# Patient Record
Sex: FEMALE | Race: BLACK OR AFRICAN AMERICAN | Employment: OTHER | URBAN - METROPOLITAN AREA
[De-identification: names, ages, dates, MRNs, and addresses within clinical notes are randomized per-mention and may not be internally consistent; named-entity substitution may affect disease eponyms.]

---

## 2017-01-04 ENCOUNTER — OFFICE VISIT (OUTPATIENT)
Dept: FAMILY MEDICINE CLINIC | Age: 57
End: 2017-01-04

## 2017-01-04 VITALS
RESPIRATION RATE: 16 BRPM | HEIGHT: 67 IN | TEMPERATURE: 98.5 F | DIASTOLIC BLOOD PRESSURE: 84 MMHG | SYSTOLIC BLOOD PRESSURE: 139 MMHG | WEIGHT: 131 LBS | OXYGEN SATURATION: 98 % | BODY MASS INDEX: 20.56 KG/M2 | HEART RATE: 99 BPM

## 2017-01-04 DIAGNOSIS — F33.9 RECURRENT MAJOR DEPRESSIVE DISORDER, REMISSION STATUS UNSPECIFIED (HCC): ICD-10-CM

## 2017-01-04 DIAGNOSIS — D50.9 IRON DEFICIENCY ANEMIA, UNSPECIFIED IRON DEFICIENCY ANEMIA TYPE: ICD-10-CM

## 2017-01-04 DIAGNOSIS — E09.9 STEROID-INDUCED DIABETES (HCC): ICD-10-CM

## 2017-01-04 DIAGNOSIS — I27.82 OTHER CHRONIC PULMONARY EMBOLISM WITHOUT ACUTE COR PULMONALE (HCC): ICD-10-CM

## 2017-01-04 DIAGNOSIS — H61.23 EXCESSIVE CERUMEN IN EAR CANAL, BILATERAL: ICD-10-CM

## 2017-01-04 DIAGNOSIS — T38.0X5A STEROID-INDUCED DIABETES (HCC): ICD-10-CM

## 2017-01-04 DIAGNOSIS — I10 ESSENTIAL HYPERTENSION: Primary | ICD-10-CM

## 2017-01-04 DIAGNOSIS — I82.501 CHRONIC DEEP VEIN THROMBOSIS (DVT) OF RIGHT LOWER EXTREMITY, UNSPECIFIED VEIN (HCC): ICD-10-CM

## 2017-01-04 LAB — HBA1C MFR BLD HPLC: 5.5 %

## 2017-01-04 NOTE — PROGRESS NOTES
HISTORY OF PRESENT ILLNESS  Paulette Gotti is a 60 y.o. female. 1/4/2017  10:02 AM    Chief Complaint   Patient presents with    Follow Up Chronic Condition       HPI: Here today for follow up on chronic disease. Had labs done 8/2016 and 9/2016. Follows with general oncology and radiation oncology for treatment of lung cancer- undergoing radiation and chemotherapy. HTN: Taking Norvasc as prescribed. Does not check BP at home. No complaints of headaches, dizziness, chest pain, SOB, or leg swelling. ECHO last done 3/2016 with EJF 55-60%. EKG done 3/2016 with ST, nonspecific T wave abnormality. 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 Anemia: Has been taking iron tablets as prescribed- has not began taking with Vit C yet, but no longer taking with milk. Has been following with oncology. Negative heme stools 8/2016. Taking stool softener daily. PE/DVT: History PE and right leg DVT 2/2016. On Xarelto and has been doing well with the medication. No signs of bleeding or side effects. She reports taking as prescribed. Anticipated lifetime on medication. Complains of bilateral ear fullness that has been happening for the last few days-she states that she was in the shower and the water has not seemed to come out of her ears. No pain, no fevers, or other ENT symptoms. Review of Systems   Constitutional: Negative for chills, fever and malaise/fatigue. HENT: Negative for congestion, ear pain and sore throat.         +ear fullness   Eyes: Negative for double vision, photophobia and pain. Respiratory: Negative for cough, shortness of breath and wheezing. Cardiovascular: Negative for chest pain, palpitations and leg swelling. Gastrointestinal: Negative for abdominal pain, constipation, diarrhea, nausea and vomiting. Genitourinary: Negative for dysuria, frequency and urgency. Musculoskeletal: Negative for back pain, joint pain and myalgias.    Skin: Negative for rash.   Neurological: Negative for dizziness, weakness and headaches. Psychiatric/Behavioral: Negative for depression, substance abuse and suicidal ideas. The patient is not nervous/anxious and does not have insomnia. 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      Social History Main Topics    Smoking status: Former Smoker     Packs/day: 0.50     Quit date: 2/28/2016    Smokeless tobacco: Not on file    Alcohol use No    Drug use: Yes     Special: Prescription    Sexual activity: Yes     Partners: Male     Birth control/ protection: None     Other Topics Concern    Not on file     Social History Narrative       Family History   Problem Relation Age of Onset    Cancer Sister 48     breast    Liver Disease Sister      cirrhosis    Heart Attack Mother 37    No Known Problems Child        Allergies   Allergen Reactions    Flagyl [Metronidazole] Hives       Current Outpatient Prescriptions   Medication Sig Dispense Refill    sertraline (ZOLOFT) 50 mg tablet Take 1 Tab by mouth daily. 30 Tab 6    rivaroxaban (XARELTO) 20 mg tab tablet Take 1 Tab by mouth daily (with breakfast). 30 Tab 6    ferrous sulfate 325 mg (65 mg iron) tablet Take 1 Tab by mouth two (2) times daily (with meals). 60 Tab 11    amLODIPine (NORVASC) 5 mg tablet Take 1 Tab by mouth daily. 30 Tab 6    ascorbic acid, vitamin C, (VITAMIN C) 100 mg tab Take 1 Tab by mouth daily. 30 Tab 11    senna-docusate (PERICOLACE) 8.6-50 mg per tablet Take 1 Tab by mouth daily. 30 Tab 0    albuterol (PROVENTIL HFA, VENTOLIN HFA, PROAIR HFA) 90 mcg/actuation inhaler Take 2 Puffs by inhalation every four (4) hours as needed for Wheezing or Shortness of Breath. 1 Inhaler 0    loratadine (CLARITIN) 10 mg tablet Take 10 mg by mouth daily. Health Maintenance and Screenings  Colon Cancer: 8/2016 negative FOBT, recent blood thinner 2/2016; consider Cologuard in future  COPD Screen/ Lung Cancer: Diagnosed with lung CA 2/2016- follows with oncology and seen pulmonology in hospital  CAD Screen: 8/2016 LDL 58, HDL 44, Trig 171  Diabetes: today A1c 5.5%- confirmed that elevated HgBA1c previously was steroid-induced. No true diabetes; however, will continue to monitor. DM meds: None  Foot exam DM: Done 7/2016  Microalbumin DM/HTN: Positive- considered ACE- will continue to monitor per patient preference  STD and HIV Screen: Declines screening  Breast Cancer: BIRADS 2 1/2015- elects to do every 2 years  Cervical Cancer: Hysterectomy in past  Osteoporosis Screen: DEXA to be done with next mammo  Hep C screening: Declines testing  Abdominal Aneurysm Screen: Not indicated  Opthalmology: Follows with eye doctor  Dentistry Services:    Hearing Impairment: No hearing loss noted  Skin Cancer Screen:    Obesity Screen: Body mass index is 20.83 kg/(m^2).     Flu Vaccination: Given 16/17 season  Pneumococcal Vaccine: Consider early vaccination  Shingles Vaccine: Not indicated for early vaccination   Tetanus Booster:   Hepatitis B Vaccinations:       *Will continue to follow up on      Advance Care Planning:   Patient was offered the opportunity to discuss advance care planning NO   Does patient have an Advance Directive:  YES   If no, did you provide information on [facility]ctions? Patient Care Team:  Patient Care Team:  Arron Soares NP as PCP - General (Nurse Practitioner)  Mike Gomez MD (Hematology and Oncology)  Jarvis Hernandez MD (Hematology and Oncology)  Whitney Moore MD (Radiation Oncology)        LABS:     Ref. Range 1/4/2017 10:05   Hemoglobin A1c (POC) Latest Units: % 5.5       RADIOLOGY:  None new to review      Physical Exam   Constitutional: She is oriented to person, place, and time. She appears well-developed and well-nourished. No distress. HENT:   Head: Normocephalic. Right Ear: External ear and ear canal normal.   Left Ear: External ear and ear canal normal.   Nose: Nose normal. No mucosal edema or rhinorrhea. Mouth/Throat: Uvula is midline, oropharynx is clear and moist and mucous membranes are normal. No oropharyngeal exudate, posterior oropharyngeal edema or posterior oropharyngeal erythema.   +cerumen bilateral ear canals impeding visualization of TMs   Eyes: EOM are normal. Pupils are equal, round, and reactive to light. Neck: Normal range of motion. Neck supple. No thyromegaly present. Cardiovascular: Normal rate, regular rhythm and normal heart sounds. No murmur heard. Pulmonary/Chest: Effort normal and breath sounds normal. No respiratory distress. Abdominal: Soft. Bowel sounds are normal. There is no tenderness. Musculoskeletal: She exhibits no edema. Lymphadenopathy:     She has no cervical adenopathy. Neurological: She is alert and oriented to person, place, and time. Skin: Skin is warm and dry.    Psychiatric: Her speech is normal and behavior is normal. Judgment normal. Her mood appears not anxious. Cognition and memory are normal. She does not exhibit a depressed mood. Vitals:    01/04/17 1001   BP: 139/84   Pulse: 99   Resp: 16   Temp: 98.5 °F (36.9 °C)   TempSrc: Oral   SpO2: 98%   Weight: 131 lb (59.4 kg)   Height: 5' 6.5\" (1.689 m)   PainSc:   0 - No pain         ASSESSMENT and PLAN  Alecia Nash was seen today for follow up chronic condition, medication refill and vaginal pain. Diagnoses and all orders for this visit:      Essential hypertension  *Controlled. Continue current medications. 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 Address every 6 months or PRN. - AMB POC HEMOGLOBIN A1C    Iron deficiency anemia, unspecified iron deficiency anemia type  *Discussed with patient- has stopped milk use with medication and this was commended. Recommend Vit C still and instructed to  at pharmacy. Recurrent major depressive disorder, remission status unspecified (HCC)  *Stable. Continue medications. Chronic deep vein thrombosis (DVT) of right lower extremity, unspecified vein (HCC)/ Other chronic pulmonary embolism without acute cor pulmonale (HCC)  *Stable. Continue Xarelto. Advised on preventing falls. Excessive cerumen in ear canal, bilateral  *Noted- recommended Debrox and rinsing ears at home in shower. Call or RTO if further lavage needed. Discussed signs of ear infection. - carbamide peroxide (DEBROX) 6.5 % otic solution; Administer 5 Drops into each ear two (2) times a day. Dispense: 7.5 mL; Refill: 0        *Plan of care reviewed with patient. Patient in agreement with plan and expresses understanding. All questions answered and patient encouraged to call or RTO if further questions or concerns. Follow-up Disposition:  Return in about 3 months (around 4/4/2017) for chronic disease followup.

## 2017-01-12 ENCOUNTER — HOSPITAL ENCOUNTER (OUTPATIENT)
Dept: INFUSION THERAPY | Age: 57
Discharge: HOME | End: 2017-01-12
Payer: MEDICAID

## 2017-01-12 VITALS
TEMPERATURE: 98 F | HEART RATE: 97 BPM | DIASTOLIC BLOOD PRESSURE: 82 MMHG | WEIGHT: 132.4 LBS | HEIGHT: 67 IN | SYSTOLIC BLOOD PRESSURE: 150 MMHG | OXYGEN SATURATION: 100 % | BODY MASS INDEX: 20.78 KG/M2 | RESPIRATION RATE: 18 BRPM

## 2017-01-12 LAB
ALBUMIN SERPL BCP-MCNC: 3.7 G/DL (ref 3.4–5)
ALBUMIN/GLOB SERPL: 1.1 {RATIO} (ref 0.8–1.7)
ALP SERPL-CCNC: 98 U/L (ref 45–117)
ALT SERPL-CCNC: 20 U/L (ref 13–56)
ANION GAP BLD CALC-SCNC: 9 MMOL/L (ref 3–18)
AST SERPL W P-5'-P-CCNC: 22 U/L (ref 15–37)
BASO+EOS+MONOS # BLD AUTO: 0.6 K/UL (ref 0–2.3)
BASO+EOS+MONOS # BLD AUTO: 10 % (ref 0.1–17)
BILIRUB SERPL-MCNC: <0.1 MG/DL (ref 0.2–1)
BUN SERPL-MCNC: 7 MG/DL (ref 7–18)
BUN/CREAT SERPL: 8 (ref 12–20)
CALCIUM SERPL-MCNC: 9.1 MG/DL (ref 8.5–10.1)
CHLORIDE SERPL-SCNC: 105 MMOL/L (ref 100–108)
CO2 SERPL-SCNC: 27 MMOL/L (ref 21–32)
CREAT SERPL-MCNC: 0.87 MG/DL (ref 0.6–1.3)
DIFFERENTIAL METHOD BLD: ABNORMAL
ERYTHROCYTE [DISTWIDTH] IN BLOOD BY AUTOMATED COUNT: 15.8 % (ref 11.5–14.5)
GLOBULIN SER CALC-MCNC: 3.3 G/DL (ref 2–4)
GLUCOSE SERPL-MCNC: 98 MG/DL (ref 74–99)
HCT VFR BLD AUTO: 33 % (ref 36–48)
HGB BLD-MCNC: 10.6 G/DL (ref 12–16)
LYMPHOCYTES # BLD AUTO: 20 % (ref 14–44)
LYMPHOCYTES # BLD: 1 K/UL (ref 1.1–5.9)
MCH RBC QN AUTO: 27.2 PG (ref 25–35)
MCHC RBC AUTO-ENTMCNC: 32.1 G/DL (ref 31–37)
MCV RBC AUTO: 84.6 FL (ref 78–102)
NEUTS SEG # BLD: 3.7 K/UL (ref 1.8–9.5)
NEUTS SEG NFR BLD AUTO: 70 % (ref 40–70)
PLATELET # BLD AUTO: 301 K/UL (ref 140–440)
POTASSIUM SERPL-SCNC: 3.8 MMOL/L (ref 3.5–5.5)
PROT SERPL-MCNC: 7 G/DL (ref 6.4–8.2)
RBC # BLD AUTO: 3.9 M/UL (ref 4.1–5.1)
SODIUM SERPL-SCNC: 141 MMOL/L (ref 136–145)
WBC # BLD AUTO: 5.3 K/UL (ref 4.5–13)

## 2017-01-12 PROCEDURE — 96375 TX/PRO/DX INJ NEW DRUG ADDON: CPT

## 2017-01-12 PROCEDURE — 80053 COMPREHEN METABOLIC PANEL: CPT | Performed by: INTERNAL MEDICINE

## 2017-01-12 PROCEDURE — 74011250636 HC RX REV CODE- 250/636

## 2017-01-12 PROCEDURE — 96409 CHEMO IV PUSH SNGL DRUG: CPT

## 2017-01-12 PROCEDURE — 85025 COMPLETE CBC W/AUTO DIFF WBC: CPT | Performed by: INTERNAL MEDICINE

## 2017-01-12 PROCEDURE — 74011000258 HC RX REV CODE- 258: Performed by: INTERNAL MEDICINE

## 2017-01-12 PROCEDURE — 36415 COLL VENOUS BLD VENIPUNCTURE: CPT | Performed by: INTERNAL MEDICINE

## 2017-01-12 PROCEDURE — 74011250636 HC RX REV CODE- 250/636: Performed by: INTERNAL MEDICINE

## 2017-01-12 PROCEDURE — 77030012965 HC NDL HUBR BBMI -A

## 2017-01-12 PROCEDURE — 96372 THER/PROPH/DIAG INJ SC/IM: CPT

## 2017-01-12 RX ORDER — SODIUM CHLORIDE 9 MG/ML
25 INJECTION, SOLUTION INTRAVENOUS CONTINUOUS
Status: DISPENSED | OUTPATIENT
Start: 2017-01-12 | End: 2017-01-13

## 2017-01-12 RX ORDER — HEPARIN SODIUM (PORCINE) LOCK FLUSH IV SOLN 100 UNIT/ML 100 UNIT/ML
SOLUTION INTRAVENOUS
Status: COMPLETED
Start: 2017-01-12 | End: 2017-01-12

## 2017-01-12 RX ORDER — ONDANSETRON 4 MG/1
4 TABLET, ORALLY DISINTEGRATING ORAL
COMMUNITY
End: 2018-04-05

## 2017-01-12 RX ADMIN — DEXAMETHASONE SODIUM PHOSPHATE 10 MG: 4 INJECTION, SOLUTION INTRA-ARTICULAR; INTRALESIONAL; INTRAMUSCULAR; INTRAVENOUS; SOFT TISSUE at 11:17

## 2017-01-12 RX ADMIN — SODIUM CHLORIDE 25 ML/HR: 900 INJECTION, SOLUTION INTRAVENOUS at 10:46

## 2017-01-12 RX ADMIN — HEPARIN SODIUM (PORCINE) LOCK FLUSH IV SOLN 100 UNIT/ML 500 UNITS: 100 SOLUTION at 12:19

## 2017-01-12 RX ADMIN — SODIUM CHLORIDE 16 MG: 900 INJECTION, SOLUTION INTRAVENOUS at 10:48

## 2017-01-12 RX ADMIN — PEGFILGRASTIM 6 MG: KIT SUBCUTANEOUS at 12:22

## 2017-01-12 RX ADMIN — SODIUM CHLORIDE 860 MG: 900 INJECTION, SOLUTION INTRAVENOUS at 12:05

## 2017-01-12 NOTE — PROGRESS NOTES
[facility] Progress Note    Date: 2017    Name: Ashlyn Cruz              MRN: [id number]              : 1960    Chemotherapy Cycle: C14      Pt to [facility], ambulatory, at 1010. Ms. Martha Guallpa was assessed and education was provided. Denies any complaints. Ms. Bashir Duenas vitals were reviewed. Visit Vitals    /82 (BP 1 Location: Right arm, BP Patient Position: Sitting)    Pulse 97    Temp 98 °F (36.7 °C)    Resp 18    Ht 5' 6.5\" (1.689 m)    Wt 60.1 kg (132 lb 6.4 oz)    SpO2 100%    Breastfeeding No    BMI 21.05 kg/m2     Left chest double mediport lateral lumen accessed with a 20 G 1 inch Downs needle after chloroprep. Flushes without difficulty and had brisk blood return. Labs drawn off and sent to lab for CBC and CMP per order after 10 mL waste. Flushed with 10 mL NS and NS started at [facility]. Lab results were obtained and reviewed. Recent Results (from the past 12 hour(s))   CBC WITH 3 PART DIFF    Collection Time: 17 10:34 AM   Result Value Ref Range    WBC 5.3 4.5 - 13.0 K/uL    RBC 3.90 (L) 4.10 - 5.10 M/uL    HGB 10.6 (L) 12.0 - 16 g/dL    HCT 33.0 (L) 36 - 48 %    MCV 84.6 78 - 102 FL    MCH 27.2 25.0 - 35.0 PG    MCHC 32.1 31 - 37 g/dL    RDW 15.8 (H) 11.5 - 14.5 %    PLATELET 794 770 - 451 K/uL    NEUTROPHILS 70 40 - 70 %    MIXED CELLS 10 0.1 - 17 %    LYMPHOCYTES 20 14 - 44 %    ABS. NEUTROPHILS 3.7 1.8 - 9.5 K/UL    ABS. MIXED CELLS 0.6 0.0 - 2.3 K/uL    ABS. LYMPHOCYTES 1.0 (L) 1.1 - 5.9 K/UL    DF AUTOMATED         Lab results within ordered parameters to give chemo today. ANC = 3.7, PLT = 301. Chemo dosages verified with today's BSA and found to be within 10% of ordered dosages. Pre-medications (Zofran 16 mg IV, Decadron 10 mg IV) were administered as ordered and chemotherapy was initiated after blood return from [facility] re-verified. Pemetrexed 860 mg was infused over 10 minutes as ordered. VS stable at end of infusion and pt denied complaints.  Line flushed with NS and blood return re-verified. Mediport flushed with 20 mL NS and 500 units of heparin per protocol before de accessing. Band-aid applied to site. Neulasta 6mg OBI was applied to the left lower quadrant of the abdomen. Green light indicator verified. Ms. Hannah Mahoney tolerated infusion, and had no complaints at this time. Patient armband removed and shredded. Ms. Hannah Mahoney was discharged from [place] [address] in stable condition at 1230. She has no further appointments with [facility] at this time.      Michael Burr RN  January 12, 2017

## 2017-03-09 ENCOUNTER — HOSPITAL ENCOUNTER (OUTPATIENT)
Dept: INFUSION THERAPY | Age: 57
Discharge: HOME | End: 2017-03-09
Payer: MEDICAID

## 2017-03-09 VITALS
OXYGEN SATURATION: 99 % | HEIGHT: 67 IN | RESPIRATION RATE: 18 BRPM | BODY MASS INDEX: 21.05 KG/M2 | HEART RATE: 90 BPM | WEIGHT: 134.1 LBS | TEMPERATURE: 97.9 F

## 2017-03-09 LAB
ALBUMIN SERPL BCP-MCNC: 3.6 G/DL (ref 3.4–5)
ALBUMIN/GLOB SERPL: 1 {RATIO} (ref 0.8–1.7)
ALP SERPL-CCNC: 99 U/L (ref 45–117)
ALT SERPL-CCNC: 12 U/L (ref 13–56)
ANION GAP BLD CALC-SCNC: 16 MMOL/L (ref 10–20)
ANION GAP BLD CALC-SCNC: 8 MMOL/L (ref 3–18)
AST SERPL W P-5'-P-CCNC: 18 U/L (ref 15–37)
BASO+EOS+MONOS # BLD AUTO: 0.1 K/UL (ref 0–2.3)
BASO+EOS+MONOS # BLD AUTO: 3 % (ref 0.1–17)
BILIRUB SERPL-MCNC: 0.2 MG/DL (ref 0.2–1)
BUN BLD-MCNC: 4 MG/DL (ref 7–18)
BUN SERPL-MCNC: 6 MG/DL (ref 7–18)
BUN/CREAT SERPL: 8 (ref 12–20)
CA-I BLD-MCNC: 1.19 MMOL/L (ref 1.12–1.32)
CALCIUM SERPL-MCNC: 8.9 MG/DL (ref 8.5–10.1)
CHLORIDE BLD-SCNC: 104 MMOL/L (ref 100–108)
CHLORIDE SERPL-SCNC: 106 MMOL/L (ref 100–108)
CO2 BLD-SCNC: 26 MMOL/L (ref 19–24)
CO2 SERPL-SCNC: 28 MMOL/L (ref 21–32)
CREAT SERPL-MCNC: 0.72 MG/DL (ref 0.6–1.3)
CREAT UR-MCNC: 0.7 MG/DL (ref 0.6–1.3)
DIFFERENTIAL METHOD BLD: ABNORMAL
ERYTHROCYTE [DISTWIDTH] IN BLOOD BY AUTOMATED COUNT: 14.4 % (ref 11.5–14.5)
GLOBULIN SER CALC-MCNC: 3.5 G/DL (ref 2–4)
GLUCOSE BLD STRIP.AUTO-MCNC: 88 MG/DL (ref 74–106)
GLUCOSE SERPL-MCNC: 82 MG/DL (ref 74–99)
HCT VFR BLD AUTO: 33.6 % (ref 36–48)
HCT VFR BLD CALC: 33 % (ref 36–49)
HGB BLD-MCNC: 10.7 G/DL (ref 12–16)
HGB BLD-MCNC: 11.2 G/DL (ref 12–16)
LYMPHOCYTES # BLD AUTO: 34 % (ref 14–44)
LYMPHOCYTES # BLD: 1.4 K/UL (ref 1.1–5.9)
MCH RBC QN AUTO: 26.3 PG (ref 25–35)
MCHC RBC AUTO-ENTMCNC: 31.8 G/DL (ref 31–37)
MCV RBC AUTO: 82.6 FL (ref 78–102)
NEUTS SEG # BLD: 2.6 K/UL (ref 1.8–9.5)
NEUTS SEG NFR BLD AUTO: 63 % (ref 40–70)
PLATELET # BLD AUTO: 177 K/UL (ref 140–440)
POTASSIUM BLD-SCNC: 3.7 MMOL/L (ref 3.5–5.5)
POTASSIUM SERPL-SCNC: 3.8 MMOL/L (ref 3.5–5.5)
PROT SERPL-MCNC: 7.1 G/DL (ref 6.4–8.2)
RBC # BLD AUTO: 4.07 M/UL (ref 4.1–5.1)
SODIUM BLD-SCNC: 142 MMOL/L (ref 136–145)
SODIUM SERPL-SCNC: 142 MMOL/L (ref 136–145)
WBC # BLD AUTO: 4.1 K/UL (ref 4.5–13)

## 2017-03-09 PROCEDURE — 74011250636 HC RX REV CODE- 250/636

## 2017-03-09 PROCEDURE — 85025 COMPLETE CBC W/AUTO DIFF WBC: CPT | Performed by: INTERNAL MEDICINE

## 2017-03-09 PROCEDURE — 80053 COMPREHEN METABOLIC PANEL: CPT | Performed by: INTERNAL MEDICINE

## 2017-03-09 PROCEDURE — 77030012965 HC NDL HUBR BBMI -A

## 2017-03-09 PROCEDURE — 96372 THER/PROPH/DIAG INJ SC/IM: CPT

## 2017-03-09 PROCEDURE — 36415 COLL VENOUS BLD VENIPUNCTURE: CPT | Performed by: INTERNAL MEDICINE

## 2017-03-09 PROCEDURE — 96375 TX/PRO/DX INJ NEW DRUG ADDON: CPT

## 2017-03-09 PROCEDURE — 96377 APPLICATON ON-BODY INJECTOR: CPT

## 2017-03-09 PROCEDURE — 74011250636 HC RX REV CODE- 250/636: Performed by: INTERNAL MEDICINE

## 2017-03-09 PROCEDURE — 74011000258 HC RX REV CODE- 258: Performed by: INTERNAL MEDICINE

## 2017-03-09 PROCEDURE — 80047 BASIC METABLC PNL IONIZED CA: CPT

## 2017-03-09 PROCEDURE — 96409 CHEMO IV PUSH SNGL DRUG: CPT

## 2017-03-09 RX ORDER — HEPARIN SODIUM (PORCINE) LOCK FLUSH IV SOLN 100 UNIT/ML 100 UNIT/ML
SOLUTION INTRAVENOUS
Status: COMPLETED
Start: 2017-03-09 | End: 2017-03-09

## 2017-03-09 RX ORDER — SODIUM CHLORIDE 0.9 % (FLUSH) 0.9 %
10-40 SYRINGE (ML) INJECTION AS NEEDED
Status: DISPENSED | OUTPATIENT
Start: 2017-03-09 | End: 2017-03-09

## 2017-03-09 RX ORDER — HEPARIN SODIUM (PORCINE) LOCK FLUSH IV SOLN 100 UNIT/ML 100 UNIT/ML
500 SOLUTION INTRAVENOUS AS NEEDED
Status: ACTIVE | OUTPATIENT
Start: 2017-03-09 | End: 2017-03-10

## 2017-03-09 RX ORDER — SODIUM CHLORIDE 9 MG/ML
25 INJECTION, SOLUTION INTRAVENOUS CONTINUOUS
Status: DISPENSED | OUTPATIENT
Start: 2017-03-09 | End: 2017-03-10

## 2017-03-09 RX ORDER — CYANOCOBALAMIN 1000 UG/ML
1000 INJECTION, SOLUTION INTRAMUSCULAR; SUBCUTANEOUS ONCE
Status: COMPLETED | OUTPATIENT
Start: 2017-03-09 | End: 2017-03-09

## 2017-03-09 RX ADMIN — SODIUM CHLORIDE 25 ML/HR: 900 INJECTION, SOLUTION INTRAVENOUS at 12:26

## 2017-03-09 RX ADMIN — HEPARIN SODIUM (PORCINE) LOCK FLUSH IV SOLN 100 UNIT/ML 500 UNITS: 100 SOLUTION at 13:55

## 2017-03-09 RX ADMIN — SODIUM CHLORIDE 850 MG: 900 INJECTION, SOLUTION INTRAVENOUS at 13:35

## 2017-03-09 RX ADMIN — CYANOCOBALAMIN 1000 MCG: 1000 INJECTION, SOLUTION INTRAMUSCULAR at 13:48

## 2017-03-09 RX ADMIN — Medication 20 ML: at 11:40

## 2017-03-09 RX ADMIN — ONDANSETRON 16 MG: 2 INJECTION INTRAMUSCULAR; INTRAVENOUS at 12:26

## 2017-03-09 RX ADMIN — DEXAMETHASONE SODIUM PHOSPHATE 10 MG: 4 INJECTION, SOLUTION INTRA-ARTICULAR; INTRALESIONAL; INTRAMUSCULAR; INTRAVENOUS; SOFT TISSUE at 12:27

## 2017-03-09 RX ADMIN — Medication 10 ML: at 13:55

## 2017-03-09 RX ADMIN — PEGFILGRASTIM 6 MG: KIT SUBCUTANEOUS at 13:48

## 2017-03-09 NOTE — PROGRESS NOTES
[facility] Progress Note    Date: 2017    Name: Andria Hurst              MRN: [id number]              : 1960    Chemotherapy Cycle: C15      Pt to [facility], ambulatory, at 1115. Ms. Eulalia Crowley was assessed and education was provided. Denies any complaints. Ms. Arabella Hartmann vitals were reviewed. Visit Vitals    Pulse 90    Temp 97.9 °F (36.6 °C)    Resp 18    Ht 5' 6.5\" (1.689 m)    Wt 60.8 kg (134 lb 1.6 oz)    SpO2 99%    Breastfeeding No    BMI 21.32 kg/m2     Left chest double mediport lateral lumen accessed with a 20 G 1 inch Downs needle after chloroprep. Flushes without difficulty and had brisk blood return. Labs drawn off and sent to lab for CBC and CMP per order after 10 mL waste. Flushed with 10 mL NS and NS started at [facility]. Lab results were obtained and reviewed. Recent Results (from the past 12 hour(s))   CBC WITH 3 PART DIFF    Collection Time: 17 11:37 AM   Result Value Ref Range    WBC 4.1 (L) 4.5 - 13.0 K/uL    RBC 4.07 (L) 4.10 - 5.10 M/uL    HGB 10.7 (L) 12.0 - 16 g/dL    HCT 33.6 (L) 36 - 48 %    MCV 82.6 78 - 102 FL    MCH 26.3 25.0 - 35.0 PG    MCHC 31.8 31 - 37 g/dL    RDW 14.4 11.5 - 14.5 %    PLATELET 507 649 - 650 K/uL    NEUTROPHILS 63 40 - 70 %    MIXED CELLS 3 0.1 - 17 %    LYMPHOCYTES 34 14 - 44 %    ABS. NEUTROPHILS 2.6 1.8 - 9.5 K/UL    ABS. MIXED CELLS 0.1 0.0 - 2.3 K/uL    ABS.  LYMPHOCYTES 1.4 1.1 - 5.9 K/UL    DF AUTOMATED     METABOLIC PANEL, COMPREHENSIVE    Collection Time: 17 11:37 AM   Result Value Ref Range    Sodium 142 136 - 145 mmol/L    Potassium 3.8 3.5 - 5.5 mmol/L    Chloride 106 100 - 108 mmol/L    CO2 28 21 - 32 mmol/L    Anion gap 8 3.0 - 18 mmol/L    Glucose 82 74 - 99 mg/dL    BUN 6 (L) 7.0 - 18 MG/DL    Creatinine 0.72 0.6 - 1.3 MG/DL    BUN/Creatinine ratio 8 (L) 12 - 20      GFR est AA >60 >60 ml/min/1.73m2    GFR est non-AA >60 >60 ml/min/1.73m2    Calcium 8.9 8.5 - 10.1 MG/DL    Bilirubin, total 0.2 0.2 - 1.0 MG/DL    ALT (SGPT) 12 (L) 13 - 56 U/L    AST (SGOT) 18 15 - 37 U/L    Alk. phosphatase 99 45 - 117 U/L    Protein, total 7.1 6.4 - 8.2 g/dL    Albumin 3.6 3.4 - 5.0 g/dL    Globulin 3.5 2.0 - 4.0 g/dL    A-G Ratio 1.0 0.8 - 1.7     POC CHEM8    Collection Time: 03/09/17 11:43 AM   Result Value Ref Range    CO2 (POC) 26 (H) 19 - 24 MMOL/L    Glucose (POC) 88 74 - 106 MG/DL    BUN (POC) 4 (L) 7 - 18 MG/DL    Creatinine (POC) 0.7 0.6 - 1.3 MG/DL    GFR-AA (POC) >60 >60 ml/min/1.73m2    GFR, non-AA (POC) >60 >60 ml/min/1.73m2    Sodium (POC) 142 136 - 145 MMOL/L    Potassium (POC) 3.7 3.5 - 5.5 MMOL/L    Calcium, ionized (POC) 1.19 1.12 - 1.32 MMOL/L    Chloride (POC) 104 100 - 108 MMOL/L    Anion gap (POC) 16 10 - 20      Hematocrit (POC) 33 (L) 36 - 49 %    Hemoglobin (POC) 11.2 (L) 12 - 16 G/DL       Lab results within ordered parameters to give chemo today. ANC = 2.6, PLT = 177. Chemo dosages verified with today's BSA and found to be within 10% of ordered dosages. Pre-medications (Zofran 16 mg IV, Decadron 10 mg IV) were administered as ordered and chemotherapy was initiated after blood return from [facility] re-verified. Pemetrexed 850 mg was infused over 10 minutes as ordered. VS stable at end of infusion and pt denied complaints. Line flushed with NS and blood return re-verified. Mediport flushed with 20 mL NS and 500 units of heparin per protocol before de accessing. Band-aid applied to site. B12 IM injection given today in right deltoid. Neulasta 6mg OBI was applied to the left lower quadrant of the abdomen. Green light indicator verified. Ms. Hazel Santizo tolerated infusion, and had no complaints at this time. Patient armband removed and shredded. Ms. Hazel Friend was discharged from [place] [address] in stable condition at 1405. She has her next appt March 30 at 1000 for Alimta C16.      Queenie Rubio RN  March 9, 2017

## 2017-03-29 ENCOUNTER — TELEPHONE (OUTPATIENT)
Dept: FAMILY MEDICINE CLINIC | Age: 57
End: 2017-03-29

## 2017-03-29 NOTE — TELEPHONE ENCOUNTER
Received call from [facility] at [facility]. She needed diagnosis code for occult stool blood test from 8/31/2016. I gave her code R19.5 per Teetee Hartman NP previous order from 7/1/16. This was a repeat test. [facility] verbalized understanding.

## 2017-03-30 ENCOUNTER — HOSPITAL ENCOUNTER (OUTPATIENT)
Dept: INFUSION THERAPY | Age: 57
Discharge: HOME | End: 2017-03-30
Payer: MEDICAID

## 2017-03-30 VITALS
TEMPERATURE: 97.9 F | HEART RATE: 82 BPM | WEIGHT: 131.7 LBS | DIASTOLIC BLOOD PRESSURE: 76 MMHG | OXYGEN SATURATION: 97 % | RESPIRATION RATE: 18 BRPM | BODY MASS INDEX: 20.67 KG/M2 | HEIGHT: 67 IN | SYSTOLIC BLOOD PRESSURE: 126 MMHG

## 2017-03-30 LAB
ALBUMIN SERPL BCP-MCNC: 3.6 G/DL (ref 3.4–5)
ALBUMIN/GLOB SERPL: 1 {RATIO} (ref 0.8–1.7)
ALP SERPL-CCNC: 100 U/L (ref 45–117)
ALT SERPL-CCNC: 19 U/L (ref 13–56)
ANION GAP BLD CALC-SCNC: 7 MMOL/L (ref 3–18)
AST SERPL W P-5'-P-CCNC: 22 U/L (ref 15–37)
BASO+EOS+MONOS # BLD AUTO: 0.5 K/UL (ref 0–2.3)
BASO+EOS+MONOS # BLD AUTO: 10 % (ref 0.1–17)
BILIRUB SERPL-MCNC: 0.2 MG/DL (ref 0.2–1)
BUN SERPL-MCNC: 10 MG/DL (ref 7–18)
BUN/CREAT SERPL: 14 (ref 12–20)
CALCIUM SERPL-MCNC: 8.9 MG/DL (ref 8.5–10.1)
CHLORIDE SERPL-SCNC: 106 MMOL/L (ref 100–108)
CO2 SERPL-SCNC: 28 MMOL/L (ref 21–32)
CREAT SERPL-MCNC: 0.71 MG/DL (ref 0.6–1.3)
DIFFERENTIAL METHOD BLD: ABNORMAL
ERYTHROCYTE [DISTWIDTH] IN BLOOD BY AUTOMATED COUNT: 15.7 % (ref 11.5–14.5)
GLOBULIN SER CALC-MCNC: 3.5 G/DL (ref 2–4)
GLUCOSE SERPL-MCNC: 92 MG/DL (ref 74–99)
HCT VFR BLD AUTO: 32.6 % (ref 36–48)
HGB BLD-MCNC: 10.8 G/DL (ref 12–16)
LYMPHOCYTES # BLD AUTO: 27 % (ref 14–44)
LYMPHOCYTES # BLD: 1.2 K/UL (ref 1.1–5.9)
MCH RBC QN AUTO: 27.6 PG (ref 25–35)
MCHC RBC AUTO-ENTMCNC: 33.1 G/DL (ref 31–37)
MCV RBC AUTO: 83.2 FL (ref 78–102)
NEUTS SEG # BLD: 2.7 K/UL (ref 1.8–9.5)
NEUTS SEG NFR BLD AUTO: 63 % (ref 40–70)
PLATELET # BLD AUTO: 348 K/UL (ref 140–440)
POTASSIUM SERPL-SCNC: 4 MMOL/L (ref 3.5–5.5)
PROT SERPL-MCNC: 7.1 G/DL (ref 6.4–8.2)
RBC # BLD AUTO: 3.92 M/UL (ref 4.1–5.1)
SODIUM SERPL-SCNC: 141 MMOL/L (ref 136–145)
WBC # BLD AUTO: 4.4 K/UL (ref 4.5–13)

## 2017-03-30 PROCEDURE — 74011250636 HC RX REV CODE- 250/636

## 2017-03-30 PROCEDURE — 85025 COMPLETE CBC W/AUTO DIFF WBC: CPT | Performed by: INTERNAL MEDICINE

## 2017-03-30 PROCEDURE — 74011000258 HC RX REV CODE- 258: Performed by: INTERNAL MEDICINE

## 2017-03-30 PROCEDURE — 96375 TX/PRO/DX INJ NEW DRUG ADDON: CPT

## 2017-03-30 PROCEDURE — 96377 APPLICATON ON-BODY INJECTOR: CPT

## 2017-03-30 PROCEDURE — 77030012965 HC NDL HUBR BBMI -A

## 2017-03-30 PROCEDURE — 96409 CHEMO IV PUSH SNGL DRUG: CPT

## 2017-03-30 PROCEDURE — 74011250636 HC RX REV CODE- 250/636: Performed by: INTERNAL MEDICINE

## 2017-03-30 PROCEDURE — 80053 COMPREHEN METABOLIC PANEL: CPT | Performed by: INTERNAL MEDICINE

## 2017-03-30 RX ORDER — SODIUM CHLORIDE 9 MG/ML
250 INJECTION, SOLUTION INTRAVENOUS ONCE
Status: COMPLETED | OUTPATIENT
Start: 2017-03-30 | End: 2017-03-30

## 2017-03-30 RX ORDER — HEPARIN SODIUM (PORCINE) LOCK FLUSH IV SOLN 100 UNIT/ML 100 UNIT/ML
500 SOLUTION INTRAVENOUS AS NEEDED
Status: DISPENSED | OUTPATIENT
Start: 2017-03-30 | End: 2017-03-31

## 2017-03-30 RX ORDER — SODIUM CHLORIDE 0.9 % (FLUSH) 0.9 %
10-40 SYRINGE (ML) INJECTION AS NEEDED
Status: DISCONTINUED | OUTPATIENT
Start: 2017-03-30 | End: 2017-04-03 | Stop reason: HOSPADM

## 2017-03-30 RX ADMIN — SODIUM CHLORIDE 850 MG: 900 INJECTION, SOLUTION INTRAVENOUS at 11:53

## 2017-03-30 RX ADMIN — PEGFILGRASTIM 6 MG: KIT SUBCUTANEOUS at 12:20

## 2017-03-30 RX ADMIN — Medication 10 ML: at 10:36

## 2017-03-30 RX ADMIN — DEXAMETHASONE SODIUM PHOSPHATE 10 MG: 4 INJECTION, SOLUTION INTRA-ARTICULAR; INTRALESIONAL; INTRAMUSCULAR; INTRAVENOUS; SOFT TISSUE at 11:05

## 2017-03-30 RX ADMIN — SODIUM CHLORIDE 250 ML: 900 INJECTION, SOLUTION INTRAVENOUS at 10:39

## 2017-03-30 RX ADMIN — HEPARIN SODIUM (PORCINE) LOCK FLUSH IV SOLN 100 UNIT/ML 500 UNITS: 100 SOLUTION at 12:13

## 2017-03-30 RX ADMIN — SODIUM CHLORIDE 16 MG: 900 INJECTION, SOLUTION INTRAVENOUS at 10:41

## 2017-03-30 RX ADMIN — Medication 20 ML: at 12:10

## 2017-03-30 NOTE — PROGRESS NOTES
[facility] Progress Note    Date: 2017    Name: Judith Zelaya              MRN: [id number]              : 1960    Chemotherapy Cycle: C16      Pt to [facility], ambulatory, at 1000. Ms. Ivelisse Ahumada was assessed and education was provided. Denies any complaints. Ms. Fung's vitals were reviewed. Visit Vitals    /76 (BP 1 Location: Right arm, BP Patient Position: Sitting)    Pulse 82    Temp 97.9 °F (36.6 °C)    Resp 18    Ht 5' 6.5\" (1.689 m)    Wt 59.7 kg (131 lb 11.2 oz)    SpO2 97%    BMI 20.94 kg/m2     Left chest double mediport lateral lumen accessed with a 20 G 1 inch Downs needle after chloroprep. Flushes without difficulty and had brisk blood return. Labs drawn off and sent to lab for CBC and CMP per order after 10 mL waste. Flushed with 10 mL NS and NS started at [facility]. Lab results were obtained and reviewed. Recent Results (from the past 12 hour(s))   CBC WITH 3 PART DIFF    Collection Time: 17 10:16 AM   Result Value Ref Range    WBC 4.4 (L) 4.5 - 13.0 K/uL    RBC 3.92 (L) 4.10 - 5.10 M/uL    HGB 10.8 (L) 12.0 - 16 g/dL    HCT 32.6 (L) 36 - 48 %    MCV 83.2 78 - 102 FL    MCH 27.6 25.0 - 35.0 PG    MCHC 33.1 31 - 37 g/dL    RDW 15.7 (H) 11.5 - 14.5 %    PLATELET 407 844 - 580 K/uL    NEUTROPHILS 63 40 - 70 %    MIXED CELLS 10 0.1 - 17 %    LYMPHOCYTES 27 14 - 44 %    ABS. NEUTROPHILS 2.7 1.8 - 9.5 K/UL    ABS. MIXED CELLS 0.5 0.0 - 2.3 K/uL    ABS. LYMPHOCYTES 1.2 1.1 - 5.9 K/UL    DF AUTOMATED         Lab results within ordered parameters to give chemo today. ANC = 2.7, PLT = 348 and WBC= 4.4. Chemo dosages verified with today's BSA and found to be within 10% of ordered dosages. Pre-medications (Zofran 16 mg IV, Decadron 10 mg IV) were administered as ordered and chemotherapy was initiated after blood return from [facility] re-verified. Pemetrexed 850 mg was infused over 10 minutes as ordered. VS stable at end of infusion and pt denied complaints.  Line flushed with NS and blood return re-verified. Chemotherapy medication administered and monitored by ROSARIO Thorpe RN. Mediport flushed with 20 mL NS and 500 units of heparin per protocol before de accessing. Band-aid applied to site. Neulasta 6mg OBI was applied to the left lower quadrant of the abdomen. Green light indicator verified. Ms. Jeromy Valderrama tolerated infusion, and had no complaints at this time. Patient armband removed and shredded. Ms. Jeromy Valderrama was discharged from [place] [address] in stable condition at 1225. She has her next appt 04/20/2017 at 1000 for Alimta C17.      Guillermina Sultana RN  March 30, 2017

## 2017-04-17 DIAGNOSIS — F33.9 RECURRENT MAJOR DEPRESSIVE DISORDER, REMISSION STATUS UNSPECIFIED (HCC): ICD-10-CM

## 2017-04-19 RX ORDER — SERTRALINE HYDROCHLORIDE 50 MG/1
50 TABLET, FILM COATED ORAL DAILY
Qty: 30 TAB | Refills: 6 | Status: SHIPPED | OUTPATIENT
Start: 2017-04-19 | End: 2017-10-05 | Stop reason: SDUPTHER

## 2017-04-19 NOTE — TELEPHONE ENCOUNTER
4/19/2017  7:20 AM    Chief Complaint   Patient presents with    Medication Refill       Noted refill request for Zoloft. Last seen in office 1/2017 and upcoming appointment next week. Refill completed.

## 2017-04-20 ENCOUNTER — HOSPITAL ENCOUNTER (OUTPATIENT)
Dept: INFUSION THERAPY | Age: 57
Discharge: HOME | End: 2017-04-20
Payer: SELF-PAY

## 2017-04-20 VITALS
HEIGHT: 67 IN | HEART RATE: 90 BPM | WEIGHT: 134.6 LBS | DIASTOLIC BLOOD PRESSURE: 80 MMHG | OXYGEN SATURATION: 97 % | BODY MASS INDEX: 21.12 KG/M2 | TEMPERATURE: 98.3 F | SYSTOLIC BLOOD PRESSURE: 117 MMHG | RESPIRATION RATE: 18 BRPM

## 2017-04-20 LAB
ALBUMIN SERPL BCP-MCNC: 3.7 G/DL (ref 3.4–5)
ALBUMIN/GLOB SERPL: 1.2 {RATIO} (ref 0.8–1.7)
ALP SERPL-CCNC: 93 U/L (ref 45–117)
ALT SERPL-CCNC: 15 U/L (ref 13–56)
ANION GAP BLD CALC-SCNC: 7 MMOL/L (ref 3–18)
AST SERPL W P-5'-P-CCNC: 17 U/L (ref 15–37)
BASOPHILS # BLD AUTO: 0 K/UL (ref 0–0.1)
BASOPHILS # BLD: 1 % (ref 0–2)
BILIRUB SERPL-MCNC: 0.2 MG/DL (ref 0.2–1)
BUN SERPL-MCNC: 10 MG/DL (ref 7–18)
BUN/CREAT SERPL: 13 (ref 12–20)
CALCIUM SERPL-MCNC: 8.8 MG/DL (ref 8.5–10.1)
CHLORIDE SERPL-SCNC: 107 MMOL/L (ref 100–108)
CO2 SERPL-SCNC: 29 MMOL/L (ref 21–32)
CREAT SERPL-MCNC: 0.79 MG/DL (ref 0.6–1.3)
DIFFERENTIAL METHOD BLD: ABNORMAL
EOSINOPHIL # BLD: 0.1 K/UL (ref 0–0.4)
EOSINOPHIL NFR BLD: 3 % (ref 0–5)
ERYTHROCYTE [DISTWIDTH] IN BLOOD BY AUTOMATED COUNT: 17.4 % (ref 11.6–14.5)
GLOBULIN SER CALC-MCNC: 3.2 G/DL (ref 2–4)
GLUCOSE SERPL-MCNC: 106 MG/DL (ref 74–99)
HCT VFR BLD AUTO: 31.1 % (ref 35–45)
HGB BLD-MCNC: 10 G/DL (ref 12–16)
LYMPHOCYTES # BLD AUTO: 25 % (ref 21–52)
LYMPHOCYTES # BLD: 1 K/UL (ref 0.9–3.6)
MCH RBC QN AUTO: 26.7 PG (ref 24–34)
MCHC RBC AUTO-ENTMCNC: 32.2 G/DL (ref 31–37)
MCV RBC AUTO: 82.9 FL (ref 74–97)
MONOCYTES # BLD: 0.5 K/UL (ref 0.05–1.2)
MONOCYTES NFR BLD AUTO: 12 % (ref 3–10)
NEUTS SEG # BLD: 2.4 K/UL (ref 1.8–8)
NEUTS SEG NFR BLD AUTO: 59 % (ref 40–73)
PLATELET # BLD AUTO: 319 K/UL (ref 135–420)
PMV BLD AUTO: 10.3 FL (ref 9.2–11.8)
POTASSIUM SERPL-SCNC: 4 MMOL/L (ref 3.5–5.5)
PROT SERPL-MCNC: 6.9 G/DL (ref 6.4–8.2)
RBC # BLD AUTO: 3.75 M/UL (ref 4.2–5.3)
SODIUM SERPL-SCNC: 143 MMOL/L (ref 136–145)
WBC # BLD AUTO: 4 K/UL (ref 4.6–13.2)

## 2017-04-20 PROCEDURE — 74011250636 HC RX REV CODE- 250/636: Performed by: INTERNAL MEDICINE

## 2017-04-20 PROCEDURE — 80053 COMPREHEN METABOLIC PANEL: CPT | Performed by: INTERNAL MEDICINE

## 2017-04-20 PROCEDURE — 74011250636 HC RX REV CODE- 250/636

## 2017-04-20 PROCEDURE — 96375 TX/PRO/DX INJ NEW DRUG ADDON: CPT

## 2017-04-20 PROCEDURE — 96409 CHEMO IV PUSH SNGL DRUG: CPT

## 2017-04-20 PROCEDURE — 74011000258 HC RX REV CODE- 258

## 2017-04-20 PROCEDURE — 96377 APPLICATON ON-BODY INJECTOR: CPT

## 2017-04-20 PROCEDURE — 74011000258 HC RX REV CODE- 258: Performed by: INTERNAL MEDICINE

## 2017-04-20 PROCEDURE — 77030012965 HC NDL HUBR BBMI -A

## 2017-04-20 PROCEDURE — 85025 COMPLETE CBC W/AUTO DIFF WBC: CPT | Performed by: INTERNAL MEDICINE

## 2017-04-20 RX ORDER — HEPARIN SODIUM (PORCINE) LOCK FLUSH IV SOLN 100 UNIT/ML 100 UNIT/ML
500 SOLUTION INTRAVENOUS AS NEEDED
Status: DISPENSED | OUTPATIENT
Start: 2017-04-20 | End: 2017-04-21

## 2017-04-20 RX ORDER — SODIUM CHLORIDE 9 MG/ML
250 INJECTION, SOLUTION INTRAVENOUS ONCE
Status: COMPLETED | OUTPATIENT
Start: 2017-04-20 | End: 2017-04-20

## 2017-04-20 RX ORDER — SODIUM CHLORIDE 0.9 % (FLUSH) 0.9 %
10-40 SYRINGE (ML) INJECTION AS NEEDED
Status: DISCONTINUED | OUTPATIENT
Start: 2017-04-20 | End: 2017-04-24 | Stop reason: HOSPADM

## 2017-04-20 RX ADMIN — SODIUM CHLORIDE 16 MG: 900 INJECTION, SOLUTION INTRAVENOUS at 10:40

## 2017-04-20 RX ADMIN — Medication 10 ML: at 10:32

## 2017-04-20 RX ADMIN — DEXAMETHASONE SODIUM PHOSPHATE 10 MG: 4 INJECTION, SOLUTION INTRA-ARTICULAR; INTRALESIONAL; INTRAMUSCULAR; INTRAVENOUS; SOFT TISSUE at 11:12

## 2017-04-20 RX ADMIN — PEGFILGRASTIM 6 MG: KIT SUBCUTANEOUS at 13:04

## 2017-04-20 RX ADMIN — SODIUM CHLORIDE 850 MG: 900 INJECTION, SOLUTION INTRAVENOUS at 12:15

## 2017-04-20 RX ADMIN — HEPARIN SODIUM (PORCINE) LOCK FLUSH IV SOLN 100 UNIT/ML 500 UNITS: 100 SOLUTION at 13:00

## 2017-04-20 RX ADMIN — SODIUM CHLORIDE 250 ML: 900 INJECTION, SOLUTION INTRAVENOUS at 10:35

## 2017-04-20 RX ADMIN — Medication 20 ML: at 12:59

## 2017-04-20 NOTE — PROGRESS NOTES
[facility] Progress Note    Date: 2017    Name: Deborah Arrieta              MRN: [id number]              : 1960    Chemotherapy Cycle: C17      Pt to [facility], ambulatory, at 0950. Ms. Jamar Mendoza was assessed and education was provided. Denies any complaints. Ms. Fung's vitals were reviewed. Visit Vitals    /76 (BP 1 Location: Right arm, BP Patient Position: Sitting)    Pulse 75    Temp 98.4 °F (36.9 °C)    Resp 18    Ht 5' 6.5\" (1.689 m)    Wt 61.1 kg (134 lb 9.6 oz)    SpO2 95%    Breastfeeding No    BMI 21.4 kg/m2     Left chest double mediport lateral lumen accessed with a 20 G 1 inch Downs needle after chloroprep. Flushes without difficulty and had brisk blood return. Labs drawn off and sent to lab for CBC and CMP per order after 10 mL waste. Flushed with 10 mL NS and NS started at [facility]. Lab results were obtained and reviewed. No results found for this or any previous visit (from the past 12 hour(s)). Lab results within ordered parameters to give chemo today. ANC = 2.5, PLT = 319 and WBC= 4.1. Chemo dosages verified with today's BSA and found to be within 10% of ordered dosages. Pre-medications (Zofran 16 mg IV, Decadron 10 mg IV) were administered as ordered and chemotherapy was initiated after blood return from mediport re-verified. Pemetrexed 850 mg was infused over 10 minutes as ordered. VS stable at end of infusion and pt denied complaints. Line flushed with NS and blood return re-verified. Chemotherapy medication administered and monitored by ROSARIO Bradley Caro, RN. Mediport flushed with 20 mL NS and 500 units of heparin per protocol before de accessing. Band-aid applied to site. Neulasta 6mg OBI was applied to the left lower quadrant of the abdomen. Green light indicator verified. Pt informed that she can remove the device around 1830 tomorrow night. Pt stated understanding.      Ms. Jamar Mendoza tolerated infusion, and had no complaints at this time.    Patient armband removed and shredded. Ms. Ivelisse Ahumada was discharged from [place] [address] in stable condition at 1310. She has her next appt 05/11/2017 at 1000 for Alimta C18.      Lori Stringer RN  April 20, 2017

## 2017-04-25 ENCOUNTER — HOSPITAL ENCOUNTER (OUTPATIENT)
Dept: CT IMAGING | Age: 57
Discharge: HOME | End: 2017-04-25
Attending: INTERNAL MEDICINE
Payer: SELF-PAY

## 2017-04-25 DIAGNOSIS — C34.11 MALIGNANT NEOPLASM OF UPPER LOBE OF RIGHT LUNG (HCC): ICD-10-CM

## 2017-04-25 LAB — CREAT UR-MCNC: 0.8 MG/DL (ref 0.6–1.3)

## 2017-04-25 PROCEDURE — 74177 CT ABD & PELVIS W/CONTRAST: CPT

## 2017-04-25 PROCEDURE — 74011250636 HC RX REV CODE- 250/636

## 2017-04-25 PROCEDURE — 82565 ASSAY OF CREATININE: CPT

## 2017-04-25 PROCEDURE — 74011636320 HC RX REV CODE- 636/320: Performed by: INTERNAL MEDICINE

## 2017-04-25 RX ORDER — HEPARIN SODIUM (PORCINE) LOCK FLUSH IV SOLN 100 UNIT/ML 100 UNIT/ML
SOLUTION INTRAVENOUS
Status: COMPLETED
Start: 2017-04-25 | End: 2017-04-25

## 2017-04-25 RX ADMIN — HEPARIN SODIUM (PORCINE) LOCK FLUSH IV SOLN 100 UNIT/ML 500 UNITS: 100 SOLUTION at 10:30

## 2017-04-25 RX ADMIN — IOPAMIDOL 88 ML: 612 INJECTION, SOLUTION INTRAVENOUS at 10:30

## 2017-04-26 ENCOUNTER — OFFICE VISIT (OUTPATIENT)
Dept: FAMILY MEDICINE CLINIC | Age: 57
End: 2017-04-26

## 2017-04-26 VITALS
RESPIRATION RATE: 18 BRPM | TEMPERATURE: 99.3 F | HEART RATE: 103 BPM | SYSTOLIC BLOOD PRESSURE: 106 MMHG | HEIGHT: 67 IN | BODY MASS INDEX: 20.97 KG/M2 | DIASTOLIC BLOOD PRESSURE: 62 MMHG | OXYGEN SATURATION: 98 % | WEIGHT: 133.6 LBS

## 2017-04-26 DIAGNOSIS — I27.82 OTHER CHRONIC PULMONARY EMBOLISM WITHOUT ACUTE COR PULMONALE (HCC): ICD-10-CM

## 2017-04-26 DIAGNOSIS — I82.501 CHRONIC DEEP VEIN THROMBOSIS (DVT) OF RIGHT LOWER EXTREMITY, UNSPECIFIED VEIN (HCC): ICD-10-CM

## 2017-04-26 DIAGNOSIS — D50.9 IRON DEFICIENCY ANEMIA, UNSPECIFIED IRON DEFICIENCY ANEMIA TYPE: ICD-10-CM

## 2017-04-26 DIAGNOSIS — Z78.0 POST-MENOPAUSAL: ICD-10-CM

## 2017-04-26 DIAGNOSIS — F33.42 RECURRENT MAJOR DEPRESSIVE DISORDER, IN FULL REMISSION (HCC): ICD-10-CM

## 2017-04-26 DIAGNOSIS — I10 ESSENTIAL HYPERTENSION WITH GOAL BLOOD PRESSURE LESS THAN 140/90: Primary | ICD-10-CM

## 2017-04-26 DIAGNOSIS — Z13.820 SCREENING FOR OSTEOPOROSIS: ICD-10-CM

## 2017-04-26 DIAGNOSIS — Z12.31 ENCOUNTER FOR SCREENING MAMMOGRAM FOR BREAST CANCER: ICD-10-CM

## 2017-04-26 DIAGNOSIS — H61.23 EXCESSIVE EAR WAX, BILATERAL: ICD-10-CM

## 2017-04-26 RX ORDER — AMLODIPINE BESYLATE 5 MG/1
5 TABLET ORAL DAILY
Qty: 30 TAB | Refills: 6 | Status: SHIPPED | OUTPATIENT
Start: 2017-04-26 | End: 2017-10-05 | Stop reason: SDUPTHER

## 2017-04-26 NOTE — MR AVS SNAPSHOT
Visit Information Date & Time Provider Department Dept. Phone Encounter #  
 4/26/2017  9:45 AM Sima Hancock NP [facility] [phone] [id number] Follow-up Instructions Return in about 3 months (around 7/26/2017) for chronic disease followup- 30 min. Upcoming Health Maintenance Date Due DTaP/Tdap/Td series (1 - Tdap) 10/22/1979 BREAST CANCER SCRN MAMMOGRAM 1/8/2017 FOBT Q 1 YEAR AGE 50-75 8/18/2017 Pneumococcal 19-64 Highest Risk (2 of 3 - PCV13) 4/26/2018 Allergies as of 4/26/2017  Review Complete On: 4/26/2017 By: Mian Chowdhury LPN Severity Noted Reaction Type Reactions Flagyl [Metronidazole]  06/07/2015    Hives Current Immunizations  Reviewed on 4/20/2017 Name Date Influenza Vaccine (Quad) PF 10/4/2016 Not reviewed this visit You Were Diagnosed With   
  
 Codes Comments Essential hypertension with goal blood pressure less than 140/90    -  Primary ICD-10-CM: I10 
ICD-9-CM: 401.9 Iron deficiency anemia, unspecified iron deficiency anemia type     ICD-10-CM: D50.9 ICD-9-CM: 280.9 Recurrent major depressive disorder, [facility]     ICD-10-CM: F33.42 
ICD-9-CM: 296.36 Chronic deep vein thrombosis (DVT) of right lower extremity, unspecified vein (HCC)     ICD-10-CM: I82.501 ICD-9-CM: 453.50 Other chronic pulmonary embolism without acute cor pulmonale (HCC)     ICD-10-CM: I27.82 
ICD-9-CM: 416.2 Encounter for screening mammogram for breast cancer     ICD-10-CM: Z12.31 
ICD-9-CM: V76.12 Screening for osteoporosis     ICD-10-CM: Z13.820 ICD-9-CM: V82.81 Post-menopausal     ICD-10-CM: Z78.0 ICD-9-CM: V49.81 Excessive ear wax, bilateral     ICD-10-CM: H61.23 
ICD-9-CM: 380.4 Vitals BP Pulse Temp Resp Height(growth percentile) Weight(growth percentile)  106/62 (BP 1 Location: Right arm, BP Patient Position: Sitting) (!) 103 99.3 °F (37.4 °C) (Oral) 18 5' 6.5\" (1.689 m) 133 lb 9.6 oz (60.6 kg) SpO2 BMI OB Status Smoking Status 98% 21.24 kg/m2 Menopause Former Smoker Vitals History BMI and BSA Data Body Mass Index Body Surface Area  
 21.24 kg/m 2 1.69 m 2 Preferred Pharmacy Pharmacy Name Phone [facility] [address], [address] [facility] [phone] Your Updated Medication List  
  
   
This list is accurate as of: 4/26/17 10:23 AM.  Always use your most recent med list.  
  
  
  
  
 albuterol 90 mcg/actuation inhaler Commonly known as:  PROVENTIL HFA, VENTOLIN HFA, PROAIR HFA Take 2 Puffs by inhalation every four (4) hours as needed for Wheezing or Shortness of Breath. amLODIPine 5 mg tablet Commonly known as:  [place] Take 1 Tab by mouth daily. ascorbic acid (vitamin C) 100 mg tab Commonly known as:  VITAMIN C Take 1 Tab by mouth daily. carbamide peroxide 6.5 % otic solution Commonly known as:  Jamaal Filter Administer 5 Drops into each ear two (2) times a day. ferrous sulfate 325 mg (65 mg iron) tablet Take 1 Tab by mouth two (2) times daily (with meals). loratadine 10 mg tablet Commonly known as:  Ana Maria Castro Take 10 mg by mouth daily. rivaroxaban 20 mg Tab tablet Commonly known as:  Rubi Mendez Take 1 Tab by mouth daily (with breakfast). senna-docusate 8.6-50 mg per tablet Commonly known as:  Wava Dun Take 1 Tab by mouth daily. sertraline 50 mg tablet Commonly known as:  ZOLOFT Take 1 Tab by mouth daily. ZOFRAN ODT 4 mg disintegrating tablet Generic drug:  ondansetron Take 4 mg by mouth every eight (8) hours as needed for Nausea. Prescriptions Printed Refills  
 rivaroxaban (XARELTO) 20 mg tab tablet 6 Sig: Take 1 Tab by mouth daily (with breakfast). Class: Print Route: Oral  
  
Prescriptions Sent to Pharmacy Refills amLODIPine (NORVASC) 5 mg tablet 6 Sig: Take 1 Tab by mouth daily. Class: Normal  
 Pharmacy: [id number] [facility], [address] [place] [address] #: [phone] Route: Oral  
  
We Performed the Following REMOVE IMPACTED EAR WAX [99333 CPT(R)] Follow-up Instructions Return in about 3 months (around 7/26/2017) for chronic disease followup- 30 min. To-Do List   
 04/26/2017 Imaging:  D[facility] MAMMO BI SCREENING INCL CAD Around 04/27/2017 Imaging:  DEXA BONE DENSITY STUDY AXIAL   
  
 05/11/2017 10:00 AM  
  Appointment with [facility] NURSE 2 at [facility] ([phone])  
  
 06/01/2017 10:00 AM  
  Appointment with [facility] NURSE 1 at [facility] ([phone])  
  
 06/22/2017 10:00 AM  
  Appointment with [facility] NURSE 1 at [facility] ([phone]) Please provide this summary of care documentation to your next provider. Your primary care clinician is listed as Sugey Harris. If you have any questions after today's visit, please call [phone].

## 2017-04-26 NOTE — PROGRESS NOTES
HISTORY OF PRESENT ILLNESS  Rosa Jaime is a 60 y.o. female. 4/26/2017  9:42 AM    Chief Complaint   Patient presents with    Follow Up Chronic Condition     HTN, DOING WELL       HPI: Here today for follow up on chronic disease. Had labs done 8/2016 and 4/2017. Follows with oncology for treatment of lung cancer- undergoing chemotherapy at this time. Would like to have her ears checked since she has used the Debrox a few times at home. HTN: Taking Norvasc as prescribed. Does not check BP at home. No complaints of headaches, dizziness, chest pain, SOB, or leg swelling. ECHO last done 3/2016 with EJF 55-60%. EKG done 3/2016 with ST, nonspecific T wave abnormality. 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 Anemia: Has been taking iron tablets and Vit C as prescribed. Has been following with oncology. Negative heme stools 8/2016. Taking stool softener/laxative PRN. PE/DVT: History PE and right leg DVT 2/2016. On Xarelto and has been doing well with the medication. No signs of bleeding or side effects. She reports taking as prescribed. Anticipated lifetime on medication. Review of Systems   Constitutional: Negative for chills, fever and malaise/fatigue. HENT: Negative for congestion, ear pain and sore throat. Eyes: Negative for double vision, photophobia and pain. Respiratory: Negative for cough, shortness of breath and wheezing. Cardiovascular: Negative for chest pain, palpitations and leg swelling. Gastrointestinal: Negative for abdominal pain, constipation, diarrhea, nausea and vomiting. Genitourinary: Negative for dysuria, frequency and urgency. Musculoskeletal: Negative for back pain, joint pain and myalgias. Skin: Negative for rash. Neurological: Negative for dizziness, weakness and headaches. Psychiatric/Behavioral: Negative for depression, substance abuse and suicidal ideas. The patient is not nervous/anxious and does not have insomnia. History  Past Medical History:   Diagnosis Date    Anemia, iron deficiency 2/2016    Depression     H/O seasonal allergies     Hypertension     Non-small cell carcinoma of lung ([facility] 2/2016    adenocarcinoma of right lung    Positive occult stool blood test 2/29/2016    Pulmonary embolism ([facility] 2/28/2016    small, bilateral PEs- on Xarelto    Right leg DVT ([facility] 2/28/2016    on Xarelto    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[facility] 4/1/2016    resolved    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 Social History Main Topics    Smoking status: Former Smoker     Packs/day: 0.50     Quit date: 2/28/2016    Smokeless tobacco: Not on file    Alcohol use No    Drug use: Yes     Special: Prescription    Sexual activity: Yes     Partners: Male     Birth control/ protection: None     Other Topics Concern    Not on file     Social History Narrative       Family History   Problem Relation Age of Onset    Cancer Sister 48     breast    Liver Disease Sister      cirrhosis    Heart Attack Mother 37    No Known Problems Child        Allergies   Allergen Reactions    Flagyl [Metronidazole] Hives       Current Outpatient Prescriptions   Medication Sig Dispense Refill    rivaroxaban (XARELTO) 20 mg tab tablet Take 1 Tab by mouth daily (with breakfast). 30 Tab 6    amLODIPine (NORVASC) 5 mg tablet Take 1 Tab by mouth daily.  30 Tab 6    sertraline (ZOLOFT) 50 mg tablet Take 1 Tab by mouth daily. 30 Tab 6    ondansetron (ZOFRAN ODT) 4 mg disintegrating tablet Take 4 mg by mouth every eight (8) hours as needed for Nausea.  ferrous sulfate 325 mg (65 mg iron) tablet Take 1 Tab by mouth two (2) times daily (with meals). 60 Tab 11    ascorbic acid, vitamin C, (VITAMIN C) 100 mg tab Take 1 Tab by mouth daily. 30 Tab 11    albuterol (PROVENTIL HFA, VENTOLIN HFA, PROAIR HFA) 90 mcg/actuation inhaler Take 2 Puffs by inhalation every four (4) hours as needed for Wheezing or Shortness of Breath. 1 Inhaler 0    loratadine (CLARITIN) 10 mg tablet Take 10 mg by mouth daily.  carbamide peroxide (DEBROX) 6.5 % otic solution Administer 5 Drops into each ear two (2) times a day. 7.5 mL 0    senna-docusate (PERICOLACE) 8.6-50 mg per tablet Take 1 Tab by mouth daily. 30 Tab 0       Health Maintenance and Screenings  Colon Cancer: 8/2016 negative FOBT  COPD Screen/ Lung Cancer: Diagnosed with lung CA 2/2016- follows with oncology and seen pulmonology in hospital  CAD Screen: 8/2016 LDL 58, HDL 44, Trig 171  Diabetes: A1c 1/2017 5.5%- confirmed that elevated HgBA1c previously was steroid-induced. No true diabetes; however, will continue to monitor. DM meds: None  Foot exam DM: Done 7/2016  Microalbumin DM/HTN: Positive- considered ACE- will continue to monitor per patient preference  STD and HIV Screen: Declines screening  Breast Cancer: BIRADS 2 1/2015- reordered  Cervical Cancer: Hysterectomy in past  Osteoporosis Screen: DEXA ordered  Hep C screening: Declines testing  Abdominal Aneurysm Screen: Not indicated  Opthalmology: Follows with eye doctor  Dentistry Services:    Hearing Impairment: No hearing loss noted  Skin Cancer Screen:    Obesity Screen: Body mass index is 21.24 kg/(m^2).     Flu Vaccination: Given 16/17 season  Pneumococcal Vaccine: Consider early vaccination  Shingles Vaccine: Not indicated for early vaccination   Tetanus Booster:   Hepatitis B Vaccinations:       *Will continue to follow up on      Advance Care Planning:   Patient was offered the opportunity to discuss advance care planning NO   Does patient have an Advance Directive:  YES   If no, did you provide information on Caring Connections? Patient Care Team:  Patient Care Team:  Shreyas Rodriguez NP as PCP - General (Nurse Practitioner)  Maxine Villeda MD (Hematology and Oncology)  Pastora Delacruz MD (Hematology and Oncology)  Arthur Maria MD (Radiation Oncology)        LABS:       Ref. Range 4/20/2017 10:08   WBC Latest Ref Range: 4.6 - 13.2 K/uL 4.0 (L)   RBC Latest Ref Range: 4.20 - 5.30 M/uL 3.75 (L)   HGB Latest Ref Range: 12.0 - 16.0 g/dL 10.0 (L)   HCT Latest Ref Range: 35.0 - 45.0 % 31.1 (L)   MCV Latest Ref Range: 74.0 - 97.0 FL 82.9   MCH Latest Ref Range: 24.0 - 34.0 PG 26.7   MCHC Latest Ref Range: 31.0 - 37.0 g/dL 32.2   RDW Latest Ref Range: 11.6 - 14.5 % 17.4 (H)   PLATELET Latest Ref Range: 135 - 420 K/uL 319   MPV Latest Ref Range: 9.2 - 11.8 FL 10.3   NEUTROPHILS Latest Ref Range: 40 - 73 % 59   LYMPHOCYTES Latest Ref Range: 21 - 52 % 25   MONOCYTES Latest Ref Range: 3 - 10 % 12 (H)   EOSINOPHILS Latest Ref Range: 0 - 5 % 3   BASOPHILS Latest Ref Range: 0 - 2 % 1   DF Latest Units:   AUTOMATED   ABS. NEUTROPHILS Latest Ref Range: 1.8 - 8.0 K/UL 2.4   ABS. LYMPHOCYTES Latest Ref Range: 0.9 - 3.6 K/UL 1.0   ABS. MONOCYTES Latest Ref Range: 0.05 - 1.2 K/UL 0.5   ABS. EOSINOPHILS Latest Ref Range: 0.0 - 0.4 K/UL 0.1   ABS.  BASOPHILS Latest Ref Range: 0.0 - 0.1 K/UL 0.0   Sodium Latest Ref Range: 136 - 145 mmol/L 143   Potassium Latest Ref Range: 3.5 - 5.5 mmol/L 4.0   Chloride Latest Ref Range: 100 - 108 mmol/L 107   CO2 Latest Ref Range: 21 - 32 mmol/L 29   Anion gap Latest Ref Range: 3.0 - 18 mmol/L 7   Glucose Latest Ref Range: 74 - 99 mg/dL 106 (H)   BUN Latest Ref Range: 7.0 - 18 MG/DL 10   Creatinine Latest Ref Range: 0.6 - 1.3 MG/DL 0.79   BUN/Creatinine ratio Latest Ref Range: 12 - 20   13   Calcium Latest Ref Range: 8.5 - 10.1 MG/DL 8.8   GFR est non-AA Latest Ref Range: >60 ml/min/1.73m2 >60   GFR est AA Latest Ref Range: >60 ml/min/1.73m2 >60   Bilirubin, total Latest Ref Range: 0.2 - 1.0 MG/DL 0.2   Protein, total Latest Ref Range: 6.4 - 8.2 g/dL 6.9   Albumin Latest Ref Range: 3.4 - 5.0 g/dL 3.7   Globulin Latest Ref Range: 2.0 - 4.0 g/dL 3.2   A-G Ratio Latest Ref Range: 0.8 - 1.7   1.2   ALT Latest Ref Range: 13 - 56 U/L 15   AST Latest Ref Range: 15 - 37 U/L 17   Alk. phosphatase Latest Ref Range: 45 - 117 U/L 93       RADIOLOGY:  None new to review      Physical Exam   Constitutional: She is oriented to person, place, and time. She appears well-developed and well-nourished. No distress. HENT:   Head: Normocephalic. Right Ear: External ear and ear canal normal.   Left Ear: External ear and ear canal normal.   Nose: Nose normal. No mucosal edema or rhinorrhea. Mouth/Throat: Uvula is midline, oropharynx is clear and moist and mucous membranes are normal. No oropharyngeal exudate, posterior oropharyngeal edema or posterior oropharyngeal erythema.   +cerumen bilateral ear canals impeding visualization of TMs   Eyes: EOM are normal. Pupils are equal, round, and reactive to light. Neck: Normal range of motion. Neck supple. No thyromegaly present. Cardiovascular: Normal rate, regular rhythm and normal heart sounds. No murmur heard. Pulmonary/Chest: Effort normal and breath sounds normal. No respiratory distress. Abdominal: Soft. Bowel sounds are normal. There is no tenderness. Musculoskeletal: She exhibits no edema. Lymphadenopathy:     She has no cervical adenopathy. Neurological: She is alert and oriented to person, place, and time. She exhibits normal muscle tone. Coordination normal.   Skin: Skin is warm and dry. Psychiatric: Her speech is normal and behavior is normal. Judgment normal. Her mood appears not anxious.  Cognition and memory are normal. She does not exhibit a depressed mood. Vitals:    04/26/17 0927   BP: 106/62   Pulse: (!) 103   Resp: 18   Temp: 99.3 °F (37.4 °C)   TempSrc: Oral   SpO2: 98%   Weight: 133 lb 9.6 oz (60.6 kg)   Height: 5' 6.5\" (1.689 m)   PainSc:   0 - No pain       Procedure(s):  Discussed with patient about cerumen removal and the risk factors and predicted effectiveness. Risks and benefits explained. Procedure risks discussed including risk for infection, further impaction, ear drum rupture, dizziness, syncope, pain, or no benefit- patient verbalizes understanding and accepting of risks. Time out at 9:55 AM. After patient was identified by name and date of birth, verbal consent was obtained by patient and procedure site verified. Using large bulb syringe and collection container, room temperature water was pushed into the ear from entrance of ear canal. Water returned with some cerumen particles. 3 flushes done bilaterally with subsequent ear exam showing slight improvement; however, still presence of cerumen impeding TM visualization. Ear canal without tenderness or erythema. The procedure was well tolerated and patient denies any complaints. ASSESSMENT and PLAN  Bella Mathis was seen today for follow up chronic condition, medication refill and vaginal pain. Diagnoses and all orders for this visit:      Essential hypertension with goal blood pressure less than 140/90  *Controlled, continue current dosing. Refilled. - amLODIPine (NORVASC) 5 mg tablet; Take 1 Tab by mouth daily. Dispense: 30 Tab; Refill: 6    Iron deficiency anemia, unspecified iron deficiency anemia type  *Continue with current medication, continue with heme/onc. Recurrent major depressive disorder, in full remission ([facility]  *Continue on Zoloft- effective. Refills done recently.      Chronic deep vein thrombosis (DVT) of right lower extremity, unspecified vein (HCC)/ Other chronic pulmonary embolism without acute cor pulmonale ([facility]  *Refilled. Continue to monitor.   - rivaroxaban (XARELTO) 20 mg tab tablet; Take 1 Tab by mouth daily (with breakfast). Dispense: 30 Tab; Refill: 6    Encounter for screening mammogram for breast cancer  - DANIELLE MAMMO BI SCREENING INCL CAD; Future    Screening for osteoporosis/ Post-menopausal  - DEXA BONE DENSITY STUDY AXIAL; Future    Excessive ear wax, bilateral  *Done today in office. Patient has not done Debrox recently. Discussed with patient about using Debrox at home for a few days and then coming into office for flush. Also discussed ENT referral. She opts to continue working on ear cleanings at home and understands that objects should not be inserted into the ear canal.   - [id number] reviewed with patient. Patient in agreement with plan and expresses understanding. All questions answered and patient encouraged to call or RTO if further questions or concerns. Follow-up Disposition:  Return in about 3 months (around 7/26/2017) for chronic disease followup- 30 min.

## 2017-04-28 ENCOUNTER — TELEPHONE (OUTPATIENT)
Dept: FAMILY MEDICINE CLINIC | Age: 57
End: 2017-04-28

## 2017-04-28 NOTE — TELEPHONE ENCOUNTER
4/28/2017  12:53 PM    Chief Complaint   Patient presents with    Medication Problem       Attempted to call patient at this time. LM requesting call back about her question with Xarelto.

## 2017-05-11 ENCOUNTER — HOSPITAL ENCOUNTER (OUTPATIENT)
Dept: INFUSION THERAPY | Age: 57
Discharge: HOME | End: 2017-05-11
Payer: MEDICAID

## 2017-05-11 VITALS
HEIGHT: 67 IN | SYSTOLIC BLOOD PRESSURE: 139 MMHG | HEART RATE: 87 BPM | BODY MASS INDEX: 20.99 KG/M2 | DIASTOLIC BLOOD PRESSURE: 80 MMHG | TEMPERATURE: 98.1 F | RESPIRATION RATE: 18 BRPM | OXYGEN SATURATION: 98 % | WEIGHT: 133.7 LBS

## 2017-05-11 LAB
ALBUMIN SERPL BCP-MCNC: 3.7 G/DL (ref 3.4–5)
ALBUMIN/GLOB SERPL: 1.1 {RATIO} (ref 0.8–1.7)
ALP SERPL-CCNC: 107 U/L (ref 45–117)
ALT SERPL-CCNC: 16 U/L (ref 13–56)
ANION GAP BLD CALC-SCNC: 7 MMOL/L (ref 3–18)
AST SERPL W P-5'-P-CCNC: 20 U/L (ref 15–37)
BASO+EOS+MONOS # BLD AUTO: 0.5 K/UL (ref 0–2.3)
BASO+EOS+MONOS # BLD AUTO: 8 % (ref 0.1–17)
BILIRUB SERPL-MCNC: 0.3 MG/DL (ref 0.2–1)
BUN SERPL-MCNC: 10 MG/DL (ref 7–18)
BUN/CREAT SERPL: 13 (ref 12–20)
CALCIUM SERPL-MCNC: 8.9 MG/DL (ref 8.5–10.1)
CHLORIDE SERPL-SCNC: 106 MMOL/L (ref 100–108)
CO2 SERPL-SCNC: 27 MMOL/L (ref 21–32)
CREAT SERPL-MCNC: 0.77 MG/DL (ref 0.6–1.3)
DIFFERENTIAL METHOD BLD: ABNORMAL
ERYTHROCYTE [DISTWIDTH] IN BLOOD BY AUTOMATED COUNT: 17.6 % (ref 11.5–14.5)
GLOBULIN SER CALC-MCNC: 3.4 G/DL (ref 2–4)
GLUCOSE SERPL-MCNC: 124 MG/DL (ref 74–99)
HCT VFR BLD AUTO: 32.1 % (ref 36–48)
HGB BLD-MCNC: 10.4 G/DL (ref 12–16)
LYMPHOCYTES # BLD AUTO: 17 % (ref 14–44)
LYMPHOCYTES # BLD: 1 K/UL (ref 1.1–5.9)
MCH RBC QN AUTO: 27.2 PG (ref 25–35)
MCHC RBC AUTO-ENTMCNC: 32.4 G/DL (ref 31–37)
MCV RBC AUTO: 83.8 FL (ref 78–102)
NEUTS SEG # BLD: 4.5 K/UL (ref 1.8–9.5)
NEUTS SEG NFR BLD AUTO: 75 % (ref 40–70)
PLATELET # BLD AUTO: 282 K/UL (ref 140–440)
POTASSIUM SERPL-SCNC: 3.5 MMOL/L (ref 3.5–5.5)
PROT SERPL-MCNC: 7.1 G/DL (ref 6.4–8.2)
RBC # BLD AUTO: 3.83 M/UL (ref 4.1–5.1)
SODIUM SERPL-SCNC: 140 MMOL/L (ref 136–145)
WBC # BLD AUTO: 6 K/UL (ref 4.5–13)

## 2017-05-11 PROCEDURE — 74011250636 HC RX REV CODE- 250/636: Performed by: INTERNAL MEDICINE

## 2017-05-11 PROCEDURE — 96367 TX/PROPH/DG ADDL SEQ IV INF: CPT

## 2017-05-11 PROCEDURE — 96409 CHEMO IV PUSH SNGL DRUG: CPT

## 2017-05-11 PROCEDURE — 74011000258 HC RX REV CODE- 258: Performed by: INTERNAL MEDICINE

## 2017-05-11 PROCEDURE — 74011000258 HC RX REV CODE- 258

## 2017-05-11 PROCEDURE — 96372 THER/PROPH/DIAG INJ SC/IM: CPT

## 2017-05-11 PROCEDURE — 96365 THER/PROPH/DIAG IV INF INIT: CPT

## 2017-05-11 PROCEDURE — 77030012965 HC NDL HUBR BBMI -A

## 2017-05-11 PROCEDURE — 74011250636 HC RX REV CODE- 250/636

## 2017-05-11 PROCEDURE — 80053 COMPREHEN METABOLIC PANEL: CPT | Performed by: INTERNAL MEDICINE

## 2017-05-11 PROCEDURE — 96375 TX/PRO/DX INJ NEW DRUG ADDON: CPT

## 2017-05-11 PROCEDURE — 96411 CHEMO IV PUSH ADDL DRUG: CPT

## 2017-05-11 PROCEDURE — 96377 APPLICATON ON-BODY INJECTOR: CPT

## 2017-05-11 PROCEDURE — 85025 COMPLETE CBC W/AUTO DIFF WBC: CPT | Performed by: INTERNAL MEDICINE

## 2017-05-11 RX ORDER — HEPARIN SODIUM (PORCINE) LOCK FLUSH IV SOLN 100 UNIT/ML 100 UNIT/ML
500 SOLUTION INTRAVENOUS AS NEEDED
Status: DISPENSED | OUTPATIENT
Start: 2017-05-11 | End: 2017-05-12

## 2017-05-11 RX ORDER — SODIUM CHLORIDE 9 MG/ML
25 INJECTION, SOLUTION INTRAVENOUS CONTINUOUS
Status: DISPENSED | OUTPATIENT
Start: 2017-05-11 | End: 2017-05-12

## 2017-05-11 RX ORDER — SODIUM CHLORIDE 0.9 % (FLUSH) 0.9 %
10-40 SYRINGE (ML) INJECTION AS NEEDED
Status: DISCONTINUED | OUTPATIENT
Start: 2017-05-11 | End: 2017-05-15 | Stop reason: HOSPADM

## 2017-05-11 RX ORDER — CYANOCOBALAMIN 1000 UG/ML
1000 INJECTION, SOLUTION INTRAMUSCULAR; SUBCUTANEOUS ONCE
Status: COMPLETED | OUTPATIENT
Start: 2017-05-11 | End: 2017-05-11

## 2017-05-11 RX ADMIN — Medication 10 ML: at 11:17

## 2017-05-11 RX ADMIN — CYANOCOBALAMIN 1000 MCG: 1000 INJECTION, SOLUTION INTRAMUSCULAR at 10:51

## 2017-05-11 RX ADMIN — SODIUM CHLORIDE 16 MG: 900 INJECTION, SOLUTION INTRAVENOUS at 10:23

## 2017-05-11 RX ADMIN — SODIUM CHLORIDE 850 MG: 900 INJECTION, SOLUTION INTRAVENOUS at 10:54

## 2017-05-11 RX ADMIN — SODIUM CHLORIDE 25 ML/HR: 900 INJECTION, SOLUTION INTRAVENOUS at 10:20

## 2017-05-11 RX ADMIN — HEPARIN SODIUM (PORCINE) LOCK FLUSH IV SOLN 100 UNIT/ML 500 UNITS: 100 SOLUTION at 11:17

## 2017-05-11 RX ADMIN — PEGFILGRASTIM 6 MG: KIT SUBCUTANEOUS at 11:17

## 2017-05-11 RX ADMIN — Medication 20 ML: at 10:05

## 2017-05-11 RX ADMIN — DEXAMETHASONE SODIUM PHOSPHATE 10 MG: 4 INJECTION, SOLUTION INTRA-ARTICULAR; INTRALESIONAL; INTRAMUSCULAR; INTRAVENOUS; SOFT TISSUE at 10:24

## 2017-05-11 NOTE — PROGRESS NOTES
[facility] Progress Note    Date: May 11, 2017    Name: Tami Mcleod              MRN: [id number]              : 1960    Chemotherapy Cycle: C18      Pt to [facility], ambulatory, at 0955. Ms. [place] was assessed and education was provided. Denies any complaints. Ms. Cathie Edmonds vitals were reviewed. Visit Vitals    /80 (BP 1 Location: Right arm, BP Patient Position: At rest;Sitting)    Pulse 87    Temp 98.1 °F (36.7 °C)    Resp 18    Ht 5' 6.5\" (1.689 m)    Wt 60.6 kg (133 lb 11.2 oz)    SpO2 98%    Breastfeeding No    BMI 21.26 kg/m2     Left chest double mediport medial lumen accessed with a 20 G 1 inch Downs needle after chloroprep. Flushes without difficulty and had brisk blood return. Labs drawn off and sent to lab for CBC and CMP per order after 10 mL waste. Flushed with 10 mL NS and NS started at [facility]. Lab results were obtained and reviewed. Recent Results (from the past 12 hour(s))   CBC WITH 3 PART DIFF    Collection Time: 17 10:07 AM   Result Value Ref Range    WBC 6.0 4.5 - 13.0 K/uL    RBC 3.83 (L) 4.10 - 5.10 M/uL    HGB 10.4 (L) 12.0 - 16 g/dL    HCT 32.1 (L) 36 - 48 %    MCV 83.8 78 - 102 FL    MCH 27.2 25.0 - 35.0 PG    MCHC 32.4 31 - 37 g/dL    RDW 17.6 (H) 11.5 - 14.5 %    PLATELET 453 797 - 416 K/uL    NEUTROPHILS 75 (H) 40 - 70 %    MIXED CELLS 8 0.1 - 17 %    LYMPHOCYTES 17 14 - 44 %    ABS. NEUTROPHILS 4.5 1.8 - 9.5 K/UL    ABS. MIXED CELLS 0.5 0.0 - 2.3 K/uL    ABS. LYMPHOCYTES 1.0 (L) 1.1 - 5.9 K/UL    DF AUTOMATED         Lab results within ordered parameters to give chemo today. Chemo dosages verified with today's BSA and found to be within 10% of ordered dosages. Pre-medications (Zofran 16 mg IV, Decadron 10 mg IV) were administered as ordered and chemotherapy was initiated after blood return from [facility] re-verified. Pemetrexed 850 mg was infused over 10 minutes as ordered. VS stable at end of infusion and pt denied complaints.  Line flushed with NS and blood return re-verified. Mediport flushed with 20 mL NS and 500 units of heparin per protocol before de accessing. Band-aid applied to site. B12 IM injection given today in right deltoid. Neulasta 6mg OBI was applied to the left lower quadrant of the abdomen. Green light indicator verified. Ms. Etienne Fay tolerated infusion, and had no complaints at this time. Patient armband removed and shredded. Ms. Etienne Fay was discharged from [place] [address] in stable condition at 1130. She has her next appt 6/1/217 at 1000 for Alimta C20.      Gaviota Nguyen RN  May 11, 2017

## 2017-05-22 ENCOUNTER — HOSPITAL ENCOUNTER (OUTPATIENT)
Dept: BONE DENSITY | Age: 57
Discharge: HOME | End: 2017-05-22
Attending: NURSE PRACTITIONER
Payer: MEDICAID

## 2017-05-22 ENCOUNTER — HOSPITAL ENCOUNTER (OUTPATIENT)
Dept: MAMMOGRAPHY | Age: 57
Discharge: HOME | End: 2017-05-22
Attending: NURSE PRACTITIONER
Payer: MEDICAID

## 2017-05-22 DIAGNOSIS — Z13.820 SCREENING FOR OSTEOPOROSIS: ICD-10-CM

## 2017-05-22 DIAGNOSIS — Z12.31 ENCOUNTER FOR SCREENING MAMMOGRAM FOR BREAST CANCER: ICD-10-CM

## 2017-05-22 DIAGNOSIS — Z78.0 POST-MENOPAUSAL: ICD-10-CM

## 2017-05-22 PROCEDURE — 77080 DXA BONE DENSITY AXIAL: CPT

## 2017-05-22 PROCEDURE — 77067 SCR MAMMO BI INCL CAD: CPT

## 2017-05-25 DIAGNOSIS — R92.8 ABNORMAL MAMMOGRAM OF RIGHT BREAST: Primary | ICD-10-CM

## 2017-06-01 ENCOUNTER — HOSPITAL ENCOUNTER (OUTPATIENT)
Dept: INFUSION THERAPY | Age: 57
Discharge: HOME | End: 2017-06-01
Payer: MEDICAID

## 2017-06-01 VITALS
TEMPERATURE: 98.3 F | OXYGEN SATURATION: 98 % | WEIGHT: 134.6 LBS | HEART RATE: 94 BPM | SYSTOLIC BLOOD PRESSURE: 133 MMHG | BODY MASS INDEX: 21.12 KG/M2 | DIASTOLIC BLOOD PRESSURE: 91 MMHG | HEIGHT: 67 IN | RESPIRATION RATE: 18 BRPM

## 2017-06-01 LAB
ANION GAP BLD CALC-SCNC: 19 MMOL/L (ref 10–20)
ANION GAP BLD CALC-SCNC: 7 MMOL/L (ref 3–18)
BASO+EOS+MONOS # BLD AUTO: 0.1 K/UL (ref 0–2.3)
BASO+EOS+MONOS # BLD AUTO: 3 % (ref 0.1–17)
BUN BLD-MCNC: 4 MG/DL (ref 7–18)
BUN SERPL-MCNC: 6 MG/DL (ref 7–18)
BUN/CREAT SERPL: 8 (ref 12–20)
CA-I BLD-MCNC: 1.2 MMOL/L (ref 1.12–1.32)
CALCIUM SERPL-MCNC: 8.8 MG/DL (ref 8.5–10.1)
CHLORIDE BLD-SCNC: 104 MMOL/L (ref 100–108)
CHLORIDE SERPL-SCNC: 107 MMOL/L (ref 100–108)
CO2 BLD-SCNC: 24 MMOL/L (ref 19–24)
CO2 SERPL-SCNC: 26 MMOL/L (ref 21–32)
CREAT SERPL-MCNC: 0.78 MG/DL (ref 0.6–1.3)
CREAT UR-MCNC: 0.7 MG/DL (ref 0.6–1.3)
DIFFERENTIAL METHOD BLD: ABNORMAL
ERYTHROCYTE [DISTWIDTH] IN BLOOD BY AUTOMATED COUNT: 17.7 % (ref 11.5–14.5)
GLUCOSE BLD STRIP.AUTO-MCNC: 122 MG/DL (ref 74–106)
GLUCOSE SERPL-MCNC: 108 MG/DL (ref 74–99)
HCT VFR BLD AUTO: 32.5 % (ref 36–48)
HCT VFR BLD CALC: 32 % (ref 36–49)
HGB BLD-MCNC: 10.5 G/DL (ref 12–16)
HGB BLD-MCNC: 10.9 G/DL (ref 12–16)
LYMPHOCYTES # BLD AUTO: 37 % (ref 14–44)
LYMPHOCYTES # BLD: 1.6 K/UL (ref 1.1–5.9)
MCH RBC QN AUTO: 27.6 PG (ref 25–35)
MCHC RBC AUTO-ENTMCNC: 32.3 G/DL (ref 31–37)
MCV RBC AUTO: 85.3 FL (ref 78–102)
NEUTS SEG # BLD: 2.6 K/UL (ref 1.8–9.5)
NEUTS SEG NFR BLD AUTO: 60 % (ref 40–70)
PLATELET # BLD AUTO: 281 K/UL (ref 140–440)
POTASSIUM BLD-SCNC: 3.5 MMOL/L (ref 3.5–5.5)
POTASSIUM SERPL-SCNC: 3.9 MMOL/L (ref 3.5–5.5)
RBC # BLD AUTO: 3.81 M/UL (ref 4.1–5.1)
SODIUM BLD-SCNC: 141 MMOL/L (ref 136–145)
SODIUM SERPL-SCNC: 140 MMOL/L (ref 136–145)
WBC # BLD AUTO: 4.3 K/UL (ref 4.5–13)

## 2017-06-01 PROCEDURE — 74011000258 HC RX REV CODE- 258: Performed by: INTERNAL MEDICINE

## 2017-06-01 PROCEDURE — 96377 APPLICATON ON-BODY INJECTOR: CPT

## 2017-06-01 PROCEDURE — 96409 CHEMO IV PUSH SNGL DRUG: CPT

## 2017-06-01 PROCEDURE — 85025 COMPLETE CBC W/AUTO DIFF WBC: CPT | Performed by: INTERNAL MEDICINE

## 2017-06-01 PROCEDURE — 36415 COLL VENOUS BLD VENIPUNCTURE: CPT | Performed by: INTERNAL MEDICINE

## 2017-06-01 PROCEDURE — 77030012965 HC NDL HUBR BBMI -A

## 2017-06-01 PROCEDURE — 80047 BASIC METABLC PNL IONIZED CA: CPT

## 2017-06-01 PROCEDURE — 96375 TX/PRO/DX INJ NEW DRUG ADDON: CPT

## 2017-06-01 PROCEDURE — 80048 BASIC METABOLIC PNL TOTAL CA: CPT | Performed by: INTERNAL MEDICINE

## 2017-06-01 PROCEDURE — 74011250636 HC RX REV CODE- 250/636

## 2017-06-01 PROCEDURE — 74011250636 HC RX REV CODE- 250/636: Performed by: INTERNAL MEDICINE

## 2017-06-01 PROCEDURE — 74011000258 HC RX REV CODE- 258

## 2017-06-01 RX ORDER — HEPARIN SODIUM (PORCINE) LOCK FLUSH IV SOLN 100 UNIT/ML 100 UNIT/ML
500 SOLUTION INTRAVENOUS AS NEEDED
Status: DISPENSED | OUTPATIENT
Start: 2017-06-01 | End: 2017-06-02

## 2017-06-01 RX ORDER — SODIUM CHLORIDE 9 MG/ML
25 INJECTION, SOLUTION INTRAVENOUS ONCE
Status: COMPLETED | OUTPATIENT
Start: 2017-06-01 | End: 2017-06-01

## 2017-06-01 RX ORDER — SODIUM CHLORIDE 0.9 % (FLUSH) 0.9 %
10-40 SYRINGE (ML) INJECTION AS NEEDED
Status: DISCONTINUED | OUTPATIENT
Start: 2017-06-01 | End: 2017-06-05 | Stop reason: HOSPADM

## 2017-06-01 RX ADMIN — PEGFILGRASTIM 6 MG: KIT SUBCUTANEOUS at 10:48

## 2017-06-01 RX ADMIN — SODIUM CHLORIDE 25 ML/HR: 900 INJECTION, SOLUTION INTRAVENOUS at 09:57

## 2017-06-01 RX ADMIN — HEPARIN SODIUM (PORCINE) LOCK FLUSH IV SOLN 100 UNIT/ML 500 UNITS: 100 SOLUTION at 11:00

## 2017-06-01 RX ADMIN — Medication 20 ML: at 09:57

## 2017-06-01 RX ADMIN — SODIUM CHLORIDE 850 MG: 900 INJECTION, SOLUTION INTRAVENOUS at 10:43

## 2017-06-01 RX ADMIN — SODIUM CHLORIDE 16 MG: 900 INJECTION, SOLUTION INTRAVENOUS at 10:13

## 2017-06-01 RX ADMIN — DEXAMETHASONE SODIUM PHOSPHATE 10 MG: 4 INJECTION, SOLUTION INTRA-ARTICULAR; INTRALESIONAL; INTRAMUSCULAR; INTRAVENOUS; SOFT TISSUE at 09:59

## 2017-06-01 RX ADMIN — Medication 10 ML: at 11:00

## 2017-06-01 NOTE — PROGRESS NOTES
[facility] Progress Note    Date: 2017    Name: Gio Hutchison              MRN: [id number]              : 1960    Chemotherapy Cycle: C19     Ms. Manuel Nance was assessed and education was provided. Pt arrived ambulatory for scheduled infusion. Denies any nausea, vomiting, fever, chills, constipation, diarrhea,pain, rashes or mouth sores. Taking folic acid as directed at home. No issues noted with appetite or po intake. Ms. Jerome Fuelanthony vitals were reviewed and patient was observed for 5 minutes prior to treatment. Visit Vitals    BP (!) 133/91 (BP 1 Location: Right arm, BP Patient Position: At rest)    Pulse 94    Temp 98.3 °F (36.8 °C)    Resp 18    Ht 5' 6.5\" (1.689 m)    Wt 61.1 kg (134 lb 9.6 oz)    SpO2 98%    BMI 21.4 kg/m2       Lab results were obtained and reviewed. Recent Results (from the past 12 hour(s))   CBC WITH 3 PART DIFF    Collection Time: 17  9:40 AM   Result Value Ref Range    WBC 4.3 (L) 4.5 - 13.0 K/uL    RBC 3.81 (L) 4.10 - 5.10 M/uL    HGB 10.5 (L) 12.0 - 16 g/dL    HCT 32.5 (L) 36 - 48 %    MCV 85.3 78 - 102 FL    MCH 27.6 25.0 - 35.0 PG    MCHC 32.3 31 - 37 g/dL    RDW 17.7 (H) 11.5 - 14.5 %    PLATELET 929 393 - 977 K/uL    NEUTROPHILS 60 40 - 70 %    MIXED CELLS 3 0.1 - 17 %    LYMPHOCYTES 37 14 - 44 %    ABS. NEUTROPHILS 2.6 1.8 - 9.5 K/UL    ABS. MIXED CELLS 0.1 0.0 - 2.3 K/uL    ABS. LYMPHOCYTES 1.6 1.1 - 5.9 K/UL    DF AUTOMATED     POC CHEM8    Collection Time: 17  9:51 AM   Result Value Ref Range    CO2 (POC) 24 19 - 24 MMOL/L    Glucose (POC) 122 (H) 74 - 106 MG/DL    BUN (POC) 4 (L) 7 - 18 MG/DL    Creatinine (POC) 0.7 0.6 - 1.3 MG/DL    GFR-AA (POC) >60 >60 ml/min/1.73m2    GFR, non-AA (POC) >60 >60 ml/min/1.73m2    Sodium (POC) 141 136 - 145 MMOL/L    Potassium (POC) 3.5 3.5 - 5.5 MMOL/L    Calcium, ionized (POC) 1.20 1. 12 - 1.32 MMOL/L    Chloride (POC) 104 100 - 108 MMOL/L    Anion gap (POC) 19 10 - 20      Hematocrit (POC) 32 (L) 36 - 49 % Hemoglobin (POC) 10.9 (L) 12 - 16 G/DL     Distal side of mediport accessed, brisk blood return noted. Labs drawn as ordered. Pre-medications were administered as ordered and chemotherapy was initiated. Alimta 850 mg  was infused over 10 min as ordered. Neulasta OBI placed to right lower quadrant of abdomen. Mediport flushed and de accessed. Ms. Julius Black tolerated infusion, and had no complaints at this time. Patient armband removed and shredded. Ms. Julius Black was discharged from [place] [address] in stable condition at . She is to return on 06/28/2017 at 1000 for her next appointment.      Ambreen Taylor RN  June 1, 2017  12:53 PM

## 2017-06-06 ENCOUNTER — HOSPITAL ENCOUNTER (OUTPATIENT)
Dept: LAB | Age: 57
Discharge: HOME | End: 2017-06-06

## 2017-06-06 ENCOUNTER — HOSPITAL ENCOUNTER (OUTPATIENT)
Dept: MAMMOGRAPHY | Age: 57
Discharge: HOME | End: 2017-06-06
Attending: NURSE PRACTITIONER
Payer: MEDICAID

## 2017-06-06 ENCOUNTER — HOSPITAL ENCOUNTER (OUTPATIENT)
Dept: ULTRASOUND IMAGING | Age: 57
Discharge: HOME | End: 2017-06-06
Attending: NURSE PRACTITIONER
Payer: MEDICAID

## 2017-06-06 DIAGNOSIS — R92.8 ABNORMAL MAMMOGRAM: ICD-10-CM

## 2017-06-06 DIAGNOSIS — R92.8 ABNORMAL MAMMOGRAM OF RIGHT BREAST: ICD-10-CM

## 2017-06-06 PROCEDURE — 77065 DX MAMMO INCL CAD UNI: CPT

## 2017-06-06 PROCEDURE — 77066 DX MAMMO INCL CAD BI: CPT

## 2017-06-21 ENCOUNTER — OFFICE VISIT (OUTPATIENT)
Dept: SURGERY | Age: 57
End: 2017-06-21

## 2017-06-21 VITALS — HEIGHT: 67 IN | WEIGHT: 134 LBS | RESPIRATION RATE: 13 BRPM | BODY MASS INDEX: 21.03 KG/M2

## 2017-06-21 DIAGNOSIS — R92.8 ABNORMAL MAMMOGRAM: Primary | ICD-10-CM

## 2017-06-21 NOTE — MR AVS SNAPSHOT
Visit Information Date & Time Provider Department Dept. Phone Encounter #  
 6/21/2017 10:00 AM Jennie Carbone MD [facility] [phone] [id number] Your Appointments 7/26/2017 10:00 AM  
FOLLOW UP EXAM with Meche Camarena NP [facility] ([address]) Appt Note: 3 month f/u  
 [address] [facility] [id number]  
[facility] [id number] Upcoming Health Maintenance Date Due DTaP/Tdap/Td series (1 - Tdap) 10/22/1979 FOBT Q 1 YEAR AGE 50-75 8/18/2017 INFLUENZA AGE 9 TO ADULT 8/1/2017 Pneumococcal 19-64 Highest Risk (2 of 3 - PCV13) 4/26/2018 BREAST CANCER SCRN MAMMOGRAM 6/6/2019 Bone Densitometry 5/22/2022 Allergies as of 6/21/2017  Review Complete On: 6/21/2017 By: Jennie Carbone MD  
  
 Severity Noted Reaction Type Reactions Flagyl [Metronidazole]  06/07/2015    Hives Current Immunizations  Reviewed on 6/1/2017 Name Date Influenza Vaccine (Quad) PF 10/4/2016 Not reviewed this visit Vitals Resp Height(growth percentile) Weight(growth percentile) BMI OB Status Smoking Status 13 5' 6.5\" (1.689 m) 134 lb (60.8 kg) 21.3 kg/m2 Menopause Former Smoker BMI and BSA Data Body Mass Index Body Surface Area  
 21.3 kg/m 2 1.69 m 2 Preferred Pharmacy Pharmacy Name Phone [facility] [phone] [facility] [phone] Your Updated Medication List  
  
   
This list is accurate as of: 6/21/17 10:37 AM.  Always use your most recent med list.  
  
  
  
  
 albuterol 90 mcg/actuation inhaler Commonly known as:  PROVENTIL HFA, VENTOLIN HFA, PROAIR HFA Take 2 Puffs by inhalation every four (4) hours as needed for Wheezing or Shortness of Breath. amLODIPine 5 mg tablet Commonly known as:  Sherald Burkitt Take 1 Tab by mouth daily. ascorbic acid (vitamin C) 100 mg tab Commonly known as:  VITAMIN C Take 1 Tab by mouth daily. carbamide peroxide 6.5 % otic solution Commonly known as:  Jamaal Filter Administer 5 Drops into each ear two (2) times a day. ferrous sulfate 325 mg (65 mg iron) tablet Take 1 Tab by mouth two (2) times daily (with meals). loratadine 10 mg tablet Commonly known as:  Ana Maria Castro Take 10 mg by mouth daily. rivaroxaban 20 mg Tab tablet Commonly known as:  Rubi Mendez Take 1 Tab by mouth daily (with breakfast). senna-docusate 8.6-50 mg per tablet Commonly known as:  Wava Dun Take 1 Tab by mouth daily. sertraline 50 mg tablet Commonly known as:  ZOLOFT Take 1 Tab by mouth daily. ZOFRAN ODT 4 mg disintegrating tablet Generic drug:  ondansetron Take 4 mg by mouth every eight (8) hours as needed for Nausea. To-Do List   
 06/22/2017 10:00 AM  
  Appointment with [facility] INFUSION NURSE 1 at [facility] OP INFUSION ([phone]) Patient Instructions Call the office at [phone] if you have any questions or concerns. Please provide this summary of care documentation to your next provider. Your primary care clinician is listed as Gabo Briceno. If you have any questions after today's visit, please call [phone].

## 2017-06-21 NOTE — PROGRESS NOTES
Progress Note    Patient: Genny Hill  MRN: [id number]  SSN: [id number]   YOB: 1960  Age: 60 y.o. Sex: female     Chief Complaint   Patient presents with    Breast Problem     Birads 2 mammo- never seen before; Rachelle Man referred for incomplete mammo; 2nd mammo done was Birads 2; sister  from breast cancer at age 48; 1st child delivered at 20; cycle started at 13; currently being treated for lung cancer       HPI    Ms Ankita Berry is a patient of Dr. Audrey Cabrales for a lung cancer who presents with an was thought to be an abnormal mammogram.  On further imaging however is a BI-RADS 2. She is currently on Xarelto for PE and being treated for her lung cancer. She has a mother who had lung cancer at an early age and  of a recurrence. She follows with her PCP for normal mammograms and reports no breast health complaints.   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     Past Surgical History:   Procedure Laterality Date    BREAST SURGERY PROCEDURE UNLISTED      biopsy    HX ANGIOPLASTY Right 3/20/2016    IJ, subclavian, and brachiocephalic veins    HX HYSTERECTOMY      \"weak cervix\"    HX OTHER SURGICAL Right 3/20/2016    2 placed in right IJ, subclavian/brachiocephalic    HX THROMBECTOMY  3/20/2016    with thrombolysis     Allergies   Allergen Reactions    Flagyl [Metronidazole] Hives     Current Outpatient Prescriptions   Medication Sig Dispense Refill    rivaroxaban (XARELTO) 20 mg tab tablet Take 1 Tab by mouth daily (with breakfast). 30 Tab 6    amLODIPine (NORVASC) 5 mg tablet Take 1 Tab by mouth daily. 30 Tab 6    sertraline (ZOLOFT) 50 mg tablet Take 1 Tab by mouth daily. 30 Tab 6    ondansetron (ZOFRAN ODT) 4 mg disintegrating tablet Take 4 mg by mouth every eight (8) hours as needed for Nausea.  carbamide peroxide (DEBROX) 6.5 % otic solution Administer 5 Drops into each ear two (2) times a day. 7.5 mL 0    ferrous sulfate 325 mg (65 mg iron) tablet Take 1 Tab by mouth two (2) times daily (with meals). 60 Tab 11    ascorbic acid, vitamin C, (VITAMIN C) 100 mg tab Take 1 Tab by mouth daily. 30 Tab 11    senna-docusate (PERICOLACE) 8.6-50 mg per tablet Take 1 Tab by mouth daily. 30 Tab 0    albuterol (PROVENTIL HFA, VENTOLIN HFA, PROAIR HFA) 90 mcg/actuation inhaler Take 2 Puffs by inhalation every four (4) hours as needed for Wheezing or Shortness of Breath. 1 Inhaler 0    loratadine (CLARITIN) 10 mg tablet Take 10 mg by mouth daily. Social History     Social History    Marital status: LEGALLY      Spouse name: N/A    Number of children: N/A    Years of education: N/A     Occupational History    Not on file.      Social History Main Topics    Smoking status: Former Smoker     Packs/day: 0.50     Quit date: 2/28/2016    Smokeless tobacco: Never Used    Alcohol use No    Drug use: Yes     Special: Prescription    Sexual activity: Yes     Partners: Male     Birth control/ protection: None     Other Topics Concern    Not on file     Social History Narrative     Family History   Problem Relation Age of Onset    Cancer Sister 48     breast    Liver Disease Sister      cirrhosis    Heart Attack Mother 37    No Known Problems Child          Review of systems:  Patient denies any reflux, emesis, abdominal pain, change in bowel habits, hematochezia, melena, fever, weight loss, fatigue chills, dermatitis, abnormal moles, change in vision, vertigo, epistaxis, dysphagia, hoarseness, chest pain, palpitations, hypertension, edema, cough, shortness of breath, wheezing, hemoptysis, snoring, hematuria, diabetes, thyroid disease, anemia, bruising, history of blood transfusion, dizziness, headache, or fainting. Physical Examination    Well developed well nourished female in no apparent distress  Visit Vitals    Resp 13    Ht 5' 6.5\" (1.689 m)    Wt 60.8 kg (134 lb)    BMI 21.3 kg/m2      Head: normocephalic, atraumatic  Mouth: Clear, no overt lesions, oral mucosa pink and moist  Neck: supple, no masses, no adenopathy or carotid bruits, trachea midline  Resp: clear to auscultation bilaterally, no wheeze, rhonchi or rales, excursions normal and symmetrical  Cardio: Regular rate and rhythm, no murmurs, clicks, gallops or rubs, no edema or varicosities  Abdomen: soft, nontender, nondistended, normoactive bowel sounds, no hernias, no hepatosplenomegaly,   Back: Deferred  Extremeties: warm, well-perfused, no tenderness or swelling, normal gait/station  Neuro: sensation and strength grossly intact and symmetrical  Psych: alert and oriented to person, place and time  Breast exam bilateral breast exam without dominant mass, skin change, nipple discharge, lymphadenopathy    IMPRESSION  Patient with history of a primary relative with breast cancer at early age however normal mammograms her whole life and no breast health complaints. Currently in cancer treatment for lung cancer    PLAN  No orders of the defined types were placed in this encounter.     Follow-up with PCP for ongoing mammograms  Kiesha Gil MD

## 2017-06-22 ENCOUNTER — HOSPITAL ENCOUNTER (OUTPATIENT)
Dept: INFUSION THERAPY | Age: 57
Discharge: HOME | End: 2017-06-22
Payer: MEDICAID

## 2017-06-22 VITALS
HEIGHT: 67 IN | WEIGHT: 136.5 LBS | RESPIRATION RATE: 18 BRPM | OXYGEN SATURATION: 100 % | DIASTOLIC BLOOD PRESSURE: 79 MMHG | SYSTOLIC BLOOD PRESSURE: 129 MMHG | HEART RATE: 73 BPM | BODY MASS INDEX: 21.43 KG/M2 | TEMPERATURE: 98.4 F

## 2017-06-22 LAB
ANION GAP BLD CALC-SCNC: 20 MMOL/L (ref 10–20)
BASO+EOS+MONOS # BLD AUTO: 0.4 K/UL (ref 0–2.3)
BASO+EOS+MONOS # BLD AUTO: 12 % (ref 0.1–17)
BUN BLD-MCNC: 3 MG/DL (ref 7–18)
CA-I BLD-MCNC: 1.17 MMOL/L (ref 1.12–1.32)
CHLORIDE BLD-SCNC: 102 MMOL/L (ref 100–108)
CO2 BLD-SCNC: 24 MMOL/L (ref 19–24)
CREAT UR-MCNC: 0.7 MG/DL (ref 0.6–1.3)
DIFFERENTIAL METHOD BLD: ABNORMAL
ERYTHROCYTE [DISTWIDTH] IN BLOOD BY AUTOMATED COUNT: 16.6 % (ref 11.5–14.5)
GLUCOSE BLD STRIP.AUTO-MCNC: 152 MG/DL (ref 74–106)
HCT VFR BLD AUTO: 32.2 % (ref 36–48)
HCT VFR BLD CALC: 32 % (ref 36–49)
HGB BLD-MCNC: 10.4 G/DL (ref 12–16)
HGB BLD-MCNC: 10.9 G/DL (ref 12–16)
LYMPHOCYTES # BLD AUTO: 32 % (ref 14–44)
LYMPHOCYTES # BLD: 1.1 K/UL (ref 1.1–5.9)
MCH RBC QN AUTO: 27.7 PG (ref 25–35)
MCHC RBC AUTO-ENTMCNC: 32.3 G/DL (ref 31–37)
MCV RBC AUTO: 85.9 FL (ref 78–102)
NEUTS SEG # BLD: 1.8 K/UL (ref 1.8–9.5)
NEUTS SEG NFR BLD AUTO: 56 % (ref 40–70)
PLATELET # BLD AUTO: 319 K/UL (ref 140–440)
POTASSIUM BLD-SCNC: 3.3 MMOL/L (ref 3.5–5.5)
RBC # BLD AUTO: 3.75 M/UL (ref 4.1–5.1)
SODIUM BLD-SCNC: 141 MMOL/L (ref 136–145)
WBC # BLD AUTO: 3.3 K/UL (ref 4.5–13)

## 2017-06-22 PROCEDURE — 96375 TX/PRO/DX INJ NEW DRUG ADDON: CPT

## 2017-06-22 PROCEDURE — 74011000258 HC RX REV CODE- 258: Performed by: INTERNAL MEDICINE

## 2017-06-22 PROCEDURE — 96409 CHEMO IV PUSH SNGL DRUG: CPT

## 2017-06-22 PROCEDURE — 36591 DRAW BLOOD OFF VENOUS DEVICE: CPT

## 2017-06-22 PROCEDURE — 77030012965 HC NDL HUBR BBMI -A

## 2017-06-22 PROCEDURE — 74011250636 HC RX REV CODE- 250/636: Performed by: INTERNAL MEDICINE

## 2017-06-22 PROCEDURE — 85025 COMPLETE CBC W/AUTO DIFF WBC: CPT | Performed by: INTERNAL MEDICINE

## 2017-06-22 PROCEDURE — 80047 BASIC METABLC PNL IONIZED CA: CPT

## 2017-06-22 PROCEDURE — 74011000258 HC RX REV CODE- 258

## 2017-06-22 PROCEDURE — 74011250636 HC RX REV CODE- 250/636

## 2017-06-22 PROCEDURE — 96377 APPLICATON ON-BODY INJECTOR: CPT

## 2017-06-22 RX ORDER — HEPARIN SODIUM (PORCINE) LOCK FLUSH IV SOLN 100 UNIT/ML 100 UNIT/ML
500 SOLUTION INTRAVENOUS AS NEEDED
Status: DISPENSED | OUTPATIENT
Start: 2017-06-22 | End: 2017-06-23

## 2017-06-22 RX ORDER — SODIUM CHLORIDE 9 MG/ML
250 INJECTION, SOLUTION INTRAVENOUS CONTINUOUS
Status: DISPENSED | OUTPATIENT
Start: 2017-06-22 | End: 2017-06-23

## 2017-06-22 RX ORDER — SODIUM CHLORIDE 0.9 % (FLUSH) 0.9 %
10-40 SYRINGE (ML) INJECTION AS NEEDED
Status: DISCONTINUED | OUTPATIENT
Start: 2017-06-22 | End: 2017-06-26 | Stop reason: HOSPADM

## 2017-06-22 RX ADMIN — DEXAMETHASONE SODIUM PHOSPHATE 10 MG: 4 INJECTION, SOLUTION INTRA-ARTICULAR; INTRALESIONAL; INTRAMUSCULAR; INTRAVENOUS; SOFT TISSUE at 11:09

## 2017-06-22 RX ADMIN — HEPARIN SODIUM (PORCINE) LOCK FLUSH IV SOLN 100 UNIT/ML 500 UNITS: 100 SOLUTION at 12:16

## 2017-06-22 RX ADMIN — SODIUM CHLORIDE 250 ML/HR: 900 INJECTION, SOLUTION INTRAVENOUS at 10:16

## 2017-06-22 RX ADMIN — Medication 10 ML: at 10:10

## 2017-06-22 RX ADMIN — SODIUM CHLORIDE 850 MG: 900 INJECTION, SOLUTION INTRAVENOUS at 11:57

## 2017-06-22 RX ADMIN — Medication 20 ML: at 10:15

## 2017-06-22 RX ADMIN — SODIUM CHLORIDE 16 MG: 900 INJECTION, SOLUTION INTRAVENOUS at 10:48

## 2017-06-22 RX ADMIN — Medication 10 ML: at 12:15

## 2017-06-22 RX ADMIN — PEGFILGRASTIM 6 MG: KIT SUBCUTANEOUS at 12:30

## 2017-06-22 NOTE — PROGRESS NOTES
[facility] Progress Note    Date: 2017    Name: Doroteo Hooks              MRN: [id number]              : 1960    Chemotherapy Cycle: C20     Ms. Jyo Dunn was assessed and education was provided. Pt arrived ambulatory for scheduled infusion. Denies any nausea, vomiting, fever, chills, constipation, diarrhea,pain, rashes or mouth sores. Taking folic acid as directed at home. No issues noted with appetite or po intake. Ms. Talia Coronel vitals were reviewed and patient was observed for 5 minutes prior to treatment. Visit Vitals    /79 (BP 1 Location: Left arm, BP Patient Position: Sitting)    Pulse 73    Temp 98.4 °F (36.9 °C)    Resp 18    Ht 5' 6.5\" (1.689 m)    Wt 61.9 kg (136 lb 8 oz)    SpO2 100%    Breastfeeding No    BMI 21.7 kg/m2       Lab results were obtained and reviewed. Recent Results (from the past 12 hour(s))   CBC WITH 3 PART DIFF    Collection Time: 17 10:15 AM   Result Value Ref Range    WBC 3.3 (L) 4.5 - 13.0 K/uL    RBC 3.75 (L) 4.10 - 5.10 M/uL    HGB 10.4 (L) 12.0 - 16 g/dL    HCT 32.2 (L) 36 - 48 %    MCV 85.9 78 - 102 FL    MCH 27.7 25.0 - 35.0 PG    MCHC 32.3 31 - 37 g/dL    RDW 16.6 (H) 11.5 - 14.5 %    PLATELET 938 597 - 493 K/uL    NEUTROPHILS 56 40 - 70 %    MIXED CELLS 12 0.1 - 17 %    LYMPHOCYTES 32 14 - 44 %    ABS. NEUTROPHILS 1.8 1.8 - 9.5 K/UL    ABS. MIXED CELLS 0.4 0.0 - 2.3 K/uL    ABS.  LYMPHOCYTES 1.1 1.1 - 5.9 K/UL    DF AUTOMATED     POC CHEM8    Collection Time: 17 10:37 AM   Result Value Ref Range    CO2 (POC) 24 19 - 24 MMOL/L    Glucose (POC) 152 (H) 74 - 106 MG/DL    BUN (POC) 3 (L) 7 - 18 MG/DL    Creatinine (POC) 0.7 0.6 - 1.3 MG/DL    GFR-AA (POC) >60 >60 ml/min/1.73m2    GFR, non-AA (POC) >60 >60 ml/min/1.73m2    Sodium (POC) 141 136 - 145 MMOL/L    Potassium (POC) 3.3 (L) 3.5 - 5.5 MMOL/L    Calcium, ionized (POC) 1.17 1.12 - 1.32 MMOL/L    Chloride (POC) 102 100 - 108 MMOL/L    Anion gap (POC) 20 10 - 20      Hematocrit (POC) 32 (L) 36 - 49 %    Hemoglobin (POC) 10.9 (L) 12 - 16 G/DL     Distal side of mediport accessed, brisk blood return noted. Labs drawn as ordered. Potassium noted to be 3.3, Black Born from Dr. Corrales Nurse made aware, RN states patient should eat some bananas. Patient has been informed and noted with full understanding. No additional orders received at this time. Pre-medications were administered as ordered and chemotherapy was initiated. Alimta 850 mg  was infused over 10 min as ordered. Neulasta OBI placed to left lower quadrant of abdomen. Mediport flushed and de accessed. Band aid applied to site. Ms. Anil Alexander tolerated infusion, and had no complaints at this time. Patient armband removed and shredded. Ms. Anil Alexander was discharged from [place] [address] in stable condition at 1235. She has no appointments with [facility] at this time.     Yumiko Moreno  June 22, 2017  12:53 PM

## 2017-07-20 ENCOUNTER — HOSPITAL ENCOUNTER (OUTPATIENT)
Dept: INFUSION THERAPY | Age: 57
Discharge: HOME | End: 2017-07-20
Payer: MEDICAID

## 2017-07-20 VITALS
DIASTOLIC BLOOD PRESSURE: 95 MMHG | OXYGEN SATURATION: 97 % | HEIGHT: 67 IN | WEIGHT: 130.8 LBS | BODY MASS INDEX: 20.53 KG/M2 | HEART RATE: 101 BPM | RESPIRATION RATE: 18 BRPM | SYSTOLIC BLOOD PRESSURE: 144 MMHG

## 2017-07-20 LAB
ALBUMIN SERPL BCP-MCNC: 3.8 G/DL (ref 3.4–5)
ALBUMIN/GLOB SERPL: 1.1 {RATIO} (ref 0.8–1.7)
ALP SERPL-CCNC: 99 U/L (ref 45–117)
ALT SERPL-CCNC: 14 U/L (ref 13–56)
ANION GAP BLD CALC-SCNC: 11 MMOL/L (ref 3–18)
AST SERPL W P-5'-P-CCNC: 20 U/L (ref 15–37)
BASO+EOS+MONOS # BLD AUTO: 0.3 K/UL (ref 0–2.3)
BASO+EOS+MONOS # BLD AUTO: 6 % (ref 0.1–17)
BILIRUB SERPL-MCNC: 0.3 MG/DL (ref 0.2–1)
BUN SERPL-MCNC: 11 MG/DL (ref 7–18)
BUN/CREAT SERPL: 11 (ref 12–20)
CALCIUM SERPL-MCNC: 9.1 MG/DL (ref 8.5–10.1)
CHLORIDE SERPL-SCNC: 105 MMOL/L (ref 100–108)
CO2 SERPL-SCNC: 25 MMOL/L (ref 21–32)
CREAT SERPL-MCNC: 0.98 MG/DL (ref 0.6–1.3)
DIFFERENTIAL METHOD BLD: ABNORMAL
ERYTHROCYTE [DISTWIDTH] IN BLOOD BY AUTOMATED COUNT: 15.6 % (ref 11.5–14.5)
GLOBULIN SER CALC-MCNC: 3.6 G/DL (ref 2–4)
GLUCOSE SERPL-MCNC: 130 MG/DL (ref 74–99)
HCT VFR BLD AUTO: 34.9 % (ref 36–48)
HGB BLD-MCNC: 11.7 G/DL (ref 12–16)
LYMPHOCYTES # BLD AUTO: 29 % (ref 14–44)
LYMPHOCYTES # BLD: 1.3 K/UL (ref 1.1–5.9)
MCH RBC QN AUTO: 28.5 PG (ref 25–35)
MCHC RBC AUTO-ENTMCNC: 33.5 G/DL (ref 31–37)
MCV RBC AUTO: 84.9 FL (ref 78–102)
NEUTS SEG # BLD: 2.8 K/UL (ref 1.8–9.5)
NEUTS SEG NFR BLD AUTO: 65 % (ref 40–70)
PLATELET # BLD AUTO: 219 K/UL (ref 140–440)
POTASSIUM SERPL-SCNC: 3.9 MMOL/L (ref 3.5–5.5)
PROT SERPL-MCNC: 7.4 G/DL (ref 6.4–8.2)
RBC # BLD AUTO: 4.11 M/UL (ref 4.1–5.1)
SODIUM SERPL-SCNC: 141 MMOL/L (ref 136–145)
WBC # BLD AUTO: 4.4 K/UL (ref 4.5–13)

## 2017-07-20 PROCEDURE — 77030012965 HC NDL HUBR BBMI -A

## 2017-07-20 PROCEDURE — 74011250636 HC RX REV CODE- 250/636: Performed by: INTERNAL MEDICINE

## 2017-07-20 PROCEDURE — 80053 COMPREHEN METABOLIC PANEL: CPT | Performed by: INTERNAL MEDICINE

## 2017-07-20 PROCEDURE — 96409 CHEMO IV PUSH SNGL DRUG: CPT

## 2017-07-20 PROCEDURE — 96375 TX/PRO/DX INJ NEW DRUG ADDON: CPT

## 2017-07-20 PROCEDURE — 85025 COMPLETE CBC W/AUTO DIFF WBC: CPT | Performed by: INTERNAL MEDICINE

## 2017-07-20 PROCEDURE — 74011000258 HC RX REV CODE- 258: Performed by: INTERNAL MEDICINE

## 2017-07-20 PROCEDURE — 96372 THER/PROPH/DIAG INJ SC/IM: CPT

## 2017-07-20 PROCEDURE — 74011250636 HC RX REV CODE- 250/636

## 2017-07-20 RX ORDER — CYANOCOBALAMIN 1000 UG/ML
1000 INJECTION, SOLUTION INTRAMUSCULAR; SUBCUTANEOUS ONCE
Status: COMPLETED | OUTPATIENT
Start: 2017-07-20 | End: 2017-07-20

## 2017-07-20 RX ORDER — SODIUM CHLORIDE 9 MG/ML
25 INJECTION, SOLUTION INTRAVENOUS CONTINUOUS
Status: DISPENSED | OUTPATIENT
Start: 2017-07-20 | End: 2017-07-21

## 2017-07-20 RX ORDER — HEPARIN SODIUM (PORCINE) LOCK FLUSH IV SOLN 100 UNIT/ML 100 UNIT/ML
SOLUTION INTRAVENOUS
Status: COMPLETED
Start: 2017-07-20 | End: 2017-07-20

## 2017-07-20 RX ORDER — SODIUM CHLORIDE 0.9 % (FLUSH) 0.9 %
10-40 SYRINGE (ML) INJECTION AS NEEDED
Status: DISCONTINUED | OUTPATIENT
Start: 2017-07-20 | End: 2017-07-23 | Stop reason: HOSPADM

## 2017-07-20 RX ORDER — HEPARIN SODIUM (PORCINE) LOCK FLUSH IV SOLN 100 UNIT/ML 100 UNIT/ML
500 SOLUTION INTRAVENOUS AS NEEDED
Status: ACTIVE | OUTPATIENT
Start: 2017-07-20 | End: 2017-07-21

## 2017-07-20 RX ADMIN — SODIUM CHLORIDE 16 MG: 900 INJECTION, SOLUTION INTRAVENOUS at 09:48

## 2017-07-20 RX ADMIN — HEPARIN SODIUM (PORCINE) LOCK FLUSH IV SOLN 100 UNIT/ML 500 UNITS: 100 SOLUTION at 10:58

## 2017-07-20 RX ADMIN — SODIUM CHLORIDE 850 MG: 900 INJECTION, SOLUTION INTRAVENOUS at 10:36

## 2017-07-20 RX ADMIN — CYANOCOBALAMIN 1000 MCG: 1000 INJECTION, SOLUTION INTRAMUSCULAR at 09:53

## 2017-07-20 RX ADMIN — Medication 30 ML: at 09:48

## 2017-07-20 RX ADMIN — DEXAMETHASONE SODIUM PHOSPHATE 10 MG: 4 INJECTION, SOLUTION INTRA-ARTICULAR; INTRALESIONAL; INTRAMUSCULAR; INTRAVENOUS; SOFT TISSUE at 09:48

## 2017-07-20 RX ADMIN — PEGFILGRASTIM 6 MG: KIT SUBCUTANEOUS at 10:58

## 2017-07-20 NOTE — PROGRESS NOTES
[facility] Progress Note    Date: 2017    Name: Charleen Pacheco              MRN: [id number]              : 1960    Chemotherapy Cycle: C21      Pt to [facility], ambulatory, at 0905. Ms. Nedra Ma was assessed and education was provided. Denies any complaints. Ms. Jade Vann vitals were reviewed. Visit Vitals    BP (!) 144/95 (BP 1 Location: Left arm, BP Patient Position: Sitting)    Pulse (!) 101    Resp 18    Ht 5' 6.5\" (1.689 m)    Wt 59.3 kg (130 lb 12.8 oz)    SpO2 97%    Breastfeeding No    BMI 20.8 kg/m2     Left chest double mediport lateral lumen accessed with a 20 G 1 inch Downs needle after chloroprep. Flushes without difficulty and had brisk blood return. Labs drawn off and sent to lab for CBC and CMP per order after 10 mL waste. Flushed with 10 mL NS and NS started at [facility]. Lab results were obtained and reviewed. Recent Results (from the past 12 hour(s))   CBC WITH 3 PART DIFF    Collection Time: 17  9:22 AM   Result Value Ref Range    WBC 4.4 (L) 4.5 - 13.0 K/uL    RBC 4.11 4.10 - 5.10 M/uL    HGB 11.7 (L) 12.0 - 16 g/dL    HCT 34.9 (L) 36 - 48 %    MCV 84.9 78 - 102 FL    MCH 28.5 25.0 - 35.0 PG    MCHC 33.5 31 - 37 g/dL    RDW 15.6 (H) 11.5 - 14.5 %    PLATELET 573 026 - 283 K/uL    NEUTROPHILS 65 40 - 70 %    MIXED CELLS 6 0.1 - 17 %    LYMPHOCYTES 29 14 - 44 %    ABS. NEUTROPHILS 2.8 1.8 - 9.5 K/UL    ABS. MIXED CELLS 0.3 0.0 - 2.3 K/uL    ABS.  LYMPHOCYTES 1.3 1.1 - 5.9 K/UL    DF AUTOMATED     METABOLIC PANEL, COMPREHENSIVE    Collection Time: 17  9:22 AM   Result Value Ref Range    Sodium 141 136 - 145 mmol/L    Potassium 3.9 3.5 - 5.5 mmol/L    Chloride 105 100 - 108 mmol/L    CO2 25 21 - 32 mmol/L    Anion gap 11 3.0 - 18 mmol/L    Glucose 130 (H) 74 - 99 mg/dL    BUN 11 7.0 - 18 MG/DL    Creatinine 0.98 0.6 - 1.3 MG/DL    BUN/Creatinine ratio 11 (L) 12 - 20      GFR est AA >60 >60 ml/min/1.73m2    GFR est non-AA 58 (L) >60 ml/min/1.73m2    Calcium 9.1 8.5 - 10.1 MG/DL    Bilirubin, total 0.3 0.2 - 1.0 MG/DL    ALT (SGPT) 14 13 - 56 U/L    AST (SGOT) 20 15 - 37 U/L    Alk. phosphatase 99 45 - 117 U/L    Protein, total 7.4 6.4 - 8.2 g/dL    Albumin 3.8 3.4 - 5.0 g/dL    Globulin 3.6 2.0 - 4.0 g/dL    A-G Ratio 1.1 0.8 - 1.7         Lab results within ordered parameters to give chemo today. ANC = 2.8, PLT = 219. Chemo dosages verified with today's BSA and found to be within 10% of ordered dosages. Pre-medications (Zofran 16 mg IV, Decadron 10 mg IV) were administered as ordered and chemotherapy was initiated after blood return from [facility] re-verified. B12 injection was given IM in the right arm. Band-aid applied. Pemetrexed 850 mg was infused over 10 minutes as ordered. VS stable at end of infusion and pt denied complaints. Line flushed with NS and blood return re-verified. Mediport flushed with 20 mL NS and 500 units of heparin per protocol before de accessing. Band-aid applied to site. Neulasta 6mg OBI was applied to the left lower quadrant of the abdomen. Green light indicator verified. Ms. Julius Black tolerated infusion, and had no complaints at this time. Patient armband removed and shredded. Ms. Julius Black was discharged from [place] [address] in stable condition at 1100. She is to return on 08/10/2017 at 1000 for her next appointment.      Sanchez Crabtree RN  July 20, 2017

## 2017-07-28 ENCOUNTER — HOSPITAL ENCOUNTER (OUTPATIENT)
Dept: CT IMAGING | Age: 57
Discharge: HOME | End: 2017-07-28
Attending: INTERNAL MEDICINE
Payer: MEDICAID

## 2017-07-28 DIAGNOSIS — I26.99 PULMONARY INFARCTION (HCC): ICD-10-CM

## 2017-07-28 DIAGNOSIS — R92.8 ABNORMAL MAMMOGRAM, UNSPECIFIED: ICD-10-CM

## 2017-07-28 DIAGNOSIS — C34.11 MALIGNANT NEOPLASM OF UPPER LOBE OF RIGHT LUNG (HCC): ICD-10-CM

## 2017-07-28 PROCEDURE — 74011636320 HC RX REV CODE- 636/320: Performed by: INTERNAL MEDICINE

## 2017-07-28 PROCEDURE — 74177 CT ABD & PELVIS W/CONTRAST: CPT

## 2017-07-28 PROCEDURE — 74011250636 HC RX REV CODE- 250/636

## 2017-07-28 RX ORDER — HEPARIN SODIUM (PORCINE) LOCK FLUSH IV SOLN 100 UNIT/ML 100 UNIT/ML
SOLUTION INTRAVENOUS
Status: COMPLETED
Start: 2017-07-28 | End: 2017-07-28

## 2017-07-28 RX ADMIN — HEPARIN SODIUM (PORCINE) LOCK FLUSH IV SOLN 100 UNIT/ML 500 UNITS: 100 SOLUTION at 10:18

## 2017-07-28 RX ADMIN — IOPAMIDOL 78 ML: 612 INJECTION, SOLUTION INTRAVENOUS at 10:12

## 2017-08-10 ENCOUNTER — HOSPITAL ENCOUNTER (OUTPATIENT)
Dept: INFUSION THERAPY | Age: 57
Discharge: HOME | End: 2017-08-10
Payer: MEDICAID

## 2017-08-10 VITALS
OXYGEN SATURATION: 98 % | TEMPERATURE: 98.1 F | BODY MASS INDEX: 21.11 KG/M2 | DIASTOLIC BLOOD PRESSURE: 86 MMHG | WEIGHT: 134.5 LBS | SYSTOLIC BLOOD PRESSURE: 149 MMHG | HEIGHT: 67 IN | RESPIRATION RATE: 18 BRPM | HEART RATE: 75 BPM

## 2017-08-10 LAB
ANION GAP BLD CALC-SCNC: 19 MMOL/L (ref 10–20)
BASO+EOS+MONOS # BLD AUTO: 0.4 K/UL (ref 0–2.3)
BASO+EOS+MONOS # BLD AUTO: 12 % (ref 0.1–17)
BUN BLD-MCNC: 3 MG/DL (ref 7–18)
CA-I BLD-MCNC: 1.18 MMOL/L (ref 1.12–1.32)
CHLORIDE BLD-SCNC: 102 MMOL/L (ref 100–108)
CO2 BLD-SCNC: 24 MMOL/L (ref 19–24)
CREAT UR-MCNC: 0.7 MG/DL (ref 0.6–1.3)
DIFFERENTIAL METHOD BLD: ABNORMAL
ERYTHROCYTE [DISTWIDTH] IN BLOOD BY AUTOMATED COUNT: 16 % (ref 11.5–14.5)
GLUCOSE BLD STRIP.AUTO-MCNC: 112 MG/DL (ref 74–106)
HCT VFR BLD AUTO: 32.7 % (ref 36–48)
HCT VFR BLD CALC: 35 % (ref 36–49)
HGB BLD-MCNC: 10.8 G/DL (ref 12–16)
HGB BLD-MCNC: 11.9 G/DL (ref 12–16)
LYMPHOCYTES # BLD AUTO: 29 % (ref 14–44)
LYMPHOCYTES # BLD: 1 K/UL (ref 1.1–5.9)
MCH RBC QN AUTO: 28.1 PG (ref 25–35)
MCHC RBC AUTO-ENTMCNC: 33 G/DL (ref 31–37)
MCV RBC AUTO: 85.2 FL (ref 78–102)
NEUTS SEG # BLD: 2.2 K/UL (ref 1.8–9.5)
NEUTS SEG NFR BLD AUTO: 59 % (ref 40–70)
PLATELET # BLD AUTO: 258 K/UL (ref 140–440)
POTASSIUM BLD-SCNC: 3.1 MMOL/L (ref 3.5–5.5)
RBC # BLD AUTO: 3.84 M/UL (ref 4.1–5.1)
SODIUM BLD-SCNC: 141 MMOL/L (ref 136–145)
WBC # BLD AUTO: 3.6 K/UL (ref 4.5–13)

## 2017-08-10 PROCEDURE — 85025 COMPLETE CBC W/AUTO DIFF WBC: CPT | Performed by: INTERNAL MEDICINE

## 2017-08-10 PROCEDURE — 74011250636 HC RX REV CODE- 250/636: Performed by: INTERNAL MEDICINE

## 2017-08-10 PROCEDURE — 96377 APPLICATON ON-BODY INJECTOR: CPT

## 2017-08-10 PROCEDURE — 96375 TX/PRO/DX INJ NEW DRUG ADDON: CPT

## 2017-08-10 PROCEDURE — 77030012965 HC NDL HUBR BBMI -A

## 2017-08-10 PROCEDURE — 80047 BASIC METABLC PNL IONIZED CA: CPT

## 2017-08-10 PROCEDURE — 96409 CHEMO IV PUSH SNGL DRUG: CPT

## 2017-08-10 PROCEDURE — 74011000258 HC RX REV CODE- 258: Performed by: INTERNAL MEDICINE

## 2017-08-10 PROCEDURE — 74011250636 HC RX REV CODE- 250/636

## 2017-08-10 RX ORDER — HEPARIN SODIUM (PORCINE) LOCK FLUSH IV SOLN 100 UNIT/ML 100 UNIT/ML
500 SOLUTION INTRAVENOUS AS NEEDED
Status: ACTIVE | OUTPATIENT
Start: 2017-08-10 | End: 2017-08-11

## 2017-08-10 RX ORDER — HEPARIN SODIUM (PORCINE) LOCK FLUSH IV SOLN 100 UNIT/ML 100 UNIT/ML
SOLUTION INTRAVENOUS
Status: COMPLETED
Start: 2017-08-10 | End: 2017-08-10

## 2017-08-10 RX ORDER — SODIUM CHLORIDE 0.9 % (FLUSH) 0.9 %
10-40 SYRINGE (ML) INJECTION AS NEEDED
Status: DISCONTINUED | OUTPATIENT
Start: 2017-08-10 | End: 2017-08-14 | Stop reason: HOSPADM

## 2017-08-10 RX ORDER — SODIUM CHLORIDE 9 MG/ML
25 INJECTION, SOLUTION INTRAVENOUS CONTINUOUS
Status: DISPENSED | OUTPATIENT
Start: 2017-08-10 | End: 2017-08-11

## 2017-08-10 RX ADMIN — Medication 20 ML: at 09:35

## 2017-08-10 RX ADMIN — SODIUM CHLORIDE 25 ML/HR: 900 INJECTION, SOLUTION INTRAVENOUS at 09:35

## 2017-08-10 RX ADMIN — HEPARIN SODIUM (PORCINE) LOCK FLUSH IV SOLN 100 UNIT/ML 500 UNITS: 100 SOLUTION at 11:43

## 2017-08-10 RX ADMIN — Medication 10 ML: at 11:43

## 2017-08-10 RX ADMIN — SODIUM CHLORIDE 850 MG: 9 INJECTION, SOLUTION INTRAVENOUS at 11:15

## 2017-08-10 RX ADMIN — PEGFILGRASTIM 6 MG: KIT SUBCUTANEOUS at 11:41

## 2017-08-10 RX ADMIN — DEXAMETHASONE SODIUM PHOSPHATE 10 MG: 4 INJECTION, SOLUTION INTRA-ARTICULAR; INTRALESIONAL; INTRAMUSCULAR; INTRAVENOUS; SOFT TISSUE at 09:38

## 2017-08-10 RX ADMIN — SODIUM CHLORIDE 16 MG: 900 INJECTION, SOLUTION INTRAVENOUS at 09:38

## 2017-08-10 NOTE — PROGRESS NOTES
[facility] Progress Note    Date: August 10, 2017    Name: Nuris Desai              MRN: [id number]              : 1960    Chemotherapy Cycle: C22      Pt to [facility], ambulatory, at 0900. Ms. Monika Cherry was assessed and education was provided. Denies any complaints. Ms. Fung's vitals were reviewed. Visit Vitals    /86 (BP 1 Location: Left arm, BP Patient Position: Sitting)    Pulse 75    Temp 98.1 °F (36.7 °C)    Resp 18    Ht 5' 6.5\" (1.689 m)    Wt 61 kg (134 lb 8 oz)    SpO2 98%    Breastfeeding No    BMI 21.38 kg/m2     Left chest double mediport medial lumen accessed with a 20 G 1 inch Downs needle after chloroprep. Flushes without difficulty and had brisk blood return. Labs drawn off and sent to lab for CBC and istat BMP run per order after 10 mL waste. Flushed with 10 mL NS and NS started at [facility]. Lab results were obtained and reviewed. Recent Results (from the past 12 hour(s))   CBC WITH 3 PART DIFF    Collection Time: 08/10/17  9:17 AM   Result Value Ref Range    WBC 3.6 (L) 4.5 - 13.0 K/uL    RBC 3.84 (L) 4.10 - 5.10 M/uL    HGB 10.8 (L) 12.0 - 16 g/dL    HCT 32.7 (L) 36 - 48 %    MCV 85.2 78 - 102 FL    MCH 28.1 25.0 - 35.0 PG    MCHC 33.0 31 - 37 g/dL    RDW 16.0 (H) 11.5 - 14.5 %    PLATELET 796 542 - 343 K/uL    NEUTROPHILS 59 40 - 70 %    MIXED CELLS 12 0.1 - 17 %    LYMPHOCYTES 29 14 - 44 %    ABS. NEUTROPHILS 2.2 1.8 - 9.5 K/UL    ABS. MIXED CELLS 0.4 0.0 - 2.3 K/uL    ABS.  LYMPHOCYTES 1.0 (L) 1.1 - 5.9 K/UL    DF AUTOMATED     POC CHEM8    Collection Time: 08/10/17  9:23 AM   Result Value Ref Range    CO2, POC 24 19 - 24 MMOL/L    Glucose,  (H) 74 - 106 MG/DL    BUN, POC 3 (L) 7 - 18 MG/DL    Creatinine, POC 0.7 0.6 - 1.3 MG/DL    GFRAA, POC >60 >60 ml/min/1.73m2    GFRNA, POC >60 >60 ml/min/1.73m2    Sodium,  136 - 145 MMOL/L    Potassium, POC 3.1 (L) 3.5 - 5.5 MMOL/L    Calcium, ionized (POC) 1.18 1.12 - 1.32 MMOL/L    Chloride,  100 - 108 MMOL/L    Anion gap, POC 19 10 - 20      Hematocrit, POC 35 (L) 36 - 49 %    Hemoglobin, POC 11.9 (L) 12 - 16 G/DL       Lab results within ordered parameters to give chemo today. ANC = 2.2, PLT = 258. Chemo dosages verified with today's BSA and found to be within 10% of ordered dosages. Pre-medications (Zofran 16 mg IV, Decadron 10 mg IV) were administered as ordered and chemotherapy was initiated after blood return from [facility] re-verified. Pemetrexed 850 mg was infused over 10 minutes as ordered. VS stable at end of infusion and pt denied complaints. Line flushed with NS and blood return re-verified. Mediport flushed with 20 mL NS and 500 units of heparin per protocol before de accessing. Band-aid applied to site. Neulasta 6mg OBI was applied to the left lower quadrant of the abdomen. Green light indicator verified. Ms. Irene Murillo tolerated infusion, and had no complaints at this time. Patient armband removed and shredded. Ms. Irene Murillo was discharged from [place] [address] in stable condition at 1145. She is to return on 08/31/2017 at 0900 for her next appointment.      Eagle Melvin RN  August 10, 2017

## 2017-08-31 ENCOUNTER — HOSPITAL ENCOUNTER (OUTPATIENT)
Dept: INFUSION THERAPY | Age: 57
Discharge: HOME | End: 2017-08-31
Payer: MEDICAID

## 2017-08-31 VITALS
HEART RATE: 79 BPM | WEIGHT: 131.9 LBS | HEIGHT: 67 IN | TEMPERATURE: 98.4 F | BODY MASS INDEX: 20.7 KG/M2 | OXYGEN SATURATION: 98 % | SYSTOLIC BLOOD PRESSURE: 143 MMHG | DIASTOLIC BLOOD PRESSURE: 87 MMHG | RESPIRATION RATE: 18 BRPM

## 2017-08-31 LAB
ANION GAP BLD CALC-SCNC: 19 MMOL/L (ref 10–20)
BASO+EOS+MONOS # BLD AUTO: 0.3 K/UL (ref 0–2.3)
BASO+EOS+MONOS # BLD AUTO: 5 % (ref 0.1–17)
BUN BLD-MCNC: 6 MG/DL (ref 7–18)
CA-I BLD-MCNC: 1.19 MMOL/L (ref 1.12–1.32)
CHLORIDE BLD-SCNC: 103 MMOL/L (ref 100–108)
CO2 BLD-SCNC: 23 MMOL/L (ref 19–24)
CREAT UR-MCNC: 0.8 MG/DL (ref 0.6–1.3)
DIFFERENTIAL METHOD BLD: ABNORMAL
ERYTHROCYTE [DISTWIDTH] IN BLOOD BY AUTOMATED COUNT: 16.1 % (ref 11.5–14.5)
GLUCOSE BLD STRIP.AUTO-MCNC: 142 MG/DL (ref 74–106)
HCT VFR BLD AUTO: 33.2 % (ref 36–48)
HCT VFR BLD CALC: 34 % (ref 36–49)
HGB BLD-MCNC: 11 G/DL (ref 12–16)
HGB BLD-MCNC: 11.6 G/DL (ref 12–16)
LYMPHOCYTES # BLD: 1.4 K/UL (ref 1.1–5.9)
LYMPHOCYTES NFR BLD: 25 % (ref 14–44)
MCH RBC QN AUTO: 28.2 PG (ref 25–35)
MCHC RBC AUTO-ENTMCNC: 33.1 G/DL (ref 31–37)
MCV RBC AUTO: 85.1 FL (ref 78–102)
NEUTS SEG # BLD: 3.8 K/UL (ref 1.8–9.5)
NEUTS SEG NFR BLD: 70 % (ref 40–70)
PLATELET # BLD AUTO: 338 K/UL (ref 140–440)
POTASSIUM BLD-SCNC: 3.6 MMOL/L (ref 3.5–5.5)
RBC # BLD AUTO: 3.9 M/UL (ref 4.1–5.1)
SODIUM BLD-SCNC: 141 MMOL/L (ref 136–145)
WBC # BLD AUTO: 5.5 K/UL (ref 4.5–13)

## 2017-08-31 PROCEDURE — 74011000258 HC RX REV CODE- 258: Performed by: INTERNAL MEDICINE

## 2017-08-31 PROCEDURE — 74011250636 HC RX REV CODE- 250/636: Performed by: INTERNAL MEDICINE

## 2017-08-31 PROCEDURE — 77030012965 HC NDL HUBR BBMI -A

## 2017-08-31 PROCEDURE — 80047 BASIC METABLC PNL IONIZED CA: CPT

## 2017-08-31 PROCEDURE — 96372 THER/PROPH/DIAG INJ SC/IM: CPT

## 2017-08-31 PROCEDURE — 85025 COMPLETE CBC W/AUTO DIFF WBC: CPT | Performed by: INTERNAL MEDICINE

## 2017-08-31 PROCEDURE — 96377 APPLICATON ON-BODY INJECTOR: CPT

## 2017-08-31 PROCEDURE — 96409 CHEMO IV PUSH SNGL DRUG: CPT

## 2017-08-31 PROCEDURE — 96367 TX/PROPH/DG ADDL SEQ IV INF: CPT

## 2017-08-31 PROCEDURE — 74011250636 HC RX REV CODE- 250/636

## 2017-08-31 RX ORDER — CYANOCOBALAMIN 1000 UG/ML
1000 INJECTION, SOLUTION INTRAMUSCULAR; SUBCUTANEOUS ONCE
Status: COMPLETED | OUTPATIENT
Start: 2017-08-31 | End: 2017-08-31

## 2017-08-31 RX ORDER — SODIUM CHLORIDE 9 MG/ML
250 INJECTION, SOLUTION INTRAVENOUS ONCE
Status: COMPLETED | OUTPATIENT
Start: 2017-08-31 | End: 2017-08-31

## 2017-08-31 RX ORDER — SODIUM CHLORIDE 0.9 % (FLUSH) 0.9 %
10-40 SYRINGE (ML) INJECTION AS NEEDED
Status: DISCONTINUED | OUTPATIENT
Start: 2017-08-31 | End: 2017-09-04 | Stop reason: HOSPADM

## 2017-08-31 RX ORDER — HEPARIN SODIUM (PORCINE) LOCK FLUSH IV SOLN 100 UNIT/ML 100 UNIT/ML
500 SOLUTION INTRAVENOUS AS NEEDED
Status: DISPENSED | OUTPATIENT
Start: 2017-08-31 | End: 2017-09-01

## 2017-08-31 RX ADMIN — Medication 10 ML: at 09:31

## 2017-08-31 RX ADMIN — SODIUM CHLORIDE 16 MG: 9 INJECTION, SOLUTION INTRAVENOUS at 09:36

## 2017-08-31 RX ADMIN — PEGFILGRASTIM 6 MG: KIT SUBCUTANEOUS at 11:05

## 2017-08-31 RX ADMIN — CYANOCOBALAMIN 1000 MCG: 1000 INJECTION, SOLUTION INTRAMUSCULAR at 10:58

## 2017-08-31 RX ADMIN — Medication 20 ML: at 10:53

## 2017-08-31 RX ADMIN — DEXAMETHASONE SODIUM PHOSPHATE 10 MG: 4 INJECTION, SOLUTION INTRA-ARTICULAR; INTRALESIONAL; INTRAMUSCULAR; INTRAVENOUS; SOFT TISSUE at 09:36

## 2017-08-31 RX ADMIN — HEPARIN SODIUM (PORCINE) LOCK FLUSH IV SOLN 100 UNIT/ML 500 UNITS: 100 SOLUTION at 10:53

## 2017-08-31 RX ADMIN — SODIUM CHLORIDE 850 MG: 900 INJECTION, SOLUTION INTRAVENOUS at 10:34

## 2017-08-31 RX ADMIN — SODIUM CHLORIDE 250 ML: 900 INJECTION, SOLUTION INTRAVENOUS at 09:31

## 2017-08-31 NOTE — PROGRESS NOTES
[facility] Progress Note    Date: 2017    Name: Holden Negrete              MRN: [id number]              : 1960    Chemotherapy Cycle: C23      Pt to [facility], ambulatory, at 0850. Ms. Etienne Fay was assessed and education was provided. Denies any complaints. Ms. Jonny Silverman vitals were reviewed. Visit Vitals    /82 (BP 1 Location: Left arm, BP Patient Position: Sitting)    Pulse 90    Temp 98.5 °F (36.9 °C)    Resp 18    Ht 5' 6.5\" (1.689 m)    Wt 59.8 kg (131 lb 14.4 oz)    SpO2 99%    BMI 20.97 kg/m2     Left chest double mediport lateral lumen accessed with a 20 G 1 inch Downs needle after chloroprep. Flushes without difficulty and had brisk blood return. Labs drawn off and sent to lab for CBC and BMP per order after 10 mL waste. Flushed with 10 mL NS and NS started at [facility]. Lab results were obtained and reviewed. Recent Results (from the past 12 hour(s))   CBC WITH 3 PART DIFF    Collection Time: 17  9:09 AM   Result Value Ref Range    WBC 5.5 4.5 - 13.0 K/uL    RBC 3.90 (L) 4.10 - 5.10 M/uL    HGB 11.0 (L) 12.0 - 16 g/dL    HCT 33.2 (L) 36 - 48 %    MCV 85.1 78 - 102 FL    MCH 28.2 25.0 - 35.0 PG    MCHC 33.1 31 - 37 g/dL    RDW 16.1 (H) 11.5 - 14.5 %    PLATELET 279 779 - 139 K/uL    NEUTROPHILS 70 40 - 70 %    MIXED CELLS 5 0.1 - 17 %    LYMPHOCYTES 25 14 - 44 %    ABS. NEUTROPHILS 3.8 1.8 - 9.5 K/UL    ABS. MIXED CELLS 0.3 0.0 - 2.3 K/uL    ABS.  LYMPHOCYTES 1.4 1.1 - 5.9 K/UL    DF AUTOMATED     POC CHEM8    Collection Time: 17  9:14 AM   Result Value Ref Range    CO2, POC 23 19 - 24 MMOL/L    Glucose,  (H) 74 - 106 MG/DL    BUN, POC 6 (L) 7 - 18 MG/DL    Creatinine, POC 0.8 0.6 - 1.3 MG/DL    GFRAA, POC >60 >60 ml/min/1.73m2    GFRNA, POC >60 >60 ml/min/1.73m2    Sodium,  136 - 145 MMOL/L    Potassium, POC 3.6 3.5 - 5.5 MMOL/L    Calcium, ionized (POC) 1.19 1.12 - 1.32 MMOL/L    Chloride,  100 - 108 MMOL/L    Anion gap, POC 19 10 - 20 Hematocrit, POC 34 (L) 36 - 49 %    Hemoglobin, POC 11.6 (L) 12 - 16 G/DL       Lab results within ordered parameters to give chemo today. ANC = 3.8, PLT = 338 and WBC= 5.5. Chemo dosages verified with today's BSA and found to be within 10% of ordered dosages. Pre-medications (Zofran 16 mg IV, Decadron 10 mg IV) were administered as ordered and chemotherapy was initiated after blood return from mediport re-verified. Alimta 850 mg was infused over 10 minutes as ordered. VS stable at end of infusion and pt denied complaints. Line flushed with NS and blood return re-verified. Mediport flushed with 20 mL NS and 500 units of heparin per protocol before de accessing. Band-aid applied to site. B12 injection was given IM in the right arm per written order. Band-aid applied. Neulasta 6mg OBI was applied to the left lower quadrant of the abdomen. Green light indicator verified. Ms. Gregory Santizo tolerated infusion, and had no complaints at this time. Patient armband removed and shredded. Ms. Jenkins Friend was discharged from [place] [address] in stable condition at 1115 . She is to return on 09/21/2017 at 0900 for her next appointment.      Mik Sanchez RN  August 31, 2017

## 2017-09-21 ENCOUNTER — HOSPITAL ENCOUNTER (OUTPATIENT)
Dept: INFUSION THERAPY | Age: 57
Discharge: HOME | End: 2017-09-21
Payer: MEDICAID

## 2017-09-21 VITALS
WEIGHT: 129.9 LBS | RESPIRATION RATE: 18 BRPM | HEART RATE: 93 BPM | OXYGEN SATURATION: 98 % | HEIGHT: 67 IN | DIASTOLIC BLOOD PRESSURE: 95 MMHG | TEMPERATURE: 98.3 F | SYSTOLIC BLOOD PRESSURE: 148 MMHG | BODY MASS INDEX: 20.39 KG/M2

## 2017-09-21 LAB
ANION GAP BLD CALC-SCNC: 18 MMOL/L (ref 10–20)
BASO+EOS+MONOS # BLD AUTO: 0.5 K/UL (ref 0–2.3)
BASO+EOS+MONOS # BLD AUTO: 9 % (ref 0.1–17)
BUN BLD-MCNC: 4 MG/DL (ref 7–18)
CA-I BLD-MCNC: 1.18 MMOL/L (ref 1.12–1.32)
CHLORIDE BLD-SCNC: 104 MMOL/L (ref 100–108)
CO2 BLD-SCNC: 23 MMOL/L (ref 19–24)
CREAT UR-MCNC: 0.7 MG/DL (ref 0.6–1.3)
DIFFERENTIAL METHOD BLD: ABNORMAL
ERYTHROCYTE [DISTWIDTH] IN BLOOD BY AUTOMATED COUNT: 16.9 % (ref 11.5–14.5)
GLUCOSE BLD STRIP.AUTO-MCNC: 97 MG/DL (ref 74–106)
HCT VFR BLD AUTO: 34.3 % (ref 36–48)
HCT VFR BLD CALC: 35 % (ref 36–49)
HGB BLD-MCNC: 11.3 G/DL (ref 12–16)
HGB BLD-MCNC: 11.9 G/DL (ref 12–16)
LYMPHOCYTES # BLD: 1 K/UL (ref 1.1–5.9)
LYMPHOCYTES NFR BLD: 20 % (ref 14–44)
MCH RBC QN AUTO: 28.4 PG (ref 25–35)
MCHC RBC AUTO-ENTMCNC: 32.9 G/DL (ref 31–37)
MCV RBC AUTO: 86.2 FL (ref 78–102)
NEUTS SEG # BLD: 3.6 K/UL (ref 1.8–9.5)
NEUTS SEG NFR BLD: 71 % (ref 40–70)
PLATELET # BLD AUTO: 319 K/UL (ref 140–440)
POTASSIUM BLD-SCNC: 3.6 MMOL/L (ref 3.5–5.5)
RBC # BLD AUTO: 3.98 M/UL (ref 4.1–5.1)
SODIUM BLD-SCNC: 141 MMOL/L (ref 136–145)
WBC # BLD AUTO: 5.1 K/UL (ref 4.5–13)

## 2017-09-21 PROCEDURE — 80047 BASIC METABLC PNL IONIZED CA: CPT

## 2017-09-21 PROCEDURE — 96377 APPLICATON ON-BODY INJECTOR: CPT

## 2017-09-21 PROCEDURE — 74011250636 HC RX REV CODE- 250/636: Performed by: INTERNAL MEDICINE

## 2017-09-21 PROCEDURE — 77030012965 HC NDL HUBR BBMI -A

## 2017-09-21 PROCEDURE — 74011250636 HC RX REV CODE- 250/636

## 2017-09-21 PROCEDURE — 96367 TX/PROPH/DG ADDL SEQ IV INF: CPT

## 2017-09-21 PROCEDURE — 74011000258 HC RX REV CODE- 258: Performed by: INTERNAL MEDICINE

## 2017-09-21 PROCEDURE — 96409 CHEMO IV PUSH SNGL DRUG: CPT

## 2017-09-21 PROCEDURE — 85025 COMPLETE CBC W/AUTO DIFF WBC: CPT | Performed by: INTERNAL MEDICINE

## 2017-09-21 RX ORDER — HEPARIN SODIUM (PORCINE) LOCK FLUSH IV SOLN 100 UNIT/ML 100 UNIT/ML
500 SOLUTION INTRAVENOUS AS NEEDED
Status: DISPENSED | OUTPATIENT
Start: 2017-09-21 | End: 2017-09-22

## 2017-09-21 RX ORDER — SODIUM CHLORIDE 0.9 % (FLUSH) 0.9 %
10-40 SYRINGE (ML) INJECTION AS NEEDED
Status: DISCONTINUED | OUTPATIENT
Start: 2017-09-21 | End: 2017-09-25 | Stop reason: HOSPADM

## 2017-09-21 RX ORDER — SODIUM CHLORIDE 9 MG/ML
25 INJECTION, SOLUTION INTRAVENOUS ONCE
Status: COMPLETED | OUTPATIENT
Start: 2017-09-21 | End: 2017-09-21

## 2017-09-21 RX ADMIN — SODIUM CHLORIDE 25 ML/HR: 900 INJECTION, SOLUTION INTRAVENOUS at 09:57

## 2017-09-21 RX ADMIN — HEPARIN SODIUM (PORCINE) LOCK FLUSH IV SOLN 100 UNIT/ML 500 UNITS: 100 SOLUTION at 11:19

## 2017-09-21 RX ADMIN — SODIUM CHLORIDE 850 MG: 900 INJECTION, SOLUTION INTRAVENOUS at 10:50

## 2017-09-21 RX ADMIN — ONDANSETRON 16 MG: 2 INJECTION INTRAMUSCULAR; INTRAVENOUS at 09:57

## 2017-09-21 RX ADMIN — Medication 40 ML: at 11:22

## 2017-09-21 RX ADMIN — PEGFILGRASTIM 6 MG: KIT SUBCUTANEOUS at 11:18

## 2017-09-21 RX ADMIN — DEXAMETHASONE SODIUM PHOSPHATE 10 MG: 4 INJECTION, SOLUTION INTRA-ARTICULAR; INTRALESIONAL; INTRAMUSCULAR; INTRAVENOUS; SOFT TISSUE at 10:15

## 2017-09-21 RX ADMIN — Medication 40 ML: at 09:57

## 2017-09-21 RX ADMIN — HEPARIN SODIUM (PORCINE) LOCK FLUSH IV SOLN 100 UNIT/ML 500 UNITS: 100 SOLUTION at 11:18

## 2017-09-21 NOTE — PROGRESS NOTES
[facility] Progress Note    Date: 2017    Name: Mary Ellen Lai              MRN: [id number]              : 1960    Chemotherapy Cycle: C23      Pt to [facility], ambulatory, at 0900. Ms. Valente Bean was assessed and education was provided. Denies any complaints. Ms. Fung's vitals were reviewed. Visit Vitals    BP (!) 148/95 (BP 1 Location: Right arm, BP Patient Position: Sitting)    Pulse 93    Temp 98.3 °F (36.8 °C)    Resp 18    Ht 5' 6.5\" (1.689 m)    Wt 58.9 kg (129 lb 14.4 oz)    SpO2 98%    BMI 20.65 kg/m2     Left chest double mediport lateral lumen accessed with a 20 G 1 inch Downs needle after chloroprep. Flushes without difficulty and had sluggish blood return. Medial lumen accessed with a 20 G 1 inch Downs needle after chloroprep. Flushes without difficulty and had brisk blood return. Labs drawn off and sent to lab for CBC and BMP per order after 10 mL waste. Flushed with 10 mL NS and NS started at [facility]. Lab results were obtained and reviewed. Recent Results (from the past 12 hour(s))   CBC WITH 3 PART DIFF    Collection Time: 17  9:27 AM   Result Value Ref Range    WBC 5.1 4.5 - 13.0 K/uL    RBC 3.98 (L) 4.10 - 5.10 M/uL    HGB 11.3 (L) 12.0 - 16 g/dL    HCT 34.3 (L) 36 - 48 %    MCV 86.2 78 - 102 FL    MCH 28.4 25.0 - 35.0 PG    MCHC 32.9 31 - 37 g/dL    RDW 16.9 (H) 11.5 - 14.5 %    PLATELET 549 981 - 141 K/uL    NEUTROPHILS 71 (H) 40 - 70 %    MIXED CELLS 9 0.1 - 17 %    LYMPHOCYTES 20 14 - 44 %    ABS. NEUTROPHILS 3.6 1.8 - 9.5 K/UL    ABS. MIXED CELLS 0.5 0.0 - 2.3 K/uL    ABS.  LYMPHOCYTES 1.0 (L) 1.1 - 5.9 K/UL    DF AUTOMATED     POC CHEM8    Collection Time: 17  9:35 AM   Result Value Ref Range    CO2, POC 23 19 - 24 MMOL/L    Glucose, POC 97 74 - 106 MG/DL    BUN, POC 4 (L) 7 - 18 MG/DL    Creatinine, POC 0.7 0.6 - 1.3 MG/DL    GFRAA, POC >60 >60 ml/min/1.73m2    GFRNA, POC >60 >60 ml/min/1.73m2    Sodium,  136 - 145 MMOL/L    Potassium, POC 3.6 3.5 - 5.5 MMOL/L    Calcium, ionized (POC) 1.18 1.12 - 1.32 MMOL/L    Chloride,  100 - 108 MMOL/L    Anion gap, POC 18 10 - 20      Hematocrit, POC 35 (L) 36 - 49 %    Hemoglobin, POC 11.9 (L) 12 - 16 G/DL       Lab results within ordered parameters to give chemo today. ANC = 3.6, PLT = 319 and WBC= 5.1. Chemo dosages verified with today's BSA and found to be within 10% of ordered dosages. Pre-medications (Zofran 16 mg IV, Decadron 10 mg IV) were administered as ordered and chemotherapy was initiated after blood return from mediport re-verified. Alimta 850 mg was infused over 10 minutes as ordered. VS stable at end of infusion and pt denied complaints. Line flushed with NS and blood return re-verified. Mediport flushed with 20 mL NS and 500 units of heparin per protocol, before de accessing, medial and lateral. Band-aid applied to sites. Neulasta 6mg OBI was applied to the left lower quadrant of the abdomen. Green light indicator verified. Ms. Hiawatha Curling tolerated infusion, and had no complaints at this time. Patient armband removed and shredded. Ms. Hiawatha Curling was discharged from [place] [address] in stable condition at 1125 . She is to return on 10/12/2017 at 0900 for her next appointment.      Callum Vasquez RN  September 21, 2017

## 2017-10-05 ENCOUNTER — OFFICE VISIT (OUTPATIENT)
Dept: FAMILY MEDICINE CLINIC | Age: 57
End: 2017-10-05

## 2017-10-05 VITALS
HEIGHT: 66 IN | HEART RATE: 91 BPM | RESPIRATION RATE: 20 BRPM | TEMPERATURE: 98.2 F | OXYGEN SATURATION: 100 % | DIASTOLIC BLOOD PRESSURE: 78 MMHG | BODY MASS INDEX: 21.21 KG/M2 | SYSTOLIC BLOOD PRESSURE: 118 MMHG | WEIGHT: 132 LBS

## 2017-10-05 DIAGNOSIS — I27.82 OTHER CHRONIC PULMONARY EMBOLISM WITHOUT ACUTE COR PULMONALE (HCC): ICD-10-CM

## 2017-10-05 DIAGNOSIS — I10 ESSENTIAL HYPERTENSION WITH GOAL BLOOD PRESSURE LESS THAN 140/90: Primary | ICD-10-CM

## 2017-10-05 DIAGNOSIS — I82.501 CHRONIC DEEP VEIN THROMBOSIS (DVT) OF RIGHT LOWER EXTREMITY, UNSPECIFIED VEIN (HCC): ICD-10-CM

## 2017-10-05 DIAGNOSIS — F33.9 RECURRENT MAJOR DEPRESSIVE DISORDER, REMISSION STATUS UNSPECIFIED (HCC): ICD-10-CM

## 2017-10-05 DIAGNOSIS — H61.23 HEARING LOSS OF BOTH EARS DUE TO CERUMEN IMPACTION: ICD-10-CM

## 2017-10-05 DIAGNOSIS — Z23 ENCOUNTER FOR IMMUNIZATION: ICD-10-CM

## 2017-10-05 RX ORDER — AMLODIPINE BESYLATE 5 MG/1
5 TABLET ORAL DAILY
Qty: 30 TAB | Refills: 6 | Status: SHIPPED | OUTPATIENT
Start: 2017-10-05 | End: 2018-04-05 | Stop reason: SDUPTHER

## 2017-10-05 RX ORDER — SERTRALINE HYDROCHLORIDE 50 MG/1
50 TABLET, FILM COATED ORAL DAILY
Qty: 30 TAB | Refills: 6 | Status: SHIPPED | OUTPATIENT
Start: 2017-10-05 | End: 2018-04-05 | Stop reason: SDUPTHER

## 2017-10-05 NOTE — MR AVS SNAPSHOT
Visit Information Date & Time Provider Department Dept. Phone Encounter #  
 [facility]5/2017 10:30 [phone] [id number] Upcoming Health Maintenance Date Due DTaP/Tdap/Td series (1 - Tdap) 10/22/1979 INFLUENZA AGE 9 TO ADULT 8/1/2017 FOBT Q 1 YEAR AGE 50-75 8/18/2017 Pneumococcal 19-64 Highest Risk (2 of 3 - PCV13) 4/26/2018 BREAST CANCER SCRN MAMMOGRAM 6/6/2019 Bone Densitometry 5/22/2022 Allergies as of 10/5/2017  Review Complete On: 10/5/2017 By: CAL Fournier Severity Noted Reaction Type Reactions Flagyl [Metronidazole]  06/07/2015    Hives Current Immunizations  Reviewed on 8/31/2017 Name Date Influenza Vaccine (Quad) PF  Incomplete, 10/4/2016 Not reviewed this visit You Were Diagnosed With   
  
 Codes Comments Essential hypertension with goal blood pressure less than 140/90    -  Primary ICD-10-CM: I10 
ICD-9-CM: 401.9 Other chronic pulmonary embolism without acute cor pulmonale (HCC)     ICD-10-CM: I27.82 
ICD-9-CM: 416.2 Chronic deep vein thrombosis (DVT) of right lower extremity, unspecified vein (HCC)     ICD-10-CM: I82.501 ICD-9-CM: 453.50 Recurrent major depressive disorder, remission status unspecified ([facility]     ICD-10-CM: F33.9 ICD-9-CM: 296.30 Hearing loss of both ears due to cerumen impaction     ICD-10-CM: H61.23 
ICD-9-CM: 389.8, 380.4 Encounter for immunization     ICD-10-CM: O02 ICD-9-CM: V03.89 Vitals BP Pulse Temp Resp Height(growth percentile) Weight(growth percentile) 118/78 91 98.2 °F (36.8 °C) (Oral) 20 5' 6\" (1.676 m) 132 lb (59.9 kg) SpO2 BMI OB Status Smoking Status 100% 21.31 kg/m2 Menopause Former Smoker Vitals History BMI and BSA Data Body Mass Index Body Surface Area  
 21.31 kg/m 2 1.67 m 2 Preferred Pharmacy Pharmacy Name Phone [facility] [address] [facility], [address] [facility] [phone] Your Updated Medication List  
  
   
This list is accurate as of: 10/5/17 11:02 AM.  Always use your most recent med list.  
  
  
  
  
 albuterol 90 mcg/actuation inhaler Commonly known as:  PROVENTIL HFA, VENTOLIN HFA, PROAIR HFA Take 2 Puffs by inhalation every four (4) hours as needed for Wheezing or Shortness of Breath. amLODIPine 5 mg tablet Commonly known as:  Angelica Croon Take 1 Tab by mouth daily. ascorbic acid (vitamin C) 100 mg tab Commonly known as:  VITAMIN C Take 1 Tab by mouth daily. ferrous sulfate 325 mg (65 mg iron) tablet Take 1 Tab by mouth two (2) times daily (with meals). loratadine 10 mg tablet Commonly known as:  Elizabeth Martinet Take 10 mg by mouth daily. rivaroxaban 20 mg Tab tablet Commonly known as:  Ace Antes Take 1 Tab by mouth daily (with breakfast). senna-docusate 8.6-50 mg per tablet Commonly known as:  Juanda Nadja Take 1 Tab by mouth daily. sertraline 50 mg tablet Commonly known as:  ZOLOFT Take 1 Tab by mouth daily. ZOFRAN ODT 4 mg disintegrating tablet Generic drug:  ondansetron Take 4 mg by mouth every eight (8) hours as needed for Nausea. Prescriptions Sent to Pharmacy Refills  
 rivaroxaban (XARELTO) 20 mg tab tablet 6 Sig: Take 1 Tab by mouth daily (with breakfast). Class: Normal  
 Pharmacy: [id number] [address] Ph #: [phone] Route: Oral  
 amLODIPine (NORVASC) 5 mg tablet 6 Sig: Take 1 Tab by mouth daily. Class: Normal  
 Pharmacy: [id number] [address] Ph #: [phone] Route: Oral  
 sertraline (ZOLOFT) 50 mg tablet 6 Sig: Take 1 Tab by mouth daily. Class: Normal  
 Pharmacy: [id number] [address] Ph #: [phone] Route: Oral  
  
We Performed the Following INFLUENZA VIRUS VAC QUAD,SPLIT,PRESV FREE SYRINGE IM [id number] CPT(R)] CT IMMUNIZ ADMIN,1 SINGLE/COMB VAC/TOXOID [id number] CPT(R)] REMOVAL IMPACTED CERUMEN IRRIGATION/LVG UNILAT [id number] CPT(R)] To-Do List   
 10/12/2017 9:00 AM  
  Appointment with [facility] NURSE 5 at [facility] ([phone]) Please provide this summary of care documentation to your next provider. Your primary care clinician is listed as Marvel Payne. If you have any questions after today's visit, please call [phone].

## 2017-10-05 NOTE — PROGRESS NOTES
Patient: Desirae Thompson MRN: [id number]  SSN: [id number]    YOB: 1960  Age: 60 y.o. Sex: female      Date of Service: 10/5/2017   Provider: CAL Soria         REASON FOR VISIT:   Chief Complaint   Patient presents with    Follow Up Chronic Condition     HTN         VITALS:   Visit Vitals    /78    Pulse 91    Temp 98.2 °F (36.8 °C) (Oral)    Resp 20    Ht 5' 6\" (1.676 m)    Wt 132 lb (59.9 kg)    SpO2 100%    BMI 21.31 kg/m2       MEDICATIONS:   Current Outpatient Prescriptions on File Prior to Visit   Medication Sig Dispense Refill    rivaroxaban (XARELTO) 20 mg tab tablet Take 1 Tab by mouth daily (with breakfast). 30 Tab 6    amLODIPine (NORVASC) 5 mg tablet Take 1 Tab by mouth daily. 30 Tab 6    sertraline (ZOLOFT) 50 mg tablet Take 1 Tab by mouth daily. 30 Tab 6    ondansetron (ZOFRAN ODT) 4 mg disintegrating tablet Take 4 mg by mouth every eight (8) hours as needed for Nausea.  ferrous sulfate 325 mg (65 mg iron) tablet Take 1 Tab by mouth two (2) times daily (with meals). 60 Tab 11    senna-docusate (PERICOLACE) 8.6-50 mg per tablet Take 1 Tab by mouth daily. 30 Tab 0    loratadine (CLARITIN) 10 mg tablet Take 10 mg by mouth daily.  carbamide peroxide (DEBROX) 6.5 % otic solution Administer 5 Drops into each ear two (2) times a day. 7.5 mL 0    ascorbic acid, vitamin C, (VITAMIN C) 100 mg tab Take 1 Tab by mouth daily. 30 Tab 11    albuterol (PROVENTIL HFA, VENTOLIN HFA, PROAIR HFA) 90 mcg/actuation inhaler Take 2 Puffs by inhalation every four (4) hours as needed for Wheezing or Shortness of Breath. 1 Inhaler 0     No current facility-administered medications on file prior to visit.          ALLERGIES:   Allergies   Allergen Reactions    Flagyl [Metronidazole] Hives        ACTIVE MEDICAL PROBLEMS:  Patient Active Problem List   Diagnosis Code    SVC syndrome I87.1    Former smoker Z87.891    Essential hypertension I10    Right leg DVT ([facility] I82.401    Pulmonary embolism without acute cor pulmonale (HCC) I26.99    [facility]urrent [facility] F33.9    Iron deficiency anemia D50.9    Abnormal mammogram R92.8        MEDICAL/SURGICAL HISTORY:  Past Medical History:   Diagnosis Date    Anemia, iron deficiency 2/2016    Depression     H/O seasonal allergies     Hypertension     Non-small cell carcinoma of lung ([facility] 2/2016    adenocarcinoma of right lung    Positive occult stool blood test 2/29/2016    Pulmonary embolism ([facility] 2/28/2016    small, bilateral PEs- on Xarelto    Right leg DVT ([facility] 2/28/2016    on Xarelto    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[facility] 4/1/2016    resolved    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      Past Surgical History:   Procedure Laterality Date    BREAST SURGERY PROCEDURE UNLISTED      biopsy    HX ANGIOPLASTY Right 3/20/2016    IJ, subclavian, and brachiocephalic veins    HX HYSTERECTOMY      \"weak cervix\"    HX OTHER SURGICAL Right 3/20/2016    2 placed in right IJ, subclavian/brachiocephalic    HX THROMBECTOMY  3/20/2016    with thrombolysis        FAMILY HISTORY:  Family History   Problem Relation Age of Onset    Cancer Sister 48     breast    Liver Disease Sister      cirrhosis    Heart Attack Mother 37    No Known Problems Child         SOCIAL HISTORY:  Social History   Substance Use Topics    Smoking status: Former Smoker     Packs/day: 0.50     Quit date: 2/28/2016    Smokeless tobacco: Never Used    Alcohol use No         HISTORY OF PRESENT ILLNESS: Lowell Oliva is a 60 y.o. female who presents to the office for a routine follow up visit. Hypertension -   Currently, the patient's condition is well controlled. Reports compliance with the following regimen: amlodipine 5 mg daily  Home BP readings: not checking    History of PE/DVT -   Patient is anticoagulated on Xarelto. Reports no acute issues today. No issues with bleeding or bruising.      Lung Cancer/Anemia -   Managed by heme/onc  Last CBC 9/21/17 revealed Hgb 11.3/Hct 34.3    Depression -   Patient reports symptoms are stable on Zoloft 50 mg daily  Reports she also uses this medication for hot flashes which are well controlled     Chronic Cerumen Impaction -  Patient reports she had her ears flushed about 1 year ago, and had been using Debrox intermittently for a while thereafter. However, she has since stopped using the drops and now feels like wax is recurring. Would like to consider having ears flushed today     REVIEW OF SYSTEMS:  Review of Systems   Constitutional: Negative for chills, fever and malaise/fatigue. HENT: Positive for hearing loss. Respiratory: Negative for cough, shortness of breath and wheezing. Cardiovascular: Negative for chest pain and palpitations. Gastrointestinal: Negative for abdominal pain, diarrhea, nausea and vomiting. PHYSICAL EXAMINATION:  Physical Exam   Constitutional: She is oriented to person, place, and time and well-developed, well-nourished, and in no distress. HENT:   excess cerumen b/l ear canals   Cardiovascular: Normal rate, regular rhythm and normal heart sounds. Exam reveals no gallop and no friction rub. No murmur heard. Pulmonary/Chest: Effort normal and breath sounds normal. She has no wheezes. She has no rales. Musculoskeletal: She exhibits no edema. Neurological: She is alert and oriented to person, place, and time. Skin: Skin is warm and dry. No rash noted. RESULTS:  No results found for this visit on 10/05/17. ASSESSMENT/PLAN:  Diagnoses and all orders for this visit:    1. Essential hypertension with goal blood pressure less than 140/90  -     amLODIPine (NORVASC) 5 mg tablet; Take 1 Tab by mouth daily. 2. Other chronic pulmonary embolism without acute cor pulmonale (HCC)  -     rivaroxaban (XARELTO) 20 mg tab tablet; Take 1 Tab by mouth daily (with breakfast).     3. Chronic deep vein thrombosis (DVT) of right lower extremity, unspecified vein (HCC)  -     rivaroxaban (XARELTO) 20 mg tab tablet; Take 1 Tab by mouth daily (with breakfast). 4. Recurrent major depressive disorder, remission status unspecified (HCC)  -     sertraline (ZOLOFT) 50 mg tablet; Take 1 Tab by mouth daily. 5. Hearing loss of both ears due to cerumen impaction  -     REMOVAL IMPACTED CERUMEN IRRIGATION/LVG UNILAT    6.  Encounter for immunization  -     INFLUENZA VIRUS VACCINE QUADRIVALENT, PRESERVATIVE FREE SYRINGE (01075)  -     SC IMMUNIZ ADMIN,1 SINGLE/COMB VAC/TOXOID        CAL Baron   October 5, 2017

## 2017-10-12 ENCOUNTER — HOSPITAL ENCOUNTER (OUTPATIENT)
Dept: INFUSION THERAPY | Age: 57
Discharge: HOME | End: 2017-10-12
Payer: SELF-PAY

## 2017-10-12 VITALS
WEIGHT: 135.9 LBS | BODY MASS INDEX: 21.33 KG/M2 | RESPIRATION RATE: 18 BRPM | SYSTOLIC BLOOD PRESSURE: 139 MMHG | DIASTOLIC BLOOD PRESSURE: 85 MMHG | HEIGHT: 67 IN | OXYGEN SATURATION: 95 % | TEMPERATURE: 99.3 F | HEART RATE: 81 BPM

## 2017-10-12 LAB
ANION GAP BLD CALC-SCNC: 17 MMOL/L (ref 10–20)
BASO+EOS+MONOS # BLD AUTO: 0.3 K/UL (ref 0–2.3)
BASO+EOS+MONOS # BLD AUTO: 9 % (ref 0.1–17)
BUN BLD-MCNC: 4 MG/DL (ref 7–18)
CA-I BLD-MCNC: 1.15 MMOL/L (ref 1.12–1.32)
CHLORIDE BLD-SCNC: 104 MMOL/L (ref 100–108)
CO2 BLD-SCNC: 24 MMOL/L (ref 19–24)
CREAT UR-MCNC: 0.7 MG/DL (ref 0.6–1.3)
DIFFERENTIAL METHOD BLD: ABNORMAL
ERYTHROCYTE [DISTWIDTH] IN BLOOD BY AUTOMATED COUNT: 16.1 % (ref 11.5–14.5)
GLUCOSE BLD STRIP.AUTO-MCNC: 108 MG/DL (ref 74–106)
HCT VFR BLD AUTO: 33.9 % (ref 36–48)
HCT VFR BLD CALC: 34 % (ref 36–49)
HGB BLD-MCNC: 10.8 G/DL (ref 12–16)
HGB BLD-MCNC: 11.6 G/DL (ref 12–16)
LYMPHOCYTES # BLD: 1.1 K/UL (ref 1.1–5.9)
LYMPHOCYTES NFR BLD: 28 % (ref 14–44)
MCH RBC QN AUTO: 28.1 PG (ref 25–35)
MCHC RBC AUTO-ENTMCNC: 31.9 G/DL (ref 31–37)
MCV RBC AUTO: 88.3 FL (ref 78–102)
NEUTS SEG # BLD: 2.4 K/UL (ref 1.8–9.5)
NEUTS SEG NFR BLD: 63 % (ref 40–70)
PLATELET # BLD AUTO: 270 K/UL (ref 140–440)
POTASSIUM BLD-SCNC: 3.5 MMOL/L (ref 3.5–5.5)
RBC # BLD AUTO: 3.84 M/UL (ref 4.1–5.1)
SODIUM BLD-SCNC: 141 MMOL/L (ref 136–145)
WBC # BLD AUTO: 3.8 K/UL (ref 4.5–13)

## 2017-10-12 PROCEDURE — 96372 THER/PROPH/DIAG INJ SC/IM: CPT

## 2017-10-12 PROCEDURE — 74011250636 HC RX REV CODE- 250/636

## 2017-10-12 PROCEDURE — 74011250636 HC RX REV CODE- 250/636: Performed by: INTERNAL MEDICINE

## 2017-10-12 PROCEDURE — 96409 CHEMO IV PUSH SNGL DRUG: CPT

## 2017-10-12 PROCEDURE — 96377 APPLICATON ON-BODY INJECTOR: CPT

## 2017-10-12 PROCEDURE — 74011000258 HC RX REV CODE- 258: Performed by: INTERNAL MEDICINE

## 2017-10-12 PROCEDURE — 85025 COMPLETE CBC W/AUTO DIFF WBC: CPT | Performed by: INTERNAL MEDICINE

## 2017-10-12 PROCEDURE — 77030012965 HC NDL HUBR BBMI -A

## 2017-10-12 PROCEDURE — 96375 TX/PRO/DX INJ NEW DRUG ADDON: CPT

## 2017-10-12 PROCEDURE — 80047 BASIC METABLC PNL IONIZED CA: CPT

## 2017-10-12 RX ORDER — HEPARIN SODIUM (PORCINE) LOCK FLUSH IV SOLN 100 UNIT/ML 100 UNIT/ML
500 SOLUTION INTRAVENOUS AS NEEDED
Status: DISPENSED | OUTPATIENT
Start: 2017-10-12 | End: 2017-10-13

## 2017-10-12 RX ORDER — CYANOCOBALAMIN 1000 UG/ML
1000 INJECTION, SOLUTION INTRAMUSCULAR; SUBCUTANEOUS ONCE
Status: COMPLETED | OUTPATIENT
Start: 2017-10-12 | End: 2017-10-12

## 2017-10-12 RX ORDER — SODIUM CHLORIDE 0.9 % (FLUSH) 0.9 %
10-40 SYRINGE (ML) INJECTION AS NEEDED
Status: DISCONTINUED | OUTPATIENT
Start: 2017-10-12 | End: 2017-10-16 | Stop reason: HOSPADM

## 2017-10-12 RX ADMIN — SODIUM CHLORIDE 850 MG: 900 INJECTION, SOLUTION INTRAVENOUS at 10:22

## 2017-10-12 RX ADMIN — PEGFILGRASTIM 6 MG: KIT SUBCUTANEOUS at 10:39

## 2017-10-12 RX ADMIN — HEPARIN SODIUM (PORCINE) LOCK FLUSH IV SOLN 100 UNIT/ML 500 UNITS: 100 SOLUTION at 10:39

## 2017-10-12 RX ADMIN — CYANOCOBALAMIN 1000 MCG: 1000 INJECTION, SOLUTION INTRAMUSCULAR at 10:39

## 2017-10-12 RX ADMIN — Medication 10 ML: at 10:24

## 2017-10-12 RX ADMIN — Medication 10 ML: at 10:39

## 2017-10-12 RX ADMIN — DEXAMETHASONE SODIUM PHOSPHATE: 4 INJECTION, SOLUTION INTRA-ARTICULAR; INTRALESIONAL; INTRAMUSCULAR; INTRAVENOUS; SOFT TISSUE at 09:44

## 2017-10-12 NOTE — PROGRESS NOTES
[facility] Progress Note    Date: 2017    Name: Judith Zelaya              MRN: [id number]              : 1960    Chemotherapy Cycle: C25      Pt to [facility], ambulatory, at 0905. Ms. Ivelisse Ahumada was assessed and education was provided. Denies any complaints. Ms. Fung's vitals were reviewed. Visit Vitals    /85 (BP 1 Location: Left arm, BP Patient Position: Sitting)    Pulse 81    Temp 99.3 °F (37.4 °C)    Resp 18    Ht 5' 6.5\" (1.689 m)    Wt 61.6 kg (135 lb 14.4 oz)    SpO2 95%    BMI 21.61 kg/m2     Left chest double mediport lateral lumen accessed with a 20 G 1 inch Downs needle after chloroprep. Flushes without difficulty and had brisk blood return. Labs drawn off and sent to lab for CBC and BMP per order after 10 mL waste. Flushed with 10 mL NS. .     Lab results were obtained and reviewed. Recent Results (from the past 12 hour(s))   CBC WITH 3 PART DIFF    Collection Time: 10/12/17  9:15 AM   Result Value Ref Range    WBC 3.8 (L) 4.5 - 13.0 K/uL    RBC 3.84 (L) 4.10 - 5.10 M/uL    HGB 10.8 (L) 12.0 - 16 g/dL    HCT 33.9 (L) 36 - 48 %    MCV 88.3 78 - 102 FL    MCH 28.1 25.0 - 35.0 PG    MCHC 31.9 31 - 37 g/dL    RDW 16.1 (H) 11.5 - 14.5 %    PLATELET 288 044 - 027 K/uL    NEUTROPHILS 63 40 - 70 %    MIXED CELLS 9 0.1 - 17 %    LYMPHOCYTES 28 14 - 44 %    ABS. NEUTROPHILS 2.4 1.8 - 9.5 K/UL    ABS. MIXED CELLS 0.3 0.0 - 2.3 K/uL    ABS.  LYMPHOCYTES 1.1 1.1 - 5.9 K/UL    DF AUTOMATED     POC CHEM8    Collection Time: 10/12/17  9:26 AM   Result Value Ref Range    CO2, POC 24 19 - 24 MMOL/L    Glucose,  (H) 74 - 106 MG/DL    BUN, POC 4 (L) 7 - 18 MG/DL    Creatinine, POC 0.7 0.6 - 1.3 MG/DL    GFRAA, POC >60 >60 ml/min/1.73m2    GFRNA, POC >60 >60 ml/min/1.73m2    Sodium,  136 - 145 MMOL/L    Potassium, POC 3.5 3.5 - 5.5 MMOL/L    Calcium, ionized (POC) 1.15 1.12 - 1.32 MMOL/L    Chloride,  100 - 108 MMOL/L    Anion gap, POC 17 10 - 20      Hematocrit, POC 34 (L) 36 - 49 %    Hemoglobin, POC 11.6 (L) 12 - 16 G/DL       Lab results within ordered parameters to give chemo today. Chemo dosages verified with today's BSA and found to be within 10% of ordered dosages. Pre-medications (Zofran 16 mg IV, Decadron 10 mg IV) were administered as ordered and chemotherapy was initiated after blood return from mediport re-verified. Pemetrexed 850 mg was infused over 10 minutes as ordered. VS stable at end of infusion and pt denied complaints. Line flushed with NS and blood return re-verified. Mediport flushed with 20 mL NS and 500 units of heparin per protocol before de accessing. Band-aid applied to site. B12 IM injection given today in right deltoid. Neulasta 6mg OBI was applied to the left lower quadrant of the abdomen. Green light indicator verified. Ms. Keny Tucker tolerated infusion, and had no complaints at this time. Patient armband removed and shredded. Ms. Keny Tucker was discharged from [place] [address] in stable condition at 1045. She has no further appt at this time.     Rory Vasquez RN  October 12, 2017  4110

## 2017-12-05 RX ORDER — CYANOCOBALAMIN 1000 UG/ML
1000 INJECTION, SOLUTION INTRAMUSCULAR; SUBCUTANEOUS ONCE
Status: CANCELLED | OUTPATIENT
Start: 2017-12-07 | End: 2017-12-07

## 2017-12-07 ENCOUNTER — HOSPITAL ENCOUNTER (OUTPATIENT)
Dept: INFUSION THERAPY | Age: 57
Discharge: HOME | End: 2017-12-07
Payer: MEDICAID

## 2017-12-07 RX ORDER — HEPARIN SODIUM (PORCINE) LOCK FLUSH IV SOLN 100 UNIT/ML 100 UNIT/ML
500 SOLUTION INTRAVENOUS AS NEEDED
Status: CANCELLED | OUTPATIENT
Start: 2017-12-07 | End: 2017-12-08

## 2017-12-07 RX ORDER — SODIUM CHLORIDE 0.9 % (FLUSH) 0.9 %
10-40 SYRINGE (ML) INJECTION AS NEEDED
Status: CANCELLED | OUTPATIENT
Start: 2017-12-07

## 2017-12-14 ENCOUNTER — HOSPITAL ENCOUNTER (OUTPATIENT)
Dept: INFUSION THERAPY | Age: 57
Discharge: HOME | End: 2017-12-14
Payer: MEDICAID

## 2017-12-14 VITALS
RESPIRATION RATE: 18 BRPM | TEMPERATURE: 98.7 F | DIASTOLIC BLOOD PRESSURE: 90 MMHG | WEIGHT: 134.4 LBS | HEIGHT: 67 IN | OXYGEN SATURATION: 99 % | HEART RATE: 89 BPM | BODY MASS INDEX: 21.09 KG/M2 | SYSTOLIC BLOOD PRESSURE: 157 MMHG

## 2017-12-14 LAB
BASO+EOS+MONOS # BLD AUTO: 0.2 K/UL (ref 0–2.3)
BASO+EOS+MONOS # BLD AUTO: 5 % (ref 0.1–17)
DIFFERENTIAL METHOD BLD: ABNORMAL
ERYTHROCYTE [DISTWIDTH] IN BLOOD BY AUTOMATED COUNT: 13.7 % (ref 11.5–14.5)
HCT VFR BLD AUTO: 37.2 % (ref 36–48)
HGB BLD-MCNC: 11.7 G/DL (ref 12–16)
LYMPHOCYTES # BLD: 1.3 K/UL (ref 1.1–5.9)
LYMPHOCYTES NFR BLD: 30 % (ref 14–44)
MCH RBC QN AUTO: 27.4 PG (ref 25–35)
MCHC RBC AUTO-ENTMCNC: 31.5 G/DL (ref 31–37)
MCV RBC AUTO: 87.1 FL (ref 78–102)
NEUTS SEG # BLD: 2.7 K/UL (ref 1.8–9.5)
NEUTS SEG NFR BLD: 64 % (ref 40–70)
PLATELET # BLD AUTO: 202 K/UL (ref 140–440)
RBC # BLD AUTO: 4.27 M/UL (ref 4.1–5.1)
WBC # BLD AUTO: 4.2 K/UL (ref 4.5–13)

## 2017-12-14 PROCEDURE — 74011250636 HC RX REV CODE- 250/636: Performed by: INTERNAL MEDICINE

## 2017-12-14 PROCEDURE — 74011000258 HC RX REV CODE- 258: Performed by: INTERNAL MEDICINE

## 2017-12-14 PROCEDURE — 96367 TX/PROPH/DG ADDL SEQ IV INF: CPT

## 2017-12-14 PROCEDURE — 96413 CHEMO IV INFUSION 1 HR: CPT

## 2017-12-14 PROCEDURE — 85025 COMPLETE CBC W/AUTO DIFF WBC: CPT | Performed by: INTERNAL MEDICINE

## 2017-12-14 PROCEDURE — 74011250636 HC RX REV CODE- 250/636

## 2017-12-14 PROCEDURE — 77030012965 HC NDL HUBR BBMI -A

## 2017-12-14 RX ORDER — HEPARIN SODIUM (PORCINE) LOCK FLUSH IV SOLN 100 UNIT/ML 100 UNIT/ML
500 SOLUTION INTRAVENOUS AS NEEDED
Status: DISPENSED | OUTPATIENT
Start: 2017-12-14 | End: 2017-12-15

## 2017-12-14 RX ORDER — CYANOCOBALAMIN 1000 UG/ML
1000 INJECTION, SOLUTION INTRAMUSCULAR; SUBCUTANEOUS ONCE
Status: COMPLETED | OUTPATIENT
Start: 2017-12-14 | End: 2017-12-14

## 2017-12-14 RX ORDER — SODIUM CHLORIDE 9 MG/ML
25 INJECTION, SOLUTION INTRAVENOUS ONCE
Status: COMPLETED | OUTPATIENT
Start: 2017-12-14 | End: 2017-12-14

## 2017-12-14 RX ORDER — SODIUM CHLORIDE 0.9 % (FLUSH) 0.9 %
10-40 SYRINGE (ML) INJECTION AS NEEDED
Status: DISCONTINUED | OUTPATIENT
Start: 2017-12-14 | End: 2017-12-18 | Stop reason: HOSPADM

## 2017-12-14 RX ADMIN — HEPARIN SODIUM (PORCINE) LOCK FLUSH IV SOLN 100 UNIT/ML 500 UNITS: 100 SOLUTION at 10:51

## 2017-12-14 RX ADMIN — SODIUM CHLORIDE 25 ML/HR: 900 INJECTION, SOLUTION INTRAVENOUS at 09:20

## 2017-12-14 RX ADMIN — Medication 20 ML: at 10:51

## 2017-12-14 RX ADMIN — PEGFILGRASTIM 6 MG: KIT SUBCUTANEOUS at 10:51

## 2017-12-14 RX ADMIN — DEXAMETHASONE SODIUM PHOSPHATE: 4 INJECTION, SOLUTION INTRA-ARTICULAR; INTRALESIONAL; INTRAMUSCULAR; INTRAVENOUS; SOFT TISSUE at 09:33

## 2017-12-14 RX ADMIN — Medication 20 ML: at 09:33

## 2017-12-14 RX ADMIN — SODIUM CHLORIDE 850 MG: 900 INJECTION, SOLUTION INTRAVENOUS at 10:17

## 2017-12-14 RX ADMIN — CYANOCOBALAMIN 1000 MCG: 1000 INJECTION, SOLUTION INTRAMUSCULAR at 10:51

## 2017-12-14 NOTE — PROGRESS NOTES
[facility] Progress Note    Date: 2017    Name: Maryse Trejo              MRN: [id number]              : 1960    Chemotherapy Cycle: C26      Pt to [facility], ambulatory, at 0900. Ms. Prince Keita was assessed and education was provided. Denies any complaints. Ms. Diamond Dick vitals were reviewed. Visit Vitals    /90 (BP 1 Location: Left arm, BP Patient Position: Sitting)    Pulse 89    Temp 98.7 °F (37.1 °C)    Resp 18    Ht 5' 6.5\" (1.689 m)    Wt 61 kg (134 lb 6.4 oz)    SpO2 99%    Breastfeeding No    BMI 21.37 kg/m2     Left chest double mediport lateral lumen accessed with a 20 G 1 inch Downs needle after chloroprep. Flushes without difficulty and had sluggish blood return. Medial lumen accessed with a 20 G 1 inch Downs needle after chloroprep. Flushes without difficulty and had brisk blood return. Labs drawn off and sent to lab for CBC per order after 10 mL waste. Flushed with 10 mL NS and NS started at [facility]. Lab results were obtained and reviewed. Recent Results (from the past 12 hour(s))   CBC WITH 3 PART DIFF    Collection Time: 17  9:20 AM   Result Value Ref Range    WBC 4.2 (L) 4.5 - 13.0 K/uL    RBC 4.27 4. 10 - 5.10 M/uL    HGB 11.7 (L) 12.0 - 16 g/dL    HCT 37.2 36 - 48 %    MCV 87.1 78 - 102 FL    MCH 27.4 25.0 - 35.0 PG    MCHC 31.5 31 - 37 g/dL    RDW 13.7 11.5 - 14.5 %    PLATELET 074 094 - 156 K/uL    NEUTROPHILS 64 40 - 70 %    MIXED CELLS 5 0.1 - 17 %    LYMPHOCYTES 30 14 - 44 %    ABS. NEUTROPHILS 2.7 1.8 - 9.5 K/UL    ABS. MIXED CELLS 0.2 0.0 - 2.3 K/uL    ABS. LYMPHOCYTES 1.3 1.1 - 5.9 K/UL    DF AUTOMATED         Lab results within ordered parameters to give chemo today. ANC = 2.7, PLT = 202 and WBC= 4.2. Chemo dosages verified with today's BSA and found to be within 10% of ordered dosages. Pre-medications (Zofran 16 mg IV, Decadron 10 mg IV) were administered as ordered and chemotherapy was initiated after blood return from [facility] re-verified. Alimta 850 mg was infused over 10 minutes as ordered. VS stable at end of infusion and pt denied complaints. Line flushed with NS and blood return re-verified. Mediport flushed with 20 mL NS and 500 units of heparin per protocol, before de accessing, medial and lateral. Band-aid applied to sites. Neulasta 6mg OBI was applied to the left lower quadrant of the abdomen. Green light indicator verified. Ms. Compa Saldivar tolerated infusion, and had no complaints at this time. Patient armband removed and shredded. Ms. Compa Saldivar was discharged from [place] [address] in stable condition at 1100 . She is to return on 1/4/2018 at 0900 for her next appointment.      Chan Jarrell RN  December 14, 2017

## 2018-01-02 RX ORDER — CYANOCOBALAMIN 1000 UG/ML
1000 INJECTION, SOLUTION INTRAMUSCULAR; SUBCUTANEOUS ONCE
Status: CANCELLED | OUTPATIENT
Start: 2018-01-04 | End: 2018-01-04

## 2018-01-04 ENCOUNTER — HOSPITAL ENCOUNTER (OUTPATIENT)
Dept: INFUSION THERAPY | Age: 58
Discharge: HOME | End: 2018-01-04
Payer: MEDICAID

## 2018-01-09 RX ORDER — CYANOCOBALAMIN 1000 UG/ML
1000 INJECTION, SOLUTION INTRAMUSCULAR; SUBCUTANEOUS ONCE
Status: CANCELLED | OUTPATIENT
Start: 2018-01-11 | End: 2018-01-11

## 2018-01-11 ENCOUNTER — HOSPITAL ENCOUNTER (OUTPATIENT)
Dept: INFUSION THERAPY | Age: 58
Discharge: HOME | End: 2018-01-11
Payer: MEDICAID

## 2018-01-13 DIAGNOSIS — D50.9 IRON DEFICIENCY ANEMIA, UNSPECIFIED IRON DEFICIENCY ANEMIA TYPE: ICD-10-CM

## 2018-01-16 RX ORDER — LANOLIN ALCOHOL/MO/W.PET/CERES
CREAM (GRAM) TOPICAL
Qty: 60 TAB | Refills: 2 | Status: SHIPPED | OUTPATIENT
Start: 2018-01-16 | End: 2018-06-28 | Stop reason: SDUPTHER

## 2018-01-18 ENCOUNTER — HOSPITAL ENCOUNTER (OUTPATIENT)
Dept: INFUSION THERAPY | Age: 58
Discharge: HOME | End: 2018-01-18
Payer: MEDICAID

## 2018-01-18 VITALS
SYSTOLIC BLOOD PRESSURE: 166 MMHG | HEIGHT: 67 IN | WEIGHT: 136.8 LBS | TEMPERATURE: 98.9 F | DIASTOLIC BLOOD PRESSURE: 92 MMHG | HEART RATE: 85 BPM | RESPIRATION RATE: 18 BRPM | BODY MASS INDEX: 21.47 KG/M2

## 2018-01-18 LAB
ALBUMIN SERPL-MCNC: 3.6 G/DL (ref 3.4–5)
ALBUMIN/GLOB SERPL: 0.9 {RATIO} (ref 0.8–1.7)
ALP SERPL-CCNC: 90 U/L (ref 45–117)
ALT SERPL-CCNC: 14 U/L (ref 13–56)
ANION GAP BLD CALC-SCNC: 18 MMOL/L (ref 10–20)
AST SERPL-CCNC: 19 U/L (ref 15–37)
BASO+EOS+MONOS # BLD AUTO: 0.3 K/UL (ref 0–2.3)
BASO+EOS+MONOS # BLD AUTO: 7 % (ref 0.1–17)
BILIRUB DIRECT SERPL-MCNC: <0.1 MG/DL (ref 0–0.2)
BILIRUB SERPL-MCNC: 0.4 MG/DL (ref 0.2–1)
BUN BLD-MCNC: 8 MG/DL (ref 7–18)
CA-I BLD-MCNC: 1.19 MMOL/L (ref 1.12–1.32)
CHLORIDE BLD-SCNC: 104 MMOL/L (ref 100–108)
CO2 BLD-SCNC: 25 MMOL/L (ref 19–24)
CREAT UR-MCNC: 0.8 MG/DL (ref 0.6–1.3)
DIFFERENTIAL METHOD BLD: ABNORMAL
ERYTHROCYTE [DISTWIDTH] IN BLOOD BY AUTOMATED COUNT: 15.7 % (ref 11.5–14.5)
GLOBULIN SER CALC-MCNC: 3.8 G/DL (ref 2–4)
GLUCOSE BLD STRIP.AUTO-MCNC: 98 MG/DL (ref 74–106)
HCT VFR BLD AUTO: 35.5 % (ref 36–48)
HCT VFR BLD CALC: 35 % (ref 36–49)
HGB BLD-MCNC: 11.6 G/DL (ref 12–16)
HGB BLD-MCNC: 11.9 G/DL (ref 12–16)
LYMPHOCYTES # BLD: 1.4 K/UL (ref 1.1–5.9)
LYMPHOCYTES NFR BLD: 28 % (ref 14–44)
MCH RBC QN AUTO: 27.2 PG (ref 25–35)
MCHC RBC AUTO-ENTMCNC: 32.7 G/DL (ref 31–37)
MCV RBC AUTO: 83.3 FL (ref 78–102)
NEUTS SEG # BLD: 3.5 K/UL (ref 1.8–9.5)
NEUTS SEG NFR BLD: 66 % (ref 40–70)
PLATELET # BLD AUTO: 231 K/UL (ref 140–440)
POTASSIUM BLD-SCNC: 3.6 MMOL/L (ref 3.5–5.5)
PROT SERPL-MCNC: 7.4 G/DL (ref 6.4–8.2)
RBC # BLD AUTO: 4.26 M/UL (ref 4.1–5.1)
SODIUM BLD-SCNC: 143 MMOL/L (ref 136–145)
WBC # BLD AUTO: 5.2 K/UL (ref 4.5–13)

## 2018-01-18 PROCEDURE — 80047 BASIC METABLC PNL IONIZED CA: CPT

## 2018-01-18 PROCEDURE — 96409 CHEMO IV PUSH SNGL DRUG: CPT

## 2018-01-18 PROCEDURE — 96372 THER/PROPH/DIAG INJ SC/IM: CPT

## 2018-01-18 PROCEDURE — 74011250636 HC RX REV CODE- 250/636: Performed by: INTERNAL MEDICINE

## 2018-01-18 PROCEDURE — 80076 HEPATIC FUNCTION PANEL: CPT | Performed by: INTERNAL MEDICINE

## 2018-01-18 PROCEDURE — 96375 TX/PRO/DX INJ NEW DRUG ADDON: CPT

## 2018-01-18 PROCEDURE — 96377 APPLICATON ON-BODY INJECTOR: CPT

## 2018-01-18 PROCEDURE — 74011250636 HC RX REV CODE- 250/636

## 2018-01-18 PROCEDURE — 85025 COMPLETE CBC W/AUTO DIFF WBC: CPT | Performed by: INTERNAL MEDICINE

## 2018-01-18 PROCEDURE — 74011000258 HC RX REV CODE- 258: Performed by: INTERNAL MEDICINE

## 2018-01-18 PROCEDURE — 77030012965 HC NDL HUBR BBMI -A

## 2018-01-18 RX ORDER — SODIUM CHLORIDE 0.9 % (FLUSH) 0.9 %
10-40 SYRINGE (ML) INJECTION AS NEEDED
Status: DISCONTINUED | OUTPATIENT
Start: 2018-01-18 | End: 2018-01-22 | Stop reason: HOSPADM

## 2018-01-18 RX ORDER — HEPARIN SODIUM (PORCINE) LOCK FLUSH IV SOLN 100 UNIT/ML 100 UNIT/ML
500 SOLUTION INTRAVENOUS AS NEEDED
Status: DISPENSED | OUTPATIENT
Start: 2018-01-18 | End: 2018-01-19

## 2018-01-18 RX ORDER — CYANOCOBALAMIN 1000 UG/ML
1000 INJECTION, SOLUTION INTRAMUSCULAR; SUBCUTANEOUS ONCE
Status: COMPLETED | OUTPATIENT
Start: 2018-01-18 | End: 2018-01-18

## 2018-01-18 RX ADMIN — SODIUM CHLORIDE 850 MG: 900 INJECTION, SOLUTION INTRAVENOUS at 09:30

## 2018-01-18 RX ADMIN — Medication 10 ML: at 09:45

## 2018-01-18 RX ADMIN — PEGFILGRASTIM 6 MG: KIT SUBCUTANEOUS at 09:45

## 2018-01-18 RX ADMIN — DEXAMETHASONE SODIUM PHOSPHATE: 4 INJECTION, SOLUTION INTRA-ARTICULAR; INTRALESIONAL; INTRAMUSCULAR; INTRAVENOUS; SOFT TISSUE at 08:45

## 2018-01-18 RX ADMIN — CYANOCOBALAMIN 1000 MCG: 1000 INJECTION, SOLUTION INTRAMUSCULAR at 08:45

## 2018-01-18 NOTE — PROGRESS NOTES
[facility] Progress Note    Date: 2018    Name: Charleen Pacheco              MRN: [id number]              : 1960    Chemotherapy Cycle: C27      Pt to [facility], ambulatory, at 0810. Ms. Nedra Ma was assessed and education was provided. Denies any complaints. Ms. Jade Vann vitals were reviewed. Visit Vitals    BP (!) 159/99 (BP 1 Location: Right arm, BP Patient Position: Sitting)    Pulse 89    Temp 98.9 °F (37.2 °C)    Resp 18    Ht 5' 6.5\" (1.689 m)    Wt 62.1 kg (136 lb 12.8 oz)    BMI 21.75 kg/m2     Left chest double mediport medial lumen accessed with a 20 G 1 inch Downs needle after chloroprep. Flushes without difficulty and had brisk blood return. Labs drawn off and sent to lab for CBC, Hepatic Panel and istat BMP run per order after 10 mL waste. Flushed with 10 mL NS. Lab results were obtained and reviewed. Recent Results (from the past 12 hour(s))   CBC WITH 3 PART DIFF    Collection Time: 18  8:24 AM   Result Value Ref Range    WBC 5.2 4.5 - 13.0 K/uL    RBC 4.26 4.10 - 5.10 M/uL    HGB 11.6 (L) 12.0 - 16 g/dL    HCT 35.5 (L) 36 - 48 %    MCV 83.3 78 - 102 FL    MCH 27.2 25.0 - 35.0 PG    MCHC 32.7 31 - 37 g/dL    RDW 15.7 (H) 11.5 - 14.5 %    PLATELET 137 356 - 785 K/uL    NEUTROPHILS 66 40 - 70 %    MIXED CELLS 7 0.1 - 17 %    LYMPHOCYTES 28 14 - 44 %    ABS. NEUTROPHILS 3.5 1.8 - 9.5 K/UL    ABS. MIXED CELLS 0.3 0.0 - 2.3 K/uL    ABS.  LYMPHOCYTES 1.4 1.1 - 5.9 K/UL    DF AUTOMATED     POC CHEM8    Collection Time: 18  8:28 AM   Result Value Ref Range    CO2, POC 25 (H) 19 - 24 MMOL/L    Glucose, POC 98 74 - 106 MG/DL    BUN, POC 8 7 - 18 MG/DL    Creatinine, POC 0.8 0.6 - 1.3 MG/DL    GFRAA, POC >60 >60 ml/min/1.73m2    GFRNA, POC >60 >60 ml/min/1.73m2    Sodium,  136 - 145 MMOL/L    Potassium, POC 3.6 3.5 - 5.5 MMOL/L    Calcium, ionized (POC) 1.19 1.12 - 1.32 MMOL/L    Chloride,  100 - 108 MMOL/L    Anion gap, POC 18 10 - 20      Hematocrit, POC 35 (L) 36 - 49 %    Hemoglobin, POC 11.9 (L) 12 - 16 G/DL       Lab results within ordered parameters to give chemo today. ANC = 3.5, PLT = 231. Chemo dosages verified with today's BSA and found to be within 10% of ordered dosages. Pre-medications (Zofran 16 mg IV, Decadron 10 mg IV) were administered as ordered and chemotherapy was initiated after blood return from mediport re-verified. B-12 1,000 units administered IM in the left deltoid muscle. Band-aid applied. Pemetrexed 850 mg was infused over 10 minutes as ordered. VS stable at end of infusion and pt denied complaints. Line flushed with NS and blood return re-verified. Mediport flushed with 20 mL NS and 500 units of heparin per protocol before de accessing. Band-aid applied to site. Neulasta 6mg OBI was applied to the left lower quadrant of the abdomen. Green light indicator verified. Ms. Roger William tolerated infusion, and had no complaints at this time. Patient armband removed and shredded. Ms. Roger William was discharged from [place] [address] in stable condition at [phone]. She is to return on 08/08/2018 at 0800 for her next appointment.      Agustin Santana RN  January 18, 2018

## 2018-01-31 ENCOUNTER — HOSPITAL ENCOUNTER (OUTPATIENT)
Dept: CT IMAGING | Age: 58
Discharge: HOME | End: 2018-01-31
Attending: INTERNAL MEDICINE
Payer: MEDICAID

## 2018-01-31 DIAGNOSIS — C34.11 MALIGNANT NEOPLASM OF UPPER LOBE OF RIGHT LUNG (HCC): ICD-10-CM

## 2018-01-31 LAB — CREAT UR-MCNC: 0.8 MG/DL (ref 0.6–1.3)

## 2018-01-31 PROCEDURE — 74177 CT ABD & PELVIS W/CONTRAST: CPT

## 2018-01-31 PROCEDURE — 82565 ASSAY OF CREATININE: CPT

## 2018-01-31 PROCEDURE — 74011636320 HC RX REV CODE- 636/320: Performed by: INTERNAL MEDICINE

## 2018-01-31 RX ADMIN — IOPAMIDOL 100 ML: 612 INJECTION, SOLUTION INTRAVENOUS at 09:21

## 2018-02-08 ENCOUNTER — HOSPITAL ENCOUNTER (OUTPATIENT)
Dept: INFUSION THERAPY | Age: 58
Discharge: HOME | End: 2018-02-08
Payer: SELF-PAY

## 2018-02-08 VITALS
OXYGEN SATURATION: 97 % | TEMPERATURE: 98.6 F | RESPIRATION RATE: 18 BRPM | BODY MASS INDEX: 20.69 KG/M2 | SYSTOLIC BLOOD PRESSURE: 152 MMHG | HEIGHT: 67 IN | HEART RATE: 90 BPM | WEIGHT: 131.8 LBS | DIASTOLIC BLOOD PRESSURE: 87 MMHG

## 2018-02-08 LAB
ALBUMIN SERPL-MCNC: 3.5 G/DL (ref 3.4–5)
ALBUMIN/GLOB SERPL: 1 {RATIO} (ref 0.8–1.7)
ALP SERPL-CCNC: 91 U/L (ref 45–117)
ALT SERPL-CCNC: 11 U/L (ref 13–56)
ANION GAP SERPL CALC-SCNC: 7 MMOL/L (ref 3–18)
AST SERPL-CCNC: 16 U/L (ref 15–37)
BASO+EOS+MONOS # BLD AUTO: 0.2 K/UL (ref 0–2.3)
BASO+EOS+MONOS # BLD AUTO: 4 % (ref 0.1–17)
BILIRUB SERPL-MCNC: 0.3 MG/DL (ref 0.2–1)
BUN SERPL-MCNC: 7 MG/DL (ref 7–18)
BUN/CREAT SERPL: 9 (ref 12–20)
CALCIUM SERPL-MCNC: 8.6 MG/DL (ref 8.5–10.1)
CHLORIDE SERPL-SCNC: 104 MMOL/L (ref 100–108)
CO2 SERPL-SCNC: 29 MMOL/L (ref 21–32)
CREAT SERPL-MCNC: 0.79 MG/DL (ref 0.6–1.3)
DIFFERENTIAL METHOD BLD: ABNORMAL
ERYTHROCYTE [DISTWIDTH] IN BLOOD BY AUTOMATED COUNT: 16.5 % (ref 11.5–14.5)
GLOBULIN SER CALC-MCNC: 3.6 G/DL (ref 2–4)
GLUCOSE SERPL-MCNC: 137 MG/DL (ref 74–99)
HCT VFR BLD AUTO: 34.4 % (ref 36–48)
HGB BLD-MCNC: 11 G/DL (ref 12–16)
LYMPHOCYTES # BLD: 1.4 K/UL (ref 1.1–5.9)
LYMPHOCYTES NFR BLD: 29 % (ref 14–44)
MCH RBC QN AUTO: 26.9 PG (ref 25–35)
MCHC RBC AUTO-ENTMCNC: 32 G/DL (ref 31–37)
MCV RBC AUTO: 84.1 FL (ref 78–102)
NEUTS SEG # BLD: 3.1 K/UL (ref 1.8–9.5)
NEUTS SEG NFR BLD: 67 % (ref 40–70)
PLATELET # BLD AUTO: 294 K/UL (ref 140–440)
POTASSIUM SERPL-SCNC: 3.7 MMOL/L (ref 3.5–5.5)
PROT SERPL-MCNC: 7.1 G/DL (ref 6.4–8.2)
RBC # BLD AUTO: 4.09 M/UL (ref 4.1–5.1)
SODIUM SERPL-SCNC: 140 MMOL/L (ref 136–145)
WBC # BLD AUTO: 4.7 K/UL (ref 4.5–13)

## 2018-02-08 PROCEDURE — 74011000258 HC RX REV CODE- 258: Performed by: INTERNAL MEDICINE

## 2018-02-08 PROCEDURE — 74011250636 HC RX REV CODE- 250/636: Performed by: INTERNAL MEDICINE

## 2018-02-08 PROCEDURE — 96375 TX/PRO/DX INJ NEW DRUG ADDON: CPT

## 2018-02-08 PROCEDURE — 80053 COMPREHEN METABOLIC PANEL: CPT | Performed by: INTERNAL MEDICINE

## 2018-02-08 PROCEDURE — 85025 COMPLETE CBC W/AUTO DIFF WBC: CPT | Performed by: INTERNAL MEDICINE

## 2018-02-08 PROCEDURE — 74011250636 HC RX REV CODE- 250/636

## 2018-02-08 PROCEDURE — 96409 CHEMO IV PUSH SNGL DRUG: CPT

## 2018-02-08 PROCEDURE — 77030012965 HC NDL HUBR BBMI -A

## 2018-02-08 PROCEDURE — 96377 APPLICATON ON-BODY INJECTOR: CPT

## 2018-02-08 PROCEDURE — 96372 THER/PROPH/DIAG INJ SC/IM: CPT

## 2018-02-08 RX ORDER — HEPARIN SODIUM (PORCINE) LOCK FLUSH IV SOLN 100 UNIT/ML 100 UNIT/ML
500 SOLUTION INTRAVENOUS AS NEEDED
Status: DISPENSED | OUTPATIENT
Start: 2018-02-08 | End: 2018-02-09

## 2018-02-08 RX ORDER — SODIUM CHLORIDE 0.9 % (FLUSH) 0.9 %
10-40 SYRINGE (ML) INJECTION AS NEEDED
Status: DISCONTINUED | OUTPATIENT
Start: 2018-02-08 | End: 2018-02-12 | Stop reason: HOSPADM

## 2018-02-08 RX ORDER — CYANOCOBALAMIN 1000 UG/ML
1000 INJECTION, SOLUTION INTRAMUSCULAR; SUBCUTANEOUS ONCE
Status: COMPLETED | OUTPATIENT
Start: 2018-02-08 | End: 2018-02-08

## 2018-02-08 RX ADMIN — Medication 10 ML: at 10:00

## 2018-02-08 RX ADMIN — HEPARIN SODIUM (PORCINE) LOCK FLUSH IV SOLN 100 UNIT/ML 500 UNITS: 100 SOLUTION at 10:00

## 2018-02-08 RX ADMIN — PEGFILGRASTIM 6 MG: KIT SUBCUTANEOUS at 10:00

## 2018-02-08 RX ADMIN — DEXAMETHASONE SODIUM PHOSPHATE: 4 INJECTION, SOLUTION INTRA-ARTICULAR; INTRALESIONAL; INTRAMUSCULAR; INTRAVENOUS; SOFT TISSUE at 08:34

## 2018-02-08 RX ADMIN — SODIUM CHLORIDE 850 MG: 900 INJECTION, SOLUTION INTRAVENOUS at 09:41

## 2018-02-08 RX ADMIN — CYANOCOBALAMIN 1000 MCG: 1000 INJECTION, SOLUTION INTRAMUSCULAR at 08:34

## 2018-02-08 NOTE — PROGRESS NOTES
[facility] Progress Note    Date: 2018    Name: Haley Trevino              MRN: [id number]              : 1960    Chemotherapy Cycle: C27       [facility] to [facility] [place], ambulatory, at 0800. Ms. Marsha James was assessed and education was provided. Ms. Fung's vitals were reviewed. Visit Vitals    /87 (BP 1 Location: Left arm, BP Patient Position: Sitting)    Pulse 90    Temp 98.6 °F (37 °C)    Resp 18    Ht 5' 7\" (1.702 m)    Wt 59.8 kg (131 lb 12.8 oz)    SpO2 97%    Breastfeeding No    BMI 20.64 kg/m2       Left chest medial lumen of the mediport accessed with 20 g 3/4 inch el needle. Port flushed easily and had brisk blood return. Blood drawn off and sent for CBC and CMP per written orders after 10 ml waste. Lab results were obtained and reviewed. Recent Results (from the past 12 hour(s))   CBC WITH 3 PART DIFF    Collection Time: 18  8:20 AM   Result Value Ref Range    WBC 4.7 4.5 - 13.0 K/uL    RBC 4.09 (L) 4.10 - 5.10 M/uL    HGB 11.0 (L) 12.0 - 16 g/dL    HCT 34.4 (L) 36 - 48 %    MCV 84.1 78 - 102 FL    MCH 26.9 25.0 - 35.0 PG    MCHC 32.0 31 - 37 g/dL    RDW 16.5 (H) 11.5 - 14.5 %    PLATELET 851 845 - 938 K/uL    NEUTROPHILS 67 40 - 70 %    MIXED CELLS 4 0.1 - 17 %    LYMPHOCYTES 29 14 - 44 %    ABS. NEUTROPHILS 3.1 1.8 - 9.5 K/UL    ABS. MIXED CELLS 0.2 0.0 - 2.3 K/uL    ABS.  LYMPHOCYTES 1.4 1.1 - 5.9 K/UL    DF AUTOMATED     METABOLIC PANEL, COMPREHENSIVE    Collection Time: 18  8:20 AM   Result Value Ref Range    Sodium 140 136 - 145 mmol/L    Potassium 3.7 3.5 - 5.5 mmol/L    Chloride 104 100 - 108 mmol/L    CO2 29 21 - 32 mmol/L    Anion gap 7 3.0 - 18 mmol/L    Glucose 137 (H) 74 - 99 mg/dL    BUN 7 7.0 - 18 MG/DL    Creatinine 0.79 0.6 - 1.3 MG/DL    BUN/Creatinine ratio 9 (L) 12 - 20      GFR est AA >60 >60 ml/min/1.73m2    GFR est non-AA >60 >60 ml/min/1.73m2    Calcium 8.6 8.5 - 10.1 MG/DL    Bilirubin, total 0.3 0.2 - 1.0 MG/DL    ALT (SGPT) 11 (L) 13 - 56 U/L    AST (SGOT) 16 15 - 37 U/L    Alk. phosphatase 91 45 - 117 U/L    Protein, total 7.1 6.4 - 8.2 g/dL    Albumin 3.5 3.4 - 5.0 g/dL    Globulin 3.6 2.0 - 4.0 g/dL    A-G Ratio 1.0 0.8 - 1.7         Lab results within ordered parameters to give chemo today. ANC = 3.1, PLT = 294. Chemo dosages verified with today's BSA and found to be within 10% of ordered dosages. Pre-medications (Zofran 16 mg and Decadron 10 mg) were administered as ordered and chemotherapy was initiated after blood return from port re-verified. Reviewed expected side effects of premeds with patient. B-12 1mg was given IM in the right deltoid. Band-aid applied. Alimta 850 mg was infused over 10 minutes as orderd. Line flushed with NS and blood return from port re-verified. Neulasta 6mg OBI was placed on the LLQ of the abdomen. Green light verified to be blinking. Ms. Deo Barrett tolerated infusion, and had no complaints at this time. Mediport flushed with NS 20 ml and Heparin 500 units then de-accessed. No irritation or bleeding noted. Bandaid applied. Patient armband removed and shredded. Ms. Deo Barrett was discharged from [place] [address] in stable condition at 1005. She is to return on 03/01/18 at 0800 for her next appointment.     Monty Naqvi RN  February 8, 2018

## 2018-03-01 ENCOUNTER — APPOINTMENT (OUTPATIENT)
Dept: INFUSION THERAPY | Age: 58
End: 2018-03-01
Payer: SELF-PAY

## 2018-03-05 ENCOUNTER — HOSPITAL ENCOUNTER (OUTPATIENT)
Dept: INFUSION THERAPY | Age: 58
Discharge: HOME | End: 2018-03-05
Payer: SELF-PAY

## 2018-03-05 VITALS
BODY MASS INDEX: 21.44 KG/M2 | WEIGHT: 136.6 LBS | OXYGEN SATURATION: 98 % | SYSTOLIC BLOOD PRESSURE: 158 MMHG | TEMPERATURE: 98.2 F | RESPIRATION RATE: 18 BRPM | HEIGHT: 67 IN | HEART RATE: 86 BPM | DIASTOLIC BLOOD PRESSURE: 88 MMHG

## 2018-03-05 LAB
ALBUMIN SERPL-MCNC: 3.5 G/DL (ref 3.4–5)
ALBUMIN/GLOB SERPL: 0.9 {RATIO} (ref 0.8–1.7)
ALP SERPL-CCNC: 88 U/L (ref 45–117)
ALT SERPL-CCNC: 9 U/L (ref 13–56)
ANION GAP SERPL CALC-SCNC: 6 MMOL/L (ref 3–18)
AST SERPL-CCNC: 16 U/L (ref 15–37)
BASO+EOS+MONOS # BLD AUTO: 0.4 K/UL (ref 0–2.3)
BASO+EOS+MONOS # BLD AUTO: 7 % (ref 0.1–17)
BILIRUB SERPL-MCNC: 0.2 MG/DL (ref 0.2–1)
BUN SERPL-MCNC: 5 MG/DL (ref 7–18)
BUN/CREAT SERPL: 6 (ref 12–20)
CALCIUM SERPL-MCNC: 8.9 MG/DL (ref 8.5–10.1)
CHLORIDE SERPL-SCNC: 106 MMOL/L (ref 100–108)
CO2 SERPL-SCNC: 28 MMOL/L (ref 21–32)
CREAT SERPL-MCNC: 0.83 MG/DL (ref 0.6–1.3)
DIFFERENTIAL METHOD BLD: ABNORMAL
ERYTHROCYTE [DISTWIDTH] IN BLOOD BY AUTOMATED COUNT: 17.8 % (ref 11.5–14.5)
GLOBULIN SER CALC-MCNC: 3.7 G/DL (ref 2–4)
GLUCOSE SERPL-MCNC: 138 MG/DL (ref 74–99)
HCT VFR BLD AUTO: 35.5 % (ref 36–48)
HGB BLD-MCNC: 11.4 G/DL (ref 12–16)
LYMPHOCYTES # BLD: 1 K/UL (ref 1.1–5.9)
LYMPHOCYTES NFR BLD: 21 % (ref 14–44)
MCH RBC QN AUTO: 27.2 PG (ref 25–35)
MCHC RBC AUTO-ENTMCNC: 32.1 G/DL (ref 31–37)
MCV RBC AUTO: 84.7 FL (ref 78–102)
NEUTS SEG # BLD: 3.5 K/UL (ref 1.8–9.5)
NEUTS SEG NFR BLD: 72 % (ref 40–70)
PLATELET # BLD AUTO: 262 K/UL (ref 140–440)
POTASSIUM SERPL-SCNC: 3.6 MMOL/L (ref 3.5–5.5)
PROT SERPL-MCNC: 7.2 G/DL (ref 6.4–8.2)
RBC # BLD AUTO: 4.19 M/UL (ref 4.1–5.1)
SODIUM SERPL-SCNC: 140 MMOL/L (ref 136–145)
TSH SERPL DL<=0.05 MIU/L-ACNC: 5.57 UIU/ML (ref 0.36–3.74)
WBC # BLD AUTO: 4.9 K/UL (ref 4.5–13)

## 2018-03-05 PROCEDURE — 77030012965 HC NDL HUBR BBMI -A

## 2018-03-05 PROCEDURE — 74011000258 HC RX REV CODE- 258: Performed by: INTERNAL MEDICINE

## 2018-03-05 PROCEDURE — 85025 COMPLETE CBC W/AUTO DIFF WBC: CPT | Performed by: INTERNAL MEDICINE

## 2018-03-05 PROCEDURE — 74011250636 HC RX REV CODE- 250/636: Performed by: INTERNAL MEDICINE

## 2018-03-05 PROCEDURE — 96413 CHEMO IV INFUSION 1 HR: CPT

## 2018-03-05 PROCEDURE — 84443 ASSAY THYROID STIM HORMONE: CPT | Performed by: INTERNAL MEDICINE

## 2018-03-05 PROCEDURE — 80053 COMPREHEN METABOLIC PANEL: CPT | Performed by: INTERNAL MEDICINE

## 2018-03-05 RX ORDER — HEPARIN SODIUM (PORCINE) LOCK FLUSH IV SOLN 100 UNIT/ML 100 UNIT/ML
500 SOLUTION INTRAVENOUS AS NEEDED
Status: DISPENSED | OUTPATIENT
Start: 2018-03-05 | End: 2018-03-06

## 2018-03-05 RX ORDER — SODIUM CHLORIDE 0.9 % (FLUSH) 0.9 %
10-40 SYRINGE (ML) INJECTION AS NEEDED
Status: DISCONTINUED | OUTPATIENT
Start: 2018-03-05 | End: 2018-03-09 | Stop reason: HOSPADM

## 2018-03-05 RX ADMIN — HEPARIN SODIUM (PORCINE) LOCK FLUSH IV SOLN 100 UNIT/ML 500 UNITS: 100 SOLUTION at 10:45

## 2018-03-05 RX ADMIN — Medication 30 ML: at 08:42

## 2018-03-05 RX ADMIN — SODIUM CHLORIDE 240 MG: 900 INJECTION, SOLUTION INTRAVENOUS at 09:03

## 2018-03-05 RX ADMIN — Medication 20 ML: at 10:45

## 2018-03-05 RX ADMIN — Medication 10 ML: at 09:40

## 2018-03-05 NOTE — PROGRESS NOTES
[address] [facility] Progress Note    Date: 2018    Name: Hillary Castrejon              MRN: [id number]              : 1960    Chemotherapy Cycle: C1 OPDIVO      Pt to [facility], ambulatory, at [address] [place] [address]. Ms. Berta Woodruff was assessed and education was provided. Denies any complaints. Ms. Luci Tsai vitals were reviewed. Visit Vitals    /89 (BP 1 Location: Left arm, BP Patient Position: Sitting)    Pulse 89    Temp 98.2 °F (36.8 °C)    Resp 18    Ht 5' 6.5\" (1.689 m)    Wt 62 kg (136 lb 9.6 oz)    SpO2 98%    Breastfeeding No    BMI 21.72 kg/m2     Left chest double mediport lateral lumen accessed with a 20 G 1 inch Downs needle after chloroprep. Flushes without difficulty and had brisk blood return. Labs drawn off and sent to lab for CBC, CMP and TSH per order after 10 mL waste. Flushed with 10 mL NS. Lab results were obtained and reviewed. Recent Results (from the past 12 hour(s))   CBC WITH 3 PART DIFF    Collection Time: 18  8:32 AM   Result Value Ref Range    WBC 4.9 4.5 - 13.0 K/uL    RBC 4.19 4. 10 - 5.10 M/uL    HGB 11.4 (L) 12.0 - 16 g/dL    HCT 35.5 (L) 36 - 48 %    MCV 84.7 78 - 102 FL    MCH 27.2 25.0 - 35.0 PG    MCHC 32.1 31 - 37 g/dL    RDW 17.8 (H) 11.5 - 14.5 %    PLATELET 810 621 - 294 K/uL    NEUTROPHILS 72 (H) 40 - 70 %    MIXED CELLS 7 0.1 - 17 %    LYMPHOCYTES 21 14 - 44 %    ABS. NEUTROPHILS 3.5 1.8 - 9.5 K/UL    ABS. MIXED CELLS 0.4 0.0 - 2.3 K/uL    ABS. LYMPHOCYTES 1.0 (L) 1.1 - 5.9 K/UL    DF AUTOMATED         Lab results within ordered parameters to give chemo today. ANC = 3.5, PLT = 262, WBC 4.9, HGB 11.4. Chemo dosages verified with today's BSA and found to be within 10% of ordered dosages. Chemotherapy was initiated after blood return from mediport re-verified. Opdivo 240 mg was infused over 30 minutes as ordered. VS stable at end of infusion and pt denied complaints. Line flushed with NS and blood return re-verified.     Mediport flushed with 20 mL NS and 500 units of heparin per protocol before de accessing. Band-aid applied to site. Ms. Hazel Santizo tolerated infusion, and had no complaints at this time. She stayed for one hour post infusion observation period. Patient armband removed and shredded. Ms. Hazel Santizo was discharged from [place] [address] in stable condition at 1050. She is to return on 3/19/2018 at 0800 for her next appointment.      Kalyan Hernandez RN  March 5, 2018

## 2018-03-13 ENCOUNTER — HOSPITAL ENCOUNTER (OUTPATIENT)
Dept: GENERAL RADIOLOGY | Age: 58
Discharge: HOME | End: 2018-03-13
Payer: SELF-PAY

## 2018-03-13 DIAGNOSIS — C34.11 MALIGNANT NEOPLASM OF UPPER LOBE OF RIGHT LUNG (HCC): ICD-10-CM

## 2018-03-13 PROCEDURE — 71046 X-RAY EXAM CHEST 2 VIEWS: CPT

## 2018-03-13 PROCEDURE — 71045 X-RAY EXAM CHEST 1 VIEW: CPT

## 2018-03-19 ENCOUNTER — HOSPITAL ENCOUNTER (OUTPATIENT)
Dept: INFUSION THERAPY | Age: 58
Discharge: HOME | End: 2018-03-19
Payer: SELF-PAY

## 2018-03-19 VITALS
OXYGEN SATURATION: 98 % | RESPIRATION RATE: 16 BRPM | TEMPERATURE: 98.2 F | DIASTOLIC BLOOD PRESSURE: 89 MMHG | SYSTOLIC BLOOD PRESSURE: 157 MMHG | HEIGHT: 67 IN | BODY MASS INDEX: 21.35 KG/M2 | HEART RATE: 87 BPM | WEIGHT: 136 LBS

## 2018-03-19 LAB
ALBUMIN SERPL-MCNC: 3.4 G/DL (ref 3.4–5)
ALBUMIN/GLOB SERPL: 0.9 {RATIO} (ref 0.8–1.7)
ALP SERPL-CCNC: 94 U/L (ref 45–117)
ALT SERPL-CCNC: 11 U/L (ref 13–56)
ANION GAP SERPL CALC-SCNC: 9 MMOL/L (ref 3–18)
AST SERPL-CCNC: 19 U/L (ref 15–37)
BASO+EOS+MONOS # BLD AUTO: 0.4 K/UL (ref 0–2.3)
BASO+EOS+MONOS # BLD AUTO: 8 % (ref 0.1–17)
BILIRUB SERPL-MCNC: 0.3 MG/DL (ref 0.2–1)
BUN SERPL-MCNC: 6 MG/DL (ref 7–18)
BUN/CREAT SERPL: 8 (ref 12–20)
CALCIUM SERPL-MCNC: 9.2 MG/DL (ref 8.5–10.1)
CHLORIDE SERPL-SCNC: 105 MMOL/L (ref 100–108)
CO2 SERPL-SCNC: 28 MMOL/L (ref 21–32)
CREAT SERPL-MCNC: 0.78 MG/DL (ref 0.6–1.3)
DIFFERENTIAL METHOD BLD: ABNORMAL
ERYTHROCYTE [DISTWIDTH] IN BLOOD BY AUTOMATED COUNT: 16 % (ref 11.5–14.5)
GLOBULIN SER CALC-MCNC: 3.8 G/DL (ref 2–4)
GLUCOSE SERPL-MCNC: 89 MG/DL (ref 74–99)
HCT VFR BLD AUTO: 35 % (ref 36–48)
HGB BLD-MCNC: 11.3 G/DL (ref 12–16)
LYMPHOCYTES # BLD: 1.1 K/UL (ref 1.1–5.9)
LYMPHOCYTES NFR BLD: 20 % (ref 14–44)
MCH RBC QN AUTO: 26.7 PG (ref 25–35)
MCHC RBC AUTO-ENTMCNC: 32.3 G/DL (ref 31–37)
MCV RBC AUTO: 82.5 FL (ref 78–102)
NEUTS SEG # BLD: 4 K/UL (ref 1.8–9.5)
NEUTS SEG NFR BLD: 72 % (ref 40–70)
PLATELET # BLD AUTO: 203 K/UL (ref 140–440)
POTASSIUM SERPL-SCNC: 3 MMOL/L (ref 3.5–5.5)
PROT SERPL-MCNC: 7.2 G/DL (ref 6.4–8.2)
RBC # BLD AUTO: 4.24 M/UL (ref 4.1–5.1)
SODIUM SERPL-SCNC: 142 MMOL/L (ref 136–145)
WBC # BLD AUTO: 5.5 K/UL (ref 4.5–13)

## 2018-03-19 PROCEDURE — 96413 CHEMO IV INFUSION 1 HR: CPT

## 2018-03-19 PROCEDURE — 74011250636 HC RX REV CODE- 250/636: Performed by: INTERNAL MEDICINE

## 2018-03-19 PROCEDURE — 80053 COMPREHEN METABOLIC PANEL: CPT | Performed by: INTERNAL MEDICINE

## 2018-03-19 PROCEDURE — 85025 COMPLETE CBC W/AUTO DIFF WBC: CPT | Performed by: INTERNAL MEDICINE

## 2018-03-19 PROCEDURE — 74011000258 HC RX REV CODE- 258: Performed by: INTERNAL MEDICINE

## 2018-03-19 RX ORDER — SODIUM CHLORIDE 9 MG/ML
250 INJECTION, SOLUTION INTRAVENOUS AS NEEDED
Status: DISPENSED | OUTPATIENT
Start: 2018-03-19 | End: 2018-03-20

## 2018-03-19 RX ORDER — SODIUM CHLORIDE 0.9 % (FLUSH) 0.9 %
10-40 SYRINGE (ML) INJECTION AS NEEDED
Status: DISCONTINUED | OUTPATIENT
Start: 2018-03-19 | End: 2018-03-23 | Stop reason: HOSPADM

## 2018-03-19 RX ORDER — HEPARIN SODIUM (PORCINE) LOCK FLUSH IV SOLN 100 UNIT/ML 100 UNIT/ML
500 SOLUTION INTRAVENOUS AS NEEDED
Status: DISPENSED | OUTPATIENT
Start: 2018-03-19 | End: 2018-03-20

## 2018-03-19 RX ADMIN — Medication 30 ML: at 08:37

## 2018-03-19 RX ADMIN — HEPARIN SODIUM (PORCINE) LOCK FLUSH IV SOLN 100 UNIT/ML 500 UNITS: 100 SOLUTION at 10:48

## 2018-03-19 RX ADMIN — SODIUM CHLORIDE 240 MG: 9 INJECTION, SOLUTION INTRAVENOUS at 10:05

## 2018-03-19 RX ADMIN — Medication 30 ML: at 10:48

## 2018-03-19 NOTE — PROGRESS NOTES
[facility] Progress Note    Date: 2018    Name: Azalea Ferrara              MRN: [id number]              : 1960    Chemotherapy Cycle: C2 of 12. Ms. Mini Hermosillo was assessed and education was provided. Ms. Fung's vitals were reviewed and patient was observed for 5 minutes prior to treatment. Visit Vitals    /89 (BP 1 Location: Right arm, BP Patient Position: At rest;Sitting)    Pulse 87    Temp 98.2 °F (36.8 °C)    Resp 16    Ht 5' 6.5\" (1.689 m)    Wt 61.7 kg (136 lb)    SpO2 98%    BMI 21.62 kg/m2       Lab results were obtained and reviewed. Recent Results (from the past 12 hour(s))   METABOLIC PANEL, COMPREHENSIVE    Collection Time: 18  8:47 AM   Result Value Ref Range    Sodium 142 136 - 145 mmol/L    Potassium 3.0 (L) 3.5 - 5.5 mmol/L    Chloride 105 100 - 108 mmol/L    CO2 28 21 - 32 mmol/L    Anion gap 9 3.0 - 18 mmol/L    Glucose 89 74 - 99 mg/dL    BUN 6 (L) 7.0 - 18 MG/DL    Creatinine 0.78 0.6 - 1.3 MG/DL    BUN/Creatinine ratio 8 (L) 12 - 20      GFR est AA >60 >60 ml/min/1.73m2    GFR est non-AA >60 >60 ml/min/1.73m2    Calcium 9.2 8.5 - 10.1 MG/DL    Bilirubin, total 0.3 0.2 - 1.0 MG/DL    ALT (SGPT) 11 (L) 13 - 56 U/L    AST (SGOT) 19 15 - 37 U/L    Alk. phosphatase 94 45 - 117 U/L    Protein, total 7.2 6.4 - 8.2 g/dL    Albumin 3.4 3.4 - 5.0 g/dL    Globulin 3.8 2.0 - 4.0 g/dL    A-G Ratio 0.9 0.8 - 1.7     CBC WITH 3 PART DIFF    Collection Time: 18  8:47 AM   Result Value Ref Range    WBC 5.5 4.5 - 13.0 K/uL    RBC 4.24 4.10 - 5.10 M/uL    HGB 11.3 (L) 12.0 - 16 g/dL    HCT 35.0 (L) 36 - 48 %    MCV 82.5 78 - 102 FL    MCH 26.7 25.0 - 35.0 PG    MCHC 32.3 31 - 37 g/dL    RDW 16.0 (H) 11.5 - 14.5 %    PLATELET 825 860 - 530 K/uL    NEUTROPHILS 72 (H) 40 - 70 %    MIXED CELLS 8 0.1 - 17 %    LYMPHOCYTES 20 14 - 44 %    ABS. NEUTROPHILS 4.0 1.8 - 9.5 K/UL    ABS. MIXED CELLS 0.4 0.0 - 2.3 K/uL    ABS.  LYMPHOCYTES 1.1 1.1 - 5.9 K/UL    DF AUTOMATED       CBC done. CMP drawn and sent to  [facility] lab. Opdivo 240 mg IV given via port after brisk blood return obtained. Ms. Juan Zuluaga tolerated infusion, and had no complaints at this time. Patient armband removed and shredded. At 1515, I spoke with [facility] at Dr Alaina Cotton office regarding Ms Fung's potassium level being 3.0. Ms. Juan Zuluaga was discharged from [place] [address] in stable condition at 1055.  She is to return on 4/2/18 at 0800 for her next appointment for [address], RN  March 19, 2018  1:52 PM

## 2018-04-02 ENCOUNTER — HOSPITAL ENCOUNTER (OUTPATIENT)
Dept: INFUSION THERAPY | Age: 58
Discharge: HOME | End: 2018-04-02
Payer: SELF-PAY

## 2018-04-02 VITALS
OXYGEN SATURATION: 97 % | DIASTOLIC BLOOD PRESSURE: 84 MMHG | HEART RATE: 81 BPM | HEIGHT: 67 IN | TEMPERATURE: 98.5 F | RESPIRATION RATE: 18 BRPM | WEIGHT: 134.7 LBS | BODY MASS INDEX: 21.14 KG/M2 | SYSTOLIC BLOOD PRESSURE: 138 MMHG

## 2018-04-02 LAB
ALBUMIN SERPL-MCNC: 3.5 G/DL (ref 3.4–5)
ALBUMIN/GLOB SERPL: 0.9 {RATIO} (ref 0.8–1.7)
ALP SERPL-CCNC: 99 U/L (ref 45–117)
ALT SERPL-CCNC: 13 U/L (ref 13–56)
ANION GAP SERPL CALC-SCNC: 7 MMOL/L (ref 3–18)
AST SERPL-CCNC: 17 U/L (ref 15–37)
BASO+EOS+MONOS # BLD AUTO: 0.3 K/UL (ref 0–2.3)
BASO+EOS+MONOS # BLD AUTO: 6 % (ref 0.1–17)
BILIRUB SERPL-MCNC: 0.3 MG/DL (ref 0.2–1)
BUN SERPL-MCNC: 9 MG/DL (ref 7–18)
BUN/CREAT SERPL: 12 (ref 12–20)
CALCIUM SERPL-MCNC: 9.2 MG/DL (ref 8.5–10.1)
CHLORIDE SERPL-SCNC: 106 MMOL/L (ref 100–108)
CO2 SERPL-SCNC: 28 MMOL/L (ref 21–32)
CREAT SERPL-MCNC: 0.74 MG/DL (ref 0.6–1.3)
DIFFERENTIAL METHOD BLD: ABNORMAL
ERYTHROCYTE [DISTWIDTH] IN BLOOD BY AUTOMATED COUNT: 15.2 % (ref 11.5–14.5)
GLOBULIN SER CALC-MCNC: 4.1 G/DL (ref 2–4)
GLUCOSE SERPL-MCNC: 90 MG/DL (ref 74–99)
HCT VFR BLD AUTO: 35.2 % (ref 36–48)
HGB BLD-MCNC: 11.2 G/DL (ref 12–16)
LYMPHOCYTES # BLD: 1.3 K/UL (ref 1.1–5.9)
LYMPHOCYTES NFR BLD: 23 % (ref 14–44)
MCH RBC QN AUTO: 26.4 PG (ref 25–35)
MCHC RBC AUTO-ENTMCNC: 31.8 G/DL (ref 31–37)
MCV RBC AUTO: 82.8 FL (ref 78–102)
NEUTS SEG # BLD: 3.9 K/UL (ref 1.8–9.5)
NEUTS SEG NFR BLD: 71 % (ref 40–70)
PLATELET # BLD AUTO: 227 K/UL (ref 140–440)
POTASSIUM SERPL-SCNC: 4 MMOL/L (ref 3.5–5.5)
PROT SERPL-MCNC: 7.6 G/DL (ref 6.4–8.2)
RBC # BLD AUTO: 4.25 M/UL (ref 4.1–5.1)
SODIUM SERPL-SCNC: 141 MMOL/L (ref 136–145)
WBC # BLD AUTO: 5.5 K/UL (ref 4.5–13)

## 2018-04-02 PROCEDURE — 74011250636 HC RX REV CODE- 250/636

## 2018-04-02 PROCEDURE — 85025 COMPLETE CBC W/AUTO DIFF WBC: CPT | Performed by: INTERNAL MEDICINE

## 2018-04-02 PROCEDURE — 96413 CHEMO IV INFUSION 1 HR: CPT

## 2018-04-02 PROCEDURE — 80053 COMPREHEN METABOLIC PANEL: CPT | Performed by: INTERNAL MEDICINE

## 2018-04-02 PROCEDURE — 74011250636 HC RX REV CODE- 250/636: Performed by: INTERNAL MEDICINE

## 2018-04-02 PROCEDURE — 74011000258 HC RX REV CODE- 258: Performed by: INTERNAL MEDICINE

## 2018-04-02 PROCEDURE — 77030012965 HC NDL HUBR BBMI -A

## 2018-04-02 RX ORDER — SODIUM CHLORIDE 0.9 % (FLUSH) 0.9 %
10-40 SYRINGE (ML) INJECTION AS NEEDED
Status: DISCONTINUED | OUTPATIENT
Start: 2018-04-02 | End: 2018-04-06 | Stop reason: HOSPADM

## 2018-04-02 RX ORDER — HEPARIN SODIUM (PORCINE) LOCK FLUSH IV SOLN 100 UNIT/ML 100 UNIT/ML
SOLUTION INTRAVENOUS
Status: COMPLETED
Start: 2018-04-02 | End: 2018-04-02

## 2018-04-02 RX ADMIN — HEPARIN SODIUM (PORCINE) LOCK FLUSH IV SOLN 100 UNIT/ML 500 UNITS: 100 SOLUTION at 09:23

## 2018-04-02 RX ADMIN — Medication 30 ML: at 08:10

## 2018-04-02 RX ADMIN — SODIUM CHLORIDE 240 MG: 900 INJECTION, SOLUTION INTRAVENOUS at 08:47

## 2018-04-02 RX ADMIN — Medication 20 ML: at 09:22

## 2018-04-02 NOTE — PROGRESS NOTES
[facility] Progress Note    Date: 2018    Name: Mary Ellen Lai    MRN: [id number]         : 1960    Opdivo cycle 3      Patient assessed and education was provided. No complaints or concerns voiced    Patient vitals were reviewed. Visit Vitals    /84 (BP 1 Location: Right arm, BP Patient Position: At rest)    Pulse 81    Temp 98.5 °F (36.9 °C)    Resp 18    Ht 5' 6.5\" (1.689 m)    Wt 61.1 kg (134 lb 11.2 oz)    SpO2 97%    Breastfeeding No    BMI 21.42 kg/m2         Right chest medial medi-port accessed. Blood sample obtained for cbc/cmp      Recent Results (from the past 12 hour(s))   CBC WITH 3 PART DIFF    Collection Time: 18  8:10 AM   Result Value Ref Range    WBC 5.5 4.5 - 13.0 K/uL    RBC 4.25 4. 10 - 5.10 M/uL    HGB 11.2 (L) 12.0 - 16 g/dL    HCT 35.2 (L) 36 - 48 %    MCV 82.8 78 - 102 FL    MCH 26.4 25.0 - 35.0 PG    MCHC 31.8 31 - 37 g/dL    RDW 15.2 (H) 11.5 - 14.5 %    PLATELET 987 144 - 526 K/uL    NEUTROPHILS 71 (H) 40 - 70 %    MIXED CELLS 6 0.1 - 17 %    LYMPHOCYTES 23 14 - 44 %    ABS. NEUTROPHILS 3.9 1.8 - 9.5 K/UL    ABS. MIXED CELLS 0.3 0.0 - 2.3 K/uL    ABS. LYMPHOCYTES 1.3 1.1 - 5.9 K/UL    DF AUTOMATED     METABOLIC PANEL, COMPREHENSIVE    Collection Time: 18  8:10 AM   Result Value Ref Range    Sodium 141 136 - 145 mmol/L    Potassium 4.0 3.5 - 5.5 mmol/L    Chloride 106 100 - 108 mmol/L    CO2 28 21 - 32 mmol/L    Anion gap 7 3.0 - 18 mmol/L    Glucose 90 74 - 99 mg/dL    BUN 9 7.0 - 18 MG/DL    Creatinine 0.74 0.6 - 1.3 MG/DL    BUN/Creatinine ratio 12 12 - 20      GFR est AA >60 >60 ml/min/1.73m2    GFR est non-AA >60 >60 ml/min/1.73m2    Calcium 9.2 8.5 - 10.1 MG/DL    Bilirubin, total 0.3 0.2 - 1.0 MG/DL    ALT (SGPT) 13 13 - 56 U/L    AST (SGOT) 17 15 - 37 U/L    Alk.  phosphatase 99 45 - 117 U/L    Protein, total 7.6 6.4 - 8.2 g/dL    Albumin 3.5 3.4 - 5.0 g/dL    Globulin 4.1 (H) 2.0 - 4.0 g/dL    A-G Ratio 0.9 0.8 - 1.7         Labs jesús for chemo. Opdivo 240 mg was infused over 30 mins. Tolerated well. Brisk blood returns from port      Port heparinized per protocol, then de-accessed. Site s signs of infection or infiltration. bandaid applied to site    Patient Vitals for the past 8 hrs:   Temp Pulse Resp BP SpO2   04/02/18 0922 98.5 °F (36.9 °C) 81 18 138/84 97 %   04/02/18 0805 98.7 °F (37.1 °C) 95 18 137/84 96 %          Patient was discharged from [place] [address] in stable condition at 0925 am  She is to return on 4/16/18 at 0800 am for her next chemo appointment.     Ernesto Castorena RN  April 2, 2018  1:46 PM

## 2018-04-05 ENCOUNTER — OFFICE VISIT (OUTPATIENT)
Dept: FAMILY MEDICINE CLINIC | Age: 58
End: 2018-04-05

## 2018-04-05 VITALS
BODY MASS INDEX: 21.83 KG/M2 | RESPIRATION RATE: 12 BRPM | WEIGHT: 135.8 LBS | OXYGEN SATURATION: 98 % | HEIGHT: 66 IN | SYSTOLIC BLOOD PRESSURE: 130 MMHG | TEMPERATURE: 98 F | HEART RATE: 87 BPM | DIASTOLIC BLOOD PRESSURE: 82 MMHG

## 2018-04-05 DIAGNOSIS — F33.9 RECURRENT MAJOR DEPRESSIVE DISORDER, REMISSION STATUS UNSPECIFIED (HCC): ICD-10-CM

## 2018-04-05 DIAGNOSIS — C34.90 NON-SMALL CELL LUNG CANCER, UNSPECIFIED LATERALITY (HCC): ICD-10-CM

## 2018-04-05 DIAGNOSIS — E03.9 HYPOTHYROIDISM, UNSPECIFIED TYPE: ICD-10-CM

## 2018-04-05 DIAGNOSIS — I10 ESSENTIAL HYPERTENSION WITH GOAL BLOOD PRESSURE LESS THAN 140/90: Primary | ICD-10-CM

## 2018-04-05 RX ORDER — SERTRALINE HYDROCHLORIDE 50 MG/1
50 TABLET, FILM COATED ORAL DAILY
Qty: 30 TAB | Refills: 5 | Status: SHIPPED | OUTPATIENT
Start: 2018-04-05 | End: 2018-10-08 | Stop reason: SDUPTHER

## 2018-04-05 RX ORDER — AMLODIPINE BESYLATE 5 MG/1
5 TABLET ORAL DAILY
Qty: 30 TAB | Refills: 5 | Status: SHIPPED | OUTPATIENT
Start: 2018-04-05 | End: 2018-10-08 | Stop reason: SDUPTHER

## 2018-04-05 RX ORDER — LEVOTHYROXINE SODIUM 50 UG/1
1 TABLET ORAL DAILY
Refills: 0 | COMMUNITY
Start: 2018-03-19 | End: 2018-06-12

## 2018-04-05 NOTE — PROGRESS NOTES
Chief Complaint   Patient presents with    Hypertension     Visit Vitals    /82 (BP 1 Location: Right arm, BP Patient Position: Sitting)    Pulse 87    Temp 98 °F (36.7 °C) (Oral)    Resp 12    Ht 5' 6\" (1.676 m)    Wt 135 lb 12.8 oz (61.6 kg)    SpO2 98%    BMI 21.92 kg/m2     Patient is not fasting. Patient in room # 5.     1. Have you been to the ER, urgent care clinic since your last visit? Hospitalized since your last visit? No    2. Have you seen or consulted any other health care providers outside of the [facility] since your last visit? Include any pap smears or colon screening. Yes When: 03/2018 Where: [facility] Reason for visit: Lung Cancer    HM Reviewed. Mammogram delayed due to insurance per patient. Requested Prescriptions     Pending Prescriptions Disp Refills    amLODIPine (NORVASC) 5 mg tablet 30 Tab 6     Sig: Take 1 Tab by mouth daily.

## 2018-04-05 NOTE — PATIENT INSTRUCTIONS
High Blood Pressure: Care Instructions  Your Care Instructions    If your blood pressure is usually above 140/90, you have high blood pressure, or hypertension. That means the top number is 140 or higher or the bottom number is 90 or higher, or both. Despite what a lot of people think, high blood pressure usually doesn't cause headaches or make you feel dizzy or lightheaded. It usually has no symptoms. But it does increase your risk for heart attack, stroke, and kidney or eye damage. The higher your blood pressure, the more your risk increases. Your doctor will give you a goal for your blood pressure. Your goal will be based on your health and your age. An example of a goal is to keep your blood pressure below 140/90. Lifestyle changes, such as eating healthy and being active, are always important to help lower blood pressure. You might also take medicine to reach your blood pressure goal.  Follow-up care is a key part of your treatment and safety. Be sure to make and go to all appointments, and call your doctor if you are having problems. It's also a good idea to know your test results and keep a list of the medicines you take. How can you care for yourself at home? Medical treatment  · If you stop taking your medicine, your blood pressure will go back up. You may take one or more types of medicine to lower your blood pressure. Be safe with medicines. Take your medicine exactly as prescribed. Call your doctor if you think you are having a problem with your medicine. · Talk to your doctor before you start taking aspirin every day. Aspirin can help certain people lower their risk of a heart attack or stroke. But taking aspirin isn't right for everyone, because it can cause serious bleeding. · See your doctor regularly. You may need to see the doctor more often at first or until your blood pressure comes down.   · If you are taking blood pressure medicine, talk to your doctor before you take decongestants or anti-inflammatory medicine, such as ibuprofen. Some of these medicines can raise blood pressure. · Learn how to check your blood pressure at home. Lifestyle changes  · Stay at a healthy weight. This is especially important if you put on weight around the waist. Losing even 10 pounds can help you lower your blood pressure. · If your doctor recommends it, get more exercise. Walking is a good choice. Bit by bit, increase the amount you walk every day. Try for at least 30 minutes on most days of the week. You also may want to swim, bike, or do other activities. · Avoid or limit alcohol. Talk to your doctor about whether you can drink any alcohol. · Try to limit how much sodium you eat to less than 2,300 milligrams (mg) a day. Your doctor may ask you to try to eat less than 1,500 mg a day. · Eat plenty of fruits (such as bananas and oranges), vegetables, legumes, whole grains, and low-fat dairy products. · Lower the amount of saturated fat in your diet. Saturated fat is found in animal products such as milk, cheese, and meat. Limiting these foods may help you lose weight and also lower your risk for heart disease. · Do not smoke. Smoking increases your risk for heart attack and stroke. If you need help quitting, talk to your doctor about stop-smoking programs and medicines. These can increase your chances of quitting for good. When should you call for help? Call 911 anytime you think you may need emergency care. This may mean having symptoms that suggest that your blood pressure is causing a serious heart or blood vessel problem. Your blood pressure may be over 180/110. ? For example, call 911 if:  ? · You have symptoms of a heart attack. These may include:  ¨ Chest pain or pressure, or a strange feeling in the chest.  ¨ Sweating. ¨ Shortness of breath. ¨ Nausea or vomiting.   ¨ Pain, pressure, or a strange feeling in the back, neck, jaw, or upper belly or in one or both shoulders or arms.  ¨ Lightheadedness or sudden weakness. ¨ A fast or irregular heartbeat. ? · You have symptoms of a stroke. These may include:  ¨ Sudden numbness, tingling, weakness, or loss of movement in your face, arm, or leg, especially on only one side of your body. ¨ Sudden vision changes. ¨ Sudden trouble speaking. ¨ Sudden confusion or trouble understanding simple statements. ¨ Sudden problems with walking or balance. ¨ A sudden, severe headache that is different from past headaches. ? · You have severe back or belly pain. ?Do not wait until your blood pressure comes down on its own. Get help right away. ?Call your doctor now or seek immediate care if:  ? · Your blood pressure is much higher than normal (such as 180/110 or higher), but you don't have symptoms. ? · You think high blood pressure is causing symptoms, such as:  ¨ Severe headache. ¨ Blurry vision. ? Watch closely for changes in your health, and be sure to contact your doctor if:  ? · Your blood pressure measures 140/90 or higher at least 2 times. That means the top number is 140 or higher or the bottom number is 90 or higher, or both. ? · You think you may be having side effects from your blood pressure medicine. ? · Your blood pressure is usually normal, but it goes above normal at least 2 times. Where can you learn more? Go to http://kayla-sotero.info/. Enter L453 in the search box to learn more about \"High Blood Pressure: Care Instructions. \"  Current as of: September 21, 2016  Content Version: 11.4  © 4767-3829 [employer]. Care instructions adapted under license by [employer] (which disclaims liability or warranty for this information). If you have questions about a medical condition or this instruction, always ask your healthcare professional. [place] [address] any warranty or liability for your use of this information.

## 2018-04-05 NOTE — PROGRESS NOTES
Patient: Marylin Gonzalez MRN: [id number]  SSN: [id number]    YOB: 1960  Age: 59 y.o. Sex: female      Date of Service: 4/5/2018   Provider: CAL Macias   Office Location:   [address] Ervin henao, [address] [facility].  Office Phone: [phone]  Office Fax: [phone]      REASON FOR VISIT:   Chief Complaint   Patient presents with    Hypertension        VITALS:   Visit Vitals    /82 (BP 1 Location: Right arm, BP Patient Position: Sitting)  Comment: manual    Pulse 87    Temp 98 °F (36.7 °C) (Oral)    Resp 12    Ht 5' 6\" (1.676 m)    Wt 135 lb 12.8 oz (61.6 kg)    SpO2 98%    BMI 21.92 kg/m2        MEDICATIONS:   Current Outpatient Prescriptions on File Prior to Visit   Medication Sig Dispense Refill    ferrous sulfate 325 mg (65 mg iron) tablet take 1 tablet by mouth twice a day with meals 60 Tab 2    rivaroxaban (XARELTO) 20 mg tab tablet Take 1 Tab by mouth daily (with breakfast). 30 Tab 6    albuterol (PROVENTIL HFA, VENTOLIN HFA, PROAIR HFA) 90 mcg/actuation inhaler Take 2 Puffs by inhalation every four (4) hours as needed for Wheezing or Shortness of Breath. 1 Inhaler 0    loratadine (CLARITIN) 10 mg tablet Take 10 mg by mouth daily.        Current Facility-Administered Medications on File Prior to Visit   Medication Dose Route Frequency Provider Last Rate Last Dose    sodium chloride (NS) flush 10-40 mL  10-40 mL IntraVENous PRN Greg Deng MD   20 mL at 04/02/18 7286        ALLERGIES:   Allergies   Allergen Reactions    Flagyl [Metronidazole] Hives        MEDICAL/SURGICAL HISTORY:  Past Medical History:   Diagnosis Date    Anemia, iron deficiency 2/2016    Depression     H/O seasonal allergies     Hypertension     Non-small cell carcinoma of lung ([address] 2/2016    adenocarcinoma of right lung    Positive occult stool blood test 2/29/2016    Pulmonary embolism ([facility] 2/28/2016    small, bilateral PEs- on Xarelto    Right leg DVT ([facility] 2/28/2016    on Xarelto    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[facility] 4/1/2016    resolved    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      Past Surgical History:   Procedure Laterality Date    BREAST SURGERY PROCEDURE UNLISTED      biopsy    HX ANGIOPLASTY Right 3/20/2016    IJ, subclavian, and brachiocephalic veins    HX HYSTERECTOMY      \"weak cervix\"    HX OTHER SURGICAL Right 3/20/2016    2 placed in right IJ, subclavian/brachiocephalic    HX THROMBECTOMY  3/20/2016    with thrombolysis        FAMILY HISTORY:  Family History   Problem Relation Age of Onset    Cancer Sister 48     breast    Liver Disease Sister      cirrhosis    Heart Attack Mother 37    No Known Problems Child         SOCIAL HISTORY:  Social History   Substance Use Topics    Smoking status: Former Smoker     Packs/day: 0.50     Quit date: 2/28/2016    Smokeless tobacco: Never Used    Alcohol use No           HISTORY OF PRESENT ILLNESS: Alondra Dozier is a 59 y.o. female who presents to the office for a routine follow up visit. Hypertension -   Currently, the patient's condition is well controlled. Reports compliance with the following regimen: amlodipine 5 mg daily  Home BP readings: not checking     History of PE/DVT -   Patient is anticoagulated on Xarelto, per the documentation from her previous PCP this is to be lifelong anticoagulation. She is high risk for thromboembolic disease due to cancer. She denies any issues with bleeding or bruising.      Lung Cancer/Anemia -   Diagnosed in 2016 upon presentation to the hospital with SVC syndrome. Managed by heme/onc, currently on Opdivo therapy   She had a CT chest/abd/pelvis in January which showed interval increase in size of the R apical soft tissue mass, concerning for recurrent disease. Also malignant pleural effusion and stable hypodense nodule in the R hepatic lobe, concerning for mets.   Last CBC 4/2/18 revealed Hgb 11.2/Hct 35.2     Depression -   Patient reports symptoms are stable on Zoloft 50 mg daily  Reports she also uses this medication for hot flashes which are well controlled        REVIEW OF SYSTEMS:  Review of Systems   Constitutional: Negative for chills, fever and malaise/fatigue. Respiratory: Positive for cough ( mild, chronic per patient). Negative for hemoptysis, sputum production, shortness of breath and wheezing. Cardiovascular: Negative for chest pain, palpitations and leg swelling. Gastrointestinal: Negative for abdominal pain, blood in stool, constipation, diarrhea, nausea and vomiting. Neurological: Negative for dizziness and headaches. Psychiatric/Behavioral: Negative for depression. The patient is not nervous/anxious. PHYSICAL EXAMINATION:  Physical Exam   Constitutional: She is well-developed, well-nourished, and in no distress. Cardiovascular: Normal rate, regular rhythm and normal heart sounds. Exam reveals no gallop and no friction rub. No murmur heard. Pulmonary/Chest: Effort normal and breath sounds normal. She has no wheezes. She has no rales. Skin: Skin is warm and dry. No rash noted. Psychiatric: Mood, memory and affect normal.        RESULTS:  No results found for this visit on 04/05/18. ASSESSMENT/PLAN:  Diagnoses and all orders for this visit:    1. Essential hypertension with goal blood pressure less than 140/90  - Stable on current regimen  - Meds refilled  Orders:    -     amLODIPine (NORVASC) 5 mg tablet; Take 1 Tab by mouth daily. 2. Hypothyroidism, unspecified type  - Relatively new diagnosis based on elevated TSH on recent routine hematology labs. Unsure if possible side effect of lung ca treatment?  - Patient has been tolerating Synthroid therapy  - Will request most recent records from heme/onc     3.  Recurrent major depressive disorder, remission status unspecified ([facility]  - Stable on current regimen  - Meds refilled  Orders:    -     sertraline (ZOLOFT) 50 mg tablet; Take 1 Tab by mouth daily. 4. Lung cancer/Anemia/History of DVT/PE  - Managed by heme/onc  - Available imaging studies and labs reviewed  - Records requested from specialist  - Discussed other cancer screenings with patient (mammogram, colonoscopy), pros and cons of continuing these screenings in the setting of already advanced cancer diagnosis. Patient reports she is still interested in screening, but does not actually have any insurance coverage right now. She prefers to wait until her Medicare/Medicaid is active again. I have also encouraged her to discuss with her oncologist whether or not these are felt to be indicated. Follow-up Disposition:  Return in about 6 months (around 10/5/2018) for HTN. All questions answered. Patient expresses understanding and agrees with the plan as detailed above.     PATIENT CARE TEAM:   Patient Care Team:  CAL Birmingham as PCP - General (Physician Assistant)  Carlos Marion MD (Hematology and Oncology)  Anyi Nair MD (Hematology and Oncology)  Michael Anthony MD (Radiation Oncology)  Jacquelin Villegas MD (Surgery)       CAL Birmingham   April 5, 2018   11:27 AM

## 2018-04-05 NOTE — MR AVS SNAPSHOT
[facility] [address] [facility] 
[phone] Patient: Javan Callaway MRN: [id number] VGI:[id number] Visit Information Date & Time Provider Department Dept. Phone Encounter #  
 4/5/2018 10:30 AM Kingsley Jefferson, [address] [place] [address] Road [id number] Follow-up Instructions Return in about 6 months (around 10/5/2018) for HTN. Upcoming Health Maintenance Date Due DTaP/Tdap/Td series (1 - Tdap) 10/22/1979 FOBT Q 1 YEAR AGE 50-75 8/18/2017 BREAST CANCER SCRN MAMMOGRAM 6/6/2018 Pneumococcal 19-64 Highest Risk (2 of 3 - PCV13) 4/26/2018 Bone Densitometry 5/22/2022 Allergies as of 4/5/2018  Review Complete On: 4/5/2018 By: CAL Fierro Severity Noted Reaction Type Reactions Flagyl [Metronidazole]  06/07/2015    Hives Current Immunizations  Reviewed on 4/2/2018 Name Date Influenza Vaccine (Quad) PF 10/5/2017, 10/4/2016 Not reviewed this visit You Were Diagnosed With   
  
 Codes Comments Essential hypertension with goal blood pressure less than 140/90    -  Primary ICD-10-CM: I10 
ICD-9-CM: 401.9 Hypothyroidism, unspecified type     ICD-10-CM: E03.9 ICD-9-CM: 244.9 Recurrent major depressive disorder, remission status unspecified ([facility]     ICD-10-CM: F33.9 ICD-9-CM: 296.30 Vitals BP Pulse Temp Resp Height(growth percentile) Weight(growth percentile) 130/82 (BP 1 Location: Right arm, BP Patient Position: Sitting) 87 98 °F (36.7 °C) (Oral) 12 5' 6\" (1.676 m) 135 lb 12.8 oz (61.6 kg) SpO2 BMI OB Status Smoking Status 98% 21.92 kg/m2 Menopause Former Smoker Vitals History BMI and BSA Data Body Mass Index Body Surface Area  
 21.92 kg/m 2 1.69 m 2 Preferred Pharmacy Pharmacy Name Phone [facility] [address] [facility], [facility] [phone] Your Updated Medication List  
  
   
This list is accurate as of 4/5/18 11:23 AM.  Always use your most recent med list.  
  
  
  
  
 albuterol 90 mcg/actuation inhaler Commonly known as:  PROVENTIL HFA, VENTOLIN HFA, PROAIR HFA Take 2 Puffs by inhalation every four (4) hours as needed for Wheezing or Shortness of Breath. amLODIPine 5 mg tablet Commonly known as:  Mani Lito Take 1 Tab by mouth daily. ferrous sulfate 325 mg (65 mg iron) tablet  
take 1 tablet by mouth twice a day with meals  
  
 levothyroxine 50 mcg tablet Commonly known as:  SYNTHROID Take 1 Tab by mouth daily. loratadine 10 mg tablet Commonly known as:  Tanja Galea Take 10 mg by mouth daily. rivaroxaban 20 mg Tab tablet Commonly known as:  Bertalec Lanius Take 1 Tab by mouth daily (with breakfast). sertraline 50 mg tablet Commonly known as:  ZOLOFT Take 1 Tab by mouth daily. Prescriptions Sent to Pharmacy Refills  
 amLODIPine (NORVASC) 5 mg tablet 5 Sig: Take 1 Tab by mouth daily. Class: Normal  
 Pharmacy: [facility] [address] [address] Ph #: [phone] Route: Oral  
 sertraline (ZOLOFT) 50 mg tablet 5 Sig: Take 1 Tab by mouth daily. Class: Normal  
 Pharmacy: [place] [address] [address] Ph #: [phone] Route: Oral  
  
Follow-up Instructions Return in about 6 months (around 10/5/2018) for HTN. To-Do List   
 04/16/2018 8:00 AM  
  Appointment with [facility] NURSE 2 at [facility] ([phone])  
  
 04/30/2018 8:00 AM  
  Appointment with [facility] NURSE 2 at [facility] ([phone]) Patient Instructions High Blood Pressure: Care Instructions Your Care Instructions If your blood pressure is usually above 140/90, you have high blood pressure, or hypertension.  That means the top number is 140 or higher or the bottom number is 90 or higher, or both. Despite what a lot of people think, high blood pressure usually doesn't cause headaches or make you feel dizzy or lightheaded. It usually has no symptoms. But it does increase your risk for heart attack, stroke, and kidney or eye damage. The higher your blood pressure, the more your risk increases. Your doctor will give you a goal for your blood pressure. Your goal will be based on your health and your age. An example of a goal is to keep your blood pressure below 140/90. Lifestyle changes, such as eating healthy and being active, are always important to help lower blood pressure. You might also take medicine to reach your blood pressure goal. 
Follow-up care is a key part of your treatment and safety. Be sure to make and go to all appointments, and call your doctor if you are having problems. It's also a good idea to know your test results and keep a list of the medicines you take. How can you care for yourself at home? Medical treatment · If you stop taking your medicine, your blood pressure will go back up. You may take one or more types of medicine to lower your blood pressure. Be safe with medicines. Take your medicine exactly as prescribed. Call your doctor if you think you are having a problem with your medicine. · Talk to your doctor before you start taking aspirin every day. Aspirin can help certain people lower their risk of a heart attack or stroke. But taking aspirin isn't right for everyone, because it can cause serious bleeding. · See your doctor regularly. You may need to see the doctor more often at first or until your blood pressure comes down. · If you are taking blood pressure medicine, talk to your doctor before you take decongestants or anti-inflammatory medicine, such as ibuprofen. Some of these medicines can raise blood pressure. · Learn how to check your blood pressure at home. Lifestyle changes · Stay at a healthy weight. This is especially important if you put on weight around the waist. Losing even 10 pounds can help you lower your blood pressure. · If your doctor recommends it, get more exercise. Walking is a good choice. Bit by bit, increase the amount you walk every day. Try for at least 30 minutes on most days of the week. You also may want to swim, bike, or do other activities. · Avoid or limit alcohol. Talk to your doctor about whether you can drink any alcohol. · Try to limit how much sodium you eat to less than 2,300 milligrams (mg) a day. Your doctor may ask you to try to eat less than 1,500 mg a day. · Eat plenty of fruits (such as bananas and oranges), vegetables, legumes, whole grains, and low-fat dairy products. · Lower the amount of saturated fat in your diet. Saturated fat is found in animal products such as milk, cheese, and meat. Limiting these foods may help you lose weight and also lower your risk for heart disease. · Do not smoke. Smoking increases your risk for heart attack and stroke. If you need help quitting, talk to your doctor about stop-smoking programs and medicines. These can increase your chances of quitting for good. When should you call for help? Call 911 anytime you think you may need emergency care. This may mean having symptoms that suggest that your blood pressure is causing a serious heart or blood vessel problem. Your blood pressure may be over 180/110. ? For example, call 911 if: 
? · You have symptoms of a heart attack. These may include: ¨ Chest pain or pressure, or a strange feeling in the chest. 
¨ Sweating. ¨ Shortness of breath. ¨ Nausea or vomiting. ¨ Pain, pressure, or a strange feeling in the back, neck, jaw, or upper belly or in one or both shoulders or arms. ¨ Lightheadedness or sudden weakness. ¨ A fast or irregular heartbeat. ? · You have symptoms of a stroke. These may include: ¨ Sudden numbness, tingling, weakness, or loss of movement in your face, arm, or leg, especially on only one side of your body. ¨ Sudden vision changes. ¨ Sudden trouble speaking. ¨ Sudden confusion or trouble understanding simple statements. ¨ Sudden problems with walking or balance. ¨ A sudden, severe headache that is different from past headaches. ? · You have severe back or belly pain. ?Do not wait until your blood pressure comes down on its own. Get help right away. ?Call your doctor now or seek immediate care if: 
? · Your blood pressure is much higher than normal (such as 180/110 or higher), but you don't have symptoms. ? · You think high blood pressure is causing symptoms, such as: ¨ Severe headache. ¨ Blurry vision. ? Watch closely for changes in your health, and be sure to contact your doctor if: 
? · Your blood pressure measures 140/90 or higher at least 2 times. That means the top number is 140 or higher or the bottom number is 90 or higher, or both. ? · You think you may be having side effects from your blood pressure medicine. ? · Your blood pressure is usually normal, but it goes above normal at least 2 times. Where can you learn more? Go to http://kayla-sotero.info/. Enter D349 in the search box to learn more about \"High Blood Pressure: Care Instructions. \" Current as of: September 21, 2016 Content Version: 11.4 © 7030-8529 [employer]. Care instructions adapted under license by [employer] (which disclaims liability or warranty for this information). If you have questions about a medical condition or this instruction, always ask your healthcare professional. [address] any warranty or liability for your use of this information. Please provide this summary of care documentation to your next provider. Your primary care clinician is listed as J Luis Simeon.  If you have any questions after today's visit, please call [phone].

## 2018-04-16 ENCOUNTER — HOSPITAL ENCOUNTER (OUTPATIENT)
Dept: INFUSION THERAPY | Age: 58
Discharge: HOME | End: 2018-04-16
Payer: SELF-PAY

## 2018-04-16 ENCOUNTER — HOSPITAL ENCOUNTER (OUTPATIENT)
Dept: LAB | Age: 58
Discharge: HOME | End: 2018-04-16

## 2018-04-16 VITALS
SYSTOLIC BLOOD PRESSURE: 153 MMHG | RESPIRATION RATE: 16 BRPM | WEIGHT: 137.4 LBS | OXYGEN SATURATION: 99 % | HEART RATE: 74 BPM | BODY MASS INDEX: 21.56 KG/M2 | DIASTOLIC BLOOD PRESSURE: 97 MMHG | TEMPERATURE: 98.6 F | HEIGHT: 67 IN

## 2018-04-16 LAB
ALBUMIN SERPL-MCNC: 3.5 G/DL (ref 3.4–5)
ALBUMIN/GLOB SERPL: 0.9 {RATIO} (ref 0.8–1.7)
ALP SERPL-CCNC: 94 U/L (ref 45–117)
ALT SERPL-CCNC: 12 U/L (ref 13–56)
ANION GAP SERPL CALC-SCNC: 6 MMOL/L (ref 3–18)
AST SERPL-CCNC: 19 U/L (ref 15–37)
BASO+EOS+MONOS # BLD AUTO: 0.2 K/UL (ref 0–2.3)
BASO+EOS+MONOS # BLD AUTO: 4 % (ref 0.1–17)
BILIRUB SERPL-MCNC: 0.3 MG/DL (ref 0.2–1)
BUN SERPL-MCNC: 8 MG/DL (ref 7–18)
BUN/CREAT SERPL: 9 (ref 12–20)
CALCIUM SERPL-MCNC: 8.7 MG/DL (ref 8.5–10.1)
CHLORIDE SERPL-SCNC: 104 MMOL/L (ref 100–108)
CO2 SERPL-SCNC: 29 MMOL/L (ref 21–32)
CREAT SERPL-MCNC: 0.85 MG/DL (ref 0.6–1.3)
DIFFERENTIAL METHOD BLD: ABNORMAL
ERYTHROCYTE [DISTWIDTH] IN BLOOD BY AUTOMATED COUNT: 14.2 % (ref 11.5–14.5)
GLOBULIN SER CALC-MCNC: 3.9 G/DL (ref 2–4)
GLUCOSE SERPL-MCNC: 91 MG/DL (ref 74–99)
HCT VFR BLD AUTO: 34.3 % (ref 36–48)
HGB BLD-MCNC: 11 G/DL (ref 12–16)
LYMPHOCYTES # BLD: 1.4 K/UL (ref 1.1–5.9)
LYMPHOCYTES NFR BLD: 25 % (ref 14–44)
MCH RBC QN AUTO: 26.1 PG (ref 25–35)
MCHC RBC AUTO-ENTMCNC: 32.1 G/DL (ref 31–37)
MCV RBC AUTO: 81.5 FL (ref 78–102)
NEUTS SEG # BLD: 4 K/UL (ref 1.8–9.5)
NEUTS SEG NFR BLD: 71 % (ref 40–70)
PLATELET # BLD AUTO: 223 K/UL (ref 140–440)
POTASSIUM SERPL-SCNC: 3.4 MMOL/L (ref 3.5–5.5)
PROT SERPL-MCNC: 7.4 G/DL (ref 6.4–8.2)
RBC # BLD AUTO: 4.21 M/UL (ref 4.1–5.1)
SODIUM SERPL-SCNC: 139 MMOL/L (ref 136–145)
TSH SERPL DL<=0.05 MIU/L-ACNC: 3.97 UIU/ML (ref 0.36–3.74)
WBC # BLD AUTO: 5.6 K/UL (ref 4.5–13)

## 2018-04-16 PROCEDURE — 96413 CHEMO IV INFUSION 1 HR: CPT

## 2018-04-16 PROCEDURE — 74011000258 HC RX REV CODE- 258: Performed by: INTERNAL MEDICINE

## 2018-04-16 PROCEDURE — 74011250636 HC RX REV CODE- 250/636: Performed by: INTERNAL MEDICINE

## 2018-04-16 PROCEDURE — 80053 COMPREHEN METABOLIC PANEL: CPT | Performed by: INTERNAL MEDICINE

## 2018-04-16 PROCEDURE — 84443 ASSAY THYROID STIM HORMONE: CPT | Performed by: INTERNAL MEDICINE

## 2018-04-16 PROCEDURE — 77030012965 HC NDL HUBR BBMI -A

## 2018-04-16 PROCEDURE — 85025 COMPLETE CBC W/AUTO DIFF WBC: CPT | Performed by: INTERNAL MEDICINE

## 2018-04-16 RX ORDER — HEPARIN SODIUM (PORCINE) LOCK FLUSH IV SOLN 100 UNIT/ML 100 UNIT/ML
500 SOLUTION INTRAVENOUS AS NEEDED
Status: DISPENSED | OUTPATIENT
Start: 2018-04-16 | End: 2018-04-17

## 2018-04-16 RX ORDER — SODIUM CHLORIDE 0.9 % (FLUSH) 0.9 %
10-40 SYRINGE (ML) INJECTION AS NEEDED
Status: DISCONTINUED | OUTPATIENT
Start: 2018-04-16 | End: 2018-04-20 | Stop reason: HOSPADM

## 2018-04-16 RX ADMIN — Medication 40 ML: at 10:00

## 2018-04-16 RX ADMIN — Medication 30 ML: at 08:20

## 2018-04-16 RX ADMIN — SODIUM CHLORIDE 240 MG: 900 INJECTION, SOLUTION INTRAVENOUS at 09:05

## 2018-04-16 RX ADMIN — HEPARIN SODIUM (PORCINE) LOCK FLUSH IV SOLN 100 UNIT/ML 500 UNITS: 100 SOLUTION at 10:00

## 2018-04-16 NOTE — PROGRESS NOTES
[facility] Progress Note    Date: 2018    Name: Jesus Butcher    MRN: [id number]         : 1960      Ms. Leigh arrived in the [facility] today at 0800, in stable condition, here for Cycle 4, IV Opdivo (Nivolumab) Infusion. She was assessed and education was provided. Ms. Kathryn Condon vitals were reviewed. Visit Vitals    BP (!) 148/91 (BP 1 Location: Left arm, BP Patient Position: At rest;Sitting)    Pulse 84    Temp 98.3 °F (36.8 °C)    Resp 16    Ht 5' 6.5\" (1.689 m)    Wt 62.3 kg (137 lb 6.4 oz)    SpO2 99%    BMI 21.84 kg/m2           The outer/lateral lumen of her left chest double lumen port was accessed without incident at 0820, and blood for a CBC, CMP, & TSH, was drawn, per order. (The CBC was processed in house, and the CMP & TSH, were sent out to be processed.)      Lab results were obtained and reviewed, and the CBC results from today, listed below, as well as the most recent CMP results from 18, were all noted to be satisfactory for treatment today. Recent Results (from the past 12 hour(s))   CBC WITH 3 PART DIFF    Collection Time: 18  8:28 AM   Result Value Ref Range    WBC 5.6 4.5 - 13.0 K/uL    RBC 4.21 4.10 - 5.10 M/uL    HGB 11.0 (L) 12.0 - 16 g/dL    HCT 34.3 (L) 36 - 48 %    MCV 81.5 78 - 102 FL    MCH 26.1 25.0 - 35.0 PG    MCHC 32.1 31 - 37 g/dL    RDW 14.2 11.5 - 14.5 %    PLATELET 569 251 - 876 K/uL    NEUTROPHILS 71 (H) 40 - 70 %    MIXED CELLS 4 0.1 - 17 %    LYMPHOCYTES 25 14 - 44 %    ABS. NEUTROPHILS 4.0 1.8 - 9.5 K/UL    ABS. MIXED CELLS 0.2 0.0 - 2.3 K/uL    ABS. LYMPHOCYTES 1.4 1.1 - 5.9 K/UL    DF AUTOMATED             Opdivo (Nivolumab) 240 mg IV, was administered over approximately 30 minutes, per order, and without incident. After completion of the IV Opdivo, the port was flushed very well per protocol, with NS & Heparin, and the el needle was removed and gauze/bandaid was applied.             Ms. Kale Wilburn tolerated well, and had no complaints. Ms. Maris Naylor was discharged from [place] [address] in stable condition at 1005. She is to return in 2 weeks, on Monday, 4-30-18,  at 0800, for her next appointment, for Cycle 5, IV Opdivo (Nivolumab) Infusion.      Reinaldo Moody RN  April 16, 2018  8:43 AM

## 2018-04-30 ENCOUNTER — HOSPITAL ENCOUNTER (OUTPATIENT)
Dept: INFUSION THERAPY | Age: 58
Discharge: HOME | End: 2018-04-30
Payer: SELF-PAY

## 2018-04-30 VITALS
OXYGEN SATURATION: 86 % | HEIGHT: 67 IN | SYSTOLIC BLOOD PRESSURE: 157 MMHG | TEMPERATURE: 98.5 F | BODY MASS INDEX: 21.12 KG/M2 | RESPIRATION RATE: 18 BRPM | HEART RATE: 78 BPM | DIASTOLIC BLOOD PRESSURE: 99 MMHG | WEIGHT: 134.6 LBS

## 2018-04-30 LAB
ALBUMIN SERPL-MCNC: 3.5 G/DL (ref 3.4–5)
ALBUMIN/GLOB SERPL: 0.9 {RATIO} (ref 0.8–1.7)
ALP SERPL-CCNC: 93 U/L (ref 45–117)
ALT SERPL-CCNC: 13 U/L (ref 13–56)
ANION GAP SERPL CALC-SCNC: 11 MMOL/L (ref 3–18)
AST SERPL-CCNC: 18 U/L (ref 15–37)
BASO+EOS+MONOS # BLD AUTO: 0.1 K/UL (ref 0–2.3)
BASO+EOS+MONOS # BLD AUTO: 3 % (ref 0.1–17)
BILIRUB SERPL-MCNC: 0.2 MG/DL (ref 0.2–1)
BUN SERPL-MCNC: 6 MG/DL (ref 7–18)
BUN/CREAT SERPL: 7 (ref 12–20)
CALCIUM SERPL-MCNC: 8.7 MG/DL (ref 8.5–10.1)
CHLORIDE SERPL-SCNC: 105 MMOL/L (ref 100–108)
CO2 SERPL-SCNC: 26 MMOL/L (ref 21–32)
CREAT SERPL-MCNC: 0.85 MG/DL (ref 0.6–1.3)
DIFFERENTIAL METHOD BLD: ABNORMAL
ERYTHROCYTE [DISTWIDTH] IN BLOOD BY AUTOMATED COUNT: 14.1 % (ref 11.5–14.5)
GLOBULIN SER CALC-MCNC: 4.1 G/DL (ref 2–4)
GLUCOSE SERPL-MCNC: 98 MG/DL (ref 74–99)
HCT VFR BLD AUTO: 36 % (ref 36–48)
HGB BLD-MCNC: 11.4 G/DL (ref 12–16)
LYMPHOCYTES # BLD: 1.3 K/UL (ref 1.1–5.9)
LYMPHOCYTES NFR BLD: 29 % (ref 14–44)
MCH RBC QN AUTO: 25.9 PG (ref 25–35)
MCHC RBC AUTO-ENTMCNC: 31.7 G/DL (ref 31–37)
MCV RBC AUTO: 81.8 FL (ref 78–102)
NEUTS SEG # BLD: 3 K/UL (ref 1.8–9.5)
NEUTS SEG NFR BLD: 69 % (ref 40–70)
PLATELET # BLD AUTO: 191 K/UL (ref 140–440)
POTASSIUM SERPL-SCNC: 3.4 MMOL/L (ref 3.5–5.5)
PROT SERPL-MCNC: 7.6 G/DL (ref 6.4–8.2)
RBC # BLD AUTO: 4.4 M/UL (ref 4.1–5.1)
SODIUM SERPL-SCNC: 142 MMOL/L (ref 136–145)
WBC # BLD AUTO: 4.4 K/UL (ref 4.5–13)

## 2018-04-30 PROCEDURE — 74011250636 HC RX REV CODE- 250/636: Performed by: INTERNAL MEDICINE

## 2018-04-30 PROCEDURE — 96413 CHEMO IV INFUSION 1 HR: CPT

## 2018-04-30 PROCEDURE — 85025 COMPLETE CBC W/AUTO DIFF WBC: CPT | Performed by: INTERNAL MEDICINE

## 2018-04-30 PROCEDURE — 77030012965 HC NDL HUBR BBMI -A

## 2018-04-30 PROCEDURE — 80053 COMPREHEN METABOLIC PANEL: CPT | Performed by: INTERNAL MEDICINE

## 2018-04-30 PROCEDURE — 74011000258 HC RX REV CODE- 258: Performed by: INTERNAL MEDICINE

## 2018-04-30 RX ORDER — SODIUM CHLORIDE 0.9 % (FLUSH) 0.9 %
10-40 SYRINGE (ML) INJECTION AS NEEDED
Status: DISCONTINUED | OUTPATIENT
Start: 2018-04-30 | End: 2018-05-04 | Stop reason: HOSPADM

## 2018-04-30 RX ORDER — HEPARIN 100 UNIT/ML
500 SYRINGE INTRAVENOUS AS NEEDED
Status: DISCONTINUED | OUTPATIENT
Start: 2018-04-30 | End: 2018-05-04 | Stop reason: HOSPADM

## 2018-04-30 RX ADMIN — Medication 30 ML: at 08:20

## 2018-04-30 RX ADMIN — HEPARIN 500 UNITS: 100 SYRINGE at 10:20

## 2018-04-30 RX ADMIN — SODIUM CHLORIDE 240 MG: 900 INJECTION, SOLUTION INTRAVENOUS at 09:21

## 2018-04-30 RX ADMIN — Medication 10 ML: at 10:15

## 2018-04-30 NOTE — PROGRESS NOTES
[facility] Progress Note    Date: 2018    Name: Bishop Her    MRN: [id number]         : 1960    Opdivo cycle 5      Patient assessed and education was provided. No complaints or concerns voiced. Opdivo check list done and scanned into media tab. Patient vitals were reviewed. Visit Vitals    BP (!) 157/99 (BP 1 Location: Left arm, BP Patient Position: At rest)    Pulse 78    Temp 98.5 °F (36.9 °C)    Resp 18    Ht 5' 6.5\" (1.689 m)    Wt 61.1 kg (134 lb 9.6 oz)    SpO2 (!) 86%    Breastfeeding No    BMI 21.4 kg/m2         Right chest medial medi-port accessed. Blood sample obtained for cbc/cmp      Recent Results (from the past 12 hour(s))   CBC WITH 3 PART DIFF    Collection Time: 18  8:20 AM   Result Value Ref Range    WBC 4.4 (L) 4.5 - 13.0 K/uL    RBC 4.40 4. 10 - 5.10 M/uL    HGB 11.4 (L) 12.0 - 16 g/dL    HCT 36.0 36 - 48 %    MCV 81.8 78 - 102 FL    MCH 25.9 25.0 - 35.0 PG    MCHC 31.7 31 - 37 g/dL    RDW 14.1 11.5 - 14.5 %    PLATELET 818 912 - 692 K/uL    NEUTROPHILS 69 40 - 70 %    MIXED CELLS 3 0.1 - 17 %    LYMPHOCYTES 29 14 - 44 %    ABS. NEUTROPHILS 3.0 1.8 - 9.5 K/UL    ABS. MIXED CELLS 0.1 0.0 - 2.3 K/uL    ABS. LYMPHOCYTES 1.3 1.1 - 5.9 K/UL    DF AUTOMATED     METABOLIC PANEL, COMPREHENSIVE    Collection Time: 18  8:20 AM   Result Value Ref Range    Sodium 142 136 - 145 mmol/L    Potassium 3.4 (L) 3.5 - 5.5 mmol/L    Chloride 105 100 - 108 mmol/L    CO2 26 21 - 32 mmol/L    Anion gap 11 3.0 - 18 mmol/L    Glucose 98 74 - 99 mg/dL    BUN 6 (L) 7.0 - 18 MG/DL    Creatinine 0.85 0.6 - 1.3 MG/DL    BUN/Creatinine ratio 7 (L) 12 - 20      GFR est AA >60 >60 ml/min/1.73m2    GFR est non-AA >60 >60 ml/min/1.73m2    Calcium 8.7 8.5 - 10.1 MG/DL    Bilirubin, total 0.2 0.2 - 1.0 MG/DL    ALT (SGPT) 13 13 - 56 U/L    AST (SGOT) 18 15 - 37 U/L    Alk.  phosphatase 93 45 - 117 U/L    Protein, total 7.6 6.4 - 8.2 g/dL    Albumin 3.5 3.4 - 5.0 g/dL    Globulin 4.1 (H) 2.0 - 4.0 g/dL    A-G Ratio 0.9 0.8 - 1.7         Labs okay for chemo. Opdivo 240 mg was infused over 30 mins. Tolerated well. Brisk blood returns from port      Port heparinized per protocol, then de-accessed. Site s signs of infection or infiltration. bandaid applied to site    Patient Vitals for the past 8 hrs:   Temp Pulse Resp BP SpO2   04/30/18 1025 98.5 °F (36.9 °C) 78 18 (!) 157/99 (!) 86 %   04/30/18 0814 98.5 °F (36.9 °C) 86 18 (!) 161/91 99 %       I spoke with hospitals, Nurse for Dr Aubree Castro, regarding above BP readings and that patient states she took her bp med last night. She was also made aware that patient is asymptomatic. Patient instructed to see her PCP regarding bp management, and she is to go to ER if symptomatic, per Dr Aubree Castro. Patient verbalized understanding        Reviewed discharge instructions with patient. Patient verbalized understanding       Patient was discharged from [place] [address] in stable condition at 1045 am  She is to return on 5/14/18 at 0800 am for her next chemo appointment.     Ortiz Mendez RN  April 30, 2018  1:46 PM

## 2018-05-14 ENCOUNTER — HOSPITAL ENCOUNTER (OUTPATIENT)
Dept: INFUSION THERAPY | Age: 58
Discharge: HOME | End: 2018-05-14
Payer: SELF-PAY

## 2018-05-14 VITALS
TEMPERATURE: 98 F | HEIGHT: 67 IN | WEIGHT: 134.2 LBS | OXYGEN SATURATION: 98 % | RESPIRATION RATE: 18 BRPM | HEART RATE: 78 BPM | BODY MASS INDEX: 21.06 KG/M2 | DIASTOLIC BLOOD PRESSURE: 85 MMHG | SYSTOLIC BLOOD PRESSURE: 149 MMHG

## 2018-05-14 LAB
ALBUMIN SERPL-MCNC: 3.2 G/DL (ref 3.4–5)
ALBUMIN/GLOB SERPL: 0.7 {RATIO} (ref 0.8–1.7)
ALP SERPL-CCNC: 92 U/L (ref 45–117)
ALT SERPL-CCNC: 14 U/L (ref 13–56)
ANION GAP SERPL CALC-SCNC: 6 MMOL/L (ref 3–18)
AST SERPL-CCNC: 18 U/L (ref 15–37)
BASO+EOS+MONOS # BLD AUTO: 0.3 K/UL (ref 0–2.3)
BASO+EOS+MONOS # BLD AUTO: 6 % (ref 0.1–17)
BILIRUB SERPL-MCNC: 0.2 MG/DL (ref 0.2–1)
BUN SERPL-MCNC: 7 MG/DL (ref 7–18)
BUN/CREAT SERPL: 10 (ref 12–20)
CALCIUM SERPL-MCNC: 8.3 MG/DL (ref 8.5–10.1)
CHLORIDE SERPL-SCNC: 106 MMOL/L (ref 100–108)
CO2 SERPL-SCNC: 29 MMOL/L (ref 21–32)
CREAT SERPL-MCNC: 0.73 MG/DL (ref 0.6–1.3)
DIFFERENTIAL METHOD BLD: ABNORMAL
ERYTHROCYTE [DISTWIDTH] IN BLOOD BY AUTOMATED COUNT: 14.2 % (ref 11.5–14.5)
GLOBULIN SER CALC-MCNC: 4.5 G/DL (ref 2–4)
GLUCOSE SERPL-MCNC: 96 MG/DL (ref 74–99)
HCT VFR BLD AUTO: 36.7 % (ref 36–48)
HGB BLD-MCNC: 11.8 G/DL (ref 12–16)
LYMPHOCYTES # BLD: 1.3 K/UL (ref 1.1–5.9)
LYMPHOCYTES NFR BLD: 26 % (ref 14–44)
MCH RBC QN AUTO: 25.7 PG (ref 25–35)
MCHC RBC AUTO-ENTMCNC: 32.2 G/DL (ref 31–37)
MCV RBC AUTO: 79.8 FL (ref 78–102)
NEUTS SEG # BLD: 3.6 K/UL (ref 1.8–9.5)
NEUTS SEG NFR BLD: 69 % (ref 40–70)
PLATELET # BLD AUTO: 239 K/UL (ref 140–440)
POTASSIUM SERPL-SCNC: 3.4 MMOL/L (ref 3.5–5.5)
PROT SERPL-MCNC: 7.7 G/DL (ref 6.4–8.2)
RBC # BLD AUTO: 4.6 M/UL (ref 4.1–5.1)
SODIUM SERPL-SCNC: 141 MMOL/L (ref 136–145)
WBC # BLD AUTO: 5.2 K/UL (ref 4.5–13)

## 2018-05-14 PROCEDURE — 77030012965 HC NDL HUBR BBMI -A

## 2018-05-14 PROCEDURE — 74011000258 HC RX REV CODE- 258: Performed by: INTERNAL MEDICINE

## 2018-05-14 PROCEDURE — 85025 COMPLETE CBC W/AUTO DIFF WBC: CPT | Performed by: INTERNAL MEDICINE

## 2018-05-14 PROCEDURE — 74011250636 HC RX REV CODE- 250/636: Performed by: INTERNAL MEDICINE

## 2018-05-14 PROCEDURE — 80053 COMPREHEN METABOLIC PANEL: CPT | Performed by: INTERNAL MEDICINE

## 2018-05-14 PROCEDURE — 96413 CHEMO IV INFUSION 1 HR: CPT

## 2018-05-14 RX ORDER — HEPARIN 100 UNIT/ML
500 SYRINGE INTRAVENOUS AS NEEDED
Status: DISCONTINUED | OUTPATIENT
Start: 2018-05-14 | End: 2018-05-18 | Stop reason: HOSPADM

## 2018-05-14 RX ORDER — SODIUM CHLORIDE 0.9 % (FLUSH) 0.9 %
10-40 SYRINGE (ML) INJECTION AS NEEDED
Status: DISCONTINUED | OUTPATIENT
Start: 2018-05-14 | End: 2018-05-18 | Stop reason: HOSPADM

## 2018-05-14 RX ADMIN — Medication 20 ML: at 08:25

## 2018-05-14 RX ADMIN — Medication 30 ML: at 09:50

## 2018-05-14 RX ADMIN — Medication 500 UNITS: at 09:51

## 2018-05-14 RX ADMIN — SODIUM CHLORIDE 240 MG: 900 INJECTION, SOLUTION INTRAVENOUS at 09:03

## 2018-05-14 NOTE — PROGRESS NOTES
[facility] Progress Note    Date: May 14, 2018    Name: Doroteo Hooks              MRN: [id number]              : 1960    Chemotherapy Cycle: Varghese Score       Ms. Joy Dunn was assessed and education was provided. Opdivo checklist completed. Patient instructed to contact PMD regarding elevated blood pressures. Ms. Talia Coronel vitals were reviewed. Visit Vitals    /85 (BP 1 Location: Left arm, BP Patient Position: Post activity; Sitting)    Pulse 78    Temp 98 °F (36.7 °C)    Resp 18    Ht 5' 6.5\" (1.689 m)    Wt 60.9 kg (134 lb 3.2 oz)    SpO2 98%    Breastfeeding No    BMI 21.34 kg/m2     Patient Vitals for the past 12 hrs:   Temp Pulse Resp BP SpO2   18 0946 98 °F (36.7 °C) 78 18 149/85 -   18 0820 98.1 °F (36.7 °C) 85 18 171/83 98 %     Lateral port of dual port at left upper chest accessed with 20 gauge, 1 inch Downs needle w/o difficulty. Good blood return obtained. Labs drawn after 8 ml discard, then port flushed with 20 ml NS. Lab results were obtained and reviewed. Recent Results (from the past 12 hour(s))   CBC WITH 3 PART DIFF    Collection Time: 18  8:30 AM   Result Value Ref Range    WBC 5.2 4.5 - 13.0 K/uL    RBC 4.60 4.10 - 5.10 M/uL    HGB 11.8 (L) 12.0 - 16 g/dL    HCT 36.7 36 - 48 %    MCV 79.8 78 - 102 FL    MCH 25.7 25.0 - 35.0 PG    MCHC 32.2 31 - 37 g/dL    RDW 14.2 11.5 - 14.5 %    PLATELET 768 645 - 950 K/uL    NEUTROPHILS 69 40 - 70 %    MIXED CELLS 6 0.1 - 17 %    LYMPHOCYTES 26 14 - 44 %    ABS. NEUTROPHILS 3.6 1.8 - 9.5 K/UL    ABS. MIXED CELLS 0.3 0.0 - 2.3 K/uL    ABS. LYMPHOCYTES 1.3 1.1 - 5.9 K/UL    DF AUTOMATED           No pre-medications were ordered and chemotherapy was initiated. Opdivo 240 mg/134 ml was infused at 268 ml/hr. No s/s reaction noted. Lateral port flushed with 30 ml NS and 5 ml of 100 units/ml Heparin, then de-accessed. No irritation or drainage noted at site, band aid applied.       Ms. Joy Dunn tolerated infusion, and had no complaints at this time. Armband removed and shredded. Ms. Nedra Ma was discharged from [place] [address] in stable condition at [phone]. She is to return on 5/29/2018 at 1000 for her next appointment for labs and Opdivo infusion.     Shelley Meier RN  May 14, 2018  10:06 AM

## 2018-05-28 ENCOUNTER — APPOINTMENT (OUTPATIENT)
Dept: INFUSION THERAPY | Age: 58
End: 2018-05-28
Payer: SELF-PAY

## 2018-05-29 ENCOUNTER — HOSPITAL ENCOUNTER (OUTPATIENT)
Dept: INFUSION THERAPY | Age: 58
Discharge: HOME | End: 2018-05-29
Payer: SELF-PAY

## 2018-05-29 VITALS
DIASTOLIC BLOOD PRESSURE: 96 MMHG | WEIGHT: 135.9 LBS | HEART RATE: 78 BPM | SYSTOLIC BLOOD PRESSURE: 156 MMHG | RESPIRATION RATE: 18 BRPM | BODY MASS INDEX: 21.33 KG/M2 | HEIGHT: 67 IN | TEMPERATURE: 98.6 F | OXYGEN SATURATION: 98 %

## 2018-05-29 LAB
ALBUMIN SERPL-MCNC: 3.3 G/DL (ref 3.4–5)
ALBUMIN/GLOB SERPL: 0.7 {RATIO} (ref 0.8–1.7)
ALP SERPL-CCNC: 88 U/L (ref 45–117)
ALT SERPL-CCNC: 12 U/L (ref 13–56)
ANION GAP SERPL CALC-SCNC: 8 MMOL/L (ref 3–18)
AST SERPL-CCNC: 14 U/L (ref 15–37)
BASO+EOS+MONOS # BLD AUTO: 0.3 K/UL (ref 0–2.3)
BASO+EOS+MONOS # BLD AUTO: 7 % (ref 0.1–17)
BILIRUB SERPL-MCNC: 0.3 MG/DL (ref 0.2–1)
BUN SERPL-MCNC: 7 MG/DL (ref 7–18)
BUN/CREAT SERPL: 9 (ref 12–20)
CALCIUM SERPL-MCNC: 8.8 MG/DL (ref 8.5–10.1)
CHLORIDE SERPL-SCNC: 104 MMOL/L (ref 100–108)
CO2 SERPL-SCNC: 28 MMOL/L (ref 21–32)
CREAT SERPL-MCNC: 0.81 MG/DL (ref 0.6–1.3)
DIFFERENTIAL METHOD BLD: ABNORMAL
ERYTHROCYTE [DISTWIDTH] IN BLOOD BY AUTOMATED COUNT: 15.3 % (ref 11.5–14.5)
GLOBULIN SER CALC-MCNC: 4.5 G/DL (ref 2–4)
GLUCOSE SERPL-MCNC: 116 MG/DL (ref 74–99)
HCT VFR BLD AUTO: 36.7 % (ref 36–48)
HGB BLD-MCNC: 11.4 G/DL (ref 12–16)
LYMPHOCYTES # BLD: 1.1 K/UL (ref 1.1–5.9)
LYMPHOCYTES NFR BLD: 28 % (ref 14–44)
MCH RBC QN AUTO: 24.8 PG (ref 25–35)
MCHC RBC AUTO-ENTMCNC: 31.1 G/DL (ref 31–37)
MCV RBC AUTO: 80 FL (ref 78–102)
NEUTS SEG # BLD: 2.6 K/UL (ref 1.8–9.5)
NEUTS SEG NFR BLD: 65 % (ref 40–70)
PLATELET # BLD AUTO: 208 K/UL (ref 140–440)
POTASSIUM SERPL-SCNC: 3.6 MMOL/L (ref 3.5–5.5)
PROT SERPL-MCNC: 7.8 G/DL (ref 6.4–8.2)
RBC # BLD AUTO: 4.59 M/UL (ref 4.1–5.1)
SODIUM SERPL-SCNC: 140 MMOL/L (ref 136–145)
TSH SERPL DL<=0.05 MIU/L-ACNC: 5.51 UIU/ML (ref 0.36–3.74)
WBC # BLD AUTO: 4 K/UL (ref 4.5–13)

## 2018-05-29 PROCEDURE — 77030012965 HC NDL HUBR BBMI -A

## 2018-05-29 PROCEDURE — 36591 DRAW BLOOD OFF VENOUS DEVICE: CPT

## 2018-05-29 PROCEDURE — 74011250636 HC RX REV CODE- 250/636: Performed by: INTERNAL MEDICINE

## 2018-05-29 PROCEDURE — 84443 ASSAY THYROID STIM HORMONE: CPT | Performed by: INTERNAL MEDICINE

## 2018-05-29 PROCEDURE — 85025 COMPLETE CBC W/AUTO DIFF WBC: CPT | Performed by: INTERNAL MEDICINE

## 2018-05-29 PROCEDURE — 74011000258 HC RX REV CODE- 258: Performed by: INTERNAL MEDICINE

## 2018-05-29 PROCEDURE — 80053 COMPREHEN METABOLIC PANEL: CPT | Performed by: INTERNAL MEDICINE

## 2018-05-29 PROCEDURE — 96413 CHEMO IV INFUSION 1 HR: CPT

## 2018-05-29 RX ORDER — SODIUM CHLORIDE 0.9 % (FLUSH) 0.9 %
10-40 SYRINGE (ML) INJECTION AS NEEDED
Status: DISCONTINUED | OUTPATIENT
Start: 2018-05-29 | End: 2018-06-02 | Stop reason: HOSPADM

## 2018-05-29 RX ORDER — HEPARIN 100 UNIT/ML
500 SYRINGE INTRAVENOUS AS NEEDED
Status: DISCONTINUED | OUTPATIENT
Start: 2018-05-29 | End: 2018-06-02 | Stop reason: HOSPADM

## 2018-05-29 RX ADMIN — SODIUM CHLORIDE 240 MG: 900 INJECTION, SOLUTION INTRAVENOUS at 11:09

## 2018-05-29 RX ADMIN — Medication 20 ML: at 11:58

## 2018-05-29 RX ADMIN — Medication 20 ML: at 10:35

## 2018-05-29 RX ADMIN — Medication 500 UNITS: at 11:59

## 2018-05-29 NOTE — PROGRESS NOTES
[facility] Progress Note    Date: May 29, 2018    Name: Gordo Temple              MRN: [id number]              : 1960    Chemotherapy Cycle:  Opdivo C7 of 12      Ms. Rahat Delgado was assessed and education was provided. Denies any changes in check list questions. Ms. Vicky Chery vitals were reviewed. Visit Vitals    BP (!) 153/99 (BP 1 Location: Left arm, BP Patient Position: Sitting;Post activity)    Pulse 85    Temp 99.4 °F (37.4 °C)    Resp 18    Ht 5' 6.5\" (1.689 m)    Wt 61.6 kg (135 lb 14.4 oz)    SpO2 98%    BMI 21.61 kg/m2     Medial port of dual Mediport at left upper chest accessed w/20 gauge, 1 inch Downs needle w/o difficulty. Good blood return obtained, labs drawn after 8 ml discard, then medial port flushed with 20 ml NS. Lab results were obtained and reviewed. Recent Results (from the past 12 hour(s))   CBC WITH 3 PART DIFF    Collection Time: 18 10:30 AM   Result Value Ref Range    WBC 4.0 (L) 4.5 - 13.0 K/uL    RBC 4.59 4. 10 - 5.10 M/uL    HGB 11.4 (L) 12.0 - 16 g/dL    HCT 36.7 36 - 48 %    MCV 80.0 78 - 102 FL    MCH 24.8 (L) 25.0 - 35.0 PG    MCHC 31.1 31 - 37 g/dL    RDW 15.3 (H) 11.5 - 14.5 %    PLATELET 560 970 - 639 K/uL    NEUTROPHILS 65 40 - 70 %    MIXED CELLS 7 0.1 - 17 %    LYMPHOCYTES 28 14 - 44 %    ABS. NEUTROPHILS 2.6 1.8 - 9.5 K/UL    ABS. MIXED CELLS 0.3 0.0 - 2.3 K/uL    ABS. LYMPHOCYTES 1.1 1.1 - 5.9 K/UL    DF AUTOMATED           No pre-medications were ordered and chemotherapy was initiated. Opdivo 240 mg/1340ml was infused at 268 ml/hr. No s/s reaction noted. Medial port flushed with 20 ml NS and 5 ml of 100 units/ml Heparin, then de-accessed. No irritation noted at site, band aid applied. Ms. Rahat Delgado tolerated infusion, and had no complaints at this time. Armband removed and shredded. Ms. Rahat Delgado was discharged from [place] [address] in stable condition at 1200.  She is to return on 2018 at 0800 for her next appointment for labs and Opdivo infusion via lateral port.     Mai Garvey RN  May 29, 2018  11:39 AM

## 2018-06-12 ENCOUNTER — HOSPITAL ENCOUNTER (OUTPATIENT)
Dept: INFUSION THERAPY | Age: 58
Discharge: HOME | End: 2018-06-12
Payer: SELF-PAY

## 2018-06-12 VITALS
BODY MASS INDEX: 21.3 KG/M2 | DIASTOLIC BLOOD PRESSURE: 89 MMHG | SYSTOLIC BLOOD PRESSURE: 156 MMHG | HEART RATE: 93 BPM | HEIGHT: 67 IN | TEMPERATURE: 98.4 F | WEIGHT: 135.7 LBS | OXYGEN SATURATION: 96 % | RESPIRATION RATE: 18 BRPM

## 2018-06-12 LAB
ALBUMIN SERPL-MCNC: 3.2 G/DL (ref 3.4–5)
ALBUMIN/GLOB SERPL: 0.7 {RATIO} (ref 0.8–1.7)
ALP SERPL-CCNC: 93 U/L (ref 45–117)
ALT SERPL-CCNC: 14 U/L (ref 13–56)
ANION GAP SERPL CALC-SCNC: 8 MMOL/L (ref 3–18)
AST SERPL-CCNC: 14 U/L (ref 15–37)
BASO+EOS+MONOS # BLD AUTO: 0.2 K/UL (ref 0–2.3)
BASO+EOS+MONOS # BLD AUTO: 4 % (ref 0.1–17)
BILIRUB SERPL-MCNC: 0.4 MG/DL (ref 0.2–1)
BUN SERPL-MCNC: 7 MG/DL (ref 7–18)
BUN/CREAT SERPL: 8 (ref 12–20)
CALCIUM SERPL-MCNC: 8.7 MG/DL (ref 8.5–10.1)
CHLORIDE SERPL-SCNC: 104 MMOL/L (ref 100–108)
CO2 SERPL-SCNC: 27 MMOL/L (ref 21–32)
CREAT SERPL-MCNC: 0.91 MG/DL (ref 0.6–1.3)
DIFFERENTIAL METHOD BLD: ABNORMAL
ERYTHROCYTE [DISTWIDTH] IN BLOOD BY AUTOMATED COUNT: 15.4 % (ref 11.5–14.5)
GLOBULIN SER CALC-MCNC: 4.6 G/DL (ref 2–4)
GLUCOSE SERPL-MCNC: 119 MG/DL (ref 74–99)
HCT VFR BLD AUTO: 36.2 % (ref 36–48)
HGB BLD-MCNC: 11.4 G/DL (ref 12–16)
LYMPHOCYTES # BLD: 1.2 K/UL (ref 1.1–5.9)
LYMPHOCYTES NFR BLD: 21 % (ref 14–44)
MCH RBC QN AUTO: 24.9 PG (ref 25–35)
MCHC RBC AUTO-ENTMCNC: 31.5 G/DL (ref 31–37)
MCV RBC AUTO: 79 FL (ref 78–102)
NEUTS SEG # BLD: 4.2 K/UL (ref 1.8–9.5)
NEUTS SEG NFR BLD: 75 % (ref 40–70)
PLATELET # BLD AUTO: 241 K/UL (ref 140–440)
POTASSIUM SERPL-SCNC: 3.3 MMOL/L (ref 3.5–5.5)
PROT SERPL-MCNC: 7.8 G/DL (ref 6.4–8.2)
RBC # BLD AUTO: 4.58 M/UL (ref 4.1–5.1)
SODIUM SERPL-SCNC: 139 MMOL/L (ref 136–145)
WBC # BLD AUTO: 5.6 K/UL (ref 4.5–13)

## 2018-06-12 PROCEDURE — 85025 COMPLETE CBC W/AUTO DIFF WBC: CPT | Performed by: INTERNAL MEDICINE

## 2018-06-12 PROCEDURE — 77030012965 HC NDL HUBR BBMI -A

## 2018-06-12 PROCEDURE — 74011250636 HC RX REV CODE- 250/636: Performed by: INTERNAL MEDICINE

## 2018-06-12 PROCEDURE — 96413 CHEMO IV INFUSION 1 HR: CPT

## 2018-06-12 PROCEDURE — 74011000258 HC RX REV CODE- 258: Performed by: INTERNAL MEDICINE

## 2018-06-12 PROCEDURE — 80053 COMPREHEN METABOLIC PANEL: CPT | Performed by: INTERNAL MEDICINE

## 2018-06-12 RX ORDER — SODIUM CHLORIDE 0.9 % (FLUSH) 0.9 %
10-40 SYRINGE (ML) INJECTION AS NEEDED
Status: DISCONTINUED | OUTPATIENT
Start: 2018-06-12 | End: 2018-06-16 | Stop reason: HOSPADM

## 2018-06-12 RX ORDER — HEPARIN 100 UNIT/ML
500 SYRINGE INTRAVENOUS AS NEEDED
Status: DISCONTINUED | OUTPATIENT
Start: 2018-06-12 | End: 2018-06-16 | Stop reason: HOSPADM

## 2018-06-12 RX ADMIN — Medication 20 ML: at 08:20

## 2018-06-12 RX ADMIN — SODIUM CHLORIDE 240 MG: 900 INJECTION, SOLUTION INTRAVENOUS at 09:10

## 2018-06-12 RX ADMIN — Medication 10 ML: at 09:06

## 2018-06-12 RX ADMIN — Medication 20 ML: at 09:45

## 2018-06-12 RX ADMIN — Medication 500 UNITS: at 09:45

## 2018-06-12 NOTE — PROGRESS NOTES
[facility] Progress Note    Date: 2018    Name: Holden Negrete              MRN: [id number]              : 1960    Chemotherapy Cycle:  C8 of 15 Opdivo       Ms. Etienne Fay was assessed and education was provided. Opdivo check list reviewed with patient, no changes from previous infusion. Ms. Jonny Silverman vitals were reviewed. Visit Vitals    /89 (BP 1 Location: Left arm, BP Patient Position: Sitting)    Pulse 93    Temp 98.4 °F (36.9 °C)    Resp 18    Ht 5' 6.5\" (1.689 m)    Wt 61.6 kg (135 lb 11.2 oz)    SpO2 96%    Breastfeeding No    BMI 21.57 kg/m2     Patient Vitals for the past 12 hrs:   Temp Pulse Resp BP SpO2   18 0947 - - - 156/89 -   18 0946 98.4 °F (36.9 °C) 93 18 (!) 156/95 -   18 0815 98.1 °F (36.7 °C) 85 18 153/89 96 %     Dual mediport at left upper chest intact. Lateral port accessed with 20 gauge, 1 inch Downs needle w/o difficulty. Good blood return obtained, labs drawn after 8 ml discard, followed with 20 ml NS flush. Lab results were obtained and reviewed. Recent Results (from the past 12 hour(s))   CBC WITH 3 PART DIFF    Collection Time: 18  8:23 AM   Result Value Ref Range    WBC 5.6 4.5 - 13.0 K/uL    RBC 4.58 4.10 - 5.10 M/uL    HGB 11.4 (L) 12.0 - 16 g/dL    HCT 36.2 36 - 48 %    MCV 79.0 78 - 102 FL    MCH 24.9 (L) 25.0 - 35.0 PG    MCHC 31.5 31 - 37 g/dL    RDW 15.4 (H) 11.5 - 14.5 %    PLATELET 736 983 - 473 K/uL    NEUTROPHILS 75 (H) 40 - 70 %    MIXED CELLS 4 0.1 - 17 %    LYMPHOCYTES 21 14 - 44 %    ABS. NEUTROPHILS 4.2 1.8 - 9.5 K/UL    ABS. MIXED CELLS 0.2 0.0 - 2.3 K/uL    ABS.  LYMPHOCYTES 1.2 1.1 - 5.9 K/UL    DF AUTOMATED     METABOLIC PANEL, COMPREHENSIVE    Collection Time: 18  8:23 AM   Result Value Ref Range    Sodium 139 136 - 145 mmol/L    Potassium 3.3 (L) 3.5 - 5.5 mmol/L    Chloride 104 100 - 108 mmol/L    CO2 27 21 - 32 mmol/L    Anion gap 8 3.0 - 18 mmol/L    Glucose 119 (H) 74 - 99 mg/dL    BUN 7 7.0 - 18 MG/DL    Creatinine 0.91 0.6 - 1.3 MG/DL    BUN/Creatinine ratio 8 (L) 12 - 20      GFR est AA >60 >60 ml/min/1.73m2    GFR est non-AA >60 >60 ml/min/1.73m2    Calcium 8.7 8.5 - 10.1 MG/DL    Bilirubin, total 0.4 0.2 - 1.0 MG/DL    ALT (SGPT) 14 13 - 56 U/L    AST (SGOT) 14 (L) 15 - 37 U/L    Alk. phosphatase 93 45 - 117 U/L    Protein, total 7.8 6.4 - 8.2 g/dL    Albumin 3.2 (L) 3.4 - 5.0 g/dL    Globulin 4.6 (H) 2.0 - 4.0 g/dL    A-G Ratio 0.7 (L) 0.8 - 1.7           No pre-medications were ordered and chemotherapy was initiated. Opdivo 240 mg/134 ml was infused at 268 ml/hr. No s/s reaction noted. Port flushed with 20 ml NS and 5 ml of 100 units/ml Heparin, then de-accessed. No irritation or drainage noted at site, band aid applied. Ms. Parag Hill tolerated infusion, and had no complaints at this time. Armband removed and shredded. Ms. Parag Hill was discharged from [place] [address] in stable condition at [phone]. She is to return on 6/26/2018 at 1000 for her next appointment for labs and chemotherapy infusion.     More Reinoso RN  June 12, 2018  11:31 AM

## 2018-06-26 ENCOUNTER — HOSPITAL ENCOUNTER (OUTPATIENT)
Dept: INFUSION THERAPY | Age: 58
Discharge: HOME | End: 2018-06-26
Payer: SELF-PAY

## 2018-06-26 VITALS
BODY MASS INDEX: 20.88 KG/M2 | WEIGHT: 133 LBS | RESPIRATION RATE: 18 BRPM | OXYGEN SATURATION: 98 % | HEART RATE: 88 BPM | SYSTOLIC BLOOD PRESSURE: 159 MMHG | DIASTOLIC BLOOD PRESSURE: 94 MMHG | HEIGHT: 67 IN | TEMPERATURE: 98.3 F

## 2018-06-26 LAB
ALBUMIN SERPL-MCNC: 3.5 G/DL (ref 3.4–5)
ALBUMIN/GLOB SERPL: 0.8 {RATIO} (ref 0.8–1.7)
ALP SERPL-CCNC: 97 U/L (ref 45–117)
ALT SERPL-CCNC: 14 U/L (ref 13–56)
ANION GAP SERPL CALC-SCNC: 6 MMOL/L (ref 3–18)
AST SERPL-CCNC: 17 U/L (ref 15–37)
BASO+EOS+MONOS # BLD AUTO: 0.2 K/UL (ref 0–2.3)
BASO+EOS+MONOS # BLD AUTO: 4 % (ref 0.1–17)
BILIRUB SERPL-MCNC: 0.2 MG/DL (ref 0.2–1)
BUN SERPL-MCNC: 10 MG/DL (ref 7–18)
BUN/CREAT SERPL: 11 (ref 12–20)
CALCIUM SERPL-MCNC: 9.5 MG/DL (ref 8.5–10.1)
CHLORIDE SERPL-SCNC: 105 MMOL/L (ref 100–108)
CO2 SERPL-SCNC: 29 MMOL/L (ref 21–32)
CREAT SERPL-MCNC: 0.91 MG/DL (ref 0.6–1.3)
DIFFERENTIAL METHOD BLD: ABNORMAL
ERYTHROCYTE [DISTWIDTH] IN BLOOD BY AUTOMATED COUNT: 16 % (ref 11.5–14.5)
GLOBULIN SER CALC-MCNC: 4.4 G/DL (ref 2–4)
GLUCOSE SERPL-MCNC: 138 MG/DL (ref 74–99)
HCT VFR BLD AUTO: 37.9 % (ref 36–48)
HGB BLD-MCNC: 11.8 G/DL (ref 12–16)
LYMPHOCYTES # BLD: 1.3 K/UL (ref 1.1–5.9)
LYMPHOCYTES NFR BLD: 24 % (ref 14–44)
MCH RBC QN AUTO: 24.5 PG (ref 25–35)
MCHC RBC AUTO-ENTMCNC: 31.1 G/DL (ref 31–37)
MCV RBC AUTO: 78.8 FL (ref 78–102)
NEUTS SEG # BLD: 4 K/UL (ref 1.8–9.5)
NEUTS SEG NFR BLD: 72 % (ref 40–70)
PLATELET # BLD AUTO: 224 K/UL (ref 140–440)
POTASSIUM SERPL-SCNC: 4.3 MMOL/L (ref 3.5–5.5)
PROT SERPL-MCNC: 7.9 G/DL (ref 6.4–8.2)
RBC # BLD AUTO: 4.81 M/UL (ref 4.1–5.1)
SODIUM SERPL-SCNC: 140 MMOL/L (ref 136–145)
WBC # BLD AUTO: 5.5 K/UL (ref 4.5–13)

## 2018-06-26 PROCEDURE — 74011000258 HC RX REV CODE- 258: Performed by: INTERNAL MEDICINE

## 2018-06-26 PROCEDURE — 74011250636 HC RX REV CODE- 250/636: Performed by: INTERNAL MEDICINE

## 2018-06-26 PROCEDURE — 80053 COMPREHEN METABOLIC PANEL: CPT | Performed by: INTERNAL MEDICINE

## 2018-06-26 PROCEDURE — 77030012965 HC NDL HUBR BBMI -A

## 2018-06-26 PROCEDURE — 96413 CHEMO IV INFUSION 1 HR: CPT

## 2018-06-26 PROCEDURE — 85025 COMPLETE CBC W/AUTO DIFF WBC: CPT | Performed by: INTERNAL MEDICINE

## 2018-06-26 RX ORDER — HEPARIN 100 UNIT/ML
500 SYRINGE INTRAVENOUS AS NEEDED
Status: DISCONTINUED | OUTPATIENT
Start: 2018-06-26 | End: 2018-06-30 | Stop reason: HOSPADM

## 2018-06-26 RX ORDER — SODIUM CHLORIDE 0.9 % (FLUSH) 0.9 %
10-40 SYRINGE (ML) INJECTION AS NEEDED
Status: DISCONTINUED | OUTPATIENT
Start: 2018-06-26 | End: 2018-06-30 | Stop reason: HOSPADM

## 2018-06-26 RX ADMIN — Medication 20 ML: at 10:40

## 2018-06-26 RX ADMIN — Medication 500 UNITS: at 12:24

## 2018-06-26 RX ADMIN — SODIUM CHLORIDE 240 MG: 900 INJECTION, SOLUTION INTRAVENOUS at 11:36

## 2018-06-26 RX ADMIN — Medication 20 ML: at 12:28

## 2018-06-26 NOTE — PROGRESS NOTES
[facility] Progress Note    Date: 2018    Name: Robert Metz              MRN: [id number]              : 1960    Chemotherapy Cycle: C9 of 15 Opdivo       Ms. Tita Herrmann was assessed and education was provided. No changes to Opdivo check list.    Ms. Shankar Sanford vitals were reviewed. Visit Vitals    BP (!) 159/94 (BP 1 Location: Left arm, BP Patient Position: Sitting)    Pulse 88    Temp 98.3 °F (36.8 °C)    Resp 18    Ht 5' 6.5\" (1.689 m)    Wt 60.3 kg (133 lb)    SpO2 98%    BMI 21.15 kg/m2     Patient Vitals for the past 12 hrs:   Temp Pulse Resp BP SpO2   18 1230 98.3 °F (36.8 °C) 88 18 (!) 159/94 -   18 1016 98.4 °F (36.9 °C) 90 18 (!) 136/91 98 %     Mediport at left upper chest intact. Medial port accessed with 20 gauge, 1 inch Downs needle w/o difficulty. Good blood return obtained, labs drawn after 8 ml discard, then flushed with 20 ml NS. Lab results were obtained and reviewed. Recent Results (from the past 12 hour(s))   CBC WITH 3 PART DIFF    Collection Time: 18 10:30 AM   Result Value Ref Range    WBC 5.5 4.5 - 13.0 K/uL    RBC 4.81 4.10 - 5.10 M/uL    HGB 11.8 (L) 12.0 - 16 g/dL    HCT 37.9 36 - 48 %    MCV 78.8 78 - 102 FL    MCH 24.5 (L) 25.0 - 35.0 PG    MCHC 31.1 31 - 37 g/dL    RDW 16.0 (H) 11.5 - 14.5 %    PLATELET 888 699 - 650 K/uL    NEUTROPHILS 72 (H) 40 - 70 %    MIXED CELLS 4 0.1 - 17 %    LYMPHOCYTES 24 14 - 44 %    ABS. NEUTROPHILS 4.0 1.8 - 9.5 K/UL    ABS. MIXED CELLS 0.2 0.0 - 2.3 K/uL    ABS.  LYMPHOCYTES 1.3 1.1 - 5.9 K/UL    DF AUTOMATED     METABOLIC PANEL, COMPREHENSIVE    Collection Time: 18 10:30 AM   Result Value Ref Range    Sodium 140 136 - 145 mmol/L    Potassium 4.3 3.5 - 5.5 mmol/L    Chloride 105 100 - 108 mmol/L    CO2 29 21 - 32 mmol/L    Anion gap 6 3.0 - 18 mmol/L    Glucose 138 (H) 74 - 99 mg/dL    BUN 10 7.0 - 18 MG/DL    Creatinine 0.91 0.6 - 1.3 MG/DL    BUN/Creatinine ratio 11 (L) 12 - 20      GFR est AA >60 >60 ml/min/1.73m2    GFR est non-AA >60 >60 ml/min/1.73m2    Calcium 9.5 8.5 - 10.1 MG/DL    Bilirubin, total 0.2 0.2 - 1.0 MG/DL    ALT (SGPT) 14 13 - 56 U/L    AST (SGOT) 17 15 - 37 U/L    Alk. phosphatase 97 45 - 117 U/L    Protein, total 7.9 6.4 - 8.2 g/dL    Albumin 3.5 3.4 - 5.0 g/dL    Globulin 4.4 (H) 2.0 - 4.0 g/dL    A-G Ratio 0.8 0.8 - 1.7           No pre-medications were ordered and chemotherapy was initiated. Opdivo 240 mg/134 ml was infused via medial port at 268 ml/hr. No s/s reaction noted. Medial port flushed with 20 ml NS and 5 ml of 100 units/ml Heparin, then de-accessed. No irritation noted at site, band aid applied. Ms. Pau Lynn tolerated infusion, and had no complaints at this time. Armband removed and shredded. Ms. Pau Lynn was discharged from [place] [address] in stable condition at 1235. She is to return on 7/10/2018 at 1100 for her next appointment for labs and Opdivo infusion.     Jose Juan Nice RN  June 26, 2018  1:55 PM

## 2018-06-28 DIAGNOSIS — D50.9 IRON DEFICIENCY ANEMIA, UNSPECIFIED IRON DEFICIENCY ANEMIA TYPE: ICD-10-CM

## 2018-06-29 RX ORDER — LANOLIN ALCOHOL/MO/W.PET/CERES
CREAM (GRAM) TOPICAL
Qty: 60 TAB | Refills: 1 | Status: SHIPPED | OUTPATIENT
Start: 2018-06-29 | End: 2018-09-24 | Stop reason: SDUPTHER

## 2018-07-10 ENCOUNTER — HOSPITAL ENCOUNTER (OUTPATIENT)
Dept: INFUSION THERAPY | Age: 58
Discharge: HOME | End: 2018-07-10
Payer: SELF-PAY

## 2018-07-10 VITALS
OXYGEN SATURATION: 99 % | SYSTOLIC BLOOD PRESSURE: 146 MMHG | TEMPERATURE: 97.7 F | RESPIRATION RATE: 18 BRPM | DIASTOLIC BLOOD PRESSURE: 93 MMHG | HEART RATE: 73 BPM

## 2018-07-10 LAB
ALBUMIN SERPL-MCNC: 3.3 G/DL (ref 3.4–5)
ALBUMIN/GLOB SERPL: 0.7 {RATIO} (ref 0.8–1.7)
ALP SERPL-CCNC: 85 U/L (ref 45–117)
ALT SERPL-CCNC: 13 U/L (ref 13–56)
ANION GAP SERPL CALC-SCNC: 8 MMOL/L (ref 3–18)
AST SERPL-CCNC: 13 U/L (ref 15–37)
BASO+EOS+MONOS # BLD AUTO: 0.3 K/UL (ref 0–2.3)
BASO+EOS+MONOS # BLD AUTO: 5 % (ref 0.1–17)
BILIRUB SERPL-MCNC: 0.2 MG/DL (ref 0.2–1)
BUN SERPL-MCNC: 11 MG/DL (ref 7–18)
BUN/CREAT SERPL: 13 (ref 12–20)
CALCIUM SERPL-MCNC: 8.5 MG/DL (ref 8.5–10.1)
CHLORIDE SERPL-SCNC: 106 MMOL/L (ref 100–108)
CO2 SERPL-SCNC: 26 MMOL/L (ref 21–32)
CREAT SERPL-MCNC: 0.88 MG/DL (ref 0.6–1.3)
DIFFERENTIAL METHOD BLD: ABNORMAL
ERYTHROCYTE [DISTWIDTH] IN BLOOD BY AUTOMATED COUNT: 16.7 % (ref 11.5–14.5)
GLOBULIN SER CALC-MCNC: 4.5 G/DL (ref 2–4)
GLUCOSE SERPL-MCNC: 123 MG/DL (ref 74–99)
HCT VFR BLD AUTO: 36.1 % (ref 36–48)
HGB BLD-MCNC: 11.4 G/DL (ref 12–16)
LYMPHOCYTES # BLD: 1.3 K/UL (ref 1.1–5.9)
LYMPHOCYTES NFR BLD: 24 % (ref 14–44)
MCH RBC QN AUTO: 24.6 PG (ref 25–35)
MCHC RBC AUTO-ENTMCNC: 31.6 G/DL (ref 31–37)
MCV RBC AUTO: 78 FL (ref 78–102)
NEUTS SEG # BLD: 3.6 K/UL (ref 1.8–9.5)
NEUTS SEG NFR BLD: 71 % (ref 40–70)
PLATELET # BLD AUTO: 220 K/UL (ref 140–440)
POTASSIUM SERPL-SCNC: 3.8 MMOL/L (ref 3.5–5.5)
PROT SERPL-MCNC: 7.8 G/DL (ref 6.4–8.2)
RBC # BLD AUTO: 4.63 M/UL (ref 4.1–5.1)
SODIUM SERPL-SCNC: 140 MMOL/L (ref 136–145)
TSH SERPL DL<=0.05 MIU/L-ACNC: 7.32 UIU/ML (ref 0.36–3.74)
WBC # BLD AUTO: 5.2 K/UL (ref 4.5–13)

## 2018-07-10 PROCEDURE — 74011000258 HC RX REV CODE- 258: Performed by: INTERNAL MEDICINE

## 2018-07-10 PROCEDURE — 74011250636 HC RX REV CODE- 250/636: Performed by: INTERNAL MEDICINE

## 2018-07-10 PROCEDURE — 77030012965 HC NDL HUBR BBMI -A

## 2018-07-10 PROCEDURE — 85025 COMPLETE CBC W/AUTO DIFF WBC: CPT | Performed by: INTERNAL MEDICINE

## 2018-07-10 PROCEDURE — 96413 CHEMO IV INFUSION 1 HR: CPT

## 2018-07-10 PROCEDURE — 36591 DRAW BLOOD OFF VENOUS DEVICE: CPT

## 2018-07-10 PROCEDURE — 80053 COMPREHEN METABOLIC PANEL: CPT | Performed by: INTERNAL MEDICINE

## 2018-07-10 PROCEDURE — 84443 ASSAY THYROID STIM HORMONE: CPT | Performed by: INTERNAL MEDICINE

## 2018-07-10 RX ORDER — SODIUM CHLORIDE 0.9 % (FLUSH) 0.9 %
10-40 SYRINGE (ML) INJECTION AS NEEDED
Status: DISCONTINUED | OUTPATIENT
Start: 2018-07-10 | End: 2018-07-14 | Stop reason: HOSPADM

## 2018-07-10 RX ORDER — HEPARIN 100 UNIT/ML
500 SYRINGE INTRAVENOUS AS NEEDED
Status: DISCONTINUED | OUTPATIENT
Start: 2018-07-10 | End: 2018-07-14 | Stop reason: HOSPADM

## 2018-07-10 RX ADMIN — Medication 20 ML: at 11:30

## 2018-07-10 RX ADMIN — Medication 500 UNITS: at 12:59

## 2018-07-10 RX ADMIN — Medication 20 ML: at 12:48

## 2018-07-10 RX ADMIN — SODIUM CHLORIDE 240 MG: 900 INJECTION, SOLUTION INTRAVENOUS at 12:09

## 2018-07-10 NOTE — PROGRESS NOTES
[facility] Progress Note    Date: July 10, 2018    Name: Nicolette Curriechure              MRN: [id number]              : 1960    Chemotherapy Cycle: C10 of 12 Nivolumab (Opidivo)       Ms. Melquiades Yarbrough was assessed and education was provided. Patient is concerned about her blood pressures being elevated. She takes Lisinopril 5 mg every evening and took an additional dose this morning before her appointment. She and I have noticed that he BP following the Nivolomab infusion is quite elevated, but the systolic will decrease while the diastolic remains elevated in the 90's. I have tried to leave a message for Dr. Tammie Paul, but was unable to speak with anyone. 1400 called Dr. Leonor Scott office again and left a message to his PA regarding blood pressures. Ms. Toña Mohr vitals were reviewed. Visit Vitals    BP (!) 146/93 (BP 1 Location: Right arm, BP Patient Position: Sitting)    Pulse 73    Temp 97.7 °F (36.5 °C)    Resp 18    SpO2 99%    Breastfeeding No     Patient Vitals for the past 12 hrs:   Temp Pulse Resp BP SpO2   07/10/18 1308 - 73 - (!) 146/93 -   07/10/18 1300 97.7 °F (36.5 °C) 79 18 (!) 178/95 -   07/10/18 1120 99 °F (37.2 °C) 78 18 (!) 141/91 99 %     Lateral port of dual port at right upper chest accessed with 20 gauge, 1 inch Downs needle w/o difficulty. Good blood return obtained, labs drawn after 8 ml discard, followed with 20 ml NS flush. Lab results were obtained and reviewed. Recent Results (from the past 12 hour(s))   CBC WITH 3 PART DIFF    Collection Time: 07/10/18 10:30 AM   Result Value Ref Range    WBC 5.2 4.5 - 13.0 K/uL    RBC 4.63 4.10 - 5.10 M/uL    HGB 11.4 (L) 12.0 - 16 g/dL    HCT 36.1 36 - 48 %    MCV 78.0 78 - 102 FL    MCH 24.6 (L) 25.0 - 35.0 PG    MCHC 31.6 31 - 37 g/dL    RDW 16.7 (H) 11.5 - 14.5 %    PLATELET 848 061 - 842 K/uL    NEUTROPHILS 71 (H) 40 - 70 %    MIXED CELLS 5 0.1 - 17 %    LYMPHOCYTES 24 14 - 44 %    ABS. NEUTROPHILS 3.6 1.8 - 9.5 K/UL    ABS.  MIXED CELLS 0.3 0.0 - 2.3 K/uL    ABS. LYMPHOCYTES 1.3 1.1 - 5.9 K/UL    DF AUTOMATED     METABOLIC PANEL, COMPREHENSIVE    Collection Time: 07/10/18 11:30 AM   Result Value Ref Range    Sodium 140 136 - 145 mmol/L    Potassium 3.8 3.5 - 5.5 mmol/L    Chloride 106 100 - 108 mmol/L    CO2 26 21 - 32 mmol/L    Anion gap 8 3.0 - 18 mmol/L    Glucose 123 (H) 74 - 99 mg/dL    BUN 11 7.0 - 18 MG/DL    Creatinine 0.88 0.6 - 1.3 MG/DL    BUN/Creatinine ratio 13 12 - 20      GFR est AA >60 >60 ml/min/1.73m2    GFR est non-AA >60 >60 ml/min/1.73m2    Calcium 8.5 8.5 - 10.1 MG/DL    Bilirubin, total 0.2 0.2 - 1.0 MG/DL    ALT (SGPT) 13 13 - 56 U/L    AST (SGOT) 13 (L) 15 - 37 U/L    Alk. phosphatase 85 45 - 117 U/L    Protein, total 7.8 6.4 - 8.2 g/dL    Albumin 3.3 (L) 3.4 - 5.0 g/dL    Globulin 4.5 (H) 2.0 - 4.0 g/dL    A-G Ratio 0.7 (L) 0.8 - 1.7     TSH 3RD GENERATION    Collection Time: 07/10/18 11:30 AM   Result Value Ref Range    TSH 7.32 (H) 0.36 - 3.74 uIU/mL         No pre-medications were ordered and chemotherapy was initiated. Nivolumab checklist is unchanged, scanned into chart. Nivolumab 240 mg/134 ml was infused at 268 ml/hr. No s/s reaction noted. Port flushed with 20 ml NS and 5 ml of 100 units/ml Heparin, then de-accessed. No irritation noted at site, band aid applied. Ms. Melquiades Yarbrough tolerated infusion, and had no complaints at this time. Armband removed and shredded. Ms. Melquiades Yarbrough was discharged from [place] [address] in stable condition at 1310. She is to return on 7/24/2018 at 1000 for her next appointment for labs and Nivolumab.     Pricila Juarez RN  July 10, 2018  1:27 PM

## 2018-07-11 NOTE — PROGRESS NOTES
Pharmacy Note     Name: Valente Daniel  : 1960  Estimated body surface area is 1.68 meters squared as calculated from the following:    Height as of 18: 168.9 cm (66.5\"). Weight as of 18: 60.3 kg (133 lb). Diagnosis: NSCLC  Treatment Plan: nivolumab  Cycle: 10  Cycle Start Date: 7/10/18    Lab Results   Component Value Date/Time    WBC 5.2 07/10/2018 10:30 AM    PLATELET 886  10:30 AM     Lab Results   Component Value Date/Time    ABS. NEUTROPHILS 3.6 07/10/2018 10:30 AM     Lab Results   Component Value Date/Time    Creatinine, POC 0.8 2018 09:15 AM    Creatinine 0.88 07/10/2018 11:30 AM     Most Recent Creatinine Clearance:  CrCl: 65.6 mL/min (based on Adjusted Body Weight)  Creatinine: 0.88 mg/dL (7/10/2018)      Pharmacy Intervention:  · Patients TSH up to 7.32. Spoke to [facility] at [facility]. She will address with Dr. Jimenez Goodwin.     Pharmacist: Carissa Manjarrez, [facility]

## 2018-07-24 ENCOUNTER — HOSPITAL ENCOUNTER (OUTPATIENT)
Dept: INFUSION THERAPY | Age: 58
Discharge: HOME | End: 2018-07-24
Payer: SELF-PAY

## 2018-07-24 VITALS
RESPIRATION RATE: 18 BRPM | HEART RATE: 84 BPM | TEMPERATURE: 98.7 F | SYSTOLIC BLOOD PRESSURE: 160 MMHG | OXYGEN SATURATION: 100 % | DIASTOLIC BLOOD PRESSURE: 93 MMHG

## 2018-07-24 LAB
ALBUMIN SERPL-MCNC: 3.2 G/DL (ref 3.4–5)
ALBUMIN/GLOB SERPL: 0.7 {RATIO} (ref 0.8–1.7)
ALP SERPL-CCNC: 82 U/L (ref 45–117)
ALT SERPL-CCNC: 12 U/L (ref 13–56)
ANION GAP SERPL CALC-SCNC: 6 MMOL/L (ref 3–18)
AST SERPL-CCNC: 13 U/L (ref 15–37)
BASO+EOS+MONOS # BLD AUTO: 0.2 K/UL (ref 0–2.3)
BASO+EOS+MONOS # BLD AUTO: 3 % (ref 0.1–17)
BILIRUB SERPL-MCNC: 0.1 MG/DL (ref 0.2–1)
BUN SERPL-MCNC: 7 MG/DL (ref 7–18)
BUN/CREAT SERPL: 9 (ref 12–20)
CALCIUM SERPL-MCNC: 8.9 MG/DL (ref 8.5–10.1)
CHLORIDE SERPL-SCNC: 104 MMOL/L (ref 100–108)
CO2 SERPL-SCNC: 31 MMOL/L (ref 21–32)
CREAT SERPL-MCNC: 0.79 MG/DL (ref 0.6–1.3)
DIFFERENTIAL METHOD BLD: ABNORMAL
ERYTHROCYTE [DISTWIDTH] IN BLOOD BY AUTOMATED COUNT: 17.6 % (ref 11.5–14.5)
GLOBULIN SER CALC-MCNC: 4.3 G/DL (ref 2–4)
GLUCOSE SERPL-MCNC: 167 MG/DL (ref 74–99)
HCT VFR BLD AUTO: 35.4 % (ref 36–48)
HGB BLD-MCNC: 11 G/DL (ref 12–16)
LYMPHOCYTES # BLD: 1 K/UL (ref 1.1–5.9)
LYMPHOCYTES NFR BLD: 20 % (ref 14–44)
MCH RBC QN AUTO: 24.5 PG (ref 25–35)
MCHC RBC AUTO-ENTMCNC: 31.1 G/DL (ref 31–37)
MCV RBC AUTO: 78.8 FL (ref 78–102)
NEUTS SEG # BLD: 3.9 K/UL (ref 1.8–9.5)
NEUTS SEG NFR BLD: 77 % (ref 40–70)
PLATELET # BLD AUTO: 202 K/UL (ref 140–440)
POTASSIUM SERPL-SCNC: 3.4 MMOL/L (ref 3.5–5.5)
PROT SERPL-MCNC: 7.5 G/DL (ref 6.4–8.2)
RBC # BLD AUTO: 4.49 M/UL (ref 4.1–5.1)
SODIUM SERPL-SCNC: 141 MMOL/L (ref 136–145)
WBC # BLD AUTO: 5.1 K/UL (ref 4.5–13)

## 2018-07-24 PROCEDURE — 96413 CHEMO IV INFUSION 1 HR: CPT

## 2018-07-24 PROCEDURE — 74011250636 HC RX REV CODE- 250/636: Performed by: INTERNAL MEDICINE

## 2018-07-24 PROCEDURE — 74011000258 HC RX REV CODE- 258: Performed by: INTERNAL MEDICINE

## 2018-07-24 PROCEDURE — 85025 COMPLETE CBC W/AUTO DIFF WBC: CPT | Performed by: INTERNAL MEDICINE

## 2018-07-24 PROCEDURE — 36591 DRAW BLOOD OFF VENOUS DEVICE: CPT

## 2018-07-24 PROCEDURE — 80053 COMPREHEN METABOLIC PANEL: CPT | Performed by: INTERNAL MEDICINE

## 2018-07-24 PROCEDURE — 74011250636 HC RX REV CODE- 250/636

## 2018-07-24 PROCEDURE — 77030012965 HC NDL HUBR BBMI -A

## 2018-07-24 RX ORDER — HEPARIN SODIUM (PORCINE) LOCK FLUSH IV SOLN 100 UNIT/ML 100 UNIT/ML
500 SOLUTION INTRAVENOUS AS NEEDED
Status: DISPENSED | OUTPATIENT
Start: 2018-07-24 | End: 2018-07-25

## 2018-07-24 RX ORDER — SODIUM CHLORIDE 0.9 % (FLUSH) 0.9 %
10-40 SYRINGE (ML) INJECTION AS NEEDED
Status: DISCONTINUED | OUTPATIENT
Start: 2018-07-24 | End: 2018-07-28 | Stop reason: HOSPADM

## 2018-07-24 RX ORDER — HEPARIN 100 UNIT/ML
SYRINGE INTRAVENOUS
Status: COMPLETED
Start: 2018-07-24 | End: 2018-07-24

## 2018-07-24 RX ADMIN — Medication 10 ML: at 11:49

## 2018-07-24 RX ADMIN — Medication 10 ML: at 10:35

## 2018-07-24 RX ADMIN — SODIUM CHLORIDE 240 MG: 900 INJECTION, SOLUTION INTRAVENOUS at 11:08

## 2018-07-24 RX ADMIN — SODIUM CHLORIDE, PRESERVATIVE FREE 500 UNITS: 5 INJECTION INTRAVENOUS at 11:49

## 2018-07-24 NOTE — PROGRESS NOTES
[facility] Progress Note    Date: 2018    Name: Dixon Banerjee              MRN: [id number]              : 1960    Chemotherapy Cycle: C 11 of 12 Nivolumab (Opidivo)       Ms. Christa Rosales was assessed and education was provided. Patient is concerned about her blood pressures being elevated. Ms. Duke Reno vitals were reviewed. Visit Vitals    BP (!) 160/93 (BP 1 Location: Left arm, BP Patient Position: Sitting)    Pulse 84    Temp 98.7 °F (37.1 °C)    Resp 18    SpO2 100%     Patient Vitals for the past 12 hrs:   Temp Pulse Resp BP SpO2   18 1010 98.7 °F (37.1 °C) 84 18 (!) 160/93 100 %     Lateral port of dual port at right upper chest accessed with 20 gauge, 1 inch Downs needle w/o difficulty. Good blood return obtained, labs drawn after 10 ml discard, followed with 20 ml NS flush. Lab results were obtained and reviewed. Recent Results (from the past 12 hour(s))   CBC WITH 3 PART DIFF    Collection Time: 18 10:30 AM   Result Value Ref Range    WBC 5.1 4.5 - 13.0 K/uL    RBC 4.49 4. 10 - 5.10 M/uL    HGB 11.0 (L) 12.0 - 16 g/dL    HCT 35.4 (L) 36 - 48 %    MCV 78.8 78 - 102 FL    MCH 24.5 (L) 25.0 - 35.0 PG    MCHC 31.1 31 - 37 g/dL    RDW 17.6 (H) 11.5 - 14.5 %    PLATELET 950 161 - 254 K/uL    NEUTROPHILS 77 (H) 40 - 70 %    MIXED CELLS 3 0.1 - 17 %    LYMPHOCYTES 20 14 - 44 %    ABS. NEUTROPHILS 3.9 1.8 - 9.5 K/UL    ABS. MIXED CELLS 0.2 0.0 - 2.3 K/uL    ABS. LYMPHOCYTES 1.0 (L) 1.1 - 5.9 K/UL    DF AUTOMATED           No pre-medications were ordered and chemotherapy was initiated. Nivolumab 240 mg/134 ml was infused at 268 ml/hr. No s/s reaction noted. Port flushed with 20 ml NS and 5 ml of 100 units/ml Heparin, then de-accessed. No irritation noted at site, band aid applied. Ms. Christa Rosales tolerated infusion, and had no complaints at this time. Armband removed and shredded.     Ms. Christa Rosales was discharged from [place] [address] in stable condition at 1155. She is to return on 8/7/2018 at 1100 for her next appointment for labs and Nivolumab.     Sara Nava  July 24, 2018  1:27 PM

## 2018-08-07 ENCOUNTER — HOSPITAL ENCOUNTER (OUTPATIENT)
Dept: INFUSION THERAPY | Age: 58
Discharge: HOME | End: 2018-08-07
Payer: MEDICARE

## 2018-08-07 VITALS
BODY MASS INDEX: 21.54 KG/M2 | WEIGHT: 135.5 LBS | HEART RATE: 87 BPM | TEMPERATURE: 98.9 F | DIASTOLIC BLOOD PRESSURE: 87 MMHG | RESPIRATION RATE: 18 BRPM | SYSTOLIC BLOOD PRESSURE: 139 MMHG | OXYGEN SATURATION: 100 %

## 2018-08-07 LAB
ALBUMIN SERPL-MCNC: 3.2 G/DL (ref 3.4–5)
ALBUMIN/GLOB SERPL: 0.7 {RATIO} (ref 0.8–1.7)
ALP SERPL-CCNC: 82 U/L (ref 45–117)
ALT SERPL-CCNC: 14 U/L (ref 13–56)
ANION GAP SERPL CALC-SCNC: 6 MMOL/L (ref 3–18)
AST SERPL-CCNC: 14 U/L (ref 15–37)
BASO+EOS+MONOS # BLD AUTO: 0.2 K/UL (ref 0–2.3)
BASO+EOS+MONOS # BLD AUTO: 4 % (ref 0.1–17)
BILIRUB SERPL-MCNC: 0.2 MG/DL (ref 0.2–1)
BUN SERPL-MCNC: 6 MG/DL (ref 7–18)
BUN/CREAT SERPL: 7 (ref 12–20)
CALCIUM SERPL-MCNC: 8.4 MG/DL (ref 8.5–10.1)
CHLORIDE SERPL-SCNC: 106 MMOL/L (ref 100–108)
CO2 SERPL-SCNC: 28 MMOL/L (ref 21–32)
CREAT SERPL-MCNC: 0.82 MG/DL (ref 0.6–1.3)
DIFFERENTIAL METHOD BLD: ABNORMAL
ERYTHROCYTE [DISTWIDTH] IN BLOOD BY AUTOMATED COUNT: 17.1 % (ref 11.5–14.5)
GLOBULIN SER CALC-MCNC: 4.4 G/DL (ref 2–4)
GLUCOSE SERPL-MCNC: 112 MG/DL (ref 74–99)
HCT VFR BLD AUTO: 34.5 % (ref 36–48)
HGB BLD-MCNC: 10.9 G/DL (ref 12–16)
LYMPHOCYTES # BLD: 1.2 K/UL (ref 1.1–5.9)
LYMPHOCYTES NFR BLD: 23 % (ref 14–44)
MCH RBC QN AUTO: 24.9 PG (ref 25–35)
MCHC RBC AUTO-ENTMCNC: 31.6 G/DL (ref 31–37)
MCV RBC AUTO: 78.8 FL (ref 78–102)
NEUTS SEG # BLD: 3.8 K/UL (ref 1.8–9.5)
NEUTS SEG NFR BLD: 73 % (ref 40–70)
PLATELET # BLD AUTO: 229 K/UL (ref 140–440)
POTASSIUM SERPL-SCNC: 3.3 MMOL/L (ref 3.5–5.5)
PROT SERPL-MCNC: 7.6 G/DL (ref 6.4–8.2)
RBC # BLD AUTO: 4.38 M/UL (ref 4.1–5.1)
SODIUM SERPL-SCNC: 140 MMOL/L (ref 136–145)
WBC # BLD AUTO: 5.2 K/UL (ref 4.5–13)

## 2018-08-07 PROCEDURE — 96413 CHEMO IV INFUSION 1 HR: CPT

## 2018-08-07 PROCEDURE — 74011250636 HC RX REV CODE- 250/636: Performed by: INTERNAL MEDICINE

## 2018-08-07 PROCEDURE — 77030012965 HC NDL HUBR BBMI -A

## 2018-08-07 PROCEDURE — 80053 COMPREHEN METABOLIC PANEL: CPT | Performed by: INTERNAL MEDICINE

## 2018-08-07 PROCEDURE — 74011000258 HC RX REV CODE- 258: Performed by: INTERNAL MEDICINE

## 2018-08-07 PROCEDURE — 85025 COMPLETE CBC W/AUTO DIFF WBC: CPT | Performed by: INTERNAL MEDICINE

## 2018-08-07 RX ORDER — HEPARIN 100 UNIT/ML
500 SYRINGE INTRAVENOUS ONCE
Status: COMPLETED | OUTPATIENT
Start: 2018-08-07 | End: 2018-08-07

## 2018-08-07 RX ORDER — SODIUM CHLORIDE 0.9 % (FLUSH) 0.9 %
10 SYRINGE (ML) INJECTION AS NEEDED
Status: DISCONTINUED | OUTPATIENT
Start: 2018-08-07 | End: 2018-08-11 | Stop reason: HOSPADM

## 2018-08-07 RX ADMIN — Medication 10 ML: at 12:39

## 2018-08-07 RX ADMIN — Medication 10 ML: at 11:05

## 2018-08-07 RX ADMIN — SODIUM CHLORIDE 240 MG: 900 INJECTION, SOLUTION INTRAVENOUS at 11:33

## 2018-08-07 RX ADMIN — SODIUM CHLORIDE, PRESERVATIVE FREE 500 UNITS: 5 INJECTION INTRAVENOUS at 12:39

## 2018-08-07 NOTE — PROGRESS NOTES
[facility] Progress Note    Date: 2018    Name: Holden Negrete    MRN: [id number]         : 1960      Ms. Leigh arrived to [facility] at [phone]. Pt is here today for chemo, Opdivo, Cycle 12, Day 1. Ms. Etienne Fay was assessed and education was provided. Patient denied any new complications. Ms. Jonny Silverman vitals were reviewed. Visit Vitals    /87 (BP 1 Location: Left arm, BP Patient Position: Sitting)    Pulse 87    Temp 98.9 °F (37.2 °C)    Resp 18    Wt 61.5 kg (135 lb 8 oz)    SpO2 100%    Breastfeeding No    BMI 21.54 kg/m2       Patient's left outer upper chest port accessed and blood drawn for labs. Lab results were obtained and reviewed. Labs okay for chemo. Recent Results (from the past 12 hour(s))   CBC WITH 3 PART DIFF    Collection Time: 18 11:06 AM   Result Value Ref Range    WBC 5.2 4.5 - 13.0 K/uL    RBC 4.38 4.10 - 5.10 M/uL    HGB 10.9 (L) 12.0 - 16 g/dL    HCT 34.5 (L) 36 - 48 %    MCV 78.8 78 - 102 FL    MCH 24.9 (L) 25.0 - 35.0 PG    MCHC 31.6 31 - 37 g/dL    RDW 17.1 (H) 11.5 - 14.5 %    PLATELET 297 501 - 262 K/uL    NEUTROPHILS 73 (H) 40 - 70 %    MIXED CELLS 4 0.1 - 17 %    LYMPHOCYTES 23 14 - 44 %    ABS. NEUTROPHILS 3.8 1.8 - 9.5 K/UL    ABS. MIXED CELLS 0.2 0.0 - 2.3 K/uL    ABS. LYMPHOCYTES 1.2 1.1 - 5.9 K/UL    DF AUTOMATED              Opdivo 240 mg administered as ordered. Ms. Etienne Fay tolerated infusion without complaints. Port flushed with heparin per order and de-accessed. Band-aid applied to site. Ms. Etienne Fay was discharged from [place] [address] in stable condition at [phone]. No further appointments are scheduled at this office. Patient is advised to follow up with her physician.   Ish Moyer RN  2018

## 2018-08-15 NOTE — PROGRESS NOTES
Pharmacy Note      Name: Rg Joseph  : 1960  Estimated body surface area is 1.7 meters squared as calculated from the following:    Height as of 18: 168.9 cm (66.5\"). Weight as of 18: 61.5 kg (135 lb 8 oz).     Diagnosis: Non Small Cell Lung Cancer  Treatment Plan: Nivolumab           Lab Results   Component Value Date/Time     WBC 5.2 2018 11:06 AM     PLATELET 871  11:06 AM            Lab Results   Component Value Date/Time     ABS.  NEUTROPHILS 3.8 2018 11:06 AM            Lab Results   Component Value Date/Time     Creatinine, POC 0.8 2018 09:15 AM     Creatinine 0.82 2018 11:06 AM      Most Recent Creatinine Clearance:  CrCl: 71.0 mL/min (based on Adjusted Body Weight)  Creatinine: 0.82 mg/dL (2018)        Pharmacy Intervention:  · Patient has received 12 doses of nivolumab 240 mg IV every 14 days between 3/5/18 and 18  · Dosing regimen will be changed to Nivolumab 480 mg IV every 28 days starting 2018     Pharmacist: Andrea Park, [facility]

## 2018-08-21 ENCOUNTER — HOSPITAL ENCOUNTER (OUTPATIENT)
Dept: INFUSION THERAPY | Age: 58
Discharge: HOME | End: 2018-08-21
Payer: MEDICARE

## 2018-08-21 VITALS
WEIGHT: 136.4 LBS | TEMPERATURE: 98.2 F | RESPIRATION RATE: 20 BRPM | DIASTOLIC BLOOD PRESSURE: 76 MMHG | SYSTOLIC BLOOD PRESSURE: 142 MMHG | BODY MASS INDEX: 21.69 KG/M2 | HEART RATE: 72 BPM | OXYGEN SATURATION: 95 %

## 2018-08-21 LAB
ALBUMIN SERPL-MCNC: 3.2 G/DL (ref 3.4–5)
ALBUMIN/GLOB SERPL: 0.7 {RATIO} (ref 0.8–1.7)
ALP SERPL-CCNC: 85 U/L (ref 45–117)
ALT SERPL-CCNC: 14 U/L (ref 13–56)
ANION GAP SERPL CALC-SCNC: 6 MMOL/L (ref 3–18)
AST SERPL-CCNC: 16 U/L (ref 15–37)
BASO+EOS+MONOS # BLD AUTO: 0.3 K/UL (ref 0–2.3)
BASO+EOS+MONOS # BLD AUTO: 5 % (ref 0.1–17)
BILIRUB SERPL-MCNC: 0.2 MG/DL (ref 0.2–1)
BUN SERPL-MCNC: 6 MG/DL (ref 7–18)
BUN/CREAT SERPL: 7 (ref 12–20)
CALCIUM SERPL-MCNC: 8.4 MG/DL (ref 8.5–10.1)
CHLORIDE SERPL-SCNC: 105 MMOL/L (ref 100–108)
CO2 SERPL-SCNC: 29 MMOL/L (ref 21–32)
CREAT SERPL-MCNC: 0.84 MG/DL (ref 0.6–1.3)
DIFFERENTIAL METHOD BLD: ABNORMAL
ERYTHROCYTE [DISTWIDTH] IN BLOOD BY AUTOMATED COUNT: 16.7 % (ref 11.5–14.5)
GLOBULIN SER CALC-MCNC: 4.6 G/DL (ref 2–4)
GLUCOSE SERPL-MCNC: 90 MG/DL (ref 74–99)
HCT VFR BLD AUTO: 34.2 % (ref 36–48)
HGB BLD-MCNC: 10.8 G/DL (ref 12–16)
LYMPHOCYTES # BLD: 1.5 K/UL (ref 1.1–5.9)
LYMPHOCYTES NFR BLD: 27 % (ref 14–44)
MCH RBC QN AUTO: 24.9 PG (ref 25–35)
MCHC RBC AUTO-ENTMCNC: 31.6 G/DL (ref 31–37)
MCV RBC AUTO: 78.8 FL (ref 78–102)
NEUTS SEG # BLD: 3.9 K/UL (ref 1.8–9.5)
NEUTS SEG NFR BLD: 69 % (ref 40–70)
PLATELET # BLD AUTO: 222 K/UL (ref 140–440)
POTASSIUM SERPL-SCNC: 3.5 MMOL/L (ref 3.5–5.5)
PROT SERPL-MCNC: 7.8 G/DL (ref 6.4–8.2)
RBC # BLD AUTO: 4.34 M/UL (ref 4.1–5.1)
SODIUM SERPL-SCNC: 140 MMOL/L (ref 136–145)
TSH SERPL DL<=0.05 MIU/L-ACNC: 7.53 UIU/ML (ref 0.36–3.74)
WBC # BLD AUTO: 5.7 K/UL (ref 4.5–13)

## 2018-08-21 PROCEDURE — 85025 COMPLETE CBC W/AUTO DIFF WBC: CPT | Performed by: INTERNAL MEDICINE

## 2018-08-21 PROCEDURE — 74011250636 HC RX REV CODE- 250/636: Performed by: INTERNAL MEDICINE

## 2018-08-21 PROCEDURE — 80053 COMPREHEN METABOLIC PANEL: CPT | Performed by: INTERNAL MEDICINE

## 2018-08-21 PROCEDURE — 74011000258 HC RX REV CODE- 258: Performed by: INTERNAL MEDICINE

## 2018-08-21 PROCEDURE — 96413 CHEMO IV INFUSION 1 HR: CPT

## 2018-08-21 PROCEDURE — 84443 ASSAY THYROID STIM HORMONE: CPT | Performed by: INTERNAL MEDICINE

## 2018-08-21 PROCEDURE — 77030012965 HC NDL HUBR BBMI -A

## 2018-08-21 RX ORDER — HEPARIN 100 UNIT/ML
500 SYRINGE INTRAVENOUS AS NEEDED
Status: DISPENSED | OUTPATIENT
Start: 2018-08-21 | End: 2018-08-22

## 2018-08-21 RX ORDER — SODIUM CHLORIDE 9 MG/ML
25 INJECTION, SOLUTION INTRAVENOUS ONCE
Status: DISPENSED | OUTPATIENT
Start: 2018-08-21 | End: 2018-08-22

## 2018-08-21 RX ORDER — SODIUM CHLORIDE 0.9 % (FLUSH) 0.9 %
10-40 SYRINGE (ML) INJECTION AS NEEDED
Status: DISCONTINUED | OUTPATIENT
Start: 2018-08-21 | End: 2018-08-25 | Stop reason: HOSPADM

## 2018-08-21 RX ADMIN — Medication 20 ML: at 14:46

## 2018-08-21 RX ADMIN — SODIUM CHLORIDE, PRESERVATIVE FREE 500 UNITS: 5 INJECTION INTRAVENOUS at 14:46

## 2018-08-21 RX ADMIN — Medication 30 ML: at 13:16

## 2018-08-21 RX ADMIN — SODIUM CHLORIDE 480 MG: 900 INJECTION, SOLUTION INTRAVENOUS at 13:53

## 2018-08-21 NOTE — PROGRESS NOTES
[facility] Progress Note    Date: 2018    Name: Sameer Orozco              MRN: [id number]              : 1960    Chemotherapy Cycle:C1     Opdivo C1      Pt to [facility], ambulatory, at 1305. Ms. Sandra Lee was assessed and education was provided. Ms. Yaa Cates vitals were reviewed. Visit Vitals    Temp 98.2 °F (36.8 °C)    Resp 20    Wt 61.9 kg (136 lb 6.4 oz)    Breastfeeding No    BMI 21.69 kg/m2       Left chest double mediport lateral lumen accessed with a 20 G 1 inch Downs needle after chloroprep. Flushes without difficulty and had brisk blood return. Labs drawn off and sent to lab for CBC, CMP and TSH per order after 10 mL waste. Flushed with 10 mL NS.        Recent Results (from the past 8 hour(s))   CBC WITH 3 PART DIFF    Collection Time: 18  1:17 PM   Result Value Ref Range    WBC 5.7 4.5 - 13.0 K/uL    RBC 4.34 4.10 - 5.10 M/uL    HGB 10.8 (L) 12.0 - 16 g/dL    HCT 34.2 (L) 36 - 48 %    MCV 78.8 78 - 102 FL    MCH 24.9 (L) 25.0 - 35.0 PG    MCHC 31.6 31 - 37 g/dL    RDW 16.7 (H) 11.5 - 14.5 %    PLATELET 639 946 - 861 K/uL    NEUTROPHILS 69 40 - 70 %    MIXED CELLS 5 0.1 - 17 %    LYMPHOCYTES 27 14 - 44 %    ABS. NEUTROPHILS 3.9 1.8 - 9.5 K/UL    ABS. MIXED CELLS 0.3 0.0 - 2.3 K/uL    ABS. LYMPHOCYTES 1.5 1.1 - 5.9 K/UL    DF AUTOMATED       Lab results were obtained and reviewed. Lab results within ordered parameters to give chemo today. ANC = 3.9, PLT = 222, WBC 5.7, HGB 10.8. Chemo dosages verified with today's BSA and found to be within 10% of ordered dosages.     Chemotherapy was initiated after blood return from mediport re-verified.       Opdivo 240 mg was infused over 30 minutes as ordered. VS stable at end of infusion and pt denied complaints. Line flushed with NS and blood return re-verified.     Mediport flushed with 20 mL NS and 500 units of heparin per protocol before de accessing. Band-aid applied to site.      Ms. Leigh tolerated infusion, and had no complaints at this time. She stayed for one hour post infusion observation period. Patient armband removed and shredded. Ms. Julius Black was discharged from [place] [address] in stable condition at 1450. She is to return on 9/18/18 at 1300 for her next [id number] [place] [address] appointment.     Trung Wilson RN  August 21, 2018

## 2018-09-07 DIAGNOSIS — I82.501 CHRONIC DEEP VEIN THROMBOSIS (DVT) OF RIGHT LOWER EXTREMITY, UNSPECIFIED VEIN (HCC): ICD-10-CM

## 2018-09-07 DIAGNOSIS — I27.82 OTHER CHRONIC PULMONARY EMBOLISM WITHOUT ACUTE COR PULMONALE (HCC): ICD-10-CM

## 2018-09-10 RX ORDER — RIVAROXABAN 20 MG/1
TABLET, FILM COATED ORAL
Qty: 30 TAB | Refills: 6 | OUTPATIENT
Start: 2018-09-10

## 2018-09-10 NOTE — TELEPHONE ENCOUNTER
I believe this medication is now prescribed by patient's oncologist Dr. Jeffrey Norman. [facility] Please check with patient. thanks!

## 2018-09-10 NOTE — TELEPHONE ENCOUNTER
Called home number. Verified name and . Spoke with patient. Patient notified of refill request for Livia to go to Dr. Jeffrey Norman office. Patient had no further questions or concerns.

## 2018-09-18 ENCOUNTER — HOSPITAL ENCOUNTER (OUTPATIENT)
Dept: INFUSION THERAPY | Age: 58
Discharge: HOME | End: 2018-09-18
Payer: MEDICARE

## 2018-09-18 VITALS
HEART RATE: 71 BPM | HEIGHT: 67 IN | DIASTOLIC BLOOD PRESSURE: 88 MMHG | BODY MASS INDEX: 21.63 KG/M2 | OXYGEN SATURATION: 98 % | RESPIRATION RATE: 18 BRPM | TEMPERATURE: 98.4 F | SYSTOLIC BLOOD PRESSURE: 143 MMHG | WEIGHT: 137.8 LBS

## 2018-09-18 LAB
ALBUMIN SERPL-MCNC: 3.3 G/DL (ref 3.4–5)
ALBUMIN/GLOB SERPL: 0.8 {RATIO} (ref 0.8–1.7)
ALP SERPL-CCNC: 83 U/L (ref 45–117)
ALT SERPL-CCNC: 12 U/L (ref 13–56)
ANION GAP SERPL CALC-SCNC: 8 MMOL/L (ref 3–18)
AST SERPL-CCNC: 13 U/L (ref 15–37)
BILIRUB SERPL-MCNC: 0.2 MG/DL (ref 0.2–1)
BUN SERPL-MCNC: 5 MG/DL (ref 7–18)
BUN/CREAT SERPL: 6 (ref 12–20)
CALCIUM SERPL-MCNC: 9 MG/DL (ref 8.5–10.1)
CHLORIDE SERPL-SCNC: 107 MMOL/L (ref 100–108)
CO2 SERPL-SCNC: 27 MMOL/L (ref 21–32)
CREAT SERPL-MCNC: 0.81 MG/DL (ref 0.6–1.3)
GLOBULIN SER CALC-MCNC: 4.3 G/DL (ref 2–4)
GLUCOSE SERPL-MCNC: 112 MG/DL (ref 74–99)
POTASSIUM SERPL-SCNC: 3.4 MMOL/L (ref 3.5–5.5)
PROT SERPL-MCNC: 7.6 G/DL (ref 6.4–8.2)
SODIUM SERPL-SCNC: 142 MMOL/L (ref 136–145)

## 2018-09-18 PROCEDURE — 74011000258 HC RX REV CODE- 258: Performed by: INTERNAL MEDICINE

## 2018-09-18 PROCEDURE — 74011250636 HC RX REV CODE- 250/636: Performed by: INTERNAL MEDICINE

## 2018-09-18 PROCEDURE — 85025 COMPLETE CBC W/AUTO DIFF WBC: CPT | Performed by: INTERNAL MEDICINE

## 2018-09-18 PROCEDURE — 96413 CHEMO IV INFUSION 1 HR: CPT

## 2018-09-18 PROCEDURE — 77030012965 HC NDL HUBR BBMI -A

## 2018-09-18 PROCEDURE — 74011250636 HC RX REV CODE- 250/636

## 2018-09-18 PROCEDURE — 80053 COMPREHEN METABOLIC PANEL: CPT | Performed by: INTERNAL MEDICINE

## 2018-09-18 RX ORDER — SODIUM CHLORIDE 0.9 % (FLUSH) 0.9 %
10-40 SYRINGE (ML) INJECTION AS NEEDED
Status: DISCONTINUED | OUTPATIENT
Start: 2018-09-18 | End: 2018-09-22 | Stop reason: HOSPADM

## 2018-09-18 RX ORDER — HEPARIN 100 UNIT/ML
500 SYRINGE INTRAVENOUS AS NEEDED
Status: DISCONTINUED | OUTPATIENT
Start: 2018-09-18 | End: 2018-09-22 | Stop reason: HOSPADM

## 2018-09-18 RX ORDER — HEPARIN 100 UNIT/ML
SYRINGE INTRAVENOUS
Status: COMPLETED
Start: 2018-09-18 | End: 2018-09-18

## 2018-09-18 RX ADMIN — Medication 20 ML: at 14:47

## 2018-09-18 RX ADMIN — Medication 30 ML: at 13:25

## 2018-09-18 RX ADMIN — Medication 30 ML: at 13:30

## 2018-09-18 RX ADMIN — SODIUM CHLORIDE 480 MG: 900 INJECTION, SOLUTION INTRAVENOUS at 14:12

## 2018-09-18 RX ADMIN — HEPARIN 500 UNITS: 100 SYRINGE at 14:45

## 2018-09-18 RX ADMIN — HEPARIN 500 UNITS: 100 SYRINGE at 14:51

## 2018-09-18 NOTE — PROGRESS NOTES
[facility] Progress Note    Date: 2018    Name: Charleen Pacheco    MRN: [id number]         : 1960    Opdivo cycle 2    Arrived ambulatory to [facility] at 1315 pm    Patient assessed and education was provided. No complaints or concerns voiced. Opdivo check list done and scanned into media tab. Patient vitals were reviewed. Visit Vitals    /88 (BP 1 Location: Right arm, BP Patient Position: At rest)    Pulse 71    Temp 98.4 °F (36.9 °C)    Resp 18    Ht 5' 6.5\" (1.689 m)    Wt 62.5 kg (137 lb 12.8 oz)    SpO2 98%    Breastfeeding No    BMI 21.91 kg/m2         Right chest medial medi-port accessed. Brisk flush but no blood returns. Call placed and message left for [facility] nurse for Dr Ubaldo Franco regarding port without blood returns. Lateral port accessed. Brisk flush/blood returns noted. Blood sample obtained for cbc/cmp      Recent Results (from the past 12 hour(s))   CBC WITH 3 PART DIFF    Collection Time: 18  1:25 PM   Result Value Ref Range    WBC 5.7 4.5 - 13.0 K/uL    RBC 4.32 4.10 - 5.10 M/uL    HGB 10.5 (L) 12.0 - 16 g/dL    HCT 34.3 (L) 36 - 48 %    MCV 79.4 78 - 102 FL    MCH 24.3 (L) 25.0 - 35.0 PG    MCHC 30.6 (L) 31 - 37 g/dL    RDW 16.0 (H) 11.5 - 14.5 %    PLATELET 459 600 - 771 K/uL    NEUTROPHILS 73 (H) 40 - 70 %    MIXED CELLS 4 0.1 - 17 %    LYMPHOCYTES 24 14 - 44 %    ABS. NEUTROPHILS 4.2 1.8 - 9.5 K/UL    ABS. MIXED CELLS 0.2 0.0 - 2.3 K/uL    ABS. LYMPHOCYTES 1.3 1.1 - 5.9 K/UL    DF AUTOMATED         Labs okay for chemo. Opdivo 480 mg was infused over 30 mins. Tolerated well. Brisk flush/blood returns from infused lateral port    No response from Dr Jennyfer Garcia office regarding follow up with medial port without blood returns. Patient declined to stay to have same addressed    Both ports heparinized per protocol, then de-accessed. Sites s signs of infection or infiltration.  Gauze/tape applied to site    Patient Vitals for the past 8 hrs:   Temp Pulse Resp BP SpO2   09/18/18 1454 98.4 °F (36.9 °C) - 18 143/88 98 %   09/18/18 1315 98.8 °F (37.1 °C) 71 18 158/88 98 %         Reviewed discharge instructions with patient. Patient verbalized understanding       Patient was discharged from [place] [address] in stable condition at 1455 pm  She is to return on 10/16/18 at 1 pm for her next chemo appointment. 1600--- Js Pineda from Dr Jalil Mcgrath office called regarding appointment for another patient. I mentioned that I left a message for her to call me regarding the medial port that had no blood returns, and that patient declined to stay until I heard from office staff. She will send a prn cath amparo order from Dr Jalil Mcgrath for future appointments.     Janet Pineda RN  September 18, 2018  1:46 PM

## 2018-09-19 LAB
BASO+EOS+MONOS # BLD AUTO: 0.2 K/UL (ref 0–2.3)
BASO+EOS+MONOS # BLD AUTO: 4 % (ref 0.1–17)
DIFFERENTIAL METHOD BLD: ABNORMAL
ERYTHROCYTE [DISTWIDTH] IN BLOOD BY AUTOMATED COUNT: 16 % (ref 11.5–14.5)
HCT VFR BLD AUTO: 34.3 % (ref 36–48)
HGB BLD-MCNC: 10.5 G/DL (ref 12–16)
LYMPHOCYTES # BLD: 1.3 K/UL (ref 1.1–5.9)
LYMPHOCYTES NFR BLD: 24 % (ref 14–44)
MCH RBC QN AUTO: 24.3 PG (ref 25–35)
MCHC RBC AUTO-ENTMCNC: 30.6 G/DL (ref 31–37)
MCV RBC AUTO: 79.4 FL (ref 78–102)
NEUTS SEG # BLD: 4.2 K/UL (ref 1.8–9.5)
NEUTS SEG NFR BLD: 73 % (ref 40–70)
PLATELET # BLD AUTO: 231 K/UL (ref 140–440)
RBC # BLD AUTO: 4.32 M/UL (ref 4.1–5.1)
WBC # BLD AUTO: 5.7 K/UL (ref 4.5–13)

## 2018-10-08 ENCOUNTER — OFFICE VISIT (OUTPATIENT)
Dept: FAMILY MEDICINE CLINIC | Age: 58
End: 2018-10-08

## 2018-10-08 VITALS
HEIGHT: 67 IN | DIASTOLIC BLOOD PRESSURE: 82 MMHG | WEIGHT: 135.4 LBS | BODY MASS INDEX: 21.25 KG/M2 | OXYGEN SATURATION: 98 % | RESPIRATION RATE: 12 BRPM | SYSTOLIC BLOOD PRESSURE: 128 MMHG | HEART RATE: 93 BPM | TEMPERATURE: 98.5 F

## 2018-10-08 DIAGNOSIS — Z23 ENCOUNTER FOR IMMUNIZATION: ICD-10-CM

## 2018-10-08 DIAGNOSIS — F33.9 RECURRENT MAJOR DEPRESSIVE DISORDER, REMISSION STATUS UNSPECIFIED (HCC): ICD-10-CM

## 2018-10-08 DIAGNOSIS — C34.91 NON-SMALL CELL CANCER OF RIGHT LUNG (HCC): ICD-10-CM

## 2018-10-08 DIAGNOSIS — I10 ESSENTIAL HYPERTENSION: Primary | ICD-10-CM

## 2018-10-08 DIAGNOSIS — D50.9 IRON DEFICIENCY ANEMIA, UNSPECIFIED IRON DEFICIENCY ANEMIA TYPE: ICD-10-CM

## 2018-10-08 RX ORDER — SERTRALINE HYDROCHLORIDE 50 MG/1
50 TABLET, FILM COATED ORAL DAILY
Qty: 30 TAB | Refills: 5 | Status: SHIPPED | OUTPATIENT
Start: 2018-10-08 | End: 2019-04-11 | Stop reason: SDUPTHER

## 2018-10-08 RX ORDER — AMLODIPINE BESYLATE 5 MG/1
5 TABLET ORAL DAILY
Qty: 30 TAB | Refills: 5 | Status: SHIPPED | OUTPATIENT
Start: 2018-10-08 | End: 2019-03-15 | Stop reason: SDUPTHER

## 2018-10-08 RX ORDER — LEVOTHYROXINE SODIUM 50 UG/1
TABLET ORAL
COMMUNITY
End: 2019-09-11 | Stop reason: SDUPTHER

## 2018-10-08 NOTE — MR AVS SNAPSHOT
[address] 
[phone] Patient: Ayan Cleary MRN: [id number] Visit Information Date & Time Provider Department Dept. Phone Encounter #  
 10/8/2018 10:30 AM Jose Alejandro Bellamy, [facility] [id number] Follow-up Instructions Return in about 3 months (around 1/8/2019) for Routine care and Medicare Wellness. Upcoming Health Maintenance Date Due DTaP/Tdap/Td series (1 - Tdap) 10/22/1979 Shingrix Vaccine Age 50> (1 of 2) 10/22/2008 Pneumococcal 19-64 Highest Risk (2 of 3 - PCV13) 4/26/2018 Bone Densitometry 5/22/2022 Allergies as of 10/8/2018  Review Complete On: 10/8/2018 By: CAL Alvarado Severity Noted Reaction Type Reactions Flagyl [Metronidazole]  06/07/2015    Hives Current Immunizations  Reviewed on 9/18/2018 Name Date Influenza Vaccine (Quad) PF 10/8/2018 10:53 AM, 10/5/2017, 10/4/2016 Not reviewed this visit You Were Diagnosed With   
  
 Codes Comments Essential hypertension    -  Primary ICD-10-CM: I10 
ICD-9-CM: 401.9 Non-small cell cancer of right lung (HCC)     ICD-10-CM: C34.91 
ICD-9-CM: 162.9 Iron deficiency anemia, unspecified iron deficiency anemia type     ICD-10-CM: D50.9 ICD-9-CM: 280.9 Recurrent major depressive disorder, remission status unspecified ([facility]     ICD-10-CM: F33.9 ICD-9-CM: 296.30 Encounter for immunization     ICD-10-CM: Y05 ICD-9-CM: V03.89 Vitals BP Pulse Temp Resp Height(growth percentile) Weight(growth percentile) 128/82 (BP 1 Location: Left arm, BP Patient Position: Sitting) 93 98.5 °F (36.9 °C) (Oral) 12 5' 6.5\" (1.689 m) 135 lb 6.4 oz (61.4 kg) SpO2 BMI OB Status Smoking Status 98% 21.53 kg/m2 Menopause Former Smoker Vitals History BMI and BSA Data Body Mass Index Body Surface Area 21.53 kg/m 2 1.7 m 2 [facility] [address] [place] [address] [phone] Your Updated Medication List  
  
   
This list is accurate as of 10/8/18 11:06 AM.  Always use your most recent med list. amLODIPine 5 mg tablet Commonly known as:  Pasty Pall Take 1 Tab by mouth daily. ferrous sulfate 325 mg (65 mg iron) tablet  
take 1 tablet by mouth twice a day with meals  
  
 levothyroxine 50 mcg tablet Commonly known as:  SYNTHROID Take  by mouth Daily (before breakfast). loratadine 10 mg tablet Commonly known as:  Ramona Ligas Take 10 mg by mouth daily. OPDIVO 240 mg/24 mL Soln chemo injection Generic drug:  nivolumab  
by IntraVENous route. Rosaura Gault is administered Every 2 Weeks IV. Indications: non-small cell lung cancer  
  
 rivaroxaban 20 mg Tab tablet Commonly known as:  Jerrol Sor Take 1 Tab by mouth daily (with breakfast). sertraline 50 mg tablet Commonly known as:  ZOLOFT Take 1 Tab by mouth daily. Prescriptions Sent to Pharmacy Refills  
 sertraline (ZOLOFT) 50 mg tablet 5 Sig: Take 1 Tab by mouth daily. Class: Normal  
 Pharmacy: [id number] [address] Ph #: [phone] Route: Oral  
 amLODIPine (NORVASC) 5 mg tablet 5 Sig: Take 1 Tab by mouth daily. Class: Normal  
 Pharmacy: [id number] [address] Ph #: [phone] Route: Oral  
  
We Performed the Following INFLUENZA VIRUS VAC QUAD,SPLIT,PRESV FREE SYRINGE IM [id number] CPT(R)] Follow-up Instructions Return in about 3 months (around 1/8/2019) for Routine care and Medicare Wellness. To-Do List   
 10/16/2018 1:00 PM  
  Appointment with [facility] NURSE 2 at [facility] ([phone]) Patient Instructions Vaccine Information Statement Influenza (Flu) Vaccine (Inactivated or Recombinant): What you need to know Many Vaccine Information Statements are available in Maltese and other languages. See www.immunize.org/vis Hojas de Información Sobre Vacunas están disponibles en Español y en muchos otros idiomas. Visite www.immunize.org/vis 1. Why get vaccinated? Influenza (flu) is a contagious disease that spreads around the [facility] every year, usually between October and May. Flu is caused by influenza viruses, and is spread mainly by coughing, sneezing, and close contact. Anyone can get flu. Flu strikes suddenly and can last several days. Symptoms vary by age, but can include: 
 fever/chills  sore throat  muscle aches  fatigue  cough  headache  runny or stuffy nose Flu can also lead to pneumonia and blood infections, and cause diarrhea and seizures in children. If you have a medical condition, such as heart or lung disease, flu can make it worse. Flu is more dangerous for some people. Infants and young children, people 72years of age and older, pregnant women, and people with certain health conditions or a weakened immune system are at greatest risk. Each year thousands of people in the [facility] die from flu, and many more are hospitalized. Flu vaccine can: 
 keep you from getting flu, 
 make flu less severe if you do get it, and 
 keep you from spreading flu to your family and other people. 2. Inactivated and recombinant flu vaccines A dose of flu vaccine is recommended every flu season. Children 6 months through 6years of age may need two doses during the same flu season. Everyone else needs only one dose each flu season. Some inactivated flu vaccines contain a very small amount of a mercury-based preservative called thimerosal. Studies have not shown thimerosal in vaccines to be harmful, but flu vaccines that do not contain thimerosal are available. There is no live flu virus in flu shots. They cannot cause the flu. There are many flu viruses, and they are always changing. Each year a new flu vaccine is made to protect against three or four viruses that are likely to cause disease in the upcoming flu season. But even when the vaccine doesnt exactly match these viruses, it may still provide some protection Flu vaccine cannot prevent: 
 flu that is caused by a virus not covered by the vaccine, or 
 illnesses that look like flu but are not. It takes about 2 weeks for protection to develop after vaccination, and protection lasts through the flu season. 3. Some people should not get this vaccine Tell the person who is giving you the vaccine:  If you have any severe, life-threatening allergies. If you ever had a life-threatening allergic reaction after a dose of flu vaccine, or have a severe allergy to any part of this vaccine, you may be advised not to get vaccinated. Most, but not all, types of flu vaccine contain a small amount of egg protein.  If you ever had Guillain-Barré Syndrome (also called GBS). Some people with a history of GBS should not get this vaccine. This should be discussed with your doctor.  If you are not feeling well. It is usually okay to get flu vaccine when you have a mild illness, but you might be asked to come back when you feel better. 4. Risks of a vaccine reaction With any medicine, including vaccines, there is a chance of reactions. These are usually mild and go away on their own, but serious reactions are also possible. Most people who get a flu shot do not have any problems with it. Minor problems following a flu shot include:  
 soreness, redness, or swelling where the shot was given  hoarseness  sore, red or itchy eyes  cough  fever  aches  headache  itching  fatigue If these problems occur, they usually begin soon after the shot and last 1 or 2 days. More serious problems following a flu shot can include the following:  There may be a small increased risk of Guillain-Barré Syndrome (GBS) after inactivated flu vaccine. This risk has been estimated at 1 or 2 additional cases per million people vaccinated. This is much lower than the risk of severe complications from flu, which can be prevented by flu vaccine.  Young children who get the flu shot along with pneumococcal vaccine (PCV13) and/or DTaP vaccine at the same time might be slightly more likely to have a seizure caused by fever. Ask your doctor for more information. Tell your doctor if a child who is getting flu vaccine has ever had a seizure. Problems that could happen after any injected vaccine:  People sometimes faint after a medical procedure, including vaccination. Sitting or lying down for about 15 minutes can help prevent fainting, and injuries caused by a fall. Tell your doctor if you feel dizzy, or have vision changes or ringing in the ears.  Some people get severe pain in the shoulder and have difficulty moving the arm where a shot was given. This happens very rarely.  Any medication can cause a severe allergic reaction. Such reactions from a vaccine are very rare, estimated at about 1 in a million doses, and would happen within a few minutes to a few hours after the vaccination. As with any medicine, there is a very remote chance of a vaccine causing a serious injury or death. The safety of vaccines is always being monitored. For more information, visit: www.cdc.gov/vaccinesafety/ 
 
5. What if there is a serious reaction? What should I look for?  Look for anything that concerns you, such as signs of a severe allergic reaction, very high fever, or unusual behavior.  
 
Signs of a severe allergic reaction can include hives, swelling of the face and throat, difficulty breathing, a fast heartbeat, dizziness, and weakness  usually within a few minutes to a few hours after the vaccination. What should I do?  If you think it is a severe allergic reaction or other emergency that cant wait, call 9-1-1 and get the person to the nearest hospital. Otherwise, call your doctor.  Reactions should be reported to the Vaccine Adverse Event Reporting System (VAERS). Your doctor should file this report, or you can do it yourself through  the VAERS web site at www.vaers. [facility].gov, or by calling 1-391.991.5514. VAERS does not give medical advice. 6. The National Vaccine Injury Compensation Program 
 
The [facility] Vaccine Injury Compensation Program (VICP) is a federal program that was created to compensate people who may have been injured by certain vaccines. Persons who believe they may have been injured by a vaccine can learn about the program and about filing a claim by calling 8-649.861.9189 or visiting the [employer] website at www.[facility].gov/vaccinecompensation. There is a time limit to file a claim for compensation. 7. How can I learn more?  Ask your healthcare provider. He or she can give you the vaccine package insert or suggest other sources of information.  Call your local or state health department.  Contact the Centers for Disease Control and Prevention (CDC): 
- Call 7-209.425.3691 (1-800-CDC-INFO) or 
- Visit CDCs website at www.cdc.gov/flu Vaccine Information Statement Inactivated Influenza Vaccine 8/7/2015 
[id number] Department of [facility] [employer] Centers for Disease Control and Prevention Office Use Only Please provide this summary of care documentation to your next provider. Your primary care clinician is listed as Thirza Prader. If you have any questions after today's visit, please call [phone].

## 2018-10-08 NOTE — PROGRESS NOTES
Chief Complaint   Patient presents with    Hypertension    Anemia     Patient is not fasting. Patient in room # 5.     1. Have you been to the ER, urgent care clinic since your last visit? Hospitalized since your last visit? No    2. Have you seen or consulted any other health care providers outside of the [address] [place] [address] since your last visit? Include any pap smears or colon screening. No    HM Reviewed. Flu is due  Verbal order for in office Flu injection per CAL Ferguson/Rabia Carr LPN    Yimi Monzon is a 59 y.o. female who presents for routine immunizations. She denies any symptoms , reactions or allergies that would exclude them from being immunized today. Risks and adverse reactions were discussed and the VIS was given to them. All questions were addressed. She was observed for 10 min post injection. There were no reactions observed.     Jaquelin Juárez LPN

## 2018-10-08 NOTE — PROGRESS NOTES
Patient: Usman Gallegos MRN: [id number]  SSN: [id number]    YOB: 1960  Age: 59 y.o. Sex: female      Date of Service: 10/8/2018   Provider: CAL Cowart   Office Location:   [address] Yamile Ayala, [address] [place] [address] Dr Ervin henao, [address]  Office Phone: [phone]  Office Fax: [phone]      REASON FOR VISIT:   Chief Complaint   Patient presents with    Hypertension    Anemia        VITALS:   Visit Vitals    /82 (BP 1 Location: Left arm, BP Patient Position: Sitting)  Comment: manual    Pulse 93    Temp 98.5 °F (36.9 °C) (Oral)    Resp 12    Ht 5' 6.5\" (1.689 m)    Wt 135 lb 6.4 oz (61.4 kg)    SpO2 98%    BMI 21.53 kg/m2        MEDICATIONS:   Current Outpatient Prescriptions on File Prior to Visit   Medication Sig Dispense Refill    ferrous sulfate 325 mg (65 mg iron) tablet take 1 tablet by mouth twice a day with meals 60 Tab 5    nivolumab (OPDIVO) 240 mg/24 mL soln chemo injection by IntraVENous route. Lord Gottron is administered Every 2 Weeks IV. Indications: non-small cell lung cancer      amLODIPine (NORVASC) 5 mg tablet Take 1 Tab by mouth daily. 30 Tab 5    sertraline (ZOLOFT) 50 mg tablet Take 1 Tab by mouth daily. 30 Tab 5    rivaroxaban (XARELTO) 20 mg tab tablet Take 1 Tab by mouth daily (with breakfast). 30 Tab 6    loratadine (CLARITIN) 10 mg tablet Take 10 mg by mouth daily. No current facility-administered medications on file prior to visit.          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[facility] 4/1/2016    resolved    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      Past Surgical History:   Procedure Laterality Date    BREAST SURGERY PROCEDURE UNLISTED      biopsy    HX ANGIOPLASTY Right 3/20/2016    IJ, subclavian, and brachiocephalic veins    HX HYSTERECTOMY      \"weak cervix\"    HX OTHER SURGICAL Right 3/20/2016    2 placed in right IJ, subclavian/brachiocephalic    HX THROMBECTOMY  3/20/2016    with thrombolysis        FAMILY HISTORY:  Family History   Problem Relation Age of Onset    Cancer Sister 48     breast    Liver Disease Sister      cirrhosis    Heart Attack Mother 37    No Known Problems Child         SOCIAL HISTORY:  Social History   Substance Use Topics    Smoking status: Former Smoker     Packs/day: 0.50     Quit date: 2/28/2016    Smokeless tobacco: Never Used    Alcohol use No             HISTORY OF PRESENT ILLNESS: Sandra De La Rosa is a 59 y.o. female who presents to the office for a routine follow up visit. Hypertension -   Currently, the patient's condition is well controlled. Reports compliance with the following regimen: amlodipine 5 mg daily  Home BP readings: not checking      History of PE/DVT -   Patient is anticoagulated on Xarelto, lifelong. She is high risk for thromboembolic disease due to cancer. She denies any issues with bleeding or bruising.       Lung Cancer/Anemia -   Diagnosed in 2016 upon presentation to the hospital with SVC syndrome. Managed by heme/onc, currently on Opdivo therapy   She had a CT chest/abd/pelvis in January which showed interval increase in size of the R apical soft tissue mass, concerning for recurrent disease. Also malignant pleural effusion and stable hypodense nodule in the R hepatic lobe, concerning for mets. She takes iron supplements for her anemia.    Last CBC 9/18/18 revealed Hgb 10.5/Hct 34.3      Depression -   Patient reports symptoms are stable on Zoloft 50 mg daily  Reports she also uses this medication for hot flashes which are well controlled       REVIEW OF SYSTEMS:  Review of Systems   Constitutional: Negative for chills, fever and malaise/fatigue. Respiratory: Negative for cough, shortness of breath and wheezing. Cardiovascular: Negative for chest pain, palpitations and leg swelling. Gastrointestinal: Negative for abdominal pain, blood in stool, constipation, diarrhea, nausea and vomiting. PHYSICAL EXAMINATION:  Physical Exam   Constitutional: She is oriented to person, place, and time and well-developed, well-nourished, and in no distress. Cardiovascular: Normal rate, regular rhythm and normal heart sounds. Exam reveals no gallop and no friction rub. No murmur heard. Pulmonary/Chest: Effort normal and breath sounds normal. She has no wheezes. She has no rales. Neurological: She is alert and oriented to person, place, and time. Gait normal.   Skin: Skin is warm and dry. No rash noted. Psychiatric: Mood, memory and affect normal.        RESULTS:  No results found for this visit on 10/08/18. ASSESSMENT/PLAN:  Diagnoses and all orders for this visit:    1. Essential hypertension  - Reviewed BP readings from [facility], which have been a bit elevated  - However, BP is normal here in the office today  - Encouraged patient to start checking her blood pressure at home periodically. If she is averaging > 140/90, consider increasing amlodipine to 10 mg at next visit  Orders:    -     amLODIPine (NORVASC) 5 mg tablet; Take 1 Tab by mouth daily. 2. Non-small cell cancer of right lung ([facility]  3. Iron deficiency anemia, unspecified iron deficiency anemia type  - Doing well on Opdivo per patient report  - Oncology notes requested     4. Recurrent major depressive disorder, remission status unspecified ([facility]   - Doing well on Zoloft  - Meds refilled  Orders:    -     sertraline (ZOLOFT) 50 mg tablet;  Take 1 Tab by mouth daily.    5. Encounter for immunization  - Flu shot administered today with patient's consent. VIS provided. Orders:    -     Influenza virus vaccine (QUADRIVALENT PRES FREE SYRINGE) IM ([id number])          Follow-up Disposition:  Return in about 3 months (around 1/8/2019) for Routine care and Medicare Wellness (patient states she will have Medicare as of the 1st of the year and would like to get updated on routine health screenings at that time. .. consider pneumococcal vaccination)       All questions answered. Patient expresses understanding and agrees with the plan as detailed above.     PATIENT CARE TEAM:   Patient Care Team:  CAL Harris as PCP - General (Physician Assistant)  Karlie Barr MD (Hematology and Oncology)  Ammy Herrera MD (Hematology and Oncology)  Shayy Del Real MD (Radiation Oncology)  Renee Carpenter MD (Surgery)       CAL Harris   October 8, 2018   11:10 AM

## 2018-10-08 NOTE — PATIENT INSTRUCTIONS
Vaccine Information Statement    Influenza (Flu) Vaccine (Inactivated or Recombinant): What you need to know    Many Vaccine Information Statements are available in Romanian and other languages. See www.immunize.org/vis  Hojas de Información Sobre Vacunas están disponibles en Español y en muchos otros idiomas. Visite www.immunize.org/vis    1. Why get vaccinated? Influenza (flu) is a contagious disease that spreads around the [place] every year, usually between October and May. Flu is caused by influenza viruses, and is spread mainly by coughing, sneezing, and close contact. Anyone can get flu. Flu strikes suddenly and can last several days. Symptoms vary by age, but can include:   fever/chills   sore throat   muscle aches   fatigue   cough   headache    runny or stuffy nose    Flu can also lead to pneumonia and blood infections, and cause diarrhea and seizures in children. If you have a medical condition, such as heart or lung disease, flu can make it worse. Flu is more dangerous for some people. Infants and young children, people 72years of age and older, pregnant women, and people with certain health conditions or a weakened immune system are at greatest risk. Each year thousands of people in the [facility] die from flu, and many more are hospitalized. Flu vaccine can:   keep you from getting flu,   make flu less severe if you do get it, and   keep you from spreading flu to your family and other people. 2. Inactivated and recombinant flu vaccines    A dose of flu vaccine is recommended every flu season. Children 6 months through 6years of age may need two doses during the same flu season. Everyone else needs only one dose each flu season.        Some inactivated flu vaccines contain a very small amount of a mercury-based preservative called thimerosal. Studies have not shown thimerosal in vaccines to be harmful, but flu vaccines that do not contain thimerosal are available. There is no live flu virus in flu shots. They cannot cause the flu. There are many flu viruses, and they are always changing. Each year a new flu vaccine is made to protect against three or four viruses that are likely to cause disease in the upcoming flu season. But even when the vaccine doesnt exactly match these viruses, it may still provide some protection    Flu vaccine cannot prevent:   flu that is caused by a virus not covered by the vaccine, or   illnesses that look like flu but are not. It takes about 2 weeks for protection to develop after vaccination, and protection lasts through the flu season. 3. Some people should not get this vaccine    Tell the person who is giving you the vaccine:     If you have any severe, life-threatening allergies. If you ever had a life-threatening allergic reaction after a dose of flu vaccine, or have a severe allergy to any part of this vaccine, you may be advised not to get vaccinated. Most, but not all, types of flu vaccine contain a small amount of egg protein.  If you ever had Guillain-Barré Syndrome (also called GBS). Some people with a history of GBS should not get this vaccine. This should be discussed with your doctor.  If you are not feeling well. It is usually okay to get flu vaccine when you have a mild illness, but you might be asked to come back when you feel better. 4. Risks of a vaccine reaction    With any medicine, including vaccines, there is a chance of reactions. These are usually mild and go away on their own, but serious reactions are also possible. Most people who get a flu shot do not have any problems with it.      Minor problems following a flu shot include:    soreness, redness, or swelling where the shot was given     hoarseness   sore, red or itchy eyes   cough   fever   aches   headache   itching   fatigue  If these problems occur, they usually begin soon after the shot and last 1 or 2 days. More serious problems following a flu shot can include the following:     There may be a small increased risk of Guillain-Barré Syndrome (GBS) after inactivated flu vaccine. This risk has been estimated at 1 or 2 additional cases per million people vaccinated. This is much lower than the risk of severe complications from flu, which can be prevented by flu vaccine.  Young children who get the flu shot along with pneumococcal vaccine (PCV13) and/or DTaP vaccine at the same time might be slightly more likely to have a seizure caused by fever. Ask your doctor for more information. Tell your doctor if a child who is getting flu vaccine has ever had a seizure. Problems that could happen after any injected vaccine:      People sometimes faint after a medical procedure, including vaccination. Sitting or lying down for about 15 minutes can help prevent fainting, and injuries caused by a fall. Tell your doctor if you feel dizzy, or have vision changes or ringing in the ears.  Some people get severe pain in the shoulder and have difficulty moving the arm where a shot was given. This happens very rarely.  Any medication can cause a severe allergic reaction. Such reactions from a vaccine are very rare, estimated at about 1 in a million doses, and would happen within a few minutes to a few hours after the vaccination. As with any medicine, there is a very remote chance of a vaccine causing a serious injury or death. The safety of vaccines is always being monitored. For more information, visit: www.cdc.gov/vaccinesafety/    5. What if there is a serious reaction? What should I look for?  Look for anything that concerns you, such as signs of a severe allergic reaction, very high fever, or unusual behavior.     Signs of a severe allergic reaction can include hives, swelling of the face and throat, difficulty breathing, a fast heartbeat, dizziness, and weakness - usually within a few minutes to a few hours after the vaccination. What should I do?  If you think it is a severe allergic reaction or other emergency that cant wait, call 9-1-1 and get the person to the nearest hospital. Otherwise, call your doctor.  Reactions should be reported to the Vaccine Adverse Event Reporting System (VAERS). Your doctor should file this report, or you can do it yourself through  the VAERS web site at www.vaers. [facility].gov, or by calling 5-647.949.5137. VAERS does not give medical advice. 6. The National Vaccine Injury Compensation Program    The [facility] Vaccine Injury Compensation Program (VICP) is a federal program that was created to compensate people who may have been injured by certain vaccines. Persons who believe they may have been injured by a vaccine can learn about the program and about filing a claim by calling 0-636.302.7084 or visiting the [employer] website at www.[facility].gov/vaccinecompensation. There is a time limit to file a claim for compensation. 7. How can I learn more?  Ask your healthcare provider. He or she can give you the vaccine package insert or suggest other sources of information.  Call your local or state health department.  Contact the Centers for Disease Control and Prevention (CDC):  - Call 4-670.895.1412 (1-800-CDC-INFO) or  - Visit CDCs website at www.cdc.gov/flu    Vaccine Information Statement   Inactivated Influenza Vaccine   8/7/2015  42 ROBERT Niharika Sparkle [id number]    Department of Health and Human Services  Centers for Disease Control and Prevention    Office Use Only

## 2018-10-16 ENCOUNTER — HOSPITAL ENCOUNTER (EMERGENCY)
Age: 58
Discharge: HOME | End: 2018-10-16
Attending: EMERGENCY MEDICINE
Payer: MEDICARE

## 2018-10-16 ENCOUNTER — HOSPITAL ENCOUNTER (OUTPATIENT)
Dept: INFUSION THERAPY | Age: 58
Discharge: HOME | End: 2018-10-16
Payer: MEDICARE

## 2018-10-16 ENCOUNTER — APPOINTMENT (OUTPATIENT)
Dept: CT IMAGING | Age: 58
End: 2018-10-16
Attending: EMERGENCY MEDICINE
Payer: MEDICARE

## 2018-10-16 VITALS
WEIGHT: 136 LBS | TEMPERATURE: 98.5 F | DIASTOLIC BLOOD PRESSURE: 81 MMHG | OXYGEN SATURATION: 97 % | HEART RATE: 87 BPM | RESPIRATION RATE: 24 BRPM | BODY MASS INDEX: 21.35 KG/M2 | SYSTOLIC BLOOD PRESSURE: 137 MMHG | HEIGHT: 67 IN

## 2018-10-16 VITALS
HEART RATE: 103 BPM | DIASTOLIC BLOOD PRESSURE: 107 MMHG | TEMPERATURE: 98.5 F | RESPIRATION RATE: 18 BRPM | SYSTOLIC BLOOD PRESSURE: 184 MMHG | OXYGEN SATURATION: 100 %

## 2018-10-16 DIAGNOSIS — C34.90 MALIGNANT NEOPLASM OF LUNG, UNSPECIFIED LATERALITY, UNSPECIFIED PART OF LUNG (HCC): ICD-10-CM

## 2018-10-16 DIAGNOSIS — R56.9 NEW ONSET SEIZURE (HCC): Primary | ICD-10-CM

## 2018-10-16 LAB
ALBUMIN SERPL-MCNC: 3.2 G/DL (ref 3.4–5)
ALBUMIN SERPL-MCNC: 3.4 G/DL (ref 3.4–5)
ALBUMIN/GLOB SERPL: 0.7 {RATIO} (ref 0.8–1.7)
ALBUMIN/GLOB SERPL: 0.7 {RATIO} (ref 0.8–1.7)
ALP SERPL-CCNC: 86 U/L (ref 45–117)
ALP SERPL-CCNC: 95 U/L (ref 45–117)
ALT SERPL-CCNC: 13 U/L (ref 13–56)
ALT SERPL-CCNC: 25 U/L (ref 13–56)
ANION GAP SERPL CALC-SCNC: 6 MMOL/L (ref 3–18)
ANION GAP SERPL CALC-SCNC: 7 MMOL/L (ref 3–18)
APPEARANCE UR: CLEAR
AST SERPL-CCNC: 19 U/L (ref 15–37)
AST SERPL-CCNC: 49 U/L (ref 15–37)
ATRIAL RATE: 101 BPM
BACTERIA URNS QL MICRO: ABNORMAL /HPF
BASO+EOS+MONOS # BLD AUTO: 0.3 K/UL (ref 0–2.3)
BASO+EOS+MONOS # BLD AUTO: 4 % (ref 0.1–17)
BASOPHILS # BLD: 0 K/UL (ref 0–0.1)
BASOPHILS NFR BLD: 0 % (ref 0–2)
BILIRUB SERPL-MCNC: 0.1 MG/DL (ref 0.2–1)
BILIRUB SERPL-MCNC: 0.2 MG/DL (ref 0.2–1)
BILIRUB UR QL: NEGATIVE
BUN SERPL-MCNC: 5 MG/DL (ref 7–18)
BUN SERPL-MCNC: 7 MG/DL (ref 7–18)
BUN/CREAT SERPL: 6 (ref 12–20)
BUN/CREAT SERPL: 7 (ref 12–20)
CALCIUM SERPL-MCNC: 8.6 MG/DL (ref 8.5–10.1)
CALCIUM SERPL-MCNC: 8.9 MG/DL (ref 8.5–10.1)
CALCULATED P AXIS, ECG09: 75 DEGREES
CALCULATED R AXIS, ECG10: 67 DEGREES
CALCULATED T AXIS, ECG11: 35 DEGREES
CHLORIDE SERPL-SCNC: 101 MMOL/L (ref 100–108)
CHLORIDE SERPL-SCNC: 103 MMOL/L (ref 100–108)
CO2 SERPL-SCNC: 30 MMOL/L (ref 21–32)
CO2 SERPL-SCNC: 31 MMOL/L (ref 21–32)
COLOR UR: YELLOW
CREAT SERPL-MCNC: 0.83 MG/DL (ref 0.6–1.3)
CREAT SERPL-MCNC: 0.97 MG/DL (ref 0.6–1.3)
DIAGNOSIS, 93000: NORMAL
DIFFERENTIAL METHOD BLD: ABNORMAL
DIFFERENTIAL METHOD BLD: ABNORMAL
EOSINOPHIL # BLD: 0.1 K/UL (ref 0–0.4)
EOSINOPHIL NFR BLD: 1 % (ref 0–5)
EPITH CASTS URNS QL MICRO: ABNORMAL /LPF (ref 0–5)
ERYTHROCYTE [DISTWIDTH] IN BLOOD BY AUTOMATED COUNT: 15.7 % (ref 11.5–14.5)
ERYTHROCYTE [DISTWIDTH] IN BLOOD BY AUTOMATED COUNT: 16.5 % (ref 11.6–14.5)
GLOBULIN SER CALC-MCNC: 4.5 G/DL (ref 2–4)
GLOBULIN SER CALC-MCNC: 4.7 G/DL (ref 2–4)
GLUCOSE SERPL-MCNC: 121 MG/DL (ref 74–99)
GLUCOSE SERPL-MCNC: 169 MG/DL (ref 74–99)
GLUCOSE UR STRIP.AUTO-MCNC: NEGATIVE MG/DL
HCT VFR BLD AUTO: 33.4 % (ref 36–48)
HCT VFR BLD AUTO: 36.2 % (ref 35–45)
HGB BLD-MCNC: 10.6 G/DL (ref 12–16)
HGB BLD-MCNC: 11.1 G/DL (ref 12–16)
HGB UR QL STRIP: ABNORMAL
KETONES UR QL STRIP.AUTO: NEGATIVE MG/DL
LEUKOCYTE ESTERASE UR QL STRIP.AUTO: ABNORMAL
LYMPHOCYTES # BLD: 1.1 K/UL (ref 0.9–3.6)
LYMPHOCYTES # BLD: 1.2 K/UL (ref 1.1–5.9)
LYMPHOCYTES NFR BLD: 11 % (ref 21–52)
LYMPHOCYTES NFR BLD: 19 % (ref 14–44)
MCH RBC QN AUTO: 24.3 PG (ref 24–34)
MCH RBC QN AUTO: 25.1 PG (ref 25–35)
MCHC RBC AUTO-ENTMCNC: 30.7 G/DL (ref 31–37)
MCHC RBC AUTO-ENTMCNC: 31.7 G/DL (ref 31–37)
MCV RBC AUTO: 79 FL (ref 78–102)
MCV RBC AUTO: 79.2 FL (ref 74–97)
MONOCYTES # BLD: 0.6 K/UL (ref 0.05–1.2)
MONOCYTES NFR BLD: 6 % (ref 3–10)
NEUTS SEG # BLD: 4.8 K/UL (ref 1.8–9.5)
NEUTS SEG # BLD: 8.7 K/UL (ref 1.8–8)
NEUTS SEG NFR BLD: 77 % (ref 40–70)
NEUTS SEG NFR BLD: 82 % (ref 40–73)
NITRITE UR QL STRIP.AUTO: NEGATIVE
P-R INTERVAL, ECG05: 134 MS
PH UR STRIP: 7.5 [PH] (ref 5–8)
PLATELET # BLD AUTO: 217 K/UL (ref 135–420)
PLATELET # BLD AUTO: 240 K/UL (ref 140–440)
PMV BLD AUTO: 9.1 FL (ref 9.2–11.8)
POTASSIUM SERPL-SCNC: 3.1 MMOL/L (ref 3.5–5.5)
POTASSIUM SERPL-SCNC: 3.3 MMOL/L (ref 3.5–5.5)
PROT SERPL-MCNC: 7.7 G/DL (ref 6.4–8.2)
PROT SERPL-MCNC: 8.1 G/DL (ref 6.4–8.2)
PROT UR STRIP-MCNC: NEGATIVE MG/DL
Q-T INTERVAL, ECG07: 352 MS
QRS DURATION, ECG06: 84 MS
QTC CALCULATION (BEZET), ECG08: 456 MS
RBC # BLD AUTO: 4.23 M/UL (ref 4.1–5.1)
RBC # BLD AUTO: 4.57 M/UL (ref 4.2–5.3)
RBC #/AREA URNS HPF: ABNORMAL /HPF (ref 0–5)
SODIUM SERPL-SCNC: 139 MMOL/L (ref 136–145)
SODIUM SERPL-SCNC: 139 MMOL/L (ref 136–145)
SP GR UR REFRACTOMETRY: 1 (ref 1–1.03)
TRICHOMONAS UR QL MICRO: ABNORMAL
TSH SERPL DL<=0.05 MIU/L-ACNC: 3.96 UIU/ML (ref 0.36–3.74)
UROBILINOGEN UR QL STRIP.AUTO: 0.2 EU/DL (ref 0.2–1)
VENTRICULAR RATE, ECG03: 101 BPM
WBC # BLD AUTO: 10.5 K/UL (ref 4.6–13.2)
WBC # BLD AUTO: 6.3 K/UL (ref 4.5–13)
WBC URNS QL MICRO: ABNORMAL /HPF (ref 0–4)

## 2018-10-16 PROCEDURE — 77030012965 HC NDL HUBR BBMI -A

## 2018-10-16 PROCEDURE — 70450 CT HEAD/BRAIN W/O DYE: CPT

## 2018-10-16 PROCEDURE — 93005 ELECTROCARDIOGRAM TRACING: CPT

## 2018-10-16 PROCEDURE — 80053 COMPREHEN METABOLIC PANEL: CPT | Performed by: EMERGENCY MEDICINE

## 2018-10-16 PROCEDURE — 99213 OFFICE O/P EST LOW 20 MIN: CPT

## 2018-10-16 PROCEDURE — 81001 URINALYSIS AUTO W/SCOPE: CPT | Performed by: EMERGENCY MEDICINE

## 2018-10-16 PROCEDURE — 84443 ASSAY THYROID STIM HORMONE: CPT | Performed by: INTERNAL MEDICINE

## 2018-10-16 PROCEDURE — 85025 COMPLETE CBC W/AUTO DIFF WBC: CPT | Performed by: INTERNAL MEDICINE

## 2018-10-16 PROCEDURE — 74011250636 HC RX REV CODE- 250/636: Performed by: INTERNAL MEDICINE

## 2018-10-16 PROCEDURE — 74011000258 HC RX REV CODE- 258: Performed by: INTERNAL MEDICINE

## 2018-10-16 PROCEDURE — 80053 COMPREHEN METABOLIC PANEL: CPT | Performed by: INTERNAL MEDICINE

## 2018-10-16 PROCEDURE — 99285 EMERGENCY DEPT VISIT HI MDM: CPT

## 2018-10-16 PROCEDURE — 85025 COMPLETE CBC W/AUTO DIFF WBC: CPT | Performed by: EMERGENCY MEDICINE

## 2018-10-16 RX ORDER — HEPARIN 100 UNIT/ML
500 SYRINGE INTRAVENOUS ONCE
Status: DISPENSED | OUTPATIENT
Start: 2018-10-16 | End: 2018-10-17

## 2018-10-16 RX ORDER — SODIUM CHLORIDE 9 MG/ML
10 INJECTION INTRAMUSCULAR; INTRAVENOUS; SUBCUTANEOUS AS NEEDED
Status: ACTIVE | OUTPATIENT
Start: 2018-10-16 | End: 2018-10-17

## 2018-10-16 RX ADMIN — SODIUM CHLORIDE 480 MG: 900 INJECTION, SOLUTION INTRAVENOUS at 14:08

## 2018-10-16 NOTE — ED TRIAGE NOTES
Presents via ems from infusion center for possible seizure. Per ems states infusion center staff reports pt went unresponsive x 3 minutes with posturing, did have urinary incontinence.

## 2018-10-16 NOTE — ED PROVIDER NOTES
EMERGENCY DEPARTMENT HISTORY AND PHYSICAL EXAM    4:38 PM      Date: 10/16/2018  Patient Name: Parish Navarro    History of Presenting Illness     Chief Complaint   Patient presents with    Seizure         History Provided By: Patient    Chief Complaint: Syncopal Episode   Duration: \"this afternoon\" Hours  Timing:  Acute  Quality: \"passed out\"  Severity: Moderate  Modifying Factors: Nothing makes the Sx better or worse. Associated Symptoms: denies any other associated signs or symptoms      Additional History (Context): Parish Navarro is a 59 y.o. female with PMHx of HTN, anemia, lung cancer, and pulmonary embolism who presents with an acute onset of a moderate syncopal episode that happened \"this afternoon\". Nothing makes the Sx better or worse. Pt was at an infusion center and is being treated for lung cancer. She states she was \"off schedule\" with eating her lunch. States that she had the infusion on an \"empty stomach\" and she \"feels exhausted\". Pt states she became \"hot\" during the infusion and tried to get help. She then proceeded to pass out. The episode was not witnessed. EMS is unsure if the pt had a possible seizure. Denies any further complaints or symptoms at the moment. PCP: CAL Harris    Current Outpatient Prescriptions   Medication Sig Dispense Refill    levothyroxine (SYNTHROID) 50 mcg tablet Take  by mouth Daily (before breakfast).  sertraline (ZOLOFT) 50 mg tablet Take 1 Tab by mouth daily. 30 Tab 5    amLODIPine (NORVASC) 5 mg tablet Take 1 Tab by mouth daily. 30 Tab 5    ferrous sulfate 325 mg (65 mg iron) tablet take 1 tablet by mouth twice a day with meals 60 Tab 5    nivolumab (OPDIVO) 240 mg/24 mL soln chemo injection by IntraVENous route. Aguirre Anuj is administered Every 2 Weeks IV. Indications: non-small cell lung cancer      rivaroxaban (XARELTO) 20 mg tab tablet Take 1 Tab by mouth daily (with breakfast).  30 Tab 6    loratadine (CLARITIN) 10 mg tablet Take 10 mg by mouth daily. Facility-Administered Medications Ordered in Other Encounters   Medication Dose Route Frequency Provider Last Rate Last Dose    sodium chloride 0.9% injection 10 mL  10 mL IntraVENous PRN Arielle Coronel MD        heparin (porcine) pf 500 Units  500 Units Mike Beltrán MD           Past History     Past Medical History:  Past Medical History:   Diagnosis Date    Anemia, iron deficiency 2/2016    Depression     H/O seasonal allergies     Hypertension     Non-small cell carcinoma of lung ([facility] [address] 2/2016    adenocarcinoma of right lung    Positive occult stool blood test 2/29/2016    Pulmonary embolism ([facility] [address] 2/28/2016    small, bilateral PEs- on Xarelto    Right leg DVT ([facility] [address] 2/28/2016    on Xarelto    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[facility] [address] 4/1/2016    resolved    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       Past Surgical History:  Past Surgical History:   Procedure Laterality Date    BREAST SURGERY PROCEDURE UNLISTED      biopsy    HX ANGIOPLASTY Right 3/20/2016    IJ, subclavian, and brachiocephalic veins    HX HYSTERECTOMY      \"weak cervix\"    HX OTHER SURGICAL Right 3/20/2016    2 placed in right IJ, subclavian/brachiocephalic    HX THROMBECTOMY  3/20/2016    with thrombolysis       Family History:  Family History   Problem Relation Age of Onset    Cancer Sister 48     breast    Liver Disease Sister      cirrhosis    Heart Attack Mother 37    No Known Problems Child        Social History:  Social History   Substance Use Topics    Smoking status: Former Smoker     Packs/day: 0.50     Quit date: 2/28/2016    Smokeless tobacco: Never Used    Alcohol use No       Allergies: Allergies   Allergen Reactions    Flagyl [Metronidazole] Hives         Review of Systems       Review of Systems   Constitutional: Negative for fever. Respiratory: Negative for shortness of breath. Cardiovascular: Negative for chest pain.    Neurological: Positive for seizures and syncope. All other systems reviewed and are negative. Physical Exam     Visit Vitals    /79    Pulse 88    Temp 98.5 °F (36.9 °C)    Resp 24    Ht 5' 6.5\" (1.689 m)    Wt 61.7 kg (136 lb)    SpO2 98%    BMI 21.62 kg/m2         Physical Exam   Constitutional: She is oriented to person, place, and time. She appears well-developed and well-nourished. No distress. HENT:   Head: Normocephalic and atraumatic. Nose: Nose normal.   Eyes: No scleral icterus. Neck: Neck supple. No JVD present. Cardiovascular: Normal rate, regular rhythm and normal heart sounds. Exam reveals no gallop and no friction rub. No murmur heard. Pulmonary/Chest: Effort normal and breath sounds normal. No respiratory distress. She has no wheezes. She has no rales. Abdominal: Soft. There is no tenderness. There is no rebound and no guarding. Musculoskeletal: She exhibits no edema or tenderness. Neurological: She is alert and oriented to person, place, and time. She exhibits normal muscle tone. Coordination normal.   Skin: Skin is warm and dry. No rash noted. She is not diaphoretic. Psychiatric: She has a normal mood and affect. Nursing note and vitals reviewed. Diagnostic Study Results     Labs -  Recent Results (from the past 12 hour(s))   CBC WITH 3 PART DIFF    Collection Time: 10/16/18  1:35 PM   Result Value Ref Range    WBC 6.3 4.5 - 13.0 K/uL    RBC 4.23 4. 10 - 5.10 M/uL    HGB 10.6 (L) 12.0 - 16 g/dL    HCT 33.4 (L) 36 - 48 %    MCV 79.0 78 - 102 FL    MCH 25.1 25.0 - 35.0 PG    MCHC 31.7 31 - 37 g/dL    RDW 15.7 (H) 11.5 - 14.5 %    PLATELET 454 648 - 764 K/uL    NEUTROPHILS 77 (H) 40 - 70 %    MIXED CELLS 4 0.1 - 17 %    LYMPHOCYTES 19 14 - 44 %    ABS. NEUTROPHILS 4.8 1.8 - 9.5 K/UL    ABS. MIXED CELLS 0.3 0.0 - 2.3 K/uL    ABS.  LYMPHOCYTES 1.2 1.1 - 5.9 K/UL    DF AUTOMATED     METABOLIC PANEL, COMPREHENSIVE    Collection Time: 10/16/18  1:35 PM   Result Value Ref Range    Sodium 139 136 - 145 mmol/L    Potassium 3.3 (L) 3.5 - 5.5 mmol/L    Chloride 103 100 - 108 mmol/L    CO2 30 21 - 32 mmol/L    Anion gap 6 3.0 - 18 mmol/L    Glucose 121 (H) 74 - 99 mg/dL    BUN 5 (L) 7.0 - 18 MG/DL    Creatinine 0.83 0.6 - 1.3 MG/DL    BUN/Creatinine ratio 6 (L) 12 - 20      GFR est AA >60 >60 ml/min/1.73m2    GFR est non-AA >60 >60 ml/min/1.73m2    Calcium 8.6 8.5 - 10.1 MG/DL    Bilirubin, total 0.1 (L) 0.2 - 1.0 MG/DL    ALT (SGPT) 13 13 - 56 U/L    AST (SGOT) 19 15 - 37 U/L    Alk. phosphatase 86 45 - 117 U/L    Protein, total 7.7 6.4 - 8.2 g/dL    Albumin 3.2 (L) 3.4 - 5.0 g/dL    Globulin 4.5 (H) 2.0 - 4.0 g/dL    A-G Ratio 0.7 (L) 0.8 - 1.7     TSH 3RD GENERATION    Collection Time: 10/16/18  1:35 PM   Result Value Ref Range    TSH 3.96 (H) 0.36 - 3.74 uIU/mL   EKG, 12 LEAD, INITIAL    Collection Time: 10/16/18  3:17 PM   Result Value Ref Range    Ventricular Rate 101 BPM    Atrial Rate 101 BPM    P-R Interval 134 ms    QRS Duration 84 ms    Q-T Interval 352 ms    QTC Calculation (Bezet) 456 ms    Calculated P Axis 75 degrees    Calculated R Axis 67 degrees    Calculated T Axis 35 degrees    Diagnosis       Sinus tachycardia  Nonspecific T wave abnormality  Abnormal ECG  No previous ECGs available  Confirmed by Lyndsay Enrique MD, ----- ([id number]) on 10/16/2018 4:54:13 PM     CBC WITH AUTOMATED DIFF    Collection Time: 10/16/18  4:00 PM   Result Value Ref Range    WBC 10.5 4.6 - 13.2 K/uL    RBC 4.57 4.20 - 5.30 M/uL    HGB 11.1 (L) 12.0 - 16.0 g/dL    HCT 36.2 35.0 - 45.0 %    MCV 79.2 74.0 - 97.0 FL    MCH 24.3 24.0 - 34.0 PG    MCHC 30.7 (L) 31.0 - 37.0 g/dL    RDW 16.5 (H) 11.6 - 14.5 %    PLATELET 637 802 - 002 K/uL    MPV 9.1 (L) 9.2 - 11.8 FL    NEUTROPHILS 82 (H) 40 - 73 %    LYMPHOCYTES 11 (L) 21 - 52 %    MONOCYTES 6 3 - 10 %    EOSINOPHILS 1 0 - 5 %    BASOPHILS 0 0 - 2 %    ABS. NEUTROPHILS 8.7 (H) 1.8 - 8.0 K/UL    ABS. LYMPHOCYTES 1.1 0.9 - 3.6 K/UL    ABS.  MONOCYTES 0.6 0.05 - 1.2 K/UL    ABS. EOSINOPHILS 0.1 0.0 - 0.4 K/UL    ABS. BASOPHILS 0.0 0.0 - 0.1 K/UL    DF AUTOMATED     METABOLIC PANEL, COMPREHENSIVE    Collection Time: 10/16/18  4:00 PM   Result Value Ref Range    Sodium 139 136 - 145 mmol/L    Potassium 3.1 (L) 3.5 - 5.5 mmol/L    Chloride 101 100 - 108 mmol/L    CO2 31 21 - 32 mmol/L    Anion gap 7 3.0 - 18 mmol/L    Glucose 169 (H) 74 - 99 mg/dL    BUN 7 7.0 - 18 MG/DL    Creatinine 0.97 0.6 - 1.3 MG/DL    BUN/Creatinine ratio 7 (L) 12 - 20      GFR est AA >60 >60 ml/min/1.73m2    GFR est non-AA 59 (L) >60 ml/min/1.73m2    Calcium 8.9 8.5 - 10.1 MG/DL    Bilirubin, total 0.2 0.2 - 1.0 MG/DL    ALT (SGPT) 25 13 - 56 U/L    AST (SGOT) 49 (H) 15 - 37 U/L    Alk. phosphatase 95 45 - 117 U/L    Protein, total 8.1 6.4 - 8.2 g/dL    Albumin 3.4 3.4 - 5.0 g/dL    Globulin 4.7 (H) 2.0 - 4.0 g/dL    A-G Ratio 0.7 (L) 0.8 - 1.7     URINALYSIS W/ RFLX MICROSCOPIC    Collection Time: 10/16/18  4:00 PM   Result Value Ref Range    Color YELLOW      Appearance CLEAR      Specific gravity 1.005 1.005 - 1.030      pH (UA) 7.5 5.0 - 8.0      Protein NEGATIVE  NEG mg/dL    Glucose NEGATIVE  NEG mg/dL    Ketone NEGATIVE  NEG mg/dL    Bilirubin NEGATIVE  NEG      Blood TRACE (A) NEG      Urobilinogen 0.2 0.2 - 1.0 EU/dL    Nitrites NEGATIVE  NEG      Leukocyte Esterase MODERATE (A) NEG     URINE MICROSCOPIC ONLY    Collection Time: 10/16/18  4:00 PM   Result Value Ref Range    WBC 0 to 3 0 - 4 /hpf    RBC 0 to 3 0 - 5 /hpf    Epithelial cells 1+ 0 - 5 /lpf    Bacteria 1+ (A) NEG /hpf    Trichomonas 1+ (A) NEG       Radiologic Studies -   CT HEAD WO CONT    (Results Pending)     IMPRESSION:     Negative CT scan of the brain. There is no hemorrhage, mass, nor acute infarct. Medical Decision Making   I am the first provider for this patient. I reviewed the vital signs, available nursing notes, past medical history, past surgical history, family history and social history.     Vital Signs-Reviewed the patient's vital signs. Pulse Oximetry Analysis -  99 % on RA, normal     EKG: Interpreted by the EP. Time Interpreted: 1518   Rate: 101   Rhythm: Sinus Tachycardia    Interpretation:nonspecific sttw change w/o elevations    Records Reviewed: Nursing Notes (Time of Review: 4:38 PM)    ED Course: Progress Notes, Reevaluation, and Consults:    Imp: Pt had period of unresponsiveness characterized by rigidity and \"posturing\" per EMS. No generalized tonic-clonic activity reported. Seizure vs syncope. Was receiving infusion for lung CA that she has received multiple times previously w/o incident. Given h/o lung CA, concern for possible lung mets. CT neg. No tongue lac or prior h/o sz. Will hold anticonvulsants for now pending 2nd witnessed seizure          Diagnosis     Clinical Impression:   1. New onset seizure ([address]    2. Malignant neoplasm of lung, unspecified laterality, unspecified part of lung ([address]      1) labs and head CT ok. 2) This was likely a seizure although cannot rule out possible fainting episode. 3) Recommend Neurology follow up  4) Return for recurrent symptoms    Disposition: home    Follow-up Information     None           Patient's Medications   Start Taking    No medications on file   Continue Taking    AMLODIPINE (NORVASC) 5 MG TABLET    Take 1 Tab by mouth daily. FERROUS SULFATE 325 MG (65 MG IRON) TABLET    take 1 tablet by mouth twice a day with meals    LEVOTHYROXINE (SYNTHROID) 50 MCG TABLET    Take  by mouth Daily (before breakfast). LORATADINE (CLARITIN) 10 MG TABLET    Take 10 mg by mouth daily. NIVOLUMAB (OPDIVO) 240 MG/24 ML SOLN CHEMO INJECTION    by IntraVENous route. Everrett Christianson is administered Every 2 Weeks IV. Indications: non-small cell lung cancer    RIVAROXABAN (XARELTO) 20 MG TAB TABLET    Take 1 Tab by mouth daily (with breakfast). SERTRALINE (ZOLOFT) 50 MG TABLET    Take 1 Tab by mouth daily.    These Medications have changed    No medications on file   Stop Taking    No medications on file     _______________________________    Attestations:  Luis Eduardo Romo (Aj) acting as a scribe for and in the presence of Nelda Barrera MD      October 16, 2018 at 4:52 PM       Provider Attestation:      I personally performed the services described in the documentation, reviewed the documentation, as recorded by the scribe in my presence, and it accurately and completely records my words and actions.  October 16, 2018 at 4:52 PM - Nelda Barrera MD    _______________________________

## 2018-10-16 NOTE — PROGRESS NOTES
[facility] Progress Note    Date: 2018    Name: Sim Navarro    MRN: [id number]         : 1960    Opdivo cycle 3    Arrived ambulatory to [facility] at 1310 pm    Patient assessed and education was provided. No complaints or concerns voiced. Patient vitals were reviewed. Visit Vitals    BP (!) 184/107    Pulse (!) 103    Temp 98.5 °F (36.9 °C)    Resp 18    SpO2 100%    Breastfeeding No     Right chest lateral lumen of medi-port accessed. Port flushed easily and had brisk blood return. Blood drawn off for CBC, CMP and TSH per written order. Recent Results (from the past 12 hour(s))   CBC WITH 3 PART DIFF    Collection Time: 10/16/18  1:35 PM   Result Value Ref Range    WBC 6.3 4.5 - 13.0 K/uL    RBC 4.23 4. 10 - 5.10 M/uL    HGB 10.6 (L) 12.0 - 16 g/dL    HCT 33.4 (L) 36 - 48 %    MCV 79.0 78 - 102 FL    MCH 25.1 25.0 - 35.0 PG    MCHC 31.7 31 - 37 g/dL    RDW 15.7 (H) 11.5 - 14.5 %    PLATELET 121 375 - 228 K/uL    NEUTROPHILS 77 (H) 40 - 70 %    MIXED CELLS 4 0.1 - 17 %    LYMPHOCYTES 19 14 - 44 %    ABS. NEUTROPHILS 4.8 1.8 - 9.5 K/UL    ABS. MIXED CELLS 0.3 0.0 - 2.3 K/uL    ABS. LYMPHOCYTES 1.2 1.1 - 5.9 K/UL    DF AUTOMATED     METABOLIC PANEL, COMPREHENSIVE    Collection Time: 10/16/18  1:35 PM   Result Value Ref Range    Sodium 139 136 - 145 mmol/L    Potassium 3.3 (L) 3.5 - 5.5 mmol/L    Chloride 103 100 - 108 mmol/L    CO2 30 21 - 32 mmol/L    Anion gap 6 3.0 - 18 mmol/L    Glucose 121 (H) 74 - 99 mg/dL    BUN 5 (L) 7.0 - 18 MG/DL    Creatinine 0.83 0.6 - 1.3 MG/DL    BUN/Creatinine ratio 6 (L) 12 - 20      GFR est AA >60 >60 ml/min/1.73m2    GFR est non-AA >60 >60 ml/min/1.73m2    Calcium 8.6 8.5 - 10.1 MG/DL    Bilirubin, total 0.1 (L) 0.2 - 1.0 MG/DL    ALT (SGPT) 13 13 - 56 U/L    AST (SGOT) 19 15 - 37 U/L    Alk.  phosphatase 86 45 - 117 U/L    Protein, total 7.7 6.4 - 8.2 g/dL    Albumin 3.2 (L) 3.4 - 5.0 g/dL    Globulin 4.5 (H) 2.0 - 4.0 g/dL    A-G Ratio 0.7 (L) 0.8 - 1.7     TSH 3RD GENERATION    Collection Time: 10/16/18  1:35 PM   Result Value Ref Range    TSH 3.96 (H) 0.36 - 3.74 uIU/mL   EKG, 12 LEAD, INITIAL    Collection Time: 10/16/18  3:17 PM   Result Value Ref Range    Ventricular Rate 101 BPM    Atrial Rate 101 BPM    P-R Interval 134 ms    QRS Duration 84 ms    Q-T Interval 352 ms    QTC Calculation (Bezet) 456 ms    Calculated P Axis 75 degrees    Calculated R Axis 67 degrees    Calculated T Axis 35 degrees    Diagnosis       Sinus tachycardia  Nonspecific T wave abnormality  Abnormal ECG  No previous ECGs available       Labs within parameters for chemo today. Opdivo 480 mg was initiated. Six minutes into infusion (approximatley 30 mL was infused) a loud crash was heard and patient was found to be on the floor, in a rigid position with jaw clenched and disorientated. [facility] stopped and patient was placed back into chair by nurses. NS initiated at 500 ml/hr, NP notified, 911 called, O2 placed on patient via nasal cannula at 4L. Patient slowly became more alert and kept saying \"im hot, im hot. I need something to drink. \" Patient given ice water. I remained with the patient. Patient condition slowly improved. Patient does state that she has had seizures before but the last one was 3-4 years ago. EMS arrived on site and upon further evaluation they recommend patient be seen in the ER. Patient agreed to go be seen. Patient ambulated to the restroom x 2 assist. Patient had an episode of urinary incontinence during the event. Patient assisted to the stretcher and EMS will take her to ED for further evaluation. Port flushed with 10mL NS and 500 units of heparin and de-accessed.      Patient Vitals for the past 8 hrs:   Temp Pulse Resp BP SpO2   10/16/18 1423 - (!) 103 - (!) 184/107 100 %   10/16/18 1417 - - - (!) 216/112 -   10/16/18 1312 98.5 °F (36.9 °C) - 18 127/81 98 %      Patient is to return to [facility] on 11/13/18 at 1000 for her next appointment.      Krystal Joseph RN  October 16, 2018

## 2018-10-16 NOTE — ED NOTES
Bedside and Verbal shift change report given to Shivam House RN (oncoming nurse) by Eva Sanchez RN (offgoing nurse). Report included the following information SBAR, ED Summary and Procedure Summary.

## 2018-10-17 ENCOUNTER — TELEPHONE (OUTPATIENT)
Dept: FAMILY MEDICINE CLINIC | Age: 58
End: 2018-10-17

## 2018-10-17 NOTE — TELEPHONE ENCOUNTER
Called home number. Verified name and . Spoke with patient. Patient notified of message below. Patient offered and accepted an appointment for 10/24/2018 at 3p.m. Patient had no further questions or concerns. CAL Magana sent to Adair Arce LPN; Your, Anabella Morris RN             Patient was in the ER yesterday for seizure vs. syncope     Can we arrange for her to be seen in follow up within the next week?

## 2018-10-24 ENCOUNTER — OFFICE VISIT (OUTPATIENT)
Dept: FAMILY MEDICINE CLINIC | Age: 58
End: 2018-10-24

## 2018-10-24 ENCOUNTER — HOSPITAL ENCOUNTER (OUTPATIENT)
Dept: LAB | Age: 58
Discharge: HOME | End: 2018-10-24
Payer: MEDICARE

## 2018-10-24 VITALS
RESPIRATION RATE: 12 BRPM | HEART RATE: 89 BPM | DIASTOLIC BLOOD PRESSURE: 82 MMHG | WEIGHT: 139.8 LBS | TEMPERATURE: 98.4 F | BODY MASS INDEX: 21.94 KG/M2 | OXYGEN SATURATION: 97 % | SYSTOLIC BLOOD PRESSURE: 136 MMHG | HEIGHT: 67 IN

## 2018-10-24 DIAGNOSIS — E87.6 HYPOKALEMIA: ICD-10-CM

## 2018-10-24 DIAGNOSIS — R82.90 ABNORMAL URINALYSIS: ICD-10-CM

## 2018-10-24 DIAGNOSIS — R55 TRANSIENT LOC (LOSS OF CONSCIOUSNESS): Primary | ICD-10-CM

## 2018-10-24 PROCEDURE — 87086 URINE CULTURE/COLONY COUNT: CPT | Performed by: PHYSICIAN ASSISTANT

## 2018-10-24 PROCEDURE — 87491 CHLMYD TRACH DNA AMP PROBE: CPT | Performed by: PHYSICIAN ASSISTANT

## 2018-10-24 NOTE — PATIENT INSTRUCTIONS
A Healthy Lifestyle: Care Instructions  Your Care Instructions    A healthy lifestyle can help you feel good, stay at a healthy weight, and have plenty of energy for both work and play. A healthy lifestyle is something you can share with your whole family. A healthy lifestyle also can lower your risk for serious health problems, such as high blood pressure, heart disease, and diabetes. You can follow a few steps listed below to improve your health and the health of your family. Follow-up care is a key part of your treatment and safety. Be sure to make and go to all appointments, and call your doctor if you are having problems. It's also a good idea to know your test results and keep a list of the medicines you take. How can you care for yourself at home? · Do not eat too much sugar, fat, or fast foods. You can still have dessert and treats now and then. The goal is moderation. · Start small to improve your eating habits. Pay attention to portion sizes, drink less juice and soda pop, and eat more fruits and vegetables. ? Eat a healthy amount of food. A 3-ounce serving of meat, for example, is about the size of a deck of cards. Fill the rest of your plate with vegetables and whole grains. ? Limit the amount of soda and sports drinks you have every day. Drink more water when you are thirsty. ? Eat at least 5 servings of fruits and vegetables every day. It may seem like a lot, but it is not hard to reach this goal. A serving or helping is 1 piece of fruit, 1 cup of vegetables, or 2 cups of leafy, raw vegetables. Have an apple or some carrot sticks as an afternoon snack instead of a candy bar. Try to have fruits and/or vegetables at every meal.  · Make exercise part of your daily routine. You may want to start with simple activities, such as walking, bicycling, or slow swimming. Try to be active 30 to 60 minutes every day. You do not need to do all 30 to 60 minutes all at once.  For example, you can exercise 3 times a day for 10 or 20 minutes. Moderate exercise is safe for most people, but it is always a good idea to talk to your doctor before starting an exercise program.  · Keep moving. Alonso Maderaer the lawn, work in the garden, or [employer]. Take the stairs instead of the elevator at work. · If you smoke, quit. People who smoke have an increased risk for heart attack, stroke, cancer, and other lung illnesses. Quitting is hard, but there are ways to boost your chance of quitting tobacco for good. ? Use nicotine gum, patches, or lozenges. ? Ask your doctor about stop-smoking programs and medicines. ? Keep trying. In addition to reducing your risk of diseases in the future, you will notice some benefits soon after you stop using tobacco. If you have shortness of breath or asthma symptoms, they will likely get better within a few weeks after you quit. · Limit how much alcohol you drink. Moderate amounts of alcohol (up to 2 drinks a day for men, 1 drink a day for women) are okay. But drinking too much can lead to liver problems, high blood pressure, and other health problems. Family health  If you have a family, there are many things you can do together to improve your health. · Eat meals together as a family as often as possible. · Eat healthy foods. This includes fruits, vegetables, lean meats and dairy, and whole grains. · Include your family in your fitness plan. Most people think of activities such as jogging or tennis as the way to fitness, but there are many ways you and your family can be more active. Anything that makes you breathe hard and gets your heart pumping is exercise. Here are some tips:  ? Walk to do errands or to take your child to school or the bus.  ? Go for a family bike ride after dinner instead of watching TV. Where can you learn more? Go to http://kayla-sotero.info/. Enter Y472 in the search box to learn more about \"A Healthy Lifestyle: Care Instructions. \"  Current as of: December 7, 2017  Content Version: 11.8  © 1022-7862 [employer], Inc[employer]. Care instructions adapted under license by [employer] (which disclaims liability or warranty for this information). If you have questions about a medical condition or this instruction, always ask your healthcare professional. [address] any warranty or liability for your use of this information.

## 2018-10-24 NOTE — PROGRESS NOTES
Patient: Kingsley Maria MRN: [id number]  SSN: [id number]    YOB: 1960  Age: 59 y.o. Sex: female      Date of Service: 10/24/2018   Provider: CAL Paez   Office Location:   [address] [place], [address]  Office Phone: [phone]  Office Fax: [phone]      REASON FOR VISIT:   Chief Complaint   Patient presents with    Follow-up     ER Follow-up Seizure    Elevated Blood Pressure        VITALS:   Visit Vitals  /82 (BP 1 Location: Left arm, BP Patient Position: Sitting) Comment: manual   Pulse 89   Temp 98.4 °F (36.9 °C) (Oral)   Resp 12   Ht 5' 6.5\" (1.689 m)   Wt 139 lb 12.8 oz (63.4 kg)   SpO2 97%   BMI 22.23 kg/m²        MEDICATIONS:   Current Outpatient Medications on File Prior to Visit   Medication Sig Dispense Refill    levothyroxine (SYNTHROID) 50 mcg tablet Take  by mouth Daily (before breakfast).  sertraline (ZOLOFT) 50 mg tablet Take 1 Tab by mouth daily. 30 Tab 5    amLODIPine (NORVASC) 5 mg tablet Take 1 Tab by mouth daily. 30 Tab 5    ferrous sulfate 325 mg (65 mg iron) tablet take 1 tablet by mouth twice a day with meals 60 Tab 5    nivolumab (OPDIVO) 240 mg/24 mL soln chemo injection by IntraVENous route. Robyn Dyers is administered Every 2 Weeks IV. Indications: non-small cell lung cancer      rivaroxaban (XARELTO) 20 mg tab tablet Take 1 Tab by mouth daily (with breakfast). 30 Tab 6    loratadine (CLARITIN) 10 mg tablet Take 10 mg by mouth daily. No current facility-administered medications on file prior to visit.          ALLERGIES:   Allergies   Allergen Reactions    Flagyl [Metronidazole] Hives        MEDICAL/SURGICAL HISTORY:  Past Medical History:   Diagnosis Date    Anemia, iron deficiency 2/2016    Depression     H/O seasonal allergies     Hypertension     Non-small cell carcinoma of lung ([facility] [address] 2/2016    adenocarcinoma of right lung    Positive occult stool blood test 2016    Pulmonary embolism ([facility] [address] 2016    small, bilateral PEs- on Xarelto    Right leg DVT ([facility] [address] 2016    on Xarelto    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[facility] [address] 2016    resolved    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      Past Surgical History:   Procedure Laterality Date    BREAST SURGERY PROCEDURE UNLISTED      biopsy    HX ANGIOPLASTY Right 3/20/2016    IJ, subclavian, and brachiocephalic veins    HX HYSTERECTOMY      \"weak cervix\"    HX OTHER SURGICAL Right 3/20/2016    2 placed in right IJ, subclavian/brachiocephalic    HX THROMBECTOMY  3/20/2016    with thrombolysis        FAMILY HISTORY:  Family History   Problem Relation Age of Onset    Cancer Sister 48        breast    Liver Disease Sister         cirrhosis    Heart Attack Mother 37    No Known Problems Child         SOCIAL HISTORY:  Social History     Tobacco Use    Smoking status: Former Smoker     Packs/day: 0.50     Last attempt to quit: 2016     Years since quittin.6    Smokeless tobacco: Never Used   Substance Use Topics    Alcohol use: No    Drug use: Yes     Types: Prescription             HISTORY OF PRESENT ILLNESS: Jeana Bobby is a 59 y.o. female who presents to the office for an ER follow up visit. Patient was taken to the ED from the infusion center on 10/16 following an episode of syncope vs. seizure. Patient states she was sitting in the chair, receiving her infusion, when all of a sudden she began to feel warm and her vision started to go fuzzy like she was \"in a cloud. \" She stood up, and then dropped to the floor. ER workup included basic labs, which showed mild hypokalemia but were otherwise negative/at her baseline, EKG which was within normal limits, head CT which showed no acute process, and urine studies, which interestingly were suggestive of possible UTI and trichomonas. Patient states she was not informed of this. She is not having any urinary symptoms.     Today, she reports she is feeling much better. She has been in contact with her oncologist, Dr. Denise Elam, who has ordered an MRI of her brain which is scheduled for tomorrow. It seems from reading the ER notes and from the patient's report, that symptoms are more consistent with syncope over seizure, but it is difficult to say. She is unsure if the plan is to continue with Opdivo infusions, as this may have been an adverse reaction? Regardless, she feels back to her baseline and denies any acute complaints today. REVIEW OF SYSTEMS:  Review of Systems   Constitutional: Negative for chills, fever and malaise/fatigue. Eyes: Negative for blurred vision and double vision. Respiratory: Negative for cough, shortness of breath and wheezing. Cardiovascular: Negative for chest pain, palpitations and leg swelling. Gastrointestinal: Negative for abdominal pain, nausea and vomiting. Neurological: Negative for dizziness, tingling, tremors, sensory change and headaches. PHYSICAL EXAMINATION:  Physical Exam   Constitutional: She is oriented to person, place, and time and well-developed, well-nourished, and in no distress. Cardiovascular: Normal rate, regular rhythm and normal heart sounds. Exam reveals no gallop and no friction rub. No murmur heard. Pulmonary/Chest: Effort normal and breath sounds normal. She has no wheezes. She has no rales. Neurological: She is alert and oriented to person, place, and time. Gait normal.   Skin: Skin is warm and dry. No rash noted. Psychiatric: Mood, memory and affect normal.        RESULTS:  No results found for this visit on 10/24/18. ASSESSMENT/PLAN:  Diagnoses and all orders for this visit:    1. Transient LOC (loss of consciousness)  - Agree with plan of care per oncology to get an MRI of her brain to exclude mets   - IF symptoms recur, should consider neuro referral for possible EEG, though it seems seizure activity has not been confirmed.  Could also consider cardiology consult for syncope. Will monitor for now. 2. Hypokalemia  - Recheck labs  Orders:    -     METABOLIC PANEL, BASIC; Future  -     MAGNESIUM; Future    3. Abnormal urinalysis  - Asymptomatic.   - Repeat urine studies. Will treat if positive for infection. Avoid intercourse in the interim   Orders:    -     CULTURE, URINE; Future  -     CHLAMYDIA/NEISSERIA/TRICHOMONAS AMP; Future          Follow-up Disposition:  Return for as scheduled. All questions answered. Patient expresses understanding and agrees with the plan as detailed above.     PATIENT CARE TEAM:   Patient Care Team:  CAL Gifford as PCP - General (Physician Assistant)  Dago Barrett MD (Hematology and Oncology)  Rubi Smith MD (Hematology and Oncology)  Rosa Patterson MD (Radiation Oncology)  Prashanth Rocha MD (Surgery)       CAL Rivas   October 24, 2018   6:47 PM

## 2018-10-24 NOTE — PROGRESS NOTES
Chief Complaint   Patient presents with    Follow-up     ER Follow-up Seizure    Elevated Blood Pressure     Patient not fasting. Patient in room # 5.     1. Have you been to the ER, urgent care clinic since your last visit? Hospitalized since your last visit? No    2. Have you seen or consulted any other health care providers outside of the [address] [place] [address] since your last visit? Include any pap smears or colon screening. No     Reviewed. Upcoming Appt.  CT at [facility] on 10/25/2018

## 2018-10-25 ENCOUNTER — HOSPITAL ENCOUNTER (OUTPATIENT)
Dept: LAB | Age: 58
Discharge: HOME | End: 2018-10-25
Payer: MEDICARE

## 2018-10-25 ENCOUNTER — HOSPITAL ENCOUNTER (OUTPATIENT)
Age: 58
Discharge: HOME | End: 2018-10-25
Attending: INTERNAL MEDICINE
Payer: MEDICARE

## 2018-10-25 DIAGNOSIS — R82.90 ABNORMAL URINALYSIS: ICD-10-CM

## 2018-10-25 DIAGNOSIS — I82.501 CHRONIC DEEP VEIN THROMBOSIS (DVT) OF RIGHT LOWER EXTREMITY, UNSPECIFIED VEIN (HCC): ICD-10-CM

## 2018-10-25 DIAGNOSIS — E87.6 HYPOKALEMIA: ICD-10-CM

## 2018-10-25 DIAGNOSIS — C34.11 MALIGNANT NEOPLASM OF UPPER LOBE OF RIGHT LUNG (HCC): ICD-10-CM

## 2018-10-25 DIAGNOSIS — I27.82 OTHER CHRONIC PULMONARY EMBOLISM WITHOUT ACUTE COR PULMONALE (HCC): ICD-10-CM

## 2018-10-25 LAB
ANION GAP SERPL CALC-SCNC: 5 MMOL/L (ref 3–18)
BUN SERPL-MCNC: 2 MG/DL (ref 7–18)
BUN/CREAT SERPL: 2 (ref 12–20)
CALCIUM SERPL-MCNC: 8.5 MG/DL (ref 8.5–10.1)
CHLORIDE SERPL-SCNC: 106 MMOL/L (ref 100–108)
CO2 SERPL-SCNC: 31 MMOL/L (ref 21–32)
CREAT SERPL-MCNC: 0.83 MG/DL (ref 0.6–1.3)
CREAT UR-MCNC: 0.8 MG/DL (ref 0.6–1.3)
GLUCOSE SERPL-MCNC: 125 MG/DL (ref 74–99)
MAGNESIUM SERPL-MCNC: 2.1 MG/DL (ref 1.6–2.6)
POTASSIUM SERPL-SCNC: 4 MMOL/L (ref 3.5–5.5)
SODIUM SERPL-SCNC: 142 MMOL/L (ref 136–145)

## 2018-10-25 PROCEDURE — 87491 CHLMYD TRACH DNA AMP PROBE: CPT | Performed by: PHYSICIAN ASSISTANT

## 2018-10-25 PROCEDURE — 83735 ASSAY OF MAGNESIUM: CPT | Performed by: PHYSICIAN ASSISTANT

## 2018-10-25 PROCEDURE — 87086 URINE CULTURE/COLONY COUNT: CPT | Performed by: PHYSICIAN ASSISTANT

## 2018-10-25 PROCEDURE — 36415 COLL VENOUS BLD VENIPUNCTURE: CPT | Performed by: PHYSICIAN ASSISTANT

## 2018-10-25 PROCEDURE — 80048 BASIC METABOLIC PNL TOTAL CA: CPT | Performed by: PHYSICIAN ASSISTANT

## 2018-10-25 PROCEDURE — 82565 ASSAY OF CREATININE: CPT

## 2018-10-25 NOTE — TELEPHONE ENCOUNTER
Refill request for Xarelto 20mg # 30. Last refill on 10/05/2017 #30 w 6 refills. Last office visit 10/24/2018. Next appointment on 01/09/2019.  Requested pharmacy [facility]. Refill request sent to provider for approval or denial.

## 2018-10-25 NOTE — PROGRESS NOTES
Labs from yesterday were normal. Potassium has normalized.  No further intervention needed at this time

## 2018-10-25 NOTE — TELEPHONE ENCOUNTER
Called home number. Verified name and . Spoke with patient. Patient notified of message below per Gerda MCCALL. Patient understood the reason for call and had no further questions or concerns.

## 2018-10-25 NOTE — PROGRESS NOTES
Called home number. Verified name and . Spoke with patient. Patient notified of results below per Sharmin MCCALL. Patient understood the reason for call and had no further questions or concerns.

## 2018-10-26 LAB
BACTERIA SPEC CULT: NORMAL
SERVICE CMNT-IMP: NORMAL

## 2018-10-27 LAB
BACTERIA SPEC CULT: NORMAL
SERVICE CMNT-IMP: NORMAL

## 2018-10-30 LAB
C TRACH RRNA VAG QL NAA+PROBE: NEGATIVE
N GONORRHOEA RRNA VAG QL NAA+PROBE: NEGATIVE
SPECIMEN SOURCE: ABNORMAL
T VAGINALIS RRNA VAG QL NAA+PROBE: POSITIVE

## 2018-10-30 RX ORDER — CLINDAMYCIN HYDROCHLORIDE 300 MG/1
300 CAPSULE ORAL 2 TIMES DAILY
Qty: 14 CAP | Refills: 0 | Status: SHIPPED | OUTPATIENT
Start: 2018-10-30 | End: 2018-11-06

## 2018-10-30 NOTE — PROGRESS NOTES
Called home number. Verified name and . Spoke with patient. Patient notified of results below per Suresh MCCALL. Patient understood the reason for call and had no further questions or concerns.

## 2018-10-30 NOTE — PROGRESS NOTES
Patient tested positive for trichomonas. She is allergic to Flagyl, so will treat with clindamycin 300 mg BID x 7 days.  Partner(s) will need to seek testing/treatment as well, as this is sexually transmitted

## 2018-11-01 ENCOUNTER — HOSPITAL ENCOUNTER (OUTPATIENT)
Age: 58
Discharge: HOME | End: 2018-11-01
Attending: INTERNAL MEDICINE
Payer: MEDICARE

## 2018-11-01 PROCEDURE — 70553 MRI BRAIN STEM W/O & W/DYE: CPT

## 2018-11-01 PROCEDURE — 74011636320 HC RX REV CODE- 636/320: Performed by: INTERNAL MEDICINE

## 2018-11-01 PROCEDURE — A9575 INJ GADOTERATE MEGLUMI 0.1ML: HCPCS | Performed by: INTERNAL MEDICINE

## 2018-11-01 RX ADMIN — GADOTERATE MEGLUMINE 13 ML: 376.9 INJECTION INTRAVENOUS at 15:00

## 2018-11-13 ENCOUNTER — HOSPITAL ENCOUNTER (OUTPATIENT)
Dept: INFUSION THERAPY | Age: 58
Discharge: HOME | End: 2018-11-13

## 2018-12-11 ENCOUNTER — APPOINTMENT (OUTPATIENT)
Dept: INFUSION THERAPY | Age: 58
End: 2018-12-11

## 2018-12-14 ENCOUNTER — HOSPITAL ENCOUNTER (OUTPATIENT)
Dept: CT IMAGING | Age: 58
Discharge: HOME | End: 2018-12-14
Attending: INTERNAL MEDICINE
Payer: MEDICARE

## 2018-12-14 DIAGNOSIS — C34.11 MALIGNANT NEOPLASM OF UPPER LOBE OF RIGHT LUNG (HCC): ICD-10-CM

## 2018-12-14 LAB — CREAT UR-MCNC: 0.7 MG/DL (ref 0.6–1.3)

## 2018-12-14 PROCEDURE — 82565 ASSAY OF CREATININE: CPT

## 2018-12-14 PROCEDURE — 74177 CT ABD & PELVIS W/CONTRAST: CPT

## 2018-12-14 PROCEDURE — 74011636320 HC RX REV CODE- 636/320: Performed by: INTERNAL MEDICINE

## 2018-12-14 RX ADMIN — IOPAMIDOL 100 ML: 612 INJECTION, SOLUTION INTRAVENOUS at 09:57

## 2019-01-09 ENCOUNTER — HOSPITAL ENCOUNTER (OUTPATIENT)
Dept: INFUSION THERAPY | Age: 59
Discharge: HOME | End: 2019-01-09
Payer: MEDICARE

## 2019-01-09 VITALS
WEIGHT: 138.5 LBS | RESPIRATION RATE: 18 BRPM | SYSTOLIC BLOOD PRESSURE: 150 MMHG | DIASTOLIC BLOOD PRESSURE: 91 MMHG | TEMPERATURE: 98.5 F | HEART RATE: 74 BPM | OXYGEN SATURATION: 98 % | HEIGHT: 67 IN | BODY MASS INDEX: 21.74 KG/M2

## 2019-01-09 LAB
ALBUMIN SERPL-MCNC: 3.6 G/DL (ref 3.4–5)
ALBUMIN/GLOB SERPL: 0.8 {RATIO} (ref 0.8–1.7)
ALP SERPL-CCNC: 100 U/L (ref 45–117)
ALT SERPL-CCNC: 12 U/L (ref 13–56)
ANION GAP SERPL CALC-SCNC: 7 MMOL/L (ref 3–18)
AST SERPL-CCNC: 14 U/L (ref 15–37)
BASO+EOS+MONOS # BLD AUTO: 0.3 K/UL (ref 0–2.3)
BASO+EOS+MONOS NFR BLD AUTO: 6 % (ref 0.1–17)
BILIRUB SERPL-MCNC: 0.3 MG/DL (ref 0.2–1)
BUN SERPL-MCNC: 6 MG/DL (ref 7–18)
BUN/CREAT SERPL: 8 (ref 12–20)
CALCIUM SERPL-MCNC: 8.6 MG/DL (ref 8.5–10.1)
CHLORIDE SERPL-SCNC: 108 MMOL/L (ref 100–108)
CO2 SERPL-SCNC: 26 MMOL/L (ref 21–32)
CREAT SERPL-MCNC: 0.74 MG/DL (ref 0.6–1.3)
DIFFERENTIAL METHOD BLD: ABNORMAL
ERYTHROCYTE [DISTWIDTH] IN BLOOD BY AUTOMATED COUNT: 17.5 % (ref 11.5–14.5)
GLOBULIN SER CALC-MCNC: 4.3 G/DL (ref 2–4)
GLUCOSE SERPL-MCNC: 57 MG/DL (ref 74–99)
HCT VFR BLD AUTO: 34.5 % (ref 36–48)
HGB BLD-MCNC: 10.7 G/DL (ref 12–16)
LYMPHOCYTES # BLD: 1 K/UL (ref 1.1–5.9)
LYMPHOCYTES NFR BLD: 20 % (ref 14–44)
MCH RBC QN AUTO: 23.7 PG (ref 25–35)
MCHC RBC AUTO-ENTMCNC: 31 G/DL (ref 31–37)
MCV RBC AUTO: 76.5 FL (ref 78–102)
NEUTS SEG # BLD: 4 K/UL (ref 1.8–9.5)
NEUTS SEG NFR BLD: 74 % (ref 40–70)
PLATELET # BLD AUTO: 239 K/UL (ref 140–440)
POTASSIUM SERPL-SCNC: 3.9 MMOL/L (ref 3.5–5.5)
PROT SERPL-MCNC: 7.9 G/DL (ref 6.4–8.2)
RBC # BLD AUTO: 4.51 M/UL (ref 4.1–5.1)
SODIUM SERPL-SCNC: 141 MMOL/L (ref 136–145)
WBC # BLD AUTO: 5.3 K/UL (ref 4.5–13)

## 2019-01-09 PROCEDURE — 96409 CHEMO IV PUSH SNGL DRUG: CPT

## 2019-01-09 PROCEDURE — 85025 COMPLETE CBC W/AUTO DIFF WBC: CPT

## 2019-01-09 PROCEDURE — 74011250636 HC RX REV CODE- 250/636: Performed by: INTERNAL MEDICINE

## 2019-01-09 PROCEDURE — 77030012965 HC NDL HUBR BBMI -A

## 2019-01-09 PROCEDURE — 74011000258 HC RX REV CODE- 258: Performed by: INTERNAL MEDICINE

## 2019-01-09 PROCEDURE — 80053 COMPREHEN METABOLIC PANEL: CPT

## 2019-01-09 RX ORDER — PROCHLORPERAZINE EDISYLATE 5 MG/ML
10 INJECTION INTRAMUSCULAR; INTRAVENOUS
Status: ACTIVE | OUTPATIENT
Start: 2019-01-09 | End: 2019-01-09

## 2019-01-09 RX ORDER — HEPARIN 100 UNIT/ML
500 SYRINGE INTRAVENOUS ONCE
Status: COMPLETED | OUTPATIENT
Start: 2019-01-09 | End: 2019-01-09

## 2019-01-09 RX ORDER — SODIUM CHLORIDE 0.9 % (FLUSH) 0.9 %
10-40 SYRINGE (ML) INJECTION AS NEEDED
Status: DISCONTINUED | OUTPATIENT
Start: 2019-01-09 | End: 2019-01-13 | Stop reason: HOSPADM

## 2019-01-09 RX ORDER — SODIUM CHLORIDE 9 MG/ML
25 INJECTION, SOLUTION INTRAVENOUS CONTINUOUS
Status: DISPENSED | OUTPATIENT
Start: 2019-01-09 | End: 2019-01-10

## 2019-01-09 RX ADMIN — Medication 10 ML: at 09:50

## 2019-01-09 RX ADMIN — HEPARIN 500 UNITS: 100 SYRINGE at 09:50

## 2019-01-09 RX ADMIN — SODIUM CHLORIDE 25 ML/HR: 900 INJECTION, SOLUTION INTRAVENOUS at 09:22

## 2019-01-09 RX ADMIN — VINORELBINE TARTRATE 35 MG: 10 INJECTION INTRAVENOUS at 09:24

## 2019-01-09 NOTE — PROGRESS NOTES
[facility] Progress Note    Date: 2019    Name: Robby Gill              MRN: [id number]              : 1960    Chemotherapy Cycle:C1D1 Vinorelbine      Pt to [facility], ambulatory, at [phone] accompanied by her significant other. Ms. Hannah Mahoney was assessed and education was provided. Patient given printed care notes on her chemo today. Ms. Viviana Clinton vitals were reviewed. Visit Vitals  BP (!) 150/91 (BP 1 Location: Left arm, BP Patient Position: Sitting)   Pulse 74   Temp 98.5 °F (36.9 °C)   Resp 18   Ht 5' 6.54\" (1.69 m)   Wt 62.8 kg (138 lb 8 oz)   SpO2 98%   Breastfeeding? No   BMI 22.00 kg/m²       Left chest later lumen of her double lumen mediport accessed with 20 g 1 inch el needle. Port flushed easily and had brisk blood return. Blood drawn off and sent for CBC and CMP per written orders after 10 ml waste. NS initiated @ 50 mL/hr. Lab results were obtained and reviewed. Recent Results (from the past 12 hour(s))   CBC WITH 3 PART DIFF    Collection Time: 19  9:04 AM   Result Value Ref Range    WBC 5.3 4.5 - 13.0 K/uL    RBC 4.51 4.10 - 5.10 M/uL    HGB 10.7 (L) 12.0 - 16 g/dL    HCT 34.5 (L) 36 - 48 %    MCV 76.5 (L) 78 - 102 FL    MCH 23.7 (L) 25.0 - 35.0 PG    MCHC 31.0 31 - 37 g/dL    RDW 17.5 (H) 11.5 - 14.5 %    PLATELET 653 116 - 347 K/uL    NEUTROPHILS 74 (H) 40 - 70 %    MIXED CELLS 6 0.1 - 17 %    LYMPHOCYTES 20 14 - 44 %    ABS. NEUTROPHILS 4.0 1.8 - 9.5 K/UL    ABS. MIXED CELLS 0.3 0.0 - 2.3 K/uL    ABS.  LYMPHOCYTES 1.0 (L) 1.1 - 5.9 K/UL    DF AUTOMATED     METABOLIC PANEL, COMPREHENSIVE    Collection Time: 19  9:04 AM   Result Value Ref Range    Sodium 141 136 - 145 mmol/L    Potassium 3.9 3.5 - 5.5 mmol/L    Chloride 108 100 - 108 mmol/L    CO2 26 21 - 32 mmol/L    Anion gap 7 3.0 - 18 mmol/L    Glucose 57 (L) 74 - 99 mg/dL    BUN 6 (L) 7.0 - 18 MG/DL    Creatinine 0.74 0.6 - 1.3 MG/DL    BUN/Creatinine ratio 8 (L) 12 - 20      GFR est AA >60 >60 ml/min/1.73m2 GFR est non-AA >60 >60 ml/min/1.73m2    Calcium 8.6 8.5 - 10.1 MG/DL    Bilirubin, total 0.3 0.2 - 1.0 MG/DL    ALT (SGPT) 12 (L) 13 - 56 U/L    AST (SGOT) 14 (L) 15 - 37 U/L    Alk. phosphatase 100 45 - 117 U/L    Protein, total 7.9 6.4 - 8.2 g/dL    Albumin 3.6 3.4 - 5.0 g/dL    Globulin 4.3 (H) 2.0 - 4.0 g/dL    A-G Ratio 0.8 0.8 - 1.7         Lab results within ordered parameters to give chemo today. ANC = 4.0, PLT = 239. Chemo dosages verified with today's BSA and found to be within 10% of ordered dosages. No pre-medications ordered. Vinorelbine 35 mg was infused at over 10 minutes as ordered. VS stable at end of infusion and pt denied complaints. Line flushed with NS and blood return from port re-verified. Patient Vitals for the past 12 hrs:   Temp Pulse Resp BP SpO2   01/09/19 0949 98.5 °F (36.9 °C) 74 18 (!) 150/91 98 %   01/09/19 0855 98.3 °F (36.8 °C) 87 18 (!) 164/97 97 %     Line flushed with 100 mL NS following completion of the Vinorelbine as ordered. Ms. Manuel Nance tolerated infusion, and had no complaints at this time. She declined to stay for an observation period. Mediport flushed with NS 20 ml and Heparin 500 units then de-accessed. No irritation or bleeding noted. Bandaid applied. Patient armband removed and shredded. Ms. Manuel Nance was discharged from [place] [address] in stable condition at [phone]. She is to return on 01/16/19 at 0900 for her next  appointment.     Brett Alejandra RN  January 9, 2019

## 2019-01-16 ENCOUNTER — HOSPITAL ENCOUNTER (OUTPATIENT)
Dept: INFUSION THERAPY | Age: 59
Discharge: HOME | End: 2019-01-16
Payer: MEDICARE

## 2019-01-16 VITALS
SYSTOLIC BLOOD PRESSURE: 138 MMHG | TEMPERATURE: 98 F | BODY MASS INDEX: 21.2 KG/M2 | WEIGHT: 135.1 LBS | RESPIRATION RATE: 18 BRPM | HEART RATE: 86 BPM | DIASTOLIC BLOOD PRESSURE: 89 MMHG | HEIGHT: 67 IN | OXYGEN SATURATION: 98 %

## 2019-01-16 LAB
BASO+EOS+MONOS # BLD AUTO: 0.2 K/UL (ref 0–2.3)
BASO+EOS+MONOS NFR BLD AUTO: 4 % (ref 0.1–17)
DIFFERENTIAL METHOD BLD: ABNORMAL
ERYTHROCYTE [DISTWIDTH] IN BLOOD BY AUTOMATED COUNT: 16.6 % (ref 11.5–14.5)
HCT VFR BLD AUTO: 33.6 % (ref 36–48)
HGB BLD-MCNC: 10.4 G/DL (ref 12–16)
LYMPHOCYTES # BLD: 1.2 K/UL (ref 1.1–5.9)
LYMPHOCYTES NFR BLD: 26 % (ref 14–44)
MCH RBC QN AUTO: 23.3 PG (ref 25–35)
MCHC RBC AUTO-ENTMCNC: 31 G/DL (ref 31–37)
MCV RBC AUTO: 75.3 FL (ref 78–102)
NEUTS SEG # BLD: 3.1 K/UL (ref 1.8–9.5)
NEUTS SEG NFR BLD: 70 % (ref 40–70)
PLATELET # BLD AUTO: 246 K/UL (ref 140–440)
RBC # BLD AUTO: 4.46 M/UL (ref 4.1–5.1)
WBC # BLD AUTO: 4.5 K/UL (ref 4.5–13)

## 2019-01-16 PROCEDURE — 74011000258 HC RX REV CODE- 258: Performed by: INTERNAL MEDICINE

## 2019-01-16 PROCEDURE — 85025 COMPLETE CBC W/AUTO DIFF WBC: CPT

## 2019-01-16 PROCEDURE — 96409 CHEMO IV PUSH SNGL DRUG: CPT

## 2019-01-16 PROCEDURE — 74011250636 HC RX REV CODE- 250/636: Performed by: INTERNAL MEDICINE

## 2019-01-16 PROCEDURE — 77030012965 HC NDL HUBR BBMI -A

## 2019-01-16 RX ORDER — EPINEPHRINE 1 MG/ML
0.3 INJECTION, SOLUTION, CONCENTRATE INTRAVENOUS AS NEEDED
Status: CANCELLED | OUTPATIENT
Start: 2019-10-23

## 2019-01-16 RX ORDER — DIPHENHYDRAMINE HYDROCHLORIDE 50 MG/ML
50 INJECTION, SOLUTION INTRAMUSCULAR; INTRAVENOUS AS NEEDED
Status: CANCELLED
Start: 2019-10-23

## 2019-01-16 RX ORDER — ALBUTEROL SULFATE 0.83 MG/ML
2.5 SOLUTION RESPIRATORY (INHALATION) AS NEEDED
Status: CANCELLED
Start: 2019-09-04

## 2019-01-16 RX ORDER — SODIUM CHLORIDE 9 MG/ML
25 INJECTION, SOLUTION INTRAVENOUS CONTINUOUS
Status: CANCELLED | OUTPATIENT
Start: 2019-07-10

## 2019-01-16 RX ORDER — DIPHENHYDRAMINE HYDROCHLORIDE 50 MG/ML
50 INJECTION, SOLUTION INTRAMUSCULAR; INTRAVENOUS AS NEEDED
Status: CANCELLED
Start: 2019-10-30

## 2019-01-16 RX ORDER — ALBUTEROL SULFATE 0.83 MG/ML
2.5 SOLUTION RESPIRATORY (INHALATION) AS NEEDED
Status: CANCELLED
Start: 2019-02-20

## 2019-01-16 RX ORDER — EPINEPHRINE 1 MG/ML
0.3 INJECTION, SOLUTION, CONCENTRATE INTRAVENOUS AS NEEDED
Status: CANCELLED | OUTPATIENT
Start: 2019-04-03

## 2019-01-16 RX ORDER — HYDROCORTISONE SODIUM SUCCINATE 100 MG/2ML
100 INJECTION, POWDER, FOR SOLUTION INTRAMUSCULAR; INTRAVENOUS AS NEEDED
Status: CANCELLED | OUTPATIENT
Start: 2019-07-10

## 2019-01-16 RX ORDER — HYDROCORTISONE SODIUM SUCCINATE 100 MG/2ML
100 INJECTION, POWDER, FOR SOLUTION INTRAMUSCULAR; INTRAVENOUS AS NEEDED
Status: CANCELLED | OUTPATIENT
Start: 2019-08-21

## 2019-01-16 RX ORDER — SODIUM CHLORIDE 0.9 % (FLUSH) 0.9 %
10 SYRINGE (ML) INJECTION AS NEEDED
Status: CANCELLED
Start: 2019-04-17

## 2019-01-16 RX ORDER — HYDROCORTISONE SODIUM SUCCINATE 100 MG/2ML
100 INJECTION, POWDER, FOR SOLUTION INTRAMUSCULAR; INTRAVENOUS AS NEEDED
Status: CANCELLED | OUTPATIENT
Start: 2019-11-20

## 2019-01-16 RX ORDER — SODIUM CHLORIDE 0.9 % (FLUSH) 0.9 %
10 SYRINGE (ML) INJECTION AS NEEDED
Status: CANCELLED
Start: 2019-02-06

## 2019-01-16 RX ORDER — SODIUM CHLORIDE 9 MG/ML
25 INJECTION, SOLUTION INTRAVENOUS CONTINUOUS
Status: CANCELLED | OUTPATIENT
Start: 2019-06-12

## 2019-01-16 RX ORDER — HYDROCORTISONE SODIUM SUCCINATE 100 MG/2ML
100 INJECTION, POWDER, FOR SOLUTION INTRAMUSCULAR; INTRAVENOUS AS NEEDED
Status: CANCELLED | OUTPATIENT
Start: 2019-05-29

## 2019-01-16 RX ORDER — DIPHENHYDRAMINE HYDROCHLORIDE 50 MG/ML
50 INJECTION, SOLUTION INTRAMUSCULAR; INTRAVENOUS AS NEEDED
Status: CANCELLED
Start: 2019-08-21

## 2019-01-16 RX ORDER — HYDROCORTISONE SODIUM SUCCINATE 100 MG/2ML
100 INJECTION, POWDER, FOR SOLUTION INTRAMUSCULAR; INTRAVENOUS AS NEEDED
Status: CANCELLED | OUTPATIENT
Start: 2019-07-31

## 2019-01-16 RX ORDER — HEPARIN 100 UNIT/ML
300-500 SYRINGE INTRAVENOUS AS NEEDED
Status: CANCELLED
Start: 2019-01-23

## 2019-01-16 RX ORDER — SODIUM CHLORIDE 0.9 % (FLUSH) 0.9 %
10 SYRINGE (ML) INJECTION AS NEEDED
Status: CANCELLED
Start: 2019-01-23

## 2019-01-16 RX ORDER — DIPHENHYDRAMINE HYDROCHLORIDE 50 MG/ML
50 INJECTION, SOLUTION INTRAMUSCULAR; INTRAVENOUS AS NEEDED
Status: CANCELLED
Start: 2019-11-13

## 2019-01-16 RX ORDER — DIPHENHYDRAMINE HYDROCHLORIDE 50 MG/ML
50 INJECTION, SOLUTION INTRAMUSCULAR; INTRAVENOUS AS NEEDED
Status: CANCELLED
Start: 2019-07-31

## 2019-01-16 RX ORDER — HYDROCORTISONE SODIUM SUCCINATE 100 MG/2ML
100 INJECTION, POWDER, FOR SOLUTION INTRAMUSCULAR; INTRAVENOUS AS NEEDED
Status: CANCELLED | OUTPATIENT
Start: 2019-02-20

## 2019-01-16 RX ORDER — ALBUTEROL SULFATE 0.83 MG/ML
2.5 SOLUTION RESPIRATORY (INHALATION) AS NEEDED
Status: CANCELLED
Start: 2019-02-06

## 2019-01-16 RX ORDER — ONDANSETRON 2 MG/ML
8 INJECTION INTRAMUSCULAR; INTRAVENOUS AS NEEDED
Status: CANCELLED | OUTPATIENT
Start: 2019-05-15

## 2019-01-16 RX ORDER — SODIUM CHLORIDE 9 MG/ML
25 INJECTION, SOLUTION INTRAVENOUS CONTINUOUS
Status: CANCELLED | OUTPATIENT
Start: 2019-03-20

## 2019-01-16 RX ORDER — EPINEPHRINE 1 MG/ML
0.3 INJECTION, SOLUTION, CONCENTRATE INTRAVENOUS AS NEEDED
Status: CANCELLED | OUTPATIENT
Start: 2019-10-30

## 2019-01-16 RX ORDER — ACETAMINOPHEN 325 MG/1
650 TABLET ORAL AS NEEDED
Status: CANCELLED
Start: 2019-05-15

## 2019-01-16 RX ORDER — EPINEPHRINE 1 MG/ML
0.3 INJECTION, SOLUTION, CONCENTRATE INTRAVENOUS AS NEEDED
Status: CANCELLED | OUTPATIENT
Start: 2019-05-29

## 2019-01-16 RX ORDER — HEPARIN 100 UNIT/ML
300-500 SYRINGE INTRAVENOUS AS NEEDED
Status: CANCELLED
Start: 2019-04-10

## 2019-01-16 RX ORDER — EPINEPHRINE 1 MG/ML
0.3 INJECTION, SOLUTION, CONCENTRATE INTRAVENOUS AS NEEDED
Status: CANCELLED | OUTPATIENT
Start: 2019-06-05

## 2019-01-16 RX ORDER — HEPARIN 100 UNIT/ML
300-500 SYRINGE INTRAVENOUS AS NEEDED
Status: CANCELLED
Start: 2019-10-23

## 2019-01-16 RX ORDER — DIPHENHYDRAMINE HYDROCHLORIDE 50 MG/ML
50 INJECTION, SOLUTION INTRAMUSCULAR; INTRAVENOUS AS NEEDED
Status: CANCELLED
Start: 2019-04-10

## 2019-01-16 RX ORDER — ACETAMINOPHEN 325 MG/1
650 TABLET ORAL AS NEEDED
Status: CANCELLED
Start: 2019-04-17

## 2019-01-16 RX ORDER — ALBUTEROL SULFATE 0.83 MG/ML
2.5 SOLUTION RESPIRATORY (INHALATION) AS NEEDED
Status: CANCELLED
Start: 2019-07-10

## 2019-01-16 RX ORDER — SODIUM CHLORIDE 9 MG/ML
25 INJECTION, SOLUTION INTRAVENOUS CONTINUOUS
Status: CANCELLED | OUTPATIENT
Start: 2019-10-16

## 2019-01-16 RX ORDER — DIPHENHYDRAMINE HYDROCHLORIDE 50 MG/ML
50 INJECTION, SOLUTION INTRAMUSCULAR; INTRAVENOUS AS NEEDED
Status: CANCELLED
Start: 2019-01-23

## 2019-01-16 RX ORDER — HYDROCORTISONE SODIUM SUCCINATE 100 MG/2ML
100 INJECTION, POWDER, FOR SOLUTION INTRAMUSCULAR; INTRAVENOUS AS NEEDED
Status: CANCELLED | OUTPATIENT
Start: 2019-10-16

## 2019-01-16 RX ORDER — DIPHENHYDRAMINE HYDROCHLORIDE 50 MG/ML
50 INJECTION, SOLUTION INTRAMUSCULAR; INTRAVENOUS AS NEEDED
Status: CANCELLED
Start: 2019-06-05

## 2019-01-16 RX ORDER — DIPHENHYDRAMINE HYDROCHLORIDE 50 MG/ML
50 INJECTION, SOLUTION INTRAMUSCULAR; INTRAVENOUS AS NEEDED
Status: CANCELLED
Start: 2019-08-07

## 2019-01-16 RX ORDER — SODIUM CHLORIDE 9 MG/ML
10 INJECTION INTRAMUSCULAR; INTRAVENOUS; SUBCUTANEOUS AS NEEDED
Status: CANCELLED | OUTPATIENT
Start: 2019-05-01

## 2019-01-16 RX ORDER — ALBUTEROL SULFATE 0.83 MG/ML
2.5 SOLUTION RESPIRATORY (INHALATION) AS NEEDED
Status: CANCELLED
Start: 2019-05-01

## 2019-01-16 RX ORDER — SODIUM CHLORIDE 0.9 % (FLUSH) 0.9 %
10 SYRINGE (ML) INJECTION AS NEEDED
Status: CANCELLED
Start: 2019-05-08

## 2019-01-16 RX ORDER — DIPHENHYDRAMINE HYDROCHLORIDE 50 MG/ML
50 INJECTION, SOLUTION INTRAMUSCULAR; INTRAVENOUS AS NEEDED
Status: CANCELLED
Start: 2019-02-20

## 2019-01-16 RX ORDER — SODIUM CHLORIDE 9 MG/ML
10 INJECTION INTRAMUSCULAR; INTRAVENOUS; SUBCUTANEOUS AS NEEDED
Status: CANCELLED | OUTPATIENT
Start: 2019-07-24

## 2019-01-16 RX ORDER — SODIUM CHLORIDE 0.9 % (FLUSH) 0.9 %
10 SYRINGE (ML) INJECTION AS NEEDED
Status: CANCELLED
Start: 2019-04-10

## 2019-01-16 RX ORDER — EPINEPHRINE 1 MG/ML
0.3 INJECTION, SOLUTION, CONCENTRATE INTRAVENOUS AS NEEDED
Status: CANCELLED | OUTPATIENT
Start: 2019-09-18

## 2019-01-16 RX ORDER — SODIUM CHLORIDE 0.9 % (FLUSH) 0.9 %
10 SYRINGE (ML) INJECTION AS NEEDED
Status: CANCELLED
Start: 2019-07-24

## 2019-01-16 RX ORDER — EPINEPHRINE 1 MG/ML
0.3 INJECTION, SOLUTION, CONCENTRATE INTRAVENOUS AS NEEDED
Status: CANCELLED | OUTPATIENT
Start: 2019-06-26

## 2019-01-16 RX ORDER — EPINEPHRINE 1 MG/ML
0.3 INJECTION, SOLUTION, CONCENTRATE INTRAVENOUS AS NEEDED
Status: CANCELLED | OUTPATIENT
Start: 2019-05-15

## 2019-01-16 RX ORDER — ALBUTEROL SULFATE 0.83 MG/ML
2.5 SOLUTION RESPIRATORY (INHALATION) AS NEEDED
Status: CANCELLED
Start: 2019-08-21

## 2019-01-16 RX ORDER — ALBUTEROL SULFATE 0.83 MG/ML
2.5 SOLUTION RESPIRATORY (INHALATION) AS NEEDED
Status: CANCELLED
Start: 2019-11-20

## 2019-01-16 RX ORDER — SODIUM CHLORIDE 9 MG/ML
25 INJECTION, SOLUTION INTRAVENOUS CONTINUOUS
Status: CANCELLED | OUTPATIENT
Start: 2019-02-13

## 2019-01-16 RX ORDER — HYDROCORTISONE SODIUM SUCCINATE 100 MG/2ML
100 INJECTION, POWDER, FOR SOLUTION INTRAMUSCULAR; INTRAVENOUS AS NEEDED
Status: CANCELLED | OUTPATIENT
Start: 2019-07-24

## 2019-01-16 RX ORDER — DIPHENHYDRAMINE HYDROCHLORIDE 50 MG/ML
50 INJECTION, SOLUTION INTRAMUSCULAR; INTRAVENOUS AS NEEDED
Status: CANCELLED
Start: 2019-06-26

## 2019-01-16 RX ORDER — SODIUM CHLORIDE 9 MG/ML
10 INJECTION INTRAMUSCULAR; INTRAVENOUS; SUBCUTANEOUS AS NEEDED
Status: CANCELLED | OUTPATIENT
Start: 2019-05-15

## 2019-01-16 RX ORDER — DIPHENHYDRAMINE HYDROCHLORIDE 50 MG/ML
50 INJECTION, SOLUTION INTRAMUSCULAR; INTRAVENOUS AS NEEDED
Status: CANCELLED
Start: 2019-09-25

## 2019-01-16 RX ORDER — SODIUM CHLORIDE 0.9 % (FLUSH) 0.9 %
10 SYRINGE (ML) INJECTION AS NEEDED
Status: CANCELLED
Start: 2019-10-02

## 2019-01-16 RX ORDER — SODIUM CHLORIDE 9 MG/ML
10 INJECTION INTRAMUSCULAR; INTRAVENOUS; SUBCUTANEOUS AS NEEDED
Status: CANCELLED | OUTPATIENT
Start: 2019-06-12

## 2019-01-16 RX ORDER — SODIUM CHLORIDE 9 MG/ML
10 INJECTION INTRAMUSCULAR; INTRAVENOUS; SUBCUTANEOUS AS NEEDED
Status: CANCELLED | OUTPATIENT
Start: 2019-11-13

## 2019-01-16 RX ORDER — DIPHENHYDRAMINE HYDROCHLORIDE 50 MG/ML
50 INJECTION, SOLUTION INTRAMUSCULAR; INTRAVENOUS AS NEEDED
Status: CANCELLED
Start: 2019-03-20

## 2019-01-16 RX ORDER — SODIUM CHLORIDE 9 MG/ML
10 INJECTION INTRAMUSCULAR; INTRAVENOUS; SUBCUTANEOUS AS NEEDED
Status: CANCELLED | OUTPATIENT
Start: 2019-08-21

## 2019-01-16 RX ORDER — SODIUM CHLORIDE 9 MG/ML
25 INJECTION, SOLUTION INTRAVENOUS CONTINUOUS
Status: CANCELLED | OUTPATIENT
Start: 2019-08-07

## 2019-01-16 RX ORDER — SODIUM CHLORIDE 0.9 % (FLUSH) 0.9 %
10 SYRINGE (ML) INJECTION AS NEEDED
Status: CANCELLED
Start: 2019-05-01

## 2019-01-16 RX ORDER — SODIUM CHLORIDE 9 MG/ML
25 INJECTION, SOLUTION INTRAVENOUS CONTINUOUS
Status: CANCELLED | OUTPATIENT
Start: 2019-04-10

## 2019-01-16 RX ORDER — ACETAMINOPHEN 325 MG/1
650 TABLET ORAL AS NEEDED
Status: CANCELLED
Start: 2019-07-03

## 2019-01-16 RX ORDER — ALBUTEROL SULFATE 0.83 MG/ML
2.5 SOLUTION RESPIRATORY (INHALATION) AS NEEDED
Status: CANCELLED
Start: 2019-07-03

## 2019-01-16 RX ORDER — DIPHENHYDRAMINE HYDROCHLORIDE 50 MG/ML
50 INJECTION, SOLUTION INTRAMUSCULAR; INTRAVENOUS AS NEEDED
Status: CANCELLED
Start: 2019-07-24

## 2019-01-16 RX ORDER — SODIUM CHLORIDE 0.9 % (FLUSH) 0.9 %
10 SYRINGE (ML) INJECTION AS NEEDED
Status: CANCELLED
Start: 2019-06-26

## 2019-01-16 RX ORDER — HEPARIN 100 UNIT/ML
300-500 SYRINGE INTRAVENOUS AS NEEDED
Status: CANCELLED
Start: 2019-04-17

## 2019-01-16 RX ORDER — ACETAMINOPHEN 325 MG/1
650 TABLET ORAL AS NEEDED
Status: CANCELLED
Start: 2019-03-20

## 2019-01-16 RX ORDER — DIPHENHYDRAMINE HYDROCHLORIDE 50 MG/ML
50 INJECTION, SOLUTION INTRAMUSCULAR; INTRAVENOUS AS NEEDED
Status: CANCELLED
Start: 2019-11-20

## 2019-01-16 RX ORDER — SODIUM CHLORIDE 0.9 % (FLUSH) 0.9 %
10 SYRINGE (ML) INJECTION AS NEEDED
Status: CANCELLED
Start: 2019-04-03

## 2019-01-16 RX ORDER — HYDROCORTISONE SODIUM SUCCINATE 100 MG/2ML
100 INJECTION, POWDER, FOR SOLUTION INTRAMUSCULAR; INTRAVENOUS AS NEEDED
Status: CANCELLED | OUTPATIENT
Start: 2019-11-27

## 2019-01-16 RX ORDER — SODIUM CHLORIDE 0.9 % (FLUSH) 0.9 %
10 SYRINGE (ML) INJECTION AS NEEDED
Status: CANCELLED
Start: 2019-03-06

## 2019-01-16 RX ORDER — HYDROCORTISONE SODIUM SUCCINATE 100 MG/2ML
100 INJECTION, POWDER, FOR SOLUTION INTRAMUSCULAR; INTRAVENOUS AS NEEDED
Status: CANCELLED | OUTPATIENT
Start: 2019-05-08

## 2019-01-16 RX ORDER — SODIUM CHLORIDE 9 MG/ML
10 INJECTION INTRAMUSCULAR; INTRAVENOUS; SUBCUTANEOUS AS NEEDED
Status: CANCELLED | OUTPATIENT
Start: 2019-11-20

## 2019-01-16 RX ORDER — ACETAMINOPHEN 325 MG/1
650 TABLET ORAL AS NEEDED
Status: CANCELLED
Start: 2019-08-07

## 2019-01-16 RX ORDER — HEPARIN 100 UNIT/ML
300-500 SYRINGE INTRAVENOUS AS NEEDED
Status: CANCELLED
Start: 2019-05-29

## 2019-01-16 RX ORDER — ONDANSETRON 2 MG/ML
8 INJECTION INTRAMUSCULAR; INTRAVENOUS AS NEEDED
Status: CANCELLED | OUTPATIENT
Start: 2019-06-26

## 2019-01-16 RX ORDER — ONDANSETRON 2 MG/ML
8 INJECTION INTRAMUSCULAR; INTRAVENOUS AS NEEDED
Status: CANCELLED | OUTPATIENT
Start: 2019-02-20

## 2019-01-16 RX ORDER — ALBUTEROL SULFATE 0.83 MG/ML
2.5 SOLUTION RESPIRATORY (INHALATION) AS NEEDED
Status: CANCELLED
Start: 2019-05-29

## 2019-01-16 RX ORDER — HYDROCORTISONE SODIUM SUCCINATE 100 MG/2ML
100 INJECTION, POWDER, FOR SOLUTION INTRAMUSCULAR; INTRAVENOUS AS NEEDED
Status: CANCELLED | OUTPATIENT
Start: 2019-11-13

## 2019-01-16 RX ORDER — HYDROCORTISONE SODIUM SUCCINATE 100 MG/2ML
100 INJECTION, POWDER, FOR SOLUTION INTRAMUSCULAR; INTRAVENOUS AS NEEDED
Status: CANCELLED | OUTPATIENT
Start: 2019-04-17

## 2019-01-16 RX ORDER — SODIUM CHLORIDE 9 MG/ML
25 INJECTION, SOLUTION INTRAVENOUS CONTINUOUS
Status: CANCELLED | OUTPATIENT
Start: 2019-11-13

## 2019-01-16 RX ORDER — EPINEPHRINE 1 MG/ML
0.3 INJECTION, SOLUTION, CONCENTRATE INTRAVENOUS AS NEEDED
Status: CANCELLED | OUTPATIENT
Start: 2019-08-21

## 2019-01-16 RX ORDER — SODIUM CHLORIDE 9 MG/ML
25 INJECTION, SOLUTION INTRAVENOUS CONTINUOUS
Status: CANCELLED | OUTPATIENT
Start: 2019-04-03

## 2019-01-16 RX ORDER — SODIUM CHLORIDE 9 MG/ML
10 INJECTION INTRAMUSCULAR; INTRAVENOUS; SUBCUTANEOUS AS NEEDED
Status: CANCELLED | OUTPATIENT
Start: 2019-06-26

## 2019-01-16 RX ORDER — HEPARIN 100 UNIT/ML
300-500 SYRINGE INTRAVENOUS AS NEEDED
Status: CANCELLED
Start: 2019-09-04

## 2019-01-16 RX ORDER — ALBUTEROL SULFATE 0.83 MG/ML
2.5 SOLUTION RESPIRATORY (INHALATION) AS NEEDED
Status: CANCELLED
Start: 2019-03-20

## 2019-01-16 RX ORDER — HYDROCORTISONE SODIUM SUCCINATE 100 MG/2ML
100 INJECTION, POWDER, FOR SOLUTION INTRAMUSCULAR; INTRAVENOUS AS NEEDED
Status: CANCELLED | OUTPATIENT
Start: 2019-06-12

## 2019-01-16 RX ORDER — HYDROCORTISONE SODIUM SUCCINATE 100 MG/2ML
100 INJECTION, POWDER, FOR SOLUTION INTRAMUSCULAR; INTRAVENOUS AS NEEDED
Status: CANCELLED | OUTPATIENT
Start: 2019-08-28

## 2019-01-16 RX ORDER — HYDROCORTISONE SODIUM SUCCINATE 100 MG/2ML
100 INJECTION, POWDER, FOR SOLUTION INTRAMUSCULAR; INTRAVENOUS AS NEEDED
Status: CANCELLED | OUTPATIENT
Start: 2019-04-10

## 2019-01-16 RX ORDER — DIPHENHYDRAMINE HYDROCHLORIDE 50 MG/ML
50 INJECTION, SOLUTION INTRAMUSCULAR; INTRAVENOUS AS NEEDED
Status: CANCELLED
Start: 2019-05-29

## 2019-01-16 RX ORDER — SODIUM CHLORIDE 0.9 % (FLUSH) 0.9 %
10 SYRINGE (ML) INJECTION AS NEEDED
Status: CANCELLED
Start: 2019-07-03

## 2019-01-16 RX ORDER — ONDANSETRON 2 MG/ML
8 INJECTION INTRAMUSCULAR; INTRAVENOUS AS NEEDED
Status: CANCELLED | OUTPATIENT
Start: 2019-04-03

## 2019-01-16 RX ORDER — ONDANSETRON 2 MG/ML
8 INJECTION INTRAMUSCULAR; INTRAVENOUS AS NEEDED
Status: CANCELLED | OUTPATIENT
Start: 2019-08-28

## 2019-01-16 RX ORDER — SODIUM CHLORIDE 9 MG/ML
25 INJECTION, SOLUTION INTRAVENOUS CONTINUOUS
Status: CANCELLED | OUTPATIENT
Start: 2019-03-06

## 2019-01-16 RX ORDER — HEPARIN 100 UNIT/ML
300-500 SYRINGE INTRAVENOUS AS NEEDED
Status: CANCELLED
Start: 2019-03-20

## 2019-01-16 RX ORDER — HEPARIN 100 UNIT/ML
300-500 SYRINGE INTRAVENOUS AS NEEDED
Status: CANCELLED
Start: 2019-09-25

## 2019-01-16 RX ORDER — EPINEPHRINE 1 MG/ML
0.3 INJECTION, SOLUTION, CONCENTRATE INTRAVENOUS AS NEEDED
Status: CANCELLED | OUTPATIENT
Start: 2019-02-06

## 2019-01-16 RX ORDER — ALBUTEROL SULFATE 0.83 MG/ML
2.5 SOLUTION RESPIRATORY (INHALATION) AS NEEDED
Status: CANCELLED
Start: 2019-07-24

## 2019-01-16 RX ORDER — SODIUM CHLORIDE 9 MG/ML
25 INJECTION, SOLUTION INTRAVENOUS CONTINUOUS
Status: CANCELLED | OUTPATIENT
Start: 2019-07-31

## 2019-01-16 RX ORDER — ONDANSETRON 2 MG/ML
8 INJECTION INTRAMUSCULAR; INTRAVENOUS AS NEEDED
Status: CANCELLED | OUTPATIENT
Start: 2019-05-01

## 2019-01-16 RX ORDER — HEPARIN 100 UNIT/ML
300-500 SYRINGE INTRAVENOUS AS NEEDED
Status: CANCELLED
Start: 2019-08-07

## 2019-01-16 RX ORDER — DIPHENHYDRAMINE HYDROCHLORIDE 50 MG/ML
50 INJECTION, SOLUTION INTRAMUSCULAR; INTRAVENOUS AS NEEDED
Status: CANCELLED
Start: 2019-09-04

## 2019-01-16 RX ORDER — HYDROCORTISONE SODIUM SUCCINATE 100 MG/2ML
100 INJECTION, POWDER, FOR SOLUTION INTRAMUSCULAR; INTRAVENOUS AS NEEDED
Status: CANCELLED | OUTPATIENT
Start: 2019-02-06

## 2019-01-16 RX ORDER — SODIUM CHLORIDE 9 MG/ML
25 INJECTION, SOLUTION INTRAVENOUS CONTINUOUS
Status: CANCELLED | OUTPATIENT
Start: 2019-02-06

## 2019-01-16 RX ORDER — ONDANSETRON 2 MG/ML
8 INJECTION INTRAMUSCULAR; INTRAVENOUS AS NEEDED
Status: CANCELLED | OUTPATIENT
Start: 2019-10-30

## 2019-01-16 RX ORDER — DIPHENHYDRAMINE HYDROCHLORIDE 50 MG/ML
50 INJECTION, SOLUTION INTRAMUSCULAR; INTRAVENOUS AS NEEDED
Status: CANCELLED
Start: 2019-02-06

## 2019-01-16 RX ORDER — ACETAMINOPHEN 325 MG/1
650 TABLET ORAL AS NEEDED
Status: CANCELLED
Start: 2019-07-24

## 2019-01-16 RX ORDER — EPINEPHRINE 1 MG/ML
0.3 INJECTION, SOLUTION, CONCENTRATE INTRAVENOUS AS NEEDED
Status: CANCELLED | OUTPATIENT
Start: 2019-02-13

## 2019-01-16 RX ORDER — ALBUTEROL SULFATE 0.83 MG/ML
2.5 SOLUTION RESPIRATORY (INHALATION) AS NEEDED
Status: CANCELLED
Start: 2019-09-25

## 2019-01-16 RX ORDER — SODIUM CHLORIDE 9 MG/ML
10 INJECTION INTRAMUSCULAR; INTRAVENOUS; SUBCUTANEOUS AS NEEDED
Status: CANCELLED | OUTPATIENT
Start: 2019-09-04

## 2019-01-16 RX ORDER — DIPHENHYDRAMINE HYDROCHLORIDE 50 MG/ML
50 INJECTION, SOLUTION INTRAMUSCULAR; INTRAVENOUS AS NEEDED
Status: CANCELLED
Start: 2019-02-13

## 2019-01-16 RX ORDER — DIPHENHYDRAMINE HYDROCHLORIDE 50 MG/ML
50 INJECTION, SOLUTION INTRAMUSCULAR; INTRAVENOUS AS NEEDED
Status: CANCELLED
Start: 2019-05-08

## 2019-01-16 RX ORDER — DIPHENHYDRAMINE HYDROCHLORIDE 50 MG/ML
50 INJECTION, SOLUTION INTRAMUSCULAR; INTRAVENOUS AS NEEDED
Status: CANCELLED
Start: 2019-07-03

## 2019-01-16 RX ORDER — SODIUM CHLORIDE 9 MG/ML
25 INJECTION, SOLUTION INTRAVENOUS CONTINUOUS
Status: CANCELLED | OUTPATIENT
Start: 2019-10-23

## 2019-01-16 RX ORDER — ALBUTEROL SULFATE 0.83 MG/ML
2.5 SOLUTION RESPIRATORY (INHALATION) AS NEEDED
Status: CANCELLED
Start: 2019-02-13

## 2019-01-16 RX ORDER — HEPARIN 100 UNIT/ML
300-500 SYRINGE INTRAVENOUS AS NEEDED
Status: CANCELLED
Start: 2019-07-03

## 2019-01-16 RX ORDER — HYDROCORTISONE SODIUM SUCCINATE 100 MG/2ML
100 INJECTION, POWDER, FOR SOLUTION INTRAMUSCULAR; INTRAVENOUS AS NEEDED
Status: CANCELLED | OUTPATIENT
Start: 2019-01-23

## 2019-01-16 RX ORDER — ONDANSETRON 2 MG/ML
8 INJECTION INTRAMUSCULAR; INTRAVENOUS AS NEEDED
Status: CANCELLED | OUTPATIENT
Start: 2019-08-21

## 2019-01-16 RX ORDER — EPINEPHRINE 1 MG/ML
0.3 INJECTION, SOLUTION, CONCENTRATE INTRAVENOUS AS NEEDED
Status: CANCELLED | OUTPATIENT
Start: 2019-06-12

## 2019-01-16 RX ORDER — ALBUTEROL SULFATE 0.83 MG/ML
2.5 SOLUTION RESPIRATORY (INHALATION) AS NEEDED
Status: CANCELLED
Start: 2019-11-27

## 2019-01-16 RX ORDER — SODIUM CHLORIDE 0.9 % (FLUSH) 0.9 %
10 SYRINGE (ML) INJECTION AS NEEDED
Status: CANCELLED
Start: 2019-08-07

## 2019-01-16 RX ORDER — DIPHENHYDRAMINE HYDROCHLORIDE 50 MG/ML
50 INJECTION, SOLUTION INTRAMUSCULAR; INTRAVENOUS AS NEEDED
Status: CANCELLED
Start: 2019-03-06

## 2019-01-16 RX ORDER — SODIUM CHLORIDE 9 MG/ML
25 INJECTION, SOLUTION INTRAVENOUS CONTINUOUS
Status: CANCELLED | OUTPATIENT
Start: 2019-08-28

## 2019-01-16 RX ORDER — EPINEPHRINE 1 MG/ML
0.3 INJECTION, SOLUTION, CONCENTRATE INTRAVENOUS AS NEEDED
Status: CANCELLED | OUTPATIENT
Start: 2019-03-13

## 2019-01-16 RX ORDER — SODIUM CHLORIDE 9 MG/ML
25 INJECTION, SOLUTION INTRAVENOUS CONTINUOUS
Status: CANCELLED | OUTPATIENT
Start: 2019-01-23

## 2019-01-16 RX ORDER — HYDROCORTISONE SODIUM SUCCINATE 100 MG/2ML
100 INJECTION, POWDER, FOR SOLUTION INTRAMUSCULAR; INTRAVENOUS AS NEEDED
Status: CANCELLED | OUTPATIENT
Start: 2019-05-01

## 2019-01-16 RX ORDER — ONDANSETRON 2 MG/ML
8 INJECTION INTRAMUSCULAR; INTRAVENOUS AS NEEDED
Status: CANCELLED | OUTPATIENT
Start: 2019-08-07

## 2019-01-16 RX ORDER — SODIUM CHLORIDE 9 MG/ML
25 INJECTION, SOLUTION INTRAVENOUS CONTINUOUS
Status: CANCELLED | OUTPATIENT
Start: 2019-02-20

## 2019-01-16 RX ORDER — SODIUM CHLORIDE 9 MG/ML
10 INJECTION INTRAMUSCULAR; INTRAVENOUS; SUBCUTANEOUS AS NEEDED
Status: CANCELLED | OUTPATIENT
Start: 2019-05-29

## 2019-01-16 RX ORDER — EPINEPHRINE 1 MG/ML
0.3 INJECTION, SOLUTION, CONCENTRATE INTRAVENOUS AS NEEDED
Status: CANCELLED | OUTPATIENT
Start: 2019-07-10

## 2019-01-16 RX ORDER — ONDANSETRON 2 MG/ML
8 INJECTION INTRAMUSCULAR; INTRAVENOUS AS NEEDED
Status: CANCELLED | OUTPATIENT
Start: 2019-09-25

## 2019-01-16 RX ORDER — SODIUM CHLORIDE 9 MG/ML
25 INJECTION, SOLUTION INTRAVENOUS CONTINUOUS
Status: DISPENSED | OUTPATIENT
Start: 2019-01-16 | End: 2019-01-17

## 2019-01-16 RX ORDER — DIPHENHYDRAMINE HYDROCHLORIDE 50 MG/ML
50 INJECTION, SOLUTION INTRAMUSCULAR; INTRAVENOUS AS NEEDED
Status: CANCELLED
Start: 2019-11-27

## 2019-01-16 RX ORDER — HYDROCORTISONE SODIUM SUCCINATE 100 MG/2ML
100 INJECTION, POWDER, FOR SOLUTION INTRAMUSCULAR; INTRAVENOUS AS NEEDED
Status: CANCELLED | OUTPATIENT
Start: 2019-10-02

## 2019-01-16 RX ORDER — DIPHENHYDRAMINE HYDROCHLORIDE 50 MG/ML
50 INJECTION, SOLUTION INTRAMUSCULAR; INTRAVENOUS AS NEEDED
Status: CANCELLED
Start: 2019-10-02

## 2019-01-16 RX ORDER — ONDANSETRON 2 MG/ML
8 INJECTION INTRAMUSCULAR; INTRAVENOUS AS NEEDED
Status: CANCELLED | OUTPATIENT
Start: 2019-04-17

## 2019-01-16 RX ORDER — ACETAMINOPHEN 325 MG/1
650 TABLET ORAL AS NEEDED
Status: CANCELLED
Start: 2019-03-13

## 2019-01-16 RX ORDER — DIPHENHYDRAMINE HYDROCHLORIDE 50 MG/ML
50 INJECTION, SOLUTION INTRAMUSCULAR; INTRAVENOUS AS NEEDED
Status: CANCELLED
Start: 2019-05-15

## 2019-01-16 RX ORDER — HYDROCORTISONE SODIUM SUCCINATE 100 MG/2ML
100 INJECTION, POWDER, FOR SOLUTION INTRAMUSCULAR; INTRAVENOUS AS NEEDED
Status: CANCELLED | OUTPATIENT
Start: 2019-09-18

## 2019-01-16 RX ORDER — ACETAMINOPHEN 325 MG/1
650 TABLET ORAL AS NEEDED
Status: CANCELLED
Start: 2019-02-06

## 2019-01-16 RX ORDER — DIPHENHYDRAMINE HYDROCHLORIDE 50 MG/ML
50 INJECTION, SOLUTION INTRAMUSCULAR; INTRAVENOUS AS NEEDED
Status: CANCELLED
Start: 2019-07-10

## 2019-01-16 RX ORDER — ALBUTEROL SULFATE 0.83 MG/ML
2.5 SOLUTION RESPIRATORY (INHALATION) AS NEEDED
Status: CANCELLED
Start: 2019-04-17

## 2019-01-16 RX ORDER — ALBUTEROL SULFATE 0.83 MG/ML
2.5 SOLUTION RESPIRATORY (INHALATION) AS NEEDED
Status: CANCELLED
Start: 2019-04-03

## 2019-01-16 RX ORDER — HEPARIN 100 UNIT/ML
300-500 SYRINGE INTRAVENOUS AS NEEDED
Status: CANCELLED
Start: 2019-10-02

## 2019-01-16 RX ORDER — DIPHENHYDRAMINE HYDROCHLORIDE 50 MG/ML
50 INJECTION, SOLUTION INTRAMUSCULAR; INTRAVENOUS AS NEEDED
Status: CANCELLED
Start: 2019-08-28

## 2019-01-16 RX ORDER — ACETAMINOPHEN 325 MG/1
650 TABLET ORAL AS NEEDED
Status: CANCELLED
Start: 2019-11-13

## 2019-01-16 RX ORDER — EPINEPHRINE 1 MG/ML
0.3 INJECTION, SOLUTION, CONCENTRATE INTRAVENOUS AS NEEDED
Status: CANCELLED | OUTPATIENT
Start: 2019-04-10

## 2019-01-16 RX ORDER — SODIUM CHLORIDE 0.9 % (FLUSH) 0.9 %
10 SYRINGE (ML) INJECTION AS NEEDED
Status: CANCELLED
Start: 2019-05-15

## 2019-01-16 RX ORDER — ALBUTEROL SULFATE 0.83 MG/ML
2.5 SOLUTION RESPIRATORY (INHALATION) AS NEEDED
Status: CANCELLED
Start: 2019-09-18

## 2019-01-16 RX ORDER — SODIUM CHLORIDE 9 MG/ML
10 INJECTION INTRAMUSCULAR; INTRAVENOUS; SUBCUTANEOUS AS NEEDED
Status: CANCELLED | OUTPATIENT
Start: 2019-03-06

## 2019-01-16 RX ORDER — HEPARIN 100 UNIT/ML
300-500 SYRINGE INTRAVENOUS AS NEEDED
Status: CANCELLED
Start: 2019-09-18

## 2019-01-16 RX ORDER — ACETAMINOPHEN 325 MG/1
650 TABLET ORAL AS NEEDED
Status: CANCELLED
Start: 2019-10-02

## 2019-01-16 RX ORDER — ALBUTEROL SULFATE 0.83 MG/ML
2.5 SOLUTION RESPIRATORY (INHALATION) AS NEEDED
Status: CANCELLED
Start: 2019-08-07

## 2019-01-16 RX ORDER — HYDROCORTISONE SODIUM SUCCINATE 100 MG/2ML
100 INJECTION, POWDER, FOR SOLUTION INTRAMUSCULAR; INTRAVENOUS AS NEEDED
Status: CANCELLED | OUTPATIENT
Start: 2019-04-03

## 2019-01-16 RX ORDER — SODIUM CHLORIDE 9 MG/ML
10 INJECTION INTRAMUSCULAR; INTRAVENOUS; SUBCUTANEOUS AS NEEDED
Status: CANCELLED | OUTPATIENT
Start: 2019-04-03

## 2019-01-16 RX ORDER — ACETAMINOPHEN 325 MG/1
650 TABLET ORAL AS NEEDED
Status: CANCELLED
Start: 2019-04-10

## 2019-01-16 RX ORDER — EPINEPHRINE 1 MG/ML
0.3 INJECTION, SOLUTION, CONCENTRATE INTRAVENOUS AS NEEDED
Status: CANCELLED | OUTPATIENT
Start: 2019-07-31

## 2019-01-16 RX ORDER — ACETAMINOPHEN 325 MG/1
650 TABLET ORAL AS NEEDED
Status: CANCELLED
Start: 2019-07-31

## 2019-01-16 RX ORDER — ALBUTEROL SULFATE 0.83 MG/ML
2.5 SOLUTION RESPIRATORY (INHALATION) AS NEEDED
Status: CANCELLED
Start: 2019-10-02

## 2019-01-16 RX ORDER — ACETAMINOPHEN 325 MG/1
650 TABLET ORAL AS NEEDED
Status: CANCELLED
Start: 2019-04-03

## 2019-01-16 RX ORDER — SODIUM CHLORIDE 0.9 % (FLUSH) 0.9 %
10 SYRINGE (ML) INJECTION AS NEEDED
Status: CANCELLED
Start: 2019-05-29

## 2019-01-16 RX ORDER — HYDROCORTISONE SODIUM SUCCINATE 100 MG/2ML
100 INJECTION, POWDER, FOR SOLUTION INTRAMUSCULAR; INTRAVENOUS AS NEEDED
Status: CANCELLED | OUTPATIENT
Start: 2019-10-30

## 2019-01-16 RX ORDER — ALBUTEROL SULFATE 0.83 MG/ML
2.5 SOLUTION RESPIRATORY (INHALATION) AS NEEDED
Status: CANCELLED
Start: 2019-01-23

## 2019-01-16 RX ORDER — SODIUM CHLORIDE 9 MG/ML
25 INJECTION, SOLUTION INTRAVENOUS CONTINUOUS
Status: CANCELLED | OUTPATIENT
Start: 2019-05-15

## 2019-01-16 RX ORDER — SODIUM CHLORIDE 9 MG/ML
10 INJECTION INTRAMUSCULAR; INTRAVENOUS; SUBCUTANEOUS AS NEEDED
Status: CANCELLED | OUTPATIENT
Start: 2019-07-31

## 2019-01-16 RX ORDER — SODIUM CHLORIDE 9 MG/ML
25 INJECTION, SOLUTION INTRAVENOUS CONTINUOUS
Status: CANCELLED | OUTPATIENT
Start: 2019-09-18

## 2019-01-16 RX ORDER — ALBUTEROL SULFATE 0.83 MG/ML
2.5 SOLUTION RESPIRATORY (INHALATION) AS NEEDED
Status: CANCELLED
Start: 2019-10-30

## 2019-01-16 RX ORDER — DIPHENHYDRAMINE HYDROCHLORIDE 50 MG/ML
50 INJECTION, SOLUTION INTRAMUSCULAR; INTRAVENOUS AS NEEDED
Status: CANCELLED
Start: 2019-05-01

## 2019-01-16 RX ORDER — ACETAMINOPHEN 325 MG/1
650 TABLET ORAL AS NEEDED
Status: CANCELLED
Start: 2019-01-23

## 2019-01-16 RX ORDER — SODIUM CHLORIDE 9 MG/ML
10 INJECTION INTRAMUSCULAR; INTRAVENOUS; SUBCUTANEOUS AS NEEDED
Status: CANCELLED | OUTPATIENT
Start: 2019-10-30

## 2019-01-16 RX ORDER — SODIUM CHLORIDE 9 MG/ML
10 INJECTION INTRAMUSCULAR; INTRAVENOUS; SUBCUTANEOUS AS NEEDED
Status: CANCELLED | OUTPATIENT
Start: 2019-09-25

## 2019-01-16 RX ORDER — SODIUM CHLORIDE 0.9 % (FLUSH) 0.9 %
10-40 SYRINGE (ML) INJECTION AS NEEDED
Status: DISCONTINUED | OUTPATIENT
Start: 2019-01-16 | End: 2019-01-20 | Stop reason: HOSPADM

## 2019-01-16 RX ORDER — SODIUM CHLORIDE 0.9 % (FLUSH) 0.9 %
10 SYRINGE (ML) INJECTION AS NEEDED
Status: CANCELLED
Start: 2019-03-20

## 2019-01-16 RX ORDER — ONDANSETRON 2 MG/ML
8 INJECTION INTRAMUSCULAR; INTRAVENOUS AS NEEDED
Status: CANCELLED | OUTPATIENT
Start: 2019-06-05

## 2019-01-16 RX ORDER — SODIUM CHLORIDE 9 MG/ML
10 INJECTION INTRAMUSCULAR; INTRAVENOUS; SUBCUTANEOUS AS NEEDED
Status: CANCELLED | OUTPATIENT
Start: 2019-10-16

## 2019-01-16 RX ORDER — EPINEPHRINE 1 MG/ML
0.3 INJECTION, SOLUTION, CONCENTRATE INTRAVENOUS AS NEEDED
Status: CANCELLED | OUTPATIENT
Start: 2019-07-24

## 2019-01-16 RX ORDER — ONDANSETRON 2 MG/ML
8 INJECTION INTRAMUSCULAR; INTRAVENOUS AS NEEDED
Status: CANCELLED | OUTPATIENT
Start: 2019-07-10

## 2019-01-16 RX ORDER — SODIUM CHLORIDE 9 MG/ML
25 INJECTION, SOLUTION INTRAVENOUS CONTINUOUS
Status: CANCELLED | OUTPATIENT
Start: 2019-07-24

## 2019-01-16 RX ORDER — HEPARIN 100 UNIT/ML
300-500 SYRINGE INTRAVENOUS AS NEEDED
Status: CANCELLED
Start: 2019-07-24

## 2019-01-16 RX ORDER — DIPHENHYDRAMINE HYDROCHLORIDE 50 MG/ML
50 INJECTION, SOLUTION INTRAMUSCULAR; INTRAVENOUS AS NEEDED
Status: CANCELLED
Start: 2019-06-12

## 2019-01-16 RX ORDER — ALBUTEROL SULFATE 0.83 MG/ML
2.5 SOLUTION RESPIRATORY (INHALATION) AS NEEDED
Status: CANCELLED
Start: 2019-06-05

## 2019-01-16 RX ORDER — EPINEPHRINE 1 MG/ML
0.3 INJECTION, SOLUTION, CONCENTRATE INTRAVENOUS AS NEEDED
Status: CANCELLED | OUTPATIENT
Start: 2019-11-20

## 2019-01-16 RX ORDER — ALBUTEROL SULFATE 0.83 MG/ML
2.5 SOLUTION RESPIRATORY (INHALATION) AS NEEDED
Status: CANCELLED
Start: 2019-05-08

## 2019-01-16 RX ORDER — HYDROCORTISONE SODIUM SUCCINATE 100 MG/2ML
100 INJECTION, POWDER, FOR SOLUTION INTRAMUSCULAR; INTRAVENOUS AS NEEDED
Status: CANCELLED | OUTPATIENT
Start: 2019-08-07

## 2019-01-16 RX ORDER — DIPHENHYDRAMINE HYDROCHLORIDE 50 MG/ML
50 INJECTION, SOLUTION INTRAMUSCULAR; INTRAVENOUS AS NEEDED
Status: CANCELLED
Start: 2019-04-03

## 2019-01-16 RX ORDER — SODIUM CHLORIDE 9 MG/ML
25 INJECTION, SOLUTION INTRAVENOUS CONTINUOUS
Status: CANCELLED | OUTPATIENT
Start: 2019-10-02

## 2019-01-16 RX ORDER — ONDANSETRON 2 MG/ML
8 INJECTION INTRAMUSCULAR; INTRAVENOUS AS NEEDED
Status: CANCELLED | OUTPATIENT
Start: 2019-07-24

## 2019-01-16 RX ORDER — SODIUM CHLORIDE 9 MG/ML
10 INJECTION INTRAMUSCULAR; INTRAVENOUS; SUBCUTANEOUS AS NEEDED
Status: CANCELLED | OUTPATIENT
Start: 2019-10-23

## 2019-01-16 RX ORDER — ACETAMINOPHEN 325 MG/1
650 TABLET ORAL AS NEEDED
Status: CANCELLED
Start: 2019-10-23

## 2019-01-16 RX ORDER — SODIUM CHLORIDE 0.9 % (FLUSH) 0.9 %
10 SYRINGE (ML) INJECTION AS NEEDED
Status: CANCELLED
Start: 2019-07-31

## 2019-01-16 RX ORDER — SODIUM CHLORIDE 9 MG/ML
10 INJECTION INTRAMUSCULAR; INTRAVENOUS; SUBCUTANEOUS AS NEEDED
Status: CANCELLED | OUTPATIENT
Start: 2019-02-13

## 2019-01-16 RX ORDER — SODIUM CHLORIDE 9 MG/ML
10 INJECTION INTRAMUSCULAR; INTRAVENOUS; SUBCUTANEOUS AS NEEDED
Status: CANCELLED | OUTPATIENT
Start: 2019-04-10

## 2019-01-16 RX ORDER — HYDROCORTISONE SODIUM SUCCINATE 100 MG/2ML
100 INJECTION, POWDER, FOR SOLUTION INTRAMUSCULAR; INTRAVENOUS AS NEEDED
Status: CANCELLED | OUTPATIENT
Start: 2019-09-04

## 2019-01-16 RX ORDER — SODIUM CHLORIDE 0.9 % (FLUSH) 0.9 %
10 SYRINGE (ML) INJECTION AS NEEDED
Status: CANCELLED
Start: 2019-06-12

## 2019-01-16 RX ORDER — SODIUM CHLORIDE 0.9 % (FLUSH) 0.9 %
10 SYRINGE (ML) INJECTION AS NEEDED
Status: CANCELLED
Start: 2019-08-21

## 2019-01-16 RX ORDER — ALBUTEROL SULFATE 0.83 MG/ML
2.5 SOLUTION RESPIRATORY (INHALATION) AS NEEDED
Status: CANCELLED
Start: 2019-11-13

## 2019-01-16 RX ORDER — EPINEPHRINE 1 MG/ML
0.3 INJECTION, SOLUTION, CONCENTRATE INTRAVENOUS AS NEEDED
Status: CANCELLED | OUTPATIENT
Start: 2019-10-02

## 2019-01-16 RX ORDER — EPINEPHRINE 1 MG/ML
0.3 INJECTION, SOLUTION, CONCENTRATE INTRAVENOUS AS NEEDED
Status: CANCELLED | OUTPATIENT
Start: 2019-03-20

## 2019-01-16 RX ORDER — SODIUM CHLORIDE 9 MG/ML
25 INJECTION, SOLUTION INTRAVENOUS CONTINUOUS
Status: CANCELLED | OUTPATIENT
Start: 2019-04-17

## 2019-01-16 RX ORDER — SODIUM CHLORIDE 0.9 % (FLUSH) 0.9 %
10 SYRINGE (ML) INJECTION AS NEEDED
Status: CANCELLED
Start: 2019-03-13

## 2019-01-16 RX ORDER — HYDROCORTISONE SODIUM SUCCINATE 100 MG/2ML
100 INJECTION, POWDER, FOR SOLUTION INTRAMUSCULAR; INTRAVENOUS AS NEEDED
Status: CANCELLED | OUTPATIENT
Start: 2019-03-13

## 2019-01-16 RX ORDER — HEPARIN 100 UNIT/ML
300-500 SYRINGE INTRAVENOUS AS NEEDED
Status: CANCELLED
Start: 2019-06-12

## 2019-01-16 RX ORDER — EPINEPHRINE 1 MG/ML
0.3 INJECTION, SOLUTION, CONCENTRATE INTRAVENOUS AS NEEDED
Status: CANCELLED | OUTPATIENT
Start: 2019-08-07

## 2019-01-16 RX ORDER — ACETAMINOPHEN 325 MG/1
650 TABLET ORAL AS NEEDED
Status: CANCELLED
Start: 2019-06-26

## 2019-01-16 RX ORDER — DIPHENHYDRAMINE HYDROCHLORIDE 50 MG/ML
50 INJECTION, SOLUTION INTRAMUSCULAR; INTRAVENOUS AS NEEDED
Status: CANCELLED
Start: 2019-10-16

## 2019-01-16 RX ORDER — ONDANSETRON 2 MG/ML
8 INJECTION INTRAMUSCULAR; INTRAVENOUS AS NEEDED
Status: CANCELLED | OUTPATIENT
Start: 2019-07-31

## 2019-01-16 RX ORDER — EPINEPHRINE 1 MG/ML
0.3 INJECTION, SOLUTION, CONCENTRATE INTRAVENOUS AS NEEDED
Status: CANCELLED | OUTPATIENT
Start: 2019-07-03

## 2019-01-16 RX ORDER — SODIUM CHLORIDE 9 MG/ML
10 INJECTION INTRAMUSCULAR; INTRAVENOUS; SUBCUTANEOUS AS NEEDED
Status: CANCELLED | OUTPATIENT
Start: 2019-03-20

## 2019-01-16 RX ORDER — DIPHENHYDRAMINE HYDROCHLORIDE 50 MG/ML
50 INJECTION, SOLUTION INTRAMUSCULAR; INTRAVENOUS AS NEEDED
Status: CANCELLED
Start: 2019-09-18

## 2019-01-16 RX ORDER — ONDANSETRON 2 MG/ML
8 INJECTION INTRAMUSCULAR; INTRAVENOUS AS NEEDED
Status: CANCELLED | OUTPATIENT
Start: 2019-03-13

## 2019-01-16 RX ORDER — EPINEPHRINE 1 MG/ML
0.3 INJECTION, SOLUTION, CONCENTRATE INTRAVENOUS AS NEEDED
Status: CANCELLED | OUTPATIENT
Start: 2019-05-01

## 2019-01-16 RX ORDER — SODIUM CHLORIDE 9 MG/ML
25 INJECTION, SOLUTION INTRAVENOUS CONTINUOUS
Status: CANCELLED | OUTPATIENT
Start: 2019-05-08

## 2019-01-16 RX ORDER — SODIUM CHLORIDE 9 MG/ML
10 INJECTION INTRAMUSCULAR; INTRAVENOUS; SUBCUTANEOUS AS NEEDED
Status: CANCELLED | OUTPATIENT
Start: 2019-07-03

## 2019-01-16 RX ORDER — SODIUM CHLORIDE 9 MG/ML
25 INJECTION, SOLUTION INTRAVENOUS CONTINUOUS
Status: CANCELLED | OUTPATIENT
Start: 2019-05-29

## 2019-01-16 RX ORDER — ONDANSETRON 2 MG/ML
8 INJECTION INTRAMUSCULAR; INTRAVENOUS AS NEEDED
Status: CANCELLED | OUTPATIENT
Start: 2019-11-27

## 2019-01-16 RX ORDER — ACETAMINOPHEN 325 MG/1
650 TABLET ORAL AS NEEDED
Status: CANCELLED
Start: 2019-05-29

## 2019-01-16 RX ORDER — ACETAMINOPHEN 325 MG/1
650 TABLET ORAL AS NEEDED
Status: CANCELLED
Start: 2019-02-20

## 2019-01-16 RX ORDER — HYDROCORTISONE SODIUM SUCCINATE 100 MG/2ML
100 INJECTION, POWDER, FOR SOLUTION INTRAMUSCULAR; INTRAVENOUS AS NEEDED
Status: CANCELLED | OUTPATIENT
Start: 2019-03-20

## 2019-01-16 RX ORDER — HEPARIN 100 UNIT/ML
300-500 SYRINGE INTRAVENOUS AS NEEDED
Status: CANCELLED
Start: 2019-06-26

## 2019-01-16 RX ORDER — ONDANSETRON 2 MG/ML
8 INJECTION INTRAMUSCULAR; INTRAVENOUS AS NEEDED
Status: CANCELLED | OUTPATIENT
Start: 2019-06-12

## 2019-01-16 RX ORDER — SODIUM CHLORIDE 9 MG/ML
10 INJECTION INTRAMUSCULAR; INTRAVENOUS; SUBCUTANEOUS AS NEEDED
Status: CANCELLED | OUTPATIENT
Start: 2019-05-08

## 2019-01-16 RX ORDER — HEPARIN 100 UNIT/ML
300-500 SYRINGE INTRAVENOUS AS NEEDED
Status: CANCELLED
Start: 2019-02-06

## 2019-01-16 RX ORDER — HYDROCORTISONE SODIUM SUCCINATE 100 MG/2ML
100 INJECTION, POWDER, FOR SOLUTION INTRAMUSCULAR; INTRAVENOUS AS NEEDED
Status: CANCELLED | OUTPATIENT
Start: 2019-10-23

## 2019-01-16 RX ORDER — ALBUTEROL SULFATE 0.83 MG/ML
2.5 SOLUTION RESPIRATORY (INHALATION) AS NEEDED
Status: CANCELLED
Start: 2019-06-12

## 2019-01-16 RX ORDER — DIPHENHYDRAMINE HYDROCHLORIDE 50 MG/ML
50 INJECTION, SOLUTION INTRAMUSCULAR; INTRAVENOUS AS NEEDED
Status: CANCELLED
Start: 2019-04-17

## 2019-01-16 RX ORDER — ALBUTEROL SULFATE 0.83 MG/ML
2.5 SOLUTION RESPIRATORY (INHALATION) AS NEEDED
Status: CANCELLED
Start: 2019-06-26

## 2019-01-16 RX ORDER — EPINEPHRINE 1 MG/ML
0.3 INJECTION, SOLUTION, CONCENTRATE INTRAVENOUS AS NEEDED
Status: CANCELLED | OUTPATIENT
Start: 2019-01-23

## 2019-01-16 RX ORDER — HYDROCORTISONE SODIUM SUCCINATE 100 MG/2ML
100 INJECTION, POWDER, FOR SOLUTION INTRAMUSCULAR; INTRAVENOUS AS NEEDED
Status: CANCELLED | OUTPATIENT
Start: 2019-07-03

## 2019-01-16 RX ORDER — HEPARIN 100 UNIT/ML
300-500 SYRINGE INTRAVENOUS AS NEEDED
Status: CANCELLED
Start: 2019-11-27

## 2019-01-16 RX ORDER — ONDANSETRON 2 MG/ML
8 INJECTION INTRAMUSCULAR; INTRAVENOUS AS NEEDED
Status: CANCELLED | OUTPATIENT
Start: 2019-10-23

## 2019-01-16 RX ORDER — ACETAMINOPHEN 325 MG/1
650 TABLET ORAL AS NEEDED
Status: CANCELLED
Start: 2019-10-30

## 2019-01-16 RX ORDER — ONDANSETRON 2 MG/ML
8 INJECTION INTRAMUSCULAR; INTRAVENOUS AS NEEDED
Status: CANCELLED | OUTPATIENT
Start: 2019-02-06

## 2019-01-16 RX ORDER — SODIUM CHLORIDE 9 MG/ML
10 INJECTION INTRAMUSCULAR; INTRAVENOUS; SUBCUTANEOUS AS NEEDED
Status: CANCELLED | OUTPATIENT
Start: 2019-08-07

## 2019-01-16 RX ORDER — SODIUM CHLORIDE 9 MG/ML
25 INJECTION, SOLUTION INTRAVENOUS CONTINUOUS
Status: CANCELLED | OUTPATIENT
Start: 2019-03-13

## 2019-01-16 RX ORDER — HYDROCORTISONE SODIUM SUCCINATE 100 MG/2ML
100 INJECTION, POWDER, FOR SOLUTION INTRAMUSCULAR; INTRAVENOUS AS NEEDED
Status: CANCELLED | OUTPATIENT
Start: 2019-05-15

## 2019-01-16 RX ORDER — EPINEPHRINE 1 MG/ML
0.3 INJECTION, SOLUTION, CONCENTRATE INTRAVENOUS AS NEEDED
Status: CANCELLED | OUTPATIENT
Start: 2019-08-28

## 2019-01-16 RX ORDER — SODIUM CHLORIDE 9 MG/ML
25 INJECTION, SOLUTION INTRAVENOUS CONTINUOUS
Status: CANCELLED | OUTPATIENT
Start: 2019-06-26

## 2019-01-16 RX ORDER — HEPARIN 100 UNIT/ML
300-500 SYRINGE INTRAVENOUS AS NEEDED
Status: CANCELLED
Start: 2019-03-06

## 2019-01-16 RX ORDER — ALBUTEROL SULFATE 0.83 MG/ML
2.5 SOLUTION RESPIRATORY (INHALATION) AS NEEDED
Status: CANCELLED
Start: 2019-07-31

## 2019-01-16 RX ORDER — SODIUM CHLORIDE 0.9 % (FLUSH) 0.9 %
10 SYRINGE (ML) INJECTION AS NEEDED
Status: CANCELLED
Start: 2019-08-28

## 2019-01-16 RX ORDER — ACETAMINOPHEN 325 MG/1
650 TABLET ORAL AS NEEDED
Status: CANCELLED
Start: 2019-02-13

## 2019-01-16 RX ORDER — ONDANSETRON 2 MG/ML
8 INJECTION INTRAMUSCULAR; INTRAVENOUS AS NEEDED
Status: CANCELLED | OUTPATIENT
Start: 2019-10-16

## 2019-01-16 RX ORDER — EPINEPHRINE 1 MG/ML
0.3 INJECTION, SOLUTION, CONCENTRATE INTRAVENOUS AS NEEDED
Status: CANCELLED | OUTPATIENT
Start: 2019-09-25

## 2019-01-16 RX ORDER — SODIUM CHLORIDE 9 MG/ML
25 INJECTION, SOLUTION INTRAVENOUS CONTINUOUS
Status: CANCELLED | OUTPATIENT
Start: 2019-06-05

## 2019-01-16 RX ORDER — SODIUM CHLORIDE 9 MG/ML
10 INJECTION INTRAMUSCULAR; INTRAVENOUS; SUBCUTANEOUS AS NEEDED
Status: CANCELLED | OUTPATIENT
Start: 2019-10-02

## 2019-01-16 RX ORDER — HEPARIN 100 UNIT/ML
500 SYRINGE INTRAVENOUS AS NEEDED
Status: DISCONTINUED | OUTPATIENT
Start: 2019-01-16 | End: 2019-01-20 | Stop reason: HOSPADM

## 2019-01-16 RX ORDER — SODIUM CHLORIDE 0.9 % (FLUSH) 0.9 %
10 SYRINGE (ML) INJECTION AS NEEDED
Status: CANCELLED
Start: 2019-09-04

## 2019-01-16 RX ORDER — HEPARIN 100 UNIT/ML
300-500 SYRINGE INTRAVENOUS AS NEEDED
Status: CANCELLED
Start: 2019-11-13

## 2019-01-16 RX ORDER — SODIUM CHLORIDE 9 MG/ML
10 INJECTION INTRAMUSCULAR; INTRAVENOUS; SUBCUTANEOUS AS NEEDED
Status: CANCELLED | OUTPATIENT
Start: 2019-04-17

## 2019-01-16 RX ORDER — ACETAMINOPHEN 325 MG/1
650 TABLET ORAL AS NEEDED
Status: CANCELLED
Start: 2019-05-01

## 2019-01-16 RX ORDER — HEPARIN 100 UNIT/ML
300-500 SYRINGE INTRAVENOUS AS NEEDED
Status: CANCELLED
Start: 2019-07-31

## 2019-01-16 RX ORDER — DIPHENHYDRAMINE HYDROCHLORIDE 50 MG/ML
50 INJECTION, SOLUTION INTRAMUSCULAR; INTRAVENOUS AS NEEDED
Status: CANCELLED
Start: 2019-03-13

## 2019-01-16 RX ORDER — ACETAMINOPHEN 325 MG/1
650 TABLET ORAL AS NEEDED
Status: CANCELLED
Start: 2019-10-16

## 2019-01-16 RX ORDER — SODIUM CHLORIDE 0.9 % (FLUSH) 0.9 %
10 SYRINGE (ML) INJECTION AS NEEDED
Status: CANCELLED
Start: 2019-11-27

## 2019-01-16 RX ORDER — SODIUM CHLORIDE 0.9 % (FLUSH) 0.9 %
10 SYRINGE (ML) INJECTION AS NEEDED
Status: CANCELLED
Start: 2019-09-18

## 2019-01-16 RX ORDER — HEPARIN 100 UNIT/ML
300-500 SYRINGE INTRAVENOUS AS NEEDED
Status: CANCELLED
Start: 2019-10-16

## 2019-01-16 RX ORDER — ACETAMINOPHEN 325 MG/1
650 TABLET ORAL AS NEEDED
Status: CANCELLED
Start: 2019-07-10

## 2019-01-16 RX ORDER — ONDANSETRON 2 MG/ML
8 INJECTION INTRAMUSCULAR; INTRAVENOUS AS NEEDED
Status: CANCELLED | OUTPATIENT
Start: 2019-11-20

## 2019-01-16 RX ORDER — EPINEPHRINE 1 MG/ML
0.3 INJECTION, SOLUTION, CONCENTRATE INTRAVENOUS AS NEEDED
Status: CANCELLED | OUTPATIENT
Start: 2019-02-20

## 2019-01-16 RX ORDER — SODIUM CHLORIDE 9 MG/ML
25 INJECTION, SOLUTION INTRAVENOUS CONTINUOUS
Status: CANCELLED | OUTPATIENT
Start: 2019-11-20

## 2019-01-16 RX ORDER — SODIUM CHLORIDE 9 MG/ML
10 INJECTION INTRAMUSCULAR; INTRAVENOUS; SUBCUTANEOUS AS NEEDED
Status: CANCELLED | OUTPATIENT
Start: 2019-01-23

## 2019-01-16 RX ORDER — ACETAMINOPHEN 325 MG/1
650 TABLET ORAL AS NEEDED
Status: CANCELLED
Start: 2019-06-12

## 2019-01-16 RX ORDER — SODIUM CHLORIDE 9 MG/ML
25 INJECTION, SOLUTION INTRAVENOUS CONTINUOUS
Status: CANCELLED | OUTPATIENT
Start: 2019-07-03

## 2019-01-16 RX ORDER — SODIUM CHLORIDE 9 MG/ML
25 INJECTION, SOLUTION INTRAVENOUS CONTINUOUS
Status: CANCELLED | OUTPATIENT
Start: 2019-10-30

## 2019-01-16 RX ORDER — HEPARIN 100 UNIT/ML
300-500 SYRINGE INTRAVENOUS AS NEEDED
Status: CANCELLED
Start: 2019-08-21

## 2019-01-16 RX ORDER — ALBUTEROL SULFATE 0.83 MG/ML
2.5 SOLUTION RESPIRATORY (INHALATION) AS NEEDED
Status: CANCELLED
Start: 2019-10-23

## 2019-01-16 RX ORDER — ONDANSETRON 2 MG/ML
8 INJECTION INTRAMUSCULAR; INTRAVENOUS AS NEEDED
Status: CANCELLED | OUTPATIENT
Start: 2019-02-13

## 2019-01-16 RX ORDER — SODIUM CHLORIDE 0.9 % (FLUSH) 0.9 %
10 SYRINGE (ML) INJECTION AS NEEDED
Status: CANCELLED
Start: 2019-11-13

## 2019-01-16 RX ORDER — HYDROCORTISONE SODIUM SUCCINATE 100 MG/2ML
100 INJECTION, POWDER, FOR SOLUTION INTRAMUSCULAR; INTRAVENOUS AS NEEDED
Status: CANCELLED | OUTPATIENT
Start: 2019-09-25

## 2019-01-16 RX ORDER — ONDANSETRON 2 MG/ML
8 INJECTION INTRAMUSCULAR; INTRAVENOUS AS NEEDED
Status: CANCELLED | OUTPATIENT
Start: 2019-09-18

## 2019-01-16 RX ORDER — SODIUM CHLORIDE 9 MG/ML
10 INJECTION INTRAMUSCULAR; INTRAVENOUS; SUBCUTANEOUS AS NEEDED
Status: CANCELLED | OUTPATIENT
Start: 2019-02-06

## 2019-01-16 RX ORDER — ALBUTEROL SULFATE 0.83 MG/ML
2.5 SOLUTION RESPIRATORY (INHALATION) AS NEEDED
Status: CANCELLED
Start: 2019-10-16

## 2019-01-16 RX ORDER — SODIUM CHLORIDE 0.9 % (FLUSH) 0.9 %
10 SYRINGE (ML) INJECTION AS NEEDED
Status: CANCELLED
Start: 2019-02-20

## 2019-01-16 RX ORDER — HEPARIN 100 UNIT/ML
300-500 SYRINGE INTRAVENOUS AS NEEDED
Status: CANCELLED
Start: 2019-06-05

## 2019-01-16 RX ORDER — PROCHLORPERAZINE EDISYLATE 5 MG/ML
10 INJECTION INTRAMUSCULAR; INTRAVENOUS
Status: ACTIVE | OUTPATIENT
Start: 2019-01-16 | End: 2019-01-16

## 2019-01-16 RX ORDER — HEPARIN 100 UNIT/ML
300-500 SYRINGE INTRAVENOUS AS NEEDED
Status: CANCELLED
Start: 2019-02-20

## 2019-01-16 RX ORDER — ONDANSETRON 2 MG/ML
8 INJECTION INTRAMUSCULAR; INTRAVENOUS AS NEEDED
Status: CANCELLED | OUTPATIENT
Start: 2019-03-06

## 2019-01-16 RX ORDER — ACETAMINOPHEN 325 MG/1
650 TABLET ORAL AS NEEDED
Status: CANCELLED
Start: 2019-09-18

## 2019-01-16 RX ORDER — HEPARIN 100 UNIT/ML
300-500 SYRINGE INTRAVENOUS AS NEEDED
Status: CANCELLED
Start: 2019-10-30

## 2019-01-16 RX ORDER — ACETAMINOPHEN 325 MG/1
650 TABLET ORAL AS NEEDED
Status: CANCELLED
Start: 2019-09-25

## 2019-01-16 RX ORDER — HYDROCORTISONE SODIUM SUCCINATE 100 MG/2ML
100 INJECTION, POWDER, FOR SOLUTION INTRAMUSCULAR; INTRAVENOUS AS NEEDED
Status: CANCELLED | OUTPATIENT
Start: 2019-03-06

## 2019-01-16 RX ORDER — SODIUM CHLORIDE 9 MG/ML
10 INJECTION INTRAMUSCULAR; INTRAVENOUS; SUBCUTANEOUS AS NEEDED
Status: CANCELLED | OUTPATIENT
Start: 2019-07-10

## 2019-01-16 RX ORDER — ACETAMINOPHEN 325 MG/1
650 TABLET ORAL AS NEEDED
Status: CANCELLED
Start: 2019-05-08

## 2019-01-16 RX ORDER — SODIUM CHLORIDE 9 MG/ML
10 INJECTION INTRAMUSCULAR; INTRAVENOUS; SUBCUTANEOUS AS NEEDED
Status: CANCELLED | OUTPATIENT
Start: 2019-09-18

## 2019-01-16 RX ORDER — ALBUTEROL SULFATE 0.83 MG/ML
2.5 SOLUTION RESPIRATORY (INHALATION) AS NEEDED
Status: CANCELLED
Start: 2019-03-13

## 2019-01-16 RX ORDER — EPINEPHRINE 1 MG/ML
0.3 INJECTION, SOLUTION, CONCENTRATE INTRAVENOUS AS NEEDED
Status: CANCELLED | OUTPATIENT
Start: 2019-11-27

## 2019-01-16 RX ORDER — ONDANSETRON 2 MG/ML
8 INJECTION INTRAMUSCULAR; INTRAVENOUS AS NEEDED
Status: CANCELLED | OUTPATIENT
Start: 2019-04-10

## 2019-01-16 RX ORDER — ACETAMINOPHEN 325 MG/1
650 TABLET ORAL AS NEEDED
Status: CANCELLED
Start: 2019-09-04

## 2019-01-16 RX ORDER — SODIUM CHLORIDE 9 MG/ML
25 INJECTION, SOLUTION INTRAVENOUS CONTINUOUS
Status: CANCELLED | OUTPATIENT
Start: 2019-09-25

## 2019-01-16 RX ORDER — ACETAMINOPHEN 325 MG/1
650 TABLET ORAL AS NEEDED
Status: CANCELLED
Start: 2019-11-27

## 2019-01-16 RX ORDER — HEPARIN 100 UNIT/ML
300-500 SYRINGE INTRAVENOUS AS NEEDED
Status: CANCELLED
Start: 2019-05-08

## 2019-01-16 RX ORDER — HYDROCORTISONE SODIUM SUCCINATE 100 MG/2ML
100 INJECTION, POWDER, FOR SOLUTION INTRAMUSCULAR; INTRAVENOUS AS NEEDED
Status: CANCELLED | OUTPATIENT
Start: 2019-02-13

## 2019-01-16 RX ORDER — SODIUM CHLORIDE 9 MG/ML
25 INJECTION, SOLUTION INTRAVENOUS CONTINUOUS
Status: CANCELLED | OUTPATIENT
Start: 2019-11-27

## 2019-01-16 RX ORDER — EPINEPHRINE 1 MG/ML
0.3 INJECTION, SOLUTION, CONCENTRATE INTRAVENOUS AS NEEDED
Status: CANCELLED | OUTPATIENT
Start: 2019-05-08

## 2019-01-16 RX ORDER — HEPARIN 100 UNIT/ML
300-500 SYRINGE INTRAVENOUS AS NEEDED
Status: CANCELLED
Start: 2019-04-03

## 2019-01-16 RX ORDER — ALBUTEROL SULFATE 0.83 MG/ML
2.5 SOLUTION RESPIRATORY (INHALATION) AS NEEDED
Status: CANCELLED
Start: 2019-05-15

## 2019-01-16 RX ORDER — ONDANSETRON 2 MG/ML
8 INJECTION INTRAMUSCULAR; INTRAVENOUS AS NEEDED
Status: CANCELLED | OUTPATIENT
Start: 2019-10-02

## 2019-01-16 RX ORDER — ALBUTEROL SULFATE 0.83 MG/ML
2.5 SOLUTION RESPIRATORY (INHALATION) AS NEEDED
Status: CANCELLED
Start: 2019-08-28

## 2019-01-16 RX ORDER — ONDANSETRON 2 MG/ML
8 INJECTION INTRAMUSCULAR; INTRAVENOUS AS NEEDED
Status: CANCELLED | OUTPATIENT
Start: 2019-03-20

## 2019-01-16 RX ORDER — ALBUTEROL SULFATE 0.83 MG/ML
2.5 SOLUTION RESPIRATORY (INHALATION) AS NEEDED
Status: CANCELLED
Start: 2019-04-10

## 2019-01-16 RX ORDER — HEPARIN 100 UNIT/ML
300-500 SYRINGE INTRAVENOUS AS NEEDED
Status: CANCELLED
Start: 2019-11-20

## 2019-01-16 RX ORDER — SODIUM CHLORIDE 9 MG/ML
10 INJECTION INTRAMUSCULAR; INTRAVENOUS; SUBCUTANEOUS AS NEEDED
Status: CANCELLED | OUTPATIENT
Start: 2019-11-27

## 2019-01-16 RX ORDER — SODIUM CHLORIDE 9 MG/ML
10 INJECTION INTRAMUSCULAR; INTRAVENOUS; SUBCUTANEOUS AS NEEDED
Status: CANCELLED | OUTPATIENT
Start: 2019-02-20

## 2019-01-16 RX ORDER — SODIUM CHLORIDE 0.9 % (FLUSH) 0.9 %
10 SYRINGE (ML) INJECTION AS NEEDED
Status: CANCELLED
Start: 2019-07-10

## 2019-01-16 RX ORDER — SODIUM CHLORIDE 0.9 % (FLUSH) 0.9 %
10 SYRINGE (ML) INJECTION AS NEEDED
Status: CANCELLED
Start: 2019-10-16

## 2019-01-16 RX ORDER — HEPARIN 100 UNIT/ML
300-500 SYRINGE INTRAVENOUS AS NEEDED
Status: CANCELLED
Start: 2019-07-10

## 2019-01-16 RX ORDER — ACETAMINOPHEN 325 MG/1
650 TABLET ORAL AS NEEDED
Status: CANCELLED
Start: 2019-11-20

## 2019-01-16 RX ORDER — HEPARIN 100 UNIT/ML
300-500 SYRINGE INTRAVENOUS AS NEEDED
Status: CANCELLED
Start: 2019-08-28

## 2019-01-16 RX ORDER — ACETAMINOPHEN 325 MG/1
650 TABLET ORAL AS NEEDED
Status: CANCELLED
Start: 2019-03-06

## 2019-01-16 RX ORDER — EPINEPHRINE 1 MG/ML
0.3 INJECTION, SOLUTION, CONCENTRATE INTRAVENOUS AS NEEDED
Status: CANCELLED | OUTPATIENT
Start: 2019-03-06

## 2019-01-16 RX ORDER — ACETAMINOPHEN 325 MG/1
650 TABLET ORAL AS NEEDED
Status: CANCELLED
Start: 2019-08-28

## 2019-01-16 RX ORDER — ONDANSETRON 2 MG/ML
8 INJECTION INTRAMUSCULAR; INTRAVENOUS AS NEEDED
Status: CANCELLED | OUTPATIENT
Start: 2019-05-29

## 2019-01-16 RX ORDER — SODIUM CHLORIDE 0.9 % (FLUSH) 0.9 %
10 SYRINGE (ML) INJECTION AS NEEDED
Status: CANCELLED
Start: 2019-10-30

## 2019-01-16 RX ORDER — HEPARIN 100 UNIT/ML
300-500 SYRINGE INTRAVENOUS AS NEEDED
Status: CANCELLED
Start: 2019-02-13

## 2019-01-16 RX ORDER — ONDANSETRON 2 MG/ML
8 INJECTION INTRAMUSCULAR; INTRAVENOUS AS NEEDED
Status: CANCELLED | OUTPATIENT
Start: 2019-05-08

## 2019-01-16 RX ORDER — HEPARIN 100 UNIT/ML
300-500 SYRINGE INTRAVENOUS AS NEEDED
Status: CANCELLED
Start: 2019-03-13

## 2019-01-16 RX ORDER — ALBUTEROL SULFATE 0.83 MG/ML
2.5 SOLUTION RESPIRATORY (INHALATION) AS NEEDED
Status: CANCELLED
Start: 2019-03-06

## 2019-01-16 RX ORDER — HEPARIN 100 UNIT/ML
300-500 SYRINGE INTRAVENOUS AS NEEDED
Status: CANCELLED
Start: 2019-05-01

## 2019-01-16 RX ORDER — HEPARIN 100 UNIT/ML
300-500 SYRINGE INTRAVENOUS AS NEEDED
Status: CANCELLED
Start: 2019-05-15

## 2019-01-16 RX ORDER — SODIUM CHLORIDE 9 MG/ML
10 INJECTION INTRAMUSCULAR; INTRAVENOUS; SUBCUTANEOUS AS NEEDED
Status: CANCELLED | OUTPATIENT
Start: 2019-08-28

## 2019-01-16 RX ORDER — HYDROCORTISONE SODIUM SUCCINATE 100 MG/2ML
100 INJECTION, POWDER, FOR SOLUTION INTRAMUSCULAR; INTRAVENOUS AS NEEDED
Status: CANCELLED | OUTPATIENT
Start: 2019-06-26

## 2019-01-16 RX ORDER — ONDANSETRON 2 MG/ML
8 INJECTION INTRAMUSCULAR; INTRAVENOUS AS NEEDED
Status: CANCELLED | OUTPATIENT
Start: 2019-01-23

## 2019-01-16 RX ORDER — EPINEPHRINE 1 MG/ML
0.3 INJECTION, SOLUTION, CONCENTRATE INTRAVENOUS AS NEEDED
Status: CANCELLED | OUTPATIENT
Start: 2019-04-17

## 2019-01-16 RX ORDER — ACETAMINOPHEN 325 MG/1
650 TABLET ORAL AS NEEDED
Status: CANCELLED
Start: 2019-06-05

## 2019-01-16 RX ORDER — SODIUM CHLORIDE 0.9 % (FLUSH) 0.9 %
10 SYRINGE (ML) INJECTION AS NEEDED
Status: CANCELLED
Start: 2019-06-05

## 2019-01-16 RX ORDER — ONDANSETRON 2 MG/ML
8 INJECTION INTRAMUSCULAR; INTRAVENOUS AS NEEDED
Status: CANCELLED | OUTPATIENT
Start: 2019-09-04

## 2019-01-16 RX ORDER — SODIUM CHLORIDE 0.9 % (FLUSH) 0.9 %
10 SYRINGE (ML) INJECTION AS NEEDED
Status: CANCELLED
Start: 2019-11-20

## 2019-01-16 RX ORDER — ONDANSETRON 2 MG/ML
8 INJECTION INTRAMUSCULAR; INTRAVENOUS AS NEEDED
Status: CANCELLED | OUTPATIENT
Start: 2019-11-13

## 2019-01-16 RX ORDER — SODIUM CHLORIDE 0.9 % (FLUSH) 0.9 %
10 SYRINGE (ML) INJECTION AS NEEDED
Status: CANCELLED
Start: 2019-02-13

## 2019-01-16 RX ORDER — SODIUM CHLORIDE 9 MG/ML
25 INJECTION, SOLUTION INTRAVENOUS CONTINUOUS
Status: CANCELLED | OUTPATIENT
Start: 2019-05-01

## 2019-01-16 RX ORDER — SODIUM CHLORIDE 9 MG/ML
25 INJECTION, SOLUTION INTRAVENOUS CONTINUOUS
Status: CANCELLED | OUTPATIENT
Start: 2019-08-21

## 2019-01-16 RX ORDER — SODIUM CHLORIDE 0.9 % (FLUSH) 0.9 %
10 SYRINGE (ML) INJECTION AS NEEDED
Status: CANCELLED
Start: 2019-09-25

## 2019-01-16 RX ORDER — EPINEPHRINE 1 MG/ML
0.3 INJECTION, SOLUTION, CONCENTRATE INTRAVENOUS AS NEEDED
Status: CANCELLED | OUTPATIENT
Start: 2019-11-13

## 2019-01-16 RX ORDER — ACETAMINOPHEN 325 MG/1
650 TABLET ORAL AS NEEDED
Status: CANCELLED
Start: 2019-08-21

## 2019-01-16 RX ORDER — HYDROCORTISONE SODIUM SUCCINATE 100 MG/2ML
100 INJECTION, POWDER, FOR SOLUTION INTRAMUSCULAR; INTRAVENOUS AS NEEDED
Status: CANCELLED | OUTPATIENT
Start: 2019-06-05

## 2019-01-16 RX ORDER — SODIUM CHLORIDE 9 MG/ML
10 INJECTION INTRAMUSCULAR; INTRAVENOUS; SUBCUTANEOUS AS NEEDED
Status: CANCELLED | OUTPATIENT
Start: 2019-03-13

## 2019-01-16 RX ORDER — SODIUM CHLORIDE 9 MG/ML
25 INJECTION, SOLUTION INTRAVENOUS CONTINUOUS
Status: CANCELLED | OUTPATIENT
Start: 2019-09-04

## 2019-01-16 RX ORDER — SODIUM CHLORIDE 0.9 % (FLUSH) 0.9 %
10 SYRINGE (ML) INJECTION AS NEEDED
Status: CANCELLED
Start: 2019-10-23

## 2019-01-16 RX ORDER — SODIUM CHLORIDE 9 MG/ML
10 INJECTION INTRAMUSCULAR; INTRAVENOUS; SUBCUTANEOUS AS NEEDED
Status: CANCELLED | OUTPATIENT
Start: 2019-06-05

## 2019-01-16 RX ORDER — ONDANSETRON 2 MG/ML
8 INJECTION INTRAMUSCULAR; INTRAVENOUS AS NEEDED
Status: CANCELLED | OUTPATIENT
Start: 2019-07-03

## 2019-01-16 RX ORDER — EPINEPHRINE 1 MG/ML
0.3 INJECTION, SOLUTION, CONCENTRATE INTRAVENOUS AS NEEDED
Status: CANCELLED | OUTPATIENT
Start: 2019-10-16

## 2019-01-16 RX ORDER — EPINEPHRINE 1 MG/ML
0.3 INJECTION, SOLUTION, CONCENTRATE INTRAVENOUS AS NEEDED
Status: CANCELLED | OUTPATIENT
Start: 2019-09-04

## 2019-01-16 RX ADMIN — Medication 10 ML: at 09:28

## 2019-01-16 RX ADMIN — SODIUM CHLORIDE 25 ML/HR: 900 INJECTION, SOLUTION INTRAVENOUS at 09:38

## 2019-01-16 RX ADMIN — SODIUM CHLORIDE, PRESERVATIVE FREE 500 UNITS: 5 INJECTION INTRAVENOUS at 10:15

## 2019-01-16 RX ADMIN — Medication 20 ML: at 10:15

## 2019-01-16 RX ADMIN — VINORELBINE TARTRATE 35 MG: 10 INJECTION INTRAVENOUS at 09:50

## 2019-01-16 NOTE — PROGRESS NOTES
[facility] Progress Note    Date: 2019    Name: Tami Mcleod              MRN: [id number]              : 1960    Chemotherapy Cycle:C1D8 Vinorelbine      Pt to [facility], ambulatory, at [phone] accompanied by her significant other. Ms. Alfred Cameron was assessed and education was provided. Ms. Cathie Edmonds vitals were reviewed. Visit Vitals  /80 (BP 1 Location: Left arm, BP Patient Position: Sitting)   Pulse 91   Temp 98 °F (36.7 °C)   Resp 18   Ht 5' 6.5\" (1.689 m)   Wt 61.3 kg (135 lb 1.6 oz)   SpO2 96%   BMI 21.48 kg/m²       Left chest later lumen of her double lumen mediport accessed with 20 g 1 inch el needle. Port flushed easily and had brisk blood return. Blood drawn off and sent for CBC  per written orders after 10 ml waste. NS initiated @ 50 mL/hr. Patient declined having medial port flushed today. She agreed to allow extra time next visit in case medial port needs Activase. Lab results were obtained and reviewed. Recent Results (from the past 12 hour(s))   CBC WITH 3 PART DIFF    Collection Time: 19  9:28 AM   Result Value Ref Range    WBC 4.5 4.5 - 13.0 K/uL    RBC 4.46 4.10 - 5.10 M/uL    HGB 10.4 (L) 12.0 - 16 g/dL    HCT 33.6 (L) 36 - 48 %    MCV 75.3 (L) 78 - 102 FL    MCH 23.3 (L) 25.0 - 35.0 PG    MCHC 31.0 31 - 37 g/dL    RDW 16.6 (H) 11.5 - 14.5 %    PLATELET 822 184 - 181 K/uL    NEUTROPHILS 70 40 - 70 %    MIXED CELLS 4 0.1 - 17 %    LYMPHOCYTES 26 14 - 44 %    ABS. NEUTROPHILS 3.1 1.8 - 9.5 K/UL    ABS. MIXED CELLS 0.2 0.0 - 2.3 K/uL    ABS. LYMPHOCYTES 1.2 1.1 - 5.9 K/UL    DF AUTOMATED         Lab results within ordered parameters to give chemo today. ANC = 3.1, PLT = 246. Chemo dosages verified with today's BSA and found to be within 10% of ordered dosages. No pre-medications ordered. Vinorelbine 35 mg was infused at over 10 minutes as ordered. VS stable at end of infusion and pt denied complaints.  Line flushed with NS and blood return from port re-verified. Patient Vitals for the past 12 hrs:   Temp Pulse Resp BP SpO2   01/16/19 0910 98 °F (36.7 °C) 91 18 135/80 96 %     Line flushed with 100 mL NS following completion of the Vinorelbine as ordered. Ms. Barry Tompkins tolerated infusion, and had no complaints at this time. She declined to stay for an observation period. Mediport flushed with NS 20 ml and Heparin 500 units then de-accessed. No irritation or bleeding noted. Bandaid applied. Patient armband removed and shredded. Ms. Barry Tompkins was discharged from [place] [address] in stable condition at 1020. She is to return on 01/23/19 at 1000 for her next  appointment.     Palmira Rachel RN  January 16, 2019  1282

## 2019-01-23 ENCOUNTER — HOSPITAL ENCOUNTER (OUTPATIENT)
Dept: INFUSION THERAPY | Age: 59
Discharge: HOME | End: 2019-01-23
Payer: MEDICARE

## 2019-01-23 VITALS
BODY MASS INDEX: 21.8 KG/M2 | OXYGEN SATURATION: 98 % | HEIGHT: 67 IN | TEMPERATURE: 98.5 F | DIASTOLIC BLOOD PRESSURE: 87 MMHG | SYSTOLIC BLOOD PRESSURE: 149 MMHG | WEIGHT: 138.9 LBS | RESPIRATION RATE: 18 BRPM | HEART RATE: 88 BPM

## 2019-01-23 DIAGNOSIS — C34.91 NON-SMALL CELL CANCER OF RIGHT LUNG (HCC): Primary | ICD-10-CM

## 2019-01-23 LAB
BASO+EOS+MONOS # BLD AUTO: 0.1 K/UL (ref 0–2.3)
BASO+EOS+MONOS NFR BLD AUTO: 3 % (ref 0.1–17)
DIFFERENTIAL METHOD BLD: ABNORMAL
ERYTHROCYTE [DISTWIDTH] IN BLOOD BY AUTOMATED COUNT: 17 % (ref 11.5–14.5)
HCT VFR BLD AUTO: 30.6 % (ref 36–48)
HGB BLD-MCNC: 9.7 G/DL (ref 12–16)
LYMPHOCYTES # BLD: 1.1 K/UL (ref 1.1–5.9)
LYMPHOCYTES NFR BLD: 30 % (ref 14–44)
MCH RBC QN AUTO: 24.3 PG (ref 25–35)
MCHC RBC AUTO-ENTMCNC: 31.7 G/DL (ref 31–37)
MCV RBC AUTO: 76.5 FL (ref 78–102)
NEUTS SEG # BLD: 2.3 K/UL (ref 1.8–9.5)
NEUTS SEG NFR BLD: 67 % (ref 40–70)
PLATELET # BLD AUTO: 349 K/UL (ref 140–440)
RBC # BLD AUTO: 4 M/UL (ref 4.1–5.1)
WBC # BLD AUTO: 3.5 K/UL (ref 4.5–13)

## 2019-01-23 PROCEDURE — 74011000258 HC RX REV CODE- 258: Performed by: INTERNAL MEDICINE

## 2019-01-23 PROCEDURE — 74011250636 HC RX REV CODE- 250/636: Performed by: INTERNAL MEDICINE

## 2019-01-23 PROCEDURE — 96409 CHEMO IV PUSH SNGL DRUG: CPT

## 2019-01-23 PROCEDURE — 85025 COMPLETE CBC W/AUTO DIFF WBC: CPT

## 2019-01-23 PROCEDURE — 77030012965 HC NDL HUBR BBMI -A

## 2019-01-23 RX ORDER — SODIUM CHLORIDE 0.9 % (FLUSH) 0.9 %
10 SYRINGE (ML) INJECTION AS NEEDED
Status: DISPENSED | OUTPATIENT
Start: 2019-01-23 | End: 2019-01-23

## 2019-01-23 RX ORDER — SODIUM CHLORIDE 9 MG/ML
25 INJECTION, SOLUTION INTRAVENOUS CONTINUOUS
Status: DISPENSED | OUTPATIENT
Start: 2019-01-23 | End: 2019-01-23

## 2019-01-23 RX ORDER — HEPARIN 100 UNIT/ML
300-500 SYRINGE INTRAVENOUS AS NEEDED
Status: DISPENSED | OUTPATIENT
Start: 2019-01-23 | End: 2019-01-23

## 2019-01-23 RX ORDER — SODIUM CHLORIDE 9 MG/ML
10 INJECTION INTRAMUSCULAR; INTRAVENOUS; SUBCUTANEOUS AS NEEDED
Status: ACTIVE | OUTPATIENT
Start: 2019-01-23 | End: 2019-01-23

## 2019-01-23 RX ADMIN — SODIUM CHLORIDE 25 ML/HR: 900 INJECTION, SOLUTION INTRAVENOUS at 10:42

## 2019-01-23 RX ADMIN — SODIUM CHLORIDE 10 ML: 9 INJECTION INTRAMUSCULAR; INTRAVENOUS; SUBCUTANEOUS at 11:51

## 2019-01-23 RX ADMIN — VINORELBINE TARTRATE 35 MG: 10 INJECTION INTRAVENOUS at 11:21

## 2019-01-23 RX ADMIN — Medication 30 ML: at 10:10

## 2019-01-23 RX ADMIN — SODIUM CHLORIDE, PRESERVATIVE FREE 500 UNITS: 5 INJECTION INTRAVENOUS at 11:52

## 2019-01-23 NOTE — PROGRESS NOTES
[facility] Progress Note    Date: 2019    Name: Rg Joseph              MRN: [id number]              : 1960    Chemotherapy Cycle:C1D15 Vinorelbine      Pt to [facility], ambulatory, at 1000 am. No complaints or concerns voiced      . Ms. Oliva Friend was assessed and education was provided. Ms. Tammie Coker vitals were reviewed. Visit Vitals  /87 (BP 1 Location: Left arm, BP Patient Position: Sitting)   Pulse 88   Temp 98.5 °F (36.9 °C)   Resp 18   Ht 5' 6.5\" (1.689 m)   Wt 63 kg (138 lb 14.4 oz)   SpO2 98%   BMI 22.08 kg/m²       Left chest medial lumen of her double lumen mediport accessed with 20 g 1 inch el needle. Port flushed easily and had brisk blood return. Blood drawn off for cbc      Lab results were obtained and reviewed. Recent Results (from the past 12 hour(s))   CBC WITH 3 PART DIFF    Collection Time: 19 10:12 AM   Result Value Ref Range    WBC 3.5 (L) 4.5 - 13.0 K/uL    RBC 4.00 (L) 4.10 - 5.10 M/uL    HGB 9.7 (L) 12.0 - 16 g/dL    HCT 30.6 (L) 36 - 48 %    MCV 76.5 (L) 78 - 102 FL    MCH 24.3 (L) 25.0 - 35.0 PG    MCHC 31.7 31 - 37 g/dL    RDW 17.0 (H) 11.5 - 14.5 %    PLATELET 992 572 - 068 K/uL    NEUTROPHILS 67 40 - 70 %    MIXED CELLS 3 0.1 - 17 %    LYMPHOCYTES 30 14 - 44 %    ABS. NEUTROPHILS 2.3 1.8 - 9.5 K/UL    ABS. MIXED CELLS 0.1 0.0 - 2.3 K/uL    ABS. LYMPHOCYTES 1.1 1.1 - 5.9 K/UL    DF AUTOMATED         Lab results within ordered parameters to give chemo today. Chemo dosages verified with today's BSA and found to be within 10% of ordered dosages. No pre-medications ordered. Vinorelbine 35 mg was infused at over 10 minutes as ordered. VS stable at end of infusion and pt denied complaints. Line flushed with NS and blood return from port re-verified.     Patient Vitals for the past 12 hrs:   Temp Pulse Resp BP SpO2   19 1154 98.5 °F (36.9 °C) 88 18 149/87 98 %   19 1005 99.3 °F (37.4 °C) 87 18 132/80 97 %     Line flushed with 100 mL NS following completion of the Vinorelbine. .     Ms. Nedra Ma tolerated infusion, and had no complaints. Mediport flushed with NS 20 ml and Heparin 500 units then de-accessed. No irritation or bleeding noted. Bandaid applied. Patient armband removed and shredded. Ms. Nedra Ma was discharged from [place] [address] in stable condition at   1155. She is to return on 2/6/19 at 1000 am for her next  appointment.     Kaela Young RN  January 23, 2019  [id number]

## 2019-02-06 ENCOUNTER — HOSPITAL ENCOUNTER (OUTPATIENT)
Dept: INFUSION THERAPY | Age: 59
Discharge: HOME | End: 2019-02-06
Payer: MEDICARE

## 2019-02-06 VITALS
DIASTOLIC BLOOD PRESSURE: 86 MMHG | SYSTOLIC BLOOD PRESSURE: 138 MMHG | BODY MASS INDEX: 21.69 KG/M2 | OXYGEN SATURATION: 95 % | TEMPERATURE: 98.7 F | HEIGHT: 67 IN | WEIGHT: 138.2 LBS | HEART RATE: 95 BPM | RESPIRATION RATE: 18 BRPM

## 2019-02-06 DIAGNOSIS — C34.91 NON-SMALL CELL CANCER OF RIGHT LUNG (HCC): Primary | ICD-10-CM

## 2019-02-06 LAB
ALBUMIN SERPL-MCNC: 3.2 G/DL (ref 3.4–5)
ALBUMIN/GLOB SERPL: 0.7 {RATIO} (ref 0.8–1.7)
ALP SERPL-CCNC: 89 U/L (ref 45–117)
ALT SERPL-CCNC: 13 U/L (ref 13–56)
ANION GAP SERPL CALC-SCNC: 5 MMOL/L (ref 3–18)
AST SERPL-CCNC: 14 U/L (ref 15–37)
BASO+EOS+MONOS # BLD AUTO: 0.3 K/UL (ref 0–2.3)
BASO+EOS+MONOS NFR BLD AUTO: 8 % (ref 0.1–17)
BILIRUB SERPL-MCNC: 0.2 MG/DL (ref 0.2–1)
BUN SERPL-MCNC: 7 MG/DL (ref 7–18)
BUN/CREAT SERPL: 8 (ref 12–20)
CALCIUM SERPL-MCNC: 8.6 MG/DL (ref 8.5–10.1)
CHLORIDE SERPL-SCNC: 106 MMOL/L (ref 100–108)
CO2 SERPL-SCNC: 27 MMOL/L (ref 21–32)
CREAT SERPL-MCNC: 0.83 MG/DL (ref 0.6–1.3)
DIFFERENTIAL METHOD BLD: ABNORMAL
ERYTHROCYTE [DISTWIDTH] IN BLOOD BY AUTOMATED COUNT: 18.3 % (ref 11.5–14.5)
GLOBULIN SER CALC-MCNC: 4.3 G/DL (ref 2–4)
GLUCOSE SERPL-MCNC: 151 MG/DL (ref 74–99)
HCT VFR BLD AUTO: 31.2 % (ref 36–48)
HGB BLD-MCNC: 9.8 G/DL (ref 12–16)
LYMPHOCYTES # BLD: 0.9 K/UL (ref 1.1–5.9)
LYMPHOCYTES NFR BLD: 24 % (ref 14–44)
MCH RBC QN AUTO: 24.1 PG (ref 25–35)
MCHC RBC AUTO-ENTMCNC: 31.4 G/DL (ref 31–37)
MCV RBC AUTO: 76.8 FL (ref 78–102)
NEUTS SEG # BLD: 2.7 K/UL (ref 1.8–9.5)
NEUTS SEG NFR BLD: 68 % (ref 40–70)
PLATELET # BLD AUTO: 258 K/UL (ref 140–440)
POTASSIUM SERPL-SCNC: 3.6 MMOL/L (ref 3.5–5.5)
PROT SERPL-MCNC: 7.5 G/DL (ref 6.4–8.2)
RBC # BLD AUTO: 4.06 M/UL (ref 4.1–5.1)
SODIUM SERPL-SCNC: 138 MMOL/L (ref 136–145)
WBC # BLD AUTO: 3.9 K/UL (ref 4.5–13)

## 2019-02-06 PROCEDURE — 74011000258 HC RX REV CODE- 258: Performed by: INTERNAL MEDICINE

## 2019-02-06 PROCEDURE — 80053 COMPREHEN METABOLIC PANEL: CPT

## 2019-02-06 PROCEDURE — 96409 CHEMO IV PUSH SNGL DRUG: CPT

## 2019-02-06 PROCEDURE — 77030012965 HC NDL HUBR BBMI -A

## 2019-02-06 PROCEDURE — 36415 COLL VENOUS BLD VENIPUNCTURE: CPT

## 2019-02-06 PROCEDURE — 74011250636 HC RX REV CODE- 250/636: Performed by: INTERNAL MEDICINE

## 2019-02-06 PROCEDURE — 85025 COMPLETE CBC W/AUTO DIFF WBC: CPT

## 2019-02-06 RX ORDER — SODIUM CHLORIDE 9 MG/ML
10 INJECTION INTRAMUSCULAR; INTRAVENOUS; SUBCUTANEOUS AS NEEDED
Status: ACTIVE | OUTPATIENT
Start: 2019-02-06 | End: 2019-02-06

## 2019-02-06 RX ORDER — SODIUM CHLORIDE 9 MG/ML
25 INJECTION, SOLUTION INTRAVENOUS CONTINUOUS
Status: DISPENSED | OUTPATIENT
Start: 2019-02-06 | End: 2019-02-06

## 2019-02-06 RX ORDER — HEPARIN 100 UNIT/ML
300-500 SYRINGE INTRAVENOUS AS NEEDED
Status: DISPENSED | OUTPATIENT
Start: 2019-02-06 | End: 2019-02-06

## 2019-02-06 RX ORDER — SODIUM CHLORIDE 0.9 % (FLUSH) 0.9 %
10-40 SYRINGE (ML) INJECTION AS NEEDED
Status: DISPENSED | OUTPATIENT
Start: 2019-02-06 | End: 2019-02-06

## 2019-02-06 RX ADMIN — HEPARIN 500 UNITS: 100 SYRINGE at 11:08

## 2019-02-06 RX ADMIN — SODIUM CHLORIDE 25 ML/HR: 900 INJECTION, SOLUTION INTRAVENOUS at 10:34

## 2019-02-06 RX ADMIN — VINORELBINE TARTRATE 35 MG: 10 INJECTION INTRAVENOUS at 10:40

## 2019-02-06 NOTE — PROGRESS NOTES
[facility] Progress Note    Date: 2019    Name: Bishop Her              MRN: [id number]              : 1960    Chemotherapy Cycle:C1D1 Vinorelbine      Pt to [facility], ambulatory, at 0900. Ms. Roger William was assessed and education was provided. Ms. Anamaria Epstein vitals were reviewed. Visit Vitals  /86 (BP 1 Location: Left arm, BP Patient Position: Sitting)   Pulse 95   Temp 98.7 °F (37.1 °C)   Resp 18   Ht 5' 6.54\" (1.69 m)   Wt 62.7 kg (138 lb 3.2 oz)   SpO2 95%   Breastfeeding? No   BMI 21.95 kg/m²       Left chest later lumen of her double lumen mediport accessed with 20 g 1 inch el needle to both lumens. Medial lumen first with no blood return so the lateral lumen was accessed. Port flushed easily and had brisk blood return. Blood drawn off and sent for CBC and CMP per written orders after 10 ml waste. NS initiated @ 50 mL/hr. Lab results were obtained and reviewed. Recent Results (from the past 12 hour(s))   CBC WITH 3 PART DIFF    Collection Time: 19  9:15 AM   Result Value Ref Range    WBC 3.9 (L) 4.5 - 13.0 K/uL    RBC 4.06 (L) 4.10 - 5.10 M/uL    HGB 9.8 (L) 12.0 - 16 g/dL    HCT 31.2 (L) 36 - 48 %    MCV 76.8 (L) 78 - 102 FL    MCH 24.1 (L) 25.0 - 35.0 PG    MCHC 31.4 31 - 37 g/dL    RDW 18.3 (H) 11.5 - 14.5 %    PLATELET 426 735 - 466 K/uL    NEUTROPHILS 68 40 - 70 %    MIXED CELLS 8 0.1 - 17 %    LYMPHOCYTES 24 14 - 44 %    ABS. NEUTROPHILS 2.7 1.8 - 9.5 K/UL    ABS. MIXED CELLS 0.3 0.0 - 2.3 K/uL    ABS.  LYMPHOCYTES 0.9 (L) 1.1 - 5.9 K/UL    DF AUTOMATED     METABOLIC PANEL, COMPREHENSIVE    Collection Time: 19  9:15 AM   Result Value Ref Range    Sodium 138 136 - 145 mmol/L    Potassium 3.6 3.5 - 5.5 mmol/L    Chloride 106 100 - 108 mmol/L    CO2 27 21 - 32 mmol/L    Anion gap 5 3.0 - 18 mmol/L    Glucose 151 (H) 74 - 99 mg/dL    BUN 7 7.0 - 18 MG/DL    Creatinine 0.83 0.6 - 1.3 MG/DL    BUN/Creatinine ratio 8 (L) 12 - 20      GFR est AA >60 >60 ml/min/1.73m2 GFR est non-AA >60 >60 ml/min/1.73m2    Calcium 8.6 8.5 - 10.1 MG/DL    Bilirubin, total 0.2 0.2 - 1.0 MG/DL    ALT (SGPT) 13 13 - 56 U/L    AST (SGOT) 14 (L) 15 - 37 U/L    Alk. phosphatase 89 45 - 117 U/L    Protein, total 7.5 6.4 - 8.2 g/dL    Albumin 3.2 (L) 3.4 - 5.0 g/dL    Globulin 4.3 (H) 2.0 - 4.0 g/dL    A-G Ratio 0.7 (L) 0.8 - 1.7         Lab results within ordered parameters to give chemo today. ANC = 2.7, PLT = 258. Chemo dosages verified with today's BSA and found to be within 10% of ordered dosages. No pre-medications ordered. Vinorelbine 35 mg was infused at over 10 minutes as ordered. VS stable at end of infusion and pt denied complaints. Line flushed with NS and blood return from port re-verified. Line flushed with 100 mL NS following completion of the Vinorelbine as ordered. Ms. Christa Rosales tolerated infusion, and had no complaints at this time. She declined to stay for an observation period. Medial lumen of mediport flushed and was positive for blood return after having the patient lift her arm above her head. Lateral lumen of mediport positive for blood return as well. Both lumens flushed with NS 20 ml and Heparin 500 units then de-accessed. No irritation or bleeding noted. Bandaid applied. Patient armband removed and shredded. Ms. Christa Rosales was discharged from [place] [address] in stable condition at 1115. She is to return on 02/13/19 at 0900 for her next  appointment.     Lupie Oppenheim, RN  February 6, 2019

## 2019-02-13 ENCOUNTER — HOSPITAL ENCOUNTER (OUTPATIENT)
Dept: INFUSION THERAPY | Age: 59
Discharge: HOME | End: 2019-02-13
Payer: MEDICARE

## 2019-02-13 VITALS
SYSTOLIC BLOOD PRESSURE: 147 MMHG | TEMPERATURE: 98.5 F | WEIGHT: 137.1 LBS | RESPIRATION RATE: 18 BRPM | HEIGHT: 67 IN | BODY MASS INDEX: 21.52 KG/M2 | DIASTOLIC BLOOD PRESSURE: 91 MMHG | OXYGEN SATURATION: 98 % | HEART RATE: 84 BPM

## 2019-02-13 DIAGNOSIS — C34.91 NON-SMALL CELL CANCER OF RIGHT LUNG (HCC): Primary | ICD-10-CM

## 2019-02-13 LAB
BASO+EOS+MONOS # BLD AUTO: 0.2 K/UL (ref 0–2.3)
BASO+EOS+MONOS NFR BLD AUTO: 5 % (ref 0.1–17)
DIFFERENTIAL METHOD BLD: ABNORMAL
ERYTHROCYTE [DISTWIDTH] IN BLOOD BY AUTOMATED COUNT: 18 % (ref 11.5–14.5)
HCT VFR BLD AUTO: 31 % (ref 36–48)
HGB BLD-MCNC: 9.8 G/DL (ref 12–16)
LYMPHOCYTES # BLD: 1 K/UL (ref 1.1–5.9)
LYMPHOCYTES NFR BLD: 21 % (ref 14–44)
MCH RBC QN AUTO: 24 PG (ref 25–35)
MCHC RBC AUTO-ENTMCNC: 31.6 G/DL (ref 31–37)
MCV RBC AUTO: 75.8 FL (ref 78–102)
NEUTS SEG # BLD: 3.6 K/UL (ref 1.8–9.5)
NEUTS SEG NFR BLD: 74 % (ref 40–70)
PLATELET # BLD AUTO: 249 K/UL (ref 140–440)
RBC # BLD AUTO: 4.09 M/UL (ref 4.1–5.1)
WBC # BLD AUTO: 4.8 K/UL (ref 4.5–13)

## 2019-02-13 PROCEDURE — 77030012965 HC NDL HUBR BBMI -A

## 2019-02-13 PROCEDURE — 85025 COMPLETE CBC W/AUTO DIFF WBC: CPT

## 2019-02-13 PROCEDURE — 74011000258 HC RX REV CODE- 258: Performed by: INTERNAL MEDICINE

## 2019-02-13 PROCEDURE — 96409 CHEMO IV PUSH SNGL DRUG: CPT

## 2019-02-13 PROCEDURE — 74011250636 HC RX REV CODE- 250/636: Performed by: INTERNAL MEDICINE

## 2019-02-13 RX ORDER — SODIUM CHLORIDE 0.9 % (FLUSH) 0.9 %
10-40 SYRINGE (ML) INJECTION AS NEEDED
Status: DISPENSED | OUTPATIENT
Start: 2019-02-13 | End: 2019-02-13

## 2019-02-13 RX ORDER — HEPARIN 100 UNIT/ML
300-500 SYRINGE INTRAVENOUS AS NEEDED
Status: DISPENSED | OUTPATIENT
Start: 2019-02-13 | End: 2019-02-13

## 2019-02-13 RX ADMIN — VINORELBINE TARTRATE 35 MG: 10 INJECTION INTRAVENOUS at 09:53

## 2019-02-13 RX ADMIN — SODIUM CHLORIDE, PRESERVATIVE FREE 500 UNITS: 5 INJECTION INTRAVENOUS at 10:21

## 2019-02-13 RX ADMIN — Medication 10 ML: at 10:21

## 2019-02-13 NOTE — PROGRESS NOTES
[facility] Progress Note    Date: 2019    Name: Anjel Montano              MRN: [id number]              : 1960    Chemotherapy Cycle: C2 D8 Navelbine       Pt to [facility], ambulatory, at 0900 . Ms. Elo Guardado was assessed and education was provided. Ms. Mithc Quiroz vitals were reviewed. Visit Vitals  BP (!) 147/91 (BP 1 Location: Right arm, BP Patient Position: Sitting)   Pulse 84   Temp 98.5 °F (36.9 °C)   Resp 18   Ht 5' 6.54\" (1.69 m)   Wt 62.2 kg (137 lb 1.6 oz)   SpO2 98%   BMI 21.77 kg/m²       Upper left chest double mediport accessed in medial site  with 20 g 3/4 inch el needle. Port flushed easily and had brisk blood return. Blood drawn off and sent for CBC per written orders after 10 ml waste. .    Lab results were obtained and reviewed. Recent Results (from the past 12 hour(s))   CBC WITH 3 PART DIFF    Collection Time: 19  9:00 AM   Result Value Ref Range    WBC 4.8 4.5 - 13.0 K/uL    RBC 4.09 (L) 4.10 - 5.10 M/uL    HGB 9.8 (L) 12.0 - 16 g/dL    HCT 31.0 (L) 36 - 48 %    MCV 75.8 (L) 78 - 102 FL    MCH 24.0 (L) 25.0 - 35.0 PG    MCHC 31.6 31 - 37 g/dL    RDW 18.0 (H) 11.5 - 14.5 %    PLATELET 439 948 - 297 K/uL    NEUTROPHILS 74 (H) 40 - 70 %    MIXED CELLS 5 0.1 - 17 %    LYMPHOCYTES 21 14 - 44 %    ABS. NEUTROPHILS 3.6 1.8 - 9.5 K/UL    ABS. MIXED CELLS 0.2 0.0 - 2.3 K/uL    ABS. LYMPHOCYTES 1.0 (L) 1.1 - 5.9 K/UL    DF AUTOMATED         Lab results within ordered parameters to give chemo today. . Chemo dosages verified with today's BSA and found to be within 10% of ordered dosages. Navelbine 35 mg was infused over 10 minutes as ordered. . VS stable at end of infusion and pt denied complaints. Line flushed with NS and blood return from port re-verified. Ms. Elo Guardado tolerated infusion, and had no complaints at this time. Ms Elo Guardado declined to wait for a 30 minute observation period. Mediport flushed with NS 20 ml and Heparin 500 units then de-accessed.  No irritation or bleeding noted. Bandaid applied. Patient armband removed and shredded. Ms. Elo Guardado was discharged from [place] [address] in stable condition at 1030. She is to return on 2/20/19 at 0900 for her next  appointment.     Lata Kimball RN  February 13, 2019

## 2019-02-20 ENCOUNTER — HOSPITAL ENCOUNTER (OUTPATIENT)
Dept: INFUSION THERAPY | Age: 59
Discharge: HOME | End: 2019-02-20
Payer: MEDICARE

## 2019-02-20 VITALS
HEIGHT: 67 IN | TEMPERATURE: 97.9 F | HEART RATE: 88 BPM | DIASTOLIC BLOOD PRESSURE: 94 MMHG | BODY MASS INDEX: 21.69 KG/M2 | RESPIRATION RATE: 20 BRPM | WEIGHT: 138.19 LBS | SYSTOLIC BLOOD PRESSURE: 175 MMHG | OXYGEN SATURATION: 100 %

## 2019-02-20 DIAGNOSIS — C34.91 NON-SMALL CELL CANCER OF RIGHT LUNG (HCC): Primary | ICD-10-CM

## 2019-02-20 LAB
BASO+EOS+MONOS # BLD AUTO: 0.2 K/UL (ref 0–2.3)
BASO+EOS+MONOS NFR BLD AUTO: 4 % (ref 0.1–17)
DIFFERENTIAL METHOD BLD: ABNORMAL
ERYTHROCYTE [DISTWIDTH] IN BLOOD BY AUTOMATED COUNT: 18.1 % (ref 11.5–14.5)
HCT VFR BLD AUTO: 29.7 % (ref 36–48)
HGB BLD-MCNC: 9.4 G/DL (ref 12–16)
LYMPHOCYTES # BLD: 1 K/UL (ref 1.1–5.9)
LYMPHOCYTES NFR BLD: 26 % (ref 14–44)
MCH RBC QN AUTO: 24.2 PG (ref 25–35)
MCHC RBC AUTO-ENTMCNC: 31.6 G/DL (ref 31–37)
MCV RBC AUTO: 76.3 FL (ref 78–102)
NEUTS SEG # BLD: 2.6 K/UL (ref 1.8–9.5)
NEUTS SEG NFR BLD: 70 % (ref 40–70)
PLATELET # BLD AUTO: 299 K/UL (ref 140–440)
RBC # BLD AUTO: 3.89 M/UL (ref 4.1–5.1)
WBC # BLD AUTO: 3.8 K/UL (ref 4.5–13)

## 2019-02-20 PROCEDURE — 85025 COMPLETE CBC W/AUTO DIFF WBC: CPT

## 2019-02-20 PROCEDURE — 96413 CHEMO IV INFUSION 1 HR: CPT

## 2019-02-20 PROCEDURE — 74011250636 HC RX REV CODE- 250/636: Performed by: INTERNAL MEDICINE

## 2019-02-20 PROCEDURE — 77030012965 HC NDL HUBR BBMI -A

## 2019-02-20 PROCEDURE — 74011000258 HC RX REV CODE- 258: Performed by: INTERNAL MEDICINE

## 2019-02-20 RX ORDER — HEPARIN 100 UNIT/ML
300-500 SYRINGE INTRAVENOUS AS NEEDED
Status: ACTIVE | OUTPATIENT
Start: 2019-02-20 | End: 2019-02-20

## 2019-02-20 RX ORDER — SODIUM CHLORIDE 9 MG/ML
25 INJECTION, SOLUTION INTRAVENOUS CONTINUOUS
Status: DISPENSED | OUTPATIENT
Start: 2019-02-20 | End: 2019-02-20

## 2019-02-20 RX ORDER — SODIUM CHLORIDE 0.9 % (FLUSH) 0.9 %
10-40 SYRINGE (ML) INJECTION AS NEEDED
Status: DISPENSED | OUTPATIENT
Start: 2019-02-20 | End: 2019-02-20

## 2019-02-20 RX ORDER — SODIUM CHLORIDE 9 MG/ML
10 INJECTION INTRAMUSCULAR; INTRAVENOUS; SUBCUTANEOUS AS NEEDED
Status: ACTIVE | OUTPATIENT
Start: 2019-02-20 | End: 2019-02-20

## 2019-02-20 RX ADMIN — SODIUM CHLORIDE 25 ML/HR: 9 INJECTION, SOLUTION INTRAVENOUS at 10:00

## 2019-02-20 RX ADMIN — Medication 40 ML: at 09:45

## 2019-02-20 RX ADMIN — Medication 20 ML: at 10:43

## 2019-02-20 RX ADMIN — VINORELBINE TARTRATE 35 MG: 10 INJECTION INTRAVENOUS at 10:17

## 2019-02-20 RX ADMIN — SODIUM CHLORIDE, PRESERVATIVE FREE 500 UNITS: 5 INJECTION INTRAVENOUS at 10:43

## 2019-02-20 NOTE — PROGRESS NOTES
[facility] Progress Note    Date: 2019    Name: Maryse Trejo              MRN: [id number]              : 1960    Chemotherapy Cycle: C2 D15 Navelbine       Pt to \[facility]", ambulatory, at 0915 . Ms. Prince Keita was assessed and education was provided. Ms. Diamond Dick vitals were reviewed. Patient Vitals for the past 12 hrs:   Temp Pulse Resp BP SpO2   19 1047 97.9 °F (36.6 °C) 88 20 (!) 175/94 100 %   19 0921 98.3 °F (36.8 °C) 87 20 131/84 98 %       Visit Vitals  BP (!) 175/94 (BP 1 Location: Left arm, BP Patient Position: At rest)   Pulse 88   Temp 97.9 °F (36.6 °C)   Resp 20   Ht 5' 6.5\" (1.689 m)   Wt 62.7 kg (138 lb 3 oz)   SpO2 100%   BMI 21.97 kg/m²       Upper left chest double mediport accessed in medial & lateral site  with 20 g 3/4 inch el needle. Both ports flushed easily and had brisk blood return. Blood drawn off and sent for CBC per written orders after 10 ml blood wasted. Lab results were obtained and reviewed. Recent Results (from the past 12 hour(s))   CBC WITH 3 PART DIFF    Collection Time: 19  9:35 AM   Result Value Ref Range    WBC 3.8 (L) 4.5 - 13.0 K/uL    RBC 3.89 (L) 4.10 - 5.10 M/uL    HGB 9.4 (L) 12.0 - 16 g/dL    HCT 29.7 (L) 36 - 48 %    MCV 76.3 (L) 78 - 102 FL    MCH 24.2 (L) 25.0 - 35.0 PG    MCHC 31.6 31 - 37 g/dL    RDW 18.1 (H) 11.5 - 14.5 %    PLATELET 340 219 - 937 K/uL    NEUTROPHILS 70 40 - 70 %    MIXED CELLS 4 0.1 - 17 %    LYMPHOCYTES 26 14 - 44 %    ABS. NEUTROPHILS 2.6 1.8 - 9.5 K/UL    ABS. MIXED CELLS 0.2 0.0 - 2.3 K/uL    ABS. LYMPHOCYTES 1.0 (L) 1.1 - 5.9 K/UL    DF AUTOMATED         Lab results within ordered parameters to give chemo today. . Chemo dosages verified with today's BSA and found to be within 10% of ordered dosages. Navelbine 35 mg was infused over 10 minutes as ordered. . VS stable at end of infusion and pt denied complaints. Line flushed with NS and blood return from port re-verified.       Ms. Prince Keita tolerated infusion, and had no complaints at this time. Mediport flushed with NS 20 ml and Heparin 500 units then de-accessed. No irritation or bleeding noted. Bandaid applied. Patient armband removed and shredded. Ms. Berta Woodruff was discharged from [place] [address] in stable condition at 1050. She is to return on 3/20/19 at 0900 for her next  appointment.     Azeem Hunt RN  February 20, 2019

## 2019-03-05 RX ORDER — SODIUM CHLORIDE 0.9 % (FLUSH) 0.9 %
10-40 SYRINGE (ML) INJECTION AS NEEDED
Status: CANCELLED | OUTPATIENT
Start: 2019-03-05

## 2019-03-05 RX ORDER — HEPARIN 100 UNIT/ML
500 SYRINGE INTRAVENOUS AS NEEDED
Status: CANCELLED | OUTPATIENT
Start: 2019-03-05

## 2019-03-06 ENCOUNTER — HOSPITAL ENCOUNTER (OUTPATIENT)
Dept: INFUSION THERAPY | Age: 59
Discharge: HOME | End: 2019-03-06
Payer: MEDICARE

## 2019-03-06 VITALS
TEMPERATURE: 98.3 F | HEART RATE: 85 BPM | BODY MASS INDEX: 21.91 KG/M2 | RESPIRATION RATE: 20 BRPM | SYSTOLIC BLOOD PRESSURE: 149 MMHG | WEIGHT: 139.6 LBS | OXYGEN SATURATION: 98 % | HEIGHT: 67 IN | DIASTOLIC BLOOD PRESSURE: 83 MMHG

## 2019-03-06 DIAGNOSIS — C34.91 NON-SMALL CELL CANCER OF RIGHT LUNG (HCC): Primary | ICD-10-CM

## 2019-03-06 LAB
ALBUMIN SERPL-MCNC: 3.2 G/DL (ref 3.4–5)
ALBUMIN/GLOB SERPL: 0.8 {RATIO} (ref 0.8–1.7)
ALP SERPL-CCNC: 81 U/L (ref 45–117)
ALT SERPL-CCNC: 12 U/L (ref 13–56)
ANION GAP SERPL CALC-SCNC: 6 MMOL/L (ref 3–18)
AST SERPL-CCNC: 13 U/L (ref 15–37)
BASO+EOS+MONOS # BLD AUTO: 0.4 K/UL (ref 0–2.3)
BASO+EOS+MONOS NFR BLD AUTO: 9 % (ref 0.1–17)
BILIRUB SERPL-MCNC: 0.2 MG/DL (ref 0.2–1)
BUN SERPL-MCNC: 5 MG/DL (ref 7–18)
BUN/CREAT SERPL: 6 (ref 12–20)
CALCIUM SERPL-MCNC: 8.4 MG/DL (ref 8.5–10.1)
CHLORIDE SERPL-SCNC: 106 MMOL/L (ref 100–108)
CO2 SERPL-SCNC: 28 MMOL/L (ref 21–32)
CREAT SERPL-MCNC: 0.8 MG/DL (ref 0.6–1.3)
DIFFERENTIAL METHOD BLD: ABNORMAL
ERYTHROCYTE [DISTWIDTH] IN BLOOD BY AUTOMATED COUNT: 20.1 % (ref 11.5–14.5)
GLOBULIN SER CALC-MCNC: 3.9 G/DL (ref 2–4)
GLUCOSE SERPL-MCNC: 140 MG/DL (ref 74–99)
HCT VFR BLD AUTO: 29.9 % (ref 36–48)
HGB BLD-MCNC: 9.3 G/DL (ref 12–16)
LYMPHOCYTES # BLD: 0.9 K/UL (ref 1.1–5.9)
LYMPHOCYTES NFR BLD: 23 % (ref 14–44)
MCH RBC QN AUTO: 24 PG (ref 25–35)
MCHC RBC AUTO-ENTMCNC: 31.1 G/DL (ref 31–37)
MCV RBC AUTO: 77.3 FL (ref 78–102)
NEUTS SEG # BLD: 2.8 K/UL (ref 1.8–9.5)
NEUTS SEG NFR BLD: 68 % (ref 40–70)
PLATELET # BLD AUTO: 280 K/UL (ref 140–440)
POTASSIUM SERPL-SCNC: 3.6 MMOL/L (ref 3.5–5.5)
PROT SERPL-MCNC: 7.1 G/DL (ref 6.4–8.2)
RBC # BLD AUTO: 3.87 M/UL (ref 4.1–5.1)
SODIUM SERPL-SCNC: 140 MMOL/L (ref 136–145)
WBC # BLD AUTO: 4.1 K/UL (ref 4.5–13)

## 2019-03-06 PROCEDURE — 77030012965 HC NDL HUBR BBMI -A

## 2019-03-06 PROCEDURE — 36415 COLL VENOUS BLD VENIPUNCTURE: CPT

## 2019-03-06 PROCEDURE — 74011250636 HC RX REV CODE- 250/636: Performed by: INTERNAL MEDICINE

## 2019-03-06 PROCEDURE — 74011000258 HC RX REV CODE- 258: Performed by: INTERNAL MEDICINE

## 2019-03-06 PROCEDURE — 96409 CHEMO IV PUSH SNGL DRUG: CPT

## 2019-03-06 PROCEDURE — 85025 COMPLETE CBC W/AUTO DIFF WBC: CPT

## 2019-03-06 PROCEDURE — 80053 COMPREHEN METABOLIC PANEL: CPT

## 2019-03-06 RX ORDER — SODIUM CHLORIDE 9 MG/ML
25 INJECTION, SOLUTION INTRAVENOUS CONTINUOUS
Status: DISPENSED | OUTPATIENT
Start: 2019-03-06 | End: 2019-03-06

## 2019-03-06 RX ORDER — SODIUM CHLORIDE 0.9 % (FLUSH) 0.9 %
10 SYRINGE (ML) INJECTION AS NEEDED
Status: DISPENSED | OUTPATIENT
Start: 2019-03-06 | End: 2019-03-06

## 2019-03-06 RX ORDER — HEPARIN 100 UNIT/ML
300-500 SYRINGE INTRAVENOUS AS NEEDED
Status: DISPENSED | OUTPATIENT
Start: 2019-03-06 | End: 2019-03-06

## 2019-03-06 RX ORDER — SODIUM CHLORIDE 9 MG/ML
10 INJECTION INTRAMUSCULAR; INTRAVENOUS; SUBCUTANEOUS AS NEEDED
Status: ACTIVE | OUTPATIENT
Start: 2019-03-06 | End: 2019-03-06

## 2019-03-06 RX ADMIN — VINORELBINE TARTRATE 35 MG: 10 INJECTION INTRAVENOUS at 10:10

## 2019-03-06 RX ADMIN — SODIUM CHLORIDE 25 ML/HR: 9 INJECTION, SOLUTION INTRAVENOUS at 08:42

## 2019-03-06 RX ADMIN — SODIUM CHLORIDE, PRESERVATIVE FREE 500 UNITS: 5 INJECTION INTRAVENOUS at 10:24

## 2019-03-06 RX ADMIN — SODIUM CHLORIDE 20 ML: 9 INJECTION INTRAMUSCULAR; INTRAVENOUS; SUBCUTANEOUS at 10:24

## 2019-03-06 NOTE — PROGRESS NOTES
[facility] Progress Note    Date: 2019    Name: Ashlyn Cruz              MRN: [id number]              : 1960    Chemotherapy Cycle:C3D1 Vinorelbine      Pt to [facility]", ambulatory, at 0800. Ms. Martha Guallpa was assessed and education was provided. Ms. Bashir Duenas vitals were reviewed. Visit Vitals  /83 (BP 1 Location: Left arm, BP Patient Position: Sitting)   Pulse 85   Temp 98.3 °F (36.8 °C)   Resp 20   Ht 5' 6.5\" (1.689 m)   Wt 63.3 kg (139 lb 9.6 oz)   SpO2 98%   Breastfeeding? No   BMI 22.19 kg/m²       Left chest lateral lumen of her double lumen mediport accessed with 20 g 1 inch el needle. Port flushed easily and had brisk blood return. Blood drawn off and sent for CBC and CMP per written orders after 10 ml waste. NS initiated @ 50 mL/hr. Lab results were obtained and reviewed. Recent Results (from the past 12 hour(s))   CBC WITH 3 PART DIFF    Collection Time: 19  8:26 AM   Result Value Ref Range    WBC 4.1 (L) 4.5 - 13.0 K/uL    RBC 3.87 (L) 4.10 - 5.10 M/uL    HGB 9.3 (L) 12.0 - 16 g/dL    HCT 29.9 (L) 36 - 48 %    MCV 77.3 (L) 78 - 102 FL    MCH 24.0 (L) 25.0 - 35.0 PG    MCHC 31.1 31 - 37 g/dL    RDW 20.1 (H) 11.5 - 14.5 %    PLATELET 373 512 - 069 K/uL    NEUTROPHILS 68 40 - 70 %    MIXED CELLS 9 0.1 - 17 %    LYMPHOCYTES 23 14 - 44 %    ABS. NEUTROPHILS 2.8 1.8 - 9.5 K/UL    ABS. MIXED CELLS 0.4 0.0 - 2.3 K/uL    ABS.  LYMPHOCYTES 0.9 (L) 1.1 - 5.9 K/UL    DF AUTOMATED     METABOLIC PANEL, COMPREHENSIVE    Collection Time: 19  8:26 AM   Result Value Ref Range    Sodium 140 136 - 145 mmol/L    Potassium 3.6 3.5 - 5.5 mmol/L    Chloride 106 100 - 108 mmol/L    CO2 28 21 - 32 mmol/L    Anion gap 6 3.0 - 18 mmol/L    Glucose 140 (H) 74 - 99 mg/dL    BUN 5 (L) 7.0 - 18 MG/DL    Creatinine 0.80 0.6 - 1.3 MG/DL    BUN/Creatinine ratio 6 (L) 12 - 20      GFR est AA >60 >60 ml/min/1.73m2    GFR est non-AA >60 >60 ml/min/1.73m2    Calcium 8.4 (L) 8.5 - 10.1 MG/DL Bilirubin, total 0.2 0.2 - 1.0 MG/DL    ALT (SGPT) 12 (L) 13 - 56 U/L    AST (SGOT) 13 (L) 15 - 37 U/L    Alk. phosphatase 81 45 - 117 U/L    Protein, total 7.1 6.4 - 8.2 g/dL    Albumin 3.2 (L) 3.4 - 5.0 g/dL    Globulin 3.9 2.0 - 4.0 g/dL    A-G Ratio 0.8 0.8 - 1.7         Lab results within ordered parameters to give chemo today. ANC = 2.8, PLT = 280. Chemo dosages verified with today's BSA and found to be within 10% of ordered dosages. No pre-medications ordered. Vinorelbine 35 mg was infused at over 10 minutes as ordered. VS stable at end of infusion and pt denied complaints. Line flushed with NS and blood return from port re-verified. Line flushed with 100 mL NS following completion of the Vinorelbine as ordered. Ms. Prince Keita tolerated infusion, and had no complaints at this time. She declined to stay for an observation period. Lateral lumen flushed with NS 20 ml and Heparin 500 units then de-accessed. No irritation or bleeding noted. Bandaid applied. Patient declined having medial port accessed today. Patient armband removed and shredded. Ms. Prince Keita was discharged from [place] [address] in stable condition at 1030. She is to return on 03/13/19 at 0900 for her next  appointment.     Regina Lowry RN  March 6, 2019

## 2019-03-13 ENCOUNTER — HOSPITAL ENCOUNTER (OUTPATIENT)
Dept: INFUSION THERAPY | Age: 59
Discharge: HOME | End: 2019-03-13
Payer: MEDICARE

## 2019-03-13 VITALS
TEMPERATURE: 98.5 F | RESPIRATION RATE: 18 BRPM | HEIGHT: 67 IN | WEIGHT: 135 LBS | OXYGEN SATURATION: 99 % | DIASTOLIC BLOOD PRESSURE: 99 MMHG | BODY MASS INDEX: 21.19 KG/M2 | HEART RATE: 77 BPM | SYSTOLIC BLOOD PRESSURE: 157 MMHG

## 2019-03-13 DIAGNOSIS — C34.91 NON-SMALL CELL CANCER OF RIGHT LUNG (HCC): Primary | ICD-10-CM

## 2019-03-13 LAB
BASO+EOS+MONOS # BLD AUTO: 0.1 K/UL (ref 0–2.3)
BASO+EOS+MONOS NFR BLD AUTO: 2 % (ref 0.1–17)
DIFFERENTIAL METHOD BLD: ABNORMAL
ERYTHROCYTE [DISTWIDTH] IN BLOOD BY AUTOMATED COUNT: 19.3 % (ref 11.5–14.5)
HCT VFR BLD AUTO: 31.4 % (ref 36–48)
HGB BLD-MCNC: 9.9 G/DL (ref 12–16)
LYMPHOCYTES # BLD: 1 K/UL (ref 1.1–5.9)
LYMPHOCYTES NFR BLD: 18 % (ref 14–44)
MCH RBC QN AUTO: 24.2 PG (ref 25–35)
MCHC RBC AUTO-ENTMCNC: 31.5 G/DL (ref 31–37)
MCV RBC AUTO: 76.8 FL (ref 78–102)
NEUTS SEG # BLD: 4.1 K/UL (ref 1.8–9.5)
NEUTS SEG NFR BLD: 79 % (ref 40–70)
PLATELET # BLD AUTO: 254 K/UL (ref 140–440)
RBC # BLD AUTO: 4.09 M/UL (ref 4.1–5.1)
WBC # BLD AUTO: 5.2 K/UL (ref 4.5–13)

## 2019-03-13 PROCEDURE — 85025 COMPLETE CBC W/AUTO DIFF WBC: CPT

## 2019-03-13 PROCEDURE — 74011250636 HC RX REV CODE- 250/636: Performed by: INTERNAL MEDICINE

## 2019-03-13 PROCEDURE — 96409 CHEMO IV PUSH SNGL DRUG: CPT

## 2019-03-13 PROCEDURE — 74011250636 HC RX REV CODE- 250/636

## 2019-03-13 PROCEDURE — 77030012965 HC NDL HUBR BBMI -A

## 2019-03-13 PROCEDURE — 74011000258 HC RX REV CODE- 258: Performed by: INTERNAL MEDICINE

## 2019-03-13 RX ORDER — HEPARIN 100 UNIT/ML
300-500 SYRINGE INTRAVENOUS AS NEEDED
Status: ACTIVE | OUTPATIENT
Start: 2019-03-13 | End: 2019-03-13

## 2019-03-13 RX ORDER — SODIUM CHLORIDE 9 MG/ML
10 INJECTION INTRAMUSCULAR; INTRAVENOUS; SUBCUTANEOUS AS NEEDED
Status: ACTIVE | OUTPATIENT
Start: 2019-03-13 | End: 2019-03-13

## 2019-03-13 RX ORDER — HEPARIN 100 UNIT/ML
SYRINGE INTRAVENOUS
Status: COMPLETED
Start: 2019-03-13 | End: 2019-03-13

## 2019-03-13 RX ORDER — SODIUM CHLORIDE 0.9 % (FLUSH) 0.9 %
10 SYRINGE (ML) INJECTION AS NEEDED
Status: DISPENSED | OUTPATIENT
Start: 2019-03-13 | End: 2019-03-13

## 2019-03-13 RX ORDER — SODIUM CHLORIDE 9 MG/ML
25 INJECTION, SOLUTION INTRAVENOUS CONTINUOUS
Status: DISPENSED | OUTPATIENT
Start: 2019-03-13 | End: 2019-03-13

## 2019-03-13 RX ADMIN — Medication 20 ML: at 10:48

## 2019-03-13 RX ADMIN — Medication 20 ML: at 09:26

## 2019-03-13 RX ADMIN — VINORELBINE TARTRATE 35 MG: 10 INJECTION INTRAVENOUS at 10:32

## 2019-03-13 RX ADMIN — HEPARIN 500 UNITS: 100 SYRINGE at 10:48

## 2019-03-13 RX ADMIN — SODIUM CHLORIDE 25 ML/HR: 900 INJECTION, SOLUTION INTRAVENOUS at 09:40

## 2019-03-13 NOTE — PROGRESS NOTES
[facility] Progress Note    Date: 2019    Name: Donelda Hodgkins              MRN: [id number]              : 1960    Chemotherapy Cycle: C2 D8 Navelbine       Pt to [facility], ambulatory, at 0915. Ms. Pau Lynn was assessed and education was provided. Ms. Nel Mena vitals were reviewed. Visit Vitals  BP (!) 157/99 (BP 1 Location: Left arm, BP Patient Position: At rest;Sitting)   Pulse 77   Temp 98.5 °F (36.9 °C)   Resp 18   Ht 5' 6.5\" (1.689 m)   Wt 61.2 kg (135 lb)   SpO2 99%   BMI 21.46 kg/m²       Upper left chest double mediport accessed in medial site with 20 g 1 inch el needle. Port flushed easily and had brisk blood return. Blood drawn off and sent for CBC per written orders after 10 ml waste. .    Lab results were obtained and reviewed. Recent Results (from the past 12 hour(s))   CBC WITH 3 PART DIFF    Collection Time: 19  9:27 AM   Result Value Ref Range    WBC 5.2 4.5 - 13.0 K/uL    RBC 4.09 (L) 4.10 - 5.10 M/uL    HGB 9.9 (L) 12.0 - 16 g/dL    HCT 31.4 (L) 36 - 48 %    MCV 76.8 (L) 78 - 102 FL    MCH 24.2 (L) 25.0 - 35.0 PG    MCHC 31.5 31 - 37 g/dL    RDW 19.3 (H) 11.5 - 14.5 %    PLATELET 273 104 - 706 K/uL    NEUTROPHILS 79 (H) 40 - 70 %    MIXED CELLS 2 0.1 - 17 %    LYMPHOCYTES 18 14 - 44 %    ABS. NEUTROPHILS 4.1 1.8 - 9.5 K/UL    ABS. MIXED CELLS 0.1 0.0 - 2.3 K/uL    ABS. LYMPHOCYTES 1.0 (L) 1.1 - 5.9 K/UL    DF AUTOMATED         Lab results within ordered parameters to give chemo today. . Chemo dosages verified with today's BSA and found to be within 10% of ordered dosages. Navelbine 35 mg was infused over 10 minutes as ordered. . VS stable at end of infusion and pt denied complaints. Line flushed with NS and blood return from port re-verified. Ms. Pau Lynn tolerated infusion, and had no complaints at this time. Ms Pau Lynn declined to wait for a 30 minute observation period. Mediport flushed with NS 20 ml and Heparin 500 units then de-accessed.  No irritation or bleeding noted. Bandaid applied. Patient armband removed and shredded. Ms. Compa Sladivar was discharged from [place] [address] in stable condition at 1055. She is to return on 3/20/19 at 0900 for her next appointment.     Kristina Finn RN  March 13, 2019  [phone]

## 2019-03-15 DIAGNOSIS — I10 ESSENTIAL HYPERTENSION: ICD-10-CM

## 2019-03-17 RX ORDER — AMLODIPINE BESYLATE 5 MG/1
TABLET ORAL
Qty: 30 TAB | Refills: 0 | Status: SHIPPED | OUTPATIENT
Start: 2019-03-17 | End: 2019-04-11 | Stop reason: SDUPTHER

## 2019-03-17 NOTE — TELEPHONE ENCOUNTER
Refill approved for 30 day supply. Patient is overdue for follow up.  Please contact her to schedule at her earliest convenience

## 2019-03-18 NOTE — TELEPHONE ENCOUNTER
Patient advised that medication has been sent to pharmacy and she has been scheduled for Thursday, April 11, 2019 08:30 AM.

## 2019-03-20 ENCOUNTER — HOSPITAL ENCOUNTER (OUTPATIENT)
Dept: INFUSION THERAPY | Age: 59
Discharge: HOME | End: 2019-03-20
Payer: MEDICARE

## 2019-03-20 VITALS
WEIGHT: 133.8 LBS | HEART RATE: 96 BPM | RESPIRATION RATE: 20 BRPM | BODY MASS INDEX: 21 KG/M2 | OXYGEN SATURATION: 98 % | TEMPERATURE: 98.3 F | SYSTOLIC BLOOD PRESSURE: 158 MMHG | HEIGHT: 67 IN | DIASTOLIC BLOOD PRESSURE: 90 MMHG

## 2019-03-20 DIAGNOSIS — C34.91 NON-SMALL CELL CANCER OF RIGHT LUNG (HCC): Primary | ICD-10-CM

## 2019-03-20 LAB
BASO+EOS+MONOS # BLD AUTO: 0.3 K/UL (ref 0–2.3)
BASO+EOS+MONOS NFR BLD AUTO: 6 % (ref 0.1–17)
DIFFERENTIAL METHOD BLD: ABNORMAL
ERYTHROCYTE [DISTWIDTH] IN BLOOD BY AUTOMATED COUNT: 19.8 % (ref 11.5–14.5)
HCT VFR BLD AUTO: 29 % (ref 36–48)
HGB BLD-MCNC: 9.1 G/DL (ref 12–16)
LYMPHOCYTES # BLD: 0.8 K/UL (ref 1.1–5.9)
LYMPHOCYTES NFR BLD: 17 % (ref 14–44)
MCH RBC QN AUTO: 24.3 PG (ref 25–35)
MCHC RBC AUTO-ENTMCNC: 31.4 G/DL (ref 31–37)
MCV RBC AUTO: 77.3 FL (ref 78–102)
NEUTS SEG # BLD: 3.5 K/UL (ref 1.8–9.5)
NEUTS SEG NFR BLD: 76 % (ref 40–70)
PLATELET # BLD AUTO: 336 K/UL (ref 140–440)
RBC # BLD AUTO: 3.75 M/UL (ref 4.1–5.1)
WBC # BLD AUTO: 4.6 K/UL (ref 4.5–13)

## 2019-03-20 PROCEDURE — 74011250636 HC RX REV CODE- 250/636: Performed by: INTERNAL MEDICINE

## 2019-03-20 PROCEDURE — 74011000258 HC RX REV CODE- 258: Performed by: INTERNAL MEDICINE

## 2019-03-20 PROCEDURE — 77030012965 HC NDL HUBR BBMI -A

## 2019-03-20 PROCEDURE — 85025 COMPLETE CBC W/AUTO DIFF WBC: CPT

## 2019-03-20 PROCEDURE — 96409 CHEMO IV PUSH SNGL DRUG: CPT

## 2019-03-20 RX ORDER — SODIUM CHLORIDE 0.9 % (FLUSH) 0.9 %
10 SYRINGE (ML) INJECTION AS NEEDED
Status: DISPENSED | OUTPATIENT
Start: 2019-03-20 | End: 2019-03-20

## 2019-03-20 RX ORDER — SODIUM CHLORIDE 9 MG/ML
25 INJECTION, SOLUTION INTRAVENOUS CONTINUOUS
Status: DISPENSED | OUTPATIENT
Start: 2019-03-20 | End: 2019-03-20

## 2019-03-20 RX ORDER — HEPARIN 100 UNIT/ML
300-500 SYRINGE INTRAVENOUS AS NEEDED
Status: DISPENSED | OUTPATIENT
Start: 2019-03-20 | End: 2019-03-20

## 2019-03-20 RX ADMIN — Medication 30 ML: at 09:20

## 2019-03-20 RX ADMIN — Medication 10 ML: at 10:30

## 2019-03-20 RX ADMIN — SODIUM CHLORIDE 25 ML/HR: 9 INJECTION, SOLUTION INTRAVENOUS at 10:25

## 2019-03-20 RX ADMIN — Medication 10 ML: at 10:47

## 2019-03-20 RX ADMIN — SODIUM CHLORIDE, PRESERVATIVE FREE 500 UNITS: 5 INJECTION INTRAVENOUS at 10:47

## 2019-03-20 RX ADMIN — VINORELBINE TARTRATE 35 MG: 10 INJECTION INTRAVENOUS at 10:20

## 2019-03-20 NOTE — PROGRESS NOTES
[facility] Progress Note    Date: 2019    Name: Maryse Trejo              MRN: [id number]              : 1960    Chemotherapy Cycle: C3 D15 Navelbine       Pt to [facility], ambulatory, at 0915 . Ms. Prince Keita was assessed and education was provided. Ms. Diamond Dick vitals were reviewed. Patient Vitals for the past 12 hrs:   Temp Pulse Resp BP SpO2   19 0921 98.3 °F (36.8 °C) 96 20 158/90 98 %       Visit Vitals  /90 (BP 1 Location: Left arm, BP Patient Position: Sitting)   Pulse 96   Temp 98.3 °F (36.8 °C)   Resp 20   Ht 5' 6.5\" (1.689 m)   Wt 60.7 kg (133 lb 12.8 oz)   SpO2 98%   BMI 21.27 kg/m²       Upper left chest double mediport accessed in medial & lateral site  with 20 g 1 inch el needle. Both ports flushed easily and had brisk blood return. Blood drawn off and sent for CBC per written orders after 10 ml blood wasted. Lab results were obtained and reviewed. Recent Results (from the past 12 hour(s))   CBC WITH 3 PART DIFF    Collection Time: 19  9:30 AM   Result Value Ref Range    WBC 4.6 4.5 - 13.0 K/uL    RBC 3.75 (L) 4.10 - 5.10 M/uL    HGB 9.1 (L) 12.0 - 16 g/dL    HCT 29.0 (L) 36 - 48 %    MCV 77.3 (L) 78 - 102 FL    MCH 24.3 (L) 25.0 - 35.0 PG    MCHC 31.4 31 - 37 g/dL    RDW 19.8 (H) 11.5 - 14.5 %    PLATELET 876 526 - 483 K/uL    NEUTROPHILS 76 (H) 40 - 70 %    MIXED CELLS 6 0.1 - 17 %    LYMPHOCYTES 17 14 - 44 %    ABS. NEUTROPHILS 3.5 1.8 - 9.5 K/UL    ABS. MIXED CELLS 0.3 0.0 - 2.3 K/uL    ABS. LYMPHOCYTES 0.8 (L) 1.1 - 5.9 K/UL    DF AUTOMATED         Lab results within ordered parameters to give chemo today. Chemo dosages verified with today's BSA and found to be within 10% of ordered dosages. Navelbine 35 mg was infused over 10 minutes as ordered. . VS stable at end of infusion and pt denied complaints. Line flushed with NS and blood return from port re-verified. Ms. Prince Keita tolerated infusion, and had no complaints at this time.      Mediport flushed with NS 20 ml and Heparin 500 units then de-accessed. No irritation or bleeding noted. Bandaid applied. Patient armband removed and shredded. Ms. Joan Pacheco was discharged from [place] [address] in stable condition at 1055. She is to return on 4/3/19 at 0800 for her next  appointment.     Debbie Dugan RN  March 20, 2019  1055

## 2019-04-03 ENCOUNTER — HOSPITAL ENCOUNTER (OUTPATIENT)
Dept: INFUSION THERAPY | Age: 59
Discharge: HOME | End: 2019-04-03
Payer: MEDICARE

## 2019-04-03 VITALS
BODY MASS INDEX: 21.24 KG/M2 | TEMPERATURE: 98 F | SYSTOLIC BLOOD PRESSURE: 134 MMHG | DIASTOLIC BLOOD PRESSURE: 78 MMHG | OXYGEN SATURATION: 97 % | HEART RATE: 83 BPM | WEIGHT: 135.3 LBS | RESPIRATION RATE: 20 BRPM | HEIGHT: 67 IN

## 2019-04-03 DIAGNOSIS — C34.91 NON-SMALL CELL CANCER OF RIGHT LUNG (HCC): Primary | ICD-10-CM

## 2019-04-03 LAB
ALBUMIN SERPL-MCNC: 3.2 G/DL (ref 3.4–5)
ALBUMIN/GLOB SERPL: 0.7 {RATIO} (ref 0.8–1.7)
ALP SERPL-CCNC: 77 U/L (ref 45–117)
ALT SERPL-CCNC: 9 U/L (ref 13–56)
ANION GAP SERPL CALC-SCNC: 5 MMOL/L (ref 3–18)
AST SERPL-CCNC: 11 U/L (ref 15–37)
BASO+EOS+MONOS # BLD AUTO: 0.6 K/UL (ref 0–2.3)
BASO+EOS+MONOS NFR BLD AUTO: 14 % (ref 0.1–17)
BILIRUB SERPL-MCNC: 0.2 MG/DL (ref 0.2–1)
BUN SERPL-MCNC: 5 MG/DL (ref 7–18)
BUN/CREAT SERPL: 7 (ref 12–20)
CALCIUM SERPL-MCNC: 8.5 MG/DL (ref 8.5–10.1)
CHLORIDE SERPL-SCNC: 106 MMOL/L (ref 100–108)
CO2 SERPL-SCNC: 30 MMOL/L (ref 21–32)
CREAT SERPL-MCNC: 0.7 MG/DL (ref 0.6–1.3)
DIFFERENTIAL METHOD BLD: ABNORMAL
ERYTHROCYTE [DISTWIDTH] IN BLOOD BY AUTOMATED COUNT: 19.7 % (ref 11.5–14.5)
GLOBULIN SER CALC-MCNC: 4.3 G/DL (ref 2–4)
GLUCOSE SERPL-MCNC: 84 MG/DL (ref 74–99)
HCT VFR BLD AUTO: 30.3 % (ref 36–48)
HGB BLD-MCNC: 9.4 G/DL (ref 12–16)
LYMPHOCYTES # BLD: 0.9 K/UL (ref 1.1–5.9)
LYMPHOCYTES NFR BLD: 19 % (ref 14–44)
MCH RBC QN AUTO: 24.4 PG (ref 25–35)
MCHC RBC AUTO-ENTMCNC: 31 G/DL (ref 31–37)
MCV RBC AUTO: 78.5 FL (ref 78–102)
NEUTS SEG # BLD: 3.2 K/UL (ref 1.8–9.5)
NEUTS SEG NFR BLD: 67 % (ref 40–70)
PLATELET # BLD AUTO: 366 K/UL (ref 140–440)
POTASSIUM SERPL-SCNC: 3.2 MMOL/L (ref 3.5–5.5)
PROT SERPL-MCNC: 7.5 G/DL (ref 6.4–8.2)
RBC # BLD AUTO: 3.86 M/UL (ref 4.1–5.1)
SODIUM SERPL-SCNC: 141 MMOL/L (ref 136–145)
WBC # BLD AUTO: 4.7 K/UL (ref 4.5–13)

## 2019-04-03 PROCEDURE — 36415 COLL VENOUS BLD VENIPUNCTURE: CPT

## 2019-04-03 PROCEDURE — 80053 COMPREHEN METABOLIC PANEL: CPT

## 2019-04-03 PROCEDURE — 74011000258 HC RX REV CODE- 258: Performed by: INTERNAL MEDICINE

## 2019-04-03 PROCEDURE — 77030012965 HC NDL HUBR BBMI -A

## 2019-04-03 PROCEDURE — 74011250636 HC RX REV CODE- 250/636: Performed by: INTERNAL MEDICINE

## 2019-04-03 PROCEDURE — 85025 COMPLETE CBC W/AUTO DIFF WBC: CPT

## 2019-04-03 PROCEDURE — 96409 CHEMO IV PUSH SNGL DRUG: CPT

## 2019-04-03 RX ORDER — SODIUM CHLORIDE 0.9 % (FLUSH) 0.9 %
10 SYRINGE (ML) INJECTION AS NEEDED
Status: DISPENSED | OUTPATIENT
Start: 2019-04-03 | End: 2019-04-03

## 2019-04-03 RX ORDER — HEPARIN 100 UNIT/ML
300-500 SYRINGE INTRAVENOUS AS NEEDED
Status: DISPENSED | OUTPATIENT
Start: 2019-04-03 | End: 2019-04-03

## 2019-04-03 RX ORDER — SODIUM CHLORIDE 9 MG/ML
25 INJECTION, SOLUTION INTRAVENOUS CONTINUOUS
Status: DISPENSED | OUTPATIENT
Start: 2019-04-03 | End: 2019-04-03

## 2019-04-03 RX ADMIN — VINORELBINE TARTRATE 35 MG: 10 INJECTION INTRAVENOUS at 10:14

## 2019-04-03 RX ADMIN — SODIUM CHLORIDE 25 ML/HR: 900 INJECTION, SOLUTION INTRAVENOUS at 10:00

## 2019-04-03 RX ADMIN — SODIUM CHLORIDE, PRESERVATIVE FREE 500 UNITS: 5 INJECTION INTRAVENOUS at 10:35

## 2019-04-03 RX ADMIN — Medication 10 ML: at 10:30

## 2019-04-03 RX ADMIN — Medication 10 ML: at 08:30

## 2019-04-03 NOTE — PROGRESS NOTES
[facility] Progress Note    Date: April 3, 2019    Name: Maryse Trejo              MRN: [id number]              : 1960    Chemotherapy Cycle:C4D1 Vinorelbine      Pt to [facility], ambulatory, at 0810. Ms. Prince Keita was assessed and education was provided. Ms. Diamond Dick vitals were reviewed. Visit Vitals  /78 (BP 1 Location: Left arm, BP Patient Position: Sitting)   Pulse 83   Temp 98 °F (36.7 °C)   Resp 20   Ht 5' 6.5\" (1.689 m)   Wt 61.4 kg (135 lb 4.8 oz)   SpO2 97%   Breastfeeding? No   BMI 21.51 kg/m²       Left chest lateral lumen of her double lumen mediport accessed with 20 g 1 inch el needle. Port flushed easily and had brisk blood return. Blood drawn off and sent for CBC and CMP per written orders after 10 ml waste. NS initiated @ 50 mL/hr. Lab results were obtained and reviewed. Recent Results (from the past 12 hour(s))   METABOLIC PANEL, COMPREHENSIVE    Collection Time: 19  8:30 AM   Result Value Ref Range    Sodium 141 136 - 145 mmol/L    Potassium 3.2 (L) 3.5 - 5.5 mmol/L    Chloride 106 100 - 108 mmol/L    CO2 30 21 - 32 mmol/L    Anion gap 5 3.0 - 18 mmol/L    Glucose 84 74 - 99 mg/dL    BUN 5 (L) 7.0 - 18 MG/DL    Creatinine 0.70 0.6 - 1.3 MG/DL    BUN/Creatinine ratio 7 (L) 12 - 20      GFR est AA >60 >60 ml/min/1.73m2    GFR est non-AA >60 >60 ml/min/1.73m2    Calcium 8.5 8.5 - 10.1 MG/DL    Bilirubin, total 0.2 0.2 - 1.0 MG/DL    ALT (SGPT) 9 (L) 13 - 56 U/L    AST (SGOT) 11 (L) 15 - 37 U/L    Alk.  phosphatase 77 45 - 117 U/L    Protein, total 7.5 6.4 - 8.2 g/dL    Albumin 3.2 (L) 3.4 - 5.0 g/dL    Globulin 4.3 (H) 2.0 - 4.0 g/dL    A-G Ratio 0.7 (L) 0.8 - 1.7     CBC WITH 3 PART DIFF    Collection Time: 19  8:30 AM   Result Value Ref Range    WBC 4.7 4.5 - 13.0 K/uL    RBC 3.86 (L) 4.10 - 5.10 M/uL    HGB 9.4 (L) 12.0 - 16 g/dL    HCT 30.3 (L) 36 - 48 %    MCV 78.5 78 - 102 FL    MCH 24.4 (L) 25.0 - 35.0 PG    MCHC 31.0 31 - 37 g/dL    RDW 19.7 (H) 11.5 - 14.5 % PLATELET 655 668 - 138 K/uL    NEUTROPHILS 67 40 - 70 %    MIXED CELLS 14 0.1 - 17 %    LYMPHOCYTES 19 14 - 44 %    ABS. NEUTROPHILS 3.2 1.8 - 9.5 K/UL    ABS. MIXED CELLS 0.6 0.0 - 2.3 K/uL    ABS. LYMPHOCYTES 0.9 (L) 1.1 - 5.9 K/UL    DF AUTOMATED         Lab results within ordered parameters to give chemo today. ANC = 3.2, PLT = 366. Chemo dosages verified with today's BSA and found to be within 10% of ordered dosages. Left voicemail for Dr. Mcfarlane Grade regarding potassium level of 3.2. Patient not currently on any supplements. Discussed potassium rich foods with patient. No pre-medications ordered. Vinorelbine 35 mg was infused at over 10 minutes as ordered. VS stable at end of infusion and pt denied complaints. Line flushed with NS and blood return from port re-verified. Line flushed with 100 mL NS following completion of the Vinorelbine as ordered. Ms. Hannah Mahoney tolerated infusion, and had no complaints at this time. She declined to stay for an observation period. Lateral lumen flushed with NS 20 ml and Heparin 500 units then de-accessed. No irritation or bleeding noted. Bandaid applied. Patient declined having medial port accessed today. Patient armband removed and shredded. Ms. Hannah Mahoney was discharged from [place] [address] in stable condition at [phone]. She is to return on 04/10/19 at 0900 for her next  appointment.     Callum Vasquez RN  April 3, 2019  [id number]

## 2019-04-10 ENCOUNTER — HOSPITAL ENCOUNTER (OUTPATIENT)
Dept: INFUSION THERAPY | Age: 59
Discharge: HOME | End: 2019-04-10
Payer: MEDICARE

## 2019-04-10 VITALS
HEART RATE: 81 BPM | BODY MASS INDEX: 21.16 KG/M2 | WEIGHT: 134.8 LBS | DIASTOLIC BLOOD PRESSURE: 88 MMHG | HEIGHT: 67 IN | RESPIRATION RATE: 18 BRPM | TEMPERATURE: 98.4 F | SYSTOLIC BLOOD PRESSURE: 145 MMHG | OXYGEN SATURATION: 97 %

## 2019-04-10 DIAGNOSIS — C34.91 NON-SMALL CELL CANCER OF RIGHT LUNG (HCC): Primary | ICD-10-CM

## 2019-04-10 LAB
BASO+EOS+MONOS # BLD AUTO: 0.2 K/UL (ref 0–2.3)
BASO+EOS+MONOS NFR BLD AUTO: 4 % (ref 0.1–17)
DIFFERENTIAL METHOD BLD: ABNORMAL
ERYTHROCYTE [DISTWIDTH] IN BLOOD BY AUTOMATED COUNT: 19.6 % (ref 11.5–14.5)
HCT VFR BLD AUTO: 30.9 % (ref 36–48)
HGB BLD-MCNC: 9.5 G/DL (ref 12–16)
LYMPHOCYTES # BLD: 1.2 K/UL (ref 1.1–5.9)
LYMPHOCYTES NFR BLD: 21 % (ref 14–44)
MCH RBC QN AUTO: 23.8 PG (ref 25–35)
MCHC RBC AUTO-ENTMCNC: 30.7 G/DL (ref 31–37)
MCV RBC AUTO: 77.3 FL (ref 78–102)
NEUTS SEG # BLD: 4.3 K/UL (ref 1.8–9.5)
NEUTS SEG NFR BLD: 76 % (ref 40–70)
PLATELET # BLD AUTO: 305 K/UL (ref 140–440)
RBC # BLD AUTO: 4 M/UL (ref 4.1–5.1)
WBC # BLD AUTO: 5.7 K/UL (ref 4.5–13)

## 2019-04-10 PROCEDURE — 77030012965 HC NDL HUBR BBMI -A

## 2019-04-10 PROCEDURE — 85025 COMPLETE CBC W/AUTO DIFF WBC: CPT

## 2019-04-10 PROCEDURE — 96409 CHEMO IV PUSH SNGL DRUG: CPT

## 2019-04-10 PROCEDURE — 74011000258 HC RX REV CODE- 258: Performed by: INTERNAL MEDICINE

## 2019-04-10 PROCEDURE — 36591 DRAW BLOOD OFF VENOUS DEVICE: CPT

## 2019-04-10 PROCEDURE — 74011250636 HC RX REV CODE- 250/636: Performed by: INTERNAL MEDICINE

## 2019-04-10 RX ORDER — HEPARIN 100 UNIT/ML
300-500 SYRINGE INTRAVENOUS AS NEEDED
Status: DISPENSED | OUTPATIENT
Start: 2019-04-10 | End: 2019-04-10

## 2019-04-10 RX ORDER — SODIUM CHLORIDE 9 MG/ML
25 INJECTION, SOLUTION INTRAVENOUS CONTINUOUS
Status: DISPENSED | OUTPATIENT
Start: 2019-04-10 | End: 2019-04-10

## 2019-04-10 RX ORDER — SODIUM CHLORIDE 0.9 % (FLUSH) 0.9 %
10 SYRINGE (ML) INJECTION AS NEEDED
Status: DISPENSED | OUTPATIENT
Start: 2019-04-10 | End: 2019-04-10

## 2019-04-10 RX ADMIN — Medication 10 ML: at 10:15

## 2019-04-10 RX ADMIN — HEPARIN 500 UNITS: 100 SYRINGE at 10:16

## 2019-04-10 RX ADMIN — SODIUM CHLORIDE 25 ML/HR: 900 INJECTION, SOLUTION INTRAVENOUS at 09:45

## 2019-04-10 RX ADMIN — VINORELBINE TARTRATE 35 MG: 10 INJECTION INTRAVENOUS at 10:05

## 2019-04-10 NOTE — PROGRESS NOTES
[facility] Progress Note    Date: April 10, 2019    Name: Margaret Parra              MRN: [id number]              : 1960    Chemotherapy Cycle: C4 D8 Navelbine       Pt to [facility], ambulatory, at 0915. Ms. Brett Gomez was assessed and education was provided. Ms. Sonny Parson vitals were reviewed. Visit Vitals  /88 (BP 1 Location: Left arm, BP Patient Position: Sitting)   Pulse 81   Temp 98.4 °F (36.9 °C)   Resp 18   Ht 5' 6.5\" (1.689 m)   Wt 61.1 kg (134 lb 12.8 oz)   SpO2 97%   BMI 21.43 kg/m²       Upper left chest double mediport accessed in medial site with 20 g 1 inch el needle. Port flushed easily and had brisk blood return. Blood drawn off and sent for CBC per written orders after 10 ml waste. .    Lab results were obtained and reviewed. Recent Results (from the past 12 hour(s))   CBC WITH 3 PART DIFF    Collection Time: 04/10/19  9:30 AM   Result Value Ref Range    WBC 5.7 4.5 - 13.0 K/uL    RBC 4.00 (L) 4.10 - 5.10 M/uL    HGB 9.5 (L) 12.0 - 16 g/dL    HCT 30.9 (L) 36 - 48 %    MCV 77.3 (L) 78 - 102 FL    MCH 23.8 (L) 25.0 - 35.0 PG    MCHC 30.7 (L) 31 - 37 g/dL    RDW 19.6 (H) 11.5 - 14.5 %    PLATELET 230 673 - 245 K/uL    NEUTROPHILS 76 (H) 40 - 70 %    MIXED CELLS 4 0.1 - 17 %    LYMPHOCYTES 21 14 - 44 %    ABS. NEUTROPHILS 4.3 1.8 - 9.5 K/UL    ABS. MIXED CELLS 0.2 0.0 - 2.3 K/uL    ABS. LYMPHOCYTES 1.2 1.1 - 5.9 K/UL    DF AUTOMATED         Lab results within ordered parameters to give chemo today. . Chemo dosages verified with today's BSA and found to be within 10% of ordered dosages. Navelbine 35 mg was infused over 10 minutes as ordered. . VS stable at end of infusion and pt denied complaints. Line flushed with NS and blood return from port re-verified. Ms. Brett Gomez tolerated infusion, and had no complaints at this time. Ms Brett Gomez declined to wait for a 30 minute observation period. Mediport flushed with NS 20 ml and Heparin 500 units then de-accessed.  No irritation or bleeding noted. Bandaid applied. Patient armband removed and shredded. Ms. Prince Keita was discharged from [place] [id number] in stable condition at 1020. She is to return on 4/17/19 at 0900 for her next appointment.     Teri Patiño  April 10, 2019  1143

## 2019-04-11 ENCOUNTER — OFFICE VISIT (OUTPATIENT)
Dept: FAMILY MEDICINE CLINIC | Age: 59
End: 2019-04-11

## 2019-04-11 VITALS
HEIGHT: 66 IN | DIASTOLIC BLOOD PRESSURE: 84 MMHG | TEMPERATURE: 99 F | WEIGHT: 132.6 LBS | SYSTOLIC BLOOD PRESSURE: 126 MMHG | OXYGEN SATURATION: 98 % | BODY MASS INDEX: 21.31 KG/M2 | HEART RATE: 91 BPM | RESPIRATION RATE: 16 BRPM

## 2019-04-11 DIAGNOSIS — Z23 ENCOUNTER FOR IMMUNIZATION: ICD-10-CM

## 2019-04-11 DIAGNOSIS — Z12.11 SCREENING FOR COLON CANCER: ICD-10-CM

## 2019-04-11 DIAGNOSIS — Z00.00 WELCOME TO MEDICARE PREVENTIVE VISIT: ICD-10-CM

## 2019-04-11 DIAGNOSIS — D50.9 IRON DEFICIENCY ANEMIA, UNSPECIFIED IRON DEFICIENCY ANEMIA TYPE: ICD-10-CM

## 2019-04-11 DIAGNOSIS — F33.9 RECURRENT MAJOR DEPRESSIVE DISORDER, REMISSION STATUS UNSPECIFIED (HCC): ICD-10-CM

## 2019-04-11 DIAGNOSIS — Z12.39 SCREENING FOR BREAST CANCER: ICD-10-CM

## 2019-04-11 DIAGNOSIS — I10 ESSENTIAL HYPERTENSION: Primary | ICD-10-CM

## 2019-04-11 RX ORDER — SERTRALINE HYDROCHLORIDE 50 MG/1
50 TABLET, FILM COATED ORAL DAILY
Qty: 30 TAB | Refills: 5 | Status: SHIPPED | OUTPATIENT
Start: 2019-04-11 | End: 2019-10-10 | Stop reason: SDUPTHER

## 2019-04-11 RX ORDER — LANOLIN ALCOHOL/MO/W.PET/CERES
CREAM (GRAM) TOPICAL
Qty: 60 TAB | Refills: 5 | Status: SHIPPED | OUTPATIENT
Start: 2019-04-11 | End: 2019-09-11 | Stop reason: SDUPTHER

## 2019-04-11 RX ORDER — AMLODIPINE BESYLATE 5 MG/1
TABLET ORAL
Qty: 30 TAB | Refills: 5 | Status: SHIPPED | OUTPATIENT
Start: 2019-04-11 | End: 2019-05-13 | Stop reason: SDUPTHER

## 2019-04-11 NOTE — PATIENT INSTRUCTIONS
Medicare Wellness Visit, Female     The best way to live healthy is to have a lifestyle where you eat a well-balanced diet, exercise regularly, limit alcohol use, and quit all forms of tobacco/nicotine, if applicable. Regular preventive services are another way to keep healthy. Preventive services (vaccines, screening tests, monitoring & exams) can help personalize your care plan, which helps you manage your own care. Screening tests can find health problems at the earliest stages, when they are easiest to treat. [facility] follows the current, evidence-based guidelines published by the [facility] ([facility]) when recommending preventive services for our patients. Because we follow these guidelines, sometimes recommendations change over time as research supports it. (For example, mammograms used to be recommended annually. Even though Medicare will still pay for an annual mammogram, the newer guidelines recommend a mammogram every two years for women of average risk.)  Of course, you and your doctor may decide to screen more often for some diseases, based on your risk and your health status. Preventive services for you include:  - Medicare offers their members a free annual wellness visit, which is time for you and your primary care provider to discuss and plan for your preventive service needs. Take advantage of this benefit every year!  -All adults over the age of 72 should receive the recommended pneumonia vaccines. Current USPSTF guidelines recommend a series of two vaccines for the best pneumonia protection.   -All adults should have a flu vaccine yearly and a tetanus vaccine every 10 years. All adults age 61 and older should receive a shingles vaccine once in their lifetime.    -A bone mass density test is recommended when a woman turns 65 to screen for osteoporosis. This test is only recommended one time, as a screening.  Some providers will use this same test as a disease monitoring tool if you already have osteoporosis. -All adults age 38-68 who are overweight should have a diabetes screening test once every three years.   -Other screening tests and preventive services for persons with diabetes include: an eye exam to screen for diabetic retinopathy, a kidney function test, a foot exam, and stricter control over your cholesterol.   -Cardiovascular screening for adults with routine risk involves an electrocardiogram (ECG) at intervals determined by your doctor.   -Colorectal cancer screenings should be done for adults age 54-65 with no increased risk factors for colorectal cancer. There are a number of acceptable methods of screening for this type of cancer. Each test has its own benefits and drawbacks. Discuss with your doctor what is most appropriate for you during your annual wellness visit. The different tests include: colonoscopy (considered the best screening method), a fecal occult blood test, a fecal DNA test, and sigmoidoscopy. -Breast cancer screenings are recommended every other year for women of normal risk, age 54-69.  -Cervical cancer screenings for women over age 72 are only recommended with certain risk factors.   -All adults born between [facility] should be screened once for Hepatitis C. Here is a list of your current Health Maintenance items (your personalized list of preventive services) with a due date:  Health Maintenance Due   Topic Date Due    DTaP/Tdap/Td  (1 - Tdap) 10/22/1979    Shingles Vaccine (1 of 2) 10/22/2008    Annual Well Visit  10/16/2018         Vaccine Information Statement    Pneumococcal Polysaccharide Vaccine: What You Need to Know    Many Vaccine Information Statements are available in [place]an and other languages. See www.immunize.org/vis. Hojas de información Sobre Vacunas están disponibles en español y en muchos otros idiomas. Visite Mehrdad.si. 1. Why get vaccinated?     Vaccination can protect older adults (and some children and younger adults) from pneumococcal disease. Pneumococcal disease is caused by bacteria that can spread from person to person through close contact. It can cause ear infections, and it can also lead to more serious infections of the:   Lungs (pneumonia),   Blood (bacteremia), and   Covering of the brain and spinal cord (meningitis). Meningitis can cause deafness and brain damage, and it can be fatal.      Anyone can get pneumococcal disease, but children under 3years of age, people with certain medical conditions, adults over 72years of age, and cigarette smokers are at the highest risk. About 18,000 older adults die each year from pneumococcal disease in the [place]. Treatment of pneumococcal infections with penicillin and other drugs used to be more effective. But some strains of the disease have become resistant to these drugs. This makes prevention of the disease, through vaccination, even more important. 2. Pneumococcal polysaccharide vaccine (PPSV23)    Pneumococcal polysaccharide vaccine (PPSV23) protects against 23 types of pneumococcal bacteria. It will not prevent all pneumococcal disease. PPSV23 is recommended for:   All adults 72years of age and older,   Anyone 2 through 59years of age with certain long-term health problems,   Anyone 2 through 59years of age with a weakened immune system,   Adults 23 through 59years of age who smoke cigarettes or have asthma. Most people need only one dose of PPSV. A second dose is recommended for certain high-risk groups. People 72 and older should get a dose even if they have gotten one or more doses of the vaccine before they turned 65. Your healthcare provider can give you more information about these recommendations. Most healthy adults develop protection within 2 to 3 weeks of getting the shot.      3. Some people should not get this vaccine     Anyone who has had a life-threatening allergic reaction to PPSV should not get another dose.  Anyone who has a severe allergy to any component of PPSV should not receive it. Tell your provider if you have any severe allergies.  Anyone who is moderately or severely ill when the shot is scheduled may be asked to wait until they recover before getting the vaccine. Someone with a mild illness can usually be vaccinated.  Children less than 3years of age should not receive this vaccine.  There is no evidence that PPSV is harmful to either a pregnant woman or to her fetus. However, as a precaution, women who need the vaccine should be vaccinated before becoming pregnant, if possible. 4. Risks of a vaccine reaction    With any medicine, including vaccines, there is a chance of side effects. These are usually mild and go away on their own, but serious reactions are also possible. About half of people who get PPSV have mild side effects, such as redness or pain where the shot is given, which go away within about two days. Less than 1 out of 100 people develop a fever, muscle aches, or more severe local reactions. Problems that could happen after any vaccine:     People sometimes faint after a medical procedure, including vaccination. Sitting or lying down for about 15 minutes can help prevent fainting, and injuries caused by a fall. Tell your doctor if you feel dizzy, or have vision changes or ringing in the ears.  Some people get severe pain in the shoulder and have difficulty moving the arm where a shot was given. This happens very rarely.  Any medication can cause a severe allergic reaction. Such reactions from a vaccine are very rare, estimated at about 1 in a million doses, and would happen within a few minutes to a few hours after the vaccination. As with any medicine, there is a very remote chance of a vaccine causing a serious injury or death. The safety of vaccines is always being monitored.   For more information, visit: www.cdc.gov/vaccinesafety/     5. What if there is a serious reaction? What should I look for? Look for anything that concerns you, such as signs of a severe allergic reaction, very high fever, or unusual behavior. Signs of a severe allergic reaction can include hives, swelling of the face and throat, difficulty breathing, a fast heartbeat, dizziness, and weakness. These would usually start a few minutes to a few hours after the vaccination. What should I do? If you think it is a severe allergic reaction or other emergency that cant wait, call 9-1-1 or get to the nearest hospital. Otherwise, call your doctor. Afterward, the reaction should be reported to the Vaccine Adverse Event Reporting System (VAERS). Your doctor might file this report, or you can do it yourself through the VAERS web site at www.vaers. hhs.gov, or by calling 3-922.166.6296. VAERS does not give medical advice. 6. How can I learn more?  Ask your doctor. He or she can give you the vaccine package insert or suggest other sources of information.  Call your local or state health department.    Contact the Centers for Disease Control and Prevention (CDC):  - Call 6-899.143.8966 (1-800-CDC-INFO) or  - Visit CDCs website at [facility] [address] 04/24/2015    [facility] for Disease Control and Prevention    Office Use Only

## 2019-04-11 NOTE — PROGRESS NOTES
Patient: Anais Alberto MRN: [id number]  SSN: [id number]    YOB: 1960  Age: 59 y.o. Sex: female      Date of Service: 2019   Provider: CAL Gallo   Office Location:   [address] [place] [address] Dr Ervin henao, [address]  Office Phone: [phone]  Office Fax: [phone]      REASON FOR VISIT:   Chief Complaint   Patient presents with    Annual Wellness Visit    Hypertension    Other     hypokalemia        VITALS:   Visit Vitals  /84 (BP 1 Location: Left arm, BP Patient Position: Sitting)   Pulse 91   Temp 99 °F (37.2 °C) (Oral)   Resp 16   Ht 5' 6\" (1.676 m)   Wt 132 lb 9.6 oz (60.1 kg)   SpO2 98%   BMI 21.40 kg/m²        MEDICATIONS:   Current Outpatient Medications on File Prior to Visit   Medication Sig Dispense Refill    levothyroxine (SYNTHROID) 50 mcg tablet Take  by mouth Daily (before breakfast).  rivaroxaban (XARELTO) 20 mg tab tablet Take 1 Tab by mouth daily (with breakfast). 30 Tab 6    loratadine (CLARITIN) 10 mg tablet Take 10 mg by mouth daily.  nivolumab (OPDIVO) 240 mg/24 mL soln chemo injection by IntraVENous route. Israel Horanman is administered Every 2 Weeks IV.    Indications: non-small cell lung cancer       Current Facility-Administered Medications on File Prior to Visit   Medication Dose Route Frequency Provider Last Rate Last Dose    [] 0.9% sodium chloride infusion  25 mL/hr IntraVENous CONTINUOUS Myrl Lesches, MD   Stopped at 04/10/19 1015    [COMPLETED] vinORELBine (NAVELBINE) 35 mg in 0.9% sodium chloride 50 mL chemo infusion  20 mg/m2 (Order-Specific) IntraVENous Valdana Mckenna MD   Stopped at 04/10/19 1015    [] saline peripheral flush soln 10 mL  10 mL InterCATHeter PRN Myrl Lesches, MD   10 mL at 04/10/19 1015    [] heparin (porcine) pf 300-500 Units  300-500 Units InterCATHeter PRN Myrl Lesches, MD   500 Units at 04/10/19 1016        ALLERGIES: Allergies   Allergen Reactions    Flagyl [Metronidazole] Hives    Nitroimidazoles Other (comments)     Nitroimidazole derivatives        MEDICAL/SURGICAL HISTORY:  Past Medical History:   Diagnosis Date    Anemia, iron deficiency 2/2016    Depression     H/O seasonal allergies     Hypertension     Non-small cell carcinoma of lung ([facility] [address] 2/2016    adenocarcinoma of right lung    Positive occult stool blood test 2/29/2016    Pulmonary embolism ([facility] [address] 2/28/2016    small, bilateral PEs- on Xarelto    Right leg DVT ([facility] [address] 2/28/2016    on Xarelto    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[facility] [address] 4/1/2016    resolved    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      Past Surgical History:   Procedure Laterality Date    BREAST SURGERY PROCEDURE UNLISTED      biopsy    HX ANGIOPLASTY Right 3/20/2016    IJ, subclavian, and brachiocephalic veins    HX HYSTERECTOMY      \"weak cervix\"    HX OTHER SURGICAL Right 3/20/2016    2 placed in right IJ, subclavian/brachiocephalic    HX THROMBECTOMY  3/20/2016    with thrombolysis        FAMILY HISTORY:  Family History   Problem Relation Age of Onset    Cancer Sister 48        breast    Liver Disease Sister         cirrhosis    Heart Attack Mother 37    No Known Problems Child         SOCIAL HISTORY:  Social History     Tobacco Use    Smoking status: Former Smoker     Packs/day: 0.50     Last attempt to quit: 2/28/2016     Years since quitting: 3.1    Smokeless tobacco: Never Used   Substance Use Topics    Alcohol use: No    Drug use: Never     Types: Prescription             HISTORY OF PRESENT ILLNESS: Verta Habermann is a 59 y.o. female who presents to the office for a routine follow up visit. Hypertension -   Currently, the patient's condition is well controlled. Reports compliance with the following regimen: amlodipine 5 mg daily  Home BP readings: not checking      History of PE/DVT -   Patient is anticoagulated on Xarelto, lifelong.   She is high risk for thromboembolic disease due to cancer. She denies any issues with bleeding or bruising.       Lung Cancer/Anemia -   Diagnosed in 2016 upon presentation to the hospital with SVC syndrome. Managed by heme/onc. She had a CT chest/abd/pelvis in January which showed interval increase in size of the R apical soft tissue mass, concerning for recurrent disease. Also malignant pleural effusion and stable hypodense nodule in the R hepatic lobe, concerning for mets. Today she tells me that Opdivo was not working, so she was switched to chemotherapeutic vinorelbine infusions. She has a follow up with her oncologist next month to determine responsiveness to therapy. She takes iron supplements for her anemia, which has been stable.       Depression -   Patient reports symptoms are stable on Zoloft 50 mg daily. She does admit she was under some stress recently due to 2 deaths in the family, but she feels she is coping well. Reports she also uses this medication for hot flashes which are well controlled       REVIEW OF SYSTEMS:  Review of Systems   Constitutional: Negative for chills, fever and malaise/fatigue. Respiratory: Negative for cough, shortness of breath and wheezing. Cardiovascular: Negative for chest pain, palpitations and leg swelling. Gastrointestinal: Negative for abdominal pain, constipation, diarrhea, nausea and vomiting. PHYSICAL EXAMINATION:  Physical Exam   Constitutional: She is oriented to person, place, and time and well-developed, well-nourished, and in no distress. Cardiovascular: Normal rate, regular rhythm and normal heart sounds. Exam reveals no gallop and no friction rub. No murmur heard. Pulmonary/Chest: Effort normal and breath sounds normal. She has no wheezes. She has no rales. Neurological: She is alert and oriented to person, place, and time. Gait normal.   Skin: Skin is warm and dry. No rash noted.    Psychiatric: Mood, memory and affect normal.        RESULTS:  No results found for this visit on 04/11/19. ASSESSMENT/PLAN:  Diagnoses and all orders for this visit:    1. Essential hypertension  - Well controlled  - Continue amlodipine  Orders:    -     amLODIPine (NORVASC) 5 mg tablet; take 1 tablet by mouth once daily    2. Recurrent major depressive disorder, remission status unspecified ([facility]  - Well controlled  - Continue ZOloft   -     sertraline (ZOLOFT) 50 mg tablet; Take 1 Tab by mouth daily. 3. Iron deficiency anemia, unspecified iron deficiency anemia type  - Iron supplements refilled  - CBC monitored very regularly by heme/onc  Orders:    -     ferrous sulfate 325 mg (65 mg iron) tablet; take 1 tablet by mouth twice a day with meals         Follow-up and Dispositions    · Return in about 5 months (around 9/11/2019) for routine care. All questions answered. Patient expresses understanding and agrees with the plan as detailed above.     PATIENT CARE TEAM:   Patient Care Team:  CAL Castro as PCP - General (Physician Assistant)  Brendon Angel MD (Hematology and Oncology)  Chelsea Sanabria MD (Hematology and Oncology)  Niko Burdick MD (Radiation Oncology)  Adali Hernandez MD (Surgery)       CAL Snyder   April 11, 2019   9:44 AM

## 2019-04-11 NOTE — PROGRESS NOTES
This is a \"Welcome to [employer]"  Initial Preventive Physical Examination (IPPE) providing Personalized Prevention Plan Services (Performed in the first 12 months of enrollment)    I have reviewed the patient's medical history in detail and updated the computerized patient record. History     Past Medical History:   Diagnosis Date    Anemia, iron deficiency 2/2016    Depression     H/O seasonal allergies     Hypertension     Non-small cell carcinoma of lung ([facility] [address] 2/2016    adenocarcinoma of right lung    Positive occult stool blood test 2/29/2016    Pulmonary embolism ([facility] [address] 2/28/2016    small, bilateral PEs- on Xarelto    Right leg DVT ([facility] [address] 2/28/2016    on Xarelto    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[facility] [address] 4/1/2016    resolved    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      Past Surgical History:   Procedure Laterality Date    BREAST SURGERY PROCEDURE UNLISTED      biopsy    HX ANGIOPLASTY Right 3/20/2016    IJ, subclavian, and brachiocephalic veins    HX HYSTERECTOMY      due to menorrhagia     HX OTHER SURGICAL Right 3/20/2016    2 placed in right IJ, subclavian/brachiocephalic    HX THROMBECTOMY  3/20/2016    with thrombolysis     Current Outpatient Medications   Medication Sig Dispense Refill    amLODIPine (NORVASC) 5 mg tablet take 1 tablet by mouth once daily 30 Tab 5    sertraline (ZOLOFT) 50 mg tablet Take 1 Tab by mouth daily. 30 Tab 5    ferrous sulfate 325 mg (65 mg iron) tablet take 1 tablet by mouth twice a day with meals 60 Tab 5    levothyroxine (SYNTHROID) 50 mcg tablet Take  by mouth Daily (before breakfast).  rivaroxaban (XARELTO) 20 mg tab tablet Take 1 Tab by mouth daily (with breakfast). 30 Tab 6    loratadine (CLARITIN) 10 mg tablet Take 10 mg by mouth daily.  nivolumab (OPDIVO) 240 mg/24 mL soln chemo injection by IntraVENous route. Wilhemina Barthel is administered Every 2 Weeks IV.    Indications: non-small cell lung cancer       Allergies   Allergen Reactions  Flagyl [Metronidazole] Hives    Nitroimidazoles Other (comments)     Nitroimidazole derivatives     Family History   Problem Relation Age of Onset    Cancer Sister 48        breast    Liver Disease Sister         cirrhosis    Heart Attack Mother 37    No Known Problems Child      Social History     Tobacco Use    Smoking status: Former Smoker     Packs/day: 0.50     Last attempt to quit: 2/28/2016     Years since quitting: 3.1    Smokeless tobacco: Never Used   Substance Use Topics    Alcohol use: No     Diet, Lifestyle: No special diet    Exercise level: moderately active. walks regularly     Depression Risk Screen     3 most recent PHQ Screens 4/11/2019   Little interest or pleasure in doing things Not at all   Feeling down, depressed, irritable, or hopeless Not at all   Total Score PHQ 2 0        Alcohol Risk Screen   You do not drink alcohol or very rarely. Functional Ability and Level of Safety   Hearing Loss  Hearing is good. Vision Screening  Vision is good. Wears one contact lens on the right. UTD with eye doctor   No exam data present      Activities of Daily Living  The home contains: no safety equipment. Patient does total self care    Fall Risk Screen  Fall Risk Assessment, last 12 mths 4/11/2019   Able to walk? Yes   Fall in past 12 months? No       Abuse Screen  Abuse Screening Questionnaire 4/11/2019   Do you ever feel afraid of your partner? N   Are you in a relationship with someone who physically or mentally threatens you? N   Is it safe for you to go home?  Y        Screening EKG   EKG order placed: No (patient had normal EKG 6 months ago in the ER)     Patient Care Team   Patient Care Team:  CAL Brown as PCP - General (Physician Assistant)  Mercedes Upton MD (Hematology and Oncology)  Gus Presley MD (Hematology and Oncology)  Kym Al MD (Radiation Oncology)  Dotty Santos MD (Surgery)     End of Life Planning   Advanced care planning directives were discussed with the patient and /or family/caregiver. Assessment/Plan   Education and counseling provided:  Are appropriate based on today's review and evaluation  End-of-Life planning (with patient's consent)  Pneumococcal Vaccine  Screening Mammography  Colorectal cancer screening tests    Diagnoses and all orders for this visit:    1. Welcome to Medicare preventive visit  - Medicare Wellness visit completed  - Health screenings reviewed  - Age and gender specific counseling provided    - ACP reviewed     2. Screening for breast cancer  - Discussed optional screening with patient as she is already being treated for an active lung CA. Discussed risks/benefits of pursuing other cancer screenings   - Patient would like to have another mammogram now that she has Medicare coverage  Orders:    -     [facility] MAMMO BI SCREENING INCL CAD; Future    3. Screening for colon cancer  - Long discussion today regarding risks/benefits of colon ca screening. Similar to breast CA screening, I would consider this to be optional as she is already actively undergoing treatment for lung CA   - Patient mentions that she actually had a colonoscopy at [facility] 2-3 years ago, and thinks she was told to repeat in 5 years. She does not think she is due yet. We will look into trying to obtain that record and discuss further at her next f/u     4.  Encounter for immunization  - Pneumovax 23 administered today with patient's consent, VIS provided  Orders:    -     PNEUMOCOCCAL POLYSACCHARIDE VACCINE, 23-VALENT, ADULT OR IMMUNOSUPPRESSED PT DOSE,        Health Maintenance Due   Topic Date Due    DTaP/Tdap/Td series (1 - Tdap) 10/22/1979    Shingrix Vaccine Age 50> (1 of 2) 10/22/2008    BREAST CANCER SCRN MAMMOGRAM  06/06/2018       CAL Castaneda   4/11/2019   9:39 AM

## 2019-04-11 NOTE — PROGRESS NOTES
1. Have you been to the ER, urgent care clinic since your last visit? Hospitalized since your last visit? No    2. Have you seen or consulted any other health care providers outside of the [address] [place] [address] since your last visit? Include any pap smears or colon screening. No     Last pap over three years - patient does not recall physician name. Abnormal Pap smears No.  Procedures if indicated No.Form of contraception tubal ligation. Mammogram (if indicated) 6/06/17. Family history of breast CA sister onset age 42's yo, colon CA No, cervical CA No.Tetanus Yes within 10 years, as per patient.

## 2019-04-11 NOTE — ACP (ADVANCE CARE PLANNING)
Advance Care Planning (ACP) Provider Conversation Snapshot    Date of ACP Conversation: 04/11/19  Persons included in Conversation:  patient  Length of ACP Conversation in minutes:  <16 minutes (Non-Billable)    Authorized Decision Maker (if patient is incapable of making informed decisions): This person is:   Healthcare Agent/Medical Power of  under Advance Directive (sister, Earnstine [place])          For Patients with Decision Making Capacity:   Values/Goals: Exploration of values, goals, and preferences if recovery is not expected, even with continued medical treatment in the event of:  Imminent death    Conversation Outcomes / Follow-Up Plan:   Reviewed existing Advance Directive . No changes made.

## 2019-04-17 ENCOUNTER — HOSPITAL ENCOUNTER (OUTPATIENT)
Dept: INFUSION THERAPY | Age: 59
Discharge: HOME | End: 2019-04-17
Payer: MEDICARE

## 2019-04-17 VITALS
RESPIRATION RATE: 18 BRPM | HEIGHT: 67 IN | SYSTOLIC BLOOD PRESSURE: 152 MMHG | BODY MASS INDEX: 21.11 KG/M2 | TEMPERATURE: 98.4 F | DIASTOLIC BLOOD PRESSURE: 88 MMHG | HEART RATE: 91 BPM | OXYGEN SATURATION: 98 % | WEIGHT: 134.5 LBS

## 2019-04-17 DIAGNOSIS — C34.91 NON-SMALL CELL CANCER OF RIGHT LUNG (HCC): Primary | ICD-10-CM

## 2019-04-17 LAB
BASO+EOS+MONOS # BLD AUTO: 0.2 K/UL (ref 0–2.3)
BASO+EOS+MONOS NFR BLD AUTO: 4 % (ref 0.1–17)
DIFFERENTIAL METHOD BLD: ABNORMAL
ERYTHROCYTE [DISTWIDTH] IN BLOOD BY AUTOMATED COUNT: 20.9 % (ref 11.5–14.5)
HCT VFR BLD AUTO: 30 % (ref 36–48)
HGB BLD-MCNC: 9.4 G/DL (ref 12–16)
LYMPHOCYTES # BLD: 1 K/UL (ref 1.1–5.9)
LYMPHOCYTES NFR BLD: 22 % (ref 14–44)
MCH RBC QN AUTO: 24.5 PG (ref 25–35)
MCHC RBC AUTO-ENTMCNC: 31.3 G/DL (ref 31–37)
MCV RBC AUTO: 78.1 FL (ref 78–102)
NEUTS SEG # BLD: 3.1 K/UL (ref 1.8–9.5)
NEUTS SEG NFR BLD: 74 % (ref 40–70)
PLATELET # BLD AUTO: 349 K/UL (ref 140–440)
RBC # BLD AUTO: 3.84 M/UL (ref 4.1–5.1)
WBC # BLD AUTO: 4.3 K/UL (ref 4.5–13)

## 2019-04-17 PROCEDURE — 74011250636 HC RX REV CODE- 250/636: Performed by: INTERNAL MEDICINE

## 2019-04-17 PROCEDURE — 96409 CHEMO IV PUSH SNGL DRUG: CPT

## 2019-04-17 PROCEDURE — 36591 DRAW BLOOD OFF VENOUS DEVICE: CPT

## 2019-04-17 PROCEDURE — 85025 COMPLETE CBC W/AUTO DIFF WBC: CPT

## 2019-04-17 PROCEDURE — 74011000258 HC RX REV CODE- 258: Performed by: INTERNAL MEDICINE

## 2019-04-17 PROCEDURE — 77030012965 HC NDL HUBR BBMI -A

## 2019-04-17 RX ORDER — HEPARIN 100 UNIT/ML
300-500 SYRINGE INTRAVENOUS AS NEEDED
Status: DISPENSED | OUTPATIENT
Start: 2019-04-17 | End: 2019-04-17

## 2019-04-17 RX ORDER — SODIUM CHLORIDE 0.9 % (FLUSH) 0.9 %
10 SYRINGE (ML) INJECTION AS NEEDED
Status: DISPENSED | OUTPATIENT
Start: 2019-04-17 | End: 2019-04-17

## 2019-04-17 RX ORDER — SODIUM CHLORIDE 9 MG/ML
25 INJECTION, SOLUTION INTRAVENOUS CONTINUOUS
Status: DISPENSED | OUTPATIENT
Start: 2019-04-17 | End: 2019-04-17

## 2019-04-17 RX ADMIN — VINORELBINE TARTRATE 35 MG: 10 INJECTION INTRAVENOUS at 10:02

## 2019-04-17 RX ADMIN — Medication 10 ML: at 10:18

## 2019-04-17 RX ADMIN — SODIUM CHLORIDE 25 ML/HR: 900 INJECTION, SOLUTION INTRAVENOUS at 09:48

## 2019-04-17 RX ADMIN — HEPARIN 500 UNITS: 100 SYRINGE at 10:18

## 2019-04-17 NOTE — PROGRESS NOTES
[address] [facility] Progress Note    Date: 2019    Name: Anupama Klein              MRN: [id number]              : 1960    Chemotherapy Cycle: C4 D15 Navelbine       Pt to [facility], ambulatory, at 0920. Ms. Joan Pacheco was assessed and education was provided. Ms. Desire Carlos vitals were reviewed. Visit Vitals  /88 (BP 1 Location: Left arm, BP Patient Position: Sitting)   Pulse 91   Temp 98.4 °F (36.9 °C)   Resp 18   Ht 5' 6.5\" (1.689 m)   Wt 61 kg (134 lb 8 oz)   SpO2 98%   BMI 21.38 kg/m²       Upper left chest double mediport accessed in medial site with 20 g 1 inch el needle. Port flushed easily and had brisk blood return. Blood drawn off and sent for CBC per written orders after 10 ml waste. .    Lab results were obtained and reviewed. Recent Results (from the past 12 hour(s))   CBC WITH 3 PART DIFF    Collection Time: 19  9:35 AM   Result Value Ref Range    WBC 4.3 (L) 4.5 - 13.0 K/uL    RBC 3.84 (L) 4.10 - 5.10 M/uL    HGB 9.4 (L) 12.0 - 16 g/dL    HCT 30.0 (L) 36 - 48 %    MCV 78.1 78 - 102 FL    MCH 24.5 (L) 25.0 - 35.0 PG    MCHC 31.3 31 - 37 g/dL    RDW 20.9 (H) 11.5 - 14.5 %    PLATELET 603 565 - 333 K/uL    NEUTROPHILS 74 (H) 40 - 70 %    MIXED CELLS 4 0.1 - 17 %    LYMPHOCYTES 22 14 - 44 %    ABS. NEUTROPHILS 3.1 1.8 - 9.5 K/UL    ABS. MIXED CELLS 0.2 0.0 - 2.3 K/uL    ABS. LYMPHOCYTES 1.0 (L) 1.1 - 5.9 K/UL    DF AUTOMATED         Lab results within ordered parameters to give chemo today. Chemo dosages verified with today's BSA and found to be within 10% of ordered dosages. Navelbine 35 mg was infused over 10 minutes as ordered. . VS stable at end of infusion and pt denied complaints. Line flushed with NS and blood return from port re-verified. Ms. Joan Pacheco tolerated infusion, and had no complaints at this time. Ms Joan Pacheco declined to wait for a 30 minute observation period. Mediport flushed with NS 20 ml and Heparin 500 units then de-accessed. No irritation or bleeding noted. Bandaid applied. Patient armband removed and shredded. Ms. Kale Wilburn was discharged from [place] [address] in stable condition at 1020. She is to return on 5/1/19 at 0800 for her next appointment.     Luda Terrazas  April 17, 2019  1145

## 2019-05-01 ENCOUNTER — HOSPITAL ENCOUNTER (OUTPATIENT)
Dept: INFUSION THERAPY | Age: 59
Discharge: HOME | End: 2019-05-01
Payer: MEDICARE

## 2019-05-01 VITALS
WEIGHT: 136.2 LBS | HEART RATE: 94 BPM | HEIGHT: 67 IN | SYSTOLIC BLOOD PRESSURE: 158 MMHG | OXYGEN SATURATION: 98 % | RESPIRATION RATE: 18 BRPM | TEMPERATURE: 98.7 F | DIASTOLIC BLOOD PRESSURE: 94 MMHG | BODY MASS INDEX: 21.38 KG/M2

## 2019-05-01 DIAGNOSIS — C34.91 NON-SMALL CELL CANCER OF RIGHT LUNG (HCC): Primary | ICD-10-CM

## 2019-05-01 LAB
ALBUMIN SERPL-MCNC: 3.3 G/DL (ref 3.4–5)
ALBUMIN/GLOB SERPL: 0.8 {RATIO} (ref 0.8–1.7)
ALP SERPL-CCNC: 80 U/L (ref 45–117)
ALT SERPL-CCNC: 11 U/L (ref 13–56)
ANION GAP SERPL CALC-SCNC: 4 MMOL/L (ref 3–18)
AST SERPL-CCNC: 12 U/L (ref 15–37)
BASO+EOS+MONOS # BLD AUTO: 0.3 K/UL (ref 0–2.3)
BASO+EOS+MONOS NFR BLD AUTO: 6 % (ref 0.1–17)
BILIRUB SERPL-MCNC: 0.2 MG/DL (ref 0.2–1)
BUN SERPL-MCNC: 5 MG/DL (ref 7–18)
BUN/CREAT SERPL: 6 (ref 12–20)
CALCIUM SERPL-MCNC: 8.5 MG/DL (ref 8.5–10.1)
CHLORIDE SERPL-SCNC: 108 MMOL/L (ref 100–108)
CO2 SERPL-SCNC: 28 MMOL/L (ref 21–32)
CREAT SERPL-MCNC: 0.84 MG/DL (ref 0.6–1.3)
DIFFERENTIAL METHOD BLD: ABNORMAL
ERYTHROCYTE [DISTWIDTH] IN BLOOD BY AUTOMATED COUNT: 20.6 % (ref 11.5–14.5)
GLOBULIN SER CALC-MCNC: 4 G/DL (ref 2–4)
GLUCOSE SERPL-MCNC: 114 MG/DL (ref 74–99)
HCT VFR BLD AUTO: 30.9 % (ref 36–48)
HGB BLD-MCNC: 9.8 G/DL (ref 12–16)
LYMPHOCYTES # BLD: 1.1 K/UL (ref 1.1–5.9)
LYMPHOCYTES NFR BLD: 19 % (ref 14–44)
MCH RBC QN AUTO: 24.8 PG (ref 25–35)
MCHC RBC AUTO-ENTMCNC: 31.7 G/DL (ref 31–37)
MCV RBC AUTO: 78.2 FL (ref 78–102)
NEUTS SEG # BLD: 4.1 K/UL (ref 1.8–9.5)
NEUTS SEG NFR BLD: 75 % (ref 40–70)
PLATELET # BLD AUTO: 281 K/UL (ref 140–440)
POTASSIUM SERPL-SCNC: 3.4 MMOL/L (ref 3.5–5.5)
PROT SERPL-MCNC: 7.3 G/DL (ref 6.4–8.2)
RBC # BLD AUTO: 3.95 M/UL (ref 4.1–5.1)
SODIUM SERPL-SCNC: 140 MMOL/L (ref 136–145)
WBC # BLD AUTO: 5.5 K/UL (ref 4.5–13)

## 2019-05-01 PROCEDURE — 74011250636 HC RX REV CODE- 250/636: Performed by: INTERNAL MEDICINE

## 2019-05-01 PROCEDURE — 96409 CHEMO IV PUSH SNGL DRUG: CPT

## 2019-05-01 PROCEDURE — 36415 COLL VENOUS BLD VENIPUNCTURE: CPT

## 2019-05-01 PROCEDURE — 36591 DRAW BLOOD OFF VENOUS DEVICE: CPT

## 2019-05-01 PROCEDURE — 74011000258 HC RX REV CODE- 258: Performed by: INTERNAL MEDICINE

## 2019-05-01 PROCEDURE — 80053 COMPREHEN METABOLIC PANEL: CPT

## 2019-05-01 PROCEDURE — 77030012965 HC NDL HUBR BBMI -A

## 2019-05-01 PROCEDURE — 85025 COMPLETE CBC W/AUTO DIFF WBC: CPT

## 2019-05-01 RX ORDER — HEPARIN 100 UNIT/ML
300-500 SYRINGE INTRAVENOUS AS NEEDED
Status: DISPENSED | OUTPATIENT
Start: 2019-05-01 | End: 2019-05-01

## 2019-05-01 RX ORDER — SODIUM CHLORIDE 0.9 % (FLUSH) 0.9 %
10 SYRINGE (ML) INJECTION AS NEEDED
Status: DISPENSED | OUTPATIENT
Start: 2019-05-01 | End: 2019-05-01

## 2019-05-01 RX ORDER — SODIUM CHLORIDE 9 MG/ML
25 INJECTION, SOLUTION INTRAVENOUS CONTINUOUS
Status: DISPENSED | OUTPATIENT
Start: 2019-05-01 | End: 2019-05-01

## 2019-05-01 RX ADMIN — Medication 10 ML: at 09:55

## 2019-05-01 RX ADMIN — HEPARIN 500 UNITS: 100 SYRINGE at 09:56

## 2019-05-01 RX ADMIN — SODIUM CHLORIDE 25 ML/HR: 9 INJECTION, SOLUTION INTRAVENOUS at 08:30

## 2019-05-01 RX ADMIN — VINORELBINE TARTRATE 35 MG: 10 INJECTION INTRAVENOUS at 09:45

## 2019-05-01 NOTE — PROGRESS NOTES
[facility] Progress Note    Date: May 1, 2019    Name: Charlotte Dotson              MRN: [id number]              : 1960    Chemotherapy Cycle:C5D1 Vinorelbine      Pt to [facility], ambulatory, at 0810. Ms. Lakshmi George was assessed and education was provided. Ms. Abdirizak Magana vitals were reviewed. Visit Vitals  BP (!) 158/94 (BP 1 Location: Left arm, BP Patient Position: Sitting)   Pulse 94   Temp 98.7 °F (37.1 °C)   Resp 18   Ht 5' 6.5\" (1.689 m)   Wt 61.8 kg (136 lb 3.2 oz)   SpO2 98%   Breastfeeding? No   BMI 21.65 kg/m²       Left chest lateral lumen of her double lumen mediport accessed with 20 g 1 inch el needle. Port flushed easily and had brisk blood return. Blood drawn off and sent for CBC and CMP per written orders after 10 ml waste. NS initiated @ 50 mL/hr. Lab results were obtained and reviewed. Recent Results (from the past 12 hour(s))   METABOLIC PANEL, COMPREHENSIVE    Collection Time: 19  8:00 AM   Result Value Ref Range    Sodium 140 136 - 145 mmol/L    Potassium 3.4 (L) 3.5 - 5.5 mmol/L    Chloride 108 100 - 108 mmol/L    CO2 28 21 - 32 mmol/L    Anion gap 4 3.0 - 18 mmol/L    Glucose 114 (H) 74 - 99 mg/dL    BUN 5 (L) 7.0 - 18 MG/DL    Creatinine 0.84 0.6 - 1.3 MG/DL    BUN/Creatinine ratio 6 (L) 12 - 20      GFR est AA >60 >60 ml/min/1.73m2    GFR est non-AA >60 >60 ml/min/1.73m2    Calcium 8.5 8.5 - 10.1 MG/DL    Bilirubin, total 0.2 0.2 - 1.0 MG/DL    ALT (SGPT) 11 (L) 13 - 56 U/L    AST (SGOT) 12 (L) 15 - 37 U/L    Alk.  phosphatase 80 45 - 117 U/L    Protein, total 7.3 6.4 - 8.2 g/dL    Albumin 3.3 (L) 3.4 - 5.0 g/dL    Globulin 4.0 2.0 - 4.0 g/dL    A-G Ratio 0.8 0.8 - 1.7     CBC WITH 3 PART DIFF    Collection Time: 19  8:20 AM   Result Value Ref Range    WBC 5.5 4.5 - 13.0 K/uL    RBC 3.95 (L) 4.10 - 5.10 M/uL    HGB 9.8 (L) 12.0 - 16 g/dL    HCT 30.9 (L) 36 - 48 %    MCV 78.2 78 - 102 FL    MCH 24.8 (L) 25.0 - 35.0 PG    MCHC 31.7 31 - 37 g/dL    RDW 20.6 (H) 11.5 - 14.5 %    PLATELET 045 344 - 699 K/uL    NEUTROPHILS 75 (H) 40 - 70 %    MIXED CELLS 6 0.1 - 17 %    LYMPHOCYTES 19 14 - 44 %    ABS. NEUTROPHILS 4.1 1.8 - 9.5 K/UL    ABS. MIXED CELLS 0.3 0.0 - 2.3 K/uL    ABS. LYMPHOCYTES 1.1 1.1 - 5.9 K/UL    DF AUTOMATED         Lab results within ordered parameters to give chemo today. No pre-medications ordered. Vinorelbine 35 mg was infused at over 10 minutes as ordered. VS stable at end of infusion and pt denied complaints. Line flushed with NS and blood return from port re-verified. Line flushed with 100 mL NS following completion of the Vinorelbine as ordered. Ms. Christopher Vaughan tolerated infusion, and had no complaints at this time. She declined to stay for an observation period. Lateral lumen flushed with NS 20 ml and Heparin 500 units then de-accessed. No irritation or bleeding noted. Bandaid applied. Patient declined having medial port accessed today. Patient armband removed and shredded. Ms. Christopher Vaughan was discharged from [place] [address] in stable condition at 1000. She is to return on 05/08/19 at 0900 for her next  appointment.     Eileen Hyde  May 1, 2019  1040

## 2019-05-08 ENCOUNTER — HOSPITAL ENCOUNTER (OUTPATIENT)
Dept: INFUSION THERAPY | Age: 59
Discharge: HOME | End: 2019-05-08
Payer: MEDICARE

## 2019-05-08 VITALS
HEART RATE: 90 BPM | DIASTOLIC BLOOD PRESSURE: 9 MMHG | OXYGEN SATURATION: 98 % | RESPIRATION RATE: 18 BRPM | BODY MASS INDEX: 20.78 KG/M2 | WEIGHT: 132.4 LBS | TEMPERATURE: 98.5 F | SYSTOLIC BLOOD PRESSURE: 137 MMHG | HEIGHT: 67 IN

## 2019-05-08 DIAGNOSIS — C34.91 NON-SMALL CELL CANCER OF RIGHT LUNG (HCC): Primary | ICD-10-CM

## 2019-05-08 LAB
BASO+EOS+MONOS # BLD AUTO: 0.2 K/UL (ref 0–2.3)
BASO+EOS+MONOS NFR BLD AUTO: 3 % (ref 0.1–17)
DIFFERENTIAL METHOD BLD: ABNORMAL
ERYTHROCYTE [DISTWIDTH] IN BLOOD BY AUTOMATED COUNT: 20.8 % (ref 11.5–14.5)
HCT VFR BLD AUTO: 31.5 % (ref 36–48)
HGB BLD-MCNC: 9.9 G/DL (ref 12–16)
LYMPHOCYTES # BLD: 1.1 K/UL (ref 1.1–5.9)
LYMPHOCYTES NFR BLD: 22 % (ref 14–44)
MCH RBC QN AUTO: 24.5 PG (ref 25–35)
MCHC RBC AUTO-ENTMCNC: 31.4 G/DL (ref 31–37)
MCV RBC AUTO: 78 FL (ref 78–102)
NEUTS SEG # BLD: 3.6 K/UL (ref 1.8–9.5)
NEUTS SEG NFR BLD: 75 % (ref 40–70)
PLATELET # BLD AUTO: 236 K/UL (ref 140–440)
RBC # BLD AUTO: 4.04 M/UL (ref 4.1–5.1)
WBC # BLD AUTO: 4.9 K/UL (ref 4.5–13)

## 2019-05-08 PROCEDURE — 74011000258 HC RX REV CODE- 258: Performed by: INTERNAL MEDICINE

## 2019-05-08 PROCEDURE — 85025 COMPLETE CBC W/AUTO DIFF WBC: CPT

## 2019-05-08 PROCEDURE — 96409 CHEMO IV PUSH SNGL DRUG: CPT

## 2019-05-08 PROCEDURE — 74011250636 HC RX REV CODE- 250/636: Performed by: INTERNAL MEDICINE

## 2019-05-08 PROCEDURE — 77030012965 HC NDL HUBR BBMI -A

## 2019-05-08 PROCEDURE — 74011000250 HC RX REV CODE- 250: Performed by: INTERNAL MEDICINE

## 2019-05-08 RX ORDER — SODIUM CHLORIDE 9 MG/ML
25 INJECTION, SOLUTION INTRAVENOUS CONTINUOUS
Status: DISPENSED | OUTPATIENT
Start: 2019-05-08 | End: 2019-05-08

## 2019-05-08 RX ORDER — HEPARIN 100 UNIT/ML
300-500 SYRINGE INTRAVENOUS AS NEEDED
Status: DISPENSED | OUTPATIENT
Start: 2019-05-08 | End: 2019-05-08

## 2019-05-08 RX ORDER — HEPARIN 100 UNIT/ML
500 SYRINGE INTRAVENOUS AS NEEDED
Status: DISCONTINUED | OUTPATIENT
Start: 2019-05-08 | End: 2019-05-12 | Stop reason: HOSPADM

## 2019-05-08 RX ORDER — SODIUM CHLORIDE 0.9 % (FLUSH) 0.9 %
10 SYRINGE (ML) INJECTION AS NEEDED
Status: DISPENSED | OUTPATIENT
Start: 2019-05-08 | End: 2019-05-08

## 2019-05-08 RX ORDER — SODIUM CHLORIDE 9 MG/ML
10 INJECTION INTRAMUSCULAR; INTRAVENOUS; SUBCUTANEOUS AS NEEDED
Status: DISPENSED | OUTPATIENT
Start: 2019-05-08 | End: 2019-05-08

## 2019-05-08 RX ORDER — HEPARIN 100 UNIT/ML
SYRINGE INTRAVENOUS
Status: DISPENSED
Start: 2019-05-08 | End: 2019-05-08

## 2019-05-08 RX ADMIN — ALTEPLASE 2 MG: 2.2 INJECTION, POWDER, LYOPHILIZED, FOR SOLUTION INTRAVENOUS at 09:20

## 2019-05-08 RX ADMIN — Medication 20 ML: at 10:00

## 2019-05-08 RX ADMIN — SODIUM CHLORIDE 30 ML: 9 INJECTION INTRAMUSCULAR; INTRAVENOUS; SUBCUTANEOUS at 08:55

## 2019-05-08 RX ADMIN — VINORELBINE TARTRATE 35 MG: 10 INJECTION INTRAVENOUS at 09:36

## 2019-05-08 RX ADMIN — Medication 20 ML: at 08:53

## 2019-05-08 RX ADMIN — Medication 500 UNITS: at 10:06

## 2019-05-08 RX ADMIN — Medication 500 UNITS: at 10:01

## 2019-05-08 RX ADMIN — SODIUM CHLORIDE 25 ML/HR: 9 INJECTION, SOLUTION INTRAVENOUS at 09:15

## 2019-05-08 RX ADMIN — Medication 10 ML: at 10:05

## 2019-05-08 NOTE — PROGRESS NOTES
[facility] Progress Note    Date: May 8, 2019    Name: Robby Gill              MRN: [id number]              : 1960    Chemotherapy Cycle:C5D8 Vinorelbine      Pt to [facility], ambulatory, at 0945 am. No complaints or concerns voiced      . Ms. Hannah Mahoney was assessed and education was provided. Ms. Viviana Clinton vitals were reviewed. Visit Vitals  BP (!) 137/9 (BP 1 Location: Left arm, BP Patient Position: At rest)   Pulse 90   Temp 98.5 °F (36.9 °C)   Resp 18   Ht 5' 6.5\" (1.689 m)   Wt 60.1 kg (132 lb 6.4 oz)   SpO2 98%   BMI 21.05 kg/m²       Left chest medial lumen of her double lumen mediport accessed with 20 g 1 inch el needle. Port flushed easily, but no blood returns. Cath amparo instilled to dwell 30 minutes. Left chest Lateral port was accessed with # 20--1' el. Brisk flush/blood returns. Blood sample obtained for cbc    Lab results were obtained and reviewed. Recent Results (from the past 12 hour(s))   CBC WITH 3 PART DIFF    Collection Time: 19  8:55 AM   Result Value Ref Range    WBC 4.9 4.5 - 13.0 K/uL    RBC 4.04 (L) 4.10 - 5.10 M/uL    HGB 9.9 (L) 12.0 - 16 g/dL    HCT 31.5 (L) 36 - 48 %    MCV 78.0 78 - 102 FL    MCH 24.5 (L) 25.0 - 35.0 PG    MCHC 31.4 31 - 37 g/dL    RDW 20.8 (H) 11.5 - 14.5 %    PLATELET 438 814 - 115 K/uL    NEUTROPHILS 75 (H) 40 - 70 %    MIXED CELLS 3 0.1 - 17 %    LYMPHOCYTES 22 14 - 44 %    ABS. NEUTROPHILS 3.6 1.8 - 9.5 K/UL    ABS. MIXED CELLS 0.2 0.0 - 2.3 K/uL    ABS. LYMPHOCYTES 1.1 1.1 - 5.9 K/UL    DF AUTOMATED         Lab results within ordered parameters to give chemo today. Chemo dosages verified with today's BSA and found to be within 10% of ordered dosages. No pre-medications ordered. Vinorelbine 35 mg was infused at over 10 minutes as ordered. VS stable at end of infusion and pt denied complaints. Blood return from port( lateral) re-verified.     Patient Vitals for the past 12 hrs:   Temp Pulse Resp BP SpO2   19 0958 98.5 °F (36.9 °C) 90 18 (!) 137/9 --   05/08/19 0845 98 °F (36.7 °C) 98 18 146/85 98 %     Line flushed with 100 mL NS following completion of the Vinorelbine. .     Ms. Tita Herrmann tolerated infusion, and had no complaints. Blood returns noted from medial port post cath amparo. 10 ml blood discarded from port, then flushed with saline and heparin per protocol, and de-accessed. Site without redness/swelling/drainage or tenderness. Lateral port flushed with NS 20 ml and Heparin 500 units then de-accessed. No irritation or bleeding noted. Gauze/tegaderm applied to double port sites . Patient armband removed and shredded. Ms. Tita Herrmann was discharged from [place] [address] in stable condition at 1010. She is to return on 5/15/19 at 1000 am for her next  appointment.     Jonathan Hummel RN  May 8, 2019  1336

## 2019-05-15 ENCOUNTER — HOSPITAL ENCOUNTER (OUTPATIENT)
Dept: INFUSION THERAPY | Age: 59
Discharge: HOME | End: 2019-05-15
Payer: MEDICARE

## 2019-05-15 VITALS
OXYGEN SATURATION: 95 % | SYSTOLIC BLOOD PRESSURE: 152 MMHG | DIASTOLIC BLOOD PRESSURE: 82 MMHG | HEART RATE: 96 BPM | BODY MASS INDEX: 20.53 KG/M2 | TEMPERATURE: 98.7 F | RESPIRATION RATE: 18 BRPM | HEIGHT: 67 IN | WEIGHT: 130.8 LBS

## 2019-05-15 DIAGNOSIS — C34.91 NON-SMALL CELL CANCER OF RIGHT LUNG (HCC): Primary | ICD-10-CM

## 2019-05-15 LAB
BASO+EOS+MONOS # BLD AUTO: 0.1 K/UL (ref 0–2.3)
BASO+EOS+MONOS NFR BLD AUTO: 3 % (ref 0.1–17)
DIFFERENTIAL METHOD BLD: ABNORMAL
ERYTHROCYTE [DISTWIDTH] IN BLOOD BY AUTOMATED COUNT: 20.4 % (ref 11.5–14.5)
HCT VFR BLD AUTO: 29.4 % (ref 36–48)
HGB BLD-MCNC: 9.3 G/DL (ref 12–16)
LYMPHOCYTES # BLD: 0.7 K/UL (ref 1.1–5.9)
LYMPHOCYTES NFR BLD: 17 % (ref 14–44)
MCH RBC QN AUTO: 24.6 PG (ref 25–35)
MCHC RBC AUTO-ENTMCNC: 31.6 G/DL (ref 31–37)
MCV RBC AUTO: 77.8 FL (ref 78–102)
NEUTS SEG # BLD: 3.2 K/UL (ref 1.8–9.5)
NEUTS SEG NFR BLD: 80 % (ref 40–70)
PLATELET # BLD AUTO: 328 K/UL (ref 140–440)
RBC # BLD AUTO: 3.78 M/UL (ref 4.1–5.1)
WBC # BLD AUTO: 4 K/UL (ref 4.5–13)

## 2019-05-15 PROCEDURE — 74011250636 HC RX REV CODE- 250/636: Performed by: INTERNAL MEDICINE

## 2019-05-15 PROCEDURE — 74011000258 HC RX REV CODE- 258: Performed by: INTERNAL MEDICINE

## 2019-05-15 PROCEDURE — 96409 CHEMO IV PUSH SNGL DRUG: CPT

## 2019-05-15 PROCEDURE — 85025 COMPLETE CBC W/AUTO DIFF WBC: CPT

## 2019-05-15 PROCEDURE — 77030012965 HC NDL HUBR BBMI -A

## 2019-05-15 RX ORDER — HEPARIN 100 UNIT/ML
300-500 SYRINGE INTRAVENOUS AS NEEDED
Status: DISPENSED | OUTPATIENT
Start: 2019-05-15 | End: 2019-05-15

## 2019-05-15 RX ORDER — SODIUM CHLORIDE 9 MG/ML
25 INJECTION, SOLUTION INTRAVENOUS CONTINUOUS
Status: DISPENSED | OUTPATIENT
Start: 2019-05-15 | End: 2019-05-15

## 2019-05-15 RX ORDER — SODIUM CHLORIDE 9 MG/ML
10 INJECTION INTRAMUSCULAR; INTRAVENOUS; SUBCUTANEOUS AS NEEDED
Status: ACTIVE | OUTPATIENT
Start: 2019-05-15 | End: 2019-05-15

## 2019-05-15 RX ORDER — SODIUM CHLORIDE 0.9 % (FLUSH) 0.9 %
10-40 SYRINGE (ML) INJECTION AS NEEDED
Status: DISPENSED | OUTPATIENT
Start: 2019-05-15 | End: 2019-05-15

## 2019-05-15 RX ADMIN — Medication 20 ML: at 11:46

## 2019-05-15 RX ADMIN — Medication 20 ML: at 11:34

## 2019-05-15 RX ADMIN — VINORELBINE TARTRATE 35 MG: 10 INJECTION INTRAVENOUS at 11:34

## 2019-05-15 RX ADMIN — SODIUM CHLORIDE, PRESERVATIVE FREE 500 UNITS: 5 INJECTION INTRAVENOUS at 11:46

## 2019-05-15 RX ADMIN — SODIUM CHLORIDE 25 ML/HR: 9 INJECTION, SOLUTION INTRAVENOUS at 11:34

## 2019-05-15 NOTE — PROGRESS NOTES
[facility] Progress Note    Date: May 15, 2019    Name: Fede Nick              MRN: [id number]              : 1960    Chemotherapy Cycle: C5 D15 Navelbine       Pt to [facility], ambulatory, at 1005. Ms. Dipti Shetty was assessed and education was provided. Ms. Chencho Vasquez vitals were reviewed. Visit Vitals  /82 (BP 1 Location: Left arm, BP Patient Position: Sitting)   Pulse 96   Temp 98.7 °F (37.1 °C)   Resp 18   Ht 5' 6.5\" (1.689 m)   Wt 59.3 kg (130 lb 12.8 oz)   SpO2 95%   BMI 20.80 kg/m²       Upper left chest double mediport accessed in medial site with 20 g 1 inch el needle. Port flushed easily and had brisk blood return. Blood drawn off and sent for CBC per written orders after 10 ml waste. .    Lab results were obtained and reviewed. Recent Results (from the past 12 hour(s))   CBC WITH 3 PART DIFF    Collection Time: 05/15/19 10:10 AM   Result Value Ref Range    WBC 4.0 (L) 4.5 - 13.0 K/uL    RBC 3.78 (L) 4.10 - 5.10 M/uL    HGB 9.3 (L) 12.0 - 16 g/dL    HCT 29.4 (L) 36 - 48 %    MCV 77.8 (L) 78 - 102 FL    MCH 24.6 (L) 25.0 - 35.0 PG    MCHC 31.6 31 - 37 g/dL    RDW 20.4 (H) 11.5 - 14.5 %    PLATELET 630 173 - 835 K/uL    NEUTROPHILS 80 (H) 40 - 70 %    MIXED CELLS 3 0.1 - 17 %    LYMPHOCYTES 17 14 - 44 %    ABS. NEUTROPHILS 3.2 1.8 - 9.5 K/UL    ABS. MIXED CELLS 0.1 0.0 - 2.3 K/uL    ABS. LYMPHOCYTES 0.7 (L) 1.1 - 5.9 K/UL    DF AUTOMATED         Lab results within ordered parameters to give chemo today. Chemo dosages verified with today's BSA and found to be within 10% of ordered dosages. Navelbine 35 mg was infused over 10 minutes as ordered. . VS stable at end of infusion and pt denied complaints. Line flushed with NS and blood return from port re-verified. Ms. Dipti Shetty tolerated infusion, and had no complaints at this time. Ms Dipti Shetty declined to wait for a 30 minute observation period. Mediport flushed with NS 20 ml and Heparin 500 units then de-accessed.  No irritation or bleeding noted. Bandaid applied. Patient armband removed and shredded. Ms. Alfred Cameron was discharged from [place] [address] in stable condition at 1205. She is to return on 6/1/19 at 0800 for her next appointment.     Cheryl Verma RN  May 15, 2019  1205

## 2019-05-29 ENCOUNTER — HOSPITAL ENCOUNTER (OUTPATIENT)
Dept: INFUSION THERAPY | Age: 59
Discharge: HOME | End: 2019-05-29
Payer: MEDICARE

## 2019-05-29 VITALS
HEART RATE: 72 BPM | DIASTOLIC BLOOD PRESSURE: 81 MMHG | WEIGHT: 133.1 LBS | SYSTOLIC BLOOD PRESSURE: 140 MMHG | HEIGHT: 67 IN | OXYGEN SATURATION: 96 % | RESPIRATION RATE: 18 BRPM | TEMPERATURE: 98.4 F | BODY MASS INDEX: 20.89 KG/M2

## 2019-05-29 DIAGNOSIS — C34.91 NON-SMALL CELL CANCER OF RIGHT LUNG (HCC): Primary | ICD-10-CM

## 2019-05-29 LAB
ALBUMIN SERPL-MCNC: 3.3 G/DL (ref 3.4–5)
ALBUMIN/GLOB SERPL: 0.8 {RATIO} (ref 0.8–1.7)
ALP SERPL-CCNC: 82 U/L (ref 45–117)
ALT SERPL-CCNC: 10 U/L (ref 13–56)
ANION GAP SERPL CALC-SCNC: 5 MMOL/L (ref 3–18)
AST SERPL-CCNC: 12 U/L (ref 15–37)
BASO+EOS+MONOS # BLD AUTO: 0.3 K/UL (ref 0–2.3)
BASO+EOS+MONOS NFR BLD AUTO: 7 % (ref 0.1–17)
BILIRUB SERPL-MCNC: 0.2 MG/DL (ref 0.2–1)
BUN SERPL-MCNC: 5 MG/DL (ref 7–18)
BUN/CREAT SERPL: 7 (ref 12–20)
CALCIUM SERPL-MCNC: 8.6 MG/DL (ref 8.5–10.1)
CHLORIDE SERPL-SCNC: 108 MMOL/L (ref 100–108)
CO2 SERPL-SCNC: 30 MMOL/L (ref 21–32)
CREAT SERPL-MCNC: 0.76 MG/DL (ref 0.6–1.3)
DIFFERENTIAL METHOD BLD: ABNORMAL
ERYTHROCYTE [DISTWIDTH] IN BLOOD BY AUTOMATED COUNT: 20.6 % (ref 11.5–14.5)
GLOBULIN SER CALC-MCNC: 4 G/DL (ref 2–4)
GLUCOSE SERPL-MCNC: 83 MG/DL (ref 74–99)
HCT VFR BLD AUTO: 31 % (ref 36–48)
HGB BLD-MCNC: 9.7 G/DL (ref 12–16)
LYMPHOCYTES # BLD: 1 K/UL (ref 1.1–5.9)
LYMPHOCYTES NFR BLD: 27 % (ref 14–44)
MCH RBC QN AUTO: 24.6 PG (ref 25–35)
MCHC RBC AUTO-ENTMCNC: 31.3 G/DL (ref 31–37)
MCV RBC AUTO: 78.7 FL (ref 78–102)
NEUTS SEG # BLD: 2.4 K/UL (ref 1.8–9.5)
NEUTS SEG NFR BLD: 66 % (ref 40–70)
PLATELET # BLD AUTO: 275 K/UL (ref 140–440)
POTASSIUM SERPL-SCNC: 3.6 MMOL/L (ref 3.5–5.5)
PROT SERPL-MCNC: 7.3 G/DL (ref 6.4–8.2)
RBC # BLD AUTO: 3.94 M/UL (ref 4.1–5.1)
SODIUM SERPL-SCNC: 143 MMOL/L (ref 136–145)
WBC # BLD AUTO: 3.7 K/UL (ref 4.5–13)

## 2019-05-29 PROCEDURE — 36415 COLL VENOUS BLD VENIPUNCTURE: CPT

## 2019-05-29 PROCEDURE — 80053 COMPREHEN METABOLIC PANEL: CPT

## 2019-05-29 PROCEDURE — 74011250636 HC RX REV CODE- 250/636: Performed by: INTERNAL MEDICINE

## 2019-05-29 PROCEDURE — 36591 DRAW BLOOD OFF VENOUS DEVICE: CPT

## 2019-05-29 PROCEDURE — 77030012965 HC NDL HUBR BBMI -A

## 2019-05-29 PROCEDURE — 74011000258 HC RX REV CODE- 258: Performed by: INTERNAL MEDICINE

## 2019-05-29 PROCEDURE — 85025 COMPLETE CBC W/AUTO DIFF WBC: CPT

## 2019-05-29 PROCEDURE — 96409 CHEMO IV PUSH SNGL DRUG: CPT

## 2019-05-29 RX ORDER — SODIUM CHLORIDE 9 MG/ML
25 INJECTION, SOLUTION INTRAVENOUS CONTINUOUS
Status: DISPENSED | OUTPATIENT
Start: 2019-05-29 | End: 2019-05-29

## 2019-05-29 RX ORDER — SODIUM CHLORIDE 0.9 % (FLUSH) 0.9 %
10 SYRINGE (ML) INJECTION AS NEEDED
Status: DISPENSED | OUTPATIENT
Start: 2019-05-29 | End: 2019-05-29

## 2019-05-29 RX ORDER — HEPARIN 100 UNIT/ML
300-500 SYRINGE INTRAVENOUS AS NEEDED
Status: DISPENSED | OUTPATIENT
Start: 2019-05-29 | End: 2019-05-29

## 2019-05-29 RX ADMIN — Medication 20 ML: at 12:03

## 2019-05-29 RX ADMIN — Medication 500 UNITS: at 12:03

## 2019-05-29 RX ADMIN — VINORELBINE TARTRATE 35 MG: 10 INJECTION INTRAVENOUS at 11:50

## 2019-05-29 RX ADMIN — SODIUM CHLORIDE 25 ML/HR: 9 INJECTION, SOLUTION INTRAVENOUS at 11:48

## 2019-05-29 NOTE — PROGRESS NOTES
[facility] Progress Note    Date: May 29, 2019    Name: Deborah Figueroa              MRN: [id number]              : 1960    Chemotherapy Cycle:C6D1 Vinorelbine      Pt to [facility], ambulatory, at 1015. Ms. Parag Hill was assessed and education was provided. Ms. Emerita Maciel vitals were reviewed. Visit Vitals  /81 (BP 1 Location: Left arm, BP Patient Position: Sitting)   Pulse 72   Temp 98.4 °F (36.9 °C)   Resp 18   Ht 5' 6.5\" (1.689 m)   Wt 60.4 kg (133 lb 1.6 oz)   SpO2 96%   BMI 21.16 kg/m²       Left chest lateral lumen of her double lumen mediport accessed with 20 g 1 inch el needle. Port flushed easily and had brisk blood return. Blood drawn off and sent for CBC and CMP per written orders after 10 ml waste. NS initiated @ 50 mL/hr. Lab results were obtained and reviewed. Recent Results (from the past 12 hour(s))   METABOLIC PANEL, COMPREHENSIVE    Collection Time: 19 10:40 AM   Result Value Ref Range    Sodium 143 136 - 145 mmol/L    Potassium 3.6 3.5 - 5.5 mmol/L    Chloride 108 100 - 108 mmol/L    CO2 30 21 - 32 mmol/L    Anion gap 5 3.0 - 18 mmol/L    Glucose 83 74 - 99 mg/dL    BUN 5 (L) 7.0 - 18 MG/DL    Creatinine 0.76 0.6 - 1.3 MG/DL    BUN/Creatinine ratio 7 (L) 12 - 20      GFR est AA >60 >60 ml/min/1.73m2    GFR est non-AA >60 >60 ml/min/1.73m2    Calcium 8.6 8.5 - 10.1 MG/DL    Bilirubin, total 0.2 0.2 - 1.0 MG/DL    ALT (SGPT) 10 (L) 13 - 56 U/L    AST (SGOT) 12 (L) 15 - 37 U/L    Alk.  phosphatase 82 45 - 117 U/L    Protein, total 7.3 6.4 - 8.2 g/dL    Albumin 3.3 (L) 3.4 - 5.0 g/dL    Globulin 4.0 2.0 - 4.0 g/dL    A-G Ratio 0.8 0.8 - 1.7     CBC WITH 3 PART DIFF    Collection Time: 19 10:40 AM   Result Value Ref Range    WBC 3.7 (L) 4.5 - 13.0 K/uL    RBC 3.94 (L) 4.10 - 5.10 M/uL    HGB 9.7 (L) 12.0 - 16 g/dL    HCT 31.0 (L) 36 - 48 %    MCV 78.7 78 - 102 FL    MCH 24.6 (L) 25.0 - 35.0 PG    MCHC 31.3 31 - 37 g/dL    RDW 20.6 (H) 11.5 - 14.5 %    PLATELET 933 089 - 880 K/uL    NEUTROPHILS 66 40 - 70 %    MIXED CELLS 7 0.1 - 17 %    LYMPHOCYTES 27 14 - 44 %    ABS. NEUTROPHILS 2.4 1.8 - 9.5 K/UL    ABS. MIXED CELLS 0.3 0.0 - 2.3 K/uL    ABS. LYMPHOCYTES 1.0 (L) 1.1 - 5.9 K/UL    DF AUTOMATED         Lab results within ordered parameters to give chemo today. No pre-medications ordered. Vinorelbine 35 mg was infused at over 10 minutes as ordered. VS stable at end of infusion and pt denied complaints. Line flushed with NS and blood return from port re-verified. Line flushed with 100 mL NS following completion of the Vinorelbine as ordered. Ms. Kale Wilburn tolerated infusion, and had no complaints at this time. She declined to stay for an observation period. Lateral lumen flushed with NS 20 ml and Heparin 500 units then de-accessed. No irritation or bleeding noted. Bandaid applied. Patient declined having medial port accessed today. Patient armband removed and shredded. Ms. Kale Wilburn was discharged from [place] [address] in stable condition at 1205. She is to return on 06/05/19 at 1000 for her next  appointment.     Luda Terrazas  May 29, 2019  1040

## 2019-06-05 ENCOUNTER — HOSPITAL ENCOUNTER (OUTPATIENT)
Dept: INFUSION THERAPY | Age: 59
Discharge: HOME | End: 2019-06-05
Payer: MEDICARE

## 2019-06-05 VITALS
HEIGHT: 66 IN | RESPIRATION RATE: 18 BRPM | SYSTOLIC BLOOD PRESSURE: 148 MMHG | WEIGHT: 133.5 LBS | OXYGEN SATURATION: 96 % | BODY MASS INDEX: 21.45 KG/M2 | DIASTOLIC BLOOD PRESSURE: 86 MMHG | TEMPERATURE: 98.4 F | HEART RATE: 74 BPM

## 2019-06-05 DIAGNOSIS — C34.91 NON-SMALL CELL CANCER OF RIGHT LUNG (HCC): Primary | ICD-10-CM

## 2019-06-05 LAB
BASO+EOS+MONOS # BLD AUTO: 0.3 K/UL (ref 0–2.3)
BASO+EOS+MONOS NFR BLD AUTO: 5 % (ref 0.1–17)
DIFFERENTIAL METHOD BLD: ABNORMAL
ERYTHROCYTE [DISTWIDTH] IN BLOOD BY AUTOMATED COUNT: 21.1 % (ref 11.5–14.5)
HCT VFR BLD AUTO: 29.7 % (ref 36–48)
HGB BLD-MCNC: 9.3 G/DL (ref 12–16)
LYMPHOCYTES # BLD: 1.3 K/UL (ref 1.1–5.9)
LYMPHOCYTES NFR BLD: 23 % (ref 14–44)
MCH RBC QN AUTO: 24.3 PG (ref 25–35)
MCHC RBC AUTO-ENTMCNC: 31.3 G/DL (ref 31–37)
MCV RBC AUTO: 77.7 FL (ref 78–102)
NEUTS SEG # BLD: 3.8 K/UL (ref 1.8–9.5)
NEUTS SEG NFR BLD: 72 % (ref 40–70)
PLATELET # BLD AUTO: 241 K/UL (ref 140–440)
RBC # BLD AUTO: 3.82 M/UL (ref 4.1–5.1)
WBC # BLD AUTO: 5.4 K/UL (ref 4.5–13)

## 2019-06-05 PROCEDURE — 85025 COMPLETE CBC W/AUTO DIFF WBC: CPT

## 2019-06-05 PROCEDURE — 77030012965 HC NDL HUBR BBMI -A

## 2019-06-05 PROCEDURE — 96409 CHEMO IV PUSH SNGL DRUG: CPT

## 2019-06-05 PROCEDURE — 74011000258 HC RX REV CODE- 258: Performed by: INTERNAL MEDICINE

## 2019-06-05 PROCEDURE — 74011250636 HC RX REV CODE- 250/636: Performed by: INTERNAL MEDICINE

## 2019-06-05 RX ORDER — SODIUM CHLORIDE 0.9 % (FLUSH) 0.9 %
10 SYRINGE (ML) INJECTION AS NEEDED
Status: DISPENSED | OUTPATIENT
Start: 2019-06-05 | End: 2019-06-05

## 2019-06-05 RX ORDER — HEPARIN 100 UNIT/ML
300-500 SYRINGE INTRAVENOUS AS NEEDED
Status: DISPENSED | OUTPATIENT
Start: 2019-06-05 | End: 2019-06-05

## 2019-06-05 RX ORDER — SODIUM CHLORIDE 9 MG/ML
10 INJECTION INTRAMUSCULAR; INTRAVENOUS; SUBCUTANEOUS AS NEEDED
Status: ACTIVE | OUTPATIENT
Start: 2019-06-05 | End: 2019-06-05

## 2019-06-05 RX ORDER — SODIUM CHLORIDE 9 MG/ML
25 INJECTION, SOLUTION INTRAVENOUS CONTINUOUS
Status: DISPENSED | OUTPATIENT
Start: 2019-06-05 | End: 2019-06-05

## 2019-06-05 RX ADMIN — Medication 10 ML: at 12:12

## 2019-06-05 RX ADMIN — SODIUM CHLORIDE 25 ML/HR: 900 INJECTION, SOLUTION INTRAVENOUS at 11:00

## 2019-06-05 RX ADMIN — Medication 500 UNITS: at 12:14

## 2019-06-05 RX ADMIN — VINORELBINE TARTRATE 35 MG: 10 INJECTION INTRAVENOUS at 11:58

## 2019-06-05 NOTE — PROGRESS NOTES
[facility] Progress Note    Date: 2019    Name: Nicolette Valles              MRN: [id number]              : 1960    Chemotherapy Cycle:C6D8 Vinorelbine      Pt to [facility], ambulatory, at 1015. Ms. Melquiades Yarbrough was assessed and education was provided. Ms. Toña Mohr vitals were reviewed. Visit Vitals  /86 (BP 1 Location: Left arm, BP Patient Position: Sitting)   Pulse 74   Temp 98.4 °F (36.9 °C)   Resp 18   Ht 5' 6\" (1.676 m)   Wt 60.6 kg (133 lb 8 oz)   SpO2 96%   BMI 21.55 kg/m²       Left chest lateral lumen of her double lumen mediport accessed with 20 g 1 inch el needle. Port flushed easily and had brisk blood return. Blood drawn off and sent for CBC and CMP per written orders after 10 ml waste. NS initiated @ 50 mL/hr. Lab results were obtained and reviewed. Recent Results (from the past 12 hour(s))   CBC WITH 3 PART DIFF    Collection Time: 19 10:45 AM   Result Value Ref Range    WBC 5.4 4.5 - 13.0 K/uL    RBC 3.82 (L) 4.10 - 5.10 M/uL    HGB 9.3 (L) 12.0 - 16 g/dL    HCT 29.7 (L) 36 - 48 %    MCV 77.7 (L) 78 - 102 FL    MCH 24.3 (L) 25.0 - 35.0 PG    MCHC 31.3 31 - 37 g/dL    RDW 21.1 (H) 11.5 - 14.5 %    PLATELET 345 240 - 131 K/uL    NEUTROPHILS 72 (H) 40 - 70 %    MIXED CELLS 5 0.1 - 17 %    LYMPHOCYTES 23 14 - 44 %    ABS. NEUTROPHILS 3.8 1.8 - 9.5 K/UL    ABS. MIXED CELLS 0.3 0.0 - 2.3 K/uL    ABS. LYMPHOCYTES 1.3 1.1 - 5.9 K/UL    DF AUTOMATED         Lab results within ordered parameters to give chemo today. No pre-medications ordered. Vinorelbine 35 mg was infused at over 10 minutes as ordered. VS stable at end of infusion and pt denied complaints. Line flushed with NS and blood return from port re-verified. Line flushed with 100 mL NS following completion of the Vinorelbine as ordered. Ms. Melquiades Yarbrough tolerated infusion, and had no complaints at this time. Lateral lumen flushed with NS 20 ml and Heparin 500 units then de-accessed.  No irritation or bleeding noted. Bandaid applied. Patient declined having medial port accessed today. Patient armband removed and shredded. Ms. Manuel Nance was discharged from [place] [address] in stable condition at 1210. She is to return on 06/12/19 at 1000 for her next  appointment.     Damon Costello RN  June 5, 2019

## 2019-06-12 ENCOUNTER — HOSPITAL ENCOUNTER (OUTPATIENT)
Dept: INFUSION THERAPY | Age: 59
Discharge: HOME | End: 2019-06-12
Payer: MEDICARE

## 2019-06-12 VITALS
HEIGHT: 66 IN | DIASTOLIC BLOOD PRESSURE: 94 MMHG | BODY MASS INDEX: 21.38 KG/M2 | TEMPERATURE: 98.4 F | WEIGHT: 133 LBS | RESPIRATION RATE: 18 BRPM | HEART RATE: 84 BPM | OXYGEN SATURATION: 98 % | SYSTOLIC BLOOD PRESSURE: 144 MMHG

## 2019-06-12 DIAGNOSIS — C34.91 NON-SMALL CELL CANCER OF RIGHT LUNG (HCC): Primary | ICD-10-CM

## 2019-06-12 LAB
BASO+EOS+MONOS # BLD AUTO: 0.1 K/UL (ref 0–2.3)
BASO+EOS+MONOS NFR BLD AUTO: 2 % (ref 0.1–17)
DIFFERENTIAL METHOD BLD: ABNORMAL
ERYTHROCYTE [DISTWIDTH] IN BLOOD BY AUTOMATED COUNT: 20.3 % (ref 11.5–14.5)
HCT VFR BLD AUTO: 30.1 % (ref 36–48)
HGB BLD-MCNC: 9.4 G/DL (ref 12–16)
LYMPHOCYTES # BLD: 0.8 K/UL (ref 1.1–5.9)
LYMPHOCYTES NFR BLD: 20 % (ref 14–44)
MCH RBC QN AUTO: 24.5 PG (ref 25–35)
MCHC RBC AUTO-ENTMCNC: 31.2 G/DL (ref 31–37)
MCV RBC AUTO: 78.4 FL (ref 78–102)
NEUTS SEG # BLD: 3 K/UL (ref 1.8–9.5)
NEUTS SEG NFR BLD: 78 % (ref 40–70)
PLATELET # BLD AUTO: 293 K/UL (ref 140–440)
RBC # BLD AUTO: 3.84 M/UL (ref 4.1–5.1)
WBC # BLD AUTO: 3.9 K/UL (ref 4.5–13)

## 2019-06-12 PROCEDURE — 96409 CHEMO IV PUSH SNGL DRUG: CPT

## 2019-06-12 PROCEDURE — 85025 COMPLETE CBC W/AUTO DIFF WBC: CPT

## 2019-06-12 PROCEDURE — 74011000258 HC RX REV CODE- 258: Performed by: INTERNAL MEDICINE

## 2019-06-12 PROCEDURE — 74011250636 HC RX REV CODE- 250/636: Performed by: INTERNAL MEDICINE

## 2019-06-12 PROCEDURE — 77030012965 HC NDL HUBR BBMI -A

## 2019-06-12 RX ORDER — SODIUM CHLORIDE 0.9 % (FLUSH) 0.9 %
10 SYRINGE (ML) INJECTION AS NEEDED
Status: DISPENSED | OUTPATIENT
Start: 2019-06-12 | End: 2019-06-12

## 2019-06-12 RX ORDER — SODIUM CHLORIDE 9 MG/ML
25 INJECTION, SOLUTION INTRAVENOUS CONTINUOUS
Status: DISPENSED | OUTPATIENT
Start: 2019-06-12 | End: 2019-06-12

## 2019-06-12 RX ORDER — HEPARIN 100 UNIT/ML
300-500 SYRINGE INTRAVENOUS AS NEEDED
Status: DISPENSED | OUTPATIENT
Start: 2019-06-12 | End: 2019-06-12

## 2019-06-12 RX ORDER — SODIUM CHLORIDE 9 MG/ML
10 INJECTION INTRAMUSCULAR; INTRAVENOUS; SUBCUTANEOUS AS NEEDED
Status: ACTIVE | OUTPATIENT
Start: 2019-06-12 | End: 2019-06-12

## 2019-06-12 RX ADMIN — SODIUM CHLORIDE 25 ML/HR: 9 INJECTION, SOLUTION INTRAVENOUS at 11:04

## 2019-06-12 RX ADMIN — VINORELBINE TARTRATE 35 MG: 10 INJECTION INTRAVENOUS at 11:04

## 2019-06-12 RX ADMIN — Medication 500 UNITS: at 11:47

## 2019-06-12 RX ADMIN — SODIUM CHLORIDE 10 ML: 9 INJECTION INTRAMUSCULAR; INTRAVENOUS; SUBCUTANEOUS at 11:46

## 2019-06-12 NOTE — PROGRESS NOTES
[facility] Progress Note    Date: 2019    Name: Jessica Ochoa              MRN: [id number]              : 1960    Chemotherapy Cycle: L4H16 Vinorelbine      Pt to [facility], ambulatory, at 1015. Ms. Compa Saldivar was assessed and education was provided. Ms. Oziel Auguste vitals were reviewed. Visit Vitals  /87 (BP 1 Location: Left arm, BP Patient Position: At rest)   Pulse 86   Temp 98.4 °F (36.9 °C)   Resp 18   Ht 5' 6\" (1.676 m)   Wt 60.3 kg (133 lb)   SpO2 98%   BMI 21.47 kg/m²       Left chest lateral lumen of her double lumen mediport accessed with 20 g 1 inch el needle. Port flushed easily and had brisk blood return. Blood drawn off and sent for CBC and CMP per written orders after 10 ml waste. NS initiated @ 50 mL/hr. Lab results were obtained and reviewed. Recent Results (from the past 12 hour(s))   CBC WITH 3 PART DIFF    Collection Time: 19 10:45 AM   Result Value Ref Range    WBC 3.9 (L) 4.5 - 13.0 K/uL    RBC 3.84 (L) 4.10 - 5.10 M/uL    HGB 9.4 (L) 12.0 - 16 g/dL    HCT 30.1 (L) 36 - 48 %    MCV 78.4 78 - 102 FL    MCH 24.5 (L) 25.0 - 35.0 PG    MCHC 31.2 31 - 37 g/dL    RDW 20.3 (H) 11.5 - 14.5 %    PLATELET 571 612 - 156 K/uL    NEUTROPHILS 78 (H) 40 - 70 %    MIXED CELLS 2 0.1 - 17 %    LYMPHOCYTES 20 14 - 44 %    ABS. NEUTROPHILS 3.0 1.8 - 9.5 K/UL    ABS. MIXED CELLS 0.1 0.0 - 2.3 K/uL    ABS. LYMPHOCYTES 0.8 (L) 1.1 - 5.9 K/UL    DF AUTOMATED         Lab results within ordered parameters to give chemo today. No pre-medications ordered. Vinorelbine 35 mg was infused at over 10 minutes as ordered. VS stable at end of infusion and pt denied complaints. Line flushed with NS and blood return from port re-verified. Line flushed with 100 mL NS following completion of the Vinorelbine as ordered. Ms. Compa Saldivar tolerated infusion, and had no complaints at this time. Lateral lumen flushed with NS 20 ml and Heparin 500 units then de-accessed.  No irritation or bleeding noted. Bandaid applied. Patient declined having medial port accessed today. Patient armband removed and shredded. Ms. Hampton Nipmichelle was discharged from [place] [address] in stable condition at 1150. She is to return on 06/26/19 at 1000 for her next  appointment.     Zara Arevalo RN  June 12, 2019

## 2019-06-20 ENCOUNTER — HOSPITAL ENCOUNTER (OUTPATIENT)
Dept: CT IMAGING | Age: 59
Discharge: HOME | End: 2019-06-20
Attending: INTERNAL MEDICINE
Payer: MEDICARE

## 2019-06-20 DIAGNOSIS — C34.11 MALIGNANT NEOPLASM OF UPPER LOBE OF RIGHT LUNG (HCC): ICD-10-CM

## 2019-06-20 LAB — CREAT UR-MCNC: 0.7 MG/DL (ref 0.6–1.3)

## 2019-06-20 PROCEDURE — 74011636320 HC RX REV CODE- 636/320: Performed by: INTERNAL MEDICINE

## 2019-06-20 PROCEDURE — 82565 ASSAY OF CREATININE: CPT

## 2019-06-20 PROCEDURE — 74177 CT ABD & PELVIS W/CONTRAST: CPT

## 2019-06-20 RX ADMIN — IOPAMIDOL 100 ML: 612 INJECTION, SOLUTION INTRAVENOUS at 11:21

## 2019-06-26 ENCOUNTER — HOSPITAL ENCOUNTER (OUTPATIENT)
Dept: INFUSION THERAPY | Age: 59
Discharge: HOME | End: 2019-06-26
Payer: MEDICARE

## 2019-06-26 VITALS
HEART RATE: 88 BPM | RESPIRATION RATE: 18 BRPM | WEIGHT: 132.9 LBS | SYSTOLIC BLOOD PRESSURE: 154 MMHG | TEMPERATURE: 98.7 F | DIASTOLIC BLOOD PRESSURE: 88 MMHG | BODY MASS INDEX: 21.36 KG/M2 | HEIGHT: 66 IN

## 2019-06-26 DIAGNOSIS — C34.91 NON-SMALL CELL CANCER OF RIGHT LUNG (HCC): Primary | ICD-10-CM

## 2019-06-26 LAB
ALBUMIN SERPL-MCNC: 3.4 G/DL (ref 3.4–5)
ALBUMIN/GLOB SERPL: 0.8 {RATIO} (ref 0.8–1.7)
ALP SERPL-CCNC: 81 U/L (ref 45–117)
ALT SERPL-CCNC: 9 U/L (ref 13–56)
ANION GAP SERPL CALC-SCNC: 7 MMOL/L (ref 3–18)
AST SERPL-CCNC: 12 U/L (ref 15–37)
BASO+EOS+MONOS # BLD AUTO: 0.2 K/UL (ref 0–2.3)
BASO+EOS+MONOS NFR BLD AUTO: 5 % (ref 0.1–17)
BILIRUB SERPL-MCNC: 0.3 MG/DL (ref 0.2–1)
BUN SERPL-MCNC: 7 MG/DL (ref 7–18)
BUN/CREAT SERPL: 11 (ref 12–20)
CALCIUM SERPL-MCNC: 8.9 MG/DL (ref 8.5–10.1)
CHLORIDE SERPL-SCNC: 107 MMOL/L (ref 100–108)
CO2 SERPL-SCNC: 27 MMOL/L (ref 21–32)
CREAT SERPL-MCNC: 0.64 MG/DL (ref 0.6–1.3)
DIFFERENTIAL METHOD BLD: ABNORMAL
ERYTHROCYTE [DISTWIDTH] IN BLOOD BY AUTOMATED COUNT: 21 % (ref 11.5–14.5)
GLOBULIN SER CALC-MCNC: 4.2 G/DL (ref 2–4)
GLUCOSE SERPL-MCNC: 145 MG/DL (ref 74–99)
HCT VFR BLD AUTO: 32 % (ref 36–48)
HGB BLD-MCNC: 10 G/DL (ref 12–16)
LYMPHOCYTES # BLD: 0.9 K/UL (ref 1.1–5.9)
LYMPHOCYTES NFR BLD: 21 % (ref 14–44)
MCH RBC QN AUTO: 24.3 PG (ref 25–35)
MCHC RBC AUTO-ENTMCNC: 31.3 G/DL (ref 31–37)
MCV RBC AUTO: 77.9 FL (ref 78–102)
NEUTS SEG # BLD: 3 K/UL (ref 1.8–9.5)
NEUTS SEG NFR BLD: 74 % (ref 40–70)
PLATELET # BLD AUTO: 307 K/UL (ref 140–440)
POTASSIUM SERPL-SCNC: 3.4 MMOL/L (ref 3.5–5.5)
PROT SERPL-MCNC: 7.6 G/DL (ref 6.4–8.2)
RBC # BLD AUTO: 4.11 M/UL (ref 4.1–5.1)
SODIUM SERPL-SCNC: 141 MMOL/L (ref 136–145)
WBC # BLD AUTO: 4.1 K/UL (ref 4.5–13)

## 2019-06-26 PROCEDURE — 96413 CHEMO IV INFUSION 1 HR: CPT

## 2019-06-26 PROCEDURE — 74011250636 HC RX REV CODE- 250/636: Performed by: INTERNAL MEDICINE

## 2019-06-26 PROCEDURE — 74011000258 HC RX REV CODE- 258: Performed by: INTERNAL MEDICINE

## 2019-06-26 PROCEDURE — 85025 COMPLETE CBC W/AUTO DIFF WBC: CPT

## 2019-06-26 PROCEDURE — 80053 COMPREHEN METABOLIC PANEL: CPT

## 2019-06-26 PROCEDURE — 77030012965 HC NDL HUBR BBMI -A

## 2019-06-26 PROCEDURE — 36415 COLL VENOUS BLD VENIPUNCTURE: CPT

## 2019-06-26 RX ORDER — SODIUM CHLORIDE 9 MG/ML
25 INJECTION, SOLUTION INTRAVENOUS CONTINUOUS
Status: DISPENSED | OUTPATIENT
Start: 2019-06-26 | End: 2019-06-26

## 2019-06-26 RX ORDER — SODIUM CHLORIDE 0.9 % (FLUSH) 0.9 %
10 SYRINGE (ML) INJECTION AS NEEDED
Status: DISPENSED | OUTPATIENT
Start: 2019-06-26 | End: 2019-06-26

## 2019-06-26 RX ORDER — HEPARIN 100 UNIT/ML
300-500 SYRINGE INTRAVENOUS AS NEEDED
Status: DISPENSED | OUTPATIENT
Start: 2019-06-26 | End: 2019-06-26

## 2019-06-26 RX ORDER — SODIUM CHLORIDE 9 MG/ML
10 INJECTION INTRAMUSCULAR; INTRAVENOUS; SUBCUTANEOUS AS NEEDED
Status: ACTIVE | OUTPATIENT
Start: 2019-06-26 | End: 2019-06-26

## 2019-06-26 RX ADMIN — HEPARIN 500 UNITS: 100 SYRINGE at 11:56

## 2019-06-26 RX ADMIN — SODIUM CHLORIDE 20 ML: 9 INJECTION INTRAMUSCULAR; INTRAVENOUS; SUBCUTANEOUS at 11:59

## 2019-06-26 RX ADMIN — SODIUM CHLORIDE 25 ML/HR: 9 INJECTION, SOLUTION INTRAVENOUS at 11:32

## 2019-06-26 RX ADMIN — VINORELBINE TARTRATE 35 MG: 10 INJECTION INTRAVENOUS at 11:36

## 2019-06-26 RX ADMIN — HEPARIN 500 UNITS: 100 SYRINGE at 11:59

## 2019-06-26 NOTE — PROGRESS NOTES
[facility] Progress Note    Date: 2019    Name: Robby Gill              MRN: [id number]              : 1960    Chemotherapy Cycle:C7D1 Vinorelbine      Pt to [facility], ambulatory, at 1005. Ms. Hannah Mahoney was assessed and education was provided. Ms. Viviana Clinton vitals were reviewed. Visit Vitals  /88 (BP 1 Location: Left arm, BP Patient Position: At rest;Sitting)   Pulse 88   Temp 98.7 °F (37.1 °C)   Resp 18   Ht 5' 6\" (1.676 m)   Wt 60.3 kg (132 lb 14.4 oz)   BMI 21.45 kg/m²       Left chest lateral lumen of her double lumen mediport accessed with 20 g 1 inch el needle. Port flushed easily and had brisk blood return. Blood drawn off and sent for CBC and CMP per written orders after 10 ml waste. Lab results were obtained and reviewed. Recent Results (from the past 12 hour(s))   CBC WITH 3 PART DIFF    Collection Time: 19 10:20 AM   Result Value Ref Range    WBC 4.1 (L) 4.5 - 13.0 K/uL    RBC 4.11 4.10 - 5.10 M/uL    HGB 10.0 (L) 12.0 - 16 g/dL    HCT 32.0 (L) 36 - 48 %    MCV 77.9 (L) 78 - 102 FL    MCH 24.3 (L) 25.0 - 35.0 PG    MCHC 31.3 31 - 37 g/dL    RDW 21.0 (H) 11.5 - 14.5 %    PLATELET 715 340 - 489 K/uL    NEUTROPHILS 74 (H) 40 - 70 %    MIXED CELLS 5 0.1 - 17 %    LYMPHOCYTES 21 14 - 44 %    ABS. NEUTROPHILS 3.0 1.8 - 9.5 K/UL    ABS. MIXED CELLS 0.2 0.0 - 2.3 K/uL    ABS.  LYMPHOCYTES 0.9 (L) 1.1 - 5.9 K/UL    DF AUTOMATED     METABOLIC PANEL, COMPREHENSIVE    Collection Time: 19 10:20 AM   Result Value Ref Range    Sodium 141 136 - 145 mmol/L    Potassium 3.4 (L) 3.5 - 5.5 mmol/L    Chloride 107 100 - 108 mmol/L    CO2 27 21 - 32 mmol/L    Anion gap 7 3.0 - 18 mmol/L    Glucose 145 (H) 74 - 99 mg/dL    BUN 7 7.0 - 18 MG/DL    Creatinine 0.64 0.6 - 1.3 MG/DL    BUN/Creatinine ratio 11 (L) 12 - 20      GFR est AA >60 >60 ml/min/1.73m2    GFR est non-AA >60 >60 ml/min/1.73m2    Calcium 8.9 8.5 - 10.1 MG/DL    Bilirubin, total 0.3 0.2 - 1.0 MG/DL    ALT (SGPT) 9 (L) 13 - 56 U/L    AST (SGOT) 12 (L) 15 - 37 U/L    Alk. phosphatase 81 45 - 117 U/L    Protein, total 7.6 6.4 - 8.2 g/dL    Albumin 3.4 3.4 - 5.0 g/dL    Globulin 4.2 (H) 2.0 - 4.0 g/dL    A-G Ratio 0.8 0.8 - 1.7         Lab results within ordered parameters to give chemo today. No pre-medications ordered. Vinorelbine 35 mg was infused at over 10 minutes as ordered. VS stable at end of infusion and pt denied complaints. Line flushed with NS and blood return from port re-verified. Line flushed with 50 mL NS following completion of the Vinorelbine as ordered. Ms. Mckenzie Mayberry tolerated infusion, and had no complaints at this time. She declined to stay for an observation period. Lateral lumen flushed with NS 20 ml and Heparin 500 units then de-accessed. No irritation or bleeding noted. Bandaid applied. Patient declined having lateral port accessed today. Patient armband removed and shredded. Ms. Mckenzie Mayberry was discharged from [place] [address] in stable condition at 1200. She is to return on 07/03/19 at 1000 for her next  appointment.     Abdon Pandey RN  June 26, 2019  [phone]

## 2019-07-03 ENCOUNTER — HOSPITAL ENCOUNTER (OUTPATIENT)
Dept: INFUSION THERAPY | Age: 59
Discharge: HOME | End: 2019-07-03
Payer: MEDICARE

## 2019-07-03 VITALS
HEIGHT: 66 IN | WEIGHT: 131 LBS | SYSTOLIC BLOOD PRESSURE: 130 MMHG | TEMPERATURE: 98.4 F | BODY MASS INDEX: 21.05 KG/M2 | DIASTOLIC BLOOD PRESSURE: 88 MMHG | RESPIRATION RATE: 18 BRPM | HEART RATE: 86 BPM

## 2019-07-03 DIAGNOSIS — C34.91 NON-SMALL CELL CANCER OF RIGHT LUNG (HCC): Primary | ICD-10-CM

## 2019-07-03 LAB
BASO+EOS+MONOS # BLD AUTO: 0.2 K/UL (ref 0–2.3)
BASO+EOS+MONOS NFR BLD AUTO: 4 % (ref 0.1–17)
DIFFERENTIAL METHOD BLD: ABNORMAL
ERYTHROCYTE [DISTWIDTH] IN BLOOD BY AUTOMATED COUNT: 19.9 % (ref 11.5–14.5)
HCT VFR BLD AUTO: 30.9 % (ref 36–48)
HGB BLD-MCNC: 9.9 G/DL (ref 12–16)
LYMPHOCYTES # BLD: 1.2 K/UL (ref 1.1–5.9)
LYMPHOCYTES NFR BLD: 25 % (ref 14–44)
MCH RBC QN AUTO: 24.6 PG (ref 25–35)
MCHC RBC AUTO-ENTMCNC: 32 G/DL (ref 31–37)
MCV RBC AUTO: 76.9 FL (ref 78–102)
NEUTS SEG # BLD: 3.4 K/UL (ref 1.8–9.5)
NEUTS SEG NFR BLD: 71 % (ref 40–70)
PLATELET # BLD AUTO: 272 K/UL (ref 140–440)
RBC # BLD AUTO: 4.02 M/UL (ref 4.1–5.1)
WBC # BLD AUTO: 4.8 K/UL (ref 4.5–13)

## 2019-07-03 PROCEDURE — 74011000258 HC RX REV CODE- 258: Performed by: INTERNAL MEDICINE

## 2019-07-03 PROCEDURE — 77030012965 HC NDL HUBR BBMI -A

## 2019-07-03 PROCEDURE — 96409 CHEMO IV PUSH SNGL DRUG: CPT

## 2019-07-03 PROCEDURE — 85025 COMPLETE CBC W/AUTO DIFF WBC: CPT

## 2019-07-03 PROCEDURE — 74011250636 HC RX REV CODE- 250/636

## 2019-07-03 PROCEDURE — 74011250636 HC RX REV CODE- 250/636: Performed by: INTERNAL MEDICINE

## 2019-07-03 PROCEDURE — 36591 DRAW BLOOD OFF VENOUS DEVICE: CPT

## 2019-07-03 RX ORDER — HEPARIN 100 UNIT/ML
300-500 SYRINGE INTRAVENOUS AS NEEDED
Status: ACTIVE | OUTPATIENT
Start: 2019-07-03 | End: 2019-07-03

## 2019-07-03 RX ORDER — SODIUM CHLORIDE 0.9 % (FLUSH) 0.9 %
10 SYRINGE (ML) INJECTION AS NEEDED
Status: DISPENSED | OUTPATIENT
Start: 2019-07-03 | End: 2019-07-03

## 2019-07-03 RX ORDER — HEPARIN 100 UNIT/ML
SYRINGE INTRAVENOUS
Status: COMPLETED
Start: 2019-07-03 | End: 2019-07-03

## 2019-07-03 RX ADMIN — Medication 10 ML: at 11:06

## 2019-07-03 RX ADMIN — VINORELBINE TARTRATE 35 MG: 10 INJECTION INTRAVENOUS at 10:53

## 2019-07-03 RX ADMIN — HEPARIN 500 UNITS: 100 SYRINGE at 11:06

## 2019-07-03 NOTE — PROGRESS NOTES
[facility] Progress Note    Date: July 3, 2019    Name: Donelda Hodgkins              MRN: [id number]              : 1960    Chemotherapy Cycle:C5D8 Vinorelbine      Pt to [facility], ambulatory, at 1010 am. No complaints or concerns voiced      . Ms. Pau Lynn was assessed and education was provided. Ms. Nel Mena vitals were reviewed. Visit Vitals  /88 (BP 1 Location: Left arm, BP Patient Position: Sitting)   Pulse 86   Temp 98.4 °F (36.9 °C)   Resp 18   Ht 5' 6\" (1.676 m)   Wt 59.4 kg (131 lb)   Breastfeeding? No   BMI 21.14 kg/m²       Left chest medial lumen of her double lumen mediport accessed with 20 g 1 inch el needle. Port flushed easily, but no blood returns. Cath amparo instilled to dwell 30 minutes. Left chest Lateral port was accessed with # 20 1' el. Brisk flush/blood returns. Blood sample obtained for cbc    Lab results were obtained and reviewed. Recent Results (from the past 12 hour(s))   CBC WITH 3 PART DIFF    Collection Time: 19 10:24 AM   Result Value Ref Range    WBC 4.8 4.5 - 13.0 K/uL    RBC 4.02 (L) 4.10 - 5.10 M/uL    HGB 9.9 (L) 12.0 - 16 g/dL    HCT 30.9 (L) 36 - 48 %    MCV 76.9 (L) 78 - 102 FL    MCH 24.6 (L) 25.0 - 35.0 PG    MCHC 32.0 31 - 37 g/dL    RDW 19.9 (H) 11.5 - 14.5 %    PLATELET 496 391 - 400 K/uL    NEUTROPHILS 71 (H) 40 - 70 %    MIXED CELLS 4 0.1 - 17 %    LYMPHOCYTES 25 14 - 44 %    ABS. NEUTROPHILS 3.4 1.8 - 9.5 K/UL    ABS. MIXED CELLS 0.2 0.0 - 2.3 K/uL    ABS. LYMPHOCYTES 1.2 1.1 - 5.9 K/UL    DF AUTOMATED         Lab results within ordered parameters to give chemo today. Chemo dosages verified with today's BSA and found to be within 10% of ordered dosages. No pre-medications ordered. Vinorelbine 35 mg was infused at over 10 minutes as ordered. VS stable at end of infusion and pt denied complaints. Blood return from port( lateral) re-verified.     Patient Vitals for the past 12 hrs:   Temp Pulse Resp BP   19 1018 98.4 °F (36.9 °C) 86 18 130/88     Line flushed with 100 mL NS following completion of the Vinorelbine. .     Ms. Anil Alexander tolerated infusion, and had no complaints. Blood returns noted from medial port post cath amparo. 10 ml blood discarded from port, then flushed with saline and heparin per protocol, and de-accessed. Site without redness/swelling/drainage or tenderness. Lateral port flushed with NS 20 ml and Heparin 500 units then de-accessed. No irritation or bleeding noted. Gauze/tegaderm applied to double port sites . Patient armband removed and shredded. Ms. Anil Alexander was discharged from [place] [address] in stable condition at 1110. She is to return on 7/10/19 at 1000 am for her next  appointment.     Yumiko Moreno  July 3, 2019  1201

## 2019-07-10 ENCOUNTER — HOSPITAL ENCOUNTER (OUTPATIENT)
Dept: INFUSION THERAPY | Age: 59
Discharge: HOME | End: 2019-07-10
Payer: MEDICARE

## 2019-07-10 VITALS
DIASTOLIC BLOOD PRESSURE: 81 MMHG | TEMPERATURE: 98.7 F | BODY MASS INDEX: 21.24 KG/M2 | RESPIRATION RATE: 18 BRPM | SYSTOLIC BLOOD PRESSURE: 138 MMHG | WEIGHT: 132.2 LBS | HEART RATE: 85 BPM | OXYGEN SATURATION: 97 % | HEIGHT: 66 IN

## 2019-07-10 DIAGNOSIS — C34.91 NON-SMALL CELL CANCER OF RIGHT LUNG (HCC): Primary | ICD-10-CM

## 2019-07-10 LAB
BASO+EOS+MONOS # BLD AUTO: 0.1 K/UL (ref 0–2.3)
BASO+EOS+MONOS NFR BLD AUTO: 4 % (ref 0.1–17)
DIFFERENTIAL METHOD BLD: ABNORMAL
ERYTHROCYTE [DISTWIDTH] IN BLOOD BY AUTOMATED COUNT: 21 % (ref 11.5–14.5)
HCT VFR BLD AUTO: 29.4 % (ref 36–48)
HGB BLD-MCNC: 9.3 G/DL (ref 12–16)
LYMPHOCYTES # BLD: 0.8 K/UL (ref 1.1–5.9)
LYMPHOCYTES NFR BLD: 22 % (ref 14–44)
MCH RBC QN AUTO: 24.6 PG (ref 25–35)
MCHC RBC AUTO-ENTMCNC: 31.6 G/DL (ref 31–37)
MCV RBC AUTO: 77.8 FL (ref 78–102)
NEUTS SEG # BLD: 3 K/UL (ref 1.8–9.5)
NEUTS SEG NFR BLD: 75 % (ref 40–70)
PLATELET # BLD AUTO: 312 K/UL (ref 140–440)
RBC # BLD AUTO: 3.78 M/UL (ref 4.1–5.1)
WBC # BLD AUTO: 3.9 K/UL (ref 4.5–13)

## 2019-07-10 PROCEDURE — 85025 COMPLETE CBC W/AUTO DIFF WBC: CPT

## 2019-07-10 PROCEDURE — 96409 CHEMO IV PUSH SNGL DRUG: CPT

## 2019-07-10 PROCEDURE — 74011000258 HC RX REV CODE- 258: Performed by: INTERNAL MEDICINE

## 2019-07-10 PROCEDURE — 74011250636 HC RX REV CODE- 250/636: Performed by: INTERNAL MEDICINE

## 2019-07-10 PROCEDURE — 77030012965 HC NDL HUBR BBMI -A

## 2019-07-10 RX ORDER — HEPARIN 100 UNIT/ML
SYRINGE INTRAVENOUS
Status: DISPENSED
Start: 2019-07-10 | End: 2019-07-10

## 2019-07-10 RX ORDER — SODIUM CHLORIDE 9 MG/ML
10 INJECTION INTRAMUSCULAR; INTRAVENOUS; SUBCUTANEOUS AS NEEDED
Status: ACTIVE | OUTPATIENT
Start: 2019-07-10 | End: 2019-07-10

## 2019-07-10 RX ORDER — SODIUM CHLORIDE 0.9 % (FLUSH) 0.9 %
10 SYRINGE (ML) INJECTION AS NEEDED
Status: DISPENSED | OUTPATIENT
Start: 2019-07-10 | End: 2019-07-10

## 2019-07-10 RX ORDER — SODIUM CHLORIDE 9 MG/ML
25 INJECTION, SOLUTION INTRAVENOUS CONTINUOUS
Status: DISPENSED | OUTPATIENT
Start: 2019-07-10 | End: 2019-07-10

## 2019-07-10 RX ORDER — HEPARIN 100 UNIT/ML
300-500 SYRINGE INTRAVENOUS AS NEEDED
Status: ACTIVE | OUTPATIENT
Start: 2019-07-10 | End: 2019-07-10

## 2019-07-10 RX ADMIN — SODIUM CHLORIDE 10 ML: 9 INJECTION INTRAMUSCULAR; INTRAVENOUS; SUBCUTANEOUS at 11:20

## 2019-07-10 RX ADMIN — VINORELBINE TARTRATE 35 MG: 10 INJECTION INTRAVENOUS at 10:53

## 2019-07-10 RX ADMIN — SODIUM CHLORIDE 25 ML/HR: 9 INJECTION, SOLUTION INTRAVENOUS at 10:48

## 2019-07-10 RX ADMIN — HEPARIN 500 UNITS: 100 SYRINGE at 11:21

## 2019-07-10 RX ADMIN — Medication 30 ML: at 10:20

## 2019-07-10 NOTE — PROGRESS NOTES
[facility] Progress Note    Date: July 10, 2019    Name: Trixie Ballesteros              MRN: [id number]              : 1960    Chemotherapy Cycle:C7D15 Vinorelbine      Pt to [facility], ambulatory, at 1015 am. No complaints or concerns voiced      . Ms. Nuno Calderon was assessed and education was provided. Ms. Narda Arias vitals were reviewed. Visit Vitals  /81 (BP 1 Location: Left arm, BP Patient Position: At rest)   Pulse 85   Temp 98.7 °F (37.1 °C)   Resp 18   Ht 5' 6\" (1.676 m)   Wt 60 kg (132 lb 3.2 oz)   SpO2 97%   BMI 21.34 kg/m²       Left chest medial lumen of her double lumen mediport accessed with 20 g 1 inch el needle. Port flushed easily and had brisk blood return. Blood drawn off for cbc      Lab results were obtained and reviewed. Recent Results (from the past 12 hour(s))   CBC WITH 3 PART DIFF    Collection Time: 07/10/19 10:20 AM   Result Value Ref Range    WBC 3.9 (L) 4.5 - 13.0 K/uL    RBC 3.78 (L) 4.10 - 5.10 M/uL    HGB 9.3 (L) 12.0 - 16 g/dL    HCT 29.4 (L) 36 - 48 %    MCV 77.8 (L) 78 - 102 FL    MCH 24.6 (L) 25.0 - 35.0 PG    MCHC 31.6 31 - 37 g/dL    RDW 21.0 (H) 11.5 - 14.5 %    PLATELET 362 066 - 584 K/uL    NEUTROPHILS 75 (H) 40 - 70 %    MIXED CELLS 4 0.1 - 17 %    LYMPHOCYTES 22 14 - 44 %    ABS. NEUTROPHILS 3.0 1.8 - 9.5 K/UL    ABS. MIXED CELLS 0.1 0.0 - 2.3 K/uL    ABS. LYMPHOCYTES 0.8 (L) 1.1 - 5.9 K/UL    DF AUTOMATED         Lab results within ordered parameters to give chemo today. Chemo dosages verified with today's BSA and found to be within 10% of ordered dosages. No pre-medications ordered. Vinorelbine 35 mg was infused at over 10 minutes as ordered. VS stable at end of infusion and pt denied complaints. Line flushed with NS and blood return from port re-verified.     Patient Vitals for the past 12 hrs:   Temp Pulse Resp BP SpO2   07/10/19 1110 98.7 °F (37.1 °C) 85 18 138/81 97 %   07/10/19 1015 97.9 °F (36.6 °C) 95 18 138/81 99 %     Line flushed with 100 mL NS following completion of the Vinorelbine. .     Ms. Avril Mckee tolerated infusion, and had no complaints. Mediport flushed with NS 20 ml and Heparin 500 units then de-accessed. No irritation or bleeding noted. Bandaid applied. Patient armband removed and shredded. Reviewed discharge instructions with patient. She verbalized understanding    Ms. Avril Mckee was discharged from [place] [address] in stable condition at 1125 am. She is to return on 7/24/19 at 1000 am for her next  appointment.     Trip Brown RN  July 10, 2019  2325

## 2019-07-24 ENCOUNTER — HOSPITAL ENCOUNTER (OUTPATIENT)
Dept: INFUSION THERAPY | Age: 59
Discharge: HOME | End: 2019-07-24
Payer: MEDICARE

## 2019-07-24 VITALS
DIASTOLIC BLOOD PRESSURE: 89 MMHG | WEIGHT: 132.5 LBS | HEART RATE: 99 BPM | BODY MASS INDEX: 21.29 KG/M2 | RESPIRATION RATE: 18 BRPM | TEMPERATURE: 98.3 F | OXYGEN SATURATION: 98 % | SYSTOLIC BLOOD PRESSURE: 157 MMHG | HEIGHT: 66 IN

## 2019-07-24 DIAGNOSIS — C34.91 NON-SMALL CELL CANCER OF RIGHT LUNG (HCC): Primary | ICD-10-CM

## 2019-07-24 LAB
ALBUMIN SERPL-MCNC: 3.3 G/DL (ref 3.4–5)
ALBUMIN/GLOB SERPL: 0.8 {RATIO} (ref 0.8–1.7)
ALP SERPL-CCNC: 81 U/L (ref 45–117)
ALT SERPL-CCNC: 12 U/L (ref 13–56)
ANION GAP SERPL CALC-SCNC: 7 MMOL/L (ref 3–18)
AST SERPL-CCNC: 10 U/L (ref 10–38)
BASO+EOS+MONOS # BLD AUTO: 0.4 K/UL (ref 0–2.3)
BASO+EOS+MONOS NFR BLD AUTO: 12 % (ref 0.1–17)
BILIRUB SERPL-MCNC: 0.3 MG/DL (ref 0.2–1)
BUN SERPL-MCNC: 6 MG/DL (ref 7–18)
BUN/CREAT SERPL: 8 (ref 12–20)
CALCIUM SERPL-MCNC: 8.9 MG/DL (ref 8.5–10.1)
CHLORIDE SERPL-SCNC: 107 MMOL/L (ref 100–111)
CO2 SERPL-SCNC: 28 MMOL/L (ref 21–32)
CREAT SERPL-MCNC: 0.74 MG/DL (ref 0.6–1.3)
DIFFERENTIAL METHOD BLD: ABNORMAL
ERYTHROCYTE [DISTWIDTH] IN BLOOD BY AUTOMATED COUNT: 21 % (ref 11.5–14.5)
GLOBULIN SER CALC-MCNC: 4.1 G/DL (ref 2–4)
GLUCOSE SERPL-MCNC: 119 MG/DL (ref 74–99)
HCT VFR BLD AUTO: 31.4 % (ref 36–48)
HGB BLD-MCNC: 9.9 G/DL (ref 12–16)
LYMPHOCYTES # BLD: 0.7 K/UL (ref 1.1–5.9)
LYMPHOCYTES NFR BLD: 20 % (ref 14–44)
MCH RBC QN AUTO: 24.7 PG (ref 25–35)
MCHC RBC AUTO-ENTMCNC: 31.5 G/DL (ref 31–37)
MCV RBC AUTO: 78.3 FL (ref 78–102)
NEUTS SEG # BLD: 2.4 K/UL (ref 1.8–9.5)
NEUTS SEG NFR BLD: 68 % (ref 40–70)
PLATELET # BLD AUTO: 279 K/UL (ref 140–440)
POTASSIUM SERPL-SCNC: 3.4 MMOL/L (ref 3.5–5.5)
PROT SERPL-MCNC: 7.4 G/DL (ref 6.4–8.2)
RBC # BLD AUTO: 4.01 M/UL (ref 4.1–5.1)
SODIUM SERPL-SCNC: 142 MMOL/L (ref 136–145)
WBC # BLD AUTO: 3.5 K/UL (ref 4.5–13)

## 2019-07-24 PROCEDURE — 80053 COMPREHEN METABOLIC PANEL: CPT

## 2019-07-24 PROCEDURE — 36591 DRAW BLOOD OFF VENOUS DEVICE: CPT

## 2019-07-24 PROCEDURE — 96409 CHEMO IV PUSH SNGL DRUG: CPT

## 2019-07-24 PROCEDURE — 74011000258 HC RX REV CODE- 258: Performed by: INTERNAL MEDICINE

## 2019-07-24 PROCEDURE — 77030012965 HC NDL HUBR BBMI -A

## 2019-07-24 PROCEDURE — 85025 COMPLETE CBC W/AUTO DIFF WBC: CPT

## 2019-07-24 PROCEDURE — 74011250636 HC RX REV CODE- 250/636: Performed by: INTERNAL MEDICINE

## 2019-07-24 PROCEDURE — 36415 COLL VENOUS BLD VENIPUNCTURE: CPT

## 2019-07-24 RX ORDER — SODIUM CHLORIDE 0.9 % (FLUSH) 0.9 %
10 SYRINGE (ML) INJECTION AS NEEDED
Status: DISPENSED | OUTPATIENT
Start: 2019-07-24 | End: 2019-07-24

## 2019-07-24 RX ORDER — SODIUM CHLORIDE 9 MG/ML
25 INJECTION, SOLUTION INTRAVENOUS CONTINUOUS
Status: DISPENSED | OUTPATIENT
Start: 2019-07-24 | End: 2019-07-24

## 2019-07-24 RX ORDER — HEPARIN 100 UNIT/ML
300-500 SYRINGE INTRAVENOUS AS NEEDED
Status: DISPENSED | OUTPATIENT
Start: 2019-07-24 | End: 2019-07-24

## 2019-07-24 RX ADMIN — HEPARIN 500 UNITS: 100 SYRINGE at 12:06

## 2019-07-24 RX ADMIN — VINORELBINE TARTRATE 35 MG: 10 INJECTION INTRAVENOUS at 11:52

## 2019-07-24 RX ADMIN — Medication 10 ML: at 12:06

## 2019-07-24 RX ADMIN — SODIUM CHLORIDE 25 ML/HR: 900 INJECTION, SOLUTION INTRAVENOUS at 10:29

## 2019-07-24 NOTE — PROGRESS NOTES
[facility] Progress Note    Date: 2019    Name: Nuno Martines              MRN: [id number]              : 1960    Chemotherapy Cycle:C8D1 Vinorelbine      Pt to [facility], ambulatory, at 1015. Ms. Jenkins Friend was assessed and education was provided. Ms. Ramona Garcia vitals were reviewed. Visit Vitals  /89 (BP 1 Location: Right arm, BP Patient Position: Sitting)   Pulse 99   Temp 98.3 °F (36.8 °C)   Resp 18   Ht 5' 6\" (1.676 m)   Wt 60.1 kg (132 lb 8 oz)   SpO2 98%   BMI 21.39 kg/m²       Left chest lateral lumen of her double lumen mediport accessed with 20 g 1 inch le needle. Port flushed easily and had brisk blood return. Blood drawn off and sent for CBC and CMP per written orders after 10 ml waste. NS initiated @ 50 mL/hr. Lab results were obtained and reviewed. Recent Results (from the past 12 hour(s))   CBC WITH 3 PART DIFF    Collection Time: 19 10:35 AM   Result Value Ref Range    WBC 3.5 (L) 4.5 - 13.0 K/uL    RBC 4.01 (L) 4.10 - 5.10 M/uL    HGB 9.9 (L) 12.0 - 16 g/dL    HCT 31.4 (L) 36 - 48 %    MCV 78.3 78 - 102 FL    MCH 24.7 (L) 25.0 - 35.0 PG    MCHC 31.5 31 - 37 g/dL    RDW 21.0 (H) 11.5 - 14.5 %    PLATELET 339 776 - 111 K/uL    NEUTROPHILS 68 40 - 70 %    MIXED CELLS 12 0.1 - 17 %    LYMPHOCYTES 20 14 - 44 %    ABS. NEUTROPHILS 2.4 1.8 - 9.5 K/UL    ABS. MIXED CELLS 0.4 0.0 - 2.3 K/uL    ABS.  LYMPHOCYTES 0.7 (L) 1.1 - 5.9 K/UL    DF AUTOMATED     METABOLIC PANEL, COMPREHENSIVE    Collection Time: 19 10:35 AM   Result Value Ref Range    Sodium 142 136 - 145 mmol/L    Potassium 3.4 (L) 3.5 - 5.5 mmol/L    Chloride 107 100 - 111 mmol/L    CO2 28 21 - 32 mmol/L    Anion gap 7 3.0 - 18 mmol/L    Glucose 119 (H) 74 - 99 mg/dL    BUN 6 (L) 7.0 - 18 MG/DL    Creatinine 0.74 0.6 - 1.3 MG/DL    BUN/Creatinine ratio 8 (L) 12 - 20      GFR est AA >60 >60 ml/min/1.73m2    GFR est non-AA >60 >60 ml/min/1.73m2    Calcium 8.9 8.5 - 10.1 MG/DL    Bilirubin, total 0.3 0.2 - 1.0 MG/DL    ALT (SGPT) 12 (L) 13 - 56 U/L    AST (SGOT) 10 10 - 38 U/L    Alk. phosphatase 81 45 - 117 U/L    Protein, total 7.4 6.4 - 8.2 g/dL    Albumin 3.3 (L) 3.4 - 5.0 g/dL    Globulin 4.1 (H) 2.0 - 4.0 g/dL    A-G Ratio 0.8 0.8 - 1.7         Lab results within ordered parameters to give chemo today. No pre-medications ordered. Vinorelbine 35 mg was infused at over 10 minutes as ordered. VS stable at end of infusion and pt denied complaints. Line flushed with NS and blood return from port re-verified. Line flushed with 100 mL NS following completion of the Vinorelbine as ordered. Ms. Joan Pacheco tolerated infusion, and had no complaints at this time. She declined to stay for an observation period. Lateral lumen flushed with NS 20 ml and Heparin 500 units then de-accessed. No irritation or bleeding noted. Bandaid applied. Patient declined having medial port accessed today. Patient armband removed and shredded. Ms. Joan Pacheco was discharged from [place] [address] in stable condition at 1210. She is to return on 07/31/19 at 1000 for her next  appointment.     Maine Cha  July 24, 2019  1040

## 2019-07-31 ENCOUNTER — HOSPITAL ENCOUNTER (OUTPATIENT)
Dept: INFUSION THERAPY | Age: 59
Discharge: HOME | End: 2019-07-31
Payer: MEDICARE

## 2019-07-31 VITALS
SYSTOLIC BLOOD PRESSURE: 137 MMHG | TEMPERATURE: 98.7 F | DIASTOLIC BLOOD PRESSURE: 85 MMHG | BODY MASS INDEX: 20.73 KG/M2 | WEIGHT: 129 LBS | HEART RATE: 76 BPM | HEIGHT: 66 IN | RESPIRATION RATE: 18 BRPM | OXYGEN SATURATION: 98 %

## 2019-07-31 DIAGNOSIS — C34.91 NON-SMALL CELL CANCER OF RIGHT LUNG (HCC): Primary | ICD-10-CM

## 2019-07-31 LAB
BASO+EOS+MONOS # BLD AUTO: 0.3 K/UL (ref 0–2.3)
BASO+EOS+MONOS NFR BLD AUTO: 6 % (ref 0.1–17)
DIFFERENTIAL METHOD BLD: ABNORMAL
ERYTHROCYTE [DISTWIDTH] IN BLOOD BY AUTOMATED COUNT: 20.9 % (ref 11.5–14.5)
HCT VFR BLD AUTO: 32.4 % (ref 36–48)
HGB BLD-MCNC: 10.2 G/DL (ref 12–16)
LYMPHOCYTES # BLD: 1.2 K/UL (ref 1.1–5.9)
LYMPHOCYTES NFR BLD: 24 % (ref 14–44)
MCH RBC QN AUTO: 24.5 PG (ref 25–35)
MCHC RBC AUTO-ENTMCNC: 31.5 G/DL (ref 31–37)
MCV RBC AUTO: 77.7 FL (ref 78–102)
NEUTS SEG # BLD: 3.4 K/UL (ref 1.8–9.5)
NEUTS SEG NFR BLD: 70 % (ref 40–70)
PLATELET # BLD AUTO: 276 K/UL (ref 140–440)
RBC # BLD AUTO: 4.17 M/UL (ref 4.1–5.1)
WBC # BLD AUTO: 4.9 K/UL (ref 4.5–13)

## 2019-07-31 PROCEDURE — 77030012965 HC NDL HUBR BBMI -A

## 2019-07-31 PROCEDURE — 85025 COMPLETE CBC W/AUTO DIFF WBC: CPT

## 2019-07-31 PROCEDURE — 74011000258 HC RX REV CODE- 258: Performed by: INTERNAL MEDICINE

## 2019-07-31 PROCEDURE — 96409 CHEMO IV PUSH SNGL DRUG: CPT

## 2019-07-31 PROCEDURE — 74011250636 HC RX REV CODE- 250/636: Performed by: INTERNAL MEDICINE

## 2019-07-31 RX ORDER — HEPARIN 100 UNIT/ML
SYRINGE INTRAVENOUS
Status: DISCONTINUED
Start: 2019-07-31 | End: 2019-08-01 | Stop reason: HOSPADM

## 2019-07-31 RX ORDER — SODIUM CHLORIDE 0.9 % (FLUSH) 0.9 %
10 SYRINGE (ML) INJECTION AS NEEDED
Status: DISPENSED | OUTPATIENT
Start: 2019-07-31 | End: 2019-07-31

## 2019-07-31 RX ORDER — SODIUM CHLORIDE 9 MG/ML
25 INJECTION, SOLUTION INTRAVENOUS CONTINUOUS
Status: DISPENSED | OUTPATIENT
Start: 2019-07-31 | End: 2019-07-31

## 2019-07-31 RX ORDER — HEPARIN 100 UNIT/ML
300-500 SYRINGE INTRAVENOUS AS NEEDED
Status: ACTIVE | OUTPATIENT
Start: 2019-07-31 | End: 2019-07-31

## 2019-07-31 RX ORDER — SODIUM CHLORIDE 9 MG/ML
10 INJECTION INTRAMUSCULAR; INTRAVENOUS; SUBCUTANEOUS AS NEEDED
Status: ACTIVE | OUTPATIENT
Start: 2019-07-31 | End: 2019-07-31

## 2019-07-31 RX ADMIN — HEPARIN 500 UNITS: 100 SYRINGE at 12:19

## 2019-07-31 RX ADMIN — SODIUM CHLORIDE 10 ML: 9 INJECTION INTRAMUSCULAR; INTRAVENOUS; SUBCUTANEOUS at 12:18

## 2019-07-31 RX ADMIN — SODIUM CHLORIDE 25 ML/HR: 9 INJECTION, SOLUTION INTRAVENOUS at 11:00

## 2019-07-31 RX ADMIN — VINORELBINE TARTRATE 35 MG: 10 INJECTION INTRAVENOUS at 11:51

## 2019-07-31 RX ADMIN — Medication 30 ML: at 10:22

## 2019-07-31 NOTE — PROGRESS NOTES
[facility] Progress Note    Date: 2019    Name: Catia Llanos              MRN: [id number]              : 1960    Chemotherapy Cycle:C8D8 Vinorelbine      [facility] to [facility], ambulatory, at 1005 am. No complaints or concerns voiced      . Ms. Mckenzie Mayberry was assessed and education was provided. Ms. Patric Cabral vitals were reviewed. Visit Vitals  /85 (BP 1 Location: Left arm, BP Patient Position: At rest)   Pulse 76   Temp 98.7 °F (37.1 °C)   Resp 18   Ht 5' 6\" (1.676 m)   Wt 58.5 kg (129 lb)   SpO2 98%   BMI 20.82 kg/m²       Left chest medial lumen of her double lumen mediport accessed with 20 g 1 inch el needle. Port flushed easily and had brisk blood return. Blood drawn off for cbc      Lab results were obtained and reviewed. Recent Results (from the past 12 hour(s))   CBC WITH 3 PART DIFF    Collection Time: 19 10:12 AM   Result Value Ref Range    WBC 4.9 4.5 - 13.0 K/uL    RBC 4.17 4.10 - 5.10 M/uL    HGB 10.2 (L) 12.0 - 16 g/dL    HCT 32.4 (L) 36 - 48 %    MCV 77.7 (L) 78 - 102 FL    MCH 24.5 (L) 25.0 - 35.0 PG    MCHC 31.5 31 - 37 g/dL    RDW 20.9 (H) 11.5 - 14.5 %    PLATELET 309 488 - 939 K/uL    NEUTROPHILS 70 40 - 70 %    MIXED CELLS 6 0.1 - 17 %    LYMPHOCYTES 24 14 - 44 %    ABS. NEUTROPHILS 3.4 1.8 - 9.5 K/UL    ABS. MIXED CELLS 0.3 0.0 - 2.3 K/uL    ABS. LYMPHOCYTES 1.2 1.1 - 5.9 K/UL    DF AUTOMATED         Lab results within ordered parameters to give chemo today. Chemo dosages verified with today's BSA and found to be within 10% of ordered dosages. N.S flush bag hung    No pre-medications ordered. Vinorelbine 35 mg was infused at over 10 minutes as ordered. VS stable at end of infusion and pt denied complaints. Line flushed with NS and blood return from port re-verified.     Patient Vitals for the past 12 hrs:   Temp Pulse Resp BP SpO2   19 1210 98.7 °F (37.1 °C) 76 18 137/85 98 %   19 1005 98.7 °F (37.1 °C) 91 18 124/79 94 %     Line flushed with 100 mL NS following completion of the Vinorelbine. .     Ms. Christopher Vaughan tolerated infusion, and had no complaints. Mediport flushed with NS 20 ml and Heparin 500 units then de-accessed. No irritation or bleeding noted. Bandaid applied. Patient armband removed and shredded. Reviewed discharge instructions with patient. She verbalized understanding    Ms. Christopher Vaughan was discharged from [place] [address] in stable condition at 1220 pm. She is to return on 8/7/19 at 1000 am for her next  appointment.     Venus Chang RN  July 31, 2019  3738

## 2019-08-07 ENCOUNTER — HOSPITAL ENCOUNTER (OUTPATIENT)
Dept: INFUSION THERAPY | Age: 59
Discharge: HOME | End: 2019-08-07
Payer: MEDICARE

## 2019-08-07 VITALS
TEMPERATURE: 98.4 F | WEIGHT: 131.2 LBS | OXYGEN SATURATION: 100 % | RESPIRATION RATE: 18 BRPM | BODY MASS INDEX: 21.08 KG/M2 | HEIGHT: 66 IN | SYSTOLIC BLOOD PRESSURE: 153 MMHG | HEART RATE: 78 BPM | DIASTOLIC BLOOD PRESSURE: 97 MMHG

## 2019-08-07 DIAGNOSIS — C34.91 NON-SMALL CELL CANCER OF RIGHT LUNG (HCC): Primary | ICD-10-CM

## 2019-08-07 LAB
BASO+EOS+MONOS # BLD AUTO: 0.1 K/UL (ref 0–2.3)
BASO+EOS+MONOS NFR BLD AUTO: 2 % (ref 0.1–17)
DIFFERENTIAL METHOD BLD: ABNORMAL
ERYTHROCYTE [DISTWIDTH] IN BLOOD BY AUTOMATED COUNT: 20.7 % (ref 11.5–14.5)
HCT VFR BLD AUTO: 30.9 % (ref 36–48)
HGB BLD-MCNC: 9.9 G/DL (ref 12–16)
LYMPHOCYTES # BLD: 0.9 K/UL (ref 1.1–5.9)
LYMPHOCYTES NFR BLD: 23 % (ref 14–44)
MCH RBC QN AUTO: 25 PG (ref 25–35)
MCHC RBC AUTO-ENTMCNC: 32 G/DL (ref 31–37)
MCV RBC AUTO: 78 FL (ref 78–102)
NEUTS SEG # BLD: 3.1 K/UL (ref 1.8–9.5)
NEUTS SEG NFR BLD: 75 % (ref 40–70)
PLATELET # BLD AUTO: 278 K/UL (ref 140–440)
RBC # BLD AUTO: 3.96 M/UL (ref 4.1–5.1)
WBC # BLD AUTO: 4.1 K/UL (ref 4.5–13)

## 2019-08-07 PROCEDURE — 74011000258 HC RX REV CODE- 258: Performed by: INTERNAL MEDICINE

## 2019-08-07 PROCEDURE — 77030012965 HC NDL HUBR BBMI -A

## 2019-08-07 PROCEDURE — 74011250636 HC RX REV CODE- 250/636: Performed by: INTERNAL MEDICINE

## 2019-08-07 PROCEDURE — 96409 CHEMO IV PUSH SNGL DRUG: CPT

## 2019-08-07 PROCEDURE — 85025 COMPLETE CBC W/AUTO DIFF WBC: CPT

## 2019-08-07 RX ORDER — SODIUM CHLORIDE 9 MG/ML
25 INJECTION, SOLUTION INTRAVENOUS CONTINUOUS
Status: DISPENSED | OUTPATIENT
Start: 2019-08-07 | End: 2019-08-07

## 2019-08-07 RX ORDER — SODIUM CHLORIDE 0.9 % (FLUSH) 0.9 %
10-40 SYRINGE (ML) INJECTION AS NEEDED
Status: DISCONTINUED | OUTPATIENT
Start: 2019-08-07 | End: 2019-08-11 | Stop reason: HOSPADM

## 2019-08-07 RX ORDER — HEPARIN 100 UNIT/ML
300-500 SYRINGE INTRAVENOUS AS NEEDED
Status: DISPENSED | OUTPATIENT
Start: 2019-08-07 | End: 2019-08-07

## 2019-08-07 RX ADMIN — HEPARIN 500 UNITS: 100 SYRINGE at 12:03

## 2019-08-07 RX ADMIN — Medication 30 ML: at 10:35

## 2019-08-07 RX ADMIN — SODIUM CHLORIDE 25 ML/HR: 9 INJECTION, SOLUTION INTRAVENOUS at 11:38

## 2019-08-07 RX ADMIN — VINORELBINE TARTRATE 35 MG: 10 INJECTION INTRAVENOUS at 11:45

## 2019-08-07 NOTE — PROGRESS NOTES
[facility] Progress Note    Date: 2019    Name: Anupama Klein    MRN: [id number]         : 1960      Ms. Leigh arrived to [facility] at 1025. Pt is here today for Navelbine, Cycle 8, Day 15. Ms. Joan Pacheco was assessed and education was provided. Ms. Desire Carlos vitals were reviewed. Visit Vitals  BP (!) 153/97 (BP 1 Location: Left arm, BP Patient Position: Sitting)   Pulse 78   Temp 98.4 °F (36.9 °C)   Resp 18   Ht 5' 6\" (1.676 m)   Wt 59.5 kg (131 lb 3.2 oz)   SpO2 100%   Breastfeeding? No   BMI 21.18 kg/m²       Patient's left upper chest port accessed and blood drawn for labs. Lab results were obtained and reviewed. Labs okay for chemo. Recent Results (from the past 12 hour(s))   CBC WITH 3 PART DIFF    Collection Time: 19 10:35 AM   Result Value Ref Range    WBC 4.1 (L) 4.5 - 13.0 K/uL    RBC 3.96 (L) 4.10 - 5.10 M/uL    HGB 9.9 (L) 12.0 - 16 g/dL    HCT 30.9 (L) 36 - 48 %    MCV 78.0 78 - 102 FL    MCH 25.0 25.0 - 35.0 PG    MCHC 32.0 31 - 37 g/dL    RDW 20.7 (H) 11.5 - 14.5 %    PLATELET 022 143 - 877 K/uL    NEUTROPHILS 75 (H) 40 - 70 %    MIXED CELLS 2 0.1 - 17 %    LYMPHOCYTES 23 14 - 44 %    ABS. NEUTROPHILS 3.1 1.8 - 9.5 K/UL    ABS. MIXED CELLS 0.1 0.0 - 2.3 K/uL    ABS. LYMPHOCYTES 0.9 (L) 1.1 - 5.9 K/UL    DF AUTOMATED         NS infusing as ordered at [place] [address]. Navelbine 35mg administered as ordered. Ms. Joan Pacheco tolerated infusion without complaints. Port flushed with heparin per order and de-accessed. Band-aid applied to site. Ms. Joan Pacheco was discharged from [address] in stable condition at 1205. She is to return on 19 at 0900 for her next appointment.     Katheryn Multani RN  2019

## 2019-08-21 ENCOUNTER — HOSPITAL ENCOUNTER (OUTPATIENT)
Dept: INFUSION THERAPY | Age: 59
Discharge: HOME | End: 2019-08-21
Payer: MEDICARE

## 2019-08-21 VITALS
OXYGEN SATURATION: 98 % | DIASTOLIC BLOOD PRESSURE: 79 MMHG | HEIGHT: 66 IN | WEIGHT: 134.4 LBS | RESPIRATION RATE: 18 BRPM | BODY MASS INDEX: 21.6 KG/M2 | TEMPERATURE: 98.5 F | SYSTOLIC BLOOD PRESSURE: 138 MMHG | HEART RATE: 71 BPM

## 2019-08-21 DIAGNOSIS — C34.91 NON-SMALL CELL CANCER OF RIGHT LUNG (HCC): Primary | ICD-10-CM

## 2019-08-21 LAB
ALBUMIN SERPL-MCNC: 3.3 G/DL (ref 3.4–5)
ALBUMIN/GLOB SERPL: 0.8 {RATIO} (ref 0.8–1.7)
ALP SERPL-CCNC: 75 U/L (ref 45–117)
ALT SERPL-CCNC: 12 U/L (ref 13–56)
ANION GAP SERPL CALC-SCNC: 6 MMOL/L (ref 3–18)
AST SERPL-CCNC: 11 U/L (ref 10–38)
BASO+EOS+MONOS # BLD AUTO: 0.4 K/UL (ref 0–2.3)
BASO+EOS+MONOS NFR BLD AUTO: 12 % (ref 0.1–17)
BILIRUB SERPL-MCNC: 0.3 MG/DL (ref 0.2–1)
BUN SERPL-MCNC: 6 MG/DL (ref 7–18)
BUN/CREAT SERPL: 8 (ref 12–20)
CALCIUM SERPL-MCNC: 8.9 MG/DL (ref 8.5–10.1)
CHLORIDE SERPL-SCNC: 108 MMOL/L (ref 100–111)
CO2 SERPL-SCNC: 27 MMOL/L (ref 21–32)
CREAT SERPL-MCNC: 0.74 MG/DL (ref 0.6–1.3)
DIFFERENTIAL METHOD BLD: ABNORMAL
ERYTHROCYTE [DISTWIDTH] IN BLOOD BY AUTOMATED COUNT: 20.4 % (ref 11.5–14.5)
GLOBULIN SER CALC-MCNC: 4.2 G/DL (ref 2–4)
GLUCOSE SERPL-MCNC: 120 MG/DL (ref 74–99)
HCT VFR BLD AUTO: 30.3 % (ref 36–48)
HGB BLD-MCNC: 9.3 G/DL (ref 12–16)
LYMPHOCYTES # BLD: 0.8 K/UL (ref 1.1–5.9)
LYMPHOCYTES NFR BLD: 21 % (ref 14–44)
MCH RBC QN AUTO: 23.9 PG (ref 25–35)
MCHC RBC AUTO-ENTMCNC: 30.7 G/DL (ref 31–37)
MCV RBC AUTO: 77.9 FL (ref 78–102)
NEUTS SEG # BLD: 2.6 K/UL (ref 1.8–9.5)
NEUTS SEG NFR BLD: 67 % (ref 40–70)
PLATELET # BLD AUTO: 290 K/UL (ref 140–440)
POTASSIUM SERPL-SCNC: 3.3 MMOL/L (ref 3.5–5.5)
PROT SERPL-MCNC: 7.5 G/DL (ref 6.4–8.2)
RBC # BLD AUTO: 3.89 M/UL (ref 4.1–5.1)
SODIUM SERPL-SCNC: 141 MMOL/L (ref 136–145)
WBC # BLD AUTO: 3.8 K/UL (ref 4.5–13)

## 2019-08-21 PROCEDURE — 77030012965 HC NDL HUBR BBMI -A

## 2019-08-21 PROCEDURE — 85025 COMPLETE CBC W/AUTO DIFF WBC: CPT

## 2019-08-21 PROCEDURE — 74011250636 HC RX REV CODE- 250/636: Performed by: INTERNAL MEDICINE

## 2019-08-21 PROCEDURE — 80053 COMPREHEN METABOLIC PANEL: CPT

## 2019-08-21 PROCEDURE — 74011000258 HC RX REV CODE- 258: Performed by: INTERNAL MEDICINE

## 2019-08-21 PROCEDURE — 96409 CHEMO IV PUSH SNGL DRUG: CPT

## 2019-08-21 PROCEDURE — 36415 COLL VENOUS BLD VENIPUNCTURE: CPT

## 2019-08-21 RX ORDER — HEPARIN 100 UNIT/ML
300-500 SYRINGE INTRAVENOUS AS NEEDED
Status: DISPENSED | OUTPATIENT
Start: 2019-08-21 | End: 2019-08-21

## 2019-08-21 RX ORDER — SODIUM CHLORIDE 9 MG/ML
25 INJECTION, SOLUTION INTRAVENOUS CONTINUOUS
Status: DISPENSED | OUTPATIENT
Start: 2019-08-21 | End: 2019-08-21

## 2019-08-21 RX ORDER — SODIUM CHLORIDE 9 MG/ML
10 INJECTION INTRAMUSCULAR; INTRAVENOUS; SUBCUTANEOUS AS NEEDED
Status: ACTIVE | OUTPATIENT
Start: 2019-08-21 | End: 2019-08-21

## 2019-08-21 RX ADMIN — SODIUM CHLORIDE 20 ML: 9 INJECTION INTRAMUSCULAR; INTRAVENOUS; SUBCUTANEOUS at 10:12

## 2019-08-21 RX ADMIN — SODIUM CHLORIDE 25 ML/HR: 9 INJECTION, SOLUTION INTRAVENOUS at 09:47

## 2019-08-21 RX ADMIN — HEPARIN 500 UNITS: 100 SYRINGE at 10:12

## 2019-08-21 RX ADMIN — VINORELBINE TARTRATE 35 MG: 10 INJECTION INTRAVENOUS at 09:47

## 2019-08-21 NOTE — PROGRESS NOTES
[facility] Progress Note    Date: 2019    Name: Margaret Parra              MRN: [id number]              : 1960    Chemotherapy Cycle:C9D1 Vinorelbine      Pt to [facility], ambulatory, at [address] [place] [address]. Ms. Brett Gomez was assessed and education was provided. Ms. Sonny Parson vitals were reviewed. Visit Vitals  /79 (BP 1 Location: Left arm, BP Patient Position: At rest;Sitting)   Pulse 71   Temp 98.5 °F (36.9 °C)   Resp 18   Ht 5' 6\" (1.676 m)   Wt 61 kg (134 lb 6.4 oz)   SpO2 98%   BMI 21.69 kg/m²       Left chest medial lumen of her double lumen mediport accessed with 20 g 1 inch el needle. Port flushed easily and had brisk blood return. Blood drawn off and sent for CBC and CMP per written orders after 10 ml waste. Lab results were obtained and reviewed. Recent Results (from the past 12 hour(s))   CBC WITH 3 PART DIFF    Collection Time: 19  8:45 AM   Result Value Ref Range    WBC 3.8 (L) 4.5 - 13.0 K/uL    RBC 3.89 (L) 4.10 - 5.10 M/uL    HGB 9.3 (L) 12.0 - 16 g/dL    HCT 30.3 (L) 36 - 48 %    MCV 77.9 (L) 78 - 102 FL    MCH 23.9 (L) 25.0 - 35.0 PG    MCHC 30.7 (L) 31 - 37 g/dL    RDW 20.4 (H) 11.5 - 14.5 %    PLATELET 521 256 - 570 K/uL    NEUTROPHILS 67 40 - 70 %    MIXED CELLS 12 0.1 - 17 %    LYMPHOCYTES 21 14 - 44 %    ABS. NEUTROPHILS 2.6 1.8 - 9.5 K/UL    ABS. MIXED CELLS 0.4 0.0 - 2.3 K/uL    ABS.  LYMPHOCYTES 0.8 (L) 1.1 - 5.9 K/UL    DF AUTOMATED     METABOLIC PANEL, COMPREHENSIVE    Collection Time: 19  8:45 AM   Result Value Ref Range    Sodium 141 136 - 145 mmol/L    Potassium 3.3 (L) 3.5 - 5.5 mmol/L    Chloride 108 100 - 111 mmol/L    CO2 27 21 - 32 mmol/L    Anion gap 6 3.0 - 18 mmol/L    Glucose 120 (H) 74 - 99 mg/dL    BUN 6 (L) 7.0 - 18 MG/DL    Creatinine 0.74 0.6 - 1.3 MG/DL    BUN/Creatinine ratio 8 (L) 12 - 20      GFR est AA >60 >60 ml/min/1.73m2    GFR est non-AA >60 >60 ml/min/1.73m2    Calcium 8.9 8.5 - 10.1 MG/DL    Bilirubin, total 0.3 0.2 - 1.0 MG/DL ALT (SGPT) 12 (L) 13 - 56 U/L    AST (SGOT) 11 10 - 38 U/L    Alk. phosphatase 75 45 - 117 U/L    Protein, total 7.5 6.4 - 8.2 g/dL    Albumin 3.3 (L) 3.4 - 5.0 g/dL    Globulin 4.2 (H) 2.0 - 4.0 g/dL    A-G Ratio 0.8 0.8 - 1.7         Lab results within ordered parameters to give chemo today. No pre-medications ordered. NS initiated @ 50 mL/hr. Vinorelbine 35 mg was infused at over 10 minutes as ordered. VS stable at end of infusion and pt denied complaints. Line flushed with NS and blood return from port re-verified. Ms. Avril Mckee tolerated infusion, and had no complaints at this time. She declined to stay for an observation period. Medial lumen flushed with NS 20 ml and Heparin 500 units then de-accessed. Bleeding noted, pressure held for 10 mins, bleeding subsided, pressure dressing applied (gauze and tegraderm). Patient armband removed and shredded. Ms. Avril Mckee was discharged from [place] [address] in stable condition at 1025. She is to return on 08/28/19 at 1000 for her next  appointment.     Lyssa Peraza RN  August 21, 2019  1052

## 2019-08-28 ENCOUNTER — HOSPITAL ENCOUNTER (OUTPATIENT)
Dept: INFUSION THERAPY | Age: 59
Discharge: HOME | End: 2019-08-28
Payer: MEDICARE

## 2019-08-28 VITALS
OXYGEN SATURATION: 99 % | DIASTOLIC BLOOD PRESSURE: 91 MMHG | HEIGHT: 66 IN | SYSTOLIC BLOOD PRESSURE: 152 MMHG | TEMPERATURE: 98 F | WEIGHT: 132 LBS | HEART RATE: 81 BPM | BODY MASS INDEX: 21.21 KG/M2 | RESPIRATION RATE: 16 BRPM

## 2019-08-28 DIAGNOSIS — C34.91 NON-SMALL CELL CANCER OF RIGHT LUNG (HCC): Primary | ICD-10-CM

## 2019-08-28 LAB
BASO+EOS+MONOS # BLD AUTO: 0.2 K/UL (ref 0–2.3)
BASO+EOS+MONOS NFR BLD AUTO: 3 % (ref 0.1–17)
DIFFERENTIAL METHOD BLD: ABNORMAL
ERYTHROCYTE [DISTWIDTH] IN BLOOD BY AUTOMATED COUNT: 20.9 % (ref 11.5–14.5)
HCT VFR BLD AUTO: 30.1 % (ref 36–48)
HGB BLD-MCNC: 9.4 G/DL (ref 12–16)
LYMPHOCYTES # BLD: 1 K/UL (ref 1.1–5.9)
LYMPHOCYTES NFR BLD: 18 % (ref 14–44)
MCH RBC QN AUTO: 24.2 PG (ref 25–35)
MCHC RBC AUTO-ENTMCNC: 31.2 G/DL (ref 31–37)
MCV RBC AUTO: 77.6 FL (ref 78–102)
NEUTS SEG # BLD: 4.4 K/UL (ref 1.8–9.5)
NEUTS SEG NFR BLD: 80 % (ref 40–70)
PLATELET # BLD AUTO: 250 K/UL (ref 140–440)
RBC # BLD AUTO: 3.88 M/UL (ref 4.1–5.1)
WBC # BLD AUTO: 5.6 K/UL (ref 4.5–13)

## 2019-08-28 PROCEDURE — 74011250636 HC RX REV CODE- 250/636: Performed by: INTERNAL MEDICINE

## 2019-08-28 PROCEDURE — 74011000258 HC RX REV CODE- 258: Performed by: INTERNAL MEDICINE

## 2019-08-28 PROCEDURE — 85025 COMPLETE CBC W/AUTO DIFF WBC: CPT

## 2019-08-28 PROCEDURE — 96409 CHEMO IV PUSH SNGL DRUG: CPT

## 2019-08-28 PROCEDURE — 77030012965 HC NDL HUBR BBMI -A

## 2019-08-28 RX ORDER — HEPARIN 100 UNIT/ML
300-500 SYRINGE INTRAVENOUS AS NEEDED
Status: DISPENSED | OUTPATIENT
Start: 2019-08-28 | End: 2019-08-28

## 2019-08-28 RX ORDER — SODIUM CHLORIDE 9 MG/ML
25 INJECTION, SOLUTION INTRAVENOUS CONTINUOUS
Status: DISPENSED | OUTPATIENT
Start: 2019-08-28 | End: 2019-08-28

## 2019-08-28 RX ORDER — SODIUM CHLORIDE 0.9 % (FLUSH) 0.9 %
10 SYRINGE (ML) INJECTION AS NEEDED
Status: DISPENSED | OUTPATIENT
Start: 2019-08-28 | End: 2019-08-28

## 2019-08-28 RX ADMIN — HEPARIN 500 UNITS: 100 SYRINGE at 12:00

## 2019-08-28 RX ADMIN — Medication 30 ML: at 12:00

## 2019-08-28 RX ADMIN — Medication 30 ML: at 10:40

## 2019-08-28 RX ADMIN — SODIUM CHLORIDE 25 ML/HR: 9 INJECTION, SOLUTION INTRAVENOUS at 11:05

## 2019-08-28 RX ADMIN — Medication 10 ML: at 11:30

## 2019-08-28 RX ADMIN — VINORELBINE TARTRATE 35 MG: 10 INJECTION INTRAVENOUS at 11:30

## 2019-08-28 NOTE — PROGRESS NOTES
[facility] Progress Note    Date: 2019    Name: Maryse Trejo    MRN: [id number]         : 1960      Ms. Leigh arrived in the [facility] today at 21 , in stable condition, here for Cycle 9, Day 8, IV Vinorelbine Chemotherapy Regimen. She was assessed and education was provided. Ms. Diamond Dick vitals were reviewed. Visit Vitals  /88 (BP 1 Location: Left arm, BP Patient Position: Sitting)   Pulse 90   Temp 98.5 °F (36.9 °C)   Resp 16   Ht 5' 6\" (1.676 m)   Wt 59.9 kg (132 lb)   SpO2 99%   BMI 21.31 kg/m²         The lateral/outer lumen of her left chest double lumen port was accessed without incident at 1040, and blood for a CBC was drawn, per order. Lab results were obtained and reviewed, and the CBC results from today, as well as the CMP results from 19, were all noted to be satisfactory for treatment today. Recent Results (from the past 12 hour(s))   CBC WITH 3 PART DIFF    Collection Time: 19 10:40 AM   Result Value Ref Range    WBC 5.6 4.5 - 13.0 K/uL    RBC 3.88 (L) 4.10 - 5.10 M/uL    HGB 9.4 (L) 12.0 - 16 g/dL    HCT 30.1 (L) 36 - 48 %    MCV 77.6 (L) 78 - 102 FL    MCH 24.2 (L) 25.0 - 35.0 PG    MCHC 31.2 31 - 37 g/dL    RDW 20.9 (H) 11.5 - 14.5 %    PLATELET 820 317 - 314 K/uL    NEUTROPHILS 80 (H) 40 - 70 %    MIXED CELLS 3 0.1 - 17 %    LYMPHOCYTES 18 14 - 44 %    ABS. NEUTROPHILS 4.4 1.8 - 9.5 K/UL    ABS. MIXED CELLS 0.2 0.0 - 2.3 K/uL    ABS. LYMPHOCYTES 1.0 (L) 1.1 - 5.9 K/UL    DF AUTOMATED           [id number]    Ref.  Range 2019 08:45   Sodium Latest Ref Range: 136 - 145 mmol/L 141   Potassium Latest Ref Range: 3.5 - 5.5 mmol/L 3.3 (L)   Chloride Latest Ref Range: 100 - 111 mmol/L 108   CO2 Latest Ref Range: 21 - 32 mmol/L 27   Anion gap Latest Ref Range: 3.0 - 18 mmol/L 6   Glucose Latest Ref Range: 74 - 99 mg/dL 120 (H)   BUN Latest Ref Range: 7.0 - 18 MG/DL 6 (L)   Creatinine Latest Ref Range: 0.6 - 1.3 MG/DL 0.74   BUN/Creatinine ratio Latest Ref Range: 12 - 20   8 (L)   Calcium Latest Ref Range: 8.5 - 10.1 MG/DL 8.9   GFR est non-AA Latest Ref Range: >60 ml/min/1.73m2 >60   GFR est AA Latest Ref Range: >60 ml/min/1.73m2 >60   Bilirubin, total Latest Ref Range: 0.2 - 1.0 MG/DL 0.3   Protein, total Latest Ref Range: 6.4 - 8.2 g/dL 7.5   Albumin Latest Ref Range: 3.4 - 5.0 g/dL 3.3 (L)   Globulin Latest Ref Range: 2.0 - 4.0 g/dL 4.2 (H)   A-G Ratio Latest Ref Range: 0.8 - 1.7   0.8   ALT (SGPT) Latest Ref Range: 13 - 56 U/L 12 (L)   AST Latest Ref Range: 10 - 38 U/L 11   Alk. phosphatase Latest Ref Range: 45 - 117 U/L 75              ml IV Bag, was initiated at 1105, to infuse prn throughout treatment today. Vinorelbine (Navelbine) 35 mg IV (20 mg/m2) was administered over approximately 10 minutes, per order, and without incident. (Brisk blood return from her port, was noted before and after the Vinorelbine.)      After completion of the Vinorelbine, her port was flushed very well per protocol with NS & Heparin, and then, the el needle was removed and gauze/bandaid was applied. Ms. Sandra Lee tolerated well, and had no complaints. Ms. Sandra Lee was discharged from [place] [address] in stable condition at 1210. Elvi Frizzle She is to return on next Wednesday, 9-4-19,  at 1000,  for her next appointment, for Cycle 9, Day 15, IV Vinorelbine chemotherapy treatment.      Jovany Castorena RN  August 28, 2019  10:33 AM

## 2019-09-04 ENCOUNTER — HOSPITAL ENCOUNTER (OUTPATIENT)
Dept: INFUSION THERAPY | Age: 59
Discharge: HOME | End: 2019-09-04
Payer: MEDICARE

## 2019-09-04 VITALS
BODY MASS INDEX: 21.1 KG/M2 | HEART RATE: 86 BPM | DIASTOLIC BLOOD PRESSURE: 83 MMHG | TEMPERATURE: 98 F | OXYGEN SATURATION: 99 % | WEIGHT: 131.3 LBS | HEIGHT: 66 IN | SYSTOLIC BLOOD PRESSURE: 159 MMHG | RESPIRATION RATE: 16 BRPM

## 2019-09-04 DIAGNOSIS — C34.91 NON-SMALL CELL CANCER OF RIGHT LUNG (HCC): Primary | ICD-10-CM

## 2019-09-04 LAB
BASO+EOS+MONOS # BLD AUTO: 0.2 K/UL (ref 0–2.3)
BASO+EOS+MONOS NFR BLD AUTO: 4 % (ref 0.1–17)
DIFFERENTIAL METHOD BLD: ABNORMAL
ERYTHROCYTE [DISTWIDTH] IN BLOOD BY AUTOMATED COUNT: 21.2 % (ref 11.5–14.5)
HCT VFR BLD AUTO: 29.8 % (ref 36–48)
HGB BLD-MCNC: 9.5 G/DL (ref 12–16)
LYMPHOCYTES # BLD: 0.9 K/UL (ref 1.1–5.9)
LYMPHOCYTES NFR BLD: 20 % (ref 14–44)
MCH RBC QN AUTO: 24.7 PG (ref 25–35)
MCHC RBC AUTO-ENTMCNC: 31.9 G/DL (ref 31–37)
MCV RBC AUTO: 77.6 FL (ref 78–102)
NEUTS SEG # BLD: 3.2 K/UL (ref 1.8–9.5)
NEUTS SEG NFR BLD: 76 % (ref 40–70)
PLATELET # BLD AUTO: 283 K/UL (ref 140–440)
RBC # BLD AUTO: 3.84 M/UL (ref 4.1–5.1)
WBC # BLD AUTO: 4.3 K/UL (ref 4.5–13)

## 2019-09-04 PROCEDURE — 77030012965 HC NDL HUBR BBMI -A

## 2019-09-04 PROCEDURE — 74011250636 HC RX REV CODE- 250/636: Performed by: INTERNAL MEDICINE

## 2019-09-04 PROCEDURE — 96409 CHEMO IV PUSH SNGL DRUG: CPT

## 2019-09-04 PROCEDURE — 85025 COMPLETE CBC W/AUTO DIFF WBC: CPT

## 2019-09-04 PROCEDURE — 74011000258 HC RX REV CODE- 258: Performed by: INTERNAL MEDICINE

## 2019-09-04 RX ORDER — SODIUM CHLORIDE 9 MG/ML
25 INJECTION, SOLUTION INTRAVENOUS CONTINUOUS
Status: DISPENSED | OUTPATIENT
Start: 2019-09-04 | End: 2019-09-04

## 2019-09-04 RX ORDER — SODIUM CHLORIDE 0.9 % (FLUSH) 0.9 %
10 SYRINGE (ML) INJECTION AS NEEDED
Status: DISPENSED | OUTPATIENT
Start: 2019-09-04 | End: 2019-09-04

## 2019-09-04 RX ORDER — HEPARIN 100 UNIT/ML
300-500 SYRINGE INTRAVENOUS AS NEEDED
Status: DISPENSED | OUTPATIENT
Start: 2019-09-04 | End: 2019-09-04

## 2019-09-04 RX ADMIN — HEPARIN 500 UNITS: 100 SYRINGE at 12:12

## 2019-09-04 RX ADMIN — Medication 30 ML: at 10:45

## 2019-09-04 RX ADMIN — SODIUM CHLORIDE 25 ML/HR: 9 INJECTION, SOLUTION INTRAVENOUS at 11:08

## 2019-09-04 RX ADMIN — VINORELBINE TARTRATE 35 MG: 10 INJECTION INTRAVENOUS at 11:40

## 2019-09-04 NOTE — PROGRESS NOTES
[facility] Progress Note    Date: 2019    Name: Rg Joseph    MRN: [id number]         : 1960      Ms. Leigh arrived to [facility] at 56. Pt is here today for Navelbine, Cycle 9, Day 15. Ms. Hazel Santizo was assessed and education was provided. Ms. Tammie Coker vitals were reviewed. Visit Vitals  BP (!) 151/96 (BP 1 Location: Left arm, BP Patient Position: Sitting)   Pulse 90   Temp 98.5 °F (36.9 °C)   Resp 16   Ht 5' 6\" (1.676 m)   Wt 59.6 kg (131 lb 4.8 oz)   SpO2 98%   BMI 21.19 kg/m²       Patient's left upper chest port accessed and blood drawn for labs. Lab results were obtained and reviewed. Labs okay for chemo. Recent Results (from the past 12 hour(s))   CBC WITH 3 PART DIFF    Collection Time: 19 10:45 AM   Result Value Ref Range    WBC 4.3 (L) 4.5 - 13.0 K/uL    RBC 3.84 (L) 4.10 - 5.10 M/uL    HGB 9.5 (L) 12.0 - 16 g/dL    HCT 29.8 (L) 36 - 48 %    MCV 77.6 (L) 78 - 102 FL    MCH 24.7 (L) 25.0 - 35.0 PG    MCHC 31.9 31 - 37 g/dL    RDW 21.2 (H) 11.5 - 14.5 %    PLATELET 223 455 - 351 K/uL    NEUTROPHILS 76 (H) 40 - 70 %    MIXED CELLS 4 0.1 - 17 %    LYMPHOCYTES 20 14 - 44 %    ABS. NEUTROPHILS 3.2 1.8 - 9.5 K/UL    ABS. MIXED CELLS 0.2 0.0 - 2.3 K/uL    ABS. LYMPHOCYTES 0.9 (L) 1.1 - 5.9 K/UL    DF AUTOMATED         NS infusing as ordered at [facility]. Navelbine 35mg administered as ordered. Ms. Hazel Santizo tolerated infusion without complaints. Port flushed with heparin per order and de-accessed. Band-aid applied to site. Ms. Hazel Santizo was discharged from [place] [address] in stable condition at 1215. She is to return on 19 at 1000 for her next appointment.     Brunilda Hernández RN  2019

## 2019-09-11 ENCOUNTER — OFFICE VISIT (OUTPATIENT)
Dept: FAMILY MEDICINE CLINIC | Age: 59
End: 2019-09-11

## 2019-09-11 VITALS
SYSTOLIC BLOOD PRESSURE: 128 MMHG | HEART RATE: 71 BPM | TEMPERATURE: 98.4 F | OXYGEN SATURATION: 96 % | RESPIRATION RATE: 16 BRPM | WEIGHT: 129 LBS | HEIGHT: 66 IN | DIASTOLIC BLOOD PRESSURE: 84 MMHG | BODY MASS INDEX: 20.73 KG/M2

## 2019-09-11 DIAGNOSIS — C34.91 NON-SMALL CELL CANCER OF RIGHT LUNG (HCC): ICD-10-CM

## 2019-09-11 DIAGNOSIS — F33.9 RECURRENT DEPRESSION (HCC): ICD-10-CM

## 2019-09-11 DIAGNOSIS — R23.2 HOT FLASHES: ICD-10-CM

## 2019-09-11 DIAGNOSIS — Z23 ENCOUNTER FOR IMMUNIZATION: ICD-10-CM

## 2019-09-11 DIAGNOSIS — E03.9 HYPOTHYROIDISM, UNSPECIFIED TYPE: ICD-10-CM

## 2019-09-11 DIAGNOSIS — Z86.711 HISTORY OF PULMONARY EMBOLISM: ICD-10-CM

## 2019-09-11 DIAGNOSIS — D50.9 IRON DEFICIENCY ANEMIA, UNSPECIFIED IRON DEFICIENCY ANEMIA TYPE: ICD-10-CM

## 2019-09-11 DIAGNOSIS — I10 ESSENTIAL HYPERTENSION: Primary | ICD-10-CM

## 2019-09-11 DIAGNOSIS — D68.59 THROMBOPHILIA (HCC): ICD-10-CM

## 2019-09-11 RX ORDER — LANOLIN ALCOHOL/MO/W.PET/CERES
CREAM (GRAM) TOPICAL
Qty: 60 TAB | Refills: 5 | Status: SHIPPED | OUTPATIENT
Start: 2019-09-11 | End: 2020-04-09 | Stop reason: SDUPTHER

## 2019-09-11 RX ORDER — AMLODIPINE BESYLATE 5 MG/1
TABLET ORAL
Qty: 30 TAB | Refills: 5 | Status: SHIPPED | OUTPATIENT
Start: 2019-09-11 | End: 2019-10-30 | Stop reason: SDUPTHER

## 2019-09-11 RX ORDER — LEVOTHYROXINE SODIUM 50 UG/1
50 TABLET ORAL
Qty: 30 TAB | Refills: 5 | Status: SHIPPED | OUTPATIENT
Start: 2019-09-11 | End: 2020-04-09 | Stop reason: SDUPTHER

## 2019-09-11 NOTE — PROGRESS NOTES
1. Have you been to the ER, urgent care clinic since your last visit? Hospitalized since your last visit? No    2. Have you seen or consulted any other health care providers outside of the [address] [place] [address] since your last visit? Include any pap smears or colon screening. No       Physician order obtained. Patient completed adult immunization consent form. Allergies, contraindications and recommendations reviewed with patient. Seasonal influenza vaccine administered IM right deltoid. Patient tolerated well. Patient remained in office for 15 minutes after injection and no adverse reactions were noted.     Lot # [id number]  Exp Date: 06-  [address] # [id number]

## 2019-09-11 NOTE — PROGRESS NOTES
Patient: Shaylee Manley MRN: [id number]  SSN: [id number]    YOB: 1960  Age: 59 y.o. Sex: female      Date of Service: 9/11/2019   Provider: CAL Wong   Office Location:   [address] [place] [address] [address] [place] [facility].  Office Phone: [phone]  Office Fax: [phone]      REASON FOR VISIT:   Chief Complaint   Patient presents with    Anemia    Depression    Hypertension    Other     Hypokalemia        VITALS:   Visit Vitals  /84 (BP 1 Location: Left arm, BP Patient Position: Sitting)   Pulse 71   Temp 98.4 °F (36.9 °C) (Oral)   Resp 16   Ht 5' 6\" (1.676 m)   Wt 129 lb (58.5 kg)   SpO2 96%   BMI 20.82 kg/m²        MEDICATIONS:   Current Outpatient Medications on File Prior to Visit   Medication Sig Dispense Refill    sertraline (ZOLOFT) 50 mg tablet Take 1 Tab by mouth daily. 30 Tab 5    ferrous sulfate 325 mg (65 mg iron) tablet take 1 tablet by mouth twice a day with meals 60 Tab 5    levothyroxine (SYNTHROID) 50 mcg tablet Take  by mouth Daily (before breakfast).  nivolumab (OPDIVO) 240 mg/24 mL soln chemo injection by IntraVENous route. Skylar Lust is administered Every 2 Weeks IV. Indications: non-small cell lung cancer      rivaroxaban (XARELTO) 20 mg tab tablet Take 1 Tab by mouth daily (with breakfast). 30 Tab 6    amLODIPine (NORVASC) 5 mg tablet take 1 tablet by mouth once daily 30 Tab 2    loratadine (CLARITIN) 10 mg tablet Take 10 mg by mouth daily. No current facility-administered medications on file prior to visit.          ALLERGIES:   Allergies   Allergen Reactions    Flagyl [Metronidazole] Hives    Nitroimidazoles Other (comments)     Nitroimidazole derivatives        MEDICAL/SURGICAL HISTORY:  Past Medical History:   Diagnosis Date    Anemia, iron deficiency 2/2016    Depression     H/O seasonal allergies     Hypertension     Non-small cell carcinoma of lung ([facility] [address] 2/2016    adenocarcinoma of right lung    Positive occult stool blood test 2/29/2016    Pulmonary embolism ([facility] [address] 2/28/2016    small, bilateral PEs- on Xarelto    Right leg DVT ([facility] [address] 2/28/2016    on Xarelto    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[facility] [address] 4/1/2016    resolved    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      Past Surgical History:   Procedure Laterality Date    BREAST SURGERY PROCEDURE UNLISTED      biopsy    HX ANGIOPLASTY Right 3/20/2016    IJ, subclavian, and brachiocephalic veins    HX HYSTERECTOMY      due to menorrhagia     HX OTHER SURGICAL Right 3/20/2016    2 placed in right IJ, subclavian/brachiocephalic    HX THROMBECTOMY  3/20/2016    with thrombolysis        FAMILY HISTORY:  Family History   Problem Relation Age of Onset    Cancer Sister 48        breast    Liver Disease Sister         cirrhosis    Heart Attack Mother 37    No Known Problems Child         SOCIAL HISTORY:  Social History     Tobacco Use    Smoking status: Former Smoker     Packs/day: 0.50     Last attempt to quit: 2/28/2016     Years since quitting: 3.5    Smokeless tobacco: Never Used   Substance Use Topics    Alcohol use: No    Drug use: Never     Types: Prescription             HISTORY OF PRESENT ILLNESS: Santa Bronson is a 59 y.o. female who presents to the office for a routine follow up visit. Hypertension -   Currently, the patient's condition is well controlled. Reports compliance with the following regimen: amlodipine 5 mg daily  Home BP readings: not checking      History of PE/DVT -   Patient is anticoagulated on Xarelto, lifelong. She is high risk for thromboembolic disease due to cancer. She denies any issues with bleeding or bruising. She does mention that her oncologist has ordered b/l lower extremity duplex studies to assess for clots.  She is planning to have this done in the next few weeks.       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 Followed by heme/onc (Dr. Amee Calderon)  Per most recent office note, patient's condition is clinically and radiographically stable on Navelbine therapy  She takes iron supplements for her anemia, which is closely monitored by heme/onc. Depression -   Patient reports symptoms are stable on Zoloft 50 mg daily. Reports she also uses this medication for hot flashes which are well controlled     Hypothyroidism -   On Synthroid 50 mcg daily   Last TSH on file 3.96 on 10/16/18           REVIEW OF SYSTEMS:  Review of Systems   Constitutional: Negative for chills, fever and malaise/fatigue. Respiratory: Negative for cough, shortness of breath and wheezing. Cardiovascular: Negative for chest pain, palpitations and leg swelling. Gastrointestinal: Negative for abdominal pain, constipation, diarrhea, nausea and vomiting. PHYSICAL EXAMINATION:  Physical Exam   Constitutional: She is oriented to person, place, and time and well-developed, well-nourished, and in no distress. Cardiovascular: Normal rate, regular rhythm and normal heart sounds. Exam reveals no gallop and no friction rub. No murmur heard. Pulmonary/Chest: Effort normal and breath sounds normal. She has no wheezes. She has no rales. Neurological: She is alert and oriented to person, place, and time. Gait normal.   Skin: Skin is warm and dry. No rash noted. Psychiatric: Mood, memory and affect normal.        RESULTS:  No results found for this visit on 09/11/19. ASSESSMENT/PLAN:  Diagnoses and all orders for this visit:    1. Essential hypertension  - BP stable and at goal today  - Continue current regimen  - meds refilled  Orders:    -     amLODIPine (NORVASC) 5 mg tablet; take 1 tablet by mouth once daily    2. History of pulmonary embolism  3. Thrombophilia ([facility] [address]  - Continue Xarelto per heme/onc     4.  Non-small cell cancer of right lung ([facility] [address]  - Available heme/onc notes reviewed  - Patient seems to be doing well overall   - Continue current therapy     5. Iron deficiency anemia, unspecified iron deficiency anemia type  - Iron supplements refilled  Orders:    -     ferrous sulfate 325 mg (65 mg iron) tablet; take 1 tablet by mouth twice a day with meals    6. Recurrent depression ([address]  7. Hot flashes  - Stable, continue Zoloft     8. Hypothyroidism, unspecified type  - Meds refilled  - Labs overdue, check TSH and T4 asap for monitoring  Orders:    -     levothyroxine (SYNTHROID) 50 mcg tablet; Take 1 Tab by mouth Daily (before breakfast). -     TSH AND FREE T4; Future    9. Encounter for immunization  - Flu shot administered today with patient's consent. VIS provided. Orders:    -     INFLUENZA VACCINE INACTIVATED (IIV), SUBUNIT, ADJUVANTED, IM  -     ADMIN INFLUENZA VIRUS VAC      Follow-up and Dispositions    · Return in about 6 months (around 3/11/2020) for annual medicare/routine care and non-fasting labs to be done ASAP . All questions answered. Patient expresses understanding and agrees with the plan as detailed above.     PATIENT CARE TEAM:   Patient Care Team:  CAL Rodriguez as PCP - General (Physician Assistant)  Baudilio Bruce MD (Hematology and Oncology)  Mary Middleton MD (Hematology and Oncology)  Yasmin Burgos MD (Radiation Oncology)  Blaire Marino MD (Surgery)       CAL Prieto   September 11, 2019   10:30 AM

## 2019-09-11 NOTE — PATIENT INSTRUCTIONS
Iron Deficiency Anemia: Care Instructions  Your Care Instructions    Anemia means that you do not have enough red blood cells. Red blood cells carry oxygen around your body. When you have anemia, it can make you pale, weak, and tired. Many things can cause anemia. The most common cause is loss of blood. This can happen if you have heavy menstrual periods. It can also happen if you have bleeding in your stomach or bowel. You can also get anemia if you don't have enough iron in your diet or if it's hard for your body to absorb iron. In some cases, pregnancy causes anemia. That's because a pregnant woman needs more iron. Your doctor may do more tests to find the cause of your anemia. If a disease or other health problem is causing it, your doctor will treat that problem. It's important to follow up with your doctor to make sure that your iron level returns to normal.  Follow-up care is a key part of your treatment and safety. Be sure to make and go to all appointments, and call your doctor if you are having problems. It's also a good idea to know your test results and keep a list of the medicines you take. How can you care for yourself at home? · If your doctor recommended iron pills, take them as directed. ? Try to take the pills on an empty stomach. You can do this about 1 hour before or 2 hours after meals. But you may need to take iron with food to avoid an upset stomach. ? Do not take antacids or drink milk or anything with caffeine within 2 hours of when you take your iron. They can keep your body from absorbing the iron well. ? Vitamin C helps your body absorb iron. You may want to take iron pills with a glass of orange juice or some other food high in vitamin C.  ? Iron pills may cause stomach problems. These include heartburn, nausea, diarrhea, constipation, and cramps. It can help to drink plenty of fluids and include fruits, vegetables, and fiber in your diet.   ? It's normal for iron pills to make your stool a greenish or grayish black. But internal bleeding can also cause dark stool. So it's important to tell your doctor about any color changes. ? Call your doctor if you think you are having a problem with your iron pills. Even after you start to feel better, it will take several months for your body to build up its supply of iron. ? If you miss a pill, don't take a double dose. ? Keep iron pills out of the reach of small children. Too much iron can be very dangerous. · Eat foods with a lot of iron. These include red meat, shellfish, poultry, and eggs. They also include beans, raisins, whole-grain bread, and leafy green vegetables. · Steam your vegetables. This is the best way to prepare them if you want to get as much iron as possible. · Be safe with medicines. Do not take nonsteroidal anti-inflammatory pain relievers unless your doctor tells you to. These include aspirin, naproxen (Aleve), and ibuprofen (Advil, Motrin). · Liquid iron can stain your teeth. But you can mix it with water or juice and drink it with a straw. Then it won't get on your teeth. When should you call for help? Call 911 anytime you think you may need emergency care. For example, call if:    · You passed out (lost consciousness).    Call your doctor now or seek immediate medical care if:    · You are short of breath.     · You are dizzy or light-headed, or you feel like you may faint.     · You have new or worse bleeding.    Watch closely for changes in your health, and be sure to contact your doctor if:    · You feel weaker or more tired than usual.     · You do not get better as expected. Where can you learn more? Go to http://kayla-sotero.info/. Enter Y993 in the search box to learn more about \"Iron Deficiency Anemia: Care Instructions. \"  Current as of: March 28, 2019  Content Version: 12.1  © 6438-5816 Healthwise, [employer].  Care instructions adapted under license by [employer] (which disclaims liability or warranty for this information). If you have questions about a medical condition or this instruction, always ask your healthcare professional. [place] [address] any warranty or liability for your use of this information. Vaccine Information Statement    Influenza (Flu) Vaccine (Inactivated or Recombinant): What You Need to Know    Many Vaccine Information Statements are available in Turkish and other languages. See www.immunize.org/vis  Hojas de información sobre vacunas están disponibles en español y en muchos otros idiomas. Visite www.immunize.org/vis    1. Why get vaccinated? Influenza vaccine can prevent influenza (flu). Flu is a contagious disease that spreads around the [place] every year, usually between October and May. Anyone can get the flu, but it is more dangerous for some people. Infants and young children, people 72years of age and older, pregnant women, and people with certain health conditions or a weakened immune system are at greatest risk of flu complications. Pneumonia, bronchitis, sinus infections and ear infections are examples of flu-related complications. If you have a medical condition, such as heart disease, cancer or diabetes, flu can make it worse. Flu can cause fever and chills, sore throat, muscle aches, fatigue, cough, headache, and runny or stuffy nose. Some people may have vomiting and diarrhea, though this is more common in children than adults. Each year thousands of people in the [facility] die from flu, and many more are hospitalized. Flu vaccine prevents millions of illnesses and flu-related visits to the doctor each year. 2. Influenza vaccines     CDC recommends everyone 10months of age and older get vaccinated every flu season. Children 6 months through 6years of age may need 2 doses during a single flu season. Everyone else needs only 1 dose each flu season.     It takes about 2 weeks for protection to develop after vaccination. There are many flu viruses, and they are always changing. Each year a new flu vaccine is made to protect against three or four viruses that are likely to cause disease in the upcoming flu season. Even when the vaccine doesnt exactly match these viruses, it may still provide some protection. Influenza vaccine does not cause flu. Influenza vaccine may be given at the same time as other vaccines. 3. Talk with your health care provider    Tell your vaccine provider if the person getting the vaccine:   Has had an allergic reaction after a previous dose of influenza vaccine, or has any severe, life-threatening allergies.  Has ever had Guillain-Barré Syndrome (also called GBS). In some cases, your health care provider may decide to postpone influenza vaccination to a future visit. People with minor illnesses, such as a cold, may be vaccinated. People who are moderately or severely ill should usually wait until they recover before getting influenza vaccine. Your health care provider can give you more information. 4. Risks of a reaction     Soreness, redness, and swelling where shot is given, fever, muscle aches, and headache can happen after influenza vaccine.  There may be a very small increased risk of Guillain-Barré Syndrome (GBS) after inactivated influenza vaccine (the flu shot). Raul Prince children who get the flu shot along with pneumococcal vaccine (PCV13), and/or DTaP vaccine at the same time might be slightly more likely to have a seizure caused by fever. Tell your health care provider if a child who is getting flu vaccine has ever had a seizure. People sometimes faint after medical procedures, including vaccination. Tell your provider if you feel dizzy or have vision changes or ringing in the ears. As with any medicine, there is a very remote chance of a vaccine causing a severe allergic reaction, other serious injury, or death.     5. What if there is a serious problem? An allergic reaction could occur after the vaccinated person leaves the clinic. If you see signs of a severe allergic reaction (hives, swelling of the face and throat, difficulty breathing, a fast heartbeat, dizziness, or weakness), call 9-1-1 and get the person to the nearest hospital.    For other signs that concern you, call your health care provider. Adverse reactions should be reported to the Vaccine Adverse Event Reporting System (VAERS). Your health care provider will usually file this report, or you can do it yourself. Visit the VAERS website at www.vaers. [facility].gov or call 6-320.656.2287. VAERS is only for reporting reactions, and VAERS staff do not give medical advice. 6. The National Vaccine Injury Compensation Program    The [facility] Vaccine Injury Compensation Program (VICP) is a federal program that was created to compensate people who may have been injured by certain vaccines. Visit the VICP website at www.[facility].gov/vaccinecompensation or call 2-209.573.9674 to learn about the program and about filing a claim. There is a time limit to file a claim for compensation. 7. How can I learn more?  Ask your health care provider.  Call your local or state health department.  Contact the Centers for Disease Control and Prevention (CDC):  - Call 3-261.568.3790 (1-800-CDC-INFO) or  - Visit CDCs influenza website at www.cdc.gov/flu    Vaccine Information Statement (Interim)  Inactivated Influenza Vaccine   8/15/2019  42 ROBERT Conley [id number]   Department of Health and Human Services  Centers for Disease Control and Prevention    Office Use Only      Vaccine Information Statement    Influenza (Flu) Vaccine (Inactivated or Recombinant): What You Need to Know    Many Vaccine Information Statements are available in Bhutanese and other languages. See www.immunize.org/vis  Hojas de información sobre vacunas están disponibles en español y en muchos otros idiomas.  Visite www.immunize.org/vis    1. Why get vaccinated? Influenza vaccine can prevent influenza (flu). Flu is a contagious disease that spreads around the [place] every year, usually between October and May. Anyone can get the flu, but it is more dangerous for some people. Infants and young children, people 72years of age and older, pregnant women, and people with certain health conditions or a weakened immune system are at greatest risk of flu complications. Pneumonia, bronchitis, sinus infections and ear infections are examples of flu-related complications. If you have a medical condition, such as heart disease, cancer or diabetes, flu can make it worse. Flu can cause fever and chills, sore throat, muscle aches, fatigue, cough, headache, and runny or stuffy nose. Some people may have vomiting and diarrhea, though this is more common in children than adults. Each year thousands of people in the [facility] die from flu, and many more are hospitalized. Flu vaccine prevents millions of illnesses and flu-related visits to the doctor each year. 2. Influenza vaccines     CDC recommends everyone 10months of age and older get vaccinated every flu season. Children 6 months through 6years of age may need 2 doses during a single flu season. Everyone else needs only 1 dose each flu season. It takes about 2 weeks for protection to develop after vaccination. There are many flu viruses, and they are always changing. Each year a new flu vaccine is made to protect against three or four viruses that are likely to cause disease in the upcoming flu season. Even when the vaccine doesnt exactly match these viruses, it may still provide some protection. Influenza vaccine does not cause flu. Influenza vaccine may be given at the same time as other vaccines.     3. Talk with your health care provider    Tell your vaccine provider if the person getting the vaccine:   Has had an allergic reaction after a previous dose of influenza vaccine, or has any severe, life-threatening allergies.  Has ever had Guillain-Barré Syndrome (also called GBS). In some cases, your health care provider may decide to postpone influenza vaccination to a future visit. People with minor illnesses, such as a cold, may be vaccinated. People who are moderately or severely ill should usually wait until they recover before getting influenza vaccine. Your health care provider can give you more information. 4. Risks of a reaction     Soreness, redness, and swelling where shot is given, fever, muscle aches, and headache can happen after influenza vaccine.  There may be a very small increased risk of Guillain-Barré Syndrome (GBS) after inactivated influenza vaccine (the flu shot). 8 [facility] children who get the flu shot along with pneumococcal vaccine (PCV13), and/or DTaP vaccine at the same time might be slightly more likely to have a seizure caused by fever. Tell your health care provider if a child who is getting flu vaccine has ever had a seizure. People sometimes faint after medical procedures, including vaccination. Tell your provider if you feel dizzy or have vision changes or ringing in the ears. As with any medicine, there is a very remote chance of a vaccine causing a severe allergic reaction, other serious injury, or death. 5. What if there is a serious problem? An allergic reaction could occur after the vaccinated person leaves the clinic. If you see signs of a severe allergic reaction (hives, swelling of the face and throat, difficulty breathing, a fast heartbeat, dizziness, or weakness), call 9-1-1 and get the person to the nearest hospital.    For other signs that concern you, call your health care provider. Adverse reactions should be reported to the Vaccine Adverse Event Reporting System (VAERS). Your health care provider will usually file this report, or you can do it yourself.  Visit the VAERS website at www.vaers. [facility].gov or call 3-179.919.9244. VAERS is only for reporting reactions, and [facility] staff do not give medical advice. 6. The National Vaccine Injury Compensation Program    The [facility] Vaccine Injury Compensation Program (VICP) is a federal program that was created to compensate people who may have been injured by certain vaccines. Visit the VICP website at www.[facility].gov/vaccinecompensation or call 8-489.200.2626 to learn about the program and about filing a claim. There is a time limit to file a claim for compensation. 7. How can I learn more?  Ask your health care provider.  Call your local or state health department.  Contact the Centers for Disease Control and Prevention (CDC):  - Call 8-163.343.4241 (1-800-CDC-INFO) or  - Visit CDCs influenza website at www.cdc.gov/flu    Vaccine Information Statement (Interim)  Inactivated Influenza Vaccine   8/15/2019  42 U. Jimena Quiver [id number]   Department of Health and Human Services  Centers for Disease Control and Prevention    Office Use Only

## 2019-09-18 ENCOUNTER — HOSPITAL ENCOUNTER (OUTPATIENT)
Dept: INFUSION THERAPY | Age: 59
Discharge: HOME | End: 2019-09-18
Payer: MEDICARE

## 2019-09-18 VITALS
TEMPERATURE: 98.2 F | DIASTOLIC BLOOD PRESSURE: 84 MMHG | WEIGHT: 130.1 LBS | SYSTOLIC BLOOD PRESSURE: 152 MMHG | HEART RATE: 84 BPM | BODY MASS INDEX: 20.91 KG/M2 | OXYGEN SATURATION: 98 % | HEIGHT: 66 IN | RESPIRATION RATE: 16 BRPM

## 2019-09-18 DIAGNOSIS — C34.91 NON-SMALL CELL CANCER OF RIGHT LUNG (HCC): Primary | ICD-10-CM

## 2019-09-18 LAB
ALBUMIN SERPL-MCNC: 3.3 G/DL (ref 3.4–5)
ALBUMIN/GLOB SERPL: 0.8 {RATIO} (ref 0.8–1.7)
ALP SERPL-CCNC: 74 U/L (ref 45–117)
ALT SERPL-CCNC: 12 U/L (ref 13–56)
ANION GAP SERPL CALC-SCNC: 10 MMOL/L (ref 3–18)
AST SERPL-CCNC: 19 U/L (ref 10–38)
BASO+EOS+MONOS # BLD AUTO: 0.4 K/UL (ref 0–2.3)
BASO+EOS+MONOS NFR BLD AUTO: 8 % (ref 0.1–17)
BILIRUB SERPL-MCNC: 0.2 MG/DL (ref 0.2–1)
BUN SERPL-MCNC: 8 MG/DL (ref 7–18)
BUN/CREAT SERPL: 9 (ref 12–20)
CALCIUM SERPL-MCNC: 9 MG/DL (ref 8.5–10.1)
CHLORIDE SERPL-SCNC: 107 MMOL/L (ref 100–111)
CO2 SERPL-SCNC: 26 MMOL/L (ref 21–32)
CREAT SERPL-MCNC: 0.85 MG/DL (ref 0.6–1.3)
DIFFERENTIAL METHOD BLD: ABNORMAL
ERYTHROCYTE [DISTWIDTH] IN BLOOD BY AUTOMATED COUNT: 20.1 % (ref 11.5–14.5)
GLOBULIN SER CALC-MCNC: 4.1 G/DL (ref 2–4)
GLUCOSE SERPL-MCNC: 146 MG/DL (ref 74–99)
HCT VFR BLD AUTO: 33 % (ref 36–48)
HGB BLD-MCNC: 10.5 G/DL (ref 12–16)
LYMPHOCYTES # BLD: 1.1 K/UL (ref 1.1–5.9)
LYMPHOCYTES NFR BLD: 21 % (ref 14–44)
MCH RBC QN AUTO: 24.9 PG (ref 25–35)
MCHC RBC AUTO-ENTMCNC: 31.8 G/DL (ref 31–37)
MCV RBC AUTO: 78.4 FL (ref 78–102)
NEUTS SEG # BLD: 3.8 K/UL (ref 1.8–9.5)
NEUTS SEG NFR BLD: 72 % (ref 40–70)
PLATELET # BLD AUTO: 227 K/UL (ref 140–440)
POTASSIUM SERPL-SCNC: 3.5 MMOL/L (ref 3.5–5.5)
PROT SERPL-MCNC: 7.4 G/DL (ref 6.4–8.2)
RBC # BLD AUTO: 4.21 M/UL (ref 4.1–5.1)
SODIUM SERPL-SCNC: 143 MMOL/L (ref 136–145)
WBC # BLD AUTO: 5.3 K/UL (ref 4.5–13)

## 2019-09-18 PROCEDURE — 80053 COMPREHEN METABOLIC PANEL: CPT

## 2019-09-18 PROCEDURE — 96409 CHEMO IV PUSH SNGL DRUG: CPT

## 2019-09-18 PROCEDURE — 77030012965 HC NDL HUBR BBMI -A

## 2019-09-18 PROCEDURE — 74011000258 HC RX REV CODE- 258: Performed by: INTERNAL MEDICINE

## 2019-09-18 PROCEDURE — 36591 DRAW BLOOD OFF VENOUS DEVICE: CPT

## 2019-09-18 PROCEDURE — 74011250636 HC RX REV CODE- 250/636: Performed by: INTERNAL MEDICINE

## 2019-09-18 PROCEDURE — 85025 COMPLETE CBC W/AUTO DIFF WBC: CPT

## 2019-09-18 RX ORDER — SODIUM CHLORIDE 9 MG/ML
10 INJECTION INTRAMUSCULAR; INTRAVENOUS; SUBCUTANEOUS AS NEEDED
Status: ACTIVE | OUTPATIENT
Start: 2019-09-18 | End: 2019-09-18

## 2019-09-18 RX ORDER — SODIUM CHLORIDE 9 MG/ML
25 INJECTION, SOLUTION INTRAVENOUS CONTINUOUS
Status: DISPENSED | OUTPATIENT
Start: 2019-09-18 | End: 2019-09-18

## 2019-09-18 RX ORDER — HEPARIN 100 UNIT/ML
300-500 SYRINGE INTRAVENOUS AS NEEDED
Status: DISPENSED | OUTPATIENT
Start: 2019-09-18 | End: 2019-09-18

## 2019-09-18 RX ADMIN — VINORELBINE TARTRATE 35 MG: 10 INJECTION INTRAVENOUS at 11:56

## 2019-09-18 RX ADMIN — SODIUM CHLORIDE 25 ML/HR: 9 INJECTION, SOLUTION INTRAVENOUS at 11:30

## 2019-09-18 RX ADMIN — HEPARIN 500 UNITS: 100 SYRINGE at 12:15

## 2019-09-18 NOTE — PROGRESS NOTES
[facility] Progress Note    Date: 2019    Name: Milan Wood    MRN: [id number]         : 1960      Ms. Leigh arrived to [facility] at 36. Pt is here today for Navelbine, Cycle 10, Day 1. Ms. Elvia Donovan was assessed and education was provided. Ms. Farooq Lopez vitals were reviewed. Visit Vitals  /84 (BP 1 Location: Left arm, BP Patient Position: At rest)   Pulse 84   Temp 98.2 °F (36.8 °C)   Resp 16   Ht 5' 6\" (1.676 m)   Wt 59 kg (130 lb 1.6 oz)   SpO2 98%   Breastfeeding? No   BMI 21.00 kg/m²       Patient's left upper chest port accessed and blood drawn for labs. Lab results were obtained and reviewed. Labs okay for chemo. Recent Results (from the past 12 hour(s))   METABOLIC PANEL, COMPREHENSIVE    Collection Time: 19 10:30 AM   Result Value Ref Range    Sodium 143 136 - 145 mmol/L    Potassium 3.5 3.5 - 5.5 mmol/L    Chloride 107 100 - 111 mmol/L    CO2 26 21 - 32 mmol/L    Anion gap 10 3.0 - 18 mmol/L    Glucose 146 (H) 74 - 99 mg/dL    BUN 8 7.0 - 18 MG/DL    Creatinine 0.85 0.6 - 1.3 MG/DL    BUN/Creatinine ratio 9 (L) 12 - 20      GFR est AA >60 >60 ml/min/1.73m2    GFR est non-AA >60 >60 ml/min/1.73m2    Calcium 9.0 8.5 - 10.1 MG/DL    Bilirubin, total 0.2 0.2 - 1.0 MG/DL    ALT (SGPT) 12 (L) 13 - 56 U/L    AST (SGOT) 19 10 - 38 U/L    Alk. phosphatase 74 45 - 117 U/L    Protein, total 7.4 6.4 - 8.2 g/dL    Albumin 3.3 (L) 3.4 - 5.0 g/dL    Globulin 4.1 (H) 2.0 - 4.0 g/dL    A-G Ratio 0.8 0.8 - 1.7     CBC WITH 3 PART DIFF    Collection Time: 19 10:35 AM   Result Value Ref Range    WBC 5.3 4.5 - 13.0 K/uL    RBC 4.21 4.10 - 5.10 M/uL    HGB 10.5 (L) 12.0 - 16 g/dL    HCT 33.0 (L) 36 - 48 %    MCV 78.4 78 - 102 FL    MCH 24.9 (L) 25.0 - 35.0 PG    MCHC 31.8 31 - 37 g/dL    RDW 20.1 (H) 11.5 - 14.5 %    PLATELET 306 538 - 023 K/uL    NEUTROPHILS 72 (H) 40 - 70 %    MIXED CELLS 8 0.1 - 17 %    LYMPHOCYTES 21 14 - 44 %    ABS.  NEUTROPHILS 3.8 1.8 - 9.5 K/UL ABS. MIXED CELLS 0.4 0.0 - 2.3 K/uL    ABS. LYMPHOCYTES 1.1 1.1 - 5.9 K/UL    DF AUTOMATED         NS infusing as ordered at [facility]. Navelbine 35mg administered as ordered. Ms. Dipti Shetty tolerated infusion without complaints. Port flushed with heparin per order and de-accessed. Band-aid applied to site. Ms. Dipti Shetty was discharged from [place] [address] in stable condition at 1220. She is to return on 9/25/19 at 1000 for her next appointment.     Cory Johnson RN  September 18, 2019

## 2019-09-25 ENCOUNTER — HOSPITAL ENCOUNTER (OUTPATIENT)
Dept: INFUSION THERAPY | Age: 59
Discharge: HOME | End: 2019-09-25
Payer: MEDICARE

## 2019-09-25 VITALS
SYSTOLIC BLOOD PRESSURE: 178 MMHG | TEMPERATURE: 97.7 F | HEIGHT: 66 IN | BODY MASS INDEX: 21.02 KG/M2 | RESPIRATION RATE: 18 BRPM | WEIGHT: 130.8 LBS | HEART RATE: 87 BPM | OXYGEN SATURATION: 97 % | DIASTOLIC BLOOD PRESSURE: 111 MMHG

## 2019-09-25 DIAGNOSIS — C34.91 NON-SMALL CELL CANCER OF RIGHT LUNG (HCC): Primary | ICD-10-CM

## 2019-09-25 LAB
BASO+EOS+MONOS # BLD AUTO: 0.1 K/UL (ref 0–2.3)
BASO+EOS+MONOS NFR BLD AUTO: 2 % (ref 0.1–17)
DIFFERENTIAL METHOD BLD: ABNORMAL
ERYTHROCYTE [DISTWIDTH] IN BLOOD BY AUTOMATED COUNT: 20.7 % (ref 11.5–14.5)
HCT VFR BLD AUTO: 31.6 % (ref 36–48)
HGB BLD-MCNC: 9.8 G/DL (ref 12–16)
LYMPHOCYTES # BLD: 1.1 K/UL (ref 1.1–5.9)
LYMPHOCYTES NFR BLD: 21 % (ref 14–44)
MCH RBC QN AUTO: 24.1 PG (ref 25–35)
MCHC RBC AUTO-ENTMCNC: 31 G/DL (ref 31–37)
MCV RBC AUTO: 77.6 FL (ref 78–102)
NEUTS SEG # BLD: 4 K/UL (ref 1.8–9.5)
NEUTS SEG NFR BLD: 77 % (ref 40–70)
PLATELET # BLD AUTO: 232 K/UL (ref 140–440)
RBC # BLD AUTO: 4.07 M/UL (ref 4.1–5.1)
WBC # BLD AUTO: 5.2 K/UL (ref 4.5–13)

## 2019-09-25 PROCEDURE — 77030012965 HC NDL HUBR BBMI -A

## 2019-09-25 PROCEDURE — 74011000258 HC RX REV CODE- 258: Performed by: INTERNAL MEDICINE

## 2019-09-25 PROCEDURE — 74011250636 HC RX REV CODE- 250/636: Performed by: INTERNAL MEDICINE

## 2019-09-25 PROCEDURE — 96409 CHEMO IV PUSH SNGL DRUG: CPT

## 2019-09-25 PROCEDURE — 85025 COMPLETE CBC W/AUTO DIFF WBC: CPT

## 2019-09-25 RX ORDER — SODIUM CHLORIDE 9 MG/ML
10 INJECTION INTRAMUSCULAR; INTRAVENOUS; SUBCUTANEOUS AS NEEDED
Status: ACTIVE | OUTPATIENT
Start: 2019-09-25 | End: 2019-09-25

## 2019-09-25 RX ORDER — HEPARIN 100 UNIT/ML
300-500 SYRINGE INTRAVENOUS AS NEEDED
Status: DISPENSED | OUTPATIENT
Start: 2019-09-25 | End: 2019-09-25

## 2019-09-25 RX ORDER — SODIUM CHLORIDE 9 MG/ML
25 INJECTION, SOLUTION INTRAVENOUS CONTINUOUS
Status: DISCONTINUED | OUTPATIENT
Start: 2019-09-25 | End: 2019-09-26 | Stop reason: HOSPADM

## 2019-09-25 RX ORDER — SODIUM CHLORIDE 0.9 % (FLUSH) 0.9 %
10 SYRINGE (ML) INJECTION AS NEEDED
Status: DISPENSED | OUTPATIENT
Start: 2019-09-25 | End: 2019-09-25

## 2019-09-25 RX ADMIN — SODIUM CHLORIDE 25 ML/HR: 9 INJECTION, SOLUTION INTRAVENOUS at 12:00

## 2019-09-25 RX ADMIN — Medication 10 ML: at 11:53

## 2019-09-25 RX ADMIN — HEPARIN 500 UNITS: 100 SYRINGE at 12:25

## 2019-09-25 RX ADMIN — VINORELBINE TARTRATE 35 MG: 10 INJECTION INTRAVENOUS at 12:02

## 2019-09-25 RX ADMIN — Medication 30 ML: at 10:35

## 2019-09-25 NOTE — PROGRESS NOTES
[facility] Progress Note    Date: 2019    Name: Anjel Montano    MRN: [id number]         : 1960      Ms. Leigh arrived to [facility] at 1025. Pt is here today for Navelbine, Cycle 10, Day 8. Ms. Elo Guardado was assessed and education was provided. Ms. Mitch Quiroz vitals were reviewed. Visit Vitals  /84 (BP 1 Location: Left arm, BP Patient Position: Sitting)   Pulse 85   Temp 99 °F (37.2 °C)   Resp 18   Ht 5' 6\" (1.676 m)   Wt 59.3 kg (130 lb 12.8 oz)   SpO2 100%   BMI 21.11 kg/m²       Patient's left upper chest port accessed and blood drawn for labs. Lab results were obtained and reviewed. Labs okay for chemo. Recent Results (from the past 12 hour(s))   CBC WITH 3 PART DIFF    Collection Time: 19 10:35 AM   Result Value Ref Range    WBC 5.2 4.5 - 13.0 K/uL    RBC 4.07 (L) 4.10 - 5.10 M/uL    HGB 9.8 (L) 12.0 - 16 g/dL    HCT 31.6 (L) 36 - 48 %    MCV 77.6 (L) 78 - 102 FL    MCH 24.1 (L) 25.0 - 35.0 PG    MCHC 31.0 31 - 37 g/dL    RDW 20.7 (H) 11.5 - 14.5 %    PLATELET 533 558 - 461 K/uL    NEUTROPHILS 77 (H) 40 - 70 %    MIXED CELLS 2 0.1 - 17 %    LYMPHOCYTES 21 14 - 44 %    ABS. NEUTROPHILS 4.0 1.8 - 9.5 K/UL    ABS. MIXED CELLS 0.1 0.0 - 2.3 K/uL    ABS. LYMPHOCYTES 1.1 1.1 - 5.9 K/UL    DF AUTOMATED         NS infusing as ordered. Navelbine 35mg administered as ordered followed by NS flush. Ms. Elo Guardado tolerated infusion without complaints. Pt's discharge vs as follows:  Patient Vitals for the past 12 hrs:   Temp Pulse Resp BP SpO2   19 1225 -- -- -- (!) 178/111 --   19 1220 97.7 °F (36.5 °C) 87 18 (!) 192/105 97 %       Pt asymptomatic and refusing to stay to wait for her nurse to get into contact w/ her referring provider. Pt stated she will take her Norvasc as soon as she gets home since she didn't take it last night.  Pt also agreed she would go to the ER if she starts experiencing s/sx of high b/p i.e. headache, chest pain, chest tightness, visual disturbances, etc,    Port flushed with heparin per order and de-accessed. Band-aid applied to site. Ms. Julius Black was discharged from [place] [address] in stable condition at 1230. She is to return on 10/2/19 at 1000 for her next appointment.     Anne Beltre, YAMILE  September 25, 2019

## 2019-09-30 DIAGNOSIS — I10 ESSENTIAL HYPERTENSION: ICD-10-CM

## 2019-09-30 RX ORDER — AMLODIPINE BESYLATE 5 MG/1
TABLET ORAL
Qty: 30 TAB | Refills: 0 | OUTPATIENT
Start: 2019-09-30

## 2019-09-30 NOTE — TELEPHONE ENCOUNTER
Spoke with [employer]) explain there is a nation-wide computer error sending out automatic refills and that Mrs. Sindhu Dumont has  medication.

## 2019-09-30 NOTE — TELEPHONE ENCOUNTER
Wu Mcneil, please see all rx sent in for 6 months on 9/11/2019.   Can you please verify with pharmacy so we do not keep getting refills one by one?

## 2019-10-02 ENCOUNTER — HOSPITAL ENCOUNTER (OUTPATIENT)
Dept: INFUSION THERAPY | Age: 59
Discharge: HOME | End: 2019-10-02
Payer: MEDICARE

## 2019-10-02 VITALS
BODY MASS INDEX: 20.55 KG/M2 | DIASTOLIC BLOOD PRESSURE: 99 MMHG | WEIGHT: 127.9 LBS | OXYGEN SATURATION: 98 % | TEMPERATURE: 98.4 F | HEIGHT: 66 IN | HEART RATE: 83 BPM | RESPIRATION RATE: 18 BRPM | SYSTOLIC BLOOD PRESSURE: 165 MMHG

## 2019-10-02 DIAGNOSIS — C34.91 NON-SMALL CELL CANCER OF RIGHT LUNG (HCC): Primary | ICD-10-CM

## 2019-10-02 LAB
BASO+EOS+MONOS # BLD AUTO: 0.1 K/UL (ref 0–2.3)
BASO+EOS+MONOS NFR BLD AUTO: 3 % (ref 0.1–17)
DIFFERENTIAL METHOD BLD: ABNORMAL
ERYTHROCYTE [DISTWIDTH] IN BLOOD BY AUTOMATED COUNT: 20.4 % (ref 11.5–14.5)
HCT VFR BLD AUTO: 31.2 % (ref 36–48)
HGB BLD-MCNC: 9.7 G/DL (ref 12–16)
LYMPHOCYTES # BLD: 0.9 K/UL (ref 1.1–5.9)
LYMPHOCYTES NFR BLD: 23 % (ref 14–44)
MCH RBC QN AUTO: 24.1 PG (ref 25–35)
MCHC RBC AUTO-ENTMCNC: 31.1 G/DL (ref 31–37)
MCV RBC AUTO: 77.6 FL (ref 78–102)
NEUTS SEG # BLD: 2.8 K/UL (ref 1.8–9.5)
NEUTS SEG NFR BLD: 74 % (ref 40–70)
PLATELET # BLD AUTO: 326 K/UL (ref 140–440)
RBC # BLD AUTO: 4.02 M/UL (ref 4.1–5.1)
WBC # BLD AUTO: 3.8 K/UL (ref 4.5–13)

## 2019-10-02 PROCEDURE — 74011250636 HC RX REV CODE- 250/636: Performed by: INTERNAL MEDICINE

## 2019-10-02 PROCEDURE — 77030012965 HC NDL HUBR BBMI -A

## 2019-10-02 PROCEDURE — 99211 OFF/OP EST MAY X REQ PHY/QHP: CPT

## 2019-10-02 PROCEDURE — 74011000258 HC RX REV CODE- 258: Performed by: INTERNAL MEDICINE

## 2019-10-02 PROCEDURE — 85025 COMPLETE CBC W/AUTO DIFF WBC: CPT

## 2019-10-02 PROCEDURE — 96409 CHEMO IV PUSH SNGL DRUG: CPT

## 2019-10-02 RX ORDER — SODIUM CHLORIDE 9 MG/ML
25 INJECTION, SOLUTION INTRAVENOUS CONTINUOUS
Status: DISPENSED | OUTPATIENT
Start: 2019-10-02 | End: 2019-10-02

## 2019-10-02 RX ORDER — HEPARIN 100 UNIT/ML
300-500 SYRINGE INTRAVENOUS AS NEEDED
Status: DISPENSED | OUTPATIENT
Start: 2019-10-02 | End: 2019-10-02

## 2019-10-02 RX ORDER — SODIUM CHLORIDE 0.9 % (FLUSH) 0.9 %
10 SYRINGE (ML) INJECTION AS NEEDED
Status: DISPENSED | OUTPATIENT
Start: 2019-10-02 | End: 2019-10-02

## 2019-10-02 RX ADMIN — VINORELBINE TARTRATE 35 MG: 10 INJECTION INTRAVENOUS at 11:16

## 2019-10-02 RX ADMIN — SODIUM CHLORIDE 25 ML/HR: 9 INJECTION, SOLUTION INTRAVENOUS at 10:56

## 2019-10-02 RX ADMIN — Medication 30 ML: at 10:30

## 2019-10-02 RX ADMIN — HEPARIN 500 UNITS: 100 SYRINGE at 11:55

## 2019-10-02 NOTE — PROGRESS NOTES
[facility] Progress Note    Date: 2019    Name: Rg Joseph    MRN: [id number]         : 1960      Ms. Leigh arrived to [facility] at 21 . Pt is here today for Navelbine, Cycle 10, Day 15. Ms. Hazel Santizo was assessed and education was provided. Pt w/o complaints except decrease in her appetite. Encouraged pt to d/w Dr. Amie Kumari at her next office visit which she states is scheduled for next week. Pt verbalized understanding. Ms. Tammie Coker vitals were reviewed. Visit Vitals  /78 (BP 1 Location: Left arm, BP Patient Position: Sitting)   Pulse 98   Temp 98.4 °F (36.9 °C)   Resp 18   Ht 5' 6\" (1.676 m)   Wt 58 kg (127 lb 14.4 oz)   SpO2 99%   BMI 20.64 kg/m²       Patient's left upper chest port accessed (lateral lumen) and blood drawn for labs. Lab results were obtained and reviewed. Labs okay for chemo. Recent Results (from the past 12 hour(s))   CBC WITH 3 PART DIFF    Collection Time: 10/02/19 10:30 AM   Result Value Ref Range    WBC 3.8 (L) 4.5 - 13.0 K/uL    RBC 4.02 (L) 4.10 - 5.10 M/uL    HGB 9.7 (L) 12.0 - 16 g/dL    HCT 31.2 (L) 36 - 48 %    MCV 77.6 (L) 78 - 102 FL    MCH 24.1 (L) 25.0 - 35.0 PG    MCHC 31.1 31 - 37 g/dL    RDW 20.4 (H) 11.5 - 14.5 %    PLATELET 367 207 - 268 K/uL    NEUTROPHILS 74 (H) 40 - 70 %    MIXED CELLS 3 0.1 - 17 %    LYMPHOCYTES 23 14 - 44 %    ABS. NEUTROPHILS 2.8 1.8 - 9.5 K/UL    ABS. MIXED CELLS 0.1 0.0 - 2.3 K/uL    ABS. LYMPHOCYTES 0.9 (L) 1.1 - 5.9 K/UL    DF AUTOMATED         NS infusing as ordered at [facility]. Navelbine 35mg administered as ordered followed by NS flush. Ms. Hazel Friend tolerated infusion without complaints. Pt's discharge vs as follows:  Patient Vitals for the past 12 hrs:   Temp Pulse Resp BP SpO2   19 1225 -- -- -- (!) 178/111 --   19 1220 97.7 °F (36.5 °C) 87 18 (!) 192/105 97 %       Pt asymptomatic and refusing to stay to wait for her nurse to get into contact w/ her referring provider.  Pt states she did take her b/p meds this morning. Pt agreed she would go to the ER if she starts experiencing s/sx of high b/p i.e. headache, chest pain, chest tightness, visual disturbances, etc.    Port flushed with heparin per order and de-accessed. Band-aid applied to site. Ms. Julius Black was discharged from [place] [address] in stable condition at 1200. She is to return on 10/16/19 at 1000 for her next appointment.     Anne Beltre, YAMILE  October 2, 2019

## 2019-10-02 NOTE — PROGRESS NOTES
Call was placed to pt's referring provider's office prior to her discharge. Ginger, Dr. Kuldip Leon nurse, called back after pt discharge and she was informed of pt's high b/p's following her Navelbine last week and this week. She stated she would let Dr. Fish Asif know and they will reach out to the pt and see her prior to her next chemo which isn't until October 16th.

## 2019-10-08 ENCOUNTER — HOSPITAL ENCOUNTER (OUTPATIENT)
Dept: VASCULAR SURGERY | Age: 59
Discharge: HOME | End: 2019-10-08
Attending: INTERNAL MEDICINE
Payer: MEDICARE

## 2019-10-08 ENCOUNTER — HOSPITAL ENCOUNTER (OUTPATIENT)
Dept: CT IMAGING | Age: 59
End: 2019-10-08
Attending: INTERNAL MEDICINE
Payer: MEDICARE

## 2019-10-08 DIAGNOSIS — C34.11 MALIGNANT NEOPLASM OF UPPER LOBE OF RIGHT LUNG (HCC): ICD-10-CM

## 2019-10-08 PROCEDURE — 93970 EXTREMITY STUDY: CPT

## 2019-10-09 ENCOUNTER — HOSPITAL ENCOUNTER (OUTPATIENT)
Dept: CT IMAGING | Age: 59
Discharge: HOME | End: 2019-10-09
Attending: INTERNAL MEDICINE
Payer: MEDICARE

## 2019-10-09 DIAGNOSIS — C34.11 MALIGNANT NEOPLASM OF UPPER LOBE OF RIGHT LUNG (HCC): ICD-10-CM

## 2019-10-09 PROCEDURE — 74011636320 HC RX REV CODE- 636/320: Performed by: INTERNAL MEDICINE

## 2019-10-09 PROCEDURE — 74177 CT ABD & PELVIS W/CONTRAST: CPT

## 2019-10-09 RX ADMIN — IOPAMIDOL 90 ML: 612 INJECTION, SOLUTION INTRAVENOUS at 11:10

## 2019-10-11 RX ORDER — ALBUTEROL SULFATE 0.83 MG/ML
2.5 SOLUTION RESPIRATORY (INHALATION) AS NEEDED
Status: CANCELLED
Start: 2019-10-18

## 2019-10-11 RX ORDER — HYDROCORTISONE SODIUM SUCCINATE 100 MG/2ML
100 INJECTION, POWDER, FOR SOLUTION INTRAMUSCULAR; INTRAVENOUS AS NEEDED
Status: CANCELLED | OUTPATIENT
Start: 2019-10-18

## 2019-10-11 RX ORDER — DIPHENHYDRAMINE HYDROCHLORIDE 50 MG/ML
50 INJECTION, SOLUTION INTRAMUSCULAR; INTRAVENOUS AS NEEDED
Status: CANCELLED
Start: 2019-10-18

## 2019-10-11 RX ORDER — EPINEPHRINE 1 MG/ML
0.3 INJECTION, SOLUTION, CONCENTRATE INTRAVENOUS AS NEEDED
Status: CANCELLED | OUTPATIENT
Start: 2019-10-18

## 2019-10-11 RX ORDER — HEPARIN 100 UNIT/ML
300-500 SYRINGE INTRAVENOUS AS NEEDED
Status: CANCELLED
Start: 2019-10-18

## 2019-10-11 RX ORDER — SODIUM CHLORIDE 0.9 % (FLUSH) 0.9 %
10-40 SYRINGE (ML) INJECTION AS NEEDED
Status: CANCELLED
Start: 2019-10-18

## 2019-10-11 RX ORDER — SODIUM CHLORIDE 9 MG/ML
25 INJECTION, SOLUTION INTRAVENOUS CONTINUOUS
Status: CANCELLED | OUTPATIENT
Start: 2019-10-18

## 2019-10-11 RX ORDER — ONDANSETRON 2 MG/ML
8 INJECTION INTRAMUSCULAR; INTRAVENOUS AS NEEDED
Status: CANCELLED | OUTPATIENT
Start: 2019-10-18

## 2019-10-11 RX ORDER — ACETAMINOPHEN 325 MG/1
650 TABLET ORAL AS NEEDED
Status: CANCELLED
Start: 2019-10-18

## 2019-10-16 ENCOUNTER — HOSPITAL ENCOUNTER (OUTPATIENT)
Dept: INFUSION THERAPY | Age: 59
End: 2019-10-16
Payer: MEDICARE

## 2019-10-18 ENCOUNTER — HOSPITAL ENCOUNTER (OUTPATIENT)
Dept: INFUSION THERAPY | Age: 59
Discharge: HOME | End: 2019-10-18
Payer: MEDICARE

## 2019-10-18 VITALS
DIASTOLIC BLOOD PRESSURE: 91 MMHG | HEART RATE: 84 BPM | TEMPERATURE: 97.9 F | SYSTOLIC BLOOD PRESSURE: 150 MMHG | RESPIRATION RATE: 18 BRPM

## 2019-10-18 DIAGNOSIS — C34.91 NON-SMALL CELL CANCER OF RIGHT LUNG (HCC): Primary | ICD-10-CM

## 2019-10-18 LAB
ALBUMIN SERPL-MCNC: 3.4 G/DL (ref 3.4–5)
ALBUMIN/GLOB SERPL: 0.8 {RATIO} (ref 0.8–1.7)
ALP SERPL-CCNC: 74 U/L (ref 45–117)
ALT SERPL-CCNC: 12 U/L (ref 13–56)
ANION GAP SERPL CALC-SCNC: 4 MMOL/L (ref 3–18)
AST SERPL-CCNC: 14 U/L (ref 10–38)
BASO+EOS+MONOS # BLD AUTO: 0.1 K/UL (ref 0–2.3)
BASO+EOS+MONOS NFR BLD AUTO: 3 % (ref 0.1–17)
BILIRUB SERPL-MCNC: 0.3 MG/DL (ref 0.2–1)
BUN SERPL-MCNC: 6 MG/DL (ref 7–18)
BUN/CREAT SERPL: 11 (ref 12–20)
CALCIUM SERPL-MCNC: 9.1 MG/DL (ref 8.5–10.1)
CHLORIDE SERPL-SCNC: 107 MMOL/L (ref 100–111)
CO2 SERPL-SCNC: 29 MMOL/L (ref 21–32)
CREAT SERPL-MCNC: 0.55 MG/DL (ref 0.6–1.3)
DIFFERENTIAL METHOD BLD: ABNORMAL
ERYTHROCYTE [DISTWIDTH] IN BLOOD BY AUTOMATED COUNT: 20.7 % (ref 11.5–14.5)
GLOBULIN SER CALC-MCNC: 4.1 G/DL (ref 2–4)
GLUCOSE SERPL-MCNC: 81 MG/DL (ref 74–99)
HCT VFR BLD AUTO: 31.9 % (ref 36–48)
HGB BLD-MCNC: 10 G/DL (ref 12–16)
LYMPHOCYTES # BLD: 1.6 K/UL (ref 1.1–5.9)
LYMPHOCYTES NFR BLD: 34 % (ref 14–44)
MCH RBC QN AUTO: 24.5 PG (ref 25–35)
MCHC RBC AUTO-ENTMCNC: 31.3 G/DL (ref 31–37)
MCV RBC AUTO: 78.2 FL (ref 78–102)
NEUTS SEG # BLD: 3 K/UL (ref 1.8–9.5)
NEUTS SEG NFR BLD: 63 % (ref 40–70)
PLATELET # BLD AUTO: 275 K/UL (ref 140–440)
POTASSIUM SERPL-SCNC: 3.4 MMOL/L (ref 3.5–5.5)
PROT SERPL-MCNC: 7.5 G/DL (ref 6.4–8.2)
RBC # BLD AUTO: 4.08 M/UL (ref 4.1–5.1)
SODIUM SERPL-SCNC: 140 MMOL/L (ref 136–145)
TSH SERPL DL<=0.05 MIU/L-ACNC: 5.15 UIU/ML (ref 0.36–3.74)
WBC # BLD AUTO: 4.7 K/UL (ref 4.5–13)

## 2019-10-18 PROCEDURE — 77030012965 HC NDL HUBR BBMI -A

## 2019-10-18 PROCEDURE — 96413 CHEMO IV INFUSION 1 HR: CPT

## 2019-10-18 PROCEDURE — 80053 COMPREHEN METABOLIC PANEL: CPT

## 2019-10-18 PROCEDURE — 84443 ASSAY THYROID STIM HORMONE: CPT

## 2019-10-18 PROCEDURE — 74011000258 HC RX REV CODE- 258: Performed by: INTERNAL MEDICINE

## 2019-10-18 PROCEDURE — 74011250636 HC RX REV CODE- 250/636: Performed by: INTERNAL MEDICINE

## 2019-10-18 PROCEDURE — 85025 COMPLETE CBC W/AUTO DIFF WBC: CPT

## 2019-10-18 PROCEDURE — 36415 COLL VENOUS BLD VENIPUNCTURE: CPT

## 2019-10-18 RX ORDER — HEPARIN 100 UNIT/ML
300-500 SYRINGE INTRAVENOUS AS NEEDED
Status: DISCONTINUED | OUTPATIENT
Start: 2019-10-18 | End: 2019-10-19 | Stop reason: HOSPADM

## 2019-10-18 RX ORDER — SODIUM CHLORIDE 0.9 % (FLUSH) 0.9 %
10-40 SYRINGE (ML) INJECTION AS NEEDED
Status: DISCONTINUED | OUTPATIENT
Start: 2019-10-18 | End: 2019-10-19 | Stop reason: HOSPADM

## 2019-10-18 RX ORDER — SODIUM CHLORIDE 9 MG/ML
25 INJECTION, SOLUTION INTRAVENOUS CONTINUOUS
Status: DISCONTINUED | OUTPATIENT
Start: 2019-10-18 | End: 2019-10-19 | Stop reason: HOSPADM

## 2019-10-18 RX ADMIN — HEPARIN 500 UNITS: 100 SYRINGE at 15:45

## 2019-10-18 RX ADMIN — SODIUM CHLORIDE 200 MG: 9 INJECTION, SOLUTION INTRAVENOUS at 15:09

## 2019-10-18 RX ADMIN — Medication 20 ML: at 15:44

## 2019-10-18 RX ADMIN — SODIUM CHLORIDE 25 ML/HR: 9 INJECTION, SOLUTION INTRAVENOUS at 13:15

## 2019-10-18 NOTE — PROGRESS NOTES
[facility] Progress Note    Date: 2019    Name: Gio Hutchison              MRN: [id number]              : 1960    Chemotherapy Florian Joelle    Ms. Manuel Nance was assessed and education was provided. Ms. Jerome Fuelanthony vitals were reviewed and patient was observed for 5 minutes prior to treatment. Visit Vitals  BP (!) 150/91 (BP 1 Location: Left arm, BP Patient Position: Sitting)   Pulse 84   Temp 97.9 °F (36.6 °C)   Resp 18       Lab results were obtained and reviewed. Recent Results (from the past 12 hour(s))   CBC WITH 3 PART DIFF    Collection Time: 10/18/19  1:25 PM   Result Value Ref Range    WBC 4.7 4.5 - 13.0 K/uL    RBC 4.08 (L) 4.10 - 5.10 M/uL    HGB 10.0 (L) 12.0 - 16 g/dL    HCT 31.9 (L) 36 - 48 %    MCV 78.2 78 - 102 FL    MCH 24.5 (L) 25.0 - 35.0 PG    MCHC 31.3 31 - 37 g/dL    RDW 20.7 (H) 11.5 - 14.5 %    PLATELET 194 829 - 468 K/uL    NEUTROPHILS 63 40 - 70 %    MIXED CELLS 3 0.1 - 17 %    LYMPHOCYTES 34 14 - 44 %    ABS. NEUTROPHILS 3.0 1.8 - 9.5 K/UL    ABS. MIXED CELLS 0.1 0.0 - 2.3 K/uL    ABS. LYMPHOCYTES 1.6 1.1 - 5.9 K/UL    DF AUTOMATED     METABOLIC PANEL, COMPREHENSIVE    Collection Time: 10/18/19  1:25 PM   Result Value Ref Range    Sodium 140 136 - 145 mmol/L    Potassium 3.4 (L) 3.5 - 5.5 mmol/L    Chloride 107 100 - 111 mmol/L    CO2 29 21 - 32 mmol/L    Anion gap 4 3.0 - 18 mmol/L    Glucose 81 74 - 99 mg/dL    BUN 6 (L) 7.0 - 18 MG/DL    Creatinine 0.55 (L) 0.6 - 1.3 MG/DL    BUN/Creatinine ratio 11 (L) 12 - 20      GFR est AA >60 >60 ml/min/1.73m2    GFR est non-AA >60 >60 ml/min/1.73m2    Calcium 9.1 8.5 - 10.1 MG/DL    Bilirubin, total 0.3 0.2 - 1.0 MG/DL    ALT (SGPT) 12 (L) 13 - 56 U/L    AST (SGOT) 14 10 - 38 U/L    Alk.  phosphatase 74 45 - 117 U/L    Protein, total 7.5 6.4 - 8.2 g/dL    Albumin 3.4 3.4 - 5.0 g/dL    Globulin 4.1 (H) 2.0 - 4.0 g/dL    A-G Ratio 0.8 0.8 - 1.7     TSH 3RD GENERATION    Collection Time: 10/18/19  1:25 PM   Result Value Ref Range    TSH 5.15 (H) 0.36 - 3.74 uIU/mL     Mediport access with a 20g el needle and flushed with NS 20 ml, with a brisk blood return. 10 ml of blood was drawn and wasted for a lab draw. CBC, CMP, and TSH was collected. Port flushed with NS 10 ml and brisk blood return reverified. NS flush bag ran at [facility]. Keytruda 200 mg was infused over 30 minutes. Flushed port with 20 ml NS and Heparin 500 units and de-accessed. No signs or symptoms of a reaction. Bandaid applied to site. Ms. Etienne Fay tolerated infusion, and had no complaints at this time. Patient armband removed and shredded. Ms. Etienne Fay was discharged from [place] [address] in stable condition at 1550. She is to return on 11/08/2019 at 1000 for her next appointment.      Talia Rice RN  October 18, 2019  4:01 PM

## 2019-10-23 ENCOUNTER — APPOINTMENT (OUTPATIENT)
Dept: INFUSION THERAPY | Age: 59
End: 2019-10-23
Payer: MEDICARE

## 2019-10-30 ENCOUNTER — APPOINTMENT (OUTPATIENT)
Dept: INFUSION THERAPY | Age: 59
End: 2019-10-30
Payer: MEDICARE

## 2019-11-01 RX ORDER — ONDANSETRON 2 MG/ML
8 INJECTION INTRAMUSCULAR; INTRAVENOUS AS NEEDED
Status: CANCELLED | OUTPATIENT
Start: 2019-11-08

## 2019-11-01 RX ORDER — EPINEPHRINE 1 MG/ML
0.3 INJECTION, SOLUTION, CONCENTRATE INTRAVENOUS AS NEEDED
Status: CANCELLED | OUTPATIENT
Start: 2019-11-08

## 2019-11-01 RX ORDER — ALBUTEROL SULFATE 0.83 MG/ML
2.5 SOLUTION RESPIRATORY (INHALATION) AS NEEDED
Status: CANCELLED
Start: 2019-11-08

## 2019-11-01 RX ORDER — DIPHENHYDRAMINE HYDROCHLORIDE 50 MG/ML
50 INJECTION, SOLUTION INTRAMUSCULAR; INTRAVENOUS AS NEEDED
Status: CANCELLED
Start: 2019-11-08

## 2019-11-01 RX ORDER — ACETAMINOPHEN 325 MG/1
650 TABLET ORAL AS NEEDED
Status: CANCELLED
Start: 2019-11-08

## 2019-11-01 RX ORDER — HYDROCORTISONE SODIUM SUCCINATE 100 MG/2ML
100 INJECTION, POWDER, FOR SOLUTION INTRAMUSCULAR; INTRAVENOUS AS NEEDED
Status: CANCELLED | OUTPATIENT
Start: 2019-11-08

## 2019-11-08 ENCOUNTER — HOSPITAL ENCOUNTER (OUTPATIENT)
Dept: INFUSION THERAPY | Age: 59
Discharge: HOME | End: 2019-11-08
Payer: MEDICARE

## 2019-11-08 VITALS
HEART RATE: 79 BPM | WEIGHT: 133.9 LBS | RESPIRATION RATE: 20 BRPM | TEMPERATURE: 98.1 F | OXYGEN SATURATION: 97 % | DIASTOLIC BLOOD PRESSURE: 81 MMHG | SYSTOLIC BLOOD PRESSURE: 129 MMHG | HEIGHT: 66 IN | BODY MASS INDEX: 21.52 KG/M2

## 2019-11-08 DIAGNOSIS — C34.91 NON-SMALL CELL CANCER OF RIGHT LUNG (HCC): Primary | ICD-10-CM

## 2019-11-08 LAB
ALBUMIN SERPL-MCNC: 3.2 G/DL (ref 3.4–5)
ALBUMIN/GLOB SERPL: 0.7 {RATIO} (ref 0.8–1.7)
ALP SERPL-CCNC: 74 U/L (ref 45–117)
ALT SERPL-CCNC: 11 U/L (ref 13–56)
ANION GAP SERPL CALC-SCNC: 5 MMOL/L (ref 3–18)
AST SERPL-CCNC: 14 U/L (ref 10–38)
BASO+EOS+MONOS # BLD AUTO: 0.3 K/UL (ref 0–2.3)
BASO+EOS+MONOS NFR BLD AUTO: 5 % (ref 0.1–17)
BILIRUB SERPL-MCNC: 0.3 MG/DL (ref 0.2–1)
BUN SERPL-MCNC: 6 MG/DL (ref 7–18)
BUN/CREAT SERPL: 9 (ref 12–20)
CALCIUM SERPL-MCNC: 8.5 MG/DL (ref 8.5–10.1)
CHLORIDE SERPL-SCNC: 108 MMOL/L (ref 100–111)
CO2 SERPL-SCNC: 28 MMOL/L (ref 21–32)
CREAT SERPL-MCNC: 0.69 MG/DL (ref 0.6–1.3)
DIFFERENTIAL METHOD BLD: ABNORMAL
ERYTHROCYTE [DISTWIDTH] IN BLOOD BY AUTOMATED COUNT: 18.8 % (ref 11.5–14.5)
GLOBULIN SER CALC-MCNC: 4.3 G/DL (ref 2–4)
GLUCOSE SERPL-MCNC: 140 MG/DL (ref 74–99)
HCT VFR BLD AUTO: 31.1 % (ref 36–48)
HGB BLD-MCNC: 9.7 G/DL (ref 12–16)
LYMPHOCYTES # BLD: 0.8 K/UL (ref 1.1–5.9)
LYMPHOCYTES NFR BLD: 12 % (ref 14–44)
MCH RBC QN AUTO: 24.1 PG (ref 25–35)
MCHC RBC AUTO-ENTMCNC: 31.2 G/DL (ref 31–37)
MCV RBC AUTO: 77.4 FL (ref 78–102)
NEUTS SEG # BLD: 5.4 K/UL (ref 1.8–9.5)
NEUTS SEG NFR BLD: 83 % (ref 40–70)
PLATELET # BLD AUTO: 105 K/UL (ref 135–420)
POTASSIUM SERPL-SCNC: 3.2 MMOL/L (ref 3.5–5.5)
PROT SERPL-MCNC: 7.5 G/DL (ref 6.4–8.2)
RBC # BLD AUTO: 4.02 M/UL (ref 4.1–5.1)
SODIUM SERPL-SCNC: 141 MMOL/L (ref 136–145)
WBC # BLD AUTO: 6.5 K/UL (ref 4.5–13)

## 2019-11-08 PROCEDURE — 77030012965 HC NDL HUBR BBMI -A

## 2019-11-08 PROCEDURE — 85025 COMPLETE CBC W/AUTO DIFF WBC: CPT

## 2019-11-08 PROCEDURE — 85049 AUTOMATED PLATELET COUNT: CPT

## 2019-11-08 PROCEDURE — 74011250636 HC RX REV CODE- 250/636: Performed by: INTERNAL MEDICINE

## 2019-11-08 PROCEDURE — 74011000258 HC RX REV CODE- 258: Performed by: INTERNAL MEDICINE

## 2019-11-08 PROCEDURE — 96413 CHEMO IV INFUSION 1 HR: CPT

## 2019-11-08 PROCEDURE — 80053 COMPREHEN METABOLIC PANEL: CPT

## 2019-11-08 RX ORDER — SODIUM CHLORIDE 0.9 % (FLUSH) 0.9 %
10-40 SYRINGE (ML) INJECTION AS NEEDED
Status: DISPENSED | OUTPATIENT
Start: 2019-11-08 | End: 2019-11-08

## 2019-11-08 RX ORDER — HEPARIN 100 UNIT/ML
SYRINGE INTRAVENOUS
Status: DISPENSED
Start: 2019-11-08 | End: 2019-11-08

## 2019-11-08 RX ORDER — SODIUM CHLORIDE 9 MG/ML
25 INJECTION, SOLUTION INTRAVENOUS CONTINUOUS
Status: DISPENSED | OUTPATIENT
Start: 2019-11-08 | End: 2019-11-08

## 2019-11-08 RX ORDER — HEPARIN 100 UNIT/ML
300-500 SYRINGE INTRAVENOUS AS NEEDED
Status: ACTIVE | OUTPATIENT
Start: 2019-11-08 | End: 2019-11-08

## 2019-11-08 RX ADMIN — Medication 30 ML: at 13:32

## 2019-11-08 RX ADMIN — SODIUM CHLORIDE 200 MG: 9 INJECTION, SOLUTION INTRAVENOUS at 12:50

## 2019-11-08 RX ADMIN — Medication 40 ML: at 10:40

## 2019-11-08 RX ADMIN — HEPARIN 500 UNITS: 100 SYRINGE at 13:34

## 2019-11-08 NOTE — PROGRESS NOTES
[facility] Progress Note    Date: 2019    Name: Valentino Tapia              MRN: [id number]              : 1960    Chemotherapy Cycle:C2d1 keytruda      Pt to [facility], ambulatory, at 1020 am. No complaints or concerns voiced      . Ms. Avril Mckee was assessed and education was provided. Ms. Chiki Gotti vitals were reviewed. Visit Vitals  /81 (BP 1 Location: Left arm, BP Patient Position: Sitting)   Pulse 79   Temp 98.1 °F (36.7 °C)   Resp 20   Ht 5' 6\" (1.676 m)   Wt 60.7 kg (133 lb 14.4 oz)   SpO2 97%   Breastfeeding? No   BMI 21.61 kg/m²       Left chest medial lumen of her double lumen mediport accessed with 20 g 1 inch el needle. Port flushed easily and had brisk blood return. Blood drawn off for cbc and cmp      Lab results were obtained and reviewed. Recent Results (from the past 12 hour(s))   METABOLIC PANEL, COMPREHENSIVE    Collection Time: 19 10:30 AM   Result Value Ref Range    Sodium 141 136 - 145 mmol/L    Potassium 3.2 (L) 3.5 - 5.5 mmol/L    Chloride 108 100 - 111 mmol/L    CO2 28 21 - 32 mmol/L    Anion gap 5 3.0 - 18 mmol/L    Glucose 140 (H) 74 - 99 mg/dL    BUN 6 (L) 7.0 - 18 MG/DL    Creatinine 0.69 0.6 - 1.3 MG/DL    BUN/Creatinine ratio 9 (L) 12 - 20      GFR est AA >60 >60 ml/min/1.73m2    GFR est non-AA >60 >60 ml/min/1.73m2    Calcium 8.5 8.5 - 10.1 MG/DL    Bilirubin, total 0.3 0.2 - 1.0 MG/DL    ALT (SGPT) 11 (L) 13 - 56 U/L    AST (SGOT) 14 10 - 38 U/L    Alk.  phosphatase 74 45 - 117 U/L    Protein, total 7.5 6.4 - 8.2 g/dL    Albumin 3.2 (L) 3.4 - 5.0 g/dL    Globulin 4.3 (H) 2.0 - 4.0 g/dL    A-G Ratio 0.7 (L) 0.8 - 1.7     CBC WITH 3 PART DIFF    Collection Time: 19 10:50 AM   Result Value Ref Range    WBC 6.5 4.5 - 13.0 K/uL    RBC 4.02 (L) 4.10 - 5.10 M/uL    HGB 9.7 (L) 12.0 - 16 g/dL    HCT 31.1 (L) 36 - 48 %    MCV 77.4 (L) 78 - 102 FL    MCH 24.1 (L) 25.0 - 35.0 PG    MCHC 31.2 31 - 37 g/dL    RDW 18.8 (H) 11.5 - 14.5 %    NEUTROPHILS 83 (H) 40 - 70 %    MIXED CELLS 5 0.1 - 17 %    LYMPHOCYTES 12 (L) 14 - 44 %    ABS. NEUTROPHILS 5.4 1.8 - 9.5 K/UL    ABS. MIXED CELLS 0.3 0.0 - 2.3 K/uL    ABS. LYMPHOCYTES 0.8 (L) 1.1 - 5.9 K/UL    DF AUTOMATED     PLATELET COUNT    Collection Time: 11/08/19 11:27 AM   Result Value Ref Range    PLATELET 387 (L) 328 - 420 K/uL     I spoke with Nolvia Drummond, nurse for Dr Aurora Turner regarding potassium of 3.2. She said to instruct patient to eat an extra banana a day per Dr Aurora Turner, and office will call her at home for further direction( if Dr Aurora Turner decides to put her on potassium pills) Ms Barry Tompkins made aware. We will proceed with chemo today per Dr Aurora Turner. Lab results within ordered parameters to give chemo today. No pre-medications ordered. Keytruda 200 mg was infused at over 30 minutes as ordered. VS stable at end of infusion and pt denied complaints. Line flushed with NS and blood return from port re-verified. Patient Vitals for the past 12 hrs:   Temp Pulse Resp BP SpO2   11/08/19 1335 98.1 °F (36.7 °C) 79 20 129/81 97 %   11/08/19 1020 98.5 °F (36.9 °C) 87 18 131/85 98 %          Ms. Leigh tolerated infusion, and had no complaints. Mediport flushed with NS 30 ml and Heparin 500 units then de-accessed. No irritation or bleeding noted. Bandaid applied. Patient armband removed and shredded. Reviewed discharge instructions with patient. She verbalized understanding    Ms. Barry Tompkins was discharged from [place] [address] in stable condition at 1340 pm. She is to return on 12/6/19 at 1000 am for her next  appointment.     Nelson Foster RN  November 8, 2019  1311

## 2019-11-27 RX ORDER — ACETAMINOPHEN 325 MG/1
650 TABLET ORAL AS NEEDED
Status: CANCELLED
Start: 2019-12-06

## 2019-11-27 RX ORDER — DIPHENHYDRAMINE HYDROCHLORIDE 50 MG/ML
50 INJECTION, SOLUTION INTRAMUSCULAR; INTRAVENOUS AS NEEDED
Status: CANCELLED
Start: 2019-12-06

## 2019-11-27 RX ORDER — ONDANSETRON 2 MG/ML
8 INJECTION INTRAMUSCULAR; INTRAVENOUS AS NEEDED
Status: CANCELLED | OUTPATIENT
Start: 2019-12-06

## 2019-11-27 RX ORDER — HYDROCORTISONE SODIUM SUCCINATE 100 MG/2ML
100 INJECTION, POWDER, FOR SOLUTION INTRAMUSCULAR; INTRAVENOUS AS NEEDED
Status: CANCELLED | OUTPATIENT
Start: 2019-12-06

## 2019-11-27 RX ORDER — ALBUTEROL SULFATE 0.83 MG/ML
2.5 SOLUTION RESPIRATORY (INHALATION) AS NEEDED
Status: CANCELLED
Start: 2019-12-06

## 2019-11-27 RX ORDER — EPINEPHRINE 1 MG/ML
0.3 INJECTION, SOLUTION, CONCENTRATE INTRAVENOUS AS NEEDED
Status: CANCELLED | OUTPATIENT
Start: 2019-12-06

## 2019-12-06 ENCOUNTER — HOSPITAL ENCOUNTER (OUTPATIENT)
Dept: INFUSION THERAPY | Age: 59
Discharge: HOME | End: 2019-12-06
Payer: MEDICARE

## 2019-12-06 VITALS
TEMPERATURE: 98.9 F | OXYGEN SATURATION: 96 % | HEART RATE: 89 BPM | HEIGHT: 66 IN | WEIGHT: 129.9 LBS | BODY MASS INDEX: 20.88 KG/M2 | DIASTOLIC BLOOD PRESSURE: 80 MMHG | RESPIRATION RATE: 18 BRPM | SYSTOLIC BLOOD PRESSURE: 141 MMHG

## 2019-12-06 DIAGNOSIS — C34.91 NON-SMALL CELL CANCER OF RIGHT LUNG (HCC): Primary | ICD-10-CM

## 2019-12-06 LAB
ALBUMIN SERPL-MCNC: 2.9 G/DL (ref 3.4–5)
ALBUMIN/GLOB SERPL: 0.6 {RATIO} (ref 0.8–1.7)
ALP SERPL-CCNC: 79 U/L (ref 45–117)
ALT SERPL-CCNC: 11 U/L (ref 13–56)
ANION GAP SERPL CALC-SCNC: 5 MMOL/L (ref 3–18)
AST SERPL-CCNC: 14 U/L (ref 10–38)
BASO+EOS+MONOS # BLD AUTO: 0.2 K/UL (ref 0–2.3)
BASO+EOS+MONOS NFR BLD AUTO: 3 % (ref 0.1–17)
BILIRUB SERPL-MCNC: 0.3 MG/DL (ref 0.2–1)
BUN SERPL-MCNC: 5 MG/DL (ref 7–18)
BUN/CREAT SERPL: 6 (ref 12–20)
CALCIUM SERPL-MCNC: 8.7 MG/DL (ref 8.5–10.1)
CHLORIDE SERPL-SCNC: 101 MMOL/L (ref 100–111)
CO2 SERPL-SCNC: 32 MMOL/L (ref 21–32)
CREAT SERPL-MCNC: 0.78 MG/DL (ref 0.6–1.3)
DIFFERENTIAL METHOD BLD: ABNORMAL
ERYTHROCYTE [DISTWIDTH] IN BLOOD BY AUTOMATED COUNT: 16.7 % (ref 11.5–14.5)
GLOBULIN SER CALC-MCNC: 5.1 G/DL (ref 2–4)
GLUCOSE SERPL-MCNC: 160 MG/DL (ref 74–99)
HCT VFR BLD AUTO: 31.4 % (ref 36–48)
HGB BLD-MCNC: 9.6 G/DL (ref 12–16)
LYMPHOCYTES # BLD: 1.2 K/UL (ref 1.1–5.9)
LYMPHOCYTES NFR BLD: 16 % (ref 14–44)
MCH RBC QN AUTO: 22.9 PG (ref 25–35)
MCHC RBC AUTO-ENTMCNC: 30.6 G/DL (ref 31–37)
MCV RBC AUTO: 74.9 FL (ref 78–102)
NEUTS SEG # BLD: 6.3 K/UL (ref 1.8–9.5)
NEUTS SEG NFR BLD: 82 % (ref 40–70)
PLATELET # BLD AUTO: 315 K/UL (ref 140–440)
POTASSIUM SERPL-SCNC: 3.6 MMOL/L (ref 3.5–5.5)
PROT SERPL-MCNC: 8 G/DL (ref 6.4–8.2)
RBC # BLD AUTO: 4.19 M/UL (ref 4.1–5.1)
SODIUM SERPL-SCNC: 138 MMOL/L (ref 136–145)
TSH SERPL DL<=0.05 MIU/L-ACNC: 3.15 UIU/ML (ref 0.36–3.74)
WBC # BLD AUTO: 7.7 K/UL (ref 4.5–13)

## 2019-12-06 PROCEDURE — 96413 CHEMO IV INFUSION 1 HR: CPT

## 2019-12-06 PROCEDURE — 77030012965 HC NDL HUBR BBMI -A

## 2019-12-06 PROCEDURE — 85025 COMPLETE CBC W/AUTO DIFF WBC: CPT

## 2019-12-06 PROCEDURE — 80053 COMPREHEN METABOLIC PANEL: CPT

## 2019-12-06 PROCEDURE — 84443 ASSAY THYROID STIM HORMONE: CPT

## 2019-12-06 PROCEDURE — 74011000258 HC RX REV CODE- 258: Performed by: INTERNAL MEDICINE

## 2019-12-06 PROCEDURE — 36591 DRAW BLOOD OFF VENOUS DEVICE: CPT

## 2019-12-06 PROCEDURE — 74011250636 HC RX REV CODE- 250/636: Performed by: INTERNAL MEDICINE

## 2019-12-06 RX ORDER — SODIUM CHLORIDE 0.9 % (FLUSH) 0.9 %
10-40 SYRINGE (ML) INJECTION AS NEEDED
Status: DISPENSED | OUTPATIENT
Start: 2019-12-06 | End: 2019-12-06

## 2019-12-06 RX ORDER — HEPARIN 100 UNIT/ML
300-500 SYRINGE INTRAVENOUS AS NEEDED
Status: DISPENSED | OUTPATIENT
Start: 2019-12-06 | End: 2019-12-06

## 2019-12-06 RX ORDER — SODIUM CHLORIDE 9 MG/ML
25 INJECTION, SOLUTION INTRAVENOUS CONTINUOUS
Status: DISPENSED | OUTPATIENT
Start: 2019-12-06 | End: 2019-12-06

## 2019-12-06 RX ADMIN — SODIUM CHLORIDE 200 MG: 9 INJECTION, SOLUTION INTRAVENOUS at 12:15

## 2019-12-06 RX ADMIN — Medication 10 ML: at 13:06

## 2019-12-06 RX ADMIN — SODIUM CHLORIDE 25 ML/HR: 9 INJECTION, SOLUTION INTRAVENOUS at 11:56

## 2019-12-06 RX ADMIN — HEPARIN 500 UNITS: 100 SYRINGE at 13:08

## 2019-12-06 NOTE — PROGRESS NOTES
[facility] Progress Note    Date: 2019    Name: Hillary Castrejon              MRN: [id number]              : 1960    Chemotherapy Jed Perez    Ms. Hampton Nipple was assessed and education was provided. Ms. Luci Tsai vitals were reviewed and patient was observed for 5 minutes prior to treatment. Visit Vitals  /80 (BP 1 Location: Left arm, BP Patient Position: Sitting)   Pulse 89   Temp 98.9 °F (37.2 °C)   Resp 18   Ht 5' 6\" (1.676 m)   Wt 58.9 kg (129 lb 14.4 oz)   SpO2 96%   BMI 20.97 kg/m²       Lab results were obtained and reviewed. Recent Results (from the past 12 hour(s))   CBC WITH 3 PART DIFF    Collection Time: 19 10:35 AM   Result Value Ref Range    WBC 7.7 4.5 - 13.0 K/uL    RBC 4.19 4. 10 - 5.10 M/uL    HGB 9.6 (L) 12.0 - 16 g/dL    HCT 31.4 (L) 36 - 48 %    MCV 74.9 (L) 78 - 102 FL    MCH 22.9 (L) 25.0 - 35.0 PG    MCHC 30.6 (L) 31 - 37 g/dL    RDW 16.7 (H) 11.5 - 14.5 %    PLATELET 231 534 - 680 K/uL    NEUTROPHILS 82 (H) 40 - 70 %    MIXED CELLS 3 0.1 - 17 %    LYMPHOCYTES 16 14 - 44 %    ABS. NEUTROPHILS 6.3 1.8 - 9.5 K/UL    ABS. MIXED CELLS 0.2 0.0 - 2.3 K/uL    ABS. LYMPHOCYTES 1.2 1.1 - 5.9 K/UL    DF AUTOMATED     METABOLIC PANEL, COMPREHENSIVE    Collection Time: 19 10:35 AM   Result Value Ref Range    Sodium 138 136 - 145 mmol/L    Potassium 3.6 3.5 - 5.5 mmol/L    Chloride 101 100 - 111 mmol/L    CO2 32 21 - 32 mmol/L    Anion gap 5 3.0 - 18 mmol/L    Glucose 160 (H) 74 - 99 mg/dL    BUN 5 (L) 7.0 - 18 MG/DL    Creatinine 0.78 0.6 - 1.3 MG/DL    BUN/Creatinine ratio 6 (L) 12 - 20      GFR est AA >60 >60 ml/min/1.73m2    GFR est non-AA >60 >60 ml/min/1.73m2    Calcium 8.7 8.5 - 10.1 MG/DL    Bilirubin, total 0.3 0.2 - 1.0 MG/DL    ALT (SGPT) 11 (L) 13 - 56 U/L    AST (SGOT) 14 10 - 38 U/L    Alk.  phosphatase 79 45 - 117 U/L    Protein, total 8.0 6.4 - 8.2 g/dL    Albumin 2.9 (L) 3.4 - 5.0 g/dL    Globulin 5.1 (H) 2.0 - 4.0 g/dL    A-G Ratio 0.6 (L) 0.8 - 1.7     TSH 3RD GENERATION    Collection Time: 12/06/19 10:35 AM   Result Value Ref Range    TSH 3.15 0.36 - 3.74 uIU/mL     Mediport access with a 20g el needle and flushed with NS 20 ml, with a brisk blood return. 10 ml of blood was drawn and wasted for a lab draw. CBC, CMP, and TSH was collected. Port flushed with NS 10 ml and brisk blood return reverified. NS flush bag ran at [facility]. Keytruda 200 mg was infused over 30 minutes. Flushed port with 20 ml NS and Heparin 500 units and de-accessed. No signs or symptoms of a reaction. Bandaid applied to site. Ms. Eliud Villalta tolerated infusion, and had no complaints at this time. Patient armband removed and shredded. Ms. Eliud Villalta was discharged from [place] [address] in stable condition at 1310. She is to return on 12/27/2019 at 1100 for her next appointment.      Fco Jones RN  December 6, 2019  4:01 PM

## 2019-12-20 RX ORDER — ALBUTEROL SULFATE 0.83 MG/ML
2.5 SOLUTION RESPIRATORY (INHALATION) AS NEEDED
Status: CANCELLED
Start: 2019-12-27

## 2019-12-20 RX ORDER — HYDROCORTISONE SODIUM SUCCINATE 100 MG/2ML
100 INJECTION, POWDER, FOR SOLUTION INTRAMUSCULAR; INTRAVENOUS AS NEEDED
Status: CANCELLED | OUTPATIENT
Start: 2019-12-27

## 2019-12-20 RX ORDER — DIPHENHYDRAMINE HYDROCHLORIDE 50 MG/ML
50 INJECTION, SOLUTION INTRAMUSCULAR; INTRAVENOUS AS NEEDED
Status: CANCELLED
Start: 2019-12-27

## 2019-12-20 RX ORDER — EPINEPHRINE 1 MG/ML
0.3 INJECTION, SOLUTION, CONCENTRATE INTRAVENOUS AS NEEDED
Status: CANCELLED | OUTPATIENT
Start: 2019-12-27

## 2019-12-20 RX ORDER — ACETAMINOPHEN 325 MG/1
650 TABLET ORAL AS NEEDED
Status: CANCELLED
Start: 2019-12-27

## 2019-12-20 RX ORDER — ONDANSETRON 2 MG/ML
8 INJECTION INTRAMUSCULAR; INTRAVENOUS AS NEEDED
Status: CANCELLED | OUTPATIENT
Start: 2019-12-27

## 2019-12-27 ENCOUNTER — HOSPITAL ENCOUNTER (OUTPATIENT)
Dept: INFUSION THERAPY | Age: 59
Discharge: HOME | End: 2019-12-27
Payer: MEDICARE

## 2019-12-27 VITALS
TEMPERATURE: 98.3 F | BODY MASS INDEX: 20.54 KG/M2 | HEART RATE: 91 BPM | SYSTOLIC BLOOD PRESSURE: 117 MMHG | WEIGHT: 127.8 LBS | HEIGHT: 66 IN | DIASTOLIC BLOOD PRESSURE: 77 MMHG | OXYGEN SATURATION: 97 % | RESPIRATION RATE: 18 BRPM

## 2019-12-27 DIAGNOSIS — C34.91 NON-SMALL CELL CANCER OF RIGHT LUNG (HCC): Primary | ICD-10-CM

## 2019-12-27 LAB
ALBUMIN SERPL-MCNC: 2.7 G/DL (ref 3.4–5)
ALBUMIN/GLOB SERPL: 0.6 {RATIO} (ref 0.8–1.7)
ALP SERPL-CCNC: 72 U/L (ref 45–117)
ALT SERPL-CCNC: 11 U/L (ref 13–56)
ANION GAP SERPL CALC-SCNC: 7 MMOL/L (ref 3–18)
AST SERPL-CCNC: 13 U/L (ref 10–38)
BASO+EOS+MONOS # BLD AUTO: 0.7 K/UL (ref 0–2.3)
BASO+EOS+MONOS NFR BLD AUTO: 8 % (ref 0.1–17)
BILIRUB SERPL-MCNC: 0.1 MG/DL (ref 0.2–1)
BUN SERPL-MCNC: 5 MG/DL (ref 7–18)
BUN/CREAT SERPL: 8 (ref 12–20)
CALCIUM SERPL-MCNC: 8.5 MG/DL (ref 8.5–10.1)
CHLORIDE SERPL-SCNC: 101 MMOL/L (ref 100–111)
CO2 SERPL-SCNC: 29 MMOL/L (ref 21–32)
CREAT SERPL-MCNC: 0.66 MG/DL (ref 0.6–1.3)
DIFFERENTIAL METHOD BLD: ABNORMAL
ERYTHROCYTE [DISTWIDTH] IN BLOOD BY AUTOMATED COUNT: 16.6 % (ref 11.5–14.5)
GLOBULIN SER CALC-MCNC: 4.7 G/DL (ref 2–4)
GLUCOSE SERPL-MCNC: 67 MG/DL (ref 74–99)
HCT VFR BLD AUTO: 29.1 % (ref 36–48)
HGB BLD-MCNC: 8.9 G/DL (ref 12–16)
LYMPHOCYTES # BLD: 1.1 K/UL (ref 1.1–5.9)
LYMPHOCYTES NFR BLD: 14 % (ref 14–44)
MCH RBC QN AUTO: 22 PG (ref 25–35)
MCHC RBC AUTO-ENTMCNC: 30.6 G/DL (ref 31–37)
MCV RBC AUTO: 72 FL (ref 78–102)
NEUTS SEG # BLD: 6 K/UL (ref 1.8–9.5)
NEUTS SEG NFR BLD: 77 % (ref 40–70)
PLATELET # BLD AUTO: 337 K/UL (ref 140–440)
POTASSIUM SERPL-SCNC: 3.1 MMOL/L (ref 3.5–5.5)
PROT SERPL-MCNC: 7.4 G/DL (ref 6.4–8.2)
RBC # BLD AUTO: 4.04 M/UL (ref 4.1–5.1)
SODIUM SERPL-SCNC: 137 MMOL/L (ref 136–145)
TSH SERPL DL<=0.05 MIU/L-ACNC: 3.53 UIU/ML (ref 0.36–3.74)
WBC # BLD AUTO: 7.8 K/UL (ref 4.5–13)

## 2019-12-27 PROCEDURE — 84443 ASSAY THYROID STIM HORMONE: CPT

## 2019-12-27 PROCEDURE — 96413 CHEMO IV INFUSION 1 HR: CPT

## 2019-12-27 PROCEDURE — 36415 COLL VENOUS BLD VENIPUNCTURE: CPT

## 2019-12-27 PROCEDURE — 36593 DECLOT VASCULAR DEVICE: CPT

## 2019-12-27 PROCEDURE — 80053 COMPREHEN METABOLIC PANEL: CPT

## 2019-12-27 PROCEDURE — 74011000258 HC RX REV CODE- 258: Performed by: INTERNAL MEDICINE

## 2019-12-27 PROCEDURE — 77030012965 HC NDL HUBR BBMI -A

## 2019-12-27 PROCEDURE — 85025 COMPLETE CBC W/AUTO DIFF WBC: CPT

## 2019-12-27 PROCEDURE — 74011250636 HC RX REV CODE- 250/636: Performed by: INTERNAL MEDICINE

## 2019-12-27 PROCEDURE — 74011000250 HC RX REV CODE- 250: Performed by: INTERNAL MEDICINE

## 2019-12-27 RX ORDER — HEPARIN 100 UNIT/ML
SYRINGE INTRAVENOUS
Status: DISCONTINUED
Start: 2019-12-27 | End: 2019-12-28 | Stop reason: HOSPADM

## 2019-12-27 RX ORDER — SODIUM CHLORIDE 9 MG/ML
25 INJECTION, SOLUTION INTRAVENOUS CONTINUOUS
Status: DISCONTINUED | OUTPATIENT
Start: 2019-12-27 | End: 2019-12-28 | Stop reason: HOSPADM

## 2019-12-27 RX ORDER — HEPARIN 100 UNIT/ML
300-500 SYRINGE INTRAVENOUS AS NEEDED
Status: ACTIVE | OUTPATIENT
Start: 2019-12-27 | End: 2019-12-27

## 2019-12-27 RX ORDER — WATER FOR INJECTION,STERILE
VIAL (ML) INJECTION
Status: DISPENSED
Start: 2019-12-27 | End: 2019-12-27

## 2019-12-27 RX ORDER — SODIUM CHLORIDE 0.9 % (FLUSH) 0.9 %
10-40 SYRINGE (ML) INJECTION AS NEEDED
Status: DISPENSED | OUTPATIENT
Start: 2019-12-27 | End: 2019-12-27

## 2019-12-27 RX ADMIN — ALTEPLASE 2 MG: 2.2 INJECTION, POWDER, LYOPHILIZED, FOR SOLUTION INTRAVENOUS at 11:55

## 2019-12-27 RX ADMIN — HEPARIN 500 UNITS: 100 SYRINGE at 13:06

## 2019-12-27 RX ADMIN — SODIUM CHLORIDE 200 MG: 9 INJECTION, SOLUTION INTRAVENOUS at 13:26

## 2019-12-27 RX ADMIN — Medication 20 ML: at 13:05

## 2019-12-27 RX ADMIN — Medication 20 ML: at 14:08

## 2019-12-27 RX ADMIN — Medication 30 ML: at 11:20

## 2019-12-27 RX ADMIN — Medication 20 ML: at 13:08

## 2019-12-27 RX ADMIN — Medication 30 ML: at 11:30

## 2019-12-27 RX ADMIN — HEPARIN 500 UNITS: 100 SYRINGE at 14:10

## 2019-12-27 RX ADMIN — ALTEPLASE 2 MG: 2.2 INJECTION, POWDER, LYOPHILIZED, FOR SOLUTION INTRAVENOUS at 11:50

## 2019-12-27 NOTE — PROGRESS NOTES
[facility] Progress Note    Date: 2019    Name: Andria Hurst              MRN: [id number]              : 1960    Chemotherapy Cycle:C4d1 keytruda      Pt to [facility], ambulatory, at 1105 am. No complaints of pain voiced. States she does not have much of an appetite. . Ms. Eulalia Crowley was assessed and education was provided. Ms. Arabella Hartmann vitals were reviewed. Visit Vitals  /77 (BP 1 Location: Left arm, BP Patient Position: At rest)   Pulse 91   Temp 98.3 °F (36.8 °C)   Resp 18   Ht 5' 6\" (1.676 m)   Wt 58 kg (127 lb 12.8 oz)   SpO2 97%   Breastfeeding No   BMI 20.63 kg/m²       Left chest medial lumen of her double lumen mediport accessed with 20 g 1 inch el needle. Port flushed easily, but sluggish blood return. Lateral port accessed with 22 g 0.5 \"and flushed easily, but sluggish blood returns. Blood sample obtained from right AC x 1 attempt for cbc,cmp and tsh. Dual ports were declotted with cath amparo successfully. Lab results were obtained and reviewed. Recent Results (from the past 12 hour(s))   CBC WITH 3 PART DIFF    Collection Time: 19 11:25 AM   Result Value Ref Range    WBC 7.8 4.5 - 13.0 K/uL    RBC 4.04 (L) 4.10 - 5.10 M/uL    HGB 8.9 (L) 12.0 - 16 g/dL    HCT 29.1 (L) 36 - 48 %    MCV 72.0 (L) 78 - 102 FL    MCH 22.0 (L) 25.0 - 35.0 PG    MCHC 30.6 (L) 31 - 37 g/dL    RDW 16.6 (H) 11.5 - 14.5 %    PLATELET 847 574 - 278 K/uL    NEUTROPHILS 77 (H) 40 - 70 %    MIXED CELLS 8 0.1 - 17 %    LYMPHOCYTES 14 14 - 44 %    ABS. NEUTROPHILS 6.0 1.8 - 9.5 K/UL    ABS. MIXED CELLS 0.7 0.0 - 2.3 K/uL    ABS.  LYMPHOCYTES 1.1 1.1 - 5.9 K/UL    DF AUTOMATED     METABOLIC PANEL, COMPREHENSIVE    Collection Time: 19 11:25 AM   Result Value Ref Range    Sodium 137 136 - 145 mmol/L    Potassium 3.1 (L) 3.5 - 5.5 mmol/L    Chloride 101 100 - 111 mmol/L    CO2 29 21 - 32 mmol/L    Anion gap 7 3.0 - 18 mmol/L    Glucose 67 (L) 74 - 99 mg/dL    BUN 5 (L) 7.0 - 18 MG/DL Creatinine 0.66 0.6 - 1.3 MG/DL    BUN/Creatinine ratio 8 (L) 12 - 20      GFR est AA >60 >60 ml/min/1.73m2    GFR est non-AA >60 >60 ml/min/1.73m2    Calcium 8.5 8.5 - 10.1 MG/DL    Bilirubin, total 0.1 (L) 0.2 - 1.0 MG/DL    ALT (SGPT) 11 (L) 13 - 56 U/L    AST (SGOT) 13 10 - 38 U/L    Alk. phosphatase 72 45 - 117 U/L    Protein, total 7.4 6.4 - 8.2 g/dL    Albumin 2.7 (L) 3.4 - 5.0 g/dL    Globulin 4.7 (H) 2.0 - 4.0 g/dL    A-G Ratio 0.6 (L) 0.8 - 1.7     TSH 3RD GENERATION    Collection Time: 12/27/19 11:25 AM   Result Value Ref Range    TSH 3.53 0.36 - 3.74 uIU/mL       Lab results within ordered parameters to give chemo today. No pre-medications ordered. Keytruda 200 mg was infused at over 30 minutes as ordered, via lateral port. VS stable at end of infusion and pt denied complaints. Line flushed with NS and blood return from port re-verified. Patient Vitals for the past 12 hrs:   Temp Pulse Resp BP SpO2   12/27/19 1418 98.3 °F (36.8 °C) 91 18 117/77 97 %   12/27/19 1105 99 °F (37.2 °C) 88 18 145/83 98 %          Ms. Leigh tolerated infusion, and had no complaints. Dual ports flushed with Heparin 500 units each, then de-accessed. No irritation or bleeding noted. Bandaids applied. Patient armband removed and shredded. Reviewed discharge instructions with patient. She verbalized understanding    I made 2 calls to MD office, and messages left regarding potassium of 3.1( one to [facility], and one to Ike Sorto) No responses from office regarding same. Patient educated on potassium rich foods, and instructed to follow up with Dr Ricardo Angulo to see if he wants her to have potassium pills. Ms. Rahat Delgado was discharged from [place] [address] in stable condition at 1420 pm. She is to return on 1/17/2020 at 1100 am for her next  appointment.     Alverto Barnes, RN  December 27, 2019  4413

## 2019-12-31 ENCOUNTER — HOSPITAL ENCOUNTER (OUTPATIENT)
Dept: CT IMAGING | Age: 59
Discharge: HOME | End: 2019-12-31
Attending: INTERNAL MEDICINE
Payer: MEDICARE

## 2019-12-31 DIAGNOSIS — C34.11 MALIGNANT NEOPLASM OF UPPER LOBE OF RIGHT LUNG (HCC): ICD-10-CM

## 2019-12-31 LAB — CREAT UR-MCNC: 0.6 MG/DL (ref 0.6–1.3)

## 2019-12-31 PROCEDURE — 82565 ASSAY OF CREATININE: CPT

## 2019-12-31 PROCEDURE — 74177 CT ABD & PELVIS W/CONTRAST: CPT

## 2019-12-31 PROCEDURE — 74011636320 HC RX REV CODE- 636/320: Performed by: INTERNAL MEDICINE

## 2019-12-31 RX ADMIN — IOPAMIDOL 100 ML: 612 INJECTION, SOLUTION INTRAVENOUS at 09:44

## 2020-01-10 RX ORDER — SODIUM CHLORIDE 9 MG/ML
25 INJECTION, SOLUTION INTRAVENOUS CONTINUOUS
Status: CANCELLED | OUTPATIENT
Start: 2020-01-24

## 2020-01-10 RX ORDER — ONDANSETRON 2 MG/ML
8 INJECTION INTRAMUSCULAR; INTRAVENOUS AS NEEDED
Status: CANCELLED | OUTPATIENT
Start: 2020-01-24

## 2020-01-10 RX ORDER — DIPHENHYDRAMINE HYDROCHLORIDE 50 MG/ML
50 INJECTION, SOLUTION INTRAMUSCULAR; INTRAVENOUS AS NEEDED
Status: CANCELLED
Start: 2020-01-24

## 2020-01-10 RX ORDER — HYDROCORTISONE SODIUM SUCCINATE 100 MG/2ML
100 INJECTION, POWDER, FOR SOLUTION INTRAMUSCULAR; INTRAVENOUS AS NEEDED
Status: CANCELLED | OUTPATIENT
Start: 2020-01-24

## 2020-01-10 RX ORDER — SODIUM CHLORIDE 0.9 % (FLUSH) 0.9 %
10-40 SYRINGE (ML) INJECTION AS NEEDED
Status: CANCELLED
Start: 2020-01-24

## 2020-01-10 RX ORDER — ACETAMINOPHEN 325 MG/1
650 TABLET ORAL AS NEEDED
Status: CANCELLED
Start: 2020-01-24

## 2020-01-10 RX ORDER — EPINEPHRINE 1 MG/ML
0.3 INJECTION, SOLUTION, CONCENTRATE INTRAVENOUS AS NEEDED
Status: CANCELLED | OUTPATIENT
Start: 2020-01-24

## 2020-01-10 RX ORDER — HEPARIN 100 UNIT/ML
300-500 SYRINGE INTRAVENOUS AS NEEDED
Status: CANCELLED
Start: 2020-01-24

## 2020-01-10 RX ORDER — ALBUTEROL SULFATE 0.83 MG/ML
2.5 SOLUTION RESPIRATORY (INHALATION) AS NEEDED
Status: CANCELLED
Start: 2020-01-24

## 2020-01-17 ENCOUNTER — HOSPITAL ENCOUNTER (OUTPATIENT)
Dept: INFUSION THERAPY | Age: 60
End: 2020-01-17
Payer: MEDICARE

## 2020-01-24 ENCOUNTER — HOSPITAL ENCOUNTER (OUTPATIENT)
Dept: INFUSION THERAPY | Age: 60
Discharge: HOME | End: 2020-01-24
Payer: MEDICARE

## 2020-01-24 VITALS
DIASTOLIC BLOOD PRESSURE: 88 MMHG | WEIGHT: 125.8 LBS | RESPIRATION RATE: 18 BRPM | OXYGEN SATURATION: 97 % | SYSTOLIC BLOOD PRESSURE: 145 MMHG | BODY MASS INDEX: 20.22 KG/M2 | HEART RATE: 87 BPM | TEMPERATURE: 98.6 F | HEIGHT: 66 IN

## 2020-01-24 DIAGNOSIS — C34.91 NON-SMALL CELL CANCER OF RIGHT LUNG (HCC): Primary | ICD-10-CM

## 2020-01-24 LAB
ALBUMIN SERPL-MCNC: 3 G/DL (ref 3.4–5)
ALBUMIN/GLOB SERPL: 0.6 {RATIO} (ref 0.8–1.7)
ALP SERPL-CCNC: 79 U/L (ref 45–117)
ALT SERPL-CCNC: 7 U/L (ref 13–56)
ANION GAP SERPL CALC-SCNC: 5 MMOL/L (ref 3–18)
AST SERPL-CCNC: 11 U/L (ref 10–38)
BASO+EOS+MONOS # BLD AUTO: 0.2 K/UL (ref 0–2.3)
BASO+EOS+MONOS NFR BLD AUTO: 4 % (ref 0.1–17)
BILIRUB SERPL-MCNC: 0.3 MG/DL (ref 0.2–1)
BUN SERPL-MCNC: 6 MG/DL (ref 7–18)
BUN/CREAT SERPL: 8 (ref 12–20)
CALCIUM SERPL-MCNC: 8.8 MG/DL (ref 8.5–10.1)
CHLORIDE SERPL-SCNC: 106 MMOL/L (ref 100–111)
CO2 SERPL-SCNC: 28 MMOL/L (ref 21–32)
CREAT SERPL-MCNC: 0.8 MG/DL (ref 0.6–1.3)
DIFFERENTIAL METHOD BLD: ABNORMAL
ERYTHROCYTE [DISTWIDTH] IN BLOOD BY AUTOMATED COUNT: 18.1 % (ref 11.5–14.5)
GLOBULIN SER CALC-MCNC: 5.4 G/DL (ref 2–4)
GLUCOSE SERPL-MCNC: 138 MG/DL (ref 74–99)
HCT VFR BLD AUTO: 31.4 % (ref 36–48)
HGB BLD-MCNC: 9.5 G/DL (ref 12–16)
LYMPHOCYTES # BLD: 0.9 K/UL (ref 1.1–5.9)
LYMPHOCYTES NFR BLD: 16 % (ref 14–44)
MCH RBC QN AUTO: 21.7 PG (ref 25–35)
MCHC RBC AUTO-ENTMCNC: 30.3 G/DL (ref 31–37)
MCV RBC AUTO: 71.7 FL (ref 78–102)
NEUTS SEG # BLD: 4.6 K/UL (ref 1.8–9.5)
NEUTS SEG NFR BLD: 80 % (ref 40–70)
PLATELET # BLD AUTO: 276 K/UL (ref 140–440)
POTASSIUM SERPL-SCNC: 3.7 MMOL/L (ref 3.5–5.5)
PROT SERPL-MCNC: 8.4 G/DL (ref 6.4–8.2)
RBC # BLD AUTO: 4.38 M/UL (ref 4.1–5.1)
SODIUM SERPL-SCNC: 139 MMOL/L (ref 136–145)
TSH SERPL DL<=0.05 MIU/L-ACNC: 3.55 UIU/ML (ref 0.36–3.74)
WBC # BLD AUTO: 5.7 K/UL (ref 4.5–13)

## 2020-01-24 PROCEDURE — 74011250636 HC RX REV CODE- 250/636: Performed by: INTERNAL MEDICINE

## 2020-01-24 PROCEDURE — 84443 ASSAY THYROID STIM HORMONE: CPT

## 2020-01-24 PROCEDURE — 85025 COMPLETE CBC W/AUTO DIFF WBC: CPT

## 2020-01-24 PROCEDURE — 96413 CHEMO IV INFUSION 1 HR: CPT

## 2020-01-24 PROCEDURE — 36415 COLL VENOUS BLD VENIPUNCTURE: CPT

## 2020-01-24 PROCEDURE — 80053 COMPREHEN METABOLIC PANEL: CPT

## 2020-01-24 PROCEDURE — 77030012965 HC NDL HUBR BBMI -A

## 2020-01-24 PROCEDURE — 74011000258 HC RX REV CODE- 258: Performed by: INTERNAL MEDICINE

## 2020-01-24 RX ORDER — SODIUM CHLORIDE 9 MG/ML
25 INJECTION, SOLUTION INTRAVENOUS CONTINUOUS
Status: DISPENSED | OUTPATIENT
Start: 2020-01-24 | End: 2020-01-24

## 2020-01-24 RX ORDER — HEPARIN 100 UNIT/ML
300-500 SYRINGE INTRAVENOUS AS NEEDED
Status: DISPENSED | OUTPATIENT
Start: 2020-01-24 | End: 2020-01-24

## 2020-01-24 RX ORDER — SODIUM CHLORIDE 0.9 % (FLUSH) 0.9 %
10-40 SYRINGE (ML) INJECTION AS NEEDED
Status: DISPENSED | OUTPATIENT
Start: 2020-01-24 | End: 2020-01-24

## 2020-01-24 RX ADMIN — Medication 20 ML: at 10:40

## 2020-01-24 RX ADMIN — SODIUM CHLORIDE 200 MG: 9 INJECTION, SOLUTION INTRAVENOUS at 10:02

## 2020-01-24 RX ADMIN — Medication 30 ML: at 08:15

## 2020-01-24 RX ADMIN — HEPARIN 500 UNITS: 100 SYRINGE at 10:42

## 2020-01-24 NOTE — PROGRESS NOTES
[facility] Progress Note    Date: 2020    Name: Ashlyn Cruz              MRN: [id number]              : 1960    Chemotherapy Cycle:C5d1 keytruda      Pt to [facility], ambulatory, at 0805 am. No complaints or concerns voiced      . Ms. Martha Guallpa was assessed and education was provided. Ms. Bashir Duenas vitals were reviewed. Visit Vitals  /88 (BP 1 Location: Left arm, BP Patient Position: At rest)   Pulse 87   Temp 98.6 °F (37 °C)   Resp 18   Ht 5' 6\" (1.676 m)   Wt 57.1 kg (125 lb 12.8 oz)   SpO2 97%   Breastfeeding No   BMI 20.30 kg/m²       Left chest lateral lumen of her double lumen mediport accessed with 20 g 1 inch el needle. Port flushed easily and had brisk blood return. Blood drawn off for cbc,  cmp and tsh      Lab results were obtained and reviewed. Recent Results (from the past 12 hour(s))   CBC WITH 3 PART DIFF    Collection Time: 20  8:15 AM   Result Value Ref Range    WBC 5.7 4.5 - 13.0 K/uL    RBC 4.38 4.10 - 5.10 M/uL    HGB 9.5 (L) 12.0 - 16 g/dL    HCT 31.4 (L) 36 - 48 %    MCV 71.7 (L) 78 - 102 FL    MCH 21.7 (L) 25.0 - 35.0 PG    MCHC 30.3 (L) 31 - 37 g/dL    RDW 18.1 (H) 11.5 - 14.5 %    PLATELET 332 889 - 417 K/uL    NEUTROPHILS 80 (H) 40 - 70 %    MIXED CELLS 4 0.1 - 17 %    LYMPHOCYTES 16 14 - 44 %    ABS. NEUTROPHILS 4.6 1.8 - 9.5 K/UL    ABS. MIXED CELLS 0.2 0.0 - 2.3 K/uL    ABS.  LYMPHOCYTES 0.9 (L) 1.1 - 5.9 K/UL    DF AUTOMATED     METABOLIC PANEL, COMPREHENSIVE    Collection Time: 20  8:15 AM   Result Value Ref Range    Sodium 139 136 - 145 mmol/L    Potassium 3.7 3.5 - 5.5 mmol/L    Chloride 106 100 - 111 mmol/L    CO2 28 21 - 32 mmol/L    Anion gap 5 3.0 - 18 mmol/L    Glucose 138 (H) 74 - 99 mg/dL    BUN 6 (L) 7.0 - 18 MG/DL    Creatinine 0.80 0.6 - 1.3 MG/DL    BUN/Creatinine ratio 8 (L) 12 - 20      GFR est AA >60 >60 ml/min/1.73m2    GFR est non-AA >60 >60 ml/min/1.73m2    Calcium 8.8 8.5 - 10.1 MG/DL    Bilirubin, total 0.3 0.2 - 1.0 MG/DL ALT (SGPT) 7 (L) 13 - 56 U/L    AST (SGOT) 11 10 - 38 U/L    Alk. phosphatase 79 45 - 117 U/L    Protein, total 8.4 (H) 6.4 - 8.2 g/dL    Albumin 3.0 (L) 3.4 - 5.0 g/dL    Globulin 5.4 (H) 2.0 - 4.0 g/dL    A-G Ratio 0.6 (L) 0.8 - 1.7     TSH 3RD GENERATION    Collection Time: 01/24/20  8:15 AM   Result Value Ref Range    TSH 3.55 0.36 - 3.74 uIU/mL       Lab results within ordered parameters to give chemo today. No pre-medications ordered. Keytruda 200 mg was infused at over 30 minutes as ordered. VS stable at end of infusion and pt denied complaints. Line flushed with NS and blood return from port re-verified. Patient Vitals for the past 12 hrs:   Temp Pulse Resp BP SpO2   01/24/20 1043 98.6 °F (37 °C) 87 18 145/88 97 %   01/24/20 0805 98.9 °F (37.2 °C) 85 18 129/85 96 %          Ms. Leigh tolerated infusion, and had no complaints. Mediport flushed with NS 30 ml and Heparin 500 units then de-accessed. No irritation or bleeding noted. Bandaid applied. Patient armband removed and shredded. Reviewed discharge instructions with patient. She verbalized understanding    Ms. Mckenzie Mayberry was discharged from [place] [address] in stable condition at 1045 am. She is to return on 2/14/2020 at 1000 am for her next  appointment.     Doris Sousa RN  January 24, 2020  1311

## 2020-02-04 DIAGNOSIS — I10 ESSENTIAL HYPERTENSION: ICD-10-CM

## 2020-02-05 RX ORDER — AMLODIPINE BESYLATE 5 MG/1
TABLET ORAL
Qty: 90 TAB | Refills: 0 | Status: SHIPPED | OUTPATIENT
Start: 2020-02-05 | End: 2020-09-28

## 2020-02-10 RX ORDER — EPINEPHRINE 1 MG/ML
0.3 INJECTION, SOLUTION, CONCENTRATE INTRAVENOUS AS NEEDED
Status: CANCELLED | OUTPATIENT
Start: 2020-02-14

## 2020-02-10 RX ORDER — ONDANSETRON 2 MG/ML
8 INJECTION INTRAMUSCULAR; INTRAVENOUS AS NEEDED
Status: CANCELLED | OUTPATIENT
Start: 2020-02-14

## 2020-02-10 RX ORDER — DIPHENHYDRAMINE HYDROCHLORIDE 50 MG/ML
50 INJECTION, SOLUTION INTRAMUSCULAR; INTRAVENOUS AS NEEDED
Status: CANCELLED
Start: 2020-02-14

## 2020-02-10 RX ORDER — ALBUTEROL SULFATE 0.83 MG/ML
2.5 SOLUTION RESPIRATORY (INHALATION) AS NEEDED
Status: CANCELLED
Start: 2020-02-14

## 2020-02-10 RX ORDER — HYDROCORTISONE SODIUM SUCCINATE 100 MG/2ML
100 INJECTION, POWDER, FOR SOLUTION INTRAMUSCULAR; INTRAVENOUS AS NEEDED
Status: CANCELLED | OUTPATIENT
Start: 2020-02-14

## 2020-02-10 RX ORDER — ACETAMINOPHEN 325 MG/1
650 TABLET ORAL AS NEEDED
Status: CANCELLED
Start: 2020-02-14

## 2020-02-14 ENCOUNTER — HOSPITAL ENCOUNTER (OUTPATIENT)
Dept: INFUSION THERAPY | Age: 60
Discharge: HOME | End: 2020-02-14
Payer: MEDICARE

## 2020-02-14 VITALS
HEART RATE: 93 BPM | HEIGHT: 66 IN | WEIGHT: 127.9 LBS | TEMPERATURE: 98.2 F | OXYGEN SATURATION: 96 % | SYSTOLIC BLOOD PRESSURE: 116 MMHG | DIASTOLIC BLOOD PRESSURE: 76 MMHG | RESPIRATION RATE: 18 BRPM | BODY MASS INDEX: 20.55 KG/M2

## 2020-02-14 DIAGNOSIS — C34.91 NON-SMALL CELL CANCER OF RIGHT LUNG (HCC): Primary | ICD-10-CM

## 2020-02-14 LAB
ALBUMIN SERPL-MCNC: 3 G/DL (ref 3.4–5)
ALBUMIN/GLOB SERPL: 0.6 {RATIO} (ref 0.8–1.7)
ALP SERPL-CCNC: 84 U/L (ref 45–117)
ALT SERPL-CCNC: 8 U/L (ref 13–56)
ANION GAP SERPL CALC-SCNC: 6 MMOL/L (ref 3–18)
AST SERPL-CCNC: 12 U/L (ref 10–38)
BASO+EOS+MONOS # BLD AUTO: 0.1 K/UL (ref 0–2.3)
BASO+EOS+MONOS NFR BLD AUTO: 3 % (ref 0.1–17)
BILIRUB SERPL-MCNC: 0.3 MG/DL (ref 0.2–1)
BUN SERPL-MCNC: 5 MG/DL (ref 7–18)
BUN/CREAT SERPL: 7 (ref 12–20)
CALCIUM SERPL-MCNC: 8.4 MG/DL (ref 8.5–10.1)
CHLORIDE SERPL-SCNC: 102 MMOL/L (ref 100–111)
CO2 SERPL-SCNC: 29 MMOL/L (ref 21–32)
CREAT SERPL-MCNC: 0.76 MG/DL (ref 0.6–1.3)
DIFFERENTIAL METHOD BLD: ABNORMAL
ERYTHROCYTE [DISTWIDTH] IN BLOOD BY AUTOMATED COUNT: 18.7 % (ref 11.5–14.5)
GLOBULIN SER CALC-MCNC: 5 G/DL (ref 2–4)
GLUCOSE SERPL-MCNC: 149 MG/DL (ref 74–99)
HCT VFR BLD AUTO: 31.7 % (ref 36–48)
HGB BLD-MCNC: 9.2 G/DL (ref 12–16)
LYMPHOCYTES # BLD: 1.1 K/UL (ref 1.1–5.9)
LYMPHOCYTES NFR BLD: 20 % (ref 14–44)
MCH RBC QN AUTO: 20.9 PG (ref 25–35)
MCHC RBC AUTO-ENTMCNC: 29 G/DL (ref 31–37)
MCV RBC AUTO: 71.9 FL (ref 78–102)
NEUTS SEG # BLD: 4.4 K/UL (ref 1.8–9.5)
NEUTS SEG NFR BLD: 77 % (ref 40–70)
PLATELET # BLD AUTO: 283 K/UL (ref 140–440)
POTASSIUM SERPL-SCNC: 2.9 MMOL/L (ref 3.5–5.5)
PROT SERPL-MCNC: 8 G/DL (ref 6.4–8.2)
RBC # BLD AUTO: 4.41 M/UL (ref 4.1–5.1)
SODIUM SERPL-SCNC: 137 MMOL/L (ref 136–145)
WBC # BLD AUTO: 5.6 K/UL (ref 4.5–13)

## 2020-02-14 PROCEDURE — 96413 CHEMO IV INFUSION 1 HR: CPT

## 2020-02-14 PROCEDURE — 80053 COMPREHEN METABOLIC PANEL: CPT

## 2020-02-14 PROCEDURE — 96367 TX/PROPH/DG ADDL SEQ IV INF: CPT

## 2020-02-14 PROCEDURE — 74011250636 HC RX REV CODE- 250/636: Performed by: INTERNAL MEDICINE

## 2020-02-14 PROCEDURE — 36591 DRAW BLOOD OFF VENOUS DEVICE: CPT

## 2020-02-14 PROCEDURE — 77030012965 HC NDL HUBR BBMI -A

## 2020-02-14 PROCEDURE — 85025 COMPLETE CBC W/AUTO DIFF WBC: CPT

## 2020-02-14 PROCEDURE — 74011000258 HC RX REV CODE- 258: Performed by: INTERNAL MEDICINE

## 2020-02-14 RX ORDER — HEPARIN 100 UNIT/ML
300-500 SYRINGE INTRAVENOUS AS NEEDED
Status: DISCONTINUED | OUTPATIENT
Start: 2020-02-14 | End: 2020-02-15 | Stop reason: HOSPADM

## 2020-02-14 RX ORDER — POTASSIUM CHLORIDE 14.9 MG/ML
20 INJECTION INTRAVENOUS ONCE
Status: COMPLETED | OUTPATIENT
Start: 2020-02-14 | End: 2020-02-14

## 2020-02-14 RX ORDER — SODIUM CHLORIDE 0.9 % (FLUSH) 0.9 %
10-40 SYRINGE (ML) INJECTION AS NEEDED
Status: DISCONTINUED | OUTPATIENT
Start: 2020-02-14 | End: 2020-02-15 | Stop reason: HOSPADM

## 2020-02-14 RX ORDER — SODIUM CHLORIDE 9 MG/ML
25 INJECTION, SOLUTION INTRAVENOUS CONTINUOUS
Status: DISCONTINUED | OUTPATIENT
Start: 2020-02-14 | End: 2020-02-15 | Stop reason: HOSPADM

## 2020-02-14 RX ADMIN — SODIUM CHLORIDE 200 MG: 9 INJECTION, SOLUTION INTRAVENOUS at 13:34

## 2020-02-14 RX ADMIN — SODIUM CHLORIDE 25 ML/HR: 900 INJECTION, SOLUTION INTRAVENOUS at 12:19

## 2020-02-14 RX ADMIN — POTASSIUM CHLORIDE 20 MEQ: 200 INJECTION, SOLUTION INTRAVENOUS at 12:13

## 2020-02-14 NOTE — PROGRESS NOTES
[facility] Progress Note    Date: 2020    Name: Rg Joseph              MRN: [id number]              : 1960    Chemotherapy Cycle:C6D1 Sumi Farris      Pt to [facility], ambulatory, at 1005 am. No complaints or concerns voiced      Ms. Oliva Friend was assessed and education was provided. Ms. Tammie Coker vitals were reviewed. Visit Vitals  /76 (BP 1 Location: Left arm, BP Patient Position: Sitting)   Pulse 93   Temp 98.2 °F (36.8 °C)   Resp 18   Ht 5' 6\" (1.676 m)   Wt 58 kg (127 lb 14.4 oz)   SpO2 96%   BMI 20.64 kg/m²       Left chest Medial lumen of her double lumen mediport accessed with 20 g 1 inch el needle. Port flushed easily and had brisk blood return. Blood drawn off for cbc, and CMP      Lab results were obtained and reviewed. Recent Results (from the past 12 hour(s))   CBC WITH 3 PART DIFF    Collection Time: 20 10:25 AM   Result Value Ref Range    WBC 5.6 4.5 - 13.0 K/uL    RBC 4.41 4.10 - 5.10 M/uL    HGB 9.2 (L) 12.0 - 16 g/dL    HCT 31.7 (L) 36 - 48 %    MCV 71.9 (L) 78 - 102 FL    MCH 20.9 (L) 25.0 - 35.0 PG    MCHC 29.0 (L) 31 - 37 g/dL    RDW 18.7 (H) 11.5 - 14.5 %    PLATELET 740 373 - 199 K/uL    NEUTROPHILS 77 (H) 40 - 70 %    MIXED CELLS 3 0.1 - 17 %    LYMPHOCYTES 20 14 - 44 %    ABS. NEUTROPHILS 4.4 1.8 - 9.5 K/UL    ABS. MIXED CELLS 0.1 0.0 - 2.3 K/uL    ABS.  LYMPHOCYTES 1.1 1.1 - 5.9 K/UL    DF AUTOMATED     METABOLIC PANEL, COMPREHENSIVE    Collection Time: 20 10:25 AM   Result Value Ref Range    Sodium 137 136 - 145 mmol/L    Potassium 2.9 (LL) 3.5 - 5.5 mmol/L    Chloride 102 100 - 111 mmol/L    CO2 29 21 - 32 mmol/L    Anion gap 6 3.0 - 18 mmol/L    Glucose 149 (H) 74 - 99 mg/dL    BUN 5 (L) 7.0 - 18 MG/DL    Creatinine 0.76 0.6 - 1.3 MG/DL    BUN/Creatinine ratio 7 (L) 12 - 20      GFR est AA >60 >60 ml/min/1.73m2    GFR est non-AA >60 >60 ml/min/1.73m2    Calcium 8.4 (L) 8.5 - 10.1 MG/DL    Bilirubin, total 0.3 0.2 - 1.0 MG/DL    ALT (SGPT) 8 (L) 13 - 56 U/L    AST (SGOT) 12 10 - 38 U/L    Alk. phosphatase 84 45 - 117 U/L    Protein, total 8.0 6.4 - 8.2 g/dL    Albumin 3.0 (L) 3.4 - 5.0 g/dL    Globulin 5.0 (H) 2.0 - 4.0 g/dL    A-G Ratio 0.6 (L) 0.8 - 1.7       Critical lab called from lab for Potassium of 2.9. Physician notified, orders received to give 20 meq of Potassium now IV then proceed with treatment. No pre-medications ordered. After Potassium complete line flushed with 20 ml normal saline. Keytruda 200 mg was infused at over 30 minutes as ordered. VS stable at end of infusion and pt denied complaints. Line flushed with NS and blood return from port re-verified. Ms. Nuno Calderon tolerated infusion, and had no complaints. Mediport flushed with NS 30 ml and Heparin 500 units then de-accessed. No irritation or bleeding noted. Bandaid applied. Patient armband removed and shredded. Reviewed discharge instructions with patient. Ms. Nuno Calderon was discharged from [place] [address] in stable condition at 26. She is to return on 3/6/20 at 1000 am for her next  appointment.     Soco Eric RN  February 14, 2020

## 2020-02-28 RX ORDER — EPINEPHRINE 1 MG/ML
0.3 INJECTION, SOLUTION, CONCENTRATE INTRAVENOUS AS NEEDED
Status: CANCELLED | OUTPATIENT
Start: 2020-03-06

## 2020-02-28 RX ORDER — HYDROCORTISONE SODIUM SUCCINATE 100 MG/2ML
100 INJECTION, POWDER, FOR SOLUTION INTRAMUSCULAR; INTRAVENOUS AS NEEDED
Status: CANCELLED | OUTPATIENT
Start: 2020-03-06

## 2020-02-28 RX ORDER — ONDANSETRON 2 MG/ML
8 INJECTION INTRAMUSCULAR; INTRAVENOUS AS NEEDED
Status: CANCELLED | OUTPATIENT
Start: 2020-03-06

## 2020-02-28 RX ORDER — DIPHENHYDRAMINE HYDROCHLORIDE 50 MG/ML
50 INJECTION, SOLUTION INTRAMUSCULAR; INTRAVENOUS AS NEEDED
Status: CANCELLED
Start: 2020-03-06

## 2020-02-28 RX ORDER — ALBUTEROL SULFATE 0.83 MG/ML
2.5 SOLUTION RESPIRATORY (INHALATION) AS NEEDED
Status: CANCELLED
Start: 2020-03-06

## 2020-02-28 RX ORDER — ACETAMINOPHEN 325 MG/1
650 TABLET ORAL AS NEEDED
Status: CANCELLED
Start: 2020-03-06

## 2020-03-06 ENCOUNTER — HOSPITAL ENCOUNTER (OUTPATIENT)
Dept: INFUSION THERAPY | Age: 60
Discharge: HOME | End: 2020-03-06
Payer: MEDICARE

## 2020-03-06 VITALS
HEART RATE: 79 BPM | TEMPERATURE: 99.4 F | BODY MASS INDEX: 21.13 KG/M2 | HEIGHT: 66 IN | RESPIRATION RATE: 16 BRPM | DIASTOLIC BLOOD PRESSURE: 88 MMHG | OXYGEN SATURATION: 96 % | SYSTOLIC BLOOD PRESSURE: 153 MMHG | WEIGHT: 131.5 LBS

## 2020-03-06 DIAGNOSIS — C34.91 NON-SMALL CELL CANCER OF RIGHT LUNG (HCC): Primary | ICD-10-CM

## 2020-03-06 LAB
ALBUMIN SERPL-MCNC: 3.2 G/DL (ref 3.4–5)
ALBUMIN/GLOB SERPL: 0.7 {RATIO} (ref 0.8–1.7)
ALP SERPL-CCNC: 82 U/L (ref 45–117)
ALT SERPL-CCNC: 10 U/L (ref 13–56)
ANION GAP SERPL CALC-SCNC: 3 MMOL/L (ref 3–18)
AST SERPL-CCNC: 12 U/L (ref 10–38)
BASO+EOS+MONOS # BLD AUTO: 0.3 K/UL (ref 0–2.3)
BASO+EOS+MONOS NFR BLD AUTO: 5 % (ref 0.1–17)
BILIRUB SERPL-MCNC: 0.3 MG/DL (ref 0.2–1)
BUN SERPL-MCNC: 5 MG/DL (ref 7–18)
BUN/CREAT SERPL: 9 (ref 12–20)
CALCIUM SERPL-MCNC: 8.7 MG/DL (ref 8.5–10.1)
CHLORIDE SERPL-SCNC: 106 MMOL/L (ref 100–111)
CO2 SERPL-SCNC: 29 MMOL/L (ref 21–32)
CREAT SERPL-MCNC: 0.58 MG/DL (ref 0.6–1.3)
DIFFERENTIAL METHOD BLD: ABNORMAL
ERYTHROCYTE [DISTWIDTH] IN BLOOD BY AUTOMATED COUNT: 20.6 % (ref 11.5–14.5)
GLOBULIN SER CALC-MCNC: 4.9 G/DL (ref 2–4)
GLUCOSE SERPL-MCNC: 60 MG/DL (ref 74–99)
HCT VFR BLD AUTO: 33 % (ref 36–48)
HGB BLD-MCNC: 9.8 G/DL (ref 12–16)
LYMPHOCYTES # BLD: 1 K/UL (ref 1.1–5.9)
LYMPHOCYTES NFR BLD: 20 % (ref 14–44)
MCH RBC QN AUTO: 21.4 PG (ref 25–35)
MCHC RBC AUTO-ENTMCNC: 29.7 G/DL (ref 31–37)
MCV RBC AUTO: 71.9 FL (ref 78–102)
NEUTS SEG # BLD: 4 K/UL (ref 1.8–9.5)
NEUTS SEG NFR BLD: 75 % (ref 40–70)
PLATELET # BLD AUTO: 251 K/UL (ref 140–440)
POTASSIUM SERPL-SCNC: 3.6 MMOL/L (ref 3.5–5.5)
PROT SERPL-MCNC: 8.1 G/DL (ref 6.4–8.2)
RBC # BLD AUTO: 4.59 M/UL (ref 4.1–5.1)
SODIUM SERPL-SCNC: 138 MMOL/L (ref 136–145)
TSH SERPL DL<=0.05 MIU/L-ACNC: 4.53 UIU/ML (ref 0.36–3.74)
WBC # BLD AUTO: 5.3 K/UL (ref 4.5–13)

## 2020-03-06 PROCEDURE — 84443 ASSAY THYROID STIM HORMONE: CPT

## 2020-03-06 PROCEDURE — 85025 COMPLETE CBC W/AUTO DIFF WBC: CPT

## 2020-03-06 PROCEDURE — 77030012965 HC NDL HUBR BBMI -A

## 2020-03-06 PROCEDURE — 74011000258 HC RX REV CODE- 258: Performed by: INTERNAL MEDICINE

## 2020-03-06 PROCEDURE — 96413 CHEMO IV INFUSION 1 HR: CPT

## 2020-03-06 PROCEDURE — 74011250636 HC RX REV CODE- 250/636: Performed by: INTERNAL MEDICINE

## 2020-03-06 PROCEDURE — 80053 COMPREHEN METABOLIC PANEL: CPT

## 2020-03-06 RX ORDER — SODIUM CHLORIDE 9 MG/ML
25 INJECTION, SOLUTION INTRAVENOUS CONTINUOUS
Status: DISCONTINUED | OUTPATIENT
Start: 2020-03-06 | End: 2020-03-07 | Stop reason: HOSPADM

## 2020-03-06 RX ORDER — SODIUM CHLORIDE 0.9 % (FLUSH) 0.9 %
10-40 SYRINGE (ML) INJECTION AS NEEDED
Status: DISCONTINUED | OUTPATIENT
Start: 2020-03-06 | End: 2020-03-07 | Stop reason: HOSPADM

## 2020-03-06 RX ORDER — HEPARIN 100 UNIT/ML
300-500 SYRINGE INTRAVENOUS AS NEEDED
Status: DISCONTINUED | OUTPATIENT
Start: 2020-03-06 | End: 2020-03-07 | Stop reason: HOSPADM

## 2020-03-06 RX ADMIN — HEPARIN 500 UNITS: 100 SYRINGE at 14:43

## 2020-03-06 RX ADMIN — Medication 30 ML: at 10:40

## 2020-03-06 RX ADMIN — Medication 30 ML: at 14:43

## 2020-03-06 RX ADMIN — SODIUM CHLORIDE 25 ML/HR: 9 INJECTION, SOLUTION INTRAVENOUS at 13:57

## 2020-03-06 RX ADMIN — SODIUM CHLORIDE 200 MG: 9 INJECTION, SOLUTION INTRAVENOUS at 14:00

## 2020-03-06 NOTE — PROGRESS NOTES
[facility] Progress Note    Date: 2020    Name: Trang Cruz              MRN: [id number]              : 1960    Chemotherapy Cycle: C7 D1. Ms. Anil Alexander was assessed and education was provided. Ms. Luis Padron vitals were reviewed and patient was observed for 5 minutes prior to treatment. Visit Vitals  /88 (BP 1 Location: Left arm, BP Patient Position: At rest;Sitting)   Pulse 79   Temp 99.4 °F (37.4 °C)   Resp 16   Ht 5' 6\" (1.676 m)   Wt 59.6 kg (131 lb 8 oz)   SpO2 96%   Breastfeeding No   BMI 21.22 kg/m²       Lab results were obtained and reviewed. Recent Results (from the past 12 hour(s))   CBC WITH 3 PART DIFF    Collection Time: 20 10:47 AM   Result Value Ref Range    WBC 5.3 4.5 - 13.0 K/uL    RBC 4.59 4. 10 - 5.10 M/uL    HGB 9.8 (L) 12.0 - 16 g/dL    HCT 33.0 (L) 36 - 48 %    MCV 71.9 (L) 78 - 102 FL    MCH 21.4 (L) 25.0 - 35.0 PG    MCHC 29.7 (L) 31 - 37 g/dL    RDW 20.6 (H) 11.5 - 14.5 %    PLATELET 105 826 - 830 K/uL    NEUTROPHILS 75 (H) 40 - 70 %    MIXED CELLS 5 0.1 - 17 %    LYMPHOCYTES 20 14 - 44 %    ABS. NEUTROPHILS 4.0 1.8 - 9.5 K/UL    ABS. MIXED CELLS 0.3 0.0 - 2.3 K/uL    ABS. LYMPHOCYTES 1.0 (L) 1.1 - 5.9 K/UL    DF AUTOMATED     METABOLIC PANEL, COMPREHENSIVE    Collection Time: 20 10:47 AM   Result Value Ref Range    Sodium 138 136 - 145 mmol/L    Potassium 3.6 3.5 - 5.5 mmol/L    Chloride 106 100 - 111 mmol/L    CO2 29 21 - 32 mmol/L    Anion gap 3 3.0 - 18 mmol/L    Glucose 60 (L) 74 - 99 mg/dL    BUN 5 (L) 7.0 - 18 MG/DL    Creatinine 0.58 (L) 0.6 - 1.3 MG/DL    BUN/Creatinine ratio 9 (L) 12 - 20      GFR est AA >60 >60 ml/min/1.73m2    GFR est non-AA >60 >60 ml/min/1.73m2    Calcium 8.7 8.5 - 10.1 MG/DL    Bilirubin, total 0.3 0.2 - 1.0 MG/DL    ALT (SGPT) 10 (L) 13 - 56 U/L    AST (SGOT) 12 10 - 38 U/L    Alk.  phosphatase 82 45 - 117 U/L    Protein, total 8.1 6.4 - 8.2 g/dL    Albumin 3.2 (L) 3.4 - 5.0 g/dL    Globulin 4.9 (H) 2.0 - 4.0 g/dL A-G Ratio 0.7 (L) 0.8 - 1.7     TSH 3RD GENERATION    Collection Time: 03/06/20 10:47 AM   Result Value Ref Range    TSH 4.53 (H) 0.36 - 3.74 uIU/mL         CBC, CMP and TSH done today. Keytruda 200 mg IV given via port after brisk blood return obtained. Ms. Pau Lynn tolerated infusion, and had no complaints at this time. Patient armband removed and shredded. Ms. Pau Lynn was discharged from [place] [address] in stable condition at 1450. She is to return on 3/27/2020 at 1000 for her next appointment.      Padmini Stanton RN  March 6, 2020  3:49 PM

## 2020-03-20 RX ORDER — DIPHENHYDRAMINE HYDROCHLORIDE 50 MG/ML
50 INJECTION, SOLUTION INTRAMUSCULAR; INTRAVENOUS AS NEEDED
Status: CANCELLED
Start: 2020-03-27

## 2020-03-20 RX ORDER — SODIUM CHLORIDE 9 MG/ML
25 INJECTION, SOLUTION INTRAVENOUS CONTINUOUS
Status: CANCELLED | OUTPATIENT
Start: 2020-03-27

## 2020-03-20 RX ORDER — HYDROCORTISONE SODIUM SUCCINATE 100 MG/2ML
100 INJECTION, POWDER, FOR SOLUTION INTRAMUSCULAR; INTRAVENOUS AS NEEDED
Status: CANCELLED | OUTPATIENT
Start: 2020-03-27

## 2020-03-20 RX ORDER — EPINEPHRINE 1 MG/ML
0.3 INJECTION, SOLUTION, CONCENTRATE INTRAVENOUS AS NEEDED
Status: CANCELLED | OUTPATIENT
Start: 2020-03-27

## 2020-03-20 RX ORDER — ACETAMINOPHEN 325 MG/1
650 TABLET ORAL AS NEEDED
Status: CANCELLED
Start: 2020-03-27

## 2020-03-20 RX ORDER — ONDANSETRON 2 MG/ML
8 INJECTION INTRAMUSCULAR; INTRAVENOUS AS NEEDED
Status: CANCELLED | OUTPATIENT
Start: 2020-03-27

## 2020-03-20 RX ORDER — ALBUTEROL SULFATE 0.83 MG/ML
2.5 SOLUTION RESPIRATORY (INHALATION) AS NEEDED
Status: CANCELLED
Start: 2020-03-27

## 2020-03-27 ENCOUNTER — HOSPITAL ENCOUNTER (OUTPATIENT)
Dept: INFUSION THERAPY | Age: 60
Discharge: HOME | End: 2020-03-27
Payer: MEDICARE

## 2020-03-27 VITALS
OXYGEN SATURATION: 98 % | WEIGHT: 132.72 LBS | HEIGHT: 66 IN | HEART RATE: 83 BPM | BODY MASS INDEX: 21.33 KG/M2 | SYSTOLIC BLOOD PRESSURE: 151 MMHG | RESPIRATION RATE: 16 BRPM | DIASTOLIC BLOOD PRESSURE: 83 MMHG | TEMPERATURE: 98.1 F

## 2020-03-27 DIAGNOSIS — C34.91 NON-SMALL CELL CANCER OF RIGHT LUNG (HCC): Primary | ICD-10-CM

## 2020-03-27 LAB
ALBUMIN SERPL-MCNC: 3.2 G/DL (ref 3.4–5)
ALBUMIN/GLOB SERPL: 0.7 {RATIO} (ref 0.8–1.7)
ALP SERPL-CCNC: 81 U/L (ref 45–117)
ALT SERPL-CCNC: 9 U/L (ref 13–56)
ANION GAP SERPL CALC-SCNC: 7 MMOL/L (ref 3–18)
AST SERPL-CCNC: 10 U/L (ref 10–38)
BASO+EOS+MONOS # BLD AUTO: 0.4 K/UL (ref 0–2.3)
BASO+EOS+MONOS NFR BLD AUTO: 7 % (ref 0.1–17)
BILIRUB SERPL-MCNC: 0.2 MG/DL (ref 0.2–1)
BUN SERPL-MCNC: 5 MG/DL (ref 7–18)
BUN/CREAT SERPL: 6 (ref 12–20)
CALCIUM SERPL-MCNC: 8.7 MG/DL (ref 8.5–10.1)
CHLORIDE SERPL-SCNC: 106 MMOL/L (ref 100–111)
CO2 SERPL-SCNC: 26 MMOL/L (ref 21–32)
CREAT SERPL-MCNC: 0.78 MG/DL (ref 0.6–1.3)
DIFFERENTIAL METHOD BLD: ABNORMAL
ERYTHROCYTE [DISTWIDTH] IN BLOOD BY AUTOMATED COUNT: 21.3 % (ref 11.5–14.5)
GLOBULIN SER CALC-MCNC: 4.4 G/DL (ref 2–4)
GLUCOSE SERPL-MCNC: 121 MG/DL (ref 74–99)
HCT VFR BLD AUTO: 32.5 % (ref 36–48)
HGB BLD-MCNC: 9.8 G/DL (ref 12–16)
LYMPHOCYTES # BLD: 0.9 K/UL (ref 1.1–5.9)
LYMPHOCYTES NFR BLD: 18 % (ref 14–44)
MCH RBC QN AUTO: 22 PG (ref 25–35)
MCHC RBC AUTO-ENTMCNC: 30.2 G/DL (ref 31–37)
MCV RBC AUTO: 72.9 FL (ref 78–102)
NEUTS SEG # BLD: 4 K/UL (ref 1.8–9.5)
NEUTS SEG NFR BLD: 75 % (ref 40–70)
PLATELET # BLD AUTO: 237 K/UL (ref 140–440)
POTASSIUM SERPL-SCNC: 3.4 MMOL/L (ref 3.5–5.5)
PROT SERPL-MCNC: 7.6 G/DL (ref 6.4–8.2)
RBC # BLD AUTO: 4.46 M/UL (ref 4.1–5.1)
SODIUM SERPL-SCNC: 139 MMOL/L (ref 136–145)
WBC # BLD AUTO: 5.3 K/UL (ref 4.5–13)

## 2020-03-27 PROCEDURE — 77030012965 HC NDL HUBR BBMI -A

## 2020-03-27 PROCEDURE — 74011000258 HC RX REV CODE- 258: Performed by: INTERNAL MEDICINE

## 2020-03-27 PROCEDURE — 96413 CHEMO IV INFUSION 1 HR: CPT

## 2020-03-27 PROCEDURE — 80053 COMPREHEN METABOLIC PANEL: CPT

## 2020-03-27 PROCEDURE — 85025 COMPLETE CBC W/AUTO DIFF WBC: CPT

## 2020-03-27 PROCEDURE — 74011250636 HC RX REV CODE- 250/636: Performed by: INTERNAL MEDICINE

## 2020-03-27 RX ORDER — HEPARIN 100 UNIT/ML
300-500 SYRINGE INTRAVENOUS AS NEEDED
Status: DISPENSED | OUTPATIENT
Start: 2020-03-27 | End: 2020-03-27

## 2020-03-27 RX ORDER — SODIUM CHLORIDE 0.9 % (FLUSH) 0.9 %
10-40 SYRINGE (ML) INJECTION AS NEEDED
Status: DISPENSED | OUTPATIENT
Start: 2020-03-27 | End: 2020-03-27

## 2020-03-27 RX ADMIN — Medication 20 ML: at 12:44

## 2020-03-27 RX ADMIN — Medication 30 ML: at 10:32

## 2020-03-27 RX ADMIN — HEPARIN 500 UNITS: 100 SYRINGE at 12:45

## 2020-03-27 RX ADMIN — SODIUM CHLORIDE 200 MG: 9 INJECTION, SOLUTION INTRAVENOUS at 12:12

## 2020-03-27 NOTE — PROGRESS NOTES
[address] [facility] [facility] Progress Note    Date: 2020    Name: Anupama Klein    MRN: [id number]         : 1960       Vicente Machuca [address]      Ms. Leigh arrived to [facility] at 21 . Ms. Joan Pacheco was assessed and education was provided. Ms. Desire Carlos vitals were reviewed. Visit Vitals  /83 (BP 1 Location: Left arm, BP Patient Position: At rest)   Pulse 83   Temp 98.1 °F (36.7 °C)   Resp 16   Ht 5' 6\" (1.676 m)   Wt 60.2 kg (132 lb 11.5 oz)   SpO2 98%   BMI 21.42 kg/m²       Patient's left upper chest port accessed (lateral lumen) and blood drawn for labs. Lab results were obtained and reviewed. Labs okay for chemo. Recent Results (from the past 12 hour(s))   METABOLIC PANEL, COMPREHENSIVE    Collection Time: 20 10:15 AM   Result Value Ref Range    Sodium 139 136 - 145 mmol/L    Potassium 3.4 (L) 3.5 - 5.5 mmol/L    Chloride 106 100 - 111 mmol/L    CO2 26 21 - 32 mmol/L    Anion gap 7 3.0 - 18 mmol/L    Glucose 121 (H) 74 - 99 mg/dL    BUN 5 (L) 7.0 - 18 MG/DL    Creatinine 0.78 0.6 - 1.3 MG/DL    BUN/Creatinine ratio 6 (L) 12 - 20      GFR est AA >60 >60 ml/min/1.73m2    GFR est non-AA >60 >60 ml/min/1.73m2    Calcium 8.7 8.5 - 10.1 MG/DL    Bilirubin, total 0.2 0.2 - 1.0 MG/DL    ALT (SGPT) 9 (L) 13 - 56 U/L    AST (SGOT) 10 10 - 38 U/L    Alk. phosphatase 81 45 - 117 U/L    Protein, total 7.6 6.4 - 8.2 g/dL    Albumin 3.2 (L) 3.4 - 5.0 g/dL    Globulin 4.4 (H) 2.0 - 4.0 g/dL    A-G Ratio 0.7 (L) 0.8 - 1.7     CBC WITH 3 PART DIFF    Collection Time: 20 10:20 AM   Result Value Ref Range    WBC 5.3 4.5 - 13.0 K/uL    RBC 4.46 4.10 - 5.10 M/uL    HGB 9.8 (L) 12.0 - 16 g/dL    HCT 32.5 (L) 36 - 48 %    MCV 72.9 (L) 78 - 102 FL    MCH 22.0 (L) 25.0 - 35.0 PG    MCHC 30.2 (L) 31 - 37 g/dL    RDW 21.3 (H) 11.5 - 14.5 %    PLATELET 667 309 - 145 K/uL    NEUTROPHILS 75 (H) 40 - 70 %    MIXED CELLS 7 0.1 - 17 %    LYMPHOCYTES 18 14 - 44 %    ABS. NEUTROPHILS 4.0 1.8 - 9.5 K/UL    ABS.  MIXED CELLS 0.4 0.0 - 2.3 K/uL    ABS. LYMPHOCYTES 0.9 (L) 1.1 - 5.9 K/UL    DF AUTOMATED         NS infusing as ordered at [facility]. Keytruda 200mg IV administered as ordered followed by NS flush. Ms. Allen Diallo tolerated infusion without complaints. Port flushed with heparin per order and de-accessed. Band-aid applied to site. Ms. Allen Diallo was discharged from [place] [address] in stable condition at 1250. She is to return for pre-chemo labs on 4/16/20 at 1000 and 4/17/20 at 0900 for her next cycle of chemo.     Colt Leal RN  March 27, 2020

## 2020-04-04 DIAGNOSIS — F33.9 RECURRENT MAJOR DEPRESSIVE DISORDER, REMISSION STATUS UNSPECIFIED (HCC): ICD-10-CM

## 2020-04-06 RX ORDER — SERTRALINE HYDROCHLORIDE 50 MG/1
TABLET, FILM COATED ORAL
Qty: 30 TAB | Refills: 0 | Status: SHIPPED | OUTPATIENT
Start: 2020-04-06 | End: 2020-04-09 | Stop reason: SDUPTHER

## 2020-04-07 NOTE — TELEPHONE ENCOUNTER
Spoke with Mrs. Leigh to inform her medication e-scribed to pharmacy on file and virtual visit set for 04- at 11:00 am

## 2020-04-09 ENCOUNTER — VIRTUAL VISIT (OUTPATIENT)
Dept: FAMILY MEDICINE CLINIC | Age: 60
End: 2020-04-09

## 2020-04-09 DIAGNOSIS — I10 ESSENTIAL HYPERTENSION: Primary | ICD-10-CM

## 2020-04-09 DIAGNOSIS — D50.9 IRON DEFICIENCY ANEMIA, UNSPECIFIED IRON DEFICIENCY ANEMIA TYPE: ICD-10-CM

## 2020-04-09 DIAGNOSIS — F33.9 RECURRENT MAJOR DEPRESSIVE DISORDER, REMISSION STATUS UNSPECIFIED (HCC): ICD-10-CM

## 2020-04-09 DIAGNOSIS — Z00.00 MEDICARE ANNUAL WELLNESS VISIT, SUBSEQUENT: ICD-10-CM

## 2020-04-09 DIAGNOSIS — Z12.39 SCREENING FOR BREAST CANCER: ICD-10-CM

## 2020-04-09 DIAGNOSIS — E03.9 HYPOTHYROIDISM, UNSPECIFIED TYPE: ICD-10-CM

## 2020-04-09 DIAGNOSIS — D68.59 THROMBOPHILIA (HCC): ICD-10-CM

## 2020-04-09 DIAGNOSIS — C34.91 NON-SMALL CELL CANCER OF RIGHT LUNG (HCC): ICD-10-CM

## 2020-04-09 RX ORDER — LEVOTHYROXINE SODIUM 50 UG/1
50 TABLET ORAL
Qty: 30 TAB | Refills: 5 | Status: SHIPPED | OUTPATIENT
Start: 2020-04-09 | End: 2021-04-14 | Stop reason: SDUPTHER

## 2020-04-09 RX ORDER — SERTRALINE HYDROCHLORIDE 50 MG/1
TABLET, FILM COATED ORAL
Qty: 30 TAB | Refills: 5 | Status: SHIPPED | OUTPATIENT
Start: 2020-04-09 | End: 2021-01-15 | Stop reason: SDUPTHER

## 2020-04-09 RX ORDER — LANOLIN ALCOHOL/MO/W.PET/CERES
CREAM (GRAM) TOPICAL
Qty: 60 TAB | Refills: 5 | Status: SHIPPED | OUTPATIENT
Start: 2020-04-09 | End: 2021-05-04 | Stop reason: SDUPTHER

## 2020-04-09 NOTE — PATIENT INSTRUCTIONS
Medicare Wellness Visit, Female The best way to live healthy is to have a lifestyle where you eat a well-balanced diet, exercise regularly, limit alcohol use, and quit all forms of tobacco/nicotine, if applicable. Regular preventive services are another way to keep healthy. Preventive services (vaccines, screening tests, monitoring & exams) can help personalize your care plan, which helps you manage your own care. Screening tests can find health problems at the earliest stages, when they are easiest to treat. [facility] follows the current, evidence-based guidelines published by the [facility] ([facility]) when recommending preventive services for our patients. Because we follow these guidelines, sometimes recommendations change over time as research supports it. (For example, mammograms used to be recommended annually. Even though Medicare will still pay for an annual mammogram, the newer guidelines recommend a mammogram every two years for women of average risk). Of course, you and your doctor may decide to screen more often for some diseases, based on your risk and your co-morbidities (chronic disease you are already diagnosed with). Preventive services for you include: - Medicare offers their members a free annual wellness visit, which is time for you and your primary care provider to discuss and plan for your preventive service needs. Take advantage of this benefit every year! 
-All adults over the age of 72 should receive the recommended pneumonia vaccines. Current USPSTF guidelines recommend a series of two vaccines for the best pneumonia protection.  
-All adults should have a flu vaccine yearly and a tetanus vaccine every 10 years.  
-All adults age 48 and older should receive the shingles vaccines (series of two vaccines). -All adults age 38-68 who are overweight should have a diabetes screening test once every three years. -All adults born between [birth year] and 1965 should be screened once for Hepatitis C. 
-Other screening tests and preventive services for persons with diabetes include: an eye exam to screen for diabetic retinopathy, a kidney function test, a foot exam, and stricter control over your cholesterol.  
-Cardiovascular screening for adults with routine risk involves an electrocardiogram (ECG) at intervals determined by your doctor.  
-Colorectal cancer screenings should be done for adults age 54-65 with no increased risk factors for colorectal cancer. There are a number of acceptable methods of screening for this type of cancer. Each test has its own benefits and drawbacks. Discuss with your doctor what is most appropriate for you during your annual wellness visit. The different tests include: colonoscopy (considered the best screening method), a fecal occult blood test, a fecal DNA test, and sigmoidoscopy. 
 
-A bone mass density test is recommended when a woman turns 65 to screen for osteoporosis. This test is only recommended one time, as a screening. Some providers will use this same test as a disease monitoring tool if you already have osteoporosis. -Breast cancer screenings are recommended every other year for women of normal risk, age 54-69. 
-Cervical cancer screenings for women over age 72 are only recommended with certain risk factors. Here is a list of your current Health Maintenance items (your personalized list of preventive services) with a due date: 
Health Maintenance Due Topic Date Due  
 DTaP/Tdap/Td  (1 - Tdap) 10/22/1979  Shingles Vaccine (1 of 2) 10/22/2008  Mammogram  06/06/2019 [address] [place] [address] Annual Well Visit  04/11/2020

## 2020-04-09 NOTE — PROGRESS NOTES
This is the Subsequent Medicare Annual Wellness Exam, performed 12 months or more after the Initial AWV or the last Subsequent AWV    Consent: Yunier Franklin, who was seen by synchronous (real-time) audio-video technology, and/or her healthcare decision maker, is aware that this patient-initiated, Telehealth encounter on 4/9/2020 is a billable service. While AWVs are fully covered by Medicare, any services rendered on this date that are not included in an AWV are subject to additional billing, with coverage as determined by her insurance carrier. She is aware that she may receive a bill for any such additional services and has provided verbal consent to proceed: Yes. I have reviewed the patient's medical history in detail and updated the computerized patient record.      History     Patient Active Problem List   Diagnosis Code    SVC syndrome I87.1    Former smoker Z87.891    Essential hypertension I10    Right leg DVT ([address] I82.401    Pulmonary embolism without acute cor pulmonale (HCC) I26.99    Recurrent major depressive disorder (HCC) F33.9    Iron deficiency anemia D50.9    Abnormal mammogram R92.8    Non-small cell lung cancer ([address] C34.90     Past Medical History:   Diagnosis Date    Anemia, iron deficiency 2/2016    Depression     H/O seasonal allergies     Hypertension     Non-small cell carcinoma of lung ([address] 2/2016    adenocarcinoma of right lung    Positive occult stool blood test 2/29/2016    Pulmonary embolism ([address] 2/28/2016    small, bilateral PEs- on Xarelto    Right leg DVT ([address] 2/28/2016    on Xarelto    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    resolved    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      Past Surgical History:   Procedure Laterality Date    BREAST SURGERY PROCEDURE UNLISTED      biopsy    HX ANGIOPLASTY Right 3/20/2016    IJ, subclavian, and brachiocephalic veins    HX HYSTERECTOMY      due to menorrhagia     HX OTHER SURGICAL Right 3/20/2016    2 placed in right IJ, subclavian/brachiocephalic    HX THROMBECTOMY  3/20/2016    with thrombolysis    HX VASCULAR ACCESS Left     double lumen     Current Outpatient Medications   Medication Sig Dispense Refill    levothyroxine (SYNTHROID) 50 mcg tablet Take 1 Tab by mouth Daily (before breakfast). 30 Tab 5    ferrous sulfate 325 mg (65 mg iron) tablet take 1 tablet by mouth twice a day with meals 60 Tab 5    sertraline (ZOLOFT) 50 mg tablet take 1 tablet by mouth once daily 30 Tab 5    amLODIPine (NORVASC) 5 mg tablet take 1 tablet by mouth once daily 90 Tab 0    rivaroxaban (XARELTO) 20 mg tab tablet Take 1 Tab by mouth daily (with breakfast). 30 Tab 6    loratadine (CLARITIN) 10 mg tablet Take 10 mg by mouth daily. Allergies   Allergen Reactions    Flagyl [Metronidazole] Hives    Nitroimidazoles Other (comments)     Nitroimidazole derivatives       Family History   Problem Relation Age of Onset    Cancer Sister 48        breast    Liver Disease Sister         cirrhosis    Heart Attack Mother 37    No Known Problems Child      Social History     Tobacco Use    Smoking status: Former Smoker     Packs/day: 0.50     Last attempt to quit: 2016     Years since quittin.1    Smokeless tobacco: Never Used   Substance Use Topics    Alcohol use: No       Depression Risk Factor Screening:     3 most recent PHQ Screens 2020   Little interest or pleasure in doing things Not at all   Feeling down, depressed, irritable, or hopeless Not at all   Total Score PHQ 2 0       Alcohol Risk Factor Screening:   Do you average 1 drink per night or more than 7 drinks a week:  No    On any one occasion in the past three months have you have had more than 3 drinks containing alcohol:  No      Functional Ability and Level of Safety:   Hearing: Hearing is good. Activities of Daily Living: The home contains: no safety equipment.   Patient does total self care    Ambulation: with no difficulty    Fall Risk:  Fall Risk Assessment, last 12 mths 4/9/2020   Able to walk? Yes   Fall in past 12 months? No       Abuse Screen:  Abuse Screening Questionnaire 4/9/2020   Do you ever feel afraid of your partner? N   Are you in a relationship with someone who physically or mentally threatens you? N   Is it safe for you to go home? Y        Cognitive Screening   Has your family/caregiver stated any concerns about your memory: no      Patient Care Team   Patient Care Team:  CAL Spann as PCP - General (Physician Assistant)  CAL Spann [facility] PCP - [facility]  Jose Arias MD (Hematology and Oncology)  Alise Denis MD (Hematology and Oncology)  Alfonzo Lopez MD (Radiation Oncology)  Trip Woods MD (Surgery)    Assessment/Plan   Education and counseling provided:      Diagnoses and all orders for this visit:    1. Medicare annual wellness visit, subsequent  - Medicare Wellness visit completed  - Health screenings reviewed  - ACP on file   - Age and gender specific counseling provided      2. Screening for breast cancer  Orders:    -     [facility] MAMMO BI SCREENING INCL CAD; Future        Health Maintenance Due   Topic Date Due    DTaP/Tdap/Td series (1 - Tdap) 10/22/1979    Shingrix Vaccine Age 50> (1 of 2) 10/22/2008    Breast Cancer Screen Mammogram  06/06/2019       Federica Montejo is a 62 y.o. female being evaluated by a video visit encounter for concerns as above. A caregiver was present when appropriate. Due to this being a TeleHealth encounter (During NRDIF-02 public health emergency), evaluation of the following organ systems was limited: Vitals/Constitutional/EENT/Resp/CV/GI//MS/Neuro/Skin/Heme-Lymph-Imm.   Pursuant to the emergency declaration under the [facility], 1135 waiver authority and the [employer] General Act, this Virtual  Visit was conducted, with patient's (and/or legal guardian's) consent, to reduce the patient's risk of exposure to COVID-19 and provide necessary medical care. Services were provided through a video synchronous discussion virtually to substitute for in-person clinic visit. Patient and provider were located at their individual homes.     CAL Segura   4/9/2020  12:11 PM

## 2020-04-09 NOTE — PROGRESS NOTES
Consent: Federica Montejo, who was seen by synchronous (real-time) audio-video technology, and/or her healthcare decision maker, is aware that this patient-initiated, Telehealth encounter on 4/9/2020 is a billable service, with coverage as determined by her insurance carrier. She is aware that she may receive a bill and has provided verbal consent to proceed: Yes. Assessment & Plan:   Diagnoses and all orders for this visit:    1. Essential hypertension  - Stable  - Continue amlodipine     2. Thrombophilia ([facility]  3. Non-small cell cancer of right lung (HCC)  - Seems to be doing well  - f/u with oncology as scheduled     4. Recurrent major depressive disorder, remission status unspecified ([facility]  - Well controlled  - meds refilled  Orders:    -     sertraline (ZOLOFT) 50 mg tablet; take 1 tablet by mouth once daily    5. Hypothyroidism, unspecified type  - Continue current dose of synthroid for now. Patient was informed that her recent TSH was mildly elevated. She is asymptomatic and this is followed closely by her oncologist  - If this becomes an upward trend, we can discuss adjusting her Synthroid  Orders:    -     levothyroxine (SYNTHROID) 50 mcg tablet; Take 1 Tab by mouth Daily (before breakfast). 6. Iron deficiency anemia, unspecified iron deficiency anemia type  Orders:    -     ferrous sulfate 325 mg (65 mg iron) tablet; take 1 tablet by mouth twice a day with meals      Follow-up and Dispositions    · Return in about 6 months (around 10/9/2020) for routine care (Dr. Vic Malcolm). 712  Subjective:   Federica Montejo is a 62 y.o. female who was seen for No chief complaint on file. Prior to Admission medications    Medication Sig Start Date End Date Taking?  Authorizing Provider   sertraline (ZOLOFT) 50 mg tablet take 1 tablet by mouth once daily 4/6/20   Lamar Chavez PA   amLODIPine (NORVASC) 5 mg tablet take 1 tablet by mouth once daily 2/5/20   CAL Spann levothyroxine (SYNTHROID) 50 mcg tablet Take 1 Tab by mouth Daily (before breakfast). 9/11/19   CAL Vital   ferrous sulfate 325 mg (65 mg iron) tablet take 1 tablet by mouth twice a day with meals 9/11/19   Frida Chavez PA   rivaroxaban (XARELTO) 20 mg tab tablet Take 1 Tab by mouth daily (with breakfast). 10/5/17   Frida Chavez PA   loratadine (CLARITIN) 10 mg tablet Take 10 mg by mouth daily. Other, MD Lennie     Allergies   Allergen Reactions    Flagyl [Metronidazole] Hives    Nitroimidazoles Other (comments)     Nitroimidazole derivatives          Hypertension -   Generally well controlled, though patient has had a few elevated BP readings in recent past. She has her blood pressure checked regularly at the infusion center. Reports compliance with the following regimen: amlodipine 5 mg daily  Home BP readings: not checking      History of PE/DVT -   Patient is anticoagulated on Xarelto, lifelong. She is high risk for thromboembolic disease due to cancer. She denies any issues with bleeding or bruising. 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 Followed by heme/onc (Dr. Arjun Marcelino). Stable on Keytruda.      Depression -   Patient reports symptoms are stable on Zoloft 50 mg daily. Reports she also uses this medication for hot flashes which are well controlled   PHQ as follows:     3 most recent PHQ Screens 4/11/2019   Little interest or pleasure in doing things Not at all   Feeling down, depressed, irritable, or hopeless Not at all   Total Score PHQ 2 0      Hypothyroidism -   On Synthroid 50 mcg daily   Last TSH mildly elevated at 4.53 on 3/6/20. Labs are checked roughly every 6 weeks by her oncologist     Review of Systems   Constitutional: Negative for chills, fever and malaise/fatigue. Respiratory: Negative for cough and shortness of breath. Cardiovascular: Negative for chest pain and palpitations. Objective: There were no vitals taken for this visit. General: alert, cooperative, no distress   Mental  status: normal mood, behavior, speech, dress, motor activity, and thought processes, able to follow commands   HENT: NCAT   Neck: no visualized mass   Resp: no respiratory distress   Neuro: no gross deficits   Skin: no discoloration or lesions of concern on visible areas   Psychiatric: normal affect, consistent with stated mood, no evidence of hallucinations       We discussed the expected course, resolution and complications of the diagnosis(es) in detail. Medication risks, benefits, costs, interactions, and alternatives were discussed as indicated. I advised her to contact the office if her condition worsens, changes or fails to improve as anticipated. She expressed understanding with the diagnosis(es) and plan. Tierra Bell is a 62 y.o. female being evaluated by a video visit encounter for concerns as above. A caregiver was present when appropriate. Due to this being a TeleHealth encounter (During XDSWA-90 public health emergency), evaluation of the following organ systems was limited: Vitals/Constitutional/EENT/Resp/CV/GI//MS/Neuro/Skin/Heme-Lymph-Imm. Pursuant to the emergency declaration under the [facility], 1135 waiver authority and the "[employer] and [employer] General Act, this Virtual  Visit was conducted, with patient's (and/or legal guardian's) consent, to reduce the patient's risk of exposure to COVID-19 and provide necessary medical care. Services were provided through a video synchronous discussion virtually to substitute for in-person clinic visit. Patient and provider were located at their individual homes.         CAL José   4/9/2020  12:33 PM

## 2020-04-10 RX ORDER — HYDROCORTISONE SODIUM SUCCINATE 100 MG/2ML
100 INJECTION, POWDER, FOR SOLUTION INTRAMUSCULAR; INTRAVENOUS AS NEEDED
Status: CANCELLED | OUTPATIENT
Start: 2020-04-17

## 2020-04-10 RX ORDER — ACETAMINOPHEN 325 MG/1
650 TABLET ORAL AS NEEDED
Status: CANCELLED
Start: 2020-04-17

## 2020-04-10 RX ORDER — DIPHENHYDRAMINE HYDROCHLORIDE 50 MG/ML
50 INJECTION, SOLUTION INTRAMUSCULAR; INTRAVENOUS AS NEEDED
Status: CANCELLED
Start: 2020-04-17

## 2020-04-10 RX ORDER — SODIUM CHLORIDE 0.9 % (FLUSH) 0.9 %
10-40 SYRINGE (ML) INJECTION AS NEEDED
Status: CANCELLED
Start: 2020-04-17

## 2020-04-10 RX ORDER — ALBUTEROL SULFATE 0.83 MG/ML
2.5 SOLUTION RESPIRATORY (INHALATION) AS NEEDED
Status: CANCELLED
Start: 2020-04-17

## 2020-04-10 RX ORDER — ONDANSETRON 2 MG/ML
8 INJECTION INTRAMUSCULAR; INTRAVENOUS AS NEEDED
Status: CANCELLED | OUTPATIENT
Start: 2020-04-17

## 2020-04-10 RX ORDER — EPINEPHRINE 1 MG/ML
0.3 INJECTION, SOLUTION, CONCENTRATE INTRAVENOUS AS NEEDED
Status: CANCELLED | OUTPATIENT
Start: 2020-04-17

## 2020-04-10 RX ORDER — HEPARIN 100 UNIT/ML
300-500 SYRINGE INTRAVENOUS AS NEEDED
Status: CANCELLED
Start: 2020-04-17

## 2020-04-16 ENCOUNTER — HOSPITAL ENCOUNTER (OUTPATIENT)
Dept: INFUSION THERAPY | Age: 60
Discharge: HOME | End: 2020-04-16
Payer: MEDICARE

## 2020-04-16 VITALS
HEIGHT: 66 IN | WEIGHT: 130.4 LBS | TEMPERATURE: 97.6 F | RESPIRATION RATE: 18 BRPM | HEART RATE: 90 BPM | SYSTOLIC BLOOD PRESSURE: 141 MMHG | BODY MASS INDEX: 20.96 KG/M2 | DIASTOLIC BLOOD PRESSURE: 90 MMHG | OXYGEN SATURATION: 94 %

## 2020-04-16 LAB
ALBUMIN SERPL-MCNC: 3.7 G/DL (ref 3.4–5)
ALBUMIN/GLOB SERPL: 0.8 {RATIO} (ref 0.8–1.7)
ALP SERPL-CCNC: 93 U/L (ref 45–117)
ALT SERPL-CCNC: 11 U/L (ref 13–56)
ANION GAP SERPL CALC-SCNC: 7 MMOL/L (ref 3–18)
AST SERPL-CCNC: 14 U/L (ref 10–38)
BASOPHILS # BLD: 0 K/UL (ref 0–0.06)
BASOPHILS NFR BLD: 0 % (ref 0–3)
BILIRUB SERPL-MCNC: 0.3 MG/DL (ref 0.2–1)
BUN SERPL-MCNC: 8 MG/DL (ref 7–18)
BUN/CREAT SERPL: 10 (ref 12–20)
CALCIUM SERPL-MCNC: 9.1 MG/DL (ref 8.5–10.1)
CHLORIDE SERPL-SCNC: 106 MMOL/L (ref 100–111)
CO2 SERPL-SCNC: 27 MMOL/L (ref 21–32)
CREAT SERPL-MCNC: 0.78 MG/DL (ref 0.6–1.3)
DIFFERENTIAL METHOD BLD: ABNORMAL
EOSINOPHIL # BLD: 0.1 K/UL (ref 0–0.4)
EOSINOPHIL NFR BLD: 1 % (ref 0–5)
ERYTHROCYTE [DISTWIDTH] IN BLOOD BY AUTOMATED COUNT: 20.9 % (ref 11.6–14.5)
GLOBULIN SER CALC-MCNC: 4.9 G/DL (ref 2–4)
GLUCOSE SERPL-MCNC: 69 MG/DL (ref 74–99)
HCT VFR BLD AUTO: 37.2 % (ref 35–45)
HGB BLD-MCNC: 11.8 G/DL (ref 12–16)
LYMPHOCYTES # BLD: 1.8 K/UL (ref 0.8–3.5)
LYMPHOCYTES NFR BLD: 28 % (ref 20–51)
MCH RBC QN AUTO: 22.6 PG (ref 24–34)
MCHC RBC AUTO-ENTMCNC: 31.7 G/DL (ref 31–37)
MCV RBC AUTO: 71.4 FL (ref 74–97)
MONOCYTES # BLD: 0.5 K/UL (ref 0–1)
MONOCYTES NFR BLD: 7 % (ref 2–9)
NEUTS SEG # BLD: 4.1 K/UL (ref 1.8–8)
NEUTS SEG NFR BLD: 64 % (ref 42–75)
PLATELET # BLD AUTO: 239 K/UL (ref 135–420)
PLATELET COMMENTS,PCOM: ABNORMAL
POTASSIUM SERPL-SCNC: 3.8 MMOL/L (ref 3.5–5.5)
PROT SERPL-MCNC: 8.6 G/DL (ref 6.4–8.2)
RBC # BLD AUTO: 5.21 M/UL (ref 4.2–5.3)
RBC MORPH BLD: ABNORMAL
SODIUM SERPL-SCNC: 140 MMOL/L (ref 136–145)
TSH SERPL DL<=0.05 MIU/L-ACNC: 4.98 UIU/ML (ref 0.36–3.74)
WBC # BLD AUTO: 6.5 K/UL (ref 4.6–13.2)

## 2020-04-16 PROCEDURE — 84443 ASSAY THYROID STIM HORMONE: CPT

## 2020-04-16 PROCEDURE — 36415 COLL VENOUS BLD VENIPUNCTURE: CPT

## 2020-04-16 PROCEDURE — 80053 COMPREHEN METABOLIC PANEL: CPT

## 2020-04-16 PROCEDURE — 85025 COMPLETE CBC W/AUTO DIFF WBC: CPT

## 2020-04-16 NOTE — PROGRESS NOTES
[address] Lab Visit:    Milan Wood  1960  [id number]    7168:  Pt arrived ambulatory. Labs drawn peripherally in right ac and sent for processing. Departed OPI ambulatory and in no distress.     Visit Vitals  /90 (BP 1 Location: Right arm, BP Patient Position: Sitting)   Pulse 90   Temp 97.6 °F (36.4 °C)   Resp 18   Ht 5' 6\" (1.676 m)   Wt 59.1 kg (130 lb 6.4 oz)   SpO2 94%   BMI 21.05 kg/m²

## 2020-04-17 ENCOUNTER — HOSPITAL ENCOUNTER (OUTPATIENT)
Dept: INFUSION THERAPY | Age: 60
Discharge: HOME | End: 2020-04-17
Payer: MEDICARE

## 2020-04-17 VITALS
HEART RATE: 88 BPM | TEMPERATURE: 98.2 F | RESPIRATION RATE: 18 BRPM | OXYGEN SATURATION: 96 % | SYSTOLIC BLOOD PRESSURE: 147 MMHG | DIASTOLIC BLOOD PRESSURE: 82 MMHG

## 2020-04-17 DIAGNOSIS — C34.91 NON-SMALL CELL CANCER OF RIGHT LUNG (HCC): Primary | ICD-10-CM

## 2020-04-17 PROCEDURE — 77030012965 HC NDL HUBR BBMI -A

## 2020-04-17 PROCEDURE — 74011250636 HC RX REV CODE- 250/636: Performed by: INTERNAL MEDICINE

## 2020-04-17 PROCEDURE — 74011000258 HC RX REV CODE- 258: Performed by: INTERNAL MEDICINE

## 2020-04-17 PROCEDURE — 96413 CHEMO IV INFUSION 1 HR: CPT

## 2020-04-17 RX ORDER — SODIUM CHLORIDE 0.9 % (FLUSH) 0.9 %
10-40 SYRINGE (ML) INJECTION AS NEEDED
Status: DISCONTINUED | OUTPATIENT
Start: 2020-04-17 | End: 2020-04-21 | Stop reason: HOSPADM

## 2020-04-17 RX ORDER — SODIUM CHLORIDE 9 MG/ML
25 INJECTION, SOLUTION INTRAVENOUS CONTINUOUS
Status: DISPENSED | OUTPATIENT
Start: 2020-04-17 | End: 2020-04-17

## 2020-04-17 RX ORDER — HEPARIN 100 UNIT/ML
500 SYRINGE INTRAVENOUS AS NEEDED
Status: DISCONTINUED | OUTPATIENT
Start: 2020-04-17 | End: 2020-04-21 | Stop reason: HOSPADM

## 2020-04-17 RX ADMIN — SODIUM CHLORIDE 200 MG: 9 INJECTION, SOLUTION INTRAVENOUS at 11:32

## 2020-04-17 RX ADMIN — Medication 30 ML: at 12:08

## 2020-04-17 RX ADMIN — Medication 20 ML: at 10:57

## 2020-04-17 RX ADMIN — SODIUM CHLORIDE 25 ML/HR: 9 INJECTION, SOLUTION INTRAVENOUS at 10:57

## 2020-04-17 RX ADMIN — Medication 500 UNITS: at 12:09

## 2020-04-17 NOTE — PROGRESS NOTES
[facility] Progress Note    Date: 2020    Name: Charleen Pacheco              MRN: [id number]              : 1960    Chemotherapy Cycle: Edgar Ferries       Ms. Nedra Ma was assessed and education was provided. Ms. Jade Vann vitals were reviewed. Visit Vitals  /82 (BP 1 Location: Right arm, BP Patient Position: Sitting)   Pulse 88   Temp 98.2 °F (36.8 °C)   Resp 18   SpO2 96%   Breastfeeding No       Lab results were obtained and reviewed from 2020 and are within parameters to administer chemotherapy. .  No results found for this or any previous visit (from the past 12 hour(s)). Medial site of mediport at right upper chest accessed with difficulty. Good blood return obtained, followed with 20 ml NS flush. No pre-medications were ordered and chemotherapy was initiated. Keytruda 200 mg/118 ml was infused at 236 ml/hr. No s/s reaction noted. mport flushed with 30 ml NS and 5 ml of 100 units/ml Heparin, then de-accessed. No irritation or drainage noted at site, band aid applied. Ms. Nedra Ma tolerated infusion, and had no complaints at this time. Armband removed and shredded. Ms. Nedra Ma was discharged from [place] [address] in stable condition at 1210. She is to return on 2020 at 36 for her next appointment for pre-chemo labs. Mickie Gutierrez RN  2020  12:39 PM

## 2020-04-24 ENCOUNTER — HOSPITAL ENCOUNTER (OUTPATIENT)
Dept: CT IMAGING | Age: 60
Discharge: HOME | End: 2020-04-24
Attending: INTERNAL MEDICINE
Payer: MEDICARE

## 2020-04-24 DIAGNOSIS — R92.8 ABNORMAL MAMMOGRAM: ICD-10-CM

## 2020-04-24 DIAGNOSIS — C34.11 MALIGNANT NEOPLASM OF UPPER LOBE OF RIGHT LUNG (HCC): ICD-10-CM

## 2020-04-24 PROCEDURE — 74177 CT ABD & PELVIS W/CONTRAST: CPT

## 2020-04-24 PROCEDURE — 74011636320 HC RX REV CODE- 636/320: Performed by: INTERNAL MEDICINE

## 2020-04-24 RX ADMIN — IOPAMIDOL 90 ML: 612 INJECTION, SOLUTION INTRAVENOUS at 10:43

## 2020-05-01 RX ORDER — SODIUM CHLORIDE 9 MG/ML
25 INJECTION, SOLUTION INTRAVENOUS CONTINUOUS
Status: CANCELLED | OUTPATIENT
Start: 2020-05-15

## 2020-05-01 RX ORDER — SODIUM CHLORIDE 0.9 % (FLUSH) 0.9 %
10-40 SYRINGE (ML) INJECTION AS NEEDED
Status: CANCELLED
Start: 2020-05-15

## 2020-05-01 RX ORDER — EPINEPHRINE 1 MG/ML
0.3 INJECTION, SOLUTION, CONCENTRATE INTRAVENOUS AS NEEDED
Status: CANCELLED | OUTPATIENT
Start: 2020-05-15

## 2020-05-01 RX ORDER — DIPHENHYDRAMINE HYDROCHLORIDE 50 MG/ML
50 INJECTION, SOLUTION INTRAMUSCULAR; INTRAVENOUS AS NEEDED
Status: CANCELLED
Start: 2020-05-15

## 2020-05-01 RX ORDER — ALBUTEROL SULFATE 0.83 MG/ML
2.5 SOLUTION RESPIRATORY (INHALATION) AS NEEDED
Status: CANCELLED
Start: 2020-05-15

## 2020-05-01 RX ORDER — ACETAMINOPHEN 325 MG/1
650 TABLET ORAL AS NEEDED
Status: CANCELLED
Start: 2020-05-15

## 2020-05-01 RX ORDER — HYDROCORTISONE SODIUM SUCCINATE 100 MG/2ML
100 INJECTION, POWDER, FOR SOLUTION INTRAMUSCULAR; INTRAVENOUS AS NEEDED
Status: CANCELLED | OUTPATIENT
Start: 2020-05-15

## 2020-05-01 RX ORDER — ONDANSETRON 2 MG/ML
8 INJECTION INTRAMUSCULAR; INTRAVENOUS AS NEEDED
Status: CANCELLED | OUTPATIENT
Start: 2020-05-15

## 2020-05-01 RX ORDER — HEPARIN 100 UNIT/ML
300-500 SYRINGE INTRAVENOUS AS NEEDED
Status: CANCELLED
Start: 2020-05-15

## 2020-05-06 ENCOUNTER — HOSPITAL ENCOUNTER (OUTPATIENT)
Dept: INFUSION THERAPY | Age: 60
Discharge: HOME | End: 2020-05-06
Payer: MEDICARE

## 2020-05-06 VITALS
DIASTOLIC BLOOD PRESSURE: 95 MMHG | TEMPERATURE: 100.5 F | SYSTOLIC BLOOD PRESSURE: 144 MMHG | HEART RATE: 91 BPM | HEIGHT: 66 IN | WEIGHT: 132.8 LBS | RESPIRATION RATE: 18 BRPM | OXYGEN SATURATION: 96 % | BODY MASS INDEX: 21.34 KG/M2

## 2020-05-06 LAB
ALBUMIN SERPL-MCNC: 3.8 G/DL (ref 3.4–5)
ALBUMIN/GLOB SERPL: 0.9 {RATIO} (ref 0.8–1.7)
ALP SERPL-CCNC: 103 U/L (ref 45–117)
ALT SERPL-CCNC: 10 U/L (ref 13–56)
ANION GAP SERPL CALC-SCNC: 5 MMOL/L (ref 3–18)
AST SERPL-CCNC: 13 U/L (ref 10–38)
BASOPHILS # BLD: 0 K/UL (ref 0–0.1)
BASOPHILS NFR BLD: 0 % (ref 0–2)
BILIRUB SERPL-MCNC: 0.4 MG/DL (ref 0.2–1)
BUN SERPL-MCNC: 6 MG/DL (ref 7–18)
BUN/CREAT SERPL: 9 (ref 12–20)
CALCIUM SERPL-MCNC: 8.8 MG/DL (ref 8.5–10.1)
CHLORIDE SERPL-SCNC: 106 MMOL/L (ref 100–111)
CO2 SERPL-SCNC: 30 MMOL/L (ref 21–32)
CREAT SERPL-MCNC: 0.69 MG/DL (ref 0.6–1.3)
DIFFERENTIAL METHOD BLD: ABNORMAL
EOSINOPHIL # BLD: 0.1 K/UL (ref 0–0.4)
EOSINOPHIL NFR BLD: 1 % (ref 0–5)
ERYTHROCYTE [DISTWIDTH] IN BLOOD BY AUTOMATED COUNT: 20.4 % (ref 11.6–14.5)
GLOBULIN SER CALC-MCNC: 4.3 G/DL (ref 2–4)
GLUCOSE SERPL-MCNC: 82 MG/DL (ref 74–99)
HCT VFR BLD AUTO: 36.5 % (ref 35–45)
HGB BLD-MCNC: 11.6 G/DL (ref 12–16)
LYMPHOCYTES # BLD: 0.9 K/UL (ref 0.9–3.6)
LYMPHOCYTES NFR BLD: 11 % (ref 21–52)
MCH RBC QN AUTO: 23 PG (ref 24–34)
MCHC RBC AUTO-ENTMCNC: 31.8 G/DL (ref 31–37)
MCV RBC AUTO: 72.3 FL (ref 74–97)
MONOCYTES # BLD: 0.3 K/UL (ref 0.05–1.2)
MONOCYTES NFR BLD: 4 % (ref 3–10)
NEUTS SEG # BLD: 7.3 K/UL (ref 1.8–8)
NEUTS SEG NFR BLD: 84 % (ref 40–73)
PLATELET # BLD AUTO: 210 K/UL (ref 135–420)
PLATELET COMMENTS,PCOM: ABNORMAL
POTASSIUM SERPL-SCNC: 3.6 MMOL/L (ref 3.5–5.5)
PROT SERPL-MCNC: 8.1 G/DL (ref 6.4–8.2)
RBC # BLD AUTO: 5.05 M/UL (ref 4.2–5.3)
RBC MORPH BLD: ABNORMAL
SODIUM SERPL-SCNC: 141 MMOL/L (ref 136–145)
TSH SERPL DL<=0.05 MIU/L-ACNC: 7.17 UIU/ML (ref 0.36–3.74)
WBC # BLD AUTO: 8.6 K/UL (ref 4.6–13.2)

## 2020-05-06 PROCEDURE — 36415 COLL VENOUS BLD VENIPUNCTURE: CPT

## 2020-05-06 PROCEDURE — 80053 COMPREHEN METABOLIC PANEL: CPT

## 2020-05-06 PROCEDURE — 85025 COMPLETE CBC W/AUTO DIFF WBC: CPT

## 2020-05-06 PROCEDURE — 84443 ASSAY THYROID STIM HORMONE: CPT

## 2020-05-06 NOTE — PROGRESS NOTES
[facility] Lab Visit:    Jessica Gabriela  1960  [id number]    1400:  Pt arrived ambulatory. Labs drawn peripherally in right ac and sent for pr ocessing. Pt advised to f/u with PCP due to temp. Reading of 100.5*F. Pt departed in no distress.      Visit Vitals  BP (!) 144/95 (BP 1 Location: Right arm, BP Patient Position: Sitting)   Pulse 91   Temp (!) 100.5 °F (38.1 °C)   Resp 18   Ht 5' 6\" (1.676 m)   Wt 60.2 kg (132 lb 12.8 oz)   SpO2 96%   BMI 21.43 kg/m²

## 2020-05-07 NOTE — PROGRESS NOTES
Pt w/ cough and temp of 100.5 when she came in for pre-chemo labs yesterday. Spoke w/ Shun Nicole at Dr. Neal Delaney office (pt's referring provider) and they are calling pt to go to ER to be worked up for Andriy and will call to reschedule pt's chemo once she is cleared. Candy manager notified.

## 2020-05-08 ENCOUNTER — HOSPITAL ENCOUNTER (OUTPATIENT)
Dept: INFUSION THERAPY | Age: 60
Discharge: HOME | End: 2020-05-08
Payer: MEDICARE

## 2020-05-11 ENCOUNTER — HOSPITAL ENCOUNTER (OUTPATIENT)
Dept: INFUSION THERAPY | Age: 60
Discharge: HOME | End: 2020-05-11
Payer: MEDICARE

## 2020-05-11 VITALS
HEIGHT: 66 IN | SYSTOLIC BLOOD PRESSURE: 139 MMHG | BODY MASS INDEX: 21.34 KG/M2 | TEMPERATURE: 98.1 F | HEART RATE: 81 BPM | OXYGEN SATURATION: 98 % | WEIGHT: 132.8 LBS | RESPIRATION RATE: 18 BRPM | DIASTOLIC BLOOD PRESSURE: 84 MMHG

## 2020-05-11 LAB
ALBUMIN SERPL-MCNC: 3.3 G/DL (ref 3.4–5)
ALBUMIN/GLOB SERPL: 0.7 {RATIO} (ref 0.8–1.7)
ALP SERPL-CCNC: 89 U/L (ref 45–117)
ALT SERPL-CCNC: 13 U/L (ref 13–56)
ANION GAP SERPL CALC-SCNC: 4 MMOL/L (ref 3–18)
AST SERPL-CCNC: 12 U/L (ref 10–38)
BASOPHILS # BLD: 0 K/UL (ref 0–0.1)
BASOPHILS NFR BLD: 0 % (ref 0–2)
BILIRUB SERPL-MCNC: 0.2 MG/DL (ref 0.2–1)
BUN SERPL-MCNC: 5 MG/DL (ref 7–18)
BUN/CREAT SERPL: 7 (ref 12–20)
CALCIUM SERPL-MCNC: 8.9 MG/DL (ref 8.5–10.1)
CHLORIDE SERPL-SCNC: 105 MMOL/L (ref 100–111)
CO2 SERPL-SCNC: 31 MMOL/L (ref 21–32)
CREAT SERPL-MCNC: 0.72 MG/DL (ref 0.6–1.3)
DIFFERENTIAL METHOD BLD: ABNORMAL
EOSINOPHIL # BLD: 0.1 K/UL (ref 0–0.4)
EOSINOPHIL NFR BLD: 2 % (ref 0–5)
ERYTHROCYTE [DISTWIDTH] IN BLOOD BY AUTOMATED COUNT: 19.2 % (ref 11.6–14.5)
GLOBULIN SER CALC-MCNC: 4.7 G/DL (ref 2–4)
GLUCOSE SERPL-MCNC: 84 MG/DL (ref 74–99)
HCT VFR BLD AUTO: 35 % (ref 35–45)
HGB BLD-MCNC: 10.9 G/DL (ref 12–16)
LYMPHOCYTES # BLD: 1.2 K/UL (ref 0.9–3.6)
LYMPHOCYTES NFR BLD: 19 % (ref 21–52)
MCH RBC QN AUTO: 22.5 PG (ref 24–34)
MCHC RBC AUTO-ENTMCNC: 31.1 G/DL (ref 31–37)
MCV RBC AUTO: 72.2 FL (ref 74–97)
MONOCYTES # BLD: 0.5 K/UL (ref 0.05–1.2)
MONOCYTES NFR BLD: 8 % (ref 3–10)
NEUTS SEG # BLD: 4.5 K/UL (ref 1.8–8)
NEUTS SEG NFR BLD: 71 % (ref 40–73)
PLATELET # BLD AUTO: 216 K/UL (ref 135–420)
PLATELET COMMENTS,PCOM: ABNORMAL
PMV BLD AUTO: 10.1 FL (ref 9.2–11.8)
POTASSIUM SERPL-SCNC: 3 MMOL/L (ref 3.5–5.5)
PROT SERPL-MCNC: 8 G/DL (ref 6.4–8.2)
RBC # BLD AUTO: 4.85 M/UL (ref 4.2–5.3)
RBC MORPH BLD: ABNORMAL
RBC MORPH BLD: ABNORMAL
SODIUM SERPL-SCNC: 140 MMOL/L (ref 136–145)
TSH SERPL DL<=0.05 MIU/L-ACNC: 5.93 UIU/ML (ref 0.36–3.74)
WBC # BLD AUTO: 6.3 K/UL (ref 4.6–13.2)

## 2020-05-11 PROCEDURE — 80053 COMPREHEN METABOLIC PANEL: CPT

## 2020-05-11 PROCEDURE — 84443 ASSAY THYROID STIM HORMONE: CPT

## 2020-05-11 PROCEDURE — 85025 COMPLETE CBC W/AUTO DIFF WBC: CPT

## 2020-05-11 PROCEDURE — 36415 COLL VENOUS BLD VENIPUNCTURE: CPT

## 2020-05-11 NOTE — PROGRESS NOTES
[facility] Lab Visit:    Deborah Figueroa  1960  [id number]    4576:  Pt arrived ambulatory. Labs drawn peripherally in right ac and sent for processing. Departed [facility] ambulatory and in no distress.     Visit Vitals  /84 (BP 1 Location: Right arm, BP Patient Position: Sitting)   Pulse 81   Temp 98.1 °F (36.7 °C)   Resp 18   Ht 5' 6\" (1.676 m)   Wt 60.2 kg (132 lb 12.8 oz)   SpO2 98%   BMI 21.43 kg/m²

## 2020-05-15 ENCOUNTER — HOSPITAL ENCOUNTER (OUTPATIENT)
Dept: INFUSION THERAPY | Age: 60
Discharge: HOME | End: 2020-05-15
Payer: MEDICARE

## 2020-05-15 VITALS
HEART RATE: 88 BPM | DIASTOLIC BLOOD PRESSURE: 94 MMHG | RESPIRATION RATE: 18 BRPM | SYSTOLIC BLOOD PRESSURE: 156 MMHG | TEMPERATURE: 98.1 F | OXYGEN SATURATION: 99 %

## 2020-05-15 DIAGNOSIS — C34.91 NON-SMALL CELL CANCER OF RIGHT LUNG (HCC): Primary | ICD-10-CM

## 2020-05-15 PROCEDURE — 74011250636 HC RX REV CODE- 250/636: Performed by: INTERNAL MEDICINE

## 2020-05-15 PROCEDURE — 96413 CHEMO IV INFUSION 1 HR: CPT

## 2020-05-15 PROCEDURE — 77030012965 HC NDL HUBR BBMI -A

## 2020-05-15 PROCEDURE — 74011000258 HC RX REV CODE- 258: Performed by: INTERNAL MEDICINE

## 2020-05-15 RX ORDER — SODIUM CHLORIDE 0.9 % (FLUSH) 0.9 %
10-40 SYRINGE (ML) INJECTION AS NEEDED
Status: DISPENSED | OUTPATIENT
Start: 2020-05-15 | End: 2020-05-15

## 2020-05-15 RX ORDER — HEPARIN 100 UNIT/ML
300-500 SYRINGE INTRAVENOUS AS NEEDED
Status: DISPENSED | OUTPATIENT
Start: 2020-05-15 | End: 2020-05-15

## 2020-05-15 RX ADMIN — SODIUM CHLORIDE 200 MG: 9 INJECTION, SOLUTION INTRAVENOUS at 09:47

## 2020-05-15 RX ADMIN — Medication 30 ML: at 10:16

## 2020-05-15 RX ADMIN — Medication 30 ML: at 09:17

## 2020-05-15 RX ADMIN — HEPARIN 500 UNITS: 100 SYRINGE at 10:17

## 2020-05-15 NOTE — PROGRESS NOTES
[facility] Progress Note    Date: May 15, 2020    Name: Hillary Castrejon    MRN: [id number]         : 1960       Humberto Bianchi Irene Leigh arrived to [facility] at 0900. Ms. Berta Woodruff was assessed and education was provided. Ms. Luci Tsai vitals were reviewed. Patient Vitals for the past 4 hrs:   Temp Pulse Resp BP SpO2   05/15/20 0902 98.1 °F (36.7 °C) 88 18 (!) 158/93 96 %     Pt's b/p is a little high, did not take her medication yesterday and hasn't take it today either. Will monitor. Patient's left upper chest port accessed (medial lumen) and brisk blood return and flushed well    Lab results were obtained and reviewed. Labs okay for chemo. Results for Stacy Santillan (MRN [id number])    Ref. Range 2020 14:30   WBC Latest Ref Range: 4.6 - 13.2 K/uL 6.3   RBC Latest Ref Range: 4.20 - 5.30 M/uL 4.85   HGB Latest Ref Range: 12.0 - 16.0 g/dL 10.9 (L)   HCT Latest Ref Range: 35.0 - 45.0 % 35.0   MCV Latest Ref Range: 74.0 - 97.0 FL 72.2 (L)   MCH Latest Ref Range: 24.0 - 34.0 PG 22.5 (L)   MCHC Latest Ref Range: 31.0 - 37.0 g/dL 31.1   RDW Latest Ref Range: 11.6 - 14.5 % 19.2 (H)   PLATELET Latest Ref Range: 135 - 420 K/uL 216   MPV Latest Ref Range: 9.2 - 11.8 FL 10.1   NEUTROPHILS Latest Ref Range: 40 - 73 % 71   LYMPHOCYTES Latest Ref Range: 21 - 52 % 19 (L)   MONOCYTES Latest Ref Range: 3 - 10 % 8   EOSINOPHILS Latest Ref Range: 0 - 5 % 2   BASOPHILS Latest Ref Range: 0 - 2 % 0   DF Latest Units:   AUTOMATED   ABS. NEUTROPHILS Latest Ref Range: 1.8 - 8.0 K/UL 4.5   ABS. LYMPHOCYTES Latest Ref Range: 0.9 - 3.6 K/UL 1.2   ABS. MONOCYTES Latest Ref Range: 0.05 - 1.2 K/UL 0.5   ABS. EOSINOPHILS Latest Ref Range: 0.0 - 0.4 K/UL 0.1   ABS. BASOPHILS Latest Ref Range: 0.0 - 0.1 K/UL 0.0   RBC COMMENTS Latest Units:   OVALOCYTES. ..    PLATELET COMMENTS Latest Units:   ADEQUATE PLATELETS   Sodium Latest Ref Range: 136 - 145 mmol/L 140   Potassium Latest Ref Range: 3.5 - 5.5 mmol/L 3.0 (L) Chloride Latest Ref Range: 100 - 111 mmol/L 105   CO2 Latest Ref Range: 21 - 32 mmol/L 31   Anion gap Latest Ref Range: 3.0 - 18 mmol/L 4   Glucose Latest Ref Range: 74 - 99 mg/dL 84   BUN Latest Ref Range: 7.0 - 18 MG/DL 5 (L)   Creatinine Latest Ref Range: 0.6 - 1.3 MG/DL 0.72   BUN/Creatinine ratio Latest Ref Range: 12 - 20   7 (L)   Calcium Latest Ref Range: 8.5 - 10.1 MG/DL 8.9   GFR est non-AA Latest Ref Range: >60 ml/min/1.73m2 >60   GFR est AA Latest Ref Range: >60 ml/min/1.73m2 >60   Bilirubin, total Latest Ref Range: 0.2 - 1.0 MG/DL 0.2   Protein, total Latest Ref Range: 6.4 - 8.2 g/dL 8.0   Albumin Latest Ref Range: 3.4 - 5.0 g/dL 3.3 (L)   Globulin Latest Ref Range: 2.0 - 4.0 g/dL 4.7 (H)   A-G Ratio Latest Ref Range: 0.8 - 1.7   0.7 (L)   ALT (SGPT) Latest Ref Range: 13 - 56 U/L 13   AST Latest Ref Range: 10 - 38 U/L 12   Alk. phosphatase Latest Ref Range: 45 - 117 U/L 89   TSH Latest Ref Range: 0.36 - 3.74 uIU/mL 5.93 (H)     Pt' K+ is low, 3.0, office notified and pt advised that the dr wanted her to start Potassium orally and they called it into her pharmacy and she needs to start taking today. Pt voices understanding. Keytruda 200mg IV administered as ordered followed by SABINO loredo. Ms. Mckenzie Mayberry tolerated infusion without complaints. Patient Vitals for the past 4 hrs:   Temp Pulse Resp BP SpO2   05/15/20 1015 98.1 °F (36.7 °C) 88 18 (!) 156/94 99 %   05/15/20 0941 -- -- -- 154/84 --   05/15/20 0902 98.1 °F (36.7 °C) 88 18 (!) 158/93 96 %   B/P is still high, advised pt to take b/p medications as soon as get home and if remains high to go to PCP. Port flushed with heparin per order and de-accessed. Band-aid applied to site. Ms. Mckenzie Mayberry was discharged from [place] [address] in stable condition at 1025. She is to return for pre-chemo labs on 6/03/20 at 1000 and 6/5/20 at 0900 for her next cycle of chemo.     Farida Butler RN  May 15, 2020

## 2020-05-15 NOTE — PROGRESS NOTES
CAL Gannon from [facility] called this morning in regards to pt's K+ level of 3. She stated to inform pt that they would be sending in a rx to her pharmacy for p.o. potassium. No new [facility] orders received. Pt's [facility] nurse notified.

## 2020-05-27 ENCOUNTER — HOSPITAL ENCOUNTER (OUTPATIENT)
Dept: INFUSION THERAPY | Age: 60
End: 2020-05-27
Payer: MEDICARE

## 2020-05-28 RX ORDER — DIPHENHYDRAMINE HYDROCHLORIDE 50 MG/ML
50 INJECTION, SOLUTION INTRAMUSCULAR; INTRAVENOUS AS NEEDED
Status: CANCELLED
Start: 2020-06-05

## 2020-05-28 RX ORDER — EPINEPHRINE 1 MG/ML
0.3 INJECTION, SOLUTION, CONCENTRATE INTRAVENOUS AS NEEDED
Status: CANCELLED | OUTPATIENT
Start: 2020-06-05

## 2020-05-28 RX ORDER — ACETAMINOPHEN 325 MG/1
650 TABLET ORAL AS NEEDED
Status: CANCELLED
Start: 2020-06-05

## 2020-05-28 RX ORDER — ALBUTEROL SULFATE 0.83 MG/ML
2.5 SOLUTION RESPIRATORY (INHALATION) AS NEEDED
Status: CANCELLED
Start: 2020-06-05

## 2020-05-28 RX ORDER — HYDROCORTISONE SODIUM SUCCINATE 100 MG/2ML
100 INJECTION, POWDER, FOR SOLUTION INTRAMUSCULAR; INTRAVENOUS AS NEEDED
Status: CANCELLED | OUTPATIENT
Start: 2020-06-05

## 2020-05-28 RX ORDER — ONDANSETRON 2 MG/ML
8 INJECTION INTRAMUSCULAR; INTRAVENOUS AS NEEDED
Status: CANCELLED | OUTPATIENT
Start: 2020-06-05

## 2020-05-28 RX ORDER — SODIUM CHLORIDE 9 MG/ML
25 INJECTION, SOLUTION INTRAVENOUS CONTINUOUS
Status: CANCELLED | OUTPATIENT
Start: 2020-06-05

## 2020-05-29 ENCOUNTER — APPOINTMENT (OUTPATIENT)
Dept: INFUSION THERAPY | Age: 60
End: 2020-05-29
Payer: MEDICARE

## 2020-06-02 ENCOUNTER — HOSPITAL ENCOUNTER (OUTPATIENT)
Dept: INFUSION THERAPY | Age: 60
Discharge: HOME | End: 2020-06-02
Payer: MEDICARE

## 2020-06-02 VITALS
OXYGEN SATURATION: 97 % | HEART RATE: 94 BPM | WEIGHT: 135.2 LBS | RESPIRATION RATE: 18 BRPM | HEIGHT: 66 IN | DIASTOLIC BLOOD PRESSURE: 74 MMHG | SYSTOLIC BLOOD PRESSURE: 157 MMHG | BODY MASS INDEX: 21.73 KG/M2 | TEMPERATURE: 98.3 F

## 2020-06-02 LAB
ALBUMIN SERPL-MCNC: 3.7 G/DL (ref 3.4–5)
ALBUMIN/GLOB SERPL: 0.9 {RATIO} (ref 0.8–1.7)
ALP SERPL-CCNC: 93 U/L (ref 45–117)
ALT SERPL-CCNC: 12 U/L (ref 13–56)
ANION GAP SERPL CALC-SCNC: 6 MMOL/L (ref 3–18)
AST SERPL-CCNC: 12 U/L (ref 10–38)
BASOPHILS # BLD: 0 K/UL (ref 0–0.1)
BASOPHILS NFR BLD: 0 % (ref 0–2)
BILIRUB SERPL-MCNC: 0.3 MG/DL (ref 0.2–1)
BUN SERPL-MCNC: 7 MG/DL (ref 7–18)
BUN/CREAT SERPL: 9 (ref 12–20)
CALCIUM SERPL-MCNC: 9 MG/DL (ref 8.5–10.1)
CHLORIDE SERPL-SCNC: 108 MMOL/L (ref 100–111)
CO2 SERPL-SCNC: 26 MMOL/L (ref 21–32)
CREAT SERPL-MCNC: 0.74 MG/DL (ref 0.6–1.3)
DIFFERENTIAL METHOD BLD: ABNORMAL
EOSINOPHIL # BLD: 0.1 K/UL (ref 0–0.4)
EOSINOPHIL NFR BLD: 1 % (ref 0–5)
ERYTHROCYTE [DISTWIDTH] IN BLOOD BY AUTOMATED COUNT: 19.6 % (ref 11.6–14.5)
GLOBULIN SER CALC-MCNC: 4.3 G/DL (ref 2–4)
GLUCOSE SERPL-MCNC: 126 MG/DL (ref 74–99)
HCT VFR BLD AUTO: 34.8 % (ref 35–45)
HGB BLD-MCNC: 11 G/DL (ref 12–16)
LYMPHOCYTES # BLD: 1.5 K/UL (ref 0.9–3.6)
LYMPHOCYTES NFR BLD: 26 % (ref 21–52)
MCH RBC QN AUTO: 23 PG (ref 24–34)
MCHC RBC AUTO-ENTMCNC: 31.6 G/DL (ref 31–37)
MCV RBC AUTO: 72.8 FL (ref 74–97)
MONOCYTES # BLD: 0.3 K/UL (ref 0.05–1.2)
MONOCYTES NFR BLD: 5 % (ref 3–10)
NEUTS SEG # BLD: 3.9 K/UL (ref 1.8–8)
NEUTS SEG NFR BLD: 68 % (ref 40–73)
PLATELET # BLD AUTO: 187 K/UL (ref 135–420)
POTASSIUM SERPL-SCNC: 3.4 MMOL/L (ref 3.5–5.5)
PROT SERPL-MCNC: 8 G/DL (ref 6.4–8.2)
RBC # BLD AUTO: 4.78 M/UL (ref 4.2–5.3)
SODIUM SERPL-SCNC: 140 MMOL/L (ref 136–145)
TSH SERPL DL<=0.05 MIU/L-ACNC: 5.5 UIU/ML (ref 0.36–3.74)
WBC # BLD AUTO: 5.8 K/UL (ref 4.6–13.2)

## 2020-06-02 PROCEDURE — 36415 COLL VENOUS BLD VENIPUNCTURE: CPT

## 2020-06-02 PROCEDURE — 80053 COMPREHEN METABOLIC PANEL: CPT

## 2020-06-02 PROCEDURE — 85025 COMPLETE CBC W/AUTO DIFF WBC: CPT

## 2020-06-02 PROCEDURE — 84443 ASSAY THYROID STIM HORMONE: CPT

## 2020-06-02 NOTE — PROGRESS NOTES
[facility] Lab Visit:    Hillary Castrejon  1960  [id number]    0864:  Pt arrived ambulatory. Labs drawn peripherally in right ac and sent for processing. Pt is scheduled to return for treatment. Departed [facility] ambulatory and in no distress.     Visit Vitals  /74 (BP 1 Location: Right arm, BP Patient Position: Sitting)   Pulse 94   Temp 98.3 °F (36.8 °C)   Resp 18   Ht 5' 6\" (1.676 m)   Wt 61.3 kg (135 lb 3.2 oz)   SpO2 97%   BMI 21.82 kg/m²

## 2020-06-03 ENCOUNTER — APPOINTMENT (OUTPATIENT)
Dept: INFUSION THERAPY | Age: 60
End: 2020-06-03
Payer: MEDICARE

## 2020-06-05 ENCOUNTER — HOSPITAL ENCOUNTER (OUTPATIENT)
Dept: INFUSION THERAPY | Age: 60
Discharge: HOME | End: 2020-06-05
Payer: MEDICARE

## 2020-06-05 VITALS
TEMPERATURE: 98.1 F | HEART RATE: 86 BPM | RESPIRATION RATE: 18 BRPM | WEIGHT: 134.7 LBS | SYSTOLIC BLOOD PRESSURE: 149 MMHG | BODY MASS INDEX: 21.65 KG/M2 | DIASTOLIC BLOOD PRESSURE: 84 MMHG | OXYGEN SATURATION: 98 % | HEIGHT: 66 IN

## 2020-06-05 DIAGNOSIS — C34.91 NON-SMALL CELL CANCER OF RIGHT LUNG (HCC): Primary | ICD-10-CM

## 2020-06-05 PROCEDURE — 77030012965 HC NDL HUBR BBMI -A

## 2020-06-05 PROCEDURE — 74011000258 HC RX REV CODE- 258: Performed by: INTERNAL MEDICINE

## 2020-06-05 PROCEDURE — 74011250636 HC RX REV CODE- 250/636: Performed by: INTERNAL MEDICINE

## 2020-06-05 PROCEDURE — 96413 CHEMO IV INFUSION 1 HR: CPT

## 2020-06-05 RX ORDER — POTASSIUM CHLORIDE 750 MG/1
10 CAPSULE, EXTENDED RELEASE ORAL DAILY
COMMUNITY
End: 2021-01-04

## 2020-06-05 RX ORDER — SODIUM CHLORIDE 0.9 % (FLUSH) 0.9 %
10-40 SYRINGE (ML) INJECTION AS NEEDED
Status: DISPENSED | OUTPATIENT
Start: 2020-06-05 | End: 2020-06-05

## 2020-06-05 RX ORDER — HEPARIN 100 UNIT/ML
300-500 SYRINGE INTRAVENOUS AS NEEDED
Status: ACTIVE | OUTPATIENT
Start: 2020-06-05 | End: 2020-06-05

## 2020-06-05 RX ORDER — HEPARIN 100 UNIT/ML
SYRINGE INTRAVENOUS
Status: DISPENSED
Start: 2020-06-05 | End: 2020-06-05

## 2020-06-05 RX ADMIN — Medication 30 ML: at 09:05

## 2020-06-05 RX ADMIN — Medication 30 ML: at 09:41

## 2020-06-05 RX ADMIN — SODIUM CHLORIDE 200 MG: 9 INJECTION, SOLUTION INTRAVENOUS at 09:10

## 2020-06-05 RX ADMIN — HEPARIN 500 UNITS: 100 SYRINGE at 09:43

## 2020-06-05 NOTE — PROGRESS NOTES
[facility] Progress Note    Date: 2020    Name: Robby Gill              MRN: [id number]              : 1960    Chemotherapy Cycle:C11d1 keytruda      Pt to [facility], ambulatory, at 0850 am. No complaints or concerns voiced      . Ms. Hannah Mahoney was assessed and education was provided. Ms. Viviana Clinton vitals were reviewed. Visit Vitals  /84 (BP 1 Location: Left arm, BP Patient Position: At rest)   Pulse 86   Temp 98.1 °F (36.7 °C)   Resp 18   Ht 5' 6\" (1.676 m)   Wt 61.1 kg (134 lb 11.2 oz)   SpO2 98%   BMI 21.74 kg/m²     Results for [facility] (MRN [id number])   Ref. Range 2020 14:50   WBC Latest Ref Range: 4.6 - 13.2 K/uL 5.8   RBC Latest Ref Range: 4.20 - 5.30 M/uL 4.78   HGB Latest Ref Range: 12.0 - 16.0 g/dL 11.0 (L)   HCT Latest Ref Range: 35.0 - 45.0 % 34.8 (L)   MCV Latest Ref Range: 74.0 - 97.0 FL 72.8 (L)   MCH Latest Ref Range: 24.0 - 34.0 PG 23.0 (L)   MCHC Latest Ref Range: 31.0 - 37.0 g/dL 31.6   RDW Latest Ref Range: 11.6 - 14.5 % 19.6 (H)   PLATELET Latest Ref Range: 135 - 420 K/uL 187   NEUTROPHILS Latest Ref Range: 40 - 73 % 68   LYMPHOCYTES Latest Ref Range: 21 - 52 % 26   MONOCYTES Latest Ref Range: 3 - 10 % 5   EOSINOPHILS Latest Ref Range: 0 - 5 % 1   BASOPHILS Latest Ref Range: 0 - 2 % 0   DF Latest Units:   AUTOMATED   ABS. NEUTROPHILS Latest Ref Range: 1.8 - 8.0 K/UL 3.9   ABS. LYMPHOCYTES Latest Ref Range: 0.9 - 3.6 K/UL 1.5   ABS. MONOCYTES Latest Ref Range: 0.05 - 1.2 K/UL 0.3   ABS. EOSINOPHILS Latest Ref Range: 0.0 - 0.4 K/UL 0.1   ABS.  BASOPHILS Latest Ref Range: 0.0 - 0.1 K/UL 0.0   Sodium Latest Ref Range: 136 - 145 mmol/L 140   Potassium Latest Ref Range: 3.5 - 5.5 mmol/L 3.4 (L)   Chloride Latest Ref Range: 100 - 111 mmol/L 108   CO2 Latest Ref Range: 21 - 32 mmol/L 26   Anion gap Latest Ref Range: 3.0 - 18 mmol/L 6   Glucose Latest Ref Range: 74 - 99 mg/dL 126 (H)   BUN Latest Ref Range: 7.0 - 18 MG/DL 7   Creatinine Latest Ref Range: 0.6 - 1.3 MG/DL 0.74   BUN/Creatinine ratio Latest Ref Range: 12 - 20   9 (L)   Calcium Latest Ref Range: 8.5 - 10.1 MG/DL 9.0   GFR est non-AA Latest Ref Range: >60 ml/min/1.73m2 >60   GFR est AA Latest Ref Range: >60 ml/min/1.73m2 >60   Bilirubin, total Latest Ref Range: 0.2 - 1.0 MG/DL 0.3   Protein, total Latest Ref Range: 6.4 - 8.2 g/dL 8.0   Albumin Latest Ref Range: 3.4 - 5.0 g/dL 3.7   Globulin Latest Ref Range: 2.0 - 4.0 g/dL 4.3 (H)   A-G Ratio Latest Ref Range: 0.8 - 1.7   0.9   ALT Latest Ref Range: 13 - 56 U/L 12 (L)   AST Latest Ref Range: 10 - 38 U/L 12   Alk. phosphatase Latest Ref Range: 45 - 117 U/L 93   TSH Latest Ref Range: 0.36 - 3.74 uIU/mL 5.50 (H)     Left chest medial lumen of her double lumen mediport accessed with 20 g 1 inch el needle. Port flushed easily and had brisk blood return      Lab results were obtained and reviewed. Lab results within ordered parameters to give chemo today. No pre-medications ordered. Keytruda 200 mg was infused at over 30 minutes as ordered. VS stable at end of infusion and pt denied complaints. Line flushed with NS and blood return from port re-verified. Patient Vitals for the past 12 hrs:   Temp Pulse Resp BP SpO2   06/05/20 0945 98.1 °F (36.7 °C) 86 18 149/84 98 %   06/05/20 0852 98.5 °F (36.9 °C) 94 18 144/89 100 %          Ms. Leigh tolerated infusion, and had no complaints. Mediport flushed with NS 30 ml and Heparin 500 units then de-accessed. No irritation or bleeding noted. Bandaid applied. Patient armband removed and shredded. Reviewed discharge instructions with patient. She verbalized understanding    Ms. Joan Pacheco was discharged from [place] [address] in stable condition at [address] am. She is to return on 6/24/2020 at 0900 am for her pre chemo lab appointment.     Fredy Eric RN  June 5, 2020  [id number]

## 2020-06-18 RX ORDER — ALBUTEROL SULFATE 0.83 MG/ML
2.5 SOLUTION RESPIRATORY (INHALATION) AS NEEDED
Status: CANCELLED
Start: 2020-06-26

## 2020-06-18 RX ORDER — ACETAMINOPHEN 325 MG/1
650 TABLET ORAL AS NEEDED
Status: CANCELLED
Start: 2020-06-26

## 2020-06-18 RX ORDER — ONDANSETRON 2 MG/ML
8 INJECTION INTRAMUSCULAR; INTRAVENOUS AS NEEDED
Status: CANCELLED | OUTPATIENT
Start: 2020-06-26

## 2020-06-18 RX ORDER — EPINEPHRINE 1 MG/ML
0.3 INJECTION, SOLUTION, CONCENTRATE INTRAVENOUS AS NEEDED
Status: CANCELLED | OUTPATIENT
Start: 2020-06-26

## 2020-06-18 RX ORDER — DIPHENHYDRAMINE HYDROCHLORIDE 50 MG/ML
50 INJECTION, SOLUTION INTRAMUSCULAR; INTRAVENOUS AS NEEDED
Status: CANCELLED
Start: 2020-06-26

## 2020-06-18 RX ORDER — HYDROCORTISONE SODIUM SUCCINATE 100 MG/2ML
100 INJECTION, POWDER, FOR SOLUTION INTRAMUSCULAR; INTRAVENOUS AS NEEDED
Status: CANCELLED | OUTPATIENT
Start: 2020-06-26

## 2020-06-24 ENCOUNTER — HOSPITAL ENCOUNTER (OUTPATIENT)
Dept: INFUSION THERAPY | Age: 60
Discharge: HOME | End: 2020-06-24
Payer: MEDICARE

## 2020-06-24 VITALS
BODY MASS INDEX: 22.79 KG/M2 | HEIGHT: 66 IN | SYSTOLIC BLOOD PRESSURE: 131 MMHG | DIASTOLIC BLOOD PRESSURE: 80 MMHG | TEMPERATURE: 97.5 F | RESPIRATION RATE: 18 BRPM | WEIGHT: 141.8 LBS | OXYGEN SATURATION: 99 % | HEART RATE: 75 BPM

## 2020-06-24 LAB
ALBUMIN SERPL-MCNC: 3.7 G/DL (ref 3.4–5)
ALBUMIN/GLOB SERPL: 1 {RATIO} (ref 0.8–1.7)
ALP SERPL-CCNC: 91 U/L (ref 45–117)
ALT SERPL-CCNC: 18 U/L (ref 13–56)
ANION GAP SERPL CALC-SCNC: 9 MMOL/L (ref 3–18)
AST SERPL-CCNC: 18 U/L (ref 10–38)
BASOPHILS # BLD: 0 K/UL (ref 0–0.1)
BASOPHILS NFR BLD: 0 % (ref 0–2)
BILIRUB SERPL-MCNC: 0.2 MG/DL (ref 0.2–1)
BUN SERPL-MCNC: 6 MG/DL (ref 7–18)
BUN/CREAT SERPL: 7 (ref 12–20)
CALCIUM SERPL-MCNC: 9 MG/DL (ref 8.5–10.1)
CHLORIDE SERPL-SCNC: 107 MMOL/L (ref 100–111)
CO2 SERPL-SCNC: 25 MMOL/L (ref 21–32)
CREAT SERPL-MCNC: 0.83 MG/DL (ref 0.6–1.3)
DIFFERENTIAL METHOD BLD: ABNORMAL
EOSINOPHIL # BLD: 0.1 K/UL (ref 0–0.4)
EOSINOPHIL NFR BLD: 2 % (ref 0–5)
ERYTHROCYTE [DISTWIDTH] IN BLOOD BY AUTOMATED COUNT: 19.2 % (ref 11.6–14.5)
GLOBULIN SER CALC-MCNC: 3.7 G/DL (ref 2–4)
GLUCOSE SERPL-MCNC: 100 MG/DL (ref 74–99)
HCT VFR BLD AUTO: 34.8 % (ref 35–45)
HGB BLD-MCNC: 10.9 G/DL (ref 12–16)
LYMPHOCYTES # BLD: 1.6 K/UL (ref 0.9–3.6)
LYMPHOCYTES NFR BLD: 28 % (ref 21–52)
MCH RBC QN AUTO: 23.2 PG (ref 24–34)
MCHC RBC AUTO-ENTMCNC: 31.3 G/DL (ref 31–37)
MCV RBC AUTO: 74 FL (ref 74–97)
MONOCYTES # BLD: 0.3 K/UL (ref 0.05–1.2)
MONOCYTES NFR BLD: 6 % (ref 3–10)
NEUTS SEG # BLD: 3.6 K/UL (ref 1.8–8)
NEUTS SEG NFR BLD: 64 % (ref 40–73)
PLATELET # BLD AUTO: 196 K/UL (ref 135–420)
PMV BLD AUTO: 9.8 FL (ref 9.2–11.8)
POTASSIUM SERPL-SCNC: 4.2 MMOL/L (ref 3.5–5.5)
PROT SERPL-MCNC: 7.4 G/DL (ref 6.4–8.2)
RBC # BLD AUTO: 4.7 M/UL (ref 4.2–5.3)
SODIUM SERPL-SCNC: 141 MMOL/L (ref 136–145)
TSH SERPL DL<=0.05 MIU/L-ACNC: 4.94 UIU/ML (ref 0.36–3.74)
WBC # BLD AUTO: 5.6 K/UL (ref 4.6–13.2)

## 2020-06-24 PROCEDURE — 85025 COMPLETE CBC W/AUTO DIFF WBC: CPT

## 2020-06-24 PROCEDURE — 84443 ASSAY THYROID STIM HORMONE: CPT

## 2020-06-24 PROCEDURE — 36415 COLL VENOUS BLD VENIPUNCTURE: CPT

## 2020-06-24 PROCEDURE — 80053 COMPREHEN METABOLIC PANEL: CPT

## 2020-06-24 NOTE — PROGRESS NOTES
[facility] Lab Visit:    Anjel Montano  1960  [id number]    8158:  Pt arrived ambulatory. CBCA, CMP & TSH drawn peripherally in right ac and sent for processing. PT scheduled to return for treatment. Departed [facility] ambulatory and in no distress.     Visit Vitals  /80 (BP 1 Location: Right arm, BP Patient Position: Sitting)   Pulse 75   Temp 97.5 °F (36.4 °C)   Resp 18   Ht 5' 6\" (1.676 m)   Wt 64.3 kg (141 lb 12.8 oz)   SpO2 99%   BMI 22.89 kg/m²

## 2020-06-26 ENCOUNTER — HOSPITAL ENCOUNTER (OUTPATIENT)
Dept: INFUSION THERAPY | Age: 60
Discharge: HOME | End: 2020-06-26
Payer: MEDICARE

## 2020-06-26 VITALS
HEART RATE: 86 BPM | RESPIRATION RATE: 18 BRPM | DIASTOLIC BLOOD PRESSURE: 90 MMHG | TEMPERATURE: 98.1 F | SYSTOLIC BLOOD PRESSURE: 140 MMHG | OXYGEN SATURATION: 100 %

## 2020-06-26 DIAGNOSIS — C34.91 NON-SMALL CELL CANCER OF RIGHT LUNG (HCC): Primary | ICD-10-CM

## 2020-06-26 PROCEDURE — 74011000258 HC RX REV CODE- 258: Performed by: INTERNAL MEDICINE

## 2020-06-26 PROCEDURE — 74011250636 HC RX REV CODE- 250/636: Performed by: INTERNAL MEDICINE

## 2020-06-26 PROCEDURE — 96413 CHEMO IV INFUSION 1 HR: CPT

## 2020-06-26 PROCEDURE — 77030012965 HC NDL HUBR BBMI -A

## 2020-06-26 RX ORDER — SODIUM CHLORIDE 9 MG/ML
25 INJECTION, SOLUTION INTRAVENOUS CONTINUOUS
Status: DISPENSED | OUTPATIENT
Start: 2020-06-26 | End: 2020-06-26

## 2020-06-26 RX ORDER — HEPARIN 100 UNIT/ML
SYRINGE INTRAVENOUS
Status: DISPENSED
Start: 2020-06-26 | End: 2020-06-26

## 2020-06-26 RX ORDER — SODIUM CHLORIDE 0.9 % (FLUSH) 0.9 %
10-40 SYRINGE (ML) INJECTION AS NEEDED
Status: DISPENSED | OUTPATIENT
Start: 2020-06-26 | End: 2020-06-26

## 2020-06-26 RX ORDER — HEPARIN 100 UNIT/ML
300-500 SYRINGE INTRAVENOUS AS NEEDED
Status: ACTIVE | OUTPATIENT
Start: 2020-06-26 | End: 2020-06-26

## 2020-06-26 RX ADMIN — Medication 30 ML: at 10:00

## 2020-06-26 RX ADMIN — HEPARIN 500 UNITS: 100 SYRINGE at 10:03

## 2020-06-26 RX ADMIN — SODIUM CHLORIDE 200 MG: 9 INJECTION, SOLUTION INTRAVENOUS at 09:24

## 2020-06-26 RX ADMIN — Medication 20 ML: at 09:15

## 2020-06-26 NOTE — PROGRESS NOTES
[facility] Progress Note    Date: 2020    Name: Maryse Trejo              MRN: [id number]              : 1960    Chemotherapy Cycle:C12d1 [place]      Pt to [facility], ambulatory, at 0855 am. No complaints or concerns voiced      . Ms. Prince Keita was assessed and education was provided. Ms. Diamond Dick vitals were reviewed. Visit Vitals  /90 (BP 1 Location: Right arm, BP Patient Position: At rest)   Pulse 86   Temp 98.1 °F (36.7 °C)   Resp 18   SpO2 100%   Breastfeeding No         Left chest medial lumen of her double lumen mediport accessed with 20 g 1 inch el needle. Port flushed easily and had brisk blood return      Lab results were obtained and reviewed. Results for Aleksey Dang (MRN [id number])    Ref. Range 2020 14:15   WBC Latest Ref Range: 4.6 - 13.2 K/uL 5.6   RBC Latest Ref Range: 4.20 - 5.30 M/uL 4.70   HGB Latest Ref Range: 12.0 - 16.0 g/dL 10.9 (L)   HCT Latest Ref Range: 35.0 - 45.0 % 34.8 (L)   MCV Latest Ref Range: 74.0 - 97.0 FL 74.0   MCH Latest Ref Range: 24.0 - 34.0 PG 23.2 (L)   MCHC Latest Ref Range: 31.0 - 37.0 g/dL 31.3   RDW Latest Ref Range: 11.6 - 14.5 % 19.2 (H)   PLATELET Latest Ref Range: 135 - 420 K/uL 196   MPV Latest Ref Range: 9.2 - 11.8 FL 9.8   NEUTROPHILS Latest Ref Range: 40 - 73 % 64   LYMPHOCYTES Latest Ref Range: 21 - 52 % 28   MONOCYTES Latest Ref Range: 3 - 10 % 6   EOSINOPHILS Latest Ref Range: 0 - 5 % 2   BASOPHILS Latest Ref Range: 0 - 2 % 0   DF Latest Units:   AUTOMATED   ABS. NEUTROPHILS Latest Ref Range: 1.8 - 8.0 K/UL 3.6   ABS. LYMPHOCYTES Latest Ref Range: 0.9 - 3.6 K/UL 1.6   ABS. MONOCYTES Latest Ref Range: 0.05 - 1.2 K/UL 0.3   ABS. EOSINOPHILS Latest Ref Range: 0.0 - 0.4 K/UL 0.1   ABS.  BASOPHILS Latest Ref Range: 0.0 - 0.1 K/UL 0.0   Sodium Latest Ref Range: 136 - 145 mmol/L 141   Potassium Latest Ref Range: 3.5 - 5.5 mmol/L 4.2   Chloride Latest Ref Range: 100 - 111 mmol/L 107   CO2 Latest Ref Range: 21 - 32 mmol/L 25 Anion gap Latest Ref Range: 3.0 - 18 mmol/L 9   Glucose Latest Ref Range: 74 - 99 mg/dL 100 (H)   BUN Latest Ref Range: 7.0 - 18 MG/DL 6 (L)   Creatinine Latest Ref Range: 0.6 - 1.3 MG/DL 0.83   BUN/Creatinine ratio Latest Ref Range: 12 - 20   7 (L)   Calcium Latest Ref Range: 8.5 - 10.1 MG/DL 9.0   GFR est non-AA Latest Ref Range: >60 ml/min/1.73m2 >60   GFR est AA Latest Ref Range: >60 ml/min/1.73m2 >60   Bilirubin, total Latest Ref Range: 0.2 - 1.0 MG/DL 0.2   Protein, total Latest Ref Range: 6.4 - 8.2 g/dL 7.4   Albumin Latest Ref Range: 3.4 - 5.0 g/dL 3.7   Globulin Latest Ref Range: 2.0 - 4.0 g/dL 3.7   A-G Ratio Latest Ref Range: 0.8 - 1.7   1.0   ALT Latest Ref Range: 13 - 56 U/L 18   AST Latest Ref Range: 10 - 38 U/L 18   Alk. phosphatase Latest Ref Range: 45 - 117 U/L 91   TSH Latest Ref Range: 0.36 - 3.74 uIU/mL 4.94 (H)     Lab results within ordered parameters to give chemo today. No pre-medications ordered. Keytruda 200 mg was infused at over 30 minutes as ordered. VS stable at end of infusion and pt denied complaints. Line flushed with NS and blood return from port re-verified. Patient Vitals for the past 12 hrs:   Temp Pulse Resp BP SpO2   06/26/20 1000 98.1 °F (36.7 °C) 86 18 140/90 100 %   06/26/20 0855 98.8 °F (37.1 °C) 83 18 146/80 98 %          Ms. Leigh tolerated infusion, and had no complaints. Mediport flushed with NS 30 ml and Heparin 500 units then de-accessed. No irritation or bleeding noted. Bandaid applied. Patient armband removed and shredded. Reviewed discharge instructions with patient. She verbalized understanding    Ms. Oliva Friend was discharged from [place] [address] in stable condition at 1005. She is to return on 7/15/2020 at 2 pm  for her pre chemo lab appointment.     Samantha Babin RN  June 26, 2020  0653

## 2020-07-10 RX ORDER — EPINEPHRINE 1 MG/ML
0.3 INJECTION, SOLUTION, CONCENTRATE INTRAVENOUS AS NEEDED
Status: CANCELLED | OUTPATIENT
Start: 2020-07-17

## 2020-07-10 RX ORDER — ACETAMINOPHEN 325 MG/1
650 TABLET ORAL AS NEEDED
Status: CANCELLED
Start: 2020-07-17

## 2020-07-10 RX ORDER — HYDROCORTISONE SODIUM SUCCINATE 100 MG/2ML
100 INJECTION, POWDER, FOR SOLUTION INTRAMUSCULAR; INTRAVENOUS AS NEEDED
Status: CANCELLED | OUTPATIENT
Start: 2020-07-17

## 2020-07-10 RX ORDER — DIPHENHYDRAMINE HYDROCHLORIDE 50 MG/ML
50 INJECTION, SOLUTION INTRAMUSCULAR; INTRAVENOUS AS NEEDED
Status: CANCELLED
Start: 2020-07-17

## 2020-07-10 RX ORDER — ONDANSETRON 2 MG/ML
8 INJECTION INTRAMUSCULAR; INTRAVENOUS AS NEEDED
Status: CANCELLED | OUTPATIENT
Start: 2020-07-17

## 2020-07-10 RX ORDER — ALBUTEROL SULFATE 0.83 MG/ML
2.5 SOLUTION RESPIRATORY (INHALATION) AS NEEDED
Status: CANCELLED
Start: 2020-07-17

## 2020-07-10 RX ORDER — SODIUM CHLORIDE 9 MG/ML
25 INJECTION, SOLUTION INTRAVENOUS CONTINUOUS
Status: CANCELLED | OUTPATIENT
Start: 2020-07-17

## 2020-07-15 ENCOUNTER — HOSPITAL ENCOUNTER (OUTPATIENT)
Dept: INFUSION THERAPY | Age: 60
Discharge: HOME | End: 2020-07-15
Payer: MEDICARE

## 2020-07-15 VITALS
TEMPERATURE: 98 F | HEART RATE: 81 BPM | HEIGHT: 66 IN | WEIGHT: 137.2 LBS | RESPIRATION RATE: 18 BRPM | BODY MASS INDEX: 22.05 KG/M2 | DIASTOLIC BLOOD PRESSURE: 75 MMHG | OXYGEN SATURATION: 94 % | SYSTOLIC BLOOD PRESSURE: 120 MMHG

## 2020-07-15 LAB
ALBUMIN SERPL-MCNC: 3.4 G/DL (ref 3.4–5)
ALBUMIN/GLOB SERPL: 0.9 {RATIO} (ref 0.8–1.7)
ALP SERPL-CCNC: 87 U/L (ref 45–117)
ALT SERPL-CCNC: 13 U/L (ref 13–56)
ANION GAP SERPL CALC-SCNC: 8 MMOL/L (ref 3–18)
AST SERPL-CCNC: 12 U/L (ref 10–38)
BASOPHILS # BLD: 0 K/UL (ref 0–0.1)
BASOPHILS NFR BLD: 0 % (ref 0–2)
BILIRUB SERPL-MCNC: 0.2 MG/DL (ref 0.2–1)
BUN SERPL-MCNC: 7 MG/DL (ref 7–18)
BUN/CREAT SERPL: 9 (ref 12–20)
CALCIUM SERPL-MCNC: 8.7 MG/DL (ref 8.5–10.1)
CHLORIDE SERPL-SCNC: 108 MMOL/L (ref 100–111)
CO2 SERPL-SCNC: 26 MMOL/L (ref 21–32)
CREAT SERPL-MCNC: 0.74 MG/DL (ref 0.6–1.3)
DIFFERENTIAL METHOD BLD: ABNORMAL
EOSINOPHIL # BLD: 0.1 K/UL (ref 0–0.4)
EOSINOPHIL NFR BLD: 1 % (ref 0–5)
ERYTHROCYTE [DISTWIDTH] IN BLOOD BY AUTOMATED COUNT: 19 % (ref 11.6–14.5)
GLOBULIN SER CALC-MCNC: 3.7 G/DL (ref 2–4)
GLUCOSE SERPL-MCNC: 119 MG/DL (ref 74–99)
HCT VFR BLD AUTO: 35.6 % (ref 35–45)
HGB BLD-MCNC: 10.9 G/DL (ref 12–16)
LYMPHOCYTES # BLD: 1 K/UL (ref 0.9–3.6)
LYMPHOCYTES NFR BLD: 20 % (ref 21–52)
MCH RBC QN AUTO: 23 PG (ref 24–34)
MCHC RBC AUTO-ENTMCNC: 30.6 G/DL (ref 31–37)
MCV RBC AUTO: 75.1 FL (ref 74–97)
MONOCYTES # BLD: 0.4 K/UL (ref 0.05–1.2)
MONOCYTES NFR BLD: 7 % (ref 3–10)
NEUTS SEG # BLD: 3.7 K/UL (ref 1.8–8)
NEUTS SEG NFR BLD: 72 % (ref 40–73)
PLATELET # BLD AUTO: 204 K/UL (ref 135–420)
PMV BLD AUTO: 9.5 FL (ref 9.2–11.8)
POTASSIUM SERPL-SCNC: 3.8 MMOL/L (ref 3.5–5.5)
PROT SERPL-MCNC: 7.1 G/DL (ref 6.4–8.2)
RBC # BLD AUTO: 4.74 M/UL (ref 4.2–5.3)
SODIUM SERPL-SCNC: 142 MMOL/L (ref 136–145)
TSH SERPL DL<=0.05 MIU/L-ACNC: 4.52 UIU/ML (ref 0.36–3.74)
WBC # BLD AUTO: 5.1 K/UL (ref 4.6–13.2)

## 2020-07-15 PROCEDURE — 84443 ASSAY THYROID STIM HORMONE: CPT

## 2020-07-15 PROCEDURE — 80053 COMPREHEN METABOLIC PANEL: CPT

## 2020-07-15 PROCEDURE — 85025 COMPLETE CBC W/AUTO DIFF WBC: CPT

## 2020-07-15 PROCEDURE — 36415 COLL VENOUS BLD VENIPUNCTURE: CPT

## 2020-07-17 ENCOUNTER — HOSPITAL ENCOUNTER (OUTPATIENT)
Dept: INFUSION THERAPY | Age: 60
Discharge: HOME | End: 2020-07-17
Payer: MEDICARE

## 2020-07-17 VITALS
HEART RATE: 76 BPM | RESPIRATION RATE: 18 BRPM | OXYGEN SATURATION: 97 % | TEMPERATURE: 98.4 F | DIASTOLIC BLOOD PRESSURE: 79 MMHG | SYSTOLIC BLOOD PRESSURE: 132 MMHG

## 2020-07-17 DIAGNOSIS — C34.91 NON-SMALL CELL CANCER OF RIGHT LUNG (HCC): Primary | ICD-10-CM

## 2020-07-17 PROCEDURE — 77030012965 HC NDL HUBR BBMI -A

## 2020-07-17 PROCEDURE — 74011000258 HC RX REV CODE- 258: Performed by: INTERNAL MEDICINE

## 2020-07-17 PROCEDURE — 74011250636 HC RX REV CODE- 250/636: Performed by: INTERNAL MEDICINE

## 2020-07-17 PROCEDURE — 96413 CHEMO IV INFUSION 1 HR: CPT

## 2020-07-17 RX ORDER — SODIUM CHLORIDE 0.9 % (FLUSH) 0.9 %
10-40 SYRINGE (ML) INJECTION AS NEEDED
Status: DISPENSED | OUTPATIENT
Start: 2020-07-17 | End: 2020-07-17

## 2020-07-17 RX ORDER — ASPIRIN 81 MG/1
81 TABLET ORAL DAILY
COMMUNITY

## 2020-07-17 RX ORDER — HEPARIN 100 UNIT/ML
300-500 SYRINGE INTRAVENOUS AS NEEDED
Status: DISPENSED | OUTPATIENT
Start: 2020-07-17 | End: 2020-07-17

## 2020-07-17 RX ADMIN — HEPARIN 500 UNITS: 100 SYRINGE at 10:37

## 2020-07-17 RX ADMIN — Medication 20 ML: at 10:37

## 2020-07-17 RX ADMIN — SODIUM CHLORIDE 400 MG: 9 INJECTION, SOLUTION INTRAVENOUS at 09:58

## 2020-07-17 RX ADMIN — Medication 10 ML: at 09:49

## 2020-07-17 NOTE — PROGRESS NOTES
[facility] Progress Note    Date: 2020    Name: Gordo Temple    MRN: [id number]         : 1960       Jack Castellanos 11      Ms. Leigh arrived to [facility] at [phone]. Ms. Rahat Delgado was assessed and education was provided. Ms. Vicky Chery vitals were reviewed. Visit Vitals  /79 (BP 1 Location: Right arm, BP Patient Position: Sitting)   Pulse 76   Temp 98.4 °F (36.9 °C)   Resp 18   SpO2 97%   Breastfeeding No       Patient's left upper chest port accessed (lateral lumen). Brisk blood return/ flushes without difficulty. Lab results were obtained and reviewed from 7/15/20. Labs within parameters as specified on tx plan. Keytruda 200mg IV administered as ordered followed by NS flush. Ms. Rahat Delgado tolerated infusion without complaints. Port flushed with heparin per order and de-accessed. Band-aid applied to site. Ms. Rahat Delgado was discharged from [place] [address] in stable condition at [phone]. She is to return for pre-chemo labs on 20 at 1400 and 20 at 0900 for her next cycle of chemo.     Nilo Briggs RN  2020

## 2020-08-04 ENCOUNTER — APPOINTMENT (OUTPATIENT)
Dept: INFUSION THERAPY | Age: 60
End: 2020-08-04
Payer: MEDICARE

## 2020-08-07 ENCOUNTER — APPOINTMENT (OUTPATIENT)
Dept: INFUSION THERAPY | Age: 60
End: 2020-08-07
Payer: MEDICARE

## 2020-08-20 ENCOUNTER — HOSPITAL ENCOUNTER (OUTPATIENT)
Dept: MAMMOGRAPHY | Age: 60
Discharge: HOME | End: 2020-08-20
Attending: INTERNAL MEDICINE
Payer: MEDICARE

## 2020-08-20 ENCOUNTER — HOSPITAL ENCOUNTER (OUTPATIENT)
Dept: ULTRASOUND IMAGING | Age: 60
Discharge: HOME | End: 2020-08-20
Attending: INTERNAL MEDICINE
Payer: MEDICARE

## 2020-08-20 DIAGNOSIS — N64.4 BREAST PAIN: ICD-10-CM

## 2020-08-20 PROCEDURE — 76642 ULTRASOUND BREAST LIMITED: CPT

## 2020-08-20 PROCEDURE — 77062 BREAST TOMOSYNTHESIS BI: CPT

## 2020-08-21 RX ORDER — DIPHENHYDRAMINE HYDROCHLORIDE 50 MG/ML
50 INJECTION, SOLUTION INTRAMUSCULAR; INTRAVENOUS AS NEEDED
Status: CANCELLED
Start: 2020-08-28

## 2020-08-21 RX ORDER — EPINEPHRINE 1 MG/ML
0.3 INJECTION, SOLUTION, CONCENTRATE INTRAVENOUS AS NEEDED
Status: CANCELLED | OUTPATIENT
Start: 2020-08-28

## 2020-08-21 RX ORDER — HYDROCORTISONE SODIUM SUCCINATE 100 MG/2ML
100 INJECTION, POWDER, FOR SOLUTION INTRAMUSCULAR; INTRAVENOUS AS NEEDED
Status: CANCELLED | OUTPATIENT
Start: 2020-08-28

## 2020-08-21 RX ORDER — ALBUTEROL SULFATE 0.83 MG/ML
2.5 SOLUTION RESPIRATORY (INHALATION) AS NEEDED
Status: CANCELLED
Start: 2020-08-28

## 2020-08-21 RX ORDER — ONDANSETRON 2 MG/ML
8 INJECTION INTRAMUSCULAR; INTRAVENOUS AS NEEDED
Status: CANCELLED | OUTPATIENT
Start: 2020-08-28

## 2020-08-21 RX ORDER — ACETAMINOPHEN 325 MG/1
650 TABLET ORAL AS NEEDED
Status: CANCELLED
Start: 2020-08-28

## 2020-08-21 RX ORDER — SODIUM CHLORIDE 9 MG/ML
25 INJECTION, SOLUTION INTRAVENOUS CONTINUOUS
Status: CANCELLED | OUTPATIENT
Start: 2020-08-28

## 2020-08-25 ENCOUNTER — OFFICE VISIT (OUTPATIENT)
Dept: SURGERY | Age: 60
End: 2020-08-25

## 2020-08-25 VITALS
RESPIRATION RATE: 18 BRPM | HEIGHT: 66 IN | SYSTOLIC BLOOD PRESSURE: 144 MMHG | DIASTOLIC BLOOD PRESSURE: 84 MMHG | WEIGHT: 140 LBS | HEART RATE: 97 BPM | OXYGEN SATURATION: 98 % | TEMPERATURE: 97.5 F | BODY MASS INDEX: 22.5 KG/M2

## 2020-08-25 DIAGNOSIS — N61.0 ACUTE MASTITIS OF RIGHT BREAST: Primary | ICD-10-CM

## 2020-08-26 ENCOUNTER — APPOINTMENT (OUTPATIENT)
Dept: INFUSION THERAPY | Age: 60
End: 2020-08-26
Payer: MEDICARE

## 2020-08-26 ENCOUNTER — HOSPITAL ENCOUNTER (OUTPATIENT)
Dept: INFUSION THERAPY | Age: 60
Discharge: HOME | End: 2020-08-26
Payer: MEDICARE

## 2020-08-26 VITALS
HEIGHT: 66 IN | HEART RATE: 64 BPM | RESPIRATION RATE: 18 BRPM | SYSTOLIC BLOOD PRESSURE: 124 MMHG | WEIGHT: 142.8 LBS | TEMPERATURE: 99 F | OXYGEN SATURATION: 99 % | BODY MASS INDEX: 22.95 KG/M2 | DIASTOLIC BLOOD PRESSURE: 76 MMHG

## 2020-08-26 LAB
ALBUMIN SERPL-MCNC: 3.4 G/DL (ref 3.4–5)
ALBUMIN/GLOB SERPL: 0.9 {RATIO} (ref 0.8–1.7)
ALP SERPL-CCNC: 87 U/L (ref 45–117)
ALT SERPL-CCNC: 11 U/L (ref 13–56)
ANION GAP SERPL CALC-SCNC: 6 MMOL/L (ref 3–18)
AST SERPL-CCNC: 14 U/L (ref 10–38)
BASOPHILS # BLD: 0 K/UL (ref 0–0.1)
BASOPHILS NFR BLD: 0 % (ref 0–2)
BILIRUB SERPL-MCNC: 0.6 MG/DL (ref 0.2–1)
BUN SERPL-MCNC: 7 MG/DL (ref 7–18)
BUN/CREAT SERPL: 9 (ref 12–20)
CALCIUM SERPL-MCNC: 8.7 MG/DL (ref 8.5–10.1)
CHLORIDE SERPL-SCNC: 108 MMOL/L (ref 100–111)
CO2 SERPL-SCNC: 28 MMOL/L (ref 21–32)
CREAT SERPL-MCNC: 0.75 MG/DL (ref 0.6–1.3)
DIFFERENTIAL METHOD BLD: ABNORMAL
EOSINOPHIL # BLD: 0.1 K/UL (ref 0–0.4)
EOSINOPHIL NFR BLD: 1 % (ref 0–5)
ERYTHROCYTE [DISTWIDTH] IN BLOOD BY AUTOMATED COUNT: 17.8 % (ref 11.6–14.5)
GLOBULIN SER CALC-MCNC: 3.6 G/DL (ref 2–4)
GLUCOSE SERPL-MCNC: 108 MG/DL (ref 74–99)
HCT VFR BLD AUTO: 36.1 % (ref 35–45)
HGB BLD-MCNC: 11.5 G/DL (ref 12–16)
LYMPHOCYTES # BLD: 1.5 K/UL (ref 0.9–3.6)
LYMPHOCYTES NFR BLD: 28 % (ref 21–52)
MCH RBC QN AUTO: 24.6 PG (ref 24–34)
MCHC RBC AUTO-ENTMCNC: 31.9 G/DL (ref 31–37)
MCV RBC AUTO: 77.3 FL (ref 74–97)
MONOCYTES # BLD: 0.3 K/UL (ref 0.05–1.2)
MONOCYTES NFR BLD: 5 % (ref 3–10)
NEUTS SEG # BLD: 3.6 K/UL (ref 1.8–8)
NEUTS SEG NFR BLD: 66 % (ref 40–73)
PLATELET # BLD AUTO: 209 K/UL (ref 135–420)
PMV BLD AUTO: 10.4 FL (ref 9.2–11.8)
POTASSIUM SERPL-SCNC: 3.5 MMOL/L (ref 3.5–5.5)
PROT SERPL-MCNC: 7 G/DL (ref 6.4–8.2)
RBC # BLD AUTO: 4.67 M/UL (ref 4.2–5.3)
SODIUM SERPL-SCNC: 142 MMOL/L (ref 136–145)
TSH SERPL DL<=0.05 MIU/L-ACNC: 6.38 UIU/ML (ref 0.36–3.74)
WBC # BLD AUTO: 5.4 K/UL (ref 4.6–13.2)

## 2020-08-26 PROCEDURE — 85025 COMPLETE CBC W/AUTO DIFF WBC: CPT

## 2020-08-26 PROCEDURE — 80053 COMPREHEN METABOLIC PANEL: CPT

## 2020-08-26 PROCEDURE — 84443 ASSAY THYROID STIM HORMONE: CPT

## 2020-08-26 PROCEDURE — 36415 COLL VENOUS BLD VENIPUNCTURE: CPT

## 2020-08-27 NOTE — PROGRESS NOTES
[facility]  General Surgery    Subjective:      HPI: Patient is a very pleasant 20-year-old female with a past medical history remarkable for hypertension, history of tobacco abuse, non-small cell lung cancer right lung, history of pulmonary emboli on Xarelto also history of right lower extremity DVT history of SVC syndrome with stent placement and history of iron deficiency anemia. She was referred for evaluation of skin thickening of the right breast and periareolar region. I reviewed the most recent mammography independently on the monitor along with the ultrasound. No evidence of mass within the breasts. Patient denies any nipple drainage or discharge or breast pain.     Patient Active Problem List    Diagnosis Date Noted    Non-small cell lung cancer ([address] 04/05/2018    Abnormal mammogram 06/21/2017    Iron deficiency anemia 01/04/2017    Recurrent major depressive disorder ([address] 07/01/2016    Former smoker 04/06/2016    Essential hypertension 04/06/2016    Right leg DVT ([address] 04/06/2016    Pulmonary embolism without acute cor pulmonale (HCC) 04/06/2016    SVC syndrome 03/20/2016     Past Medical History:   Diagnosis Date    Anemia, iron deficiency 2/2016    Depression     H/O seasonal allergies     Hypertension     Non-small cell carcinoma of lung ([address] 2/2016    adenocarcinoma of right lung    Positive occult stool blood test 2/29/2016    Pulmonary embolism ([address] 2/28/2016    small, bilateral PEs- on Xarelto    Right leg DVT ([address] 2/28/2016    on Xarelto    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    resolved    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      Past Surgical History:   Procedure Laterality Date    BREAST SURGERY PROCEDURE UNLISTED      biopsy    HX ANGIOPLASTY Right 3/20/2016    IJ, subclavian, and brachiocephalic veins    HX HYSTERECTOMY      due to menorrhagia     HX OTHER SURGICAL Right 3/20/2016    2 placed in right IJ, subclavian/brachiocephalic    HX THROMBECTOMY  3/20/2016    with thrombolysis    HX VASCULAR ACCESS Left     double lumen      Family History   Problem Relation Age of Onset    Cancer Sister 48        breast    Liver Disease Sister         cirrhosis    Heart Attack Mother 37    No Known Problems Child       Social History     Tobacco Use    Smoking status: Former Smoker     Packs/day: 0.50     Last attempt to quit: 2016     Years since quittin.4    Smokeless tobacco: Never Used   Substance Use Topics    Alcohol use: No      Allergies   Allergen Reactions    Flagyl [Metronidazole] Hives    Nitroimidazoles Other (comments)     Nitroimidazole derivatives       Prior to Admission medications    Medication Sig Start Date End Date Taking? Authorizing Provider   aspirin delayed-release 81 mg tablet Take 81 mg by mouth daily. Yes Provider, Historical   potassium chloride SA (MICRO-K) 10 mEq capsule Take 10 mEq by mouth daily. Yes Provider, Historical   levothyroxine (SYNTHROID) 50 mcg tablet Take 1 Tab by mouth Daily (before breakfast). 20  Yes Lamar Chavez PA   ferrous sulfate 325 mg (65 mg iron) tablet take 1 tablet by mouth twice a day with meals 20  Yes Lamar Chavez PA   sertraline (ZOLOFT) 50 mg tablet take 1 tablet by mouth once daily 20  Yes Lamar Chavez PA   amLODIPine (NORVASC) 5 mg tablet take 1 tablet by mouth once daily 20  Yes Lamar Chavez PA   loratadine (CLARITIN) 10 mg tablet Take 10 mg by mouth daily. Yes Other, MD Lennie       Review of Systems:    14 systems were reviewed. The results are as above in the HPI and otherwise negative. Objective:     Vitals:    20 1326   BP: 144/84   Pulse: 97   Resp: 18   Temp: 97.5 °F (36.4 °C)   SpO2: 98%   Weight: 63.5 kg (140 lb)   Height: 5' 6\" (1.676 m)       Physical Exam:  GENERAL: alert, cooperative, no distress, appears stated age,   EYE: conjunctivae/corneas clear.  PERRL, EOM's intact. THROAT & NECK: normal and no erythema or exudates noted. ,    LYMPHATIC: Cervical, supraclavicular, and axillary nodes normal. ,   LUNG: clear to auscultation bilaterally,   HEART: regular rate and rhythm, S1, S2 normal, no murmur, click, rub or gallop,   BREASTS:   Left: No dimpling, discoloration, nipple inversion or retractions. No axillary or supraclavicular lymphadenopathy. No mass  Right: No  discoloration, nipple inversion or retractions. Circumareolar 3 cm radius around the nipple and areolar peau d'orange each type dimpling,   No axillary or supraclavicular lymphadenopathy. No mass  ABDOMEN: soft, non-tender. Bowel sounds normal. No masses,  no organomegaly,   EXTREMITIES:  extremities normal, atraumatic, no cyanosis or edema,   SKIN: Normal.,   NEUROLOGIC: AOx3. Cranial nerves 2-12 and sensation grossly intact. ,     Data Review: As in HPI    Impression:     · Patient with skin thickening of the right breast concerning for possible inflammatory breast cancer. Patient states that this has happened prior to her beginning chemotherapy for the lung cancer. The thickening resolved after she started chemotherapy. She is requesting that we wait until she has had a couple of cycles of chemotherapy before we determine whether to do a biopsy. I think this is reasonable. Plan:     · Follow-up in 3 months to reassess the right breast for punch biopsy.

## 2020-08-27 NOTE — PATIENT INSTRUCTIONS
Breast Self-Exam: Care Instructions  Your Care Instructions     A breast self-exam is when you check your breasts for lumps or changes. This regular exam helps you learn how your breasts normally look and feel. Most breast problems or changes are not because of cancer. Breast self-exam is not a substitute for a mammogram. Having regular breast exams by your doctor and regular mammograms improve your chances of finding any problems with your breasts. Some women set a time each month to do a step-by-step breast self-exam. Other women like a less formal system. They might look at their breasts as they brush their teeth, or feel their breasts once in a while in the shower. If you notice a change in your breast, tell your doctor. Follow-up care is a key part of your treatment and safety. Be sure to make and go to all appointments, and call your doctor if you are having problems. It's also a good idea to know your test results and keep a list of the medicines you take. How do you do a breast self-exam?  · The best time to examine your breasts is usually one week after your menstrual period begins. Your breasts should not be tender then. If you do not have periods, you might do your exam on a day of the month that is easy to remember. · To examine your breasts:  ? Remove all your clothes above the waist and lie down. When you are lying down, your breast tissue spreads evenly over your chest wall, which makes it easier to feel all your breast tissue. ? Use the pads--not the fingertips--of the 3 middle fingers of your left hand to check your right breast. Move your fingers slowly in small coin-sized circles that overlap. ? Use three levels of pressure to feel of all your breast tissue. Use light pressure to feel the tissue close to the skin surface. Use medium pressure to feel a little deeper. Use firm pressure to feel your tissue close to your breastbone and ribs.  Use each pressure level to feel your breast tissue before moving on to the next spot. ? Check your entire breast, moving up and down as if following a strip from the collarbone to the bra line, and from the armpit to the ribs. Repeat until you have covered the entire breast.  ? Repeat this procedure for your left breast, using the pads of the 3 middle fingers of your right hand. · To examine your breasts while in the shower:  ? Place one arm over your head and lightly soap your breast on that side. ? Using the pads of your fingers, gently move your hand over your breast (in the strip pattern described above), feeling carefully for any lumps or changes. ? Repeat for the other breast.  · Have your doctor inspect anything you notice to see if you need further testing. Where can you learn more? Go to http://www.gray.com/  Enter P148 in the search box to learn more about \"Breast Self-Exam: Care Instructions. \"  Current as of: August 22, 2019               Content Version: 12.5  © 5203-0221 [employer]. Care instructions adapted under license by [employer] (which disclaims liability or warranty for this information). If you have questions about a medical condition or this instruction, always ask your healthcare professional. [address] any warranty or liability for your use of this information.

## 2020-08-28 ENCOUNTER — HOSPITAL ENCOUNTER (OUTPATIENT)
Dept: INFUSION THERAPY | Age: 60
Discharge: HOME | End: 2020-08-28
Payer: MEDICARE

## 2020-08-28 VITALS
HEART RATE: 69 BPM | WEIGHT: 142.8 LBS | HEIGHT: 66 IN | BODY MASS INDEX: 22.95 KG/M2 | RESPIRATION RATE: 18 BRPM | OXYGEN SATURATION: 96 % | SYSTOLIC BLOOD PRESSURE: 149 MMHG | TEMPERATURE: 98 F | DIASTOLIC BLOOD PRESSURE: 88 MMHG

## 2020-08-28 DIAGNOSIS — C34.91 NON-SMALL CELL CANCER OF RIGHT LUNG (HCC): Primary | ICD-10-CM

## 2020-08-28 PROCEDURE — 96413 CHEMO IV INFUSION 1 HR: CPT

## 2020-08-28 PROCEDURE — 74011000258 HC RX REV CODE- 258: Performed by: INTERNAL MEDICINE

## 2020-08-28 PROCEDURE — 74011250636 HC RX REV CODE- 250/636: Performed by: INTERNAL MEDICINE

## 2020-08-28 PROCEDURE — 77030012965 HC NDL HUBR BBMI -A

## 2020-08-28 RX ORDER — HEPARIN 100 UNIT/ML
300-500 SYRINGE INTRAVENOUS AS NEEDED
Status: DISPENSED | OUTPATIENT
Start: 2020-08-28 | End: 2020-08-28

## 2020-08-28 RX ORDER — SODIUM CHLORIDE 0.9 % (FLUSH) 0.9 %
10-40 SYRINGE (ML) INJECTION AS NEEDED
Status: DISPENSED | OUTPATIENT
Start: 2020-08-28 | End: 2020-08-28

## 2020-08-28 RX ADMIN — SODIUM CHLORIDE 400 MG: 9 INJECTION, SOLUTION INTRAVENOUS at 09:59

## 2020-08-28 RX ADMIN — Medication 10 ML: at 09:55

## 2020-08-28 RX ADMIN — Medication 20 ML: at 10:30

## 2020-08-28 RX ADMIN — HEPARIN 500 UNITS: 100 SYRINGE at 10:37

## 2020-08-28 NOTE — PROGRESS NOTES
[facility] Progress Note    Date: 2020    Name: Ashlyn Cruz    MRN: [id number]         : 1960       Stacey Lubin 14      Ms. Leigh arrived to [facility]. Ms. Martha Guallpa was assessed and education was provided. Ms. Bashir Duenas vitals were reviewed. Visit Vitals  /78 (BP 1 Location: Left arm, BP Patient Position: Sitting)   Pulse 68   Temp 98.5 °F (36.9 °C)   Resp 18   Ht 5' 6\" (1.676 m)   Wt 64.8 kg (142 lb 12.8 oz)   SpO2 97%   BMI 23.05 kg/m²       Patient's left upper chest port accessed (lateral lumen). Brisk blood return/ flushes without difficulty. Lab results were obtained and reviewed from 20. Labs within parameters specified on tx plan. Keytruda 400mg IV administered as ordered followed by NS flush. Ms. Martha Guallpa tolerated infusion without complaints. Port flushed with heparin per order and de-accessed. Band-aid applied to site. Pt armband removed & shredded. Ms. Martha Guallpa was discharged from [place] [address] in stable condition at [phone]. She is to return for her next appt for pre-chemo labs on 10/7/20 at 1400.     Marilee Lazaro RN  2020

## 2020-09-25 DIAGNOSIS — I10 ESSENTIAL HYPERTENSION: ICD-10-CM

## 2020-09-28 RX ORDER — AMLODIPINE BESYLATE 5 MG/1
TABLET ORAL
Qty: 30 TAB | Refills: 0 | Status: SHIPPED | OUTPATIENT
Start: 2020-09-28 | End: 2020-10-29

## 2020-10-01 RX ORDER — DIPHENHYDRAMINE HYDROCHLORIDE 50 MG/ML
50 INJECTION, SOLUTION INTRAMUSCULAR; INTRAVENOUS AS NEEDED
Status: CANCELLED
Start: 2020-10-09

## 2020-10-01 RX ORDER — ACETAMINOPHEN 325 MG/1
650 TABLET ORAL AS NEEDED
Status: CANCELLED
Start: 2020-10-09

## 2020-10-01 RX ORDER — ONDANSETRON 2 MG/ML
8 INJECTION INTRAMUSCULAR; INTRAVENOUS AS NEEDED
Status: CANCELLED | OUTPATIENT
Start: 2020-10-09

## 2020-10-01 RX ORDER — EPINEPHRINE 1 MG/ML
0.3 INJECTION, SOLUTION, CONCENTRATE INTRAVENOUS AS NEEDED
Status: CANCELLED | OUTPATIENT
Start: 2020-10-09

## 2020-10-01 RX ORDER — ALBUTEROL SULFATE 0.83 MG/ML
2.5 SOLUTION RESPIRATORY (INHALATION) AS NEEDED
Status: CANCELLED
Start: 2020-10-09

## 2020-10-01 RX ORDER — HYDROCORTISONE SODIUM SUCCINATE 100 MG/2ML
100 INJECTION, POWDER, FOR SOLUTION INTRAMUSCULAR; INTRAVENOUS AS NEEDED
Status: CANCELLED | OUTPATIENT
Start: 2020-10-09

## 2020-10-07 ENCOUNTER — HOSPITAL ENCOUNTER (OUTPATIENT)
Dept: INFUSION THERAPY | Age: 60
Discharge: HOME | End: 2020-10-07
Payer: MEDICARE

## 2020-10-07 VITALS
RESPIRATION RATE: 16 BRPM | DIASTOLIC BLOOD PRESSURE: 88 MMHG | HEIGHT: 66 IN | BODY MASS INDEX: 22.82 KG/M2 | WEIGHT: 142 LBS | SYSTOLIC BLOOD PRESSURE: 145 MMHG | HEART RATE: 88 BPM | OXYGEN SATURATION: 99 % | TEMPERATURE: 98.6 F

## 2020-10-07 LAB
ALBUMIN SERPL-MCNC: 3.7 G/DL (ref 3.4–5)
ALBUMIN/GLOB SERPL: 1 {RATIO} (ref 0.8–1.7)
ALP SERPL-CCNC: 98 U/L (ref 45–117)
ALT SERPL-CCNC: 10 U/L (ref 13–56)
ANION GAP SERPL CALC-SCNC: 7 MMOL/L (ref 3–18)
AST SERPL-CCNC: 15 U/L (ref 10–38)
BASOPHILS # BLD: 0 K/UL (ref 0–0.1)
BASOPHILS NFR BLD: 0 % (ref 0–2)
BILIRUB SERPL-MCNC: 0.4 MG/DL (ref 0.2–1)
BUN SERPL-MCNC: 5 MG/DL (ref 7–18)
BUN/CREAT SERPL: 6 (ref 12–20)
CALCIUM SERPL-MCNC: 9.1 MG/DL (ref 8.5–10.1)
CHLORIDE SERPL-SCNC: 105 MMOL/L (ref 100–111)
CO2 SERPL-SCNC: 29 MMOL/L (ref 21–32)
CREAT SERPL-MCNC: 0.82 MG/DL (ref 0.6–1.3)
DIFFERENTIAL METHOD BLD: ABNORMAL
EOSINOPHIL # BLD: 0.1 K/UL (ref 0–0.4)
EOSINOPHIL NFR BLD: 2 % (ref 0–5)
ERYTHROCYTE [DISTWIDTH] IN BLOOD BY AUTOMATED COUNT: 16.9 % (ref 11.6–14.5)
GLOBULIN SER CALC-MCNC: 3.6 G/DL (ref 2–4)
GLUCOSE SERPL-MCNC: 100 MG/DL (ref 74–99)
HCT VFR BLD AUTO: 37.6 % (ref 35–45)
HGB BLD-MCNC: 12.3 G/DL (ref 12–16)
LYMPHOCYTES # BLD: 1.3 K/UL (ref 0.9–3.6)
LYMPHOCYTES NFR BLD: 26 % (ref 21–52)
MCH RBC QN AUTO: 25.3 PG (ref 24–34)
MCHC RBC AUTO-ENTMCNC: 32.7 G/DL (ref 31–37)
MCV RBC AUTO: 77.2 FL (ref 74–97)
MONOCYTES # BLD: 0.4 K/UL (ref 0.05–1.2)
MONOCYTES NFR BLD: 7 % (ref 3–10)
NEUTS SEG # BLD: 3.2 K/UL (ref 1.8–8)
NEUTS SEG NFR BLD: 65 % (ref 40–73)
PLATELET # BLD AUTO: 203 K/UL (ref 135–420)
PMV BLD AUTO: 10.6 FL (ref 9.2–11.8)
POTASSIUM SERPL-SCNC: 3.5 MMOL/L (ref 3.5–5.5)
PROT SERPL-MCNC: 7.3 G/DL (ref 6.4–8.2)
RBC # BLD AUTO: 4.87 M/UL (ref 4.2–5.3)
SODIUM SERPL-SCNC: 141 MMOL/L (ref 136–145)
TSH SERPL DL<=0.05 MIU/L-ACNC: 4.71 UIU/ML (ref 0.36–3.74)
WBC # BLD AUTO: 5 K/UL (ref 4.6–13.2)

## 2020-10-07 PROCEDURE — 85025 COMPLETE CBC W/AUTO DIFF WBC: CPT

## 2020-10-07 PROCEDURE — 36415 COLL VENOUS BLD VENIPUNCTURE: CPT

## 2020-10-07 PROCEDURE — 84443 ASSAY THYROID STIM HORMONE: CPT

## 2020-10-07 PROCEDURE — 80053 COMPREHEN METABOLIC PANEL: CPT

## 2020-10-09 ENCOUNTER — HOSPITAL ENCOUNTER (OUTPATIENT)
Dept: INFUSION THERAPY | Age: 60
Discharge: HOME | End: 2020-10-09
Payer: MEDICARE

## 2020-10-09 VITALS
RESPIRATION RATE: 18 BRPM | OXYGEN SATURATION: 95 % | HEART RATE: 94 BPM | TEMPERATURE: 98.7 F | SYSTOLIC BLOOD PRESSURE: 141 MMHG | DIASTOLIC BLOOD PRESSURE: 91 MMHG

## 2020-10-09 DIAGNOSIS — C34.91 NON-SMALL CELL CANCER OF RIGHT LUNG (HCC): Primary | ICD-10-CM

## 2020-10-09 PROCEDURE — 77030012965 HC NDL HUBR BBMI -A

## 2020-10-09 PROCEDURE — 74011250636 HC RX REV CODE- 250/636: Performed by: INTERNAL MEDICINE

## 2020-10-09 PROCEDURE — 74011000258 HC RX REV CODE- 258: Performed by: INTERNAL MEDICINE

## 2020-10-09 PROCEDURE — 96413 CHEMO IV INFUSION 1 HR: CPT

## 2020-10-09 RX ORDER — HEPARIN 100 UNIT/ML
300-500 SYRINGE INTRAVENOUS AS NEEDED
Status: ACTIVE | OUTPATIENT
Start: 2020-10-09 | End: 2020-10-09

## 2020-10-09 RX ORDER — SODIUM CHLORIDE 9 MG/ML
25 INJECTION, SOLUTION INTRAVENOUS CONTINUOUS
Status: DISPENSED | OUTPATIENT
Start: 2020-10-09 | End: 2020-10-09

## 2020-10-09 RX ORDER — SODIUM CHLORIDE 0.9 % (FLUSH) 0.9 %
10-40 SYRINGE (ML) INJECTION AS NEEDED
Status: DISPENSED | OUTPATIENT
Start: 2020-10-09 | End: 2020-10-09

## 2020-10-09 RX ORDER — HEPARIN 100 UNIT/ML
SYRINGE INTRAVENOUS
Status: DISCONTINUED
Start: 2020-10-09 | End: 2020-10-09 | Stop reason: HOSPADM

## 2020-10-09 RX ADMIN — SODIUM CHLORIDE 25 ML/HR: 900 INJECTION, SOLUTION INTRAVENOUS at 09:24

## 2020-10-09 RX ADMIN — Medication 20 ML: at 10:04

## 2020-10-09 RX ADMIN — HEPARIN 500 UNITS: 100 SYRINGE at 10:04

## 2020-10-09 RX ADMIN — SODIUM CHLORIDE 400 MG: 900 INJECTION, SOLUTION INTRAVENOUS at 09:37

## 2020-10-09 NOTE — PROGRESS NOTES
[facility] Progress Note    Date: 2020    Name: Gomez De Anda    MRN: [id number]         : 1960       Usha Whittington 13      Ms. Leigh arrived to [facility] at [phone]. Ms. Dhiraj Denise was assessed and education was provided. Ms. Addison Espinosa vitals were reviewed. Visit Vitals  BP (!) 141/91 (BP 1 Location: Left arm, BP Patient Position: Sitting)   Pulse 94   Temp 98.7 °F (37.1 °C)   Resp 18   SpO2 95%   Breastfeeding No       Patient's left upper chest port accessed (lateral lumen). Brisk blood return/ flushes without difficulty. Lab results were obtained and reviewed from 10/7/20. Labs within parameters specified on tx plan. Results for Quentin Tyler (MRN [id number]) as of 10/9/2020 10:12   Ref. Range 10/7/2020 14:15   WBC Latest Ref Range: 4.6 - 13.2 K/uL 5.0   RBC Latest Ref Range: 4.20 - 5.30 M/uL 4.87   HGB Latest Ref Range: 12.0 - 16.0 g/dL 12.3   HCT Latest Ref Range: 35.0 - 45.0 % 37.6   MCV Latest Ref Range: 74.0 - 97.0 FL 77.2   MCH Latest Ref Range: 24.0 - 34.0 PG 25.3   MCHC Latest Ref Range: 31.0 - 37.0 g/dL 32.7   RDW Latest Ref Range: 11.6 - 14.5 % 16.9 (H)   PLATELET Latest Ref Range: 135 - 420 K/uL 203   MPV Latest Ref Range: 9.2 - 11.8 FL 10.6   NEUTROPHILS Latest Ref Range: 40 - 73 % 65   LYMPHOCYTES Latest Ref Range: 21 - 52 % 26   MONOCYTES Latest Ref Range: 3 - 10 % 7   EOSINOPHILS Latest Ref Range: 0 - 5 % 2   BASOPHILS Latest Ref Range: 0 - 2 % 0   DF Latest Units:   AUTOMATED   ABS. NEUTROPHILS Latest Ref Range: 1.8 - 8.0 K/UL 3.2   ABS. LYMPHOCYTES Latest Ref Range: 0.9 - 3.6 K/UL 1.3   ABS. MONOCYTES Latest Ref Range: 0.05 - 1.2 K/UL 0.4   ABS. EOSINOPHILS Latest Ref Range: 0.0 - 0.4 K/UL 0.1   ABS.  BASOPHILS Latest Ref Range: 0.0 - 0.1 K/UL 0.0   Sodium Latest Ref Range: 136 - 145 mmol/L 141   Potassium Latest Ref Range: 3.5 - 5.5 mmol/L 3.5   Chloride Latest Ref Range: 100 - 111 mmol/L 105   CO2 Latest Ref Range: 21 - 32 mmol/L 29   Anion gap Latest Ref Range: 3.0 - 18 mmol/L 7   Glucose Latest Ref Range: 74 - 99 mg/dL 100 (H)   BUN Latest Ref Range: 7.0 - 18 MG/DL 5 (L)   Creatinine Latest Ref Range: 0.6 - 1.3 MG/DL 0.82   BUN/Creatinine ratio Latest Ref Range: 12 - 20   6 (L)   Calcium Latest Ref Range: 8.5 - 10.1 MG/DL 9.1   GFR est non-AA Latest Ref Range: >60 ml/min/1.73m2 >60   GFR est AA Latest Ref Range: >60 ml/min/1.73m2 >60   Bilirubin, total Latest Ref Range: 0.2 - 1.0 MG/DL 0.4   Protein, total Latest Ref Range: 6.4 - 8.2 g/dL 7.3   Albumin Latest Ref Range: 3.4 - 5.0 g/dL 3.7   Globulin Latest Ref Range: 2.0 - 4.0 g/dL 3.6   A-G Ratio Latest Ref Range: 0.8 - 1.7   1.0   ALT Latest Ref Range: 13 - 56 U/L 10 (L)   AST Latest Ref Range: 10 - 38 U/L 15   Alk. phosphatase Latest Ref Range: 45 - 117 U/L 98   TSH Latest Ref Range: 0.36 - 3.74 uIU/mL 4.71 (H)          Keytruda 400mg IV administered as ordered followed by NS flush. Ms. Darron Ibarra tolerated infusion without complaints. Port flushed with heparin per order and de-accessed. Band-aid applied to site. Pt armband removed & shredded. Ms. Darron Ibarra was discharged from [place] [address] in stable condition at 1010. She is to return for her next appt for pre-chemo labs on 11/18/20 at 1400.     Lucas Renteria RN  October 9, 2020

## 2020-10-12 ENCOUNTER — TELEPHONE (OUTPATIENT)
Dept: FAMILY MEDICINE CLINIC | Age: 60
End: 2020-10-12

## 2020-10-12 ENCOUNTER — OFFICE VISIT (OUTPATIENT)
Dept: FAMILY MEDICINE CLINIC | Age: 60
End: 2020-10-12
Payer: MEDICARE

## 2020-10-12 VITALS
BODY MASS INDEX: 22.18 KG/M2 | DIASTOLIC BLOOD PRESSURE: 78 MMHG | HEART RATE: 78 BPM | WEIGHT: 138 LBS | SYSTOLIC BLOOD PRESSURE: 130 MMHG | OXYGEN SATURATION: 98 % | RESPIRATION RATE: 16 BRPM | TEMPERATURE: 98.8 F | HEIGHT: 66 IN

## 2020-10-12 DIAGNOSIS — E03.9 ACQUIRED HYPOTHYROIDISM: ICD-10-CM

## 2020-10-12 DIAGNOSIS — F33.42 RECURRENT MAJOR DEPRESSIVE DISORDER, IN FULL REMISSION (HCC): Primary | ICD-10-CM

## 2020-10-12 DIAGNOSIS — Z72.0 TOBACCO USE: ICD-10-CM

## 2020-10-12 DIAGNOSIS — Z23 ENCOUNTER FOR IMMUNIZATION: ICD-10-CM

## 2020-10-12 DIAGNOSIS — C34.91 NON-SMALL CELL CANCER OF RIGHT LUNG (HCC): ICD-10-CM

## 2020-10-12 DIAGNOSIS — I10 ESSENTIAL HYPERTENSION: ICD-10-CM

## 2020-10-12 DIAGNOSIS — D50.9 IRON DEFICIENCY ANEMIA, UNSPECIFIED IRON DEFICIENCY ANEMIA TYPE: ICD-10-CM

## 2020-10-12 DIAGNOSIS — R92.8 ABNORMAL MAMMOGRAM: ICD-10-CM

## 2020-10-12 PROCEDURE — G0463 HOSPITAL OUTPT CLINIC VISIT: HCPCS | Performed by: FAMILY MEDICINE

## 2020-10-12 PROCEDURE — 99214 OFFICE O/P EST MOD 30 MIN: CPT | Performed by: FAMILY MEDICINE

## 2020-10-12 PROCEDURE — G8427 DOCREV CUR MEDS BY ELIG CLIN: HCPCS | Performed by: FAMILY MEDICINE

## 2020-10-12 PROCEDURE — G9711 PT HX TOT COL OR COLON CA: HCPCS | Performed by: FAMILY MEDICINE

## 2020-10-12 PROCEDURE — G9717 DOC PT DX DEP/BP F/U NT REQ: HCPCS | Performed by: FAMILY MEDICINE

## 2020-10-12 PROCEDURE — G9899 SCRN MAM PERF RSLTS DOC: HCPCS | Performed by: FAMILY MEDICINE

## 2020-10-12 PROCEDURE — G8420 CALC BMI NORM PARAMETERS: HCPCS | Performed by: FAMILY MEDICINE

## 2020-10-12 PROCEDURE — 90686 IIV4 VACC NO PRSV 0.5 ML IM: CPT

## 2020-10-12 PROCEDURE — G8754 DIAS BP LESS 90: HCPCS | Performed by: FAMILY MEDICINE

## 2020-10-12 PROCEDURE — G8752 SYS BP LESS 140: HCPCS | Performed by: FAMILY MEDICINE

## 2020-10-12 NOTE — TELEPHONE ENCOUNTER
.Have you been diagnosed with, tested for, or told that you are suspected of having COVID-19 (coronovirus)? Fever going through chemo in April/ neg   Have you had a fever or taken medication to treat a fever in the past 72 hours? no  Have you had a cough, SOB, or flu-like symptoms within the past 3 days?no  Have you had direct contact with someone who tested positive for COVID-19 within the past 14 days?no  Do you have a household member with flu-like symptoms, including fever, cough, or SOB? no  Do you currently have flu-like symptoms including fever, cough, or SOB? no  Are you experiencing new loss of taste or smell?no

## 2020-10-12 NOTE — PROGRESS NOTES
1. Have you been to the ER, urgent care clinic since your last visit? Hospitalized since your last visit? No    2. Have you seen or consulted any other health care providers outside of the [address] [place] [address] since your last visit? Include any pap smears or colon screening. Dr. Verónica Allison Hematology  Flulaval 0.5 ml given IM in left deltoid. Lot # [id number], exp date 06/30/2021. Patient tolerated injection well. No adverse reaction noted.

## 2020-10-12 NOTE — PROGRESS NOTES
Yesenia Schneider, 62 y.o.,  female    SUBJECTIVE  Establish care, prev PA leah    HTN-long standing h/o, on norvasc for years. She continues to smoke about 1 pack every 3 days. Depression- reports to be doing well on zoloft    Non small cell lung cancer-dx 2015 ongoing chemotherapy with dr. Tariq Amanda. Hypothyroidism- on lt4 replacement, no symptoms, following dr. Paty Rodriguez- surveillance per hematology    Abnormal mammogram- mastitis responded to antibx,  [facility] recommended 3 month ff-up clinical exam to ensure not inflammatory breast pathology. ROS:  See HPI, all others negative        Patient Active Problem List   Diagnosis Code    SVC syndrome I87.1    Former smoker Z87.891    Essential hypertension I10    Right leg DVT ([facility] I82.401    Pulmonary embolism without acute cor pulmonale (HCC) I26.99    Recurrent major depressive disorder (HCC) F33.9    Iron deficiency anemia D50.9    Abnormal mammogram R92.8    Non-small cell lung cancer ([facility] C34.90       Current Outpatient Medications   Medication Sig Dispense Refill    amLODIPine (NORVASC) 5 mg tablet take 1 tablet by mouth once daily 30 Tab 0    aspirin delayed-release 81 mg tablet Take 81 mg by mouth daily.  levothyroxine (SYNTHROID) 50 mcg tablet Take 1 Tab by mouth Daily (before breakfast). 30 Tab 5    ferrous sulfate 325 mg (65 mg iron) tablet take 1 tablet by mouth twice a day with meals 60 Tab 5    sertraline (ZOLOFT) 50 mg tablet take 1 tablet by mouth once daily 30 Tab 5    loratadine (CLARITIN) 10 mg tablet Take 10 mg by mouth daily.  potassium chloride SA (MICRO-K) 10 mEq capsule Take 10 mEq by mouth daily.          Allergies   Allergen Reactions    Flagyl [Metronidazole] Hives    Nitroimidazoles Other (comments)     Nitroimidazole derivatives       Past Medical History:   Diagnosis Date    Anemia, iron deficiency 2/2016    Depression     H/O seasonal allergies     Hypertension     Non-[facility] 2016    adenocarcinoma of right lung    Positive occult stool blood test 2016    Pulmonary embolism ([facility] 2016    small, bilateral PEs- on Xarelto    Right leg DVT ([facility] 2016    on Xarelto    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[facility] 2016    resolved    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       Social History     Socioeconomic History    Marital status: LEGALLY      Spouse name: Not on file    Number of children: Not on file    Years of education: Not on file    Highest education level: Not on file   Occupational History    Not on file   Social Needs    Financial resource strain: Not on file    Food insecurity     Worry: Not on file     Inability: Not on file    Transportation needs     Medical: Not on file     Non-medical: Not on file   Tobacco Use    Smoking status: Former Smoker     Packs/day: 0.50     Last attempt to quit: 2016     Years since quittin.6    Smokeless tobacco: Never Used   Substance and Sexual Activity    Alcohol use: No    Drug use: Never     Types: Prescription    Sexual activity: Yes     Partners: Male     Birth control/protection: Surgical     Comment: tubal ligation   Lifestyle    Physical activity     Days per week: Not on file     Minutes per session: Not on file    Stress: Not on file   Relationships    Social connections     Talks on phone: Not on file     Gets together: Not on file     Attends Taoist service: Not on file     Active member of club or organization: Not on file     Attends meetings of clubs or organizations: Not on file     Relationship status: Not on file    Intimate partner violence     Fear of current or ex partner: Not on file     Emotionally abused: Not on file     Physically abused: Not on file     Forced sexual activity: Not on file   Other Topics Concern     Service Not Asked    Blood Transfusions Not Asked    Caffeine Concern Not Asked    Occupational Exposure Not Asked    Hobby Hazards Not Asked    Sleep Concern Not Asked    Stress Concern Not Asked    Weight Concern Not Asked    Special Diet Not Asked    Back Care Not Asked    Exercise Not Asked    Bike Helmet Not Asked   [address] Not Asked    Self-Exams Not Asked   Social History Narrative    Not on file       Family History   Problem Relation Age of Onset    Cancer Sister 48        breast    Liver Disease Sister         cirrhosis    Heart Attack Mother 37    No Known Problems Child          OBJECTIVE    Physical Exam:     Visit Vitals  /78 (BP 1 Location: Left arm, BP Patient Position: Sitting)   Pulse 78   Temp 98.8 °F (37.1 °C) (Oral)   Resp 16   Ht 5' 6\" (1.676 m)   Wt 138 lb (62.6 kg)   SpO2 98%   BMI 22.27 kg/m²       General: alert, chronically ill- appearing, AA, in no apparent distress or pain  Head: atraumatic. Non-tender maxillary and frontal sinuses  Eyes: Lids with no discharge, no matting, conjunctivae clear and non injected, full EOMs, PERLLA  Ears: pinna non-tender, external auditory canal patent, TM intact  Neck: supple, no adenopathy palpated  CVS: normal rate, regular rhythm, distinct S1 and S2  Lungs:clear to ausculation bilaterally, no crackles, wheezing or rhonchi noted  Abdomen: normoactive bowel sounds, soft, non-tender  Extremities: no edema, no cyanosis, MSK grossly normal  Skin: warm, no lesions, rashes noted  Psych:  mood and affect normal        ASSESSMENT/PLAN  Diagnoses and all orders for this visit:    1. Recurrent major depressive disorder, in full remission ([facility]  Stable  Cont zoloft    2. Encounter for immunization  -     INFLUENZA VIRUS VAC QUAD,SPLIT,PRESV FREE SYRINGE IM    3. Non-small cell cancer of right lung [facility])  Ongoing chemotherapy  Following dr. Rogers Deleon    4. Essential hypertension  Controlled  Cont norvasc  5. Iron deficiency anemia, unspecified iron deficiency anemia type  Surveillance per dr. Rogers Deleon    6.  Abnormal mammogram  Mastitis, responded to antibx  Following dr. Brandon Lopes    7. Tobacco use  Encouraged compete cessation    8. Acquired hypothyroidism  Following dr. Brittney Adkins        Follow-up and Dispositions    · Return in about 6 months (around 4/12/2021), or if symptoms worsen or fail to improve, for routine chronic illness care, plan on Medicare wellness on next visit. Patient understands plan of care. Patient has provided input and agrees with goals.

## 2020-10-12 NOTE — PATIENT INSTRUCTIONS
Vaccine Information Statement    Influenza (Flu) Vaccine (Inactivated or Recombinant): What You Need to Know    Many Vaccine Information Statements are available in Yoruba and other languages. See www.immunize.org/vis  Hojas de información sobre vacunas están disponibles en español y en muchos otros idiomas. Visite www.immunize.org/vis    1. Why get vaccinated? Influenza vaccine can prevent influenza (flu). Flu is a contagious disease that spreads around the [place] [facility] every year, usually between October and May. Anyone can get the flu, but it is more dangerous for some people. Infants and young children, people 72years of age and older, pregnant women, and people with certain health conditions or a weakened immune system are at greatest risk of flu complications. Pneumonia, bronchitis, sinus infections and ear infections are examples of flu-related complications. If you have a medical condition, such as heart disease, cancer or diabetes, flu can make it worse. Flu can cause fever and chills, sore throat, muscle aches, fatigue, cough, headache, and runny or stuffy nose. Some people may have vomiting and diarrhea, though this is more common in children than adults. Each year thousands of people in the [facility] die from flu, and many more are hospitalized. Flu vaccine prevents millions of illnesses and flu-related visits to the doctor each year. 2. Influenza vaccines     CDC recommends everyone 10months of age and older get vaccinated every flu season. Children 6 months through 6years of age may need 2 doses during a single flu season. Everyone else needs only 1 dose each flu season. It takes about 2 weeks for protection to develop after vaccination. There are many flu viruses, and they are always changing. Each year a new flu vaccine is made to protect against three or four viruses that are likely to cause disease in the upcoming flu season.  Even when the vaccine doesnt exactly match these viruses, it may still provide some protection. Influenza vaccine does not cause flu. Influenza vaccine may be given at the same time as other vaccines. 3. Talk with your health care provider    Tell your vaccine provider if the person getting the vaccine:   Has had an allergic reaction after a previous dose of influenza vaccine, or has any severe, life-threatening allergies.  Has ever had Guillain-Barré Syndrome (also called GBS). In some cases, your health care provider may decide to postpone influenza vaccination to a future visit. People with minor illnesses, such as a cold, may be vaccinated. People who are moderately or severely ill should usually wait until they recover before getting influenza vaccine. Your health care provider can give you more information. 4. Risks of a reaction     Soreness, redness, and swelling where shot is given, fever, muscle aches, and headache can happen after influenza vaccine.  There may be a very small increased risk of Guillain-Barré Syndrome (GBS) after inactivated influenza vaccine (the flu shot). Genevieve Martinez children who get the flu shot along with pneumococcal vaccine (PCV13), and/or DTaP vaccine at the same time might be slightly more likely to have a seizure caused by fever. Tell your health care provider if a child who is getting flu vaccine has ever had a seizure. People sometimes faint after medical procedures, including vaccination. Tell your provider if you feel dizzy or have vision changes or ringing in the ears. As with any medicine, there is a very remote chance of a vaccine causing a severe allergic reaction, other serious injury, or death. 5. What if there is a serious problem? An allergic reaction could occur after the vaccinated person leaves the clinic.  If you see signs of a severe allergic reaction (hives, swelling of the face and throat, difficulty breathing, a fast heartbeat, dizziness, or weakness), call 9-1-1 and get the person to the nearest hospital.    For other signs that concern you, call your health care provider. Adverse reactions should be reported to the Vaccine Adverse Event Reporting System (VAERS). Your health care provider will usually file this report, or you can do it yourself. Visit the VAERS website at www.vaers. [facility].gov or call 9-307.150.4648. VAERS is only for reporting reactions, and VAERS staff do not give medical advice. 6. The National Vaccine Injury Compensation Program    The [facility] Vaccine Injury Compensation Program (VICP) is a federal program that was created to compensate people who may have been injured by certain vaccines. Visit the VICP website at www.[facility].gov/vaccinecompensation or call 1-562.188.1852 to learn about the program and about filing a claim. There is a time limit to file a claim for compensation. 7. How can I learn more?  Ask your health care provider.  Call your local or state health department.  Contact the Centers for Disease Control and Prevention (CDC):  - Call 7-631.358.7826 (1-800-CDC-INFO) or  - Visit CDCs influenza website at www.cdc.gov/flu    Vaccine Information Statement (Interim)  Inactivated Influenza Vaccine   8/15/2019  42 ROBERT Connors [id number]   Department of Health and Human Services  Centers for Disease Control and Prevention    Office Use Only

## 2020-11-05 ENCOUNTER — TRANSCRIBE ORDER (OUTPATIENT)
Dept: SCHEDULING | Age: 60
End: 2020-11-05

## 2020-11-05 DIAGNOSIS — C34.11 MALIGNANT NEOPLASM OF UPPER LOBE OF RIGHT LUNG (HCC): Primary | ICD-10-CM

## 2020-11-05 DIAGNOSIS — R92.8 ABNORMAL MAMMOGRAM: ICD-10-CM

## 2020-11-12 RX ORDER — EPINEPHRINE 1 MG/ML
0.3 INJECTION, SOLUTION, CONCENTRATE INTRAVENOUS AS NEEDED
Status: CANCELLED | OUTPATIENT
Start: 2020-11-20

## 2020-11-12 RX ORDER — ONDANSETRON 2 MG/ML
8 INJECTION INTRAMUSCULAR; INTRAVENOUS AS NEEDED
Status: CANCELLED | OUTPATIENT
Start: 2020-11-20

## 2020-11-12 RX ORDER — DIPHENHYDRAMINE HYDROCHLORIDE 50 MG/ML
50 INJECTION, SOLUTION INTRAMUSCULAR; INTRAVENOUS AS NEEDED
Status: CANCELLED
Start: 2020-11-20

## 2020-11-12 RX ORDER — ALBUTEROL SULFATE 0.83 MG/ML
2.5 SOLUTION RESPIRATORY (INHALATION) AS NEEDED
Status: CANCELLED
Start: 2020-11-20

## 2020-11-12 RX ORDER — HYDROCORTISONE SODIUM SUCCINATE 100 MG/2ML
100 INJECTION, POWDER, FOR SOLUTION INTRAMUSCULAR; INTRAVENOUS AS NEEDED
Status: CANCELLED | OUTPATIENT
Start: 2020-11-20

## 2020-11-12 RX ORDER — ACETAMINOPHEN 325 MG/1
650 TABLET ORAL AS NEEDED
Status: CANCELLED
Start: 2020-11-20

## 2020-11-16 ENCOUNTER — HOSPITAL ENCOUNTER (OUTPATIENT)
Dept: CT IMAGING | Age: 60
Discharge: HOME | End: 2020-11-16
Attending: INTERNAL MEDICINE
Payer: MEDICARE

## 2020-11-16 DIAGNOSIS — C34.11 MALIGNANT NEOPLASM OF UPPER LOBE OF RIGHT LUNG (HCC): ICD-10-CM

## 2020-11-16 DIAGNOSIS — R92.8 ABNORMAL MAMMOGRAM: ICD-10-CM

## 2020-11-16 PROCEDURE — 74177 CT ABD & PELVIS W/CONTRAST: CPT

## 2020-11-16 PROCEDURE — 74011000636 HC RX REV CODE- 636: Performed by: INTERNAL MEDICINE

## 2020-11-16 RX ADMIN — IOPAMIDOL 80 ML: 612 INJECTION, SOLUTION INTRAVENOUS at 11:49

## 2020-11-18 ENCOUNTER — HOSPITAL ENCOUNTER (OUTPATIENT)
Dept: INFUSION THERAPY | Age: 60
End: 2020-11-18

## 2020-11-19 ENCOUNTER — HOSPITAL ENCOUNTER (OUTPATIENT)
Dept: INFUSION THERAPY | Age: 60
Discharge: HOME | End: 2020-11-19
Payer: MEDICARE

## 2020-11-19 VITALS
BODY MASS INDEX: 22.28 KG/M2 | OXYGEN SATURATION: 96 % | WEIGHT: 141.98 LBS | TEMPERATURE: 98.3 F | HEART RATE: 82 BPM | HEIGHT: 67 IN

## 2020-11-19 LAB
ALBUMIN SERPL-MCNC: 3.6 G/DL (ref 3.4–5)
ALBUMIN/GLOB SERPL: 0.9 {RATIO} (ref 0.8–1.7)
ALP SERPL-CCNC: 93 U/L (ref 45–117)
ALT SERPL-CCNC: 11 U/L (ref 13–56)
ANION GAP SERPL CALC-SCNC: 3 MMOL/L (ref 3–18)
AST SERPL-CCNC: 12 U/L (ref 10–38)
BASOPHILS # BLD: 0 K/UL (ref 0–0.1)
BASOPHILS NFR BLD: 0 % (ref 0–2)
BILIRUB SERPL-MCNC: 0.4 MG/DL (ref 0.2–1)
BUN SERPL-MCNC: 5 MG/DL (ref 7–18)
BUN/CREAT SERPL: 7 (ref 12–20)
CALCIUM SERPL-MCNC: 9.1 MG/DL (ref 8.5–10.1)
CHLORIDE SERPL-SCNC: 107 MMOL/L (ref 100–111)
CO2 SERPL-SCNC: 30 MMOL/L (ref 21–32)
CREAT SERPL-MCNC: 0.75 MG/DL (ref 0.6–1.3)
DIFFERENTIAL METHOD BLD: ABNORMAL
EOSINOPHIL # BLD: 0.1 K/UL (ref 0–0.4)
EOSINOPHIL NFR BLD: 1 % (ref 0–5)
ERYTHROCYTE [DISTWIDTH] IN BLOOD BY AUTOMATED COUNT: 16.7 % (ref 11.6–14.5)
GLOBULIN SER CALC-MCNC: 3.9 G/DL (ref 2–4)
GLUCOSE SERPL-MCNC: 109 MG/DL (ref 74–99)
HCT VFR BLD AUTO: 37.6 % (ref 35–45)
HGB BLD-MCNC: 11.9 G/DL (ref 12–16)
LYMPHOCYTES # BLD: 1.2 K/UL (ref 0.9–3.6)
LYMPHOCYTES NFR BLD: 23 % (ref 21–52)
MCH RBC QN AUTO: 24.9 PG (ref 24–34)
MCHC RBC AUTO-ENTMCNC: 31.6 G/DL (ref 31–37)
MCV RBC AUTO: 78.8 FL (ref 74–97)
MONOCYTES # BLD: 0.4 K/UL (ref 0.05–1.2)
MONOCYTES NFR BLD: 7 % (ref 3–10)
NEUTS SEG # BLD: 3.7 K/UL (ref 1.8–8)
NEUTS SEG NFR BLD: 69 % (ref 40–73)
PLATELET # BLD AUTO: 199 K/UL (ref 135–420)
PMV BLD AUTO: 10.4 FL (ref 9.2–11.8)
POTASSIUM SERPL-SCNC: 3.5 MMOL/L (ref 3.5–5.5)
PROT SERPL-MCNC: 7.5 G/DL (ref 6.4–8.2)
RBC # BLD AUTO: 4.77 M/UL (ref 4.2–5.3)
SODIUM SERPL-SCNC: 140 MMOL/L (ref 136–145)
TSH SERPL DL<=0.05 MIU/L-ACNC: 10.2 UIU/ML (ref 0.36–3.74)
WBC # BLD AUTO: 5.3 K/UL (ref 4.6–13.2)

## 2020-11-19 PROCEDURE — 80053 COMPREHEN METABOLIC PANEL: CPT

## 2020-11-19 PROCEDURE — 36415 COLL VENOUS BLD VENIPUNCTURE: CPT

## 2020-11-19 PROCEDURE — 84443 ASSAY THYROID STIM HORMONE: CPT

## 2020-11-19 PROCEDURE — 85025 COMPLETE CBC W/AUTO DIFF WBC: CPT

## 2020-11-19 NOTE — PROGRESS NOTES
[facility] Progress Note    Date: 2020    Name: Brian Anderson    MRN: [id number]         : 1960    Peripheral Lab Draw      Ms. Leigh to [facility], ambulatory at 1055 accompanied by self. Pt was assessed and education was provided. Ms. Drew Gil vitals were reviewed and patient was observed for 5 minutes prior to treatment. Visit Vitals  Pulse 82   Temp 98.3 °F (36.8 °C)   Ht 5' 7\" (1.702 m)   Wt 64.4 kg (141 lb 15.6 oz)   SpO2 96%   BMI 22.24 kg/m²   No results found for this or any previous visit (from the past 12 hour(s)). Blood obtained peripherally from right [facility] first attempt with butterfly needle and sent to lab for CBC w/ Diff, CMP, and TSH per written orders. No bleeding or hematoma noted at site. Gauze and coban applied. Ms. Marcelina Donaldson tolerated the venipuncture, and had no complaints. Patient armband removed and shredded. Ms. Marcelina Donaldson was discharged from [place] [address] in stable condition at .      Carissa Getting Phlebotomist PCT  2020  11:52 AM

## 2020-11-20 ENCOUNTER — HOSPITAL ENCOUNTER (OUTPATIENT)
Dept: INFUSION THERAPY | Age: 60
Discharge: HOME | End: 2020-11-20
Payer: MEDICARE

## 2020-11-20 ENCOUNTER — TRANSCRIBE ORDER (OUTPATIENT)
Dept: SCHEDULING | Age: 60
End: 2020-11-20

## 2020-11-20 VITALS
DIASTOLIC BLOOD PRESSURE: 89 MMHG | RESPIRATION RATE: 18 BRPM | WEIGHT: 142 LBS | OXYGEN SATURATION: 95 % | HEIGHT: 66 IN | SYSTOLIC BLOOD PRESSURE: 139 MMHG | BODY MASS INDEX: 22.82 KG/M2 | TEMPERATURE: 97.1 F | HEART RATE: 85 BPM

## 2020-11-20 DIAGNOSIS — R93.2 ABNORMAL CT OF LIVER: Primary | ICD-10-CM

## 2020-11-20 DIAGNOSIS — C34.11 MALIGNANT NEOPLASM OF UPPER LOBE OF RIGHT LUNG (HCC): ICD-10-CM

## 2020-11-20 DIAGNOSIS — R93.89 ABNORMAL RADIOLOGICAL FINDINGS IN SKIN AND SUBCUTANEOUS TISSUE: ICD-10-CM

## 2020-11-20 DIAGNOSIS — C34.91 NON-SMALL CELL CANCER OF RIGHT LUNG (HCC): Primary | ICD-10-CM

## 2020-11-20 LAB
T4 FREE SERPL-MCNC: 0.9 NG/DL (ref 0.7–1.5)
TSH SERPL DL<=0.05 MIU/L-ACNC: 5.08 UIU/ML (ref 0.36–3.74)

## 2020-11-20 PROCEDURE — 74011250636 HC RX REV CODE- 250/636: Performed by: INTERNAL MEDICINE

## 2020-11-20 PROCEDURE — 96413 CHEMO IV INFUSION 1 HR: CPT

## 2020-11-20 PROCEDURE — 74011000258 HC RX REV CODE- 258: Performed by: INTERNAL MEDICINE

## 2020-11-20 PROCEDURE — 84439 ASSAY OF FREE THYROXINE: CPT

## 2020-11-20 PROCEDURE — 84443 ASSAY THYROID STIM HORMONE: CPT

## 2020-11-20 PROCEDURE — 77030012965 HC NDL HUBR BBMI -A

## 2020-11-20 RX ORDER — SODIUM CHLORIDE 0.9 % (FLUSH) 0.9 %
10-40 SYRINGE (ML) INJECTION AS NEEDED
Status: DISPENSED | OUTPATIENT
Start: 2020-11-20 | End: 2020-11-20

## 2020-11-20 RX ORDER — HEPARIN 100 UNIT/ML
300-500 SYRINGE INTRAVENOUS AS NEEDED
Status: DISPENSED | OUTPATIENT
Start: 2020-11-20 | End: 2020-11-20

## 2020-11-20 RX ORDER — SODIUM CHLORIDE 9 MG/ML
25 INJECTION, SOLUTION INTRAVENOUS CONTINUOUS
Status: DISPENSED | OUTPATIENT
Start: 2020-11-20 | End: 2020-11-20

## 2020-11-20 RX ADMIN — SODIUM CHLORIDE 400 MG: 9 INJECTION, SOLUTION INTRAVENOUS at 10:19

## 2020-11-20 RX ADMIN — HEPARIN 500 UNITS: 100 SYRINGE at 10:58

## 2020-11-20 RX ADMIN — SODIUM CHLORIDE 25 ML/HR: 9 INJECTION, SOLUTION INTRAVENOUS at 09:31

## 2020-11-20 RX ADMIN — Medication 20 ML: at 10:57

## 2020-11-20 NOTE — PROGRESS NOTES
Called back Dr. Kun Barton office and spoke with [facility] and advised of T4 Free is 0.9 and TSH is 5.08 which is lower than yesterday and pt has been taking Synthyroid. She will relay message to Dr. Mickey Ashby and follow up with pt if need be.

## 2020-11-20 NOTE — PROGRESS NOTES
[facility] Progress Note    Date: 2020    Name: Star Murillo    MRN: [id number]         : 1960       Yaneli Horse 14      Ms. Leigh arrived to [facility] at [id number]. Ms. Minesh Desai was assessed and education was provided. Ms. Carmel Peace vitals were reviewed. Visit Vitals  /70 (BP 1 Location: Right arm, BP Patient Position: Sitting)   Pulse 83   Temp 97.1 °F (36.2 °C)   Resp 18   Ht 5' 6\" (1.676 m)   Wt 64.4 kg (142 lb)   SpO2 97%   Breastfeeding No   BMI 22.92 kg/m²       Patient's left upper chest port accessed (lateral lumen). Brisk blood return, waste 10cc for blood draw repeat CBC and Free T4 and flushed with 30 ml of NS. Sent specimen to main lab for processing. Lab results were obtained and reviewed from 20. Labs within parameters specified on tx plan. Results for Eboni Avendano (MRN [id number])   Ref. Range 2020 11:00   WBC Latest Ref Range: 4.6 - 13.2 K/uL 5.3   RBC Latest Ref Range: 4.20 - 5.30 M/uL 4.77   HGB Latest Ref Range: 12.0 - 16.0 g/dL 11.9 (L)   HCT Latest Ref Range: 35.0 - 45.0 % 37.6   MCV Latest Ref Range: 74.0 - 97.0 FL 78.8   MCH Latest Ref Range: 24.0 - 34.0 PG 24.9   MCHC Latest Ref Range: 31.0 - 37.0 g/dL 31.6   RDW Latest Ref Range: 11.6 - 14.5 % 16.7 (H)   PLATELET Latest Ref Range: 135 - 420 K/uL 199   MPV Latest Ref Range: 9.2 - 11.8 FL 10.4   NEUTROPHILS Latest Ref Range: 40 - 73 % 69   LYMPHOCYTES Latest Ref Range: 21 - 52 % 23   MONOCYTES Latest Ref Range: 3 - 10 % 7   EOSINOPHILS Latest Ref Range: 0 - 5 % 1   BASOPHILS Latest Ref Range: 0 - 2 % 0   DF Latest Units:   AUTOMATED   ABS. NEUTROPHILS Latest Ref Range: 1.8 - 8.0 K/UL 3.7   ABS. LYMPHOCYTES Latest Ref Range: 0.9 - 3.6 K/UL 1.2   ABS. MONOCYTES Latest Ref Range: 0.05 - 1.2 K/UL 0.4   ABS. EOSINOPHILS Latest Ref Range: 0.0 - 0.4 K/UL 0.1   ABS.  BASOPHILS Latest Ref Range: 0.0 - 0.1 K/UL 0.0   Sodium Latest Ref Range: 136 - 145 mmol/L 140   Potassium Latest Ref Range: 3.5 - 5.5 mmol/L 3.5   Chloride Latest Ref Range: 100 - 111 mmol/L 107   CO2 Latest Ref Range: 21 - 32 mmol/L 30   Anion gap Latest Ref Range: 3.0 - 18 mmol/L 3   Glucose Latest Ref Range: 74 - 99 mg/dL 109 (H)   BUN Latest Ref Range: 7.0 - 18 MG/DL 5 (L)   Creatinine Latest Ref Range: 0.6 - 1.3 MG/DL 0.75   BUN/Creatinine ratio Latest Ref Range: 12 - 20   7 (L)   Calcium Latest Ref Range: 8.5 - 10.1 MG/DL 9.1   GFR est non-AA Latest Ref Range: >60 ml/min/1.73m2 >60   GFR est AA Latest Ref Range: >60 ml/min/1.73m2 >60   Bilirubin, total Latest Ref Range: 0.2 - 1.0 MG/DL 0.4   Protein, total Latest Ref Range: 6.4 - 8.2 g/dL 7.5   Albumin Latest Ref Range: 3.4 - 5.0 g/dL 3.6   Globulin Latest Ref Range: 2.0 - 4.0 g/dL 3.9   A-G Ratio Latest Ref Range: 0.8 - 1.7   0.9   ALT Latest Ref Range: 13 - 56 U/L 11 (L)   AST Latest Ref Range: 10 - 38 U/L 12   Alk. phosphatase Latest Ref Range: 45 - 117 U/L 93   TSH Latest Ref Range: 0.36 - 3.74 uIU/mL 10.20 (H)          Keytruda 400mg IV administered as ordered followed by NS flush. Ms. Joycelyn Garcia tolerated infusion without complaints. Port flushed with heparin per order and de-accessed. Band-aid applied to site. Pt armband removed & shredded. Ms. Joycelyn Garcia was discharged from [place] [id number] in stable condition at . She is to return for her next appt for pre-chemo labs on 12/30/20 at 1400 for pre-chemo labs.        Miriam Gaspar RN  November 20, 2020

## 2020-12-08 ENCOUNTER — TRANSCRIBE ORDER (OUTPATIENT)
Dept: SCHEDULING | Age: 60
End: 2020-12-08

## 2020-12-08 DIAGNOSIS — R93.2 ABNORMAL CT OF LIVER: Primary | ICD-10-CM

## 2020-12-08 DIAGNOSIS — C34.11 MALIGNANT NEOPLASM OF UPPER LOBE OF RIGHT LUNG (HCC): ICD-10-CM

## 2020-12-28 RX ORDER — ACETAMINOPHEN 325 MG/1
650 TABLET ORAL AS NEEDED
Status: CANCELLED
Start: 2021-01-04

## 2020-12-28 RX ORDER — HYDROCORTISONE SODIUM SUCCINATE 100 MG/2ML
100 INJECTION, POWDER, FOR SOLUTION INTRAMUSCULAR; INTRAVENOUS AS NEEDED
Status: CANCELLED | OUTPATIENT
Start: 2021-01-04

## 2020-12-28 RX ORDER — ALBUTEROL SULFATE 0.83 MG/ML
2.5 SOLUTION RESPIRATORY (INHALATION) AS NEEDED
Status: CANCELLED
Start: 2021-01-04

## 2020-12-28 RX ORDER — DIPHENHYDRAMINE HYDROCHLORIDE 50 MG/ML
50 INJECTION, SOLUTION INTRAMUSCULAR; INTRAVENOUS AS NEEDED
Status: CANCELLED
Start: 2021-01-04

## 2020-12-28 RX ORDER — ONDANSETRON 2 MG/ML
8 INJECTION INTRAMUSCULAR; INTRAVENOUS AS NEEDED
Status: CANCELLED | OUTPATIENT
Start: 2021-01-04

## 2020-12-28 RX ORDER — EPINEPHRINE 1 MG/ML
0.3 INJECTION, SOLUTION, CONCENTRATE INTRAVENOUS AS NEEDED
Status: CANCELLED | OUTPATIENT
Start: 2021-01-04

## 2020-12-30 ENCOUNTER — HOSPITAL ENCOUNTER (OUTPATIENT)
Dept: INFUSION THERAPY | Age: 60
Discharge: HOME | End: 2020-12-30
Payer: MEDICARE

## 2020-12-30 VITALS
HEART RATE: 67 BPM | SYSTOLIC BLOOD PRESSURE: 144 MMHG | TEMPERATURE: 97.7 F | DIASTOLIC BLOOD PRESSURE: 88 MMHG | BODY MASS INDEX: 23.95 KG/M2 | HEIGHT: 66 IN | WEIGHT: 149 LBS | OXYGEN SATURATION: 95 %

## 2020-12-30 LAB
ALBUMIN SERPL-MCNC: 3.5 G/DL (ref 3.4–5)
ALBUMIN/GLOB SERPL: 0.8 {RATIO} (ref 0.8–1.7)
ALP SERPL-CCNC: 93 U/L (ref 45–117)
ALT SERPL-CCNC: 13 U/L (ref 13–56)
ANION GAP SERPL CALC-SCNC: 8 MMOL/L (ref 3–18)
AST SERPL-CCNC: 14 U/L (ref 10–38)
BASOPHILS # BLD: 0 K/UL (ref 0–0.1)
BASOPHILS NFR BLD: 0 % (ref 0–2)
BILIRUB SERPL-MCNC: 1 MG/DL (ref 0.2–1)
BUN SERPL-MCNC: 7 MG/DL (ref 7–18)
BUN/CREAT SERPL: 9 (ref 12–20)
CALCIUM SERPL-MCNC: 9.1 MG/DL (ref 8.5–10.1)
CHLORIDE SERPL-SCNC: 104 MMOL/L (ref 100–111)
CO2 SERPL-SCNC: 28 MMOL/L (ref 21–32)
CREAT SERPL-MCNC: 0.74 MG/DL (ref 0.6–1.3)
DIFFERENTIAL METHOD BLD: ABNORMAL
EOSINOPHIL # BLD: 0.1 K/UL (ref 0–0.4)
EOSINOPHIL NFR BLD: 2 % (ref 0–5)
ERYTHROCYTE [DISTWIDTH] IN BLOOD BY AUTOMATED COUNT: 16.8 % (ref 11.6–14.5)
GLOBULIN SER CALC-MCNC: 4.3 G/DL (ref 2–4)
GLUCOSE SERPL-MCNC: 69 MG/DL (ref 74–99)
HCT VFR BLD AUTO: 37.6 % (ref 35–45)
HGB BLD-MCNC: 12 G/DL (ref 12–16)
LYMPHOCYTES # BLD: 1.5 K/UL (ref 0.9–3.6)
LYMPHOCYTES NFR BLD: 28 % (ref 21–52)
MCH RBC QN AUTO: 25.1 PG (ref 24–34)
MCHC RBC AUTO-ENTMCNC: 31.9 G/DL (ref 31–37)
MCV RBC AUTO: 78.7 FL (ref 74–97)
MONOCYTES # BLD: 0.3 K/UL (ref 0.05–1.2)
MONOCYTES NFR BLD: 6 % (ref 3–10)
NEUTS SEG # BLD: 3.4 K/UL (ref 1.8–8)
NEUTS SEG NFR BLD: 64 % (ref 40–73)
PLATELET # BLD AUTO: 221 K/UL (ref 135–420)
PMV BLD AUTO: 10 FL (ref 9.2–11.8)
POTASSIUM SERPL-SCNC: 3.4 MMOL/L (ref 3.5–5.5)
PROT SERPL-MCNC: 7.8 G/DL (ref 6.4–8.2)
RBC # BLD AUTO: 4.78 M/UL (ref 4.2–5.3)
SODIUM SERPL-SCNC: 140 MMOL/L (ref 136–145)
TSH SERPL DL<=0.05 MIU/L-ACNC: 5.48 UIU/ML (ref 0.36–3.74)
WBC # BLD AUTO: 5.3 K/UL (ref 4.6–13.2)

## 2020-12-30 PROCEDURE — 36415 COLL VENOUS BLD VENIPUNCTURE: CPT

## 2020-12-30 PROCEDURE — 85025 COMPLETE CBC W/AUTO DIFF WBC: CPT

## 2020-12-30 PROCEDURE — 84443 ASSAY THYROID STIM HORMONE: CPT

## 2020-12-30 PROCEDURE — 80053 COMPREHEN METABOLIC PANEL: CPT

## 2021-01-04 ENCOUNTER — HOSPITAL ENCOUNTER (OUTPATIENT)
Dept: INFUSION THERAPY | Age: 61
Discharge: HOME | End: 2021-01-04
Payer: MEDICARE

## 2021-01-04 VITALS
TEMPERATURE: 97.9 F | DIASTOLIC BLOOD PRESSURE: 92 MMHG | RESPIRATION RATE: 20 BRPM | HEART RATE: 95 BPM | OXYGEN SATURATION: 96 % | SYSTOLIC BLOOD PRESSURE: 168 MMHG

## 2021-01-04 DIAGNOSIS — C34.91 NON-SMALL CELL CANCER OF RIGHT LUNG (HCC): Primary | ICD-10-CM

## 2021-01-04 PROCEDURE — 99211 OFF/OP EST MAY X REQ PHY/QHP: CPT

## 2021-01-04 PROCEDURE — 96413 CHEMO IV INFUSION 1 HR: CPT

## 2021-01-04 PROCEDURE — 74011000258 HC RX REV CODE- 258: Performed by: INTERNAL MEDICINE

## 2021-01-04 PROCEDURE — 74011250636 HC RX REV CODE- 250/636: Performed by: INTERNAL MEDICINE

## 2021-01-04 PROCEDURE — 77030012965 HC NDL HUBR BBMI -A

## 2021-01-04 RX ORDER — HEPARIN 100 UNIT/ML
300-500 SYRINGE INTRAVENOUS AS NEEDED
Status: DISPENSED | OUTPATIENT
Start: 2021-01-04 | End: 2021-01-04

## 2021-01-04 RX ORDER — SODIUM CHLORIDE 9 MG/ML
25 INJECTION, SOLUTION INTRAVENOUS CONTINUOUS
Status: DISPENSED | OUTPATIENT
Start: 2021-01-04 | End: 2021-01-04

## 2021-01-04 RX ORDER — SODIUM CHLORIDE 0.9 % (FLUSH) 0.9 %
10-40 SYRINGE (ML) INJECTION AS NEEDED
Status: DISPENSED | OUTPATIENT
Start: 2021-01-04 | End: 2021-01-04

## 2021-01-04 RX ADMIN — SODIUM CHLORIDE 400 MG: 9 INJECTION, SOLUTION INTRAVENOUS at 10:10

## 2021-01-04 RX ADMIN — Medication 20 ML: at 09:30

## 2021-01-04 RX ADMIN — SODIUM CHLORIDE 25 ML/HR: 9 INJECTION, SOLUTION INTRAVENOUS at 09:50

## 2021-01-04 RX ADMIN — Medication 30 ML: at 11:00

## 2021-01-04 RX ADMIN — HEPARIN 500 UNITS: 100 SYRINGE at 11:00

## 2021-01-04 NOTE — PROGRESS NOTES
[facility] Progress Note    Date: 2021    Name: Federica Montejo    MRN: [id number]         : 1960    Ms. Leigh arrived in the [facility] today at 0915, in stable condition, here for Cycle 17, IV KEYTRUDA Infusion (Every 6 Week Cycle). She was assessed and education was provided. Ms. Nancie Meier vitals were reviewed. Visit Vitals  BP (!) 142/85 (BP 1 Location: Right arm, BP Patient Position: Sitting)   Pulse 81   Temp 98.3 °F (36.8 °C)   Resp 20   SpO2 96%   Breastfeeding No           Lab results were reviewed, and all of her most recent labs listed below, from 20, were all noted to be satisfactory for treatment today. Results for Gee Whiteside (MRN [id number])    Ref. Range 2020 14:05   WBC Latest Ref Range: 4.6 - 13.2 K/uL 5.3   RBC Latest Ref Range: 4.20 - 5.30 M/uL 4.78   HGB Latest Ref Range: 12.0 - 16.0 g/dL 12.0   HCT Latest Ref Range: 35.0 - 45.0 % 37.6   MCV Latest Ref Range: 74.0 - 97.0 FL 78.7   MCH Latest Ref Range: 24.0 - 34.0 PG 25.1   MCHC Latest Ref Range: 31.0 - 37.0 g/dL 31.9   RDW Latest Ref Range: 11.6 - 14.5 % 16.8 (H)   PLATELET Latest Ref Range: 135 - 420 K/uL 221   MPV Latest Ref Range: 9.2 - 11.8 FL 10.0   NEUTROPHILS Latest Ref Range: 40 - 73 % 64   LYMPHOCYTES Latest Ref Range: 21 - 52 % 28   MONOCYTES Latest Ref Range: 3 - 10 % 6   EOSINOPHILS Latest Ref Range: 0 - 5 % 2   BASOPHILS Latest Ref Range: 0 - 2 % 0   DF Latest Units:   AUTOMATED   ABS. NEUTROPHILS Latest Ref Range: 1.8 - 8.0 K/UL 3.4   ABS. LYMPHOCYTES Latest Ref Range: 0.9 - 3.6 K/UL 1.5   ABS. MONOCYTES Latest Ref Range: 0.05 - 1.2 K/UL 0.3   ABS. EOSINOPHILS Latest Ref Range: 0.0 - 0.4 K/UL 0.1   ABS.  BASOPHILS Latest Ref Range: 0.0 - 0.1 K/UL 0.0   Sodium Latest Ref Range: 136 - 145 mmol/L 140   Potassium Latest Ref Range: 3.5 - 5.5 mmol/L 3.4 (L)   Chloride Latest Ref Range: 100 - 111 mmol/L 104   CO2 Latest Ref Range: 21 - 32 mmol/L 28   Anion gap Latest Ref Range: 3.0 - 18 mmol/L 8 Glucose Latest Ref Range: 74 - 99 mg/dL 69 (L)   BUN Latest Ref Range: 7.0 - 18 MG/DL 7   Creatinine Latest Ref Range: 0.6 - 1.3 MG/DL 0.74   BUN/Creatinine ratio Latest Ref Range: 12 - 20   9 (L)   Calcium Latest Ref Range: 8.5 - 10.1 MG/DL 9.1   GFR est non-AA Latest Ref Range: >60 ml/min/1.73m2 >60   GFR est AA Latest Ref Range: >60 ml/min/1.73m2 >60   Bilirubin, total Latest Ref Range: 0.2 - 1.0 MG/DL 1.0   Protein, total Latest Ref Range: 6.4 - 8.2 g/dL 7.8   Albumin Latest Ref Range: 3.4 - 5.0 g/dL 3.5   Globulin Latest Ref Range: 2.0 - 4.0 g/dL 4.3 (H)   A-G Ratio Latest Ref Range: 0.8 - 1.7   0.8   ALT Latest Ref Range: 13 - 56 U/L 13   AST Latest Ref Range: 10 - 38 U/L 14   Alk. phosphatase Latest Ref Range: 45 - 117 U/L 93   TSH Latest Ref Range: 0.36 - 3.74 uIU/mL 5.48 (H)     . Current written chemotherapy consent was noted to be . So, I attempted to contact Dr. Pb Schreiber at his office to obtain a new chemotherapy consent. However, I was notified by the  that Dr. Bella Jensen was unavailable today except for emergencies, because he was rounding at the hospital. I was also made aware that no clinical staff was available, except medical assistant Radha Navas. So, I left a message for LANG Navas on her voicemail box, letting her know that Ms. Leigh required a new written chemotherapy consent & also making her aware of the slightly low Potassium result of 3.4 listed above (per Ms. Leigh, she is NOT currently taking any oral Potassium supplements). Verbal consent for KEYTRUDA Infusion was obtained from Ms. Leigh for today. The outer/lateral lumen of her left chest double lumen port was accessed without incident at 0930, and brisk blood return was obtained.  ml IV Bag was hung to infuse @ KVO PRN throughout treatment today. KEYTRUDA (pembrolizumab) 400 mg IV, was administered over approximately 30 minutes, per order, and without incident.        After completion of all ordered IV medications, her port was flushed well per protocol and without incident, with NS & Heparin, and then, the el needle was removed and gauze/bandaid was applied. Ms. Desean De La Rosa tolerated treatment very well today, and voiced no complaints. Ms. Desaen De La Rosa stated that she had a follow up appointment with Dr. Argentina Calderon on Thursday of this week (1-7-21), and so she was reminded to sign a new chemotherapy consent for [facility] on Thursday while at her appointment with Dr. Argentina Calderon, and she agreed that she would. Ms. Desean De La Rosa was discharged from [place] [address] in stable condition at . Mushtaq Prince She is to return in 6 weeks, on Wednesday, 2-10-21 at [phone],  for her next appointment, for pre-chemo labs. And then, Cycle 18, IV KEYTRUDA Infusion is scheduled for Monday, 2-15-21 at 0900.     Jesus Garcia RN  January 4, 2021  9:58 AM

## 2021-01-15 DIAGNOSIS — F33.9 RECURRENT MAJOR DEPRESSIVE DISORDER, REMISSION STATUS UNSPECIFIED (HCC): ICD-10-CM

## 2021-01-15 NOTE — TELEPHONE ENCOUNTER
This pharmacy faxed over request for the following prescriptions to be filled:    Medication requested :   Requested Prescriptions     Pending Prescriptions Disp Refills    sertraline (ZOLOFT) 50 mg tablet 30 Tab 5     Sig: take 1 tablet by mouth once daily     PCP: [address] [place] [address] or Print: [employer] order or [employer] [address]    Scheduled appointment if not seen by current providers in office: LOV 10/12/2020 No f/u up Scheduled at this time.   Due 4/12/2021

## 2021-01-18 RX ORDER — SERTRALINE HYDROCHLORIDE 50 MG/1
TABLET, FILM COATED ORAL
Qty: 90 TAB | Refills: 0 | Status: SHIPPED | OUTPATIENT
Start: 2021-01-18 | End: 2021-05-03

## 2021-02-01 ENCOUNTER — HOSPITAL ENCOUNTER (OUTPATIENT)
Age: 61
Discharge: HOME | End: 2021-02-01
Attending: PHYSICIAN ASSISTANT
Payer: MEDICARE

## 2021-02-01 LAB — CREAT UR-MCNC: 0.9 MG/DL (ref 0.6–1.3)

## 2021-02-01 PROCEDURE — 74183 MRI ABD W/O CNTR FLWD CNTR: CPT

## 2021-02-01 PROCEDURE — 82565 ASSAY OF CREATININE: CPT

## 2021-02-01 PROCEDURE — A9577 INJ MULTIHANCE: HCPCS

## 2021-02-01 PROCEDURE — 74011250636 HC RX REV CODE- 250/636

## 2021-02-01 RX ADMIN — GADOBENATE DIMEGLUMINE 20 ML: 529 INJECTION, SOLUTION INTRAVENOUS at 17:00

## 2021-02-05 RX ORDER — ONDANSETRON 2 MG/ML
8 INJECTION INTRAMUSCULAR; INTRAVENOUS AS NEEDED
Status: CANCELLED | OUTPATIENT
Start: 2021-02-15

## 2021-02-05 RX ORDER — ALBUTEROL SULFATE 0.83 MG/ML
2.5 SOLUTION RESPIRATORY (INHALATION) AS NEEDED
Status: CANCELLED
Start: 2021-02-15

## 2021-02-05 RX ORDER — DIPHENHYDRAMINE HYDROCHLORIDE 50 MG/ML
50 INJECTION, SOLUTION INTRAMUSCULAR; INTRAVENOUS AS NEEDED
Status: CANCELLED
Start: 2021-02-15

## 2021-02-05 RX ORDER — ACETAMINOPHEN 325 MG/1
650 TABLET ORAL AS NEEDED
Status: CANCELLED
Start: 2021-02-15

## 2021-02-05 RX ORDER — EPINEPHRINE 1 MG/ML
0.3 INJECTION, SOLUTION, CONCENTRATE INTRAVENOUS AS NEEDED
Status: CANCELLED | OUTPATIENT
Start: 2021-02-15

## 2021-02-05 RX ORDER — HYDROCORTISONE SODIUM SUCCINATE 100 MG/2ML
100 INJECTION, POWDER, FOR SOLUTION INTRAMUSCULAR; INTRAVENOUS AS NEEDED
Status: CANCELLED | OUTPATIENT
Start: 2021-02-15

## 2021-02-08 ENCOUNTER — TRANSCRIBE ORDER (OUTPATIENT)
Dept: SCHEDULING | Age: 61
End: 2021-02-08

## 2021-02-08 DIAGNOSIS — C34.11 MALIGNANT NEOPLASM OF UPPER LOBE OF RIGHT LUNG (HCC): Primary | ICD-10-CM

## 2021-02-10 ENCOUNTER — APPOINTMENT (OUTPATIENT)
Dept: INFUSION THERAPY | Age: 61
End: 2021-02-10

## 2021-02-11 ENCOUNTER — HOSPITAL ENCOUNTER (OUTPATIENT)
Dept: INFUSION THERAPY | Age: 61
Discharge: HOME | End: 2021-02-11
Payer: MEDICARE

## 2021-02-11 VITALS
WEIGHT: 142 LBS | DIASTOLIC BLOOD PRESSURE: 94 MMHG | BODY MASS INDEX: 22.82 KG/M2 | SYSTOLIC BLOOD PRESSURE: 170 MMHG | HEART RATE: 84 BPM | HEIGHT: 66 IN | TEMPERATURE: 98.3 F | OXYGEN SATURATION: 96 %

## 2021-02-11 LAB
ALBUMIN SERPL-MCNC: 3.8 G/DL (ref 3.4–5)
ALBUMIN/GLOB SERPL: 0.9 {RATIO} (ref 0.8–1.7)
ALP SERPL-CCNC: 106 U/L (ref 45–117)
ALT SERPL-CCNC: 14 U/L (ref 13–56)
ANION GAP SERPL CALC-SCNC: 4 MMOL/L (ref 3–18)
AST SERPL-CCNC: 16 U/L (ref 10–38)
BASOPHILS # BLD: 0 K/UL (ref 0–0.1)
BASOPHILS NFR BLD: 0 % (ref 0–2)
BILIRUB SERPL-MCNC: 0.4 MG/DL (ref 0.2–1)
BUN SERPL-MCNC: 5 MG/DL (ref 7–18)
BUN/CREAT SERPL: 6 (ref 12–20)
CALCIUM SERPL-MCNC: 8.6 MG/DL (ref 8.5–10.1)
CHLORIDE SERPL-SCNC: 104 MMOL/L (ref 100–111)
CO2 SERPL-SCNC: 31 MMOL/L (ref 21–32)
CREAT SERPL-MCNC: 0.78 MG/DL (ref 0.6–1.3)
DIFFERENTIAL METHOD BLD: ABNORMAL
EOSINOPHIL # BLD: 0.1 K/UL (ref 0–0.4)
EOSINOPHIL NFR BLD: 2 % (ref 0–5)
ERYTHROCYTE [DISTWIDTH] IN BLOOD BY AUTOMATED COUNT: 16.4 % (ref 11.6–14.5)
GLOBULIN SER CALC-MCNC: 4.3 G/DL (ref 2–4)
GLUCOSE SERPL-MCNC: 62 MG/DL (ref 74–99)
HCT VFR BLD AUTO: 39.1 % (ref 35–45)
HGB BLD-MCNC: 12.6 G/DL (ref 12–16)
LYMPHOCYTES # BLD: 1.5 K/UL (ref 0.9–3.6)
LYMPHOCYTES NFR BLD: 25 % (ref 21–52)
MCH RBC QN AUTO: 25.4 PG (ref 24–34)
MCHC RBC AUTO-ENTMCNC: 32.2 G/DL (ref 31–37)
MCV RBC AUTO: 78.7 FL (ref 74–97)
MONOCYTES # BLD: 0.4 K/UL (ref 0.05–1.2)
MONOCYTES NFR BLD: 6 % (ref 3–10)
NEUTS SEG # BLD: 4 K/UL (ref 1.8–8)
NEUTS SEG NFR BLD: 67 % (ref 40–73)
PLATELET # BLD AUTO: 225 K/UL (ref 135–420)
PMV BLD AUTO: 9.7 FL (ref 9.2–11.8)
POTASSIUM SERPL-SCNC: 3.4 MMOL/L (ref 3.5–5.5)
PROT SERPL-MCNC: 8.1 G/DL (ref 6.4–8.2)
RBC # BLD AUTO: 4.97 M/UL (ref 4.2–5.3)
SODIUM SERPL-SCNC: 139 MMOL/L (ref 136–145)
TSH SERPL DL<=0.05 MIU/L-ACNC: 6.87 UIU/ML (ref 0.36–3.74)
WBC # BLD AUTO: 5.9 K/UL (ref 4.6–13.2)

## 2021-02-11 PROCEDURE — 80053 COMPREHEN METABOLIC PANEL: CPT

## 2021-02-11 PROCEDURE — 85025 COMPLETE CBC W/AUTO DIFF WBC: CPT

## 2021-02-11 PROCEDURE — 84443 ASSAY THYROID STIM HORMONE: CPT

## 2021-02-11 PROCEDURE — 36415 COLL VENOUS BLD VENIPUNCTURE: CPT

## 2021-02-11 NOTE — PROGRESS NOTES
[facility] Progress Note    Date: 2021    Name: Dimple Cancino    MRN: [id number]         : 1960    Peripheral Lab Draw      Ms. Leigh to [facility], ambulatory at 1015 accompanied by self. Pt was assessed and education was provided. Ms. Pavel Burk vitals were reviewed and patient was observed for 5 minutes prior to treatment. Visit Vitals  BP (!) 170/94 (BP 1 Location: Right upper arm, BP Patient Position: Sitting)   Pulse 84   Temp 98.3 °F (36.8 °C)   Ht 5' 6\" (1.676 m)   Wt 64.4 kg (142 lb)   SpO2 96%   BMI 22.92 kg/m²     Recent Results (from the past 12 hour(s))   CBC WITH AUTOMATED DIFF    Collection Time: 21 10:20 AM   Result Value Ref Range    WBC 5.9 4.6 - 13.2 K/uL    RBC 4.97 4.20 - 5.30 M/uL    HGB 12.6 12.0 - 16.0 g/dL    HCT 39.1 35.0 - 45.0 %    MCV 78.7 74.0 - 97.0 FL    MCH 25.4 24.0 - 34.0 PG    MCHC 32.2 31.0 - 37.0 g/dL    RDW 16.4 (H) 11.6 - 14.5 %    PLATELET 610 737 - 060 K/uL    MPV 9.7 9.2 - 11.8 FL    NEUTROPHILS 67 40 - 73 %    LYMPHOCYTES 25 21 - 52 %    MONOCYTES 6 3 - 10 %    EOSINOPHILS 2 0 - 5 %    BASOPHILS 0 0 - 2 %    ABS. NEUTROPHILS 4.0 1.8 - 8.0 K/UL    ABS. LYMPHOCYTES 1.5 0.9 - 3.6 K/UL    ABS. MONOCYTES 0.4 0.05 - 1.2 K/UL    ABS. EOSINOPHILS 0.1 0.0 - 0.4 K/UL    ABS. BASOPHILS 0.0 0.0 - 0.1 K/UL    DF AUTOMATED     METABOLIC PANEL, COMPREHENSIVE    Collection Time: 21 10:20 AM   Result Value Ref Range    Sodium 139 136 - 145 mmol/L    Potassium 3.4 (L) 3.5 - 5.5 mmol/L    Chloride 104 100 - 111 mmol/L    CO2 31 21 - 32 mmol/L    Anion gap 4 3.0 - 18 mmol/L    Glucose 62 (L) 74 - 99 mg/dL    BUN 5 (L) 7.0 - 18 MG/DL    Creatinine 0.78 0.6 - 1.3 MG/DL    BUN/Creatinine ratio 6 (L) 12 - 20      GFR est AA >60 >60 ml/min/1.73m2    GFR est non-AA >60 >60 ml/min/1.73m2    Calcium 8.6 8.5 - 10.1 MG/DL    Bilirubin, total 0.4 0.2 - 1.0 MG/DL    ALT (SGPT) 14 13 - 56 U/L    AST (SGOT) 16 10 - 38 U/L    Alk.  phosphatase 106 45 - 117 U/L    Protein, total 8.1 6.4 - 8.2 g/dL    Albumin 3.8 3.4 - 5.0 g/dL    Globulin 4.3 (H) 2.0 - 4.0 g/dL    A-G Ratio 0.9 0.8 - 1.7     TSH 3RD GENERATION    Collection Time: 02/11/21 10:20 AM   Result Value Ref Range    TSH 6.87 (H) 0.36 - 3.74 uIU/mL       Blood obtained peripherally from [facility] first attempt with butterfly needle and sent to lab for CBC w/ Diff, CMP, and TSH per written orders. No bleeding or hematoma noted at site. Gauze and coban applied. Ms. Ruma Giles tolerated the venipuncture, and had no complaints. Patient armband removed and shredded. Ms. Ruma Giles was discharged from [place] [address] in stable condition at 1025.     Daisey Meigs Phlebotomist PCT  February 11, 2021  3:51 PM

## 2021-02-15 ENCOUNTER — HOSPITAL ENCOUNTER (OUTPATIENT)
Dept: INFUSION THERAPY | Age: 61
Discharge: HOME | End: 2021-02-15
Payer: MEDICARE

## 2021-02-15 VITALS
RESPIRATION RATE: 18 BRPM | HEART RATE: 88 BPM | TEMPERATURE: 98.3 F | OXYGEN SATURATION: 95 % | DIASTOLIC BLOOD PRESSURE: 94 MMHG | SYSTOLIC BLOOD PRESSURE: 166 MMHG

## 2021-02-15 DIAGNOSIS — C34.91 NON-SMALL CELL CANCER OF RIGHT LUNG (HCC): Primary | ICD-10-CM

## 2021-02-15 PROCEDURE — 74011250636 HC RX REV CODE- 250/636: Performed by: INTERNAL MEDICINE

## 2021-02-15 PROCEDURE — 77030012965 HC NDL HUBR BBMI -A

## 2021-02-15 PROCEDURE — 74011000258 HC RX REV CODE- 258: Performed by: INTERNAL MEDICINE

## 2021-02-15 PROCEDURE — 96413 CHEMO IV INFUSION 1 HR: CPT

## 2021-02-15 PROCEDURE — 96367 TX/PROPH/DG ADDL SEQ IV INF: CPT

## 2021-02-15 RX ORDER — POTASSIUM CHLORIDE 7.45 MG/ML
10 INJECTION INTRAVENOUS ONCE
Status: COMPLETED | OUTPATIENT
Start: 2021-02-15 | End: 2021-02-15

## 2021-02-15 RX ORDER — SODIUM CHLORIDE 9 MG/ML
25 INJECTION, SOLUTION INTRAVENOUS CONTINUOUS
Status: DISPENSED | OUTPATIENT
Start: 2021-02-15 | End: 2021-02-15

## 2021-02-15 RX ORDER — HEPARIN 100 UNIT/ML
300-500 SYRINGE INTRAVENOUS AS NEEDED
Status: DISPENSED | OUTPATIENT
Start: 2021-02-15 | End: 2021-02-15

## 2021-02-15 RX ORDER — SODIUM CHLORIDE 0.9 % (FLUSH) 0.9 %
10-40 SYRINGE (ML) INJECTION AS NEEDED
Status: DISPENSED | OUTPATIENT
Start: 2021-02-15 | End: 2021-02-15

## 2021-02-15 RX ADMIN — POTASSIUM CHLORIDE 10 MEQ: 7.46 INJECTION, SOLUTION INTRAVENOUS at 09:27

## 2021-02-15 RX ADMIN — SODIUM CHLORIDE 400 MG: 9 INJECTION, SOLUTION INTRAVENOUS at 10:42

## 2021-02-15 RX ADMIN — SODIUM CHLORIDE 25 ML/HR: 9 INJECTION, SOLUTION INTRAVENOUS at 09:25

## 2021-02-15 RX ADMIN — Medication 10 ML: at 09:25

## 2021-02-15 RX ADMIN — HEPARIN 500 UNITS: 100 SYRINGE at 11:15

## 2021-02-15 NOTE — PROGRESS NOTES
[facility] Progress Note    Date: February 15, 2021    Name: Dimple Cancino    MRN: [id number]         : 1960       Michael Siegel 16      Ms. Leigh arrived to [facility] at 8037. Ms. Alda Sanford was assessed and education was provided. Ms. Pavel Burk vitals were reviewed. Visit Vitals  BP (!) 167/96 (BP 1 Location: Right arm, BP Patient Position: Sitting)   Pulse 76   Temp 98.5 °F (36.9 °C)   Resp 18   SpO2 96%       Lab results were obtained and reviewed from 21. Labs within parameters as specified on tx plan to proceed w/ chemo. Dr. Jaxson Sprague was notified of pt's K+ level being 3.4 and increased TSH (6.87). Order received to administer IV Potassium Chloride 10meq x1 dose. Also advised to encourage pt to eat a banana daily. D/w pt. Pt verbalized understanding. Patient's L upper chest port accessed (lateral lumen). Brisk blood return/ flushes without difficulty. NS infusing as ordered at Jazmin Patel. Potassium Chloride 10meq IV administered as ordered followed by NS flush. Keytruda 400mg IV administered as ordered followed by NS flush. Ms. Alda Sanford tolerated infusion without complaints. Port flushed with heparin per order and de-accessed. Band-aid applied to site. Pt armband removed & shredded. Ms. Alda Sanford was discharged from [place] [address] in stable condition at 1120. She is to return for her next appt for pre-chemo labs on 3/24/21 at 1415.     Roshan Anguiano RN  February 15, 2021

## 2021-03-22 ENCOUNTER — HOSPITAL ENCOUNTER (OUTPATIENT)
Dept: CT IMAGING | Age: 61
Discharge: HOME | End: 2021-03-22
Attending: INTERNAL MEDICINE
Payer: MEDICARE

## 2021-03-22 DIAGNOSIS — C34.11 MALIGNANT NEOPLASM OF UPPER LOBE OF RIGHT LUNG (HCC): ICD-10-CM

## 2021-03-22 LAB — CREAT UR-MCNC: 0.7 MG/DL (ref 0.6–1.3)

## 2021-03-22 PROCEDURE — 74011000636 HC RX REV CODE- 636: Performed by: INTERNAL MEDICINE

## 2021-03-22 PROCEDURE — 74177 CT ABD & PELVIS W/CONTRAST: CPT

## 2021-03-22 PROCEDURE — 82565 ASSAY OF CREATININE: CPT

## 2021-03-22 RX ADMIN — IOPAMIDOL 100 ML: 755 INJECTION, SOLUTION INTRAVENOUS at 16:46

## 2021-03-24 ENCOUNTER — HOSPITAL ENCOUNTER (OUTPATIENT)
Dept: INFUSION THERAPY | Age: 61
Discharge: HOME | End: 2021-03-24
Payer: MEDICARE

## 2021-03-24 VITALS
BODY MASS INDEX: 23.03 KG/M2 | OXYGEN SATURATION: 97 % | WEIGHT: 143.3 LBS | TEMPERATURE: 98.5 F | DIASTOLIC BLOOD PRESSURE: 75 MMHG | HEART RATE: 79 BPM | SYSTOLIC BLOOD PRESSURE: 139 MMHG | HEIGHT: 66 IN

## 2021-03-24 LAB
ALBUMIN SERPL-MCNC: 3.7 G/DL (ref 3.4–5)
ALBUMIN/GLOB SERPL: 1 {RATIO} (ref 0.8–1.7)
ALP SERPL-CCNC: 102 U/L (ref 45–117)
ALT SERPL-CCNC: 14 U/L (ref 13–56)
ANION GAP SERPL CALC-SCNC: 7 MMOL/L (ref 3–18)
AST SERPL-CCNC: 14 U/L (ref 10–38)
BASOPHILS # BLD: 0 K/UL (ref 0–0.1)
BASOPHILS NFR BLD: 0 % (ref 0–2)
BILIRUB SERPL-MCNC: 0.3 MG/DL (ref 0.2–1)
BUN SERPL-MCNC: 6 MG/DL (ref 7–18)
BUN/CREAT SERPL: 7 (ref 12–20)
CALCIUM SERPL-MCNC: 8.7 MG/DL (ref 8.5–10.1)
CHLORIDE SERPL-SCNC: 104 MMOL/L (ref 100–111)
CO2 SERPL-SCNC: 30 MMOL/L (ref 21–32)
CREAT SERPL-MCNC: 0.81 MG/DL (ref 0.6–1.3)
DIFFERENTIAL METHOD BLD: ABNORMAL
EOSINOPHIL # BLD: 0.1 K/UL (ref 0–0.4)
EOSINOPHIL NFR BLD: 2 % (ref 0–5)
ERYTHROCYTE [DISTWIDTH] IN BLOOD BY AUTOMATED COUNT: 16.7 % (ref 11.6–14.5)
GLOBULIN SER CALC-MCNC: 3.6 G/DL (ref 2–4)
GLUCOSE SERPL-MCNC: 121 MG/DL (ref 74–99)
HCT VFR BLD AUTO: 37.6 % (ref 35–45)
HGB BLD-MCNC: 12 G/DL (ref 12–16)
LYMPHOCYTES # BLD: 1.4 K/UL (ref 0.9–3.6)
LYMPHOCYTES NFR BLD: 27 % (ref 21–52)
MCH RBC QN AUTO: 24.8 PG (ref 24–34)
MCHC RBC AUTO-ENTMCNC: 31.9 G/DL (ref 31–37)
MCV RBC AUTO: 77.7 FL (ref 74–97)
MONOCYTES # BLD: 0.2 K/UL (ref 0.05–1.2)
MONOCYTES NFR BLD: 5 % (ref 3–10)
NEUTS SEG # BLD: 3.4 K/UL (ref 1.8–8)
NEUTS SEG NFR BLD: 66 % (ref 40–73)
PLATELET # BLD AUTO: 211 K/UL (ref 135–420)
PMV BLD AUTO: 9.8 FL (ref 9.2–11.8)
POTASSIUM SERPL-SCNC: 3.5 MMOL/L (ref 3.5–5.5)
PROT SERPL-MCNC: 7.3 G/DL (ref 6.4–8.2)
RBC # BLD AUTO: 4.84 M/UL (ref 4.2–5.3)
SODIUM SERPL-SCNC: 141 MMOL/L (ref 136–145)
TSH SERPL DL<=0.05 MIU/L-ACNC: 8.1 UIU/ML (ref 0.36–3.74)
WBC # BLD AUTO: 5.1 K/UL (ref 4.6–13.2)

## 2021-03-24 PROCEDURE — 85025 COMPLETE CBC W/AUTO DIFF WBC: CPT

## 2021-03-24 PROCEDURE — 80053 COMPREHEN METABOLIC PANEL: CPT

## 2021-03-24 PROCEDURE — 36415 COLL VENOUS BLD VENIPUNCTURE: CPT

## 2021-03-24 PROCEDURE — 84443 ASSAY THYROID STIM HORMONE: CPT

## 2021-03-24 RX ORDER — EPINEPHRINE 1 MG/ML
0.3 INJECTION, SOLUTION, CONCENTRATE INTRAVENOUS AS NEEDED
Status: CANCELLED | OUTPATIENT
Start: 2021-03-29

## 2021-03-24 RX ORDER — SODIUM CHLORIDE 9 MG/ML
25 INJECTION, SOLUTION INTRAVENOUS CONTINUOUS
Status: CANCELLED | OUTPATIENT
Start: 2021-03-29

## 2021-03-24 RX ORDER — ONDANSETRON 2 MG/ML
8 INJECTION INTRAMUSCULAR; INTRAVENOUS AS NEEDED
Status: CANCELLED | OUTPATIENT
Start: 2021-03-29

## 2021-03-24 RX ORDER — DIPHENHYDRAMINE HYDROCHLORIDE 50 MG/ML
50 INJECTION, SOLUTION INTRAMUSCULAR; INTRAVENOUS AS NEEDED
Status: CANCELLED
Start: 2021-03-29

## 2021-03-24 RX ORDER — ALBUTEROL SULFATE 0.83 MG/ML
2.5 SOLUTION RESPIRATORY (INHALATION) AS NEEDED
Status: CANCELLED
Start: 2021-03-29

## 2021-03-24 RX ORDER — ACETAMINOPHEN 325 MG/1
650 TABLET ORAL AS NEEDED
Status: CANCELLED
Start: 2021-03-29

## 2021-03-24 RX ORDER — HYDROCORTISONE SODIUM SUCCINATE 100 MG/2ML
100 INJECTION, POWDER, FOR SOLUTION INTRAMUSCULAR; INTRAVENOUS AS NEEDED
Status: CANCELLED | OUTPATIENT
Start: 2021-03-29

## 2021-03-24 NOTE — PROGRESS NOTES
[facility] Progress Note    Date: 2021    Name: Nati Matta    MRN: [id number]         : 1960    Peripheral Lab Draw      Ms. Leigh to [address], ambulatory at 1420 accompanied by self. Pt was assessed and education was provided. Ms. Starr Nation vitals were reviewed and patient was observed for 5 minutes prior to treatment. Visit Vitals  /75 (BP 1 Location: Right arm, BP Patient Position: Sitting)   Pulse 79   Temp 98.5 °F (36.9 °C)   Ht 5' 6\" (1.676 m)   Wt 65 kg (143 lb 4.8 oz)   SpO2 97%   BMI 23.13 kg/m²   No results found for this or any previous visit (from the past 12 hour(s)). Blood obtained peripherally from [facility] first attempt with butterfly needle and sent to lab for CBC w/ Diff, CMP, and TSH per written orders. No bleeding or hematoma noted at site. Gauze and coban applied. Ms. Lizzie Downey tolerated the venipuncture, and had no complaints. Patient armband removed and shredded. Ms. Lizzie Downey was discharged from [place] [address] in stable condition at 1430.      Daniela Ross Phlebootomist PCT  2021  4:02 PM

## 2021-03-29 ENCOUNTER — HOSPITAL ENCOUNTER (OUTPATIENT)
Dept: INFUSION THERAPY | Age: 61
Discharge: HOME | End: 2021-03-29
Payer: MEDICARE

## 2021-03-29 VITALS
RESPIRATION RATE: 18 BRPM | DIASTOLIC BLOOD PRESSURE: 81 MMHG | TEMPERATURE: 98.5 F | HEART RATE: 76 BPM | SYSTOLIC BLOOD PRESSURE: 126 MMHG | OXYGEN SATURATION: 96 %

## 2021-03-29 DIAGNOSIS — C34.91 NON-SMALL CELL CANCER OF RIGHT LUNG (HCC): Primary | ICD-10-CM

## 2021-03-29 PROCEDURE — 74011000258 HC RX REV CODE- 258: Performed by: INTERNAL MEDICINE

## 2021-03-29 PROCEDURE — 77030012965 HC NDL HUBR BBMI -A

## 2021-03-29 PROCEDURE — 96413 CHEMO IV INFUSION 1 HR: CPT

## 2021-03-29 PROCEDURE — 74011250636 HC RX REV CODE- 250/636: Performed by: INTERNAL MEDICINE

## 2021-03-29 RX ORDER — HEPARIN 100 UNIT/ML
300-500 SYRINGE INTRAVENOUS AS NEEDED
Status: DISPENSED | OUTPATIENT
Start: 2021-03-29 | End: 2021-03-29

## 2021-03-29 RX ORDER — SODIUM CHLORIDE 0.9 % (FLUSH) 0.9 %
10-40 SYRINGE (ML) INJECTION AS NEEDED
Status: DISPENSED | OUTPATIENT
Start: 2021-03-29 | End: 2021-03-29

## 2021-03-29 RX ADMIN — Medication 20 ML: at 12:22

## 2021-03-29 RX ADMIN — SODIUM CHLORIDE 400 MG: 9 INJECTION, SOLUTION INTRAVENOUS at 11:42

## 2021-03-29 RX ADMIN — HEPARIN 500 UNITS: 100 SYRINGE at 12:22

## 2021-03-29 RX ADMIN — Medication 10 ML: at 11:37

## 2021-03-29 NOTE — PROGRESS NOTES
[facility] Progress Note    Date: 2021    Name: Breana Sarabia    MRN: [id number]         : 1960       Anahy Argueta 17      Ms. Leigh arrived to [facility] at 1110. Ms. Luz Marina Ngo was assessed and education was provided. Ms. Shannon Parra vitals were reviewed. Visit Vitals  BP (!) 146/90 (BP 1 Location: Right arm, BP Patient Position: Sitting)   Pulse 82   Temp 97.9 °F (36.6 °C)   Resp 18   SpO2 98%   Breastfeeding No       Lab results obtained & reviewed from 3/24/21. Okay to proceed w/ tx per pt's tx plan parameters. Patient's L upper chest port accessed (medial lumen). Brisk blood return/ flushes without difficulty. Keytruda 400mg IV administered as ordered followed by NS flush. Ms. Luz Marina Ngo tolerated infusion without complaints. Discharge/ follow-up instructions discussed w/ pt. Pt verbalized understanding. Port flushed with heparin per order and de-accessed. Band-aid applied to site. Pt armband removed & shredded. Ms. Luz Marina Ngo was discharged from [place] [address] in stable condition at 1225. She is to return for her next appt for pre-chemo labs on 21 at 1415 (pre-chemo labs).     Ovi Orourke RN  2021

## 2021-04-14 DIAGNOSIS — E03.9 HYPOTHYROIDISM, UNSPECIFIED TYPE: ICD-10-CM

## 2021-04-14 NOTE — TELEPHONE ENCOUNTER
This pharmacy faxed over request for the following prescriptions to be filled:    Medication requested :   Requested Prescriptions     Pending Prescriptions Disp Refills    levothyroxine (SYNTHROID) 50 mcg tablet 30 Tab 5     Sig: Take 1 Tab by mouth Daily (before breakfast).      [facility] [address] or Print: [facility] order or Local pharmacy [address] [facility] [phone]    Scheduled appointment if not seen by current providers in office: lov 10/12/2020 No follow up scheduled at this time, tried to call pt and schedule but her voicemail is full

## 2021-04-15 DIAGNOSIS — E03.9 ACQUIRED HYPOTHYROIDISM: Primary | ICD-10-CM

## 2021-04-15 NOTE — TELEPHONE ENCOUNTER
TSH 3/2021 reviewed elevated  If compliant with meds, then need to increase dose to 75 mcgs/day  If misses dose, then keep 50 mcgs remind to take daily  Recheck TSH in 6 weeks. Also due for ff-up,pls sched after repeat TSH around June.

## 2021-04-19 NOTE — TELEPHONE ENCOUNTER
Pt will get labs on 5/5/2021 and has an appt on 5/14/2021. She is going out of town and will need the refill before she leaves. Please advise.

## 2021-04-19 NOTE — TELEPHONE ENCOUNTER
Patient identified with 2 identifiers (name and ). Spoke with patient and she states she has missed some doses. Patient has been advised of the importance to take her levothyroxine daily 1 hour prior to breakfast. Patient verbalizes understanding. Patient has been advised to repeat TSH in 6 weeks.

## 2021-04-20 DIAGNOSIS — R79.89 ELEVATED TSH: Primary | ICD-10-CM

## 2021-04-20 DIAGNOSIS — E03.9 ACQUIRED HYPOTHYROIDISM: ICD-10-CM

## 2021-04-20 RX ORDER — LEVOTHYROXINE SODIUM 50 UG/1
50 TABLET ORAL
Qty: 60 TAB | Refills: 0 | Status: SHIPPED | OUTPATIENT
Start: 2021-04-20 | End: 2021-06-22

## 2021-05-03 DIAGNOSIS — F33.9 RECURRENT MAJOR DEPRESSIVE DISORDER, REMISSION STATUS UNSPECIFIED (HCC): ICD-10-CM

## 2021-05-03 RX ORDER — SERTRALINE HYDROCHLORIDE 50 MG/1
TABLET, FILM COATED ORAL
Qty: 90 TAB | Refills: 0 | Status: SHIPPED | OUTPATIENT
Start: 2021-05-03 | End: 2021-08-10

## 2021-05-03 RX ORDER — ONDANSETRON 2 MG/ML
8 INJECTION INTRAMUSCULAR; INTRAVENOUS AS NEEDED
Status: CANCELLED | OUTPATIENT
Start: 2021-05-10

## 2021-05-03 RX ORDER — HYDROCORTISONE SODIUM SUCCINATE 100 MG/2ML
100 INJECTION, POWDER, FOR SOLUTION INTRAMUSCULAR; INTRAVENOUS AS NEEDED
Status: CANCELLED | OUTPATIENT
Start: 2021-05-10

## 2021-05-03 RX ORDER — ALBUTEROL SULFATE 0.83 MG/ML
2.5 SOLUTION RESPIRATORY (INHALATION) AS NEEDED
Status: CANCELLED
Start: 2021-05-10

## 2021-05-03 RX ORDER — EPINEPHRINE 1 MG/ML
0.3 INJECTION, SOLUTION, CONCENTRATE INTRAVENOUS AS NEEDED
Status: CANCELLED | OUTPATIENT
Start: 2021-05-10

## 2021-05-03 RX ORDER — DIPHENHYDRAMINE HYDROCHLORIDE 50 MG/ML
50 INJECTION, SOLUTION INTRAMUSCULAR; INTRAVENOUS AS NEEDED
Status: CANCELLED
Start: 2021-05-10

## 2021-05-03 RX ORDER — ACETAMINOPHEN 325 MG/1
650 TABLET ORAL AS NEEDED
Status: CANCELLED
Start: 2021-05-10

## 2021-05-04 DIAGNOSIS — D50.9 IRON DEFICIENCY ANEMIA, UNSPECIFIED IRON DEFICIENCY ANEMIA TYPE: ICD-10-CM

## 2021-05-04 NOTE — TELEPHONE ENCOUNTER
This pharmacy faxed over request for the following prescriptions to be filled:    Medication requested :  Requested Prescriptions     Pending Prescriptions Disp Refills    ferrous sulfate 325 mg (65 mg iron) tablet 60 Tab 5     Sig: take 1 tablet by mouth twice a day with meals     PCP: [facility] or Print: [employer] order or [facility] pharmacy [address]     Scheduled appointment if not seen by current providers in office: LOV 10/12/2020 f/u 5/14/2021

## 2021-05-05 ENCOUNTER — HOSPITAL ENCOUNTER (OUTPATIENT)
Dept: INFUSION THERAPY | Age: 61
Discharge: HOME | End: 2021-05-05
Payer: MEDICARE

## 2021-05-05 VITALS
RESPIRATION RATE: 18 BRPM | DIASTOLIC BLOOD PRESSURE: 83 MMHG | HEART RATE: 84 BPM | SYSTOLIC BLOOD PRESSURE: 138 MMHG | HEIGHT: 66 IN | WEIGHT: 146.2 LBS | TEMPERATURE: 98.3 F | OXYGEN SATURATION: 93 % | BODY MASS INDEX: 23.5 KG/M2

## 2021-05-05 LAB
ALBUMIN SERPL-MCNC: 3.6 G/DL (ref 3.4–5)
ALBUMIN/GLOB SERPL: 1 {RATIO} (ref 0.8–1.7)
ALP SERPL-CCNC: 94 U/L (ref 45–117)
ALT SERPL-CCNC: 13 U/L (ref 13–56)
ANION GAP SERPL CALC-SCNC: 5 MMOL/L (ref 3–18)
AST SERPL-CCNC: 15 U/L (ref 10–38)
BASOPHILS # BLD: 0 K/UL (ref 0–0.1)
BASOPHILS NFR BLD: 0 % (ref 0–2)
BILIRUB SERPL-MCNC: 0.4 MG/DL (ref 0.2–1)
BUN SERPL-MCNC: 8 MG/DL (ref 7–18)
BUN/CREAT SERPL: 10 (ref 12–20)
CALCIUM SERPL-MCNC: 8.6 MG/DL (ref 8.5–10.1)
CHLORIDE SERPL-SCNC: 106 MMOL/L (ref 100–111)
CO2 SERPL-SCNC: 30 MMOL/L (ref 21–32)
CREAT SERPL-MCNC: 0.83 MG/DL (ref 0.6–1.3)
DIFFERENTIAL METHOD BLD: ABNORMAL
EOSINOPHIL # BLD: 0.1 K/UL (ref 0–0.4)
EOSINOPHIL NFR BLD: 1 % (ref 0–5)
ERYTHROCYTE [DISTWIDTH] IN BLOOD BY AUTOMATED COUNT: 16.4 % (ref 11.6–14.5)
GLOBULIN SER CALC-MCNC: 3.6 G/DL (ref 2–4)
GLUCOSE SERPL-MCNC: 108 MG/DL (ref 74–99)
HCT VFR BLD AUTO: 36.5 % (ref 35–45)
HGB BLD-MCNC: 11.4 G/DL (ref 12–16)
LYMPHOCYTES # BLD: 1.2 K/UL (ref 0.9–3.6)
LYMPHOCYTES NFR BLD: 18 % (ref 21–52)
MCH RBC QN AUTO: 24.6 PG (ref 24–34)
MCHC RBC AUTO-ENTMCNC: 31.2 G/DL (ref 31–37)
MCV RBC AUTO: 78.8 FL (ref 74–97)
MONOCYTES # BLD: 0.3 K/UL (ref 0.05–1.2)
MONOCYTES NFR BLD: 5 % (ref 3–10)
NEUTS SEG # BLD: 5.1 K/UL (ref 1.8–8)
NEUTS SEG NFR BLD: 76 % (ref 40–73)
PLATELET # BLD AUTO: 200 K/UL (ref 135–420)
PMV BLD AUTO: 10.4 FL (ref 9.2–11.8)
POTASSIUM SERPL-SCNC: 3.4 MMOL/L (ref 3.5–5.5)
PROT SERPL-MCNC: 7.2 G/DL (ref 6.4–8.2)
RBC # BLD AUTO: 4.63 M/UL (ref 4.2–5.3)
SODIUM SERPL-SCNC: 141 MMOL/L (ref 136–145)
TSH SERPL DL<=0.05 MIU/L-ACNC: 4.72 UIU/ML (ref 0.36–3.74)
WBC # BLD AUTO: 6.7 K/UL (ref 4.6–13.2)

## 2021-05-05 PROCEDURE — 36415 COLL VENOUS BLD VENIPUNCTURE: CPT

## 2021-05-05 PROCEDURE — 85025 COMPLETE CBC W/AUTO DIFF WBC: CPT

## 2021-05-05 PROCEDURE — 84443 ASSAY THYROID STIM HORMONE: CPT

## 2021-05-05 PROCEDURE — 80053 COMPREHEN METABOLIC PANEL: CPT

## 2021-05-05 NOTE — PROGRESS NOTES
[facility] Progress Note    Date: May 5, 2021    Name: Judy David    MRN: [id number]         : 1960    Peripheral Lab Draw      Ms. Leigh to [facility], ambulatory at 1325 accompanied by self. Pt was assessed and education was provided. Ms. Sadia Connell vitals were reviewed and patient was observed for 5 minutes prior to treatment. Visit Vitals  /83 (BP 1 Location: Right arm, BP Patient Position: Sitting)   Pulse 84   Temp 98.3 °F (36.8 °C)   Resp 18   Ht 5' 6\" (1.676 m)   Wt 66.3 kg (146 lb 3.2 oz)   SpO2 93%   BMI 23.60 kg/m²   No results found for this or any previous visit (from the past 12 hour(s)). Blood obtained peripherally from [facility] first attempt with butterfly needle and sent to lab for CBC w/ Diff, CMP, and TSH per written orders. No bleeding or hematoma noted at site. Gauze and coban applied. Ms. Vianey Hidalgo tolerated the venipuncture, and had no complaints. Patient armband removed and shredded. Ms. Vianey Hidalgo was discharged from [place] [address] in stable condition at 1335.      Yin Patterson Phlebotomist PCT   May 5, 2021  1:40 PM

## 2021-05-06 RX ORDER — LANOLIN ALCOHOL/MO/W.PET/CERES
CREAM (GRAM) TOPICAL
Qty: 60 TAB | Refills: 5 | Status: SHIPPED | OUTPATIENT
Start: 2021-05-06

## 2021-05-10 ENCOUNTER — HOSPITAL ENCOUNTER (OUTPATIENT)
Dept: INFUSION THERAPY | Age: 61
Discharge: HOME | End: 2021-05-10
Payer: MEDICARE

## 2021-05-10 VITALS
SYSTOLIC BLOOD PRESSURE: 171 MMHG | HEART RATE: 87 BPM | OXYGEN SATURATION: 94 % | DIASTOLIC BLOOD PRESSURE: 92 MMHG | RESPIRATION RATE: 18 BRPM | TEMPERATURE: 97.5 F

## 2021-05-10 DIAGNOSIS — C34.91 NON-SMALL CELL CANCER OF RIGHT LUNG (HCC): Primary | ICD-10-CM

## 2021-05-10 PROCEDURE — 77030012965 HC NDL HUBR BBMI -A

## 2021-05-10 PROCEDURE — 96413 CHEMO IV INFUSION 1 HR: CPT

## 2021-05-10 PROCEDURE — 74011250636 HC RX REV CODE- 250/636: Performed by: INTERNAL MEDICINE

## 2021-05-10 PROCEDURE — 74011000258 HC RX REV CODE- 258: Performed by: INTERNAL MEDICINE

## 2021-05-10 RX ORDER — HEPARIN 100 UNIT/ML
300-500 SYRINGE INTRAVENOUS AS NEEDED
Status: DISPENSED | OUTPATIENT
Start: 2021-05-10 | End: 2021-05-10

## 2021-05-10 RX ORDER — SODIUM CHLORIDE 0.9 % (FLUSH) 0.9 %
10-40 SYRINGE (ML) INJECTION AS NEEDED
Status: DISPENSED | OUTPATIENT
Start: 2021-05-10 | End: 2021-05-10

## 2021-05-10 RX ORDER — SODIUM CHLORIDE 9 MG/ML
25 INJECTION, SOLUTION INTRAVENOUS CONTINUOUS
Status: DISPENSED | OUTPATIENT
Start: 2021-05-10 | End: 2021-05-10

## 2021-05-10 RX ADMIN — SODIUM CHLORIDE 25 ML/HR: 900 INJECTION, SOLUTION INTRAVENOUS at 09:19

## 2021-05-10 RX ADMIN — HEPARIN 500 UNITS: 100 SYRINGE at 10:24

## 2021-05-10 RX ADMIN — SODIUM CHLORIDE 400 MG: 9 INJECTION, SOLUTION INTRAVENOUS at 09:42

## 2021-05-10 RX ADMIN — Medication 20 ML: at 10:24

## 2021-05-10 NOTE — PROGRESS NOTES
[facility] Progress Note    Date: May 10, 2021    Name: Edison Pimentel    MRN: [id number]         : 1960       Jess Leader 18      Ms. Leigh arrived to [facility] at [phone]. Ms. Holger Moreno was assessed and education was provided. Ms. Chin Finney vitals were reviewed. Visit Vitals  BP (!) 161/90 (BP 1 Location: Left upper arm, BP Patient Position: Sitting)   Pulse 90   Temp 97.5 °F (36.4 °C)   Resp 18   SpO2 94%       Lab results obtained & reviewed from 21. Okay to proceed w/ tx per pt's tx plan parameters. Patient's L upper chest port accessed (lateral lumen). Brisk blood return/ flushes without difficulty. Keytruda 400mg IV administered as ordered followed by NS flush. Ms. Holger Moreno tolerated infusion without complaints. Discharge/ follow-up instructions discussed w/ pt. Pt verbalized understanding. Port flushed with heparin per order and de-accessed. Band-aid applied to site. Pt armband removed & shredded. Ms. Holger Moreno was discharged from [place] [address] in stable condition at 1030. She is to return for her next appt for pre-chemo labs on 21 at 1415 (pre-chemo labs).     Gurjit Mckeon RN  May 10, 2021

## 2021-05-21 ENCOUNTER — OFFICE VISIT (OUTPATIENT)
Dept: FAMILY MEDICINE CLINIC | Age: 61
End: 2021-05-21
Payer: MEDICARE

## 2021-05-21 VITALS
OXYGEN SATURATION: 98 % | SYSTOLIC BLOOD PRESSURE: 136 MMHG | HEIGHT: 66 IN | WEIGHT: 148 LBS | DIASTOLIC BLOOD PRESSURE: 84 MMHG | RESPIRATION RATE: 16 BRPM | HEART RATE: 92 BPM | TEMPERATURE: 97.7 F | BODY MASS INDEX: 23.78 KG/M2

## 2021-05-21 DIAGNOSIS — F33.42 RECURRENT MAJOR DEPRESSIVE DISORDER, IN FULL REMISSION (HCC): ICD-10-CM

## 2021-05-21 DIAGNOSIS — J30.9 ALLERGIC RHINITIS, UNSPECIFIED SEASONALITY, UNSPECIFIED TRIGGER: Primary | ICD-10-CM

## 2021-05-21 DIAGNOSIS — Z72.0 TOBACCO USE: ICD-10-CM

## 2021-05-21 DIAGNOSIS — I10 ESSENTIAL HYPERTENSION: ICD-10-CM

## 2021-05-21 DIAGNOSIS — C34.91 NON-SMALL CELL CANCER OF RIGHT LUNG (HCC): ICD-10-CM

## 2021-05-21 DIAGNOSIS — E03.9 ACQUIRED HYPOTHYROIDISM: ICD-10-CM

## 2021-05-21 PROCEDURE — G8420 CALC BMI NORM PARAMETERS: HCPCS | Performed by: FAMILY MEDICINE

## 2021-05-21 PROCEDURE — G9899 SCRN MAM PERF RSLTS DOC: HCPCS | Performed by: FAMILY MEDICINE

## 2021-05-21 PROCEDURE — G8754 DIAS BP LESS 90: HCPCS | Performed by: FAMILY MEDICINE

## 2021-05-21 PROCEDURE — G8752 SYS BP LESS 140: HCPCS | Performed by: FAMILY MEDICINE

## 2021-05-21 PROCEDURE — G9711 PT HX TOT COL OR COLON CA: HCPCS | Performed by: FAMILY MEDICINE

## 2021-05-21 PROCEDURE — G8427 DOCREV CUR MEDS BY ELIG CLIN: HCPCS | Performed by: FAMILY MEDICINE

## 2021-05-21 PROCEDURE — G0463 HOSPITAL OUTPT CLINIC VISIT: HCPCS | Performed by: FAMILY MEDICINE

## 2021-05-21 PROCEDURE — 99214 OFFICE O/P EST MOD 30 MIN: CPT | Performed by: FAMILY MEDICINE

## 2021-05-21 PROCEDURE — G9717 DOC PT DX DEP/BP F/U NT REQ: HCPCS | Performed by: FAMILY MEDICINE

## 2021-05-21 RX ORDER — AMOXICILLIN AND CLAVULANATE POTASSIUM 875; 125 MG/1; MG/1
1 TABLET, FILM COATED ORAL EVERY 12 HOURS
Qty: 20 TABLET | Refills: 0 | Status: SHIPPED | OUTPATIENT
Start: 2021-05-21 | End: 2021-05-31

## 2021-05-21 RX ORDER — MONTELUKAST SODIUM 10 MG/1
10 TABLET ORAL DAILY
Qty: 90 TABLET | Refills: 0 | Status: SHIPPED | OUTPATIENT
Start: 2021-05-21 | End: 2021-08-12

## 2021-05-21 RX ORDER — CETIRIZINE HCL 10 MG
10 TABLET ORAL DAILY
Qty: 90 TABLET | Refills: 0 | Status: SHIPPED | OUTPATIENT
Start: 2021-05-21 | End: 2021-09-07

## 2021-05-21 RX ORDER — FLUTICASONE PROPIONATE 50 MCG
2 SPRAY, SUSPENSION (ML) NASAL DAILY
Qty: 1 BOTTLE | Refills: 0 | Status: SHIPPED | OUTPATIENT
Start: 2021-05-21

## 2021-05-21 NOTE — PROGRESS NOTES
Maria Luz Lundberg, 56 y.o.,  female    SUBJECTIVE  Ff-up and concern    HTN-long standing h/o, on norvasc for years. She continues to smoke about 1 pack every 3 days. Depression- reports to be doing well on zoloft    Non small cell lung cancer-dx 2015 ongoing chemotherapy with dr. Nury Bradley. Reports sinus pressure, nasal congestion with clear discharge. Taking claritin for her usual allergies without much improvement    Hypothyroidism- on lt4 replacement, misses doses, no symptoms, following dr. Downey Quant- surveillance per hematology    ROS:  See HPI, all others negative        Patient Active Problem List   Diagnosis Code    SVC syndrome I87.1    Former smoker Z87.891    Essential hypertension I10    Right leg DVT ([facility] I82.401    Pulmonary embolism without acute cor pulmonale (HCC) I26.99    Recurrent major depressive disorder (HCC) F33.9    Iron deficiency anemia D50.9    Abnormal mammogram R92.8    Non-small cell lung cancer ([facility] C34.90       Current Outpatient Medications   Medication Sig Dispense Refill    fluticasone propionate (FLONASE) 50 mcg/actuation nasal spray 2 Sprays by Both Nostrils route daily. 1 Bottle 0    montelukast (SINGULAIR) 10 mg tablet Take 1 Tablet by mouth daily. 90 Tablet 0    cetirizine (ZYRTEC) 10 mg tablet Take 1 Tablet by mouth daily. 90 Tablet 0    amoxicillin-clavulanate (AUGMENTIN) 875-125 mg per tablet Take 1 Tablet by mouth every twelve (12) hours for 10 days. 20 Tablet 0    ferrous sulfate 325 mg (65 mg iron) tablet take 1 tablet by mouth twice a day with meals 60 Tab 5    sertraline (ZOLOFT) 50 mg tablet take 1 tablet by mouth once daily 90 Tab 0    levothyroxine (SYNTHROID) 50 mcg tablet Take 1 Tab by mouth Daily (before breakfast). 60 Tab 0    amLODIPine (NORVASC) 5 mg tablet take 1 tablet by mouth once daily 90 Tab 1    aspirin delayed-release 81 mg tablet Take 81 mg by mouth daily.          Allergies   Allergen Reactions    Flagyl [Metronidazole] Hives    Nitroimidazoles Other (comments)     Nitroimidazole derivatives       Past Medical History:   Diagnosis Date    Anemia, iron deficiency 2016    Depression     H/O seasonal allergies     Hypertension     Non-small cell carcinoma of lung ([email or web address] 2016    adenocarcinoma of right lung    Positive occult stool blood test 2016    Pulmonary embolism ([email or web address] 2016    small, bilateral PEs- on Xarelto    Right leg DVT ([email or web address] 2016    on Xarelto    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    resolved    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       Social History     Socioeconomic History    Marital status: LEGALLY      Spouse name: Not on file    Number of children: Not on file    Years of education: Not on file    Highest education level: Not on file   Occupational History    Not on file   Tobacco Use    Smoking status: Former Smoker     Packs/day: 0.50     Quit date: 2016     Years since quittin.2    Smokeless tobacco: Never Used   Substance and Sexual Activity    Alcohol use: No    Drug use: Never     Types: Prescription    Sexual activity: Yes     Partners: Male     Birth control/protection: Surgical     Comment: tubal ligation   Other Topics Concern     Service Not Asked    Blood Transfusions Not Asked    Caffeine Concern Not Asked    Occupational Exposure Not Asked    Hobby Hazards Not Asked    Sleep Concern Not Asked    Stress Concern Not Asked    Weight Concern Not Asked    Special Diet Not Asked    Back Care Not Asked    Exercise Not Asked    Bike Helmet Not Asked    Seat Belt Not Asked    Self-Exams Not Asked   Social History Narrative    Not on file     Social Determinants of Health     Financial Resource Strain:     Difficulty of Paying Living Expenses:    Food Insecurity:     Worried About Running Out of Food in the Last Year:     [address] in the Last Year:    Transportation Needs:     Lack of Transportation (Medical):  Lack of Transportation (Non-Medical):    Physical Activity:     Days of Exercise per Week:     Minutes of Exercise per Session:    Stress:     Feeling of Stress :    Social Connections:     Frequency of Communication with Friends and Family:     Frequency of Social Gatherings with Friends and Family:     Attends Confucianist Services:     Active Member of Clubs or Organizations:     Attends Club or Organization Meetings:     Marital Status:    Intimate Partner Violence:     Fear of Current or Ex-Partner:     Emotionally Abused:     Physically Abused:     Sexually Abused:        Family History   Problem Relation Age of Onset    Cancer Sister 48        breast    Liver Disease Sister         cirrhosis    Heart Attack Mother 37    No Known Problems Child          OBJECTIVE    Physical Exam:     Visit Vitals  /84 (BP 1 Location: Left upper arm, BP Patient Position: Sitting, BP Cuff Size: Adult)   Pulse 92   Temp 97.7 °F (36.5 °C) (Oral)   Resp 16   Ht 5' 6\" (1.676 m)   Wt 148 lb (67.1 kg)   SpO2 98%   BMI 23.89 kg/m²       General: alert, chronically ill- appearing, AA, in no apparent distress or pain  Head: atraumatic. Non-tender maxillary and frontal sinuses  Eyes: Lids with no discharge, no matting, conjunctivae clear and non injected, full EOMs, PERLLA  Ears: pinna non-tender, external auditory canal patent, TM intact  Neck: supple, no adenopathy palpated  CVS: normal rate, regular rhythm, distinct S1 and S2  Lungs:clear to ausculation bilaterally, no crackles, wheezing or rhonchi noted  Abdomen: normoactive bowel sounds, soft, non-tender  Extremities: no edema, no cyanosis, MSK grossly normal  Skin: warm, no lesions, rashes noted  Psych:  mood and affect normal        ASSESSMENT/PLAN  Diagnoses and all orders for this visit:    1. Recurrent major depressive disorder, in full remission ([facility]  Stable  Cont zoloft    2.  Acute sinusitis, unspecified  Start flonase, singulair, switch claritin to zyrtec  Add augmentin    3. Non-small cell cancer of right lung [facility])  Ongoing chemotherapy  Following dr. Hanna Naylor    4. Essential hypertension  Controlled  Cont norvasc  5. Iron deficiency anemia, unspecified iron deficiency anemia type  Surveillance per dr. Hanna Naylor    6. Tobacco use  Encouraged compete cessation    7. Acquired hypothyroidism  Elevated TSH, advised better compliance, cont current dose  Repeat TSH in 6 weeks    8. Allergic rhinitis, unspecified    Follow-up and Dispositions    · Return in about 3 months (around 8/21/2021), or if symptoms worsen or fail to improve, for routine chronic illness care. Patient understands plan of care. Patient has provided input and agrees with goals.

## 2021-05-21 NOTE — PROGRESS NOTES
1. Have you been to the ER, urgent care clinic since your last visit? Hospitalized since your last visit? No    2. Have you seen or consulted any other health care providers outside of the [address] [place] [address] since your last visit? Include any pap smears or colon screening.  Dr. Emilee Simpson

## 2021-05-21 NOTE — PATIENT INSTRUCTIONS
Stopping Smoking: Care Instructions  Your Care Instructions     Cigarette smokers crave the nicotine in cigarettes. Giving it up is much harder than simply changing a habit. Your body has to stop craving the nicotine. It is hard to quit, but you can do it. There are many tools that people use to quit smoking. You may find that combining tools works best for you. There are several steps to quitting. First you get ready to quit. Then you get support to help you. After that, you learn new skills and behaviors to become a nonsmoker. For many people, a necessary step is getting and using medicine. Your doctor will help you set up the plan that best meets your needs. You may want to attend a smoking cessation program to help you quit smoking. When you choose a program, look for one that has proven success. Ask your doctor for ideas. You will greatly increase your chances of success if you take medicine as well as get counseling or join a cessation program.  Some of the changes you feel when you first quit tobacco are uncomfortable. Your body will miss the nicotine at first, and you may feel short-tempered and grumpy. You may have trouble sleeping or concentrating. Medicine can help you deal with these symptoms. You may struggle with changing your smoking habits and rituals. The last step is the tricky one: Be prepared for the smoking urge to continue for a time. This is a lot to deal with, but keep at it. You will feel better. Follow-up care is a key part of your treatment and safety. Be sure to make and go to all appointments, and call your doctor if you are having problems. It's also a good idea to know your test results and keep a list of the medicines you take. How can you care for yourself at home? · Ask your family, friends, and coworkers for support. You have a better chance of quitting if you have help and support.   · Join a support group, such as Nicotine Anonymous, for people who are trying to quit smoking. · Consider signing up for a smoking cessation program, such as the American Lung Association's Freedom from Smoking program.  · Get text messaging support. Go to the website at www.smokefree. gov to sign up for the [facility] program.  · Set a quit date. Pick your date carefully so that it is not right in the middle of a big deadline or stressful time. Once you quit, do not even take a puff. Get rid of all ashtrays and lighters after your last cigarette. Clean your house and your clothes so that they do not smell of smoke. · Learn how to be a nonsmoker. Think about ways you can avoid those things that make you reach for a cigarette. ? Avoid situations that put you at greatest risk for smoking. For some people, it is hard to have a drink with friends without smoking. For others, they might skip a coffee break with coworkers who smoke. ? Change your daily routine. Take a different route to work or eat a meal in a different place. · Cut down on stress. Calm yourself or release tension by doing an activity you enjoy, such as reading a book, taking a hot bath, or gardening. · Talk to your doctor or pharmacist about nicotine replacement therapy, which replaces the nicotine in your body. You still get nicotine but you do not use tobacco. Nicotine replacement products help you slowly reduce the amount of nicotine you need. These products come in several forms, many of them available over-the-counter:  ? Nicotine patches  ? Nicotine gum and lozenges  ? Nicotine inhaler  · Ask your doctor about bupropion (Wellbutrin) or varenicline (Chantix), which are prescription medicines. They do not contain nicotine. They help you by reducing withdrawal symptoms, such as stress and anxiety. · Some people find hypnosis, acupuncture, and massage helpful for ending the smoking habit. · Eat a healthy diet and get regular exercise. Having healthy habits will help your body move past its craving for nicotine.   · Be prepared to keep trying. Most people are not successful the first few times they try to quit. Do not get mad at yourself if you smoke again. Make a list of things you learned and think about when you want to try again, such as next week, next month, or next year. Where can you learn more? Go to http://www.gray.com/  Enter [id number] in the search box to learn more about \"Stopping Smoking: Care Instructions. \"  Current as of: March 12, 2020               Content Version: 12.8  © 7688-2019 Healthwise, [employer]. Care instructions adapted under license by [employer] (which disclaims liability or warranty for this information). If you have questions about a medical condition or this instruction, always ask your healthcare professional. [address] any warranty or liability for your use of this information.

## 2021-06-02 ENCOUNTER — TRANSCRIBE ORDER (OUTPATIENT)
Dept: SCHEDULING | Age: 61
End: 2021-06-02

## 2021-06-02 DIAGNOSIS — C34.11 MALIGNANT NEOPLASM OF UPPER LOBE OF RIGHT LUNG (HCC): Primary | ICD-10-CM

## 2021-06-07 DIAGNOSIS — I10 ESSENTIAL HYPERTENSION: ICD-10-CM

## 2021-06-07 RX ORDER — AMLODIPINE BESYLATE 5 MG/1
TABLET ORAL
Qty: 90 TABLET | Refills: 1 | Status: SHIPPED | OUTPATIENT
Start: 2021-06-07 | End: 2021-12-30

## 2021-06-11 RX ORDER — ALBUTEROL SULFATE 0.83 MG/ML
2.5 SOLUTION RESPIRATORY (INHALATION) AS NEEDED
Status: CANCELLED
Start: 2021-06-21

## 2021-06-11 RX ORDER — DIPHENHYDRAMINE HYDROCHLORIDE 50 MG/ML
50 INJECTION, SOLUTION INTRAMUSCULAR; INTRAVENOUS AS NEEDED
Status: CANCELLED
Start: 2021-06-21

## 2021-06-11 RX ORDER — ACETAMINOPHEN 325 MG/1
650 TABLET ORAL AS NEEDED
Status: CANCELLED
Start: 2021-06-21

## 2021-06-11 RX ORDER — HYDROCORTISONE SODIUM SUCCINATE 100 MG/2ML
100 INJECTION, POWDER, FOR SOLUTION INTRAMUSCULAR; INTRAVENOUS AS NEEDED
Status: CANCELLED | OUTPATIENT
Start: 2021-06-21

## 2021-06-11 RX ORDER — ONDANSETRON 2 MG/ML
8 INJECTION INTRAMUSCULAR; INTRAVENOUS AS NEEDED
Status: CANCELLED | OUTPATIENT
Start: 2021-06-21

## 2021-06-11 RX ORDER — EPINEPHRINE 1 MG/ML
0.3 INJECTION, SOLUTION, CONCENTRATE INTRAVENOUS AS NEEDED
Status: CANCELLED | OUTPATIENT
Start: 2021-06-21

## 2021-06-16 ENCOUNTER — HOSPITAL ENCOUNTER (OUTPATIENT)
Dept: INFUSION THERAPY | Age: 61
Discharge: HOME | End: 2021-06-16
Payer: MEDICARE

## 2021-06-16 VITALS
BODY MASS INDEX: 23.89 KG/M2 | OXYGEN SATURATION: 98 % | HEIGHT: 66 IN | RESPIRATION RATE: 16 BRPM | HEART RATE: 83 BPM | DIASTOLIC BLOOD PRESSURE: 81 MMHG | SYSTOLIC BLOOD PRESSURE: 131 MMHG | TEMPERATURE: 98.5 F

## 2021-06-16 LAB
ALBUMIN SERPL-MCNC: 3.6 G/DL (ref 3.4–5)
ALBUMIN/GLOB SERPL: 1 {RATIO} (ref 0.8–1.7)
ALP SERPL-CCNC: 94 U/L (ref 45–117)
ALT SERPL-CCNC: 12 U/L (ref 13–56)
ANION GAP SERPL CALC-SCNC: 4 MMOL/L (ref 3–18)
AST SERPL-CCNC: 11 U/L (ref 10–38)
BASO+EOS+MONOS # BLD AUTO: 0.2 K/UL (ref 0–2.3)
BASO+EOS+MONOS NFR BLD AUTO: 4 % (ref 0.1–17)
BILIRUB SERPL-MCNC: 0.3 MG/DL (ref 0.2–1)
BUN SERPL-MCNC: 5 MG/DL (ref 7–18)
BUN/CREAT SERPL: 6 (ref 12–20)
CALCIUM SERPL-MCNC: 8.5 MG/DL (ref 8.5–10.1)
CHLORIDE SERPL-SCNC: 106 MMOL/L (ref 100–111)
CO2 SERPL-SCNC: 28 MMOL/L (ref 21–32)
CREAT SERPL-MCNC: 0.83 MG/DL (ref 0.6–1.3)
DIFFERENTIAL METHOD BLD: ABNORMAL
ERYTHROCYTE [DISTWIDTH] IN BLOOD BY AUTOMATED COUNT: 15.9 % (ref 11.5–14.5)
GLOBULIN SER CALC-MCNC: 3.5 G/DL (ref 2–4)
GLUCOSE SERPL-MCNC: 100 MG/DL (ref 74–99)
HCT VFR BLD AUTO: 37.4 % (ref 36–48)
HGB BLD-MCNC: 11.7 G/DL (ref 12–16)
LYMPHOCYTES # BLD: 1.4 K/UL (ref 1.1–5.9)
LYMPHOCYTES NFR BLD: 25 % (ref 14–44)
MCH RBC QN AUTO: 25.1 PG (ref 25–35)
MCHC RBC AUTO-ENTMCNC: 31.3 G/DL (ref 31–37)
MCV RBC AUTO: 80.1 FL (ref 78–102)
NEUTS SEG # BLD: 3.9 K/UL (ref 1.8–9.5)
NEUTS SEG NFR BLD: 71 % (ref 40–70)
PLATELET # BLD AUTO: 186 K/UL (ref 140–440)
POTASSIUM SERPL-SCNC: 3.7 MMOL/L (ref 3.5–5.5)
PROT SERPL-MCNC: 7.1 G/DL (ref 6.4–8.2)
RBC # BLD AUTO: 4.67 M/UL (ref 4.1–5.1)
SODIUM SERPL-SCNC: 138 MMOL/L (ref 136–145)
TSH SERPL DL<=0.05 MIU/L-ACNC: 4.66 UIU/ML (ref 0.36–3.74)
WBC # BLD AUTO: 5.5 K/UL (ref 4.5–13)

## 2021-06-16 PROCEDURE — 36415 COLL VENOUS BLD VENIPUNCTURE: CPT

## 2021-06-16 PROCEDURE — 85025 COMPLETE CBC W/AUTO DIFF WBC: CPT

## 2021-06-16 PROCEDURE — 80053 COMPREHEN METABOLIC PANEL: CPT

## 2021-06-16 PROCEDURE — 84443 ASSAY THYROID STIM HORMONE: CPT

## 2021-06-21 ENCOUNTER — HOSPITAL ENCOUNTER (OUTPATIENT)
Dept: INFUSION THERAPY | Age: 61
Discharge: HOME | End: 2021-06-21
Payer: MEDICARE

## 2021-06-21 VITALS
BODY MASS INDEX: 23.01 KG/M2 | TEMPERATURE: 97.9 F | RESPIRATION RATE: 16 BRPM | DIASTOLIC BLOOD PRESSURE: 93 MMHG | HEART RATE: 81 BPM | WEIGHT: 143.2 LBS | OXYGEN SATURATION: 95 % | HEIGHT: 66 IN | SYSTOLIC BLOOD PRESSURE: 153 MMHG

## 2021-06-21 DIAGNOSIS — C34.91 NON-SMALL CELL CANCER OF RIGHT LUNG (HCC): Primary | ICD-10-CM

## 2021-06-21 PROCEDURE — 77030012965 HC NDL HUBR BBMI -A

## 2021-06-21 PROCEDURE — 74011000258 HC RX REV CODE- 258: Performed by: INTERNAL MEDICINE

## 2021-06-21 PROCEDURE — 74011250636 HC RX REV CODE- 250/636: Performed by: INTERNAL MEDICINE

## 2021-06-21 PROCEDURE — 96413 CHEMO IV INFUSION 1 HR: CPT

## 2021-06-21 RX ORDER — SODIUM CHLORIDE 9 MG/ML
25 INJECTION, SOLUTION INTRAVENOUS CONTINUOUS
Status: DISPENSED | OUTPATIENT
Start: 2021-06-21 | End: 2021-06-21

## 2021-06-21 RX ORDER — SODIUM CHLORIDE 0.9 % (FLUSH) 0.9 %
10-40 SYRINGE (ML) INJECTION AS NEEDED
Status: DISPENSED | OUTPATIENT
Start: 2021-06-21 | End: 2021-06-21

## 2021-06-21 RX ORDER — HEPARIN 100 UNIT/ML
300-500 SYRINGE INTRAVENOUS AS NEEDED
Status: DISPENSED | OUTPATIENT
Start: 2021-06-21 | End: 2021-06-21

## 2021-06-21 RX ADMIN — SODIUM CHLORIDE 400 MG: 9 INJECTION, SOLUTION INTRAVENOUS at 11:30

## 2021-06-21 RX ADMIN — Medication 30 ML: at 11:01

## 2021-06-21 RX ADMIN — SODIUM CHLORIDE 25 ML/HR: 9 INJECTION, SOLUTION INTRAVENOUS at 11:04

## 2021-06-21 RX ADMIN — HEPARIN 500 UNITS: 100 SYRINGE at 12:10

## 2021-06-21 RX ADMIN — Medication 30 ML: at 12:10

## 2021-06-21 NOTE — PROGRESS NOTES
[facility] Progress Note    Date: 2021    Name: Maximo Gray    MRN: [id number]         : 1960       [address]      Ms. Leigh arrived to [facility] at [phone]. Ms. Nancy Elizalde was assessed and education was provided. Ms. Flash Briseno vitals were reviewed. Visit Vitals  /79 (BP 1 Location: Left upper arm, BP Patient Position: Sitting)   Pulse 74   Temp 98.8 °F (37.1 °C)   Resp 16   Ht 5' 6\" (1.676 m)   Wt 65 kg (143 lb 3.2 oz)   SpO2 95%   BMI 23.11 kg/m²       Lab results obtained & reviewed from . Okay to proceed w/ tx per pt's tx plan parameters. Ms Nancy Elizalde stated that Dr Arun Sprague is monitoring her TSH and has her on thyroid medication. Patient's L upper chest port accessed (lateral lumen). Brisk blood return/ flushes without difficulty. Keytruda 400mg IV administered as ordered followed by NS flush. Ms. Nancy Elizalde tolerated infusion without complaints. Discharge/ follow-up instructions discussed w/ pt. Pt verbalized understanding. Port flushed with heparin per order and de-accessed. Band-aid applied to site. Pt armband removed & shredded. Ms. Nancy Elizalde was discharged from [place] [address] in stable condition at 1220. She is to return for her next appt for pre-chemo labs on 21 at 1330 (pre-chemo labs).     Maritza Tiradokeeper, YAMILE  2021

## 2021-06-22 DIAGNOSIS — E03.9 HYPOTHYROIDISM, UNSPECIFIED TYPE: ICD-10-CM

## 2021-06-22 DIAGNOSIS — I10 ESSENTIAL HYPERTENSION: Primary | ICD-10-CM

## 2021-06-22 DIAGNOSIS — E03.9 ACQUIRED HYPOTHYROIDISM: ICD-10-CM

## 2021-06-22 RX ORDER — LEVOTHYROXINE SODIUM 50 UG/1
TABLET ORAL
Qty: 90 TABLET | Refills: 0 | Status: SHIPPED | OUTPATIENT
Start: 2021-06-22 | End: 2021-10-05 | Stop reason: DRUGHIGH

## 2021-07-12 ENCOUNTER — HOSPITAL ENCOUNTER (OUTPATIENT)
Dept: CT IMAGING | Age: 61
Discharge: HOME | End: 2021-07-12
Attending: INTERNAL MEDICINE
Payer: MEDICARE

## 2021-07-12 DIAGNOSIS — C34.11 MALIGNANT NEOPLASM OF UPPER LOBE OF RIGHT LUNG (HCC): ICD-10-CM

## 2021-07-12 PROCEDURE — 74011000636 HC RX REV CODE- 636: Performed by: INTERNAL MEDICINE

## 2021-07-12 PROCEDURE — 74177 CT ABD & PELVIS W/CONTRAST: CPT

## 2021-07-12 RX ADMIN — IOPAMIDOL 100 ML: 612 INJECTION, SOLUTION INTRAVENOUS at 13:11

## 2021-07-26 RX ORDER — HYDROCORTISONE SODIUM SUCCINATE 100 MG/2ML
100 INJECTION, POWDER, FOR SOLUTION INTRAMUSCULAR; INTRAVENOUS AS NEEDED
Status: CANCELLED | OUTPATIENT
Start: 2021-08-02

## 2021-07-26 RX ORDER — EPINEPHRINE 1 MG/ML
0.3 INJECTION, SOLUTION, CONCENTRATE INTRAVENOUS AS NEEDED
Status: CANCELLED | OUTPATIENT
Start: 2021-08-02

## 2021-07-26 RX ORDER — ALBUTEROL SULFATE 0.83 MG/ML
2.5 SOLUTION RESPIRATORY (INHALATION) AS NEEDED
Status: CANCELLED
Start: 2021-08-02

## 2021-07-26 RX ORDER — ACETAMINOPHEN 325 MG/1
650 TABLET ORAL AS NEEDED
Status: CANCELLED
Start: 2021-08-02

## 2021-07-26 RX ORDER — ONDANSETRON 2 MG/ML
8 INJECTION INTRAMUSCULAR; INTRAVENOUS AS NEEDED
Status: CANCELLED | OUTPATIENT
Start: 2021-08-02

## 2021-07-26 RX ORDER — DIPHENHYDRAMINE HYDROCHLORIDE 50 MG/ML
50 INJECTION, SOLUTION INTRAMUSCULAR; INTRAVENOUS AS NEEDED
Status: CANCELLED
Start: 2021-08-02

## 2021-07-28 ENCOUNTER — HOSPITAL ENCOUNTER (OUTPATIENT)
Dept: INFUSION THERAPY | Age: 61
Discharge: HOME | End: 2021-07-28
Payer: MEDICARE

## 2021-07-28 VITALS
DIASTOLIC BLOOD PRESSURE: 83 MMHG | SYSTOLIC BLOOD PRESSURE: 144 MMHG | WEIGHT: 146.8 LBS | OXYGEN SATURATION: 100 % | TEMPERATURE: 97.9 F | HEART RATE: 82 BPM | RESPIRATION RATE: 16 BRPM | BODY MASS INDEX: 23.69 KG/M2

## 2021-07-28 LAB
ALBUMIN SERPL-MCNC: 3.4 G/DL (ref 3.4–5)
ALBUMIN/GLOB SERPL: 0.8 {RATIO} (ref 0.8–1.7)
ALP SERPL-CCNC: 93 U/L (ref 45–117)
ALT SERPL-CCNC: 11 U/L (ref 13–56)
ANION GAP SERPL CALC-SCNC: 4 MMOL/L (ref 3–18)
AST SERPL-CCNC: 10 U/L (ref 10–38)
BASOPHILS # BLD: 0 K/UL (ref 0–0.1)
BASOPHILS NFR BLD: 0 % (ref 0–2)
BILIRUB SERPL-MCNC: 0.4 MG/DL (ref 0.2–1)
BUN SERPL-MCNC: 5 MG/DL (ref 7–18)
BUN/CREAT SERPL: 7 (ref 12–20)
CALCIUM SERPL-MCNC: 8.7 MG/DL (ref 8.5–10.1)
CHLORIDE SERPL-SCNC: 105 MMOL/L (ref 100–111)
CO2 SERPL-SCNC: 29 MMOL/L (ref 21–32)
CREAT SERPL-MCNC: 0.76 MG/DL (ref 0.6–1.3)
DIFFERENTIAL METHOD BLD: ABNORMAL
EOSINOPHIL # BLD: 0.1 K/UL (ref 0–0.4)
EOSINOPHIL NFR BLD: 2 % (ref 0–5)
ERYTHROCYTE [DISTWIDTH] IN BLOOD BY AUTOMATED COUNT: 16.2 % (ref 11.6–14.5)
GLOBULIN SER CALC-MCNC: 4.3 G/DL (ref 2–4)
GLUCOSE SERPL-MCNC: 105 MG/DL (ref 74–99)
HCT VFR BLD AUTO: 37 % (ref 35–45)
HGB BLD-MCNC: 11.3 G/DL (ref 12–16)
LYMPHOCYTES # BLD: 1.2 K/UL (ref 0.9–3.6)
LYMPHOCYTES NFR BLD: 23 % (ref 21–52)
MCH RBC QN AUTO: 24 PG (ref 24–34)
MCHC RBC AUTO-ENTMCNC: 30.5 G/DL (ref 31–37)
MCV RBC AUTO: 78.7 FL (ref 74–97)
MONOCYTES # BLD: 0.4 K/UL (ref 0.05–1.2)
MONOCYTES NFR BLD: 7 % (ref 3–10)
NEUTS SEG # BLD: 3.7 K/UL (ref 1.8–8)
NEUTS SEG NFR BLD: 68 % (ref 40–73)
PLATELET # BLD AUTO: 190 K/UL (ref 135–420)
PMV BLD AUTO: 9.4 FL (ref 9.2–11.8)
POTASSIUM SERPL-SCNC: 3.3 MMOL/L (ref 3.5–5.5)
PROT SERPL-MCNC: 7.7 G/DL (ref 6.4–8.2)
RBC # BLD AUTO: 4.7 M/UL (ref 4.2–5.3)
SODIUM SERPL-SCNC: 138 MMOL/L (ref 136–145)
TSH SERPL DL<=0.05 MIU/L-ACNC: 5.38 UIU/ML (ref 0.36–3.74)
WBC # BLD AUTO: 5.4 K/UL (ref 4.6–13.2)

## 2021-07-28 PROCEDURE — 36415 COLL VENOUS BLD VENIPUNCTURE: CPT

## 2021-07-28 PROCEDURE — 85025 COMPLETE CBC W/AUTO DIFF WBC: CPT

## 2021-07-28 PROCEDURE — 84443 ASSAY THYROID STIM HORMONE: CPT

## 2021-07-28 PROCEDURE — 80053 COMPREHEN METABOLIC PANEL: CPT

## 2021-07-28 NOTE — PROGRESS NOTES
[facility] Progress Note    Date: 2021    Name: Nile Morton    MRN: [id number]         : 1960    Pre chemo labs       Ms. Leigh to [facility], ambulatory at 1335 accompanied by self. Pt was assessed and education was provided. Ms. Danielle Cramer vitals were reviewed and patient was observed for 5 minutes prior to treatment. Visit Vitals  BP (!) 144/83 (BP 1 Location: Right upper arm, BP Patient Position: Sitting)   Pulse 82   Temp 97.9 °F (36.6 °C)   Resp 16   Wt 66.6 kg (146 lb 12.8 oz)   SpO2 100%   BMI 23.69 kg/m²         Blood obtained peripherally from [facility] first attempt with butterfly needle and sent to lab for CBC w/ Diff, CMP, and TSH per written orders. No bleeding or hematoma noted at site. Gauze and coban applied. Ms. Karyna Paulino tolerated the venipuncture, and had no complaints. Patient armband removed and shredded. Ms. Karyna Paulino was discharged from [place] [address] in stable condition at [phone].      Trista Porter RN   2021

## 2021-08-02 ENCOUNTER — HOSPITAL ENCOUNTER (OUTPATIENT)
Dept: INFUSION THERAPY | Age: 61
Discharge: HOME | End: 2021-08-02
Payer: MEDICARE

## 2021-08-02 VITALS
SYSTOLIC BLOOD PRESSURE: 144 MMHG | DIASTOLIC BLOOD PRESSURE: 86 MMHG | HEART RATE: 82 BPM | OXYGEN SATURATION: 97 % | RESPIRATION RATE: 16 BRPM | TEMPERATURE: 98.1 F

## 2021-08-02 DIAGNOSIS — C34.91 NON-SMALL CELL CANCER OF RIGHT LUNG (HCC): Primary | ICD-10-CM

## 2021-08-02 PROCEDURE — 74011000258 HC RX REV CODE- 258: Performed by: INTERNAL MEDICINE

## 2021-08-02 PROCEDURE — 77030012965 HC NDL HUBR BBMI -A

## 2021-08-02 PROCEDURE — 74011250636 HC RX REV CODE- 250/636: Performed by: INTERNAL MEDICINE

## 2021-08-02 PROCEDURE — 96413 CHEMO IV INFUSION 1 HR: CPT

## 2021-08-02 RX ORDER — SODIUM CHLORIDE 9 MG/ML
25 INJECTION, SOLUTION INTRAVENOUS CONTINUOUS
Status: DISPENSED | OUTPATIENT
Start: 2021-08-02 | End: 2021-08-02

## 2021-08-02 RX ORDER — HEPARIN 100 UNIT/ML
300-500 SYRINGE INTRAVENOUS AS NEEDED
Status: DISPENSED | OUTPATIENT
Start: 2021-08-02 | End: 2021-08-02

## 2021-08-02 RX ORDER — SODIUM CHLORIDE 0.9 % (FLUSH) 0.9 %
10-40 SYRINGE (ML) INJECTION AS NEEDED
Status: DISPENSED | OUTPATIENT
Start: 2021-08-02 | End: 2021-08-02

## 2021-08-02 RX ADMIN — SODIUM CHLORIDE 25 ML/HR: 900 INJECTION, SOLUTION INTRAVENOUS at 09:15

## 2021-08-02 RX ADMIN — HEPARIN 500 UNITS: 100 SYRINGE at 10:48

## 2021-08-02 RX ADMIN — Medication 10 ML: at 09:10

## 2021-08-02 RX ADMIN — SODIUM CHLORIDE 400 MG: 9 INJECTION, SOLUTION INTRAVENOUS at 09:56

## 2021-08-02 RX ADMIN — Medication 10 ML: at 10:47

## 2021-08-02 NOTE — PROGRESS NOTES
[facility] Progress Note    Date: 2021    Name: David Enrique    MRN: [id number]         : 1960       Eli[address]      Ms. Leigh arrived to [facility] at 0900. Ms. Santi Carmichael was assessed and education was provided. Ms. Sapphire Douglas vitals were reviewed. Visit Vitals  BP (!) 146/83 (BP 1 Location: Left upper arm, BP Patient Position: Sitting)   Pulse 87   Temp 98.1 °F (36.7 °C)   Resp 16   SpO2 97%   Breastfeeding No       Lab results obtained & reviewed from 21. Okay to proceed w/ tx per pt's tx plan parameters. Dr. Priscilla Zuniga aware of potassium is low at 3.3 but no new orders. Patient's L upper chest port accessed (lateral lumen). Brisk blood return/ flushes without difficulty. Keytruda 400mg IV administered as ordered followed by NS flush. Ms. Santi Carmichael tolerated infusion without complaints. Discharge/ follow-up instructions discussed w/ pt. Pt verbalized understanding. Port flushed with heparin per order and de-accessed. Band-aid applied to site. Pt armband removed & shredded. Ms. Santi Carmichael was discharged from [place] [address] in stable condition at 1055. She is to return for her next appt for pre-chemo labs on 21 at 1330 (pre-chemo labs).     Belen Williamson RN  2021

## 2021-08-10 DIAGNOSIS — F33.9 RECURRENT MAJOR DEPRESSIVE DISORDER, REMISSION STATUS UNSPECIFIED (HCC): ICD-10-CM

## 2021-08-10 RX ORDER — SERTRALINE HYDROCHLORIDE 50 MG/1
TABLET, FILM COATED ORAL
Qty: 90 TABLET | Refills: 0 | Status: SHIPPED | OUTPATIENT
Start: 2021-08-10 | End: 2021-11-23

## 2021-08-12 RX ORDER — MONTELUKAST SODIUM 10 MG/1
TABLET ORAL
Qty: 90 TABLET | Refills: 0 | Status: SHIPPED | OUTPATIENT
Start: 2021-08-12 | End: 2021-11-09

## 2021-09-02 ENCOUNTER — TELEPHONE (OUTPATIENT)
Dept: FAMILY MEDICINE CLINIC | Age: 61
End: 2021-09-02

## 2021-09-02 ENCOUNTER — OFFICE VISIT (OUTPATIENT)
Dept: FAMILY MEDICINE CLINIC | Age: 61
End: 2021-09-02
Payer: MEDICARE

## 2021-09-02 VITALS
TEMPERATURE: 97.3 F | HEIGHT: 66 IN | SYSTOLIC BLOOD PRESSURE: 156 MMHG | OXYGEN SATURATION: 96 % | WEIGHT: 143.8 LBS | RESPIRATION RATE: 16 BRPM | DIASTOLIC BLOOD PRESSURE: 88 MMHG | HEART RATE: 82 BPM | BODY MASS INDEX: 23.11 KG/M2

## 2021-09-02 DIAGNOSIS — D50.9 IRON DEFICIENCY ANEMIA, UNSPECIFIED IRON DEFICIENCY ANEMIA TYPE: ICD-10-CM

## 2021-09-02 DIAGNOSIS — F33.42 RECURRENT MAJOR DEPRESSIVE DISORDER, IN FULL REMISSION (HCC): Primary | ICD-10-CM

## 2021-09-02 DIAGNOSIS — E03.9 ACQUIRED HYPOTHYROIDISM: ICD-10-CM

## 2021-09-02 DIAGNOSIS — I10 ESSENTIAL HYPERTENSION: ICD-10-CM

## 2021-09-02 DIAGNOSIS — J31.0 CHRONIC RHINITIS: ICD-10-CM

## 2021-09-02 DIAGNOSIS — Z72.0 TOBACCO USE: ICD-10-CM

## 2021-09-02 DIAGNOSIS — C34.91 NON-SMALL CELL CANCER OF RIGHT LUNG (HCC): ICD-10-CM

## 2021-09-02 PROCEDURE — G8753 SYS BP > OR = 140: HCPCS | Performed by: FAMILY MEDICINE

## 2021-09-02 PROCEDURE — G0463 HOSPITAL OUTPT CLINIC VISIT: HCPCS | Performed by: FAMILY MEDICINE

## 2021-09-02 PROCEDURE — G8427 DOCREV CUR MEDS BY ELIG CLIN: HCPCS | Performed by: FAMILY MEDICINE

## 2021-09-02 PROCEDURE — G8754 DIAS BP LESS 90: HCPCS | Performed by: FAMILY MEDICINE

## 2021-09-02 PROCEDURE — G9717 DOC PT DX DEP/BP F/U NT REQ: HCPCS | Performed by: FAMILY MEDICINE

## 2021-09-02 PROCEDURE — G9899 SCRN MAM PERF RSLTS DOC: HCPCS | Performed by: FAMILY MEDICINE

## 2021-09-02 PROCEDURE — 99214 OFFICE O/P EST MOD 30 MIN: CPT | Performed by: FAMILY MEDICINE

## 2021-09-02 PROCEDURE — G8420 CALC BMI NORM PARAMETERS: HCPCS | Performed by: FAMILY MEDICINE

## 2021-09-02 PROCEDURE — G9711 PT HX TOT COL OR COLON CA: HCPCS | Performed by: FAMILY MEDICINE

## 2021-09-02 RX ORDER — AMOXICILLIN AND CLAVULANATE POTASSIUM 875; 125 MG/1; MG/1
1 TABLET, FILM COATED ORAL EVERY 12 HOURS
Qty: 20 TABLET | Refills: 0 | Status: SHIPPED | OUTPATIENT
Start: 2021-09-02 | End: 2021-09-12

## 2021-09-02 RX ORDER — METHYLPREDNISOLONE 4 MG/1
TABLET ORAL
Qty: 1 DOSE PACK | Refills: 0 | Status: SHIPPED | OUTPATIENT
Start: 2021-09-02 | End: 2021-10-05

## 2021-09-02 NOTE — PROGRESS NOTES
Hany Ashraf, 56 y.o.,  female    SUBJECTIVE  Ff-up    HTN-long standing h/o, on norvasc for years. She does not check BP at home, but says normal with doctor visits. She continues to smoke about 1 pack every 3 days. Depression- reports to be doing well on zoloft    Non small cell lung cancer-dx 2015 ongoing chemotherapy with dr. Emily Donaldson. Reports sinus pressure, nasal congestion with clear discharge. Not improved since may visit, she says she did not  augmentin, but did try zyrtec, flonase, without improvement    Hypothyroidism- on lt4 replacement, misses doses, no symptoms, following dr. Emily Donaldson. TSH 5 july    ALAN- surveillance per hematology    ROS:  See HPI, all others negative        Patient Active Problem List   Diagnosis Code    SVC syndrome I87.1    Former smoker Z87.891    Essential hypertension I10    Recurrent major depressive disorder ([facility] F33.9    Iron deficiency anemia D50.9    Abnormal mammogram R92.8    Non-small cell lung cancer ([facility] C34.90       Current Outpatient Medications   Medication Sig Dispense Refill    amoxicillin-clavulanate (AUGMENTIN) 875-125 mg per tablet Take 1 Tablet by mouth every twelve (12) hours for 10 days. 20 Tablet 0    methylPREDNISolone (MEDROL DOSEPACK) 4 mg tablet Take as directed 1 Dose Pack 0    montelukast (SINGULAIR) 10 mg tablet take 1 tablet by mouth once daily 90 Tablet 0    levothyroxine (SYNTHROID) 50 mcg tablet take 1 tablet by mouth once daily before breakfast 90 Tablet 0    amLODIPine (NORVASC) 5 mg tablet take 1 tablet by mouth once daily 90 Tablet 1    fluticasone propionate (FLONASE) 50 mcg/actuation nasal spray 2 Sprays by Both Nostrils route daily. 1 Bottle 0    cetirizine (ZYRTEC) 10 mg tablet Take 1 Tablet by mouth daily. 90 Tablet 0    ferrous sulfate 325 mg (65 mg iron) tablet take 1 tablet by mouth twice a day with meals 60 Tab 5    aspirin delayed-release 81 mg tablet Take 81 mg by mouth daily.       sertraline (ZOLOFT) 50 mg tablet take 1 tablet by mouth once daily (Patient not taking: Reported on 2021) 90 Tablet 0       Allergies   Allergen Reactions    Flagyl [Metronidazole] Hives    Nitroimidazoles Other (comments)     Nitroimidazole derivatives       Past Medical History:   Diagnosis Date    Anemia, iron deficiency 2016    Depression     H/O seasonal allergies     Hypertension     Non-small cell carcinoma of lung ([facility] [address] 2016    adenocarcinoma of right lung    Positive occult stool blood test 2016    [facility] 2016    small, bilateral PEs- on Xarelto    Right leg DVT ([facility] 2016    on Xarelto    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[facility] 2016    resolved    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       Social History     Socioeconomic History    Marital status: LEGALLY      Spouse name: Not on file    Number of children: Not on file    Years of education: Not on file    Highest education level: Not on file   Occupational History    Not on file   Tobacco Use    Smoking status: Current Every Day Smoker     Packs/day: 0.50     Types: Cigarettes     Last attempt to quit: 2016     Years since quittin.5    Smokeless tobacco: Never Used   Substance and Sexual Activity    Alcohol use: No    Drug use: Never     Types: Prescription    Sexual activity: Yes     Partners: Male     Birth control/protection: Surgical     Comment: tubal ligation   Other Topics Concern     Service Not Asked    Blood Transfusions Not Asked    Caffeine Concern Not Asked    Occupational Exposure Not Asked    Hobby Hazards Not Asked    Sleep Concern Not Asked    Stress Concern Not Asked    Weight Concern Not Asked    Special Diet Not Asked    Back Care Not Asked    Exercise Not Asked    Bike Helmet Not Asked    Seat Belt Not Asked    Self-Exams Not Asked   Social History Narrative    Not on file     Social Determinants of Health     Financial Resource Strain:     Difficulty of Paying Living Expenses:    Food Insecurity:     Worried About Running Out of Food in the Last Year:     [address] in the Last Year:    Transportation Needs:     Lack of Transportation (Medical):  Lack of Transportation (Non-Medical):    Physical Activity:     Days of Exercise per Week:     Minutes of Exercise per Session:    Stress:     Feeling of Stress :    Social Connections:     Frequency of Communication with Friends and Family:     Frequency of Social Gatherings with Friends and Family:     Attends Religion Services:     Active Member of Clubs or Organizations:     Attends Club or Organization Meetings:     Marital Status:    Intimate Partner Violence:     Fear of Current or Ex-Partner:     Emotionally Abused:     Physically Abused:     Sexually Abused:        Family History   Problem Relation Age of Onset    Cancer Sister 48        breast    Liver Disease Sister         cirrhosis    Heart Attack Mother 37    No Known Problems Child          OBJECTIVE    Physical Exam:     Visit Vitals  BP (!) 156/88 (BP 1 Location: Left upper arm, BP Patient Position: Sitting, BP Cuff Size: Adult)   Pulse 82   Temp 97.3 °F (36.3 °C) (Temporal)   Resp 16   Ht 5' 6\" (1.676 m)   Wt 143 lb 12.8 oz (65.2 kg)   SpO2 96%   BMI 23.21 kg/m²       General: alert, chronically ill- appearing, AA, in no apparent distress or pain  Head: atraumatic.  Non-tender maxillary and frontal sinuses  Eyes: Lids with no discharge, no matting, conjunctivae clear and non injected, full EOMs, PERLLA  Ears: pinna non-tender, external auditory canal patent, TM intact  Neck: supple, no adenopathy palpated  CVS: normal rate, regular rhythm, distinct S1 and S2  Lungs:clear to ausculation bilaterally, no crackles, wheezing or rhonchi noted  Abdomen: normoactive bowel sounds, soft, non-tender  Extremities: no edema, no cyanosis, MSK grossly normal  Skin: warm, no lesions, rashes noted  Psych:  mood and affect normal        ASSESSMENT/PLAN  Diagnoses and all orders for this visit:    1. Recurrent major depressive disorder, in full remission ([facility]  Stable  Cont zoloft    2. Chronic rhinitis  Persistent  cont flonase, singulair,  zyrtec  start augmentin/medrol dose pack  Referral to ENT    3. Non-small cell cancer of right lung [facility])  Ongoing chemotherapy  Following dr. Bernardino Hsieh    4. Essential hypertension  Elevated today, with acute illness  Previously Controlled  Cont norvasc current dose for now, will increase to 10 mg if persistently elevated on next visit    5. Iron deficiency anemia, unspecified iron deficiency anemia type  Surveillance per dr. Bernardino Hsieh    6. Tobacco use  Encouraged compete cessation    7. Acquired hypothyroidism  Elevated TSH, advised better compliance, cont current dose  Repeat TSH in 4 weeks      Follow-up and Dispositions    · Return in about 4 weeks (around 9/30/2021), or if symptoms worsen or fail to improve, for non-fasting labs prior to your next visit, plan on Medicare wellness on next visit. Patient understands plan of care. Patient has provided input and agrees with goals.

## 2021-09-02 NOTE — TELEPHONE ENCOUNTER
Pt would like to know if Dr Efrain Newman wants hr to continue with her other medications . Please advise.

## 2021-09-02 NOTE — TELEPHONE ENCOUNTER
Contacted patient and verified identity using name and date of birth (2- identifiers)  Spoke with patient and she wanted to know if she should continue with her routine allergy medications. Advised patient to continue with all scheduled medications at this time.

## 2021-09-02 NOTE — PATIENT INSTRUCTIONS
DASH Diet: Care Instructions  Your Care Instructions     The DASH diet is an eating plan that can help lower your blood pressure. DASH stands for Dietary Approaches to Stop Hypertension. Hypertension is high blood pressure. The DASH diet focuses on eating foods that are high in calcium, potassium, and magnesium. These nutrients can lower blood pressure. The foods that are highest in these nutrients are fruits, vegetables, low-fat dairy products, nuts, seeds, and legumes. But taking calcium, potassium, and magnesium supplements instead of eating foods that are high in those nutrients does not have the same effect. The DASH diet also includes whole grains, fish, and poultry. The DASH diet is one of several lifestyle changes your doctor may recommend to lower your high blood pressure. Your doctor may also want you to decrease the amount of sodium in your diet. Lowering sodium while following the DASH diet can lower blood pressure even further than just the DASH diet alone. Follow-up care is a key part of your treatment and safety. Be sure to make and go to all appointments, and call your doctor if you are having problems. It's also a good idea to know your test results and keep a list of the medicines you take. How can you care for yourself at home? Following the DASH diet  · Eat 4 to 5 servings of fruit each day. A serving is 1 medium-sized piece of fruit, ½ cup chopped or canned fruit, 1/4 cup dried fruit, or 4 ounces (½ cup) of fruit juice. Choose fruit more often than fruit juice. · Eat 4 to 5 servings of vegetables each day. A serving is 1 cup of lettuce or raw leafy vegetables, ½ cup of chopped or cooked vegetables, or 4 ounces (½ cup) of vegetable juice. Choose vegetables more often than vegetable juice. · Get 2 to 3 servings of low-fat and fat-free dairy each day. A serving is 8 ounces of milk, 1 cup of yogurt, or 1 ½ ounces of cheese. · Eat 6 to 8 servings of grains each day.  A serving is 1 slice of bread, 1 ounce of dry cereal, or ½ cup of cooked rice, pasta, or cooked cereal. Try to choose whole-grain products as much as possible. · Limit lean meat, poultry, and fish to 2 servings each day. A serving is 3 ounces, about the size of a deck of cards. · Eat 4 to 5 servings of nuts, seeds, and legumes (cooked dried beans, lentils, and split peas) each week. A serving is 1/3 cup of nuts, 2 tablespoons of seeds, or ½ cup of cooked beans or peas. · Limit fats and oils to 2 to 3 servings each day. A serving is 1 teaspoon of vegetable oil or 2 tablespoons of salad dressing. · Limit sweets and added sugars to 5 servings or less a week. A serving is 1 tablespoon jelly or jam, ½ cup sorbet, or 1 cup of lemonade. · Eat less than 2,300 milligrams (mg) of sodium a day. If you limit your sodium to 1,500 mg a day, you can lower your blood pressure even more. · Be aware that all of these are the suggested number of servings for people who eat 1,800 to 2,000 calories a day. Your recommended number of servings may be different if you need more or fewer calories. Tips for success  · Start small. Do not try to make dramatic changes to your diet all at once. You might feel that you are missing out on your favorite foods and then be more likely to not follow the plan. Make small changes, and stick with them. Once those changes become habit, add a few more changes. · Try some of the following:  ? Make it a goal to eat a fruit or vegetable at every meal and at snacks. This will make it easy to get the recommended amount of fruits and vegetables each day. ? Try yogurt topped with fruit and nuts for a snack or healthy dessert. ? Add lettuce, tomato, cucumber, and onion to sandwiches. ? Combine a ready-made pizza crust with low-fat mozzarella cheese and lots of vegetable toppings. Try using tomatoes, squash, spinach, broccoli, carrots, cauliflower, and onions. ?  Have a variety of cut-up vegetables with a low-fat dip as an appetizer instead of chips and dip. ? Sprinkle sunflower seeds or chopped almonds over salads. Or try adding chopped walnuts or almonds to cooked vegetables. ? Try some vegetarian meals using beans and peas. Add garbanzo or kidney beans to salads. Make burritos and tacos with mashed hinton beans or black beans. Where can you learn more? Go to http://www.cruz.com/  Enter H967 in the search box to learn more about \"DASH Diet: Care Instructions. \"  Current as of: August 31, 2020               Content Version: 12.8  © 7476-9492 [employer]. Care instructions adapted under license by [employer] (which disclaims liability or warranty for this information). If you have questions about a medical condition or this instruction, always ask your healthcare professional. [address] any warranty or liability for your use of this information.

## 2021-09-02 NOTE — PROGRESS NOTES
1. Have you been to the ER, urgent care clinic since your last visit? Hospitalized since your last visit? No    2. Have you seen or consulted any other health care providers outside of the [address] [place] [address] since your last visit? Include any pap smears or colon screening.  No    Chief Complaint   Patient presents with    Depression    Hypertension    Thyroid Problem    Lung Cancer    Anemia    Allergic Rhinitis     zyrtec and montelukast is not working    Cold Symptoms     cough    '

## 2021-09-03 RX ORDER — ACETAMINOPHEN 325 MG/1
650 TABLET ORAL AS NEEDED
Status: CANCELLED
Start: 2021-09-13

## 2021-09-03 RX ORDER — DIPHENHYDRAMINE HYDROCHLORIDE 50 MG/ML
50 INJECTION, SOLUTION INTRAMUSCULAR; INTRAVENOUS AS NEEDED
Status: CANCELLED
Start: 2021-09-13

## 2021-09-03 RX ORDER — ALBUTEROL SULFATE 0.83 MG/ML
2.5 SOLUTION RESPIRATORY (INHALATION) AS NEEDED
Status: CANCELLED
Start: 2021-09-13

## 2021-09-03 RX ORDER — SODIUM CHLORIDE 9 MG/ML
25 INJECTION, SOLUTION INTRAVENOUS CONTINUOUS
Status: CANCELLED | OUTPATIENT
Start: 2021-09-13

## 2021-09-03 RX ORDER — HYDROCORTISONE SODIUM SUCCINATE 100 MG/2ML
100 INJECTION, POWDER, FOR SOLUTION INTRAMUSCULAR; INTRAVENOUS AS NEEDED
Status: CANCELLED | OUTPATIENT
Start: 2021-09-13

## 2021-09-03 RX ORDER — ONDANSETRON 2 MG/ML
8 INJECTION INTRAMUSCULAR; INTRAVENOUS AS NEEDED
Status: CANCELLED | OUTPATIENT
Start: 2021-09-13

## 2021-09-03 RX ORDER — EPINEPHRINE 1 MG/ML
0.3 INJECTION, SOLUTION, CONCENTRATE INTRAVENOUS AS NEEDED
Status: CANCELLED | OUTPATIENT
Start: 2021-09-13

## 2021-09-08 ENCOUNTER — HOSPITAL ENCOUNTER (OUTPATIENT)
Dept: INFUSION THERAPY | Age: 61
Discharge: HOME | End: 2021-09-08
Payer: MEDICARE

## 2021-09-08 VITALS
SYSTOLIC BLOOD PRESSURE: 160 MMHG | HEART RATE: 85 BPM | BODY MASS INDEX: 23.58 KG/M2 | RESPIRATION RATE: 18 BRPM | TEMPERATURE: 98.7 F | OXYGEN SATURATION: 95 % | WEIGHT: 146.1 LBS | DIASTOLIC BLOOD PRESSURE: 78 MMHG

## 2021-09-08 LAB
ALBUMIN SERPL-MCNC: 3.5 G/DL (ref 3.4–5)
ALBUMIN/GLOB SERPL: 0.9 {RATIO} (ref 0.8–1.7)
ALP SERPL-CCNC: 85 U/L (ref 45–117)
ALT SERPL-CCNC: 17 U/L (ref 13–56)
ANION GAP SERPL CALC-SCNC: 6 MMOL/L (ref 3–18)
AST SERPL-CCNC: 12 U/L (ref 10–38)
BASOPHILS # BLD: 0 K/UL (ref 0–0.1)
BASOPHILS NFR BLD: 0 % (ref 0–2)
BILIRUB SERPL-MCNC: 0.3 MG/DL (ref 0.2–1)
BUN SERPL-MCNC: 11 MG/DL (ref 7–18)
BUN/CREAT SERPL: 12 (ref 12–20)
CALCIUM SERPL-MCNC: 9.1 MG/DL (ref 8.5–10.1)
CHLORIDE SERPL-SCNC: 103 MMOL/L (ref 100–111)
CO2 SERPL-SCNC: 29 MMOL/L (ref 21–32)
CREAT SERPL-MCNC: 0.93 MG/DL (ref 0.6–1.3)
DIFFERENTIAL METHOD BLD: ABNORMAL
EOSINOPHIL # BLD: 0.1 K/UL (ref 0–0.4)
EOSINOPHIL NFR BLD: 1 % (ref 0–5)
ERYTHROCYTE [DISTWIDTH] IN BLOOD BY AUTOMATED COUNT: 16.7 % (ref 11.6–14.5)
GLOBULIN SER CALC-MCNC: 3.9 G/DL (ref 2–4)
GLUCOSE SERPL-MCNC: 167 MG/DL (ref 74–99)
HCT VFR BLD AUTO: 38.9 % (ref 35–45)
HGB BLD-MCNC: 12.1 G/DL (ref 12–16)
LYMPHOCYTES # BLD: 1.8 K/UL (ref 0.9–3.6)
LYMPHOCYTES NFR BLD: 16 % (ref 21–52)
MCH RBC QN AUTO: 24.2 PG (ref 24–34)
MCHC RBC AUTO-ENTMCNC: 31.1 G/DL (ref 31–37)
MCV RBC AUTO: 78 FL (ref 78–100)
MONOCYTES # BLD: 0.5 K/UL (ref 0.05–1.2)
MONOCYTES NFR BLD: 5 % (ref 3–10)
NEUTS SEG # BLD: 8.6 K/UL (ref 1.8–8)
NEUTS SEG NFR BLD: 78 % (ref 40–73)
PLATELET # BLD AUTO: 236 K/UL (ref 135–420)
PMV BLD AUTO: 9.1 FL (ref 9.2–11.8)
POTASSIUM SERPL-SCNC: 3.2 MMOL/L (ref 3.5–5.5)
PROT SERPL-MCNC: 7.4 G/DL (ref 6.4–8.2)
RBC # BLD AUTO: 4.99 M/UL (ref 4.2–5.3)
SODIUM SERPL-SCNC: 138 MMOL/L (ref 136–145)
TSH SERPL DL<=0.05 MIU/L-ACNC: 8.65 UIU/ML (ref 0.36–3.74)
WBC # BLD AUTO: 11.1 K/UL (ref 4.6–13.2)

## 2021-09-08 PROCEDURE — 36415 COLL VENOUS BLD VENIPUNCTURE: CPT

## 2021-09-08 PROCEDURE — 80053 COMPREHEN METABOLIC PANEL: CPT

## 2021-09-08 PROCEDURE — 84443 ASSAY THYROID STIM HORMONE: CPT

## 2021-09-08 PROCEDURE — 85025 COMPLETE CBC W/AUTO DIFF WBC: CPT

## 2021-09-08 NOTE — PROGRESS NOTES
[facility] Progress Note    Date: 2021    Name: Julianna Hurt    MRN: [id number]         : 1960    Pre chemo labs       Ms. Leigh to [facility], ambulatory at 1325 accompanied by self. Pt was assessed and education was provided. Ms. Tejinder Adrian vitals were reviewed and patient was observed for 5 minutes prior to treatment. Visit Vitals  BP (!) 160/78 (BP 1 Location: Left upper arm, BP Patient Position: Sitting)   Pulse 85   Temp 98.7 °F (37.1 °C)   Resp 18   Wt 66.3 kg (146 lb 1.6 oz)   SpO2 95%   BMI 23.58 kg/m²         Blood obtained peripherally from [facility] first attempt with butterfly needle and sent to lab for CBC w/ Diff, CMP, and TSH per written orders. No bleeding or hematoma noted at site. Gauze and coban applied. Ms. Sathya Wolf tolerated the venipuncture, and had no complaints. Patient armband removed and shredded. Ms. Sathya Wolf was discharged from [place] [address] in stable condition at 1335. She is to return on 2021 at 0900 for her next appointment.     [facility]   2021

## 2021-09-13 ENCOUNTER — HOSPITAL ENCOUNTER (OUTPATIENT)
Dept: INFUSION THERAPY | Age: 61
Discharge: HOME | End: 2021-09-13
Payer: MEDICARE

## 2021-09-13 VITALS
OXYGEN SATURATION: 98 % | HEART RATE: 84 BPM | SYSTOLIC BLOOD PRESSURE: 141 MMHG | RESPIRATION RATE: 20 BRPM | TEMPERATURE: 97.9 F | DIASTOLIC BLOOD PRESSURE: 82 MMHG

## 2021-09-13 DIAGNOSIS — C34.91 NON-SMALL CELL CANCER OF RIGHT LUNG (HCC): Primary | ICD-10-CM

## 2021-09-13 PROCEDURE — 74011000258 HC RX REV CODE- 258: Performed by: INTERNAL MEDICINE

## 2021-09-13 PROCEDURE — 74011250636 HC RX REV CODE- 250/636: Performed by: INTERNAL MEDICINE

## 2021-09-13 PROCEDURE — 96413 CHEMO IV INFUSION 1 HR: CPT

## 2021-09-13 PROCEDURE — 77030012965 HC NDL HUBR BBMI -A

## 2021-09-13 PROCEDURE — 74011250636 HC RX REV CODE- 250/636

## 2021-09-13 RX ORDER — SODIUM CHLORIDE 0.9 % (FLUSH) 0.9 %
10-40 SYRINGE (ML) INJECTION AS NEEDED
Status: DISPENSED | OUTPATIENT
Start: 2021-09-13 | End: 2021-09-13

## 2021-09-13 RX ORDER — HEPARIN 100 UNIT/ML
SYRINGE INTRAVENOUS
Status: COMPLETED
Start: 2021-09-13 | End: 2021-09-13

## 2021-09-13 RX ORDER — HEPARIN 100 UNIT/ML
300-500 SYRINGE INTRAVENOUS AS NEEDED
Status: ACTIVE | OUTPATIENT
Start: 2021-09-13 | End: 2021-09-13

## 2021-09-13 RX ADMIN — HEPARIN 500 UNITS: 100 SYRINGE at 10:33

## 2021-09-13 RX ADMIN — SODIUM CHLORIDE 400 MG: 9 INJECTION, SOLUTION INTRAVENOUS at 10:00

## 2021-09-13 RX ADMIN — Medication 20 ML: at 10:33

## 2021-09-13 NOTE — PROGRESS NOTES
[facility] Progress Note    Date: 2021    Name: Jaime Orellana    MRN: [id number]         : 1960       Lynne Arisjace [address]      Ms. Leigh arrived to [facility] at 0930. Ms. Melly Jaime was assessed and education was provided. Ms. Parker Mode vitals were reviewed. Visit Vitals  BP (!) 141/82 (BP 1 Location: Right upper arm, BP Patient Position: Sitting)   Pulse 84   Temp 97.9 °F (36.6 °C)   Resp 20   SpO2 98%       Lab results obtained & reviewed from 21. Okay to proceed w/ tx per pt's tx plan parameters. Patient's L upper chest port accessed (lateral lumen). Brisk blood return/ flushes without difficulty. Patient's medial port not functioning per patient. Keytruda 400mg IV administered as ordered followed by NS flush. Ms. Melly Jaime tolerated infusion without complaints. Discharge/ follow-up instructions discussed w/ pt. Pt verbalized understanding. Port flushed with heparin per order and de-accessed. Band-aid applied to site. Pt armband removed & shredded. Ms. Melly Jaime was discharged from [place] [address] in stable condition at [phone]. She is to return for her next appt for pre-chemo labs on 10/20/21 at 1330 (pre-chemo labs).       Agustina Sanchez  2021

## 2021-09-23 NOTE — PROGRESS NOTES
Thyroid level is low,  If compliant- increase levothyroxine to 75 mcgs/brand name  If not- reiterate med compliance  K level is also low- is she on any supplement?  If not then will start 20 meqs daily  Recheck TSH/BMP in 6 weeks  Pls notify pt

## 2021-10-05 ENCOUNTER — OFFICE VISIT (OUTPATIENT)
Dept: FAMILY MEDICINE CLINIC | Age: 61
End: 2021-10-05
Payer: MEDICARE

## 2021-10-05 VITALS
OXYGEN SATURATION: 96 % | HEART RATE: 104 BPM | DIASTOLIC BLOOD PRESSURE: 92 MMHG | TEMPERATURE: 97.6 F | HEIGHT: 66 IN | RESPIRATION RATE: 16 BRPM | WEIGHT: 149 LBS | BODY MASS INDEX: 23.95 KG/M2 | SYSTOLIC BLOOD PRESSURE: 148 MMHG

## 2021-10-05 DIAGNOSIS — I10 ESSENTIAL HYPERTENSION: ICD-10-CM

## 2021-10-05 DIAGNOSIS — J31.0 CHRONIC RHINITIS: ICD-10-CM

## 2021-10-05 DIAGNOSIS — F17.200 SMOKER: ICD-10-CM

## 2021-10-05 DIAGNOSIS — F33.42 RECURRENT MAJOR DEPRESSIVE DISORDER, IN FULL REMISSION (HCC): ICD-10-CM

## 2021-10-05 DIAGNOSIS — Z00.00 MEDICARE ANNUAL WELLNESS VISIT, SUBSEQUENT: Primary | ICD-10-CM

## 2021-10-05 DIAGNOSIS — E03.9 ACQUIRED HYPOTHYROIDISM: ICD-10-CM

## 2021-10-05 DIAGNOSIS — Z23 NEEDS FLU SHOT: ICD-10-CM

## 2021-10-05 DIAGNOSIS — C34.91 NON-SMALL CELL CANCER OF RIGHT LUNG (HCC): ICD-10-CM

## 2021-10-05 PROCEDURE — G8420 CALC BMI NORM PARAMETERS: HCPCS | Performed by: FAMILY MEDICINE

## 2021-10-05 PROCEDURE — 90686 IIV4 VACC NO PRSV 0.5 ML IM: CPT | Performed by: FAMILY MEDICINE

## 2021-10-05 PROCEDURE — G9711 PT HX TOT COL OR COLON CA: HCPCS | Performed by: FAMILY MEDICINE

## 2021-10-05 PROCEDURE — G9717 DOC PT DX DEP/BP F/U NT REQ: HCPCS | Performed by: FAMILY MEDICINE

## 2021-10-05 PROCEDURE — G0463 HOSPITAL OUTPT CLINIC VISIT: HCPCS | Performed by: FAMILY MEDICINE

## 2021-10-05 PROCEDURE — G8427 DOCREV CUR MEDS BY ELIG CLIN: HCPCS | Performed by: FAMILY MEDICINE

## 2021-10-05 PROCEDURE — G9899 SCRN MAM PERF RSLTS DOC: HCPCS | Performed by: FAMILY MEDICINE

## 2021-10-05 PROCEDURE — G8755 DIAS BP > OR = 90: HCPCS | Performed by: FAMILY MEDICINE

## 2021-10-05 PROCEDURE — G0439 PPPS, SUBSEQ VISIT: HCPCS | Performed by: FAMILY MEDICINE

## 2021-10-05 PROCEDURE — G8753 SYS BP > OR = 140: HCPCS | Performed by: FAMILY MEDICINE

## 2021-10-05 PROCEDURE — 99214 OFFICE O/P EST MOD 30 MIN: CPT | Performed by: FAMILY MEDICINE

## 2021-10-05 RX ORDER — LEVOTHYROXINE SODIUM 75 UG/1
75 TABLET ORAL
Qty: 60 TABLET | Refills: 0 | Status: SHIPPED | OUTPATIENT
Start: 2021-10-05 | End: 2021-12-01

## 2021-10-05 RX ORDER — AMLODIPINE BESYLATE 2.5 MG/1
2.5 TABLET ORAL DAILY
Qty: 90 TABLET | Refills: 0 | Status: SHIPPED | OUTPATIENT
Start: 2021-10-05 | End: 2022-01-03

## 2021-10-05 RX ORDER — POTASSIUM CHLORIDE 20 MEQ/1
20 TABLET, EXTENDED RELEASE ORAL DAILY
Qty: 30 TABLET | Refills: 0 | Status: SHIPPED | OUTPATIENT
Start: 2021-10-05 | End: 2022-06-06 | Stop reason: SDUPTHER

## 2021-10-05 NOTE — PATIENT INSTRUCTIONS
Medicare Wellness Visit, Female     The best way to live healthy is to have a lifestyle where you eat a well-balanced diet, exercise regularly, limit alcohol use, and quit all forms of tobacco/nicotine, if applicable. Regular preventive services are another way to keep healthy. Preventive services (vaccines, screening tests, monitoring & exams) can help personalize your care plan, which helps you manage your own care. Screening tests can find health problems at the earliest stages, when they are easiest to treat. [facility] follows the current, evidence-based guidelines published by the [facility] ([facility]) when recommending preventive services for our patients. Because we follow these guidelines, sometimes recommendations change over time as research supports it. (For example, mammograms used to be recommended annually. Even though Medicare will still pay for an annual mammogram, the newer guidelines recommend a mammogram every two years for women of average risk). Of course, you and your doctor may decide to screen more often for some diseases, based on your risk and your co-morbidities (chronic disease you are already diagnosed with). Preventive services for you include:  - Medicare offers their members a free annual wellness visit, which is time for you and your primary care provider to discuss and plan for your preventive service needs. Take advantage of this benefit every year!  -All adults over the age of 72 should receive the recommended pneumonia vaccines. Current USPSTF guidelines recommend a series of two vaccines for the best pneumonia protection.   -All adults should have a flu vaccine yearly and a tetanus vaccine every 10 years.   -All adults age 48 and older should receive the shingles vaccines (series of two vaccines).       -All adults age 38-68 who are overweight should have a diabetes screening test once every three years.   -All adults born between [birth year] and 1965 should be screened once for Hepatitis C.  -Other screening tests and preventive services for persons with diabetes include: an eye exam to screen for diabetic retinopathy, a kidney function test, a foot exam, and stricter control over your cholesterol.   -Cardiovascular screening for adults with routine risk involves an electrocardiogram (ECG) at intervals determined by your doctor.   -Colorectal cancer screenings should be done for adults age 54-65 with no increased risk factors for colorectal cancer. There are a number of acceptable methods of screening for this type of cancer. Each test has its own benefits and drawbacks. Discuss with your doctor what is most appropriate for you during your annual wellness visit. The different tests include: colonoscopy (considered the best screening method), a fecal occult blood test, a fecal DNA test, and sigmoidoscopy.    -A bone mass density test is recommended when a woman turns 65 to screen for osteoporosis. This test is only recommended one time, as a screening. Some providers will use this same test as a disease monitoring tool if you already have osteoporosis. -Breast cancer screenings are recommended every other year for women of normal risk, age 54-69.  -Cervical cancer screenings for women over age 72 are only recommended with certain risk factors. Here is a list of your current Health Maintenance items (your personalized list of preventive services) with a due date:  Health Maintenance Due   Topic Date Due    DTaP/Tdap/Td  (1 - Tdap) Never done    Cervical cancer screen  Never done    Shingles Vaccine (1 of 2) Never done    Colorectal Screening  08/18/2017    Cholesterol Test   08/15/2021    Yearly Flu Vaccine (1) 09/01/2021         Vaccine Information Statement    Influenza (Flu) Vaccine (Inactivated or Recombinant):  What You Need to Know    Many vaccine information statements are available in [place]ian and other languages. See www.immunize.org/vis. Hojas de información sobre vacunas están disponibles en español y en muchos otros idiomas. Visite www.immunize.org/vis. 1. Why get vaccinated? Influenza vaccine can prevent influenza (flu). Flu is a contagious disease that spreads around the [facility] every year, usually between October and May. Anyone can get the flu, but it is more dangerous for some people. Infants and young children, people 72 years and older, pregnant people, and people with certain health conditions or a weakened immune system are at greatest risk of flu complications. Pneumonia, bronchitis, sinus infections, and ear infections are examples of flu-related complications. If you have a medical condition, such as heart disease, cancer, or diabetes, flu can make it worse. Flu can cause fever and chills, sore throat, muscle aches, fatigue, cough, headache, and runny or stuffy nose. Some people may have vomiting and diarrhea, though this is more common in children than adults. In an average year, thousands of people in the [facility] die from flu, and many more are hospitalized. Flu vaccine prevents millions of illnesses and flu-related visits to the doctor each year. 2. Influenza vaccines     CDC recommends everyone 6 months and older get vaccinated every flu season. Children 6 months through 6years of age may need 2 doses during a single flu season. Everyone else needs only 1 dose each flu season. It takes about 2 weeks for protection to develop after vaccination. There are many flu viruses, and they are always changing. Each year a new flu vaccine is made to protect against the influenza viruses believed to be likely to cause disease in the upcoming flu season. Even when the vaccine doesnt exactly match these viruses, it may still provide some protection. Influenza vaccine does not cause flu. Influenza vaccine may be given at the same time as other vaccines.     3. Talk with your health care provider    Tell your vaccination provider if the person getting the vaccine:   Has had an allergic reaction after a previous dose of influenza vaccine, or has any severe, life-threatening allergies    Has ever had Guillain-Barré Syndrome (also called GBS)    In some cases, your health care provider may decide to postpone influenza vaccination until a future visit. Influenza vaccine can be administered at any time during pregnancy. People who are or will be pregnant during influenza season should receive inactivated influenza vaccine. People with minor illnesses, such as a cold, may be vaccinated. People who are moderately or severely ill should usually wait until they recover before getting influenza vaccine. Your health care provider can give you more information. 4. Risks of a vaccine reaction     Soreness, redness, and swelling where the shot is given, fever, muscle aches, and headache can happen after influenza vaccination.  There may be a very small increased risk of Guillain-Barré Syndrome (GBS) after inactivated influenza vaccine (the flu shot). Eduard De Jesus children who get the flu shot along with pneumococcal vaccine (PCV13) and/or DTaP vaccine at the same time might be slightly more likely to have a seizure caused by fever. Tell your health care provider if a child who is getting flu vaccine has ever had a seizure. People sometimes faint after medical procedures, including vaccination. Tell your provider if you feel dizzy or have vision changes or ringing in the ears. As with any medicine, there is a very remote chance of a vaccine causing a severe allergic reaction, other serious injury, or death. 5. What if there is a serious problem? An allergic reaction could occur after the vaccinated person leaves the clinic.  If you see signs of a severe allergic reaction (hives, swelling of the face and throat, difficulty breathing, a fast heartbeat, dizziness, or weakness), call 9-1-1 and get the person to the nearest hospital.    For other signs that concern you, call your health care provider. Adverse reactions should be reported to the Vaccine Adverse Event Reporting System (VAERS). Your health care provider will usually file this report, or you can do it yourself. Visit the VAERS website at www.vaers. [facility].gov or call 0-457.204.7081. VAERS is only for reporting reactions, and VAERS staff members do not give medical advice. 6. The National Vaccine Injury Compensation Program    The [facility] Vaccine Injury Compensation Program (VICP) is a federal program that was created to compensate people who may have been injured by certain vaccines. Claims regarding alleged injury or death due to vaccination have a time limit for filing, which may be as short as two years. Visit the VICP website at www.hrsa.gov/vaccinecompensation or call 4-268.542.7308 to learn about the program and about filing a claim. 7. How can I learn more?  Ask your health care provider.  Call your local or state health department.  Visit the website of the Food and Drug Administration (FDA) for vaccine package inserts and additional information at www.fda.gov/vaccines-blood-biologics/vaccines.  Contact the Centers for Disease Control and Prevention (CDC):  - Call 9-708.446.6278 (3-193-CGH-INFO) or  - Visit CDCs influenza website at www.cdc.gov/flu. Vaccine Information Statement   Inactivated Influenza Vaccine   8/6/2021  42 ROBERT Marinelli [id number]   Department of Health and Human Services  Centers for Disease Control and Prevention    Office Use Only

## 2021-10-05 NOTE — PROGRESS NOTES
Flulaval 0.5 ml given IM in right deltoid. Lot # [id number], exp date 06/30/2022 . Patient tolerated injection well. No adverse reaction noted. This is the Subsequent Medicare Annual Wellness Exam, performed 12 months or more after the Initial AWV or the last Subsequent AWV    I have reviewed the patient's medical history in detail and updated the computerized patient record. Assessment/Plan   Education and counseling provided:  Health Maintenance reviewed- ongoing chemotherapy for lung cancer, discussed age appropriate prev health  We agreed on checking lipids only at this time. Depression Risk Factor Screening     3 most recent PHQ Screens 4/9/2020   Little interest or pleasure in doing things Not at all   Feeling down, depressed, irritable, or hopeless Not at all   Total Score PHQ 2 0       Alcohol Risk Screen    Do you average more than 1 drink per night or more than 7 drinks a week:  No    On any one occasion in the past three months have you have had more than 3 drinks containing alcohol:  No        Functional Ability and Level of Safety    Hearing: Hearing is good. Activities of Daily Living: The home contains: no safety equipment. Patient does total self care      Ambulation: with no difficulty     Fall Risk:  Fall Risk Assessment, last 12 mths 4/9/2020   Able to walk? Yes   Fall in past 12 months?  No      Abuse Screen:  Patient is not abused       Cognitive Screening    Has your family/caregiver stated any concerns about your memory: no      Health Maintenance Due     Health Maintenance Due   Topic Date Due    DTaP/Tdap/Td series (1 - Tdap) Never done    Cervical cancer screen  Never done    Shingrix Vaccine Age 50> (1 of 2) Never done    Colorectal Cancer Screening Combo  08/18/2017    Lipid Screen  08/15/2021    Flu Vaccine (1) 09/01/2021       Patient Care Team   Patient Care Team:  Lata Farah MD as PCP - General (Family Medicine)  Lata Farah MD as PCP - [facility] Empaneled Provider  German Juarez MD (Hematology and Oncology)  Yuliya Recio MD (Hematology and Oncology)  Maxine Grande MD (Radiation Oncology)  Remigio Kimball MD as Surgeon (General Surgery)    History     Patient Active Problem List   Diagnosis Code    SVC syndrome I87.1   Shaylee Xiao Former smoker R14.195    Essential hypertension I10    Recurrent major depressive disorder ([facility] F33.9    Iron deficiency anemia D50.9    Abnormal mammogram R92.8    Non-small cell lung cancer ([facility] C34.90     Past Medical History:   Diagnosis Date    Anemia, iron deficiency 2/2016    Depression     H/O seasonal allergies     Hypertension     Non-small cell carcinoma of lung ([facility] 2/2016    adenocarcinoma of right lung    Positive occult stool blood test 2/29/2016    Pulmonary embolism ([facility] 2/28/2016    small, bilateral PEs- on Xarelto    Right leg DVT ([facility] 2/28/2016    on Xarelto    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[facility] 4/1/2016    resolved    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      Past Surgical History:   Procedure Laterality Date    HX ANGIOPLASTY Right 3/20/2016    IJ, subclavian, and brachiocephalic veins    HX HYSTERECTOMY      due to menorrhagia     HX OTHER SURGICAL Right 3/20/2016    2 placed in right IJ, subclavian/brachiocephalic    HX THROMBECTOMY  3/20/2016    with thrombolysis    HX VASCULAR ACCESS Left     double lumen    LA BREAST SURGERY PROCEDURE UNLISTED      biopsy     Current Outpatient Medications   Medication Sig Dispense Refill    cetirizine (ZYRTEC) 10 mg tablet take 1 tablet by mouth once daily 90 Tablet 3    montelukast (SINGULAIR) 10 mg tablet take 1 tablet by mouth once daily 90 Tablet 0    sertraline (ZOLOFT) 50 mg tablet take 1 tablet by mouth once daily 90 Tablet 0    levothyroxine (SYNTHROID) 50 mcg tablet take 1 tablet by mouth once daily before breakfast 90 Tablet 0    amLODIPine (NORVASC) 5 mg tablet take 1 tablet by mouth once daily 90 Tablet 1  ferrous sulfate 325 mg (65 mg iron) tablet take 1 tablet by mouth twice a day with meals 60 Tab 5    aspirin delayed-release 81 mg tablet Take 81 mg by mouth daily.  methylPREDNISolone (MEDROL DOSEPACK) 4 mg tablet Take as directed (Patient not taking: Reported on 10/5/2021) 1 Dose Pack 0    fluticasone propionate (FLONASE) 50 mcg/actuation nasal spray 2 Sprays by Both Nostrils route daily. (Patient not taking: Reported on 10/5/2021) 1 Bottle 0     Allergies   Allergen Reactions    Flagyl [Metronidazole] Hives    Nitroimidazoles Other (comments)     Nitroimidazole derivatives       Family History   Problem Relation Age of Onset    Cancer Sister 48        breast    Liver Disease Sister         cirrhosis    Heart Attack Mother 37    No Known Problems Child      Social History     Tobacco Use    Smoking status: Current Every Day Smoker     Packs/day: 0.50     Types: Cigarettes     Last attempt to quit: 2016     Years since quittin.6    Smokeless tobacco: Never Used   Substance Use Topics    Alcohol use: No       Charlee Nayeli, 56 y.o.,  female    SUBJECTIVE  Ff-up    HTN-norvasc 5 mg. She continues to smoke about 1 pack every 3 days. Depression- reports to be doing well on zoloft    Non small cell lung cancer-dx  ongoing chemotherapy with dr. Argentina Calderon. sinus pressure, nasal congestion  Has resolved    Hypothyroidism- reviewed labs TSH elevated, reports good compliance with levothyroxince 50 mcgs/day.      ALAN- surveillance per hematology    ROS:  See HPI, all others negative        Patient Active Problem List   Diagnosis Code    SVC syndrome I87.1    Former smoker Z87.891    Essential hypertension I10    Recurrent major depressive disorder ([facility] F33.9    Iron deficiency anemia D50.9    Abnormal mammogram R92.8    Non-small cell lung cancer ([facility] C34.90       Current Outpatient Medications   Medication Sig Dispense Refill    levothyroxine (SYNTHROID) 75 mcg tablet Take 1 Tablet by mouth Daily (before breakfast). 60 Tablet 0    potassium chloride (K-DUR, KLOR-CON) 20 mEq tablet Take 1 Tablet by mouth daily. 30 Tablet 0    amLODIPine (NORVASC) 2.5 mg tablet Take 1 Tablet by mouth daily. 90 Tablet 0    cetirizine (ZYRTEC) 10 mg tablet take 1 tablet by mouth once daily 90 Tablet 3    montelukast (SINGULAIR) 10 mg tablet take 1 tablet by mouth once daily 90 Tablet 0    sertraline (ZOLOFT) 50 mg tablet take 1 tablet by mouth once daily 90 Tablet 0    amLODIPine (NORVASC) 5 mg tablet take 1 tablet by mouth once daily 90 Tablet 1    ferrous sulfate 325 mg (65 mg iron) tablet take 1 tablet by mouth twice a day with meals 60 Tab 5    aspirin delayed-release 81 mg tablet Take 81 mg by mouth daily.  methylPREDNISolone (MEDROL DOSEPACK) 4 mg tablet Take as directed (Patient not taking: Reported on 10/5/2021) 1 Dose Pack 0    fluticasone propionate (FLONASE) 50 mcg/actuation nasal spray 2 Sprays by Both Nostrils route daily.  (Patient not taking: Reported on 10/5/2021) 1 Bottle 0       Allergies   Allergen Reactions    Flagyl [Metronidazole] Hives    Nitroimidazoles Other (comments)     Nitroimidazole derivatives       Past Medical History:   Diagnosis Date    Anemia, iron deficiency 2/2016    Depression     H/O seasonal allergies     Hypertension     Non-small cell carcinoma of lung ([address] 2/2016    adenocarcinoma of right lung    Positive occult stool blood test 2/29/2016    Pulmonary embolism ([address] 2/28/2016    small, bilateral PEs- on Xarelto    Right leg DVT ([address] 2/28/2016    on Xarelto    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    resolved    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       Social History     Socioeconomic History    Marital status: LEGALLY      Spouse name: Not on file    Number of children: Not on file    Years of education: Not on file    Highest education level: Not on file   Occupational History    Not on file Tobacco Use    Smoking status: Current Every Day Smoker     Packs/day: 0.50     Types: Cigarettes     Last attempt to quit: 2016     Years since quittin.6    Smokeless tobacco: Never Used   Substance and Sexual Activity    Alcohol use: No    Drug use: Never     Types: Prescription    Sexual activity: Yes     Partners: Male     Birth control/protection: Surgical     Comment: tubal ligation   Other Topics Concern     Service Not Asked    Blood Transfusions Not Asked    Caffeine Concern Not Asked    Occupational Exposure Not Asked    Hobby Hazards Not Asked    Sleep Concern Not Asked    Stress Concern Not Asked    Weight Concern Not Asked    Special Diet Not Asked    Back Care Not Asked    Exercise Not Asked    Bike Helmet Not Asked    Seat Belt Not Asked    Self-Exams Not Asked   Social History Narrative    Not on file     Social Determinants of Health     Financial Resource Strain:     Difficulty of Paying Living Expenses:    Food Insecurity:     Worried About Running Out of Food in the Last Year:     Ran Out of Food in the Last Year:    Transportation Needs:     Lack of Transportation (Medical):      Lack of Transportation (Non-Medical):    Physical Activity:     Days of Exercise per Week:     Minutes of Exercise per Session:    Stress:     Feeling of Stress :    Social Connections:     Frequency of Communication with Friends and Family:     Frequency of Social Gatherings with Friends and Family:     Attends Evangelical Services:     Active Member of Clubs or Organizations:     Attends Club or Organization Meetings:     Marital Status:    Intimate Partner Violence:     Fear of Current or Ex-Partner:     Emotionally Abused:     Physically Abused:     Sexually Abused:        Family History   Problem Relation Age of Onset    Cancer Sister 48        breast    Liver Disease Sister         cirrhosis    Heart Attack Mother 37    No Known Problems Child OBJECTIVE    Physical Exam:     Visit Vitals  BP (!) 148/92 (BP 1 Location: Left upper arm, BP Patient Position: Sitting, BP Cuff Size: Adult)   Pulse (!) 104   Temp 97.6 °F (36.4 °C) (Oral)   Resp 16   Ht 5' 6\" (1.676 m)   Wt 149 lb (67.6 kg)   SpO2 96%   BMI 24.05 kg/m²       General: alert, chronically ill- appearing, AA, in no apparent distress or pain  Head: atraumatic. Non-tender maxillary and frontal sinuses  Eyes: Lids with no discharge, no matting, conjunctivae clear and non injected, full EOMs, PERLLA  Ears: pinna non-tender, external auditory canal patent, TM intact  Neck: supple, no adenopathy palpated  CVS: normal rate, regular rhythm, distinct S1 and S2  Lungs:clear to ausculation bilaterally, no crackles, wheezing or rhonchi noted  Abdomen: normoactive bowel sounds, soft, non-tender  Extremities: no edema, no cyanosis, MSK grossly normal  Skin: warm, no lesions, rashes noted  Psych:  mood and affect normal        ASSESSMENT/PLAN  Diagnoses and all orders for this visit:    1. Recurrent major depressive disorder, in full remission ([address]  Stable  Cont zoloft    2. Chronic rhinitis  improved  cont flonase, singulair,  zyrtec  Decided against pursuing ENT at this time  Will monitor    3. Non-small cell cancer of right lung [facility])  Ongoing chemotherapy  Following dr. Zahida Rashid    4. Essential hypertension  Needs better control  Increase norvasc to 7.5 mg   Mild hypokalemia- kcl 20 meqs OD  Monitoring    5. Iron deficiency anemia, unspecified iron deficiency anemia type  Surveillance per dr. Zahida Rashid    6. Tobacco use  Encouraged compete cessation    7. Acquired hypothyroidism  Elevated TSH, increase synthroid to 75 mcgs/day  Recheck TSH in 6 weeks      Follow-up and Dispositions    · Return in about 2 months (around 12/5/2021), or if symptoms worsen or fail to improve, for fasting labs a week prior to your next visit, routine chronic illness care. Patient understands plan of care.  Patient has provided input and agrees with goals.

## 2021-10-19 RX ORDER — EPINEPHRINE 1 MG/ML
0.3 INJECTION, SOLUTION, CONCENTRATE INTRAVENOUS AS NEEDED
Status: CANCELLED | OUTPATIENT
Start: 2021-10-25

## 2021-10-19 RX ORDER — ONDANSETRON 2 MG/ML
8 INJECTION INTRAMUSCULAR; INTRAVENOUS AS NEEDED
Status: CANCELLED | OUTPATIENT
Start: 2021-10-25

## 2021-10-19 RX ORDER — DIPHENHYDRAMINE HYDROCHLORIDE 50 MG/ML
50 INJECTION, SOLUTION INTRAMUSCULAR; INTRAVENOUS AS NEEDED
Status: CANCELLED
Start: 2021-10-25

## 2021-10-19 RX ORDER — ALBUTEROL SULFATE 0.83 MG/ML
2.5 SOLUTION RESPIRATORY (INHALATION) AS NEEDED
Status: CANCELLED
Start: 2021-10-25

## 2021-10-19 RX ORDER — ACETAMINOPHEN 325 MG/1
650 TABLET ORAL AS NEEDED
Status: CANCELLED
Start: 2021-10-25

## 2021-10-19 RX ORDER — HYDROCORTISONE SODIUM SUCCINATE 100 MG/2ML
100 INJECTION, POWDER, FOR SOLUTION INTRAMUSCULAR; INTRAVENOUS AS NEEDED
Status: CANCELLED | OUTPATIENT
Start: 2021-10-25

## 2021-10-20 ENCOUNTER — HOSPITAL ENCOUNTER (OUTPATIENT)
Dept: INFUSION THERAPY | Age: 61
Discharge: HOME | End: 2021-10-20
Payer: MEDICARE

## 2021-10-20 VITALS
HEART RATE: 88 BPM | RESPIRATION RATE: 20 BRPM | WEIGHT: 154 LBS | SYSTOLIC BLOOD PRESSURE: 137 MMHG | DIASTOLIC BLOOD PRESSURE: 79 MMHG | OXYGEN SATURATION: 95 % | BODY MASS INDEX: 24.75 KG/M2 | TEMPERATURE: 98.5 F | HEIGHT: 66 IN

## 2021-10-20 LAB
ALBUMIN SERPL-MCNC: 3.4 G/DL (ref 3.4–5)
ALBUMIN/GLOB SERPL: 1 {RATIO} (ref 0.8–1.7)
ALP SERPL-CCNC: 82 U/L (ref 45–117)
ALT SERPL-CCNC: 11 U/L (ref 13–56)
ANION GAP SERPL CALC-SCNC: 8 MMOL/L (ref 3–18)
AST SERPL-CCNC: 13 U/L (ref 10–38)
BASOPHILS # BLD: 0 K/UL (ref 0–0.1)
BASOPHILS NFR BLD: 0 % (ref 0–2)
BILIRUB SERPL-MCNC: 0.3 MG/DL (ref 0.2–1)
BUN SERPL-MCNC: 5 MG/DL (ref 7–18)
BUN/CREAT SERPL: 7 (ref 12–20)
CALCIUM SERPL-MCNC: 8.9 MG/DL (ref 8.5–10.1)
CHLORIDE SERPL-SCNC: 105 MMOL/L (ref 100–111)
CO2 SERPL-SCNC: 28 MMOL/L (ref 21–32)
CREAT SERPL-MCNC: 0.75 MG/DL (ref 0.6–1.3)
DIFFERENTIAL METHOD BLD: ABNORMAL
EOSINOPHIL # BLD: 0.1 K/UL (ref 0–0.4)
EOSINOPHIL NFR BLD: 2 % (ref 0–5)
ERYTHROCYTE [DISTWIDTH] IN BLOOD BY AUTOMATED COUNT: 16.6 % (ref 11.6–14.5)
GLOBULIN SER CALC-MCNC: 3.4 G/DL (ref 2–4)
GLUCOSE SERPL-MCNC: 129 MG/DL (ref 74–99)
HCT VFR BLD AUTO: 35.5 % (ref 35–45)
HGB BLD-MCNC: 11 G/DL (ref 12–16)
LYMPHOCYTES # BLD: 1.3 K/UL (ref 0.9–3.6)
LYMPHOCYTES NFR BLD: 24 % (ref 21–52)
MCH RBC QN AUTO: 24.1 PG (ref 24–34)
MCHC RBC AUTO-ENTMCNC: 31 G/DL (ref 31–37)
MCV RBC AUTO: 77.9 FL (ref 78–100)
MONOCYTES # BLD: 0.4 K/UL (ref 0.05–1.2)
MONOCYTES NFR BLD: 7 % (ref 3–10)
NEUTS SEG # BLD: 3.5 K/UL (ref 1.8–8)
NEUTS SEG NFR BLD: 66 % (ref 40–73)
PLATELET # BLD AUTO: 204 K/UL (ref 135–420)
PMV BLD AUTO: 9.9 FL (ref 9.2–11.8)
POTASSIUM SERPL-SCNC: 3.3 MMOL/L (ref 3.5–5.5)
PROT SERPL-MCNC: 6.8 G/DL (ref 6.4–8.2)
RBC # BLD AUTO: 4.56 M/UL (ref 4.2–5.3)
SODIUM SERPL-SCNC: 141 MMOL/L (ref 136–145)
TSH SERPL DL<=0.05 MIU/L-ACNC: 3.74 UIU/ML (ref 0.36–3.74)
WBC # BLD AUTO: 5.2 K/UL (ref 4.6–13.2)

## 2021-10-20 PROCEDURE — 84443 ASSAY THYROID STIM HORMONE: CPT

## 2021-10-20 PROCEDURE — 80053 COMPREHEN METABOLIC PANEL: CPT

## 2021-10-20 PROCEDURE — 36415 COLL VENOUS BLD VENIPUNCTURE: CPT

## 2021-10-20 PROCEDURE — 85025 COMPLETE CBC W/AUTO DIFF WBC: CPT

## 2021-10-20 NOTE — PROGRESS NOTES
[facility] OPI Progress Note    Date: 2021    Name: Christine Aragon    MRN: [id number]         : 1960      Ms. Leigh arrived in the [facility] today at 1440, in stable condition, here for Pre-Chemo Labs. She was assessed and education was provided. Ms. Noah Morales vitals were reviewed. Visit Vitals  /79 (BP 1 Location: Right upper arm, BP Patient Position: Sitting)   Pulse 88   Temp 98.5 °F (36.9 °C)   Resp 20   Ht 5' 6\" (1.676 m)   Wt 69.9 kg (154 lb)   SpO2 95%   BMI 24.86 kg/m²           Blood for the ordered labs (CBC, CMP, & TSH) was drawn, per  Raquel, without incident. And then, all labs were sent out by  to the [facility] hospital lab, for processing. Ms. Lucas Thao tolerated well, and had no complaints. Ms. Lucas Thao was discharged from [place] [address] in stable condition at 1450. Keeleydy  She is to return on Monday, 10-25-21, at 0900, for her next appointment, for her next cycle of  Keytruda.      Casimiro Gamboa RN  2021  3:55 PM

## 2021-10-25 ENCOUNTER — HOSPITAL ENCOUNTER (OUTPATIENT)
Dept: INFUSION THERAPY | Age: 61
Discharge: HOME | End: 2021-10-25
Payer: MEDICARE

## 2021-10-25 VITALS
OXYGEN SATURATION: 96 % | RESPIRATION RATE: 16 BRPM | SYSTOLIC BLOOD PRESSURE: 140 MMHG | HEART RATE: 69 BPM | TEMPERATURE: 97.9 F | DIASTOLIC BLOOD PRESSURE: 81 MMHG

## 2021-10-25 DIAGNOSIS — C34.91 NON-SMALL CELL CANCER OF RIGHT LUNG (HCC): Primary | ICD-10-CM

## 2021-10-25 PROCEDURE — 74011000258 HC RX REV CODE- 258: Performed by: INTERNAL MEDICINE

## 2021-10-25 PROCEDURE — 96413 CHEMO IV INFUSION 1 HR: CPT

## 2021-10-25 PROCEDURE — 77030012965 HC NDL HUBR BBMI -A

## 2021-10-25 PROCEDURE — 74011250636 HC RX REV CODE- 250/636: Performed by: INTERNAL MEDICINE

## 2021-10-25 RX ORDER — SODIUM CHLORIDE 0.9 % (FLUSH) 0.9 %
10-40 SYRINGE (ML) INJECTION AS NEEDED
Status: DISPENSED | OUTPATIENT
Start: 2021-10-25 | End: 2021-10-25

## 2021-10-25 RX ORDER — HEPARIN 100 UNIT/ML
300-500 SYRINGE INTRAVENOUS AS NEEDED
Status: DISPENSED | OUTPATIENT
Start: 2021-10-25 | End: 2021-10-25

## 2021-10-25 RX ORDER — SODIUM CHLORIDE 9 MG/ML
25 INJECTION, SOLUTION INTRAVENOUS CONTINUOUS
Status: DISPENSED | OUTPATIENT
Start: 2021-10-25 | End: 2021-10-25

## 2021-10-25 RX ADMIN — SODIUM CHLORIDE 25 ML/HR: 9 INJECTION, SOLUTION INTRAVENOUS at 09:45

## 2021-10-25 RX ADMIN — HEPARIN 500 UNITS: 100 SYRINGE at 10:55

## 2021-10-25 RX ADMIN — Medication 30 ML: at 10:55

## 2021-10-25 RX ADMIN — SODIUM CHLORIDE 400 MG: 9 INJECTION, SOLUTION INTRAVENOUS at 10:05

## 2021-10-25 RX ADMIN — Medication 20 ML: at 09:40

## 2021-10-25 NOTE — PROGRESS NOTES
[facility] Progress Note    Date: 2021    Name: Maine Coe    MRN: [id number]         : 1960    Ms. Leigh arrived in the [facility] today at 0915, in stable condition, here for Cycle 24, IV KEYTRUDA Infusion (Every 6 Week Cycle). She was assessed and education was provided. Ms. Abundio Montejo vitals were reviewed. Visit Vitals  BP (!) 141/81 (BP 1 Location: Right upper arm, BP Patient Position: Sitting)   Pulse 84   Temp 98 °F (36.7 °C)   Resp 16   SpO2 96%   Breastfeeding No           Lab results were reviewed, and all of her most recent labs listed below, from 10-20-21, were all noted to be satisfactory for treatment today. Results for Sharon Perez (MRN [id number])    Ref. Range 10/20/2021 14:45   WBC Latest Ref Range: 4.6 - 13.2 K/uL 5.2   RBC Latest Ref Range: 4.20 - 5.30 M/uL 4.56   HGB Latest Ref Range: 12.0 - 16.0 g/dL 11.0 (L)   HCT Latest Ref Range: 35.0 - 45.0 % 35.5   MCV Latest Ref Range: 78.0 - 100.0 FL 77.9 (L)   MCH Latest Ref Range: 24.0 - 34.0 PG 24.1   MCHC Latest Ref Range: 31.0 - 37.0 g/dL 31.0   RDW Latest Ref Range: 11.6 - 14.5 % 16.6 (H)   PLATELET Latest Ref Range: 135 - 420 K/uL 204   MPV Latest Ref Range: 9.2 - 11.8 FL 9.9   NEUTROPHILS Latest Ref Range: 40 - 73 % 66   LYMPHOCYTES Latest Ref Range: 21 - 52 % 24   MONOCYTES Latest Ref Range: 3 - 10 % 7   EOSINOPHILS Latest Ref Range: 0 - 5 % 2   BASOPHILS Latest Ref Range: 0 - 2 % 0   DF Latest Units:   AUTOMATED   ABS. NEUTROPHILS Latest Ref Range: 1.8 - 8.0 K/UL 3.5   ABS. LYMPHOCYTES Latest Ref Range: 0.9 - 3.6 K/UL 1.3   ABS. MONOCYTES Latest Ref Range: 0.05 - 1.2 K/UL 0.4   ABS. EOSINOPHILS Latest Ref Range: 0.0 - 0.4 K/UL 0.1   ABS.  BASOPHILS Latest Ref Range: 0.0 - 0.1 K/UL 0.0   Sodium Latest Ref Range: 136 - 145 mmol/L 141   Potassium Latest Ref Range: 3.5 - 5.5 mmol/L 3.3 (L)   Chloride Latest Ref Range: 100 - 111 mmol/L 105   CO2 Latest Ref Range: 21 - 32 mmol/L 28   Anion gap Latest Ref Range: 3.0 - 18 mmol/L 8   Glucose Latest Ref Range: 74 - 99 mg/dL 129 (H)   BUN Latest Ref Range: 7.0 - 18 MG/DL 5 (L)   Creatinine Latest Ref Range: 0.6 - 1.3 MG/DL 0.75   BUN/Creatinine ratio Latest Ref Range: 12 - 20   7 (L)   Calcium Latest Ref Range: 8.5 - 10.1 MG/DL 8.9   GFR est non-AA Latest Ref Range: >60 ml/min/1.73m2 >60   GFR est AA Latest Ref Range: >60 ml/min/1.73m2 >60   Bilirubin, total Latest Ref Range: 0.2 - 1.0 MG/DL 0.3   Protein, total Latest Ref Range: 6.4 - 8.2 g/dL 6.8   Albumin Latest Ref Range: 3.4 - 5.0 g/dL 3.4   Globulin Latest Ref Range: 2.0 - 4.0 g/dL 3.4   A-G Ratio Latest Ref Range: 0.8 - 1.7   1.0   ALT Latest Ref Range: 13 - 56 U/L 11 (L)   AST Latest Ref Range: 10 - 38 U/L 13   Alk. phosphatase Latest Ref Range: 45 - 117 U/L 82   TSH Latest Ref Range: 0.36 - 3.74 uIU/mL 3.74           Written consent for [facility] Infusion was viewed in her electronic record. The outer/lateral lumen of her left chest double lumen port was accessed without incident at 0940, and brisk blood return was obtained.  ml IV Bag was hung to infuse @ KVO PRN throughout treatment today. KEYTRUDA (pembrolizumab) 400 mg IV, was administered over approximately 30 minutes, per order, and without incident. After completion of all ordered IV medications, her port was flushed well per protocol and without incident, with NS & Heparin, and then, the el needle was removed and gauze/bandaid was applied. Ms. Lucas Thao tolerated treatment very well today, and voiced no complaints. Ms. Lucas Thao was discharged from [place] [address] in stable condition at 1100. Essie  She is to return in 6 weeks, on Wednesday, 12-1-21 at 1330,  for her next appointment, for pre-chemo labs. And then, Cycle 25, IV KEYTRUDA Infusion is scheduled for Monday, 12-6-21 at 0900.     Casimiro Gamboa RN  October 25, 2021  9:58 AM

## 2021-11-09 RX ORDER — MONTELUKAST SODIUM 10 MG/1
TABLET ORAL
Qty: 90 TABLET | Refills: 3 | Status: SHIPPED | OUTPATIENT
Start: 2021-11-09

## 2021-11-09 NOTE — TELEPHONE ENCOUNTER
K is mildly low on recent oncology visit, has she been taking kcl 20 meqs OD regularly?   If yes, increase to 20 meqs BID recheck BMP next week  If no, pls remind to take regularly and will recheck BMP next week

## 2021-11-29 RX ORDER — DIPHENHYDRAMINE HYDROCHLORIDE 50 MG/ML
50 INJECTION, SOLUTION INTRAMUSCULAR; INTRAVENOUS AS NEEDED
Status: CANCELLED
Start: 2021-12-06

## 2021-11-29 RX ORDER — ONDANSETRON 2 MG/ML
8 INJECTION INTRAMUSCULAR; INTRAVENOUS AS NEEDED
Status: CANCELLED | OUTPATIENT
Start: 2021-12-06

## 2021-11-29 RX ORDER — ACETAMINOPHEN 325 MG/1
650 TABLET ORAL AS NEEDED
Status: CANCELLED
Start: 2021-12-06

## 2021-11-29 RX ORDER — EPINEPHRINE 1 MG/ML
0.3 INJECTION, SOLUTION, CONCENTRATE INTRAVENOUS AS NEEDED
Status: CANCELLED | OUTPATIENT
Start: 2021-12-06

## 2021-11-29 RX ORDER — ALBUTEROL SULFATE 0.83 MG/ML
2.5 SOLUTION RESPIRATORY (INHALATION) AS NEEDED
Status: CANCELLED
Start: 2021-12-06

## 2021-11-29 RX ORDER — HYDROCORTISONE SODIUM SUCCINATE 100 MG/2ML
100 INJECTION, POWDER, FOR SOLUTION INTRAMUSCULAR; INTRAVENOUS AS NEEDED
Status: CANCELLED | OUTPATIENT
Start: 2021-12-06

## 2021-11-29 RX ORDER — SODIUM CHLORIDE 9 MG/ML
25 INJECTION, SOLUTION INTRAVENOUS CONTINUOUS
Status: CANCELLED | OUTPATIENT
Start: 2021-12-06

## 2021-12-01 ENCOUNTER — APPOINTMENT (OUTPATIENT)
Dept: INFUSION THERAPY | Age: 61
End: 2021-12-01
Payer: MEDICARE

## 2021-12-01 ENCOUNTER — HOSPITAL ENCOUNTER (OUTPATIENT)
Dept: INFUSION THERAPY | Age: 61
Discharge: HOME | End: 2021-12-01
Payer: MEDICARE

## 2021-12-01 VITALS
TEMPERATURE: 97.8 F | OXYGEN SATURATION: 95 % | HEIGHT: 66 IN | WEIGHT: 156.4 LBS | BODY MASS INDEX: 25.13 KG/M2 | HEART RATE: 91 BPM | RESPIRATION RATE: 18 BRPM | SYSTOLIC BLOOD PRESSURE: 142 MMHG | DIASTOLIC BLOOD PRESSURE: 76 MMHG

## 2021-12-01 LAB
ALBUMIN SERPL-MCNC: 3.5 G/DL (ref 3.4–5)
ALBUMIN/GLOB SERPL: 1 {RATIO} (ref 0.8–1.7)
ALP SERPL-CCNC: 83 U/L (ref 45–117)
ALT SERPL-CCNC: 11 U/L (ref 13–56)
ANION GAP SERPL CALC-SCNC: 6 MMOL/L (ref 3–18)
AST SERPL-CCNC: 10 U/L (ref 10–38)
BASOPHILS # BLD: 0 K/UL (ref 0–0.1)
BASOPHILS NFR BLD: 0 % (ref 0–2)
BILIRUB SERPL-MCNC: 0.3 MG/DL (ref 0.2–1)
BUN SERPL-MCNC: 7 MG/DL (ref 7–18)
BUN/CREAT SERPL: 11 (ref 12–20)
CALCIUM SERPL-MCNC: 9.1 MG/DL (ref 8.5–10.1)
CHLORIDE SERPL-SCNC: 106 MMOL/L (ref 100–111)
CO2 SERPL-SCNC: 27 MMOL/L (ref 21–32)
CREAT SERPL-MCNC: 0.61 MG/DL (ref 0.6–1.3)
DIFFERENTIAL METHOD BLD: ABNORMAL
EOSINOPHIL # BLD: 0.1 K/UL (ref 0–0.4)
EOSINOPHIL NFR BLD: 2 % (ref 0–5)
ERYTHROCYTE [DISTWIDTH] IN BLOOD BY AUTOMATED COUNT: 16 % (ref 11.6–14.5)
GLOBULIN SER CALC-MCNC: 3.4 G/DL (ref 2–4)
GLUCOSE SERPL-MCNC: 112 MG/DL (ref 74–99)
HCT VFR BLD AUTO: 35.8 % (ref 35–45)
HGB BLD-MCNC: 11 G/DL (ref 12–16)
IMM GRANULOCYTES # BLD AUTO: 0 K/UL (ref 0–0.04)
IMM GRANULOCYTES NFR BLD AUTO: 1 % (ref 0–0.5)
LYMPHOCYTES # BLD: 1.4 K/UL (ref 0.9–3.6)
LYMPHOCYTES NFR BLD: 22 % (ref 21–52)
MCH RBC QN AUTO: 24.2 PG (ref 24–34)
MCHC RBC AUTO-ENTMCNC: 30.7 G/DL (ref 31–37)
MCV RBC AUTO: 78.7 FL (ref 78–100)
MONOCYTES # BLD: 0.4 K/UL (ref 0.05–1.2)
MONOCYTES NFR BLD: 7 % (ref 3–10)
NEUTS SEG # BLD: 4.3 K/UL (ref 1.8–8)
NEUTS SEG NFR BLD: 68 % (ref 40–73)
NRBC # BLD: 0 K/UL (ref 0–0.01)
NRBC BLD-RTO: 0 PER 100 WBC
PLATELET # BLD AUTO: 201 K/UL (ref 135–420)
PMV BLD AUTO: 9.7 FL (ref 9.2–11.8)
POTASSIUM SERPL-SCNC: 3.4 MMOL/L (ref 3.5–5.5)
PROT SERPL-MCNC: 6.9 G/DL (ref 6.4–8.2)
RBC # BLD AUTO: 4.55 M/UL (ref 4.2–5.3)
SODIUM SERPL-SCNC: 139 MMOL/L (ref 136–145)
TSH SERPL DL<=0.05 MIU/L-ACNC: 4.65 UIU/ML (ref 0.36–3.74)
WBC # BLD AUTO: 6.3 K/UL (ref 4.6–13.2)

## 2021-12-01 PROCEDURE — 36415 COLL VENOUS BLD VENIPUNCTURE: CPT

## 2021-12-01 PROCEDURE — 84443 ASSAY THYROID STIM HORMONE: CPT

## 2021-12-01 PROCEDURE — 85025 COMPLETE CBC W/AUTO DIFF WBC: CPT

## 2021-12-01 PROCEDURE — 80053 COMPREHEN METABOLIC PANEL: CPT

## 2021-12-01 NOTE — PROGRESS NOTES
[facility] OPI Progress Note    Date: 2021    Name: Christine Aragon    MRN: [id number]         : 1960      Ms. Leigh arrived in the [facility] today at 1330, in stable condition, here for Pre-Chemo Labs. She was assessed and education was provided. Ms. Noah Morales vitals were reviewed. Visit Vitals  BP (!) 142/76 (BP 1 Location: Left upper arm, BP Patient Position: Sitting)   Pulse 91   Temp 97.8 °F (36.6 °C)   Resp 18   Ht 5' 6\" (1.676 m)   Wt 70.9 kg (156 lb 6.4 oz)   SpO2 95%   BMI 25.24 kg/m²           Blood for the ordered labs (CBC, CMP, & TSH) was drawn without incident. And then, all labs were sent out by  to the [facility] hospital lab, for processing. Ms. Lucas Thao tolerated well, and had no complaints. Ms. Lucas Thao was discharged from [place] [address] in stable condition at 1340. She is to return on Monday, 21, at 0900, for her next appointment, for her next cycle of  Keytruda.      Sara Nava  2021  3:55 PM

## 2021-12-03 RX ORDER — POTASSIUM CHLORIDE 20 MEQ/1
TABLET, EXTENDED RELEASE ORAL
Qty: 30 TABLET | Refills: 0 | OUTPATIENT
Start: 2021-12-03

## 2021-12-06 ENCOUNTER — OFFICE VISIT (OUTPATIENT)
Dept: FAMILY MEDICINE CLINIC | Age: 61
End: 2021-12-06
Payer: MEDICARE

## 2021-12-06 ENCOUNTER — HOSPITAL ENCOUNTER (OUTPATIENT)
Dept: INFUSION THERAPY | Age: 61
Discharge: HOME | End: 2021-12-06
Payer: MEDICARE

## 2021-12-06 VITALS
TEMPERATURE: 97.4 F | HEART RATE: 86 BPM | BODY MASS INDEX: 25.17 KG/M2 | WEIGHT: 156.6 LBS | SYSTOLIC BLOOD PRESSURE: 108 MMHG | DIASTOLIC BLOOD PRESSURE: 70 MMHG | OXYGEN SATURATION: 97 % | RESPIRATION RATE: 16 BRPM | HEIGHT: 66 IN

## 2021-12-06 VITALS
SYSTOLIC BLOOD PRESSURE: 149 MMHG | HEART RATE: 82 BPM | RESPIRATION RATE: 18 BRPM | OXYGEN SATURATION: 94 % | TEMPERATURE: 98.5 F | DIASTOLIC BLOOD PRESSURE: 92 MMHG

## 2021-12-06 DIAGNOSIS — E87.6 HYPOKALEMIA: ICD-10-CM

## 2021-12-06 DIAGNOSIS — C34.91 NON-SMALL CELL CANCER OF RIGHT LUNG (HCC): Primary | ICD-10-CM

## 2021-12-06 DIAGNOSIS — F33.9 RECURRENT DEPRESSION (HCC): ICD-10-CM

## 2021-12-06 DIAGNOSIS — I10 ESSENTIAL HYPERTENSION: Primary | ICD-10-CM

## 2021-12-06 DIAGNOSIS — F17.200 SMOKER: ICD-10-CM

## 2021-12-06 DIAGNOSIS — E03.9 HYPOTHYROIDISM, UNSPECIFIED TYPE: ICD-10-CM

## 2021-12-06 PROCEDURE — G9899 SCRN MAM PERF RSLTS DOC: HCPCS | Performed by: FAMILY MEDICINE

## 2021-12-06 PROCEDURE — 99214 OFFICE O/P EST MOD 30 MIN: CPT | Performed by: FAMILY MEDICINE

## 2021-12-06 PROCEDURE — G8427 DOCREV CUR MEDS BY ELIG CLIN: HCPCS | Performed by: FAMILY MEDICINE

## 2021-12-06 PROCEDURE — 74011000258 HC RX REV CODE- 258: Performed by: INTERNAL MEDICINE

## 2021-12-06 PROCEDURE — 77030012965 HC NDL HUBR BBMI -A

## 2021-12-06 PROCEDURE — G8754 DIAS BP LESS 90: HCPCS | Performed by: FAMILY MEDICINE

## 2021-12-06 PROCEDURE — G0463 HOSPITAL OUTPT CLINIC VISIT: HCPCS | Performed by: FAMILY MEDICINE

## 2021-12-06 PROCEDURE — 74011250636 HC RX REV CODE- 250/636: Performed by: INTERNAL MEDICINE

## 2021-12-06 PROCEDURE — G9711 PT HX TOT COL OR COLON CA: HCPCS | Performed by: FAMILY MEDICINE

## 2021-12-06 PROCEDURE — G8752 SYS BP LESS 140: HCPCS | Performed by: FAMILY MEDICINE

## 2021-12-06 PROCEDURE — G9717 DOC PT DX DEP/BP F/U NT REQ: HCPCS | Performed by: FAMILY MEDICINE

## 2021-12-06 PROCEDURE — G8419 CALC BMI OUT NRM PARAM NOF/U: HCPCS | Performed by: FAMILY MEDICINE

## 2021-12-06 PROCEDURE — 96413 CHEMO IV INFUSION 1 HR: CPT

## 2021-12-06 RX ORDER — HEPARIN 100 UNIT/ML
300-500 SYRINGE INTRAVENOUS AS NEEDED
Status: DISPENSED | OUTPATIENT
Start: 2021-12-06 | End: 2021-12-06

## 2021-12-06 RX ORDER — SODIUM CHLORIDE 0.9 % (FLUSH) 0.9 %
10-40 SYRINGE (ML) INJECTION AS NEEDED
Status: DISPENSED | OUTPATIENT
Start: 2021-12-06 | End: 2021-12-06

## 2021-12-06 RX ADMIN — SODIUM CHLORIDE 400 MG: 9 INJECTION, SOLUTION INTRAVENOUS at 09:42

## 2021-12-06 RX ADMIN — HEPARIN 500 UNITS: 100 SYRINGE at 10:14

## 2021-12-06 RX ADMIN — Medication 10 ML: at 09:30

## 2021-12-06 RX ADMIN — Medication 20 ML: at 10:13

## 2021-12-06 NOTE — PROGRESS NOTES
Ally Hinkle, 59 y.o.,  female    SUBJECTIVE  Ff-up    HTN-norvasc 7.5 mg. She continues to smoke about 1 pack every 3 days. Hypokalemia- reviewed labs mild k 3.4, she has not been taking kcl 20 meqs daily, says appetite is ok, no diarrhea. Depression- reports to be doing well on zoloft    Non small cell lung cancer-dx 2015 ongoing chemotherapy with dr. Lita Ken. Hypothyroidism- reviewed labs TSH elevated, reports good compliance with levothyroxince 75 mcgs/day. ALAN- surveillance per hematology    ROS:  See HPI, all others negative        Patient Active Problem List   Diagnosis Code    SVC syndrome I87.1    Former smoker Z87.891    Essential hypertension I10    Recurrent major depressive disorder ([facility] [address] F33.9    Iron deficiency anemia D50.9    Abnormal mammogram R92.8    Non-small cell lung cancer (HCC) C34.90       Current Outpatient Medications   Medication Sig Dispense Refill    levothyroxine (SYNTHROID) 75 mcg tablet take 1 tablet by mouth daily before breakfast 60 Tablet 3    montelukast (SINGULAIR) 10 mg tablet take 1 tablet by mouth once daily 90 Tablet 3    potassium chloride (K-DUR, KLOR-CON) 20 mEq tablet Take 1 Tablet by mouth daily. 30 Tablet 0    amLODIPine (NORVASC) 2.5 mg tablet Take 1 Tablet by mouth daily. 90 Tablet 0    cetirizine (ZYRTEC) 10 mg tablet take 1 tablet by mouth once daily 90 Tablet 3    amLODIPine (NORVASC) 5 mg tablet take 1 tablet by mouth once daily 90 Tablet 1    fluticasone propionate (FLONASE) 50 mcg/actuation nasal spray 2 Sprays by Both Nostrils route daily. 1 Bottle 0    ferrous sulfate 325 mg (65 mg iron) tablet take 1 tablet by mouth twice a day with meals 60 Tab 5    aspirin delayed-release 81 mg tablet Take 81 mg by mouth daily.       sertraline (ZOLOFT) 50 mg tablet take 1 tablet by mouth once daily (Patient not taking: Reported on 12/6/2021) 90 Tablet 1     Facility-Administered Medications Ordered in Other Visits   Medication Dose Route Frequency Provider Last Rate Last Admin    saline peripheral flush soln 10-40 mL  10-40 mL InterCATHeter PRN Parag Wagner MD   20 mL at 21 1013    heparin (porcine) pf 300-500 Units  300-500 Units InterCATHeter PRN Parag Wagner MD   500 Units at 21 1014       Allergies   Allergen Reactions    Flagyl [Metronidazole] Hives    Nitroimidazoles Other (comments)     Nitroimidazole derivatives       Past Medical History:   Diagnosis Date    Anemia, iron deficiency 2016    Depression     H/O seasonal allergies     Hypertension     Non-small cell carcinoma of lung ([facility] [address] 2016    adenocarcinoma of right lung    Positive occult stool blood test 2016    Pulmonary embolism ([facility] [address] 2016    small, bilateral PEs- on Xarelto    Right leg DVT ([facility] [address] 2016    on Xarelto    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[facility] [address] [facility] 2016    resolved    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       Social History     Socioeconomic History    Marital status: LEGALLY      Spouse name: Not on file    Number of children: Not on file    Years of education: Not on file    Highest education level: Not on file   Occupational History    Not on file   Tobacco Use    Smoking status: Current Every Day Smoker     Packs/day: 0.50     Types: Cigarettes     Last attempt to quit: 2016     Years since quittin.7    Smokeless tobacco: Never Used   Substance and Sexual Activity    Alcohol use: No    Drug use: Never     Types: Prescription    Sexual activity: Yes     Partners: Male     Birth control/protection: Surgical     Comment: tubal ligation   Other Topics Concern     Service Not Asked    Blood Transfusions Not Asked    Caffeine Concern Not Asked    Occupational Exposure Not Asked    Hobby Hazards Not Asked    Sleep Concern Not Asked    Stress Concern Not Asked    Weight Concern Not Asked    Special Diet Not Asked    Back Care Not Asked    Exercise Not Asked    Bike Helmet Not Asked   [address] [facility] Not Asked    Self-Exams Not Asked   Social History Narrative    Not on file     Social Determinants of Health     Financial Resource Strain:     Difficulty of Paying Living Expenses: Not on file   Food Insecurity:     Worried About Running Out of Food in the Last Year: Not on file    Johanne of Food in the Last Year: Not on file   Transportation Needs:     Lack of Transportation (Medical): Not on file    Lack of Transportation (Non-Medical):  Not on file   Physical Activity:     Days of Exercise per Week: Not on file    Minutes of Exercise per Session: Not on file   Stress:     Feeling of Stress : Not on file   Social Connections:     Frequency of Communication with Friends and Family: Not on file    Frequency of Social Gatherings with Friends and Family: Not on file    Attends Rastafari Services: Not on file    Active Member of [address] [place] [address] madvertise or Organizations: Not on file    Attends Club or Organization Meetings: Not on file    Marital Status: Not on file   Intimate Partner Violence:     Fear of Current or Ex-Partner: Not on file    Emotionally Abused: Not on file    Physically Abused: Not on file    Sexually Abused: Not on file   Housing Stability:     Unable to Pay for Housing in the Last Year: Not on file    Number of [place] in the Last Year: Not on file    Unstable Housing in the Last Year: Not on file       Family History   Problem Relation Age of Onset    Cancer Sister 48        breast    Liver Disease Sister         cirrhosis    Heart Attack Mother 37    No Known Problems Child          OBJECTIVE    Physical Exam:     Visit Vitals  /70 (BP 1 Location: Left upper arm, BP Patient Position: Sitting, BP Cuff Size: Adult)   Pulse 86   Temp 97.4 °F (36.3 °C) (Temporal)   Resp 16   Ht 5' 6\" (1.676 m)   Wt 156 lb 9.6 oz (71 kg)   SpO2 97%   BMI 25.28 kg/m²       General: alert, chronically ill- appearing, AA, in no apparent distress or pain  CVS: normal rate, regular rhythm, distinct S1 and S2  Lungs:clear to ausculation bilaterally, no crackles, wheezing or rhonchi noted  Abdomen: normoactive bowel sounds, soft, non-tender  Extremities: no edema, no cyanosis, MSK grossly normal  Skin: warm, no lesions, rashes noted  Psych:  mood and affect normal        ASSESSMENT/PLAN  Diagnoses and all orders for this visit:    1. Recurrent depression (HCC)  Stable  Cont zoloft    2. Chronic rhinitis  improved  cont flonase, singulair,  zyrtec  Decided against pursuing ENT at this time  Will monitor    3. Non-small cell cancer of right lung [facility])  Ongoing chemotherapy  Following dr. Winifred Estevez    4. Essential hypertension  controlled  Cont  norvasc to 7.5 mg   Mild hypokalemia- cont kcl 20 meqs OD, advised better compliance  Recheck BMP next week  Monitoring  cmp prior to 6 month visit    5. Iron deficiency anemia, unspecified iron deficiency anemia type  Surveillance per dr. Winifred Estevez    6. Tobacco use  Encouraged compete cessation    7. Acquired hypothyroidism  cont synthroid to 76 mcgs/day  Recheck TSH prior to next visit      Follow-up and Dispositions    · Return in about 6 months (around 6/6/2022), or if symptoms worsen or fail to improve, for routine chronic illness care. Patient understands plan of care. Patient has provided input and agrees with goals.

## 2021-12-06 NOTE — PATIENT INSTRUCTIONS
DASH Diet: Care Instructions  Your Care Instructions     The DASH diet is an eating plan that can help lower your blood pressure. DASH stands for Dietary Approaches to Stop Hypertension. Hypertension is high blood pressure. The DASH diet focuses on eating foods that are high in calcium, potassium, and magnesium. These nutrients can lower blood pressure. The foods that are highest in these nutrients are fruits, vegetables, low-fat dairy products, nuts, seeds, and legumes. But taking calcium, potassium, and magnesium supplements instead of eating foods that are high in those nutrients does not have the same effect. The DASH diet also includes whole grains, fish, and poultry. The DASH diet is one of several lifestyle changes your doctor may recommend to lower your high blood pressure. Your doctor may also want you to decrease the amount of sodium in your diet. Lowering sodium while following the DASH diet can lower blood pressure even further than just the DASH diet alone. Follow-up care is a key part of your treatment and safety. Be sure to make and go to all appointments, and call your doctor if you are having problems. It's also a good idea to know your test results and keep a list of the medicines you take. How can you care for yourself at home? Following the DASH diet  · Eat 4 to 5 servings of fruit each day. A serving is 1 medium-sized piece of fruit, ½ cup chopped or canned fruit, 1/4 cup dried fruit, or 4 ounces (½ cup) of fruit juice. Choose fruit more often than fruit juice. · Eat 4 to 5 servings of vegetables each day. A serving is 1 cup of lettuce or raw leafy vegetables, ½ cup of chopped or cooked vegetables, or 4 ounces (½ cup) of vegetable juice. Choose vegetables more often than vegetable juice. · Get 2 to 3 servings of low-fat and fat-free dairy each day. A serving is 8 ounces of milk, 1 cup of yogurt, or 1 ½ ounces of cheese. · Eat 6 to 8 servings of grains each day.  A serving is 1 slice of bread, 1 ounce of dry cereal, or ½ cup of cooked rice, pasta, or cooked cereal. Try to choose whole-grain products as much as possible. · Limit lean meat, poultry, and fish to 2 servings each day. A serving is 3 ounces, about the size of a deck of cards. · Eat 4 to 5 servings of nuts, seeds, and legumes (cooked dried beans, lentils, and split peas) each week. A serving is 1/3 cup of nuts, 2 tablespoons of seeds, or ½ cup of cooked beans or peas. · Limit fats and oils to 2 to 3 servings each day. A serving is 1 teaspoon of vegetable oil or 2 tablespoons of salad dressing. · Limit sweets and added sugars to 5 servings or less a week. A serving is 1 tablespoon jelly or jam, ½ cup sorbet, or 1 cup of lemonade. · Eat less than 2,300 milligrams (mg) of sodium a day. If you limit your sodium to 1,500 mg a day, you can lower your blood pressure even more. · Be aware that all of these are the suggested number of servings for people who eat 1,800 to 2,000 calories a day. Your recommended number of servings may be different if you need more or fewer calories. Tips for success  · Start small. Do not try to make dramatic changes to your diet all at once. You might feel that you are missing out on your favorite foods and then be more likely to not follow the plan. Make small changes, and stick with them. Once those changes become habit, add a few more changes. · Try some of the following:  ? Make it a goal to eat a fruit or vegetable at every meal and at snacks. This will make it easy to get the recommended amount of fruits and vegetables each day. ? Try yogurt topped with fruit and nuts for a snack or healthy dessert. ? Add lettuce, tomato, cucumber, and onion to sandwiches. ? Combine a ready-made pizza crust with low-fat mozzarella cheese and lots of vegetable toppings. Try using tomatoes, squash, spinach, broccoli, carrots, cauliflower, and onions. ?  Have a variety of cut-up vegetables with a low-fat dip as an appetizer instead of chips and dip. ? Sprinkle sunflower seeds or chopped almonds over salads. Or try adding chopped walnuts or almonds to cooked vegetables. ? Try some vegetarian meals using beans and peas. Add garbanzo or kidney beans to salads. Make burritos and tacos with mashed hinton beans or black beans. Where can you learn more? Go to http://www.cruz.com/  Enter H967 in the search box to learn more about \"DASH Diet: Care Instructions. \"  Current as of: April 29, 2021               Content Version: 13.0  © 4034-6692 [employer]. Care instructions adapted under license by [employer] (which disclaims liability or warranty for this information). If you have questions about a medical condition or this instruction, always ask your healthcare professional. [address] any warranty or liability for your use of this information.

## 2021-12-06 NOTE — PROGRESS NOTES
1. Have you been to the ER, urgent care clinic since your last visit? Hospitalized since your last visit? No    2. Have you seen or consulted any other health care providers outside of the [address] [place] [address] since your last visit? Include any pap smears or colon screening.  No    Chief Complaint   Patient presents with    Depression    Hypertension    Anemia    Thyroid Problem

## 2021-12-06 NOTE — PROGRESS NOTES
[facility] OPIC Progress Note    Date: 2021    Name: Shun Hernandez    MRN: [id number]         : 1960       [facility]      Ms. Leigh arrived to [facility] at [address]. Ms. Allen Barrientos was assessed and education was provided. Ms. Lorenzo Goldstein vitals were reviewed. Visit Vitals  /78 (BP 1 Location: Right upper arm, BP Patient Position: Sitting)   Pulse 89   Temp 98.3 °F (36.8 °C)   Resp 18   SpO2 95%       Lab results obtained & reviewed from 21. Okay to proceed w/ tx per pt's tx plan parameters. Patient's L upper chest port accessed (medial lumen). Brisk blood return/ flushes without difficulty. Keytruda 400mg IV administered as ordered followed by NS flush. Ms. Allen Barrientos tolerated infusion without complaints. Discharge/ follow-up instructions discussed w/ pt. Pt verbalized understanding. Port flushed with heparin per order and de-accessed. Band-aid applied to site. Pt armband removed & shredded. Ms. Allen Barrientos was discharged from [place] [address] in stable condition at 1015. She is to return for her next appt on 22 at [phone] (pre-chemo labs).     Connie Barker RN  2021

## 2021-12-10 ENCOUNTER — TRANSCRIBE ORDER (OUTPATIENT)
Dept: SCHEDULING | Age: 61
End: 2021-12-10

## 2021-12-10 DIAGNOSIS — C34.11 MALIGNANT NEOPLASM OF UPPER LOBE OF RIGHT LUNG (HCC): Primary | ICD-10-CM

## 2021-12-28 DIAGNOSIS — I10 ESSENTIAL HYPERTENSION: ICD-10-CM

## 2021-12-30 RX ORDER — AMLODIPINE BESYLATE 5 MG/1
TABLET ORAL
Qty: 90 TABLET | Refills: 1 | Status: SHIPPED | OUTPATIENT
Start: 2021-12-30 | End: 2022-07-25

## 2022-01-03 RX ORDER — AMLODIPINE BESYLATE 2.5 MG/1
TABLET ORAL
Qty: 90 TABLET | Refills: 0 | Status: SHIPPED | OUTPATIENT
Start: 2022-01-03 | End: 2022-04-04

## 2022-01-04 ENCOUNTER — TELEPHONE (OUTPATIENT)
Dept: FAMILY MEDICINE CLINIC | Age: 62
End: 2022-01-04

## 2022-01-04 NOTE — TELEPHONE ENCOUNTER
Patient demographics that includes Medicare ID # faxed to [facility]. @ [phone].

## 2022-01-04 NOTE — TELEPHONE ENCOUNTER
----- Message from Robin Lloyd sent at 12/30/2021 10:29 AM EST -----  Subject: Message to Provider    QUESTIONS  Information for Provider? Kamla Ibarrat reached out to request that someone   call her ENT doctor and provide them her Medicare ID number. Dr. Merrill Oakley needs the information. She has an upcoming appointment on 1/28 @   2:15 with the ENT office. ---------------------------------------------------------------------------  --------------  Jacquelin DAVILA  What is the best way for the office to contact you? OK to leave message on   voicemail  Preferred Call Back Phone Number? [phone]  ---------------------------------------------------------------------------  --------------  SCRIPT ANSWERS  Relationship to Patient?  Self

## 2022-01-11 ENCOUNTER — HOSPITAL ENCOUNTER (OUTPATIENT)
Dept: CT IMAGING | Age: 62
Discharge: HOME | End: 2022-01-11
Attending: INTERNAL MEDICINE
Payer: MEDICARE

## 2022-01-11 DIAGNOSIS — C34.11 MALIGNANT NEOPLASM OF UPPER LOBE OF RIGHT LUNG (HCC): ICD-10-CM

## 2022-01-11 PROCEDURE — 74177 CT ABD & PELVIS W/CONTRAST: CPT

## 2022-01-11 PROCEDURE — 74011000636 HC RX REV CODE- 636: Performed by: INTERNAL MEDICINE

## 2022-01-11 RX ADMIN — IOPAMIDOL 100 ML: 612 INJECTION, SOLUTION INTRAVENOUS at 13:23

## 2022-01-12 ENCOUNTER — APPOINTMENT (OUTPATIENT)
Dept: INFUSION THERAPY | Age: 62
End: 2022-01-12

## 2022-01-12 RX ORDER — HYDROCORTISONE SODIUM SUCCINATE 100 MG/2ML
100 INJECTION, POWDER, FOR SOLUTION INTRAMUSCULAR; INTRAVENOUS AS NEEDED
Status: CANCELLED | OUTPATIENT
Start: 2022-01-19

## 2022-01-12 RX ORDER — ONDANSETRON 2 MG/ML
8 INJECTION INTRAMUSCULAR; INTRAVENOUS AS NEEDED
Status: CANCELLED | OUTPATIENT
Start: 2022-01-19

## 2022-01-12 RX ORDER — ACETAMINOPHEN 325 MG/1
650 TABLET ORAL AS NEEDED
Status: CANCELLED
Start: 2022-01-19

## 2022-01-12 RX ORDER — DIPHENHYDRAMINE HYDROCHLORIDE 50 MG/ML
50 INJECTION, SOLUTION INTRAMUSCULAR; INTRAVENOUS AS NEEDED
Status: CANCELLED
Start: 2022-01-19

## 2022-01-12 RX ORDER — ALBUTEROL SULFATE 0.83 MG/ML
2.5 SOLUTION RESPIRATORY (INHALATION) AS NEEDED
Status: CANCELLED
Start: 2022-01-19

## 2022-01-12 RX ORDER — EPINEPHRINE 1 MG/ML
0.3 INJECTION, SOLUTION, CONCENTRATE INTRAVENOUS AS NEEDED
Status: CANCELLED | OUTPATIENT
Start: 2022-01-19

## 2022-01-17 ENCOUNTER — HOSPITAL ENCOUNTER (OUTPATIENT)
Dept: INFUSION THERAPY | Age: 62
Discharge: HOME | End: 2022-01-17
Payer: MEDICARE

## 2022-01-17 VITALS
RESPIRATION RATE: 18 BRPM | SYSTOLIC BLOOD PRESSURE: 134 MMHG | BODY MASS INDEX: 24.4 KG/M2 | TEMPERATURE: 98.7 F | WEIGHT: 151.8 LBS | OXYGEN SATURATION: 92 % | DIASTOLIC BLOOD PRESSURE: 95 MMHG | HEART RATE: 84 BPM | HEIGHT: 66 IN

## 2022-01-17 LAB
ALBUMIN SERPL-MCNC: 3.7 G/DL (ref 3.4–5)
ALBUMIN/GLOB SERPL: 1 {RATIO} (ref 0.8–1.7)
ALP SERPL-CCNC: 98 U/L (ref 45–117)
ALT SERPL-CCNC: 12 U/L (ref 13–56)
ANION GAP SERPL CALC-SCNC: 4 MMOL/L (ref 3–18)
AST SERPL-CCNC: 14 U/L (ref 10–38)
BASOPHILS # BLD: 0 K/UL (ref 0–0.1)
BASOPHILS NFR BLD: 1 % (ref 0–2)
BILIRUB SERPL-MCNC: 0.3 MG/DL (ref 0.2–1)
BUN SERPL-MCNC: 6 MG/DL (ref 7–18)
BUN/CREAT SERPL: 8 (ref 12–20)
CALCIUM SERPL-MCNC: 9.3 MG/DL (ref 8.5–10.1)
CHLORIDE SERPL-SCNC: 104 MMOL/L (ref 100–111)
CO2 SERPL-SCNC: 30 MMOL/L (ref 21–32)
CREAT SERPL-MCNC: 0.75 MG/DL (ref 0.6–1.3)
DIFFERENTIAL METHOD BLD: ABNORMAL
EOSINOPHIL # BLD: 0.2 K/UL (ref 0–0.4)
EOSINOPHIL NFR BLD: 2 % (ref 0–5)
ERYTHROCYTE [DISTWIDTH] IN BLOOD BY AUTOMATED COUNT: 15.9 % (ref 11.6–14.5)
GLOBULIN SER CALC-MCNC: 3.8 G/DL (ref 2–4)
GLUCOSE SERPL-MCNC: 76 MG/DL (ref 74–99)
HCT VFR BLD AUTO: 39.8 % (ref 35–45)
HGB BLD-MCNC: 12 G/DL (ref 12–16)
IMM GRANULOCYTES # BLD AUTO: 0 K/UL (ref 0–0.04)
IMM GRANULOCYTES NFR BLD AUTO: 0 % (ref 0–0.5)
LYMPHOCYTES # BLD: 1.6 K/UL (ref 0.9–3.6)
LYMPHOCYTES NFR BLD: 24 % (ref 21–52)
MCH RBC QN AUTO: 23.6 PG (ref 24–34)
MCHC RBC AUTO-ENTMCNC: 30.2 G/DL (ref 31–37)
MCV RBC AUTO: 78.3 FL (ref 78–100)
MONOCYTES # BLD: 0.4 K/UL (ref 0.05–1.2)
MONOCYTES NFR BLD: 6 % (ref 3–10)
NEUTS SEG # BLD: 4.4 K/UL (ref 1.8–8)
NEUTS SEG NFR BLD: 67 % (ref 40–73)
NRBC # BLD: 0 K/UL (ref 0–0.01)
NRBC BLD-RTO: 0 PER 100 WBC
PLATELET # BLD AUTO: 233 K/UL (ref 135–420)
PMV BLD AUTO: 9.6 FL (ref 9.2–11.8)
POTASSIUM SERPL-SCNC: 3.5 MMOL/L (ref 3.5–5.5)
PROT SERPL-MCNC: 7.5 G/DL (ref 6.4–8.2)
RBC # BLD AUTO: 5.08 M/UL (ref 4.2–5.3)
SODIUM SERPL-SCNC: 138 MMOL/L (ref 136–145)
TSH SERPL DL<=0.05 MIU/L-ACNC: 7.26 UIU/ML (ref 0.36–3.74)
WBC # BLD AUTO: 6.6 K/UL (ref 4.6–13.2)

## 2022-01-17 PROCEDURE — 85025 COMPLETE CBC W/AUTO DIFF WBC: CPT

## 2022-01-17 PROCEDURE — 84443 ASSAY THYROID STIM HORMONE: CPT

## 2022-01-17 PROCEDURE — 80053 COMPREHEN METABOLIC PANEL: CPT

## 2022-01-17 PROCEDURE — 36415 COLL VENOUS BLD VENIPUNCTURE: CPT

## 2022-01-17 NOTE — PROGRESS NOTES
[facility] Lab Visit:    Ashley Carbajal  1960  [id number]    3623 Pt arrived ambulatory w/o assist. Labs drawn per order via Left AC venipuncture x1 attempt. Pt tolerated well without complaints. Gauze/ coban to site. Pt departed [facility] ambulatory and in no distress at 1400.       Visit Vitals  BP (!) 134/95 (BP 1 Location: Right upper arm, BP Patient Position: Sitting)   Pulse 84   Temp 98.7 °F (37.1 °C)   Resp 18   Ht 5' 6\" (1.676 m)   Wt 68.9 kg (151 lb 12.8 oz)   SpO2 92%   BMI 24.50 kg/m²

## 2022-01-19 ENCOUNTER — HOSPITAL ENCOUNTER (OUTPATIENT)
Dept: INFUSION THERAPY | Age: 62
Discharge: HOME | End: 2022-01-19
Payer: MEDICARE

## 2022-01-19 VITALS
TEMPERATURE: 97.3 F | DIASTOLIC BLOOD PRESSURE: 76 MMHG | SYSTOLIC BLOOD PRESSURE: 145 MMHG | HEART RATE: 67 BPM | OXYGEN SATURATION: 96 % | RESPIRATION RATE: 16 BRPM

## 2022-01-19 DIAGNOSIS — C34.91 NON-SMALL CELL CANCER OF RIGHT LUNG (HCC): Primary | ICD-10-CM

## 2022-01-19 PROCEDURE — 74011000258 HC RX REV CODE- 258: Performed by: INTERNAL MEDICINE

## 2022-01-19 PROCEDURE — 96413 CHEMO IV INFUSION 1 HR: CPT

## 2022-01-19 PROCEDURE — 77030012965 HC NDL HUBR BBMI -A

## 2022-01-19 PROCEDURE — 74011250636 HC RX REV CODE- 250/636: Performed by: INTERNAL MEDICINE

## 2022-01-19 RX ORDER — SODIUM CHLORIDE 0.9 % (FLUSH) 0.9 %
10-40 SYRINGE (ML) INJECTION AS NEEDED
Status: DISPENSED | OUTPATIENT
Start: 2022-01-19 | End: 2022-01-19

## 2022-01-19 RX ORDER — SODIUM CHLORIDE 9 MG/ML
25 INJECTION, SOLUTION INTRAVENOUS CONTINUOUS
Status: DISPENSED | OUTPATIENT
Start: 2022-01-19 | End: 2022-01-19

## 2022-01-19 RX ORDER — HEPARIN 100 UNIT/ML
300-500 SYRINGE INTRAVENOUS AS NEEDED
Status: DISPENSED | OUTPATIENT
Start: 2022-01-19 | End: 2022-01-19

## 2022-01-19 RX ADMIN — HEPARIN 500 UNITS: 100 SYRINGE at 10:45

## 2022-01-19 RX ADMIN — Medication 10 ML: at 09:20

## 2022-01-19 RX ADMIN — SODIUM CHLORIDE 400 MG: 9 INJECTION, SOLUTION INTRAVENOUS at 09:55

## 2022-01-19 RX ADMIN — Medication 30 ML: at 10:45

## 2022-01-19 RX ADMIN — SODIUM CHLORIDE 25 ML/HR: 9 INJECTION, SOLUTION INTRAVENOUS at 09:25

## 2022-01-19 NOTE — PROGRESS NOTES
[facility] Progress Note    Date: 2022    Name: Dionne Clinton    MRN: [id number]         : 1960    Ms. Leigh arrived in the [facility] today at [phone], in stable condition, here for Cycle 26, IV KEYTRUDA Infusion (Every 6 Week Cycle). She was assessed and education was provided. Ms. Reyna Guadarrama vitals were reviewed. Visit Vitals  BP (!) 141/81 (BP 1 Location: Left upper arm, BP Patient Position: Sitting)   Pulse 80   Temp 98.5 °F (36.9 °C)   Resp 16   SpO2 96%   Breastfeeding No           Lab results were reviewed, and all of her most recent labs listed below, from 22, were all noted to be satisfactory for treatment today. Results for Shayy Gross (MRN [id number])    Ref. Range 2022 13:55   WBC Latest Ref Range: 4.6 - 13.2 K/uL 6.6   NRBC Latest Ref Range: 0  WBC 0.0   RBC Latest Ref Range: 4.20 - 5.30 M/uL 5.08   HGB Latest Ref Range: 12.0 - 16.0 g/dL 12.0   HCT Latest Ref Range: 35.0 - 45.0 % 39.8   MCV Latest Ref Range: 78.0 - 100.0 FL 78.3   MCH Latest Ref Range: 24.0 - 34.0 PG 23.6 (L)   MCHC Latest Ref Range: 31.0 - 37.0 g/dL 30.2 (L)   RDW Latest Ref Range: 11.6 - 14.5 % 15.9 (H)   PLATELET Latest Ref Range: 135 - 420 K/uL 233   MPV Latest Ref Range: 9.2 - 11.8 FL 9.6   NEUTROPHILS Latest Ref Range: 40 - 73 % 67   LYMPHOCYTES Latest Ref Range: 21 - 52 % 24   MONOCYTES Latest Ref Range: 3 - 10 % 6   EOSINOPHILS Latest Ref Range: 0 - 5 % 2   BASOPHILS Latest Ref Range: 0 - 2 % 1   IMMATURE GRANULOCYTES Latest Ref Range: 0.0 - 0.5 % 0   DF Latest Units:   AUTOMATED   ABSOLUTE NRBC Latest Ref Range: 0.00 - 0.01 K/uL 0.00   ABS. NEUTROPHILS Latest Ref Range: 1.8 - 8.0 K/UL 4.4   ABS. IMM. GRANS. Latest Ref Range: 0.00 - 0.04 K/UL 0.0   ABS. LYMPHOCYTES Latest Ref Range: 0.9 - 3.6 K/UL 1.6   ABS. MONOCYTES Latest Ref Range: 0.05 - 1.2 K/UL 0.4   ABS. EOSINOPHILS Latest Ref Range: 0.0 - 0.4 K/UL 0.2   ABS.  BASOPHILS Latest Ref Range: 0.0 - 0.1 K/UL 0.0   Sodium Latest Ref Range: 136 - 145 mmol/L 138   Potassium Latest Ref Range: 3.5 - 5.5 mmol/L 3.5   Chloride Latest Ref Range: 100 - 111 mmol/L 104   CO2 Latest Ref Range: 21 - 32 mmol/L 30   Anion gap Latest Ref Range: 3.0 - 18 mmol/L 4   Glucose Latest Ref Range: 74 - 99 mg/dL 76   BUN Latest Ref Range: 7.0 - 18 MG/DL 6 (L)   Creatinine Latest Ref Range: 0.6 - 1.3 MG/DL 0.75   BUN/Creatinine ratio Latest Ref Range: 12 - 20   8 (L)   Calcium Latest Ref Range: 8.5 - 10.1 MG/DL 9.3   GFR est non-AA Latest Ref Range: >60 ml/min/1.73m2 >60   GFR est AA Latest Ref Range: >60 ml/min/1.73m2 >60   Bilirubin, total Latest Ref Range: 0.2 - 1.0 MG/DL 0.3   Protein, total Latest Ref Range: 6.4 - 8.2 g/dL 7.5   Albumin Latest Ref Range: 3.4 - 5.0 g/dL 3.7   Globulin Latest Ref Range: 2.0 - 4.0 g/dL 3.8   A-G Ratio Latest Ref Range: 0.8 - 1.7   1.0   ALT Latest Ref Range: 13 - 56 U/L 12 (L)   AST Latest Ref Range: 10 - 38 U/L 14   Alk. phosphatase Latest Ref Range: 45 - 117 U/L 98   TSH Latest Ref Range: 0.36 - 3.74 uIU/mL 7.26 (H)         Written consent for Saw Velazquez Infusion was viewed in her electronic record. The outer/lateral lumen of her left chest double lumen port was accessed without incident at 0920, and brisk blood return was obtained.  ml IV Bag was hung to infuse @ KVO PRN throughout treatment today. KEYTRUDA (pembrolizumab) 400 mg IV, was administered over approximately 30 minutes, per order, and without incident. After completion of all ordered IV medications, her port was flushed well per protocol and without incident, with NS & Heparin, and then, the el needle was removed and gauze/bandaid was applied. Ms. Colton Hawk tolerated treatment very well today, and voiced no complaints. Ms. Colton Hawk was discharged from [place] [address] in stable condition at 1050. She is to return in 6 weeks, on Monday, 2-28-22 at [phone],  for her next appointment, for pre-chemo labs.  And then, Cycle 27, IV Daniella Chambers Infusion is scheduled for Wednesday, 3-2-22 at 0900.     Elia Suero RN  January 19, 2022  9:58 AM

## 2022-02-22 RX ORDER — ACETAMINOPHEN 325 MG/1
650 TABLET ORAL AS NEEDED
Status: CANCELLED
Start: 2022-03-02

## 2022-02-22 RX ORDER — ALBUTEROL SULFATE 0.83 MG/ML
2.5 SOLUTION RESPIRATORY (INHALATION) AS NEEDED
Status: CANCELLED
Start: 2022-03-02

## 2022-02-22 RX ORDER — DIPHENHYDRAMINE HYDROCHLORIDE 50 MG/ML
50 INJECTION, SOLUTION INTRAMUSCULAR; INTRAVENOUS AS NEEDED
Status: CANCELLED
Start: 2022-03-02

## 2022-02-22 RX ORDER — HYDROCORTISONE SODIUM SUCCINATE 100 MG/2ML
100 INJECTION, POWDER, FOR SOLUTION INTRAMUSCULAR; INTRAVENOUS AS NEEDED
Status: CANCELLED | OUTPATIENT
Start: 2022-03-02

## 2022-02-22 RX ORDER — ONDANSETRON 2 MG/ML
8 INJECTION INTRAMUSCULAR; INTRAVENOUS AS NEEDED
Status: CANCELLED | OUTPATIENT
Start: 2022-03-02

## 2022-02-22 RX ORDER — EPINEPHRINE 1 MG/ML
0.3 INJECTION, SOLUTION, CONCENTRATE INTRAVENOUS AS NEEDED
Status: CANCELLED | OUTPATIENT
Start: 2022-03-02

## 2022-02-23 ENCOUNTER — TRANSCRIBE ORDER (OUTPATIENT)
Dept: SCHEDULING | Age: 62
End: 2022-02-23

## 2022-02-23 DIAGNOSIS — C34.11 MALIGNANT NEOPLASM OF UPPER LOBE OF RIGHT LUNG (HCC): Primary | ICD-10-CM

## 2022-02-28 ENCOUNTER — HOSPITAL ENCOUNTER (OUTPATIENT)
Dept: INFUSION THERAPY | Age: 62
Discharge: HOME | End: 2022-02-28
Payer: MEDICARE

## 2022-02-28 VITALS
HEIGHT: 66 IN | RESPIRATION RATE: 16 BRPM | TEMPERATURE: 98.2 F | SYSTOLIC BLOOD PRESSURE: 144 MMHG | WEIGHT: 151.4 LBS | OXYGEN SATURATION: 97 % | BODY MASS INDEX: 24.33 KG/M2 | DIASTOLIC BLOOD PRESSURE: 87 MMHG | HEART RATE: 79 BPM

## 2022-02-28 LAB
ALBUMIN SERPL-MCNC: 3.4 G/DL (ref 3.4–5)
ALBUMIN/GLOB SERPL: 0.9 {RATIO} (ref 0.8–1.7)
ALP SERPL-CCNC: 91 U/L (ref 45–117)
ALT SERPL-CCNC: 10 U/L (ref 13–56)
ANION GAP SERPL CALC-SCNC: 6 MMOL/L (ref 3–18)
AST SERPL-CCNC: 11 U/L (ref 10–38)
BASOPHILS # BLD: 0 K/UL (ref 0–0.1)
BASOPHILS NFR BLD: 0 % (ref 0–2)
BILIRUB SERPL-MCNC: 0.3 MG/DL (ref 0.2–1)
BUN SERPL-MCNC: 5 MG/DL (ref 7–18)
BUN/CREAT SERPL: 8 (ref 12–20)
CALCIUM SERPL-MCNC: 8.8 MG/DL (ref 8.5–10.1)
CHLORIDE SERPL-SCNC: 105 MMOL/L (ref 100–111)
CO2 SERPL-SCNC: 28 MMOL/L (ref 21–32)
CREAT SERPL-MCNC: 0.63 MG/DL (ref 0.6–1.3)
DIFFERENTIAL METHOD BLD: ABNORMAL
EOSINOPHIL # BLD: 0.1 K/UL (ref 0–0.4)
EOSINOPHIL NFR BLD: 2 % (ref 0–5)
ERYTHROCYTE [DISTWIDTH] IN BLOOD BY AUTOMATED COUNT: 16 % (ref 11.6–14.5)
GLOBULIN SER CALC-MCNC: 3.7 G/DL (ref 2–4)
GLUCOSE SERPL-MCNC: 81 MG/DL (ref 74–99)
HCT VFR BLD AUTO: 37.9 % (ref 35–45)
HGB BLD-MCNC: 11.7 G/DL (ref 12–16)
IMM GRANULOCYTES # BLD AUTO: 0 K/UL (ref 0–0.04)
IMM GRANULOCYTES NFR BLD AUTO: 0 % (ref 0–0.5)
LYMPHOCYTES # BLD: 1.3 K/UL (ref 0.9–3.6)
LYMPHOCYTES NFR BLD: 22 % (ref 21–52)
MCH RBC QN AUTO: 24.1 PG (ref 24–34)
MCHC RBC AUTO-ENTMCNC: 30.9 G/DL (ref 31–37)
MCV RBC AUTO: 78 FL (ref 78–100)
MONOCYTES # BLD: 0.4 K/UL (ref 0.05–1.2)
MONOCYTES NFR BLD: 6 % (ref 3–10)
NEUTS SEG # BLD: 4 K/UL (ref 1.8–8)
NEUTS SEG NFR BLD: 69 % (ref 40–73)
NRBC # BLD: 0 K/UL (ref 0–0.01)
NRBC BLD-RTO: 0 PER 100 WBC
PLATELET # BLD AUTO: 202 K/UL (ref 135–420)
PMV BLD AUTO: 9.6 FL (ref 9.2–11.8)
POTASSIUM SERPL-SCNC: 3.5 MMOL/L (ref 3.5–5.5)
PROT SERPL-MCNC: 7.1 G/DL (ref 6.4–8.2)
RBC # BLD AUTO: 4.86 M/UL (ref 4.2–5.3)
SODIUM SERPL-SCNC: 139 MMOL/L (ref 136–145)
WBC # BLD AUTO: 5.8 K/UL (ref 4.6–13.2)

## 2022-02-28 PROCEDURE — 85025 COMPLETE CBC W/AUTO DIFF WBC: CPT

## 2022-02-28 PROCEDURE — 36415 COLL VENOUS BLD VENIPUNCTURE: CPT

## 2022-02-28 PROCEDURE — 80053 COMPREHEN METABOLIC PANEL: CPT

## 2022-02-28 NOTE — PROGRESS NOTES
[address] [facility] Lab Visit:    Chapin Mariscal  1960  [id number]    1400 Pt arrived ambulatory w/o assist. Labs drawn per order via  Right AC venipuncture x1 attempt. Pt tolerated well without complaints. Gauze/ coban to site. Pt departed [facility] ambulatory and in no distress at 1410.    Visit Vitals  BP (!) 144/87 (BP 1 Location: Left upper arm, BP Patient Position: Sitting)   Pulse 79   Temp 98.2 °F (36.8 °C)   Resp 16   Ht 5' 6\" (1.676 m)   Wt 68.7 kg (151 lb 6.4 oz)   SpO2 97%   BMI 24.44 kg/m²

## 2022-03-01 ENCOUNTER — HOSPITAL ENCOUNTER (OUTPATIENT)
Dept: LAB | Age: 62
Discharge: HOME | End: 2022-03-01
Payer: MEDICARE

## 2022-03-01 PROCEDURE — 88305 TISSUE EXAM BY PATHOLOGIST: CPT

## 2022-03-02 ENCOUNTER — HOSPITAL ENCOUNTER (OUTPATIENT)
Dept: INFUSION THERAPY | Age: 62
Discharge: HOME | End: 2022-03-02
Payer: MEDICARE

## 2022-03-02 VITALS
DIASTOLIC BLOOD PRESSURE: 90 MMHG | OXYGEN SATURATION: 96 % | TEMPERATURE: 97.7 F | SYSTOLIC BLOOD PRESSURE: 161 MMHG | RESPIRATION RATE: 20 BRPM | HEART RATE: 92 BPM

## 2022-03-02 DIAGNOSIS — C34.91 NON-SMALL CELL CANCER OF RIGHT LUNG (HCC): Primary | ICD-10-CM

## 2022-03-02 PROCEDURE — 77030012965 HC NDL HUBR BBMI -A

## 2022-03-02 PROCEDURE — 74011250636 HC RX REV CODE- 250/636: Performed by: INTERNAL MEDICINE

## 2022-03-02 PROCEDURE — 96413 CHEMO IV INFUSION 1 HR: CPT

## 2022-03-02 PROCEDURE — 74011000258 HC RX REV CODE- 258: Performed by: INTERNAL MEDICINE

## 2022-03-02 RX ORDER — SODIUM CHLORIDE 9 MG/ML
25 INJECTION, SOLUTION INTRAVENOUS CONTINUOUS
Status: DISPENSED | OUTPATIENT
Start: 2022-03-02 | End: 2022-03-02

## 2022-03-02 RX ORDER — SODIUM CHLORIDE 0.9 % (FLUSH) 0.9 %
10-40 SYRINGE (ML) INJECTION AS NEEDED
Status: DISPENSED | OUTPATIENT
Start: 2022-03-02 | End: 2022-03-02

## 2022-03-02 RX ORDER — HEPARIN 100 UNIT/ML
300-500 SYRINGE INTRAVENOUS AS NEEDED
Status: DISPENSED | OUTPATIENT
Start: 2022-03-02 | End: 2022-03-02

## 2022-03-02 RX ADMIN — Medication 30 ML: at 11:15

## 2022-03-02 RX ADMIN — HEPARIN 500 UNITS: 100 SYRINGE at 11:15

## 2022-03-02 RX ADMIN — SODIUM CHLORIDE 25 ML/HR: 9 INJECTION, SOLUTION INTRAVENOUS at 09:40

## 2022-03-02 RX ADMIN — Medication 10 ML: at 09:35

## 2022-03-02 RX ADMIN — SODIUM CHLORIDE 400 MG: 9 INJECTION, SOLUTION INTRAVENOUS at 10:20

## 2022-03-02 NOTE — PROGRESS NOTES
[facility] Progress Note    Date: 2022    Name: Zully Frey    MRN: [id number]         : 1960    Ms. Leigh arrived in the [facility] today at , in stable condition, here for Cycle 27, IV KEYTRUDA Infusion (Every 6 Week Cycle). She was assessed and education was provided. Ms. De La Rosa Miss vitals were reviewed. Visit Vitals  /86 (BP 1 Location: Left upper arm, BP Patient Position: Sitting)   Pulse 82   Temp 98.9 °F (37.2 °C)   Resp 20   SpO2 96%   Breastfeeding No           Lab results were reviewed, and all of her most recent labs listed below, from 22, were all noted to be satisfactory for treatment today. Results for Sanna Clayton (MRN [id number])    Ref. Range 2022 14:05   WBC Latest Ref Range: 4.6 - 13.2 K/uL 5.8   NRBC Latest Ref Range: 0  WBC 0.0   RBC Latest Ref Range: 4.20 - 5.30 M/uL 4.86   HGB Latest Ref Range: 12.0 - 16.0 g/dL 11.7 (L)   HCT Latest Ref Range: 35.0 - 45.0 % 37.9   MCV Latest Ref Range: 78.0 - 100.0 FL 78.0   MCH Latest Ref Range: 24.0 - 34.0 PG 24.1   MCHC Latest Ref Range: 31.0 - 37.0 g/dL 30.9 (L)   RDW Latest Ref Range: 11.6 - 14.5 % 16.0 (H)   PLATELET Latest Ref Range: 135 - 420 K/uL 202   MPV Latest Ref Range: 9.2 - 11.8 FL 9.6   NEUTROPHILS Latest Ref Range: 40 - 73 % 69   LYMPHOCYTES Latest Ref Range: 21 - 52 % 22   MONOCYTES Latest Ref Range: 3 - 10 % 6   EOSINOPHILS Latest Ref Range: 0 - 5 % 2   BASOPHILS Latest Ref Range: 0 - 2 % 0   IMMATURE GRANULOCYTES Latest Ref Range: 0.0 - 0.5 % 0   DF Latest Units:   AUTOMATED   ABSOLUTE NRBC Latest Ref Range: 0.00 - 0.01 K/uL 0.00   ABS. NEUTROPHILS Latest Ref Range: 1.8 - 8.0 K/UL 4.0   ABS. IMM. GRANS. Latest Ref Range: 0.00 - 0.04 K/UL 0.0   ABS. LYMPHOCYTES Latest Ref Range: 0.9 - 3.6 K/UL 1.3   ABS. MONOCYTES Latest Ref Range: 0.05 - 1.2 K/UL 0.4   ABS. EOSINOPHILS Latest Ref Range: 0.0 - 0.4 K/UL 0.1   ABS.  BASOPHILS Latest Ref Range: 0.0 - 0.1 K/UL 0.0   Sodium Latest Ref Range: 136 - 145 mmol/L 139   Potassium Latest Ref Range: 3.5 - 5.5 mmol/L 3.5   Chloride Latest Ref Range: 100 - 111 mmol/L 105   CO2 Latest Ref Range: 21 - 32 mmol/L 28   Anion gap Latest Ref Range: 3.0 - 18 mmol/L 6   Glucose Latest Ref Range: 74 - 99 mg/dL 81   BUN Latest Ref Range: 7.0 - 18 MG/DL 5 (L)   Creatinine Latest Ref Range: 0.6 - 1.3 MG/DL 0.63   BUN/Creatinine ratio Latest Ref Range: 12 - 20   8 (L)   Calcium Latest Ref Range: 8.5 - 10.1 MG/DL 8.8   GFR est non-AA Latest Ref Range: >60 ml/min/1.73m2 >60   GFR est AA Latest Ref Range: >60 ml/min/1.73m2 >60   Bilirubin, total Latest Ref Range: 0.2 - 1.0 MG/DL 0.3   Protein, total Latest Ref Range: 6.4 - 8.2 g/dL 7.1   Albumin Latest Ref Range: 3.4 - 5.0 g/dL 3.4   Globulin Latest Ref Range: 2.0 - 4.0 g/dL 3.7   A-G Ratio Latest Ref Range: 0.8 - 1.7   0.9   ALT Latest Ref Range: 13 - 56 U/L 10 (L)   AST Latest Ref Range: 10 - 38 U/L 11   Alk. phosphatase Latest Ref Range: 45 - 117 U/L 91         Written consent for Al[facility]z Brown Infusion was viewed in her electronic record. The inner/medial lumen of her left chest double lumen port was accessed without incident at 0935, and brisk blood return was obtained.  ml IV Bag was hung to infuse @ KVO PRN throughout treatment today. KEYTRUDA (pembrolizumab) 400 mg IV, was administered over approximately 30 minutes, per order, and without incident. After completion of all ordered IV medications, her port was flushed well per protocol and without incident, with NS & Heparin, and then, the el needle was removed and gauze/bandaid was applied. Ms. Brandan Grimaldo tolerated treatment very well today, and voiced no complaints. Ms. Brandan Grimaldo was discharged from [place] [address] in stable condition at 1120. She is to return in 6 weeks, on Monday, 4-11-22 at [phone],  for her next appointment, for pre-chemo labs.  And then, Cycle 28, IV KEYTRUDA Infusion is scheduled for Wednesday, 4-13-22 at 0900.    Eulalia Tao RN  March 2, 2022  9:58 AM

## 2022-03-17 ENCOUNTER — HOSPITAL ENCOUNTER (OUTPATIENT)
Dept: CT IMAGING | Age: 62
Discharge: HOME | End: 2022-03-17
Attending: INTERNAL MEDICINE
Payer: MEDICARE

## 2022-03-17 DIAGNOSIS — C34.11 MALIGNANT NEOPLASM OF UPPER LOBE OF RIGHT LUNG (HCC): ICD-10-CM

## 2022-03-17 PROCEDURE — 74011000636 HC RX REV CODE- 636: Performed by: INTERNAL MEDICINE

## 2022-03-17 PROCEDURE — 74177 CT ABD & PELVIS W/CONTRAST: CPT

## 2022-03-17 RX ADMIN — IOPAMIDOL 100 ML: 612 INJECTION, SOLUTION INTRAVENOUS at 09:59

## 2022-03-19 PROBLEM — D50.9 IRON DEFICIENCY ANEMIA: Status: ACTIVE | Noted: 2017-01-04

## 2022-03-19 PROBLEM — C34.90 NON-SMALL CELL LUNG CANCER (HCC): Status: ACTIVE | Noted: 2018-04-05

## 2022-03-19 PROBLEM — R92.8 ABNORMAL MAMMOGRAM: Status: ACTIVE | Noted: 2017-06-21

## 2022-04-04 RX ORDER — AMLODIPINE BESYLATE 2.5 MG/1
TABLET ORAL
Qty: 90 TABLET | Refills: 0 | Status: SHIPPED | OUTPATIENT
Start: 2022-04-04 | End: 2022-07-05

## 2022-04-06 RX ORDER — DIPHENHYDRAMINE HYDROCHLORIDE 50 MG/ML
50 INJECTION, SOLUTION INTRAMUSCULAR; INTRAVENOUS AS NEEDED
Status: CANCELLED
Start: 2022-04-13

## 2022-04-06 RX ORDER — EPINEPHRINE 1 MG/ML
0.3 INJECTION, SOLUTION, CONCENTRATE INTRAVENOUS AS NEEDED
Status: CANCELLED | OUTPATIENT
Start: 2022-04-13

## 2022-04-06 RX ORDER — ACETAMINOPHEN 325 MG/1
650 TABLET ORAL AS NEEDED
Status: CANCELLED
Start: 2022-04-13

## 2022-04-06 RX ORDER — HYDROCORTISONE SODIUM SUCCINATE 100 MG/2ML
100 INJECTION, POWDER, FOR SOLUTION INTRAMUSCULAR; INTRAVENOUS AS NEEDED
Status: CANCELLED | OUTPATIENT
Start: 2022-04-13

## 2022-04-06 RX ORDER — SODIUM CHLORIDE 0.9 % (FLUSH) 0.9 %
10-40 SYRINGE (ML) INJECTION AS NEEDED
Status: CANCELLED
Start: 2022-04-13

## 2022-04-06 RX ORDER — ONDANSETRON 2 MG/ML
8 INJECTION INTRAMUSCULAR; INTRAVENOUS AS NEEDED
Status: CANCELLED | OUTPATIENT
Start: 2022-04-13

## 2022-04-06 RX ORDER — ALBUTEROL SULFATE 0.83 MG/ML
2.5 SOLUTION RESPIRATORY (INHALATION) AS NEEDED
Status: CANCELLED
Start: 2022-04-13

## 2022-04-06 RX ORDER — SODIUM CHLORIDE 9 MG/ML
25 INJECTION, SOLUTION INTRAVENOUS CONTINUOUS
Status: CANCELLED | OUTPATIENT
Start: 2022-04-13

## 2022-04-06 RX ORDER — HEPARIN 100 UNIT/ML
300-500 SYRINGE INTRAVENOUS AS NEEDED
Status: CANCELLED
Start: 2022-04-13

## 2022-04-11 ENCOUNTER — HOSPITAL ENCOUNTER (OUTPATIENT)
Dept: INFUSION THERAPY | Age: 62
Discharge: HOME | End: 2022-04-11
Payer: MEDICARE

## 2022-04-11 ENCOUNTER — APPOINTMENT (OUTPATIENT)
Dept: INFUSION THERAPY | Age: 62
End: 2022-04-11

## 2022-04-11 VITALS
BODY MASS INDEX: 24.09 KG/M2 | SYSTOLIC BLOOD PRESSURE: 127 MMHG | HEART RATE: 85 BPM | TEMPERATURE: 98.2 F | WEIGHT: 149.9 LBS | RESPIRATION RATE: 18 BRPM | DIASTOLIC BLOOD PRESSURE: 79 MMHG | OXYGEN SATURATION: 98 % | HEIGHT: 66 IN

## 2022-04-11 LAB
ALBUMIN SERPL-MCNC: 3.4 G/DL (ref 3.4–5)
ALBUMIN/GLOB SERPL: 1 {RATIO} (ref 0.8–1.7)
ALP SERPL-CCNC: 86 U/L (ref 45–117)
ALT SERPL-CCNC: 11 U/L (ref 13–56)
ANION GAP SERPL CALC-SCNC: 5 MMOL/L (ref 3–18)
AST SERPL-CCNC: 12 U/L (ref 10–38)
BASOPHILS # BLD: 0 K/UL (ref 0–0.1)
BASOPHILS NFR BLD: 0 % (ref 0–2)
BILIRUB SERPL-MCNC: 0.4 MG/DL (ref 0.2–1)
BUN SERPL-MCNC: 7 MG/DL (ref 7–18)
BUN/CREAT SERPL: 9 (ref 12–20)
CALCIUM SERPL-MCNC: 8.7 MG/DL (ref 8.5–10.1)
CHLORIDE SERPL-SCNC: 106 MMOL/L (ref 100–111)
CO2 SERPL-SCNC: 27 MMOL/L (ref 21–32)
CREAT SERPL-MCNC: 0.8 MG/DL (ref 0.6–1.3)
DIFFERENTIAL METHOD BLD: ABNORMAL
EOSINOPHIL # BLD: 0.1 K/UL (ref 0–0.4)
EOSINOPHIL NFR BLD: 2 % (ref 0–5)
ERYTHROCYTE [DISTWIDTH] IN BLOOD BY AUTOMATED COUNT: 16.9 % (ref 11.6–14.5)
GLOBULIN SER CALC-MCNC: 3.4 G/DL (ref 2–4)
GLUCOSE SERPL-MCNC: 97 MG/DL (ref 74–99)
HCT VFR BLD AUTO: 35.2 % (ref 35–45)
HGB BLD-MCNC: 11 G/DL (ref 12–16)
IMM GRANULOCYTES # BLD AUTO: 0 K/UL (ref 0–0.04)
IMM GRANULOCYTES NFR BLD AUTO: 0 % (ref 0–0.5)
LYMPHOCYTES # BLD: 1.6 K/UL (ref 0.9–3.6)
LYMPHOCYTES NFR BLD: 27 % (ref 21–52)
MCH RBC QN AUTO: 24.5 PG (ref 24–34)
MCHC RBC AUTO-ENTMCNC: 31.3 G/DL (ref 31–37)
MCV RBC AUTO: 78.4 FL (ref 78–100)
MONOCYTES # BLD: 0.4 K/UL (ref 0.05–1.2)
MONOCYTES NFR BLD: 6 % (ref 3–10)
NEUTS SEG # BLD: 3.8 K/UL (ref 1.8–8)
NEUTS SEG NFR BLD: 65 % (ref 40–73)
NRBC # BLD: 0 K/UL (ref 0–0.01)
NRBC BLD-RTO: 0 PER 100 WBC
PLATELET # BLD AUTO: 221 K/UL (ref 135–420)
PMV BLD AUTO: 10 FL (ref 9.2–11.8)
POTASSIUM SERPL-SCNC: 3.4 MMOL/L (ref 3.5–5.5)
PROT SERPL-MCNC: 6.8 G/DL (ref 6.4–8.2)
RBC # BLD AUTO: 4.49 M/UL (ref 4.2–5.3)
SODIUM SERPL-SCNC: 138 MMOL/L (ref 136–145)
TSH SERPL DL<=0.05 MIU/L-ACNC: 6.68 UIU/ML (ref 0.36–3.74)
WBC # BLD AUTO: 5.9 K/UL (ref 4.6–13.2)

## 2022-04-11 PROCEDURE — 80053 COMPREHEN METABOLIC PANEL: CPT

## 2022-04-11 PROCEDURE — 85025 COMPLETE CBC W/AUTO DIFF WBC: CPT

## 2022-04-11 PROCEDURE — 84443 ASSAY THYROID STIM HORMONE: CPT

## 2022-04-11 PROCEDURE — 36415 COLL VENOUS BLD VENIPUNCTURE: CPT

## 2022-04-11 NOTE — PROGRESS NOTES
[facility] Lab Visit:    Kailey Buitrago  1960  [id number]    3818 Pt arrived ambulatory w/o assist. Labs drawn per order via Left AC venipuncture x1 attempt. Pt tolerated well without complaints. Gauze/ coban to site. Pt departed [facility] ambulatory and in no distress at 1420.     Visit Vitals  /79 (BP 1 Location: Left upper arm, BP Patient Position: Sitting)   Pulse 85   Temp 98.2 °F (36.8 °C)   Resp 18   Ht 5' 6\" (1.676 m)   Wt 68 kg (149 lb 14.4 oz)   SpO2 98%   BMI 24.19 kg/m²

## 2022-04-13 ENCOUNTER — HOSPITAL ENCOUNTER (OUTPATIENT)
Dept: INFUSION THERAPY | Age: 62
Discharge: HOME | End: 2022-04-13
Payer: MEDICARE

## 2022-04-13 ENCOUNTER — APPOINTMENT (OUTPATIENT)
Dept: INFUSION THERAPY | Age: 62
End: 2022-04-13

## 2022-04-13 VITALS
RESPIRATION RATE: 18 BRPM | OXYGEN SATURATION: 100 % | HEART RATE: 84 BPM | DIASTOLIC BLOOD PRESSURE: 77 MMHG | SYSTOLIC BLOOD PRESSURE: 133 MMHG | TEMPERATURE: 98.8 F

## 2022-04-13 DIAGNOSIS — C34.91 NON-SMALL CELL CANCER OF RIGHT LUNG (HCC): Primary | ICD-10-CM

## 2022-04-13 PROCEDURE — 96413 CHEMO IV INFUSION 1 HR: CPT

## 2022-04-13 PROCEDURE — 74011000258 HC RX REV CODE- 258: Performed by: INTERNAL MEDICINE

## 2022-04-13 PROCEDURE — 74011250636 HC RX REV CODE- 250/636: Performed by: INTERNAL MEDICINE

## 2022-04-13 PROCEDURE — 77030012965 HC NDL HUBR BBMI -A

## 2022-04-13 RX ORDER — HEPARIN 100 UNIT/ML
300-500 SYRINGE INTRAVENOUS AS NEEDED
Status: DISPENSED | OUTPATIENT
Start: 2022-04-13 | End: 2022-04-13

## 2022-04-13 RX ORDER — SODIUM CHLORIDE 9 MG/ML
25 INJECTION, SOLUTION INTRAVENOUS CONTINUOUS
Status: DISPENSED | OUTPATIENT
Start: 2022-04-13 | End: 2022-04-13

## 2022-04-13 RX ORDER — SODIUM CHLORIDE 0.9 % (FLUSH) 0.9 %
10-40 SYRINGE (ML) INJECTION AS NEEDED
Status: DISPENSED | OUTPATIENT
Start: 2022-04-13 | End: 2022-04-13

## 2022-04-13 RX ADMIN — SODIUM CHLORIDE 400 MG: 9 INJECTION, SOLUTION INTRAVENOUS at 09:58

## 2022-04-13 RX ADMIN — SODIUM CHLORIDE 25 ML/HR: 9 INJECTION, SOLUTION INTRAVENOUS at 09:19

## 2022-04-13 RX ADMIN — Medication 20 ML: at 09:19

## 2022-04-13 NOTE — PROGRESS NOTES
\[facility]\"" Progress Note    Date: 2022    Name: Darius Noel    MRN: [id number]         : 1960    Ms. Jase Simpson arrived in the [facility] today at -[phone], in stable condition, here for Cycle 28, IV KEYTRUDA Infusion (Every 6 Week Cycle). She was assessed and education was provided. Ms. Payne's vitals were reviewed. Visit Vitals  /77 (BP 1 Location: Left upper arm, BP Patient Position: Sitting)   Pulse 84   Temp 98.8 °F (37.1 °C)   Resp 18   SpO2 100%   Breastfeeding No       Lab results were reviewed, satisfactory for treatment today. Written consent for Wesley Marino Infusion was viewed in her electronic record. The inner/medial lumen of her left chest double lumen port was accessed without incident, and brisk blood return was obtained.  ml IV Bag was hung to infuse @ KVO PRN throughout treatment today. KEYTRUDA (pembrolizumab) 400 mg IV, was administered over approximately 30 minutes, per order, and without incident. After completion of all ordered IV medications, her port was flushed well per protocol and without incident, with NS & Heparin, and then, the el needle was removed and gauze/bandaid was applied. Ms. Jase Simpson tolerated treatment very well today, and voiced no complaints. Patient armband removed and shredded. Discharge instructions given, patient gave verbalized understanding. Ms. Jase Simpson was discharged from [place] [address] in stable condition at 1110. She is to return in 6 weeks, on Monday, 22 at 1330,  for her next appointment, for pre-chemo labs. And then, Cycle 29, IV KEYTRUDA Infusion is scheduled for Wednesday, 22 at 0900.     Stacey Lopez RN  2022

## 2022-05-18 RX ORDER — DIPHENHYDRAMINE HYDROCHLORIDE 50 MG/ML
50 INJECTION, SOLUTION INTRAMUSCULAR; INTRAVENOUS AS NEEDED
Status: CANCELLED
Start: 2022-05-25

## 2022-05-18 RX ORDER — EPINEPHRINE 1 MG/ML
0.3 INJECTION, SOLUTION, CONCENTRATE INTRAVENOUS AS NEEDED
Status: CANCELLED | OUTPATIENT
Start: 2022-05-25

## 2022-05-18 RX ORDER — HYDROCORTISONE SODIUM SUCCINATE 100 MG/2ML
100 INJECTION, POWDER, FOR SOLUTION INTRAMUSCULAR; INTRAVENOUS AS NEEDED
Status: CANCELLED | OUTPATIENT
Start: 2022-05-25

## 2022-05-18 RX ORDER — ONDANSETRON 2 MG/ML
8 INJECTION INTRAMUSCULAR; INTRAVENOUS AS NEEDED
Status: CANCELLED | OUTPATIENT
Start: 2022-05-25

## 2022-05-18 RX ORDER — ACETAMINOPHEN 325 MG/1
650 TABLET ORAL AS NEEDED
Status: CANCELLED
Start: 2022-05-25

## 2022-05-18 RX ORDER — ALBUTEROL SULFATE 0.83 MG/ML
2.5 SOLUTION RESPIRATORY (INHALATION) AS NEEDED
Status: CANCELLED
Start: 2022-05-25

## 2022-05-20 ENCOUNTER — TRANSCRIBE ORDER (OUTPATIENT)
Dept: SCHEDULING | Age: 62
End: 2022-05-20

## 2022-05-20 DIAGNOSIS — C34.11 MALIGNANT NEOPLASM OF UPPER LOBE OF RIGHT LUNG (HCC): Primary | ICD-10-CM

## 2022-05-23 ENCOUNTER — HOSPITAL ENCOUNTER (OUTPATIENT)
Dept: INFUSION THERAPY | Age: 62
Discharge: HOME | End: 2022-05-23
Payer: MEDICARE

## 2022-05-23 VITALS
HEIGHT: 66 IN | TEMPERATURE: 98.6 F | RESPIRATION RATE: 18 BRPM | DIASTOLIC BLOOD PRESSURE: 78 MMHG | SYSTOLIC BLOOD PRESSURE: 134 MMHG | BODY MASS INDEX: 23.95 KG/M2 | OXYGEN SATURATION: 95 % | HEART RATE: 93 BPM | WEIGHT: 149 LBS

## 2022-05-23 LAB
ALBUMIN SERPL-MCNC: 3.6 G/DL (ref 3.4–5)
ALBUMIN/GLOB SERPL: 1 {RATIO} (ref 0.8–1.7)
ALP SERPL-CCNC: 90 U/L (ref 45–117)
ALT SERPL-CCNC: 11 U/L (ref 13–56)
ANION GAP SERPL CALC-SCNC: 1 MMOL/L (ref 3–18)
AST SERPL-CCNC: 16 U/L (ref 10–38)
BASOPHILS # BLD: 0 K/UL (ref 0–0.1)
BASOPHILS NFR BLD: 0 % (ref 0–2)
BILIRUB SERPL-MCNC: 0.3 MG/DL (ref 0.2–1)
BUN SERPL-MCNC: 6 MG/DL (ref 7–18)
BUN/CREAT SERPL: 8 (ref 12–20)
CALCIUM SERPL-MCNC: 8.6 MG/DL (ref 8.5–10.1)
CHLORIDE SERPL-SCNC: 104 MMOL/L (ref 100–111)
CO2 SERPL-SCNC: 32 MMOL/L (ref 21–32)
CREAT SERPL-MCNC: 0.71 MG/DL (ref 0.6–1.3)
DIFFERENTIAL METHOD BLD: ABNORMAL
EOSINOPHIL # BLD: 0.1 K/UL (ref 0–0.4)
EOSINOPHIL NFR BLD: 2 % (ref 0–5)
ERYTHROCYTE [DISTWIDTH] IN BLOOD BY AUTOMATED COUNT: 16 % (ref 11.6–14.5)
GLOBULIN SER CALC-MCNC: 3.6 G/DL (ref 2–4)
GLUCOSE SERPL-MCNC: 103 MG/DL (ref 74–99)
HCT VFR BLD AUTO: 36 % (ref 35–45)
HGB BLD-MCNC: 11.4 G/DL (ref 12–16)
IMM GRANULOCYTES # BLD AUTO: 0 K/UL (ref 0–0.04)
IMM GRANULOCYTES NFR BLD AUTO: 1 % (ref 0–0.5)
LYMPHOCYTES # BLD: 1.2 K/UL (ref 0.9–3.6)
LYMPHOCYTES NFR BLD: 21 % (ref 21–52)
MCH RBC QN AUTO: 24.8 PG (ref 24–34)
MCHC RBC AUTO-ENTMCNC: 31.7 G/DL (ref 31–37)
MCV RBC AUTO: 78.4 FL (ref 78–100)
MONOCYTES # BLD: 0.4 K/UL (ref 0.05–1.2)
MONOCYTES NFR BLD: 7 % (ref 3–10)
NEUTS SEG # BLD: 4.1 K/UL (ref 1.8–8)
NEUTS SEG NFR BLD: 70 % (ref 40–73)
NRBC # BLD: 0 K/UL (ref 0–0.01)
NRBC BLD-RTO: 0 PER 100 WBC
PLATELET # BLD AUTO: 209 K/UL (ref 135–420)
PMV BLD AUTO: 10.5 FL (ref 9.2–11.8)
POTASSIUM SERPL-SCNC: 3.4 MMOL/L (ref 3.5–5.5)
PROT SERPL-MCNC: 7.2 G/DL (ref 6.4–8.2)
RBC # BLD AUTO: 4.59 M/UL (ref 4.2–5.3)
SODIUM SERPL-SCNC: 137 MMOL/L (ref 136–145)
TSH SERPL DL<=0.05 MIU/L-ACNC: 3.24 UIU/ML (ref 0.36–3.74)
WBC # BLD AUTO: 5.9 K/UL (ref 4.6–13.2)

## 2022-05-23 PROCEDURE — 85025 COMPLETE CBC W/AUTO DIFF WBC: CPT

## 2022-05-23 PROCEDURE — 80053 COMPREHEN METABOLIC PANEL: CPT

## 2022-05-23 PROCEDURE — 36415 COLL VENOUS BLD VENIPUNCTURE: CPT

## 2022-05-23 PROCEDURE — 84443 ASSAY THYROID STIM HORMONE: CPT

## 2022-05-23 NOTE — PROGRESS NOTES
[facility] Lab Visit:    Alessia Bosch  1960  [id number]    7663 Pt arrived ambulatory w/o assist. Labs drawn per order via Right AC venipuncture x1 attempt. Pt tolerated well without complaints. Gauze/ coban to site. Pt departed [facility] ambulatory and in no distress at 1350.      Visit Vitals  /78 (BP 1 Location: Left upper arm, BP Patient Position: Sitting)   Pulse 93   Temp 98.6 °F (37 °C)   Resp 18   Ht 5' 6\" (1.676 m)   Wt 67.6 kg (149 lb)   SpO2 95%   BMI 24.05 kg/m²

## 2022-05-25 ENCOUNTER — HOSPITAL ENCOUNTER (OUTPATIENT)
Dept: INFUSION THERAPY | Age: 62
Discharge: HOME | End: 2022-05-25
Payer: MEDICARE

## 2022-05-25 VITALS
OXYGEN SATURATION: 96 % | SYSTOLIC BLOOD PRESSURE: 150 MMHG | DIASTOLIC BLOOD PRESSURE: 96 MMHG | TEMPERATURE: 98.1 F | HEART RATE: 86 BPM | RESPIRATION RATE: 16 BRPM

## 2022-05-25 DIAGNOSIS — C34.91 NON-SMALL CELL CANCER OF RIGHT LUNG (HCC): Primary | ICD-10-CM

## 2022-05-25 PROCEDURE — 96413 CHEMO IV INFUSION 1 HR: CPT

## 2022-05-25 PROCEDURE — 74011000258 HC RX REV CODE- 258: Performed by: INTERNAL MEDICINE

## 2022-05-25 PROCEDURE — 74011000250 HC RX REV CODE- 250: Performed by: INTERNAL MEDICINE

## 2022-05-25 PROCEDURE — 74011250636 HC RX REV CODE- 250/636: Performed by: INTERNAL MEDICINE

## 2022-05-25 PROCEDURE — 77030012965 HC NDL HUBR BBMI -A

## 2022-05-25 RX ORDER — HEPARIN 100 UNIT/ML
300-500 SYRINGE INTRAVENOUS AS NEEDED
Status: DISCONTINUED | OUTPATIENT
Start: 2022-05-25 | End: 2022-05-26 | Stop reason: HOSPADM

## 2022-05-25 RX ORDER — SODIUM CHLORIDE 0.9 % (FLUSH) 0.9 %
10-40 SYRINGE (ML) INJECTION AS NEEDED
Status: DISCONTINUED | OUTPATIENT
Start: 2022-05-25 | End: 2022-05-26 | Stop reason: HOSPADM

## 2022-05-25 RX ORDER — SODIUM CHLORIDE 9 MG/ML
25 INJECTION, SOLUTION INTRAVENOUS CONTINUOUS
Status: DISCONTINUED | OUTPATIENT
Start: 2022-05-25 | End: 2022-05-26 | Stop reason: HOSPADM

## 2022-05-25 RX ADMIN — SODIUM CHLORIDE 400 MG: 9 INJECTION, SOLUTION INTRAVENOUS at 14:00

## 2022-05-25 RX ADMIN — SODIUM CHLORIDE, PRESERVATIVE FREE 10 ML: 5 INJECTION INTRAVENOUS at 13:30

## 2022-05-25 RX ADMIN — HEPARIN 500 UNITS: 100 SYRINGE at 14:55

## 2022-05-25 RX ADMIN — SODIUM CHLORIDE 25 ML/HR: 9 INJECTION, SOLUTION INTRAVENOUS at 13:35

## 2022-05-25 RX ADMIN — SODIUM CHLORIDE, PRESERVATIVE FREE 30 ML: 5 INJECTION INTRAVENOUS at 14:55

## 2022-05-25 NOTE — PROGRESS NOTES
[facility] Progress Note    Date: May 25, 2022    Name: Alondra Oliveira    MRN: [id number]         : 1960    Ms. Leigh arrived in the [facility] today at 1320, in stable condition, here for Cycle 29, IV KEYTRUDA Infusion (Every 6 Week Cycle). She was assessed and education was provided. Ms. cEho Malagon vitals were reviewed. Visit Vitals  /77 (BP 1 Location: Left upper arm, BP Patient Position: Sitting)   Pulse 80   Temp 98 °F (36.7 °C)   Resp 20   SpO2 96%   Breastfeeding No           Lab results were reviewed, and all of her most recent labs listed below, from 22, were all noted to be satisfactory for treatment today. Results for Arley Carlin (MRN [id number])   Ref. Range 2022 13:45   WBC Latest Ref Range: 4.6 - 13.2 K/uL 5.9   NRBC Latest Ref Range: 0  WBC 0.0   RBC Latest Ref Range: 4.20 - 5.30 M/uL 4.59   HGB Latest Ref Range: 12.0 - 16.0 g/dL 11.4 (L)   HCT Latest Ref Range: 35.0 - 45.0 % 36.0   MCV Latest Ref Range: 78.0 - 100.0 FL 78.4   MCH Latest Ref Range: 24.0 - 34.0 PG 24.8   MCHC Latest Ref Range: 31.0 - 37.0 g/dL 31.7   RDW Latest Ref Range: 11.6 - 14.5 % 16.0 (H)   PLATELET Latest Ref Range: 135 - 420 K/uL 209   MPV Latest Ref Range: 9.2 - 11.8 FL 10.5   NEUTROPHILS Latest Ref Range: 40 - 73 % 70   LYMPHOCYTES Latest Ref Range: 21 - 52 % 21   MONOCYTES Latest Ref Range: 3 - 10 % 7   EOSINOPHILS Latest Ref Range: 0 - 5 % 2   BASOPHILS Latest Ref Range: 0 - 2 % 0   IMMATURE GRANULOCYTES Latest Ref Range: 0.0 - 0.5 % 1 (H)   DF Latest Units:   AUTOMATED   ABSOLUTE NRBC Latest Ref Range: 0.00 - 0.01 K/uL 0.00   ABS. NEUTROPHILS Latest Ref Range: 1.8 - 8.0 K/UL 4.1   ABS. IMM. GRANS. Latest Ref Range: 0.00 - 0.04 K/UL 0.0   ABS. LYMPHOCYTES Latest Ref Range: 0.9 - 3.6 K/UL 1.2   ABS. MONOCYTES Latest Ref Range: 0.05 - 1.2 K/UL 0.4   ABS. EOSINOPHILS Latest Ref Range: 0.0 - 0.4 K/UL 0.1   ABS.  BASOPHILS Latest Ref Range: 0.0 - 0.1 K/UL 0.0   Sodium Latest Ref Range: 136 - 145 mmol/L 137   Potassium Latest Ref Range: 3.5 - 5.5 mmol/L 3.4 (L)   Chloride Latest Ref Range: 100 - 111 mmol/L 104   CO2 Latest Ref Range: 21 - 32 mmol/L 32   Anion gap Latest Ref Range: 3.0 - 18 mmol/L 1 (L)   Glucose Latest Ref Range: 74 - 99 mg/dL 103 (H)   BUN Latest Ref Range: 7.0 - 18 MG/DL 6 (L)   Creatinine Latest Ref Range: 0.6 - 1.3 MG/DL 0.71   BUN/Creatinine ratio Latest Ref Range: 12 - 20   8 (L)   Calcium Latest Ref Range: 8.5 - 10.1 MG/DL 8.6   GFR est non-AA Latest Ref Range: >60 ml/min/1.73m2 >60   GFR est AA Latest Ref Range: >60 ml/min/1.73m2 >60   Bilirubin, total Latest Ref Range: 0.2 - 1.0 MG/DL 0.3   Protein, total Latest Ref Range: 6.4 - 8.2 g/dL 7.2   Albumin Latest Ref Range: 3.4 - 5.0 g/dL 3.6   Globulin Latest Ref Range: 2.0 - 4.0 g/dL 3.6   A-G Ratio Latest Ref Range: 0.8 - 1.7   1.0   ALT Latest Ref Range: 13 - 56 U/L 11 (L)   AST Latest Ref Range: 10 - 38 U/L 16   Alk. phosphatase Latest Ref Range: 45 - 117 U/L 90   TSH Latest Ref Range: 0.36 - 3.74 uIU/mL 3.24       Written consent for G[facility]ondon [facility]fusion was viewed in her electronic record. The OUTER/LATERAL lumen of her left chest double lumen port was accessed without incident at 1330, and brisk blood return was obtained.  ml IV Bag was hung to infuse @ [facility] throughout treatment today. KEYTRUDA (pembrolizumab) 400 mg IV, was administered over approximately 30 minutes, per order, and without incident. After completion of all ordered IV medications, her port was flushed well per protocol and without incident, with NS & Heparin, and then, the el needle was removed and gauze/bandaid was applied. Ms. Deborah Vázquez tolerated treatment very well today, and voiced no complaints. Ms. Deborah Vázquez was discharged from [place] [address] in stable condition at 1500. She is to return in 6 weeks, on Tuesday, 7-5-22 at [phone],  for her next appointment, for pre-chemo labs.  And then, Cycle 30, CATRACHO Rondon Infusion is scheduled for Wednesday, 7-6-22 at 1300.     Reji Alicea RN  May 25, 2022  1:20 PM

## 2022-06-06 ENCOUNTER — OFFICE VISIT (OUTPATIENT)
Dept: FAMILY MEDICINE CLINIC | Age: 62
End: 2022-06-06
Payer: MEDICARE

## 2022-06-06 VITALS
HEART RATE: 84 BPM | OXYGEN SATURATION: 98 % | RESPIRATION RATE: 16 BRPM | DIASTOLIC BLOOD PRESSURE: 82 MMHG | HEIGHT: 66 IN | TEMPERATURE: 98.4 F | BODY MASS INDEX: 23.63 KG/M2 | SYSTOLIC BLOOD PRESSURE: 130 MMHG | WEIGHT: 147 LBS

## 2022-06-06 DIAGNOSIS — Z12.11 COLON CANCER SCREENING: ICD-10-CM

## 2022-06-06 DIAGNOSIS — E03.9 ACQUIRED HYPOTHYROIDISM: ICD-10-CM

## 2022-06-06 DIAGNOSIS — C34.90 NON-SMALL CELL LUNG CANCER, UNSPECIFIED LATERALITY (HCC): ICD-10-CM

## 2022-06-06 DIAGNOSIS — J31.0 CHRONIC RHINITIS: ICD-10-CM

## 2022-06-06 DIAGNOSIS — F33.9 RECURRENT DEPRESSION (HCC): ICD-10-CM

## 2022-06-06 DIAGNOSIS — I10 ESSENTIAL HYPERTENSION: Primary | ICD-10-CM

## 2022-06-06 DIAGNOSIS — F33.0 MILD EPISODE OF RECURRENT MAJOR DEPRESSIVE DISORDER (HCC): ICD-10-CM

## 2022-06-06 DIAGNOSIS — Z72.0 TOBACCO USE: ICD-10-CM

## 2022-06-06 DIAGNOSIS — D50.8 OTHER IRON DEFICIENCY ANEMIA: ICD-10-CM

## 2022-06-06 PROCEDURE — G9717 DOC PT DX DEP/BP F/U NT REQ: HCPCS | Performed by: FAMILY MEDICINE

## 2022-06-06 PROCEDURE — G9711 PT HX TOT COL OR COLON CA: HCPCS | Performed by: FAMILY MEDICINE

## 2022-06-06 PROCEDURE — 99214 OFFICE O/P EST MOD 30 MIN: CPT | Performed by: FAMILY MEDICINE

## 2022-06-06 PROCEDURE — G8752 SYS BP LESS 140: HCPCS | Performed by: FAMILY MEDICINE

## 2022-06-06 PROCEDURE — G8754 DIAS BP LESS 90: HCPCS | Performed by: FAMILY MEDICINE

## 2022-06-06 PROCEDURE — G8427 DOCREV CUR MEDS BY ELIG CLIN: HCPCS | Performed by: FAMILY MEDICINE

## 2022-06-06 PROCEDURE — G8420 CALC BMI NORM PARAMETERS: HCPCS | Performed by: FAMILY MEDICINE

## 2022-06-06 PROCEDURE — G0463 HOSPITAL OUTPT CLINIC VISIT: HCPCS | Performed by: FAMILY MEDICINE

## 2022-06-06 RX ORDER — POTASSIUM CHLORIDE 20 MEQ/1
20 TABLET, EXTENDED RELEASE ORAL DAILY
Qty: 90 TABLET | Refills: 0 | Status: SHIPPED | OUTPATIENT
Start: 2022-06-06 | End: 2022-09-01

## 2022-06-06 RX ORDER — ALBUTEROL SULFATE 90 UG/1
AEROSOL, METERED RESPIRATORY (INHALATION)
COMMUNITY
Start: 2022-03-25

## 2022-06-06 NOTE — PROGRESS NOTES
1. Have you been to the ER, urgent care clinic since your last visit? Hospitalized since your last visit? No    2. Have you seen or consulted any other health care providers outside of the [address] [place] [address] since your last visit? Include any pap smears or colon screening.  No

## 2022-06-06 NOTE — PATIENT INSTRUCTIONS
A Healthy Heart: Care Instructions  Your Care Instructions     Coronary artery disease, also called heart disease, occurs when a substance called plaque builds up in the vessels that supply oxygen-rich blood to your heart muscle. This can narrow the blood vessels and reduce blood flow. A heart attack happens when blood flow is completely blocked. A high-fat diet, smoking, and other factors increase the risk of heart disease. Your doctor has found that you have a chance of having heart disease. You can do lots of things to keep your heart healthy. It may not be easy, but you can change your diet, exercise more, and quit smoking. These steps really work to lower your chance of heart disease. Follow-up care is a key part of your treatment and safety. Be sure to make and go to all appointments, and call your doctor if you are having problems. It's also a good idea to know your test results and keep a list of the medicines you take. How can you care for yourself at home? Diet    · Use less salt when you cook and eat. This helps lower your blood pressure. Taste food before salting. Add only a little salt when you think you need it. With time, your taste buds will adjust to less salt.     · Eat fewer snack items, fast foods, canned soups, and other high-salt, high-fat, processed foods.     · Read food labels and try to avoid saturated and trans fats. They increase your risk of heart disease by raising cholesterol levels.     · Limit the amount of solid fat-butter, margarine, and shortening-you eat. Use olive, peanut, or canola oil when you cook. Bake, broil, and steam foods instead of frying them.     · Eat a variety of fruit and vegetables every day. Dark green, deep orange, red, or yellow fruits and vegetables are especially good for you. Examples include spinach, carrots, peaches, and berries.     · Foods high in fiber can reduce your cholesterol and provide important vitamins and minerals.  High-fiber foods include whole-grain cereals and breads, oatmeal, beans, brown rice, citrus fruits, and apples.     · Eat lean proteins. Heart-healthy proteins include seafood, lean meats and poultry, eggs, beans, peas, nuts, seeds, and soy products.     · Limit drinks and foods with added sugar. These include candy, desserts, and soda pop. Lifestyle changes    · If your doctor recommends it, get more exercise. Walking is a good choice. Bit by bit, increase the amount you walk every day. Try for at least 30 minutes on most days of the week. You also may want to swim, bike, or do other activities.     · Do not smoke. If you need help quitting, talk to your doctor about stop-smoking programs and medicines. These can increase your chances of quitting for good. Quitting smoking may be the most important step you can take to protect your heart. It is never too late to quit.     · Limit alcohol to 2 drinks a day for men and 1 drink a day for women. Too much alcohol can cause health problems.     · Manage other health problems such as diabetes, high blood pressure, and high cholesterol. If you think you may have a problem with alcohol or drug use, talk to your doctor. Medicines    · Take your medicines exactly as prescribed. Call your doctor if you think you are having a problem with your medicine.     · If your doctor recommends aspirin, take the amount directed each day. Make sure you take aspirin and not another kind of pain reliever, such as acetaminophen (Tylenol). When should you call for help? Call 911 if you have symptoms of a heart attack. These may include:    · Chest pain or pressure, or a strange feeling in the chest.     · Sweating.     · Shortness of breath.     · Pain, pressure, or a strange feeling in the back, neck, jaw, or upper belly or in one or both shoulders or arms.     · Lightheadedness or sudden weakness.     · A fast or irregular heartbeat.    After you call 911, the  may tell you to chew 1 adult-strength or 2 to 4 low-dose aspirin. Wait for an ambulance. Do not try to drive yourself. Watch closely for changes in your health, and be sure to contact your doctor if you have any problems. Where can you learn more? Go to http://www.cruz.com/  Enter F075 in the search box to learn more about \"A Healthy Heart: Care Instructions. \"  Current as of: January 10, 2022               Content Version: 13.2  © 2006-2022 [employer]. Care instructions adapted under license by [employer] (which disclaims liability or warranty for this information). If you have questions about a medical condition or this instruction, always ask your healthcare professional. [address] any warranty or liability for your use of this information.

## 2022-06-06 NOTE — PROGRESS NOTES
Ayan Valerio, 59 y.o.,  female    SUBJECTIVE  Ff-up    HTN-norvasc 7.5 mg. She continues to smoke   Hypokalemia- reviewed labs mild k 3.4, she has not been taking kcl 20 meqs daily    Depression- reports to be doing well on zoloft    Non small cell lung cancer/iron def anemia-dx 2015, following dr. Derik Edwards. Hypothyroidism- reviewed labs TSH wnl, compliant with  levothyroxine 75 mcgs/day. Chronic rhinitis- started allergy shots, following dr Tasia Briceño  ROS:  See HPI, all others negative        Patient Active Problem List   Diagnosis Code    SVC syndrome I87.1    Former smoker Z87.891    Essential hypertension I10    Recurrent major depressive disorder ([facility] F33.9    Iron deficiency anemia D50.9    Abnormal mammogram R92.8    Non-small cell lung cancer ([facility] C34.90       Current Outpatient Medications   Medication Sig Dispense Refill    albuterol (PROVENTIL HFA, VENTOLIN HFA, PROAIR HFA) 90 mcg/actuation inhaler inhale 1 to 2 puffs by mouth and INTO THE LUNGS every 4 to 6 hour. ..  (REFER TO PRESCRIPTION NOTES).  potassium chloride (K-DUR, KLOR-CON M20) 20 mEq tablet Take 1 Tablet by mouth daily. 90 Tablet 0    amLODIPine (NORVASC) 2.5 mg tablet take 1 tablet by mouth once daily 90 Tablet 0    amLODIPine (NORVASC) 5 mg tablet take 1 tablet by mouth once daily 90 Tablet 1    levothyroxine (SYNTHROID) 75 mcg tablet take 1 tablet by mouth daily before breakfast 60 Tablet 3    sertraline (ZOLOFT) 50 mg tablet take 1 tablet by mouth once daily 90 Tablet 1    montelukast (SINGULAIR) 10 mg tablet take 1 tablet by mouth once daily 90 Tablet 3    cetirizine (ZYRTEC) 10 mg tablet take 1 tablet by mouth once daily 90 Tablet 3    ferrous sulfate 325 mg (65 mg iron) tablet take 1 tablet by mouth twice a day with meals 60 Tab 5    aspirin delayed-release 81 mg tablet Take 81 mg by mouth daily.  fluticasone propionate (FLONASE) 50 mcg/actuation nasal spray 2 Sprays by Both Nostrils route daily. (Patient not taking: Reported on 2022) 1 Bottle 0       Allergies   Allergen Reactions    Flagyl [Metronidazole] Hives    Nitroimidazoles Other (comments)     Nitroimidazole derivatives       Past Medical History:   Diagnosis Date    Anemia, iron deficiency 2016    Depression     H/O seasonal allergies     Hypertension     Non-small cell carcinoma of lung ([facility] 2016    adenocarcinoma of right lung    Positive occult stool blood test 2016    Pul[facility] ([facility] 2016    small, bilateral PEs- on Xarelto    Right leg DVT ([facility] 2016    on Xarelto    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[facility] 2016    resolved    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       Social History     Socioeconomic History    Marital status: UNKNOWN     Spouse name: Not on file    Number of children: Not on file    Years of education: Not on file    Highest education level: Not on file   Occupational History    Not on file   Tobacco Use    Smoking status: Current Every Day Smoker     Packs/day: 0.50     Types: Cigarettes     Last attempt to quit: 2016     Years since quittin.2    Smokeless tobacco: Never Used   Substance and Sexual Activity    Alcohol use: No    Drug use: Never     Types: Prescription    Sexual activity: Yes     Partners: Male     Birth control/protection: Surgical     Comment: tubal ligation   Other Topics Concern     Service Not Asked    Blood Transfusions Not Asked    Caffeine Concern Not Asked    Occupational Exposure Not Asked    Hobby Hazards Not Asked    Sleep Concern Not Asked    Stress Concern Not Asked    Weight Concern Not Asked    Special Diet Not Asked    Back Care Not Asked    Exercise Not Asked    Bike Helmet Not Asked    Seat Belt Not Asked    Self-Exams Not Asked   Social History Narrative    Not on file     Social Determinants of Health     Financial Resource Strain:     Difficulty of Paying Living Expenses: Not on file Food Insecurity:     Worried About Running Out of Food in the Last Year: Not on file    Johanne of Food in the Last Year: Not on file   Transportation Needs:     Lack of Transportation (Medical): Not on file    Lack of Transportation (Non-Medical):  Not on file   Physical Activity:     Days of Exercise per Week: Not on file    Minutes of Exercise per Session: Not on file   Stress:     Feeling of Stress : Not on file   Social Connections:     Frequency of Communication with Friends and Family: Not on file    Frequency of Social Gatherings with Friends and Family: Not on file    Attends Worship Services: Not on file    Active Member of [address] [place] [address] [employer] or Organizations: Not on file    Attends Club or Organization Meetings: Not on file    Marital Status: Not on file   Intimate Partner Violence:     Fear of Current or Ex-Partner: Not on file    Emotionally Abused: Not on file    Physically Abused: Not on file    Sexually Abused: Not on file   Housing Stability:     Unable to Pay for Housing in the Last Year: Not on file    Number of [place] in the Last Year: Not on file    Unstable Housing in the Last Year: Not on file       Family History   Problem Relation Age of Onset    Cancer Sister 48        breast    Liver Disease Sister         cirrhosis    Heart Attack Mother 37    No Known Problems Child          OBJECTIVE    Physical Exam:     Visit Vitals  /82 (BP 1 Location: Left upper arm, BP Patient Position: Sitting, BP Cuff Size: Adult)   Pulse 84   Temp 98.4 °F (36.9 °C) (Oral)   Resp 16   Ht 5' 6\" (1.676 m)   Wt 147 lb (66.7 kg)   SpO2 98%   BMI 23.73 kg/m²       General: alert, chronically ill- appearing, AA, in no apparent distress or pain  CVS: normal rate, regular rhythm, distinct S1 and S2  Lungs:clear to ausculation bilaterally, no crackles, wheezing or rhonchi noted  Abdomen: normoactive bowel sounds, soft, non-tender  Extremities: no edema, no cyanosis, MSK grossly normal  Skin: warm, no lesions, rashes noted  Psych:  mood and affect normal        ASSESSMENT/PLAN  Diagnoses and all orders for this visit:    1. Recurrent depression (HCC)  Stable  Cont zoloft    2. Chronic rhinitis  Ongoing allergy shots  cont flonase, singulair,  zyrtec  Decided against pursuing ENT at this time  Will monitor    3. Non-small cell cancer of right lung [facility])  Ongoing chemotherapy  Following dr. Dwain Sylvester    4. Essential hypertension  controlled  Cont  norvasc to 7.5 mg   Mild hypokalemia- cont kcl 20 meqs OD (or 2 bananas/day) advised better compliance  monitoring  Cmp/lipid panel prior to next visit    5. Iron deficiency anemia, unspecified iron deficiency anemia type  Surveillance per dr. Dwain Sylvester    6. Tobacco use  Encouraged compete cessation    7. Acquired hypothyroidism  cont synthroid to 75 mcgs/day  tsh wnl 6/6/2022    8. Colon cancer screening  Discussed options, agreed on FIT test    Follow-up and Dispositions    · Return in about 4 months (around 10/6/2022), or if symptoms worsen or fail to improve, for fasting labs a week prior to your next visit, plan on Medicare wellness on next visit. Patient understands plan of care. Patient has provided input and agrees with goals.

## 2022-06-21 DIAGNOSIS — F33.9 RECURRENT MAJOR DEPRESSIVE DISORDER, REMISSION STATUS UNSPECIFIED (HCC): ICD-10-CM

## 2022-06-21 RX ORDER — SERTRALINE HYDROCHLORIDE 50 MG/1
TABLET, FILM COATED ORAL
Qty: 90 TABLET | Refills: 1 | Status: SHIPPED | OUTPATIENT
Start: 2022-06-21

## 2022-06-29 RX ORDER — DIPHENHYDRAMINE HYDROCHLORIDE 50 MG/ML
50 INJECTION, SOLUTION INTRAMUSCULAR; INTRAVENOUS AS NEEDED
Status: CANCELLED
Start: 2022-07-06

## 2022-06-29 RX ORDER — EPINEPHRINE 1 MG/ML
0.3 INJECTION, SOLUTION, CONCENTRATE INTRAVENOUS AS NEEDED
Status: CANCELLED | OUTPATIENT
Start: 2022-07-06

## 2022-06-29 RX ORDER — ALBUTEROL SULFATE 0.83 MG/ML
2.5 SOLUTION RESPIRATORY (INHALATION) AS NEEDED
Status: CANCELLED
Start: 2022-07-06

## 2022-06-29 RX ORDER — ACETAMINOPHEN 325 MG/1
650 TABLET ORAL AS NEEDED
Status: CANCELLED
Start: 2022-07-06

## 2022-06-29 RX ORDER — ONDANSETRON 2 MG/ML
8 INJECTION INTRAMUSCULAR; INTRAVENOUS AS NEEDED
Status: CANCELLED | OUTPATIENT
Start: 2022-07-06

## 2022-06-29 RX ORDER — SODIUM CHLORIDE 9 MG/ML
25 INJECTION, SOLUTION INTRAVENOUS CONTINUOUS
Status: CANCELLED | OUTPATIENT
Start: 2022-07-06

## 2022-06-29 RX ORDER — HYDROCORTISONE SODIUM SUCCINATE 100 MG/2ML
100 INJECTION, POWDER, FOR SOLUTION INTRAMUSCULAR; INTRAVENOUS AS NEEDED
Status: CANCELLED | OUTPATIENT
Start: 2022-07-06

## 2022-07-05 ENCOUNTER — HOSPITAL ENCOUNTER (OUTPATIENT)
Dept: INFUSION THERAPY | Age: 62
Discharge: HOME | End: 2022-07-05
Payer: MEDICARE

## 2022-07-05 VITALS
SYSTOLIC BLOOD PRESSURE: 115 MMHG | TEMPERATURE: 98.4 F | DIASTOLIC BLOOD PRESSURE: 76 MMHG | OXYGEN SATURATION: 97 % | HEART RATE: 81 BPM | WEIGHT: 149.91 LBS | RESPIRATION RATE: 16 BRPM | BODY MASS INDEX: 24.09 KG/M2 | HEIGHT: 66 IN

## 2022-07-05 LAB
ALBUMIN SERPL-MCNC: 3.6 G/DL (ref 3.4–5)
ALBUMIN/GLOB SERPL: 1.1 {RATIO} (ref 0.8–1.7)
ALP SERPL-CCNC: 83 U/L (ref 45–117)
ALT SERPL-CCNC: 8 U/L (ref 13–56)
ANION GAP SERPL CALC-SCNC: 9 MMOL/L (ref 3–18)
AST SERPL-CCNC: 13 U/L (ref 10–38)
BASOPHILS # BLD: 0 K/UL (ref 0–0.1)
BASOPHILS NFR BLD: 0 % (ref 0–2)
BILIRUB SERPL-MCNC: 0.3 MG/DL (ref 0.2–1)
BUN SERPL-MCNC: 9 MG/DL (ref 7–18)
BUN/CREAT SERPL: 11 (ref 12–20)
CALCIUM SERPL-MCNC: 8.8 MG/DL (ref 8.5–10.1)
CHLORIDE SERPL-SCNC: 103 MMOL/L (ref 100–111)
CO2 SERPL-SCNC: 26 MMOL/L (ref 21–32)
CREAT SERPL-MCNC: 0.81 MG/DL (ref 0.6–1.3)
DIFFERENTIAL METHOD BLD: ABNORMAL
EOSINOPHIL # BLD: 0.1 K/UL (ref 0–0.4)
EOSINOPHIL NFR BLD: 2 % (ref 0–5)
ERYTHROCYTE [DISTWIDTH] IN BLOOD BY AUTOMATED COUNT: 16.3 % (ref 11.6–14.5)
GLOBULIN SER CALC-MCNC: 3.3 G/DL (ref 2–4)
GLUCOSE SERPL-MCNC: 119 MG/DL (ref 74–99)
HCT VFR BLD AUTO: 35.8 % (ref 35–45)
HGB BLD-MCNC: 11 G/DL (ref 12–16)
IMM GRANULOCYTES # BLD AUTO: 0 K/UL (ref 0–0.04)
IMM GRANULOCYTES NFR BLD AUTO: 0 % (ref 0–0.5)
LYMPHOCYTES # BLD: 1.4 K/UL (ref 0.9–3.6)
LYMPHOCYTES NFR BLD: 24 % (ref 21–52)
MCH RBC QN AUTO: 24.2 PG (ref 24–34)
MCHC RBC AUTO-ENTMCNC: 30.7 G/DL (ref 31–37)
MCV RBC AUTO: 78.7 FL (ref 78–100)
MONOCYTES # BLD: 0.3 K/UL (ref 0.05–1.2)
MONOCYTES NFR BLD: 6 % (ref 3–10)
NEUTS SEG # BLD: 3.9 K/UL (ref 1.8–8)
NEUTS SEG NFR BLD: 68 % (ref 40–73)
NRBC # BLD: 0 K/UL (ref 0–0.01)
NRBC BLD-RTO: 0 PER 100 WBC
PLATELET # BLD AUTO: 217 K/UL (ref 135–420)
PMV BLD AUTO: 10.6 FL (ref 9.2–11.8)
POTASSIUM SERPL-SCNC: 3.5 MMOL/L (ref 3.5–5.5)
PROT SERPL-MCNC: 6.9 G/DL (ref 6.4–8.2)
RBC # BLD AUTO: 4.55 M/UL (ref 4.2–5.3)
SODIUM SERPL-SCNC: 138 MMOL/L (ref 136–145)
TSH SERPL DL<=0.05 MIU/L-ACNC: 9.82 UIU/ML (ref 0.36–3.74)
WBC # BLD AUTO: 5.8 K/UL (ref 4.6–13.2)

## 2022-07-05 PROCEDURE — 80053 COMPREHEN METABOLIC PANEL: CPT

## 2022-07-05 PROCEDURE — 36415 COLL VENOUS BLD VENIPUNCTURE: CPT

## 2022-07-05 PROCEDURE — 84443 ASSAY THYROID STIM HORMONE: CPT

## 2022-07-05 PROCEDURE — 85025 COMPLETE CBC W/AUTO DIFF WBC: CPT

## 2022-07-05 RX ORDER — AMLODIPINE BESYLATE 2.5 MG/1
TABLET ORAL
Qty: 90 TABLET | Refills: 0 | Status: SHIPPED | OUTPATIENT
Start: 2022-07-05 | End: 2022-09-28

## 2022-07-05 NOTE — PROGRESS NOTES
[facility] Lab Visit:    Michael Patterson  1960  [id number]    2833 Pt arrived ambulatory w/o assist. Labs drawn per order via Right AC venipuncture x1 attempt. Pt tolerated well without complaints. Gauze/ coban to site. Pt departed [facility] ambulatory and in no distress at 1350.     Visit Vitals  /76 (BP 1 Location: Left upper arm, BP Patient Position: Sitting)   Pulse 81   Temp 98.4 °F (36.9 °C)   Resp 16   Ht 5' 6\" (1.676 m)   Wt 68 kg (149 lb 14.6 oz)   SpO2 97%   BMI 24.20 kg/m²

## 2022-07-06 ENCOUNTER — HOSPITAL ENCOUNTER (OUTPATIENT)
Dept: INFUSION THERAPY | Age: 62
Discharge: HOME | End: 2022-07-06
Payer: MEDICARE

## 2022-07-06 VITALS
DIASTOLIC BLOOD PRESSURE: 80 MMHG | SYSTOLIC BLOOD PRESSURE: 123 MMHG | OXYGEN SATURATION: 95 % | TEMPERATURE: 99.1 F | RESPIRATION RATE: 16 BRPM | HEART RATE: 74 BPM

## 2022-07-06 DIAGNOSIS — C34.91 NON-SMALL CELL CANCER OF RIGHT LUNG (HCC): Primary | ICD-10-CM

## 2022-07-06 PROCEDURE — 96413 CHEMO IV INFUSION 1 HR: CPT

## 2022-07-06 PROCEDURE — 74011000258 HC RX REV CODE- 258: Performed by: INTERNAL MEDICINE

## 2022-07-06 PROCEDURE — 74011000250 HC RX REV CODE- 250: Performed by: INTERNAL MEDICINE

## 2022-07-06 PROCEDURE — 77030012965 HC NDL HUBR BBMI -A

## 2022-07-06 PROCEDURE — 74011250636 HC RX REV CODE- 250/636: Performed by: INTERNAL MEDICINE

## 2022-07-06 RX ORDER — SODIUM CHLORIDE 0.9 % (FLUSH) 0.9 %
10-40 SYRINGE (ML) INJECTION AS NEEDED
Status: DISCONTINUED | OUTPATIENT
Start: 2022-07-06 | End: 2022-07-07 | Stop reason: HOSPADM

## 2022-07-06 RX ORDER — HEPARIN 100 UNIT/ML
300-500 SYRINGE INTRAVENOUS AS NEEDED
Status: DISCONTINUED | OUTPATIENT
Start: 2022-07-06 | End: 2022-07-07 | Stop reason: HOSPADM

## 2022-07-06 RX ADMIN — HEPARIN 500 UNITS: 100 SYRINGE at 14:50

## 2022-07-06 RX ADMIN — SODIUM CHLORIDE 400 MG: 9 INJECTION, SOLUTION INTRAVENOUS at 14:18

## 2022-07-06 RX ADMIN — SODIUM CHLORIDE, PRESERVATIVE FREE 20 ML: 5 INJECTION INTRAVENOUS at 14:49

## 2022-07-06 RX ADMIN — SODIUM CHLORIDE, PRESERVATIVE FREE 10 ML: 5 INJECTION INTRAVENOUS at 13:45

## 2022-07-06 NOTE — PROGRESS NOTES
[facility] Progress Note    Date: 2022    Name: Michael Patterson    MRN: [id number]         : 1960       Aaliyah Leigh arrived to [facility] at 1320. Ms. Haley Comfort was assessed and education was provided. Ms. Zahida Perez vitals were reviewed. Visit Vitals  /80 (BP 1 Location: Left upper arm, BP Patient Position: Sitting)   Pulse 74   Temp 99.1 °F (37.3 °C)   Resp 16   SpO2 95%   Breastfeeding No       Lab results obtained & reviewed from yesterday's pre-chemo lab visit. Okay to proceed w/ tx per pt's tx plan parameters. Recent Results (from the past 24 hour(s))   CBC WITH AUTOMATED DIFF    Collection Time: 22  1:45 PM   Result Value Ref Range    WBC 5.8 4.6 - 13.2 K/uL    RBC 4.55 4.20 - 5.30 M/uL    HGB 11.0 (L) 12.0 - 16.0 g/dL    HCT 35.8 35.0 - 45.0 %    MCV 78.7 78.0 - 100.0 FL    MCH 24.2 24.0 - 34.0 PG    MCHC 30.7 (L) 31.0 - 37.0 g/dL    RDW 16.3 (H) 11.6 - 14.5 %    PLATELET 218 463 - 583 K/uL    MPV 10.6 9.2 - 11.8 FL    NRBC 0.0 0  WBC    ABSOLUTE NRBC 0.00 0.00 - 0.01 K/uL    NEUTROPHILS 68 40 - 73 %    LYMPHOCYTES 24 21 - 52 %    MONOCYTES 6 3 - 10 %    EOSINOPHILS 2 0 - 5 %    BASOPHILS 0 0 - 2 %    IMMATURE GRANULOCYTES 0 0.0 - 0.5 %    ABS. NEUTROPHILS 3.9 1.8 - 8.0 K/UL    ABS. LYMPHOCYTES 1.4 0.9 - 3.6 K/UL    ABS. MONOCYTES 0.3 0.05 - 1.2 K/UL    ABS. EOSINOPHILS 0.1 0.0 - 0.4 K/UL    ABS. BASOPHILS 0.0 0.0 - 0.1 K/UL    ABS. IMM.  GRANS. 0.0 0.00 - 0.04 K/UL    DF AUTOMATED     METABOLIC PANEL, COMPREHENSIVE    Collection Time: 22  1:45 PM   Result Value Ref Range    Sodium 138 136 - 145 mmol/L    Potassium 3.5 3.5 - 5.5 mmol/L    Chloride 103 100 - 111 mmol/L    CO2 26 21 - 32 mmol/L    Anion gap 9 3.0 - 18 mmol/L    Glucose 119 (H) 74 - 99 mg/dL    BUN 9 7.0 - 18 MG/DL    Creatinine 0.81 0.6 - 1.3 MG/DL    BUN/Creatinine ratio 11 (L) 12 - 20      GFR est AA >60 >60 ml/min/1.73m2    GFR est non-AA >60 >60 ml/min/1.73m2    Calcium 8.8 8.5 - 10.1 MG/DL    Bilirubin, total 0.3 0.2 - 1.0 MG/DL    ALT (SGPT) 8 (L) 13 - 56 U/L    AST (SGOT) 13 10 - 38 U/L    Alk. phosphatase 83 45 - 117 U/L    Protein, total 6.9 6.4 - 8.2 g/dL    Albumin 3.6 3.4 - 5.0 g/dL    Globulin 3.3 2.0 - 4.0 g/dL    A-G Ratio 1.1 0.8 - 1.7     TSH 3RD GENERATION    Collection Time: 07/05/22  1:45 PM   Result Value Ref Range    TSH 9.82 (H) 0.36 - 3.74 uIU/mL       Called & spoke w/ Sukh Hood, Dr. Chelsea Crespo nurse, regarding pt's increased TSH. She stated that Dr. Mino Azul is aware and that it's ok to proceed w/ [address] as ordered, and to advise pt to follow up w/ the physician that is monitoring pt's TSH & prescribes her Levothyroxine (pt's PCP, Dr. Flor Nieto). D/w pt & pt reported that she is taking her Levothyroxine as prescribed & she agreed to reach out to her PCP regarding her increased TSH. Patient's L upper chest port accessed (lateral lumen). Brisk blood return/ flushes without difficulty. Keytruda 400mg IV administered as ordered followed by 20ml NS flush. Brisk blood return noted following completion of infusion. Ms. Dann Lawrence tolerated infusion without complaints. Port flushed with heparin per order and de-accessed. Band-aid/ gauze applied to site. Following de-accessing pt's port, pt reported to nurse that she felt \"beth swollen\" pointing to around her L collar bone area and told nurse that she hadn't noticed until just now. Upon assessment, it was noted that there did appear to be some swelling in that area. No pitting noted and pt denies any pain. No erythema, coolness, or discoloration noted. Pt was noted to be laying on her L side during her infusion which is the same side of her port. Pt denies any other complaints and her VS remain stable. Brisk blood return noted from port before & after infusion and no resistance noted upon flushing.  Placed a bag of ice over the area and re-assessed after approximately 15 minutes and pt agreed that the area was less swollen and that she was ready to go home. Discharge/ follow-up instructions discussed w/ pt. Advised pt to use ice as needed and if it gets worse or if she starts having any trouble breathing to call 911 or go to the ER. Also advised her to try to avoid laying on her L side as this could be a contributing factor. Pt verbalized understanding. Will plan to follow up with pt tomorrow w/ a phone call. Pt armband removed & shredded. Ms. Tera Crum was discharged from [place] [address] in stable condition at 1510. She is to return for her next appt on 8/16/22 at 1315 (pre-chemo labs). Anderson Cherry RN  July 6, 2022       **Update-- Called & spoke w/ pt on 7/7/22 at 1025. Pt reported to nurse that she is doing fine, there is no longer any swelling, and that she appreciated the call.

## 2022-07-07 DIAGNOSIS — D50.9 IRON DEFICIENCY ANEMIA, UNSPECIFIED IRON DEFICIENCY ANEMIA TYPE: ICD-10-CM

## 2022-07-07 RX ORDER — LANOLIN ALCOHOL/MO/W.PET/CERES
CREAM (GRAM) TOPICAL
Qty: 60 TABLET | Refills: 5 | OUTPATIENT
Start: 2022-07-07

## 2022-07-11 ENCOUNTER — HOSPITAL ENCOUNTER (OUTPATIENT)
Dept: CT IMAGING | Age: 62
Discharge: HOME | End: 2022-07-11
Attending: INTERNAL MEDICINE
Payer: MEDICARE

## 2022-07-11 DIAGNOSIS — C34.11 MALIGNANT NEOPLASM OF UPPER LOBE OF RIGHT LUNG (HCC): ICD-10-CM

## 2022-07-11 PROCEDURE — 74177 CT ABD & PELVIS W/CONTRAST: CPT

## 2022-07-11 PROCEDURE — 74011000636 HC RX REV CODE- 636: Performed by: INTERNAL MEDICINE

## 2022-07-11 RX ADMIN — IOPAMIDOL 100 ML: 612 INJECTION, SOLUTION INTRAVENOUS at 12:07

## 2022-07-15 DIAGNOSIS — D50.9 IRON DEFICIENCY ANEMIA, UNSPECIFIED IRON DEFICIENCY ANEMIA TYPE: ICD-10-CM

## 2022-07-18 RX ORDER — LANOLIN ALCOHOL/MO/W.PET/CERES
CREAM (GRAM) TOPICAL
Qty: 60 TABLET | Refills: 5 | OUTPATIENT
Start: 2022-07-18

## 2022-07-22 DIAGNOSIS — I10 ESSENTIAL HYPERTENSION: ICD-10-CM

## 2022-07-25 RX ORDER — AMLODIPINE BESYLATE 5 MG/1
TABLET ORAL
Qty: 90 TABLET | Refills: 1 | Status: SHIPPED | OUTPATIENT
Start: 2022-07-25

## 2022-08-05 NOTE — TELEPHONE ENCOUNTER
Spoke with Mrs. Leigh to inform that her TSH level on 7/5/2022 appears hypothyroid. Mrs. Sonya Christian did confirm that she has been taking levothyroxine 75 mcgs/day regularly. She was advise during conversation that Dr. Rashmi Ortiz will  increase to 80 mcgs/day and she is to repeat TSH blood work in 6 weeks. Mrs. Leigh voice understanding. Please send new medication dose to pharmacy on file. And reorder blood work. She is aware to check with pharmacy.

## 2022-08-08 DIAGNOSIS — E03.9 ACQUIRED HYPOTHYROIDISM: Primary | ICD-10-CM

## 2022-08-08 RX ORDER — LEVOTHYROXINE SODIUM 88 UG/1
88 TABLET ORAL
Qty: 60 TABLET | Refills: 0 | Status: SHIPPED | OUTPATIENT
Start: 2022-08-08 | End: 2022-10-03

## 2022-08-08 RX ORDER — LEVOTHYROXINE SODIUM 75 UG/1
TABLET ORAL
Qty: 60 TABLET | Refills: 3 | OUTPATIENT
Start: 2022-08-08

## 2022-08-10 RX ORDER — ONDANSETRON 2 MG/ML
8 INJECTION INTRAMUSCULAR; INTRAVENOUS AS NEEDED
Status: CANCELLED | OUTPATIENT
Start: 2022-08-17

## 2022-08-10 RX ORDER — EPINEPHRINE 1 MG/ML
0.3 INJECTION, SOLUTION, CONCENTRATE INTRAVENOUS AS NEEDED
Status: CANCELLED | OUTPATIENT
Start: 2022-08-17

## 2022-08-10 RX ORDER — HYDROCORTISONE SODIUM SUCCINATE 100 MG/2ML
100 INJECTION, POWDER, FOR SOLUTION INTRAMUSCULAR; INTRAVENOUS AS NEEDED
Status: CANCELLED | OUTPATIENT
Start: 2022-08-17

## 2022-08-10 RX ORDER — ALBUTEROL SULFATE 0.83 MG/ML
2.5 SOLUTION RESPIRATORY (INHALATION) AS NEEDED
Status: CANCELLED
Start: 2022-08-17

## 2022-08-10 RX ORDER — SODIUM CHLORIDE 9 MG/ML
25 INJECTION, SOLUTION INTRAVENOUS CONTINUOUS
Status: CANCELLED | OUTPATIENT
Start: 2022-08-17

## 2022-08-10 RX ORDER — DIPHENHYDRAMINE HYDROCHLORIDE 50 MG/ML
50 INJECTION, SOLUTION INTRAMUSCULAR; INTRAVENOUS AS NEEDED
Status: CANCELLED
Start: 2022-08-17

## 2022-08-10 RX ORDER — ACETAMINOPHEN 325 MG/1
650 TABLET ORAL AS NEEDED
Status: CANCELLED
Start: 2022-08-17

## 2022-08-10 RX ORDER — HEPARIN 100 UNIT/ML
300-500 SYRINGE INTRAVENOUS AS NEEDED
Status: CANCELLED | OUTPATIENT
Start: 2022-08-17

## 2022-08-16 ENCOUNTER — HOSPITAL ENCOUNTER (OUTPATIENT)
Dept: INFUSION THERAPY | Age: 62
Discharge: HOME | End: 2022-08-16
Payer: MEDICARE

## 2022-08-16 VITALS
BODY MASS INDEX: 23.76 KG/M2 | SYSTOLIC BLOOD PRESSURE: 129 MMHG | WEIGHT: 147.2 LBS | OXYGEN SATURATION: 96 % | DIASTOLIC BLOOD PRESSURE: 82 MMHG | HEART RATE: 77 BPM | RESPIRATION RATE: 16 BRPM | TEMPERATURE: 98.7 F

## 2022-08-16 LAB
ALBUMIN SERPL-MCNC: 3.6 G/DL (ref 3.4–5)
ALBUMIN/GLOB SERPL: 1 {RATIO} (ref 0.8–1.7)
ALP SERPL-CCNC: 86 U/L (ref 45–117)
ALT SERPL-CCNC: 13 U/L (ref 13–56)
ANION GAP SERPL CALC-SCNC: 4 MMOL/L (ref 3–18)
AST SERPL-CCNC: 14 U/L (ref 10–38)
BASOPHILS # BLD: 0 K/UL (ref 0–0.1)
BASOPHILS NFR BLD: 0 % (ref 0–2)
BILIRUB SERPL-MCNC: 0.4 MG/DL (ref 0.2–1)
BUN SERPL-MCNC: 6 MG/DL (ref 7–18)
BUN/CREAT SERPL: 8 (ref 12–20)
CALCIUM SERPL-MCNC: 9.3 MG/DL (ref 8.5–10.1)
CHLORIDE SERPL-SCNC: 103 MMOL/L (ref 100–111)
CO2 SERPL-SCNC: 30 MMOL/L (ref 21–32)
CREAT SERPL-MCNC: 0.77 MG/DL (ref 0.6–1.3)
DIFFERENTIAL METHOD BLD: ABNORMAL
EOSINOPHIL # BLD: 0.1 K/UL (ref 0–0.4)
EOSINOPHIL NFR BLD: 2 % (ref 0–5)
ERYTHROCYTE [DISTWIDTH] IN BLOOD BY AUTOMATED COUNT: 16.1 % (ref 11.6–14.5)
GLOBULIN SER CALC-MCNC: 3.6 G/DL (ref 2–4)
GLUCOSE SERPL-MCNC: 61 MG/DL (ref 74–99)
HCT VFR BLD AUTO: 36.6 % (ref 35–45)
HGB BLD-MCNC: 11.5 G/DL (ref 12–16)
IMM GRANULOCYTES # BLD AUTO: 0 K/UL (ref 0–0.04)
IMM GRANULOCYTES NFR BLD AUTO: 0 % (ref 0–0.5)
LYMPHOCYTES # BLD: 1.7 K/UL (ref 0.9–3.6)
LYMPHOCYTES NFR BLD: 27 % (ref 21–52)
MCH RBC QN AUTO: 24.7 PG (ref 24–34)
MCHC RBC AUTO-ENTMCNC: 31.4 G/DL (ref 31–37)
MCV RBC AUTO: 78.7 FL (ref 78–100)
MONOCYTES # BLD: 0.4 K/UL (ref 0.05–1.2)
MONOCYTES NFR BLD: 6 % (ref 3–10)
NEUTS SEG # BLD: 4 K/UL (ref 1.8–8)
NEUTS SEG NFR BLD: 64 % (ref 40–73)
NRBC # BLD: 0 K/UL (ref 0–0.01)
NRBC BLD-RTO: 0 PER 100 WBC
PLATELET # BLD AUTO: 203 K/UL (ref 135–420)
PMV BLD AUTO: 9.6 FL (ref 9.2–11.8)
POTASSIUM SERPL-SCNC: 3.7 MMOL/L (ref 3.5–5.5)
PROT SERPL-MCNC: 7.2 G/DL (ref 6.4–8.2)
RBC # BLD AUTO: 4.65 M/UL (ref 4.2–5.3)
SODIUM SERPL-SCNC: 137 MMOL/L (ref 136–145)
TSH SERPL DL<=0.05 MIU/L-ACNC: 3.47 UIU/ML (ref 0.36–3.74)
WBC # BLD AUTO: 6.2 K/UL (ref 4.6–13.2)

## 2022-08-16 PROCEDURE — 85025 COMPLETE CBC W/AUTO DIFF WBC: CPT

## 2022-08-16 PROCEDURE — 80053 COMPREHEN METABOLIC PANEL: CPT

## 2022-08-16 PROCEDURE — 84443 ASSAY THYROID STIM HORMONE: CPT

## 2022-08-16 PROCEDURE — 36415 COLL VENOUS BLD VENIPUNCTURE: CPT

## 2022-08-17 ENCOUNTER — HOSPITAL ENCOUNTER (OUTPATIENT)
Dept: INFUSION THERAPY | Age: 62
Discharge: HOME | End: 2022-08-17
Payer: MEDICARE

## 2022-08-17 VITALS
OXYGEN SATURATION: 95 % | RESPIRATION RATE: 18 BRPM | SYSTOLIC BLOOD PRESSURE: 163 MMHG | TEMPERATURE: 98.2 F | DIASTOLIC BLOOD PRESSURE: 82 MMHG | HEART RATE: 82 BPM

## 2022-08-17 DIAGNOSIS — C34.91 NON-SMALL CELL CANCER OF RIGHT LUNG (HCC): Primary | ICD-10-CM

## 2022-08-17 PROCEDURE — 74011000258 HC RX REV CODE- 258: Performed by: INTERNAL MEDICINE

## 2022-08-17 PROCEDURE — 74011250636 HC RX REV CODE- 250/636: Performed by: INTERNAL MEDICINE

## 2022-08-17 PROCEDURE — 96413 CHEMO IV INFUSION 1 HR: CPT

## 2022-08-17 PROCEDURE — 74011000250 HC RX REV CODE- 250: Performed by: INTERNAL MEDICINE

## 2022-08-17 PROCEDURE — 77030012965 HC NDL HUBR BBMI -A

## 2022-08-17 PROCEDURE — 99211 OFF/OP EST MAY X REQ PHY/QHP: CPT

## 2022-08-17 RX ORDER — SODIUM CHLORIDE 0.9 % (FLUSH) 0.9 %
10-40 SYRINGE (ML) INJECTION AS NEEDED
Status: DISPENSED | OUTPATIENT
Start: 2022-08-17 | End: 2022-08-17

## 2022-08-17 RX ADMIN — SODIUM CHLORIDE, PRESERVATIVE FREE 20 ML: 5 INJECTION INTRAVENOUS at 11:08

## 2022-08-17 RX ADMIN — SODIUM CHLORIDE, PRESERVATIVE FREE 20 ML: 5 INJECTION INTRAVENOUS at 11:09

## 2022-08-17 RX ADMIN — SODIUM CHLORIDE, PRESERVATIVE FREE 10 ML: 5 INJECTION INTRAVENOUS at 12:25

## 2022-08-17 RX ADMIN — SODIUM CHLORIDE, PRESERVATIVE FREE 10 ML: 5 INJECTION INTRAVENOUS at 11:18

## 2022-08-17 RX ADMIN — SODIUM CHLORIDE 400 MG: 9 INJECTION, SOLUTION INTRAVENOUS at 11:51

## 2022-08-17 NOTE — PROGRESS NOTES
[facility] Progress Note    Date: 2022    Name: Esperanza Massey    MRN: [id number]         : 1960       Rhys Cogan 31    Ms. Leigh arrived to [facility] at [phone]. Ms. Tri Canela was assessed and education was provided. Pt reports she noticed swelling around her port after her last cycle that she made Dr. Froilan Calderon aware of. Pt reports swelling went down after placing an ice pack on it. Ms. Elizabeth Meléndez vitals were reviewed. Visit Vitals  /77 (BP 1 Location: Left upper arm, BP Patient Position: Sitting)   Pulse 77   Temp 98.2 °F (36.8 °C)   Resp 16   SpO2 95%     Pt Medial/Inner lumen of her L upper chest port was assessed using a 20g 1\" el after chloraprep. Brisk blood return obtained and flushed well with NS; however, pt c/o flush stinging when going in. No visible swelling around port noticed. Pt Lateral/outer lumen was then assessed at this time. Brisk blood return obtained and flushed well with NS; however, pt again c/o pain with flush. Both lumens of port then de-assessed and gauze/Band-Aid applied. PIV 22g started to [facility] after one failed attempt to [address] [place] [address]. Brisk blood return obtained and flushed well with NS. Voicemail left with Dr. Candance Pol nurse explaining that pt was c/o stinging and pain to both lumens upon NS flush and that this writer did not feel comfortable giving chemo through the port today and that it would be given peripherally instead. This writer also explained I wanted to make the Doctor aware in case he wanted to do a check for port placement. Call back number left for [facility]. Pt stated she also had an appointment with him this Friday and would bring it up to him then. Lab results from 22 were obtained & reviewed and were okay to proceed w/ tx today. Keytruda 400mg IV administered over approximately 30 min. Ms. Tri Canela tolerated infusion well without complaints. PIV flushed and removed. Gauze/coban applied to site.     Discharge/ follow-up instructions discussed w/ pt. Pt verbalized understanding. Pt armband removed and shredded. Ms. Dago Muhammad was discharged from [place] [address] in stable condition at 1230. She is to return for her next appt on 9/27/22 at 1400 (pre-chemo labs).     Benny Parkinson RN  August 17, 2022

## 2022-08-18 ENCOUNTER — TRANSCRIBE ORDER (OUTPATIENT)
Dept: SCHEDULING | Age: 62
End: 2022-08-18

## 2022-08-18 DIAGNOSIS — C34.90 LUNG CANCER (HCC): Primary | ICD-10-CM

## 2022-08-29 ENCOUNTER — TRANSCRIBE ORDER (OUTPATIENT)
Dept: CARDIAC CATH/INVASIVE PROCEDURES | Age: 62
End: 2022-08-29

## 2022-08-29 DIAGNOSIS — C34.90 MALIGNANT NEOPLASM OF LUNG, UNSPECIFIED LATERALITY, UNSPECIFIED PART OF LUNG (HCC): Primary | ICD-10-CM

## 2022-08-30 ENCOUNTER — TRANSCRIBE ORDER (OUTPATIENT)
Dept: SCHEDULING | Age: 62
End: 2022-08-30

## 2022-08-30 DIAGNOSIS — C34.11 MALIGNANT NEOPLASM OF UPPER LOBE OF RIGHT LUNG (HCC): Primary | ICD-10-CM

## 2022-09-01 RX ORDER — POTASSIUM CHLORIDE 20 MEQ/1
TABLET, EXTENDED RELEASE ORAL
Qty: 90 TABLET | Refills: 0 | Status: SHIPPED | OUTPATIENT
Start: 2022-09-01

## 2022-09-07 RX ORDER — LIDOCAINE HYDROCHLORIDE 10 MG/ML
0.1 INJECTION, SOLUTION EPIDURAL; INFILTRATION; INTRACAUDAL; PERINEURAL AS NEEDED
Status: CANCELLED | OUTPATIENT
Start: 2022-09-07

## 2022-09-07 RX ORDER — FAMOTIDINE 20 MG/1
20 TABLET, FILM COATED ORAL ONCE
Status: CANCELLED | OUTPATIENT
Start: 2022-09-07 | End: 2022-09-07

## 2022-09-07 RX ORDER — SODIUM CHLORIDE, SODIUM LACTATE, POTASSIUM CHLORIDE, CALCIUM CHLORIDE 600; 310; 30; 20 MG/100ML; MG/100ML; MG/100ML; MG/100ML
25 INJECTION, SOLUTION INTRAVENOUS CONTINUOUS
Status: CANCELLED | OUTPATIENT
Start: 2022-09-07 | End: 2022-09-08

## 2022-09-07 RX ORDER — SODIUM CHLORIDE 0.9 % (FLUSH) 0.9 %
5-40 SYRINGE (ML) INJECTION AS NEEDED
Status: CANCELLED | OUTPATIENT
Start: 2022-09-07

## 2022-09-07 RX ORDER — SODIUM CHLORIDE 0.9 % (FLUSH) 0.9 %
5-40 SYRINGE (ML) INJECTION EVERY 8 HOURS
Status: CANCELLED | OUTPATIENT
Start: 2022-09-07

## 2022-09-07 RX ORDER — INSULIN LISPRO 100 [IU]/ML
INJECTION, SOLUTION INTRAVENOUS; SUBCUTANEOUS ONCE
Status: CANCELLED | OUTPATIENT
Start: 2022-09-07 | End: 2022-09-07

## 2022-09-08 ENCOUNTER — HOSPITAL ENCOUNTER (OUTPATIENT)
Dept: INTERVENTIONAL RADIOLOGY/VASCULAR | Age: 62
Discharge: HOME | End: 2022-09-08
Attending: INTERNAL MEDICINE | Admitting: RADIOLOGY
Payer: MEDICARE

## 2022-09-08 DIAGNOSIS — C34.90 LUNG CANCER (HCC): ICD-10-CM

## 2022-09-08 PROCEDURE — 36598 INJ W/FLUOR EVAL CV DEVICE: CPT

## 2022-09-08 RX ORDER — HEPARIN SODIUM (PORCINE) LOCK FLUSH IV SOLN 100 UNIT/ML 100 UNIT/ML
500 SOLUTION INTRAVENOUS AS NEEDED
Status: DISCONTINUED | OUTPATIENT
Start: 2022-09-08 | End: 2022-09-12 | Stop reason: HOSPADM

## 2022-09-08 NOTE — H&P
OUTPATIENT HISTORY AND PHYSICAL  Today 9/8/2022     Indication/Symptoms:   Álvaro Schultz is a 59 y.o. female with history of NSCLC of the right lung now here for a mediport injection due to pain with swelling under the skin after her last infusion. Current Meds:    Prior to Admission medications    Medication Sig Start Date End Date Taking? Authorizing Provider   potassium chloride (K-DUR, KLOR-CON M20) 20 mEq tablet take 1 tablet by mouth once daily 9/1/22   Guanako Coffey MD   levothyroxine (SYNTHROID) 88 mcg tablet Take 1 Tablet by mouth Daily (before breakfast). 8/8/22   Guanako Coffey MD   amLODIPine (NORVASC) 5 mg tablet take 1 tablet by mouth once daily 7/25/22   Guanako Coffey MD   amLODIPine (NORVASC) 2.5 mg tablet take 1 tablet by mouth once daily 7/5/22   Guanako Coffey MD   sertraline (ZOLOFT) 50 mg tablet take 1 tablet by mouth once daily 6/21/22   Guanako Coffey MD   albuterol (PROVENTIL HFA, VENTOLIN HFA, PROAIR HFA) 90 mcg/actuation inhaler inhale 1 to 2 puffs by mouth and INTO THE LUNGS every 4 to 6 hour. ..  (REFER TO PRESCRIPTION NOTES). 3/25/22   Provider, Historical   montelukast (SINGULAIR) 10 mg tablet take 1 tablet by mouth once daily 11/9/21   Guanako Coffey MD   cetirizine (ZYRTEC) 10 mg tablet take 1 tablet by mouth once daily 9/7/21   Guanako Coffey MD   fluticasone propionate (FLONASE) 50 mcg/actuation nasal spray 2 Sprays by Both Nostrils route daily. Patient not taking: Reported on 6/6/2022 5/21/21   Guanako Coffey MD   ferrous sulfate 325 mg (65 mg iron) tablet take 1 tablet by mouth twice a day with meals 5/6/21   Guanako Coffey MD   aspirin delayed-release 81 mg tablet Take 81 mg by mouth daily. Provider, Historical       Allergies:       Allergies   Allergen Reactions    Flagyl [Metronidazole] Hives    Nitroimidazoles Other (comments)     Nitroimidazole derivatives       Comorbid Conditions:    Past Medical History:   Diagnosis Date Anemia, iron deficiency 2/2016    Depression     H/O seasonal allergies     Hypertension     Non-small cell carcinoma of lung ([facility] 2/2016    adenocarcinoma of right lung    Positive occult stool blood test 2/29/2016    Pulmonary embolism ([facility] 2/28/2016    small, bilateral PEs- on Xarelto    Right leg DVT ([facility] 2/28/2016    on Xarelto    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[facility] 4/1/2016    resolved    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          Past Surgical History:   Procedure Laterality Date    HX ANGIOPLASTY Right 3/20/2016    IJ, subclavian, and brachiocephalic veins    HX HYSTERECTOMY      due to menorrhagia     HX OTHER SURGICAL Right 3/20/2016    2 placed in right IJ, subclavian/brachiocephalic    HX THROMBECTOMY  3/20/2016    with thrombolysis    HX VASCULAR ACCESS Left     double lumen    OK BREAST SURGERY PROCEDURE UNLISTED      biopsy     Data:    There were no vitals taken for this visit.:  No results for input(s): PLT, PLTEXT in the last 72 hours. No lab exists for component:  HCT  No results for input(s): INR, APTT, INREXT in the last 72 hours. No lab exists for component: PT    The H & P and/or progress notes and any available imaging were reviewed. The risks, indications and possible alternatives to the procedure, including doing nothing, were discussed and informed consent was obtained. Physical Exam:      Mental status:   Alert and oriented. Heart:   Regular rate. Lungs:  Normal respiratory effort. The patient is an appropriate candidate to undergo the planned procedure.      CAL Sagastume

## 2022-09-08 NOTE — PROCEDURES
RADIOLOGY POST PROCEDURE NOTE     September 8, 2022       8:55 AM     Preoperative Diagnosis:   Mediport dye study    Postoperative Diagnosis:  Same. :  CAL Sandoval    Assistant:  None. Type of Anesthesia: not needed    Procedure/Description:  Image guided mediport dye study    Findings:   Contrast injection demonstrates supraclavicular extravasation from the mediport catheter indicating fracture of the device. Estimated blood Loss:  Minimal    Specimen Removed:   no    Blood transfusions:  None. Implants:  Dual lumen left chest mediport    Complications: None    Condition: Stable    Blood thinning medications: OK to resume in 24 hours unless otherwise indicated    Discharge Plan:   The patient will be scheduled for a mediport removal and replacement at her next availability. The patient was instructed to NOT USE THE MEDIPORT until it is removed and replaced.     CAL Ac

## 2022-09-15 RX ORDER — CETIRIZINE HCL 10 MG
TABLET ORAL
Qty: 90 TABLET | Refills: 3 | Status: SHIPPED | OUTPATIENT
Start: 2022-09-15

## 2022-09-21 RX ORDER — EPINEPHRINE 1 MG/ML
0.3 INJECTION, SOLUTION, CONCENTRATE INTRAVENOUS AS NEEDED
Status: CANCELLED | OUTPATIENT
Start: 2022-09-28

## 2022-09-21 RX ORDER — DIPHENHYDRAMINE HYDROCHLORIDE 50 MG/ML
50 INJECTION, SOLUTION INTRAMUSCULAR; INTRAVENOUS AS NEEDED
Status: CANCELLED
Start: 2022-09-28

## 2022-09-21 RX ORDER — HYDROCORTISONE SODIUM SUCCINATE 100 MG/2ML
100 INJECTION, POWDER, FOR SOLUTION INTRAMUSCULAR; INTRAVENOUS AS NEEDED
Status: CANCELLED | OUTPATIENT
Start: 2022-09-28

## 2022-09-21 RX ORDER — HEPARIN 100 UNIT/ML
300-500 SYRINGE INTRAVENOUS AS NEEDED
Status: CANCELLED | OUTPATIENT
Start: 2022-09-28

## 2022-09-21 RX ORDER — ONDANSETRON 2 MG/ML
8 INJECTION INTRAMUSCULAR; INTRAVENOUS AS NEEDED
Status: CANCELLED | OUTPATIENT
Start: 2022-09-28

## 2022-09-21 RX ORDER — ACETAMINOPHEN 325 MG/1
650 TABLET ORAL AS NEEDED
Status: CANCELLED
Start: 2022-09-28

## 2022-09-21 RX ORDER — ALBUTEROL SULFATE 0.83 MG/ML
2.5 SOLUTION RESPIRATORY (INHALATION) AS NEEDED
Status: CANCELLED
Start: 2022-09-28

## 2022-09-27 ENCOUNTER — HOSPITAL ENCOUNTER (OUTPATIENT)
Dept: INFUSION THERAPY | Age: 62
Discharge: HOME | End: 2022-09-27
Payer: MEDICARE

## 2022-09-27 VITALS
OXYGEN SATURATION: 98 % | SYSTOLIC BLOOD PRESSURE: 150 MMHG | HEIGHT: 66 IN | TEMPERATURE: 98 F | BODY MASS INDEX: 23.48 KG/M2 | WEIGHT: 146.1 LBS | RESPIRATION RATE: 18 BRPM | HEART RATE: 88 BPM | DIASTOLIC BLOOD PRESSURE: 81 MMHG

## 2022-09-27 LAB
ALBUMIN SERPL-MCNC: 3.7 G/DL (ref 3.4–5)
ALBUMIN/GLOB SERPL: 1 {RATIO} (ref 0.8–1.7)
ALP SERPL-CCNC: 93 U/L (ref 45–117)
ALT SERPL-CCNC: 11 U/L (ref 13–56)
ANION GAP SERPL CALC-SCNC: 7 MMOL/L (ref 3–18)
AST SERPL-CCNC: 12 U/L (ref 10–38)
BASO+EOS+MONOS # BLD AUTO: 0.3 K/UL (ref 0–2.3)
BASO+EOS+MONOS NFR BLD AUTO: 5 % (ref 0.1–17)
BILIRUB SERPL-MCNC: 0.4 MG/DL (ref 0.2–1)
BUN SERPL-MCNC: 5 MG/DL (ref 7–18)
BUN/CREAT SERPL: 8 (ref 12–20)
CALCIUM SERPL-MCNC: 8.8 MG/DL (ref 8.5–10.1)
CHLORIDE SERPL-SCNC: 102 MMOL/L (ref 100–111)
CO2 SERPL-SCNC: 28 MMOL/L (ref 21–32)
CREAT SERPL-MCNC: 0.6 MG/DL (ref 0.6–1.3)
DIFFERENTIAL METHOD BLD: ABNORMAL
ERYTHROCYTE [DISTWIDTH] IN BLOOD BY AUTOMATED COUNT: 15.9 % (ref 11.5–14.5)
GLOBULIN SER CALC-MCNC: 3.7 G/DL (ref 2–4)
GLUCOSE SERPL-MCNC: 83 MG/DL (ref 74–99)
HCT VFR BLD AUTO: 38.5 % (ref 36–48)
HGB BLD-MCNC: 11.9 G/DL (ref 12–16)
LYMPHOCYTES # BLD: 1.5 K/UL (ref 1.1–5.9)
LYMPHOCYTES NFR BLD: 27 % (ref 14–44)
MCH RBC QN AUTO: 24.9 PG (ref 25–35)
MCHC RBC AUTO-ENTMCNC: 30.9 G/DL (ref 31–37)
MCV RBC AUTO: 80.5 FL (ref 78–102)
NEUTS SEG # BLD: 3.9 K/UL (ref 1.8–9.5)
NEUTS SEG NFR BLD: 68 % (ref 40–70)
PLATELET # BLD AUTO: 206 K/UL (ref 140–440)
POTASSIUM SERPL-SCNC: 3.4 MMOL/L (ref 3.5–5.5)
PROT SERPL-MCNC: 7.4 G/DL (ref 6.4–8.2)
RBC # BLD AUTO: 4.78 M/UL (ref 4.1–5.1)
SODIUM SERPL-SCNC: 137 MMOL/L (ref 136–145)
TSH SERPL DL<=0.05 MIU/L-ACNC: 2.9 UIU/ML (ref 0.36–3.74)
WBC # BLD AUTO: 5.7 K/UL (ref 4.5–13)

## 2022-09-27 PROCEDURE — 85025 COMPLETE CBC W/AUTO DIFF WBC: CPT

## 2022-09-27 PROCEDURE — 84443 ASSAY THYROID STIM HORMONE: CPT

## 2022-09-27 PROCEDURE — 80053 COMPREHEN METABOLIC PANEL: CPT

## 2022-09-27 PROCEDURE — 36415 COLL VENOUS BLD VENIPUNCTURE: CPT

## 2022-09-27 NOTE — PROGRESS NOTES
[facility] Lab Visit:    Miguel Nova  1960  [id number]    7408 Pt arrived ambulatory w/o assist. Labs drawn per order via Left AC venipuncture x1 attempt. Pt tolerated well without complaints. Gauze/ coban to site. Pt departed [facility] ambulatory and in no distress at 1415.      Visit Vitals  BP (!) 150/81 (BP 1 Location: Left upper arm, BP Patient Position: Sitting)   Pulse 88   Temp 98 °F (36.7 °C)   Resp 18   Ht 5' 6\" (1.676 m)   Wt 66.3 kg (146 lb 1.6 oz)   SpO2 98%   BMI 23.58 kg/m²

## 2022-09-28 ENCOUNTER — APPOINTMENT (OUTPATIENT)
Dept: INFUSION THERAPY | Age: 62
End: 2022-09-28
Payer: MEDICARE

## 2022-09-28 ENCOUNTER — HOSPITAL ENCOUNTER (OUTPATIENT)
Dept: INFUSION THERAPY | Age: 62
Discharge: HOME | End: 2022-09-28
Payer: MEDICARE

## 2022-09-28 VITALS
RESPIRATION RATE: 18 BRPM | OXYGEN SATURATION: 99 % | TEMPERATURE: 98.6 F | HEART RATE: 79 BPM | SYSTOLIC BLOOD PRESSURE: 131 MMHG | DIASTOLIC BLOOD PRESSURE: 83 MMHG

## 2022-09-28 DIAGNOSIS — C34.91 NON-SMALL CELL CANCER OF RIGHT LUNG (HCC): Primary | ICD-10-CM

## 2022-09-28 PROCEDURE — 74011000258 HC RX REV CODE- 258: Performed by: INTERNAL MEDICINE

## 2022-09-28 PROCEDURE — 74011250636 HC RX REV CODE- 250/636: Performed by: INTERNAL MEDICINE

## 2022-09-28 PROCEDURE — 96413 CHEMO IV INFUSION 1 HR: CPT

## 2022-09-28 PROCEDURE — 74011000250 HC RX REV CODE- 250: Performed by: INTERNAL MEDICINE

## 2022-09-28 RX ORDER — SODIUM CHLORIDE 0.9 % (FLUSH) 0.9 %
10-40 SYRINGE (ML) INJECTION AS NEEDED
Status: DISCONTINUED | OUTPATIENT
Start: 2022-09-28 | End: 2022-09-29 | Stop reason: HOSPADM

## 2022-09-28 RX ORDER — SODIUM CHLORIDE 9 MG/ML
25 INJECTION, SOLUTION INTRAVENOUS CONTINUOUS
Status: DISCONTINUED | OUTPATIENT
Start: 2022-09-28 | End: 2022-09-29 | Stop reason: HOSPADM

## 2022-09-28 RX ADMIN — SODIUM CHLORIDE 25 ML/HR: 9 INJECTION, SOLUTION INTRAVENOUS at 13:21

## 2022-09-28 RX ADMIN — SODIUM CHLORIDE 400 MG: 9 INJECTION, SOLUTION INTRAVENOUS at 13:44

## 2022-09-28 RX ADMIN — SODIUM CHLORIDE, PRESERVATIVE FREE 10 ML: 5 INJECTION INTRAVENOUS at 13:21

## 2022-09-28 NOTE — PROGRESS NOTES
[facility] Progress Note    Date: 2022    Name: Sanna Saldivar    MRN: [id number]         : 1960       Kumar Weeks 30    Ms. Leigh arrived to [facility] at . Ms. Oly Rascon was assessed and education was provided. Patient reports having study done to [facility] and stated that she has a hole in it; but they havent taken it out yet. Ms. Donn Roldan vitals were reviewed. Visit Vitals  BP (!) 142/78 (BP 1 Location: Left upper arm, BP Patient Position: Sitting)   Pulse 83   Temp 98.9 °F (37.2 °C)   Resp 18   SpO2 99%   Breastfeeding No     PIV 22g started to RAC; Brisk blood return obtained and flushed well with NS. Lab results from 22 were obtained & reviewed and were okay to proceed w/ treatment today. Keytruda 400mg IV administered over approximately 30 min. Ms. Oly Rascon tolerated infusion well without complaints. PIV flushed and removed. Gauze/coban applied to site. Discharge/ follow-up instructions discussed w/ patient. Patient verbalized understanding. Patient armband removed and shredded. Ms. Oly Rascon was discharged from [place] [address] in stable condition at [phone] [id number]. She is to return for her next appt on 22 at [phone] (pre-chemo labs).     Davina Clark RN  2022

## 2022-10-06 ENCOUNTER — OFFICE VISIT (OUTPATIENT)
Dept: FAMILY MEDICINE CLINIC | Age: 62
End: 2022-10-06
Payer: MEDICARE

## 2022-10-06 VITALS
HEART RATE: 83 BPM | DIASTOLIC BLOOD PRESSURE: 72 MMHG | TEMPERATURE: 98.1 F | WEIGHT: 146 LBS | OXYGEN SATURATION: 98 % | BODY MASS INDEX: 23.46 KG/M2 | SYSTOLIC BLOOD PRESSURE: 124 MMHG | RESPIRATION RATE: 16 BRPM | HEIGHT: 66 IN

## 2022-10-06 DIAGNOSIS — Z12.31 BREAST CANCER SCREENING BY MAMMOGRAM: ICD-10-CM

## 2022-10-06 DIAGNOSIS — F33.0 MILD EPISODE OF RECURRENT MAJOR DEPRESSIVE DISORDER (HCC): ICD-10-CM

## 2022-10-06 DIAGNOSIS — I10 ESSENTIAL HYPERTENSION: ICD-10-CM

## 2022-10-06 DIAGNOSIS — Z00.00 MEDICARE ANNUAL WELLNESS VISIT, SUBSEQUENT: Primary | ICD-10-CM

## 2022-10-06 DIAGNOSIS — D50.8 OTHER IRON DEFICIENCY ANEMIA: ICD-10-CM

## 2022-10-06 DIAGNOSIS — C34.90 NON-SMALL CELL LUNG CANCER, UNSPECIFIED LATERALITY (HCC): ICD-10-CM

## 2022-10-06 DIAGNOSIS — Z72.0 TOBACCO USE: ICD-10-CM

## 2022-10-06 DIAGNOSIS — Z23 NEEDS FLU SHOT: ICD-10-CM

## 2022-10-06 PROCEDURE — G8427 DOCREV CUR MEDS BY ELIG CLIN: HCPCS | Performed by: FAMILY MEDICINE

## 2022-10-06 PROCEDURE — G9711 PT HX TOT COL OR COLON CA: HCPCS | Performed by: FAMILY MEDICINE

## 2022-10-06 PROCEDURE — G8752 SYS BP LESS 140: HCPCS | Performed by: FAMILY MEDICINE

## 2022-10-06 PROCEDURE — 99214 OFFICE O/P EST MOD 30 MIN: CPT | Performed by: FAMILY MEDICINE

## 2022-10-06 PROCEDURE — G0439 PPPS, SUBSEQ VISIT: HCPCS | Performed by: FAMILY MEDICINE

## 2022-10-06 PROCEDURE — G9899 SCRN MAM PERF RSLTS DOC: HCPCS | Performed by: FAMILY MEDICINE

## 2022-10-06 PROCEDURE — 90686 IIV4 VACC NO PRSV 0.5 ML IM: CPT | Performed by: FAMILY MEDICINE

## 2022-10-06 PROCEDURE — G8754 DIAS BP LESS 90: HCPCS | Performed by: FAMILY MEDICINE

## 2022-10-06 PROCEDURE — G8420 CALC BMI NORM PARAMETERS: HCPCS | Performed by: FAMILY MEDICINE

## 2022-10-06 PROCEDURE — G9717 DOC PT DX DEP/BP F/U NT REQ: HCPCS | Performed by: FAMILY MEDICINE

## 2022-10-06 PROCEDURE — G0463 HOSPITAL OUTPT CLINIC VISIT: HCPCS | Performed by: FAMILY MEDICINE

## 2022-10-06 NOTE — PROGRESS NOTES
1. \"Have you been to the ER, urgent care clinic since your last visit? Hospitalized since your last visit? \" No    2. \"Have you seen or consulted any other health care providers outside of the [address] [place] [address] since your last visit? \" No     3. For patients aged 39-70: Has the patient had a colonoscopy / FIT/ Cologuard? Yes - no Care Gap present Fit test given to patient      If the patient is female:    4. For patients aged 41-77: Has the patient had a mammogram within the past 2 years? No      5. For patients aged 21-65: Has the patient had a pap smear? NA - based on age or sex     Flulaval 0.5 ml given IM in right deltoid. Lot # [id number], exp date 06/30/2023. Patient tolerated injection well. No adverse reaction noted.

## 2022-10-06 NOTE — PATIENT INSTRUCTIONS
DASH Diet: Care Instructions  Your Care Instructions     The DASH diet is an eating plan that can help lower your blood pressure. DASH stands for Dietary Approaches to Stop Hypertension. Hypertension is high blood pressure. The DASH diet focuses on eating foods that are high in calcium, potassium, and magnesium. These nutrients can lower blood pressure. The foods that are highest in these nutrients are fruits, vegetables, low-fat dairy products, nuts, seeds, and legumes. But taking calcium, potassium, and magnesium supplements instead of eating foods that are high in those nutrients does not have the same effect. The DASH diet also includes whole grains, fish, and poultry. The DASH diet is one of several lifestyle changes your doctor may recommend to lower your high blood pressure. Your doctor may also want you to decrease the amount of sodium in your diet. Lowering sodium while following the DASH diet can lower blood pressure even further than just the DASH diet alone. Follow-up care is a key part of your treatment and safety. Be sure to make and go to all appointments, and call your doctor if you are having problems. It's also a good idea to know your test results and keep a list of the medicines you take. How can you care for yourself at home? Following the DASH diet  Eat 4 to 5 servings of fruit each day. A serving is 1 medium-sized piece of fruit, ½ cup chopped or canned fruit, 1/4 cup dried fruit, or 4 ounces (½ cup) of fruit juice. Choose fruit more often than fruit juice. Eat 4 to 5 servings of vegetables each day. A serving is 1 cup of lettuce or raw leafy vegetables, ½ cup of chopped or cooked vegetables, or 4 ounces (½ cup) of vegetable juice. Choose vegetables more often than vegetable juice. Get 2 to 3 servings of low-fat and fat-free dairy each day. A serving is 8 ounces of milk, 1 cup of yogurt, or 1 ½ ounces of cheese. Eat 6 to 8 servings of grains each day.  A serving is 1 slice of bread, 1 ounce of dry cereal, or ½ cup of cooked rice, pasta, or cooked cereal. Try to choose whole-grain products as much as possible. Limit lean meat, poultry, and fish to 2 servings each day. A serving is 3 ounces, about the size of a deck of cards. Eat 4 to 5 servings of nuts, seeds, and legumes (cooked dried beans, lentils, and split peas) each week. A serving is 1/3 cup of nuts, 2 tablespoons of seeds, or ½ cup of cooked beans or peas. Limit fats and oils to 2 to 3 servings each day. A serving is 1 teaspoon of vegetable oil or 2 tablespoons of salad dressing. Limit sweets and added sugars to 5 servings or less a week. A serving is 1 tablespoon jelly or jam, ½ cup sorbet, or 1 cup of lemonade. Eat less than 2,300 milligrams (mg) of sodium a day. If you limit your sodium to 1,500 mg a day, you can lower your blood pressure even more. Be aware that all of these are the suggested number of servings for people who eat 1,800 to 2,000 calories a day. Your recommended number of servings may be different if you need more or fewer calories. Tips for success  Start small. Do not try to make dramatic changes to your diet all at once. You might feel that you are missing out on your favorite foods and then be more likely to not follow the plan. Make small changes, and stick with them. Once those changes become habit, add a few more changes. Try some of the following:  Make it a goal to eat a fruit or vegetable at every meal and at snacks. This will make it easy to get the recommended amount of fruits and vegetables each day. Try yogurt topped with fruit and nuts for a snack or healthy dessert. Add lettuce, tomato, cucumber, and onion to sandwiches. Combine a ready-made pizza crust with low-fat mozzarella cheese and lots of vegetable toppings. Try using tomatoes, squash, spinach, broccoli, carrots, cauliflower, and onions.   Have a variety of cut-up vegetables with a low-fat dip as an appetizer instead of chips and dip.  Sprinkle sunflower seeds or chopped almonds over salads. Or try adding chopped walnuts or almonds to cooked vegetables. Try some vegetarian meals using beans and peas. Add garbanzo or kidney beans to salads. Make burritos and tacos with mashed hinton beans or black beans. Where can you learn more? Go to http://www.cruz.com/  Enter H967 in the search box to learn more about \"DASH Diet: Care Instructions. \"  Current as of: January 10, 2022               Content Version: 13.2  © 5478-4015 [employer]. Care instructions adapted under license by [employer] (which disclaims liability or warranty for this information). If you have questions about a medical condition or this instruction, always ask your healthcare professional. [address] any warranty or liability for your use of this information. Vaccine Information Statement    Influenza (Flu) Vaccine (Inactivated or Recombinant): What You Need to Know    Many vaccine information statements are available in [place] and other languages. See www.immunize.org/vis. Hojas de información sobre vacunas están disponibles en español y en muchos otros idiomas. Visite www.immunize.org/vis. 1. Why get vaccinated? Influenza vaccine can prevent influenza (flu). Flu is a contagious disease that spreads around the [place] every year, usually between October and May. Anyone can get the flu, but it is more dangerous for some people. Infants and young children, people 72 years and older, pregnant people, and people with certain health conditions or a weakened immune system are at greatest risk of flu complications. Pneumonia, bronchitis, sinus infections, and ear infections are examples of flu-related complications. If you have a medical condition, such as heart disease, cancer, or diabetes, flu can make it worse.     Flu can cause fever and chills, sore throat, muscle aches, fatigue, cough, headache, and runny or stuffy nose. Some people may have vomiting and diarrhea, though this is more common in children than adults. In an average year, thousands of people in the [facility] die from flu, and many more are hospitalized. Flu vaccine prevents millions of illnesses and flu-related visits to the doctor each year. 2. Influenza vaccines     CDC recommends everyone 6 months and older get vaccinated every flu season. Children 6 months through 6years of age may need 2 doses during a single flu season. Everyone else needs only 1 dose each flu season. It takes about 2 weeks for protection to develop after vaccination. There are many flu viruses, and they are always changing. Each year a new flu vaccine is made to protect against the influenza viruses believed to be likely to cause disease in the upcoming flu season. Even when the vaccine doesnt exactly match these viruses, it may still provide some protection. Influenza vaccine does not cause flu. Influenza vaccine may be given at the same time as other vaccines. 3. Talk with your health care provider    Tell your vaccination provider if the person getting the vaccine:  Has had an allergic reaction after a previous dose of influenza vaccine, or has any severe, life-threatening allergies   Has ever had Guillain-Barré Syndrome (also called GBS)    In some cases, your health care provider may decide to postpone influenza vaccination until a future visit. Influenza vaccine can be administered at any time during pregnancy. People who are or will be pregnant during influenza season should receive inactivated influenza vaccine. People with minor illnesses, such as a cold, may be vaccinated. People who are moderately or severely ill should usually wait until they recover before getting influenza vaccine. Your health care provider can give you more information.     4. Risks of a vaccine reaction    Soreness, redness, and swelling where the shot is given, fever, muscle aches, and headache can happen after influenza vaccination. There may be a very small increased risk of Guillain-Barré Syndrome (GBS) after inactivated influenza vaccine (the flu shot). Pamela Horton children who get the flu shot along with pneumococcal vaccine (PCV13) and/or DTaP vaccine at the same time might be slightly more likely to have a seizure caused by fever. Tell your health care provider if a child who is getting flu vaccine has ever had a seizure. People sometimes faint after medical procedures, including vaccination. Tell your provider if you feel dizzy or have vision changes or ringing in the ears. As with any medicine, there is a very remote chance of a vaccine causing a severe allergic reaction, other serious injury, or death. 5. What if there is a serious problem? An allergic reaction could occur after the vaccinated person leaves the clinic. If you see signs of a severe allergic reaction (hives, swelling of the face and throat, difficulty breathing, a fast heartbeat, dizziness, or weakness), call 9-1-1 and get the person to the nearest hospital.    For other signs that concern you, call your health care provider. Adverse reactions should be reported to the Vaccine Adverse Event Reporting System (VAERS). Your health care provider will usually file this report, or you can do it yourself. Visit the VAERS website at www.vaers. hhs.gov or call 7-260.775.3038. VAERS is only for reporting reactions, and VAERS staff members do not give medical advice. 6. The National Vaccine Injury Compensation Program    The [facility] Vaccine Injury Compensation Program (VICP) is a federal program that was created to compensate people who may have been injured by certain vaccines. Claims regarding alleged injury or death due to vaccination have a time limit for filing, which may be as short as two years.  Visit the VICP website at www.hrsa.gov/vaccinecompensation or call 1-264.715.1633 to learn about the program and about filing a claim. 7. How can I learn more? Ask your health care provider. Call your local or state health department. Visit the website of the Food and Drug Administration (FDA) for vaccine package inserts and additional information at www.fda.gov/vaccines-blood-biologics/vaccines. Contact the Centers for Disease Control and Prevention (CDC): Call 0-947.260.4513 (8-796-FXV-INFO) or  Visit CDCs influenza website at www.cdc.gov/flu. Vaccine Information Statement   Inactivated Influenza Vaccine   8/6/2021  42 ROBERT Abbott [id number]   Department of Health and Human Services  Centers for Disease Control and Prevention    Office Use Only

## 2022-10-06 NOTE — PROGRESS NOTES
This is the Subsequent Medicare Annual Wellness Exam, performed 12 months or more after the Initial AWV or the last Subsequent AWV    I have reviewed the patient's medical history in detail and updated the computerized patient record. Assessment/Plan   Education and counseling provided:  Health Maintenance reviewed- ongoing chemotherapy for lung cancer, discussed age appropriate prev health  Flu vaccine given today  Mammogram-due, order placed  CRCS- reminded to submit FIT test    Depression Risk Factor Screening     3 most recent PHQ Screens 10/6/2022   Little interest or pleasure in doing things Not at all   Feeling down, depressed, irritable, or hopeless Not at all   Total Score PHQ 2 0       Alcohol Risk Screen    Do you average more than 1 drink per night or more than 7 drinks a week:  No    On any one occasion in the past three months have you have had more than 3 drinks containing alcohol:  No        Functional Ability and Level of Safety    Hearing: Hearing is good. Activities of Daily Living: The home contains: no safety equipment. Patient does total self care      Ambulation: with no difficulty     Fall Risk:  Fall Risk Assessment, last 12 mths 10/6/2022   Able to walk? Yes   Fall in past 12 months? 1   Number of falls in past 12 months 1   Fall with injury?  0      Abuse Screen:  Patient is not abused       Cognitive Screening    Has your family/caregiver stated any concerns about your memory: no      Health Maintenance Due     Health Maintenance Due   Topic Date Due    DTaP/Tdap/Td series (1 - Tdap) Never done    Cervical cancer screen  Never done    Shingrix Vaccine Age 50> (1 of 2) Never done    Lipid Screen  08/15/2021    COVID-19 Vaccine (4 - Booster for Pfizer series) 03/20/2022    Bone Densitometry  05/22/2022    Breast Cancer Screen Mammogram  08/20/2022       Patient Care Team   Patient Care Team:  Edilia Jackson MD as PCP - General (Family Medicine)  Edilia Jackson MD as PCP - [facility]  Helen Whitley MD (Hematology and Oncology)  Becky Pfeiffer MD (Hematology and Oncology)  Dimitri Arroyo MD (Radiation Oncology)  Belkis Thompson MD as Surgeon (General Surgery)  Ton Carias MD (Otolaryngology)    History     Patient Active Problem List   Diagnosis Code    SVC syndrome I87.1    Former smoker Z87.891    Essential hypertension I10    Recurrent major depressive disorder ([address] F33.9    Iron deficiency anemia D50.9    Abnormal mammogram R92.8    Non-small cell lung cancer ([address] C34.90     Past Medical History:   Diagnosis Date    Anemia, iron deficiency 2/2016    Depression     H/O seasonal allergies     Hypertension     Non-small cell carcinoma of lung ([address] 2/2016    adenocarcinoma of right lung    Positive occult stool blood test 2/29/2016    Pulmonary embolism ([address] 2/28/2016    small, bilateral PEs- on Xarelto    Right leg DVT ([address] 2/28/2016    on Xarelto    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    resolved    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      Past Surgical History:   Procedure Laterality Date    HX ANGIOPLASTY Right 3/20/2016    IJ, subclavian, and brachiocephalic veins    HX HYSTERECTOMY      due to menorrhagia     HX OTHER SURGICAL Right 3/20/2016    2 placed in right IJ, subclavian/brachiocephalic    HX THROMBECTOMY  3/20/2016    with thrombolysis    HX VASCULAR ACCESS Left     double lumen    MO BREAST SURGERY PROCEDURE UNLISTED      biopsy     Current Outpatient Medications   Medication Sig Dispense Refill    levothyroxine (SYNTHROID) 88 mcg tablet take 1 tablet by mouth daily before breakfast 90 Tablet 2    amLODIPine (NORVASC) 2.5 mg tablet take 1 tablet by mouth once daily 90 Tablet 1    cetirizine (ZYRTEC) 10 mg tablet take 1 tablet by mouth once daily 90 Tablet 3    potassium chloride (K-DUR, KLOR-CON M20) 20 mEq tablet take 1 tablet by mouth once daily 90 Tablet 0    amLODIPine (NORVASC) 5 mg tablet take 1 tablet by mouth once daily 90 Tablet 1    sertraline (ZOLOFT) 50 mg tablet take 1 tablet by mouth once daily 90 Tablet 1    albuterol (PROVENTIL HFA, VENTOLIN HFA, PROAIR HFA) 90 mcg/actuation inhaler inhale 1 to 2 puffs by mouth and INTO THE LUNGS every 4 to 6 hour. ..  (REFER TO PRESCRIPTION NOTES). montelukast (SINGULAIR) 10 mg tablet take 1 tablet by mouth once daily 90 Tablet 3    ferrous sulfate 325 mg (65 mg iron) tablet take 1 tablet by mouth twice a day with meals 60 Tab 5    aspirin delayed-release 81 mg tablet Take 81 mg by mouth daily. fluticasone propionate (FLONASE) 50 mcg/actuation nasal spray 2 Sprays by Both Nostrils route daily. (Patient not taking: No sig reported) 1 Bottle 0     Allergies   Allergen Reactions    Flagyl [Metronidazole] Hives    Nitroimidazoles Other (comments)     Nitroimidazole derivatives       Family History   Problem Relation Age of Onset    Cancer Sister 48        breast    Liver Disease Sister         cirrhosis    Heart Attack Mother 37    No Known Problems Child      Social History     Tobacco Use    Smoking status: Every Day     Packs/day: 0.50     Types: Cigarettes     Last attempt to quit: 2016     Years since quittin.6    Smokeless tobacco: Never   Substance Use Topics    Alcohol use: No       Naren Cat, 59 y.o.,  female  Naren Cat, 59 y.o.,  female    SUBJECTIVE  Ff-up    HTN-norvasc 7.5 mg. She continues to smoke     Depression- reports to be doing well on zoloft    Non small cell lung cancer/iron def anemia-dx , following dr. Glynn Macias.      Hypothyroidism- reviewed labs TSH wnl, compliant with  levothyroxine     ROS:  See HPI, all others negative        Patient Active Problem List   Diagnosis Code    SVC syndrome I87.1    Former smoker Z87.891    Essential hypertension I10    Recurrent major depressive disorder ([address] F33.9    Iron deficiency anemia D50.9    Abnormal mammogram R92.8    Non-small cell lung cancer ([facility] [address] C34.90 Current Outpatient Medications   Medication Sig Dispense Refill    levothyroxine (SYNTHROID) 88 mcg tablet take 1 tablet by mouth daily before breakfast 90 Tablet 2    amLODIPine (NORVASC) 2.5 mg tablet take 1 tablet by mouth once daily 90 Tablet 1    cetirizine (ZYRTEC) 10 mg tablet take 1 tablet by mouth once daily 90 Tablet 3    potassium chloride (K-DUR, KLOR-CON M20) 20 mEq tablet take 1 tablet by mouth once daily 90 Tablet 0    amLODIPine (NORVASC) 5 mg tablet take 1 tablet by mouth once daily 90 Tablet 1    sertraline (ZOLOFT) 50 mg tablet take 1 tablet by mouth once daily 90 Tablet 1    albuterol (PROVENTIL HFA, VENTOLIN HFA, PROAIR HFA) 90 mcg/actuation inhaler inhale 1 to 2 puffs by mouth and INTO THE LUNGS every 4 to 6 hour. ..  (REFER TO PRESCRIPTION NOTES). montelukast (SINGULAIR) 10 mg tablet take 1 tablet by mouth once daily 90 Tablet 3    ferrous sulfate 325 mg (65 mg iron) tablet take 1 tablet by mouth twice a day with meals 60 Tab 5    aspirin delayed-release 81 mg tablet Take 81 mg by mouth daily. fluticasone propionate (FLONASE) 50 mcg/actuation nasal spray 2 Sprays by Both Nostrils route daily.  (Patient not taking: No sig reported) 1 Bottle 0       Allergies   Allergen Reactions    Flagyl [Metronidazole] Hives    Nitroimidazoles Other (comments)     Nitroimidazole derivatives       Past Medical History:   Diagnosis Date    Anemia, iron deficiency 2/2016    Depression     H/O seasonal allergies     Hypertension     Non-small cell carcinoma of lung ([facility] [address] 2/2016    adenocarcinoma of right lung    Positive occult stool blood test 2/29/2016    Pulmonary embolism ([facility] [address] 2/28/2016    small, bilateral PEs- on Xarelto    Right leg DVT ([facility] [address] 2/28/2016    on Xarelto    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[facility] [address] 4/1/2016    resolved    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       Social History     Socioeconomic History    Marital status: UNKNOWN     Spouse name: Not on file    Number of children: Not on file    Years of education: Not on file    Highest education level: Not on file   Occupational History    Not on file   Tobacco Use    Smoking status: Every Day     Packs/day: 0.50     Types: Cigarettes     Last attempt to quit: 2016     Years since quittin.6    Smokeless tobacco: Never   Substance and Sexual Activity    Alcohol use: No    Drug use: Never     Types: Prescription    Sexual activity: Yes     Partners: Male     Birth control/protection: Surgical     Comment: tubal ligation   Other Topics Concern     Service Not Asked    Blood Transfusions Not Asked    Caffeine Concern Not Asked    Occupational Exposure Not Asked    Hobby Hazards Not Asked    Sleep Concern Not Asked    Stress Concern Not Asked    Weight Concern Not Asked    Special Diet Not Asked    Back Care Not Asked    Exercise Not Asked    Bike Helmet Not Asked    Seat Belt Not Asked    Self-Exams Not Asked   Social History Narrative    Not on file     Social Determinants of Health     Financial Resource Strain: Not on file   Food Insecurity: Not on file   Transportation Needs: Not on file   Physical Activity: Not on file   Stress: Not on file   Social Connections: Not on file   Intimate Partner Violence: Not on file   Housing Stability: Not on file       Family History   Problem Relation Age of Onset    Cancer Sister 48        breast    Liver Disease Sister         cirrhosis    Heart Attack Mother 37    No Known Problems Child          OBJECTIVE    Physical Exam:     Visit Vitals  /72 (BP 1 Location: Left upper arm, BP Patient Position: Sitting, BP Cuff Size: Adult)   Pulse 83   Temp 98.1 °F (36.7 °C) (Temporal)   Resp 16   Ht 5' 6\" (1.676 m)   Wt 146 lb (66.2 kg)   SpO2 98%   BMI 23.57 kg/m²       General: alert, chronically ill- appearing, AA, in no apparent distress or pain  Breasts: breasts appear normal, no suspicious masses, no skin or nipple changes or axillary nodes.   CVS: normal rate, regular rhythm, distinct S1 and S2  Lungs:clear to ausculation bilaterally, no crackles, wheezing or rhonchi noted  Abdomen: normoactive bowel sounds, soft, non-tender  Extremities: no edema, no cyanosis, MSK grossly normal  Skin: warm, no lesions, rashes noted  Psych:  mood and affect normal        ASSESSMENT/PLAN  Diagnoses and all orders for this visit:    1. Recurrent depression (HCC)  Stable  Cont zoloft    2. Non-small cell cancer of right lung [facility])  Following dr. Krystina Stringer    3. Essential hypertension  controlled  Cont  norvasc to 7.5 mg     5. Iron deficiency anemia, unspecified iron deficiency anemia type  Surveillance per dr. Krystina Stringer    6. Tobacco use  Encouraged compete cessation    7. Acquired hypothyroidism  cont synthroid to 75 mcgs/day  tsh wnl 9/2022      Follow-up and Dispositions    Return in about 6 months (around 4/6/2023), or if symptoms worsen or fail to improve, for routine chronic illness care. Patient understands plan of care. Patient has provided input and agrees with goals.

## 2022-10-19 ENCOUNTER — HOSPITAL ENCOUNTER (OUTPATIENT)
Dept: CT IMAGING | Age: 62
Discharge: HOME | End: 2022-10-19
Attending: INTERNAL MEDICINE
Payer: MEDICARE

## 2022-10-19 DIAGNOSIS — C34.11 MALIGNANT NEOPLASM OF UPPER LOBE OF RIGHT LUNG (HCC): ICD-10-CM

## 2022-10-19 PROCEDURE — 74177 CT ABD & PELVIS W/CONTRAST: CPT

## 2022-10-19 PROCEDURE — 74011000636 HC RX REV CODE- 636: Performed by: INTERNAL MEDICINE

## 2022-10-19 RX ADMIN — IOPAMIDOL 100 ML: 612 INJECTION, SOLUTION INTRAVENOUS at 10:51

## 2022-10-31 ENCOUNTER — HOSPITAL ENCOUNTER (OUTPATIENT)
Dept: ULTRASOUND IMAGING | Age: 62
Discharge: HOME | End: 2022-10-31
Attending: FAMILY MEDICINE
Payer: MEDICARE

## 2022-10-31 ENCOUNTER — HOSPITAL ENCOUNTER (OUTPATIENT)
Dept: MAMMOGRAPHY | Age: 62
Discharge: HOME | End: 2022-10-31
Attending: FAMILY MEDICINE
Payer: MEDICARE

## 2022-10-31 DIAGNOSIS — Z12.31 BREAST CANCER SCREENING BY MAMMOGRAM: ICD-10-CM

## 2022-10-31 PROCEDURE — 77062 BREAST TOMOSYNTHESIS BI: CPT

## 2022-10-31 PROCEDURE — 77063 BREAST TOMOSYNTHESIS BI: CPT

## 2022-10-31 PROCEDURE — 76642 ULTRASOUND BREAST LIMITED: CPT

## 2022-11-02 RX ORDER — DIPHENHYDRAMINE HYDROCHLORIDE 50 MG/ML
50 INJECTION, SOLUTION INTRAMUSCULAR; INTRAVENOUS AS NEEDED
Start: 2022-11-09

## 2022-11-02 RX ORDER — ACETAMINOPHEN 325 MG/1
650 TABLET ORAL AS NEEDED
Start: 2022-11-09

## 2022-11-02 RX ORDER — ONDANSETRON 2 MG/ML
8 INJECTION INTRAMUSCULAR; INTRAVENOUS AS NEEDED
OUTPATIENT
Start: 2022-11-09

## 2022-11-02 RX ORDER — SODIUM CHLORIDE 9 MG/ML
25 INJECTION, SOLUTION INTRAVENOUS CONTINUOUS
OUTPATIENT
Start: 2022-11-09

## 2022-11-02 RX ORDER — ALBUTEROL SULFATE 0.83 MG/ML
2.5 SOLUTION RESPIRATORY (INHALATION) AS NEEDED
Start: 2022-11-09

## 2022-11-02 RX ORDER — HYDROCORTISONE SODIUM SUCCINATE 100 MG/2ML
100 INJECTION, POWDER, FOR SOLUTION INTRAMUSCULAR; INTRAVENOUS AS NEEDED
OUTPATIENT
Start: 2022-11-09

## 2022-11-02 RX ORDER — EPINEPHRINE 1 MG/ML
0.3 INJECTION, SOLUTION, CONCENTRATE INTRAVENOUS AS NEEDED
OUTPATIENT
Start: 2022-11-09

## 2022-11-02 RX ORDER — SODIUM CHLORIDE 0.9 % (FLUSH) 0.9 %
10-40 SYRINGE (ML) INJECTION AS NEEDED
OUTPATIENT
Start: 2022-11-09

## 2022-11-02 RX ORDER — HEPARIN 100 UNIT/ML
300-500 SYRINGE INTRAVENOUS AS NEEDED
OUTPATIENT
Start: 2022-11-09

## 2022-11-08 ENCOUNTER — HOSPITAL ENCOUNTER (OUTPATIENT)
Dept: INFUSION THERAPY | Age: 62
End: 2022-11-08

## 2022-11-08 ENCOUNTER — TRANSCRIBE ORDER (OUTPATIENT)
Dept: CARDIAC CATH/INVASIVE PROCEDURES | Age: 62
End: 2022-11-08

## 2022-11-08 DIAGNOSIS — R92.8 ABNORMAL FINDINGS ON DIAGNOSTIC IMAGING OF BREAST: ICD-10-CM

## 2022-11-08 DIAGNOSIS — C34.11 MALIGNANT NEOPLASM OF UPPER LOBE OF RIGHT LUNG (HCC): Primary | ICD-10-CM

## 2022-11-09 ENCOUNTER — HOSPITAL ENCOUNTER (OUTPATIENT)
Dept: INFUSION THERAPY | Age: 62
End: 2022-11-09

## 2022-11-21 ENCOUNTER — HOSPITAL ENCOUNTER (OUTPATIENT)
Dept: CT IMAGING | Age: 62
Discharge: HOME | End: 2022-11-21
Attending: RADIOLOGY | Admitting: RADIOLOGY
Payer: MEDICARE

## 2022-11-21 VITALS
DIASTOLIC BLOOD PRESSURE: 84 MMHG | TEMPERATURE: 98 F | SYSTOLIC BLOOD PRESSURE: 140 MMHG | HEART RATE: 72 BPM | RESPIRATION RATE: 18 BRPM | OXYGEN SATURATION: 97 %

## 2022-11-21 DIAGNOSIS — R92.8 ABNORMAL FINDINGS ON DIAGNOSTIC IMAGING OF BREAST: ICD-10-CM

## 2022-11-21 DIAGNOSIS — C34.11 MALIGNANT NEOPLASM OF UPPER LOBE OF RIGHT LUNG (HCC): ICD-10-CM

## 2022-11-21 LAB
ANION GAP SERPL CALC-SCNC: 9 MMOL/L (ref 3–18)
BUN SERPL-MCNC: 6 MG/DL (ref 7–18)
BUN/CREAT SERPL: 7 (ref 12–20)
CALCIUM SERPL-MCNC: 9.2 MG/DL (ref 8.5–10.1)
CHLORIDE SERPL-SCNC: 102 MMOL/L (ref 100–111)
CO2 SERPL-SCNC: 26 MMOL/L (ref 21–32)
CREAT SERPL-MCNC: 0.84 MG/DL (ref 0.6–1.3)
ERYTHROCYTE [DISTWIDTH] IN BLOOD BY AUTOMATED COUNT: 15.8 % (ref 11.6–14.5)
GLUCOSE SERPL-MCNC: 183 MG/DL (ref 74–99)
HCT VFR BLD AUTO: 39.7 % (ref 35–45)
HGB BLD-MCNC: 12.9 G/DL (ref 12–16)
INR PPP: 0.9 (ref 0.8–1.2)
MCH RBC QN AUTO: 25.3 PG (ref 24–34)
MCHC RBC AUTO-ENTMCNC: 32.5 G/DL (ref 31–37)
MCV RBC AUTO: 77.8 FL (ref 78–100)
NRBC # BLD: 0 K/UL (ref 0–0.01)
NRBC BLD-RTO: 0 PER 100 WBC
PLATELET # BLD AUTO: 252 K/UL (ref 135–420)
PMV BLD AUTO: 10.4 FL (ref 9.2–11.8)
POTASSIUM SERPL-SCNC: 4 MMOL/L (ref 3.5–5.5)
PROTHROMBIN TIME: 13 SEC (ref 11.5–15.2)
RBC # BLD AUTO: 5.1 M/UL (ref 4.2–5.3)
SODIUM SERPL-SCNC: 137 MMOL/L (ref 136–145)
WBC # BLD AUTO: 7.5 K/UL (ref 4.6–13.2)

## 2022-11-21 PROCEDURE — 88333 PATH CONSLTJ SURG CYTO XM 1: CPT

## 2022-11-21 PROCEDURE — 38505 NEEDLE BIOPSY LYMPH NODES: CPT

## 2022-11-21 PROCEDURE — 88334 PATH CONSLTJ SURG CYTO XM EA: CPT

## 2022-11-21 PROCEDURE — 80048 BASIC METABOLIC PNL TOTAL CA: CPT

## 2022-11-21 PROCEDURE — 74011250636 HC RX REV CODE- 250/636: Performed by: RADIOLOGY

## 2022-11-21 PROCEDURE — 88305 TISSUE EXAM BY PATHOLOGIST: CPT

## 2022-11-21 PROCEDURE — 85610 PROTHROMBIN TIME: CPT

## 2022-11-21 PROCEDURE — 85027 COMPLETE CBC AUTOMATED: CPT

## 2022-11-21 PROCEDURE — 74011000250 HC RX REV CODE- 250: Performed by: RADIOLOGY

## 2022-11-21 RX ORDER — FENTANYL CITRATE 50 UG/ML
12.5-1 INJECTION, SOLUTION INTRAMUSCULAR; INTRAVENOUS
Status: DISCONTINUED | OUTPATIENT
Start: 2022-11-21 | End: 2022-11-21 | Stop reason: HOSPADM

## 2022-11-21 RX ORDER — FLUMAZENIL 0.1 MG/ML
0.2 INJECTION INTRAVENOUS AS NEEDED
Status: DISCONTINUED | OUTPATIENT
Start: 2022-11-21 | End: 2022-11-21 | Stop reason: HOSPADM

## 2022-11-21 RX ORDER — SODIUM CHLORIDE 9 MG/ML
25 INJECTION, SOLUTION INTRAVENOUS CONTINUOUS
Status: DISCONTINUED | OUTPATIENT
Start: 2022-11-21 | End: 2022-11-21 | Stop reason: HOSPADM

## 2022-11-21 RX ORDER — MIDAZOLAM HYDROCHLORIDE 1 MG/ML
.5-2 INJECTION, SOLUTION INTRAMUSCULAR; INTRAVENOUS
Status: DISCONTINUED | OUTPATIENT
Start: 2022-11-21 | End: 2022-11-21 | Stop reason: HOSPADM

## 2022-11-21 RX ORDER — LIDOCAINE HYDROCHLORIDE 10 MG/ML
30 INJECTION, SOLUTION EPIDURAL; INFILTRATION; INTRACAUDAL; PERINEURAL
Status: COMPLETED | OUTPATIENT
Start: 2022-11-21 | End: 2022-11-21

## 2022-11-21 RX ORDER — NALOXONE HYDROCHLORIDE 0.4 MG/ML
0.2 INJECTION, SOLUTION INTRAMUSCULAR; INTRAVENOUS; SUBCUTANEOUS AS NEEDED
Status: DISCONTINUED | OUTPATIENT
Start: 2022-11-21 | End: 2022-11-21 | Stop reason: HOSPADM

## 2022-11-21 RX ADMIN — LIDOCAINE HYDROCHLORIDE 30 ML: 10 INJECTION, SOLUTION EPIDURAL; INFILTRATION; INTRACAUDAL; PERINEURAL at 12:55

## 2022-11-21 RX ADMIN — FENTANYL CITRATE 50 MCG: 50 INJECTION, SOLUTION INTRAMUSCULAR; INTRAVENOUS at 13:05

## 2022-11-21 RX ADMIN — FENTANYL CITRATE 50 MCG: 50 INJECTION, SOLUTION INTRAMUSCULAR; INTRAVENOUS at 12:55

## 2022-11-21 RX ADMIN — MIDAZOLAM 1 MG: 1 INJECTION INTRAMUSCULAR; INTRAVENOUS at 12:55

## 2022-11-21 RX ADMIN — MIDAZOLAM 1 MG: 1 INJECTION INTRAMUSCULAR; INTRAVENOUS at 13:05

## 2022-11-21 NOTE — PROGRESS NOTES
TRANSFER - OUT REPORT:    Verbal report given to YAMILE Herman(name) on Malcolm Caal  being transferred to Phase II(unit) for routine post - op       Report consisted of patients Situation, Background, Assessment and   Recommendations(SBAR). Information from the following report(s) SBAR, Procedure Summary, and MAR was reviewed with the receiving nurse. Lines:   Venous Access Device [employer] [place] Lot # [id number]  10/24/16 Upper chest (subclavicular area), left (Active)       Venous Access Device Left chest mediport (Active)        Opportunity for questions and clarification was provided.       Patient transported with:   Registered Nurse

## 2022-11-21 NOTE — DISCHARGE INSTRUCTIONS
DISCHARGE SUMMARY from Nurse    PATIENT INSTRUCTIONS:    After general anesthesia or intravenous sedation, for 24 hours or while taking prescription Narcotics:  Limit your activities  Do not drive and operate hazardous machinery  Do not make important personal or business decisions  Do  not drink alcoholic beverages  If you have not urinated within 8 hours after discharge, please contact your surgeon on call. Report the following to your surgeon:  Excessive pain, swelling, redness or odor of or around the surgical area  Temperature over 100.5  Nausea and vomiting lasting longer than 4 hours or if unable to take medications  Any signs of decreased circulation or nerve impairment to extremity: change in color, persistent  numbness, tingling, coldness or increase pain  Any questions      These are general instructions for a healthy lifestyle:    No smoking/ No tobacco products/ Avoid exposure to second hand smoke  Surgeon General's Warning:  Quitting smoking now greatly reduces serious risk to your health. Obesity, smoking, and sedentary lifestyle greatly increases your risk for illness    A healthy diet, regular physical exercise & weight monitoring are important for maintaining a healthy lifestyle    You may be retaining fluid if you have a history of heart failure or if you experience any of the following symptoms:  Weight gain of 3 pounds or more overnight or 5 pounds in a week, increased swelling in our hands or feet or shortness of breath while lying flat in bed. Please call your doctor as soon as you notice any of these symptoms; do not wait until your next office visit. The discharge information has been reviewed with the patient and spouse. The patient and spouse verbalized understanding.   Discharge medications reviewed with the patient and spouse and appropriate educational materials and side effects teaching were provided.   ___________________________________________________________________________________________________________________________________

## 2022-11-21 NOTE — H&P
History and Physical    Patient: Summer Krause           Sex: female       DOA: 2022  YOB: 1960      Age:  57 y.o.     LOS:  LOS: 0 days        HPI:     Summer Krause is a 57 y.o. female who has history of RUL NSCLC here for a biopsy radiologist choice lung lesion v axillary lymphadenopathy with moderate sedation.     Past Medical History:   Diagnosis Date    Anemia, iron deficiency 2016    Depression     H/O seasonal allergies     Hypertension     Non-small cell carcinoma of lung ([address] 2016    adenocarcinoma of right lung    Positive occult stool blood test 2016    Pulmonary embolism ([address] 2016    small, bilateral PEs- on Xarelto    Right leg D[facility] 2016    on Xarelto    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[facility] 2016    resolved    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       Past Surgical History:   Procedure Laterality Date    HX ANGIOPLASTY Right 3/20/2016    IJ, subclavian, and brachiocephalic veins    HX HYSTERECTOMY      due to menorrhagia     HX OTHER SURGICAL Right 3/20/2016    2 placed in right IJ, subclavian/brachiocephalic    HX THROMBECTOMY  3/20/2016    with thrombolysis    HX VASCULAR ACCESS Left     double lumen    NE BREAST SURGERY PROCEDURE UNLISTED      biopsy       Family History   Problem Relation Age of Onset    Cancer Sister 48        breast    Liver Disease Sister         cirrhosis    Heart Attack Mother 37    No Known Problems Child        Social History     Socioeconomic History    Marital status: SINGLE   Tobacco Use    Smoking status: Every Day     Packs/day: 0.50     Types: Cigarettes     Last attempt to quit: 2016     Years since quittin.7    Smokeless tobacco: Never   Substance and Sexual Activity    Alcohol use: No    Drug use: Never     Types: Prescription    Sexual activity: Yes     Partners: Male     Birth control/protection: Surgical     Comment: tubal ligation       Prior to Admission medications Medication Sig Start Date End Date Taking? Authorizing Provider   levothyroxine (SYNTHROID) 88 mcg tablet take 1 tablet by mouth daily before breakfast 10/3/22   Luz Marina Nunn MD   amLODIPine (NORVASC) 2.5 mg tablet take 1 tablet by mouth once daily 9/28/22   Luz Marina Nunn MD   cetirizine (ZYRTEC) 10 mg tablet take 1 tablet by mouth once daily 9/15/22   Luz Marina Nunn MD   potassium chloride (K-DUR, KLOR-CON M20) 20 mEq tablet take 1 tablet by mouth once daily 9/1/22   Luz Marina Nunn MD   amLODIPine (NORVASC) 5 mg tablet take 1 tablet by mouth once daily 7/25/22   Luz Marina Nunn MD   sertraline (ZOLOFT) 50 mg tablet take 1 tablet by mouth once daily 6/21/22   Luz Marina Nunn MD   albuterol (PROVENTIL HFA, VENTOLIN HFA, PROAIR HFA) 90 mcg/actuation inhaler inhale 1 to 2 puffs by mouth and INTO THE LUNGS every 4 to 6 hour. ..  (REFER TO PRESCRIPTION NOTES). 3/25/22   Provider, Historical   montelukast (SINGULAIR) 10 mg tablet take 1 tablet by mouth once daily 11/9/21   Luz Marina Nunn MD   fluticasone propionate (FLONASE) 50 mcg/actuation nasal spray 2 Sprays by Both Nostrils route daily. Patient not taking: No sig reported 5/21/21   Luz Marina Nunn MD   ferrous sulfate 325 mg (65 mg iron) tablet take 1 tablet by mouth twice a day with meals 5/6/21   Luz Marina Nunn MD   aspirin delayed-release 81 mg tablet Take 81 mg by mouth daily. Provider, Historical       Allergies   Allergen Reactions    Flagyl [Metronidazole] Hives    Nitroimidazoles Other (comments)     Nitroimidazole derivatives       Physical Exam:      Visit Vitals  BP (!) 161/75 (BP 1 Location: Right arm, BP Patient Position: At rest)   Pulse (!) 103   Temp 98.4 °F (36.9 °C)   Resp 20   SpO2 96%     Physical Exam:  Mallampati 2 ASA 3  General: A&O x 4, NAD  Heart: RRR  Lungs: Normal work of breathing on room air    Labs Reviewed: All lab results for the last 24 hours reviewed.     Assessment/Plan     The patient is an appropriate candidate to undergo the planned procedure and sedation    CAL Hughes

## 2022-11-21 NOTE — PROCEDURES
RADIOLOGY POST PROCEDURE NOTE     November 21, 2022       5:45 PM     Preoperative Diagnosis:   Lung and breast cancer. Pulmonary nodules. Right axillary adenopathy. Postoperative Diagnosis:  Same. :  Dr. Bhavesh Bhatt    Assistant:  None. Type of Anesthesia: 1% local lidocaine and IV moderate sedation with Versed and fentanyl. Procedure/Description: Image guided right axillary lymph node core needle biopsy. Findings:   Given minimal interval decrease in size of pulmonary nodular opacities as well as significant emphysematous change with patient's ongoing extensive smoking biopsy and very high risk of pneumothorax and persistent air leak, biopsy of the right axillary lymph node was performed first.  Biopsy of pulmonary nodular infiltrates can be rescheduled if necessary. The above findings were discussed with Dr. Everardo Adame as well as patient in detail. Estimated blood Loss: Minimal    Specimen Removed:   Yes    Blood transfusions:  None. Implants:  None. Complications: None    Condition: Stable    Discharge Plan: Discharge home if stable and no bleeding. Resume blood thinners if any in 24 hours.     Patria Dotson MD

## 2022-11-25 RX ORDER — MONTELUKAST SODIUM 10 MG/1
TABLET ORAL
Qty: 90 TABLET | Refills: 3 | Status: SHIPPED | OUTPATIENT
Start: 2022-11-25

## 2022-11-25 RX ORDER — POTASSIUM CHLORIDE 20 MEQ/1
TABLET, EXTENDED RELEASE ORAL
Qty: 90 TABLET | Refills: 0 | Status: SHIPPED | OUTPATIENT
Start: 2022-11-25

## 2022-12-06 ENCOUNTER — TRANSCRIBE ORDER (OUTPATIENT)
Dept: SCHEDULING | Age: 62
End: 2022-12-06

## 2022-12-06 DIAGNOSIS — C34.11 MALIGNANT NEOPLASM OF UPPER LOBE OF RIGHT LUNG (HCC): Primary | ICD-10-CM

## 2022-12-07 ENCOUNTER — HOSPITAL ENCOUNTER (OUTPATIENT)
Dept: INFUSION THERAPY | Age: 62
Discharge: HOME | End: 2022-12-07
Payer: MEDICARE

## 2022-12-07 VITALS
DIASTOLIC BLOOD PRESSURE: 85 MMHG | BODY MASS INDEX: 24.15 KG/M2 | SYSTOLIC BLOOD PRESSURE: 137 MMHG | TEMPERATURE: 98 F | OXYGEN SATURATION: 97 % | WEIGHT: 149.6 LBS | RESPIRATION RATE: 18 BRPM | HEART RATE: 67 BPM

## 2022-12-07 LAB
ALBUMIN SERPL-MCNC: 3.5 G/DL (ref 3.4–5)
ALBUMIN/GLOB SERPL: 0.9 {RATIO} (ref 0.8–1.7)
ALP SERPL-CCNC: 93 U/L (ref 45–117)
ALT SERPL-CCNC: 13 U/L (ref 13–56)
ANION GAP SERPL CALC-SCNC: 7 MMOL/L (ref 3–18)
AST SERPL-CCNC: 10 U/L (ref 10–38)
BASO+EOS+MONOS # BLD AUTO: 0.4 K/UL (ref 0–2.3)
BASO+EOS+MONOS NFR BLD AUTO: 7 % (ref 0.1–17)
BILIRUB SERPL-MCNC: 0.3 MG/DL (ref 0.2–1)
BUN SERPL-MCNC: 6 MG/DL (ref 7–18)
BUN/CREAT SERPL: 9 (ref 12–20)
CALCIUM SERPL-MCNC: 8.8 MG/DL (ref 8.5–10.1)
CHLORIDE SERPL-SCNC: 102 MMOL/L (ref 100–111)
CO2 SERPL-SCNC: 30 MMOL/L (ref 21–32)
CREAT SERPL-MCNC: 0.69 MG/DL (ref 0.6–1.3)
DIFFERENTIAL METHOD BLD: ABNORMAL
ERYTHROCYTE [DISTWIDTH] IN BLOOD BY AUTOMATED COUNT: 15.3 % (ref 11.5–14.5)
GLOBULIN SER CALC-MCNC: 3.8 G/DL (ref 2–4)
GLUCOSE SERPL-MCNC: 90 MG/DL (ref 74–99)
HCT VFR BLD AUTO: 38.1 % (ref 36–48)
HGB BLD-MCNC: 12 G/DL (ref 12–16)
LYMPHOCYTES # BLD: 1.6 K/UL (ref 1.1–5.9)
LYMPHOCYTES NFR BLD: 24 % (ref 14–44)
MCH RBC QN AUTO: 25.1 PG (ref 25–35)
MCHC RBC AUTO-ENTMCNC: 31.5 G/DL (ref 31–37)
MCV RBC AUTO: 79.7 FL (ref 78–102)
NEUTS SEG # BLD: 4.8 K/UL (ref 1.8–9.5)
NEUTS SEG NFR BLD: 70 % (ref 40–70)
PLATELET # BLD AUTO: 240 K/UL (ref 140–440)
POTASSIUM SERPL-SCNC: 3.2 MMOL/L (ref 3.5–5.5)
PROT SERPL-MCNC: 7.3 G/DL (ref 6.4–8.2)
RBC # BLD AUTO: 4.78 M/UL (ref 4.1–5.1)
SODIUM SERPL-SCNC: 139 MMOL/L (ref 136–145)
TSH SERPL DL<=0.05 MIU/L-ACNC: 6.65 UIU/ML (ref 0.36–3.74)
WBC # BLD AUTO: 6.8 K/UL (ref 4.5–13)

## 2022-12-07 PROCEDURE — 80053 COMPREHEN METABOLIC PANEL: CPT

## 2022-12-07 PROCEDURE — 85025 COMPLETE CBC W/AUTO DIFF WBC: CPT

## 2022-12-07 PROCEDURE — 84443 ASSAY THYROID STIM HORMONE: CPT

## 2022-12-07 PROCEDURE — 36415 COLL VENOUS BLD VENIPUNCTURE: CPT

## 2022-12-07 RX ORDER — WATER FOR INJECTION,STERILE
VIAL (ML) INJECTION
Status: DISCONTINUED
Start: 2022-12-07 | End: 2022-12-08 | Stop reason: HOSPADM

## 2022-12-07 NOTE — PROGRESS NOTES
[facility] Lab Visit:    Jorge Bhatti  1960  [id number]    [id number] Pt arrived ambulatory w/o assist. Labs drawn per order via Right AC venipuncture x1 attempt. Pt tolerated well without complaints. Gauze/ coban to site. Pt departed [facility] ambulatory and in no distress at 1350.      Visit Vitals  /85 (BP 1 Location: Left upper arm, BP Patient Position: Sitting)   Pulse 67   Temp 98 °F (36.7 °C)   Resp 18   Wt 67.9 kg (149 lb 9.6 oz)   SpO2 97%   BMI 24.15 kg/m²

## 2022-12-08 NOTE — PROGRESS NOTES
Patient's K+ level 3.2 and TSH 6.65 on labs from 12/07/22. Spoke w/Den Castro in [address] [place] [address] and we need to verify if patient is taking her home meds of Potassium and Synthroid on 12/09/22.

## 2022-12-09 ENCOUNTER — HOSPITAL ENCOUNTER (OUTPATIENT)
Dept: INFUSION THERAPY | Age: 62
End: 2022-12-09
Payer: MEDICARE

## 2022-12-09 VITALS
TEMPERATURE: 98.8 F | DIASTOLIC BLOOD PRESSURE: 75 MMHG | OXYGEN SATURATION: 100 % | HEART RATE: 96 BPM | SYSTOLIC BLOOD PRESSURE: 137 MMHG | RESPIRATION RATE: 18 BRPM

## 2022-12-09 DIAGNOSIS — C34.91 NON-SMALL CELL CANCER OF RIGHT LUNG (HCC): Primary | ICD-10-CM

## 2022-12-09 PROCEDURE — 74011000258 HC RX REV CODE- 258: Performed by: INTERNAL MEDICINE

## 2022-12-09 PROCEDURE — 74011000250 HC RX REV CODE- 250: Performed by: INTERNAL MEDICINE

## 2022-12-09 PROCEDURE — 74011250636 HC RX REV CODE- 250/636: Performed by: INTERNAL MEDICINE

## 2022-12-09 PROCEDURE — 96413 CHEMO IV INFUSION 1 HR: CPT

## 2022-12-09 RX ORDER — SODIUM CHLORIDE 9 MG/ML
25 INJECTION, SOLUTION INTRAVENOUS CONTINUOUS
Status: DISCONTINUED | OUTPATIENT
Start: 2022-12-09 | End: 2022-12-10 | Stop reason: HOSPADM

## 2022-12-09 RX ORDER — SODIUM CHLORIDE 0.9 % (FLUSH) 0.9 %
10-40 SYRINGE (ML) INJECTION AS NEEDED
Status: DISCONTINUED | OUTPATIENT
Start: 2022-12-09 | End: 2022-12-10 | Stop reason: HOSPADM

## 2022-12-09 RX ADMIN — SODIUM CHLORIDE 25 ML/HR: 9 INJECTION, SOLUTION INTRAVENOUS at 13:30

## 2022-12-09 RX ADMIN — SODIUM CHLORIDE, PRESERVATIVE FREE 10 ML: 5 INJECTION INTRAVENOUS at 13:30

## 2022-12-09 RX ADMIN — SODIUM CHLORIDE, PRESERVATIVE FREE 10 ML: 5 INJECTION INTRAVENOUS at 14:35

## 2022-12-09 RX ADMIN — SODIUM CHLORIDE 400 MG: 9 INJECTION, SOLUTION INTRAVENOUS at 14:00

## 2022-12-09 NOTE — PROGRESS NOTES
[facility] Progress Note    Date: 2022    Name: Ector Ramirez    MRN: [id number]         : 1960    Ms. Leigh arrived in the [facility] today at 1310, in stable condition, here for Cycle 32, IV KEYTRUDA Infusion (Every 6 Week Cycle). She was assessed and education was provided. Pt states she will starting chemo soon and today is her last Keytruda. Also states that she has a crack in her mediport but hasn't had her mediport removed yet. Ms. Kj Valera vitals were reviewed. Visit Vitals  /81 (BP 1 Location: Left upper arm, BP Patient Position: Sitting)   Pulse 85   Temp 97.5 °F (36.4 °C)   Resp 20   SpO2 95%   Breastfeeding No       Lab results were reviewed, and all of her most recent labs listed below, from , were all noted to be satisfactory for treatment today. Potasium noted at 3.2. pt states she has NOT been taking her potassium pills. Pt instructed to start back taking as prescribed. She verbalized understanding. Melodie Libman in pharmacy made aware. TSH 6.65. pt report taking her synthroid as prescribed. Melodie Libman in pharmacy states ok to proceed since this is her last treatment with Keytruda. Written consent verified in her chart. #22 ga PIV started to RT [facility] x1 attempt. Flushed well with NS with positive blood return.  ml IV Bag started to infuse @ [facility] throughout treatment today. KEYTRUDA (pembrolizumab) 400 mg IV administered over approximately 30 minutes per order followed by NS flush. Ms. Reva Melendez tolerated treatment very well today, and voiced no complaints. VSS. PIV flushed with NS and removed. 2x2 applied with coban. Patient armband removed and shredded. I have reviewed discharge instructions with the patient. The patient verbalized understanding. Ms. Reva Melendez was discharged from [place] [id number] in stable condition at 1435. She has no further [facility] appointments at this time.      Henrietta Lea RN  2022  1:20 PM

## 2022-12-13 ENCOUNTER — HOSPITAL ENCOUNTER (OUTPATIENT)
Dept: LAB | Age: 62
Discharge: HOME | End: 2022-12-13

## 2022-12-20 ENCOUNTER — APPOINTMENT (OUTPATIENT)
Dept: INFUSION THERAPY | Age: 62
End: 2022-12-20
Payer: MEDICARE

## 2022-12-21 ENCOUNTER — APPOINTMENT (OUTPATIENT)
Dept: INFUSION THERAPY | Age: 62
End: 2022-12-21
Payer: MEDICARE

## 2022-12-30 ENCOUNTER — HOSPITAL ENCOUNTER (OUTPATIENT)
Dept: CT IMAGING | Age: 62
End: 2022-12-30
Attending: INTERNAL MEDICINE
Payer: MEDICARE

## 2022-12-30 DIAGNOSIS — C34.11 MALIGNANT NEOPLASM OF UPPER LOBE OF RIGHT LUNG (HCC): ICD-10-CM

## 2022-12-30 PROCEDURE — 71260 CT THORAX DX C+: CPT

## 2022-12-30 PROCEDURE — 74011000636 HC RX REV CODE- 636: Performed by: INTERNAL MEDICINE

## 2022-12-30 RX ADMIN — IOPAMIDOL 75 ML: 612 INJECTION, SOLUTION INTRAVENOUS at 10:37

## 2023-01-11 DIAGNOSIS — F33.9 RECURRENT MAJOR DEPRESSIVE DISORDER, REMISSION STATUS UNSPECIFIED (HCC): ICD-10-CM

## 2023-01-11 RX ORDER — SERTRALINE HYDROCHLORIDE 50 MG/1
TABLET, FILM COATED ORAL
Qty: 90 TABLET | Refills: 1 | Status: SHIPPED | OUTPATIENT
Start: 2023-01-11

## 2023-01-31 DIAGNOSIS — C34.90 MALIGNANT NEOPLASM OF LUNG, UNSPECIFIED LATERALITY, UNSPECIFIED PART OF LUNG (HCC): Primary | ICD-10-CM

## 2023-02-03 DIAGNOSIS — C34.90 MALIGNANT NEOPLASM OF LUNG, UNSPECIFIED LATERALITY, UNSPECIFIED PART OF LUNG (HCC): Primary | ICD-10-CM

## 2023-02-06 DIAGNOSIS — C34.90 MALIGNANT NEOPLASM OF LUNG, UNSPECIFIED LATERALITY, UNSPECIFIED PART OF LUNG (HCC): Primary | ICD-10-CM

## 2023-02-14 ENCOUNTER — APPOINTMENT (OUTPATIENT)
Facility: HOSPITAL | Age: 63
End: 2023-02-14
Attending: RADIOLOGY
Payer: MEDICARE

## 2023-02-14 ENCOUNTER — HOSPITAL ENCOUNTER (OUTPATIENT)
Facility: HOSPITAL | Age: 63
Discharge: HOME | End: 2023-02-14
Attending: RADIOLOGY | Admitting: RADIOLOGY
Payer: MEDICARE

## 2023-02-14 VITALS
RESPIRATION RATE: 19 BRPM | HEIGHT: 67 IN | WEIGHT: 147 LBS | OXYGEN SATURATION: 90 % | DIASTOLIC BLOOD PRESSURE: 76 MMHG | SYSTOLIC BLOOD PRESSURE: 134 MMHG | BODY MASS INDEX: 23.07 KG/M2 | HEART RATE: 71 BPM

## 2023-02-14 DIAGNOSIS — C34.91 MALIGNANT NEOPLASM OF RIGHT LUNG, UNSPECIFIED PART OF LUNG (HCC): ICD-10-CM

## 2023-02-14 DIAGNOSIS — C34.11 MALIGNANT NEOPLASM OF UPPER LOBE OF RIGHT LUNG (HCC): ICD-10-CM

## 2023-02-14 LAB
ANION GAP SERPL CALC-SCNC: 4 MMOL/L (ref 3–18)
APTT PPP: 22.1 SEC (ref 23–36.4)
BASOPHILS # BLD: 0 K/UL (ref 0–0.1)
BASOPHILS NFR BLD: 0 % (ref 0–2)
BUN SERPL-MCNC: 7 MG/DL (ref 7–18)
BUN/CREAT SERPL: 11 (ref 12–20)
CALCIUM SERPL-MCNC: 9 MG/DL (ref 8.5–10.1)
CHLORIDE SERPL-SCNC: 105 MMOL/L (ref 100–111)
CO2 SERPL-SCNC: 29 MMOL/L (ref 21–32)
CREAT SERPL-MCNC: 0.64 MG/DL (ref 0.6–1.3)
DIFFERENTIAL METHOD BLD: ABNORMAL
EOSINOPHIL # BLD: 0.1 K/UL (ref 0–0.4)
EOSINOPHIL NFR BLD: 1 % (ref 0–5)
ERYTHROCYTE [DISTWIDTH] IN BLOOD BY AUTOMATED COUNT: 16.2 % (ref 11.6–14.5)
GLUCOSE SERPL-MCNC: 119 MG/DL (ref 74–99)
HCT VFR BLD AUTO: 39.2 % (ref 35–45)
HGB BLD-MCNC: 12.2 G/DL (ref 12–16)
IMM GRANULOCYTES # BLD AUTO: 0 K/UL (ref 0–0.04)
IMM GRANULOCYTES NFR BLD AUTO: 0 % (ref 0–0.5)
INR PPP: 1 (ref 0.8–1.2)
LYMPHOCYTES # BLD: 1.2 K/UL (ref 0.9–3.6)
LYMPHOCYTES NFR BLD: 18 % (ref 21–52)
MCH RBC QN AUTO: 23.8 PG (ref 24–34)
MCHC RBC AUTO-ENTMCNC: 31.1 G/DL (ref 31–37)
MCV RBC AUTO: 76.6 FL (ref 78–100)
MONOCYTES # BLD: 0.3 K/UL (ref 0.05–1.2)
MONOCYTES NFR BLD: 5 % (ref 3–10)
NEUTS SEG # BLD: 5.1 K/UL (ref 1.8–8)
NEUTS SEG NFR BLD: 75 % (ref 40–73)
NRBC # BLD: 0 K/UL (ref 0–0.01)
NRBC BLD-RTO: 0 PER 100 WBC
PLATELET # BLD AUTO: 218 K/UL (ref 135–420)
PMV BLD AUTO: 10 FL (ref 9.2–11.8)
POTASSIUM SERPL-SCNC: 3.3 MMOL/L (ref 3.5–5.5)
PROTHROMBIN TIME: 13.4 SEC (ref 11.5–15.2)
RBC # BLD AUTO: 5.12 M/UL (ref 4.2–5.3)
SODIUM SERPL-SCNC: 138 MMOL/L (ref 136–145)
WBC # BLD AUTO: 6.7 K/UL (ref 4.6–13.2)

## 2023-02-14 PROCEDURE — 85610 PROTHROMBIN TIME: CPT

## 2023-02-14 PROCEDURE — 2580000003 HC RX 258: Performed by: STUDENT IN AN ORGANIZED HEALTH CARE EDUCATION/TRAINING PROGRAM

## 2023-02-14 PROCEDURE — 85730 THROMBOPLASTIN TIME PARTIAL: CPT

## 2023-02-14 PROCEDURE — C1769 GUIDE WIRE: HCPCS

## 2023-02-14 PROCEDURE — 85025 COMPLETE CBC W/AUTO DIFF WBC: CPT

## 2023-02-14 PROCEDURE — 80048 BASIC METABOLIC PNL TOTAL CA: CPT

## 2023-02-14 PROCEDURE — 6360000002 HC RX W HCPCS: Performed by: STUDENT IN AN ORGANIZED HEALTH CARE EDUCATION/TRAINING PROGRAM

## 2023-02-14 PROCEDURE — 2500000003 HC RX 250 WO HCPCS: Performed by: STUDENT IN AN ORGANIZED HEALTH CARE EDUCATION/TRAINING PROGRAM

## 2023-02-14 RX ORDER — AMLODIPINE BESYLATE 2.5 MG/1
2.5 TABLET ORAL DAILY
COMMUNITY

## 2023-02-14 RX ORDER — FENTANYL CITRATE 50 UG/ML
INJECTION, SOLUTION INTRAMUSCULAR; INTRAVENOUS
Status: DISCONTINUED
Start: 2023-02-14 | End: 2023-02-14 | Stop reason: WASHOUT

## 2023-02-14 RX ORDER — LIDOCAINE HYDROCHLORIDE 10 MG/ML
INJECTION, SOLUTION EPIDURAL; INFILTRATION; INTRACAUDAL; PERINEURAL
Status: COMPLETED | OUTPATIENT
Start: 2023-02-14 | End: 2023-02-14

## 2023-02-14 RX ORDER — SODIUM CHLORIDE 9 MG/ML
INJECTION, SOLUTION INTRAVENOUS CONTINUOUS
Status: DISCONTINUED | OUTPATIENT
Start: 2023-02-14 | End: 2023-02-14 | Stop reason: HOSPADM

## 2023-02-14 RX ORDER — MIDAZOLAM HYDROCHLORIDE 2 MG/2ML
INJECTION, SOLUTION INTRAMUSCULAR; INTRAVENOUS
Status: COMPLETED | OUTPATIENT
Start: 2023-02-14 | End: 2023-02-14

## 2023-02-14 RX ORDER — LEVOTHYROXINE SODIUM 88 UG/1
88 TABLET ORAL DAILY
COMMUNITY

## 2023-02-14 RX ORDER — HEPARIN SODIUM (PORCINE) LOCK FLUSH IV SOLN 100 UNIT/ML 100 UNIT/ML
1000 SOLUTION INTRAVENOUS PRN
Status: DISCONTINUED | OUTPATIENT
Start: 2023-02-14 | End: 2023-02-14 | Stop reason: HOSPADM

## 2023-02-14 RX ORDER — ALBUTEROL SULFATE 90 UG/1
2 AEROSOL, METERED RESPIRATORY (INHALATION) EVERY 4 HOURS PRN
COMMUNITY

## 2023-02-14 RX ORDER — MIDAZOLAM HYDROCHLORIDE 1 MG/ML
INJECTION INTRAMUSCULAR; INTRAVENOUS
Status: DISCONTINUED
Start: 2023-02-14 | End: 2023-02-14 | Stop reason: WASHOUT

## 2023-02-14 RX ORDER — POTASSIUM CHLORIDE 1.5 G/1.77G
20 POWDER, FOR SOLUTION ORAL DAILY
COMMUNITY

## 2023-02-14 RX ORDER — MONTELUKAST SODIUM 10 MG/1
10 TABLET ORAL NIGHTLY
COMMUNITY

## 2023-02-14 RX ORDER — FLUTICASONE PROPIONATE 50 MCG
1 SPRAY, SUSPENSION (ML) NASAL DAILY
COMMUNITY

## 2023-02-14 RX ORDER — LIDOCAINE HYDROCHLORIDE AND EPINEPHRINE BITARTRATE 20; .01 MG/ML; MG/ML
INJECTION, SOLUTION SUBCUTANEOUS
Status: COMPLETED | OUTPATIENT
Start: 2023-02-14 | End: 2023-02-14

## 2023-02-14 RX ORDER — AMLODIPINE BESYLATE 5 MG/1
5 TABLET ORAL DAILY
COMMUNITY
End: 2023-02-17

## 2023-02-14 RX ORDER — FENTANYL CITRATE 50 UG/ML
INJECTION, SOLUTION INTRAMUSCULAR; INTRAVENOUS
Status: COMPLETED | OUTPATIENT
Start: 2023-02-14 | End: 2023-02-14

## 2023-02-14 RX ORDER — CETIRIZINE HYDROCHLORIDE 10 MG/1
10 TABLET ORAL DAILY
COMMUNITY

## 2023-02-14 RX ORDER — FERROUS SULFATE 325(65) MG
325 TABLET ORAL
COMMUNITY

## 2023-02-14 RX ADMIN — LIDOCAINE HYDROCHLORIDE,EPINEPHRINE BITARTRATE 30 ML: 20; .01 INJECTION, SOLUTION INFILTRATION; PERINEURAL at 11:40

## 2023-02-14 RX ADMIN — FENTANYL CITRATE 50 MCG: 50 INJECTION, SOLUTION INTRAMUSCULAR; INTRAVENOUS at 11:51

## 2023-02-14 RX ADMIN — CEFAZOLIN 2000 MG: 1 INJECTION, POWDER, FOR SOLUTION INTRAMUSCULAR; INTRAVENOUS at 11:46

## 2023-02-14 RX ADMIN — MIDAZOLAM HYDROCHLORIDE 1 MG: 1 INJECTION, SOLUTION INTRAMUSCULAR; INTRAVENOUS at 11:40

## 2023-02-14 RX ADMIN — MIDAZOLAM HYDROCHLORIDE 1 MG: 1 INJECTION, SOLUTION INTRAMUSCULAR; INTRAVENOUS at 11:51

## 2023-02-14 RX ADMIN — FENTANYL CITRATE 50 MCG: 50 INJECTION, SOLUTION INTRAMUSCULAR; INTRAVENOUS at 11:40

## 2023-02-14 ASSESSMENT — PAIN - FUNCTIONAL ASSESSMENT: PAIN_FUNCTIONAL_ASSESSMENT: NONE - DENIES PAIN

## 2023-02-14 NOTE — DISCHARGE INSTRUCTIONS
DISCHARGE SUMMARY from Nurse    PATIENT INSTRUCTIONS:    After general anesthesia or intravenous sedation, for 24 hours or while taking prescription Narcotics:  Limit your activities  Do not drive and operate hazardous machinery  Do not make important personal or business decisions  Do  not drink alcoholic beverages  If you have not urinated within 8 hours after discharge, please contact your surgeon on call. Report the following to your surgeon:  Excessive pain, swelling, redness or odor of or around the surgical area  Temperature over 100.5  Nausea and vomiting lasting longer than 4 hours or if unable to take medications  Any signs of decreased circulation or nerve impairment to extremity: change in color, persistent  numbness, tingling, coldness or increase pain  Any questions    What to do at Home:  Recommended activity: activity as tolerated and no driving for today, ***    If you experience any of the above symptoms, please follow up with Dr Franco Peoples. *  Please give a list of your current medications to your Primary Care Provider. *  Please update this list whenever your medications are discontinued, doses are      changed, or new medications (including over-the-counter products) are added. *  Please carry medication information at all times in case of emergency situations. These are general instructions for a healthy lifestyle:    No smoking/ No tobacco products/ Avoid exposure to second hand smoke  Surgeon General's Warning:  Quitting smoking now greatly reduces serious risk to your health.     Obesity, smoking, and sedentary lifestyle greatly increases your risk for illness    A healthy diet, regular physical exercise & weight monitoring are important for maintaining a healthy lifestyle    You may be retaining fluid if you have a history of heart failure or if you experience any of the following symptoms:  Weight gain of 3 pounds or more overnight or 5 pounds in a week, increased swelling in our hands or feet or shortness of breath while lying flat in bed. Please call your doctor as soon as you notice any of these symptoms; do not wait until your next office visit. The discharge information has been reviewed with the patient. The patient verbalized understanding. Discharge medications reviewed with the patient and appropriate educational materials and side effects teaching were provided.   ___________________________________________________________________________________________________________________________________

## 2023-02-14 NOTE — PROGRESS NOTES
Cath holding summary     Patient escorted to cath holding from waiting area ambulatory, alert and oriented x 4, voicing no complaints. Changed into gown and placed on monitor. NPO since MN. Lab results, med rec and H&P reviewed on chart. PIV x 1 inserted without difficulty. Family at bedside.

## 2023-02-14 NOTE — H&P
History and Physical    Patient: Obi Pérez           Sex: female       DOA: 2/14/2023  YOB: 1960      Age:  57 y.o.     LOS:  LOS: 0 days        HPI:     Obi Pérez is a 57 y.o. female with h/o NSCLC and right breast lymphedema (suspected to be secondary to XRT) who presents for left chest Mediport removal in the setting of Mediport dye study 9/2022 demonstrating cracked Mediport catheter. After examination of site, will plan for removal today with planned interval new left chest port placement after site has healed. No past medical history on file. Past Surgical History:   Procedure Laterality Date    CT BIOPSY LYMPH NODES SUPERFICIAL  11/21/2022    CT BIOPSY LYMPH NODES SUPERFICIAL 11/21/2022 [address]       No family history on file. Social History     Socioeconomic History    Marital status: Single       Prior to Admission medications    Medication Sig Start Date End Date Taking?  Authorizing Provider   albuterol sulfate HFA (VENTOLIN HFA) 108 (90 Base) MCG/ACT inhaler Inhale 2 puffs into the lungs every 4 hours as needed for Wheezing   Yes Historical Provider, MD   amLODIPine (NORVASC) 2.5 MG tablet Take 2.5 mg by mouth daily   Yes Historical Provider, MD   amLODIPine (NORVASC) 5 MG tablet Take 5 mg by mouth daily   Yes Historical Provider, MD   cetirizine (ZYRTEC) 10 MG tablet Take 10 mg by mouth daily   Yes Historical Provider, MD   ferrous sulfate (IRON 325) 325 (65 Fe) MG tablet Take 325 mg by mouth daily (with breakfast)   Yes Historical Provider, MD   fluticasone (FLONASE) 50 MCG/ACT nasal spray 1 spray by Each Nostril route daily   Yes Historical Provider, MD   levothyroxine (SYNTHROID) 88 MCG tablet Take 88 mcg by mouth Daily   Yes Historical Provider, MD   montelukast (SINGULAIR) 10 MG tablet Take 10 mg by mouth nightly   Yes Historical Provider, MD   potassium chloride (KLOR-CON) 20 MEQ packet Take 20 mEq by mouth daily   Yes Historical Provider, MD sertraline (ZOLOFT) 50 MG tablet Take 50 mg by mouth daily   Yes Historical Provider, MD       Allergies   Allergen Reactions    Flagyl [Metronidazole] Hives       Physical Exam:      Visit Vitals  BP (!) 174/86   Pulse (!) 101   Resp 29   Ht 5' 7\" (1.702 m)   Wt 147 lb (66.7 kg)   SpO2 95%   BMI 23.02 kg/m²     Physical Exam:  Mallampati 2 ASA 3  General: A&O x 4, NAD  Heart: RRR  Lungs: Normal work of breathing on room air    Labs Reviewed: All lab results for the last 24 hours reviewed. Assessment/Plan     The patient is an appropriate candidate to undergo the planned procedure and sedation.     Brian Miramontes PA-C

## 2023-02-14 NOTE — PROGRESS NOTES
TRANSFER - OUT REPORT:    Verbal report given to Jaida Castro RN  on Lizaberkley Pike  being transferred to cath [facility] for routine post-op       Report consisted of patient's Situation, Background, Assessment and   Recommendations(SBAR). Information from the following report(s) Nurse Handoff Report and MAR was reviewed with the receiving nurse. Kiel Assessment: No data recorded  Lines:   Peripheral IV 02/14/23 Right Antecubital (Active)        Opportunity for questions and clarification was provided.       Patient transported with:  Registered Nurse

## 2023-02-17 RX ORDER — CETIRIZINE HYDROCHLORIDE 10 MG/1
TABLET ORAL
COMMUNITY
Start: 2022-09-15

## 2023-02-17 RX ORDER — LORATADINE 10 MG/1
10 TABLET ORAL DAILY
COMMUNITY

## 2023-02-17 RX ORDER — AMLODIPINE BESYLATE 2.5 MG/1
2.5 TABLET ORAL DAILY
Qty: 30 TABLET | Refills: 3 | Status: SHIPPED | OUTPATIENT
Start: 2023-02-17

## 2023-02-17 RX ORDER — LOSARTAN POTASSIUM 50 MG/1
100 TABLET ORAL DAILY
COMMUNITY

## 2023-02-17 RX ORDER — FERROUS SULFATE 325(65) MG
TABLET ORAL
COMMUNITY
Start: 2021-05-06

## 2023-02-17 RX ORDER — ASPIRIN 81 MG/1
TABLET ORAL
COMMUNITY

## 2023-02-17 RX ORDER — FLUTICASONE PROPIONATE 50 MCG
2 SPRAY, SUSPENSION (ML) NASAL DAILY
COMMUNITY
Start: 2021-05-21

## 2023-02-17 RX ORDER — AMLODIPINE BESYLATE 2.5 MG/1
TABLET ORAL
COMMUNITY
Start: 2022-09-28

## 2023-02-17 RX ORDER — MONTELUKAST SODIUM 10 MG/1
TABLET ORAL
COMMUNITY
Start: 2022-11-25

## 2023-02-17 RX ORDER — ALBUTEROL SULFATE 90 UG/1
AEROSOL, METERED RESPIRATORY (INHALATION)
COMMUNITY
Start: 2022-03-25

## 2023-02-17 RX ORDER — LEVOTHYROXINE SODIUM 88 UG/1
TABLET ORAL
COMMUNITY
Start: 2022-10-03

## 2023-02-17 RX ORDER — POTASSIUM CHLORIDE 20 MEQ/1
TABLET, EXTENDED RELEASE ORAL
COMMUNITY
Start: 2022-11-25

## 2023-02-17 NOTE — TELEPHONE ENCOUNTER
Last OV: 10/6/2022  Last labs: 2/14/2023 (ER labs)  Next OV: 4/6/2023    Spoke with patient. She has clarified that Amlodipine has been changed to 2.5 mg daily and Levothyroxine has been changed to 88 mcg daily.

## 2023-02-23 RX ORDER — AMLODIPINE BESYLATE 5 MG/1
TABLET ORAL
Qty: 90 TABLET | Refills: 1 | Status: SHIPPED | OUTPATIENT
Start: 2023-02-23

## 2023-03-01 ENCOUNTER — ANESTHESIA EVENT (OUTPATIENT)
Facility: HOSPITAL | Age: 63
End: 2023-03-01
Payer: MEDICARE

## 2023-03-02 ENCOUNTER — HOSPITAL ENCOUNTER (OUTPATIENT)
Facility: HOSPITAL | Age: 63
Discharge: HOME | End: 2023-03-02
Attending: RADIOLOGY | Admitting: RADIOLOGY
Payer: MEDICARE

## 2023-03-02 ENCOUNTER — ANESTHESIA (OUTPATIENT)
Facility: HOSPITAL | Age: 63
End: 2023-03-02
Payer: MEDICARE

## 2023-03-02 ENCOUNTER — APPOINTMENT (OUTPATIENT)
Facility: HOSPITAL | Age: 63
End: 2023-03-02
Attending: RADIOLOGY
Payer: MEDICARE

## 2023-03-02 VITALS
TEMPERATURE: 98.8 F | SYSTOLIC BLOOD PRESSURE: 141 MMHG | RESPIRATION RATE: 22 BRPM | DIASTOLIC BLOOD PRESSURE: 82 MMHG | HEART RATE: 90 BPM | OXYGEN SATURATION: 98 %

## 2023-03-02 LAB
ANION GAP SERPL CALC-SCNC: 5 MMOL/L (ref 3–18)
BUN SERPL-MCNC: 5 MG/DL (ref 7–18)
BUN/CREAT SERPL: 7 (ref 12–20)
CALCIUM SERPL-MCNC: 9.5 MG/DL (ref 8.5–10.1)
CHLORIDE SERPL-SCNC: 103 MMOL/L (ref 100–111)
CO2 SERPL-SCNC: 27 MMOL/L (ref 21–32)
CREAT SERPL-MCNC: 0.74 MG/DL (ref 0.6–1.3)
ERYTHROCYTE [DISTWIDTH] IN BLOOD BY AUTOMATED COUNT: 16.3 % (ref 11.6–14.5)
GLUCOSE SERPL-MCNC: 119 MG/DL (ref 74–99)
HCT VFR BLD AUTO: 33.3 % (ref 35–45)
HGB BLD-MCNC: 10.7 G/DL (ref 12–16)
INR PPP: 1 (ref 0.8–1.2)
MCH RBC QN AUTO: 23.7 PG (ref 24–34)
MCHC RBC AUTO-ENTMCNC: 32.1 G/DL (ref 31–37)
MCV RBC AUTO: 73.8 FL (ref 78–100)
NRBC # BLD: 0 K/UL (ref 0–0.01)
NRBC BLD-RTO: 0 PER 100 WBC
PLATELET # BLD AUTO: 336 K/UL (ref 135–420)
PMV BLD AUTO: 9.3 FL (ref 9.2–11.8)
POTASSIUM SERPL-SCNC: 3.3 MMOL/L (ref 3.5–5.5)
PROTHROMBIN TIME: 14 SEC (ref 11.5–15.2)
RBC # BLD AUTO: 4.51 M/UL (ref 4.2–5.3)
SODIUM SERPL-SCNC: 135 MMOL/L (ref 136–145)
WBC # BLD AUTO: 9.5 K/UL (ref 4.6–13.2)

## 2023-03-02 PROCEDURE — 80048 BASIC METABOLIC PNL TOTAL CA: CPT

## 2023-03-02 PROCEDURE — 36595 MECH REMOV TUNNELED CV CATH: CPT

## 2023-03-02 PROCEDURE — 3700000001 HC ADD 15 MINUTES (ANESTHESIA)

## 2023-03-02 PROCEDURE — 37193 REM ENDOVAS VENA CAVA FILTER: CPT

## 2023-03-02 PROCEDURE — 36573 INSJ PICC RS&I 5 YR+: CPT

## 2023-03-02 PROCEDURE — 85027 COMPLETE CBC AUTOMATED: CPT

## 2023-03-02 PROCEDURE — 3700000000 HC ANESTHESIA ATTENDED CARE

## 2023-03-02 PROCEDURE — 7100000000 HC PACU RECOVERY - FIRST 15 MIN

## 2023-03-02 PROCEDURE — 7100000001 HC PACU RECOVERY - ADDTL 15 MIN

## 2023-03-02 PROCEDURE — 2580000003 HC RX 258: Performed by: NURSE ANESTHETIST, CERTIFIED REGISTERED

## 2023-03-02 PROCEDURE — 7100000011 HC PHASE II RECOVERY - ADDTL 15 MIN

## 2023-03-02 PROCEDURE — 2500000003 HC RX 250 WO HCPCS: Performed by: NURSE ANESTHETIST, CERTIFIED REGISTERED

## 2023-03-02 PROCEDURE — 85610 PROTHROMBIN TIME: CPT

## 2023-03-02 PROCEDURE — 6360000002 HC RX W HCPCS: Performed by: NURSE ANESTHETIST, CERTIFIED REGISTERED

## 2023-03-02 PROCEDURE — 75827 VEIN X-RAY CHEST: CPT

## 2023-03-02 PROCEDURE — 7100000010 HC PHASE II RECOVERY - FIRST 15 MIN

## 2023-03-02 PROCEDURE — 36560 INSERT TUNNELED CV CATH: CPT

## 2023-03-02 PROCEDURE — 6370000000 HC RX 637 (ALT 250 FOR IP): Performed by: NURSE ANESTHETIST, CERTIFIED REGISTERED

## 2023-03-02 RX ORDER — PROCHLORPERAZINE EDISYLATE 5 MG/ML
5 INJECTION INTRAMUSCULAR; INTRAVENOUS
Status: DISCONTINUED | OUTPATIENT
Start: 2023-03-02 | End: 2023-03-02 | Stop reason: HOSPADM

## 2023-03-02 RX ORDER — SODIUM CHLORIDE 0.9 % (FLUSH) 0.9 %
5-40 SYRINGE (ML) INJECTION EVERY 12 HOURS SCHEDULED
Status: DISCONTINUED | OUTPATIENT
Start: 2023-03-02 | End: 2023-03-02 | Stop reason: HOSPADM

## 2023-03-02 RX ORDER — FENTANYL CITRATE 50 UG/ML
25 INJECTION, SOLUTION INTRAMUSCULAR; INTRAVENOUS EVERY 5 MIN PRN
Status: DISCONTINUED | OUTPATIENT
Start: 2023-03-03 | End: 2023-03-02 | Stop reason: SDUPTHER

## 2023-03-02 RX ORDER — ONDANSETRON 2 MG/ML
4 INJECTION INTRAMUSCULAR; INTRAVENOUS
Status: DISCONTINUED | OUTPATIENT
Start: 2023-03-02 | End: 2023-03-02 | Stop reason: SDUPTHER

## 2023-03-02 RX ORDER — LIDOCAINE HYDROCHLORIDE 10 MG/ML
1 INJECTION, SOLUTION EPIDURAL; INFILTRATION; INTRACAUDAL; PERINEURAL
Status: DISCONTINUED | OUTPATIENT
Start: 2023-03-02 | End: 2023-03-02 | Stop reason: HOSPADM

## 2023-03-02 RX ORDER — PROPOFOL 10 MG/ML
INJECTION, EMULSION INTRAVENOUS CONTINUOUS PRN
Status: DISCONTINUED | OUTPATIENT
Start: 2023-03-02 | End: 2023-03-02 | Stop reason: SDUPTHER

## 2023-03-02 RX ORDER — ONDANSETRON 2 MG/ML
4 INJECTION INTRAMUSCULAR; INTRAVENOUS
Status: DISCONTINUED | OUTPATIENT
Start: 2023-03-02 | End: 2023-03-02 | Stop reason: HOSPADM

## 2023-03-02 RX ORDER — SODIUM CHLORIDE 9 MG/ML
INJECTION, SOLUTION INTRAVENOUS CONTINUOUS PRN
Status: DISCONTINUED | OUTPATIENT
Start: 2023-03-02 | End: 2023-03-02 | Stop reason: SDUPTHER

## 2023-03-02 RX ORDER — SODIUM CHLORIDE, SODIUM LACTATE, POTASSIUM CHLORIDE, CALCIUM CHLORIDE 600; 310; 30; 20 MG/100ML; MG/100ML; MG/100ML; MG/100ML
INJECTION, SOLUTION INTRAVENOUS CONTINUOUS
Status: DISCONTINUED | OUTPATIENT
Start: 2023-03-02 | End: 2023-03-02 | Stop reason: SDUPTHER

## 2023-03-02 RX ORDER — SODIUM CHLORIDE, SODIUM LACTATE, POTASSIUM CHLORIDE, CALCIUM CHLORIDE 600; 310; 30; 20 MG/100ML; MG/100ML; MG/100ML; MG/100ML
INJECTION, SOLUTION INTRAVENOUS CONTINUOUS
Status: DISCONTINUED | OUTPATIENT
Start: 2023-03-02 | End: 2023-03-02 | Stop reason: HOSPADM

## 2023-03-02 RX ORDER — SODIUM CHLORIDE 0.9 % (FLUSH) 0.9 %
5-40 SYRINGE (ML) INJECTION PRN
Status: DISCONTINUED | OUTPATIENT
Start: 2023-03-02 | End: 2023-03-02 | Stop reason: HOSPADM

## 2023-03-02 RX ORDER — MIDAZOLAM HYDROCHLORIDE 1 MG/ML
INJECTION INTRAMUSCULAR; INTRAVENOUS PRN
Status: DISCONTINUED | OUTPATIENT
Start: 2023-03-02 | End: 2023-03-02 | Stop reason: SDUPTHER

## 2023-03-02 RX ORDER — FENTANYL CITRATE 50 UG/ML
INJECTION, SOLUTION INTRAMUSCULAR; INTRAVENOUS PRN
Status: DISCONTINUED | OUTPATIENT
Start: 2023-03-02 | End: 2023-03-02 | Stop reason: SDUPTHER

## 2023-03-02 RX ORDER — FAMOTIDINE 20 MG/1
20 TABLET, FILM COATED ORAL ONCE
Status: DISCONTINUED | OUTPATIENT
Start: 2023-03-02 | End: 2023-03-02 | Stop reason: HOSPADM

## 2023-03-02 RX ORDER — LIDOCAINE HYDROCHLORIDE 20 MG/ML
INJECTION, SOLUTION EPIDURAL; INFILTRATION; INTRACAUDAL; PERINEURAL PRN
Status: DISCONTINUED | OUTPATIENT
Start: 2023-03-02 | End: 2023-03-02 | Stop reason: SDUPTHER

## 2023-03-02 RX ORDER — PROCHLORPERAZINE EDISYLATE 5 MG/ML
5 INJECTION INTRAMUSCULAR; INTRAVENOUS
Status: DISCONTINUED | OUTPATIENT
Start: 2023-03-02 | End: 2023-03-02 | Stop reason: SDUPTHER

## 2023-03-02 RX ORDER — ALBUTEROL SULFATE 90 UG/1
AEROSOL, METERED RESPIRATORY (INHALATION) PRN
Status: DISCONTINUED | OUTPATIENT
Start: 2023-03-02 | End: 2023-03-02 | Stop reason: SDUPTHER

## 2023-03-02 RX ORDER — ESMOLOL HYDROCHLORIDE 10 MG/ML
INJECTION INTRAVENOUS PRN
Status: DISCONTINUED | OUTPATIENT
Start: 2023-03-02 | End: 2023-03-02 | Stop reason: SDUPTHER

## 2023-03-02 RX ORDER — FENTANYL CITRATE 50 UG/ML
25 INJECTION, SOLUTION INTRAMUSCULAR; INTRAVENOUS EVERY 5 MIN PRN
Status: DISCONTINUED | OUTPATIENT
Start: 2023-03-03 | End: 2023-03-02 | Stop reason: HOSPADM

## 2023-03-02 RX ORDER — CEFAZOLIN SODIUM 1 G/3ML
INJECTION, POWDER, FOR SOLUTION INTRAMUSCULAR; INTRAVENOUS PRN
Status: DISCONTINUED | OUTPATIENT
Start: 2023-03-02 | End: 2023-03-02 | Stop reason: SDUPTHER

## 2023-03-02 RX ADMIN — PROPOFOL 120 MCG/KG/MIN: 10 INJECTION, EMULSION INTRAVENOUS at 14:46

## 2023-03-02 RX ADMIN — LIDOCAINE HYDROCHLORIDE 40 MG: 20 INJECTION, SOLUTION EPIDURAL; INFILTRATION; INTRACAUDAL; PERINEURAL at 14:46

## 2023-03-02 RX ADMIN — SODIUM CHLORIDE: 900 INJECTION, SOLUTION INTRAVENOUS at 14:36

## 2023-03-02 RX ADMIN — MIDAZOLAM HYDROCHLORIDE 1 MG: 2 INJECTION, SOLUTION INTRAMUSCULAR; INTRAVENOUS at 14:45

## 2023-03-02 RX ADMIN — CEFAZOLIN 2 G: 330 INJECTION, POWDER, FOR SOLUTION INTRAMUSCULAR; INTRAVENOUS at 14:45

## 2023-03-02 RX ADMIN — FENTANYL CITRATE 25 MCG: 50 INJECTION, SOLUTION INTRAMUSCULAR; INTRAVENOUS at 15:42

## 2023-03-02 RX ADMIN — ALBUTEROL SULFATE 2 PUFF: 108 AEROSOL, METERED RESPIRATORY (INHALATION) at 17:04

## 2023-03-02 RX ADMIN — MIDAZOLAM HYDROCHLORIDE 1 MG: 2 INJECTION, SOLUTION INTRAMUSCULAR; INTRAVENOUS at 14:48

## 2023-03-02 RX ADMIN — DEXMEDETOMIDINE HYDROCHLORIDE 4 MCG: 100 INJECTION, SOLUTION INTRAVENOUS at 14:39

## 2023-03-02 RX ADMIN — FENTANYL CITRATE 25 MCG: 50 INJECTION, SOLUTION INTRAMUSCULAR; INTRAVENOUS at 14:53

## 2023-03-02 RX ADMIN — DEXMEDETOMIDINE HYDROCHLORIDE 2 MCG: 100 INJECTION, SOLUTION INTRAVENOUS at 14:45

## 2023-03-02 RX ADMIN — FENTANYL CITRATE 50 MCG: 50 INJECTION, SOLUTION INTRAMUSCULAR; INTRAVENOUS at 16:28

## 2023-03-02 RX ADMIN — FENTANYL CITRATE 25 MCG: 50 INJECTION, SOLUTION INTRAMUSCULAR; INTRAVENOUS at 14:45

## 2023-03-02 RX ADMIN — FENTANYL CITRATE 25 MCG: 50 INJECTION, SOLUTION INTRAMUSCULAR; INTRAVENOUS at 16:02

## 2023-03-02 RX ADMIN — ESMOLOL HYDROCHLORIDE 5 MG: 10 INJECTION, SOLUTION INTRAVENOUS at 16:58

## 2023-03-02 NOTE — ANESTHESIA PRE PROCEDURE
Department of Anesthesiology  Preprocedure Note       Name:  Kendall Hernandez   Age:  57 y.o.  :  1960                                          MRN:  [id number]         Date:  3/2/2023      Surgeon: * No surgeons listed *    Procedure: * No procedures listed *    Medications prior to admission:   Prior to Admission medications    Medication Sig Start Date End Date Taking? Authorizing Provider   amLODIPine (NORVASC) 5 MG tablet take 1 tablet by mouth once daily 23   Newton Doss MD   amLODIPine (NORVASC) 2.5 MG tablet Take 1 tablet by mouth daily 23   Newton Doss MD   aspirin 81 MG EC tablet Take by mouth    Historical Provider, MD   cetirizine (ZYRTEC) 10 MG tablet take 1 tablet by mouth once daily 9/15/22   Historical Provider, MD   losartan (COZAAR) 50 MG tablet Take 100 mg by mouth daily    Historical Provider, MD   montelukast (SINGULAIR) 10 MG tablet Take by mouth 22   Historical Provider, MD   sertraline (ZOLOFT) 50 MG tablet Take by mouth 23   Historical Provider, MD   potassium chloride (KLOR-CON M) 20 MEQ extended release tablet take 1 tablet by mouth once daily 22   Historical Provider, MD   albuterol sulfate HFA (PROVENTIL;VENTOLIN;PROAIR) 108 (90 Base) MCG/ACT inhaler inhale 1 to 2 puffs by mouth and INTO THE LUNGS every 4 to 6 hour. ..  (REFER TO PRESCRIPTION NOTES).  3/25/22   Historical Provider, MD   ferrous sulfate (IRON 325) 325 (65 Fe) MG tablet Take by mouth 21   Historical Provider, MD   levothyroxine (SYNTHROID) 88 MCG tablet take 1 tablet by mouth daily before breakfast 10/3/22   Historical Provider, MD       Current medications:    Current Facility-Administered Medications   Medication Dose Route Frequency Provider Last Rate Last Admin    lidocaine PF 1 % injection 1 mL  1 mL IntraDERmal Once PRN ROSA Hernandez - CRNA        famotidine (PEPCID) tablet 20 mg  20 mg Oral Once ROSA Hernandez - CRNA        lactated ringers IV soln infusion   IntraVENous Continuous Earlean Jazmine, APRN - CRNA        sodium chloride flush 0.9 % injection 5-40 mL  5-40 mL IntraVENous 2 times per day Earlean Jazmine, APRN - CRNA        sodium chloride flush 0.9 % injection 5-40 mL  5-40 mL IntraVENous PRN Earlean Jazmine, APRN - CRNA        lidocaine-EPINEPHrine 1 [id number] injection 10 mL  10 mL IntraDERmal Once Flakita Caro MD           Allergies:     Allergies   Allergen Reactions    Flagyl [Metronidazole] Hives       Problem List:    Patient Active Problem List   Diagnosis Code    SVC syndrome I87.1    Non-small cell lung cancer (HCC) C34.90    Abnormal mammogram R92.8    Former smoker Z87.891    Iron deficiency anemia D50.9    Recurrent major depressive disorder ([address] F33.9    Essential hypertension I10       Past Medical History:        Diagnosis Date    Anemia, iron deficiency 2/2016    Depression     H/O seasonal allergies     Hypertension     Non-small cell carcinoma of lung ([address] 2/2016    adenocarcinoma of right lung    Positive occult stool blood test 2/29/2016    Pulmonary embolism ([address] 2/28/2016    small, bilateral PEs- on Xarelto    Right leg DVT ([address] 2/28/2016    on Xarelto    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    resolved    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       Past Surgical History:        Procedure Laterality Date    ANGIOPLASTY Right 3/20/2016    IJ, subclavian, and brachiocephalic veins    BREAST SURGERY      biopsy    CT BIOPSY LYMPH NODES SUPERFICIAL  11/21/2022    CT BIOPSY LYMPH NODES SUPERFICIAL 11/21/2022 [facility] CT    HYSTERECTOMY (CERVIX STATUS UNKNOWN)      due to menorrhagia     OTHER SURGICAL HISTORY Right 3/20/2016    2 placed in right IJ, subclavian/brachiocephalic    THROMBECTOMY  3/20/2016    with thrombolysis    VASCULAR SURGERY Left     double lumen       Social History:    Social History     Tobacco Use    Smoking status: Every Day Packs/day: 0.50     Types: Cigarettes     Last attempt to quit: 2016     Years since quittin.0    Smokeless tobacco: Never   Substance Use Topics    Alcohol use: No                                Ready to quit: Not Answered  Counseling given: Not Answered      Vital Signs (Current):   Vitals:    23 1300   BP: (!) 184/86   Pulse: (!) 109   Resp: 25   SpO2: 95%                                              BP Readings from Last 3 Encounters:   23 (!) 184/86   23 134/76   10/06/22 124/72       NPO Status:                                                   Date of last liquid consumption: 23                        Date of last solid food consumption: 23    BMI:   Wt Readings from Last 3 Encounters:   23 147 lb (66.7 kg)   10/06/22 146 lb (66.2 kg)   22 146 lb 1.6 oz (66.3 kg)     There is no height or weight on file to calculate BMI.    CBC:   Lab Results   Component Value Date/Time    WBC 6.7 2023 08:58 AM    RBC 5.12 2023 08:58 AM    HGB 12.2 2023 08:58 AM    HCT 39.2 2023 08:58 AM    MCV 76.6 2023 08:58 AM    RDW 16.2 2023 08:58 AM     2023 08:58 AM       CMP:   Lab Results   Component Value Date/Time     2023 08:58 AM    K 3.3 2023 08:58 AM     2023 08:58 AM    CO2 29 2023 08:58 AM    BUN 7 2023 08:58 AM    CREATININE 0.64 2023 08:58 AM    GFRAA >60 2022 02:10 PM    AGRATIO 0.9 2022 01:45 PM    LABGLOM >60 2023 08:58 AM    GLUCOSE 119 2023 08:58 AM    PROT 7.3 2022 01:45 PM    CALCIUM 9.0 2023 08:58 AM    BILITOT 0.3 2022 01:45 PM    ALKPHOS 93 2022 01:45 PM    AST 10 2022 01:45 PM    ALT 13 2022 01:45 PM       POC Tests: No results for input(s): POCGLU, POCNA, POCK, POCCL, POCBUN, POCHEMO, POCHCT in the last 72 hours.     Coags:   Lab Results   Component Value Date/Time    PROTIME 13.4 2023 08:58 AM    INR 1.0 02/14/2023 08:58 AM    APTT 22.1 02/14/2023 08:58 AM       HCG (If Applicable): No results found for: PREGTESTUR, PREGSERUM, HCG, HCGQUANT     ABGs: No results found for: PHART, PO2ART, GBE5NQF, XXV9QQJ, BEART, V0OGTULV     Type & Screen (If Applicable):  No results found for: LABABO, LABRH    Drug/Infectious Status (If Applicable):  No results found for: HIV, HEPCAB    COVID-19 Screening (If Applicable): No results found for: COVID19        Anesthesia Evaluation  Patient summary reviewed  Airway: Mallampati: II     Neck ROM: limited  Mouth opening: > = 3 FB   Dental:    (+) poor dentition      Pulmonary:   (+) decreased breath sounds: bilateral                            Cardiovascular:    (+) hypertension:,         Rhythm: irregular  Rate: normal                    Neuro/Psych:   (+) psychiatric history:            GI/Hepatic/Renal:             Endo/Other:    (+) DiabetesType II DM, , .                 Abdominal:             Vascular: Other Findings:           Anesthesia Plan      MAC     ASA 3             Anesthetic plan and risks discussed with patient.                         Carmen Posada MD   3/2/2023

## 2023-03-02 NOTE — DISCHARGE INSTRUCTIONS
DISCHARGE SUMMARY from Nurse    PATIENT INSTRUCTIONS:    After general anesthesia or intravenous sedation, for 24 hours or while taking prescription Narcotics:  Limit your activities  Do not drive and operate hazardous machinery  Do not make important personal or business decisions  Do  not drink alcoholic beverages  If you have not urinated within 8 hours after discharge, please contact your surgeon on call. Report the following to your surgeon:  Excessive pain, swelling, redness or odor of or around the surgical area  Temperature over 100.5  Nausea and vomiting lasting longer than 4 hours or if unable to take medications  Any signs of decreased circulation or nerve impairment to extremity: change in color, persistent  numbness, tingling, coldness or increase pain  Any questions    What to do at Home:  Recommended activity: activity as tolerated and no driving for today. Other: Following Femoral puncture for procedure  Keep right groin dressing in place for 48 hours. If you have any change in circulation to your right lower extremity such as cold, numb or tingling or change in pulses notify MD.      *  Please give a list of your current medications to your Primary Care Provider. *  Please update this list whenever your medications are discontinued, doses are      changed, or new medications (including over-the-counter products) are added. *  Please carry medication information at all times in case of emergency situations. These are general instructions for a healthy lifestyle:    No smoking/ No tobacco products/ Avoid exposure to second hand smoke  Surgeon General's Warning:  Quitting smoking now greatly reduces serious risk to your health.     Obesity, smoking, and sedentary lifestyle greatly increases your risk for illness    A healthy diet, regular physical exercise & weight monitoring are important for maintaining a healthy lifestyle    You may be retaining fluid if you have a history of heart failure or if you experience any of the following symptoms:  Weight gain of 3 pounds or more overnight or 5 pounds in a week, increased swelling in our hands or feet or shortness of breath while lying flat in bed. Please call your doctor as soon as you notice any of these symptoms; do not wait until your next office visit. The discharge information has been reviewed with the patient and spouse. The patient and spouse verbalized understanding. Discharge medications reviewed with the patient and spouse and appropriate educational materials and side effects teaching were provided. Peripherally Inserted Central Catheter (PICC): Care Instructions  Overview     A peripherally inserted central catheter (PICC) is a soft, flexible tube that runs under your skin from a vein in your arm to a large vein near your heart. One end of the catheter stays outside your body. It is a type of central vascular access device, or central line. You may have it for weeks or months. A PICC is used to give you medicine, blood products, nutrients, or fluids. A PICC makes doing these things more comfortable for you because they are put directly into the catheter. So you will not be stuck with a needle every time. A PICC may be used to draw blood for tests only if another vein, such as in the hand or arm, can't be used. The end of the PICC sometimes has two or three openings so that you can get more than one type of fluid or medicine at a time. Your doctor may give you medicine to make you feel relaxed. You may feel a little pain when your doctor numbs your arm. Your doctor will then thread the catheter up a vein in your arm to a larger vein. You will not feel any pain. The doctor may use stitches or other devices to hold the catheter in place where it exits your arm. After the procedure, the site may be sore for a day or two. Follow-up care is a key part of your treatment and safety.  Be sure to make and go to all appointments, and call your doctor if you are having problems. It's also a good idea to know your test results and keep a list of the medicines you take.  How can you care for yourself at home?  Do not wear jewelry, such as necklaces, that can catch on the catheter.  If the catheter breaks, follow the instructions your doctor gave you. If you have no instructions, clamp or tie off the catheter. Then see a doctor as soon as possible.  To help prevent infection, take a shower instead of a bath. Do not go swimming with the catheter.  Try to keep the area dry. When you shower, cover the area with waterproof material, such as plastic wrap.  Never touch the open end of the catheter if the cap is off.  Never use scissors, knives, pins, or other sharp objects near the catheter or other tubing.  If your catheter has a clamp, keep it clamped when you are not using it.  Fasten or tape the catheter to your body to prevent pulling or dangling.  Avoid clothing that rubs or pulls on your catheter.  Avoid bending or crimping your catheter.  Always wash your hands before you touch your catheter.  Wear loose clothing over the catheter for the first 10 to 14 days. When getting dressed, be careful not to pull on the catheter.  How to change the dressing  Since the PICC is in one of your arms, you won't be able to change the dressing on your own. You'll need someone to help you change the dressing using the same instructions that your doctor or nurse gave you.  Your PICC dressing should be changed at least once a week. If the dressing gets loose, wet, or dirty, it must be changed more often to prevent infection. Your doctor may also give you instructions for when to change the dressing.  Be sure you have all your supplies ready. These include medical tape, a surgical mask, sterile gloves, and your dressing kit. The names and brands of the items will vary. Your doctor or nurse may give you specific instructions for changing the  dressing. Here are basic tips for how to change the dressing. Wash your hands with soap and water. Do this for 15 seconds. Dry them with paper towels. Put on the surgical mask. Loosen and remove your old dressing. Peel it toward the PICC, not away from it. You may need to use an adhesive remover if it doesn't come off easily. Look at the site carefully for redness, swelling, drainage, tenderness, or warmth. If you notice any of these, call your doctor. Wash your hands again. Open your dressing kit, and put on the sterile gloves. Clean the site with the supplies in the dressing kit. Use the dressing that your doctor gave you, and place it over the site. Tape the PICC tubing to your skin. Make sure it doesn't dangle or pull. When should you call for help? Call 911 anytime you think you may need emergency care. For example, call if:    You passed out (lost consciousness). You have severe trouble breathing. You have sudden chest pain and shortness of breath, or you cough up blood. You have a fast or uneven pulse. Call your doctor now or seek immediate medical care if:    You have signs of infection, such as: Increased pain, swelling, warmth, or redness. Red streaks leading from the area. Pus or blood draining from the area. A fever. You have swelling in your face, chest, neck, or arm on the side where the catheter is. You have signs of a blood clot, such as bulging veins near the catheter. Your catheter is leaking, cracked, or clogged. You feel resistance when you inject medicine or fluids into your catheter. Your catheter is out of place. This may happen after severe coughing or vomiting, or if you pull on the catheter. You have chest pain or shortness of breath. Watch closely for changes in your health, and be sure to contact your doctor if:    You have any concerns about your catheter. Where can you learn more?   Go to http://www.woods.com/ and enter L935 to learn more about \"Peripherally Inserted Central Catheter (PICC): Care Instructions. \"  Current as of: April 5, 2022               Content Version: 13.5 © 2006-2022 [employer]. Care instructions adapted under license by [facility] ([facility]). If you have questions about a medical condition or this instruction, always ask your healthcare professional. [address] any warranty or liability for your use of this information.       ___________________________________________________________________________________________________________________________________

## 2023-03-02 NOTE — ANESTHESIA PRE PROCEDURE
Department of Anesthesiology  Preprocedure Note       Name:  Shara Sylvester   Age:  57 y.o.  :  1960                                          MRN:  [id number]         Date:  3/2/2023      Surgeon: * No surgeons listed *    Procedure: * No procedures listed *    Medications prior to admission:   Prior to Admission medications    Medication Sig Start Date End Date Taking? Authorizing Provider   amLODIPine (NORVASC) 5 MG tablet take 1 tablet by mouth once daily 23   Doni Saleh MD   amLODIPine (NORVASC) 2.5 MG tablet Take 1 tablet by mouth daily 23   Doni Saleh MD   aspirin 81 MG EC tablet Take by mouth    Historical Provider, MD   cetirizine (ZYRTEC) 10 MG tablet take 1 tablet by mouth once daily 9/15/22   Historical Provider, MD   losartan (COZAAR) 50 MG tablet Take 100 mg by mouth daily    Historical Provider, MD   montelukast (SINGULAIR) 10 MG tablet Take by mouth 22   Historical Provider, MD   sertraline (ZOLOFT) 50 MG tablet Take by mouth 23   Historical Provider, MD   potassium chloride (KLOR-CON M) 20 MEQ extended release tablet take 1 tablet by mouth once daily 22   Historical Provider, MD   albuterol sulfate HFA (PROVENTIL;VENTOLIN;PROAIR) 108 (90 Base) MCG/ACT inhaler inhale 1 to 2 puffs by mouth and INTO THE LUNGS every 4 to 6 hour. ..  (REFER TO PRESCRIPTION NOTES).  3/25/22   Historical Provider, MD   ferrous sulfate (IRON 325) 325 (65 Fe) MG tablet Take by mouth 21   Historical Provider, MD   levothyroxine (SYNTHROID) 88 MCG tablet take 1 tablet by mouth daily before breakfast 10/3/22   Historical Provider, MD       Current medications:    Current Facility-Administered Medications   Medication Dose Route Frequency Provider Last Rate Last Admin    lidocaine PF 1 % injection 1 mL  1 mL IntraDERmal Once PRN Leola Lundborg, APRN - CRNA        famotidine (PEPCID) tablet 20 mg  20 mg Oral Once Brianna Lundjenny APRN - CRNA        lactated ringers IV soln infusion   IntraVENous Continuous Zander Memory, APRN - CRNA        sodium chloride flush 0.9 % injection 5-40 mL  5-40 mL IntraVENous 2 times per day Zander Memory, APRN - CRNA        sodium chloride flush 0.9 % injection 5-40 mL  5-40 mL IntraVENous PRN Zander Memory, APRN - CRNA        lidocaine-EPINEPHrine 1 percent-1:[id number] injection 10 mL  10 mL IntraDERmal Once Mari Campbell MD         Facility-Administered Medications Ordered in Other Encounters   Medication Dose Route Frequency Provider Last Rate Last Admin    0.9 % sodium chloride infusion   IntraVENous Continuous PRN Garnetta Mattes, APRN - CRNA   New Bag at 03/02/23 1436    dexmedetomidine (PRECEDEX) 200 mcg in sodium chloride 0.9 % 50 mL IV bolus   IntraVENous Continuous PRN Garnetta Mattes, APRN - CRNA   4 mcg at 03/02/23 1439       Allergies:     Allergies   Allergen Reactions    Flagyl [Metronidazole] Hives       Problem List:    Patient Active Problem List   Diagnosis Code    SVC syndrome I87.1    Non-small cell lung cancer (HCC) C34.90    Abnormal mammogram R92.8    Former smoker Z87.891    Iron deficiency anemia D50.9    Recurrent major depressive disorder ([address] F33.9    Essential hypertension I10       Past Medical History:        Diagnosis Date    Anemia, iron deficiency 2/2016    Depression     H/O seasonal allergies     Hypertension     Non-small cell carcinoma of lung ([address] 2/2016    adenocarcinoma of right lung    Positive occult stool blood test 2/29/2016    Pulmonary embolism ([address] 2/28/2016    small, bilateral PEs- on Xarelto    Right leg DVT ([address] 2/28/2016    on Xarelto    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    resolved    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       Past Surgical History:        Procedure Laterality Date    ANGIOPLASTY Right 3/20/2016    IJ, subclavian, and brachiocephalic veins    BREAST SURGERY      biopsy    CT BIOPSY LYMPH NODES SUPERFICIAL 2022    CT BIOPSY LYMPH NODES SUPERFICIAL 2022 [facility] CT    HYSTERECTOMY (CERVIX STATUS UNKNOWN)      due to menorrhagia     OTHER SURGICAL HISTORY Right 3/20/2016    2 placed in right IJ, subclavian/brachiocephalic    THROMBECTOMY  3/20/2016    with thrombolysis    VASCULAR SURGERY Left     double lumen       Social History:    Social History     Tobacco Use    Smoking status: Every Day     Packs/day: 0.50     Types: Cigarettes     Last attempt to quit: 2016     Years since quittin.0    Smokeless tobacco: Never   Substance Use Topics    Alcohol use:  No                                Ready to quit: Not Answered  Counseling given: Not Answered      Vital Signs (Current):   Vitals:    23 1300   BP: (!) 184/86   Pulse: (!) 109   Resp: 25   SpO2: 95%                                              BP Readings from Last 3 Encounters:   23 (!) 184/86   23 134/76   10/06/22 124/72       NPO Status:                                                   Date of last liquid consumption: 23                        Date of last solid food consumption: 23    BMI:   Wt Readings from Last 3 Encounters:   23 147 lb (66.7 kg)   10/06/22 146 lb (66.2 kg)   22 146 lb 1.6 oz (66.3 kg)     There is no height or weight on file to calculate BMI.    CBC:   Lab Results   Component Value Date/Time    WBC 9.5 2023 01:15 PM    RBC 4.51 2023 01:15 PM    HGB 10.7 2023 01:15 PM    HCT 33.3 2023 01:15 PM    MCV 73.8 2023 01:15 PM    RDW 16.3 2023 01:15 PM     2023 01:15 PM       CMP:   Lab Results   Component Value Date/Time     2023 01:15 PM    K 3.3 2023 01:15 PM     2023 01:15 PM    CO2 27 2023 01:15 PM    BUN 5 2023 01:15 PM    CREATININE 0.74 2023 01:15 PM    GFRAA >60 2022 02:10 PM    AGRATIO 0.9 2022 01:45 PM    LABGLOM >60 2023 01:15 PM    GLUCOSE 119 2023 01:15 PM    PROT 7.3 12/07/2022 01:45 PM    CALCIUM 9.5 03/02/2023 01:15 PM    BILITOT 0.3 12/07/2022 01:45 PM    ALKPHOS 93 12/07/2022 01:45 PM    AST 10 12/07/2022 01:45 PM    ALT 13 12/07/2022 01:45 PM       POC Tests: No results for input(s): POCGLU, POCNA, POCK, POCCL, POCBUN, POCHEMO, POCHCT in the last 72 hours. Coags:   Lab Results   Component Value Date/Time    PROTIME 14.0 03/02/2023 01:15 PM    INR 1.0 03/02/2023 01:15 PM    APTT 22.1 02/14/2023 08:58 AM       HCG (If Applicable): No results found for: PREGTESTUR, PREGSERUM, HCG, HCGQUANT     ABGs: No results found for: PHART, PO2ART, WYZ4NDQ, QWB3BJV, BEART, E2RACBYK     Type & Screen (If Applicable):  No results found for: LABABO, LABRH    Drug/Infectious Status (If Applicable):  No results found for: HIV, HEPCAB    COVID-19 Screening (If Applicable): No results found for: COVID19        Anesthesia Evaluation    Airway: Mallampati: II     Neck ROM: limited  Mouth opening: > = 3 FB   Dental:    (+) upper braces      Pulmonary:   (+) decreased breath sounds: bilateral                            Cardiovascular:    (+) hypertension:,         Rhythm: regular  Rate: normal                    Neuro/Psych:               GI/Hepatic/Renal:             Endo/Other:    (+) Diabetes, . Abdominal:             Vascular: Other Findings:           Anesthesia Plan      MAC     ASA 3             Anesthetic plan and risks discussed with patient.                         Marlon Pulido MD   3/2/2023

## 2023-03-02 NOTE — ANESTHESIA POSTPROCEDURE EVALUATION
Department of Anesthesiology  Postprocedure Note    Patient: Randolph Fajardo  MRN: [id number]  YOB: 1960  Date of evaluation: 3/2/2023      Procedure Summary     Date: 03/02/23 Room / Location: [address] [place] [address] Dr MARISCAL IR; [address] [place] [address] CATH LAB    Anesthesia Start: 1430 Anesthesia Stop: 8487    Procedure: IR REMOVE TUNNELED VAD W PORT Diagnosis:       Malignant neoplasm of right lung, unspecified part of lung ([address]      Malignant neoplasm of right lung, unspecified part of lung ([address]    Scheduled Providers: Ernie Hooper MD Responsible Provider: Ernie Hooper MD    Anesthesia Type: MAC ASA Status: 3          Anesthesia Type: MAC    Rodri Phase I: Rodri Score: 10    Rodri Phase II:        Anesthesia Post Evaluation    Patient location during evaluation: bedside  Patient participation: complete - patient participated  Airway patency: patent  Complications: no  Cardiovascular status: hemodynamically stable  Respiratory status: acceptable  Hydration status: stable

## 2023-03-02 NOTE — H&P
History and Physical    Patient: Keshia Mann           Sex: female       DOA: 3/2/2023  YOB: 1960      Age:  57 y.o.     LOS:  LOS: 0 days        HPI:     Keshia Mann is a 57 y.o. female with h/o NSCLC and right breast lymphedema (suspected to be secondary to XRT) and malfunctioning left chest Mediport removal s/p explant c/b retained catheter 2/14/23 who presents for image guided removal of retained Mediport catheter and new TCVL placement for chemotherapy.     Past Medical History:   Diagnosis Date    Anemia, iron deficiency 2/2016    Depression     H/O seasonal allergies     Hypertension     Non-small cell carcinoma of lung ([address] 2/2016    adenocarcinoma of right lung    Positive occult stool blood test 2/29/2016    Pulmonary embolism ([address] 2/28/2016    small, bilateral PEs- on Xarelto    Right leg DVT ([address] 2/28/2016    on Xarelto    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[facility] 4/1/2016    resolved    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       Past Surgical History:   Procedure Laterality Date    ANGIOPLASTY Right 3/20/2016    IJ, subclavian, and brachiocephalic veins    BREAST SURGERY      biopsy    CT BIOPSY LYMPH NODES SUPERFICIAL  11/21/2022    CT BIOPSY LYMPH NODES SUPERFICIAL 11/21/2022 [facility] RAD CT    HYSTERECTOMY (CERVIX STATUS UNKNOWN)      due to menorrhagia     OTHER SURGICAL HISTORY Right 3/20/2016    2 placed in right IJ, subclavian/brachiocephalic    THROMBECTOMY  3/20/2016    with thrombolysis    VASCULAR SURGERY Left     double lumen       Family History   Problem Relation Age of Onset    No Known Problems Child     Cancer Sister 48        breast    Liver Disease Sister         cirrhosis    Heart Attack Mother 37       Social History     Socioeconomic History    Marital status: Single     Spouse name: None    Number of children: None    Years of education: None    Highest education level: None   Tobacco Use    Smoking status: Every Day     Packs/day: 0.50 Types: Cigarettes     Last attempt to quit: 2016     Years since quittin.0    Smokeless tobacco: Never   Substance and Sexual Activity    Alcohol use: No    Drug use: Never     Types: Prescription       Prior to Admission medications    Medication Sig Start Date End Date Taking? Authorizing Provider   amLODIPine (NORVASC) 5 MG tablet take 1 tablet by mouth once daily 23   Hank Seymour MD   amLODIPine (NORVASC) 2.5 MG tablet Take 1 tablet by mouth daily 23   Hank Seymour MD   aspirin 81 MG EC tablet Take by mouth    Historical Provider, MD   cetirizine (ZYRTEC) 10 MG tablet take 1 tablet by mouth once daily 9/15/22   Historical Provider, MD   losartan (COZAAR) 50 MG tablet Take 100 mg by mouth daily    Historical Provider, MD   montelukast (SINGULAIR) 10 MG tablet Take by mouth 22   Historical Provider, MD   sertraline (ZOLOFT) 50 MG tablet Take by mouth 23   Historical Provider, MD   potassium chloride (KLOR-CON M) 20 MEQ extended release tablet take 1 tablet by mouth once daily 22   Historical Provider, MD   albuterol sulfate HFA (PROVENTIL;VENTOLIN;PROAIR) 108 (90 Base) MCG/ACT inhaler inhale 1 to 2 puffs by mouth and INTO THE LUNGS every 4 to 6 hour. ..  (REFER TO PRESCRIPTION NOTES). 3/25/22   Historical Provider, MD   ferrous sulfate (IRON 325) 325 (65 Fe) MG tablet Take by mouth 21   Historical Provider, MD   levothyroxine (SYNTHROID) 88 MCG tablet take 1 tablet by mouth daily before breakfast 10/3/22   Historical Provider, MD       Allergies   Allergen Reactions    Flagyl [Metronidazole] Hives       Physical Exam:      Visit Vitals  BP (!) 184/86   Pulse (!) 109   Resp 25   SpO2 95%     Physical Exam:  General: A&O x 4, NAD  Heart: RRR  Lungs:Normal work of breathing on room air    Labs Reviewed:   All labs for last 24 hours reviewed    Assessment/Plan     The patient is an appropriate candidate to undergo the planned procedure    Cheli Huff PA-C

## 2023-03-03 NOTE — PROCEDURES
RADIOLOGY POST PROCEDURE NOTE     March 2, 2023       7:08 PM     Preoperative Diagnosis:   Lung carcinoma with right upper extremity lymphedema and prior SVC syndrome requiring stenting. Patient status post left chest port removal with retained, trapped brachiocephalic SVC portion of the port catheter retained. Ongoing chemotherapy. Patient needs intravenous access. Postoperative Diagnosis:  Same. :  Dr. Ashley Jama    Assistant:  None. Type of Anesthesia: 1% local lidocaine and IV deep sedation per anesthesia. Procedure/Description:    Image guided SVC venogram.  Image guided right upper extremity venogram.  Image guided left upper extremity venogram.  Image guided retained left chest port catheter SVC left brachiocephalic chest port tubing snare retrieval.  Image guided left upper extremity PICC line catheter placement. Findings:   Chronic occlusion of left brachiocephalic vein. Successful removal of the retained chest port, left brachiocephalic/SVC, catheter. Estimated blood Loss: Minimal    Specimen Removed:   No    Blood transfusions:  None. Implants:  None. Complications: None    Condition: Stable    Discharge Plan: Discharged home in stable and no bleeding. Resume blood thinners if any in 24 hours.     Rich Dickson MD

## 2023-03-08 ENCOUNTER — HOSPITAL ENCOUNTER (OUTPATIENT)
Facility: HOSPITAL | Age: 63
Setting detail: INFUSION SERIES
End: 2023-03-08
Payer: MEDICARE

## 2023-03-08 VITALS
WEIGHT: 147.2 LBS | RESPIRATION RATE: 18 BRPM | OXYGEN SATURATION: 99 % | HEART RATE: 72 BPM | TEMPERATURE: 98 F | BODY MASS INDEX: 23.05 KG/M2

## 2023-03-08 DIAGNOSIS — C34.90 NON-SMALL CELL LUNG CANCER, UNSPECIFIED LATERALITY (HCC): ICD-10-CM

## 2023-03-08 LAB
ALBUMIN SERPL-MCNC: 3.5 G/DL (ref 3.4–5)
ALBUMIN/GLOB SERPL: 0.8 (ref 0.8–1.7)
ALP SERPL-CCNC: 93 U/L (ref 45–117)
ALT SERPL-CCNC: 10 U/L (ref 13–56)
ANION GAP SERPL CALC-SCNC: 6 MMOL/L (ref 3–18)
AST SERPL-CCNC: 13 U/L (ref 10–38)
BASO+EOS+MONOS # BLD AUTO: 0.4 K/UL (ref 0–2.3)
BASO+EOS+MONOS NFR BLD AUTO: 6 % (ref 0.1–17)
BILIRUB SERPL-MCNC: 0.3 MG/DL (ref 0.2–1)
BUN SERPL-MCNC: 4 MG/DL (ref 7–18)
BUN/CREAT SERPL: 6 (ref 12–20)
CALCIUM SERPL-MCNC: 9.1 MG/DL (ref 8.5–10.1)
CHLORIDE SERPL-SCNC: 101 MMOL/L (ref 100–111)
CO2 SERPL-SCNC: 30 MMOL/L (ref 21–32)
CREAT SERPL-MCNC: 0.71 MG/DL (ref 0.6–1.3)
DIFFERENTIAL METHOD BLD: ABNORMAL
ERYTHROCYTE [DISTWIDTH] IN BLOOD BY AUTOMATED COUNT: 16.1 % (ref 11.5–14.5)
GLOBULIN SER CALC-MCNC: 4.2 G/DL (ref 2–4)
GLUCOSE SERPL-MCNC: 90 MG/DL (ref 74–99)
HBV SURFACE AG SER QL: <0.1 INDEX
HBV SURFACE AG SER QL: NEGATIVE
HCT VFR BLD AUTO: 36 % (ref 36–48)
HGB BLD-MCNC: 11.1 G/DL (ref 12–16)
LYMPHOCYTES # BLD: 1.6 K/UL (ref 1.1–5.9)
LYMPHOCYTES NFR BLD: 23 % (ref 14–44)
MCH RBC QN AUTO: 23.8 PG (ref 25–35)
MCHC RBC AUTO-ENTMCNC: 30.8 G/DL (ref 31–37)
MCV RBC AUTO: 77.3 FL (ref 78–102)
NEUTS SEG # BLD: 5.1 K/UL (ref 1.8–9.5)
NEUTS SEG NFR BLD: 71 % (ref 40–70)
PLATELET # BLD AUTO: 281 K/UL (ref 140–440)
POTASSIUM SERPL-SCNC: 3.9 MMOL/L (ref 3.5–5.5)
PROT SERPL-MCNC: 7.7 G/DL (ref 6.4–8.2)
RBC # BLD AUTO: 4.66 M/UL (ref 4.1–5.1)
SODIUM SERPL-SCNC: 137 MMOL/L (ref 136–145)
WBC # BLD AUTO: 7.1 K/UL (ref 4.5–13)

## 2023-03-08 PROCEDURE — 36415 COLL VENOUS BLD VENIPUNCTURE: CPT

## 2023-03-08 PROCEDURE — 85025 COMPLETE CBC W/AUTO DIFF WBC: CPT

## 2023-03-08 PROCEDURE — 80053 COMPREHEN METABOLIC PANEL: CPT

## 2023-03-08 PROCEDURE — 86704 HEP B CORE ANTIBODY TOTAL: CPT

## 2023-03-08 PROCEDURE — 87340 HEPATITIS B SURFACE AG IA: CPT

## 2023-03-08 RX ORDER — ACETAMINOPHEN 325 MG/1
650 TABLET ORAL
Status: CANCELLED | OUTPATIENT
Start: 2023-03-09

## 2023-03-08 RX ORDER — DIPHENHYDRAMINE HYDROCHLORIDE 50 MG/ML
50 INJECTION INTRAMUSCULAR; INTRAVENOUS
Status: CANCELLED | OUTPATIENT
Start: 2023-03-09

## 2023-03-08 RX ORDER — ONDANSETRON 2 MG/ML
8 INJECTION INTRAMUSCULAR; INTRAVENOUS
Status: CANCELLED | OUTPATIENT
Start: 2023-03-09

## 2023-03-08 RX ORDER — SODIUM CHLORIDE 9 MG/ML
5-40 INJECTION INTRAVENOUS PRN
Status: CANCELLED | OUTPATIENT
Start: 2023-03-09

## 2023-03-08 RX ORDER — SODIUM CHLORIDE 9 MG/ML
5-250 INJECTION, SOLUTION INTRAVENOUS PRN
Status: CANCELLED | OUTPATIENT
Start: 2023-03-09

## 2023-03-08 RX ORDER — EPINEPHRINE 1 MG/ML
0.3 INJECTION, SOLUTION, CONCENTRATE INTRAVENOUS PRN
Status: CANCELLED | OUTPATIENT
Start: 2023-03-09

## 2023-03-08 RX ORDER — ALBUTEROL SULFATE 90 UG/1
4 AEROSOL, METERED RESPIRATORY (INHALATION) PRN
Status: CANCELLED | OUTPATIENT
Start: 2023-03-09

## 2023-03-08 RX ORDER — HEPARIN SODIUM (PORCINE) LOCK FLUSH IV SOLN 100 UNIT/ML 100 UNIT/ML
500 SOLUTION INTRAVENOUS PRN
Status: CANCELLED | OUTPATIENT
Start: 2023-03-09

## 2023-03-08 RX ORDER — MEPERIDINE HYDROCHLORIDE 25 MG/ML
12.5 INJECTION INTRAMUSCULAR; INTRAVENOUS; SUBCUTANEOUS PRN
Status: CANCELLED | OUTPATIENT
Start: 2023-03-09

## 2023-03-08 RX ORDER — SODIUM CHLORIDE 9 MG/ML
INJECTION, SOLUTION INTRAVENOUS CONTINUOUS
Status: CANCELLED | OUTPATIENT
Start: 2023-03-09

## 2023-03-08 NOTE — PROGRESS NOTES
[facility] Lab Visit:  Date: 2023    Name: Sky Sorto    : 1960    MRN: [id number]           7092 Pt arrived ambulatory w/o assist. Labs drawn per order via Right venipuncture x1 attempt. Pt tolerated well without complaints. Gauze/ coban to site. Pt departed [facility] ambulatory and in no distress at 1335.     Vitals:    23 1325   Pulse: 72   Resp: 18   Temp: 98 °F (36.7 °C)   SpO2: 99%         Bonnie Coughlin  2023

## 2023-03-09 ENCOUNTER — HOSPITAL ENCOUNTER (OUTPATIENT)
Facility: HOSPITAL | Age: 63
Setting detail: INFUSION SERIES
End: 2023-03-09
Payer: MEDICARE

## 2023-03-09 VITALS
TEMPERATURE: 98 F | RESPIRATION RATE: 20 BRPM | SYSTOLIC BLOOD PRESSURE: 180 MMHG | HEART RATE: 89 BPM | OXYGEN SATURATION: 97 % | DIASTOLIC BLOOD PRESSURE: 97 MMHG

## 2023-03-09 DIAGNOSIS — C34.90 NON-SMALL CELL LUNG CANCER, UNSPECIFIED LATERALITY (HCC): Primary | ICD-10-CM

## 2023-03-09 LAB
APPEARANCE UR: CLEAR
BILIRUB UR QL: NEGATIVE
COLOR UR: YELLOW
GLUCOSE UR STRIP.AUTO-MCNC: NEGATIVE MG/DL
HBV CORE AB SERPL QL IA: NEGATIVE
HGB UR QL STRIP: NEGATIVE
KETONES UR QL STRIP.AUTO: NEGATIVE MG/DL
LEUKOCYTE ESTERASE UR QL STRIP.AUTO: NEGATIVE
NITRITE UR QL STRIP.AUTO: NEGATIVE
PH UR STRIP: 7 (ref 5–8)
PROT UR STRIP-MCNC: NEGATIVE MG/DL
SP GR UR REFRACTOMETRY: <1.005 (ref 1–1.03)
UROBILINOGEN UR QL STRIP.AUTO: 0.2 EU/DL (ref 0.2–1)

## 2023-03-09 PROCEDURE — 96377 APPLICATON ON-BODY INJECTOR: CPT

## 2023-03-09 PROCEDURE — 6360000002 HC RX W HCPCS: Performed by: INTERNAL MEDICINE

## 2023-03-09 PROCEDURE — 36593 DECLOT VASCULAR DEVICE: CPT

## 2023-03-09 PROCEDURE — 96413 CHEMO IV INFUSION 1 HR: CPT

## 2023-03-09 PROCEDURE — A4216 STERILE WATER/SALINE, 10 ML: HCPCS | Performed by: INTERNAL MEDICINE

## 2023-03-09 PROCEDURE — 96375 TX/PRO/DX INJ NEW DRUG ADDON: CPT

## 2023-03-09 PROCEDURE — 2580000003 HC RX 258: Performed by: INTERNAL MEDICINE

## 2023-03-09 PROCEDURE — 81003 URINALYSIS AUTO W/O SCOPE: CPT

## 2023-03-09 PROCEDURE — 99214 OFFICE O/P EST MOD 30 MIN: CPT

## 2023-03-09 PROCEDURE — 2500000003 HC RX 250 WO HCPCS: Performed by: INTERNAL MEDICINE

## 2023-03-09 PROCEDURE — 96417 CHEMO IV INFUS EACH ADDL SEQ: CPT

## 2023-03-09 PROCEDURE — 6360000002 HC RX W HCPCS

## 2023-03-09 RX ORDER — DIPHENHYDRAMINE HYDROCHLORIDE 50 MG/ML
25 INJECTION INTRAMUSCULAR; INTRAVENOUS ONCE
Status: COMPLETED | OUTPATIENT
Start: 2023-03-09 | End: 2023-03-09

## 2023-03-09 RX ORDER — PROMETHAZINE HYDROCHLORIDE 12.5 MG/1
1 TABLET ORAL EVERY 6 HOURS PRN
COMMUNITY
Start: 2023-02-17 | End: 2023-04-06 | Stop reason: SDUPTHER

## 2023-03-09 RX ORDER — EPINEPHRINE 1 MG/ML
INJECTION, SOLUTION, CONCENTRATE INTRAVENOUS
COMMUNITY
Start: 2022-04-13

## 2023-03-09 RX ORDER — DOCETAXEL 10 MG/ML
INJECTION, SOLUTION INTRAVENOUS
COMMUNITY
Start: 2023-03-09

## 2023-03-09 RX ORDER — SODIUM CHLORIDE 0.9 % (FLUSH) 0.9 %
5-40 SYRINGE (ML) INJECTION 2 TIMES DAILY
Status: DISCONTINUED | OUTPATIENT
Start: 2023-03-09 | End: 2023-03-30

## 2023-03-09 RX ORDER — HEPARIN SODIUM (PORCINE) LOCK FLUSH IV SOLN 100 UNIT/ML 100 UNIT/ML
500 SOLUTION INTRAVENOUS PRN
Status: DISCONTINUED | OUTPATIENT
Start: 2023-03-09 | End: 2023-03-30

## 2023-03-09 RX ORDER — PALONOSETRON HYDROCHLORIDE 0.25 MG/2ML
INJECTION INTRAVENOUS
COMMUNITY
Start: 2023-03-09

## 2023-03-09 RX ORDER — HEPARIN SODIUM (PORCINE) LOCK FLUSH IV SOLN 100 UNIT/ML 100 UNIT/ML
SOLUTION INTRAVENOUS
Status: DISPENSED
Start: 2023-03-09 | End: 2023-03-09

## 2023-03-09 RX ORDER — FAMOTIDINE 10 MG/ML
INJECTION, SOLUTION INTRAVENOUS
COMMUNITY
Start: 2022-04-13 | End: 2023-04-06 | Stop reason: SDUPTHER

## 2023-03-09 RX ORDER — DEXAMETHASONE 4 MG/1
TABLET ORAL
COMMUNITY
Start: 2023-02-17 | End: 2023-04-06 | Stop reason: SDUPTHER

## 2023-03-09 RX ORDER — PROMETHAZINE HYDROCHLORIDE 12.5 MG/1
TABLET ORAL
COMMUNITY
Start: 2023-02-17

## 2023-03-09 RX ORDER — FAMOTIDINE 10 MG/ML
INJECTION, SOLUTION INTRAVENOUS
COMMUNITY
Start: 2023-03-09

## 2023-03-09 RX ORDER — SODIUM CHLORIDE 9 MG/ML
5-250 INJECTION, SOLUTION INTRAVENOUS PRN
Status: ACTIVE | OUTPATIENT
Start: 2023-03-09 | End: 2023-03-10

## 2023-03-09 RX ORDER — DEXAMETHASONE SODIUM PHOSPHATE 4 MG/ML
8 INJECTION, SOLUTION INTRA-ARTICULAR; INTRALESIONAL; INTRAMUSCULAR; INTRAVENOUS; SOFT TISSUE ONCE
Status: COMPLETED | OUTPATIENT
Start: 2023-03-09 | End: 2023-03-09

## 2023-03-09 RX ORDER — ONDANSETRON 8 MG/1
TABLET, ORALLY DISINTEGRATING ORAL
COMMUNITY
Start: 2023-02-17

## 2023-03-09 RX ORDER — DEXAMETHASONE 4 MG/1
TABLET ORAL
COMMUNITY
Start: 2023-02-17

## 2023-03-09 RX ORDER — PALONOSETRON 0.05 MG/ML
0.25 INJECTION, SOLUTION INTRAVENOUS ONCE
Status: COMPLETED | OUTPATIENT
Start: 2023-03-09 | End: 2023-03-09

## 2023-03-09 RX ADMIN — ALTEPLASE 2 MG: 2.2 INJECTION, POWDER, LYOPHILIZED, FOR SOLUTION INTRAVENOUS at 10:58

## 2023-03-09 RX ADMIN — SODIUM CHLORIDE, PRESERVATIVE FREE 20 ML: 5 INJECTION INTRAVENOUS at 10:33

## 2023-03-09 RX ADMIN — ALTEPLASE 2 MG: 2.2 INJECTION, POWDER, LYOPHILIZED, FOR SOLUTION INTRAVENOUS at 10:59

## 2023-03-09 RX ADMIN — DIPHENHYDRAMINE HYDROCHLORIDE 25 MG: 50 INJECTION INTRAMUSCULAR; INTRAVENOUS at 13:01

## 2023-03-09 RX ADMIN — SODIUM CHLORIDE, PRESERVATIVE FREE 20 ML: 5 INJECTION INTRAVENOUS at 12:35

## 2023-03-09 RX ADMIN — SODIUM CHLORIDE, PRESERVATIVE FREE 20 ML: 5 INJECTION INTRAVENOUS at 14:44

## 2023-03-09 RX ADMIN — PEGFILGRASTIM 6 MG: KIT SUBCUTANEOUS at 16:15

## 2023-03-09 RX ADMIN — PALONOSETRON HYDROCHLORIDE 0.25 MG: 0.25 INJECTION INTRAVENOUS at 12:46

## 2023-03-09 RX ADMIN — SODIUM CHLORIDE 670 MG: 9 INJECTION, SOLUTION INTRAVENOUS at 13:46

## 2023-03-09 RX ADMIN — HEPARIN SODIUM (PORCINE) LOCK FLUSH IV SOLN 100 UNIT/ML 500 UNITS: 100 SOLUTION at 14:44

## 2023-03-09 RX ADMIN — DOCETAXEL 130 MG: 10 INJECTION, SOLUTION INTRAVENOUS at 15:00

## 2023-03-09 RX ADMIN — FAMOTIDINE 20 MG: 10 INJECTION INTRAVENOUS at 13:06

## 2023-03-09 RX ADMIN — ALTEPLASE 2 MG: 2.2 INJECTION, POWDER, LYOPHILIZED, FOR SOLUTION INTRAVENOUS at 13:40

## 2023-03-09 RX ADMIN — SODIUM CHLORIDE, PRESERVATIVE FREE 20 ML: 5 INJECTION INTRAVENOUS at 16:45

## 2023-03-09 RX ADMIN — SODIUM CHLORIDE 25 ML/HR: 9 INJECTION, SOLUTION INTRAVENOUS at 12:38

## 2023-03-09 RX ADMIN — DEXAMETHASONE SODIUM PHOSPHATE 8 MG: 4 INJECTION INTRA-ARTICULAR; INTRALESIONAL; INTRAMUSCULAR; INTRAVENOUS; SOFT TISSUE at 12:52

## 2023-03-09 RX ADMIN — HEPARIN SODIUM (PORCINE) LOCK FLUSH IV SOLN 100 UNIT/ML 500 UNITS: 100 SOLUTION at 16:45

## 2023-03-09 NOTE — PROGRESS NOTES
[facility] Progress Note    Date: 2023    Name: Ming Rios              MRN: [id number]              : 1960    Chemotherapy Cycle:C1 D1 Ramucirumab/Docetaxel Q 21 days & Neulasta       Pt to [address], ambulatory, at 1015. Ms. Cheli Fontanez was assessed and education was provided. Reviewed patient education on chemo and premeds regarding side effects and allergic reactions. Patient verbalized understanding and hard copy given to patient to take home. Ms. Jonatan Pza vitals were reviewed. Vitals:    23 1615   BP: (!) 180/98   Pulse: 89   Resp: 20   Temp:    SpO2: 94%     U/A obtained this morning from patient and sent out STAT by  for processing. PICC dressing and stat lock changed under sterile technique after cleansing site with chlorhexidine. Biopatch and skin protectant applied, and microclaves changed. No redness, streaking, warmth, or drainage noted at site. PICC LINE New inserted on 23. Both Lumens flush well with NS but NO BLOOD RETURN NOTED. Alteplase (Cathflo) initiated per protocal to both lumens at 1158 & 1159 respectively. Patients PURPLE lumen had brisk blood return at 1235 10 ml of blood wasted per protocol and flushed thereafter with NS 20 ml. Red Lumen only showing scant amount of blood tinged fluild. Second dose of Alteplase (Cathflo) instilled in the Red Lumen at 1340 at 1430 RED lumen patent, 10 ml of blood wasted per protocol and flushed with 20 NS. Lab results were obtained and reviewed from 3/08/23 and urinalysis form 23 ok to proceed with treatment today. NS initiated at [facility] throughout treatment at 21  via the PURPLE lumen of the patient's PICC Line. Patient took her decadron 8 mg po this morning and last night per patient. Pre-medications (ALOXI . 25 mg IVP, DECADRON 8 mg IVP, Benadryl 25 mg IVP and PEPCID 20 MG IVP) were administered as ordered and chemotherapy was initiated after blood return from PURPLE Lumen re-verified. Ramucirumab (CYRAMZA) 670 mg infused over 1 hour as ordered without incident. VS stable at end of infusion and pt denied complaints. Line flushed with NS and blood return from [address] [place] [address] re-verified. DOCEtaxel (TAXOTERE) 130 mg was infused over 1 hour as ordered, without incident. Patient's blood pressure elevated but per patient she takes her b/p meds at night. Patient denies headache dizziness or headache. See vitals below. Neulasta OPI applied to back of patient's LEFT ARM and covered with Tegaderm. Green light flushing prior to discharge. Discussed with patient that removal time will be 715pm tomorrow. Patient verbalized understanding and stated \"I've worn this before. Ms. Peyton Osborne tolerated infusion, and had no complaints at this time. Both lumens of PICC flushed with NS 20 ml and Heparin 500 units today. Green end caps applied, and lumens wrapped with coban. Stockinette placed over dressing for protection. Vitals:    03/09/23 1015 03/09/23 1500 03/09/23 1615 03/09/23 1645   BP: (!) 159/81 (!) 178/86 (!) 180/98 (!) 180/97   Pulse: 94 83 89 89   Resp: 20 20 20 20   Temp: 98.3 °F (36.8 °C)   98 °F (36.7 °C)   TempSrc: Oral   Oral   SpO2: 95% 96% 94% 97%       Patient armband removed and shredded. Ms. Peyton Osborne was discharged from [place] [address] in stable condition at 66 91 21. She is to return on 03/16/23 at 1000 for her next PICC LINE Care Weekly appointment.       Darwin Chow RN  March 9, 2023  4:33 PM

## 2023-03-16 ENCOUNTER — HOSPITAL ENCOUNTER (OUTPATIENT)
Facility: HOSPITAL | Age: 63
Setting detail: INFUSION SERIES
End: 2023-03-16
Payer: MEDICARE

## 2023-03-16 VITALS
TEMPERATURE: 98.8 F | HEART RATE: 106 BPM | OXYGEN SATURATION: 95 % | SYSTOLIC BLOOD PRESSURE: 109 MMHG | DIASTOLIC BLOOD PRESSURE: 70 MMHG | RESPIRATION RATE: 18 BRPM

## 2023-03-16 PROCEDURE — 6360000002 HC RX W HCPCS: Performed by: INTERNAL MEDICINE

## 2023-03-16 PROCEDURE — 96523 IRRIG DRUG DELIVERY DEVICE: CPT

## 2023-03-16 PROCEDURE — 2580000003 HC RX 258: Performed by: INTERNAL MEDICINE

## 2023-03-16 RX ORDER — SODIUM CHLORIDE 0.9 % (FLUSH) 0.9 %
5-40 SYRINGE (ML) INJECTION 2 TIMES DAILY
Status: DISCONTINUED | OUTPATIENT
Start: 2023-03-16 | End: 2023-06-21

## 2023-03-16 RX ORDER — HEPARIN SODIUM (PORCINE) LOCK FLUSH IV SOLN 100 UNIT/ML 100 UNIT/ML
500 SOLUTION INTRAVENOUS PRN
Status: DISCONTINUED | OUTPATIENT
Start: 2023-03-16 | End: 2023-06-21

## 2023-03-16 RX ADMIN — SODIUM CHLORIDE, PRESERVATIVE FREE 20 ML: 5 INJECTION INTRAVENOUS at 10:19

## 2023-03-16 RX ADMIN — HEPARIN SODIUM (PORCINE) LOCK FLUSH IV SOLN 100 UNIT/ML 500 UNITS: 100 SOLUTION at 10:21

## 2023-03-16 NOTE — PROGRESS NOTES
[facility] Progress Note    Date: 2023    Name: Caryn Anglin    MRN: [id number]         : 1960    Weekly PICC Care    Ms. Prajapati to [facility], ambulatory at 1040. Pt was assessed and education was provided. Patient states after getting her first chemo treatment last Thursday, she has been having N/V and diarrhea. Patient was seen at Dr. Neal Youngblood office yesterday for hydration and IV anti-metics. States she is feeling better and taking immodium now for the diarrhea. Diet recommendations given to patient. She verbalized understanding. Ms. Ashley Arriaza vitals were reviewed. Vitals:    23 1011   BP: 109/70   Pulse: (!) 106   Resp: 18   Temp: 98.8 °F (37.1 °C)   SpO2: 95%       LEFT upper arm double lumen PICC line connectors changed to both lumens flushed well with NS with slugglish blood return at first.  After multiple flushing, positive blood return noted. Dressing c/d/I. Patient denies c/o arm pain. No redness, swelling or drainage noted to site. Dressing changed per protocol. Both lumens flushed with heparin flush and green curos caps to end connectors. New stockinette over PICC dressing for securement. Ms. Anastasia Rosario tolerated picc line dressing change, and had no complaints at this time. Patient armband removed and shredded. Ms. Anastasia Rosario was discharged from [place] [address] in stable condition at 200. She is to return on 3/23/23 at 1000 for her next PICC line dressing change appointment.     Tigre Ruvalcaba RN  2023  11:21 AM

## 2023-03-23 ENCOUNTER — HOSPITAL ENCOUNTER (OUTPATIENT)
Facility: HOSPITAL | Age: 63
Setting detail: INFUSION SERIES
End: 2023-03-23
Payer: MEDICARE

## 2023-03-23 VITALS
SYSTOLIC BLOOD PRESSURE: 119 MMHG | RESPIRATION RATE: 20 BRPM | DIASTOLIC BLOOD PRESSURE: 66 MMHG | OXYGEN SATURATION: 96 % | HEART RATE: 84 BPM | TEMPERATURE: 98.2 F

## 2023-03-23 PROCEDURE — 6360000002 HC RX W HCPCS

## 2023-03-23 PROCEDURE — 2580000003 HC RX 258: Performed by: INTERNAL MEDICINE

## 2023-03-23 PROCEDURE — 96523 IRRIG DRUG DELIVERY DEVICE: CPT

## 2023-03-23 PROCEDURE — 6360000002 HC RX W HCPCS: Performed by: INTERNAL MEDICINE

## 2023-03-23 RX ORDER — ONDANSETRON 2 MG/ML
8 INJECTION INTRAMUSCULAR; INTRAVENOUS
Status: CANCELLED | OUTPATIENT
Start: 2023-03-30

## 2023-03-23 RX ORDER — ACETAMINOPHEN 325 MG/1
650 TABLET ORAL
Status: CANCELLED | OUTPATIENT
Start: 2023-03-30

## 2023-03-23 RX ORDER — SODIUM CHLORIDE 9 MG/ML
5-250 INJECTION, SOLUTION INTRAVENOUS PRN
Status: CANCELLED | OUTPATIENT
Start: 2023-03-30

## 2023-03-23 RX ORDER — ALBUTEROL SULFATE 90 UG/1
4 AEROSOL, METERED RESPIRATORY (INHALATION) PRN
Status: CANCELLED | OUTPATIENT
Start: 2023-03-30

## 2023-03-23 RX ORDER — MEPERIDINE HYDROCHLORIDE 25 MG/ML
12.5 INJECTION INTRAMUSCULAR; INTRAVENOUS; SUBCUTANEOUS PRN
Status: CANCELLED | OUTPATIENT
Start: 2023-03-30

## 2023-03-23 RX ORDER — HEPARIN SODIUM (PORCINE) LOCK FLUSH IV SOLN 100 UNIT/ML 100 UNIT/ML
SOLUTION INTRAVENOUS
Status: COMPLETED
Start: 2023-03-23 | End: 2023-03-23

## 2023-03-23 RX ORDER — EPINEPHRINE 1 MG/ML
0.3 INJECTION, SOLUTION, CONCENTRATE INTRAVENOUS PRN
Status: CANCELLED | OUTPATIENT
Start: 2023-03-30

## 2023-03-23 RX ORDER — SODIUM CHLORIDE 0.9 % (FLUSH) 0.9 %
5-40 SYRINGE (ML) INJECTION PRN
Status: DISCONTINUED | OUTPATIENT
Start: 2023-03-23 | End: 2023-06-21

## 2023-03-23 RX ORDER — HEPARIN SODIUM (PORCINE) LOCK FLUSH IV SOLN 100 UNIT/ML 100 UNIT/ML
500 SOLUTION INTRAVENOUS PRN
Status: DISCONTINUED | OUTPATIENT
Start: 2023-03-23 | End: 2023-06-21

## 2023-03-23 RX ORDER — SODIUM CHLORIDE 9 MG/ML
INJECTION, SOLUTION INTRAVENOUS CONTINUOUS
Status: CANCELLED | OUTPATIENT
Start: 2023-03-30

## 2023-03-23 RX ORDER — ACETAMINOPHEN 325 MG/1
650 TABLET ORAL
Status: CANCELLED
Start: 2023-03-30

## 2023-03-23 RX ORDER — DIPHENHYDRAMINE HYDROCHLORIDE 50 MG/ML
50 INJECTION INTRAMUSCULAR; INTRAVENOUS
Status: CANCELLED | OUTPATIENT
Start: 2023-03-30

## 2023-03-23 RX ADMIN — SODIUM CHLORIDE, PRESERVATIVE FREE 10 ML: 5 INJECTION INTRAVENOUS at 10:30

## 2023-03-23 RX ADMIN — HEPARIN SODIUM (PORCINE) LOCK FLUSH IV SOLN 100 UNIT/ML 500 UNITS: 100 SOLUTION at 10:30

## 2023-03-23 RX ADMIN — HEPARIN SODIUM (PORCINE) LOCK FLUSH IV SOLN 100 UNIT/ML 500 UNITS: 100 SOLUTION at 10:32

## 2023-03-23 RX ADMIN — SODIUM CHLORIDE, PRESERVATIVE FREE 10 ML: 5 INJECTION INTRAVENOUS at 10:32

## 2023-03-23 ASSESSMENT — PAIN - FUNCTIONAL ASSESSMENT: PAIN_FUNCTIONAL_ASSESSMENT: NONE - DENIES PAIN

## 2023-03-23 NOTE — PROGRESS NOTES
[facility] Progress Note    Date: 2023    Name: Tanner Armendariz    MRN: [id number]         : 1960    Ms. Prajapati arrived in the [facility] today at 1010, in stable condition, here for her WEEKLY PICC LINE CARE. She was assessed and education was provided. Ms. Cathie Rahman vitals were reviewed. Vitals:    23 1010   BP: 119/66   Pulse: 84   Resp: 20   Temp: 98.2 °F (36.8 °C)   SpO2: 96%       Ms. Prajapati presented to the infusion center today stating that she was doing well, and voicing no major complaints. HER LEFT UPPER ARM DOUBLE LUMEN PICC LINE WAS NOTED TO BE COMPLETELY DRY AND INTACT, AND BOTH LUMENS HAD A BRISK BLOOD RETURN AND FLUSHED EASILY WITH NS. Her PICC line dressing was changed today per policy, and without incident, then both lumens of her PICC line were flushed well per policy with NS & Heparin, and then the end caps were also changed, and alcohol caps applied. Ms. Rhys Gomez tolerated well and voiced no complaints. Ms. Rhys Gomez was discharged from [place] [address] in stable condition at 21 , with her PICC line completely dry and intact. She is to return on Wednesday, 3-29-23 at 1015, for her next appointment, for pre-chemo labs. And then, she is scheduled to return on next Thursday, 3-30-23 at 1000, for chemotherapy and Weekly PICC Care.      Fela Dooley RN  2023  10:25 AM

## 2023-03-29 ENCOUNTER — HOSPITAL ENCOUNTER (OUTPATIENT)
Facility: HOSPITAL | Age: 63
Setting detail: INFUSION SERIES
End: 2023-03-29
Payer: MEDICARE

## 2023-03-29 VITALS
TEMPERATURE: 98.9 F | OXYGEN SATURATION: 99 % | DIASTOLIC BLOOD PRESSURE: 77 MMHG | WEIGHT: 141.4 LBS | HEART RATE: 85 BPM | BODY MASS INDEX: 22.15 KG/M2 | SYSTOLIC BLOOD PRESSURE: 125 MMHG | RESPIRATION RATE: 20 BRPM

## 2023-03-29 LAB
ALBUMIN SERPL-MCNC: 2.5 G/DL (ref 3.4–5)
ALBUMIN/GLOB SERPL: 0.6 (ref 0.8–1.7)
ALP SERPL-CCNC: 85 U/L (ref 45–117)
ALT SERPL-CCNC: 11 U/L (ref 13–56)
ANION GAP SERPL CALC-SCNC: 7 MMOL/L (ref 3–18)
APPEARANCE UR: CLEAR
AST SERPL-CCNC: 19 U/L (ref 10–38)
BACTERIA URNS QL MICRO: NEGATIVE /HPF
BASOPHILS # BLD: 0.1 K/UL (ref 0–0.1)
BASOPHILS NFR BLD: 0 % (ref 0–2)
BILIRUB SERPL-MCNC: 0.3 MG/DL (ref 0.2–1)
BILIRUB UR QL: NEGATIVE
BUN SERPL-MCNC: 5 MG/DL (ref 7–18)
BUN/CREAT SERPL: 8 (ref 12–20)
CALCIUM SERPL-MCNC: 8.3 MG/DL (ref 8.5–10.1)
CHLORIDE SERPL-SCNC: 99 MMOL/L (ref 100–111)
CO2 SERPL-SCNC: 31 MMOL/L (ref 21–32)
COLOR UR: ABNORMAL
CREAT SERPL-MCNC: 0.61 MG/DL (ref 0.6–1.3)
DIFFERENTIAL METHOD BLD: ABNORMAL
EOSINOPHIL # BLD: 0 K/UL (ref 0–0.4)
EOSINOPHIL NFR BLD: 0 % (ref 0–5)
EPITH CASTS URNS QL MICRO: ABNORMAL /LPF (ref 0–5)
ERYTHROCYTE [DISTWIDTH] IN BLOOD BY AUTOMATED COUNT: 17.5 % (ref 11.6–14.5)
GLOBULIN SER CALC-MCNC: 4 G/DL (ref 2–4)
GLUCOSE SERPL-MCNC: 121 MG/DL (ref 74–99)
GLUCOSE UR STRIP.AUTO-MCNC: NEGATIVE MG/DL
HCT VFR BLD AUTO: 30.8 % (ref 35–45)
HGB BLD-MCNC: 9.7 G/DL (ref 12–16)
HGB UR QL STRIP: NEGATIVE
IMM GRANULOCYTES # BLD AUTO: 0.1 K/UL (ref 0–0.04)
IMM GRANULOCYTES NFR BLD AUTO: 1 % (ref 0–0.5)
KETONES UR QL STRIP.AUTO: NEGATIVE MG/DL
LEUKOCYTE ESTERASE UR QL STRIP.AUTO: ABNORMAL
LYMPHOCYTES # BLD: 1.3 K/UL (ref 0.9–3.6)
LYMPHOCYTES NFR BLD: 10 % (ref 21–52)
MCH RBC QN AUTO: 23.8 PG (ref 24–34)
MCHC RBC AUTO-ENTMCNC: 31.5 G/DL (ref 31–37)
MCV RBC AUTO: 75.5 FL (ref 78–100)
MONOCYTES # BLD: 0.5 K/UL (ref 0.05–1.2)
MONOCYTES NFR BLD: 4 % (ref 3–10)
MUCOUS THREADS URNS QL MICRO: ABNORMAL /LPF
NEUTS SEG # BLD: 12 K/UL (ref 1.8–8)
NEUTS SEG NFR BLD: 86 % (ref 40–73)
NITRITE UR QL STRIP.AUTO: NEGATIVE
NRBC # BLD: 0 K/UL (ref 0–0.01)
NRBC BLD-RTO: 0 PER 100 WBC
PH UR STRIP: 6 (ref 5–8)
PLATELET # BLD AUTO: 485 K/UL (ref 135–420)
PMV BLD AUTO: 9.9 FL (ref 9.2–11.8)
POTASSIUM SERPL-SCNC: 3.4 MMOL/L (ref 3.5–5.5)
PROT SERPL-MCNC: 6.5 G/DL (ref 6.4–8.2)
PROT UR STRIP-MCNC: 30 MG/DL
RBC # BLD AUTO: 4.08 M/UL (ref 4.2–5.3)
RBC #/AREA URNS HPF: ABNORMAL /HPF (ref 0–5)
SODIUM SERPL-SCNC: 137 MMOL/L (ref 136–145)
SP GR UR REFRACTOMETRY: 1.02 (ref 1–1.03)
UROBILINOGEN UR QL STRIP.AUTO: 2 EU/DL (ref 0.2–1)
WBC # BLD AUTO: 13.9 K/UL (ref 4.6–13.2)
WBC URNS QL MICRO: ABNORMAL /HPF (ref 0–4)

## 2023-03-29 PROCEDURE — 80053 COMPREHEN METABOLIC PANEL: CPT

## 2023-03-29 PROCEDURE — 36415 COLL VENOUS BLD VENIPUNCTURE: CPT

## 2023-03-29 PROCEDURE — 85025 COMPLETE CBC W/AUTO DIFF WBC: CPT

## 2023-03-29 PROCEDURE — 81001 URINALYSIS AUTO W/SCOPE: CPT

## 2023-03-29 NOTE — PROGRESS NOTES
[facility] Progress Note    Date: 2023    Name: Brittany Alcantara    MRN: [id number]         : 1960    Peripheral Lab Draw- Pre chemo Labs      Ms. Prajapati to [facility], ambulatory at 1350. Ms. Prajapati's vitals were reviewed and patient was observed for 5 minutes prior to treatment. Vitals:    23 1350   BP: 125/77   Pulse: 85   Resp: 20   Temp: 98.9 °F (37.2 °C)   SpO2: 99%       Blood obtained peripherally from right AC x 1 attempt with butterfly needle. CBC ran on site, due to flags CBC/CMP sent to lab for processing per written orders. No bleeding or hematoma noted at site. Gauze and coban applied. Urinalysis collected and sent to lab. Ms. Toy Medina tolerated the phlebotomy, and had no complaints. Patient armbands removed and shredded. Ms. Toy Medina was discharged from [place] [address] in stable condition at 1400. She is to return on 23 at 1000 for her next appointment.     Flory Jama RN  2023  2:53 PM

## 2023-03-30 ENCOUNTER — APPOINTMENT (OUTPATIENT)
Facility: HOSPITAL | Age: 63
End: 2023-03-30
Payer: MEDICARE

## 2023-03-30 ENCOUNTER — HOSPITAL ENCOUNTER (OUTPATIENT)
Facility: HOSPITAL | Age: 63
Setting detail: INFUSION SERIES
End: 2023-03-30
Payer: MEDICARE

## 2023-03-30 VITALS
OXYGEN SATURATION: 99 % | RESPIRATION RATE: 18 BRPM | HEART RATE: 85 BPM | TEMPERATURE: 97.9 F | SYSTOLIC BLOOD PRESSURE: 159 MMHG | DIASTOLIC BLOOD PRESSURE: 85 MMHG

## 2023-03-30 DIAGNOSIS — C34.90 NON-SMALL CELL LUNG CANCER, UNSPECIFIED LATERALITY (HCC): Primary | ICD-10-CM

## 2023-03-30 PROCEDURE — 6360000002 HC RX W HCPCS: Performed by: INTERNAL MEDICINE

## 2023-03-30 PROCEDURE — 96367 TX/PROPH/DG ADDL SEQ IV INF: CPT

## 2023-03-30 PROCEDURE — 96375 TX/PRO/DX INJ NEW DRUG ADDON: CPT

## 2023-03-30 PROCEDURE — 96417 CHEMO IV INFUS EACH ADDL SEQ: CPT

## 2023-03-30 PROCEDURE — A4216 STERILE WATER/SALINE, 10 ML: HCPCS | Performed by: INTERNAL MEDICINE

## 2023-03-30 PROCEDURE — 96377 APPLICATON ON-BODY INJECTOR: CPT

## 2023-03-30 PROCEDURE — 2500000003 HC RX 250 WO HCPCS: Performed by: INTERNAL MEDICINE

## 2023-03-30 PROCEDURE — 96413 CHEMO IV INFUSION 1 HR: CPT

## 2023-03-30 PROCEDURE — 2580000003 HC RX 258: Performed by: INTERNAL MEDICINE

## 2023-03-30 RX ORDER — PALONOSETRON 0.05 MG/ML
0.25 INJECTION, SOLUTION INTRAVENOUS ONCE
Status: COMPLETED | OUTPATIENT
Start: 2023-03-30 | End: 2023-03-30

## 2023-03-30 RX ORDER — SODIUM CHLORIDE 9 MG/ML
5-40 INJECTION INTRAVENOUS PRN
Status: ACTIVE | OUTPATIENT
Start: 2023-03-30 | End: 2023-03-31

## 2023-03-30 RX ORDER — SODIUM CHLORIDE 9 MG/ML
5-250 INJECTION, SOLUTION INTRAVENOUS PRN
Status: ACTIVE | OUTPATIENT
Start: 2023-03-30 | End: 2023-03-31

## 2023-03-30 RX ORDER — HEPARIN SODIUM (PORCINE) LOCK FLUSH IV SOLN 100 UNIT/ML 100 UNIT/ML
500 SOLUTION INTRAVENOUS PRN
Status: ACTIVE | OUTPATIENT
Start: 2023-03-30 | End: 2023-03-31

## 2023-03-30 RX ORDER — DEXAMETHASONE SODIUM PHOSPHATE 4 MG/ML
8 INJECTION, SOLUTION INTRA-ARTICULAR; INTRALESIONAL; INTRAMUSCULAR; INTRAVENOUS; SOFT TISSUE ONCE
Status: COMPLETED | OUTPATIENT
Start: 2023-03-30 | End: 2023-03-30

## 2023-03-30 RX ORDER — DIPHENHYDRAMINE HYDROCHLORIDE 50 MG/ML
25 INJECTION INTRAMUSCULAR; INTRAVENOUS ONCE
Status: COMPLETED | OUTPATIENT
Start: 2023-03-30 | End: 2023-03-30

## 2023-03-30 RX ADMIN — FAMOTIDINE 20 MG: 10 INJECTION INTRAVENOUS at 10:49

## 2023-03-30 RX ADMIN — HEPARIN SODIUM (PORCINE) LOCK FLUSH IV SOLN 100 UNIT/ML 500 UNITS: 100 SOLUTION at 14:40

## 2023-03-30 RX ADMIN — PEGFILGRASTIM 6 MG: KIT SUBCUTANEOUS at 14:35

## 2023-03-30 RX ADMIN — PALONOSETRON HYDROCHLORIDE 0.25 MG: 0.25 INJECTION, SOLUTION INTRAVENOUS at 12:20

## 2023-03-30 RX ADMIN — SODIUM CHLORIDE, PRESERVATIVE FREE 10 ML: 5 INJECTION INTRAVENOUS at 10:52

## 2023-03-30 RX ADMIN — SODIUM CHLORIDE, PRESERVATIVE FREE 60 ML: 5 INJECTION INTRAVENOUS at 10:20

## 2023-03-30 RX ADMIN — SODIUM CHLORIDE 25 ML/HR: 9 INJECTION, SOLUTION INTRAVENOUS at 10:20

## 2023-03-30 RX ADMIN — SODIUM CHLORIDE 670 MG: 9 INJECTION, SOLUTION INTRAVENOUS at 11:43

## 2023-03-30 RX ADMIN — DEXAMETHASONE SODIUM PHOSPHATE 8 MG: 4 INJECTION INTRA-ARTICULAR; INTRALESIONAL; INTRAMUSCULAR; INTRAVENOUS; SOFT TISSUE at 12:54

## 2023-03-30 RX ADMIN — DOCETAXEL 130 MG: 10 INJECTION, SOLUTION INTRAVENOUS at 13:24

## 2023-03-30 RX ADMIN — FOSAPREPITANT 150 MG: 150 INJECTION, POWDER, LYOPHILIZED, FOR SOLUTION INTRAVENOUS at 12:22

## 2023-03-30 RX ADMIN — DIPHENHYDRAMINE HYDROCHLORIDE 25 MG: 50 INJECTION INTRAMUSCULAR; INTRAVENOUS at 10:52

## 2023-03-30 NOTE — PROGRESS NOTES
Pharmacy Note     Name: Rashid Escobedo  : 1960  Estimated body surface area is 1.78 meters squared as calculated from the following:    Height as of 23: 5' 7\" (1.702 m). Weight as of 3/8/23: 147 lb 3.2 oz (66.8 kg). Diagnosis: NSCLC  Treatment Plan: Ramucirumab + docetaxel  Cycle/Day: C2D1  Cycle Start Date: 3/30/23    Lab Results   Component Value Date/Time    WBC 7.1 2023 01:30 PM     2023 01:30 PM     No components found for: ANEU  Lab Results   Component Value Date/Time    CREAPOC 0.7 2021 04:21 PM     Most Recent Creatinine Clearance:  CrCl:  (based on Adjusted Body Weight)  Creatinine: .71 mg/dL (2023 01:31 PM)    Pharmacy Intervention:  Spoke to Dr. Darell Messina nurse at [facility]. 15. Patient experienced a lot of N/V after Cycle 1. Emend 150 mg IV to be added to Cycle 2.   Dexamethasone decreased to 12 mg if needed  Plan updated in [facility] to continue to monitor      Pharmacist: MARIA TERESA Goodman MARIELOS [facility]
Pharmacy Note     Name: Stas Waterman  : 1960  Estimated body surface area is 1.74 meters squared as calculated from the following:    Height as of 23: 5' 7\" (1.702 m). Weight as of 3/29/23: 141 lb 6.4 oz (64.1 kg). Diagnosis: NSCLC  Treatment Plan: Ramucirumab + docetaxel  Cycle/Day: C2D1  Cycle Start Date: 3/30/23    Lab Results   Component Value Date/Time    WBC 13.9 2023 01:55 PM     2023 01:55 PM     No components found for: ANEU  Lab Results   Component Value Date/Time    CREAPOC 0.7 2021 04:21 PM     Most Recent Creatinine Clearance:  CrCl: 92 mL/min  (based on Adjusted Body Weight)  Creatinine: .61 mg/dL (2023 01:55 PM)    Pharmacy Intervention:  Patient reported to [facility] for treatment and it was relayed by Priscila Messina RN that the patient has taken dexamethasone PO the day before AND day of as instructed by Dr. Xi Ferrari. Current treatment plan includes contingent for if the patient has taken dexamethasone PO day before or not. However, since the patient has taken dexamethasone the day before and morning of, it was unclear and thus a call was placed to Dr. Xi Ferrari. The above was discussed with Nathalia Gloria RN and inquired on how Dr. Xi Ferrari would wish to proceed. After confirming with Dr. Morales Prabhakar reported that the patient should still receive dexamethasone 8 mg IVP prior to docetaxel. After repeating back and confirming, it was relayed that the plan will include that updated information moving forward. Pharmacy to continue to monitor accordingly.      Pharmacist: Minal Bradford, [facility]
Urine 03/29/2023 Negative  NEG   Final    Urobilinogen, Urine 03/29/2023 2.0 (A)  0.2 - 1.0 EU/dL Final    Nitrite, Urine 03/29/2023 Negative  NEG   Final    Leukocyte Esterase, Urine 03/29/2023 TRACE (A)  NEG   Final    WBC, UA 03/29/2023 4 to 10  0 - 4 /hpf Final    RBC, UA 03/29/2023 0 to 1  0 - 5 /hpf Final    Epithelial Cells UA 03/29/2023 2+  0 - 5 /lpf Final    BACTERIA, URINE 03/29/2023 Negative  NEG /hpf Final    Mucus, UA 03/29/2023 3+ (A)  NEG /lpf Final       Pt with L upper arm double lumen PICC line, due for PICC care today. Removed end caps from lumens & replaced w/ new caps primed w/ NS. Each lumen flushes without difficulty. Following 20ml NS flush to each lumen, brisk blood return noted in both lumens. NS infusing as ordered at [facility]. Pre-medications were administered as ordered: Pepcid 20mg IVP & Benadryl 25mg IVP. PICC care was then performed at this time. No redness, bruising, or swelling noted at site and/ or extremity. No drainage or SSI noted. Educated pt on ways of keeping her PICC line dressing dry since it was noted to be some moisture underneath the old dressing. Pt verbalized understanding of the importance of keeping it dry. Cyramza 670mg IV administered as ordered followed by NS flush. Additional pre-medications were then administered as ordered: Aloxi 0.25 IVP, Emend 150mg IVPB, & Dexamethasone 8mg IVP. (Pt reported she took her rx Dexamethasone 8mg po as ordered yesterday morning & this morning. D/w Krista Ayaan in pharmacy who called & confirmed w/ Dr. Joss Winslow that he does want pt to get 8mg IVP Dexamethasone as part of her pre-medication regimen even if she already took 8mg po Dexamethasone by mouth on day of tx.)     Taxotere 130mg IV administered as ordered followed by NS flush. Neulasta on-body injector placed on pt's LLQ abdomen. Verified proper functioning of unit by visualizing green blinking light.   Pt verbalized understanding regarding the function of the

## 2023-04-05 PROBLEM — R53.83 FATIGUE: Status: ACTIVE | Noted: 2023-04-05

## 2023-04-05 PROBLEM — F43.22 ADJUSTMENT DISORDER WITH ANXIOUS MOOD: Status: ACTIVE | Noted: 2023-04-05

## 2023-04-05 PROBLEM — J30.2 PERENNIAL ALLERGIC RHINITIS WITH SEASONAL VARIATION: Status: ACTIVE | Noted: 2022-03-15

## 2023-04-05 PROBLEM — J30.1 ALLERGIC RHINITIS DUE TO POLLEN: Status: ACTIVE | Noted: 2022-03-15

## 2023-04-05 PROBLEM — N63.0 BREAST MASS SEEN ON MAMMOGRAM: Status: ACTIVE | Noted: 2023-04-05

## 2023-04-05 PROBLEM — J30.89 PERENNIAL ALLERGIC RHINITIS WITH SEASONAL VARIATION: Status: ACTIVE | Noted: 2022-03-15

## 2023-04-05 PROBLEM — I26.99 PULMONARY EMBOLISM (HCC): Status: ACTIVE | Noted: 2023-04-05

## 2023-04-05 NOTE — PROGRESS NOTES
Chief Complaint   Patient presents with    Anemia    Anxiety    Depression    Hypothyroidism    Other     Hx of right lobe lung cancer     Fatigue     Along with weakness (experiencing some diarrhea, vomiting) for the past week. Mouth Lesions       1. \"Have you been to the ER, urgent care clinic since your last visit? Hospitalized since your last visit? \" No    2. \"Have you seen or consulted any other health care providers outside of the [address] [place] [address] since your last visit? \" No     3. For patients aged 39-70: Has the patient had a colonoscopy / FIT/ Cologuard? Yes - no Care Gap present due 11/07/2024      If the patient is female:    4. For patients aged 41-77: Has the patient had a mammogram within the past 2 years? Yes - no Care Gap present      5. For patients aged 21-65: Has the patient had a pap smear?  No hx of hysterectomy

## 2023-04-06 ENCOUNTER — OFFICE VISIT (OUTPATIENT)
Age: 63
End: 2023-04-06
Payer: MEDICARE

## 2023-04-06 ENCOUNTER — HOSPITAL ENCOUNTER (OUTPATIENT)
Facility: HOSPITAL | Age: 63
Setting detail: INFUSION SERIES
End: 2023-04-06
Payer: MEDICARE

## 2023-04-06 VITALS
TEMPERATURE: 98.8 F | HEART RATE: 104 BPM | OXYGEN SATURATION: 100 % | DIASTOLIC BLOOD PRESSURE: 68 MMHG | HEIGHT: 67 IN | BODY MASS INDEX: 20.56 KG/M2 | RESPIRATION RATE: 16 BRPM | WEIGHT: 131 LBS | SYSTOLIC BLOOD PRESSURE: 136 MMHG

## 2023-04-06 VITALS
DIASTOLIC BLOOD PRESSURE: 81 MMHG | OXYGEN SATURATION: 94 % | TEMPERATURE: 98 F | RESPIRATION RATE: 18 BRPM | SYSTOLIC BLOOD PRESSURE: 132 MMHG | HEART RATE: 99 BPM

## 2023-04-06 DIAGNOSIS — F33.0 MAJOR DEPRESSIVE DISORDER, RECURRENT, MILD (HCC): ICD-10-CM

## 2023-04-06 DIAGNOSIS — D68.59 OTHER PRIMARY THROMBOPHILIA (HCC): Primary | ICD-10-CM

## 2023-04-06 PROBLEM — I26.99 PULMONARY EMBOLISM (HCC): Status: RESOLVED | Noted: 2023-04-05 | Resolved: 2023-04-06

## 2023-04-06 PROCEDURE — 3075F SYST BP GE 130 - 139MM HG: CPT | Performed by: FAMILY MEDICINE

## 2023-04-06 PROCEDURE — 3078F DIAST BP <80 MM HG: CPT | Performed by: FAMILY MEDICINE

## 2023-04-06 PROCEDURE — 2580000003 HC RX 258: Performed by: INTERNAL MEDICINE

## 2023-04-06 PROCEDURE — 96523 IRRIG DRUG DELIVERY DEVICE: CPT

## 2023-04-06 PROCEDURE — 99214 OFFICE O/P EST MOD 30 MIN: CPT | Performed by: FAMILY MEDICINE

## 2023-04-06 PROCEDURE — 3017F COLORECTAL CA SCREEN DOC REV: CPT | Performed by: FAMILY MEDICINE

## 2023-04-06 PROCEDURE — G8420 CALC BMI NORM PARAMETERS: HCPCS | Performed by: FAMILY MEDICINE

## 2023-04-06 PROCEDURE — 4004F PT TOBACCO SCREEN RCVD TLK: CPT | Performed by: FAMILY MEDICINE

## 2023-04-06 PROCEDURE — 6360000002 HC RX W HCPCS: Performed by: INTERNAL MEDICINE

## 2023-04-06 PROCEDURE — G8427 DOCREV CUR MEDS BY ELIG CLIN: HCPCS | Performed by: FAMILY MEDICINE

## 2023-04-06 RX ORDER — SODIUM CHLORIDE 0.9 % (FLUSH) 0.9 %
5-40 SYRINGE (ML) INJECTION PRN
Status: DISCONTINUED | OUTPATIENT
Start: 2023-04-06 | End: 2023-06-21

## 2023-04-06 RX ORDER — LOPERAMIDE HYDROCHLORIDE 2 MG/1
TABLET ORAL
COMMUNITY
Start: 2023-03-24

## 2023-04-06 RX ORDER — HEPARIN SODIUM (PORCINE) LOCK FLUSH IV SOLN 100 UNIT/ML 100 UNIT/ML
500 SOLUTION INTRAVENOUS PRN
Status: DISCONTINUED | OUTPATIENT
Start: 2023-04-06 | End: 2023-06-21

## 2023-04-06 RX ADMIN — SODIUM CHLORIDE, PRESERVATIVE FREE 10 ML: 5 INJECTION INTRAVENOUS at 10:50

## 2023-04-06 RX ADMIN — SODIUM CHLORIDE, PRESERVATIVE FREE 10 ML: 5 INJECTION INTRAVENOUS at 10:48

## 2023-04-06 RX ADMIN — SODIUM CHLORIDE, PRESERVATIVE FREE 10 ML: 5 INJECTION INTRAVENOUS at 10:47

## 2023-04-06 RX ADMIN — HEPARIN SODIUM (PORCINE) LOCK FLUSH IV SOLN 100 UNIT/ML 500 UNITS: 100 SOLUTION at 10:50

## 2023-04-06 RX ADMIN — SODIUM CHLORIDE, PRESERVATIVE FREE 10 ML: 5 INJECTION INTRAVENOUS at 10:49

## 2023-04-06 SDOH — ECONOMIC STABILITY: INCOME INSECURITY: HOW HARD IS IT FOR YOU TO PAY FOR THE VERY BASICS LIKE FOOD, HOUSING, MEDICAL CARE, AND HEATING?: NOT VERY HARD

## 2023-04-06 SDOH — ECONOMIC STABILITY: FOOD INSECURITY: WITHIN THE PAST 12 MONTHS, THE FOOD YOU BOUGHT JUST DIDN'T LAST AND YOU DIDN'T HAVE MONEY TO GET MORE.: NEVER TRUE

## 2023-04-06 SDOH — ECONOMIC STABILITY: FOOD INSECURITY: WITHIN THE PAST 12 MONTHS, YOU WORRIED THAT YOUR FOOD WOULD RUN OUT BEFORE YOU GOT MONEY TO BUY MORE.: NEVER TRUE

## 2023-04-06 SDOH — ECONOMIC STABILITY: HOUSING INSECURITY
IN THE LAST 12 MONTHS, WAS THERE A TIME WHEN YOU DID NOT HAVE A STEADY PLACE TO SLEEP OR SLEPT IN A SHELTER (INCLUDING NOW)?: NO

## 2023-04-06 ASSESSMENT — PATIENT HEALTH QUESTIONNAIRE - PHQ9
SUM OF ALL RESPONSES TO PHQ QUESTIONS 1-9: 3
SUM OF ALL RESPONSES TO PHQ QUESTIONS 1-9: 3
1. LITTLE INTEREST OR PLEASURE IN DOING THINGS: 0
4. FEELING TIRED OR HAVING LITTLE ENERGY: 0
SUM OF ALL RESPONSES TO PHQ QUESTIONS 1-9: 3
3. TROUBLE FALLING OR STAYING ASLEEP: 1
9. THOUGHTS THAT YOU WOULD BE BETTER OFF DEAD, OR OF HURTING YOURSELF: 0
6. FEELING BAD ABOUT YOURSELF - OR THAT YOU ARE A FAILURE OR HAVE LET YOURSELF OR YOUR FAMILY DOWN: 0
8. MOVING OR SPEAKING SO SLOWLY THAT OTHER PEOPLE COULD HAVE NOTICED. OR THE OPPOSITE, BEING SO FIGETY OR RESTLESS THAT YOU HAVE BEEN MOVING AROUND A LOT MORE THAN USUAL: 0
DEPRESSION UNABLE TO ASSESS: FUNCTIONAL CAPACITY MOTIVATION LIMITS ACCURACY
10. IF YOU CHECKED OFF ANY PROBLEMS, HOW DIFFICULT HAVE THESE PROBLEMS MADE IT FOR YOU TO DO YOUR WORK, TAKE CARE OF THINGS AT HOME, OR GET ALONG WITH OTHER PEOPLE: 1
2. FEELING DOWN, DEPRESSED OR HOPELESS: 1
SUM OF ALL RESPONSES TO PHQ QUESTIONS 1-9: 3
7. TROUBLE CONCENTRATING ON THINGS, SUCH AS READING THE NEWSPAPER OR WATCHING TELEVISION: 1
SUM OF ALL RESPONSES TO PHQ9 QUESTIONS 1 & 2: 1
5. POOR APPETITE OR OVEREATING: 0

## 2023-04-06 NOTE — PROGRESS NOTES
SUBJECTIVE   Ff-up      HTN-norvasc 7.5 mg, has been missing pills past few weeks with having more difficulty with keeping down food/mouth sores with her ongoing chemo. Says received IV fluids with infusion center this week, and has appt with oncology yvonne. No fever, no longer vomiting, no abdominal pain. She continues to smoke       Depression- about the same, fairly  well on zoloft      Non small cell lung cancer/iron def anemia-dx 2015, following dr. Yang Led.        Hypothyroidism- taking levothyroxine       ROS:   See HPI, all others negative       Social Determinants of Health            Financial Resource Strain: Not on file     Food Insecurity: Not on file     Transportation Needs: Not on file     Physical Activity: Not on file     Stress: Not on file     Social Connections: Not on file     Intimate Partner Violence: Not on file       Housing Stability: Not on file                OBJECTIVE      Physical Exam:       /68 (Site: Right Upper Arm, Position: Sitting, Cuff Size: Large Adult)   Pulse (!) 104   Temp 98.8 °F (37.1 °C) (Temporal)   Resp 16   Ht 5' 7\" (1.702 m)   Wt 131 lb (59.4 kg)   SpO2 100%   BMI 20.52 kg/m²         General: alert, chronically ill- appearing, AA, in no apparent distress or pain   CVS: normal rate, regular rhythm, distinct S1 and S2   Lungs:clear to ausculation bilaterally, no crackles, wheezing or rhonchi noted   Abdomen: normoactive bowel sounds, soft, non-tender   Extremities: no edema, no cyanosis, MSK grossly normal   Skin: warm, no lesions, rashes noted   Psych:  mood and affect normal            ASSESSMENT/PLAN   Diagnoses and all orders for this visit:      Recurrent depression (HCC)   Stable   Cont zoloft     Non-small cell cancer of right lung (HCC)   Following dr. Ede Renee hypertension   Controlled  Cont to hold norvasc, resume if BP >150/90  Also advised to hold periodically with episodes of dehydration/vomting/not tolerating po   Cont  norvasc Ambulatory

## 2023-04-06 NOTE — PROGRESS NOTES
[facility] Progress Note    Date: 2023    Name: Umer Gaspar    MRN: [id number]         : 1960    Ms. Prajapati arrived in the [facility] today at [id number] in stable condition, here for her WEEKLY PICC LINE CARE. She was assessed and education was provided. Ms. Sophia Johnson vitals were reviewed. Vitals:    23 1040   BP: 132/81   Pulse: 99   Resp: 18   Temp: 98 °F (36.7 °C)   SpO2: 94%       HER LEFT UPPER ARM DOUBLE LUMEN PICC LINE WAS NOTED TO BE COMPLETELY DRY AND INTACT, AND BOTH LUMENS HAD A BRISK BLOOD RETURN AND FLUSHED EASILY WITH NS. Her PICC line dressing was changed today per policy, and without incident, then both lumens of her PICC line were flushed well per policy with NS & Heparin, and then the end caps were also changed, and alcohol caps applied. Ms. Storm Nelson tolerated well and voiced no complaints. Ms. Storm Nelson was discharged from [place] [address] in stable condition at 1100. She is to return on 23 at 56, for her next appointment for Weekly PICC Care.      Jackie Alicea RN  2023  11:04 AM

## 2023-04-07 ENCOUNTER — HOSPITAL ENCOUNTER (EMERGENCY)
Facility: HOSPITAL | Age: 63
Discharge: HOME | End: 2023-04-07
Attending: EMERGENCY MEDICINE
Payer: MEDICARE

## 2023-04-07 VITALS
SYSTOLIC BLOOD PRESSURE: 130 MMHG | DIASTOLIC BLOOD PRESSURE: 74 MMHG | WEIGHT: 134 LBS | TEMPERATURE: 98.3 F | HEIGHT: 67 IN | OXYGEN SATURATION: 100 % | HEART RATE: 88 BPM | BODY MASS INDEX: 21.03 KG/M2 | RESPIRATION RATE: 27 BRPM

## 2023-04-07 DIAGNOSIS — E87.6 HYPOKALEMIA: ICD-10-CM

## 2023-04-07 DIAGNOSIS — E83.42 HYPOMAGNESEMIA: ICD-10-CM

## 2023-04-07 DIAGNOSIS — R00.0 TACHYCARDIA: Primary | ICD-10-CM

## 2023-04-07 LAB
ALBUMIN SERPL-MCNC: 2.8 G/DL (ref 3.4–5)
ALBUMIN/GLOB SERPL: 0.8 (ref 0.8–1.7)
ALP SERPL-CCNC: 108 U/L (ref 45–117)
ALT SERPL-CCNC: 18 U/L (ref 13–56)
ANION GAP SERPL CALC-SCNC: 6 MMOL/L (ref 3–18)
ANION GAP SERPL CALC-SCNC: 9 MMOL/L (ref 3–18)
APPEARANCE UR: CLEAR
AST SERPL-CCNC: 20 U/L (ref 10–38)
BACTERIA URNS QL MICRO: NEGATIVE /HPF
BASOPHILS # BLD: 0 K/UL (ref 0–0.1)
BASOPHILS NFR BLD: 0 % (ref 0–2)
BILIRUB SERPL-MCNC: 0.3 MG/DL (ref 0.2–1)
BILIRUB UR QL: NEGATIVE
BUN SERPL-MCNC: 4 MG/DL (ref 7–18)
BUN SERPL-MCNC: 5 MG/DL (ref 7–18)
BUN/CREAT SERPL: 7 (ref 12–20)
BUN/CREAT SERPL: 7 (ref 12–20)
CALCIUM SERPL-MCNC: 8 MG/DL (ref 8.5–10.1)
CALCIUM SERPL-MCNC: 8.1 MG/DL (ref 8.5–10.1)
CHLORIDE SERPL-SCNC: 100 MMOL/L (ref 100–111)
CHLORIDE SERPL-SCNC: 97 MMOL/L (ref 100–111)
CO2 SERPL-SCNC: 29 MMOL/L (ref 21–32)
CO2 SERPL-SCNC: 30 MMOL/L (ref 21–32)
COLOR UR: YELLOW
CREAT SERPL-MCNC: 0.59 MG/DL (ref 0.6–1.3)
CREAT SERPL-MCNC: 0.69 MG/DL (ref 0.6–1.3)
DIFFERENTIAL METHOD BLD: ABNORMAL
EOSINOPHIL # BLD: 0 K/UL (ref 0–0.4)
EOSINOPHIL NFR BLD: 0 % (ref 0–5)
EPITH CASTS URNS QL MICRO: NORMAL /LPF (ref 0–5)
ERYTHROCYTE [DISTWIDTH] IN BLOOD BY AUTOMATED COUNT: 17.6 % (ref 11.6–14.5)
GLOBULIN SER CALC-MCNC: 3.4 G/DL (ref 2–4)
GLUCOSE SERPL-MCNC: 100 MG/DL (ref 74–99)
GLUCOSE SERPL-MCNC: 107 MG/DL (ref 74–99)
GLUCOSE UR STRIP.AUTO-MCNC: NEGATIVE MG/DL
HCT VFR BLD AUTO: 32.4 % (ref 35–45)
HGB BLD-MCNC: 10.4 G/DL (ref 12–16)
HGB UR QL STRIP: NEGATIVE
IMM GRANULOCYTES # BLD AUTO: 0 K/UL (ref 0–0.04)
IMM GRANULOCYTES NFR BLD AUTO: 0 % (ref 0–0.5)
KETONES UR QL STRIP.AUTO: NEGATIVE MG/DL
LEUKOCYTE ESTERASE UR QL STRIP.AUTO: ABNORMAL
LYMPHOCYTES # BLD: 2.4 K/UL (ref 0.9–3.6)
LYMPHOCYTES NFR BLD: 9 % (ref 21–52)
MAGNESIUM SERPL-MCNC: 1.4 MG/DL (ref 1.6–2.6)
MAGNESIUM SERPL-MCNC: 2 MG/DL (ref 1.6–2.6)
MCH RBC QN AUTO: 23.4 PG (ref 24–34)
MCHC RBC AUTO-ENTMCNC: 32.1 G/DL (ref 31–37)
MCV RBC AUTO: 73 FL (ref 78–100)
MONOCYTES # BLD: 1.6 K/UL (ref 0.05–1.2)
MONOCYTES NFR BLD: 6 % (ref 3–10)
NEUTS BAND NFR BLD MANUAL: 3 %
NEUTS SEG # BLD: 22.5 K/UL (ref 1.8–8)
NEUTS SEG NFR BLD: 82 % (ref 40–73)
NITRITE UR QL STRIP.AUTO: NEGATIVE
NRBC # BLD: 0 K/UL (ref 0–0.01)
NRBC BLD-RTO: 0 PER 100 WBC
PH UR STRIP: 7.5 (ref 5–8)
PLATELET # BLD AUTO: 199 K/UL (ref 135–420)
PLATELET COMMENT: ABNORMAL
PMV BLD AUTO: 10.2 FL (ref 9.2–11.8)
POTASSIUM SERPL-SCNC: 2.3 MMOL/L (ref 3.5–5.5)
POTASSIUM SERPL-SCNC: 3.1 MMOL/L (ref 3.5–5.5)
PROT SERPL-MCNC: 6.2 G/DL (ref 6.4–8.2)
PROT UR STRIP-MCNC: ABNORMAL MG/DL
RBC # BLD AUTO: 4.44 M/UL (ref 4.2–5.3)
RBC #/AREA URNS HPF: NORMAL /HPF (ref 0–5)
RBC MORPH BLD: ABNORMAL
SODIUM SERPL-SCNC: 135 MMOL/L (ref 136–145)
SODIUM SERPL-SCNC: 136 MMOL/L (ref 136–145)
SP GR UR REFRACTOMETRY: 1.01 (ref 1–1.03)
TROPONIN I SERPL HS-MCNC: 10 NG/L (ref 0–54)
TROPONIN I SERPL HS-MCNC: 9 NG/L (ref 0–54)
UROBILINOGEN UR QL STRIP.AUTO: 1 EU/DL (ref 0.2–1)
WBC # BLD AUTO: 26.5 K/UL (ref 4.6–13.2)
WBC URNS QL MICRO: NORMAL /HPF (ref 0–4)

## 2023-04-07 PROCEDURE — 83735 ASSAY OF MAGNESIUM: CPT

## 2023-04-07 PROCEDURE — 6370000000 HC RX 637 (ALT 250 FOR IP): Performed by: EMERGENCY MEDICINE

## 2023-04-07 PROCEDURE — 81001 URINALYSIS AUTO W/SCOPE: CPT

## 2023-04-07 PROCEDURE — 80053 COMPREHEN METABOLIC PANEL: CPT

## 2023-04-07 PROCEDURE — 6360000002 HC RX W HCPCS: Performed by: EMERGENCY MEDICINE

## 2023-04-07 PROCEDURE — 93005 ELECTROCARDIOGRAM TRACING: CPT | Performed by: EMERGENCY MEDICINE

## 2023-04-07 PROCEDURE — 84484 ASSAY OF TROPONIN QUANT: CPT

## 2023-04-07 PROCEDURE — 2580000003 HC RX 258: Performed by: EMERGENCY MEDICINE

## 2023-04-07 PROCEDURE — 85025 COMPLETE CBC W/AUTO DIFF WBC: CPT

## 2023-04-07 RX ORDER — HEPARIN SODIUM (PORCINE) LOCK FLUSH IV SOLN 100 UNIT/ML 100 UNIT/ML
500 SOLUTION INTRAVENOUS PRN
Status: DISCONTINUED | OUTPATIENT
Start: 2023-04-07 | End: 2023-04-07 | Stop reason: HOSPADM

## 2023-04-07 RX ORDER — POTASSIUM CHLORIDE 7.45 MG/ML
10 INJECTION INTRAVENOUS
Status: COMPLETED | OUTPATIENT
Start: 2023-04-07 | End: 2023-04-07

## 2023-04-07 RX ORDER — 0.9 % SODIUM CHLORIDE 0.9 %
500 INTRAVENOUS SOLUTION INTRAVENOUS ONCE
Status: COMPLETED | OUTPATIENT
Start: 2023-04-07 | End: 2023-04-07

## 2023-04-07 RX ORDER — MAGNESIUM SULFATE 1 G/100ML
1000 INJECTION INTRAVENOUS
Status: COMPLETED | OUTPATIENT
Start: 2023-04-07 | End: 2023-04-07

## 2023-04-07 RX ORDER — POTASSIUM CHLORIDE 20 MEQ/1
20 TABLET, EXTENDED RELEASE ORAL 2 TIMES DAILY
Qty: 24 TABLET | Refills: 0 | Status: SHIPPED | OUTPATIENT
Start: 2023-04-07 | End: 2023-04-19

## 2023-04-07 RX ADMIN — POTASSIUM BICARBONATE 40 MEQ: 782 TABLET, EFFERVESCENT ORAL at 18:08

## 2023-04-07 RX ADMIN — SODIUM CHLORIDE 500 ML: 9 INJECTION, SOLUTION INTRAVENOUS at 12:29

## 2023-04-07 RX ADMIN — POTASSIUM BICARBONATE 40 MEQ: 782 TABLET, EFFERVESCENT ORAL at 13:09

## 2023-04-07 RX ADMIN — MAGNESIUM SULFATE HEPTAHYDRATE 1000 MG: 1 INJECTION, SOLUTION INTRAVENOUS at 14:42

## 2023-04-07 RX ADMIN — POTASSIUM CHLORIDE 10 MEQ: 7.46 INJECTION, SOLUTION INTRAVENOUS at 14:16

## 2023-04-07 RX ADMIN — HEPARIN SODIUM (PORCINE) LOCK FLUSH IV SOLN 100 UNIT/ML 500 UNITS: 100 SOLUTION at 18:08

## 2023-04-07 RX ADMIN — HEPARIN SODIUM (PORCINE) LOCK FLUSH IV SOLN 100 UNIT/ML 500 UNITS: 100 SOLUTION at 18:07

## 2023-04-07 RX ADMIN — POTASSIUM CHLORIDE 10 MEQ: 7.46 INJECTION, SOLUTION INTRAVENOUS at 13:09

## 2023-04-07 ASSESSMENT — PAIN - FUNCTIONAL ASSESSMENT: PAIN_FUNCTIONAL_ASSESSMENT: NONE - DENIES PAIN

## 2023-04-07 ASSESSMENT — ENCOUNTER SYMPTOMS
ABDOMINAL PAIN: 1
CONSTIPATION: 0
RESPIRATORY NEGATIVE: 1
VOMITING: 1
NAUSEA: 1

## 2023-04-07 NOTE — ED NOTES
PICC line flushed with Heparin per protocol and caps and coban applied. Discharge instructions reviewed with patient. Patient verbalized understanding. Patient advised to follow up as directed on discharge instructions. Patient denies questions, needs or concerns at this time. Patient verbalized understanding. No s/sx of distress noted.         Arden Alexander RN  04/07/23 2970

## 2023-04-07 NOTE — ED PROVIDER NOTES
film images such as CT, Ultrasound and MRI are read by the radiologist. Plain radiographic images are visualized and preliminarily interpreted by the emergency physician with the below findings:        Interpretation per the Radiologist below, if available at the time of this note:    No orders to display         ED BEDSIDE ULTRASOUND:   Performed by ED Physician - none    LABS:  Labs Reviewed   CBC WITH AUTO DIFFERENTIAL - Abnormal; Notable for the following components:       Result Value    WBC 26.5 (*)     Hemoglobin 10.4 (*)     Hematocrit 32.4 (*)     MCV 73.0 (*)     MCH 23.4 (*)     RDW 17.6 (*)     Seg Neutrophils 82 (*)     Lymphocytes 9 (*)     Segs Absolute 22.5 (*)     Absolute Mono # 1.6 (*)     All other components within normal limits   COMPREHENSIVE METABOLIC PANEL - Abnormal; Notable for the following components:    Sodium 135 (*)     Potassium 2.3 (*)     Chloride 97 (*)     Glucose 107 (*)     BUN 5 (*)     Bun/Cre Ratio 7 (*)     Calcium 8.0 (*)     Total Protein 6.2 (*)     Albumin 2.8 (*)     All other components within normal limits   URINALYSIS - Abnormal; Notable for the following components:    Protein, UA TRACE (*)     Leukocyte Esterase, Urine SMALL (*)     All other components within normal limits   MAGNESIUM - Abnormal; Notable for the following components:    Magnesium 1.4 (*)     All other components within normal limits   BASIC METABOLIC PANEL - Abnormal; Notable for the following components:    Potassium 3.1 (*)     Glucose 100 (*)     BUN 4 (*)     Creatinine 0.59 (*)     Bun/Cre Ratio 7 (*)     Calcium 8.1 (*)     All other components within normal limits   TROPONIN   TROPONIN   MAGNESIUM   URINALYSIS, MICRO       All other labs were within normal range or not returned as of this dictation.     EMERGENCY DEPARTMENT COURSE and DIFFERENTIAL DIAGNOSIS/MDM:   Vitals:    Vitals:    04/07/23 1300 04/07/23 1333 04/07/23 1348 04/07/23 1418   BP: 138/85 136/76 139/76 (!) 145/81   Pulse: 95

## 2023-04-07 NOTE — ED NOTES
Labs drawn from PICC left [facility] and flushed with NSS, cap replaced.      Esau Irvin RN  04/07/23 7506

## 2023-04-07 NOTE — DISCHARGE INSTRUCTIONS
Come back if you get worse!! Follow up without fail! Increase your foods with potassium and magnesium.

## 2023-04-07 NOTE — ED NOTES
Elena Benites reported potassium of 2.3. Dr. Bob Marina informed with read back.      Spencer Archer RN  04/07/23 6808

## 2023-04-07 NOTE — ED TRIAGE NOTES
Sent by Dr. Mcfarlane Early routine Oncology appointment for possible new onset Afib. Pt has been generally weak and experiencing intermittent N/V following chemo treatments. Denies CP or SOB.
Statement Selected

## 2023-04-08 LAB
EKG ATRIAL RATE: 105 BPM
EKG DIAGNOSIS: NORMAL
EKG P AXIS: 81 DEGREES
EKG P-R INTERVAL: 122 MS
EKG Q-T INTERVAL: 328 MS
EKG QRS DURATION: 82 MS
EKG QTC CALCULATION (BAZETT): 433 MS
EKG R AXIS: 88 DEGREES
EKG T AXIS: -55 DEGREES
EKG VENTRICULAR RATE: 105 BPM

## 2023-04-12 ENCOUNTER — HOSPITAL ENCOUNTER (OUTPATIENT)
Facility: HOSPITAL | Age: 63
Setting detail: INFUSION SERIES
Discharge: HOME | End: 2023-04-15
Payer: MEDICARE

## 2023-04-12 VITALS
TEMPERATURE: 98.3 F | DIASTOLIC BLOOD PRESSURE: 77 MMHG | SYSTOLIC BLOOD PRESSURE: 118 MMHG | OXYGEN SATURATION: 94 % | HEART RATE: 100 BPM | RESPIRATION RATE: 18 BRPM

## 2023-04-12 PROCEDURE — 96523 IRRIG DRUG DELIVERY DEVICE: CPT

## 2023-04-12 PROCEDURE — 6360000002 HC RX W HCPCS: Performed by: INTERNAL MEDICINE

## 2023-04-12 PROCEDURE — 2580000003 HC RX 258: Performed by: INTERNAL MEDICINE

## 2023-04-12 RX ORDER — SODIUM CHLORIDE 0.9 % (FLUSH) 0.9 %
5-40 SYRINGE (ML) INJECTION 2 TIMES DAILY
Status: DISCONTINUED | OUTPATIENT
Start: 2023-04-12 | End: 2023-06-21

## 2023-04-12 RX ORDER — HEPARIN SODIUM (PORCINE) LOCK FLUSH IV SOLN 100 UNIT/ML 100 UNIT/ML
500 SOLUTION INTRAVENOUS PRN
Status: DISCONTINUED | OUTPATIENT
Start: 2023-04-12 | End: 2023-06-21

## 2023-04-12 RX ADMIN — HEPARIN SODIUM (PORCINE) LOCK FLUSH IV SOLN 100 UNIT/ML 500 UNITS: 100 SOLUTION at 11:15

## 2023-04-12 RX ADMIN — SODIUM CHLORIDE, PRESERVATIVE FREE 40 ML: 5 INJECTION INTRAVENOUS at 11:15

## 2023-04-12 NOTE — PROGRESS NOTES
[facility] Progress Note    Date: 2023    Name: Jennifer Lino    MRN: [id number]         : 1960    Weekly PICC Care (Chemo on hold until 23)    Ms. Prajapati to [facility], ambulatory at 1055. Pt was assessed and education was provided. Ms. Marlo Barbour vitals were reviewed. Vitals:    23 1055   BP: 118/77   Pulse: 100   Resp: 18   Temp: 98.3 °F (36.8 °C)   SpO2: 94%     LEFT upper arm double lumen PICC line dressing changed today per protocol. Both lumens flushed well with NS and heparin. However, NO BLOOD return from either lumen after multiple flushes and re-positioning. Pt advised she may need Cathflo prior to her chemo tx visit. Pt verbalized understanding. Green cuero caps applied and lumens wrapped in coban, new net stocking placed over dressing for protection. Ms. Devan Maciel tolerated well and had no complaints at this time. Pt armband removed and shredded. Ms. Devan Maciel was discharged from [place] [address] in stable condition at 1120. She is to return on 23 at 1330 for her next PICC line dressing change appointment.     Jelani Thompson RN  2023  12:29 PM

## 2023-04-15 ENCOUNTER — APPOINTMENT (OUTPATIENT)
Facility: HOSPITAL | Age: 63
End: 2023-04-15
Payer: MEDICARE

## 2023-04-15 ENCOUNTER — HOSPITAL ENCOUNTER (EMERGENCY)
Facility: HOSPITAL | Age: 63
Discharge: HOME | End: 2023-04-15
Attending: EMERGENCY MEDICINE
Payer: MEDICARE

## 2023-04-15 VITALS
RESPIRATION RATE: 18 BRPM | OXYGEN SATURATION: 97 % | SYSTOLIC BLOOD PRESSURE: 157 MMHG | TEMPERATURE: 98.4 F | HEART RATE: 98 BPM | DIASTOLIC BLOOD PRESSURE: 95 MMHG

## 2023-04-15 DIAGNOSIS — L02.91 ABSCESS: Primary | ICD-10-CM

## 2023-04-15 LAB
ALBUMIN SERPL-MCNC: 2.7 G/DL (ref 3.4–5)
ALBUMIN/GLOB SERPL: 0.6 (ref 0.8–1.7)
ALP SERPL-CCNC: 92 U/L (ref 45–117)
ALT SERPL-CCNC: 16 U/L (ref 13–56)
ANION GAP SERPL CALC-SCNC: 4 MMOL/L (ref 3–18)
AST SERPL-CCNC: 15 U/L (ref 10–38)
BASOPHILS # BLD: 0 K/UL (ref 0–0.1)
BASOPHILS NFR BLD: 0 % (ref 0–2)
BILIRUB SERPL-MCNC: 0.3 MG/DL (ref 0.2–1)
BUN SERPL-MCNC: 7 MG/DL (ref 7–18)
BUN/CREAT SERPL: 11 (ref 12–20)
CALCIUM SERPL-MCNC: 8.9 MG/DL (ref 8.5–10.1)
CHLORIDE SERPL-SCNC: 106 MMOL/L (ref 100–111)
CO2 SERPL-SCNC: 29 MMOL/L (ref 21–32)
CREAT SERPL-MCNC: 0.65 MG/DL (ref 0.6–1.3)
DIFFERENTIAL METHOD BLD: ABNORMAL
EKG ATRIAL RATE: 106 BPM
EKG DIAGNOSIS: NORMAL
EKG P AXIS: 76 DEGREES
EKG P-R INTERVAL: 132 MS
EKG Q-T INTERVAL: 334 MS
EKG QRS DURATION: 72 MS
EKG QTC CALCULATION (BAZETT): 443 MS
EKG R AXIS: 62 DEGREES
EKG T AXIS: 4 DEGREES
EKG VENTRICULAR RATE: 106 BPM
EOSINOPHIL # BLD: 0 K/UL (ref 0–0.4)
EOSINOPHIL NFR BLD: 0 % (ref 0–5)
ERYTHROCYTE [DISTWIDTH] IN BLOOD BY AUTOMATED COUNT: 18.6 % (ref 11.6–14.5)
GLOBULIN SER CALC-MCNC: 4.6 G/DL (ref 2–4)
GLUCOSE SERPL-MCNC: 105 MG/DL (ref 74–99)
HCT VFR BLD AUTO: 33.1 % (ref 35–45)
HGB BLD-MCNC: 10.3 G/DL (ref 12–16)
IMM GRANULOCYTES # BLD AUTO: 0.1 K/UL (ref 0–0.04)
IMM GRANULOCYTES NFR BLD AUTO: 1 % (ref 0–0.5)
LYMPHOCYTES # BLD: 1.8 K/UL (ref 0.9–3.6)
LYMPHOCYTES NFR BLD: 14 % (ref 21–52)
MCH RBC QN AUTO: 23.1 PG (ref 24–34)
MCHC RBC AUTO-ENTMCNC: 31.1 G/DL (ref 31–37)
MCV RBC AUTO: 74.2 FL (ref 78–100)
MONOCYTES # BLD: 0.7 K/UL (ref 0.05–1.2)
MONOCYTES NFR BLD: 5 % (ref 3–10)
NEUTS SEG # BLD: 10.4 K/UL (ref 1.8–8)
NEUTS SEG NFR BLD: 80 % (ref 40–73)
NRBC # BLD: 0 K/UL (ref 0–0.01)
NRBC BLD-RTO: 0 PER 100 WBC
PLATELET # BLD AUTO: 357 K/UL (ref 135–420)
PMV BLD AUTO: 9.2 FL (ref 9.2–11.8)
POTASSIUM SERPL-SCNC: 3.4 MMOL/L (ref 3.5–5.5)
PROT SERPL-MCNC: 7.3 G/DL (ref 6.4–8.2)
RBC # BLD AUTO: 4.46 M/UL (ref 4.2–5.3)
SODIUM SERPL-SCNC: 139 MMOL/L (ref 136–145)
WBC # BLD AUTO: 13 K/UL (ref 4.6–13.2)

## 2023-04-15 PROCEDURE — 6370000000 HC RX 637 (ALT 250 FOR IP)

## 2023-04-15 PROCEDURE — 93005 ELECTROCARDIOGRAM TRACING: CPT

## 2023-04-15 PROCEDURE — 85025 COMPLETE CBC W/AUTO DIFF WBC: CPT

## 2023-04-15 PROCEDURE — 10060 I&D ABSCESS SIMPLE/SINGLE: CPT

## 2023-04-15 PROCEDURE — 80053 COMPREHEN METABOLIC PANEL: CPT

## 2023-04-15 PROCEDURE — 72170 X-RAY EXAM OF PELVIS: CPT

## 2023-04-15 PROCEDURE — 93010 ELECTROCARDIOGRAM REPORT: CPT | Performed by: INTERNAL MEDICINE

## 2023-04-15 PROCEDURE — 99285 EMERGENCY DEPT VISIT HI MDM: CPT

## 2023-04-15 RX ORDER — AMLODIPINE BESYLATE 5 MG/1
5 TABLET ORAL DAILY
Status: DISCONTINUED | OUTPATIENT
Start: 2023-04-15 | End: 2023-04-15 | Stop reason: HOSPADM

## 2023-04-15 RX ORDER — SULFAMETHOXAZOLE AND TRIMETHOPRIM 800; 160 MG/1; MG/1
1 TABLET ORAL 2 TIMES DAILY
Qty: 20 TABLET | Refills: 0 | Status: SHIPPED | OUTPATIENT
Start: 2023-04-15 | End: 2023-04-25

## 2023-04-15 RX ADMIN — POTASSIUM BICARBONATE 20 MEQ: 782 TABLET, EFFERVESCENT ORAL at 10:59

## 2023-04-15 RX ADMIN — AMLODIPINE BESYLATE 5 MG: 5 TABLET ORAL at 12:17

## 2023-04-15 ASSESSMENT — ENCOUNTER SYMPTOMS
ABDOMINAL DISTENTION: 0
ABDOMINAL PAIN: 0
BLOOD IN STOOL: 0
DIARRHEA: 1
VOMITING: 0
NAUSEA: 1

## 2023-04-15 NOTE — ED TRIAGE NOTES
Pt arrived with c/o of left side groin pain and diarrhea x 2 days. Pt states back in March they removed her port and piece got stuck. Pt believes the piece has now traveled down to her groin area. Pt states that she recently had chemo and has had diarrhea \"it has cleared up a lot but I'm still having some. \"

## 2023-04-15 NOTE — ED PROVIDER NOTES
completed with a voice recognition program.  Efforts were made to edit the dictations but occasionally words are mis-transcribed.)    Haley Parish MD (electronically signed)  Attending Emergency Physician          Haley Parish MD  Resident  04/15/23 87 Ines Castro MD  Resident  04/15/23 [phone]

## 2023-04-15 NOTE — ED NOTES
Provided pt with discharge instructions and paperwork. Advised pt to follow up with PCP as instructed in discharge instruction. Patient verbally acknowledged understanding of discharge instructions. Pt had no further questions or concerns.         Lavonne Raphael RN  04/15/23 1245

## 2023-04-19 ENCOUNTER — HOSPITAL ENCOUNTER (OUTPATIENT)
Facility: HOSPITAL | Age: 63
Setting detail: INFUSION SERIES
End: 2023-04-19
Payer: MEDICARE

## 2023-04-19 VITALS
OXYGEN SATURATION: 95 % | RESPIRATION RATE: 18 BRPM | HEART RATE: 94 BPM | DIASTOLIC BLOOD PRESSURE: 74 MMHG | TEMPERATURE: 98.4 F | SYSTOLIC BLOOD PRESSURE: 131 MMHG

## 2023-04-19 PROCEDURE — 96523 IRRIG DRUG DELIVERY DEVICE: CPT

## 2023-04-19 PROCEDURE — 2580000003 HC RX 258: Performed by: INTERNAL MEDICINE

## 2023-04-19 PROCEDURE — 6360000002 HC RX W HCPCS: Performed by: INTERNAL MEDICINE

## 2023-04-19 RX ORDER — HEPARIN SODIUM (PORCINE) LOCK FLUSH IV SOLN 100 UNIT/ML 100 UNIT/ML
500 SOLUTION INTRAVENOUS PRN
Status: DISCONTINUED | OUTPATIENT
Start: 2023-04-19 | End: 2023-06-21

## 2023-04-19 RX ORDER — SODIUM CHLORIDE 0.9 % (FLUSH) 0.9 %
5-40 SYRINGE (ML) INJECTION 2 TIMES DAILY
Status: DISCONTINUED | OUTPATIENT
Start: 2023-04-19 | End: 2023-06-21

## 2023-04-19 RX ADMIN — HEPARIN SODIUM (PORCINE) LOCK FLUSH IV SOLN 100 UNIT/ML 500 UNITS: 100 SOLUTION at 13:53

## 2023-04-19 RX ADMIN — HEPARIN SODIUM (PORCINE) LOCK FLUSH IV SOLN 100 UNIT/ML 500 UNITS: 100 SOLUTION at 13:52

## 2023-04-19 RX ADMIN — SODIUM CHLORIDE, PRESERVATIVE FREE 40 ML: 5 INJECTION INTRAVENOUS at 13:40

## 2023-04-19 RX ADMIN — SODIUM CHLORIDE, PRESERVATIVE FREE 40 ML: 5 INJECTION INTRAVENOUS at 13:43

## 2023-04-19 NOTE — PROGRESS NOTES
[facility] Progress Note    Date: 2023    Name: Bhavani Cantrell    MRN: [id number]         : 1960    Weekly PICC Care    Ms. Prajapati to [facility], ambulatory at 1330. Pt was assessed and education was provided. Ms. Bridgette Max vitals were reviewed. Vitals:    23 1330   BP: 131/74   Pulse: 94   Resp: 18   Temp: 98.4 °F (36.9 °C)   SpO2: 95%       LEFT upper arm double lumen PICC line connectors changed to both lumens flushed well with NS with slugglish blood return at first.  After multiple flushing, positive blood return noted to red and purple lumen. Dressing c/d/I. Patient denies c/o arm pain. No redness, swelling or drainage noted to site. Dressing changed per protocol. Both lumens flushed with heparin flush and green curos caps to end connectors. New stockinette over PICC dressing for securement. Ms. Lizzie Vasquez tolerated picc line dressing change, and had no complaints at this time. Patient armband removed and shredded. Ms. Lizzie Vasquez was discharged from [place] [address] in stable condition at 1400. She is to return on 23 at 1300 for her next PICC line dressing change appointment.     Kendra Ingram RN  2023  1:36 PM

## 2023-04-20 ENCOUNTER — APPOINTMENT (OUTPATIENT)
Facility: HOSPITAL | Age: 63
End: 2023-04-20
Payer: MEDICARE

## 2023-04-22 ENCOUNTER — HOSPITAL ENCOUNTER (OUTPATIENT)
Facility: HOSPITAL | Age: 63
End: 2023-04-22
Payer: MEDICARE

## 2023-04-22 DIAGNOSIS — C34.11 MALIGNANT NEOPLASM OF UPPER LOBE OF RIGHT LUNG (HCC): ICD-10-CM

## 2023-04-22 PROCEDURE — 74178 CT ABD&PLV WO CNTR FLWD CNTR: CPT

## 2023-04-22 PROCEDURE — 6360000004 HC RX CONTRAST MEDICATION: Performed by: INTERNAL MEDICINE

## 2023-04-22 RX ADMIN — IOPAMIDOL 100 ML: 612 INJECTION, SOLUTION INTRAVENOUS at 14:20

## 2023-04-26 ENCOUNTER — HOSPITAL ENCOUNTER (OUTPATIENT)
Facility: HOSPITAL | Age: 63
Setting detail: INFUSION SERIES
End: 2023-04-26
Payer: MEDICARE

## 2023-04-26 VITALS
DIASTOLIC BLOOD PRESSURE: 70 MMHG | HEART RATE: 89 BPM | RESPIRATION RATE: 18 BRPM | SYSTOLIC BLOOD PRESSURE: 113 MMHG | OXYGEN SATURATION: 100 % | TEMPERATURE: 98.2 F

## 2023-04-26 PROCEDURE — 2580000003 HC RX 258: Performed by: INTERNAL MEDICINE

## 2023-04-26 PROCEDURE — 96523 IRRIG DRUG DELIVERY DEVICE: CPT

## 2023-04-26 PROCEDURE — 6360000002 HC RX W HCPCS

## 2023-04-26 PROCEDURE — 6360000002 HC RX W HCPCS: Performed by: INTERNAL MEDICINE

## 2023-04-26 RX ORDER — HEPARIN SODIUM (PORCINE) LOCK FLUSH IV SOLN 100 UNIT/ML 100 UNIT/ML
500 SOLUTION INTRAVENOUS PRN
Status: DISCONTINUED | OUTPATIENT
Start: 2023-04-26 | End: 2023-06-21

## 2023-04-26 RX ORDER — SODIUM CHLORIDE 0.9 % (FLUSH) 0.9 %
5-40 SYRINGE (ML) INJECTION 2 TIMES DAILY
Status: DISCONTINUED | OUTPATIENT
Start: 2023-04-26 | End: 2023-06-21

## 2023-04-26 RX ORDER — HEPARIN SODIUM (PORCINE) LOCK FLUSH IV SOLN 100 UNIT/ML 100 UNIT/ML
SOLUTION INTRAVENOUS
Status: COMPLETED
Start: 2023-04-26 | End: 2023-04-26

## 2023-04-26 RX ADMIN — HEPARIN 500 UNITS: 100 SYRINGE at 13:15

## 2023-04-26 RX ADMIN — HEPARIN SODIUM (PORCINE) LOCK FLUSH IV SOLN 100 UNIT/ML 500 UNITS: 100 SOLUTION at 13:14

## 2023-04-26 RX ADMIN — SODIUM CHLORIDE, PRESERVATIVE FREE 40 ML: 5 INJECTION INTRAVENOUS at 13:10

## 2023-04-26 RX ADMIN — HEPARIN 500 UNITS: 100 SYRINGE at 13:14

## 2023-04-26 NOTE — PROGRESS NOTES
[facility] Progress Note    Date: 2023    Name: Tucker Correia    MRN: [id number]         : 1960    Weekly PICC Care    Ms. Prajapati to [facility], ambulatory at 1305. Patient was assessed and education was provided. Ms. Aime Guillaume vitals were reviewed. Vitals:    23 1304   BP: 113/70   Pulse: 89   Resp: 18   Temp: 98.2 °F (36.8 °C)   SpO2: 100%       LEFT upper arm double lumen PICC line connectors changed to both lumens flushed well with NS with slugglish blood return at first.  After multiple flushing, positive blood return noted to red and purple lumen. Dressing c/d/I. Patient denies c/o arm pain. No redness, swelling or drainage noted to site. Dressing changed per protocol. Both lumens flushed with heparin flush and green curos caps to end connectors. New stockinette over PICC dressing for securement. Ms. Bella Paul tolerated picc line dressing change, and had no complaints at this time. Patient armband removed and shredded. Ms. Bella Paul was discharged from [place] [address] in stable condition at 1330. She is to return on 5/3/23 at 1100 for her next PICC line dressing change appointment.     Judy Alejandra RN  2023  1:27 PM

## 2023-05-03 ENCOUNTER — HOSPITAL ENCOUNTER (OUTPATIENT)
Facility: HOSPITAL | Age: 63
Setting detail: INFUSION SERIES
End: 2023-05-03
Payer: MEDICARE

## 2023-05-03 VITALS
OXYGEN SATURATION: 99 % | RESPIRATION RATE: 18 BRPM | SYSTOLIC BLOOD PRESSURE: 104 MMHG | DIASTOLIC BLOOD PRESSURE: 67 MMHG | HEART RATE: 79 BPM | TEMPERATURE: 97.1 F

## 2023-05-03 PROCEDURE — 6360000002 HC RX W HCPCS: Performed by: INTERNAL MEDICINE

## 2023-05-03 PROCEDURE — 96523 IRRIG DRUG DELIVERY DEVICE: CPT

## 2023-05-03 PROCEDURE — 2580000003 HC RX 258: Performed by: INTERNAL MEDICINE

## 2023-05-03 RX ORDER — HEPARIN SODIUM (PORCINE) LOCK FLUSH IV SOLN 100 UNIT/ML 100 UNIT/ML
500 SOLUTION INTRAVENOUS PRN
Status: DISCONTINUED | OUTPATIENT
Start: 2023-05-03 | End: 2023-06-21

## 2023-05-03 RX ORDER — SODIUM CHLORIDE 0.9 % (FLUSH) 0.9 %
5-40 SYRINGE (ML) INJECTION PRN
Status: DISCONTINUED | OUTPATIENT
Start: 2023-05-03 | End: 2023-06-21

## 2023-05-03 RX ADMIN — SODIUM CHLORIDE, PRESERVATIVE FREE 10 ML: 5 INJECTION INTRAVENOUS at 11:26

## 2023-05-03 RX ADMIN — HEPARIN 500 UNITS: 100 SYRINGE at 11:27

## 2023-05-03 RX ADMIN — SODIUM CHLORIDE, PRESERVATIVE FREE 10 ML: 5 INJECTION INTRAVENOUS at 11:25

## 2023-05-03 NOTE — PROGRESS NOTES
[facility] Progress Note    Date: May 3, 2023    Name: Kendall Hernandez    MRN: [id number]         : 1960    Weekly PICC Care    Ms. Prajapati to [facility], ambulatory at 1305. Patient was assessed and education was provided. Ms. Lenny Borjas vitals were reviewed. Vitals:    23 1111   BP: 104/67   Pulse: 79   Resp: 18   Temp: 97.1 °F (36.2 °C)   SpO2: 99%       LEFT upper arm double lumen PICC line connectors changed to both lumens flushed well with NS with brisk blood return. Dressing c/d/I. Patient denies c/o arm pain. No redness, swelling or drainage noted to site. Dressing changed per protocol. Both lumens flushed with heparin flush and connectors changed, and green curos caps to end connectors. New stockinette over PICC dressing for securement. Ms. Hosea Orona tolerated picc line dressing change, and had no complaints at this time. Patient armband removed and shredded. Ms. Hosea Orona was discharged from [place] [address] in stable condition at 1135. She is to return on 5/10/23 at 1100 for her next PICC line dressing change appointment with pre chemo labs.     Aisha Loo RN  May 3, 2023  11:54 AM

## 2023-05-05 RX ORDER — ACETAMINOPHEN 325 MG/1
650 TABLET ORAL
Status: CANCELLED | OUTPATIENT
Start: 2023-05-11

## 2023-05-05 RX ORDER — DIPHENHYDRAMINE HYDROCHLORIDE 50 MG/ML
50 INJECTION INTRAMUSCULAR; INTRAVENOUS
Status: CANCELLED | OUTPATIENT
Start: 2023-05-11

## 2023-05-05 RX ORDER — DEXAMETHASONE SODIUM PHOSPHATE 4 MG/ML
8 INJECTION, SOLUTION INTRA-ARTICULAR; INTRALESIONAL; INTRAMUSCULAR; INTRAVENOUS; SOFT TISSUE ONCE
Status: CANCELLED | OUTPATIENT
Start: 2023-05-11 | End: 2023-05-11

## 2023-05-05 RX ORDER — SODIUM CHLORIDE 9 MG/ML
5-250 INJECTION, SOLUTION INTRAVENOUS PRN
Status: CANCELLED | OUTPATIENT
Start: 2023-05-11

## 2023-05-05 RX ORDER — DIPHENHYDRAMINE HYDROCHLORIDE 50 MG/ML
25 INJECTION INTRAMUSCULAR; INTRAVENOUS ONCE
Status: CANCELLED | OUTPATIENT
Start: 2023-05-11 | End: 2023-05-11

## 2023-05-05 RX ORDER — EPINEPHRINE 1 MG/ML
0.3 INJECTION, SOLUTION, CONCENTRATE INTRAVENOUS PRN
Status: CANCELLED | OUTPATIENT
Start: 2023-05-11

## 2023-05-05 RX ORDER — ACETAMINOPHEN 325 MG/1
650 TABLET ORAL
Status: CANCELLED
Start: 2023-05-11

## 2023-05-05 RX ORDER — SODIUM CHLORIDE 9 MG/ML
INJECTION, SOLUTION INTRAVENOUS CONTINUOUS
Status: CANCELLED | OUTPATIENT
Start: 2023-05-11

## 2023-05-05 RX ORDER — PALONOSETRON 0.05 MG/ML
0.25 INJECTION, SOLUTION INTRAVENOUS ONCE
Status: CANCELLED
Start: 2023-05-11 | End: 2023-05-11

## 2023-05-05 RX ORDER — MEPERIDINE HYDROCHLORIDE 25 MG/ML
12.5 INJECTION INTRAMUSCULAR; INTRAVENOUS; SUBCUTANEOUS PRN
Status: CANCELLED | OUTPATIENT
Start: 2023-05-11

## 2023-05-05 RX ORDER — ONDANSETRON 2 MG/ML
8 INJECTION INTRAMUSCULAR; INTRAVENOUS
Status: CANCELLED | OUTPATIENT
Start: 2023-05-11

## 2023-05-05 RX ORDER — HEPARIN SODIUM (PORCINE) LOCK FLUSH IV SOLN 100 UNIT/ML 100 UNIT/ML
500 SOLUTION INTRAVENOUS PRN
Status: CANCELLED | OUTPATIENT
Start: 2023-05-11

## 2023-05-05 RX ORDER — SODIUM CHLORIDE 0.9 % (FLUSH) 0.9 %
5-40 SYRINGE (ML) INJECTION PRN
Status: CANCELLED | OUTPATIENT
Start: 2023-05-11

## 2023-05-05 RX ORDER — ALBUTEROL SULFATE 90 UG/1
4 AEROSOL, METERED RESPIRATORY (INHALATION) PRN
Status: CANCELLED | OUTPATIENT
Start: 2023-05-11

## 2023-05-10 ENCOUNTER — APPOINTMENT (OUTPATIENT)
Facility: HOSPITAL | Age: 63
End: 2023-05-10
Payer: MEDICARE

## 2023-05-10 ENCOUNTER — HOSPITAL ENCOUNTER (OUTPATIENT)
Facility: HOSPITAL | Age: 63
Setting detail: INFUSION SERIES
End: 2023-05-10
Payer: MEDICARE

## 2023-05-10 VITALS
RESPIRATION RATE: 18 BRPM | OXYGEN SATURATION: 95 % | HEART RATE: 78 BPM | TEMPERATURE: 97.6 F | WEIGHT: 137.9 LBS | SYSTOLIC BLOOD PRESSURE: 150 MMHG | BODY MASS INDEX: 21.6 KG/M2 | DIASTOLIC BLOOD PRESSURE: 85 MMHG

## 2023-05-10 LAB
ALBUMIN SERPL-MCNC: 3.6 G/DL (ref 3.4–5)
ALBUMIN/GLOB SERPL: 1 (ref 0.8–1.7)
ALP SERPL-CCNC: 95 U/L (ref 45–117)
ALT SERPL-CCNC: 12 U/L (ref 13–56)
ANION GAP SERPL CALC-SCNC: 10 MMOL/L (ref 3–18)
APPEARANCE UR: CLEAR
AST SERPL-CCNC: 13 U/L (ref 10–38)
BACTERIA URNS QL MICRO: ABNORMAL /HPF
BASO+EOS+MONOS # BLD AUTO: 0.1 K/UL (ref 0–2.3)
BASO+EOS+MONOS NFR BLD AUTO: 1 % (ref 0.1–17)
BILIRUB SERPL-MCNC: 0.3 MG/DL (ref 0.2–1)
BILIRUB UR QL: NEGATIVE
BUN SERPL-MCNC: 5 MG/DL (ref 7–18)
BUN/CREAT SERPL: 8 (ref 12–20)
CALCIUM SERPL-MCNC: 9.1 MG/DL (ref 8.5–10.1)
CHLORIDE SERPL-SCNC: 105 MMOL/L (ref 100–111)
CO2 SERPL-SCNC: 24 MMOL/L (ref 21–32)
COLOR UR: YELLOW
CREAT SERPL-MCNC: 0.61 MG/DL (ref 0.6–1.3)
DIFFERENTIAL METHOD BLD: ABNORMAL
EPITH CASTS URNS QL MICRO: ABNORMAL /LPF (ref 0–5)
ERYTHROCYTE [DISTWIDTH] IN BLOOD BY AUTOMATED COUNT: 20.7 % (ref 11.5–14.5)
GLOBULIN SER CALC-MCNC: 3.5 G/DL (ref 2–4)
GLUCOSE SERPL-MCNC: 76 MG/DL (ref 74–99)
GLUCOSE UR STRIP.AUTO-MCNC: NEGATIVE MG/DL
HCT VFR BLD AUTO: 34.6 % (ref 36–48)
HGB BLD-MCNC: 10.5 G/DL (ref 12–16)
HGB UR QL STRIP: NEGATIVE
KETONES UR QL STRIP.AUTO: NEGATIVE MG/DL
LEUKOCYTE ESTERASE UR QL STRIP.AUTO: ABNORMAL
LYMPHOCYTES # BLD: 0.5 K/UL (ref 1.1–5.9)
LYMPHOCYTES NFR BLD: 7 % (ref 14–44)
MCH RBC QN AUTO: 24.2 PG (ref 25–35)
MCHC RBC AUTO-ENTMCNC: 30.3 G/DL (ref 31–37)
MCV RBC AUTO: 79.7 FL (ref 78–102)
NEUTS SEG # BLD: 6.9 K/UL (ref 1.8–9.5)
NEUTS SEG NFR BLD: 92 % (ref 40–70)
NITRITE UR QL STRIP.AUTO: NEGATIVE
PH UR STRIP: 5.5 (ref 5–8)
PLATELET # BLD AUTO: 207 K/UL (ref 140–440)
POTASSIUM SERPL-SCNC: 3.9 MMOL/L (ref 3.5–5.5)
PROT SERPL-MCNC: 7.1 G/DL (ref 6.4–8.2)
PROT UR STRIP-MCNC: NEGATIVE MG/DL
RBC # BLD AUTO: 4.34 M/UL (ref 4.1–5.1)
RBC #/AREA URNS HPF: ABNORMAL /HPF (ref 0–5)
SODIUM SERPL-SCNC: 139 MMOL/L (ref 136–145)
SP GR UR REFRACTOMETRY: 1.01 (ref 1–1.03)
UROBILINOGEN UR QL STRIP.AUTO: 1 EU/DL (ref 0.2–1)
WBC # BLD AUTO: 7.5 K/UL (ref 4.5–13)
WBC URNS QL MICRO: ABNORMAL /HPF (ref 0–4)

## 2023-05-10 PROCEDURE — 6360000002 HC RX W HCPCS: Performed by: INTERNAL MEDICINE

## 2023-05-10 PROCEDURE — 36415 COLL VENOUS BLD VENIPUNCTURE: CPT

## 2023-05-10 PROCEDURE — 96523 IRRIG DRUG DELIVERY DEVICE: CPT

## 2023-05-10 PROCEDURE — 80053 COMPREHEN METABOLIC PANEL: CPT

## 2023-05-10 PROCEDURE — 81001 URINALYSIS AUTO W/SCOPE: CPT

## 2023-05-10 PROCEDURE — 85025 COMPLETE CBC W/AUTO DIFF WBC: CPT

## 2023-05-10 PROCEDURE — 2580000003 HC RX 258: Performed by: INTERNAL MEDICINE

## 2023-05-10 RX ORDER — HEPARIN SODIUM (PORCINE) LOCK FLUSH IV SOLN 100 UNIT/ML 100 UNIT/ML
500 SOLUTION INTRAVENOUS PRN
Status: DISCONTINUED | OUTPATIENT
Start: 2023-05-10 | End: 2023-06-21

## 2023-05-10 RX ORDER — SODIUM CHLORIDE 0.9 % (FLUSH) 0.9 %
5-40 SYRINGE (ML) INJECTION 2 TIMES DAILY
Status: DISCONTINUED | OUTPATIENT
Start: 2023-05-10 | End: 2023-06-21

## 2023-05-10 RX ADMIN — SODIUM CHLORIDE, PRESERVATIVE FREE 20 ML: 5 INJECTION INTRAVENOUS at 09:42

## 2023-05-10 RX ADMIN — HEPARIN 500 UNITS: 100 SYRINGE at 09:42

## 2023-05-10 RX ADMIN — SODIUM CHLORIDE, PRESERVATIVE FREE 40 ML: 5 INJECTION INTRAVENOUS at 09:40

## 2023-05-10 NOTE — PROGRESS NOTES
[facility] Progress Note    Date: May 10, 2023    Name: Justin Villanueva    MRN: [id number]         : 1960    Weekly PICC Care/Pre-chemo labs    Ms. Prajapati to [facility], ambulatory at 0930. Pt was assessed and education was provided. Ms. Austin Hoff vitals were reviewed. Vitals:    05/10/23 0930   BP: (!) 150/85   Pulse: 78   Resp: 18   Temp: 97.6 °F (36.4 °C)   SpO2: 95%     LEFT upper arm double lumen PICC line dressing changed today per protocol. Both lumens flushed well with NS and heparin. However, only very small amount of blood return from each lumen after multiple flushes and re-positioning. Green cuero caps applied and lumens wrapped in coban, new net stocking placed over dressing for protection. Blood for ordered CBC/CMP drawn via RAC x1 attempt. CBC processed on site. CMP as well as pt provided UA sample sent out for processing. Ms. Ethel Bauman tolerated well and had no complaints at this time. Pt armband removed and shredded. Ms. Ethel Bauman was discharged from [place] [address] in stable condition at 1000. She is to return on 23 at 1000 for her next appointment, for C3 treatment.     Chula Syed RN  May 10, 2023  10:41 AM

## 2023-05-11 ENCOUNTER — HOSPITAL ENCOUNTER (OUTPATIENT)
Facility: HOSPITAL | Age: 63
Setting detail: INFUSION SERIES
End: 2023-05-11
Payer: MEDICARE

## 2023-05-11 VITALS
OXYGEN SATURATION: 97 % | RESPIRATION RATE: 18 BRPM | DIASTOLIC BLOOD PRESSURE: 88 MMHG | SYSTOLIC BLOOD PRESSURE: 162 MMHG | TEMPERATURE: 98.1 F | HEART RATE: 82 BPM

## 2023-05-11 DIAGNOSIS — C34.90 NON-SMALL CELL LUNG CANCER, UNSPECIFIED LATERALITY (HCC): Primary | ICD-10-CM

## 2023-05-11 PROCEDURE — 96375 TX/PRO/DX INJ NEW DRUG ADDON: CPT

## 2023-05-11 PROCEDURE — 96367 TX/PROPH/DG ADDL SEQ IV INF: CPT

## 2023-05-11 PROCEDURE — A4216 STERILE WATER/SALINE, 10 ML: HCPCS | Performed by: INTERNAL MEDICINE

## 2023-05-11 PROCEDURE — 96413 CHEMO IV INFUSION 1 HR: CPT

## 2023-05-11 PROCEDURE — 2500000003 HC RX 250 WO HCPCS: Performed by: INTERNAL MEDICINE

## 2023-05-11 PROCEDURE — 2580000003 HC RX 258: Performed by: INTERNAL MEDICINE

## 2023-05-11 PROCEDURE — 6360000002 HC RX W HCPCS: Performed by: INTERNAL MEDICINE

## 2023-05-11 RX ORDER — HEPARIN SODIUM (PORCINE) LOCK FLUSH IV SOLN 100 UNIT/ML 100 UNIT/ML
SOLUTION INTRAVENOUS
Status: DISPENSED
Start: 2023-05-11 | End: 2023-05-12

## 2023-05-11 RX ORDER — DIPHENHYDRAMINE HYDROCHLORIDE 50 MG/ML
25 INJECTION INTRAMUSCULAR; INTRAVENOUS ONCE
Status: COMPLETED | OUTPATIENT
Start: 2023-05-11 | End: 2023-05-11

## 2023-05-11 RX ORDER — PALONOSETRON 0.05 MG/ML
0.25 INJECTION, SOLUTION INTRAVENOUS ONCE
Status: COMPLETED | OUTPATIENT
Start: 2023-05-11 | End: 2023-05-11

## 2023-05-11 RX ORDER — SODIUM CHLORIDE 9 MG/ML
5-250 INJECTION, SOLUTION INTRAVENOUS PRN
Status: ACTIVE | OUTPATIENT
Start: 2023-05-11 | End: 2023-05-12

## 2023-05-11 RX ORDER — SODIUM CHLORIDE 0.9 % (FLUSH) 0.9 %
5-40 SYRINGE (ML) INJECTION PRN
Status: ACTIVE | OUTPATIENT
Start: 2023-05-11 | End: 2023-05-12

## 2023-05-11 RX ORDER — HEPARIN SODIUM (PORCINE) LOCK FLUSH IV SOLN 100 UNIT/ML 100 UNIT/ML
500 SOLUTION INTRAVENOUS PRN
Status: ACTIVE | OUTPATIENT
Start: 2023-05-11 | End: 2023-05-12

## 2023-05-11 RX ADMIN — PALONOSETRON HYDROCHLORIDE 0.25 MG: 0.25 INJECTION INTRAVENOUS at 10:35

## 2023-05-11 RX ADMIN — SODIUM CHLORIDE 25 ML/HR: 9 INJECTION, SOLUTION INTRAVENOUS at 10:21

## 2023-05-11 RX ADMIN — PEGFILGRASTIM 6 MG: KIT SUBCUTANEOUS at 13:03

## 2023-05-11 RX ADMIN — HEPARIN 500 UNITS: 100 SYRINGE at 13:02

## 2023-05-11 RX ADMIN — FOSAPREPITANT 150 MG: 150 INJECTION, POWDER, LYOPHILIZED, FOR SOLUTION INTRAVENOUS at 10:45

## 2023-05-11 RX ADMIN — DIPHENHYDRAMINE HYDROCHLORIDE 25 MG: 50 INJECTION, SOLUTION INTRAMUSCULAR; INTRAVENOUS at 10:24

## 2023-05-11 RX ADMIN — SODIUM CHLORIDE, PRESERVATIVE FREE 10 ML: 5 INJECTION INTRAVENOUS at 10:18

## 2023-05-11 RX ADMIN — HEPARIN 500 UNITS: 100 SYRINGE at 13:03

## 2023-05-11 RX ADMIN — SODIUM CHLORIDE 130 MG: 9 INJECTION, SOLUTION INTRAVENOUS at 11:50

## 2023-05-11 RX ADMIN — SODIUM CHLORIDE, PRESERVATIVE FREE 10 ML: 5 INJECTION INTRAVENOUS at 13:01

## 2023-05-11 RX ADMIN — SODIUM CHLORIDE, PRESERVATIVE FREE 10 ML: 5 INJECTION INTRAVENOUS at 10:20

## 2023-05-11 RX ADMIN — FAMOTIDINE 20 MG: 10 INJECTION INTRAVENOUS at 10:28

## 2023-05-11 NOTE — PROGRESS NOTES
[facility] Progress Note    Date: May 11, 2023    Name: Delonte Leija    MRN: [id number]         : 1960    Single dose docetaxel Q 21 days, starting today. Cycle 3 chemotherapy was held. Ms. Vickey Mc arrived to [facility] at 12. No complaints or concerns voiced    Pt is here today for Docetaxel, Cycle 4,Day 1. Ms. Vickey Mc was assessed and education was provided. Ms. Giselle Norton vitals were reviewed. Vitals:    23 1258   BP: (!) 162/88   Pulse: 82   Resp: 18   Temp: 98.1 °F (36.7 °C)   SpO2: 97%       Patient's left upper arm picc line intact with dry drsg at site. No bruising, drainage, swelling, streaking or pain noted at, around or along arm. Both lumens with brisk flush/blood returns. Lab results were obtained and reviewed. Labs okay for chemo.         Latest Reference Range & Units 05/10/23 09:40   Sodium 136 - 145 mmol/L 139   Potassium 3.5 - 5.5 mmol/L 3.9   Chloride 100 - 111 mmol/L 105   CO2 21 - 32 mmol/L 24   BUN,BUNPL 7.0 - 18 MG/DL 5 (L)   Creatinine 0.6 - 1.3 MG/DL 0.61   Bun/Cre Ratio 12 - 20   8 (L)   Anion Gap 3.0 - 18 mmol/L 10   Est, Glom Filt Rate >60 ml/min/1.73m2 >60   Glucose, Random 74 - 99 mg/dL 76   CALCIUM, SERUM, 403206 8.5 - 10.1 MG/DL 9.1   ALBUMIN/GLOBULIN RATIO 0.8 - 1.7   1.0   Total Protein 6.4 - 8.2 g/dL 7.1   Albumin 3.4 - 5.0 g/dL 3.6   Globulin 2.0 - 4.0 g/dL 3.5   Alk Phosphatase 45 - 117 U/L 95   ALT 13 - 56 U/L 12 (L)   AST 10 - 38 U/L 13   BILIRUBIN TOTAL 0.2 - 1.0 MG/DL 0.3      Latest Reference Range & Units 05/10/23 09:40   WBC 4.5 - 13.0 K/uL 7.5   RBC 4.10 - 5.10 M/uL 4.34   Hemoglobin Quant 12.0 - 16 g/dL 10.5 (L)   Hematocrit 36 - 48 % 34.6 (L)   MCV 78 - 102 FL 79.7   MCH 25.0 - 35.0 PG 24.2 (L)   MCHC 31 - 37 g/dL 30.3 (L)   RDW 11.5 - 14.5 % 20.7 (H)   Platelet Count 032 - 440 K/uL 207   Differential Type -   AUTOMATED   Seg Neutrophils 40 - 70 % 92 (H)   Segs Absolute 1.8 - 9.5 K/UL 6.9   Lymphocytes 14 - 44 % 7 (L)   Absolute Lymph # 1.1 - 5.9

## 2023-05-17 ENCOUNTER — HOSPITAL ENCOUNTER (OUTPATIENT)
Facility: HOSPITAL | Age: 63
Setting detail: INFUSION SERIES
End: 2023-05-17
Payer: MEDICARE

## 2023-05-17 VITALS
TEMPERATURE: 98.2 F | HEART RATE: 116 BPM | DIASTOLIC BLOOD PRESSURE: 80 MMHG | RESPIRATION RATE: 20 BRPM | SYSTOLIC BLOOD PRESSURE: 130 MMHG | OXYGEN SATURATION: 100 %

## 2023-05-17 PROCEDURE — 96523 IRRIG DRUG DELIVERY DEVICE: CPT

## 2023-05-17 PROCEDURE — 2580000003 HC RX 258: Performed by: INTERNAL MEDICINE

## 2023-05-17 PROCEDURE — 6360000002 HC RX W HCPCS: Performed by: INTERNAL MEDICINE

## 2023-05-17 PROCEDURE — 6360000002 HC RX W HCPCS

## 2023-05-17 RX ORDER — SODIUM CHLORIDE 0.9 % (FLUSH) 0.9 %
5-40 SYRINGE (ML) INJECTION PRN
Status: DISCONTINUED | OUTPATIENT
Start: 2023-05-17 | End: 2023-06-21

## 2023-05-17 RX ORDER — HEPARIN SODIUM (PORCINE) LOCK FLUSH IV SOLN 100 UNIT/ML 100 UNIT/ML
500 SOLUTION INTRAVENOUS PRN
Status: DISCONTINUED | OUTPATIENT
Start: 2023-05-17 | End: 2023-06-21

## 2023-05-17 RX ORDER — HEPARIN SODIUM (PORCINE) LOCK FLUSH IV SOLN 100 UNIT/ML 100 UNIT/ML
SOLUTION INTRAVENOUS
Status: COMPLETED
Start: 2023-05-17 | End: 2023-05-17

## 2023-05-17 RX ADMIN — HEPARIN 500 UNITS: 100 SYRINGE at 11:30

## 2023-05-17 RX ADMIN — SODIUM CHLORIDE, PRESERVATIVE FREE 20 ML: 5 INJECTION INTRAVENOUS at 11:30

## 2023-05-17 RX ADMIN — HEPARIN 500 UNITS: 100 SYRINGE at 11:32

## 2023-05-17 RX ADMIN — SODIUM CHLORIDE, PRESERVATIVE FREE 20 ML: 5 INJECTION INTRAVENOUS at 11:32

## 2023-05-17 ASSESSMENT — PAIN - FUNCTIONAL ASSESSMENT: PAIN_FUNCTIONAL_ASSESSMENT: NONE - DENIES PAIN

## 2023-05-17 NOTE — PROGRESS NOTES
[facility] Progress Note    Date: May 17, 2023    Name: Sherry Duffy    MRN: [id number]         : 1960    Ms. Prajapati arrived in the [address] [place] [address] today at , in stable condition, here for her WEEKLY PICC LINE CARE. She was assessed and education was provided. Ms. Mathew Ni vitals were reviewed. Vitals:    23 1105   BP: 130/80   Pulse: (!) 116   Resp: 20   Temp: 98.2 °F (36.8 °C)   SpO2: 100%       Ms. Prajapati presented to the infusion center today stating that she was doing well, and voicing no major complaints other than some mild diarrhea and nausea, which she said she has been periodically having since her chemo treatment last week. Also, she stated that she received some IV Hydration at Dr. Adam De La Torre office on Monday, 5-15-23. HER LEFT UPPER ARM DOUBLE LUMEN PICC LINE WAS NOTED TO BE COMPLETELY DRY AND INTACT, AND BOTH LUMENS HAD A BRISK BLOOD RETURN AND FLUSHED EASILY WITH NS. Her PICC line dressing was changed today per policy, and without incident, then both lumens of her PICC line were flushed well per policy with NS & Heparin, and then the end caps were also changed, and alcohol caps applied. Ms. Loyda Acosta tolerated well and voiced no complaints. Ms. Loyda Acosta was discharged from [place] [address] in stable condition at 1140, with her PICC line completely dry and intact. She is to return on next 23 at 1030, for her next appointment, for Weekly PICC Care.      Dylan Carey RN  May 17, 2023  11:19 AM

## 2023-05-24 ENCOUNTER — HOSPITAL ENCOUNTER (OUTPATIENT)
Facility: HOSPITAL | Age: 63
Setting detail: INFUSION SERIES
End: 2023-05-24
Payer: MEDICARE

## 2023-05-24 VITALS
HEART RATE: 93 BPM | OXYGEN SATURATION: 100 % | RESPIRATION RATE: 20 BRPM | SYSTOLIC BLOOD PRESSURE: 118 MMHG | DIASTOLIC BLOOD PRESSURE: 67 MMHG | TEMPERATURE: 98.2 F

## 2023-05-24 PROCEDURE — 96523 IRRIG DRUG DELIVERY DEVICE: CPT

## 2023-05-24 PROCEDURE — 6360000002 HC RX W HCPCS

## 2023-05-24 PROCEDURE — 2580000003 HC RX 258: Performed by: INTERNAL MEDICINE

## 2023-05-24 RX ORDER — HEPARIN SODIUM (PORCINE) LOCK FLUSH IV SOLN 100 UNIT/ML 100 UNIT/ML
500 SOLUTION INTRAVENOUS PRN
Status: DISCONTINUED | OUTPATIENT
Start: 2023-05-24 | End: 2023-06-21

## 2023-05-24 RX ORDER — HEPARIN SODIUM (PORCINE) LOCK FLUSH IV SOLN 100 UNIT/ML 100 UNIT/ML
SOLUTION INTRAVENOUS
Status: COMPLETED
Start: 2023-05-24 | End: 2023-05-24

## 2023-05-24 RX ORDER — SODIUM CHLORIDE 0.9 % (FLUSH) 0.9 %
5-40 SYRINGE (ML) INJECTION PRN
Status: DISCONTINUED | OUTPATIENT
Start: 2023-05-24 | End: 2023-06-21

## 2023-05-24 RX ADMIN — SODIUM CHLORIDE, PRESERVATIVE FREE 10 ML: 5 INJECTION INTRAVENOUS at 10:33

## 2023-05-24 RX ADMIN — HEPARIN SODIUM (PORCINE) LOCK FLUSH IV SOLN 100 UNIT/ML 500 UNITS: 100 SOLUTION at 10:36

## 2023-05-24 RX ADMIN — SODIUM CHLORIDE, PRESERVATIVE FREE 10 ML: 5 INJECTION INTRAVENOUS at 10:34

## 2023-05-24 NOTE — PROGRESS NOTES
[facility] Progress Note    Date: May 24, 2023    Name: Shanti Kurtz    MRN: [id number]         : 1960    Weekly PICC Care    Ms. Prajapati to [facility], ambulatory at 1030. Patient was assessed and education was provided. Ms. Cherelle Colbert vitals were reviewed. Vitals:    23 1030   BP: 118/67   Pulse: 93   Resp: 20   Temp: 98.2 °F (36.8 °C)   SpO2: 100%       LEFT upper arm double lumen PICC line connectors changed to both lumens flushed well with NS with brisk blood return. Dressing c/d/I. Patient denies c/o arm pain. No redness, swelling or drainage noted to site. Dressing changed per protocol. Both lumens flushed with heparin flush and connectors changed, and green curos caps to end connectors. New stockinette over PICC dressing for securement. Ms. Elsa Sharp tolerated picc line dressing change, and had no complaints at this time. Patient armband removed and shredded. Ms. Elsa Sharp was discharged from [place] [address] in stable condition at 1050. She is to return on 2023 at 0930 for her next PICC line dressing change appointment with pre chemo labs.     Hilario Grove RN  May 24, 2023  10:56 AM

## 2023-05-25 RX ORDER — ACETAMINOPHEN 325 MG/1
650 TABLET ORAL
Status: CANCELLED | OUTPATIENT
Start: 2023-06-01

## 2023-05-25 RX ORDER — ALBUTEROL SULFATE 90 UG/1
4 AEROSOL, METERED RESPIRATORY (INHALATION) PRN
Status: CANCELLED | OUTPATIENT
Start: 2023-06-01

## 2023-05-25 RX ORDER — SODIUM CHLORIDE 9 MG/ML
5-250 INJECTION, SOLUTION INTRAVENOUS PRN
Status: CANCELLED | OUTPATIENT
Start: 2023-06-01

## 2023-05-25 RX ORDER — EPINEPHRINE 1 MG/ML
0.3 INJECTION, SOLUTION, CONCENTRATE INTRAVENOUS PRN
Status: CANCELLED | OUTPATIENT
Start: 2023-06-01

## 2023-05-25 RX ORDER — SODIUM CHLORIDE 9 MG/ML
INJECTION, SOLUTION INTRAVENOUS CONTINUOUS
Status: CANCELLED | OUTPATIENT
Start: 2023-06-01

## 2023-05-25 RX ORDER — DEXAMETHASONE SODIUM PHOSPHATE 4 MG/ML
8 INJECTION, SOLUTION INTRA-ARTICULAR; INTRALESIONAL; INTRAMUSCULAR; INTRAVENOUS; SOFT TISSUE ONCE
Status: CANCELLED | OUTPATIENT
Start: 2023-06-01 | End: 2023-06-01

## 2023-05-25 RX ORDER — DIPHENHYDRAMINE HYDROCHLORIDE 50 MG/ML
50 INJECTION INTRAMUSCULAR; INTRAVENOUS
Status: CANCELLED | OUTPATIENT
Start: 2023-06-01

## 2023-05-25 RX ORDER — SODIUM CHLORIDE 0.9 % (FLUSH) 0.9 %
5-40 SYRINGE (ML) INJECTION PRN
Status: CANCELLED | OUTPATIENT
Start: 2023-06-01

## 2023-05-25 RX ORDER — ONDANSETRON 2 MG/ML
8 INJECTION INTRAMUSCULAR; INTRAVENOUS
Status: CANCELLED | OUTPATIENT
Start: 2023-06-01

## 2023-05-25 RX ORDER — HEPARIN SODIUM (PORCINE) LOCK FLUSH IV SOLN 100 UNIT/ML 100 UNIT/ML
500 SOLUTION INTRAVENOUS PRN
Status: CANCELLED | OUTPATIENT
Start: 2023-06-01

## 2023-05-25 RX ORDER — PALONOSETRON 0.05 MG/ML
0.25 INJECTION, SOLUTION INTRAVENOUS ONCE
Status: CANCELLED
Start: 2023-06-01 | End: 2023-06-01

## 2023-05-25 RX ORDER — MEPERIDINE HYDROCHLORIDE 25 MG/ML
12.5 INJECTION INTRAMUSCULAR; INTRAVENOUS; SUBCUTANEOUS PRN
Status: CANCELLED | OUTPATIENT
Start: 2023-06-01

## 2023-05-31 ENCOUNTER — HOSPITAL ENCOUNTER (OUTPATIENT)
Facility: HOSPITAL | Age: 63
Setting detail: INFUSION SERIES
End: 2023-05-31
Payer: MEDICARE

## 2023-05-31 LAB
ALBUMIN SERPL-MCNC: 2.9 G/DL (ref 3.4–5)
ALBUMIN/GLOB SERPL: 1 (ref 0.8–1.7)
ALP SERPL-CCNC: 72 U/L (ref 45–117)
ALT SERPL-CCNC: 8 U/L (ref 13–56)
ANION GAP SERPL CALC-SCNC: 7 MMOL/L (ref 3–18)
AST SERPL-CCNC: 10 U/L (ref 10–38)
BASO+EOS+MONOS # BLD AUTO: 0.6 K/UL (ref 0–2.3)
BASO+EOS+MONOS NFR BLD AUTO: 7 % (ref 0.1–17)
BILIRUB SERPL-MCNC: 0.4 MG/DL (ref 0.2–1)
BUN SERPL-MCNC: 5 MG/DL (ref 7–18)
BUN/CREAT SERPL: 8 (ref 12–20)
CALCIUM SERPL-MCNC: 8.6 MG/DL (ref 8.5–10.1)
CHLORIDE SERPL-SCNC: 107 MMOL/L (ref 100–111)
CO2 SERPL-SCNC: 26 MMOL/L (ref 21–32)
CREAT SERPL-MCNC: 0.64 MG/DL (ref 0.6–1.3)
DIFFERENTIAL METHOD BLD: ABNORMAL
ERYTHROCYTE [DISTWIDTH] IN BLOOD BY AUTOMATED COUNT: 20.7 % (ref 11.5–14.5)
GLOBULIN SER CALC-MCNC: 3 G/DL (ref 2–4)
GLUCOSE SERPL-MCNC: 120 MG/DL (ref 74–99)
HCT VFR BLD AUTO: 30.5 % (ref 36–48)
HGB BLD-MCNC: 9.2 G/DL (ref 12–16)
LYMPHOCYTES # BLD: 1.7 K/UL (ref 1.1–5.9)
LYMPHOCYTES NFR BLD: 21 % (ref 14–44)
MCH RBC QN AUTO: 24.5 PG (ref 25–35)
MCHC RBC AUTO-ENTMCNC: 30.2 G/DL (ref 31–37)
MCV RBC AUTO: 81.3 FL (ref 78–102)
NEUTS SEG # BLD: 5.9 K/UL (ref 1.8–9.5)
NEUTS SEG NFR BLD: 72 % (ref 40–70)
PLATELET # BLD AUTO: 327 K/UL (ref 140–440)
POTASSIUM SERPL-SCNC: 3.9 MMOL/L (ref 3.5–5.5)
PROT SERPL-MCNC: 5.9 G/DL (ref 6.4–8.2)
RBC # BLD AUTO: 3.75 M/UL (ref 4.1–5.1)
SODIUM SERPL-SCNC: 140 MMOL/L (ref 136–145)
WBC # BLD AUTO: 8.2 K/UL (ref 4.5–13)

## 2023-05-31 PROCEDURE — 36415 COLL VENOUS BLD VENIPUNCTURE: CPT

## 2023-05-31 PROCEDURE — 96523 IRRIG DRUG DELIVERY DEVICE: CPT

## 2023-05-31 PROCEDURE — 80053 COMPREHEN METABOLIC PANEL: CPT

## 2023-05-31 PROCEDURE — 2580000003 HC RX 258: Performed by: INTERNAL MEDICINE

## 2023-05-31 PROCEDURE — 85025 COMPLETE CBC W/AUTO DIFF WBC: CPT

## 2023-05-31 RX ORDER — SODIUM CHLORIDE 0.9 % (FLUSH) 0.9 %
5-40 SYRINGE (ML) INJECTION PRN
Status: DISCONTINUED | OUTPATIENT
Start: 2023-05-31 | End: 2023-06-21

## 2023-05-31 RX ORDER — HEPARIN SODIUM (PORCINE) LOCK FLUSH IV SOLN 100 UNIT/ML 100 UNIT/ML
500 SOLUTION INTRAVENOUS ONCE
Status: DISCONTINUED | OUTPATIENT
Start: 2023-05-31 | End: 2023-06-21

## 2023-05-31 RX ADMIN — SODIUM CHLORIDE, PRESERVATIVE FREE 10 ML: 5 INJECTION INTRAVENOUS at 09:52

## 2023-05-31 RX ADMIN — SODIUM CHLORIDE, PRESERVATIVE FREE 10 ML: 5 INJECTION INTRAVENOUS at 09:50

## 2023-05-31 RX ADMIN — SODIUM CHLORIDE, PRESERVATIVE FREE 10 ML: 5 INJECTION INTRAVENOUS at 09:49

## 2023-05-31 RX ADMIN — SODIUM CHLORIDE, PRESERVATIVE FREE 10 ML: 5 INJECTION INTRAVENOUS at 09:51

## 2023-05-31 NOTE — PROGRESS NOTES
ALT 8 (L) 13 - 56 U/L    AST 10 10 - 38 U/L    Alk Phosphatase 72 45 - 117 U/L    Total Protein 5.9 (L) 6.4 - 8.2 g/dL    Albumin 2.9 (L) 3.4 - 5.0 g/dL    Globulin 3.0 2.0 - 4.0 g/dL    Albumin/Globulin Ratio 1.0 0.8 - 1.7         Ms. Prajapati tolerated well and had no complaints at this time. Pt armband removed and shredded. Ms. Saadia Galarza was discharged from [place] [address] in stable condition at 1015. She is to return on 6/1/23 at 1000 for her next appointment.     Kei Diallo RN  May 31, 2023  3:38 PM

## 2023-06-01 ENCOUNTER — HOSPITAL ENCOUNTER (OUTPATIENT)
Facility: HOSPITAL | Age: 63
Setting detail: INFUSION SERIES
End: 2023-06-01
Payer: MEDICARE

## 2023-06-01 VITALS
TEMPERATURE: 98.1 F | BODY MASS INDEX: 21.52 KG/M2 | RESPIRATION RATE: 20 BRPM | OXYGEN SATURATION: 100 % | DIASTOLIC BLOOD PRESSURE: 75 MMHG | HEART RATE: 95 BPM | SYSTOLIC BLOOD PRESSURE: 130 MMHG | WEIGHT: 137.1 LBS | HEIGHT: 67 IN

## 2023-06-01 VITALS
SYSTOLIC BLOOD PRESSURE: 151 MMHG | TEMPERATURE: 98.1 F | OXYGEN SATURATION: 100 % | HEART RATE: 80 BPM | DIASTOLIC BLOOD PRESSURE: 78 MMHG | RESPIRATION RATE: 20 BRPM

## 2023-06-01 DIAGNOSIS — C34.90 NON-SMALL CELL LUNG CANCER, UNSPECIFIED LATERALITY (HCC): Primary | ICD-10-CM

## 2023-06-01 PROCEDURE — 2580000003 HC RX 258: Performed by: INTERNAL MEDICINE

## 2023-06-01 PROCEDURE — 96367 TX/PROPH/DG ADDL SEQ IV INF: CPT

## 2023-06-01 PROCEDURE — 96413 CHEMO IV INFUSION 1 HR: CPT

## 2023-06-01 PROCEDURE — 6360000002 HC RX W HCPCS: Performed by: INTERNAL MEDICINE

## 2023-06-01 PROCEDURE — 36593 DECLOT VASCULAR DEVICE: CPT

## 2023-06-01 PROCEDURE — 96375 TX/PRO/DX INJ NEW DRUG ADDON: CPT

## 2023-06-01 PROCEDURE — 6360000002 HC RX W HCPCS

## 2023-06-01 PROCEDURE — 96377 APPLICATON ON-BODY INJECTOR: CPT

## 2023-06-01 RX ORDER — DEXAMETHASONE SODIUM PHOSPHATE 4 MG/ML
8 INJECTION, SOLUTION INTRA-ARTICULAR; INTRALESIONAL; INTRAMUSCULAR; INTRAVENOUS; SOFT TISSUE ONCE
Status: COMPLETED | OUTPATIENT
Start: 2023-06-01 | End: 2023-06-01

## 2023-06-01 RX ORDER — SODIUM CHLORIDE 9 MG/ML
5-250 INJECTION, SOLUTION INTRAVENOUS PRN
Status: ACTIVE | OUTPATIENT
Start: 2023-06-01 | End: 2023-06-02

## 2023-06-01 RX ORDER — HEPARIN SODIUM (PORCINE) LOCK FLUSH IV SOLN 100 UNIT/ML 100 UNIT/ML
SOLUTION INTRAVENOUS
Status: COMPLETED
Start: 2023-06-01 | End: 2023-06-01

## 2023-06-01 RX ORDER — PALONOSETRON 0.05 MG/ML
0.25 INJECTION, SOLUTION INTRAVENOUS ONCE
Status: COMPLETED | OUTPATIENT
Start: 2023-06-01 | End: 2023-06-01

## 2023-06-01 RX ORDER — HEPARIN SODIUM (PORCINE) LOCK FLUSH IV SOLN 100 UNIT/ML 100 UNIT/ML
500 SOLUTION INTRAVENOUS PRN
Status: ACTIVE | OUTPATIENT
Start: 2023-06-01 | End: 2023-06-02

## 2023-06-01 RX ORDER — SODIUM CHLORIDE 0.9 % (FLUSH) 0.9 %
5-40 SYRINGE (ML) INJECTION PRN
Status: ACTIVE | OUTPATIENT
Start: 2023-06-01 | End: 2023-06-02

## 2023-06-01 RX ADMIN — FOSAPREPITANT 150 MG: 150 INJECTION, POWDER, LYOPHILIZED, FOR SOLUTION INTRAVENOUS at 11:15

## 2023-06-01 RX ADMIN — SODIUM CHLORIDE, PRESERVATIVE FREE 20 ML: 5 INJECTION INTRAVENOUS at 10:10

## 2023-06-01 RX ADMIN — SODIUM CHLORIDE 30 ML/HR: 9 INJECTION, SOLUTION INTRAVENOUS at 11:05

## 2023-06-01 RX ADMIN — SODIUM CHLORIDE, PRESERVATIVE FREE 10 ML: 5 INJECTION INTRAVENOUS at 14:02

## 2023-06-01 RX ADMIN — PALONOSETRON HYDROCHLORIDE 0.25 MG: 0.25 INJECTION INTRAVENOUS at 12:00

## 2023-06-01 RX ADMIN — PEGFILGRASTIM 6 MG: KIT SUBCUTANEOUS at 14:10

## 2023-06-01 RX ADMIN — DOCETAXEL ANHYDROUS 130 MG: 10 INJECTION, SOLUTION INTRAVENOUS at 12:35

## 2023-06-01 RX ADMIN — ALTEPLASE 2 MG: 2.2 INJECTION, POWDER, LYOPHILIZED, FOR SOLUTION INTRAVENOUS at 10:26

## 2023-06-01 RX ADMIN — HEPARIN 500 UNITS: 100 SYRINGE at 14:00

## 2023-06-01 RX ADMIN — HEPARIN 500 UNITS: 100 SYRINGE at 14:02

## 2023-06-01 RX ADMIN — SODIUM CHLORIDE, PRESERVATIVE FREE 20 ML: 5 INJECTION INTRAVENOUS at 10:12

## 2023-06-01 RX ADMIN — SODIUM CHLORIDE, PRESERVATIVE FREE 20 ML: 5 INJECTION INTRAVENOUS at 14:00

## 2023-06-01 RX ADMIN — SODIUM CHLORIDE, PRESERVATIVE FREE 10 ML: 5 INJECTION INTRAVENOUS at 11:02

## 2023-06-01 RX ADMIN — ALTEPLASE 2 MG: 2.2 INJECTION, POWDER, LYOPHILIZED, FOR SOLUTION INTRAVENOUS at 10:25

## 2023-06-01 RX ADMIN — SODIUM CHLORIDE, PRESERVATIVE FREE 10 ML: 5 INJECTION INTRAVENOUS at 11:00

## 2023-06-01 RX ADMIN — DEXAMETHASONE SODIUM PHOSPHATE 8 MG: 4 INJECTION INTRA-ARTICULAR; INTRALESIONAL; INTRAMUSCULAR; INTRAVENOUS; SOFT TISSUE at 11:52

## 2023-06-01 ASSESSMENT — PAIN - FUNCTIONAL ASSESSMENT: PAIN_FUNCTIONAL_ASSESSMENT: NONE - DENIES PAIN

## 2023-06-01 NOTE — PROGRESS NOTES
[facility] Progress Note    Date: 2023    Name: Delonte Leija    MRN: [id number]         : 1960    Ms. Prajapati arrived in the [address] [place] [address] today at 1000, in stable condition, here for CYCLE 5, DAY 1, IV TAXOTERE + RAMUCIRUMAB CHEMOTHERAPY REGIMEN (EVERY 21 DAY CYCLE). She was assessed and education was provided. Ms. Giselle Norton vitals were reviewed. Vitals:    23 1000   BP: 123/75   Pulse: 76   Resp: 20   Temp: 97.2 °F (36.2 °C)   SpO2: 97%       RAMUCIRUMAB CONTINUES TO BE ON HOLD FOR NOW. Ms. Vickey Mc presented to the infusion center today stating that she was doing well, and voicing no major complaints. CURRENT AND SIGNED CHEMOTHERAPY CONSENT WAS VIEWED IN HER ELECTRONIC RECORD.        HER PRE-CHEMO LABS OBTAINED ON YESTERDAY, 23 WERE ALL REVIEWED, AND WERE ALL NOTED TO BE SATISFACTORY FOR TREATMENT TODAY, AND WERE AS FOLLOWS:     Latest Reference Range & Units 23 10:12   Sodium 136 - 145 mmol/L 140   Potassium 3.5 - 5.5 mmol/L 3.9   Chloride 100 - 111 mmol/L 107   CO2 21 - 32 mmol/L 26   BUN,BUNPL 7.0 - 18 MG/DL 5 (L)   Creatinine 0.6 - 1.3 MG/DL 0.64   Bun/Cre Ratio 12 - 20   8 (L)   Anion Gap 3.0 - 18 mmol/L 7   Est, Glom Filt Rate >60 ml/min/1.73m2 >60   Glucose, Random 74 - 99 mg/dL 120 (H)   CALCIUM, SERUM, 617290 8.5 - 10.1 MG/DL 8.6   ALBUMIN/GLOBULIN RATIO 0.8 - 1.7   1.0   Total Protein 6.4 - 8.2 g/dL 5.9 (L)   Albumin 3.4 - 5.0 g/dL 2.9 (L)   Globulin 2.0 - 4.0 g/dL 3.0   Alk Phos 45 - 117 U/L 72   ALT 13 - 56 U/L 8 (L)   AST 10 - 38 U/L 10   BILIRUBIN TOTAL 0.2 - 1.0 MG/DL 0.4   WBC 4.5 - 13.0 K/uL 8.2   RBC 4.10 - 5.10 M/uL 3.75 (L)   Hemoglobin Quant 12.0 - 16 g/dL 9.2 (L)   Hematocrit 36 - 48 % 30.5 (L)   MCV 78 - 102 FL 81.3   MCH 25.0 - 35.0 PG 24.5 (L)   MCHC 31 - 37 g/dL 30.2 (L)   RDW 11.5 - 14.5 % 20.7 (H)   Platelet Count 904 - 440 K/uL 327   Neutrophils % 40 - 70 % 72 (H)   Lymphocyte % 14 - 44 % 21   Neutrophils Absolute 1.8 - 9.5 K/UL 5.9   Lymphocytes

## 2023-06-07 ENCOUNTER — HOSPITAL ENCOUNTER (OUTPATIENT)
Facility: HOSPITAL | Age: 63
Setting detail: INFUSION SERIES
End: 2023-06-07
Payer: MEDICARE

## 2023-06-07 VITALS
RESPIRATION RATE: 18 BRPM | DIASTOLIC BLOOD PRESSURE: 73 MMHG | HEART RATE: 95 BPM | SYSTOLIC BLOOD PRESSURE: 117 MMHG | TEMPERATURE: 97.9 F | OXYGEN SATURATION: 94 %

## 2023-06-07 PROCEDURE — 6360000002 HC RX W HCPCS: Performed by: INTERNAL MEDICINE

## 2023-06-07 PROCEDURE — 96523 IRRIG DRUG DELIVERY DEVICE: CPT

## 2023-06-07 PROCEDURE — 2580000003 HC RX 258: Performed by: INTERNAL MEDICINE

## 2023-06-07 RX ORDER — SODIUM CHLORIDE 0.9 % (FLUSH) 0.9 %
5-40 SYRINGE (ML) INJECTION 2 TIMES DAILY
Status: DISCONTINUED | OUTPATIENT
Start: 2023-06-07 | End: 2023-06-21

## 2023-06-07 RX ORDER — HEPARIN SODIUM (PORCINE) LOCK FLUSH IV SOLN 100 UNIT/ML 100 UNIT/ML
500 SOLUTION INTRAVENOUS PRN
Status: DISCONTINUED | OUTPATIENT
Start: 2023-06-07 | End: 2023-06-21

## 2023-06-07 RX ADMIN — HEPARIN 500 UNITS: 100 SYRINGE at 10:15

## 2023-06-07 RX ADMIN — SODIUM CHLORIDE, PRESERVATIVE FREE 40 ML: 5 INJECTION INTRAVENOUS at 10:15

## 2023-06-07 NOTE — PROGRESS NOTES
[facility] Progress Note    Date: 2023    Name: Iris Dickson    MRN: [id number]         : 1960    Weekly PICC Care    Ms. Prajapati to [facility], ambulatory at 1005. Patient was assessed and education was provided. Patient states she has had on and off nausea/diarrhea but not bad enough to take any medications for it. States having a reduction in appetite since getting chemo last week but states has started feeling a little better today. Ms. Low Triplett vitals were reviewed. Vitals:    23 1009   BP: 117/73   Pulse: 95   Resp: 18   Temp: 97.9 °F (36.6 °C)   SpO2: 94%       LEFT upper arm double lumen PICC line connectors changed to both lumens flushed well with NS with POSITIVE blood return followed by heparin flush. Green curos caps to each lumen. Both lumens wrapped in coban. Dressing c/d/I. Patient denies c/o arm pain. No redness, swelling or drainage noted to site. Dressing changed per protocol. New stockinette over PICC dressing for securement. Ms. Joanna Phelps tolerated picc line dressing change, and had no complaints at this time. Patient armband removed and shredded. Ms. Joanna Phelps was discharged from [place] [address] in stable condition at 1025. She is to return on 23 at 1100 for her next PICC line dressing change appointment.     Ryan Victoria RN  2023  10:37 AM

## 2023-06-14 ENCOUNTER — HOSPITAL ENCOUNTER (OUTPATIENT)
Facility: HOSPITAL | Age: 63
Setting detail: INFUSION SERIES
Discharge: HOME | End: 2023-06-17
Payer: MEDICARE

## 2023-06-14 VITALS
WEIGHT: 135.8 LBS | SYSTOLIC BLOOD PRESSURE: 146 MMHG | BODY MASS INDEX: 21.27 KG/M2 | HEART RATE: 94 BPM | RESPIRATION RATE: 18 BRPM | TEMPERATURE: 97.8 F | DIASTOLIC BLOOD PRESSURE: 77 MMHG | OXYGEN SATURATION: 95 %

## 2023-06-14 PROCEDURE — 6360000002 HC RX W HCPCS: Performed by: INTERNAL MEDICINE

## 2023-06-14 PROCEDURE — 96523 IRRIG DRUG DELIVERY DEVICE: CPT

## 2023-06-14 PROCEDURE — 2580000003 HC RX 258: Performed by: INTERNAL MEDICINE

## 2023-06-14 PROCEDURE — 6360000002 HC RX W HCPCS

## 2023-06-14 RX ORDER — HEPARIN SODIUM (PORCINE) LOCK FLUSH IV SOLN 100 UNIT/ML 100 UNIT/ML
500 SOLUTION INTRAVENOUS PRN
Status: DISCONTINUED | OUTPATIENT
Start: 2023-06-14 | End: 2023-06-21

## 2023-06-14 RX ORDER — HEPARIN 100 UNIT/ML
SYRINGE INTRAVENOUS
Status: COMPLETED
Start: 2023-06-14 | End: 2023-06-14

## 2023-06-14 RX ORDER — SODIUM CHLORIDE 0.9 % (FLUSH) 0.9 %
5-40 SYRINGE (ML) INJECTION 2 TIMES DAILY
Status: DISCONTINUED | OUTPATIENT
Start: 2023-06-14 | End: 2023-06-21

## 2023-06-14 RX ADMIN — SODIUM CHLORIDE, PRESERVATIVE FREE 40 ML: 5 INJECTION INTRAVENOUS at 11:20

## 2023-06-14 RX ADMIN — HEPARIN 500 UNITS: 100 SYRINGE at 11:20

## 2023-06-14 RX ADMIN — HEPARIN SODIUM (PORCINE) LOCK FLUSH IV SOLN 100 UNIT/ML 500 UNITS: 100 SOLUTION at 11:20

## 2023-06-14 NOTE — PROGRESS NOTES
[facility] Progress Note    Date: 2023    Name: Juvencio Lindsey    MRN: [id number]         : 1960    Weekly PICC Care    Ms. Prajapati to [facility], ambulatory at 1110. Patient was assessed and education was provided. Patient states her appetite is back, denied nausea and has occasional diarrhea. Patient states she feels much better than last week. Ms. Amanda Cole vitals were reviewed. Vitals:    23 1107   BP: (!) 146/77   Pulse: 94   Resp: 18   Temp: 97.8 °F (36.6 °C)   SpO2: 95%       LEFT upper arm double lumen PICC line connectors changed to both lumens flushed well with NS with POSITIVE blood return followed by heparin flush to red and purple lumen. Green curos caps to each lumen. Both lumens wrapped in coban. Dressing c/d/I. Patient denies c/o arm pain. No redness, swelling or drainage noted to site. Dressing changed per protocol. New stockinette over PICC dressing for securement. Ms. Kapil Loving tolerated picc line dressing change, and had no complaints at this time. Patient armband removed and shredded. Ms. Kapil Loving was discharged from [place] [address] in stable condition at 1135. She is to return on 23 at 1030 for her next PICC line dressing change appointment.     Oli Benson RN  2023  11:13 AM

## 2023-06-15 RX ORDER — ACETAMINOPHEN 325 MG/1
650 TABLET ORAL
Status: CANCELLED | OUTPATIENT
Start: 2023-06-22

## 2023-06-15 RX ORDER — ALBUTEROL SULFATE 90 UG/1
4 AEROSOL, METERED RESPIRATORY (INHALATION) PRN
Status: CANCELLED | OUTPATIENT
Start: 2023-06-22

## 2023-06-15 RX ORDER — DIPHENHYDRAMINE HYDROCHLORIDE 50 MG/ML
50 INJECTION INTRAMUSCULAR; INTRAVENOUS
Status: CANCELLED | OUTPATIENT
Start: 2023-06-22

## 2023-06-15 RX ORDER — EPINEPHRINE 1 MG/ML
0.3 INJECTION, SOLUTION, CONCENTRATE INTRAVENOUS PRN
Status: CANCELLED | OUTPATIENT
Start: 2023-06-22

## 2023-06-15 RX ORDER — ONDANSETRON 2 MG/ML
8 INJECTION INTRAMUSCULAR; INTRAVENOUS
Status: CANCELLED | OUTPATIENT
Start: 2023-06-22

## 2023-06-15 RX ORDER — MEPERIDINE HYDROCHLORIDE 25 MG/ML
12.5 INJECTION INTRAMUSCULAR; INTRAVENOUS; SUBCUTANEOUS PRN
Status: CANCELLED | OUTPATIENT
Start: 2023-06-22

## 2023-06-15 RX ORDER — SODIUM CHLORIDE 9 MG/ML
5-250 INJECTION, SOLUTION INTRAVENOUS PRN
Status: CANCELLED | OUTPATIENT
Start: 2023-06-22

## 2023-06-15 RX ORDER — SODIUM CHLORIDE 9 MG/ML
INJECTION, SOLUTION INTRAVENOUS CONTINUOUS
Status: CANCELLED | OUTPATIENT
Start: 2023-06-22

## 2023-06-16 LAB — CREATININE, EXTERNAL: 0.62

## 2023-06-21 ENCOUNTER — HOSPITAL ENCOUNTER (OUTPATIENT)
Facility: HOSPITAL | Age: 63
Setting detail: INFUSION SERIES
End: 2023-06-21
Payer: MEDICARE

## 2023-06-21 VITALS
OXYGEN SATURATION: 94 % | BODY MASS INDEX: 21.86 KG/M2 | RESPIRATION RATE: 18 BRPM | TEMPERATURE: 98.1 F | HEART RATE: 89 BPM | SYSTOLIC BLOOD PRESSURE: 126 MMHG | DIASTOLIC BLOOD PRESSURE: 73 MMHG | WEIGHT: 139.6 LBS

## 2023-06-21 LAB
ALBUMIN SERPL-MCNC: 3.1 G/DL (ref 3.4–5)
ALBUMIN/GLOB SERPL: 1.1 (ref 0.8–1.7)
ALP SERPL-CCNC: 73 U/L (ref 45–117)
ALT SERPL-CCNC: 10 U/L (ref 13–56)
ANION GAP SERPL CALC-SCNC: 5 MMOL/L (ref 3–18)
AST SERPL-CCNC: 10 U/L (ref 10–38)
BASO+EOS+MONOS # BLD AUTO: 0.5 K/UL (ref 0–2.3)
BASO+EOS+MONOS NFR BLD AUTO: 9 % (ref 0.1–17)
BILIRUB SERPL-MCNC: 0.3 MG/DL (ref 0.2–1)
BUN SERPL-MCNC: 6 MG/DL (ref 7–18)
BUN/CREAT SERPL: 11 (ref 12–20)
CALCIUM SERPL-MCNC: 8.7 MG/DL (ref 8.5–10.1)
CHLORIDE SERPL-SCNC: 107 MMOL/L (ref 100–111)
CO2 SERPL-SCNC: 29 MMOL/L (ref 21–32)
CREAT SERPL-MCNC: 0.53 MG/DL (ref 0.6–1.3)
DIFFERENTIAL METHOD BLD: ABNORMAL
ERYTHROCYTE [DISTWIDTH] IN BLOOD BY AUTOMATED COUNT: 21 % (ref 11.5–14.5)
GLOBULIN SER CALC-MCNC: 2.7 G/DL (ref 2–4)
GLUCOSE SERPL-MCNC: 79 MG/DL (ref 74–99)
HCT VFR BLD AUTO: 31 % (ref 36–48)
HGB BLD-MCNC: 9.3 G/DL (ref 12–16)
LYMPHOCYTES # BLD: 1.3 K/UL (ref 1.1–5.9)
LYMPHOCYTES NFR BLD: 22 % (ref 14–44)
MCH RBC QN AUTO: 24.9 PG (ref 25–35)
MCHC RBC AUTO-ENTMCNC: 30 G/DL (ref 31–37)
MCV RBC AUTO: 83.1 FL (ref 78–102)
NEUTS SEG # BLD: 4.3 K/UL (ref 1.8–9.5)
NEUTS SEG NFR BLD: 69 % (ref 40–70)
PLATELET # BLD AUTO: 263 K/UL (ref 140–440)
POTASSIUM SERPL-SCNC: 3.9 MMOL/L (ref 3.5–5.5)
PROT SERPL-MCNC: 5.8 G/DL (ref 6.4–8.2)
RBC # BLD AUTO: 3.73 M/UL (ref 4.1–5.1)
SODIUM SERPL-SCNC: 141 MMOL/L (ref 136–145)
WBC # BLD AUTO: 6.1 K/UL (ref 4.5–13)

## 2023-06-21 PROCEDURE — 2580000003 HC RX 258: Performed by: INTERNAL MEDICINE

## 2023-06-21 PROCEDURE — 85025 COMPLETE CBC W/AUTO DIFF WBC: CPT

## 2023-06-21 PROCEDURE — 36592 COLLECT BLOOD FROM PICC: CPT

## 2023-06-21 PROCEDURE — 6360000002 HC RX W HCPCS: Performed by: INTERNAL MEDICINE

## 2023-06-21 PROCEDURE — 80053 COMPREHEN METABOLIC PANEL: CPT

## 2023-06-21 RX ORDER — SODIUM CHLORIDE 0.9 % (FLUSH) 0.9 %
5-40 SYRINGE (ML) INJECTION PRN
Status: DISCONTINUED | OUTPATIENT
Start: 2023-06-21 | End: 2023-06-21

## 2023-06-21 RX ORDER — HEPARIN SODIUM (PORCINE) LOCK FLUSH IV SOLN 100 UNIT/ML 100 UNIT/ML
500 SOLUTION INTRAVENOUS PRN
Status: DISCONTINUED | OUTPATIENT
Start: 2023-06-21 | End: 2023-06-21

## 2023-06-21 RX ADMIN — Medication 500 UNITS: at 10:47

## 2023-06-21 RX ADMIN — SODIUM CHLORIDE, PRESERVATIVE FREE 10 ML: 5 INJECTION INTRAVENOUS at 10:44

## 2023-06-21 RX ADMIN — SODIUM CHLORIDE, PRESERVATIVE FREE 10 ML: 5 INJECTION INTRAVENOUS at 10:47

## 2023-06-21 RX ADMIN — SODIUM CHLORIDE, PRESERVATIVE FREE 10 ML: 5 INJECTION INTRAVENOUS at 10:46

## 2023-06-21 NOTE — PROGRESS NOTES
[facility] Progress Note    Date: 2023    Name: Ian Raza    MRN: [id number]         : 1960    Weekly PICC Care/Pre-chemo labs    Ms. Prajapati to [facility], ambulatory at 1035. Pt was assessed and education was provided. Ms. Annie Crespo vitals were reviewed. Vitals:    23 1041   BP: 126/73   Pulse: 89   Resp: 18   Temp: 98.1 °F (36.7 °C)   SpO2: 94%     LEFT upper arm double lumen PICC line dressing changed today per protocol. Both lumens flushed well with NS. BLOOD RETURN noted from both lumens. Labs (CBC/CMP) collected from RED lumen after 10ml waste, then flushed again with NS. Both lumens flushed with HEPARIN. Green cuero caps applied and lumens wrapped in coban, new net stocking placed over dressing for protection. CBC processed on site. CMP sent out for processing. Results for orders placed or performed during the hospital encounter of 23   CBC with Partial Differential   Result Value Ref Range    WBC 6.1 4.5 - 13.0 K/uL    RBC 3.73 (L) 4.10 - 5.10 M/uL    Hemoglobin 9.3 (L) 12.0 - 16 g/dL    Hematocrit 31.0 (L) 36 - 48 %    MCV 83.1 78 - 102 FL    MCH 24.9 (L) 25.0 - 35.0 PG    MCHC 30.0 (L) 31 - 37 g/dL    RDW 21.0 (H) 11.5 - 14.5 %    Platelets 840 762 - 647 K/uL    Neutrophils % 69 40 - 70 %    Mixed Cells 9 0.1 - 17 %    Lymphocytes % 22 14 - 44 %    Neutrophils Absolute 4.3 1.8 - 9.5 K/UL    ABSOLUTE MIXED CELLS 0.5 0.0 - 2.3 K/uL    Lymphocytes Absolute 1.3 1.1 - 5.9 K/UL    Differential Type AUTOMATED       Ms. Prajapati tolerated well and had no complaints at this time. Pt armband removed and shredded. Ms. Kelsie Christianson was discharged from [place] [address] in stable condition at 1100. She is to return on 23 at 1000 for her next appointment.     Teri Mendoza RN  2023  12:06 PM

## 2023-06-22 ENCOUNTER — HOSPITAL ENCOUNTER (OUTPATIENT)
Facility: HOSPITAL | Age: 63
Setting detail: INFUSION SERIES
End: 2023-06-22
Payer: MEDICARE

## 2023-06-22 VITALS
HEART RATE: 85 BPM | DIASTOLIC BLOOD PRESSURE: 73 MMHG | SYSTOLIC BLOOD PRESSURE: 130 MMHG | RESPIRATION RATE: 18 BRPM | TEMPERATURE: 97.2 F | OXYGEN SATURATION: 95 %

## 2023-06-22 DIAGNOSIS — C34.90 NON-SMALL CELL LUNG CANCER, UNSPECIFIED LATERALITY (HCC): Primary | ICD-10-CM

## 2023-06-22 PROCEDURE — 96413 CHEMO IV INFUSION 1 HR: CPT

## 2023-06-22 PROCEDURE — 96377 APPLICATON ON-BODY INJECTOR: CPT

## 2023-06-22 PROCEDURE — 96367 TX/PROPH/DG ADDL SEQ IV INF: CPT

## 2023-06-22 PROCEDURE — 96375 TX/PRO/DX INJ NEW DRUG ADDON: CPT

## 2023-06-22 PROCEDURE — 2580000003 HC RX 258: Performed by: INTERNAL MEDICINE

## 2023-06-22 PROCEDURE — 6360000002 HC RX W HCPCS: Performed by: INTERNAL MEDICINE

## 2023-06-22 RX ORDER — SODIUM CHLORIDE 9 MG/ML
5-250 INJECTION, SOLUTION INTRAVENOUS PRN
Status: ACTIVE | OUTPATIENT
Start: 2023-06-22 | End: 2023-06-23

## 2023-06-22 RX ORDER — HEPARIN 100 UNIT/ML
500 SYRINGE INTRAVENOUS PRN
Status: DISPENSED | OUTPATIENT
Start: 2023-06-22 | End: 2023-06-23

## 2023-06-22 RX ORDER — PALONOSETRON 0.05 MG/ML
0.25 INJECTION, SOLUTION INTRAVENOUS ONCE
Status: COMPLETED | OUTPATIENT
Start: 2023-06-22 | End: 2023-06-22

## 2023-06-22 RX ORDER — SODIUM CHLORIDE 0.9 % (FLUSH) 0.9 %
5-40 SYRINGE (ML) INJECTION PRN
Status: ACTIVE | OUTPATIENT
Start: 2023-06-22 | End: 2023-06-23

## 2023-06-22 RX ORDER — DEXAMETHASONE SODIUM PHOSPHATE 4 MG/ML
8 INJECTION, SOLUTION INTRA-ARTICULAR; INTRALESIONAL; INTRAMUSCULAR; INTRAVENOUS; SOFT TISSUE ONCE
Status: COMPLETED | OUTPATIENT
Start: 2023-06-22 | End: 2023-06-22

## 2023-06-22 RX ADMIN — SODIUM CHLORIDE, PRESERVATIVE FREE 10 ML: 5 INJECTION INTRAVENOUS at 12:39

## 2023-06-22 RX ADMIN — HEPARIN 500 UNITS: 100 SYRINGE at 12:39

## 2023-06-22 RX ADMIN — DOCETAXEL ANHYDROUS 130 MG: 10 INJECTION, SOLUTION INTRAVENOUS at 11:28

## 2023-06-22 RX ADMIN — DEXAMETHASONE SODIUM PHOSPHATE 8 MG: 4 INJECTION INTRA-ARTICULAR; INTRALESIONAL; INTRAMUSCULAR; INTRAVENOUS; SOFT TISSUE at 10:26

## 2023-06-22 RX ADMIN — PEGFILGRASTIM 6 MG: KIT SUBCUTANEOUS at 12:44

## 2023-06-22 RX ADMIN — FOSAPREPITANT 150 MG: 150 INJECTION, POWDER, LYOPHILIZED, FOR SOLUTION INTRAVENOUS at 10:31

## 2023-06-22 RX ADMIN — SODIUM CHLORIDE, PRESERVATIVE FREE 10 ML: 5 INJECTION INTRAVENOUS at 12:38

## 2023-06-22 RX ADMIN — SODIUM CHLORIDE, PRESERVATIVE FREE 10 ML: 5 INJECTION INTRAVENOUS at 10:23

## 2023-06-22 RX ADMIN — SODIUM CHLORIDE 25 ML/HR: 9 INJECTION, SOLUTION INTRAVENOUS at 10:23

## 2023-06-22 RX ADMIN — PALONOSETRON 0.25 MG: 0.05 INJECTION, SOLUTION INTRAVENOUS at 10:29

## 2023-06-22 NOTE — PROGRESS NOTES
[facility] Progress Note    Date: 2023    Name: Martinez Murray    MRN: [id number]         : 1960    Ms. Prajapati arrived in the [address] [place] [address] today at 21 [phone], in stable condition, here for CYCLE 6 DAY 1, IV TAXOTERE CHEMOTHERAPY REGIMEN (EVERY 21 DAY CYCLE). She was assessed and education was provided. Ms. Lea Shi vitals were reviewed. Vitals:    23 1015   BP: 130/73   Pulse: 85   Resp: 18   Temp: 97.2 °F (36.2 °C)   SpO2: 95%       RAMUCIRUMAB HAS BEEN DISCONTINUED. CURRENT AND SIGNED CHEMOTHERAPY CONSENT WAS VIEWED IN HER ELECTRONIC RECORD. HER PRE-CHEMO LABS OBTAINED ON YESTERDAY, 23 WERE ALL REVIEWED, AND WERE ALL NOTED TO BE SATISFACTORY FOR TREATMENT TODAY. Her LEFT UPPER ARM DOUBLE LUMEN PICC LINE WAS NOTED TO BE COMPLETELY DRY AND INTACT. Blood return present in both lumens.  ml IV BAG was initiated to infuse @ [facility] throughout treatment today. The following pre-medications were administered: DECADRON 8 MG IVP (she DID take her oral Decadron at home as instructed on yesterday, and is aware to take again tomorrow), EMEND 150 MG IVPB, & ALOXI 0.25 MG IVP. Docetaxel (TAXOTERE) 130 mg IV (75 mg/m2), was administered per order, over approximately 60 minutes. After completion of the TAXOTERE chemotherapy, both lumens of her PICC line were flushed well with NS & Heparin, and the PICC line was left completely dry and intact. NEULASTA 6 MG SQ ON BODY INJECTOR, was applied to her RIGHT UPPER ARM And after application, the green light was noted to be flashing appropriately. PT AWARE OF WHEN TO REMOVE. Ms. Wesley Valdovinos tolerated treatment very well today, and voiced no complaints. Ms. Wesley Valdovinos was discharged from [place] [address] in stable condition at 1250. She is to return on next Wednesday, 23 at 1100, for her next appointment, for weekly PICC CARE.      Shane Blanton RN  2023  2:50 PM

## 2023-06-22 NOTE — PROGRESS NOTES
Pharmacy Note     Name: Nidia Iqbal  : 1960  Estimated body surface area is 1.73 meters squared as calculated from the following:    Height as of 23: 5' 7\" (1.702 m). Weight as of 23: 139 lb 9.6 oz (63.3 kg). Diagnosis: NSCLC  Treatment Plan: Docetaxel  Cycle/Day: C6D1  Cycle Start Date: 23    Lab Results   Component Value Date/Time    WBC 6.1 2023 10:45 AM     2023 10:45 AM     No components found for: ANEU  Lab Results   Component Value Date/Time    CREAPOC 0.7 2021 04:21 PM     Most Recent Creatinine Clearance:  CrCl: 105 mL/min  (based on Adjusted Body Weight)  Creatinine: 0.53 mg/dL (2023 10:45 AM)    Pharmacy Intervention:  Received a called from [facility], [address] at [facility]. 15. Dr. Mcgill Head would like to discontinue the ramucirumab. Orders received for docetaxel only on 23.   Treatment plan updated in [facility] will continue to monitor    Pharmacist: MARIA TERESA Soto MARIELOS [facility]

## 2023-06-26 RX ORDER — AMLODIPINE BESYLATE 2.5 MG/1
TABLET ORAL
Qty: 90 TABLET | Refills: 3 | Status: SHIPPED | OUTPATIENT
Start: 2023-06-26

## 2023-06-28 ENCOUNTER — HOSPITAL ENCOUNTER (OUTPATIENT)
Facility: HOSPITAL | Age: 63
Setting detail: INFUSION SERIES
End: 2023-06-28
Payer: MEDICARE

## 2023-06-28 VITALS
RESPIRATION RATE: 20 BRPM | TEMPERATURE: 98.6 F | HEART RATE: 109 BPM | SYSTOLIC BLOOD PRESSURE: 117 MMHG | DIASTOLIC BLOOD PRESSURE: 72 MMHG | OXYGEN SATURATION: 96 %

## 2023-06-28 PROCEDURE — 2580000003 HC RX 258: Performed by: INTERNAL MEDICINE

## 2023-06-28 PROCEDURE — 6360000002 HC RX W HCPCS

## 2023-06-28 PROCEDURE — 96523 IRRIG DRUG DELIVERY DEVICE: CPT

## 2023-06-28 PROCEDURE — 6360000002 HC RX W HCPCS: Performed by: INTERNAL MEDICINE

## 2023-06-28 RX ORDER — HEPARIN 100 UNIT/ML
SYRINGE INTRAVENOUS
Status: COMPLETED
Start: 2023-06-28 | End: 2023-06-28

## 2023-06-28 RX ORDER — HEPARIN SODIUM (PORCINE) LOCK FLUSH IV SOLN 100 UNIT/ML 100 UNIT/ML
500 SOLUTION INTRAVENOUS PRN
Status: DISCONTINUED | OUTPATIENT
Start: 2023-06-28 | End: 2023-07-12

## 2023-06-28 RX ORDER — SODIUM CHLORIDE 0.9 % (FLUSH) 0.9 %
5-40 SYRINGE (ML) INJECTION PRN
Status: DISCONTINUED | OUTPATIENT
Start: 2023-06-28 | End: 2023-07-12

## 2023-06-28 RX ADMIN — SODIUM CHLORIDE, PRESERVATIVE FREE 10 ML: 5 INJECTION INTRAVENOUS at 11:20

## 2023-06-28 RX ADMIN — SODIUM CHLORIDE, PRESERVATIVE FREE 10 ML: 5 INJECTION INTRAVENOUS at 11:22

## 2023-06-28 RX ADMIN — HEPARIN 500 UNITS: 100 SYRINGE at 11:20

## 2023-06-28 RX ADMIN — HEPARIN 500 UNITS: 100 SYRINGE at 11:22

## 2023-06-28 ASSESSMENT — PAIN - FUNCTIONAL ASSESSMENT: PAIN_FUNCTIONAL_ASSESSMENT: NONE - DENIES PAIN

## 2023-07-05 ENCOUNTER — HOSPITAL ENCOUNTER (OUTPATIENT)
Facility: HOSPITAL | Age: 63
Setting detail: INFUSION SERIES
End: 2023-07-05
Payer: MEDICARE

## 2023-07-05 VITALS
RESPIRATION RATE: 18 BRPM | OXYGEN SATURATION: 96 % | SYSTOLIC BLOOD PRESSURE: 117 MMHG | DIASTOLIC BLOOD PRESSURE: 71 MMHG | TEMPERATURE: 97.2 F | HEART RATE: 102 BPM

## 2023-07-05 PROCEDURE — 96523 IRRIG DRUG DELIVERY DEVICE: CPT

## 2023-07-05 PROCEDURE — 2580000003 HC RX 258: Performed by: INTERNAL MEDICINE

## 2023-07-05 PROCEDURE — 6360000002 HC RX W HCPCS: Performed by: INTERNAL MEDICINE

## 2023-07-05 RX ORDER — ONDANSETRON 2 MG/ML
8 INJECTION INTRAMUSCULAR; INTRAVENOUS
Status: CANCELLED | OUTPATIENT
Start: 2023-07-13

## 2023-07-05 RX ORDER — DIPHENHYDRAMINE HYDROCHLORIDE 50 MG/ML
50 INJECTION INTRAMUSCULAR; INTRAVENOUS
Status: CANCELLED | OUTPATIENT
Start: 2023-07-13

## 2023-07-05 RX ORDER — SODIUM CHLORIDE 0.9 % (FLUSH) 0.9 %
5-40 SYRINGE (ML) INJECTION PRN
Status: DISCONTINUED | OUTPATIENT
Start: 2023-07-05 | End: 2023-07-12

## 2023-07-05 RX ORDER — MEPERIDINE HYDROCHLORIDE 25 MG/ML
12.5 INJECTION INTRAMUSCULAR; INTRAVENOUS; SUBCUTANEOUS PRN
Status: CANCELLED | OUTPATIENT
Start: 2023-07-13

## 2023-07-05 RX ORDER — ACETAMINOPHEN 325 MG/1
650 TABLET ORAL
Status: CANCELLED | OUTPATIENT
Start: 2023-07-13

## 2023-07-05 RX ORDER — HEPARIN SODIUM (PORCINE) LOCK FLUSH IV SOLN 100 UNIT/ML 100 UNIT/ML
500 SOLUTION INTRAVENOUS PRN
Status: DISCONTINUED | OUTPATIENT
Start: 2023-07-05 | End: 2023-07-12

## 2023-07-05 RX ORDER — HEPARIN 100 UNIT/ML
SYRINGE INTRAVENOUS
Status: DISPENSED
Start: 2023-07-05 | End: 2023-07-05

## 2023-07-05 RX ORDER — ALBUTEROL SULFATE 90 UG/1
4 AEROSOL, METERED RESPIRATORY (INHALATION) PRN
Status: CANCELLED | OUTPATIENT
Start: 2023-07-13

## 2023-07-05 RX ORDER — EPINEPHRINE 1 MG/ML
0.3 INJECTION, SOLUTION, CONCENTRATE INTRAVENOUS PRN
Status: CANCELLED | OUTPATIENT
Start: 2023-07-13

## 2023-07-05 RX ORDER — SODIUM CHLORIDE 9 MG/ML
5-250 INJECTION, SOLUTION INTRAVENOUS PRN
Status: CANCELLED | OUTPATIENT
Start: 2023-07-13

## 2023-07-05 RX ORDER — SODIUM CHLORIDE 9 MG/ML
INJECTION, SOLUTION INTRAVENOUS CONTINUOUS
Status: CANCELLED | OUTPATIENT
Start: 2023-07-13

## 2023-07-05 RX ADMIN — HEPARIN 500 UNITS: 100 SYRINGE at 09:14

## 2023-07-05 RX ADMIN — HEPARIN 500 UNITS: 100 SYRINGE at 09:11

## 2023-07-05 RX ADMIN — SODIUM CHLORIDE, PRESERVATIVE FREE 10 ML: 5 INJECTION INTRAVENOUS at 09:13

## 2023-07-05 RX ADMIN — SODIUM CHLORIDE, PRESERVATIVE FREE 10 ML: 5 INJECTION INTRAVENOUS at 09:10

## 2023-07-05 NOTE — TELEPHONE ENCOUNTER
Requested Prescriptions     Pending Prescriptions Disp Refills    levothyroxine (SYNTHROID) 88 MCG tablet [Pharmacy Med Name: LEVOTHYROXINE 88 MCG TABLET] 90 tablet      Sig: take 1 tablet by mouth once daily before breakfast     Last OV: 4/6/2023  Last labs: 6/21/2023 (oncology labs)  Next OV: 10/10/2023

## 2023-07-06 DIAGNOSIS — E03.9 HYPOTHYROIDISM, UNSPECIFIED TYPE: ICD-10-CM

## 2023-07-06 RX ORDER — LEVOTHYROXINE SODIUM 88 UG/1
TABLET ORAL
Qty: 90 TABLET | Refills: 0 | Status: SHIPPED | OUTPATIENT
Start: 2023-07-06

## 2023-07-12 ENCOUNTER — HOSPITAL ENCOUNTER (OUTPATIENT)
Facility: HOSPITAL | Age: 63
Setting detail: INFUSION SERIES
End: 2023-07-12
Payer: MEDICARE

## 2023-07-12 VITALS
OXYGEN SATURATION: 92 % | TEMPERATURE: 98.2 F | DIASTOLIC BLOOD PRESSURE: 85 MMHG | RESPIRATION RATE: 18 BRPM | HEART RATE: 98 BPM | SYSTOLIC BLOOD PRESSURE: 157 MMHG

## 2023-07-12 LAB
ALBUMIN SERPL-MCNC: 2.9 G/DL (ref 3.4–5)
ALBUMIN/GLOB SERPL: 1 (ref 0.8–1.7)
ALP SERPL-CCNC: 78 U/L (ref 45–117)
ALT SERPL-CCNC: 7 U/L (ref 13–56)
ANION GAP SERPL CALC-SCNC: 6 MMOL/L (ref 3–18)
AST SERPL-CCNC: 14 U/L (ref 10–38)
BASO+EOS+MONOS # BLD AUTO: 0.6 K/UL (ref 0–2.3)
BASO+EOS+MONOS NFR BLD AUTO: 5 % (ref 0.1–17)
BILIRUB SERPL-MCNC: 0.3 MG/DL (ref 0.2–1)
BUN SERPL-MCNC: 5 MG/DL (ref 7–18)
BUN/CREAT SERPL: 9 (ref 12–20)
CALCIUM SERPL-MCNC: 8.6 MG/DL (ref 8.5–10.1)
CHLORIDE SERPL-SCNC: 105 MMOL/L (ref 100–111)
CO2 SERPL-SCNC: 30 MMOL/L (ref 21–32)
CREAT SERPL-MCNC: 0.56 MG/DL (ref 0.6–1.3)
DIFFERENTIAL METHOD BLD: ABNORMAL
ERYTHROCYTE [DISTWIDTH] IN BLOOD BY AUTOMATED COUNT: 19.2 % (ref 11.5–14.5)
GLOBULIN SER CALC-MCNC: 3 G/DL (ref 2–4)
GLUCOSE SERPL-MCNC: 56 MG/DL (ref 74–99)
HCT VFR BLD AUTO: 31.6 % (ref 36–48)
HGB BLD-MCNC: 9.7 G/DL (ref 12–16)
LYMPHOCYTES # BLD: 1.5 K/UL (ref 1.1–5.9)
LYMPHOCYTES NFR BLD: 12 % (ref 14–44)
MCH RBC QN AUTO: 26.1 PG (ref 25–35)
MCHC RBC AUTO-ENTMCNC: 30.7 G/DL (ref 31–37)
MCV RBC AUTO: 85.2 FL (ref 78–102)
NEUTS SEG # BLD: 9.6 K/UL (ref 1.8–9.5)
NEUTS SEG NFR BLD: 83 % (ref 40–70)
PLATELET # BLD AUTO: 308 K/UL (ref 140–440)
POTASSIUM SERPL-SCNC: 3.3 MMOL/L (ref 3.5–5.5)
PROT SERPL-MCNC: 5.9 G/DL (ref 6.4–8.2)
RBC # BLD AUTO: 3.71 M/UL (ref 4.1–5.1)
SODIUM SERPL-SCNC: 141 MMOL/L (ref 136–145)
WBC # BLD AUTO: 11.7 K/UL (ref 4.5–13)

## 2023-07-12 PROCEDURE — 36415 COLL VENOUS BLD VENIPUNCTURE: CPT

## 2023-07-12 PROCEDURE — 80053 COMPREHEN METABOLIC PANEL: CPT

## 2023-07-12 PROCEDURE — 85025 COMPLETE CBC W/AUTO DIFF WBC: CPT

## 2023-07-12 PROCEDURE — 96523 IRRIG DRUG DELIVERY DEVICE: CPT

## 2023-07-12 PROCEDURE — 6360000002 HC RX W HCPCS: Performed by: INTERNAL MEDICINE

## 2023-07-12 PROCEDURE — 2580000003 HC RX 258: Performed by: INTERNAL MEDICINE

## 2023-07-12 RX ORDER — SODIUM CHLORIDE 0.9 % (FLUSH) 0.9 %
5-40 SYRINGE (ML) INJECTION PRN
Status: CANCELLED | OUTPATIENT
Start: 2023-07-12

## 2023-07-12 RX ORDER — SODIUM CHLORIDE 0.9 % (FLUSH) 0.9 %
5-40 SYRINGE (ML) INJECTION PRN
Status: DISCONTINUED | OUTPATIENT
Start: 2023-07-12 | End: 2023-07-12

## 2023-07-12 RX ORDER — HEPARIN SODIUM (PORCINE) LOCK FLUSH IV SOLN 100 UNIT/ML 100 UNIT/ML
500 SOLUTION INTRAVENOUS PRN
Status: CANCELLED | OUTPATIENT
Start: 2023-07-12

## 2023-07-12 RX ORDER — HEPARIN SODIUM (PORCINE) LOCK FLUSH IV SOLN 100 UNIT/ML 100 UNIT/ML
500 SOLUTION INTRAVENOUS PRN
Status: DISCONTINUED | OUTPATIENT
Start: 2023-07-12 | End: 2023-07-12

## 2023-07-12 RX ADMIN — Medication 500 UNITS: at 09:03

## 2023-07-12 RX ADMIN — SODIUM CHLORIDE, PRESERVATIVE FREE 10 ML: 5 INJECTION INTRAVENOUS at 09:01

## 2023-07-12 RX ADMIN — SODIUM CHLORIDE, PRESERVATIVE FREE 10 ML: 5 INJECTION INTRAVENOUS at 08:55

## 2023-07-12 RX ADMIN — SODIUM CHLORIDE, PRESERVATIVE FREE 10 ML: 5 INJECTION INTRAVENOUS at 08:56

## 2023-07-12 RX ADMIN — SODIUM CHLORIDE, PRESERVATIVE FREE 10 ML: 5 INJECTION INTRAVENOUS at 09:02

## 2023-07-12 NOTE — PROGRESS NOTES
[facility] Progress Note    Date: 2023    Name: Cherelle Menezes    MRN: [id number]         : 1960    Spoke to Kiki Kingsley, nurse at  [place] office, regarding K+ of 3.3. Justine to fax over order for pt to receive 10meq Potassium IV tomorrow during her tx visit.     Shawn Spicer RN  2023  3:37 PM

## 2023-07-13 ENCOUNTER — HOSPITAL ENCOUNTER (OUTPATIENT)
Facility: HOSPITAL | Age: 63
Setting detail: INFUSION SERIES
End: 2023-07-13
Payer: MEDICARE

## 2023-07-13 VITALS
SYSTOLIC BLOOD PRESSURE: 155 MMHG | TEMPERATURE: 98.4 F | OXYGEN SATURATION: 98 % | HEIGHT: 67 IN | HEART RATE: 78 BPM | WEIGHT: 139.2 LBS | DIASTOLIC BLOOD PRESSURE: 86 MMHG | BODY MASS INDEX: 21.85 KG/M2 | RESPIRATION RATE: 20 BRPM

## 2023-07-13 DIAGNOSIS — C34.90 NON-SMALL CELL LUNG CANCER, UNSPECIFIED LATERALITY (HCC): Primary | ICD-10-CM

## 2023-07-13 PROCEDURE — 96367 TX/PROPH/DG ADDL SEQ IV INF: CPT

## 2023-07-13 PROCEDURE — 96377 APPLICATON ON-BODY INJECTOR: CPT

## 2023-07-13 PROCEDURE — 6360000002 HC RX W HCPCS: Performed by: INTERNAL MEDICINE

## 2023-07-13 PROCEDURE — 2580000003 HC RX 258: Performed by: INTERNAL MEDICINE

## 2023-07-13 PROCEDURE — 96375 TX/PRO/DX INJ NEW DRUG ADDON: CPT

## 2023-07-13 PROCEDURE — 96413 CHEMO IV INFUSION 1 HR: CPT

## 2023-07-13 RX ORDER — HEPARIN 100 UNIT/ML
500 SYRINGE INTRAVENOUS PRN
Status: DISPENSED | OUTPATIENT
Start: 2023-07-13 | End: 2023-07-14

## 2023-07-13 RX ORDER — PALONOSETRON 0.05 MG/ML
0.25 INJECTION, SOLUTION INTRAVENOUS ONCE
Status: COMPLETED | OUTPATIENT
Start: 2023-07-13 | End: 2023-07-13

## 2023-07-13 RX ORDER — SODIUM CHLORIDE 0.9 % (FLUSH) 0.9 %
5-40 SYRINGE (ML) INJECTION PRN
Status: ACTIVE | OUTPATIENT
Start: 2023-07-13 | End: 2023-07-14

## 2023-07-13 RX ORDER — SODIUM CHLORIDE 9 MG/ML
5-250 INJECTION, SOLUTION INTRAVENOUS PRN
Status: ACTIVE | OUTPATIENT
Start: 2023-07-13 | End: 2023-07-14

## 2023-07-13 RX ORDER — DEXAMETHASONE SODIUM PHOSPHATE 4 MG/ML
8 INJECTION, SOLUTION INTRA-ARTICULAR; INTRALESIONAL; INTRAMUSCULAR; INTRAVENOUS; SOFT TISSUE ONCE
Status: COMPLETED | OUTPATIENT
Start: 2023-07-13 | End: 2023-07-13

## 2023-07-13 RX ORDER — POTASSIUM CHLORIDE 7.45 MG/ML
10 INJECTION INTRAVENOUS ONCE
Status: COMPLETED | OUTPATIENT
Start: 2023-07-13 | End: 2023-07-13

## 2023-07-13 RX ADMIN — POTASSIUM CHLORIDE 10 MEQ: 10 INJECTION, SOLUTION INTRAVENOUS at 10:35

## 2023-07-13 RX ADMIN — SODIUM CHLORIDE 150 MG: 900 INJECTION, SOLUTION INTRAVENOUS at 11:55

## 2023-07-13 RX ADMIN — PALONOSETRON HYDROCHLORIDE 0.25 MG: 0.25 INJECTION INTRAVENOUS at 11:50

## 2023-07-13 RX ADMIN — HEPARIN 500 UNITS: 100 SYRINGE at 14:32

## 2023-07-13 RX ADMIN — SODIUM CHLORIDE, PRESERVATIVE FREE 20 ML: 5 INJECTION INTRAVENOUS at 10:16

## 2023-07-13 RX ADMIN — DEXAMETHASONE SODIUM PHOSPHATE 8 MG: 4 INJECTION INTRA-ARTICULAR; INTRALESIONAL; INTRAMUSCULAR; INTRAVENOUS; SOFT TISSUE at 11:46

## 2023-07-13 RX ADMIN — PEGFILGRASTIM 6 MG: KIT SUBCUTANEOUS at 14:35

## 2023-07-13 RX ADMIN — SODIUM CHLORIDE, PRESERVATIVE FREE 20 ML: 5 INJECTION INTRAVENOUS at 10:15

## 2023-07-13 RX ADMIN — HEPARIN 500 UNITS: 100 SYRINGE at 14:30

## 2023-07-13 RX ADMIN — SODIUM CHLORIDE, PRESERVATIVE FREE 20 ML: 5 INJECTION INTRAVENOUS at 14:32

## 2023-07-13 RX ADMIN — SODIUM CHLORIDE 30 ML/HR: 9 INJECTION, SOLUTION INTRAVENOUS at 10:20

## 2023-07-13 RX ADMIN — SODIUM CHLORIDE, PRESERVATIVE FREE 20 ML: 5 INJECTION INTRAVENOUS at 14:30

## 2023-07-13 RX ADMIN — DOCETAXEL ANHYDROUS 130 MG: 10 INJECTION, SOLUTION INTRAVENOUS at 13:00

## 2023-07-13 ASSESSMENT — PAIN - FUNCTIONAL ASSESSMENT: PAIN_FUNCTIONAL_ASSESSMENT: NONE - DENIES PAIN

## 2023-07-19 ENCOUNTER — HOSPITAL ENCOUNTER (OUTPATIENT)
Facility: HOSPITAL | Age: 63
Setting detail: INFUSION SERIES
End: 2023-07-19
Payer: MEDICARE

## 2023-07-19 VITALS
HEART RATE: 107 BPM | TEMPERATURE: 99.7 F | OXYGEN SATURATION: 98 % | DIASTOLIC BLOOD PRESSURE: 72 MMHG | SYSTOLIC BLOOD PRESSURE: 116 MMHG | RESPIRATION RATE: 20 BRPM

## 2023-07-19 PROCEDURE — 96523 IRRIG DRUG DELIVERY DEVICE: CPT

## 2023-07-19 PROCEDURE — 6360000002 HC RX W HCPCS: Performed by: INTERNAL MEDICINE

## 2023-07-19 PROCEDURE — 2580000003 HC RX 258: Performed by: INTERNAL MEDICINE

## 2023-07-19 RX ORDER — SODIUM CHLORIDE 0.9 % (FLUSH) 0.9 %
5-40 SYRINGE (ML) INJECTION 2 TIMES DAILY
Status: DISCONTINUED | OUTPATIENT
Start: 2023-07-19 | End: 2023-08-23

## 2023-07-19 RX ORDER — HEPARIN SODIUM (PORCINE) LOCK FLUSH IV SOLN 100 UNIT/ML 100 UNIT/ML
500 SOLUTION INTRAVENOUS PRN
Status: DISCONTINUED | OUTPATIENT
Start: 2023-07-19 | End: 2023-08-23

## 2023-07-19 RX ADMIN — SODIUM CHLORIDE, PRESERVATIVE FREE 20 ML: 5 INJECTION INTRAVENOUS at 13:08

## 2023-07-19 RX ADMIN — HEPARIN 500 UNITS: 100 SYRINGE at 13:08

## 2023-07-21 DIAGNOSIS — C34.90 NON-SMALL CELL LUNG CANCER, UNSPECIFIED LATERALITY (HCC): ICD-10-CM

## 2023-07-26 ENCOUNTER — HOSPITAL ENCOUNTER (OUTPATIENT)
Facility: HOSPITAL | Age: 63
Setting detail: INFUSION SERIES
End: 2023-07-26
Payer: MEDICARE

## 2023-07-26 VITALS
OXYGEN SATURATION: 95 % | DIASTOLIC BLOOD PRESSURE: 73 MMHG | SYSTOLIC BLOOD PRESSURE: 101 MMHG | HEART RATE: 105 BPM | RESPIRATION RATE: 18 BRPM | TEMPERATURE: 97.4 F

## 2023-07-26 PROCEDURE — 2580000003 HC RX 258: Performed by: INTERNAL MEDICINE

## 2023-07-26 PROCEDURE — 6360000002 HC RX W HCPCS: Performed by: INTERNAL MEDICINE

## 2023-07-26 PROCEDURE — 96523 IRRIG DRUG DELIVERY DEVICE: CPT

## 2023-07-26 RX ORDER — SODIUM CHLORIDE 0.9 % (FLUSH) 0.9 %
5-40 SYRINGE (ML) INJECTION PRN
Status: DISCONTINUED | OUTPATIENT
Start: 2023-07-26 | End: 2023-08-23

## 2023-07-26 RX ORDER — ACETAMINOPHEN 325 MG/1
650 TABLET ORAL
Status: CANCELLED | OUTPATIENT
Start: 2023-08-03

## 2023-07-26 RX ORDER — HEPARIN 100 UNIT/ML
SYRINGE INTRAVENOUS
Status: DISCONTINUED
Start: 2023-07-26 | End: 2023-07-26 | Stop reason: WASHOUT

## 2023-07-26 RX ORDER — ALBUTEROL SULFATE 90 UG/1
4 AEROSOL, METERED RESPIRATORY (INHALATION) PRN
Status: CANCELLED | OUTPATIENT
Start: 2023-08-03

## 2023-07-26 RX ORDER — ONDANSETRON 2 MG/ML
8 INJECTION INTRAMUSCULAR; INTRAVENOUS
Status: CANCELLED | OUTPATIENT
Start: 2023-08-03

## 2023-07-26 RX ORDER — HEPARIN SODIUM (PORCINE) LOCK FLUSH IV SOLN 100 UNIT/ML 100 UNIT/ML
500 SOLUTION INTRAVENOUS PRN
Status: DISCONTINUED | OUTPATIENT
Start: 2023-07-26 | End: 2023-08-23

## 2023-07-26 RX ORDER — SODIUM CHLORIDE 9 MG/ML
INJECTION, SOLUTION INTRAVENOUS CONTINUOUS
Status: CANCELLED | OUTPATIENT
Start: 2023-08-03

## 2023-07-26 RX ORDER — EPINEPHRINE 1 MG/ML
0.3 INJECTION, SOLUTION, CONCENTRATE INTRAVENOUS PRN
Status: CANCELLED | OUTPATIENT
Start: 2023-08-03

## 2023-07-26 RX ORDER — HEPARIN 100 UNIT/ML
SYRINGE INTRAVENOUS
Status: DISCONTINUED
Start: 2023-07-26 | End: 2023-07-26 | Stop reason: HOSPADM

## 2023-07-26 RX ORDER — DIPHENHYDRAMINE HYDROCHLORIDE 50 MG/ML
50 INJECTION INTRAMUSCULAR; INTRAVENOUS
Status: CANCELLED | OUTPATIENT
Start: 2023-08-03

## 2023-07-26 RX ORDER — SODIUM CHLORIDE 9 MG/ML
5-250 INJECTION, SOLUTION INTRAVENOUS PRN
Status: CANCELLED | OUTPATIENT
Start: 2023-08-03

## 2023-07-26 RX ORDER — MEPERIDINE HYDROCHLORIDE 25 MG/ML
12.5 INJECTION INTRAMUSCULAR; INTRAVENOUS; SUBCUTANEOUS PRN
Status: CANCELLED | OUTPATIENT
Start: 2023-08-03

## 2023-07-26 RX ADMIN — SODIUM CHLORIDE, PRESERVATIVE FREE 20 ML: 5 INJECTION INTRAVENOUS at 11:21

## 2023-07-26 RX ADMIN — HEPARIN 500 UNITS: 100 SYRINGE at 11:21

## 2023-07-31 ENCOUNTER — HOSPITAL ENCOUNTER (OUTPATIENT)
Facility: HOSPITAL | Age: 63
Discharge: HOME | End: 2023-08-03
Attending: INTERNAL MEDICINE
Payer: MEDICARE

## 2023-07-31 DIAGNOSIS — R92.8 ABNORMAL MAMMOGRAM: ICD-10-CM

## 2023-07-31 DIAGNOSIS — C34.11 MALIGNANT NEOPLASM OF UPPER LOBE OF RIGHT LUNG (HCC): ICD-10-CM

## 2023-07-31 LAB — CREAT UR-MCNC: 0.5 MG/DL (ref 0.6–1.3)

## 2023-07-31 PROCEDURE — 82565 ASSAY OF CREATININE: CPT

## 2023-07-31 PROCEDURE — 6360000004 HC RX CONTRAST MEDICATION: Performed by: INTERNAL MEDICINE

## 2023-07-31 PROCEDURE — 71260 CT THORAX DX C+: CPT

## 2023-07-31 RX ADMIN — IOPAMIDOL 100 ML: 612 INJECTION, SOLUTION INTRAVENOUS at 09:35

## 2023-07-31 RX ADMIN — DIATRIZOATE MEGLUMINE AND DIATRIZOATE SODIUM 30 ML: 660; 100 LIQUID ORAL; RECTAL at 09:35

## 2023-08-02 ENCOUNTER — HOSPITAL ENCOUNTER (OUTPATIENT)
Facility: HOSPITAL | Age: 63
Setting detail: INFUSION SERIES
End: 2023-08-02
Payer: MEDICARE

## 2023-08-02 VITALS
OXYGEN SATURATION: 98 % | WEIGHT: 136.2 LBS | TEMPERATURE: 97.5 F | RESPIRATION RATE: 18 BRPM | SYSTOLIC BLOOD PRESSURE: 128 MMHG | HEART RATE: 101 BPM | DIASTOLIC BLOOD PRESSURE: 78 MMHG | BODY MASS INDEX: 21.38 KG/M2 | HEIGHT: 67 IN

## 2023-08-02 LAB
ALBUMIN SERPL-MCNC: 2.9 G/DL (ref 3.4–5)
ALBUMIN/GLOB SERPL: 1 (ref 0.8–1.7)
ALP SERPL-CCNC: 70 U/L (ref 45–117)
ALT SERPL-CCNC: 8 U/L (ref 13–56)
ANION GAP SERPL CALC-SCNC: 8 MMOL/L (ref 3–18)
AST SERPL-CCNC: 12 U/L (ref 10–38)
BASO+EOS+MONOS # BLD AUTO: 0.1 K/UL (ref 0–2.3)
BASO+EOS+MONOS NFR BLD AUTO: 2 % (ref 0.1–17)
BILIRUB SERPL-MCNC: 0.2 MG/DL (ref 0.2–1)
BUN SERPL-MCNC: 5 MG/DL (ref 7–18)
BUN/CREAT SERPL: 8 (ref 12–20)
CALCIUM SERPL-MCNC: 8.5 MG/DL (ref 8.5–10.1)
CHLORIDE SERPL-SCNC: 105 MMOL/L (ref 100–111)
CO2 SERPL-SCNC: 27 MMOL/L (ref 21–32)
CREAT SERPL-MCNC: 0.62 MG/DL (ref 0.6–1.3)
DIFFERENTIAL METHOD BLD: ABNORMAL
ERYTHROCYTE [DISTWIDTH] IN BLOOD BY AUTOMATED COUNT: 18.6 % (ref 11.5–14.5)
GLOBULIN SER CALC-MCNC: 2.9 G/DL (ref 2–4)
GLUCOSE SERPL-MCNC: 114 MG/DL (ref 74–99)
HCT VFR BLD AUTO: 30.6 % (ref 36–48)
HGB BLD-MCNC: 9 G/DL (ref 12–16)
LYMPHOCYTES # BLD: 1.5 K/UL (ref 1.1–5.9)
LYMPHOCYTES NFR BLD: 22 % (ref 14–44)
MCH RBC QN AUTO: 24.9 PG (ref 25–35)
MCHC RBC AUTO-ENTMCNC: 29.4 G/DL (ref 31–37)
MCV RBC AUTO: 84.5 FL (ref 78–102)
NEUTS SEG # BLD: 5.3 K/UL (ref 1.8–9.5)
NEUTS SEG NFR BLD: 76 % (ref 40–70)
PLATELET # BLD AUTO: 330 K/UL (ref 140–440)
POTASSIUM SERPL-SCNC: 3.7 MMOL/L (ref 3.5–5.5)
PROT SERPL-MCNC: 5.8 G/DL (ref 6.4–8.2)
RBC # BLD AUTO: 3.62 M/UL (ref 4.1–5.1)
SODIUM SERPL-SCNC: 140 MMOL/L (ref 136–145)
WBC # BLD AUTO: 6.9 K/UL (ref 4.5–13)

## 2023-08-02 PROCEDURE — 85025 COMPLETE CBC W/AUTO DIFF WBC: CPT

## 2023-08-02 PROCEDURE — 80053 COMPREHEN METABOLIC PANEL: CPT

## 2023-08-02 PROCEDURE — 6360000002 HC RX W HCPCS: Performed by: INTERNAL MEDICINE

## 2023-08-02 PROCEDURE — 2580000003 HC RX 258: Performed by: INTERNAL MEDICINE

## 2023-08-02 PROCEDURE — 36592 COLLECT BLOOD FROM PICC: CPT

## 2023-08-02 RX ORDER — HEPARIN SODIUM (PORCINE) LOCK FLUSH IV SOLN 100 UNIT/ML 100 UNIT/ML
500 SOLUTION INTRAVENOUS PRN
Status: DISCONTINUED | OUTPATIENT
Start: 2023-08-02 | End: 2023-08-23

## 2023-08-02 RX ORDER — SODIUM CHLORIDE 0.9 % (FLUSH) 0.9 %
5-40 SYRINGE (ML) INJECTION PRN
Status: DISCONTINUED | OUTPATIENT
Start: 2023-08-02 | End: 2023-08-23

## 2023-08-02 RX ADMIN — Medication 10 ML: at 09:17

## 2023-08-02 RX ADMIN — HEPARIN 500 UNITS: 100 SYRINGE at 09:17

## 2023-08-02 RX ADMIN — Medication 10 ML: at 09:12

## 2023-08-02 RX ADMIN — Medication 10 ML: at 09:13

## 2023-08-02 RX ADMIN — Medication 10 ML: at 09:16

## 2023-08-03 ENCOUNTER — HOSPITAL ENCOUNTER (OUTPATIENT)
Facility: HOSPITAL | Age: 63
Setting detail: INFUSION SERIES
End: 2023-08-03
Payer: MEDICARE

## 2023-08-03 VITALS
DIASTOLIC BLOOD PRESSURE: 91 MMHG | OXYGEN SATURATION: 96 % | TEMPERATURE: 98.1 F | HEART RATE: 86 BPM | RESPIRATION RATE: 18 BRPM | SYSTOLIC BLOOD PRESSURE: 167 MMHG

## 2023-08-03 DIAGNOSIS — C34.90 NON-SMALL CELL LUNG CANCER, UNSPECIFIED LATERALITY (HCC): Primary | ICD-10-CM

## 2023-08-03 PROCEDURE — 2580000003 HC RX 258: Performed by: INTERNAL MEDICINE

## 2023-08-03 PROCEDURE — 96377 APPLICATON ON-BODY INJECTOR: CPT

## 2023-08-03 PROCEDURE — 6360000002 HC RX W HCPCS: Performed by: INTERNAL MEDICINE

## 2023-08-03 PROCEDURE — 96367 TX/PROPH/DG ADDL SEQ IV INF: CPT

## 2023-08-03 PROCEDURE — 96413 CHEMO IV INFUSION 1 HR: CPT

## 2023-08-03 PROCEDURE — 96375 TX/PRO/DX INJ NEW DRUG ADDON: CPT

## 2023-08-03 RX ORDER — PALONOSETRON 0.05 MG/ML
0.25 INJECTION, SOLUTION INTRAVENOUS ONCE
Status: COMPLETED | OUTPATIENT
Start: 2023-08-03 | End: 2023-08-03

## 2023-08-03 RX ORDER — SODIUM CHLORIDE 9 MG/ML
5-250 INJECTION, SOLUTION INTRAVENOUS PRN
Status: ACTIVE | OUTPATIENT
Start: 2023-08-03 | End: 2023-08-04

## 2023-08-03 RX ORDER — HEPARIN 100 UNIT/ML
500 SYRINGE INTRAVENOUS PRN
Status: DISPENSED | OUTPATIENT
Start: 2023-08-03 | End: 2023-08-04

## 2023-08-03 RX ORDER — DEXAMETHASONE SODIUM PHOSPHATE 4 MG/ML
8 INJECTION, SOLUTION INTRA-ARTICULAR; INTRALESIONAL; INTRAMUSCULAR; INTRAVENOUS; SOFT TISSUE ONCE
Status: COMPLETED | OUTPATIENT
Start: 2023-08-03 | End: 2023-08-03

## 2023-08-03 RX ORDER — SODIUM CHLORIDE 0.9 % (FLUSH) 0.9 %
5-40 SYRINGE (ML) INJECTION PRN
Status: ACTIVE | OUTPATIENT
Start: 2023-08-03 | End: 2023-08-04

## 2023-08-03 RX ADMIN — DEXAMETHASONE SODIUM PHOSPHATE 8 MG: 4 INJECTION INTRA-ARTICULAR; INTRALESIONAL; INTRAMUSCULAR; INTRAVENOUS; SOFT TISSUE at 08:43

## 2023-08-03 RX ADMIN — HEPARIN 500 UNITS: 100 SYRINGE at 11:05

## 2023-08-03 RX ADMIN — SODIUM CHLORIDE 25 ML/HR: 9 INJECTION, SOLUTION INTRAVENOUS at 08:35

## 2023-08-03 RX ADMIN — SODIUM CHLORIDE, PRESERVATIVE FREE 10 ML: 5 INJECTION INTRAVENOUS at 11:05

## 2023-08-03 RX ADMIN — FOSAPREPITANT 150 MG: 150 INJECTION, POWDER, LYOPHILIZED, FOR SOLUTION INTRAVENOUS at 08:52

## 2023-08-03 RX ADMIN — PALONOSETRON HYDROCHLORIDE 0.25 MG: 0.25 INJECTION INTRAVENOUS at 09:21

## 2023-08-03 RX ADMIN — PEGFILGRASTIM 6 MG: KIT SUBCUTANEOUS at 11:20

## 2023-08-03 RX ADMIN — SODIUM CHLORIDE, PRESERVATIVE FREE 10 ML: 5 INJECTION INTRAVENOUS at 08:25

## 2023-08-03 RX ADMIN — HEPARIN 500 UNITS: 100 SYRINGE at 11:06

## 2023-08-03 RX ADMIN — DOCETAXEL ANHYDROUS 130 MG: 10 INJECTION, SOLUTION INTRAVENOUS at 09:58

## 2023-08-03 RX ADMIN — SODIUM CHLORIDE, PRESERVATIVE FREE 20 ML: 5 INJECTION INTRAVENOUS at 11:06

## 2023-08-03 RX ADMIN — SODIUM CHLORIDE, PRESERVATIVE FREE 10 ML: 5 INJECTION INTRAVENOUS at 08:26

## 2023-08-08 RX ORDER — POTASSIUM CHLORIDE 20 MEQ/1
TABLET, EXTENDED RELEASE ORAL
Qty: 90 TABLET | OUTPATIENT
Start: 2023-08-08

## 2023-08-09 ENCOUNTER — HOSPITAL ENCOUNTER (OUTPATIENT)
Facility: HOSPITAL | Age: 63
Setting detail: INFUSION SERIES
End: 2023-08-09
Payer: MEDICARE

## 2023-08-09 VITALS
OXYGEN SATURATION: 93 % | HEART RATE: 98 BPM | SYSTOLIC BLOOD PRESSURE: 115 MMHG | DIASTOLIC BLOOD PRESSURE: 71 MMHG | TEMPERATURE: 98.1 F | RESPIRATION RATE: 18 BRPM

## 2023-08-09 PROCEDURE — 96523 IRRIG DRUG DELIVERY DEVICE: CPT

## 2023-08-09 PROCEDURE — 2580000003 HC RX 258: Performed by: INTERNAL MEDICINE

## 2023-08-09 PROCEDURE — 6360000002 HC RX W HCPCS: Performed by: INTERNAL MEDICINE

## 2023-08-09 RX ORDER — SODIUM CHLORIDE 0.9 % (FLUSH) 0.9 %
5-40 SYRINGE (ML) INJECTION PRN
Status: DISCONTINUED | OUTPATIENT
Start: 2023-08-09 | End: 2023-08-23

## 2023-08-09 RX ORDER — HEPARIN SODIUM (PORCINE) LOCK FLUSH IV SOLN 100 UNIT/ML 100 UNIT/ML
500 SOLUTION INTRAVENOUS PRN
Status: DISCONTINUED | OUTPATIENT
Start: 2023-08-09 | End: 2023-08-23

## 2023-08-09 RX ADMIN — Medication 10 ML: at 08:56

## 2023-08-09 RX ADMIN — Medication 10 ML: at 09:01

## 2023-08-09 RX ADMIN — HEPARIN 500 UNITS: 100 SYRINGE at 09:04

## 2023-08-16 ENCOUNTER — HOSPITAL ENCOUNTER (OUTPATIENT)
Facility: HOSPITAL | Age: 63
Setting detail: INFUSION SERIES
End: 2023-08-16
Payer: MEDICARE

## 2023-08-16 VITALS
RESPIRATION RATE: 18 BRPM | DIASTOLIC BLOOD PRESSURE: 76 MMHG | TEMPERATURE: 97.3 F | HEART RATE: 102 BPM | SYSTOLIC BLOOD PRESSURE: 125 MMHG | OXYGEN SATURATION: 96 %

## 2023-08-16 PROCEDURE — 2580000003 HC RX 258: Performed by: INTERNAL MEDICINE

## 2023-08-16 PROCEDURE — 96523 IRRIG DRUG DELIVERY DEVICE: CPT

## 2023-08-16 PROCEDURE — 6360000002 HC RX W HCPCS

## 2023-08-16 RX ORDER — HEPARIN 100 UNIT/ML
SYRINGE INTRAVENOUS
Status: COMPLETED
Start: 2023-08-16 | End: 2023-08-16

## 2023-08-16 RX ORDER — HEPARIN SODIUM (PORCINE) LOCK FLUSH IV SOLN 100 UNIT/ML 100 UNIT/ML
500 SOLUTION INTRAVENOUS PRN
Status: DISCONTINUED | OUTPATIENT
Start: 2023-08-16 | End: 2023-08-23

## 2023-08-16 RX ORDER — SODIUM CHLORIDE 0.9 % (FLUSH) 0.9 %
5-40 SYRINGE (ML) INJECTION PRN
Status: DISCONTINUED | OUTPATIENT
Start: 2023-08-16 | End: 2023-08-23

## 2023-08-16 RX ADMIN — Medication 10 ML: at 09:19

## 2023-08-16 RX ADMIN — Medication 10 ML: at 09:18

## 2023-08-16 RX ADMIN — HEPARIN SODIUM (PORCINE) LOCK FLUSH IV SOLN 100 UNIT/ML 500 UNITS: 100 SOLUTION at 09:50

## 2023-08-16 RX ADMIN — HEPARIN 500 UNITS: 100 SYRINGE at 09:50

## 2023-08-23 ENCOUNTER — HOSPITAL ENCOUNTER (OUTPATIENT)
Facility: HOSPITAL | Age: 63
Setting detail: INFUSION SERIES
End: 2023-08-23
Payer: MEDICARE

## 2023-08-23 DIAGNOSIS — C34.90 NON-SMALL CELL LUNG CANCER, UNSPECIFIED LATERALITY (HCC): ICD-10-CM

## 2023-08-23 LAB
ALBUMIN SERPL-MCNC: 2.6 G/DL (ref 3.4–5)
ALBUMIN/GLOB SERPL: 0.8 (ref 0.8–1.7)
ALP SERPL-CCNC: 65 U/L (ref 45–117)
ALT SERPL-CCNC: 10 U/L (ref 13–56)
ANION GAP SERPL CALC-SCNC: 6 MMOL/L (ref 3–18)
AST SERPL-CCNC: 10 U/L (ref 10–38)
BASOPHILS # BLD: 0 K/UL (ref 0–0.1)
BASOPHILS NFR BLD: 0 % (ref 0–2)
BILIRUB SERPL-MCNC: 0.2 MG/DL (ref 0.2–1)
BUN SERPL-MCNC: 6 MG/DL (ref 7–18)
BUN/CREAT SERPL: 11 (ref 12–20)
CALCIUM SERPL-MCNC: 8.6 MG/DL (ref 8.5–10.1)
CHLORIDE SERPL-SCNC: 105 MMOL/L (ref 100–111)
CO2 SERPL-SCNC: 30 MMOL/L (ref 21–32)
CREAT SERPL-MCNC: 0.55 MG/DL (ref 0.6–1.3)
DIFFERENTIAL METHOD BLD: ABNORMAL
EOSINOPHIL # BLD: 0 K/UL (ref 0–0.4)
EOSINOPHIL NFR BLD: 1 % (ref 0–5)
ERYTHROCYTE [DISTWIDTH] IN BLOOD BY AUTOMATED COUNT: 18.9 % (ref 11.6–14.5)
GLOBULIN SER CALC-MCNC: 3.2 G/DL (ref 2–4)
GLUCOSE SERPL-MCNC: 83 MG/DL (ref 74–99)
HCT VFR BLD AUTO: 26.8 % (ref 35–45)
HGB BLD-MCNC: 8.2 G/DL (ref 12–16)
IMM GRANULOCYTES # BLD AUTO: 0 K/UL (ref 0–0.04)
IMM GRANULOCYTES NFR BLD AUTO: 1 % (ref 0–0.5)
LYMPHOCYTES # BLD: 1.1 K/UL (ref 0.9–3.6)
LYMPHOCYTES NFR BLD: 17 % (ref 21–52)
MCH RBC QN AUTO: 25.2 PG (ref 24–34)
MCHC RBC AUTO-ENTMCNC: 30.6 G/DL (ref 31–37)
MCV RBC AUTO: 82.2 FL (ref 78–100)
MONOCYTES # BLD: 0.6 K/UL (ref 0.05–1.2)
MONOCYTES NFR BLD: 10 % (ref 3–10)
NEUTS SEG # BLD: 4.5 K/UL (ref 1.8–8)
NEUTS SEG NFR BLD: 72 % (ref 40–73)
NRBC # BLD: 0 K/UL (ref 0–0.01)
NRBC BLD-RTO: 0 PER 100 WBC
PLATELET # BLD AUTO: 328 K/UL (ref 135–420)
PMV BLD AUTO: 9.5 FL (ref 9.2–11.8)
POTASSIUM SERPL-SCNC: 3.2 MMOL/L (ref 3.5–5.5)
PROT SERPL-MCNC: 5.8 G/DL (ref 6.4–8.2)
RBC # BLD AUTO: 3.26 M/UL (ref 4.2–5.3)
SODIUM SERPL-SCNC: 141 MMOL/L (ref 136–145)
WBC # BLD AUTO: 6.2 K/UL (ref 4.6–13.2)

## 2023-08-23 PROCEDURE — 6360000002 HC RX W HCPCS: Performed by: INTERNAL MEDICINE

## 2023-08-23 PROCEDURE — 2580000003 HC RX 258: Performed by: INTERNAL MEDICINE

## 2023-08-23 PROCEDURE — 96523 IRRIG DRUG DELIVERY DEVICE: CPT

## 2023-08-23 PROCEDURE — 36415 COLL VENOUS BLD VENIPUNCTURE: CPT

## 2023-08-23 PROCEDURE — 85025 COMPLETE CBC W/AUTO DIFF WBC: CPT

## 2023-08-23 PROCEDURE — 80053 COMPREHEN METABOLIC PANEL: CPT

## 2023-08-23 RX ORDER — MEPERIDINE HYDROCHLORIDE 25 MG/ML
12.5 INJECTION INTRAMUSCULAR; INTRAVENOUS; SUBCUTANEOUS PRN
Status: CANCELLED | OUTPATIENT
Start: 2023-08-24

## 2023-08-23 RX ORDER — HEPARIN SODIUM (PORCINE) LOCK FLUSH IV SOLN 100 UNIT/ML 100 UNIT/ML
500 SOLUTION INTRAVENOUS PRN
Status: DISCONTINUED | OUTPATIENT
Start: 2023-08-23 | End: 2023-10-11

## 2023-08-23 RX ORDER — SODIUM CHLORIDE 9 MG/ML
INJECTION, SOLUTION INTRAVENOUS CONTINUOUS
Status: CANCELLED | OUTPATIENT
Start: 2023-08-24

## 2023-08-23 RX ORDER — ALBUTEROL SULFATE 90 UG/1
4 AEROSOL, METERED RESPIRATORY (INHALATION) PRN
Status: CANCELLED | OUTPATIENT
Start: 2023-08-24

## 2023-08-23 RX ORDER — SODIUM CHLORIDE 9 MG/ML
5-250 INJECTION, SOLUTION INTRAVENOUS PRN
Status: CANCELLED | OUTPATIENT
Start: 2023-08-24

## 2023-08-23 RX ORDER — DIPHENHYDRAMINE HYDROCHLORIDE 50 MG/ML
50 INJECTION INTRAMUSCULAR; INTRAVENOUS
Status: CANCELLED | OUTPATIENT
Start: 2023-08-24

## 2023-08-23 RX ORDER — EPINEPHRINE 1 MG/ML
0.3 INJECTION, SOLUTION, CONCENTRATE INTRAVENOUS PRN
Status: CANCELLED | OUTPATIENT
Start: 2023-08-24

## 2023-08-23 RX ORDER — ACETAMINOPHEN 325 MG/1
650 TABLET ORAL
Status: CANCELLED | OUTPATIENT
Start: 2023-08-24

## 2023-08-23 RX ORDER — ONDANSETRON 2 MG/ML
8 INJECTION INTRAMUSCULAR; INTRAVENOUS
Status: CANCELLED | OUTPATIENT
Start: 2023-08-24

## 2023-08-23 RX ORDER — SODIUM CHLORIDE 0.9 % (FLUSH) 0.9 %
5-40 SYRINGE (ML) INJECTION 2 TIMES DAILY
Status: DISCONTINUED | OUTPATIENT
Start: 2023-08-23 | End: 2023-10-11

## 2023-08-23 RX ADMIN — SODIUM CHLORIDE, PRESERVATIVE FREE 20 ML: 5 INJECTION INTRAVENOUS at 09:25

## 2023-08-23 RX ADMIN — SODIUM CHLORIDE, PRESERVATIVE FREE 20 ML: 5 INJECTION INTRAVENOUS at 09:28

## 2023-08-23 RX ADMIN — SODIUM CHLORIDE, PRESERVATIVE FREE 20 ML: 5 INJECTION INTRAVENOUS at 09:27

## 2023-08-23 RX ADMIN — HEPARIN 500 UNITS: 100 SYRINGE at 09:28

## 2023-08-23 RX ADMIN — HEPARIN 500 UNITS: 100 SYRINGE at 09:29

## 2023-08-24 ENCOUNTER — HOSPITAL ENCOUNTER (OUTPATIENT)
Facility: HOSPITAL | Age: 63
Setting detail: INFUSION SERIES
End: 2023-08-24
Payer: MEDICARE

## 2023-08-24 VITALS
HEART RATE: 98 BPM | SYSTOLIC BLOOD PRESSURE: 124 MMHG | BODY MASS INDEX: 20.44 KG/M2 | DIASTOLIC BLOOD PRESSURE: 75 MMHG | OXYGEN SATURATION: 98 % | HEIGHT: 67 IN | WEIGHT: 130.2 LBS | TEMPERATURE: 98.2 F | RESPIRATION RATE: 18 BRPM

## 2023-08-24 VITALS
DIASTOLIC BLOOD PRESSURE: 83 MMHG | SYSTOLIC BLOOD PRESSURE: 150 MMHG | RESPIRATION RATE: 20 BRPM | HEART RATE: 84 BPM | OXYGEN SATURATION: 95 % | TEMPERATURE: 98.4 F

## 2023-08-24 DIAGNOSIS — C34.90 NON-SMALL CELL LUNG CANCER, UNSPECIFIED LATERALITY (HCC): Primary | ICD-10-CM

## 2023-08-24 PROCEDURE — 96367 TX/PROPH/DG ADDL SEQ IV INF: CPT

## 2023-08-24 PROCEDURE — 6360000002 HC RX W HCPCS: Performed by: INTERNAL MEDICINE

## 2023-08-24 PROCEDURE — 96413 CHEMO IV INFUSION 1 HR: CPT

## 2023-08-24 PROCEDURE — 2580000003 HC RX 258: Performed by: INTERNAL MEDICINE

## 2023-08-24 PROCEDURE — 96375 TX/PRO/DX INJ NEW DRUG ADDON: CPT

## 2023-08-24 PROCEDURE — 96377 APPLICATON ON-BODY INJECTOR: CPT

## 2023-08-24 RX ORDER — PALONOSETRON 0.05 MG/ML
0.25 INJECTION, SOLUTION INTRAVENOUS ONCE
Status: COMPLETED | OUTPATIENT
Start: 2023-08-24 | End: 2023-08-24

## 2023-08-24 RX ORDER — DEXAMETHASONE SODIUM PHOSPHATE 4 MG/ML
8 INJECTION, SOLUTION INTRA-ARTICULAR; INTRALESIONAL; INTRAMUSCULAR; INTRAVENOUS; SOFT TISSUE ONCE
Status: COMPLETED | OUTPATIENT
Start: 2023-08-24 | End: 2023-08-24

## 2023-08-24 RX ORDER — SODIUM CHLORIDE 9 MG/ML
5-250 INJECTION, SOLUTION INTRAVENOUS PRN
Status: ACTIVE | OUTPATIENT
Start: 2023-08-24 | End: 2023-08-25

## 2023-08-24 RX ORDER — SODIUM CHLORIDE 0.9 % (FLUSH) 0.9 %
5-40 SYRINGE (ML) INJECTION PRN
Status: ACTIVE | OUTPATIENT
Start: 2023-08-24 | End: 2023-08-25

## 2023-08-24 RX ORDER — HEPARIN 100 UNIT/ML
500 SYRINGE INTRAVENOUS PRN
Status: DISPENSED | OUTPATIENT
Start: 2023-08-24 | End: 2023-08-25

## 2023-08-24 RX ORDER — POTASSIUM CHLORIDE 20 MEQ/1
20 TABLET, EXTENDED RELEASE ORAL DAILY
COMMUNITY

## 2023-08-24 RX ADMIN — PALONOSETRON HYDROCHLORIDE 0.25 MG: 0.25 INJECTION INTRAVENOUS at 09:00

## 2023-08-24 RX ADMIN — DOCETAXEL ANHYDROUS 130 MG: 10 INJECTION, SOLUTION INTRAVENOUS at 10:15

## 2023-08-24 RX ADMIN — SODIUM CHLORIDE, PRESERVATIVE FREE 10 ML: 5 INJECTION INTRAVENOUS at 11:50

## 2023-08-24 RX ADMIN — HEPARIN 500 UNITS: 100 SYRINGE at 11:50

## 2023-08-24 RX ADMIN — SODIUM CHLORIDE, PRESERVATIVE FREE 10 ML: 5 INJECTION INTRAVENOUS at 08:30

## 2023-08-24 RX ADMIN — SODIUM CHLORIDE, PRESERVATIVE FREE 10 ML: 5 INJECTION INTRAVENOUS at 08:32

## 2023-08-24 RX ADMIN — PEGFILGRASTIM 6 MG: KIT SUBCUTANEOUS at 11:40

## 2023-08-24 RX ADMIN — FOSAPREPITANT 150 MG: 150 INJECTION, POWDER, LYOPHILIZED, FOR SOLUTION INTRAVENOUS at 09:05

## 2023-08-24 RX ADMIN — HEPARIN 500 UNITS: 100 SYRINGE at 11:52

## 2023-08-24 RX ADMIN — SODIUM CHLORIDE 30 ML/HR: 9 INJECTION, SOLUTION INTRAVENOUS at 08:45

## 2023-08-24 RX ADMIN — DEXAMETHASONE SODIUM PHOSPHATE 8 MG: 4 INJECTION INTRA-ARTICULAR; INTRALESIONAL; INTRAMUSCULAR; INTRAVENOUS; SOFT TISSUE at 08:54

## 2023-08-24 RX ADMIN — SODIUM CHLORIDE, PRESERVATIVE FREE 10 ML: 5 INJECTION INTRAVENOUS at 11:52

## 2023-08-24 ASSESSMENT — PAIN - FUNCTIONAL ASSESSMENT: PAIN_FUNCTIONAL_ASSESSMENT: NONE - DENIES PAIN

## 2023-08-24 NOTE — PROGRESS NOTES
Pharmacy Note     Name: Tsering Mcdonald  : 1960  Estimated body surface area is 1.67 meters squared as calculated from the following:    Height as of 23: 5' 7\" (1.702 m). Weight as of 23: 130 lb 3.2 oz (59.1 kg). Diagnosis: NSCLC  Treatment Plan: docetaxel + ramucirumab (held)  Cycle/Day: C9D1  Cycle Start Date: 23    Lab Results   Component Value Date/Time    WBC 6.2 2023 09:10 AM     2023 09:10 AM     No components found for: ANEU  Lab Results   Component Value Date/Time    CREAPOC 0.7 2021 04:21 PM       Most Recent Creatinine Clearance:  CrCl: 96 mL/min  (based on Adjusted Body Weight)  Creatinine: 0.55 mg/dL (2023 09:10 AM)    Pharmacy Intervention:  Patient reported to [facility] for pre-treatment labs on 23; to note potassium = 3.2 mmol/L  Jorge Anderson RN had a brief discussion with the patient about the aforementioned labs. Patient stated that she is currently taking potassium chloride 20 mEq daily but had run out for a couple of days prior to her lab appointment. Patient did state however that she does have the medication on hand but did not take any today (23). A call was placed to Dr. Elvia Olguin office regarding the hypokalemia and spoke with Toni Flanagan RN. Shalonda Fuller spoke with the provider and confirmed that the patient did not need any addition potassium at this time as long as the patient resumes her PO potassium. This information was relayed to [facility] and pharmacy will continue to monitor.      Pharmacist: Joanna Herrera, [facility]

## 2023-08-24 NOTE — PROGRESS NOTES
0.8 - 1.7   0.8   Total Protein 6.4 - 8.2 g/dL 5.8 (L)   Albumin 3.4 - 5.0 g/dL 2.6 (L)   Globulin 2.0 - 4.0 g/dL 3.2   Alk Phos 45 - 117 U/L 65   ALT 13 - 56 U/L 10 (L)   AST 10 - 38 U/L 10   BILIRUBIN TOTAL 0.2 - 1.0 MG/DL 0.2   WBC 4.6 - 13.2 K/uL 6.2   RBC 4.20 - 5.30 M/uL 3.26 (L)   Hemoglobin Quant 12.0 - 16.0 g/dL 8.2 (L)   Hematocrit 35.0 - 45.0 % 26.8 (L)   MCV 78.0 - 100.0 FL 82.2   MCH 24.0 - 34.0 PG 25.2   MCHC 31.0 - 37.0 g/dL 30.6 (L)   MPV 9.2 - 11.8 FL 9.5   RDW 11.6 - 14.5 % 18.9 (H)   Platelet Count 228 - 420 K/uL 328   Neutrophils % 40 - 73 % 72   Lymphocyte % 21 - 52 % 17 (L)   Monocytes % 3 - 10 % 10   Eosinophils % 0 - 5 % 1   Basophils % 0 - 2 % 0   Neutrophils Absolute 1.8 - 8.0 K/UL 4.5   Lymphocytes Absolute 0.9 - 3.6 K/UL 1.1   Monocytes Absolute 0.05 - 1.2 K/UL 0.6   Eosinophils Absolute 0.0 - 0.4 K/UL 0.0   Basophils Absolute 0.0 - 0.1 K/UL 0.0   Differential Type -   AUTOMATED   Immature Granulocytes 0.0 - 0.5 % 1 (H)   Nucleated Red Blood Cells 0  WBC  0.00 - 0.01 K/uL 0.0  0.00   Absolute Immature Granulocyte 0.00 - 0.04 K/UL 0.0   (L): Data is abnormally low  (H): Data is abnormally high        Her LEFT UPPER ARM DOUBLE LUMEN PICC LINE WAS NOTED TO BE COMPLETELY DRY AND INTACT, AND BRISK BLOOD RETURN WAS OBTAINED FROM BOTH LUMENS OF THE PICC LINE. THE RED LUMEN OF HER PICC LINE WAS USED FOR ALL MEDICATION ADMINISTRATION TODAY.  ml IV BAG was initiated to infuse @ [facility] throughout treatment today. The following pre-medications were administered 30-60 minutes prior to starting the chemotherapy: DECADRON 8 MG IV (she DID take her oral Decadron at home as instructed on yesterday, and is aware to take again tomorrow), EMEND 150 MG IV, & ALOXI 0.25 MG IV. Docetaxel (TAXOTERE) 130 mg IV (75 mg/m2), was administered per order, over approximately 60 minutes, and without incident.            After completion of the TAXOTERE chemotherapy, both lumens of her PICC line were flushed well with NS & Heparin, and the PICC line was left completely dry and intact. NEULASTA 6 MG SQ ON BODY INJECTOR, was applied to her RIGHT ABDOMEN at 1140. And after application, the green light was noted to be flashing appropriately. Ms. Sara Stanton tolerated treatment very well today, and voiced no complaints. Ms. Sara Stanton was discharged from [address] [place] [address] in stable condition at 1200. She is to return on next Wednesday, 8-30-23 at 1000, for her next appointment, for weekly PICC CARE. And then, she is scheduled to return on Wednesday, 9-13-23 at 0900, for [address] [place] [address] Drive, [address]. And then, she is scheduled to return on Thursday, 9-14-23 at 0800, for her next cycle of chemotherapy.      Torres Masters RN  August 24, 2023  8:52 AM

## 2023-08-30 ENCOUNTER — HOSPITAL ENCOUNTER (OUTPATIENT)
Facility: HOSPITAL | Age: 63
Setting detail: INFUSION SERIES
End: 2023-08-30
Payer: MEDICARE

## 2023-08-30 VITALS
DIASTOLIC BLOOD PRESSURE: 73 MMHG | RESPIRATION RATE: 20 BRPM | HEART RATE: 106 BPM | TEMPERATURE: 98.6 F | SYSTOLIC BLOOD PRESSURE: 124 MMHG | OXYGEN SATURATION: 95 %

## 2023-08-30 PROCEDURE — 6360000002 HC RX W HCPCS: Performed by: INTERNAL MEDICINE

## 2023-08-30 PROCEDURE — 2580000003 HC RX 258: Performed by: INTERNAL MEDICINE

## 2023-08-30 PROCEDURE — 96523 IRRIG DRUG DELIVERY DEVICE: CPT

## 2023-08-30 RX ORDER — SODIUM CHLORIDE 0.9 % (FLUSH) 0.9 %
5-40 SYRINGE (ML) INJECTION PRN
Status: DISCONTINUED | OUTPATIENT
Start: 2023-08-30 | End: 2023-10-11

## 2023-08-30 RX ORDER — HEPARIN SODIUM (PORCINE) LOCK FLUSH IV SOLN 100 UNIT/ML 100 UNIT/ML
500 SOLUTION INTRAVENOUS PRN
Status: DISCONTINUED | OUTPATIENT
Start: 2023-08-30 | End: 2023-10-11

## 2023-08-30 RX ADMIN — SODIUM CHLORIDE, PRESERVATIVE FREE 20 ML: 5 INJECTION INTRAVENOUS at 10:32

## 2023-08-30 RX ADMIN — SODIUM CHLORIDE, PRESERVATIVE FREE 20 ML: 5 INJECTION INTRAVENOUS at 10:30

## 2023-08-30 RX ADMIN — HEPARIN 500 UNITS: 100 SYRINGE at 10:30

## 2023-08-30 RX ADMIN — HEPARIN 500 UNITS: 100 SYRINGE at 10:32

## 2023-08-30 ASSESSMENT — PAIN - FUNCTIONAL ASSESSMENT: PAIN_FUNCTIONAL_ASSESSMENT: NONE - DENIES PAIN

## 2023-09-06 ENCOUNTER — HOSPITAL ENCOUNTER (OUTPATIENT)
Facility: HOSPITAL | Age: 63
Setting detail: INFUSION SERIES
End: 2023-09-06
Payer: MEDICARE

## 2023-09-06 VITALS
TEMPERATURE: 99.7 F | OXYGEN SATURATION: 96 % | DIASTOLIC BLOOD PRESSURE: 79 MMHG | HEART RATE: 106 BPM | RESPIRATION RATE: 20 BRPM | SYSTOLIC BLOOD PRESSURE: 127 MMHG

## 2023-09-06 PROCEDURE — 2580000003 HC RX 258: Performed by: INTERNAL MEDICINE

## 2023-09-06 PROCEDURE — 96523 IRRIG DRUG DELIVERY DEVICE: CPT

## 2023-09-06 PROCEDURE — 6360000002 HC RX W HCPCS: Performed by: INTERNAL MEDICINE

## 2023-09-06 RX ORDER — SODIUM CHLORIDE 0.9 % (FLUSH) 0.9 %
5-40 SYRINGE (ML) INJECTION PRN
Status: DISCONTINUED | OUTPATIENT
Start: 2023-09-06 | End: 2023-10-11

## 2023-09-06 RX ORDER — HEPARIN SODIUM (PORCINE) LOCK FLUSH IV SOLN 100 UNIT/ML 100 UNIT/ML
500 SOLUTION INTRAVENOUS PRN
Status: DISCONTINUED | OUTPATIENT
Start: 2023-09-06 | End: 2023-10-11

## 2023-09-06 RX ADMIN — SODIUM CHLORIDE, PRESERVATIVE FREE 20 ML: 5 INJECTION INTRAVENOUS at 09:55

## 2023-09-06 RX ADMIN — HEPARIN 500 UNITS: 100 SYRINGE at 09:55

## 2023-09-06 RX ADMIN — HEPARIN 500 UNITS: 100 SYRINGE at 09:56

## 2023-09-06 RX ADMIN — SODIUM CHLORIDE, PRESERVATIVE FREE 20 ML: 5 INJECTION INTRAVENOUS at 09:56

## 2023-09-06 ASSESSMENT — PAIN DESCRIPTION - DESCRIPTORS: DESCRIPTORS: THROBBING

## 2023-09-06 ASSESSMENT — PAIN - FUNCTIONAL ASSESSMENT: PAIN_FUNCTIONAL_ASSESSMENT: 0-10

## 2023-09-07 RX ORDER — DIPHENHYDRAMINE HYDROCHLORIDE 50 MG/ML
50 INJECTION INTRAMUSCULAR; INTRAVENOUS
Status: CANCELLED | OUTPATIENT
Start: 2023-09-21

## 2023-09-07 RX ORDER — SODIUM CHLORIDE 9 MG/ML
INJECTION, SOLUTION INTRAVENOUS CONTINUOUS
Status: CANCELLED | OUTPATIENT
Start: 2023-09-21

## 2023-09-07 RX ORDER — ONDANSETRON 2 MG/ML
8 INJECTION INTRAMUSCULAR; INTRAVENOUS
Status: CANCELLED | OUTPATIENT
Start: 2023-09-21

## 2023-09-07 RX ORDER — ACETAMINOPHEN 325 MG/1
650 TABLET ORAL
Status: CANCELLED | OUTPATIENT
Start: 2023-09-21

## 2023-09-07 RX ORDER — SODIUM CHLORIDE 9 MG/ML
5-250 INJECTION, SOLUTION INTRAVENOUS PRN
Status: CANCELLED | OUTPATIENT
Start: 2023-09-21

## 2023-09-07 RX ORDER — HEPARIN 100 UNIT/ML
500 SYRINGE INTRAVENOUS PRN
Status: CANCELLED | OUTPATIENT
Start: 2023-09-21

## 2023-09-07 RX ORDER — DEXAMETHASONE SODIUM PHOSPHATE 4 MG/ML
8 INJECTION, SOLUTION INTRA-ARTICULAR; INTRALESIONAL; INTRAMUSCULAR; INTRAVENOUS; SOFT TISSUE ONCE
Status: CANCELLED | OUTPATIENT
Start: 2023-09-21 | End: 2023-09-14

## 2023-09-07 RX ORDER — MEPERIDINE HYDROCHLORIDE 25 MG/ML
12.5 INJECTION INTRAMUSCULAR; INTRAVENOUS; SUBCUTANEOUS PRN
Status: CANCELLED | OUTPATIENT
Start: 2023-09-21

## 2023-09-07 RX ORDER — EPINEPHRINE 1 MG/ML
0.3 INJECTION, SOLUTION, CONCENTRATE INTRAVENOUS PRN
Status: CANCELLED | OUTPATIENT
Start: 2023-09-21

## 2023-09-07 RX ORDER — PALONOSETRON 0.05 MG/ML
0.25 INJECTION, SOLUTION INTRAVENOUS ONCE
Status: CANCELLED
Start: 2023-09-21 | End: 2023-09-14

## 2023-09-07 RX ORDER — SODIUM CHLORIDE 0.9 % (FLUSH) 0.9 %
5-40 SYRINGE (ML) INJECTION PRN
Status: CANCELLED | OUTPATIENT
Start: 2023-09-21

## 2023-09-07 RX ORDER — ALBUTEROL SULFATE 90 UG/1
4 AEROSOL, METERED RESPIRATORY (INHALATION) PRN
Status: CANCELLED | OUTPATIENT
Start: 2023-09-21

## 2023-09-13 ENCOUNTER — HOSPITAL ENCOUNTER (OUTPATIENT)
Facility: HOSPITAL | Age: 63
Setting detail: INFUSION SERIES
End: 2023-09-13
Payer: MEDICARE

## 2023-09-13 VITALS
HEART RATE: 89 BPM | OXYGEN SATURATION: 96 % | TEMPERATURE: 98.3 F | SYSTOLIC BLOOD PRESSURE: 130 MMHG | DIASTOLIC BLOOD PRESSURE: 71 MMHG | RESPIRATION RATE: 18 BRPM

## 2023-09-13 PROCEDURE — 6360000002 HC RX W HCPCS: Performed by: INTERNAL MEDICINE

## 2023-09-13 PROCEDURE — 96523 IRRIG DRUG DELIVERY DEVICE: CPT

## 2023-09-13 PROCEDURE — 2580000003 HC RX 258: Performed by: INTERNAL MEDICINE

## 2023-09-13 RX ORDER — HEPARIN SODIUM (PORCINE) LOCK FLUSH IV SOLN 100 UNIT/ML 100 UNIT/ML
500 SOLUTION INTRAVENOUS PRN
Status: DISCONTINUED | OUTPATIENT
Start: 2023-09-13 | End: 2023-10-11

## 2023-09-13 RX ORDER — SODIUM CHLORIDE 0.9 % (FLUSH) 0.9 %
5-40 SYRINGE (ML) INJECTION PRN
Status: DISCONTINUED | OUTPATIENT
Start: 2023-09-13 | End: 2023-10-11

## 2023-09-13 RX ADMIN — HEPARIN 500 UNITS: 100 SYRINGE at 09:13

## 2023-09-13 RX ADMIN — Medication 10 ML: at 09:10

## 2023-09-13 RX ADMIN — Medication 10 ML: at 09:13

## 2023-09-14 ENCOUNTER — HOSPITAL ENCOUNTER (OUTPATIENT)
Facility: HOSPITAL | Age: 63
Setting detail: INFUSION SERIES
End: 2023-09-14
Payer: MEDICARE

## 2023-09-14 DIAGNOSIS — C34.90 NON-SMALL CELL LUNG CANCER, UNSPECIFIED LATERALITY (HCC): Primary | ICD-10-CM

## 2023-09-20 ENCOUNTER — HOSPITAL ENCOUNTER (OUTPATIENT)
Facility: HOSPITAL | Age: 63
Discharge: HOME | End: 2023-09-22
Attending: INTERNAL MEDICINE
Payer: MEDICARE

## 2023-09-20 ENCOUNTER — HOSPITAL ENCOUNTER (OUTPATIENT)
Facility: HOSPITAL | Age: 63
Setting detail: INFUSION SERIES
End: 2023-09-20
Payer: MEDICARE

## 2023-09-20 ENCOUNTER — TRANSCRIBE ORDERS (OUTPATIENT)
Facility: HOSPITAL | Age: 63
End: 2023-09-20

## 2023-09-20 VITALS
TEMPERATURE: 98.3 F | DIASTOLIC BLOOD PRESSURE: 85 MMHG | BODY MASS INDEX: 20.89 KG/M2 | SYSTOLIC BLOOD PRESSURE: 144 MMHG | WEIGHT: 133.4 LBS | OXYGEN SATURATION: 94 % | RESPIRATION RATE: 18 BRPM | HEART RATE: 99 BPM

## 2023-09-20 DIAGNOSIS — R60.0 LOCALIZED EDEMA: Primary | ICD-10-CM

## 2023-09-20 DIAGNOSIS — M79.602 LEFT ARM PAIN: ICD-10-CM

## 2023-09-20 DIAGNOSIS — R60.0 LOCALIZED EDEMA: ICD-10-CM

## 2023-09-20 DIAGNOSIS — C34.11 SMALL CELL LUNG CANCER, RIGHT UPPER LOBE (HCC): ICD-10-CM

## 2023-09-20 LAB
ALBUMIN SERPL-MCNC: 2.9 G/DL (ref 3.4–5)
ALBUMIN/GLOB SERPL: 0.9 (ref 0.8–1.7)
ALP SERPL-CCNC: 62 U/L (ref 45–117)
ALT SERPL-CCNC: 11 U/L (ref 13–56)
ANION GAP SERPL CALC-SCNC: 5 MMOL/L (ref 3–18)
AST SERPL-CCNC: 11 U/L (ref 10–38)
BASOPHILS # BLD: 0 K/UL (ref 0–0.1)
BASOPHILS NFR BLD: 0 % (ref 0–2)
BILIRUB SERPL-MCNC: 0.2 MG/DL (ref 0.2–1)
BUN SERPL-MCNC: 8 MG/DL (ref 7–18)
BUN/CREAT SERPL: 15 (ref 12–20)
CALCIUM SERPL-MCNC: 8.6 MG/DL (ref 8.5–10.1)
CHLORIDE SERPL-SCNC: 110 MMOL/L (ref 100–111)
CO2 SERPL-SCNC: 28 MMOL/L (ref 21–32)
CREAT SERPL-MCNC: 0.53 MG/DL (ref 0.6–1.3)
DIFFERENTIAL METHOD BLD: ABNORMAL
EOSINOPHIL # BLD: 0.2 K/UL (ref 0–0.4)
EOSINOPHIL NFR BLD: 3 % (ref 0–5)
ERYTHROCYTE [DISTWIDTH] IN BLOOD BY AUTOMATED COUNT: 19 % (ref 11.6–14.5)
GLOBULIN SER CALC-MCNC: 3.2 G/DL (ref 2–4)
GLUCOSE SERPL-MCNC: 79 MG/DL (ref 74–99)
HCT VFR BLD AUTO: 30.3 % (ref 35–45)
HGB BLD-MCNC: 9.3 G/DL (ref 12–16)
IMM GRANULOCYTES # BLD AUTO: 0 K/UL (ref 0–0.04)
IMM GRANULOCYTES NFR BLD AUTO: 0 % (ref 0–0.5)
LYMPHOCYTES # BLD: 1.4 K/UL (ref 0.9–3.6)
LYMPHOCYTES NFR BLD: 25 % (ref 21–52)
MCH RBC QN AUTO: 25.9 PG (ref 24–34)
MCHC RBC AUTO-ENTMCNC: 30.7 G/DL (ref 31–37)
MCV RBC AUTO: 84.4 FL (ref 78–100)
MONOCYTES # BLD: 0.5 K/UL (ref 0.05–1.2)
MONOCYTES NFR BLD: 10 % (ref 3–10)
NEUTS SEG # BLD: 3.4 K/UL (ref 1.8–8)
NEUTS SEG NFR BLD: 62 % (ref 40–73)
NRBC # BLD: 0 K/UL (ref 0–0.01)
NRBC BLD-RTO: 0 PER 100 WBC
PLATELET # BLD AUTO: 318 K/UL (ref 135–420)
PMV BLD AUTO: 9.7 FL (ref 9.2–11.8)
POTASSIUM SERPL-SCNC: 3.8 MMOL/L (ref 3.5–5.5)
PROT SERPL-MCNC: 6.1 G/DL (ref 6.4–8.2)
RBC # BLD AUTO: 3.59 M/UL (ref 4.2–5.3)
SODIUM SERPL-SCNC: 143 MMOL/L (ref 136–145)
WBC # BLD AUTO: 5.5 K/UL (ref 4.6–13.2)

## 2023-09-20 PROCEDURE — 80053 COMPREHEN METABOLIC PANEL: CPT

## 2023-09-20 PROCEDURE — 85025 COMPLETE CBC W/AUTO DIFF WBC: CPT

## 2023-09-20 PROCEDURE — 93971 EXTREMITY STUDY: CPT

## 2023-09-20 PROCEDURE — 36415 COLL VENOUS BLD VENIPUNCTURE: CPT

## 2023-09-20 PROCEDURE — 93971 EXTREMITY STUDY: CPT | Performed by: STUDENT IN AN ORGANIZED HEALTH CARE EDUCATION/TRAINING PROGRAM

## 2023-09-20 PROCEDURE — 96523 IRRIG DRUG DELIVERY DEVICE: CPT

## 2023-09-20 PROCEDURE — 2580000003 HC RX 258: Performed by: INTERNAL MEDICINE

## 2023-09-20 PROCEDURE — 99211 OFF/OP EST MAY X REQ PHY/QHP: CPT

## 2023-09-20 RX ORDER — SODIUM CHLORIDE 0.9 % (FLUSH) 0.9 %
5-40 SYRINGE (ML) INJECTION 2 TIMES DAILY
Status: DISCONTINUED | OUTPATIENT
Start: 2023-09-20 | End: 2023-10-11

## 2023-09-20 RX ORDER — HEPARIN SODIUM (PORCINE) LOCK FLUSH IV SOLN 100 UNIT/ML 100 UNIT/ML
500 SOLUTION INTRAVENOUS PRN
Status: DISCONTINUED | OUTPATIENT
Start: 2023-09-20 | End: 2023-10-11

## 2023-09-20 RX ORDER — HEPARIN 100 UNIT/ML
SYRINGE INTRAVENOUS
Status: DISPENSED
Start: 2023-09-20 | End: 2023-09-20

## 2023-09-20 RX ADMIN — SODIUM CHLORIDE, PRESERVATIVE FREE 20 ML: 5 INJECTION INTRAVENOUS at 09:25

## 2023-09-20 NOTE — PROGRESS NOTES
[facility] Progress Note    Date: 2023    Name: Gus Hendrickson    MRN: [id number]         : 1960    Ms. Alo Mathis triage nurse, Ethel Olvera, called back to state the plan is for pt to get her current PICC line removed and have a new line placed today. Plan is still for pt to get tx tomorrow and will be seeing Dr. Elena Alvarado tomorrow at 21 [phone] and they will do a PICC dressing change at the office.     Valeria Wooten RN  2023  12:18 PM

## 2023-09-21 ENCOUNTER — HOSPITAL ENCOUNTER (OUTPATIENT)
Facility: HOSPITAL | Age: 63
Setting detail: INFUSION SERIES
End: 2023-09-21
Payer: MEDICARE

## 2023-09-21 VITALS
TEMPERATURE: 97.8 F | HEART RATE: 92 BPM | BODY MASS INDEX: 20.94 KG/M2 | WEIGHT: 133.4 LBS | DIASTOLIC BLOOD PRESSURE: 78 MMHG | SYSTOLIC BLOOD PRESSURE: 136 MMHG | OXYGEN SATURATION: 98 % | RESPIRATION RATE: 16 BRPM | HEIGHT: 67 IN

## 2023-09-21 DIAGNOSIS — C34.90 NON-SMALL CELL LUNG CANCER, UNSPECIFIED LATERALITY (HCC): Primary | ICD-10-CM

## 2023-09-21 PROCEDURE — 99211 OFF/OP EST MAY X REQ PHY/QHP: CPT

## 2023-09-21 PROCEDURE — 96413 CHEMO IV INFUSION 1 HR: CPT

## 2023-09-21 PROCEDURE — 96377 APPLICATON ON-BODY INJECTOR: CPT

## 2023-09-21 PROCEDURE — 2580000003 HC RX 258: Performed by: INTERNAL MEDICINE

## 2023-09-21 PROCEDURE — 96375 TX/PRO/DX INJ NEW DRUG ADDON: CPT

## 2023-09-21 PROCEDURE — 6360000002 HC RX W HCPCS: Performed by: INTERNAL MEDICINE

## 2023-09-21 PROCEDURE — 96367 TX/PROPH/DG ADDL SEQ IV INF: CPT

## 2023-09-21 PROCEDURE — 36593 DECLOT VASCULAR DEVICE: CPT

## 2023-09-21 RX ORDER — SODIUM CHLORIDE 0.9 % (FLUSH) 0.9 %
5-40 SYRINGE (ML) INJECTION PRN
Status: ACTIVE | OUTPATIENT
Start: 2023-09-21 | End: 2023-09-22

## 2023-09-21 RX ORDER — HEPARIN 100 UNIT/ML
500 SYRINGE INTRAVENOUS PRN
Status: DISPENSED | OUTPATIENT
Start: 2023-09-21 | End: 2023-09-22

## 2023-09-21 RX ORDER — SODIUM CHLORIDE 9 MG/ML
5-250 INJECTION, SOLUTION INTRAVENOUS PRN
Status: ACTIVE | OUTPATIENT
Start: 2023-09-21 | End: 2023-09-22

## 2023-09-21 RX ORDER — PALONOSETRON 0.05 MG/ML
0.25 INJECTION, SOLUTION INTRAVENOUS ONCE
Status: COMPLETED | OUTPATIENT
Start: 2023-09-21 | End: 2023-09-21

## 2023-09-21 RX ADMIN — FOSAPREPITANT 150 MG: 150 INJECTION, POWDER, LYOPHILIZED, FOR SOLUTION INTRAVENOUS at 09:47

## 2023-09-21 RX ADMIN — SODIUM CHLORIDE 25 ML/HR: 9 INJECTION, SOLUTION INTRAVENOUS at 09:41

## 2023-09-21 RX ADMIN — Medication 10 ML: at 09:33

## 2023-09-21 RX ADMIN — Medication 10 ML: at 12:06

## 2023-09-21 RX ADMIN — Medication 10 ML: at 09:32

## 2023-09-21 RX ADMIN — DOCETAXEL ANHYDROUS 130 MG: 10 INJECTION, SOLUTION INTRAVENOUS at 11:05

## 2023-09-21 RX ADMIN — Medication 10 ML: at 12:07

## 2023-09-21 RX ADMIN — HEPARIN 500 UNITS: 100 SYRINGE at 12:08

## 2023-09-21 RX ADMIN — HEPARIN 500 UNITS: 100 SYRINGE at 12:09

## 2023-09-21 RX ADMIN — DEXAMETHASONE SODIUM PHOSPHATE 12 MG: 10 INJECTION, SOLUTION INTRAMUSCULAR; INTRAVENOUS at 10:15

## 2023-09-21 RX ADMIN — WATER 2 MG: 1 INJECTION INTRAMUSCULAR; INTRAVENOUS; SUBCUTANEOUS at 08:28

## 2023-09-21 RX ADMIN — PALONOSETRON HYDROCHLORIDE 0.25 MG: 0.25 INJECTION INTRAVENOUS at 10:10

## 2023-09-21 RX ADMIN — PEGFILGRASTIM 6 MG: KIT SUBCUTANEOUS at 12:11

## 2023-09-21 RX ADMIN — WATER 2 MG: 1 INJECTION INTRAMUSCULAR; INTRAVENOUS; SUBCUTANEOUS at 08:31

## 2023-09-27 ENCOUNTER — HOSPITAL ENCOUNTER (OUTPATIENT)
Facility: HOSPITAL | Age: 63
Setting detail: INFUSION SERIES
End: 2023-09-27
Payer: MEDICARE

## 2023-09-27 VITALS
SYSTOLIC BLOOD PRESSURE: 127 MMHG | DIASTOLIC BLOOD PRESSURE: 73 MMHG | TEMPERATURE: 97.5 F | HEART RATE: 108 BPM | RESPIRATION RATE: 18 BRPM | OXYGEN SATURATION: 96 %

## 2023-09-27 PROCEDURE — 96523 IRRIG DRUG DELIVERY DEVICE: CPT

## 2023-09-27 PROCEDURE — 2580000003 HC RX 258: Performed by: INTERNAL MEDICINE

## 2023-09-27 PROCEDURE — 6360000002 HC RX W HCPCS

## 2023-09-27 RX ORDER — HEPARIN SODIUM (PORCINE) LOCK FLUSH IV SOLN 100 UNIT/ML 100 UNIT/ML
500 SOLUTION INTRAVENOUS PRN
Status: DISCONTINUED | OUTPATIENT
Start: 2023-09-27 | End: 2023-10-11

## 2023-09-27 RX ORDER — HEPARIN 100 UNIT/ML
SYRINGE INTRAVENOUS
Status: COMPLETED
Start: 2023-09-27 | End: 2023-09-27

## 2023-09-27 RX ORDER — SODIUM CHLORIDE 0.9 % (FLUSH) 0.9 %
5-40 SYRINGE (ML) INJECTION PRN
Status: DISCONTINUED | OUTPATIENT
Start: 2023-09-27 | End: 2023-10-11

## 2023-09-27 RX ADMIN — HEPARIN 500 UNITS: 100 SYRINGE at 09:29

## 2023-09-27 RX ADMIN — HEPARIN SODIUM (PORCINE) LOCK FLUSH IV SOLN 100 UNIT/ML 500 UNITS: 100 SOLUTION at 09:29

## 2023-09-27 RX ADMIN — Medication 10 ML: at 09:28

## 2023-09-27 RX ADMIN — Medication 10 ML: at 09:29

## 2023-10-02 RX ORDER — CETIRIZINE HYDROCHLORIDE 10 MG/1
TABLET ORAL
Qty: 90 TABLET | Refills: 3 | Status: SHIPPED | OUTPATIENT
Start: 2023-10-02

## 2023-10-04 ENCOUNTER — HOSPITAL ENCOUNTER (OUTPATIENT)
Facility: HOSPITAL | Age: 63
Setting detail: INFUSION SERIES
End: 2023-10-04
Payer: MEDICARE

## 2023-10-04 VITALS
DIASTOLIC BLOOD PRESSURE: 73 MMHG | OXYGEN SATURATION: 97 % | TEMPERATURE: 98.1 F | SYSTOLIC BLOOD PRESSURE: 118 MMHG | HEART RATE: 97 BPM | RESPIRATION RATE: 18 BRPM

## 2023-10-04 PROCEDURE — 6360000002 HC RX W HCPCS: Performed by: INTERNAL MEDICINE

## 2023-10-04 PROCEDURE — 2580000003 HC RX 258: Performed by: INTERNAL MEDICINE

## 2023-10-04 PROCEDURE — 96523 IRRIG DRUG DELIVERY DEVICE: CPT

## 2023-10-04 RX ORDER — HEPARIN SODIUM (PORCINE) LOCK FLUSH IV SOLN 100 UNIT/ML 100 UNIT/ML
500 SOLUTION INTRAVENOUS PRN
Status: DISCONTINUED | OUTPATIENT
Start: 2023-10-04 | End: 2023-10-11

## 2023-10-04 RX ORDER — SODIUM CHLORIDE 0.9 % (FLUSH) 0.9 %
5-40 SYRINGE (ML) INJECTION PRN
Status: DISCONTINUED | OUTPATIENT
Start: 2023-10-04 | End: 2023-10-11

## 2023-10-04 RX ADMIN — HEPARIN 500 UNITS: 100 SYRINGE at 09:42

## 2023-10-04 RX ADMIN — SODIUM CHLORIDE, PRESERVATIVE FREE 20 ML: 5 INJECTION INTRAVENOUS at 09:42

## 2023-10-04 ASSESSMENT — PAIN SCALES - GENERAL: PAINLEVEL_OUTOF10: 0

## 2023-10-05 ENCOUNTER — APPOINTMENT (OUTPATIENT)
Facility: HOSPITAL | Age: 63
End: 2023-10-05
Payer: MEDICARE

## 2023-10-05 RX ORDER — ACETAMINOPHEN 325 MG/1
650 TABLET ORAL
Status: CANCELLED | OUTPATIENT
Start: 2023-10-12

## 2023-10-05 RX ORDER — DIPHENHYDRAMINE HYDROCHLORIDE 50 MG/ML
50 INJECTION INTRAMUSCULAR; INTRAVENOUS
Status: CANCELLED | OUTPATIENT
Start: 2023-10-12

## 2023-10-05 RX ORDER — ONDANSETRON 2 MG/ML
8 INJECTION INTRAMUSCULAR; INTRAVENOUS
Status: CANCELLED | OUTPATIENT
Start: 2023-10-12

## 2023-10-05 RX ORDER — EPINEPHRINE 1 MG/ML
0.3 INJECTION, SOLUTION, CONCENTRATE INTRAVENOUS PRN
Status: CANCELLED | OUTPATIENT
Start: 2023-10-12

## 2023-10-05 RX ORDER — MEPERIDINE HYDROCHLORIDE 25 MG/ML
12.5 INJECTION INTRAMUSCULAR; INTRAVENOUS; SUBCUTANEOUS PRN
Status: CANCELLED | OUTPATIENT
Start: 2023-10-12

## 2023-10-05 RX ORDER — SODIUM CHLORIDE 9 MG/ML
5-250 INJECTION, SOLUTION INTRAVENOUS PRN
Status: CANCELLED | OUTPATIENT
Start: 2023-10-12

## 2023-10-05 RX ORDER — SODIUM CHLORIDE 9 MG/ML
INJECTION, SOLUTION INTRAVENOUS CONTINUOUS
Status: CANCELLED | OUTPATIENT
Start: 2023-10-12

## 2023-10-05 RX ORDER — ALBUTEROL SULFATE 90 UG/1
4 AEROSOL, METERED RESPIRATORY (INHALATION) PRN
Status: CANCELLED | OUTPATIENT
Start: 2023-10-12

## 2023-10-10 ENCOUNTER — OFFICE VISIT (OUTPATIENT)
Age: 63
End: 2023-10-10
Payer: MEDICARE

## 2023-10-10 VITALS
TEMPERATURE: 97.1 F | HEART RATE: 92 BPM | BODY MASS INDEX: 21.5 KG/M2 | DIASTOLIC BLOOD PRESSURE: 78 MMHG | SYSTOLIC BLOOD PRESSURE: 134 MMHG | OXYGEN SATURATION: 96 % | WEIGHT: 137 LBS | RESPIRATION RATE: 16 BRPM | HEIGHT: 67 IN

## 2023-10-10 DIAGNOSIS — F33.0 MILD EPISODE OF RECURRENT MAJOR DEPRESSIVE DISORDER (HCC): ICD-10-CM

## 2023-10-10 DIAGNOSIS — C34.90 NON-SMALL CELL LUNG CANCER, UNSPECIFIED LATERALITY (HCC): ICD-10-CM

## 2023-10-10 DIAGNOSIS — Z23 NEEDS FLU SHOT: ICD-10-CM

## 2023-10-10 DIAGNOSIS — Z00.00 MEDICARE ANNUAL WELLNESS VISIT, SUBSEQUENT: Primary | ICD-10-CM

## 2023-10-10 DIAGNOSIS — I10 ESSENTIAL HYPERTENSION: ICD-10-CM

## 2023-10-10 DIAGNOSIS — E03.9 HYPOTHYROIDISM, UNSPECIFIED TYPE: ICD-10-CM

## 2023-10-10 PROCEDURE — 99214 OFFICE O/P EST MOD 30 MIN: CPT | Performed by: FAMILY MEDICINE

## 2023-10-10 PROCEDURE — 90674 CCIIV4 VAC NO PRSV 0.5 ML IM: CPT | Performed by: FAMILY MEDICINE

## 2023-10-10 PROCEDURE — G8420 CALC BMI NORM PARAMETERS: HCPCS | Performed by: FAMILY MEDICINE

## 2023-10-10 PROCEDURE — G0439 PPPS, SUBSEQ VISIT: HCPCS | Performed by: FAMILY MEDICINE

## 2023-10-10 PROCEDURE — 3075F SYST BP GE 130 - 139MM HG: CPT | Performed by: FAMILY MEDICINE

## 2023-10-10 PROCEDURE — G8428 CUR MEDS NOT DOCUMENT: HCPCS | Performed by: FAMILY MEDICINE

## 2023-10-10 PROCEDURE — G8482 FLU IMMUNIZE ORDER/ADMIN: HCPCS | Performed by: FAMILY MEDICINE

## 2023-10-10 PROCEDURE — 3078F DIAST BP <80 MM HG: CPT | Performed by: FAMILY MEDICINE

## 2023-10-10 PROCEDURE — PBSHW INFLUENZA, FLUCELVAX, (AGE 6 MO+), IM, PF, 0.5 ML: Performed by: FAMILY MEDICINE

## 2023-10-10 PROCEDURE — 4004F PT TOBACCO SCREEN RCVD TLK: CPT | Performed by: FAMILY MEDICINE

## 2023-10-10 PROCEDURE — 3017F COLORECTAL CA SCREEN DOC REV: CPT | Performed by: FAMILY MEDICINE

## 2023-10-10 RX ORDER — LEVOTHYROXINE SODIUM 88 UG/1
TABLET ORAL
Qty: 90 TABLET | Refills: 0 | OUTPATIENT
Start: 2023-10-10

## 2023-10-10 ASSESSMENT — PATIENT HEALTH QUESTIONNAIRE - PHQ9
2. FEELING DOWN, DEPRESSED OR HOPELESS: 0
1. LITTLE INTEREST OR PLEASURE IN DOING THINGS: 0
7. TROUBLE CONCENTRATING ON THINGS, SUCH AS READING THE NEWSPAPER OR WATCHING TELEVISION: 0
4. FEELING TIRED OR HAVING LITTLE ENERGY: 0
SUM OF ALL RESPONSES TO PHQ QUESTIONS 1-9: 0
3. TROUBLE FALLING OR STAYING ASLEEP: 0
SUM OF ALL RESPONSES TO PHQ QUESTIONS 1-9: 0
SUM OF ALL RESPONSES TO PHQ9 QUESTIONS 1 & 2: 0
8. MOVING OR SPEAKING SO SLOWLY THAT OTHER PEOPLE COULD HAVE NOTICED. OR THE OPPOSITE, BEING SO FIGETY OR RESTLESS THAT YOU HAVE BEEN MOVING AROUND A LOT MORE THAN USUAL: 0
5. POOR APPETITE OR OVEREATING: 0
9. THOUGHTS THAT YOU WOULD BE BETTER OFF DEAD, OR OF HURTING YOURSELF: 0
6. FEELING BAD ABOUT YOURSELF - OR THAT YOU ARE A FAILURE OR HAVE LET YOURSELF OR YOUR FAMILY DOWN: 0
10. IF YOU CHECKED OFF ANY PROBLEMS, HOW DIFFICULT HAVE THESE PROBLEMS MADE IT FOR YOU TO DO YOUR WORK, TAKE CARE OF THINGS AT HOME, OR GET ALONG WITH OTHER PEOPLE: 0

## 2023-10-10 ASSESSMENT — LIFESTYLE VARIABLES
HOW MANY STANDARD DRINKS CONTAINING ALCOHOL DO YOU HAVE ON A TYPICAL DAY: PATIENT DOES NOT DRINK
HOW OFTEN DO YOU HAVE A DRINK CONTAINING ALCOHOL: NEVER

## 2023-10-10 NOTE — PATIENT INSTRUCTIONS
Deepti Delgado is a 65 year old female here for  Chief Complaint   Patient presents with   • Breast Cancer     Denies latex allergy or sensitivity.    Medication verified, no changes.  PCP and Pharmacy verified.    Social History     Tobacco Use   Smoking Status Former Smoker   • Packs/day: 1.00   • Years: 12.00   • Pack years: 12.00   • Quit date: 2/15/1988   • Years since quittin.4   Smokeless Tobacco Never Used     Advance Directives Filed: Yes    ECOG:   ECOG   ECOG Performance Status        Height: No.  Ht Readings from Last 1 Encounters:   21 5' 7.52\" (1.715 m)     Weight:Yes, shoes off.  Wt Readings from Last 3 Encounters:   21 93.3 kg   21 91.9 kg   21 92 kg       BMI: There is no height or weight on file to calculate BMI.    REVIEW OF SYSTEMS  GENERAL:  Patient denies fevers, chills, night sweats, change in appetite, weight loss, dizziness, but complains of: headache and excessive fatigue  ALLERGIC/IMMUNOLOGIC: Verified allergies: Yes  EYES:  Patient denies significant visual difficulties, double vision, blurred vision  ENT/MOUTH: Patient denies problems with hearing, sore throat, sinus drainage, mouth sores  ENDOCRINE:  Patient denies diabetes, thyroid disease, hormone replacement, hot flashes  HEMATOLOGIC/LYMPHATIC: Patient denies easy bruising, bleeding, tender lymph nodes, swollen lymph nodes  BREASTS: Patient denies abnormal masses of breast, nipple discharge, pain  RESPIRATORY:  Patient denies lung pain with breathing, cough, coughing up blood, shortness of breath  CARDIOVASCULAR:  Patient denies anginal chest pain, palpitations, shortness of breath when lying flat, peripheral edema  GASTROINTESTINAL: Patient denies abdominal pain , nausea, vomiting, GI bleeding, constipation, change in bowel habits, heartburn, sensation of feeling full, difficulty swallowing, but complains of: diarrhea and bloating  : Patient denies abnormal genital masses, blood in the urine, frequency,  urgency, burning with urination, hesitancy, incontinence, vaginal bleeding, discharge  MUSCULOSKELETAL:  Patient denies joint pain, joint swelling, redness, decreased range of motion, but complains of: bone pain, pain ratin, location: intermittent in extremeties  SKIN:  Patient denies chronic rashes, inflammation, ulcerations, skin changes, itching  NEUROLOGIC:  Patient denies loss of balance, areas of focal weakness, abnormal gait, sensory problems, numbness, tingling  PSYCHIATRIC: Patient denies insomnia, depression, anxiety, but complains of: having some trouble sleeping - taking melatonin as needed   professional. [address] any warranty or liability for your use of this information. Advance Directives: Care Instructions  Overview  An advance directive is a legal way to state your wishes at the end of your life. It tells your family and your doctor what to do if you can't say what you want. There are two main types of advance directives. You can change them any time your wishes change. Living will. This form tells your family and your doctor your wishes about life support and other treatment. The form is also called a declaration. Medical power of . This form lets you name a person to make treatment decisions for you when you can't speak for yourself. This person is called a health care agent (health care proxy, health care surrogate). The form is also called a durable power of  for health care. If you do not have an advance directive, decisions about your medical care may be made by a family member, or by a doctor or a  who doesn't know you. It may help to think of an advance directive as a gift to the people who care for you. If you have one, they won't have to make tough decisions by themselves. For more information, including forms for your state, see the [address] [place] [address] website (www.caringinfo.org/planning/advance-directives/). Follow-up care is a key part of your treatment and safety. Be sure to make and go to all appointments, and call your doctor if you are having problems. It's also a good idea to know your test results and keep a list of the medicines you take. What should you include in an advance directive? Many states have a unique advance directive form. (It may ask you to address specific issues.) Or you might use a universal form that's approved by many states. If your form doesn't tell you what to address, it may be hard to know what to include in your advance directive. Use the questions below to help you get started.   Who do you

## 2023-10-11 ENCOUNTER — HOSPITAL ENCOUNTER (OUTPATIENT)
Facility: HOSPITAL | Age: 63
Setting detail: INFUSION SERIES
End: 2023-10-11
Payer: MEDICARE

## 2023-10-11 VITALS
HEART RATE: 89 BPM | HEIGHT: 67 IN | OXYGEN SATURATION: 92 % | SYSTOLIC BLOOD PRESSURE: 111 MMHG | RESPIRATION RATE: 16 BRPM | WEIGHT: 137.44 LBS | TEMPERATURE: 98.2 F | BODY MASS INDEX: 21.57 KG/M2 | DIASTOLIC BLOOD PRESSURE: 71 MMHG

## 2023-10-11 LAB
ALBUMIN SERPL-MCNC: 2.9 G/DL (ref 3.4–5)
ALBUMIN/GLOB SERPL: 1 (ref 0.8–1.7)
ALP SERPL-CCNC: 65 U/L (ref 45–117)
ALT SERPL-CCNC: 11 U/L (ref 13–56)
ANION GAP SERPL CALC-SCNC: 8 MMOL/L (ref 3–18)
AST SERPL-CCNC: 13 U/L (ref 10–38)
BASO+EOS+MONOS # BLD AUTO: 0.1 K/UL (ref 0–2.3)
BASO+EOS+MONOS NFR BLD AUTO: 3 % (ref 0.1–17)
BILIRUB SERPL-MCNC: 0.4 MG/DL (ref 0.2–1)
BUN SERPL-MCNC: 7 MG/DL (ref 7–18)
BUN/CREAT SERPL: 13 (ref 12–20)
CALCIUM SERPL-MCNC: 8.3 MG/DL (ref 8.5–10.1)
CHLORIDE SERPL-SCNC: 106 MMOL/L (ref 100–111)
CO2 SERPL-SCNC: 27 MMOL/L (ref 21–32)
CREAT SERPL-MCNC: 0.56 MG/DL (ref 0.6–1.3)
DIFFERENTIAL METHOD BLD: ABNORMAL
ERYTHROCYTE [DISTWIDTH] IN BLOOD BY AUTOMATED COUNT: 19 % (ref 11.5–14.5)
GLOBULIN SER CALC-MCNC: 3 G/DL (ref 2–4)
GLUCOSE SERPL-MCNC: 74 MG/DL (ref 74–99)
HCT VFR BLD AUTO: 30.3 % (ref 36–48)
HGB BLD-MCNC: 9.1 G/DL (ref 12–16)
LYMPHOCYTES # BLD: 1.3 K/UL (ref 1.1–5.9)
LYMPHOCYTES NFR BLD: 30 % (ref 14–44)
MCH RBC QN AUTO: 25.1 PG (ref 25–35)
MCHC RBC AUTO-ENTMCNC: 30 G/DL (ref 31–37)
MCV RBC AUTO: 83.5 FL (ref 78–102)
NEUTS SEG # BLD: 3 K/UL (ref 1.8–9.5)
NEUTS SEG NFR BLD: 67 % (ref 40–70)
PLATELET # BLD AUTO: 242 K/UL (ref 140–440)
POTASSIUM SERPL-SCNC: 3.3 MMOL/L (ref 3.5–5.5)
PROT SERPL-MCNC: 5.9 G/DL (ref 6.4–8.2)
RBC # BLD AUTO: 3.63 M/UL (ref 4.1–5.1)
SODIUM SERPL-SCNC: 141 MMOL/L (ref 136–145)
WBC # BLD AUTO: 4.4 K/UL (ref 4.5–13)

## 2023-10-11 PROCEDURE — 96523 IRRIG DRUG DELIVERY DEVICE: CPT

## 2023-10-11 PROCEDURE — 2580000003 HC RX 258: Performed by: INTERNAL MEDICINE

## 2023-10-11 PROCEDURE — 36415 COLL VENOUS BLD VENIPUNCTURE: CPT

## 2023-10-11 PROCEDURE — 85025 COMPLETE CBC W/AUTO DIFF WBC: CPT

## 2023-10-11 PROCEDURE — 6360000002 HC RX W HCPCS: Performed by: INTERNAL MEDICINE

## 2023-10-11 PROCEDURE — 80053 COMPREHEN METABOLIC PANEL: CPT

## 2023-10-11 RX ORDER — HEPARIN SODIUM (PORCINE) LOCK FLUSH IV SOLN 100 UNIT/ML 100 UNIT/ML
500 SOLUTION INTRAVENOUS PRN
Status: DISCONTINUED | OUTPATIENT
Start: 2023-10-11 | End: 2023-10-27 | Stop reason: HOSPADM

## 2023-10-11 RX ORDER — SODIUM CHLORIDE 0.9 % (FLUSH) 0.9 %
5-40 SYRINGE (ML) INJECTION PRN
Status: DISCONTINUED | OUTPATIENT
Start: 2023-10-11 | End: 2023-10-27 | Stop reason: HOSPADM

## 2023-10-11 RX ADMIN — HEPARIN 500 UNITS: 100 SYRINGE at 09:32

## 2023-10-11 RX ADMIN — SODIUM CHLORIDE, PRESERVATIVE FREE 40 ML: 5 INJECTION INTRAVENOUS at 09:32

## 2023-10-12 ENCOUNTER — HOSPITAL ENCOUNTER (OUTPATIENT)
Facility: HOSPITAL | Age: 63
Setting detail: INFUSION SERIES
End: 2023-10-12
Payer: MEDICARE

## 2023-10-12 VITALS
OXYGEN SATURATION: 98 % | RESPIRATION RATE: 16 BRPM | DIASTOLIC BLOOD PRESSURE: 72 MMHG | TEMPERATURE: 97.7 F | HEART RATE: 84 BPM | SYSTOLIC BLOOD PRESSURE: 133 MMHG

## 2023-10-12 DIAGNOSIS — C34.90 NON-SMALL CELL LUNG CANCER, UNSPECIFIED LATERALITY (HCC): Primary | ICD-10-CM

## 2023-10-12 PROCEDURE — 36593 DECLOT VASCULAR DEVICE: CPT

## 2023-10-12 PROCEDURE — 96367 TX/PROPH/DG ADDL SEQ IV INF: CPT

## 2023-10-12 PROCEDURE — 96413 CHEMO IV INFUSION 1 HR: CPT

## 2023-10-12 PROCEDURE — 6360000002 HC RX W HCPCS: Performed by: INTERNAL MEDICINE

## 2023-10-12 PROCEDURE — 2580000003 HC RX 258: Performed by: INTERNAL MEDICINE

## 2023-10-12 PROCEDURE — 96375 TX/PRO/DX INJ NEW DRUG ADDON: CPT

## 2023-10-12 PROCEDURE — 96377 APPLICATON ON-BODY INJECTOR: CPT

## 2023-10-12 RX ORDER — SODIUM CHLORIDE 0.9 % (FLUSH) 0.9 %
5-40 SYRINGE (ML) INJECTION PRN
Status: ACTIVE | OUTPATIENT
Start: 2023-10-12 | End: 2023-10-13

## 2023-10-12 RX ORDER — PALONOSETRON 0.05 MG/ML
0.25 INJECTION, SOLUTION INTRAVENOUS ONCE
Status: COMPLETED | OUTPATIENT
Start: 2023-10-12 | End: 2023-10-12

## 2023-10-12 RX ORDER — DEXAMETHASONE SODIUM PHOSPHATE 4 MG/ML
8 INJECTION, SOLUTION INTRA-ARTICULAR; INTRALESIONAL; INTRAMUSCULAR; INTRAVENOUS; SOFT TISSUE ONCE
Status: COMPLETED | OUTPATIENT
Start: 2023-10-12 | End: 2023-10-12

## 2023-10-12 RX ORDER — HEPARIN 100 UNIT/ML
500 SYRINGE INTRAVENOUS PRN
Status: DISPENSED | OUTPATIENT
Start: 2023-10-12 | End: 2023-10-13

## 2023-10-12 RX ORDER — SODIUM CHLORIDE 9 MG/ML
5-250 INJECTION, SOLUTION INTRAVENOUS PRN
Status: ACTIVE | OUTPATIENT
Start: 2023-10-12 | End: 2023-10-13

## 2023-10-12 RX ADMIN — SODIUM CHLORIDE, PRESERVATIVE FREE 10 ML: 5 INJECTION INTRAVENOUS at 11:16

## 2023-10-12 RX ADMIN — WATER 2 MG: 1 INJECTION INTRAMUSCULAR; INTRAVENOUS; SUBCUTANEOUS at 10:45

## 2023-10-12 RX ADMIN — DEXAMETHASONE SODIUM PHOSPHATE 8 MG: 4 INJECTION INTRA-ARTICULAR; INTRALESIONAL; INTRAMUSCULAR; INTRAVENOUS; SOFT TISSUE at 11:34

## 2023-10-12 RX ADMIN — SODIUM CHLORIDE, PRESERVATIVE FREE 10 ML: 5 INJECTION INTRAVENOUS at 11:15

## 2023-10-12 RX ADMIN — SODIUM CHLORIDE, PRESERVATIVE FREE 20 ML: 5 INJECTION INTRAVENOUS at 10:30

## 2023-10-12 RX ADMIN — SODIUM CHLORIDE, PRESERVATIVE FREE 20 ML: 5 INJECTION INTRAVENOUS at 14:15

## 2023-10-12 RX ADMIN — SODIUM CHLORIDE, PRESERVATIVE FREE 20 ML: 5 INJECTION INTRAVENOUS at 14:16

## 2023-10-12 RX ADMIN — HEPARIN 500 UNITS: 100 SYRINGE at 14:15

## 2023-10-12 RX ADMIN — SODIUM CHLORIDE 30 ML/HR: 9 INJECTION, SOLUTION INTRAVENOUS at 11:20

## 2023-10-12 RX ADMIN — PEGFILGRASTIM 6 MG: KIT SUBCUTANEOUS at 14:25

## 2023-10-12 RX ADMIN — PALONOSETRON HYDROCHLORIDE 0.25 MG: 0.25 INJECTION INTRAVENOUS at 11:32

## 2023-10-12 RX ADMIN — FOSAPREPITANT 150 MG: 150 INJECTION, POWDER, LYOPHILIZED, FOR SOLUTION INTRAVENOUS at 11:40

## 2023-10-12 RX ADMIN — HEPARIN 500 UNITS: 100 SYRINGE at 14:16

## 2023-10-12 RX ADMIN — SODIUM CHLORIDE, PRESERVATIVE FREE 20 ML: 5 INJECTION INTRAVENOUS at 10:32

## 2023-10-12 RX ADMIN — DOCETAXEL ANHYDROUS 130 MG: 10 INJECTION, SOLUTION INTRAVENOUS at 12:45

## 2023-10-12 ASSESSMENT — PAIN - FUNCTIONAL ASSESSMENT: PAIN_FUNCTIONAL_ASSESSMENT: NONE - DENIES PAIN

## 2023-10-17 NOTE — PERIOP NOTE
Patient discharged home after procedure at 1830. Patient and  provided with verbal and written discharge instructions to include what to do if femoral vein puncture sight begins to bleed. Instructed to hold pressure for 5 minutes and if bleeding doesn't stop to call MD. If unable to reach MD call 911. no

## 2023-10-18 ENCOUNTER — HOSPITAL ENCOUNTER (OUTPATIENT)
Facility: HOSPITAL | Age: 63
Setting detail: INFUSION SERIES
End: 2023-10-18
Payer: MEDICARE

## 2023-10-18 VITALS
DIASTOLIC BLOOD PRESSURE: 62 MMHG | TEMPERATURE: 97.8 F | OXYGEN SATURATION: 99 % | SYSTOLIC BLOOD PRESSURE: 98 MMHG | HEART RATE: 108 BPM | RESPIRATION RATE: 16 BRPM

## 2023-10-18 PROCEDURE — 2580000003 HC RX 258: Performed by: INTERNAL MEDICINE

## 2023-10-18 PROCEDURE — 6360000002 HC RX W HCPCS: Performed by: INTERNAL MEDICINE

## 2023-10-18 PROCEDURE — 96523 IRRIG DRUG DELIVERY DEVICE: CPT

## 2023-10-18 RX ORDER — SODIUM CHLORIDE 0.9 % (FLUSH) 0.9 %
5-40 SYRINGE (ML) INJECTION PRN
Status: DISCONTINUED | OUTPATIENT
Start: 2023-10-18 | End: 2023-10-27 | Stop reason: HOSPADM

## 2023-10-18 RX ORDER — HEPARIN SODIUM (PORCINE) LOCK FLUSH IV SOLN 100 UNIT/ML 100 UNIT/ML
500 SOLUTION INTRAVENOUS PRN
Status: DISCONTINUED | OUTPATIENT
Start: 2023-10-18 | End: 2023-10-27 | Stop reason: HOSPADM

## 2023-10-18 RX ADMIN — HEPARIN 500 UNITS: 100 SYRINGE at 09:30

## 2023-10-18 RX ADMIN — SODIUM CHLORIDE, PRESERVATIVE FREE 10 ML: 5 INJECTION INTRAVENOUS at 09:28

## 2023-10-18 RX ADMIN — SODIUM CHLORIDE, PRESERVATIVE FREE 10 ML: 5 INJECTION INTRAVENOUS at 09:31

## 2023-10-18 RX ADMIN — HEPARIN 500 UNITS: 100 SYRINGE at 09:32

## 2023-10-25 ENCOUNTER — HOSPITAL ENCOUNTER (OUTPATIENT)
Facility: HOSPITAL | Age: 63
Setting detail: INFUSION SERIES
Discharge: HOME | End: 2023-10-28
Payer: MEDICARE

## 2023-10-25 VITALS
RESPIRATION RATE: 18 BRPM | SYSTOLIC BLOOD PRESSURE: 110 MMHG | DIASTOLIC BLOOD PRESSURE: 64 MMHG | TEMPERATURE: 97.2 F | OXYGEN SATURATION: 94 % | HEART RATE: 101 BPM

## 2023-10-25 PROCEDURE — 6360000002 HC RX W HCPCS

## 2023-10-25 PROCEDURE — 2580000003 HC RX 258: Performed by: INTERNAL MEDICINE

## 2023-10-25 PROCEDURE — 96523 IRRIG DRUG DELIVERY DEVICE: CPT

## 2023-10-25 RX ORDER — HEPARIN 100 UNIT/ML
SYRINGE INTRAVENOUS
Status: COMPLETED
Start: 2023-10-25 | End: 2023-10-25

## 2023-10-25 RX ORDER — SODIUM CHLORIDE 0.9 % (FLUSH) 0.9 %
5-40 SYRINGE (ML) INJECTION PRN
Status: DISCONTINUED | OUTPATIENT
Start: 2023-10-25 | End: 2023-10-29 | Stop reason: HOSPADM

## 2023-10-25 RX ORDER — HEPARIN SODIUM (PORCINE) LOCK FLUSH IV SOLN 100 UNIT/ML 100 UNIT/ML
500 SOLUTION INTRAVENOUS PRN
Status: DISCONTINUED | OUTPATIENT
Start: 2023-10-25 | End: 2023-10-29 | Stop reason: HOSPADM

## 2023-10-25 RX ADMIN — SODIUM CHLORIDE, PRESERVATIVE FREE 20 ML: 5 INJECTION INTRAVENOUS at 09:22

## 2023-10-25 RX ADMIN — SODIUM CHLORIDE, PRESERVATIVE FREE 20 ML: 5 INJECTION INTRAVENOUS at 09:20

## 2023-10-25 RX ADMIN — HEPARIN 500 UNITS: 100 SYRINGE at 09:22

## 2023-10-25 RX ADMIN — HEPARIN SODIUM (PORCINE) LOCK FLUSH IV SOLN 100 UNIT/ML 500 UNITS: 100 SOLUTION at 09:20

## 2023-10-25 RX ADMIN — HEPARIN 500 UNITS: 100 SYRINGE at 09:20

## 2023-10-25 RX ADMIN — HEPARIN SODIUM (PORCINE) LOCK FLUSH IV SOLN 100 UNIT/ML 500 UNITS: 100 SOLUTION at 09:22

## 2023-10-27 ENCOUNTER — HOSPITAL ENCOUNTER (OUTPATIENT)
Facility: HOSPITAL | Age: 63
End: 2023-10-27
Attending: INTERNAL MEDICINE
Payer: MEDICARE

## 2023-10-27 DIAGNOSIS — C34.11 MALIGNANT NEOPLASM OF UPPER LOBE OF RIGHT LUNG (HCC): ICD-10-CM

## 2023-10-27 PROCEDURE — 71260 CT THORAX DX C+: CPT

## 2023-10-27 PROCEDURE — 6360000004 HC RX CONTRAST MEDICATION: Performed by: INTERNAL MEDICINE

## 2023-10-27 RX ADMIN — DIATRIZOATE MEGLUMINE AND DIATRIZOATE SODIUM 30 ML: 660; 100 LIQUID ORAL; RECTAL at 09:07

## 2023-10-27 RX ADMIN — IOPAMIDOL 100 ML: 612 INJECTION, SOLUTION INTRAVENOUS at 09:07

## 2023-11-01 ENCOUNTER — HOSPITAL ENCOUNTER (OUTPATIENT)
Facility: HOSPITAL | Age: 63
Setting detail: INFUSION SERIES
Discharge: HOME | End: 2023-11-04
Payer: MEDICARE

## 2023-11-01 VITALS
OXYGEN SATURATION: 96 % | HEART RATE: 96 BPM | SYSTOLIC BLOOD PRESSURE: 132 MMHG | TEMPERATURE: 98.3 F | RESPIRATION RATE: 20 BRPM | DIASTOLIC BLOOD PRESSURE: 76 MMHG

## 2023-11-01 PROCEDURE — 96523 IRRIG DRUG DELIVERY DEVICE: CPT

## 2023-11-01 PROCEDURE — 6360000002 HC RX W HCPCS

## 2023-11-01 PROCEDURE — 2580000003 HC RX 258: Performed by: INTERNAL MEDICINE

## 2023-11-01 PROCEDURE — 6360000002 HC RX W HCPCS: Performed by: INTERNAL MEDICINE

## 2023-11-01 RX ORDER — SODIUM CHLORIDE 0.9 % (FLUSH) 0.9 %
5-40 SYRINGE (ML) INJECTION PRN
Status: DISCONTINUED | OUTPATIENT
Start: 2023-11-01 | End: 2023-11-05 | Stop reason: HOSPADM

## 2023-11-01 RX ORDER — HEPARIN SODIUM (PORCINE) LOCK FLUSH IV SOLN 100 UNIT/ML 100 UNIT/ML
500 SOLUTION INTRAVENOUS PRN
Status: DISCONTINUED | OUTPATIENT
Start: 2023-11-01 | End: 2023-11-05 | Stop reason: HOSPADM

## 2023-11-01 RX ORDER — HEPARIN 100 UNIT/ML
SYRINGE INTRAVENOUS
Status: COMPLETED
Start: 2023-11-01 | End: 2023-11-01

## 2023-11-01 RX ADMIN — HEPARIN 500 UNITS: 100 SYRINGE at 09:26

## 2023-11-01 RX ADMIN — HEPARIN 500 UNITS: 100 SYRINGE at 09:25

## 2023-11-01 RX ADMIN — SODIUM CHLORIDE, PRESERVATIVE FREE 20 ML: 5 INJECTION INTRAVENOUS at 09:25

## 2023-11-01 RX ADMIN — SODIUM CHLORIDE, PRESERVATIVE FREE 20 ML: 5 INJECTION INTRAVENOUS at 09:26

## 2023-11-01 ASSESSMENT — PAIN - FUNCTIONAL ASSESSMENT: PAIN_FUNCTIONAL_ASSESSMENT: NONE - DENIES PAIN

## 2023-11-02 ENCOUNTER — APPOINTMENT (OUTPATIENT)
Facility: HOSPITAL | Age: 63
End: 2023-11-02
Payer: MEDICARE

## 2023-11-02 RX ORDER — EPINEPHRINE 1 MG/ML
0.3 INJECTION, SOLUTION, CONCENTRATE INTRAVENOUS PRN
Status: CANCELLED | OUTPATIENT
Start: 2023-11-09

## 2023-11-02 RX ORDER — ONDANSETRON 2 MG/ML
8 INJECTION INTRAMUSCULAR; INTRAVENOUS
Status: CANCELLED | OUTPATIENT
Start: 2023-11-09

## 2023-11-02 RX ORDER — MEPERIDINE HYDROCHLORIDE 25 MG/ML
12.5 INJECTION INTRAMUSCULAR; INTRAVENOUS; SUBCUTANEOUS PRN
Status: CANCELLED | OUTPATIENT
Start: 2023-11-09

## 2023-11-02 RX ORDER — ACETAMINOPHEN 325 MG/1
650 TABLET ORAL
Status: CANCELLED | OUTPATIENT
Start: 2023-11-09

## 2023-11-02 RX ORDER — SODIUM CHLORIDE 9 MG/ML
INJECTION, SOLUTION INTRAVENOUS CONTINUOUS
Status: CANCELLED | OUTPATIENT
Start: 2023-11-09

## 2023-11-02 RX ORDER — ALBUTEROL SULFATE 90 UG/1
4 AEROSOL, METERED RESPIRATORY (INHALATION) PRN
Status: CANCELLED | OUTPATIENT
Start: 2023-11-09

## 2023-11-02 RX ORDER — DIPHENHYDRAMINE HYDROCHLORIDE 50 MG/ML
50 INJECTION INTRAMUSCULAR; INTRAVENOUS
Status: CANCELLED | OUTPATIENT
Start: 2023-11-09

## 2023-11-08 ENCOUNTER — HOSPITAL ENCOUNTER (OUTPATIENT)
Facility: HOSPITAL | Age: 63
Setting detail: INFUSION SERIES
Discharge: HOME | End: 2023-11-11
Payer: MEDICARE

## 2023-11-08 VITALS
HEART RATE: 100 BPM | RESPIRATION RATE: 20 BRPM | SYSTOLIC BLOOD PRESSURE: 121 MMHG | OXYGEN SATURATION: 94 % | DIASTOLIC BLOOD PRESSURE: 74 MMHG | TEMPERATURE: 97.9 F

## 2023-11-08 LAB
ALBUMIN SERPL-MCNC: 2.8 G/DL (ref 3.4–5)
ALBUMIN/GLOB SERPL: 1 (ref 0.8–1.7)
ALP SERPL-CCNC: 54 U/L (ref 45–117)
ALT SERPL-CCNC: 8 U/L (ref 13–56)
ANION GAP SERPL CALC-SCNC: 6 MMOL/L (ref 3–18)
AST SERPL-CCNC: 7 U/L (ref 10–38)
BASO+EOS+MONOS # BLD AUTO: 0.7 K/UL (ref 0–2.3)
BASO+EOS+MONOS NFR BLD AUTO: 13 % (ref 0.1–17)
BILIRUB SERPL-MCNC: 0.3 MG/DL (ref 0.2–1)
BUN SERPL-MCNC: 6 MG/DL (ref 7–18)
BUN/CREAT SERPL: 10 (ref 12–20)
CALCIUM SERPL-MCNC: 8.1 MG/DL (ref 8.5–10.1)
CHLORIDE SERPL-SCNC: 107 MMOL/L (ref 100–111)
CO2 SERPL-SCNC: 29 MMOL/L (ref 21–32)
CREAT SERPL-MCNC: 0.6 MG/DL (ref 0.6–1.3)
DIFFERENTIAL METHOD BLD: ABNORMAL
ERYTHROCYTE [DISTWIDTH] IN BLOOD BY AUTOMATED COUNT: 18.6 % (ref 11.5–14.5)
GLOBULIN SER CALC-MCNC: 2.9 G/DL (ref 2–4)
GLUCOSE SERPL-MCNC: 100 MG/DL (ref 74–99)
HCT VFR BLD AUTO: 28.7 % (ref 36–48)
HGB BLD-MCNC: 8.4 G/DL (ref 12–16)
LYMPHOCYTES # BLD: 0.8 K/UL (ref 1.1–5.9)
LYMPHOCYTES NFR BLD: 15 % (ref 14–44)
MCH RBC QN AUTO: 24.8 PG (ref 25–35)
MCHC RBC AUTO-ENTMCNC: 29.3 G/DL (ref 31–37)
MCV RBC AUTO: 84.7 FL (ref 78–102)
NEUTS SEG # BLD: 4 K/UL (ref 1.8–9.5)
NEUTS SEG NFR BLD: 73 % (ref 40–70)
PLATELET # BLD AUTO: 311 K/UL (ref 140–440)
POTASSIUM SERPL-SCNC: 3.6 MMOL/L (ref 3.5–5.5)
PROT SERPL-MCNC: 5.7 G/DL (ref 6.4–8.2)
RBC # BLD AUTO: 3.39 M/UL (ref 4.1–5.1)
SODIUM SERPL-SCNC: 142 MMOL/L (ref 136–145)
WBC # BLD AUTO: 5.5 K/UL (ref 4.5–13)

## 2023-11-08 PROCEDURE — 80053 COMPREHEN METABOLIC PANEL: CPT

## 2023-11-08 PROCEDURE — 6360000002 HC RX W HCPCS

## 2023-11-08 PROCEDURE — 85025 COMPLETE CBC W/AUTO DIFF WBC: CPT

## 2023-11-08 PROCEDURE — 2580000003 HC RX 258: Performed by: INTERNAL MEDICINE

## 2023-11-08 PROCEDURE — 36592 COLLECT BLOOD FROM PICC: CPT

## 2023-11-08 RX ORDER — HEPARIN 100 UNIT/ML
SYRINGE INTRAVENOUS
Status: COMPLETED
Start: 2023-11-08 | End: 2023-11-08

## 2023-11-08 RX ORDER — SODIUM CHLORIDE 0.9 % (FLUSH) 0.9 %
5-40 SYRINGE (ML) INJECTION PRN
Status: DISCONTINUED | OUTPATIENT
Start: 2023-11-08 | End: 2023-11-12 | Stop reason: HOSPADM

## 2023-11-08 RX ORDER — HEPARIN SODIUM (PORCINE) LOCK FLUSH IV SOLN 100 UNIT/ML 100 UNIT/ML
500 SOLUTION INTRAVENOUS PRN
Status: DISCONTINUED | OUTPATIENT
Start: 2023-11-08 | End: 2023-11-12 | Stop reason: HOSPADM

## 2023-11-08 RX ADMIN — SODIUM CHLORIDE, PRESERVATIVE FREE 20 ML: 5 INJECTION INTRAVENOUS at 09:10

## 2023-11-08 RX ADMIN — HEPARIN SODIUM (PORCINE) LOCK FLUSH IV SOLN 100 UNIT/ML 500 UNITS: 100 SOLUTION at 09:30

## 2023-11-08 RX ADMIN — SODIUM CHLORIDE, PRESERVATIVE FREE 40 ML: 5 INJECTION INTRAVENOUS at 09:28

## 2023-11-08 RX ADMIN — HEPARIN 500 UNITS: 100 SYRINGE at 09:30

## 2023-11-09 ENCOUNTER — HOSPITAL ENCOUNTER (OUTPATIENT)
Facility: HOSPITAL | Age: 63
Setting detail: INFUSION SERIES
End: 2023-11-09
Payer: MEDICARE

## 2023-11-09 VITALS
WEIGHT: 136.9 LBS | RESPIRATION RATE: 18 BRPM | HEIGHT: 67 IN | HEART RATE: 86 BPM | SYSTOLIC BLOOD PRESSURE: 160 MMHG | OXYGEN SATURATION: 93 % | DIASTOLIC BLOOD PRESSURE: 68 MMHG | TEMPERATURE: 97.9 F | BODY MASS INDEX: 21.49 KG/M2

## 2023-11-09 DIAGNOSIS — C34.90 NON-SMALL CELL LUNG CANCER, UNSPECIFIED LATERALITY (HCC): Primary | ICD-10-CM

## 2023-11-09 PROCEDURE — 96377 APPLICATON ON-BODY INJECTOR: CPT

## 2023-11-09 PROCEDURE — 6360000002 HC RX W HCPCS: Performed by: INTERNAL MEDICINE

## 2023-11-09 PROCEDURE — 2580000003 HC RX 258: Performed by: INTERNAL MEDICINE

## 2023-11-09 PROCEDURE — 96375 TX/PRO/DX INJ NEW DRUG ADDON: CPT

## 2023-11-09 PROCEDURE — 96413 CHEMO IV INFUSION 1 HR: CPT

## 2023-11-09 PROCEDURE — 96367 TX/PROPH/DG ADDL SEQ IV INF: CPT

## 2023-11-09 RX ORDER — PALONOSETRON 0.05 MG/ML
0.25 INJECTION, SOLUTION INTRAVENOUS ONCE
Status: COMPLETED | OUTPATIENT
Start: 2023-11-09 | End: 2023-11-09

## 2023-11-09 RX ORDER — SODIUM CHLORIDE 9 MG/ML
5-250 INJECTION, SOLUTION INTRAVENOUS PRN
Status: ACTIVE | OUTPATIENT
Start: 2023-11-09 | End: 2023-11-10

## 2023-11-09 RX ORDER — HEPARIN 100 UNIT/ML
500 SYRINGE INTRAVENOUS PRN
Status: DISPENSED | OUTPATIENT
Start: 2023-11-09 | End: 2023-11-10

## 2023-11-09 RX ORDER — DEXAMETHASONE SODIUM PHOSPHATE 4 MG/ML
8 INJECTION, SOLUTION INTRA-ARTICULAR; INTRALESIONAL; INTRAMUSCULAR; INTRAVENOUS; SOFT TISSUE ONCE
Status: COMPLETED | OUTPATIENT
Start: 2023-11-09 | End: 2023-11-09

## 2023-11-09 RX ORDER — SODIUM CHLORIDE 0.9 % (FLUSH) 0.9 %
5-40 SYRINGE (ML) INJECTION PRN
Status: ACTIVE | OUTPATIENT
Start: 2023-11-09 | End: 2023-11-10

## 2023-11-09 RX ADMIN — PALONOSETRON HYDROCHLORIDE 0.25 MG: 0.25 INJECTION INTRAVENOUS at 09:58

## 2023-11-09 RX ADMIN — DOCETAXEL ANHYDROUS 130 MG: 10 INJECTION, SOLUTION INTRAVENOUS at 11:22

## 2023-11-09 RX ADMIN — SODIUM CHLORIDE 25 ML/HR: 9 INJECTION, SOLUTION INTRAVENOUS at 09:45

## 2023-11-09 RX ADMIN — PEGFILGRASTIM 6 MG: KIT SUBCUTANEOUS at 12:38

## 2023-11-09 RX ADMIN — HEPARIN 500 UNITS: 100 SYRINGE at 12:45

## 2023-11-09 RX ADMIN — DEXAMETHASONE SODIUM PHOSPHATE 8 MG: 4 INJECTION INTRA-ARTICULAR; INTRALESIONAL; INTRAMUSCULAR; INTRAVENOUS; SOFT TISSUE at 10:50

## 2023-11-09 RX ADMIN — FOSAPREPITANT 150 MG: 150 INJECTION, POWDER, LYOPHILIZED, FOR SOLUTION INTRAVENOUS at 10:02

## 2023-11-09 RX ADMIN — SODIUM CHLORIDE, PRESERVATIVE FREE 40 ML: 5 INJECTION INTRAVENOUS at 09:30

## 2023-11-09 NOTE — PROGRESS NOTES
Pharmacy Note     Name: Lindsey Vazquez  : 1960  Estimated body surface area is 1.71 meters squared as calculated from the following:    Height as of this encounter: 1.702 m (5' 7\"). Weight as of this encounter: 62.1 kg (136 lb 14.4 oz). Diagnosis: NSCLC  Treatment Plan: ramucirumab (held) + docetaxel  Cycle/Day: C13D1  Cycle Start Date: 23    Lab Results   Component Value Date/Time    WBC 5.5 2023 09:33 AM     2023 09:33 AM     No components found for: \"ANEU\"  Lab Results   Component Value Date/Time    CREAPOC 0.7 2021 04:21 PM     Most Recent Creatinine Clearance:  CrCl: 91 mL/min  (based on Adjusted Body Weight)  Creatinine: 0.60 mg/dL (2023 09:33 AM)    Pharmacy Intervention:  Patient reported to [facility] for treatment today. Patient stated she has taken her dexamethasone as instructed: 8 mg PO twice yesterday, this morning, and will take this evening and twice tomorrow. The question arose if the patient should receive IV dexamethasone despite already haven taken PO the morning on treatment. A call was placed to Dr. Bnonie Pfeiffer and it was confirmed that the patient should still receive IV dexamethasone in addition to PO on day of treatment. This information was repeated for confirmation and Dr. Bonnie Pfeiffer was thanked for his time. Pharmacy to continue to monitor accordingly.      Pharmacist: Sandra Martins, [address] [place] [address]

## 2023-11-11 RX ORDER — HEPARIN 100 UNIT/ML
SYRINGE INTRAVENOUS
Status: DISPENSED
Start: 2023-11-11 | End: 2023-11-11

## 2023-11-15 ENCOUNTER — HOSPITAL ENCOUNTER (OUTPATIENT)
Facility: HOSPITAL | Age: 63
Setting detail: INFUSION SERIES
Discharge: HOME | End: 2023-11-18
Payer: MEDICARE

## 2023-11-15 VITALS
RESPIRATION RATE: 16 BRPM | HEART RATE: 107 BPM | DIASTOLIC BLOOD PRESSURE: 64 MMHG | SYSTOLIC BLOOD PRESSURE: 115 MMHG | OXYGEN SATURATION: 98 % | TEMPERATURE: 97.8 F

## 2023-11-15 PROCEDURE — 6360000002 HC RX W HCPCS

## 2023-11-15 PROCEDURE — 2580000003 HC RX 258: Performed by: INTERNAL MEDICINE

## 2023-11-15 PROCEDURE — 6360000002 HC RX W HCPCS: Performed by: INTERNAL MEDICINE

## 2023-11-15 PROCEDURE — 96523 IRRIG DRUG DELIVERY DEVICE: CPT

## 2023-11-15 RX ORDER — HEPARIN SODIUM (PORCINE) LOCK FLUSH IV SOLN 100 UNIT/ML 100 UNIT/ML
500 SOLUTION INTRAVENOUS PRN
Status: DISCONTINUED | OUTPATIENT
Start: 2023-11-15 | End: 2023-11-19 | Stop reason: HOSPADM

## 2023-11-15 RX ORDER — HEPARIN 100 UNIT/ML
SYRINGE INTRAVENOUS
Status: COMPLETED
Start: 2023-11-15 | End: 2023-11-15

## 2023-11-15 RX ORDER — SODIUM CHLORIDE 0.9 % (FLUSH) 0.9 %
5-40 SYRINGE (ML) INJECTION PRN
Status: DISCONTINUED | OUTPATIENT
Start: 2023-11-15 | End: 2023-11-19 | Stop reason: HOSPADM

## 2023-11-15 RX ADMIN — HEPARIN 500 UNITS: 100 SYRINGE at 09:52

## 2023-11-15 RX ADMIN — SODIUM CHLORIDE, PRESERVATIVE FREE 20 ML: 5 INJECTION INTRAVENOUS at 09:52

## 2023-11-15 RX ADMIN — HEPARIN 500 UNITS: 100 SYRINGE at 09:50

## 2023-11-15 RX ADMIN — SODIUM CHLORIDE, PRESERVATIVE FREE 20 ML: 5 INJECTION INTRAVENOUS at 09:50

## 2023-11-15 ASSESSMENT — PAIN - FUNCTIONAL ASSESSMENT: PAIN_FUNCTIONAL_ASSESSMENT: NONE - DENIES PAIN

## 2023-11-22 ENCOUNTER — HOSPITAL ENCOUNTER (OUTPATIENT)
Facility: HOSPITAL | Age: 63
Setting detail: INFUSION SERIES
Discharge: HOME | End: 2023-11-25
Payer: MEDICARE

## 2023-11-22 VITALS
HEART RATE: 98 BPM | DIASTOLIC BLOOD PRESSURE: 67 MMHG | SYSTOLIC BLOOD PRESSURE: 109 MMHG | OXYGEN SATURATION: 94 % | RESPIRATION RATE: 16 BRPM | TEMPERATURE: 98.3 F

## 2023-11-22 PROCEDURE — 6360000002 HC RX W HCPCS: Performed by: INTERNAL MEDICINE

## 2023-11-22 PROCEDURE — 96523 IRRIG DRUG DELIVERY DEVICE: CPT

## 2023-11-22 PROCEDURE — 2580000003 HC RX 258: Performed by: INTERNAL MEDICINE

## 2023-11-22 RX ORDER — SODIUM CHLORIDE 0.9 % (FLUSH) 0.9 %
5-40 SYRINGE (ML) INJECTION PRN
Status: DISCONTINUED | OUTPATIENT
Start: 2023-11-22 | End: 2023-11-26 | Stop reason: HOSPADM

## 2023-11-22 RX ORDER — HEPARIN SODIUM (PORCINE) LOCK FLUSH IV SOLN 100 UNIT/ML 100 UNIT/ML
500 SOLUTION INTRAVENOUS PRN
Status: DISCONTINUED | OUTPATIENT
Start: 2023-11-22 | End: 2023-11-26 | Stop reason: HOSPADM

## 2023-11-22 RX ADMIN — HEPARIN 500 UNITS: 100 SYRINGE at 10:22

## 2023-11-22 RX ADMIN — SODIUM CHLORIDE, PRESERVATIVE FREE 20 ML: 5 INJECTION INTRAVENOUS at 10:21

## 2023-11-22 RX ADMIN — SODIUM CHLORIDE, PRESERVATIVE FREE 20 ML: 5 INJECTION INTRAVENOUS at 10:20

## 2023-11-22 RX ADMIN — HEPARIN 500 UNITS: 100 SYRINGE at 10:23

## 2023-11-22 RX ADMIN — SODIUM CHLORIDE, PRESERVATIVE FREE 20 ML: 5 INJECTION INTRAVENOUS at 10:10

## 2023-11-27 RX ORDER — SODIUM CHLORIDE 9 MG/ML
5-250 INJECTION, SOLUTION INTRAVENOUS PRN
Status: CANCELLED | OUTPATIENT
Start: 2023-11-30

## 2023-11-27 RX ORDER — ONDANSETRON 2 MG/ML
8 INJECTION INTRAMUSCULAR; INTRAVENOUS
Status: CANCELLED | OUTPATIENT
Start: 2023-11-30

## 2023-11-27 RX ORDER — MEPERIDINE HYDROCHLORIDE 25 MG/ML
12.5 INJECTION INTRAMUSCULAR; INTRAVENOUS; SUBCUTANEOUS PRN
Status: CANCELLED | OUTPATIENT
Start: 2023-11-30

## 2023-11-27 RX ORDER — DIPHENHYDRAMINE HYDROCHLORIDE 50 MG/ML
50 INJECTION INTRAMUSCULAR; INTRAVENOUS
Status: CANCELLED | OUTPATIENT
Start: 2023-11-30

## 2023-11-27 RX ORDER — ALBUTEROL SULFATE 90 UG/1
4 AEROSOL, METERED RESPIRATORY (INHALATION) PRN
Status: CANCELLED | OUTPATIENT
Start: 2023-11-30

## 2023-11-27 RX ORDER — EPINEPHRINE 1 MG/ML
0.3 INJECTION, SOLUTION, CONCENTRATE INTRAVENOUS PRN
Status: CANCELLED | OUTPATIENT
Start: 2023-11-30

## 2023-11-27 RX ORDER — SODIUM CHLORIDE 9 MG/ML
INJECTION, SOLUTION INTRAVENOUS CONTINUOUS
Status: CANCELLED | OUTPATIENT
Start: 2023-11-30

## 2023-11-27 RX ORDER — ACETAMINOPHEN 325 MG/1
650 TABLET ORAL
Status: CANCELLED | OUTPATIENT
Start: 2023-11-30

## 2023-11-29 ENCOUNTER — HOSPITAL ENCOUNTER (OUTPATIENT)
Facility: HOSPITAL | Age: 63
Setting detail: INFUSION SERIES
Discharge: HOME | End: 2023-12-02
Payer: MEDICARE

## 2023-11-29 VITALS
WEIGHT: 137.8 LBS | OXYGEN SATURATION: 100 % | SYSTOLIC BLOOD PRESSURE: 124 MMHG | HEART RATE: 103 BPM | DIASTOLIC BLOOD PRESSURE: 72 MMHG | RESPIRATION RATE: 16 BRPM | TEMPERATURE: 97.4 F | BODY MASS INDEX: 21.63 KG/M2 | HEIGHT: 67 IN

## 2023-11-29 LAB
ALBUMIN SERPL-MCNC: 3.2 G/DL (ref 3.4–5)
ALBUMIN/GLOB SERPL: 1 (ref 0.8–1.7)
ALP SERPL-CCNC: 61 U/L (ref 45–117)
ALT SERPL-CCNC: 13 U/L (ref 13–56)
ANION GAP SERPL CALC-SCNC: 2 MMOL/L (ref 3–18)
AST SERPL-CCNC: 11 U/L (ref 10–38)
BASO+EOS+MONOS # BLD AUTO: 0.4 K/UL (ref 0–2.3)
BASO+EOS+MONOS NFR BLD AUTO: 7 % (ref 0.1–17)
BILIRUB SERPL-MCNC: 0.2 MG/DL (ref 0.2–1)
BUN SERPL-MCNC: 9 MG/DL (ref 7–18)
BUN/CREAT SERPL: 18 (ref 12–20)
CALCIUM SERPL-MCNC: 8.8 MG/DL (ref 8.5–10.1)
CHLORIDE SERPL-SCNC: 111 MMOL/L (ref 100–111)
CO2 SERPL-SCNC: 29 MMOL/L (ref 21–32)
CREAT SERPL-MCNC: 0.5 MG/DL (ref 0.6–1.3)
DIFFERENTIAL METHOD BLD: ABNORMAL
ERYTHROCYTE [DISTWIDTH] IN BLOOD BY AUTOMATED COUNT: 19.1 % (ref 11.5–14.5)
GLOBULIN SER CALC-MCNC: 3.1 G/DL (ref 2–4)
GLUCOSE SERPL-MCNC: 82 MG/DL (ref 74–99)
HCT VFR BLD AUTO: 31.5 % (ref 36–48)
HGB BLD-MCNC: 9.4 G/DL (ref 12–16)
LYMPHOCYTES # BLD: 1.3 K/UL (ref 1.1–5.9)
LYMPHOCYTES NFR BLD: 26 % (ref 14–44)
MCH RBC QN AUTO: 24.6 PG (ref 25–35)
MCHC RBC AUTO-ENTMCNC: 29.8 G/DL (ref 31–37)
MCV RBC AUTO: 82.5 FL (ref 78–102)
NEUTS SEG # BLD: 3.4 K/UL (ref 1.8–9.5)
NEUTS SEG NFR BLD: 67 % (ref 40–70)
PLATELET # BLD AUTO: 377 K/UL (ref 140–440)
POTASSIUM SERPL-SCNC: 3.9 MMOL/L (ref 3.5–5.5)
PROT SERPL-MCNC: 6.3 G/DL (ref 6.4–8.2)
RBC # BLD AUTO: 3.82 M/UL (ref 4.1–5.1)
SODIUM SERPL-SCNC: 142 MMOL/L (ref 136–145)
WBC # BLD AUTO: 5.1 K/UL (ref 4.5–13)

## 2023-11-29 PROCEDURE — 6360000002 HC RX W HCPCS: Performed by: INTERNAL MEDICINE

## 2023-11-29 PROCEDURE — 36415 COLL VENOUS BLD VENIPUNCTURE: CPT

## 2023-11-29 PROCEDURE — 2580000003 HC RX 258: Performed by: INTERNAL MEDICINE

## 2023-11-29 PROCEDURE — 96523 IRRIG DRUG DELIVERY DEVICE: CPT

## 2023-11-29 PROCEDURE — 85025 COMPLETE CBC W/AUTO DIFF WBC: CPT

## 2023-11-29 PROCEDURE — 6360000002 HC RX W HCPCS

## 2023-11-29 PROCEDURE — 80053 COMPREHEN METABOLIC PANEL: CPT

## 2023-11-29 RX ORDER — HEPARIN 100 UNIT/ML
SYRINGE INTRAVENOUS
Status: COMPLETED
Start: 2023-11-29 | End: 2023-11-29

## 2023-11-29 RX ORDER — SODIUM CHLORIDE 0.9 % (FLUSH) 0.9 %
5-40 SYRINGE (ML) INJECTION PRN
Status: DISCONTINUED | OUTPATIENT
Start: 2023-11-29 | End: 2023-12-03 | Stop reason: HOSPADM

## 2023-11-29 RX ORDER — HEPARIN SODIUM (PORCINE) LOCK FLUSH IV SOLN 100 UNIT/ML 100 UNIT/ML
500 SOLUTION INTRAVENOUS PRN
Status: DISCONTINUED | OUTPATIENT
Start: 2023-11-29 | End: 2023-12-03 | Stop reason: HOSPADM

## 2023-11-29 RX ADMIN — SODIUM CHLORIDE, PRESERVATIVE FREE 20 ML: 5 INJECTION INTRAVENOUS at 09:22

## 2023-11-29 RX ADMIN — HEPARIN 500 UNITS: 100 SYRINGE at 09:22

## 2023-11-29 RX ADMIN — SODIUM CHLORIDE, PRESERVATIVE FREE 20 ML: 5 INJECTION INTRAVENOUS at 09:20

## 2023-11-29 RX ADMIN — HEPARIN 500 UNITS: 100 SYRINGE at 09:20

## 2023-11-29 ASSESSMENT — PAIN - FUNCTIONAL ASSESSMENT: PAIN_FUNCTIONAL_ASSESSMENT: NONE - DENIES PAIN

## 2023-11-30 ENCOUNTER — HOSPITAL ENCOUNTER (OUTPATIENT)
Facility: HOSPITAL | Age: 63
Setting detail: INFUSION SERIES
End: 2023-11-30
Payer: MEDICARE

## 2023-11-30 VITALS
OXYGEN SATURATION: 95 % | HEART RATE: 98 BPM | SYSTOLIC BLOOD PRESSURE: 139 MMHG | DIASTOLIC BLOOD PRESSURE: 76 MMHG | RESPIRATION RATE: 18 BRPM | TEMPERATURE: 98.3 F

## 2023-11-30 DIAGNOSIS — C34.90 NON-SMALL CELL LUNG CANCER, UNSPECIFIED LATERALITY (HCC): Primary | ICD-10-CM

## 2023-11-30 PROCEDURE — 6360000002 HC RX W HCPCS: Performed by: INTERNAL MEDICINE

## 2023-11-30 PROCEDURE — 96413 CHEMO IV INFUSION 1 HR: CPT

## 2023-11-30 PROCEDURE — 96377 APPLICATON ON-BODY INJECTOR: CPT

## 2023-11-30 PROCEDURE — 2580000003 HC RX 258: Performed by: INTERNAL MEDICINE

## 2023-11-30 PROCEDURE — 96367 TX/PROPH/DG ADDL SEQ IV INF: CPT

## 2023-11-30 PROCEDURE — 96375 TX/PRO/DX INJ NEW DRUG ADDON: CPT

## 2023-11-30 PROCEDURE — 36593 DECLOT VASCULAR DEVICE: CPT

## 2023-11-30 RX ORDER — PALONOSETRON 0.05 MG/ML
0.25 INJECTION, SOLUTION INTRAVENOUS ONCE
Status: COMPLETED | OUTPATIENT
Start: 2023-11-30 | End: 2023-11-30

## 2023-11-30 RX ORDER — SODIUM CHLORIDE 0.9 % (FLUSH) 0.9 %
5-40 SYRINGE (ML) INJECTION PRN
Status: ACTIVE | OUTPATIENT
Start: 2023-11-30 | End: 2023-12-01

## 2023-11-30 RX ORDER — SODIUM CHLORIDE 9 MG/ML
5-250 INJECTION, SOLUTION INTRAVENOUS PRN
Status: ACTIVE | OUTPATIENT
Start: 2023-11-30 | End: 2023-12-01

## 2023-11-30 RX ORDER — DEXAMETHASONE SODIUM PHOSPHATE 4 MG/ML
8 INJECTION, SOLUTION INTRA-ARTICULAR; INTRALESIONAL; INTRAMUSCULAR; INTRAVENOUS; SOFT TISSUE ONCE
Status: COMPLETED | OUTPATIENT
Start: 2023-11-30 | End: 2023-11-30

## 2023-11-30 RX ORDER — HEPARIN 100 UNIT/ML
500 SYRINGE INTRAVENOUS PRN
Status: DISPENSED | OUTPATIENT
Start: 2023-11-30 | End: 2023-12-01

## 2023-11-30 RX ADMIN — PALONOSETRON HYDROCHLORIDE 0.25 MG: 0.25 INJECTION INTRAVENOUS at 10:18

## 2023-11-30 RX ADMIN — SODIUM CHLORIDE, PRESERVATIVE FREE 40 ML: 5 INJECTION INTRAVENOUS at 09:25

## 2023-11-30 RX ADMIN — FOSAPREPITANT 150 MG: 150 INJECTION, POWDER, LYOPHILIZED, FOR SOLUTION INTRAVENOUS at 11:55

## 2023-11-30 RX ADMIN — SODIUM CHLORIDE 25 ML/HR: 9 INJECTION, SOLUTION INTRAVENOUS at 10:11

## 2023-11-30 RX ADMIN — ALTEPLASE 2 MG: 2.2 INJECTION, POWDER, LYOPHILIZED, FOR SOLUTION INTRAVENOUS at 09:30

## 2023-11-30 RX ADMIN — ALTEPLASE 2 MG: 2.2 INJECTION, POWDER, LYOPHILIZED, FOR SOLUTION INTRAVENOUS at 09:33

## 2023-11-30 RX ADMIN — PEGFILGRASTIM 6 MG: KIT SUBCUTANEOUS at 14:02

## 2023-11-30 RX ADMIN — SODIUM CHLORIDE, PRESERVATIVE FREE 20 ML: 5 INJECTION INTRAVENOUS at 10:06

## 2023-11-30 RX ADMIN — DOCETAXEL ANHYDROUS 130 MG: 10 INJECTION, SOLUTION INTRAVENOUS at 12:54

## 2023-11-30 RX ADMIN — DEXAMETHASONE SODIUM PHOSPHATE 8 MG: 4 INJECTION INTRA-ARTICULAR; INTRALESIONAL; INTRAMUSCULAR; INTRAVENOUS; SOFT TISSUE at 10:18

## 2023-11-30 RX ADMIN — SODIUM CHLORIDE, PRESERVATIVE FREE 20 ML: 5 INJECTION INTRAVENOUS at 10:19

## 2023-11-30 RX ADMIN — SODIUM CHLORIDE, PRESERVATIVE FREE 20 ML: 5 INJECTION INTRAVENOUS at 10:08

## 2023-11-30 RX ADMIN — HEPARIN 500 UNITS: 100 SYRINGE at 14:10

## 2023-12-01 RX ORDER — AMLODIPINE BESYLATE 5 MG/1
TABLET ORAL
Qty: 90 TABLET | Refills: 1 | Status: SHIPPED | OUTPATIENT
Start: 2023-12-01

## 2023-12-06 ENCOUNTER — HOSPITAL ENCOUNTER (OUTPATIENT)
Facility: HOSPITAL | Age: 63
Setting detail: INFUSION SERIES
Discharge: HOME | End: 2023-12-09
Payer: MEDICARE

## 2023-12-06 PROCEDURE — 6360000002 HC RX W HCPCS: Performed by: INTERNAL MEDICINE

## 2023-12-06 PROCEDURE — 2580000003 HC RX 258: Performed by: INTERNAL MEDICINE

## 2023-12-06 PROCEDURE — 96523 IRRIG DRUG DELIVERY DEVICE: CPT

## 2023-12-06 RX ORDER — SODIUM CHLORIDE 0.9 % (FLUSH) 0.9 %
5-40 SYRINGE (ML) INJECTION PRN
Status: DISCONTINUED | OUTPATIENT
Start: 2023-12-06 | End: 2023-12-10 | Stop reason: HOSPADM

## 2023-12-06 RX ORDER — HEPARIN 100 UNIT/ML
SYRINGE INTRAVENOUS
Status: DISPENSED
Start: 2023-12-06 | End: 2023-12-06

## 2023-12-06 RX ORDER — HEPARIN SODIUM (PORCINE) LOCK FLUSH IV SOLN 100 UNIT/ML 100 UNIT/ML
500 SOLUTION INTRAVENOUS PRN
Status: DISCONTINUED | OUTPATIENT
Start: 2023-12-06 | End: 2023-12-10 | Stop reason: HOSPADM

## 2023-12-08 VITALS
TEMPERATURE: 98.5 F | HEART RATE: 101 BPM | RESPIRATION RATE: 22 BRPM | DIASTOLIC BLOOD PRESSURE: 66 MMHG | SYSTOLIC BLOOD PRESSURE: 116 MMHG | OXYGEN SATURATION: 94 %

## 2023-12-08 PROCEDURE — 2580000003 HC RX 258: Performed by: INTERNAL MEDICINE

## 2023-12-08 PROCEDURE — 6360000002 HC RX W HCPCS: Performed by: INTERNAL MEDICINE

## 2023-12-08 RX ADMIN — SODIUM CHLORIDE, PRESERVATIVE FREE 20 ML: 5 INJECTION INTRAVENOUS at 10:10

## 2023-12-08 RX ADMIN — HEPARIN 500 UNITS: 100 SYRINGE at 10:11

## 2023-12-13 ENCOUNTER — HOSPITAL ENCOUNTER (OUTPATIENT)
Facility: HOSPITAL | Age: 63
Setting detail: INFUSION SERIES
Discharge: HOME | End: 2023-12-16
Payer: MEDICARE

## 2023-12-13 VITALS
DIASTOLIC BLOOD PRESSURE: 74 MMHG | RESPIRATION RATE: 20 BRPM | SYSTOLIC BLOOD PRESSURE: 128 MMHG | OXYGEN SATURATION: 98 % | HEART RATE: 95 BPM | TEMPERATURE: 98.2 F

## 2023-12-13 PROCEDURE — 96523 IRRIG DRUG DELIVERY DEVICE: CPT

## 2023-12-13 PROCEDURE — 2580000003 HC RX 258: Performed by: INTERNAL MEDICINE

## 2023-12-13 PROCEDURE — 6360000002 HC RX W HCPCS: Performed by: INTERNAL MEDICINE

## 2023-12-13 RX ORDER — HEPARIN SODIUM (PORCINE) LOCK FLUSH IV SOLN 100 UNIT/ML 100 UNIT/ML
500 SOLUTION INTRAVENOUS PRN
Status: DISCONTINUED | OUTPATIENT
Start: 2023-12-13 | End: 2023-12-17 | Stop reason: HOSPADM

## 2023-12-13 RX ORDER — SODIUM CHLORIDE 0.9 % (FLUSH) 0.9 %
5-40 SYRINGE (ML) INJECTION PRN
Status: DISCONTINUED | OUTPATIENT
Start: 2023-12-13 | End: 2023-12-17 | Stop reason: HOSPADM

## 2023-12-13 RX ADMIN — SODIUM CHLORIDE, PRESERVATIVE FREE 10 ML: 5 INJECTION INTRAVENOUS at 09:32

## 2023-12-13 RX ADMIN — HEPARIN 500 UNITS: 100 SYRINGE at 09:31

## 2023-12-13 RX ADMIN — SODIUM CHLORIDE, PRESERVATIVE FREE 10 ML: 5 INJECTION INTRAVENOUS at 09:30

## 2023-12-13 RX ADMIN — HEPARIN 500 UNITS: 100 SYRINGE at 09:33

## 2023-12-20 ENCOUNTER — HOSPITAL ENCOUNTER (OUTPATIENT)
Facility: HOSPITAL | Age: 63
Setting detail: INFUSION SERIES
Discharge: HOME | End: 2023-12-23
Payer: MEDICARE

## 2023-12-20 VITALS
RESPIRATION RATE: 20 BRPM | TEMPERATURE: 97 F | DIASTOLIC BLOOD PRESSURE: 73 MMHG | OXYGEN SATURATION: 94 % | HEART RATE: 115 BPM | SYSTOLIC BLOOD PRESSURE: 133 MMHG

## 2023-12-20 PROCEDURE — 2580000003 HC RX 258: Performed by: INTERNAL MEDICINE

## 2023-12-20 PROCEDURE — 96523 IRRIG DRUG DELIVERY DEVICE: CPT

## 2023-12-20 PROCEDURE — 6360000002 HC RX W HCPCS: Performed by: INTERNAL MEDICINE

## 2023-12-20 RX ORDER — HEPARIN SODIUM (PORCINE) LOCK FLUSH IV SOLN 100 UNIT/ML 100 UNIT/ML
500 SOLUTION INTRAVENOUS PRN
Status: DISCONTINUED | OUTPATIENT
Start: 2023-12-20 | End: 2023-12-24 | Stop reason: HOSPADM

## 2023-12-20 RX ORDER — SODIUM CHLORIDE 0.9 % (FLUSH) 0.9 %
5-40 SYRINGE (ML) INJECTION PRN
Status: DISCONTINUED | OUTPATIENT
Start: 2023-12-20 | End: 2023-12-24 | Stop reason: HOSPADM

## 2023-12-20 RX ADMIN — HEPARIN 500 UNITS: 100 SYRINGE at 09:31

## 2023-12-20 RX ADMIN — SODIUM CHLORIDE, PRESERVATIVE FREE 20 ML: 5 INJECTION INTRAVENOUS at 09:30

## 2023-12-21 ENCOUNTER — HOSPITAL ENCOUNTER (OUTPATIENT)
Facility: HOSPITAL | Age: 63
Setting detail: INFUSION SERIES
End: 2023-12-21
Payer: MEDICARE

## 2023-12-22 RX ORDER — SODIUM CHLORIDE 9 MG/ML
5-250 INJECTION, SOLUTION INTRAVENOUS PRN
Status: CANCELLED | OUTPATIENT
Start: 2023-12-29

## 2023-12-22 RX ORDER — DEXAMETHASONE SODIUM PHOSPHATE 4 MG/ML
8 INJECTION, SOLUTION INTRA-ARTICULAR; INTRALESIONAL; INTRAMUSCULAR; INTRAVENOUS; SOFT TISSUE ONCE
Status: CANCELLED | OUTPATIENT
Start: 2023-12-29 | End: 2023-12-29

## 2023-12-22 RX ORDER — ALBUTEROL SULFATE 90 UG/1
4 AEROSOL, METERED RESPIRATORY (INHALATION) PRN
Status: CANCELLED | OUTPATIENT
Start: 2023-12-29

## 2023-12-22 RX ORDER — EPINEPHRINE 1 MG/ML
0.3 INJECTION, SOLUTION, CONCENTRATE INTRAVENOUS PRN
Status: CANCELLED | OUTPATIENT
Start: 2023-12-29

## 2023-12-22 RX ORDER — DIPHENHYDRAMINE HYDROCHLORIDE 50 MG/ML
50 INJECTION INTRAMUSCULAR; INTRAVENOUS
Status: CANCELLED | OUTPATIENT
Start: 2023-12-29

## 2023-12-22 RX ORDER — MEPERIDINE HYDROCHLORIDE 25 MG/ML
12.5 INJECTION INTRAMUSCULAR; INTRAVENOUS; SUBCUTANEOUS PRN
Status: CANCELLED | OUTPATIENT
Start: 2023-12-29

## 2023-12-22 RX ORDER — SODIUM CHLORIDE 9 MG/ML
INJECTION, SOLUTION INTRAVENOUS CONTINUOUS
Status: CANCELLED | OUTPATIENT
Start: 2023-12-29

## 2023-12-22 RX ORDER — ONDANSETRON 2 MG/ML
8 INJECTION INTRAMUSCULAR; INTRAVENOUS
Status: CANCELLED | OUTPATIENT
Start: 2023-12-29

## 2023-12-22 RX ORDER — ACETAMINOPHEN 325 MG/1
650 TABLET ORAL
Status: CANCELLED | OUTPATIENT
Start: 2023-12-29

## 2023-12-27 ENCOUNTER — APPOINTMENT (OUTPATIENT)
Facility: HOSPITAL | Age: 63
End: 2023-12-27
Payer: MEDICARE

## 2023-12-28 ENCOUNTER — HOSPITAL ENCOUNTER (OUTPATIENT)
Facility: HOSPITAL | Age: 63
Setting detail: INFUSION SERIES
End: 2023-12-28
Payer: MEDICARE

## 2023-12-28 VITALS
SYSTOLIC BLOOD PRESSURE: 131 MMHG | TEMPERATURE: 98.2 F | OXYGEN SATURATION: 94 % | HEART RATE: 102 BPM | RESPIRATION RATE: 20 BRPM | DIASTOLIC BLOOD PRESSURE: 77 MMHG

## 2023-12-28 LAB
ALBUMIN SERPL-MCNC: 3.4 G/DL (ref 3.4–5)
ALBUMIN/GLOB SERPL: 1.1 (ref 0.8–1.7)
ALP SERPL-CCNC: 62 U/L (ref 45–117)
ALT SERPL-CCNC: 13 U/L (ref 13–56)
ANION GAP SERPL CALC-SCNC: 7 MMOL/L (ref 3–18)
AST SERPL-CCNC: 9 U/L (ref 10–38)
BASO+EOS+MONOS # BLD AUTO: 0.4 K/UL (ref 0–2.3)
BASO+EOS+MONOS NFR BLD AUTO: 7 % (ref 0.1–17)
BILIRUB SERPL-MCNC: 0.3 MG/DL (ref 0.2–1)
BUN SERPL-MCNC: 7 MG/DL (ref 7–18)
BUN/CREAT SERPL: 13 (ref 12–20)
CALCIUM SERPL-MCNC: 8.7 MG/DL (ref 8.5–10.1)
CHLORIDE SERPL-SCNC: 106 MMOL/L (ref 100–111)
CO2 SERPL-SCNC: 29 MMOL/L (ref 21–32)
CREAT SERPL-MCNC: 0.53 MG/DL (ref 0.6–1.3)
DIFFERENTIAL METHOD BLD: ABNORMAL
ERYTHROCYTE [DISTWIDTH] IN BLOOD BY AUTOMATED COUNT: 19.6 % (ref 11.5–14.5)
GLOBULIN SER CALC-MCNC: 3 G/DL (ref 2–4)
GLUCOSE SERPL-MCNC: 70 MG/DL (ref 74–99)
HCT VFR BLD AUTO: 33.9 % (ref 36–48)
HGB BLD-MCNC: 9.7 G/DL (ref 12–16)
LYMPHOCYTES # BLD: 1.2 K/UL (ref 1.1–5.9)
LYMPHOCYTES NFR BLD: 21 % (ref 14–44)
MCH RBC QN AUTO: 23.7 PG (ref 25–35)
MCHC RBC AUTO-ENTMCNC: 28.6 G/DL (ref 31–37)
MCV RBC AUTO: 82.7 FL (ref 78–102)
NEUTS SEG # BLD: 4.1 K/UL (ref 1.8–9.5)
NEUTS SEG NFR BLD: 72 % (ref 40–70)
PLATELET # BLD AUTO: 277 K/UL (ref 140–440)
POTASSIUM SERPL-SCNC: 3.7 MMOL/L (ref 3.5–5.5)
PROT SERPL-MCNC: 6.4 G/DL (ref 6.4–8.2)
RBC # BLD AUTO: 4.1 M/UL (ref 4.1–5.1)
SODIUM SERPL-SCNC: 142 MMOL/L (ref 136–145)
WBC # BLD AUTO: 5.7 K/UL (ref 4.5–13)

## 2023-12-28 PROCEDURE — 36415 COLL VENOUS BLD VENIPUNCTURE: CPT

## 2023-12-28 PROCEDURE — 6360000002 HC RX W HCPCS: Performed by: INTERNAL MEDICINE

## 2023-12-28 PROCEDURE — 2580000003 HC RX 258: Performed by: INTERNAL MEDICINE

## 2023-12-28 PROCEDURE — 80053 COMPREHEN METABOLIC PANEL: CPT

## 2023-12-28 PROCEDURE — 96523 IRRIG DRUG DELIVERY DEVICE: CPT

## 2023-12-28 PROCEDURE — 85025 COMPLETE CBC W/AUTO DIFF WBC: CPT

## 2023-12-28 RX ORDER — SODIUM CHLORIDE 0.9 % (FLUSH) 0.9 %
5-40 SYRINGE (ML) INJECTION PRN
Status: DISCONTINUED | OUTPATIENT
Start: 2023-12-28 | End: 2024-01-01 | Stop reason: HOSPADM

## 2023-12-28 RX ORDER — HEPARIN SODIUM (PORCINE) LOCK FLUSH IV SOLN 100 UNIT/ML 100 UNIT/ML
500 SOLUTION INTRAVENOUS PRN
Status: DISCONTINUED | OUTPATIENT
Start: 2023-12-28 | End: 2024-01-01 | Stop reason: HOSPADM

## 2023-12-28 RX ORDER — SODIUM CHLORIDE 0.9 % (FLUSH) 0.9 %
5-40 SYRINGE (ML) INJECTION 2 TIMES DAILY
Status: DISCONTINUED | OUTPATIENT
Start: 2023-12-28 | End: 2023-12-28

## 2023-12-28 RX ADMIN — HEPARIN 500 UNITS: 100 SYRINGE at 09:28

## 2023-12-28 RX ADMIN — SODIUM CHLORIDE, PRESERVATIVE FREE 20 ML: 5 INJECTION INTRAVENOUS at 09:27

## 2023-12-28 RX ADMIN — HEPARIN 500 UNITS: 100 SYRINGE at 09:26

## 2023-12-28 RX ADMIN — SODIUM CHLORIDE, PRESERVATIVE FREE 20 ML: 5 INJECTION INTRAVENOUS at 09:25

## 2023-12-29 ENCOUNTER — APPOINTMENT (OUTPATIENT)
Facility: HOSPITAL | Age: 63
End: 2023-12-29
Payer: MEDICARE

## 2023-12-29 ENCOUNTER — HOSPITAL ENCOUNTER (OUTPATIENT)
Facility: HOSPITAL | Age: 63
Setting detail: INFUSION SERIES
End: 2023-12-29
Payer: MEDICARE

## 2023-12-29 VITALS
HEART RATE: 102 BPM | WEIGHT: 136 LBS | DIASTOLIC BLOOD PRESSURE: 82 MMHG | SYSTOLIC BLOOD PRESSURE: 152 MMHG | RESPIRATION RATE: 18 BRPM | HEIGHT: 67 IN | TEMPERATURE: 98.8 F | OXYGEN SATURATION: 95 % | BODY MASS INDEX: 21.35 KG/M2

## 2023-12-29 DIAGNOSIS — C34.90 NON-SMALL CELL LUNG CANCER, UNSPECIFIED LATERALITY (HCC): Primary | ICD-10-CM

## 2023-12-29 PROCEDURE — 96367 TX/PROPH/DG ADDL SEQ IV INF: CPT

## 2023-12-29 PROCEDURE — 2580000003 HC RX 258: Performed by: INTERNAL MEDICINE

## 2023-12-29 PROCEDURE — 6360000002 HC RX W HCPCS: Performed by: INTERNAL MEDICINE

## 2023-12-29 PROCEDURE — 96375 TX/PRO/DX INJ NEW DRUG ADDON: CPT

## 2023-12-29 PROCEDURE — 6360000002 HC RX W HCPCS

## 2023-12-29 PROCEDURE — 96413 CHEMO IV INFUSION 1 HR: CPT

## 2023-12-29 PROCEDURE — 96377 APPLICATON ON-BODY INJECTOR: CPT

## 2023-12-29 RX ORDER — PALONOSETRON 0.05 MG/ML
INJECTION, SOLUTION INTRAVENOUS
Status: COMPLETED
Start: 2023-12-29 | End: 2023-12-29

## 2023-12-29 RX ORDER — PALONOSETRON 0.05 MG/ML
0.25 INJECTION, SOLUTION INTRAVENOUS ONCE
Status: COMPLETED | OUTPATIENT
Start: 2023-12-29 | End: 2023-12-29

## 2023-12-29 RX ORDER — HEPARIN 100 UNIT/ML
500 SYRINGE INTRAVENOUS PRN
Status: DISPENSED | OUTPATIENT
Start: 2023-12-29 | End: 2023-12-30

## 2023-12-29 RX ORDER — SODIUM CHLORIDE 0.9 % (FLUSH) 0.9 %
5-40 SYRINGE (ML) INJECTION PRN
Status: ACTIVE | OUTPATIENT
Start: 2023-12-29 | End: 2023-12-30

## 2023-12-29 RX ORDER — SODIUM CHLORIDE 9 MG/ML
5-250 INJECTION, SOLUTION INTRAVENOUS PRN
Status: ACTIVE | OUTPATIENT
Start: 2023-12-29 | End: 2023-12-30

## 2023-12-29 RX ADMIN — PALONOSETRON HYDROCHLORIDE 0.25 MG: 0.25 INJECTION INTRAVENOUS at 09:48

## 2023-12-29 RX ADMIN — PEGFILGRASTIM 6 MG: KIT SUBCUTANEOUS at 12:27

## 2023-12-29 RX ADMIN — DEXAMETHASONE SODIUM PHOSPHATE 12 MG: 4 INJECTION INTRA-ARTICULAR; INTRALESIONAL; INTRAMUSCULAR; INTRAVENOUS; SOFT TISSUE at 10:30

## 2023-12-29 RX ADMIN — SODIUM CHLORIDE, PRESERVATIVE FREE 20 ML: 5 INJECTION INTRAVENOUS at 09:30

## 2023-12-29 RX ADMIN — SODIUM CHLORIDE 25 ML/HR: 9 INJECTION, SOLUTION INTRAVENOUS at 09:30

## 2023-12-29 RX ADMIN — HEPARIN 500 UNITS: 100 SYRINGE at 12:31

## 2023-12-29 RX ADMIN — FOSAPREPITANT 150 MG: 150 INJECTION, POWDER, LYOPHILIZED, FOR SOLUTION INTRAVENOUS at 09:55

## 2023-12-29 RX ADMIN — DOCETAXEL ANHYDROUS 130 MG: 10 INJECTION, SOLUTION INTRAVENOUS at 11:15

## 2023-12-29 RX ADMIN — PALONOSETRON 0.25 MG: 0.05 INJECTION, SOLUTION INTRAVENOUS at 09:48

## 2023-12-29 NOTE — PROGRESS NOTES
[facility] Progress Note    Date: 2023    Name: Rina Prajapati    MRN: [id number]         : 1960      TAXOTERE/ NEULASTA Q21 DAYS  CYCLE 14      Ms. Prajapati arrived to [facility] at 0920. Pt ambulatory without assist.    Ms. Prajapati was assessed and education was provided.     Ms. Prajapati's vitals were reviewed.  Vitals:    23 0920   BP: (!) 156/81   Pulse: (!) 105   Resp: 18   Temp: 98.3 °F (36.8 °C)   SpO2: 94%       Pt's pre-chemo lab results from yesterday were obtained and reviewed. Okay for chemo per pt's tx plan parameters.  Hospital Outpatient Visit on 2023   Component Date Value Ref Range Status    WBC 2023 5.7  4.5 - 13.0 K/uL Final    RBC 2023 4.10  4.10 - 5.10 M/uL Final    Hemoglobin 2023 9.7 (L)  12.0 - 16.0 g/dL Final    Hematocrit 2023 33.9 (L)  36 - 48 % Final    MCV 2023 82.7  78 - 102 FL Final    MCH 2023 23.7 (L)  25.0 - 35.0 PG Final    MCHC 2023 28.6 (L)  31 - 37 g/dL Final    RDW 2023 19.6 (H)  11.5 - 14.5 % Final    Platelets 2023 277  140 - 440 K/uL Final    Neutrophils % 2023 72 (H)  40 - 70 % Final    Mixed Cells 2023 7  0.1 - 17 % Final    Lymphocytes % 2023 21  14 - 44 % Final    Neutrophils Absolute 2023 4.1  1.8 - 9.5 K/UL Final    ABSOLUTE MIXED CELLS 2023 0.4  0.0 - 2.3 K/uL Final    Lymphocytes Absolute 2023 1.2  1.1 - 5.9 K/UL Final    Test performed at [facility] or Outpatient Infusion Center Location. Reviewed by Medical Director.    Differential Type 2023 AUTOMATED    Final    Sodium 2023 142  136 - 145 mmol/L Final    Potassium 2023 3.7  3.5 - 5.5 mmol/L Final    Chloride 2023 106  100 - 111 mmol/L Final    CO2 2023 29  21 - 32 mmol/L Final    Anion Gap 2023 7  3.0 - 18 mmol/L Final    Glucose 2023 70 (L)  74 - 99 mg/dL Final    BUN 2023 7  7.0 - 18 MG/DL Final    Creatinine  administered as ordered: Aloxi 0.25mg IVP, Emend 150mg IVPB, and Dexamethasone 12mg IVPB. Taxotere 130mg IV administered as ordered followed by NS flush. Neulasta on-body injector placed on pt's LLQ abdomen. Observed flashing green light & indicator line at \"Full\" as verification of properly functioning unit. Pt verbalized understanding regarding the function of the on-body injector and when it can be removed/ and disposed of (around 1630 tomorrow evening). Ms. Kendall Moreno tolerated her tx well without complaints. Flushed each lumen of pt's PICC w/ heparin per order & new curos caps applied. Wrapped lumens w/ coban & secured w/ netted sleeve. Discharge instructions reviewed w/ pt. Pt verbalized understanding. Pt's armband removed/ shredded. Ms. Kendall Moreno was discharged from [address] [place] [address] in stable condition at 1235. She is to return on 1/3/24 at 0900 for her next appointment (weekly PICC care).     Saloni Corbett RN  December 29, 2023

## 2024-01-01 ENCOUNTER — HOSPITAL ENCOUNTER (OUTPATIENT)
Facility: HOSPITAL | Age: 64
Setting detail: INFUSION SERIES
End: 2024-01-01

## 2024-01-01 DIAGNOSIS — C34.11 MALIGNANT NEOPLASM OF UPPER LOBE OF RIGHT LUNG (HCC): Primary | ICD-10-CM

## 2024-01-01 DIAGNOSIS — C34.90 NON-SMALL CELL LUNG CANCER, UNSPECIFIED LATERALITY (HCC): ICD-10-CM

## 2024-01-01 RX ORDER — ACETAMINOPHEN 325 MG/1
650 TABLET ORAL
Status: CANCELLED | OUTPATIENT
Start: 2024-01-01

## 2024-01-01 RX ORDER — HYDROCORTISONE SODIUM SUCCINATE 100 MG/2ML
100 INJECTION INTRAMUSCULAR; INTRAVENOUS
Status: CANCELLED | OUTPATIENT
Start: 2024-01-01

## 2024-01-01 RX ORDER — SODIUM CHLORIDE 9 MG/ML
5-250 INJECTION, SOLUTION INTRAVENOUS PRN
Status: CANCELLED | OUTPATIENT
Start: 2024-01-01

## 2024-01-01 RX ORDER — DEXAMETHASONE SODIUM PHOSPHATE 4 MG/ML
10 INJECTION, SOLUTION INTRA-ARTICULAR; INTRALESIONAL; INTRAMUSCULAR; INTRAVENOUS; SOFT TISSUE ONCE
Status: CANCELLED
Start: 2024-01-01 | End: 2024-01-01

## 2024-01-01 RX ORDER — DIPHENHYDRAMINE HYDROCHLORIDE 50 MG/ML
50 INJECTION, SOLUTION INTRAMUSCULAR; INTRAVENOUS
Status: CANCELLED | OUTPATIENT
Start: 2024-01-01

## 2024-01-01 RX ORDER — EPINEPHRINE 1 MG/ML
0.3 INJECTION, SOLUTION, CONCENTRATE INTRAVENOUS PRN
Status: CANCELLED | OUTPATIENT
Start: 2024-01-01

## 2024-01-01 RX ORDER — ALBUTEROL SULFATE 90 UG/1
4 INHALANT RESPIRATORY (INHALATION) PRN
Status: CANCELLED | OUTPATIENT
Start: 2024-01-01

## 2024-01-01 RX ORDER — HEPARIN 100 UNIT/ML
500 SYRINGE INTRAVENOUS PRN
Status: CANCELLED | OUTPATIENT
Start: 2024-01-01

## 2024-01-01 RX ORDER — SODIUM CHLORIDE 9 MG/ML
INJECTION, SOLUTION INTRAVENOUS CONTINUOUS
Status: CANCELLED | OUTPATIENT
Start: 2024-01-01

## 2024-01-01 RX ORDER — SODIUM CHLORIDE 0.9 % (FLUSH) 0.9 %
5-40 SYRINGE (ML) INJECTION PRN
Status: CANCELLED | OUTPATIENT
Start: 2024-01-01

## 2024-01-01 RX ORDER — ONDANSETRON 2 MG/ML
8 INJECTION INTRAMUSCULAR; INTRAVENOUS
Status: CANCELLED | OUTPATIENT
Start: 2024-01-01

## 2024-01-01 RX ORDER — MEPERIDINE HYDROCHLORIDE 25 MG/ML
12.5 INJECTION INTRAMUSCULAR; INTRAVENOUS; SUBCUTANEOUS PRN
Status: CANCELLED | OUTPATIENT
Start: 2024-01-01

## 2024-01-01 RX ORDER — ONDANSETRON 2 MG/ML
8 INJECTION INTRAMUSCULAR; INTRAVENOUS ONCE
Status: CANCELLED | OUTPATIENT
Start: 2024-01-01 | End: 2024-01-01

## 2024-01-03 ENCOUNTER — HOSPITAL ENCOUNTER (OUTPATIENT)
Facility: HOSPITAL | Age: 64
Setting detail: INFUSION SERIES
Discharge: HOME | End: 2024-01-06
Payer: MEDICARE

## 2024-01-03 VITALS
HEART RATE: 89 BPM | OXYGEN SATURATION: 94 % | DIASTOLIC BLOOD PRESSURE: 79 MMHG | RESPIRATION RATE: 20 BRPM | TEMPERATURE: 98.5 F | SYSTOLIC BLOOD PRESSURE: 118 MMHG

## 2024-01-03 PROCEDURE — 96523 IRRIG DRUG DELIVERY DEVICE: CPT

## 2024-01-03 PROCEDURE — 2580000003 HC RX 258: Performed by: INTERNAL MEDICINE

## 2024-01-03 PROCEDURE — 6360000002 HC RX W HCPCS: Performed by: INTERNAL MEDICINE

## 2024-01-03 RX ORDER — SODIUM CHLORIDE 0.9 % (FLUSH) 0.9 %
5-40 SYRINGE (ML) INJECTION PRN
Status: DISCONTINUED | OUTPATIENT
Start: 2024-01-03 | End: 2024-01-07 | Stop reason: HOSPADM

## 2024-01-03 RX ORDER — HEPARIN SODIUM (PORCINE) LOCK FLUSH IV SOLN 100 UNIT/ML 100 UNIT/ML
500 SOLUTION INTRAVENOUS PRN
Status: DISCONTINUED | OUTPATIENT
Start: 2024-01-03 | End: 2024-01-07 | Stop reason: HOSPADM

## 2024-01-03 RX ADMIN — HEPARIN 500 UNITS: 100 SYRINGE at 09:31

## 2024-01-03 RX ADMIN — SODIUM CHLORIDE, PRESERVATIVE FREE 20 ML: 5 INJECTION INTRAVENOUS at 09:30

## 2024-01-03 NOTE — PROGRESS NOTES
[facility] Progress Note    Date: January 3, 2024    Name: Rina Prajapati10    MRN: [id number]         : 1960    Ms. Prajapati arrived in the [facility] today at 0905, in stable condition, here for her WEEKLY PICC CARE. She was assessed and education was provided.     Ms. Prajapati's vitals were reviewed.  Vitals:    24 0905   BP: 118/79   Pulse: 89   Resp: 20   Temp: 98.5 °F (36.9 °C)   SpO2: 94%     Ms. Prajapati presented to the infusion center today stating that she was doing well, and voicing no major complaints. She reports that the edema in both of her arms and legs has resolved and there is none present today.    HER LEFT UPPER ARM DOUBLE LUMEN PICC LINE WAS NOTED TO BE COMPLETELY DRY AND INTACT, AND BOTH LUMENS FLUSHED EASILY WITH NS, HOWEVER, NEITHER LUMEN HAD A BLOOD RETURN TODAY.      Her PICC dressing was changed today per policy, and without incident, then both lumens of her PICC  were flushed well per policy with NS & Heparin. Both injection caps were changed, and alcohol caps applied.    Ms. Prajapati tolerated the PICC dressing well, without incident.    Ms. Prajapati was discharged [facility] in stable condition at 0945, with her PICC line completely dry and intact.     Discharge instructions reviewed with patient, who expressed understanding. Her armband was removed and shredded.    She is scheduled to return on Thursday, 1/10/24, @ 0900  for her next weekly PICC dressing change.        Kathleen Gallagher RN  January 3, 2024  9:45 AM

## 2024-01-10 ENCOUNTER — HOSPITAL ENCOUNTER (OUTPATIENT)
Facility: HOSPITAL | Age: 64
Setting detail: INFUSION SERIES
Discharge: HOME | End: 2024-01-13
Payer: MEDICARE

## 2024-01-10 VITALS
HEART RATE: 106 BPM | DIASTOLIC BLOOD PRESSURE: 72 MMHG | RESPIRATION RATE: 20 BRPM | TEMPERATURE: 97.7 F | SYSTOLIC BLOOD PRESSURE: 132 MMHG | OXYGEN SATURATION: 94 %

## 2024-01-10 PROCEDURE — 96523 IRRIG DRUG DELIVERY DEVICE: CPT

## 2024-01-10 PROCEDURE — 6360000002 HC RX W HCPCS

## 2024-01-10 PROCEDURE — 2580000003 HC RX 258: Performed by: INTERNAL MEDICINE

## 2024-01-10 PROCEDURE — 6360000002 HC RX W HCPCS: Performed by: INTERNAL MEDICINE

## 2024-01-10 RX ORDER — SODIUM CHLORIDE 0.9 % (FLUSH) 0.9 %
5-40 SYRINGE (ML) INJECTION PRN
Status: DISCONTINUED | OUTPATIENT
Start: 2024-01-10 | End: 2024-01-14 | Stop reason: HOSPADM

## 2024-01-10 RX ORDER — HEPARIN 100 UNIT/ML
SYRINGE INTRAVENOUS
Status: COMPLETED
Start: 2024-01-10 | End: 2024-01-10

## 2024-01-10 RX ORDER — HEPARIN SODIUM (PORCINE) LOCK FLUSH IV SOLN 100 UNIT/ML 100 UNIT/ML
500 SOLUTION INTRAVENOUS PRN
Status: DISCONTINUED | OUTPATIENT
Start: 2024-01-10 | End: 2024-01-14 | Stop reason: HOSPADM

## 2024-01-10 RX ADMIN — HEPARIN 500 UNITS: 100 SYRINGE at 09:25

## 2024-01-10 RX ADMIN — SODIUM CHLORIDE, PRESERVATIVE FREE 20 ML: 5 INJECTION INTRAVENOUS at 09:25

## 2024-01-10 RX ADMIN — SODIUM CHLORIDE, PRESERVATIVE FREE 20 ML: 5 INJECTION INTRAVENOUS at 09:26

## 2024-01-10 RX ADMIN — HEPARIN 500 UNITS: 100 SYRINGE at 09:26

## 2024-01-10 ASSESSMENT — PAIN - FUNCTIONAL ASSESSMENT: PAIN_FUNCTIONAL_ASSESSMENT: NONE - DENIES PAIN

## 2024-01-10 NOTE — PROGRESS NOTES
[facility] Progress Note    Date: January 10, 2024    Name: Rina Prajapati    MRN: [id number]         : 1960    Ms. Prajapati arrived in the [facility] today at 0905, in stable condition, here for her WEEKLY PICC LINE CARE. She was assessed and education was provided.     Ms. Prajapati's vitals were reviewed.  Vitals:    01/10/24 0905   BP: 132/72   Pulse: (!) 106   Resp: 20   Temp: 97.7 °F (36.5 °C)   SpO2: 94%       Ms. Prajapati presented to the infusion center today stating that she was doing well, and voicing no major complaints.              HER LEFT UPPER ARM DOUBLE LUMEN PICC LINE WAS NOTED TO BE COMPLETELY DRY AND INTACT, AND BOTH LUMENS FLUSHED EASILY WITH NS, HOWEVER, NEITHER LUMEN HAD A BLOOD RETURN TODAY.         Her PICC line dressing was changed today per policy, and without incident, then both lumens of her PICC line were flushed well per policy with NS & Heparin, and then the end caps were also changed, and alcohol caps applied.          Ms. Prajapati tolerated well and voiced no complaints.     Ms. Prajapati was discharged from [facility] in stable condition at 0945, with her PICC line completely dry and intact.       She is to return on next Wednesday, 24 at 0900, for her next appointment, for Weekly PICC Care & Pre-Chemo Labs. .       Jeramie Adams RN  January 10, 2024  9:18 AM

## 2024-01-11 ENCOUNTER — APPOINTMENT (OUTPATIENT)
Facility: HOSPITAL | Age: 64
End: 2024-01-11
Payer: MEDICARE

## 2024-01-11 RX ORDER — SODIUM CHLORIDE 9 MG/ML
5-250 INJECTION, SOLUTION INTRAVENOUS PRN
Status: CANCELLED | OUTPATIENT
Start: 2024-01-18

## 2024-01-11 RX ORDER — ALBUTEROL SULFATE 90 UG/1
4 AEROSOL, METERED RESPIRATORY (INHALATION) PRN
Status: CANCELLED | OUTPATIENT
Start: 2024-01-18

## 2024-01-11 RX ORDER — MEPERIDINE HYDROCHLORIDE 25 MG/ML
12.5 INJECTION INTRAMUSCULAR; INTRAVENOUS; SUBCUTANEOUS PRN
Status: CANCELLED | OUTPATIENT
Start: 2024-01-18

## 2024-01-11 RX ORDER — ACETAMINOPHEN 325 MG/1
650 TABLET ORAL
Status: CANCELLED | OUTPATIENT
Start: 2024-01-18

## 2024-01-11 RX ORDER — ONDANSETRON 2 MG/ML
8 INJECTION INTRAMUSCULAR; INTRAVENOUS
Status: CANCELLED | OUTPATIENT
Start: 2024-01-18

## 2024-01-11 RX ORDER — EPINEPHRINE 1 MG/ML
0.3 INJECTION, SOLUTION, CONCENTRATE INTRAVENOUS PRN
Status: CANCELLED | OUTPATIENT
Start: 2024-01-18

## 2024-01-11 RX ORDER — SODIUM CHLORIDE 9 MG/ML
INJECTION, SOLUTION INTRAVENOUS CONTINUOUS
Status: CANCELLED | OUTPATIENT
Start: 2024-01-18

## 2024-01-11 RX ORDER — DIPHENHYDRAMINE HYDROCHLORIDE 50 MG/ML
50 INJECTION INTRAMUSCULAR; INTRAVENOUS
Status: CANCELLED | OUTPATIENT
Start: 2024-01-18

## 2024-01-17 ENCOUNTER — HOSPITAL ENCOUNTER (OUTPATIENT)
Facility: HOSPITAL | Age: 64
Setting detail: INFUSION SERIES
Discharge: HOME | End: 2024-01-20
Payer: MEDICARE

## 2024-01-17 LAB
ALBUMIN SERPL-MCNC: 3 G/DL (ref 3.4–5)
ALBUMIN/GLOB SERPL: 1 (ref 0.8–1.7)
ALP SERPL-CCNC: 68 U/L (ref 45–117)
ALT SERPL-CCNC: 14 U/L (ref 13–56)
ANION GAP SERPL CALC-SCNC: 5 MMOL/L (ref 3–18)
AST SERPL-CCNC: 11 U/L (ref 10–38)
BASO+EOS+MONOS # BLD AUTO: 0.1 K/UL (ref 0–2.3)
BASO+EOS+MONOS NFR BLD AUTO: 2 % (ref 0.1–17)
BILIRUB SERPL-MCNC: 0.4 MG/DL (ref 0.2–1)
BUN SERPL-MCNC: 7 MG/DL (ref 7–18)
BUN/CREAT SERPL: 11 (ref 12–20)
CALCIUM SERPL-MCNC: 8.6 MG/DL (ref 8.5–10.1)
CHLORIDE SERPL-SCNC: 107 MMOL/L (ref 100–111)
CO2 SERPL-SCNC: 28 MMOL/L (ref 21–32)
CREAT SERPL-MCNC: 0.62 MG/DL (ref 0.6–1.3)
DIFFERENTIAL METHOD BLD: ABNORMAL
ERYTHROCYTE [DISTWIDTH] IN BLOOD BY AUTOMATED COUNT: 18.9 % (ref 11.5–14.5)
GLOBULIN SER CALC-MCNC: 3.1 G/DL (ref 2–4)
GLUCOSE SERPL-MCNC: 101 MG/DL (ref 74–99)
HCT VFR BLD AUTO: 30.8 % (ref 36–48)
HGB BLD-MCNC: 9.1 G/DL (ref 12–16)
LYMPHOCYTES # BLD: 0.7 K/UL (ref 1.1–5.9)
LYMPHOCYTES NFR BLD: 16 % (ref 14–44)
MCH RBC QN AUTO: 23.8 PG (ref 25–35)
MCHC RBC AUTO-ENTMCNC: 29.5 G/DL (ref 31–37)
MCV RBC AUTO: 80.4 FL (ref 78–102)
NEUTS SEG # BLD: 3.9 K/UL (ref 1.8–9.5)
NEUTS SEG NFR BLD: 83 % (ref 40–70)
PLATELET # BLD AUTO: 333 K/UL (ref 140–440)
POTASSIUM SERPL-SCNC: 3.2 MMOL/L (ref 3.5–5.5)
PROT SERPL-MCNC: 6.1 G/DL (ref 6.4–8.2)
RBC # BLD AUTO: 3.83 M/UL (ref 4.1–5.1)
SODIUM SERPL-SCNC: 140 MMOL/L (ref 136–145)
WBC # BLD AUTO: 4.7 K/UL (ref 4.5–13)

## 2024-01-17 PROCEDURE — 6360000002 HC RX W HCPCS

## 2024-01-17 PROCEDURE — 36592 COLLECT BLOOD FROM PICC: CPT

## 2024-01-17 PROCEDURE — 6360000002 HC RX W HCPCS: Performed by: INTERNAL MEDICINE

## 2024-01-17 PROCEDURE — 80053 COMPREHEN METABOLIC PANEL: CPT

## 2024-01-17 PROCEDURE — 2580000003 HC RX 258: Performed by: INTERNAL MEDICINE

## 2024-01-17 PROCEDURE — 85025 COMPLETE CBC W/AUTO DIFF WBC: CPT

## 2024-01-17 PROCEDURE — 36591 DRAW BLOOD OFF VENOUS DEVICE: CPT

## 2024-01-17 RX ORDER — HEPARIN 100 UNIT/ML
SYRINGE INTRAVENOUS
Status: COMPLETED
Start: 2024-01-17 | End: 2024-01-17

## 2024-01-17 RX ORDER — HEPARIN SODIUM (PORCINE) LOCK FLUSH IV SOLN 100 UNIT/ML 100 UNIT/ML
500 SOLUTION INTRAVENOUS PRN
Status: DISCONTINUED | OUTPATIENT
Start: 2024-01-17 | End: 2024-01-21 | Stop reason: HOSPADM

## 2024-01-17 RX ORDER — SODIUM CHLORIDE 0.9 % (FLUSH) 0.9 %
5-40 SYRINGE (ML) INJECTION PRN
Status: DISCONTINUED | OUTPATIENT
Start: 2024-01-17 | End: 2024-01-21 | Stop reason: HOSPADM

## 2024-01-17 RX ADMIN — SODIUM CHLORIDE, PRESERVATIVE FREE 30 ML: 5 INJECTION INTRAVENOUS at 09:35

## 2024-01-17 RX ADMIN — HEPARIN 500 UNITS: 100 SYRINGE at 09:36

## 2024-01-17 RX ADMIN — SODIUM CHLORIDE, PRESERVATIVE FREE 20 ML: 5 INJECTION INTRAVENOUS at 09:36

## 2024-01-17 RX ADMIN — HEPARIN 500 UNITS: 100 SYRINGE at 09:35

## 2024-01-17 NOTE — PROGRESS NOTES
[facility] Progress Note    Date: 2024    Name: Rina Prajapati    MRN: [id number]         : 1960    Ms. Prajapati arrived in the [facility] today at 0915, in stable condition, here for her WEEKLY PICC LINE CARE & PRE-CHEMO LABS. She was assessed and education was provided.     Ms. Prajapati's vitals were reviewed.  Vitals:    24 0915   BP: (!) 145/76   Pulse: (!) 101   Resp: 20   Temp: 97.8 °F (36.6 °C)   SpO2: 94%       WEIGHT TODAY 134.4 LBS (60.9 KG)      Ms. Prajapati presented to the infusion center today stating that she was doing well, and voicing no major complaints. However, she reported an infrequent, productive cough but no antibiotic use.       HER LEFT UPPER ARM DOUBLE LUMEN PICC LINE WAS NOTED TO BE COMPLETELY DRY AND INTACT, AND BOTH LUMENS HAD A BRISK BLOOD RETURN AND FLUSHED EASILY WITH NS.         Her PICC line dressing was changed today per policy, and without incident, then both lumens of her PICC line were flushed well per policy with NS & Heparin, and then the end caps were also changed, and alcohol caps applied.      BLOOD FOR THE ORDERED PRE-CHEMO LABS (CBC & CMP) WAS DRAWN FROM HER PICC Line AT 0935 WITHOUT INCIDENT.     The CBC was processed on site, and the CMP was sent out by  to the [facility] hospital lab for processing.       Results for orders placed or performed during the hospital encounter of 24   CBC with Partial Differential   Result Value Ref Range    WBC 4.7 4.5 - 13.0 K/uL    RBC 3.83 (L) 4.10 - 5.10 M/uL    Hemoglobin 9.1 (L) 12.0 - 16.0 g/dL    Hematocrit 30.8 (L) 36 - 48 %    MCV 80.4 78 - 102 FL    MCH 23.8 (L) 25.0 - 35.0 PG    MCHC 29.5 (L) 31 - 37 g/dL    RDW 18.9 (H) 11.5 - 14.5 %    Platelets 333 140 - 440 K/uL    Neutrophils % 83 (H) 40 - 70 %    Mixed Cells 2 0.1 - 17 %    Lymphocytes % 16 14 - 44 %    Neutrophils Absolute 3.9 1.8 - 9.5 K/UL    ABSOLUTE MIXED CELLS 0.1 0.0 - 2.3 K/uL    Lymphocytes Absolute 0.7 (L) 1.1 - 5.9 K/UL    Differential Type

## 2024-01-18 ENCOUNTER — HOSPITAL ENCOUNTER (OUTPATIENT)
Facility: HOSPITAL | Age: 64
Setting detail: INFUSION SERIES
End: 2024-01-18
Payer: MEDICARE

## 2024-01-18 VITALS
HEART RATE: 101 BPM | HEIGHT: 67 IN | TEMPERATURE: 97.8 F | SYSTOLIC BLOOD PRESSURE: 145 MMHG | DIASTOLIC BLOOD PRESSURE: 76 MMHG | RESPIRATION RATE: 20 BRPM | OXYGEN SATURATION: 94 % | WEIGHT: 134.4 LBS | BODY MASS INDEX: 21.09 KG/M2

## 2024-01-18 VITALS
TEMPERATURE: 97.7 F | SYSTOLIC BLOOD PRESSURE: 168 MMHG | RESPIRATION RATE: 24 BRPM | OXYGEN SATURATION: 93 % | HEART RATE: 104 BPM | DIASTOLIC BLOOD PRESSURE: 93 MMHG

## 2024-01-18 DIAGNOSIS — C34.90 NON-SMALL CELL LUNG CANCER, UNSPECIFIED LATERALITY (HCC): Primary | ICD-10-CM

## 2024-01-18 PROCEDURE — 2580000003 HC RX 258: Performed by: INTERNAL MEDICINE

## 2024-01-18 PROCEDURE — 96375 TX/PRO/DX INJ NEW DRUG ADDON: CPT

## 2024-01-18 PROCEDURE — 6360000002 HC RX W HCPCS: Performed by: INTERNAL MEDICINE

## 2024-01-18 PROCEDURE — 96367 TX/PROPH/DG ADDL SEQ IV INF: CPT

## 2024-01-18 PROCEDURE — 96413 CHEMO IV INFUSION 1 HR: CPT

## 2024-01-18 PROCEDURE — 96377 APPLICATON ON-BODY INJECTOR: CPT

## 2024-01-18 RX ORDER — SODIUM CHLORIDE 0.9 % (FLUSH) 0.9 %
5-40 SYRINGE (ML) INJECTION PRN
Status: ACTIVE | OUTPATIENT
Start: 2024-01-18 | End: 2024-01-19

## 2024-01-18 RX ORDER — SODIUM CHLORIDE 9 MG/ML
5-250 INJECTION, SOLUTION INTRAVENOUS PRN
Status: ACTIVE | OUTPATIENT
Start: 2024-01-18 | End: 2024-01-19

## 2024-01-18 RX ORDER — HEPARIN 100 UNIT/ML
500 SYRINGE INTRAVENOUS PRN
Status: ACTIVE | OUTPATIENT
Start: 2024-01-18 | End: 2024-01-19

## 2024-01-18 RX ORDER — PALONOSETRON 0.05 MG/ML
0.25 INJECTION, SOLUTION INTRAVENOUS ONCE
Status: COMPLETED | OUTPATIENT
Start: 2024-01-18 | End: 2024-01-18

## 2024-01-18 RX ORDER — DEXAMETHASONE SODIUM PHOSPHATE 4 MG/ML
8 INJECTION, SOLUTION INTRA-ARTICULAR; INTRALESIONAL; INTRAMUSCULAR; INTRAVENOUS; SOFT TISSUE ONCE
Status: COMPLETED | OUTPATIENT
Start: 2024-01-18 | End: 2024-01-18

## 2024-01-18 RX ADMIN — FOSAPREPITANT 150 MG: 150 INJECTION, POWDER, LYOPHILIZED, FOR SOLUTION INTRAVENOUS at 10:13

## 2024-01-18 RX ADMIN — DEXAMETHASONE SODIUM PHOSPHATE 8 MG: 4 INJECTION INTRA-ARTICULAR; INTRALESIONAL; INTRAMUSCULAR; INTRAVENOUS; SOFT TISSUE at 10:05

## 2024-01-18 RX ADMIN — SODIUM CHLORIDE, PRESERVATIVE FREE 10 ML: 5 INJECTION INTRAVENOUS at 13:11

## 2024-01-18 RX ADMIN — HEPARIN 500 UNITS: 100 SYRINGE at 13:11

## 2024-01-18 RX ADMIN — HEPARIN 500 UNITS: 100 SYRINGE at 13:12

## 2024-01-18 RX ADMIN — DOCETAXEL ANHYDROUS 130 MG: 10 INJECTION, SOLUTION INTRAVENOUS at 11:20

## 2024-01-18 RX ADMIN — PALONOSETRON HYDROCHLORIDE 0.25 MG: 0.25 INJECTION INTRAVENOUS at 10:07

## 2024-01-18 RX ADMIN — SODIUM CHLORIDE, PRESERVATIVE FREE 10 ML: 5 INJECTION INTRAVENOUS at 09:30

## 2024-01-18 RX ADMIN — SODIUM CHLORIDE, PRESERVATIVE FREE 10 ML: 5 INJECTION INTRAVENOUS at 13:10

## 2024-01-18 RX ADMIN — SODIUM CHLORIDE, PRESERVATIVE FREE 10 ML: 5 INJECTION INTRAVENOUS at 09:29

## 2024-01-18 RX ADMIN — PEGFILGRASTIM 6 MG: KIT SUBCUTANEOUS at 13:15

## 2024-01-18 RX ADMIN — SODIUM CHLORIDE 25 ML/HR: 9 INJECTION, SOLUTION INTRAVENOUS at 09:30

## 2024-01-18 NOTE — PROGRESS NOTES
hospitals Progress Note    Date: 2024    Name: Rina Prajapati    MRN: [id number]         : 1960    Ms. Prajapati arrived in the hospitals today at 0920, in stable condition, here for CYCLE 15, DAY 1, IV TAXOTERE CHEMOTHERAPY REGIMEN (EVERY 21 DAY CYCLE). She was assessed and education was provided.     Ms. Prajapati's vitals were reviewed.  Vitals:    24 0915   BP: (!) 147/76   Pulse: 99   Resp: 18   Temp: 98.1 °F (36.7 °C)   SpO2: 94%       WEIGHT FROM YESTERDAY WAS  61 KG    BSA 1.7        RAMUCIRUMAB CONTINUES TO BE ON HOLD FOR NOW.        Ms. Prajapati presented to the infusion center today stating that she was doing well, and voicing no major complaints. Reports no change from yesterday.       CURRENT AND SIGNED CHEMOTHERAPY CONSENT WAS VIEWED IN HER ELECTRONIC RECORD.       HER PRE-CHEMO LABS OBTAINED ON YESTERDAY, 24 WERE ALL REVIEWED, AND WERE ALL NOTED TO BE SATISFACTORY FOR TREATMENT TODAY, AND WERE AS FOLLOWS:    The slightly low Potassium result was reviewed. Patient was instructed to continue to take her oral Potassium as prescribed (20 MEQ Daily), and she verbalized understanding. (States she \"missed some doses of her potassium last week.\")     Latest Reference Range & Units 24 09:35   Sodium 136 - 145 mmol/L 140   Potassium 3.5 - 5.5 mmol/L 3.2 (L)   Chloride 100 - 111 mmol/L 107   CO2 21 - 32 mmol/L 28   BUN,BUNPL 7.0 - 18 MG/DL 7   Creatinine 0.6 - 1.3 MG/DL 0.62   Bun/Cre Ratio 12 - 20   11 (L)   Anion Gap 3.0 - 18 mmol/L 5   Est, Glom Filt Rate >60 ml/min/1.73m2 >60   Glucose, Random 74 - 99 mg/dL 101 (H)   CALCIUM, SERUM, 429680 8.5 - 10.1 MG/DL 8.6   ALBUMIN/GLOBULIN RATIO 0.8 - 1.7   1.0   Total Protein 6.4 - 8.2 g/dL 6.1 (L)   Albumin 3.4 - 5.0 g/dL 3.0 (L)   Globulin 2.0 - 4.0 g/dL 3.1   Alk Phos 45 - 117 U/L 68   ALT 13 - 56 U/L 14   AST 10 - 38 U/L 11   BILIRUBIN TOTAL 0.2 - 1.0 MG/DL 0.4   WBC 4.5 - 13.0 K/uL 4.7   RBC 4.10 - 5.10 M/uL 3.83 (L)   Hemoglobin Quant 12.0 -

## 2024-01-24 ENCOUNTER — HOSPITAL ENCOUNTER (OUTPATIENT)
Facility: HOSPITAL | Age: 64
Setting detail: INFUSION SERIES
Discharge: HOME | End: 2024-01-27
Payer: MEDICARE

## 2024-01-24 VITALS
HEART RATE: 99 BPM | TEMPERATURE: 98.8 F | DIASTOLIC BLOOD PRESSURE: 72 MMHG | RESPIRATION RATE: 20 BRPM | OXYGEN SATURATION: 95 % | SYSTOLIC BLOOD PRESSURE: 148 MMHG

## 2024-01-24 PROCEDURE — 6360000002 HC RX W HCPCS: Performed by: INTERNAL MEDICINE

## 2024-01-24 PROCEDURE — 2580000003 HC RX 258: Performed by: INTERNAL MEDICINE

## 2024-01-24 PROCEDURE — 96523 IRRIG DRUG DELIVERY DEVICE: CPT

## 2024-01-24 RX ORDER — SODIUM CHLORIDE 0.9 % (FLUSH) 0.9 %
5-40 SYRINGE (ML) INJECTION PRN
Status: DISCONTINUED | OUTPATIENT
Start: 2024-01-24 | End: 2024-01-28 | Stop reason: HOSPADM

## 2024-01-24 RX ORDER — HEPARIN SODIUM (PORCINE) LOCK FLUSH IV SOLN 100 UNIT/ML 100 UNIT/ML
500 SOLUTION INTRAVENOUS PRN
Status: DISCONTINUED | OUTPATIENT
Start: 2024-01-24 | End: 2024-01-28 | Stop reason: HOSPADM

## 2024-01-24 RX ADMIN — SODIUM CHLORIDE, PRESERVATIVE FREE 20 ML: 5 INJECTION INTRAVENOUS at 09:25

## 2024-01-24 RX ADMIN — SODIUM CHLORIDE, PRESERVATIVE FREE 20 ML: 5 INJECTION INTRAVENOUS at 09:26

## 2024-01-24 RX ADMIN — HEPARIN 500 UNITS: 100 SYRINGE at 09:26

## 2024-01-24 RX ADMIN — HEPARIN 500 UNITS: 100 SYRINGE at 09:25

## 2024-01-24 NOTE — PROGRESS NOTES
[facility] Progress Note    Date: 2024    Name: Rina Prajapati    MRN: [id number]         : 1960    Ms. Prajapati arrived in the [facility] today at 0905, in stable condition, here for her WEEKLY PICC LINE CARE. She was assessed and education was provided.     Ms. Prajapati's vitals were reviewed.  Vitals:    24 0905   BP: (!) 148/72   Pulse: 99   Resp: 20   Temp: 98.8 °F (37.1 °C)   SpO2: 95%       Ms. Prajapati presented to the infusion center today stating that she was doing well, and voicing no major complaints.        HER LEFT UPPER ARM DOUBLE LUMEN PICC LINE WAS NOTED TO BE COMPLETELY DRY AND INTACT, AND BOTH LUMENS FLUSHED EASILY WITH NS, HOWEVER, NEITHER LUMEN HAD A BLOOD RETURN TODAY.         Her PICC line dressing was changed today per policy, and without incident, then both lumens of her PICC line were flushed well per policy with NS & Heparin, and then the end caps were also changed, and alcohol caps applied.        Ms. Prajapati tolerated well and voiced no complaints.     Ms. Prajapati was discharged [facility] in stable condition at 0945, with her PICC line completely dry and intact.       She is to return on, 24 at 1000, for her next appointment, for Weekly PICC Care.       Safia Norris RN  2024  9:49 AM

## 2024-01-25 ENCOUNTER — HOSPITAL ENCOUNTER (OUTPATIENT)
Facility: HOSPITAL | Age: 64
Discharge: HOME | End: 2024-01-25
Attending: INTERNAL MEDICINE
Payer: MEDICARE

## 2024-01-25 DIAGNOSIS — C34.11 MALIGNANT NEOPLASM OF UPPER LOBE OF RIGHT LUNG (HCC): ICD-10-CM

## 2024-01-25 PROCEDURE — 71260 CT THORAX DX C+: CPT

## 2024-01-25 PROCEDURE — 74177 CT ABD & PELVIS W/CONTRAST: CPT

## 2024-01-25 PROCEDURE — 6360000004 HC RX CONTRAST MEDICATION: Performed by: INTERNAL MEDICINE

## 2024-01-25 RX ADMIN — IOPAMIDOL 100 ML: 612 INJECTION, SOLUTION INTRAVENOUS at 10:22

## 2024-01-26 RX ORDER — HEPARIN 100 UNIT/ML
SYRINGE INTRAVENOUS
Status: DISPENSED
Start: 2024-01-26 | End: 2024-01-27

## 2024-01-31 ENCOUNTER — HOSPITAL ENCOUNTER (OUTPATIENT)
Facility: HOSPITAL | Age: 64
Setting detail: INFUSION SERIES
Discharge: HOME | End: 2024-02-03
Payer: MEDICARE

## 2024-01-31 VITALS
OXYGEN SATURATION: 94 % | RESPIRATION RATE: 18 BRPM | DIASTOLIC BLOOD PRESSURE: 78 MMHG | HEART RATE: 104 BPM | SYSTOLIC BLOOD PRESSURE: 127 MMHG | TEMPERATURE: 97.2 F

## 2024-01-31 PROCEDURE — 2580000003 HC RX 258: Performed by: INTERNAL MEDICINE

## 2024-01-31 PROCEDURE — 96523 IRRIG DRUG DELIVERY DEVICE: CPT

## 2024-01-31 PROCEDURE — 6360000002 HC RX W HCPCS: Performed by: INTERNAL MEDICINE

## 2024-01-31 RX ORDER — HEPARIN SODIUM (PORCINE) LOCK FLUSH IV SOLN 100 UNIT/ML 100 UNIT/ML
500 SOLUTION INTRAVENOUS PRN
Status: DISCONTINUED | OUTPATIENT
Start: 2024-01-31 | End: 2024-02-04 | Stop reason: HOSPADM

## 2024-01-31 RX ORDER — SODIUM CHLORIDE 0.9 % (FLUSH) 0.9 %
5-40 SYRINGE (ML) INJECTION PRN
Status: DISCONTINUED | OUTPATIENT
Start: 2024-01-31 | End: 2024-02-04 | Stop reason: HOSPADM

## 2024-01-31 RX ADMIN — SODIUM CHLORIDE, PRESERVATIVE FREE 20 ML: 5 INJECTION INTRAVENOUS at 10:11

## 2024-01-31 RX ADMIN — HEPARIN 500 UNITS: 100 SYRINGE at 10:12

## 2024-01-31 RX ADMIN — HEPARIN 500 UNITS: 100 SYRINGE at 10:09

## 2024-01-31 RX ADMIN — SODIUM CHLORIDE, PRESERVATIVE FREE 20 ML: 5 INJECTION INTRAVENOUS at 10:08

## 2024-01-31 NOTE — PROGRESS NOTES
[facility] Progress Note    Date: 2024    Name: Rina Prajapati    MRN: [id number]         : 1960    Weekly PICC care     Ms. Prajapati to [facility], ambulatory at 1000. Pt was assessed and education was provided. Denies pain. No acute distress noted.      Ms. Prajapati's vitals were reviewed.  Vitals:    24 1000   BP: 127/78   Pulse: (!) 104   Resp: 18   Temp: 97.2 °F (36.2 °C)   SpO2: 94%       Left arm double lumen picc line with drsg d/I. Brisk flush and blood returns noted from each lumen. End caps changed. PICC dressing and stat lock changed per protocol. No redness, streaking, warmth, swelling or drainage noted at site. Lumens wrapped with coban. Both lumens flushed with 20 ml NS and 500 units heparin each. Green curos caps applied to both lumens. Pt given new stockinette to protect dressing.     Ms. Prajapati tolerated procedure, and had no complaints.    Patient armband removed and shredded.    Ms. Prajapati was discharged from [facility] in stable condition at 1025. She is to return on 24 at 0900 for her next appointment.    VALENTE GALLAGHER RN  2024  10:51 AM

## 2024-02-01 RX ORDER — HEPARIN 100 UNIT/ML
500 SYRINGE INTRAVENOUS PRN
Status: CANCELLED | OUTPATIENT
Start: 2024-02-15

## 2024-02-01 RX ORDER — DIPHENHYDRAMINE HYDROCHLORIDE 50 MG/ML
50 INJECTION, SOLUTION INTRAMUSCULAR; INTRAVENOUS
Status: CANCELLED | OUTPATIENT
Start: 2024-02-15

## 2024-02-01 RX ORDER — SODIUM CHLORIDE 9 MG/ML
5-250 INJECTION, SOLUTION INTRAVENOUS PRN
Status: CANCELLED | OUTPATIENT
Start: 2024-02-15

## 2024-02-01 RX ORDER — ACETAMINOPHEN 325 MG/1
650 TABLET ORAL
Status: CANCELLED | OUTPATIENT
Start: 2024-02-15

## 2024-02-01 RX ORDER — EPINEPHRINE 1 MG/ML
0.3 INJECTION, SOLUTION, CONCENTRATE INTRAVENOUS PRN
Status: CANCELLED | OUTPATIENT
Start: 2024-02-15

## 2024-02-01 RX ORDER — DEXAMETHASONE SODIUM PHOSPHATE 4 MG/ML
8 INJECTION, SOLUTION INTRA-ARTICULAR; INTRALESIONAL; INTRAMUSCULAR; INTRAVENOUS; SOFT TISSUE ONCE
Status: CANCELLED | OUTPATIENT
Start: 2024-02-15 | End: 2024-02-08

## 2024-02-01 RX ORDER — MEPERIDINE HYDROCHLORIDE 25 MG/ML
12.5 INJECTION INTRAMUSCULAR; INTRAVENOUS; SUBCUTANEOUS PRN
Status: CANCELLED | OUTPATIENT
Start: 2024-02-15

## 2024-02-01 RX ORDER — ALBUTEROL SULFATE 90 UG/1
4 INHALANT RESPIRATORY (INHALATION) PRN
Status: CANCELLED | OUTPATIENT
Start: 2024-02-15

## 2024-02-01 RX ORDER — ONDANSETRON 2 MG/ML
8 INJECTION INTRAMUSCULAR; INTRAVENOUS
Status: CANCELLED | OUTPATIENT
Start: 2024-02-15

## 2024-02-01 RX ORDER — SODIUM CHLORIDE 9 MG/ML
INJECTION, SOLUTION INTRAVENOUS CONTINUOUS
Status: CANCELLED | OUTPATIENT
Start: 2024-02-15

## 2024-02-01 RX ORDER — SODIUM CHLORIDE 0.9 % (FLUSH) 0.9 %
5-40 SYRINGE (ML) INJECTION PRN
Status: CANCELLED | OUTPATIENT
Start: 2024-02-15

## 2024-02-01 RX ORDER — HYDROCORTISONE SODIUM SUCCINATE 100 MG/2ML
100 INJECTION INTRAMUSCULAR; INTRAVENOUS
Status: CANCELLED | OUTPATIENT
Start: 2024-02-15

## 2024-02-01 RX ORDER — PALONOSETRON 0.05 MG/ML
0.25 INJECTION, SOLUTION INTRAVENOUS ONCE
Status: CANCELLED
Start: 2024-02-15 | End: 2024-02-08

## 2024-02-07 ENCOUNTER — HOSPITAL ENCOUNTER (OUTPATIENT)
Facility: HOSPITAL | Age: 64
Setting detail: INFUSION SERIES
Discharge: HOME | End: 2024-02-10
Payer: MEDICARE

## 2024-02-07 VITALS
WEIGHT: 136.4 LBS | SYSTOLIC BLOOD PRESSURE: 140 MMHG | TEMPERATURE: 98.2 F | BODY MASS INDEX: 21.41 KG/M2 | DIASTOLIC BLOOD PRESSURE: 79 MMHG | RESPIRATION RATE: 22 BRPM | HEIGHT: 67 IN | OXYGEN SATURATION: 95 % | HEART RATE: 107 BPM

## 2024-02-07 LAB
ALBUMIN SERPL-MCNC: 3.2 G/DL (ref 3.4–5)
ALBUMIN/GLOB SERPL: 1.1 (ref 0.8–1.7)
ALP SERPL-CCNC: 70 U/L (ref 45–117)
ALT SERPL-CCNC: 9 U/L (ref 13–56)
ANION GAP SERPL CALC-SCNC: 5 MMOL/L (ref 3–18)
AST SERPL-CCNC: 9 U/L (ref 10–38)
BASO+EOS+MONOS # BLD AUTO: 0.6 K/UL (ref 0–2.3)
BASO+EOS+MONOS NFR BLD AUTO: 10 % (ref 0.1–17)
BILIRUB SERPL-MCNC: 0.3 MG/DL (ref 0.2–1)
BUN SERPL-MCNC: 6 MG/DL (ref 7–18)
BUN/CREAT SERPL: 10 (ref 12–20)
CALCIUM SERPL-MCNC: 9 MG/DL (ref 8.5–10.1)
CHLORIDE SERPL-SCNC: 108 MMOL/L (ref 100–111)
CO2 SERPL-SCNC: 28 MMOL/L (ref 21–32)
CREAT SERPL-MCNC: 0.6 MG/DL (ref 0.6–1.3)
DIFFERENTIAL METHOD BLD: ABNORMAL
ERYTHROCYTE [DISTWIDTH] IN BLOOD BY AUTOMATED COUNT: 20.2 % (ref 11.5–14.5)
GLOBULIN SER CALC-MCNC: 3 G/DL (ref 2–4)
GLUCOSE SERPL-MCNC: 98 MG/DL (ref 74–99)
HCT VFR BLD AUTO: 32.2 % (ref 36–48)
HGB BLD-MCNC: 9.6 G/DL (ref 12–16)
LYMPHOCYTES # BLD: 0.9 K/UL (ref 1.1–5.9)
LYMPHOCYTES NFR BLD: 16 % (ref 14–44)
MCH RBC QN AUTO: 24.1 PG (ref 25–35)
MCHC RBC AUTO-ENTMCNC: 29.8 G/DL (ref 31–37)
MCV RBC AUTO: 80.7 FL (ref 78–102)
NEUTS SEG # BLD: 4.1 K/UL (ref 1.8–9.5)
NEUTS SEG NFR BLD: 74 % (ref 40–70)
PLATELET # BLD AUTO: 346 K/UL (ref 140–440)
POTASSIUM SERPL-SCNC: 3.7 MMOL/L (ref 3.5–5.5)
PROT SERPL-MCNC: 6.2 G/DL (ref 6.4–8.2)
RBC # BLD AUTO: 3.99 M/UL (ref 4.1–5.1)
SODIUM SERPL-SCNC: 141 MMOL/L (ref 136–145)
WBC # BLD AUTO: 5.6 K/UL (ref 4.5–13)

## 2024-02-07 PROCEDURE — 96523 IRRIG DRUG DELIVERY DEVICE: CPT

## 2024-02-07 PROCEDURE — 80053 COMPREHEN METABOLIC PANEL: CPT

## 2024-02-07 PROCEDURE — 6360000002 HC RX W HCPCS: Performed by: INTERNAL MEDICINE

## 2024-02-07 PROCEDURE — 85025 COMPLETE CBC W/AUTO DIFF WBC: CPT

## 2024-02-07 PROCEDURE — 36415 COLL VENOUS BLD VENIPUNCTURE: CPT

## 2024-02-07 PROCEDURE — 2580000003 HC RX 258: Performed by: INTERNAL MEDICINE

## 2024-02-07 RX ORDER — HEPARIN SODIUM (PORCINE) LOCK FLUSH IV SOLN 100 UNIT/ML 100 UNIT/ML
500 SOLUTION INTRAVENOUS PRN
Status: DISCONTINUED | OUTPATIENT
Start: 2024-02-07 | End: 2024-02-11 | Stop reason: HOSPADM

## 2024-02-07 RX ORDER — SODIUM CHLORIDE 0.9 % (FLUSH) 0.9 %
5-40 SYRINGE (ML) INJECTION PRN
Status: DISCONTINUED | OUTPATIENT
Start: 2024-02-07 | End: 2024-02-11 | Stop reason: HOSPADM

## 2024-02-07 RX ADMIN — HEPARIN 500 UNITS: 100 SYRINGE at 09:29

## 2024-02-07 RX ADMIN — HEPARIN 500 UNITS: 100 SYRINGE at 09:26

## 2024-02-07 RX ADMIN — SODIUM CHLORIDE, PRESERVATIVE FREE 20 ML: 5 INJECTION INTRAVENOUS at 09:25

## 2024-02-07 RX ADMIN — SODIUM CHLORIDE, PRESERVATIVE FREE 20 ML: 5 INJECTION INTRAVENOUS at 09:28

## 2024-02-07 NOTE — PROGRESS NOTES
[facility] Progress Note    Date: 2024    Name: Rina Prajapati10    MRN: [id number]         : 1960    Ms. Prajapati arrived in the [facility] today at 0910, in stable condition, here for her WEEKLY PICC CARE AND PRE-CHEMOTHERAPY LABS (CBC & CMP). She was assessed and education was provided.     Ms. Prajapati's vitals were reviewed.  Vitals:    24 0910   BP: (!) 140/79   Pulse: (!) 107   Resp: 22   Temp: 98.2 °F (36.8 °C)   SpO2: 95%     Ms. Prajapati presented to the infusion center today stating that she was doing well, and voicing no major complaints.There is still edema in all 4 extremities. 1+ pitting edema in LUE, 2+ pitting edema in RUE, and trace/minimal edema in BLEs.     Weight today was 136.4 lbs (61.871 Kg)    HER LEFT UPPER ARM DOUBLE LUMEN PICC  WAS NOTED TO BE COMPLETELY DRY AND INTACT, AND BOTH LUMENS FLUSHED EASILY WITH NS. HOWEVER, NEITHER LUMEN HAD A BLOOD RETURN TODAY.      Her PICC dressing was changed today per policy, and without incident, then both lumens of her PICC  were flushed well per policy with NS & Heparin. Both injection caps were changed, and alcohol caps applied.    Patient has poor peripheral venous access and blood was drawn for CBC and CMP from right dorsal metacarpal vein by Jeramie Adams RN. Total venous access attempts was 2.  The CMP was sent via  to the [facility] lab for processing. And the CBC was performed in-house. CBC results were as follows:     Latest Reference Range & Units 24 09:15   WBC 4.5 - 13.0 K/uL 5.6   RBC 4.10 - 5.10 M/uL 3.99 (L)   Hemoglobin Quant 12.0 - 16.0 g/dL 9.6 (L)   Hematocrit 36 - 48 % 32.2 (L)   MCV 78 - 102 FL 80.7   MCH 25.0 - 35.0 PG 24.1 (L)   MCHC 31 - 37 g/dL 29.8 (L)   RDW 11.5 - 14.5 % 20.2 (H)   Platelet Count 140 - 440 K/uL 346   Neutrophils % 40 - 70 % 74 (H)   Lymphocyte % 14 - 44 % 16   Neutrophils Absolute 1.8 - 9.5 K/UL 4.1   Lymphocytes Absolute 1.1 - 5.9 K/UL 0.9 (L)   Differential Type -   AUTOMATED    (L): Data is abnormally low  (H): Data is abnormally high        Ms. Prajapati tolerated the PICC dressing change and the lab draws well, without incident.    Discharge instructions reviewed with patient, who expressed understanding. Her armband was removed and shredded.    Ms. Prajapati was [facility] ambulatory and in stable condition at 1010, with her PICC  completely dry and intact.     She is scheduled to return on Thursday, 2/08/24, @ 0900  for her next chemotherapy infusion..      Kathleen Gallagher RN  February 7, 2024  9:45 AM

## 2024-02-08 ENCOUNTER — HOSPITAL ENCOUNTER (OUTPATIENT)
Facility: HOSPITAL | Age: 64
Setting detail: INFUSION SERIES
End: 2024-02-08

## 2024-02-08 DIAGNOSIS — C34.90 NON-SMALL CELL LUNG CANCER, UNSPECIFIED LATERALITY (HCC): Primary | ICD-10-CM

## 2024-02-14 ENCOUNTER — HOSPITAL ENCOUNTER (OUTPATIENT)
Facility: HOSPITAL | Age: 64
Discharge: HOME | End: 2024-02-16
Attending: INTERNAL MEDICINE
Payer: MEDICARE

## 2024-02-14 ENCOUNTER — HOSPITAL ENCOUNTER (OUTPATIENT)
Facility: HOSPITAL | Age: 64
Setting detail: INFUSION SERIES
Discharge: HOME | End: 2024-02-17
Payer: MEDICARE

## 2024-02-14 VITALS
HEIGHT: 67 IN | HEART RATE: 98 BPM | WEIGHT: 133.4 LBS | RESPIRATION RATE: 18 BRPM | TEMPERATURE: 98.8 F | SYSTOLIC BLOOD PRESSURE: 129 MMHG | DIASTOLIC BLOOD PRESSURE: 69 MMHG | OXYGEN SATURATION: 94 % | BODY MASS INDEX: 20.94 KG/M2

## 2024-02-14 VITALS
BODY MASS INDEX: 21.35 KG/M2 | HEIGHT: 67 IN | DIASTOLIC BLOOD PRESSURE: 79 MMHG | SYSTOLIC BLOOD PRESSURE: 140 MMHG | WEIGHT: 136 LBS

## 2024-02-14 DIAGNOSIS — C34.90 NON-SMALL CELL LUNG CANCER, UNSPECIFIED LATERALITY (HCC): ICD-10-CM

## 2024-02-14 DIAGNOSIS — Z79.899 ENCOUNTER FOR MONITORING CARDIOTOXIC DRUG THERAPY: ICD-10-CM

## 2024-02-14 DIAGNOSIS — Z51.81 ENCOUNTER FOR MONITORING CARDIOTOXIC DRUG THERAPY: ICD-10-CM

## 2024-02-14 LAB
ALBUMIN SERPL-MCNC: 3.1 G/DL (ref 3.4–5)
ALBUMIN/GLOB SERPL: 1 (ref 0.8–1.7)
ALP SERPL-CCNC: 62 U/L (ref 45–117)
ALT SERPL-CCNC: 10 U/L (ref 13–56)
ANION GAP SERPL CALC-SCNC: 6 MMOL/L (ref 3–18)
AST SERPL-CCNC: 8 U/L (ref 10–38)
BASO+EOS+MONOS # BLD AUTO: 0.1 K/UL (ref 0–2.3)
BASO+EOS+MONOS NFR BLD AUTO: 1 % (ref 0.1–17)
BILIRUB SERPL-MCNC: 0.3 MG/DL (ref 0.2–1)
BUN SERPL-MCNC: 11 MG/DL (ref 7–18)
BUN/CREAT SERPL: 17 (ref 12–20)
CALCIUM SERPL-MCNC: 8.5 MG/DL (ref 8.5–10.1)
CHLORIDE SERPL-SCNC: 109 MMOL/L (ref 100–111)
CO2 SERPL-SCNC: 27 MMOL/L (ref 21–32)
CREAT SERPL-MCNC: 0.63 MG/DL (ref 0.6–1.3)
DIFFERENTIAL METHOD BLD: ABNORMAL
ECHO AO ASC DIAM: 3.1 CM
ECHO AO ASCENDING AORTA INDEX: 1.8 CM/M2
ECHO AO ROOT DIAM: 2.9 CM
ECHO AO ROOT INDEX: 1.69 CM/M2
ECHO AV AREA PEAK VELOCITY: 2.1 CM2
ECHO AV AREA/BSA PEAK VELOCITY: 1.2 CM2/M2
ECHO AV PEAK GRADIENT: 6 MMHG
ECHO AV PEAK VELOCITY: 1.3 M/S
ECHO AV VELOCITY RATIO: 0.69
ECHO BSA: 1.71 M2
ECHO LA DIAMETER INDEX: 1.8 CM/M2
ECHO LA DIAMETER: 3.1 CM
ECHO LA TO AORTIC ROOT RATIO: 1.07
ECHO LA VOL A-L A2C: 56 ML (ref 22–52)
ECHO LA VOL A-L A4C: 26 ML (ref 22–52)
ECHO LA VOL BP: 32 ML (ref 22–52)
ECHO LA VOL MOD A2C: 47 ML (ref 22–52)
ECHO LA VOL MOD A4C: 22 ML (ref 22–52)
ECHO LA VOL/BSA BIPLANE: 19 ML/M2 (ref 16–34)
ECHO LA VOLUME AREA LENGTH: 39 ML
ECHO LA VOLUME INDEX A-L A2C: 33 ML/M2 (ref 16–34)
ECHO LA VOLUME INDEX A-L A4C: 15 ML/M2 (ref 16–34)
ECHO LA VOLUME INDEX AREA LENGTH: 23 ML/M2 (ref 16–34)
ECHO LA VOLUME INDEX MOD A2C: 27 ML/M2 (ref 16–34)
ECHO LA VOLUME INDEX MOD A4C: 13 ML/M2 (ref 16–34)
ECHO LV E' LATERAL VELOCITY: 8 CM/S
ECHO LV E' SEPTAL VELOCITY: 6 CM/S
ECHO LV FRACTIONAL SHORTENING: 48 % (ref 28–44)
ECHO LV GLOBAL LONGITUDINAL STRAIN (GLS): -16.5 %
ECHO LV INTERNAL DIMENSION DIASTOLE INDEX: 2.56 CM/M2
ECHO LV INTERNAL DIMENSION DIASTOLIC: 4.4 CM (ref 3.9–5.3)
ECHO LV INTERNAL DIMENSION SYSTOLIC INDEX: 1.34 CM/M2
ECHO LV INTERNAL DIMENSION SYSTOLIC: 2.3 CM
ECHO LV IVSD: 0.9 CM (ref 0.6–0.9)
ECHO LV MASS INDEX 2D: 80.1 G/M2 (ref 43–95)
ECHO LV POSTERIOR WALL DIASTOLIC: 1 CM (ref 0.6–0.9)
ECHO LV RELATIVE WALL THICKNESS RATIO: 0.45
ECHO LVOT AREA: 3.1 CM2
ECHO LVOT DIAM: 2 CM
ECHO LVOT PEAK GRADIENT: 3 MMHG
ECHO LVOT PEAK VELOCITY: 0.9 M/S
ECHO MV A VELOCITY: 0.86 M/S
ECHO MV E DECELERATION TIME (DT): 261.6 MS
ECHO MV E VELOCITY: 0.92 M/S
ECHO MV E/A RATIO: 1.07
ECHO MV E/E' LATERAL: 11.5
ECHO MV E/E' RATIO (AVERAGED): 13.42
ECHO PV MAX VELOCITY: 0.9 M/S
ECHO PV PEAK GRADIENT: 3 MMHG
ECHO RV TAPSE: 1.9 CM (ref 1.7–?)
ERYTHROCYTE [DISTWIDTH] IN BLOOD BY AUTOMATED COUNT: 19.9 % (ref 11.5–14.5)
GLOBULIN SER CALC-MCNC: 3 G/DL (ref 2–4)
GLUCOSE SERPL-MCNC: 178 MG/DL (ref 74–99)
HCT VFR BLD AUTO: 37.1 % (ref 36–48)
HGB BLD-MCNC: 10.7 G/DL (ref 12–16)
LYMPHOCYTES # BLD: 0.5 K/UL (ref 1.1–5.9)
LYMPHOCYTES NFR BLD: 5 % (ref 14–44)
MCH RBC QN AUTO: 23.3 PG (ref 25–35)
MCHC RBC AUTO-ENTMCNC: 28.8 G/DL (ref 31–37)
MCV RBC AUTO: 80.8 FL (ref 78–102)
NEUTS SEG # BLD: 9.6 K/UL (ref 1.8–9.5)
NEUTS SEG NFR BLD: 95 % (ref 40–70)
PLATELET # BLD AUTO: 252 K/UL (ref 140–440)
POTASSIUM SERPL-SCNC: 4 MMOL/L (ref 3.5–5.5)
PROT SERPL-MCNC: 6.1 G/DL (ref 6.4–8.2)
RBC # BLD AUTO: 4.59 M/UL (ref 4.1–5.1)
SODIUM SERPL-SCNC: 142 MMOL/L (ref 136–145)
WBC # BLD AUTO: 10.2 K/UL (ref 4.5–13)

## 2024-02-14 PROCEDURE — 96523 IRRIG DRUG DELIVERY DEVICE: CPT

## 2024-02-14 PROCEDURE — 2580000003 HC RX 258: Performed by: INTERNAL MEDICINE

## 2024-02-14 PROCEDURE — 36415 COLL VENOUS BLD VENIPUNCTURE: CPT

## 2024-02-14 PROCEDURE — 80053 COMPREHEN METABOLIC PANEL: CPT

## 2024-02-14 PROCEDURE — 6360000002 HC RX W HCPCS: Performed by: INTERNAL MEDICINE

## 2024-02-14 PROCEDURE — 93356 MYOCRD STRAIN IMG SPCKL TRCK: CPT

## 2024-02-14 PROCEDURE — 85025 COMPLETE CBC W/AUTO DIFF WBC: CPT

## 2024-02-14 RX ORDER — SODIUM CHLORIDE 0.9 % (FLUSH) 0.9 %
5-40 SYRINGE (ML) INJECTION PRN
Status: DISCONTINUED | OUTPATIENT
Start: 2024-02-14 | End: 2024-02-18 | Stop reason: HOSPADM

## 2024-02-14 RX ORDER — HEPARIN SODIUM (PORCINE) LOCK FLUSH IV SOLN 100 UNIT/ML 100 UNIT/ML
500 SOLUTION INTRAVENOUS PRN
Status: DISCONTINUED | OUTPATIENT
Start: 2024-02-14 | End: 2024-02-18 | Stop reason: HOSPADM

## 2024-02-14 RX ADMIN — HEPARIN 500 UNITS: 100 SYRINGE at 14:31

## 2024-02-14 RX ADMIN — SODIUM CHLORIDE, PRESERVATIVE FREE 10 ML: 5 INJECTION INTRAVENOUS at 14:30

## 2024-02-14 RX ADMIN — SODIUM CHLORIDE, PRESERVATIVE FREE 10 ML: 5 INJECTION INTRAVENOUS at 14:33

## 2024-02-14 RX ADMIN — HEPARIN 500 UNITS: 100 SYRINGE at 14:34

## 2024-02-14 NOTE — PROGRESS NOTES
\[facility]\"" Progress Note    Date: 2024    Name: Rina Prajapati10    MRN: [id number]         : 1960    Ms. Prajapati arrived in the \[facility]\"" today at 1410, in stable condition, here for her WEEKLY PICC CARE AND PRE-CHEMOTHERAPY LABS (CBC & CMP). She was assessed and education was provided.     Ms. Prajapati's vitals were reviewed.  Vitals:    24 1410   BP: 129/69   Pulse: 98   Resp: 18   Temp: 98.8 °F (37.1 °C)   SpO2: 94%     Ms. Prajapati presented to the infusion center today stating that she was doing well, and voicing no major complaints.There is still edema in all 4 extremities. 1+ pitting edema in LUE, 1+ pitting edema in RUE, and trace/minimal edema in BLEs.     Weight today was 133.4 lbs (60.51 Kg)    HER LEFT UPPER ARM DOUBLE LUMEN PICC  WAS NOTED TO BE COMPLETELY DRY AND INTACT, AND BOTH LUMENS FLUSHED EASILY WITH NS. HOWEVER, NEITHER LUMEN HAD A BLOOD RETURN TODAY.      Her PICC dressing was changed today per policy, and without incident, then both lumens of her PICC  were flushed well per policy with NS & Heparin. Both injection caps were changed, and alcohol caps applied.    Patient has poor peripheral venous access and blood was drawn for CBC and CMP from right dorsal metacarpal vein by Jeramie Adams RN. Blood obtained on first attempt.  The CMP was sent via  to the [facility] lab for processing. CBC was performed in-house. CBC results were as follows:   Latest Reference Range & Units 24 14:15   WBC 4.5 - 13.0 K/uL 10.2   RBC 4.10 - 5.10 M/uL 4.59   Hemoglobin Quant 12.0 - 16.0 g/dL 10.7 (L)   Hematocrit 36 - 48 % 37.1   MCV 78 - 102 FL 80.8   MCH 25.0 - 35.0 PG 23.3 (L)   MCHC 31 - 37 g/dL 28.8 (L)   RDW 11.5 - 14.5 % 19.9 (H)   Platelet Count 140 - 440 K/uL 252   Neutrophils % 40 - 70 % 95 (H)   Lymphocyte % 14 - 44 % 5 (L)   Neutrophils Absolute 1.8 - 9.5 K/UL 9.6 (H)   Lymphocytes Absolute 1.1 - 5.9 K/UL 0.5 (L)   Differential Type -   AUTOMATED   (L): Data is

## 2024-02-15 ENCOUNTER — HOSPITAL ENCOUNTER (OUTPATIENT)
Facility: HOSPITAL | Age: 64
Setting detail: INFUSION SERIES
End: 2024-02-15
Payer: MEDICARE

## 2024-02-15 VITALS
RESPIRATION RATE: 20 BRPM | DIASTOLIC BLOOD PRESSURE: 91 MMHG | HEART RATE: 96 BPM | TEMPERATURE: 98.5 F | SYSTOLIC BLOOD PRESSURE: 160 MMHG | OXYGEN SATURATION: 95 %

## 2024-02-15 DIAGNOSIS — C34.90 NON-SMALL CELL LUNG CANCER, UNSPECIFIED LATERALITY (HCC): Primary | ICD-10-CM

## 2024-02-15 PROCEDURE — 96413 CHEMO IV INFUSION 1 HR: CPT

## 2024-02-15 PROCEDURE — 36593 DECLOT VASCULAR DEVICE: CPT

## 2024-02-15 PROCEDURE — 2580000003 HC RX 258: Performed by: INTERNAL MEDICINE

## 2024-02-15 PROCEDURE — 96375 TX/PRO/DX INJ NEW DRUG ADDON: CPT

## 2024-02-15 PROCEDURE — 96367 TX/PROPH/DG ADDL SEQ IV INF: CPT

## 2024-02-15 PROCEDURE — 96377 APPLICATON ON-BODY INJECTOR: CPT

## 2024-02-15 PROCEDURE — 6360000002 HC RX W HCPCS: Performed by: INTERNAL MEDICINE

## 2024-02-15 RX ORDER — SODIUM CHLORIDE 9 MG/ML
5-250 INJECTION, SOLUTION INTRAVENOUS PRN
Status: ACTIVE | OUTPATIENT
Start: 2024-02-15 | End: 2024-02-16

## 2024-02-15 RX ORDER — SODIUM CHLORIDE 0.9 % (FLUSH) 0.9 %
5-40 SYRINGE (ML) INJECTION PRN
Status: ACTIVE | OUTPATIENT
Start: 2024-02-15 | End: 2024-02-16

## 2024-02-15 RX ORDER — DEXAMETHASONE SODIUM PHOSPHATE 4 MG/ML
8 INJECTION, SOLUTION INTRA-ARTICULAR; INTRALESIONAL; INTRAMUSCULAR; INTRAVENOUS; SOFT TISSUE ONCE
Status: COMPLETED | OUTPATIENT
Start: 2024-02-15 | End: 2024-02-15

## 2024-02-15 RX ORDER — HEPARIN 100 UNIT/ML
500 SYRINGE INTRAVENOUS PRN
Status: DISPENSED | OUTPATIENT
Start: 2024-02-15 | End: 2024-02-16

## 2024-02-15 RX ORDER — PALONOSETRON 0.05 MG/ML
0.25 INJECTION, SOLUTION INTRAVENOUS ONCE
Status: COMPLETED | OUTPATIENT
Start: 2024-02-15 | End: 2024-02-15

## 2024-02-15 RX ADMIN — PEGFILGRASTIM 6 MG: KIT SUBCUTANEOUS at 13:20

## 2024-02-15 RX ADMIN — ALTEPLASE 2 MG: 2.2 INJECTION, POWDER, LYOPHILIZED, FOR SOLUTION INTRAVENOUS at 09:42

## 2024-02-15 RX ADMIN — HEPARIN 500 UNITS: 100 SYRINGE at 13:30

## 2024-02-15 RX ADMIN — HEPARIN 500 UNITS: 100 SYRINGE at 13:25

## 2024-02-15 RX ADMIN — FOSAPREPITANT 150 MG: 150 INJECTION, POWDER, LYOPHILIZED, FOR SOLUTION INTRAVENOUS at 10:48

## 2024-02-15 RX ADMIN — DEXAMETHASONE SODIUM PHOSPHATE 8 MG: 4 INJECTION INTRA-ARTICULAR; INTRALESIONAL; INTRAMUSCULAR; INTRAVENOUS; SOFT TISSUE at 10:40

## 2024-02-15 RX ADMIN — SODIUM CHLORIDE, PRESERVATIVE FREE 10 ML: 5 INJECTION INTRAVENOUS at 10:40

## 2024-02-15 RX ADMIN — SODIUM CHLORIDE 25 ML/HR: 9 INJECTION, SOLUTION INTRAVENOUS at 10:40

## 2024-02-15 RX ADMIN — ALTEPLASE 2 MG: 2.2 INJECTION, POWDER, LYOPHILIZED, FOR SOLUTION INTRAVENOUS at 09:45

## 2024-02-15 RX ADMIN — SODIUM CHLORIDE, PRESERVATIVE FREE 10 ML: 5 INJECTION INTRAVENOUS at 13:30

## 2024-02-15 RX ADMIN — DOCETAXEL ANHYDROUS 130 MG: 10 INJECTION, SOLUTION INTRAVENOUS at 12:05

## 2024-02-15 RX ADMIN — SODIUM CHLORIDE, PRESERVATIVE FREE 10 ML: 5 INJECTION INTRAVENOUS at 13:25

## 2024-02-15 RX ADMIN — PALONOSETRON HYDROCHLORIDE 0.25 MG: 0.25 INJECTION INTRAVENOUS at 10:45

## 2024-02-15 NOTE — PROGRESS NOTES
[facility] Progress Note    Date: February 15, 2024    Name: Rina Prajapati    MRN: [id number]         : 1960    Ms. Prajapati arrived in the [facility] today at 0920, in stable condition, here for CYCLE 16, DAY 1, IV TAXOTERE + RAMUCIRUMAB CHEMOTHERAPY REGIMEN (EVERY 21 DAY CYCLE). She was assessed and education was provided.     Ms. Prajapati's vitals were reviewed.  Vitals:    02/15/24 0920   BP: (!) 156/80   Pulse: 100   Resp: 20   Temp: 98.3 °F (36.8 °C)   SpO2: 100%       WEIGHT FROM YESTERDAY WAS  60.5 KG    BSA 1.69      RAMUCIRUMAB CONTINUES TO BE ON HOLD FOR NOW.      Ms. Prajapati presented to the infusion center today stating that she was doing well, and voicing no major complaints.     CURRENT AND SIGNED CHEMOTHERAPY CONSENT WAS VIEWED IN HER ELECTRONIC RECORD.       HER PRE-CHEMO LABS OBTAINED ON YESTERDAY, 24, WERE ALL REVIEWED, AND WERE ALL NOTED TO BE SATISFACTORY FOR TREATMENT TODAY, AND WERE AS FOLLOWS:   Latest Reference Range & Units 24 14:50   Sodium 136 - 145 mmol/L 142   Potassium 3.5 - 5.5 mmol/L 4.0   Chloride 100 - 111 mmol/L 109   CO2 21 - 32 mmol/L 27   BUN,BUNPL 7.0 - 18 MG/DL 11   Creatinine 0.6 - 1.3 MG/DL 0.63   Bun/Cre Ratio 12 - 20   17   Anion Gap 3.0 - 18 mmol/L 6   Est, Glom Filt Rate >60 ml/min/1.73m2 >60   Glucose, Random 74 - 99 mg/dL 178 (H)   CALCIUM, SERUM, [id number] 8.5 - 10.1 MG/DL 8.5   ALBUMIN/GLOBULIN RATIO 0.8 - 1.7   1.0   Total Protein 6.4 - 8.2 g/dL 6.1 (L)   Albumin 3.4 - 5.0 g/dL 3.1 (L)   Globulin 2.0 - 4.0 g/dL 3.0   Alk Phos 45 - 117 U/L 62   ALT 13 - 56 U/L 10 (L)   AST 10 - 38 U/L 8 (L)   BILIRUBIN TOTAL 0.2 - 1.0 MG/DL 0.3   (H): Data is abnormally high  (L): Data is abnormally low   Latest Reference Range & Units 24 14:15   WBC 4.5 - 13.0 K/uL 10.2   RBC 4.10 - 5.10 M/uL 4.59   Hemoglobin Quant 12.0 - 16.0 g/dL 10.7 (L)   Hematocrit 36 - 48 % 37.1   MCV 78 - 102 FL 80.8   MCH 25.0 - 35.0 PG 23.3 (L)   MCHC 31 - 37 g/dL 28.8 (L)   RDW 11.5 - 14.5 % 19.9

## 2024-02-15 NOTE — PROGRESS NOTES
[facility] Progress Note    Date: February 15, 2024    Name: Rina Prajapati    MRN: [id number]         : 1960    Ms. Prajapati arrived in the [facility] today at 0920, in stable condition, here for CYCLE 16, DAY 1, IV TAXOTERE CHEMOTHERAPY REGIMEN (EVERY 21 DAY CYCLE). She was assessed and education was provided.     Ms. Prajapati's vitals were reviewed.  Vitals:    02/15/24 0920   BP: (!) 156/80   Pulse: 100   Resp: 20   Temp: 98.3 °F (36.8 °C)   SpO2: 100%       WEIGHT FROM YESTERDAY WAS  61 KG    BSA 1.7        RAMUCIRUMAB CONTINUES TO BE ON HOLD FOR NOW.        Ms. Prajapati presented to the infusion center today stating that she was doing well, and voicing no major complaints. Reports no change from yesterday.       CURRENT AND SIGNED CHEMOTHERAPY CONSENT WAS VIEWED IN HER ELECTRONIC RECORD.       HER PRE-CHEMO LABS OBTAINED ON YESTERDAY, 24 WERE ALL REVIEWED, AND WERE ALL NOTED TO BE SATISFACTORY FOR TREATMENT TODAY, AND WERE AS FOLLOWS:    The slightly low Potassium result was reviewed. Patient was instructed to continue to take her oral Potassium as prescribed (20 MEQ Daily), and she verbalized understanding. (States she \"missed some doses of her potassium last week.\")     Latest Reference Range & Units 24 09:35   Sodium 136 - 145 mmol/L 140   Potassium 3.5 - 5.5 mmol/L 3.2 (L)   Chloride 100 - 111 mmol/L 107   CO2 21 - 32 mmol/L 28   BUN,BUNPL 7.0 - 18 MG/DL 7   Creatinine 0.6 - 1.3 MG/DL 0.62   Bun/Cre Ratio 12 - 20   11 (L)   Anion Gap 3.0 - 18 mmol/L 5   Est, Glom Filt Rate >60 ml/min/1.73m2 >60   Glucose, Random 74 - 99 mg/dL 101 (H)   CALCIUM, SERUM, [id number] 8.5 - 10.1 MG/DL 8.6   ALBUMIN/GLOBULIN RATIO 0.8 - 1.7   1.0   Total Protein 6.4 - 8.2 g/dL 6.1 (L)   Albumin 3.4 - 5.0 g/dL 3.0 (L)   Globulin 2.0 - 4.0 g/dL 3.1   Alk Phos 45 - 117 U/L 68   ALT 13 - 56 U/L 14   AST 10 - 38 U/L 11   BILIRUBIN TOTAL 0.2 - 1.0 MG/DL 0.4   WBC 4.5 - 13.0 K/uL 4.7   RBC 4.10 - 5.10 M/uL 3.83 (L)   Hemoglobin Quant 12.0

## 2024-02-19 ENCOUNTER — CLINICAL DOCUMENTATION (OUTPATIENT)
Age: 64
End: 2024-02-19

## 2024-02-19 NOTE — PROGRESS NOTES
Unable to L/M to sched NPA w/HP or first available d/t mailbx full from  Dr. Jameel Li for pulm htn w/pft. PPWK scanned, hard copy at [facility] desk

## 2024-02-21 ENCOUNTER — HOSPITAL ENCOUNTER (OUTPATIENT)
Facility: HOSPITAL | Age: 64
Setting detail: INFUSION SERIES
Discharge: HOME | End: 2024-02-24
Payer: MEDICARE

## 2024-02-21 VITALS
RESPIRATION RATE: 18 BRPM | HEART RATE: 102 BPM | OXYGEN SATURATION: 94 % | TEMPERATURE: 97.6 F | SYSTOLIC BLOOD PRESSURE: 112 MMHG | DIASTOLIC BLOOD PRESSURE: 62 MMHG

## 2024-02-21 PROCEDURE — 6360000002 HC RX W HCPCS: Performed by: INTERNAL MEDICINE

## 2024-02-21 PROCEDURE — 96523 IRRIG DRUG DELIVERY DEVICE: CPT

## 2024-02-21 PROCEDURE — 2580000003 HC RX 258: Performed by: INTERNAL MEDICINE

## 2024-02-21 RX ORDER — HEPARIN SODIUM (PORCINE) LOCK FLUSH IV SOLN 100 UNIT/ML 100 UNIT/ML
500 SOLUTION INTRAVENOUS PRN
Status: DISCONTINUED | OUTPATIENT
Start: 2024-02-21 | End: 2024-02-25 | Stop reason: HOSPADM

## 2024-02-21 RX ORDER — SODIUM CHLORIDE 0.9 % (FLUSH) 0.9 %
5-40 SYRINGE (ML) INJECTION PRN
Status: DISCONTINUED | OUTPATIENT
Start: 2024-02-21 | End: 2024-02-25 | Stop reason: HOSPADM

## 2024-02-21 RX ADMIN — HEPARIN 500 UNITS: 100 SYRINGE at 09:20

## 2024-02-21 RX ADMIN — SODIUM CHLORIDE, PRESERVATIVE FREE 20 ML: 5 INJECTION INTRAVENOUS at 09:18

## 2024-02-21 RX ADMIN — SODIUM CHLORIDE, PRESERVATIVE FREE 20 ML: 5 INJECTION INTRAVENOUS at 09:21

## 2024-02-21 RX ADMIN — HEPARIN 500 UNITS: 100 SYRINGE at 09:22

## 2024-02-21 NOTE — PROGRESS NOTES
[facility] Progress Note    Date: 2024    Name: Rina Prajapati    MRN: [id number]         : 1960    Weekly PICC care     Ms. Prajapati to [facility], ambulatory at 0905. Pt was assessed and education was provided. Denies pain. No acute distress noted.      Ms. Prajapati's vitals were reviewed.  Vitals:    24 0920   BP: 112/62   Pulse: (!) 102   Resp: 18   Temp: 97.6 °F (36.4 °C)   SpO2: 94%       Left arm double lumen picc line with drsg d/I. Brisk flush and blood returns noted from each lumen. End caps changed. PICC dressing and stat lock changed per protocol. No redness, streaking, warmth, swelling or drainage noted at site. Lumens wrapped with coban. Both lumens flushed with 20 ml NS and 500 units heparin each. Green curos caps applied to both lumens. Pt given new stockinette to protect dressing.     Ms. Prajapati tolerated procedure, and had no complaints.    Patient armband removed and shredded.    [facility]. Pra[facility]ap[facility] in stable condition at 0927. She is to return on 24 at 0900 for her next appointment.    Nikole Luna RN  2024  10:59 AM

## 2024-02-28 ENCOUNTER — HOSPITAL ENCOUNTER (OUTPATIENT)
Facility: HOSPITAL | Age: 64
Setting detail: INFUSION SERIES
Discharge: HOME | End: 2024-03-02
Payer: MEDICARE

## 2024-02-28 VITALS
HEART RATE: 108 BPM | OXYGEN SATURATION: 95 % | DIASTOLIC BLOOD PRESSURE: 75 MMHG | TEMPERATURE: 98 F | RESPIRATION RATE: 18 BRPM | SYSTOLIC BLOOD PRESSURE: 118 MMHG

## 2024-02-28 PROCEDURE — 2580000003 HC RX 258: Performed by: INTERNAL MEDICINE

## 2024-02-28 PROCEDURE — 6360000002 HC RX W HCPCS

## 2024-02-28 PROCEDURE — 96523 IRRIG DRUG DELIVERY DEVICE: CPT

## 2024-02-28 RX ORDER — SODIUM CHLORIDE 0.9 % (FLUSH) 0.9 %
5-40 SYRINGE (ML) INJECTION PRN
Status: DISCONTINUED | OUTPATIENT
Start: 2024-02-28 | End: 2024-03-03 | Stop reason: HOSPADM

## 2024-02-28 RX ORDER — HEPARIN 100 UNIT/ML
SYRINGE INTRAVENOUS
Status: COMPLETED
Start: 2024-02-28 | End: 2024-02-28

## 2024-02-28 RX ORDER — HEPARIN SODIUM (PORCINE) LOCK FLUSH IV SOLN 100 UNIT/ML 100 UNIT/ML
500 SOLUTION INTRAVENOUS PRN
Status: DISCONTINUED | OUTPATIENT
Start: 2024-02-28 | End: 2024-03-03 | Stop reason: HOSPADM

## 2024-02-28 RX ADMIN — SODIUM CHLORIDE, PRESERVATIVE FREE 10 ML: 5 INJECTION INTRAVENOUS at 09:27

## 2024-02-28 RX ADMIN — HEPARIN 500 UNITS: 100 SYRINGE at 09:27

## 2024-02-28 RX ADMIN — SODIUM CHLORIDE, PRESERVATIVE FREE 10 ML: 5 INJECTION INTRAVENOUS at 09:28

## 2024-02-28 RX ADMIN — HEPARIN SODIUM (PORCINE) LOCK FLUSH IV SOLN 100 UNIT/ML 500 UNITS: 100 SOLUTION at 09:27

## 2024-02-28 NOTE — PROGRESS NOTES
[facility] Progress Note    Date: 2024    Name: Rina Prajapati    MRN: [id number]         : 1960    Weekly PICC care     Ms. Prajapati to [facility], ambulatory at 0915. Pt was assessed and education was provided. Pt states she is doing well.    Ms. Prajapati's vitals were reviewed.    Patient Vitals for the past 12 hrs:   Temp Pulse Resp BP SpO2   24 0932 -- -- -- 118/75 95 %   24 0915 98 °F (36.7 °C) (!) 108 18 (!) 165/93 92 %     Left arm double lumen picc line with drsg clean, dry and a little loose. Flushes easily but NO blood return noted from either lumen. Microclaves and end caps changed. PICC dressing and stat lock changed per protocol. No redness, streaking, warmth, swelling or drainage noted at site. Lumens wrapped with gauze and coban. Both lumens flushed with 10 ml NS and 250 units heparin each. Pt's stockinette re-applied.    Ms. Prajapati tolerated procedure, and had no complaints.    Patient armband removed and shredded.    Ms. Prajapati was [facility] in stable condition at 0935. She is to return on 3/6/24 at 0900 for her next appointment for PICC care and prechemo labs.    Nadira Perdomo RN  2024  9:54 AM

## 2024-03-02 RX ORDER — ACETAMINOPHEN 325 MG/1
650 TABLET ORAL
Status: CANCELLED | OUTPATIENT
Start: 2024-03-07

## 2024-03-02 RX ORDER — SODIUM CHLORIDE 9 MG/ML
5-250 INJECTION, SOLUTION INTRAVENOUS PRN
Status: CANCELLED | OUTPATIENT
Start: 2024-03-07

## 2024-03-02 RX ORDER — ONDANSETRON 2 MG/ML
8 INJECTION INTRAMUSCULAR; INTRAVENOUS
Status: CANCELLED | OUTPATIENT
Start: 2024-03-07

## 2024-03-02 RX ORDER — ALBUTEROL SULFATE 90 UG/1
4 AEROSOL, METERED RESPIRATORY (INHALATION) PRN
Status: CANCELLED | OUTPATIENT
Start: 2024-03-07

## 2024-03-02 RX ORDER — EPINEPHRINE 1 MG/ML
0.3 INJECTION, SOLUTION, CONCENTRATE INTRAVENOUS PRN
Status: CANCELLED | OUTPATIENT
Start: 2024-03-07

## 2024-03-02 RX ORDER — SODIUM CHLORIDE 9 MG/ML
INJECTION, SOLUTION INTRAVENOUS CONTINUOUS
Status: CANCELLED | OUTPATIENT
Start: 2024-03-07

## 2024-03-02 RX ORDER — MEPERIDINE HYDROCHLORIDE 25 MG/ML
12.5 INJECTION INTRAMUSCULAR; INTRAVENOUS; SUBCUTANEOUS PRN
Status: CANCELLED | OUTPATIENT
Start: 2024-03-07

## 2024-03-02 RX ORDER — DIPHENHYDRAMINE HYDROCHLORIDE 50 MG/ML
50 INJECTION INTRAMUSCULAR; INTRAVENOUS
Status: CANCELLED | OUTPATIENT
Start: 2024-03-07

## 2024-03-05 RX ORDER — MONTELUKAST SODIUM 10 MG/1
10 TABLET ORAL DAILY
Qty: 30 TABLET | Refills: 11 | Status: SHIPPED | OUTPATIENT
Start: 2024-03-05

## 2024-03-06 ENCOUNTER — HOSPITAL ENCOUNTER (OUTPATIENT)
Facility: HOSPITAL | Age: 64
Setting detail: INFUSION SERIES
Discharge: HOME | End: 2024-03-09
Payer: MEDICARE

## 2024-03-06 VITALS
OXYGEN SATURATION: 95 % | WEIGHT: 130.8 LBS | SYSTOLIC BLOOD PRESSURE: 153 MMHG | DIASTOLIC BLOOD PRESSURE: 86 MMHG | HEART RATE: 109 BPM | TEMPERATURE: 97.3 F | BODY MASS INDEX: 20.48 KG/M2 | RESPIRATION RATE: 18 BRPM

## 2024-03-06 LAB
ALBUMIN SERPL-MCNC: 3.2 G/DL (ref 3.4–5)
ALBUMIN/GLOB SERPL: 1.1 (ref 0.8–1.7)
ALP SERPL-CCNC: 68 U/L (ref 45–117)
ALT SERPL-CCNC: 8 U/L (ref 13–56)
ANION GAP SERPL CALC-SCNC: 5 MMOL/L (ref 3–18)
AST SERPL-CCNC: 11 U/L (ref 10–38)
BASO+EOS+MONOS # BLD AUTO: 0.4 K/UL (ref 0–2.3)
BASO+EOS+MONOS NFR BLD AUTO: 7 % (ref 0.1–17)
BILIRUB SERPL-MCNC: 0.4 MG/DL (ref 0.2–1)
BUN SERPL-MCNC: 5 MG/DL (ref 7–18)
BUN/CREAT SERPL: 8 (ref 12–20)
CALCIUM SERPL-MCNC: 9.1 MG/DL (ref 8.5–10.1)
CHLORIDE SERPL-SCNC: 107 MMOL/L (ref 100–111)
CO2 SERPL-SCNC: 28 MMOL/L (ref 21–32)
CREAT SERPL-MCNC: 0.59 MG/DL (ref 0.6–1.3)
DIFFERENTIAL METHOD BLD: ABNORMAL
ERYTHROCYTE [DISTWIDTH] IN BLOOD BY AUTOMATED COUNT: 20.1 % (ref 11.5–14.5)
GLOBULIN SER CALC-MCNC: 2.8 G/DL (ref 2–4)
GLUCOSE SERPL-MCNC: 99 MG/DL (ref 74–99)
HCT VFR BLD AUTO: 36.4 % (ref 36–48)
HGB BLD-MCNC: 10.8 G/DL (ref 12–16)
LYMPHOCYTES # BLD: 1.2 K/UL (ref 1.1–5.9)
LYMPHOCYTES NFR BLD: 20 % (ref 14–44)
MCH RBC QN AUTO: 23.5 PG (ref 25–35)
MCHC RBC AUTO-ENTMCNC: 29.7 G/DL (ref 31–37)
MCV RBC AUTO: 79.1 FL (ref 78–102)
NEUTS SEG # BLD: 4.5 K/UL (ref 1.8–9.5)
NEUTS SEG NFR BLD: 73 % (ref 40–70)
PLATELET # BLD AUTO: 361 K/UL (ref 140–440)
POTASSIUM SERPL-SCNC: 3.6 MMOL/L (ref 3.5–5.5)
PROT SERPL-MCNC: 6 G/DL (ref 6.4–8.2)
RBC # BLD AUTO: 4.6 M/UL (ref 4.1–5.1)
SODIUM SERPL-SCNC: 140 MMOL/L (ref 136–145)
WBC # BLD AUTO: 6.1 K/UL (ref 4.5–13)

## 2024-03-06 PROCEDURE — 85025 COMPLETE CBC W/AUTO DIFF WBC: CPT

## 2024-03-06 PROCEDURE — 96523 IRRIG DRUG DELIVERY DEVICE: CPT

## 2024-03-06 PROCEDURE — 36415 COLL VENOUS BLD VENIPUNCTURE: CPT

## 2024-03-06 PROCEDURE — 2580000003 HC RX 258: Performed by: INTERNAL MEDICINE

## 2024-03-06 PROCEDURE — 6360000002 HC RX W HCPCS: Performed by: INTERNAL MEDICINE

## 2024-03-06 PROCEDURE — 80053 COMPREHEN METABOLIC PANEL: CPT

## 2024-03-06 RX ORDER — SODIUM CHLORIDE 0.9 % (FLUSH) 0.9 %
5-40 SYRINGE (ML) INJECTION PRN
Status: DISCONTINUED | OUTPATIENT
Start: 2024-03-06 | End: 2024-03-10 | Stop reason: HOSPADM

## 2024-03-06 RX ORDER — HEPARIN SODIUM (PORCINE) LOCK FLUSH IV SOLN 100 UNIT/ML 100 UNIT/ML
500 SOLUTION INTRAVENOUS PRN
Status: DISCONTINUED | OUTPATIENT
Start: 2024-03-06 | End: 2024-03-10 | Stop reason: HOSPADM

## 2024-03-06 RX ADMIN — SODIUM CHLORIDE, PRESERVATIVE FREE 10 ML: 5 INJECTION INTRAVENOUS at 09:25

## 2024-03-06 RX ADMIN — SODIUM CHLORIDE, PRESERVATIVE FREE 10 ML: 5 INJECTION INTRAVENOUS at 09:24

## 2024-03-06 RX ADMIN — HEPARIN 500 UNITS: 100 SYRINGE at 09:26

## 2024-03-07 ENCOUNTER — HOSPITAL ENCOUNTER (OUTPATIENT)
Facility: HOSPITAL | Age: 64
Setting detail: INFUSION SERIES
End: 2024-03-07
Payer: MEDICARE

## 2024-03-07 VITALS
TEMPERATURE: 98.3 F | SYSTOLIC BLOOD PRESSURE: 144 MMHG | DIASTOLIC BLOOD PRESSURE: 83 MMHG | OXYGEN SATURATION: 95 % | HEART RATE: 106 BPM | RESPIRATION RATE: 24 BRPM

## 2024-03-07 DIAGNOSIS — C34.90 NON-SMALL CELL LUNG CANCER, UNSPECIFIED LATERALITY (HCC): Primary | ICD-10-CM

## 2024-03-07 PROCEDURE — 96375 TX/PRO/DX INJ NEW DRUG ADDON: CPT

## 2024-03-07 PROCEDURE — 96413 CHEMO IV INFUSION 1 HR: CPT

## 2024-03-07 PROCEDURE — 96377 APPLICATON ON-BODY INJECTOR: CPT

## 2024-03-07 PROCEDURE — 2580000003 HC RX 258: Performed by: INTERNAL MEDICINE

## 2024-03-07 PROCEDURE — 96367 TX/PROPH/DG ADDL SEQ IV INF: CPT

## 2024-03-07 PROCEDURE — 6360000002 HC RX W HCPCS: Performed by: INTERNAL MEDICINE

## 2024-03-07 RX ORDER — SODIUM CHLORIDE 0.9 % (FLUSH) 0.9 %
5-40 SYRINGE (ML) INJECTION PRN
Status: ACTIVE | OUTPATIENT
Start: 2024-03-07 | End: 2024-03-08

## 2024-03-07 RX ORDER — SODIUM CHLORIDE 9 MG/ML
5-250 INJECTION, SOLUTION INTRAVENOUS PRN
Status: ACTIVE | OUTPATIENT
Start: 2024-03-07 | End: 2024-03-08

## 2024-03-07 RX ORDER — PALONOSETRON 0.05 MG/ML
0.25 INJECTION, SOLUTION INTRAVENOUS ONCE
Status: COMPLETED | OUTPATIENT
Start: 2024-03-07 | End: 2024-03-07

## 2024-03-07 RX ORDER — HEPARIN 100 UNIT/ML
500 SYRINGE INTRAVENOUS PRN
Status: DISPENSED | OUTPATIENT
Start: 2024-03-07 | End: 2024-03-08

## 2024-03-07 RX ORDER — DEXAMETHASONE SODIUM PHOSPHATE 4 MG/ML
8 INJECTION, SOLUTION INTRA-ARTICULAR; INTRALESIONAL; INTRAMUSCULAR; INTRAVENOUS; SOFT TISSUE ONCE
Status: COMPLETED | OUTPATIENT
Start: 2024-03-07 | End: 2024-03-07

## 2024-03-07 RX ADMIN — SODIUM CHLORIDE, PRESERVATIVE FREE 20 ML: 5 INJECTION INTRAVENOUS at 09:32

## 2024-03-07 RX ADMIN — PEGFILGRASTIM 6 MG: KIT SUBCUTANEOUS at 12:45

## 2024-03-07 RX ADMIN — DEXAMETHASONE SODIUM PHOSPHATE 8 MG: 4 INJECTION INTRA-ARTICULAR; INTRALESIONAL; INTRAMUSCULAR; INTRAVENOUS; SOFT TISSUE at 09:42

## 2024-03-07 RX ADMIN — SODIUM CHLORIDE, PRESERVATIVE FREE 20 ML: 5 INJECTION INTRAVENOUS at 12:30

## 2024-03-07 RX ADMIN — SODIUM CHLORIDE, PRESERVATIVE FREE 20 ML: 5 INJECTION INTRAVENOUS at 12:32

## 2024-03-07 RX ADMIN — HEPARIN 500 UNITS: 100 SYRINGE at 12:30

## 2024-03-07 RX ADMIN — SODIUM CHLORIDE 30 ML/HR: 9 INJECTION, SOLUTION INTRAVENOUS at 09:35

## 2024-03-07 RX ADMIN — PALONOSETRON HYDROCHLORIDE 0.25 MG: 0.25 INJECTION INTRAVENOUS at 09:48

## 2024-03-07 RX ADMIN — DOCETAXEL ANHYDROUS 130 MG: 10 INJECTION, SOLUTION INTRAVENOUS at 11:00

## 2024-03-07 RX ADMIN — FOSAPREPITANT 150 MG: 150 INJECTION, POWDER, LYOPHILIZED, FOR SOLUTION INTRAVENOUS at 09:55

## 2024-03-07 RX ADMIN — SODIUM CHLORIDE, PRESERVATIVE FREE 20 ML: 5 INJECTION INTRAVENOUS at 09:30

## 2024-03-07 RX ADMIN — HEPARIN 500 UNITS: 100 SYRINGE at 12:32

## 2024-03-07 ASSESSMENT — PAIN - FUNCTIONAL ASSESSMENT
PAIN_FUNCTIONAL_ASSESSMENT: NONE - DENIES PAIN
PAIN_FUNCTIONAL_ASSESSMENT: NONE - DENIES PAIN

## 2024-03-13 ENCOUNTER — HOSPITAL ENCOUNTER (OUTPATIENT)
Facility: HOSPITAL | Age: 64
Setting detail: INFUSION SERIES
Discharge: HOME | End: 2024-03-16
Payer: MEDICARE

## 2024-03-13 VITALS
DIASTOLIC BLOOD PRESSURE: 86 MMHG | SYSTOLIC BLOOD PRESSURE: 135 MMHG | TEMPERATURE: 98 F | OXYGEN SATURATION: 90 % | RESPIRATION RATE: 20 BRPM | HEART RATE: 115 BPM

## 2024-03-13 PROCEDURE — 2580000003 HC RX 258: Performed by: INTERNAL MEDICINE

## 2024-03-13 PROCEDURE — 6360000002 HC RX W HCPCS: Performed by: INTERNAL MEDICINE

## 2024-03-13 PROCEDURE — 96523 IRRIG DRUG DELIVERY DEVICE: CPT

## 2024-03-13 RX ORDER — SODIUM CHLORIDE 0.9 % (FLUSH) 0.9 %
5-40 SYRINGE (ML) INJECTION PRN
Status: DISCONTINUED | OUTPATIENT
Start: 2024-03-13 | End: 2024-03-17 | Stop reason: HOSPADM

## 2024-03-13 RX ORDER — HEPARIN SODIUM (PORCINE) LOCK FLUSH IV SOLN 100 UNIT/ML 100 UNIT/ML
500 SOLUTION INTRAVENOUS PRN
Status: DISCONTINUED | OUTPATIENT
Start: 2024-03-13 | End: 2024-03-17 | Stop reason: HOSPADM

## 2024-03-13 RX ADMIN — HEPARIN 500 UNITS: 100 SYRINGE at 09:19

## 2024-03-13 RX ADMIN — SODIUM CHLORIDE, PRESERVATIVE FREE 20 ML: 5 INJECTION INTRAVENOUS at 09:18

## 2024-03-13 RX ADMIN — HEPARIN 500 UNITS: 100 SYRINGE at 09:21

## 2024-03-13 RX ADMIN — SODIUM CHLORIDE, PRESERVATIVE FREE 20 ML: 5 INJECTION INTRAVENOUS at 09:20

## 2024-03-13 NOTE — PROGRESS NOTES
[facility] Progress Note    Date: 2024    Name: Rina Prajapati    MRN: [id number]         : 1960    Weekly PICC care     Ms. Prajapati to [facility], ambulatory at 0905. Pt was assessed and education was provided. Denies pain. No acute distress noted.      Ms. Prajapati's vitals were reviewed.  Vitals:    24 0910   BP: 135/86   Pulse: (!) 115   Resp: 20   Temp: 98 °F (36.7 °C)   SpO2: 90%       Left arm double lumen picc line with drsg d/I. Brisk flush and blood returns noted from each lumen. End caps changed. PICC dressing and stat lock changed per protocol. No redness, streaking, warmth, swelling or drainage noted at site. Lumens wrapped with coban. Both lumens flushed with 20 ml NS and 500 units heparin each. Green curos caps applied to both lumens. Patient was  given new stockinette to protect dressing.     Ms. Prajapati tolerated procedure, and had no complaints.    Patient armband removed and shredded.    Ms. Prajapati was [facility] in stable condition at 0925. She is to return on 24 at 0900 for her next appointment.    Nikole Luna RN  2024  9:35 AM

## 2024-03-20 ENCOUNTER — HOSPITAL ENCOUNTER (OUTPATIENT)
Facility: HOSPITAL | Age: 64
Setting detail: INFUSION SERIES
Discharge: HOME | End: 2024-03-23
Payer: MEDICARE

## 2024-03-20 VITALS
HEART RATE: 101 BPM | DIASTOLIC BLOOD PRESSURE: 77 MMHG | TEMPERATURE: 97.6 F | OXYGEN SATURATION: 93 % | RESPIRATION RATE: 20 BRPM | SYSTOLIC BLOOD PRESSURE: 125 MMHG

## 2024-03-20 PROCEDURE — 2580000003 HC RX 258: Performed by: INTERNAL MEDICINE

## 2024-03-20 PROCEDURE — 96523 IRRIG DRUG DELIVERY DEVICE: CPT

## 2024-03-20 PROCEDURE — 6360000002 HC RX W HCPCS: Performed by: INTERNAL MEDICINE

## 2024-03-20 RX ORDER — SODIUM CHLORIDE 0.9 % (FLUSH) 0.9 %
5-40 SYRINGE (ML) INJECTION PRN
Status: DISCONTINUED | OUTPATIENT
Start: 2024-03-20 | End: 2024-03-24 | Stop reason: HOSPADM

## 2024-03-20 RX ORDER — DEXAMETHASONE 2 MG/1
2 TABLET ORAL
COMMUNITY

## 2024-03-20 RX ORDER — HEPARIN SODIUM (PORCINE) LOCK FLUSH IV SOLN 100 UNIT/ML 100 UNIT/ML
500 SOLUTION INTRAVENOUS PRN
Status: DISCONTINUED | OUTPATIENT
Start: 2024-03-20 | End: 2024-03-24 | Stop reason: HOSPADM

## 2024-03-20 RX ADMIN — SODIUM CHLORIDE, PRESERVATIVE FREE 10 ML: 5 INJECTION INTRAVENOUS at 09:51

## 2024-03-20 RX ADMIN — SODIUM CHLORIDE, PRESERVATIVE FREE 10 ML: 5 INJECTION INTRAVENOUS at 09:50

## 2024-03-20 RX ADMIN — HEPARIN 500 UNITS: 100 SYRINGE at 09:51

## 2024-03-20 NOTE — PROGRESS NOTES
[facility] Progress Note    Date: 2024    Name: Rina Prajapati    MRN: [id number]         : 1960    Weekly PICC care     Ms. Prajapati to [facility], ambulatory at 0930. Pt was assessed and education was provided. Pt states she is doing well.    Ms. Prajapati's vitals were reviewed.    Patient Vitals for the past 12 hrs:   Temp Pulse Resp BP SpO2   24 0933 97.6 °F (36.4 °C) (!) 101 20 125/77 93 %       Left arm double lumen picc line with drsg clean, dry and a little loose. Flushes easily but NO blood return noted from either lumen. Microclaves and end caps changed. PICC dressing and stat lock changed per protocol. No redness, streaking, warmth, swelling or drainage noted at site. Lumens wrapped with gauze and coban. Both lumens flushed with 10 ml NS and 250 units heparin each. Pt's stockinette re-applied.    Ms. Prajapati tolerated procedure, and had no complaints.    Patient armband removed and shredded.    Ms. Prajapati was [facility] in stable condition at 0953. She is to return on 3/27/24 at 0900 for her next appointment for PICC care and prechemo labs.    Nadira Perdomo RN  2024  10:18 AM

## 2024-03-21 RX ORDER — ALBUTEROL SULFATE 90 UG/1
4 AEROSOL, METERED RESPIRATORY (INHALATION) PRN
Status: CANCELLED | OUTPATIENT
Start: 2024-03-28

## 2024-03-21 RX ORDER — EPINEPHRINE 1 MG/ML
0.3 INJECTION, SOLUTION, CONCENTRATE INTRAVENOUS PRN
Status: CANCELLED | OUTPATIENT
Start: 2024-03-28

## 2024-03-21 RX ORDER — ONDANSETRON 2 MG/ML
8 INJECTION INTRAMUSCULAR; INTRAVENOUS
Status: CANCELLED | OUTPATIENT
Start: 2024-03-28

## 2024-03-21 RX ORDER — ACETAMINOPHEN 325 MG/1
650 TABLET ORAL
Status: CANCELLED | OUTPATIENT
Start: 2024-03-28

## 2024-03-21 RX ORDER — MEPERIDINE HYDROCHLORIDE 25 MG/ML
12.5 INJECTION INTRAMUSCULAR; INTRAVENOUS; SUBCUTANEOUS PRN
Status: CANCELLED | OUTPATIENT
Start: 2024-03-28

## 2024-03-21 RX ORDER — SODIUM CHLORIDE 9 MG/ML
INJECTION, SOLUTION INTRAVENOUS CONTINUOUS
Status: CANCELLED | OUTPATIENT
Start: 2024-03-28

## 2024-03-21 RX ORDER — DIPHENHYDRAMINE HYDROCHLORIDE 50 MG/ML
50 INJECTION INTRAMUSCULAR; INTRAVENOUS
Status: CANCELLED | OUTPATIENT
Start: 2024-03-28

## 2024-03-21 RX ORDER — SODIUM CHLORIDE 9 MG/ML
5-250 INJECTION, SOLUTION INTRAVENOUS PRN
Status: CANCELLED | OUTPATIENT
Start: 2024-03-28

## 2024-03-27 ENCOUNTER — HOSPITAL ENCOUNTER (OUTPATIENT)
Facility: HOSPITAL | Age: 64
Setting detail: INFUSION SERIES
Discharge: HOME | End: 2024-03-30
Payer: MEDICARE

## 2024-03-27 VITALS
HEART RATE: 109 BPM | RESPIRATION RATE: 18 BRPM | SYSTOLIC BLOOD PRESSURE: 130 MMHG | TEMPERATURE: 98 F | DIASTOLIC BLOOD PRESSURE: 82 MMHG

## 2024-03-27 DIAGNOSIS — C34.90 NON-SMALL CELL LUNG CANCER, UNSPECIFIED LATERALITY (HCC): ICD-10-CM

## 2024-03-27 LAB
ALBUMIN SERPL-MCNC: 3.2 G/DL (ref 3.4–5)
ALBUMIN/GLOB SERPL: 1 (ref 0.8–1.7)
ALP SERPL-CCNC: 68 U/L (ref 45–117)
ALT SERPL-CCNC: 13 U/L (ref 13–56)
ANION GAP SERPL CALC-SCNC: 5 MMOL/L (ref 3–18)
AST SERPL-CCNC: 12 U/L (ref 10–38)
BASO+EOS+MONOS # BLD AUTO: 0.4 K/UL (ref 0–2.3)
BASO+EOS+MONOS NFR BLD AUTO: 6 % (ref 0.1–17)
BILIRUB SERPL-MCNC: 0.4 MG/DL (ref 0.2–1)
BUN SERPL-MCNC: 9 MG/DL (ref 7–18)
BUN/CREAT SERPL: 15 (ref 12–20)
CALCIUM SERPL-MCNC: 8.9 MG/DL (ref 8.5–10.1)
CHLORIDE SERPL-SCNC: 106 MMOL/L (ref 100–111)
CO2 SERPL-SCNC: 30 MMOL/L (ref 21–32)
CREAT SERPL-MCNC: 0.59 MG/DL (ref 0.6–1.3)
DIFFERENTIAL METHOD BLD: ABNORMAL
ERYTHROCYTE [DISTWIDTH] IN BLOOD BY AUTOMATED COUNT: 21.4 % (ref 11.5–14.5)
GLOBULIN SER CALC-MCNC: 3.1 G/DL (ref 2–4)
GLUCOSE SERPL-MCNC: 94 MG/DL (ref 74–99)
HCT VFR BLD AUTO: 35.4 % (ref 36–48)
HGB BLD-MCNC: 10.3 G/DL (ref 12–16)
LYMPHOCYTES # BLD: 0.8 K/UL (ref 1.1–5.9)
LYMPHOCYTES NFR BLD: 13 % (ref 14–44)
MCH RBC QN AUTO: 23.5 PG (ref 25–35)
MCHC RBC AUTO-ENTMCNC: 29.1 G/DL (ref 31–37)
MCV RBC AUTO: 80.8 FL (ref 78–102)
NEUTS SEG # BLD: 5.2 K/UL (ref 1.8–9.5)
NEUTS SEG NFR BLD: 81 % (ref 40–70)
PLATELET # BLD AUTO: 345 K/UL (ref 140–440)
POTASSIUM SERPL-SCNC: 3.5 MMOL/L (ref 3.5–5.5)
PROT SERPL-MCNC: 6.3 G/DL (ref 6.4–8.2)
RBC # BLD AUTO: 4.38 M/UL (ref 4.1–5.1)
SODIUM SERPL-SCNC: 141 MMOL/L (ref 136–145)
WBC # BLD AUTO: 6.4 K/UL (ref 4.5–13)

## 2024-03-27 PROCEDURE — 85025 COMPLETE CBC W/AUTO DIFF WBC: CPT

## 2024-03-27 PROCEDURE — 2580000003 HC RX 258: Performed by: INTERNAL MEDICINE

## 2024-03-27 PROCEDURE — 80053 COMPREHEN METABOLIC PANEL: CPT

## 2024-03-27 PROCEDURE — 6360000002 HC RX W HCPCS

## 2024-03-27 PROCEDURE — 36415 COLL VENOUS BLD VENIPUNCTURE: CPT

## 2024-03-27 PROCEDURE — 96523 IRRIG DRUG DELIVERY DEVICE: CPT

## 2024-03-27 RX ORDER — HEPARIN 100 UNIT/ML
SYRINGE INTRAVENOUS
Status: COMPLETED
Start: 2024-03-27 | End: 2024-03-27

## 2024-03-27 RX ORDER — HEPARIN SODIUM (PORCINE) LOCK FLUSH IV SOLN 100 UNIT/ML 100 UNIT/ML
500 SOLUTION INTRAVENOUS PRN
Status: DISCONTINUED | OUTPATIENT
Start: 2024-03-27 | End: 2024-03-31 | Stop reason: HOSPADM

## 2024-03-27 RX ORDER — SODIUM CHLORIDE 0.9 % (FLUSH) 0.9 %
5-40 SYRINGE (ML) INJECTION PRN
Status: DISCONTINUED | OUTPATIENT
Start: 2024-03-27 | End: 2024-03-31 | Stop reason: HOSPADM

## 2024-03-27 RX ADMIN — SODIUM CHLORIDE, PRESERVATIVE FREE 10 ML: 5 INJECTION INTRAVENOUS at 09:48

## 2024-03-27 RX ADMIN — HEPARIN 500 UNITS: 100 SYRINGE at 09:48

## 2024-03-27 RX ADMIN — HEPARIN SODIUM (PORCINE) LOCK FLUSH IV SOLN 100 UNIT/ML 500 UNITS: 100 SOLUTION at 09:48

## 2024-03-27 RX ADMIN — SODIUM CHLORIDE, PRESERVATIVE FREE 10 ML: 5 INJECTION INTRAVENOUS at 09:47

## 2024-03-27 NOTE — PROGRESS NOTES
[facility]ospitals Progress Note    Date: 2024    Name: Rina Prajapati    MRN: [id number]         : 1960    Weekly PICC care and prechemo labs    Ms Prajapati arrived to hospitals, ambulatory at 0923. Pt was assessed and education was provided. Pt states she is doing well.    Ms. Prajapati's vitals were reviewed.    Patient Vitals for the past 12 hrs:   Temp Pulse Resp BP   24 0924 98 °F (36.7 °C) (!) 109 18 130/82     Left arm double lumen picc line with drsg clean, dry and intact. Flushes easily with sluggish blood return noted in both lumens. Microclaves and end caps changed. PICC dressing and stat lock changed per protocol. No redness, streaking, warmth, swelling or drainage noted at site. Lumens wrapped with gauze and coban. Both lumens flushed with 10 ml NS and 250 units heparin each. Pt's stockinette re-applied.    Ms. Prajapati tolerated procedure, and had no complaints.    Blood for labs (CBC, CMP) drawn via butterfly needle to left wrist x 2 attempts. Site dressed with gauze and coban. Pt tolerated well.    Patient armband removed and shredded.    Ms. Prajapati was [facility] in stable condition at 1006. She is to return on 3/28/24 at 0900 for her next appointment for Docetaxel.    Nadira Perdomo RN  2024  12:07 PM

## 2024-03-28 ENCOUNTER — HOSPITAL ENCOUNTER (OUTPATIENT)
Facility: HOSPITAL | Age: 64
Setting detail: INFUSION SERIES
End: 2024-03-28
Payer: MEDICARE

## 2024-03-28 VITALS
BODY MASS INDEX: 21.54 KG/M2 | OXYGEN SATURATION: 100 % | HEART RATE: 102 BPM | SYSTOLIC BLOOD PRESSURE: 130 MMHG | TEMPERATURE: 98.7 F | RESPIRATION RATE: 20 BRPM | DIASTOLIC BLOOD PRESSURE: 76 MMHG | WEIGHT: 137.54 LBS

## 2024-03-28 DIAGNOSIS — C34.90 NON-SMALL CELL LUNG CANCER, UNSPECIFIED LATERALITY (HCC): Primary | ICD-10-CM

## 2024-03-28 PROCEDURE — 6360000002 HC RX W HCPCS: Performed by: INTERNAL MEDICINE

## 2024-03-28 PROCEDURE — 96377 APPLICATON ON-BODY INJECTOR: CPT

## 2024-03-28 PROCEDURE — 96367 TX/PROPH/DG ADDL SEQ IV INF: CPT

## 2024-03-28 PROCEDURE — 96375 TX/PRO/DX INJ NEW DRUG ADDON: CPT

## 2024-03-28 PROCEDURE — 2580000003 HC RX 258: Performed by: INTERNAL MEDICINE

## 2024-03-28 PROCEDURE — 96413 CHEMO IV INFUSION 1 HR: CPT

## 2024-03-28 PROCEDURE — 96366 THER/PROPH/DIAG IV INF ADDON: CPT

## 2024-03-28 RX ORDER — PALONOSETRON 0.05 MG/ML
0.25 INJECTION, SOLUTION INTRAVENOUS ONCE
Status: COMPLETED | OUTPATIENT
Start: 2024-03-28 | End: 2024-03-28

## 2024-03-28 RX ORDER — DEXAMETHASONE SODIUM PHOSPHATE 4 MG/ML
8 INJECTION, SOLUTION INTRA-ARTICULAR; INTRALESIONAL; INTRAMUSCULAR; INTRAVENOUS; SOFT TISSUE ONCE
Status: COMPLETED | OUTPATIENT
Start: 2024-03-28 | End: 2024-03-28

## 2024-03-28 RX ORDER — HEPARIN 100 UNIT/ML
500 SYRINGE INTRAVENOUS PRN
Status: DISPENSED | OUTPATIENT
Start: 2024-03-28 | End: 2024-03-29

## 2024-03-28 RX ORDER — SODIUM CHLORIDE 9 MG/ML
5-250 INJECTION, SOLUTION INTRAVENOUS PRN
Status: DISPENSED | OUTPATIENT
Start: 2024-03-28 | End: 2024-03-29

## 2024-03-28 RX ORDER — SODIUM CHLORIDE 0.9 % (FLUSH) 0.9 %
5-40 SYRINGE (ML) INJECTION PRN
Status: DISPENSED | OUTPATIENT
Start: 2024-03-28 | End: 2024-03-29

## 2024-03-28 RX ADMIN — SODIUM CHLORIDE, PRESERVATIVE FREE 10 ML: 5 INJECTION INTRAVENOUS at 11:30

## 2024-03-28 RX ADMIN — DOCETAXEL ANHYDROUS 130 MG: 10 INJECTION, SOLUTION INTRAVENOUS at 10:53

## 2024-03-28 RX ADMIN — HEPARIN 500 UNITS: 100 SYRINGE at 12:01

## 2024-03-28 RX ADMIN — PEGFILGRASTIM 6 MG: KIT SUBCUTANEOUS at 12:06

## 2024-03-28 RX ADMIN — FOSAPREPITANT 150 MG: 150 INJECTION, POWDER, LYOPHILIZED, FOR SOLUTION INTRAVENOUS at 09:55

## 2024-03-28 RX ADMIN — HEPARIN 500 UNITS: 100 SYRINGE at 11:30

## 2024-03-28 RX ADMIN — SODIUM CHLORIDE, PRESERVATIVE FREE 10 ML: 5 INJECTION INTRAVENOUS at 09:45

## 2024-03-28 RX ADMIN — PALONOSETRON HYDROCHLORIDE 0.25 MG: 0.25 INJECTION INTRAVENOUS at 09:50

## 2024-03-28 RX ADMIN — SODIUM CHLORIDE, PRESERVATIVE FREE 10 ML: 5 INJECTION INTRAVENOUS at 12:00

## 2024-03-28 RX ADMIN — DEXAMETHASONE SODIUM PHOSPHATE 8 MG: 4 INJECTION INTRA-ARTICULAR; INTRALESIONAL; INTRAMUSCULAR; INTRAVENOUS; SOFT TISSUE at 09:46

## 2024-03-28 RX ADMIN — SODIUM CHLORIDE 25 ML/HR: 9 INJECTION, SOLUTION INTRAVENOUS at 09:46

## 2024-03-28 NOTE — PROGRESS NOTES
[facility] Progress Note    Date: 2024    Name: Rina Prajapati    MRN: [id number]         : 1960          Ms. Prajapati arrived to [facility] at 1020 ambulatory. No complaints or concerns voiced    Pt is here today for Docetaxel, Cycle 18.    Ms. Prajapati was assessed and education was provided.     Ms. Prajapati's vitals were reviewed.  Vitals:    24 0925   BP: (!) 146/78   Pulse: 96   Resp: 20   Temp: 97.9 °F (36.6 °C)   SpO2: 92%       Patient's left upper arm picc line intact with dry drsg at site. No bruising, drainage, swelling, streaking or pain noted at, around or along arm. Both lumens with sluggish blood returns and brisk flush.    Lab results were obtained and reviewed.  Labs okay for chemo.      Latest Reference Range & Units 24 10:05   Sodium 136 - 145 mmol/L 141   Potassium 3.5 - 5.5 mmol/L 3.5   Chloride 100 - 111 mmol/L 106   CO2 21 - 32 mmol/L 30   BUN,BUNPL 7.0 - 18 MG/DL 9   Creatinine 0.6 - 1.3 MG/DL 0.59 (L)   Bun/Cre Ratio 12 - 20   15   Anion Gap 3.0 - 18 mmol/L 5   Est, Glom Filt Rate >60 ml/min/1.73m2 >90   Glucose, Random 74 - 99 mg/dL 94   CALCIUM, SERUM, 433358 8.5 - 10.1 MG/DL 8.9   ALBUMIN/GLOBULIN RATIO 0.8 - 1.7   1.0   Total Protein 6.4 - 8.2 g/dL 6.3 (L)   Albumin 3.4 - 5.0 g/dL 3.2 (L)   Globulin 2.0 - 4.0 g/dL 3.1   Alk Phos 45 - 117 U/L 68   ALT 13 - 56 U/L 13   AST 10 - 38 U/L 12   BILIRUBIN TOTAL 0.2 - 1.0 MG/DL 0.4   WBC 4.5 - 13.0 K/uL 6.4   RBC 4.10 - 5.10 M/uL 4.38   Hemoglobin Quant 12.0 - 16.0 g/dL 10.3 (L)   Hematocrit 36 - 48 % 35.4 (L)   MCV 78 - 102 FL 80.8   MCH 25.0 - 35.0 PG 23.5 (L)   MCHC 31 - 37 g/dL 29.1 (L)   RDW 11.5 - 14.5 % 21.4 (H)   Platelet Count 140 - 440 K/uL 345   Neutrophils, Automated 40 - 70 % 81 (H)   Lymphocyte % 14 - 44 % 13 (L)   Neutrophils Absolute 1.8 - 9.5 K/UL 5.2   Lymphocytes Absolute 1.1 - 5.9 K/UL 0.8 (L)   Differential Type -   AUTOMATED       Normal saline flush bag hung. via purple lumen. Will use purple lumen for  treatment today.    Pre-medications Decadron 8 mg IVP, aloxi 0.25 mg IVP and Emend 150 mg IVPB were administered as ordered and chemotherapy was initiated.    Docetaxel 130 mg administered  over one hour as ordered, followed by normal saline flush. Brisk blood returns noted.    Both lumens heparinized per protocol. Lumens capped and wrapped with coban, then stockinette applied over picc site for protection. Picc line drsg and site without change.      Ms. Prajapati tolerated infusion without complaints.    Patient Vitals for the past 24 hrs:   BP Temp Temp src Pulse Resp SpO2 Weight   03/28/24 1155 130/76 98.7 °F (37.1 °C) Oral (!) 102 20 100 % --   03/28/24 0925 (!) 146/78 97.9 °F (36.6 °C) Oral 96 20 92 % 62.4 kg (137 lb 8.6 oz)           Neulasta OBI applied to lower right abdomen, green light flashed appropiately, and patient given instructions on when to remove tomorrow evening, along with general discharge instructions post chemotherapy. She verbalized understanding.    Armband removed and shredded.    Ms. Prajapati was [facility] in stable condition at 1215.  She is to return on  4/3/24 at 0900 for her next picc care appointment.    VALENTE GALLAGHER RN  March 28, 2024

## 2024-03-31 NOTE — PROGRESS NOTES
[facility] Lab Visit:    Kurt Corbin  1960  [id number]    7722 Pt arrived ambulatory w/o assist. Labs drawn per order via Left AC venipuncture x1 attempt. Pt tolerated well without complaints. Gauze/ coban to site. Pt departed [facility] [place] ambulatory and in no distress at 1335.      Visit Vitals  /82 (BP 1 Location: Left upper arm, BP Patient Position: Sitting)   Pulse 77   Temp 98.7 °F (37.1 °C)   Resp 16   Wt 66.8 kg (147 lb 3.2 oz)   SpO2 96%   BMI 23.76 kg/m² Jossie Melissa

## 2024-04-03 ENCOUNTER — HOSPITAL ENCOUNTER (OUTPATIENT)
Facility: HOSPITAL | Age: 64
Setting detail: INFUSION SERIES
Discharge: HOME | End: 2024-04-06
Payer: MEDICARE

## 2024-04-03 VITALS
TEMPERATURE: 98.1 F | DIASTOLIC BLOOD PRESSURE: 82 MMHG | RESPIRATION RATE: 22 BRPM | OXYGEN SATURATION: 98 % | HEART RATE: 110 BPM | SYSTOLIC BLOOD PRESSURE: 118 MMHG

## 2024-04-03 PROCEDURE — 6360000002 HC RX W HCPCS

## 2024-04-03 PROCEDURE — 96523 IRRIG DRUG DELIVERY DEVICE: CPT

## 2024-04-03 PROCEDURE — 6360000002 HC RX W HCPCS: Performed by: INTERNAL MEDICINE

## 2024-04-03 PROCEDURE — 2580000003 HC RX 258: Performed by: INTERNAL MEDICINE

## 2024-04-03 RX ORDER — HEPARIN SODIUM (PORCINE) LOCK FLUSH IV SOLN 100 UNIT/ML 100 UNIT/ML
500 SOLUTION INTRAVENOUS PRN
Status: DISCONTINUED | OUTPATIENT
Start: 2024-04-03 | End: 2024-04-07 | Stop reason: HOSPADM

## 2024-04-03 RX ORDER — HEPARIN 100 UNIT/ML
SYRINGE INTRAVENOUS
Status: COMPLETED
Start: 2024-04-03 | End: 2024-04-03

## 2024-04-03 RX ORDER — SODIUM CHLORIDE 0.9 % (FLUSH) 0.9 %
5-40 SYRINGE (ML) INJECTION PRN
Status: DISCONTINUED | OUTPATIENT
Start: 2024-04-03 | End: 2024-04-07 | Stop reason: HOSPADM

## 2024-04-03 RX ADMIN — SODIUM CHLORIDE, PRESERVATIVE FREE 20 ML: 5 INJECTION INTRAVENOUS at 09:22

## 2024-04-03 RX ADMIN — HEPARIN 500 UNITS: 100 SYRINGE at 09:24

## 2024-04-03 RX ADMIN — SODIUM CHLORIDE, PRESERVATIVE FREE 20 ML: 5 INJECTION INTRAVENOUS at 09:26

## 2024-04-03 RX ADMIN — HEPARIN 500 UNITS: 100 SYRINGE at 09:28

## 2024-04-03 NOTE — PROGRESS NOTES
[facility] Progress Note    Date: April 3, 2024    Name: Rina Prajapati    MRN: [id number]         : 1960    Weekly PICC care     Ms. Prajapati to [facility], ambulatory at 0905. Pt was assessed and education was provided. Denies pain. No acute distress noted.      Ms. Prajapati's vitals were reviewed.  Vitals:    24 0907   BP: 118/82   Pulse: (!) 110   Resp: 22   Temp: 98.1 °F (36.7 °C)   SpO2: 98%       Left arm double lumen picc line with drsg d/I. Brisk flush and blood returns noted from each lumen. End caps changed. PICC dressing and stat lock changed per protocol. No redness, streaking, warmth, swelling or drainage noted at site. Lumens wrapped with coban. Both lumens flushed with 20 ml NS and 500 units heparin each. Green curos caps applied to both lumens. Pt given new stockinette to protect dressing.     Ms. Prajapati tolerated procedure, and had no complaints.    Patient armband removed and shredded.    Ms. Prajapati was [facility] in stable condition at 0930. She is to return on 4/10/24 at 1100 for her next appointment.    VALENTE GALLAGHER RN  April 3, 2024  9:48 AM

## 2024-04-10 ENCOUNTER — HOSPITAL ENCOUNTER (OUTPATIENT)
Facility: HOSPITAL | Age: 64
Setting detail: INFUSION SERIES
Discharge: HOME | End: 2024-04-13
Payer: MEDICARE

## 2024-04-10 ENCOUNTER — OFFICE VISIT (OUTPATIENT)
Age: 64
End: 2024-04-10
Payer: MEDICARE

## 2024-04-10 VITALS
SYSTOLIC BLOOD PRESSURE: 110 MMHG | OXYGEN SATURATION: 80 % | WEIGHT: 128 LBS | HEART RATE: 52 BPM | DIASTOLIC BLOOD PRESSURE: 58 MMHG | RESPIRATION RATE: 16 BRPM | HEIGHT: 67 IN | TEMPERATURE: 98.2 F | BODY MASS INDEX: 20.09 KG/M2

## 2024-04-10 VITALS
HEART RATE: 111 BPM | SYSTOLIC BLOOD PRESSURE: 113 MMHG | TEMPERATURE: 98.1 F | OXYGEN SATURATION: 90 % | DIASTOLIC BLOOD PRESSURE: 74 MMHG

## 2024-04-10 DIAGNOSIS — C34.90 NON-SMALL CELL LUNG CANCER, UNSPECIFIED LATERALITY (HCC): ICD-10-CM

## 2024-04-10 DIAGNOSIS — D68.59 OTHER PRIMARY THROMBOPHILIA (HCC): ICD-10-CM

## 2024-04-10 DIAGNOSIS — J43.9 PULMONARY EMPHYSEMA, UNSPECIFIED EMPHYSEMA TYPE (HCC): Primary | ICD-10-CM

## 2024-04-10 DIAGNOSIS — F33.0 MILD EPISODE OF RECURRENT MAJOR DEPRESSIVE DISORDER (HCC): ICD-10-CM

## 2024-04-10 DIAGNOSIS — E44.1 MILD PROTEIN-CALORIE MALNUTRITION (HCC): ICD-10-CM

## 2024-04-10 DIAGNOSIS — I82.291 CHRONIC EMBOLISM AND THROMBOSIS OF OTHER THORACIC VEINS (HCC): ICD-10-CM

## 2024-04-10 DIAGNOSIS — I10 ESSENTIAL HYPERTENSION: ICD-10-CM

## 2024-04-10 PROBLEM — F43.22 ADJUSTMENT DISORDER WITH ANXIOUS MOOD: Status: RESOLVED | Noted: 2023-04-05 | Resolved: 2024-04-10

## 2024-04-10 PROBLEM — E46 PROTEIN CALORIE MALNUTRITION (HCC): Status: ACTIVE | Noted: 2024-04-10

## 2024-04-10 PROBLEM — R53.83 FATIGUE: Status: RESOLVED | Noted: 2023-04-05 | Resolved: 2024-04-10

## 2024-04-10 PROCEDURE — 1123F ACP DISCUSS/DSCN MKR DOCD: CPT | Performed by: FAMILY MEDICINE

## 2024-04-10 PROCEDURE — 2580000003 HC RX 258: Performed by: INTERNAL MEDICINE

## 2024-04-10 PROCEDURE — 3078F DIAST BP <80 MM HG: CPT | Performed by: FAMILY MEDICINE

## 2024-04-10 PROCEDURE — 96523 IRRIG DRUG DELIVERY DEVICE: CPT

## 2024-04-10 PROCEDURE — 99214 OFFICE O/P EST MOD 30 MIN: CPT | Performed by: FAMILY MEDICINE

## 2024-04-10 PROCEDURE — 3074F SYST BP LT 130 MM HG: CPT | Performed by: FAMILY MEDICINE

## 2024-04-10 PROCEDURE — 6360000002 HC RX W HCPCS: Performed by: INTERNAL MEDICINE

## 2024-04-10 RX ORDER — METHYLPREDNISOLONE 4 MG/1
TABLET ORAL
Qty: 1 KIT | Refills: 0 | Status: SHIPPED | OUTPATIENT
Start: 2024-04-10 | End: 2024-04-16

## 2024-04-10 RX ORDER — HEPARIN 100 UNIT/ML
SYRINGE INTRAVENOUS
Status: DISPENSED
Start: 2024-04-10 | End: 2024-04-10

## 2024-04-10 RX ORDER — TIOTROPIUM BROMIDE 18 UG/1
18 CAPSULE ORAL; RESPIRATORY (INHALATION) DAILY
Qty: 90 CAPSULE | Refills: 0 | Status: SHIPPED | OUTPATIENT
Start: 2024-04-10

## 2024-04-10 RX ORDER — SODIUM CHLORIDE 0.9 % (FLUSH) 0.9 %
5-40 SYRINGE (ML) INJECTION PRN
Status: DISCONTINUED | OUTPATIENT
Start: 2024-04-10 | End: 2024-04-14 | Stop reason: HOSPADM

## 2024-04-10 RX ORDER — HEPARIN SODIUM (PORCINE) LOCK FLUSH IV SOLN 100 UNIT/ML 100 UNIT/ML
500 SOLUTION INTRAVENOUS PRN
Status: DISCONTINUED | OUTPATIENT
Start: 2024-04-10 | End: 2024-04-14 | Stop reason: HOSPADM

## 2024-04-10 RX ORDER — AZITHROMYCIN 250 MG/1
TABLET, FILM COATED ORAL
Qty: 6 TABLET | Refills: 0 | Status: SHIPPED | OUTPATIENT
Start: 2024-04-10 | End: 2024-04-20

## 2024-04-10 RX ADMIN — SODIUM CHLORIDE, PRESERVATIVE FREE 20 ML: 5 INJECTION INTRAVENOUS at 11:16

## 2024-04-10 RX ADMIN — HEPARIN 500 UNITS: 100 SYRINGE at 11:20

## 2024-04-10 RX ADMIN — HEPARIN 500 UNITS: 100 SYRINGE at 11:18

## 2024-04-10 RX ADMIN — SODIUM CHLORIDE, PRESERVATIVE FREE 20 ML: 5 INJECTION INTRAVENOUS at 11:19

## 2024-04-10 SDOH — ECONOMIC STABILITY: INCOME INSECURITY: HOW HARD IS IT FOR YOU TO PAY FOR THE VERY BASICS LIKE FOOD, HOUSING, MEDICAL CARE, AND HEATING?: NOT VERY HARD

## 2024-04-10 ASSESSMENT — PATIENT HEALTH QUESTIONNAIRE - PHQ9
SUM OF ALL RESPONSES TO PHQ QUESTIONS 1-9: 0
1. LITTLE INTEREST OR PLEASURE IN DOING THINGS: NOT AT ALL
SUM OF ALL RESPONSES TO PHQ QUESTIONS 1-9: 0
2. FEELING DOWN, DEPRESSED OR HOPELESS: NOT AT ALL
SUM OF ALL RESPONSES TO PHQ9 QUESTIONS 1 & 2: 0
SUM OF ALL RESPONSES TO PHQ QUESTIONS 1-9: 0
SUM OF ALL RESPONSES TO PHQ QUESTIONS 1-9: 0

## 2024-04-10 NOTE — PROGRESS NOTES
SUBJECTIVE   Ff-up      HTN- continues to take norvasc 7.5 mg. She continues to smoke. Declines lipid/DEXA checks.    Does not check bp at home, reports normotensive readings with doctor visits     Depression- about the same, fairly  well on zoloft      Non small cell lung cancer/iron def anemia-dx 2015, following dr. Li.   Reports recent diagnosis of R subclavian vein thrombus, on eliquis    Also with increase sob, wheezing and cough past 2 months. Reviewed CT Chest 1/2024/Echo 2/2024 ordered by dr. Li. No new pulm nodules, + emphysema,  EF 55-60%, no significant valve disease.  She has upcoming new pulm appt with dr. Ames July  Receives decadron pre chemo, says glucose levels have been satisfactory. RBG 3/24 was <100     Hypothyroidism- taking levothyroxine, per pt thyroid function tests are checked by oncology, advised refill of meds from them as well      ROS:   See HPI, all others negative       Social Determinants of Health            Financial Resource Strain: Not on file     Food Insecurity: Not on file     Transportation Needs: Not on file     Physical Activity: Not on file     Stress: Not on file     Social Connections: Not on file     Intimate Partner Violence: Not on file       Housing Stability: Not on file                OBJECTIVE      Physical Exam:       BP (!) 110/58 (Site: Right Upper Arm, Position: Sitting, Cuff Size: Medium Adult)   Pulse 52   Temp 98.2 °F (36.8 °C) (Tympanic)   Resp 16   Ht 1.702 m (5' 7\")   Wt 58.1 kg (128 lb)   SpO2 (!) 80%   BMI 20.05 kg/m²         General: alert, chronically ill- appearing, AA, in no apparent distress or pain   CVS: normal rate, regular rhythm, distinct S1 and S2   Lungs:+ diffuse expiratory wheeze   Abdomen: normoactive bowel sounds, soft, non-tender   Extremities: no edema, no cyanosis, MSK grossly normal   Skin: warm, no lesions, rashes noted   Psych:  mood and affect normal            ASSESSMENT/PLAN   Diagnoses and all orders for

## 2024-04-10 NOTE — PROGRESS NOTES
\"Have you been to the ER, urgent care clinic since your last visit?  Hospitalized since your last visit?\"    NO    “Have you seen or consulted any other health care providers outside of [facility] since your last visit?”    Dr. Guillermo vincentlogjose luis             Click Here for Release of Records Request

## 2024-04-10 NOTE — PROGRESS NOTES
[facility] Progress Note    Date: April 10, 2024    Name: Rina Prajapati    MRN: [id number]         : 1960    Weekly PICC care     Ms. Prajapati to [facility], ambulatory at 1100. Pt was assessed and education was provided. Denies pain. No acute distress noted.      Ms. Prajapati's vitals were reviewed.  Vitals:    04/10/24 1104   BP: 113/74   Pulse: (!) 111   Temp: 98.1 °F (36.7 °C)   SpO2: 90%       Left arm double lumen picc line with drsg d/I. Brisk flush and blood returns noted from each lumen. End caps changed. PICC dressing and stat lock changed per protocol. No redness, streaking, warmth, swelling or drainage noted at site. Lumens wrapped with coban. Both lumens flushed with 20 ml NS and 500 units heparin each. Green curos caps applied to both lumens. Pt given new stockinette to protect dressing.     Ms. Prajapati tolerated procedure, and had no complaints.    Patient armband removed and shredded.    Ms. Prajapati was [facility] in stable condition at 1130. She is to return on 24 at 0900 for her next appointment.    VALENTE GALLAGHER RN  April 10, 2024  11:43 AM

## 2024-04-16 RX ORDER — EPINEPHRINE 1 MG/ML
0.3 INJECTION, SOLUTION, CONCENTRATE INTRAVENOUS PRN
Status: CANCELLED | OUTPATIENT
Start: 2024-04-19

## 2024-04-16 RX ORDER — PALONOSETRON 0.05 MG/ML
0.25 INJECTION, SOLUTION INTRAVENOUS ONCE
Status: CANCELLED | OUTPATIENT
Start: 2024-04-19 | End: 2024-04-18

## 2024-04-16 RX ORDER — HYDROCORTISONE SODIUM SUCCINATE 100 MG/2ML
100 INJECTION INTRAMUSCULAR; INTRAVENOUS
Status: CANCELLED | OUTPATIENT
Start: 2024-04-19

## 2024-04-16 RX ORDER — SODIUM CHLORIDE 0.9 % (FLUSH) 0.9 %
5-40 SYRINGE (ML) INJECTION PRN
Status: CANCELLED | OUTPATIENT
Start: 2024-04-19

## 2024-04-16 RX ORDER — ACETAMINOPHEN 325 MG/1
650 TABLET ORAL
Status: CANCELLED | OUTPATIENT
Start: 2024-04-19

## 2024-04-16 RX ORDER — SODIUM CHLORIDE 9 MG/ML
5-250 INJECTION, SOLUTION INTRAVENOUS PRN
Status: CANCELLED | OUTPATIENT
Start: 2024-04-19

## 2024-04-16 RX ORDER — SODIUM CHLORIDE 9 MG/ML
INJECTION, SOLUTION INTRAVENOUS CONTINUOUS
Status: CANCELLED | OUTPATIENT
Start: 2024-04-19

## 2024-04-16 RX ORDER — HEPARIN 100 UNIT/ML
500 SYRINGE INTRAVENOUS PRN
Status: CANCELLED | OUTPATIENT
Start: 2024-04-19

## 2024-04-16 RX ORDER — ONDANSETRON 2 MG/ML
8 INJECTION INTRAMUSCULAR; INTRAVENOUS
Status: CANCELLED | OUTPATIENT
Start: 2024-04-19

## 2024-04-16 RX ORDER — DEXAMETHASONE SODIUM PHOSPHATE 4 MG/ML
8 INJECTION, SOLUTION INTRA-ARTICULAR; INTRALESIONAL; INTRAMUSCULAR; INTRAVENOUS; SOFT TISSUE ONCE
Status: CANCELLED | OUTPATIENT
Start: 2024-04-19 | End: 2024-04-18

## 2024-04-16 RX ORDER — MEPERIDINE HYDROCHLORIDE 25 MG/ML
12.5 INJECTION INTRAMUSCULAR; INTRAVENOUS; SUBCUTANEOUS PRN
Status: CANCELLED | OUTPATIENT
Start: 2024-04-19

## 2024-04-16 RX ORDER — DIPHENHYDRAMINE HYDROCHLORIDE 50 MG/ML
50 INJECTION, SOLUTION INTRAMUSCULAR; INTRAVENOUS
Status: CANCELLED | OUTPATIENT
Start: 2024-04-19

## 2024-04-16 RX ORDER — ALBUTEROL SULFATE 90 UG/1
4 INHALANT RESPIRATORY (INHALATION) PRN
Status: CANCELLED | OUTPATIENT
Start: 2024-04-19

## 2024-04-17 ENCOUNTER — HOSPITAL ENCOUNTER (EMERGENCY)
Facility: HOSPITAL | Age: 64
Discharge: HOME | End: 2024-04-17
Attending: EMERGENCY MEDICINE
Payer: MEDICARE

## 2024-04-17 ENCOUNTER — HOSPITAL ENCOUNTER (OUTPATIENT)
Facility: HOSPITAL | Age: 64
Setting detail: INFUSION SERIES
Discharge: HOME | End: 2024-04-20
Payer: MEDICARE

## 2024-04-17 VITALS
TEMPERATURE: 98.2 F | DIASTOLIC BLOOD PRESSURE: 74 MMHG | HEIGHT: 65 IN | HEART RATE: 94 BPM | WEIGHT: 130 LBS | SYSTOLIC BLOOD PRESSURE: 147 MMHG | RESPIRATION RATE: 16 BRPM | BODY MASS INDEX: 21.66 KG/M2 | OXYGEN SATURATION: 100 %

## 2024-04-17 VITALS
HEART RATE: 93 BPM | SYSTOLIC BLOOD PRESSURE: 139 MMHG | TEMPERATURE: 98.3 F | WEIGHT: 132.6 LBS | RESPIRATION RATE: 24 BRPM | BODY MASS INDEX: 20.77 KG/M2 | DIASTOLIC BLOOD PRESSURE: 83 MMHG | OXYGEN SATURATION: 97 %

## 2024-04-17 DIAGNOSIS — Z71.1 FEARED CONDITION NOT DEMONSTRATED: Primary | ICD-10-CM

## 2024-04-17 DIAGNOSIS — C34.90 MALIGNANT NEOPLASM OF LUNG, UNSPECIFIED LATERALITY, UNSPECIFIED PART OF LUNG (HCC): ICD-10-CM

## 2024-04-17 LAB
ALBUMIN SERPL-MCNC: 3.2 G/DL (ref 3.4–5)
ALBUMIN/GLOB SERPL: 1.1 (ref 0.8–1.7)
ALP SERPL-CCNC: 65 U/L (ref 45–117)
ALT SERPL-CCNC: 16 U/L (ref 13–56)
ANION GAP SERPL CALC-SCNC: 5 MMOL/L (ref 3–18)
AST SERPL-CCNC: 12 U/L (ref 10–38)
BASO+EOS+MONOS # BLD AUTO: 0.7 K/UL (ref 0–2.3)
BASO+EOS+MONOS NFR BLD AUTO: 10 % (ref 0.1–17)
BILIRUB SERPL-MCNC: 0.4 MG/DL (ref 0.2–1)
BUN SERPL-MCNC: 10 MG/DL (ref 7–18)
BUN/CREAT SERPL: 19 (ref 12–20)
CALCIUM SERPL-MCNC: 8.8 MG/DL (ref 8.5–10.1)
CHLORIDE SERPL-SCNC: 106 MMOL/L (ref 100–111)
CO2 SERPL-SCNC: 31 MMOL/L (ref 21–32)
CREAT SERPL-MCNC: 0.54 MG/DL (ref 0.6–1.3)
DIFFERENTIAL METHOD BLD: ABNORMAL
ERYTHROCYTE [DISTWIDTH] IN BLOOD BY AUTOMATED COUNT: 21.7 % (ref 11.5–14.5)
GLOBULIN SER CALC-MCNC: 3 G/DL (ref 2–4)
GLUCOSE SERPL-MCNC: 89 MG/DL (ref 74–99)
HCT VFR BLD AUTO: 38.6 % (ref 36–48)
HGB BLD-MCNC: 10.8 G/DL (ref 12–16)
LYMPHOCYTES # BLD: 1.1 K/UL (ref 1.1–5.9)
LYMPHOCYTES NFR BLD: 17 % (ref 14–44)
MCH RBC QN AUTO: 22.9 PG (ref 25–35)
MCHC RBC AUTO-ENTMCNC: 28 G/DL (ref 31–37)
MCV RBC AUTO: 81.8 FL (ref 78–102)
NEUTS SEG # BLD: 4.9 K/UL (ref 1.8–9.5)
NEUTS SEG NFR BLD: 73 % (ref 40–70)
PLATELET # BLD AUTO: 371 K/UL (ref 140–440)
POTASSIUM SERPL-SCNC: 3 MMOL/L (ref 3.5–5.5)
PROT SERPL-MCNC: 6.2 G/DL (ref 6.4–8.2)
RBC # BLD AUTO: 4.72 M/UL (ref 4.1–5.1)
SODIUM SERPL-SCNC: 142 MMOL/L (ref 136–145)
WBC # BLD AUTO: 6.7 K/UL (ref 4.5–13)

## 2024-04-17 PROCEDURE — 99212 OFFICE O/P EST SF 10 MIN: CPT

## 2024-04-17 PROCEDURE — 2700000000 HC OXYGEN THERAPY PER DAY

## 2024-04-17 PROCEDURE — 2580000003 HC RX 258: Performed by: INTERNAL MEDICINE

## 2024-04-17 PROCEDURE — 6360000002 HC RX W HCPCS: Performed by: INTERNAL MEDICINE

## 2024-04-17 PROCEDURE — 85025 COMPLETE CBC W/AUTO DIFF WBC: CPT

## 2024-04-17 PROCEDURE — 99282 EMERGENCY DEPT VISIT SF MDM: CPT

## 2024-04-17 PROCEDURE — 36415 COLL VENOUS BLD VENIPUNCTURE: CPT

## 2024-04-17 PROCEDURE — 6360000002 HC RX W HCPCS

## 2024-04-17 PROCEDURE — 96523 IRRIG DRUG DELIVERY DEVICE: CPT

## 2024-04-17 PROCEDURE — 80053 COMPREHEN METABOLIC PANEL: CPT

## 2024-04-17 RX ORDER — HEPARIN SODIUM (PORCINE) LOCK FLUSH IV SOLN 100 UNIT/ML 100 UNIT/ML
500 SOLUTION INTRAVENOUS PRN
Status: DISCONTINUED | OUTPATIENT
Start: 2024-04-17 | End: 2024-04-21 | Stop reason: HOSPADM

## 2024-04-17 RX ORDER — SODIUM CHLORIDE 0.9 % (FLUSH) 0.9 %
5-40 SYRINGE (ML) INJECTION PRN
Status: DISCONTINUED | OUTPATIENT
Start: 2024-04-17 | End: 2024-04-21 | Stop reason: HOSPADM

## 2024-04-17 RX ORDER — HEPARIN 100 UNIT/ML
SYRINGE INTRAVENOUS
Status: COMPLETED
Start: 2024-04-17 | End: 2024-04-17

## 2024-04-17 RX ADMIN — HEPARIN 500 UNITS: 100 SYRINGE at 09:48

## 2024-04-17 RX ADMIN — SODIUM CHLORIDE, PRESERVATIVE FREE 20 ML: 5 INJECTION INTRAVENOUS at 09:50

## 2024-04-17 RX ADMIN — HEPARIN 500 UNITS: 100 SYRINGE at 09:50

## 2024-04-17 RX ADMIN — SODIUM CHLORIDE, PRESERVATIVE FREE 20 ML: 5 INJECTION INTRAVENOUS at 09:48

## 2024-04-17 ASSESSMENT — PAIN - FUNCTIONAL ASSESSMENT
PAIN_FUNCTIONAL_ASSESSMENT: NONE - DENIES PAIN
PAIN_FUNCTIONAL_ASSESSMENT: NONE - DENIES PAIN

## 2024-04-17 NOTE — PROGRESS NOTES
[facility] Progress Note    Date: 2024    Name: Rina Prajapati    MRN: [id number]         : 1960    Ms. Prajapati arrived in the [facility] today at 0925, in stable condition, here for her WEEKLY PICC LINE CARE & PRE-CHEMO LABS. She was assessed and education was provided.     Ms. Prajapati's vitals were reviewed.  Vitals:    24 0925   BP: 138/66   Pulse: 100   Resp: 24   Temp: 98.3 °F (36.8 °C)   SpO2: (!) 88%         WEIGHT TODAY 132.6 LBS (60.1 KG)        Ms. Prajapati presented to the infusion center today stating that she wasn't feeling that well. She stated that she had been to her PCP on 4-10-24, and her PCP had put her on antibiotics because her \"chest was tight\". She stated that she didn't know the name of the antibiotic, but she stated that she completed the antibiotic on Monday, 4-15-24.   (After looking at the completed progress note from Dr. Cora Her, Family Medicine, from 4-10-24, it was noted that Dr. Her documented that Ms. Prajapati was given Azithromycin (Zithromax) 500 mg PO on day 1, followed by 250 mg on days 2-5. And then, Medrol Dose Pack 4 mg.)      The above documented very low room oxygen saturation of 88% was reviewed.     It was also noted that Ms. Prajapati was really short of breath, had a congested sounding wet cough, and had audible wheezes.             HER LEFT UPPER ARM DOUBLE LUMEN PICC LINE WAS NOTED TO BE COMPLETELY DRY AND INTACT, AND BOTH LUMENS FLUSHED EASILY WITH NS, AND BOTH LUMENS OF THE PICC LINE HAD A BRISK BLOOD RETURN, BUT NOT ENOUGH BLOOD COULD BE OBTAINED TO DRAW LABS. .         Her PICC line dressing was changed today per policy, and without incident, then both lumens of her PICC line were flushed well per policy with NS & Heparin, and then the end caps were also changed, and alcohol caps applied.        BLOOD FOR THE ORDERED PRE-CHEMO LABS (CBC & CMP) WAS DRAWN FROM HER LEFT POSTERIOR WRIST AT 1010 WITHOUT INCIDENT.     The CBC was processed on site, with  results listed below, and the CMP was sent out by  to the [facility] lab for processing.       Results for orders placed or performed during the hospital encounter of 04/17/24   CBC with Partial Differential   Result Value Ref Range    WBC 6.7 4.5 - 13.0 K/uL    RBC 4.72 4.10 - 5.10 M/uL    Hemoglobin 10.8 (L) 12.0 - 16.0 g/dL    Hematocrit 38.6 36 - 48 %    MCV 81.8 78 - 102 FL    MCH 22.9 (L) 25.0 - 35.0 PG    MCHC 28.0 (L) 31 - 37 g/dL    RDW 21.7 (H) 11.5 - 14.5 %    Platelets 371 140 - 440 K/uL    Neutrophils % 73 (H) 40 - 70 %    Mixed Cells 10 0.1 - 17 %    Lymphocytes % 17 14 - 44 %    Neutrophils Absolute 4.9 1.8 - 9.5 K/UL    ABSOLUTE MIXED CELLS 0.7 0.0 - 2.3 K/uL    Lymphocytes Absolute 1.1 1.1 - 5.9 K/UL    Differential Type AUTOMATED               So, by the completion of her PICC care and labs documented above, her room air saturation was still very low at 85 % at 1000.    So at 1015, Oxygen @ 2 Liters per NC was applied, and Ms. Prajapati was instructed to just sit quietly for awhile, to see if her oxygen sat would come up, and she agreed.       By 1040, her oxygen sat on 2 Liters Oxygen was 95 %, and by 1055, her sat was up to 97 %.     However, I called and spoke with Supa George RN, at Ms. Prajapati's referring providers office (Dr. Jameel Li) at 1100, and explained to her that Ms. Prajapati's room air sat was quite low, but that we had been able to get her sat up to normal with oxygen as documented above. I asked nurse Cowart whether we should proceed tomorrow with chemo, and how we should proceed in regards to her low room air oxygen level. And after consulting with Dr. Li, Supa stated that we should send Ms. Prajapati to the ED for further evaluation and treatment. And then they would decide what to do about her chemo tomorrow, based on the results of her ED visit.       After talking with nurse Cowart @ Dr. Li's office, I explained to Ms. Prajapati, that she would need to report

## 2024-04-17 NOTE — DISCHARGE INSTRUCTIONS
Thank you!  Thank you for allowing me to care for you in the emergency department. It is my goal to provide you with excellent care.  You came to the emergency department over concerns for hypoxia however after monitoring in the emergency department you has had no episodes of hypoxia. please fill out the survey that will come to you by mail or email since we listen to your feedback!       Labs      Radiologic Studies  No orders to display     ------------------------------------------------------------------------------------------------------------  The exam and treatment you received in the Emergency Department were for an urgent problem and are not intended as complete care. It is important that you follow-up with a doctor, nurse practitioner, or physician assistant to:  (1) confirm your diagnosis,  (2) re-evaluation of changes in your illness and treatment, and (3) for ongoing care. Please take your discharge instructions with you when you go to your follow-up appointment.     If you have any problem arranging a follow-up appointment, contact the Emergency Department.  If your symptoms become worse or you do not improve as expected and you are unable to reach your health care provider, please return to the Emergency Department. We are available 24 hours a day.     If a prescription has been provided, please have it filled as soon as possible to prevent a delay in treatment. If you have any questions or reservations about taking the medication due to side effects or interactions with other medications, please call your primary care provider or contact the ER.

## 2024-04-17 NOTE — PROGRESS NOTES
[facility] Progress Note    Date: 2024    Name: Rina Prajapati    MRN: [id number]         : 1960        The following documentation is a follow up to my previous note from today:          Ms. Prajapati was discharged from the [facility] earlier today at 1130, with very specific instructions to report immediately to [facility] ED for further evaluation and treatment, which she at that time, had agreed to do.     However, by 1240, it was noted, after reviewing her electronic record, that Ms. Prajapati had NOT reported to the [facility] ED as instructed. So, I called her, and asked if she was OK, and asked why she had not yet reported to the [facility] ED as we had discussed. And, she stated that she had decided to go home, and wait for her significant other to go with her to the ED. So, I again explained how very important and urgent it was for her to report to the [facility] ED for further evaluation and treatment. And she stated that her significant other was on his way home to her, and that after he got home, they would proceed directly to the [facility] ED.       So, as of 155 today, it was noted that Ms. Prajapati had still NOT reported to the emergency room for evaluation. So, I called her once again, and she stated that she was still waiting for her boyfriend//significant other to get home, and he was going to take her to the ER at that time. She promised me that she would go to [facility] ER sometime this afternoon//evening, as soon has her boyfriend got home.       Results for orders placed or performed during the hospital encounter of 24   CBC with Partial Differential   Result Value Ref Range    WBC 6.7 4.5 - 13.0 K/uL    RBC 4.72 4.10 - 5.10 M/uL    Hemoglobin 10.8 (L) 12.0 - 16.0 g/dL    Hematocrit 38.6 36 - 48 %    MCV 81.8 78 - 102 FL    MCH 22.9 (L) 25.0 - 35.0 PG    MCHC 28.0 (L) 31 - 37 g/dL    RDW 21.7 (H) 11.5 - 14.5 %    Platelets 371 140 - 440 K/uL    Neutrophils % 73 (H) 40 - 70 %    Mixed Cells 10 0.1 - 17 %    Lymphocytes % 17 14

## 2024-04-17 NOTE — ED PROVIDER NOTES
EMERGENCY DEPARTMENT HISTORY AND PHYSICAL EXAM      Date: 4/17/2024  Patient Name: Rina Prajapati    History of Presenting Illness     Chief Complaint   Patient presents with    hypoxia       History Provided By: Patient    HPI: Rina Prajapati, 63 y.o. female with PMHx presents the emergency department for evaluation of hypoxia.  Patient sent from her oncology office with reports of hypoxia, otherwise patient has no acute complaints at this time and feels at her baseline, only sent here for evaluation of the hypoxia.    Pt denies any other alleviating or exacerbating factors. Additionally, pt specifically denies any recent fever, chills, headache, nausea, vomiting, abdominal pain, CP, new or worsening SOB, lightheadedness, dizziness, numbness, weakness, BLE swelling, heart palpitations, urinary sxs, diarrhea, constipation, melena, hematochezia, worsening cough, or congestion.    PCP: Cora Her MD    No current facility-administered medications for this encounter.     Current Outpatient Medications   Medication Sig Dispense Refill    tiotropium (SPIRIVA HANDIHALER) 18 MCG inhalation capsule Inhale 1 capsule into the lungs daily 90 capsule 0    azithromycin (ZITHROMAX) 250 MG tablet 500mg on day 1 followed by 250mg on days 2 - 5 6 tablet 0    montelukast (SINGULAIR) 10 MG tablet Take 1 tablet by mouth daily Take by mouth 30 tablet 11    amLODIPine (NORVASC) 5 MG tablet take 1 tablet by mouth once daily 90 tablet 1    sertraline (ZOLOFT) 50 MG tablet take 1 tablet by mouth once daily 90 tablet 1    cetirizine (ZYRTEC) 10 MG tablet take 1 tablet by mouth once daily 90 tablet 3    apixaban (ELIQUIS) 5 MG TABS tablet Take 1 tablet by mouth 2 times daily      potassium chloride (KLOR-CON M) 20 MEQ extended release tablet Take 1 tablet by mouth daily Ms. Prajapati stated that she just picked up a refill of her Potassium tablets on 8-23-23      levothyroxine (SYNTHROID) 88 MCG tablet take 1 tablet by mouth once

## 2024-04-17 NOTE — ED NOTES
Pt in NAD, Discussed DC with the patient and all questions fully answered. Pt denies concerns at this time. Pt ambulated without difficulty to the lobby.

## 2024-04-18 ENCOUNTER — HOSPITAL ENCOUNTER (OUTPATIENT)
Facility: HOSPITAL | Age: 64
Setting detail: INFUSION SERIES
End: 2024-04-18

## 2024-04-18 DIAGNOSIS — C34.90 NON-SMALL CELL LUNG CANCER, UNSPECIFIED LATERALITY (HCC): Primary | ICD-10-CM

## 2024-04-18 NOTE — PROGRESS NOTES
Pharmacy Note     Name: Rina Prajapati  : 1960  Estimated body surface area is 1.64 meters squared as calculated from the following:    Height as of 24: 1.651 m (5' 5\").    Weight as of 24: 59 kg (130 lb).    Diagnosis: SCLC  Treatment Plan: Docetaxel  Cycle/Day: Cycle 19 Day1  Cycle Start Date: 2024    Lab Results   Component Value Date/Time    WBC 6.7 2024 10:10 AM     2024 10:10 AM     No components found for: \"ANEU\"  Lab Results   Component Value Date/Time    CREAPOC 0.7 2021 04:21 PM     Most Recent Creatinine Clearance:  CrCl: 95 mL/min  (based on Adjusted Body Weight)  Creatinine: 0.54 mg/dL (2024 10:10 AM)    Pharmacy Intervention:  Spoke with Shanae Garg NP in regards to patient's most recent ED visit  Office aware that patient has had recent SOB and hypoxic episodes  Referral to pulmonology sent  Proceed with chemo tomorrow as long as patient is stable, vital signs and labs are reviewed  Continue to monitor    Thank you,  Sonia Smith, PharmD, M.S.  Outpatient Clinical Pharmacy Services   [facility]   [facility]   [phone]    Pharmacist: Sonia Smith [facility]

## 2024-04-19 ENCOUNTER — HOSPITAL ENCOUNTER (OUTPATIENT)
Facility: HOSPITAL | Age: 64
Setting detail: INFUSION SERIES
End: 2024-04-19
Payer: MEDICARE

## 2024-04-19 VITALS
HEIGHT: 67 IN | DIASTOLIC BLOOD PRESSURE: 86 MMHG | SYSTOLIC BLOOD PRESSURE: 154 MMHG | TEMPERATURE: 98.5 F | HEART RATE: 111 BPM | OXYGEN SATURATION: 91 % | RESPIRATION RATE: 24 BRPM | WEIGHT: 133.2 LBS | BODY MASS INDEX: 20.91 KG/M2

## 2024-04-19 DIAGNOSIS — C34.90 NON-SMALL CELL LUNG CANCER, UNSPECIFIED LATERALITY (HCC): Primary | ICD-10-CM

## 2024-04-19 PROCEDURE — 6360000002 HC RX W HCPCS: Performed by: INTERNAL MEDICINE

## 2024-04-19 PROCEDURE — 96413 CHEMO IV INFUSION 1 HR: CPT

## 2024-04-19 PROCEDURE — 2580000003 HC RX 258: Performed by: INTERNAL MEDICINE

## 2024-04-19 PROCEDURE — 96375 TX/PRO/DX INJ NEW DRUG ADDON: CPT

## 2024-04-19 PROCEDURE — 96367 TX/PROPH/DG ADDL SEQ IV INF: CPT

## 2024-04-19 PROCEDURE — 96377 APPLICATON ON-BODY INJECTOR: CPT

## 2024-04-19 RX ORDER — DEXAMETHASONE SODIUM PHOSPHATE 4 MG/ML
8 INJECTION, SOLUTION INTRA-ARTICULAR; INTRALESIONAL; INTRAMUSCULAR; INTRAVENOUS; SOFT TISSUE ONCE
Status: COMPLETED | OUTPATIENT
Start: 2024-04-19 | End: 2024-04-19

## 2024-04-19 RX ORDER — PALONOSETRON 0.05 MG/ML
0.25 INJECTION, SOLUTION INTRAVENOUS ONCE
Status: COMPLETED | OUTPATIENT
Start: 2024-04-19 | End: 2024-04-19

## 2024-04-19 RX ORDER — SODIUM CHLORIDE 9 MG/ML
5-250 INJECTION, SOLUTION INTRAVENOUS PRN
Status: ACTIVE | OUTPATIENT
Start: 2024-04-19 | End: 2024-04-20

## 2024-04-19 RX ORDER — SODIUM CHLORIDE 0.9 % (FLUSH) 0.9 %
5-40 SYRINGE (ML) INJECTION PRN
Status: ACTIVE | OUTPATIENT
Start: 2024-04-19 | End: 2024-04-20

## 2024-04-19 RX ORDER — HEPARIN 100 UNIT/ML
500 SYRINGE INTRAVENOUS PRN
Status: DISPENSED | OUTPATIENT
Start: 2024-04-19 | End: 2024-04-20

## 2024-04-19 RX ADMIN — SODIUM CHLORIDE, PRESERVATIVE FREE 10 ML: 5 INJECTION INTRAVENOUS at 09:50

## 2024-04-19 RX ADMIN — HEPARIN 500 UNITS: 100 SYRINGE at 12:48

## 2024-04-19 RX ADMIN — SODIUM CHLORIDE 30 ML/HR: 9 INJECTION, SOLUTION INTRAVENOUS at 09:52

## 2024-04-19 RX ADMIN — DOCETAXEL ANHYDROUS 130 MG: 10 INJECTION, SOLUTION INTRAVENOUS at 11:10

## 2024-04-19 RX ADMIN — HEPARIN 500 UNITS: 100 SYRINGE at 12:50

## 2024-04-19 RX ADMIN — SODIUM CHLORIDE 150 MG: 900 INJECTION, SOLUTION INTRAVENOUS at 10:10

## 2024-04-19 RX ADMIN — PALONOSETRON HYDROCHLORIDE 0.25 MG: 0.25 INJECTION INTRAVENOUS at 10:06

## 2024-04-19 RX ADMIN — SODIUM CHLORIDE, PRESERVATIVE FREE 20 ML: 5 INJECTION INTRAVENOUS at 12:48

## 2024-04-19 RX ADMIN — SODIUM CHLORIDE, PRESERVATIVE FREE 20 ML: 5 INJECTION INTRAVENOUS at 12:50

## 2024-04-19 RX ADMIN — PEGFILGRASTIM 6 MG: KIT SUBCUTANEOUS at 13:00

## 2024-04-19 RX ADMIN — SODIUM CHLORIDE, PRESERVATIVE FREE 10 ML: 5 INJECTION INTRAVENOUS at 09:52

## 2024-04-19 RX ADMIN — DEXAMETHASONE SODIUM PHOSPHATE 8 MG: 4 INJECTION INTRA-ARTICULAR; INTRALESIONAL; INTRAMUSCULAR; INTRAVENOUS; SOFT TISSUE at 10:00

## 2024-04-19 ASSESSMENT — PAIN - FUNCTIONAL ASSESSMENT
PAIN_FUNCTIONAL_ASSESSMENT: NONE - DENIES PAIN
PAIN_FUNCTIONAL_ASSESSMENT: NONE - DENIES PAIN

## 2024-04-19 NOTE — PROGRESS NOTES
[facility] Progress Note    Date: 2024    Name: Rina Prajapati    MRN: [id number]         : 1960    Ms. Prajapati arrived in the [facility] today at 0930, in stable condition, here for CYCLE 19, DAY 1, IV TAXOTERE CHEMOTHERAPY REGIMEN (EVERY 21 DAY CYCLE). She was assessed and education was provided.       THE RAMUCIRUMAB HAS BEEN DISCONTINUED.       Ms. Prajapati's vitals were reviewed.  Vitals:    24 0930   BP: (!) 161/89   Pulse: (!) 102   Resp: 20   Temp: 98.1 °F (36.7 °C)   SpO2: 93%       WEIGHT FROM TODAY WAS  60.4 KG (133.2 LBS)    MOST CURRENT BSA 1.69 (BSA USED FOR CALCULATION 1.71 PER PHARMACY)        Ms. Prajapati presented to the infusion center today stating that she was doing well, and voicing no major complaints. And, she stated that she felt so much better today, than she felt when she was here on Wednesday, 24.       CURRENT AND SIGNED CHEMOTHERAPY CONSENT WAS VIEWED IN HER ELECTRONIC RECORD.       HER PRE-CHEMO LABS OBTAINED ON WEDNESDAY, 24 WERE ALL REVIEWED, AND WERE ALL NOTED TO BE SATISFACTORY FOR TREATMENT TODAY, AND WERE AS FOLLOWS:    Please note however, that the low Potassium level was reported to Supa George RN, at Dr. Li's office on Wednesday, 24. NO NEW ORDERS WERE RECEIVED. However, Ms. Prajapati did say that Supa had called her on  24, and stressed to her the importance of taking her oral Potassium daily as prescribed, and she stated that she verbalized understanding.        Latest Reference Range & Units 24 10:10   Sodium 136 - 145 mmol/L 142   Potassium 3.5 - 5.5 mmol/L 3.0 (L)   Chloride 100 - 111 mmol/L 106   CARBON DIOXIDE 21 - 32 mmol/L 31   BUN,BUNPL 7.0 - 18 MG/DL 10   Creatinine 0.6 - 1.3 MG/DL 0.54 (L)   Bun/Cre Ratio 12 - 20   19   Anion Gap 3.0 - 18 mmol/L 5   Est, Glom Filt Rate >60 ml/min/1.73m2 >90   Glucose, Random 74 - 99 mg/dL 89   Calcium, Total 8.5 - 10.1 MG/DL 8.8   ALBUMIN/GLOBULIN RATIO 0.8 - 1.7   1.1   Total  complaints.     Ms. Prajapati was discharged from [facility] in stable condition at 1310.     She is to return on next Wednesday, 4-24-24 at 1000, for her next appointment, for weekly PICC CARE.     She also has a WEEKLY PICC CARE appointment scheduled on Wednesday, 5-1-24 @ 0900.        And then, she is scheduled to return on Wednesday, 5-8-24 at 0900, for PICC CARE & PRE-CHEMO LABS.     And then, she is scheduled to return in 3 weeks, on Thursday, 5-9-24 at 0900, for her next cycle of chemotherapy (Cycle 20).       Also, Ms. Prajapati stated that she had a consult with a pulmonary physician on Monday, 4-22-24 at 1600.       Jeramie Adams RN  April 19, 2024  9:46 AM

## 2024-04-24 ENCOUNTER — HOSPITAL ENCOUNTER (OUTPATIENT)
Facility: HOSPITAL | Age: 64
Setting detail: INFUSION SERIES
Discharge: HOME | End: 2024-04-27
Payer: MEDICARE

## 2024-04-24 VITALS
TEMPERATURE: 98.3 F | DIASTOLIC BLOOD PRESSURE: 77 MMHG | HEART RATE: 98 BPM | OXYGEN SATURATION: 93 % | SYSTOLIC BLOOD PRESSURE: 116 MMHG | RESPIRATION RATE: 24 BRPM

## 2024-04-24 PROCEDURE — 2580000003 HC RX 258: Performed by: INTERNAL MEDICINE

## 2024-04-24 PROCEDURE — 96523 IRRIG DRUG DELIVERY DEVICE: CPT

## 2024-04-24 PROCEDURE — 6360000002 HC RX W HCPCS: Performed by: INTERNAL MEDICINE

## 2024-04-24 RX ORDER — SODIUM CHLORIDE 0.9 % (FLUSH) 0.9 %
5-40 SYRINGE (ML) INJECTION PRN
Status: DISCONTINUED | OUTPATIENT
Start: 2024-04-24 | End: 2024-04-28 | Stop reason: HOSPADM

## 2024-04-24 RX ORDER — HEPARIN SODIUM (PORCINE) LOCK FLUSH IV SOLN 100 UNIT/ML 100 UNIT/ML
500 SOLUTION INTRAVENOUS PRN
Status: DISCONTINUED | OUTPATIENT
Start: 2024-04-24 | End: 2024-04-28 | Stop reason: HOSPADM

## 2024-04-24 RX ADMIN — SODIUM CHLORIDE, PRESERVATIVE FREE 10 ML: 5 INJECTION INTRAVENOUS at 11:16

## 2024-04-24 RX ADMIN — SODIUM CHLORIDE, PRESERVATIVE FREE 10 ML: 5 INJECTION INTRAVENOUS at 11:15

## 2024-04-24 RX ADMIN — HEPARIN 500 UNITS: 100 SYRINGE at 11:17

## 2024-04-24 NOTE — PROGRESS NOTES
[facility] Progress Note    Date: 2024    Name: Rina Prajapati    MRN: [id number]         : 1960    Weekly PICC care     Ms. Prajapati to [facility], ambulatory at 1105. Pt was assessed and education was provided. Pt states she is doing well.    Ms. Prajapati's vitals were reviewed.    Patient Vitals for the past 12 hrs:   Temp Pulse Resp BP SpO2   24 1107 98.3 °F (36.8 °C) 98 24 116/77 93 %     Left arm double lumen picc line with drsg clean, dry and intact. Flushes easily but NO blood return noted from either lumen. Microclaves and end caps changed. PICC dressing and stat lock changed per protocol. No redness, streaking, warmth, swelling or drainage noted at site. Lumens wrapped with gauze and coban. Both lumens flushed with 10 ml NS and 250 units heparin each. Pt's stockinette re-applied.    Ms. Prajapati tolerated procedure, and had no complaints.    Patient armband removed and shredded.    Ms. Prajapati was discharged from [facility] in stable condition at 1125. She is to return on 24 at 0900 for her next appointment for PICC care.    Nadira Perdomo RN  2024  11:39 AM

## 2024-05-01 ENCOUNTER — HOSPITAL ENCOUNTER (OUTPATIENT)
Facility: HOSPITAL | Age: 64
Setting detail: INFUSION SERIES
Discharge: HOME | End: 2024-05-04
Payer: MEDICARE

## 2024-05-01 VITALS
HEART RATE: 110 BPM | RESPIRATION RATE: 24 BRPM | SYSTOLIC BLOOD PRESSURE: 101 MMHG | DIASTOLIC BLOOD PRESSURE: 65 MMHG | OXYGEN SATURATION: 96 % | TEMPERATURE: 98.2 F

## 2024-05-01 PROCEDURE — 6360000002 HC RX W HCPCS

## 2024-05-01 PROCEDURE — 2580000003 HC RX 258: Performed by: INTERNAL MEDICINE

## 2024-05-01 PROCEDURE — 96523 IRRIG DRUG DELIVERY DEVICE: CPT

## 2024-05-01 PROCEDURE — 6360000002 HC RX W HCPCS: Performed by: INTERNAL MEDICINE

## 2024-05-01 RX ORDER — HEPARIN 100 UNIT/ML
SYRINGE INTRAVENOUS
Status: COMPLETED
Start: 2024-05-01 | End: 2024-05-01

## 2024-05-01 RX ORDER — HEPARIN SODIUM (PORCINE) LOCK FLUSH IV SOLN 100 UNIT/ML 100 UNIT/ML
500 SOLUTION INTRAVENOUS PRN
Status: DISCONTINUED | OUTPATIENT
Start: 2024-05-01 | End: 2024-05-05 | Stop reason: HOSPADM

## 2024-05-01 RX ORDER — SODIUM CHLORIDE 0.9 % (FLUSH) 0.9 %
5-40 SYRINGE (ML) INJECTION PRN
Status: DISCONTINUED | OUTPATIENT
Start: 2024-05-01 | End: 2024-05-05 | Stop reason: HOSPADM

## 2024-05-01 RX ADMIN — HEPARIN 500 UNITS: 100 SYRINGE at 09:35

## 2024-05-01 RX ADMIN — HEPARIN 500 UNITS: 100 SYRINGE at 09:36

## 2024-05-01 RX ADMIN — SODIUM CHLORIDE, PRESERVATIVE FREE 20 ML: 5 INJECTION INTRAVENOUS at 09:36

## 2024-05-01 RX ADMIN — SODIUM CHLORIDE, PRESERVATIVE FREE 20 ML: 5 INJECTION INTRAVENOUS at 09:35

## 2024-05-01 ASSESSMENT — PAIN - FUNCTIONAL ASSESSMENT: PAIN_FUNCTIONAL_ASSESSMENT: NONE - DENIES PAIN

## 2024-05-01 NOTE — PROGRESS NOTES
[facility] Progress Note    Date: May 1, 2024    Name: Rina Prajapati    MRN: [id number]         : 1960    Ms. Prajapati arrived in the [facility] today at 0915, in stable condition, here for her WEEKLY PICC LINE CARE. She was assessed and education was provided.     Ms. Prajapati's vitals were reviewed.  Vitals:    24 0915   BP: 101/65   Pulse: (!) 110   Resp: 24   Temp: 98.2 °F (36.8 °C)   SpO2: 96%       Ms. Prajapati presented to the infusion center today stating that she was doing well, and voicing no major complaints or concerns.              HER LEFT UPPER ARM DOUBLE LUMEN PICC LINE WAS NOTED TO BE COMPLETELY DRY AND INTACT, AND BOTH LUMENS FLUSHED EASILY WITH NS, HOWEVER, NEITHER LUMEN HAD A BLOOD RETURN TODAY.         Her PICC line dressing was changed today per policy, and without incident, then both lumens of her PICC line were flushed well per policy with NS & Heparin, and then the end caps were also changed, and alcohol caps applied.          Ms. Prajapati tolerated well and voiced no complaints.     Ms. Prajapati was discharged from [facility] in stable condition at 1000, with her PICC line completely dry and intact.       She is to return on next Wednesday, 24 at 0900, for her next appointment, for Weekly PICC Care & Pre-Chemo Labs. .       Jeramie Adams RN  May 1, 2024  9:30 AM

## 2024-05-08 ENCOUNTER — HOSPITAL ENCOUNTER (OUTPATIENT)
Facility: HOSPITAL | Age: 64
Setting detail: INFUSION SERIES
Discharge: HOME | End: 2024-05-11
Payer: MEDICARE

## 2024-05-08 VITALS
RESPIRATION RATE: 22 BRPM | BODY MASS INDEX: 20.58 KG/M2 | WEIGHT: 131.4 LBS | TEMPERATURE: 97.7 F | OXYGEN SATURATION: 91 % | DIASTOLIC BLOOD PRESSURE: 65 MMHG | HEART RATE: 101 BPM | SYSTOLIC BLOOD PRESSURE: 109 MMHG

## 2024-05-08 LAB
ALBUMIN SERPL-MCNC: 2.7 G/DL (ref 3.4–5)
ALBUMIN/GLOB SERPL: 0.9 (ref 0.8–1.7)
ALP SERPL-CCNC: 68 U/L (ref 45–117)
ALT SERPL-CCNC: 9 U/L (ref 13–56)
ANION GAP SERPL CALC-SCNC: 5 MMOL/L (ref 3–18)
AST SERPL-CCNC: 10 U/L (ref 10–38)
BASO+EOS+MONOS # BLD AUTO: 0.4 K/UL (ref 0–2.3)
BASO+EOS+MONOS NFR BLD AUTO: 8 % (ref 0.1–17)
BILIRUB SERPL-MCNC: 0.4 MG/DL (ref 0.2–1)
BUN SERPL-MCNC: 5 MG/DL (ref 7–18)
BUN/CREAT SERPL: 9 (ref 12–20)
CALCIUM SERPL-MCNC: 8.4 MG/DL (ref 8.5–10.1)
CHLORIDE SERPL-SCNC: 104 MMOL/L (ref 100–111)
CO2 SERPL-SCNC: 31 MMOL/L (ref 21–32)
CREAT SERPL-MCNC: 0.53 MG/DL (ref 0.6–1.3)
DIFFERENTIAL METHOD BLD: ABNORMAL
ERYTHROCYTE [DISTWIDTH] IN BLOOD BY AUTOMATED COUNT: 21.1 % (ref 11.5–14.5)
GLOBULIN SER CALC-MCNC: 3 G/DL (ref 2–4)
GLUCOSE SERPL-MCNC: 75 MG/DL (ref 74–99)
HCT VFR BLD AUTO: 34.8 % (ref 36–48)
HGB BLD-MCNC: 9.9 G/DL (ref 12–16)
LYMPHOCYTES # BLD: 0.8 K/UL (ref 1.1–5.9)
LYMPHOCYTES NFR BLD: 16 % (ref 14–44)
MCH RBC QN AUTO: 23.3 PG (ref 25–35)
MCHC RBC AUTO-ENTMCNC: 28.4 G/DL (ref 31–37)
MCV RBC AUTO: 81.9 FL (ref 78–102)
NEUTS SEG # BLD: 3.6 K/UL (ref 1.8–9.5)
NEUTS SEG NFR BLD: 76 % (ref 40–70)
PLATELET # BLD AUTO: 318 K/UL (ref 140–440)
POTASSIUM SERPL-SCNC: 3.1 MMOL/L (ref 3.5–5.5)
PROT SERPL-MCNC: 5.7 G/DL (ref 6.4–8.2)
RBC # BLD AUTO: 4.25 M/UL (ref 4.1–5.1)
SODIUM SERPL-SCNC: 140 MMOL/L (ref 136–145)
WBC # BLD AUTO: 4.8 K/UL (ref 4.5–13)

## 2024-05-08 PROCEDURE — 2580000003 HC RX 258: Performed by: INTERNAL MEDICINE

## 2024-05-08 PROCEDURE — 6360000002 HC RX W HCPCS: Performed by: INTERNAL MEDICINE

## 2024-05-08 PROCEDURE — 80053 COMPREHEN METABOLIC PANEL: CPT

## 2024-05-08 PROCEDURE — 36415 COLL VENOUS BLD VENIPUNCTURE: CPT

## 2024-05-08 PROCEDURE — 85025 COMPLETE CBC W/AUTO DIFF WBC: CPT

## 2024-05-08 PROCEDURE — 96523 IRRIG DRUG DELIVERY DEVICE: CPT

## 2024-05-08 RX ORDER — SODIUM CHLORIDE 9 MG/ML
INJECTION, SOLUTION INTRAVENOUS CONTINUOUS
Status: CANCELLED | OUTPATIENT
Start: 2024-05-09

## 2024-05-08 RX ORDER — ACETAMINOPHEN 325 MG/1
650 TABLET ORAL
Status: CANCELLED | OUTPATIENT
Start: 2024-05-09

## 2024-05-08 RX ORDER — EPINEPHRINE 1 MG/ML
0.3 INJECTION, SOLUTION, CONCENTRATE INTRAVENOUS PRN
Status: CANCELLED | OUTPATIENT
Start: 2024-05-09

## 2024-05-08 RX ORDER — ALBUTEROL SULFATE 90 UG/1
4 AEROSOL, METERED RESPIRATORY (INHALATION) PRN
Status: CANCELLED | OUTPATIENT
Start: 2024-05-09

## 2024-05-08 RX ORDER — HEPARIN SODIUM (PORCINE) LOCK FLUSH IV SOLN 100 UNIT/ML 100 UNIT/ML
500 SOLUTION INTRAVENOUS PRN
Status: DISCONTINUED | OUTPATIENT
Start: 2024-05-08 | End: 2024-05-12 | Stop reason: HOSPADM

## 2024-05-08 RX ORDER — MEPERIDINE HYDROCHLORIDE 25 MG/ML
12.5 INJECTION INTRAMUSCULAR; INTRAVENOUS; SUBCUTANEOUS PRN
Status: CANCELLED | OUTPATIENT
Start: 2024-05-09

## 2024-05-08 RX ORDER — DIPHENHYDRAMINE HYDROCHLORIDE 50 MG/ML
50 INJECTION INTRAMUSCULAR; INTRAVENOUS
Status: CANCELLED | OUTPATIENT
Start: 2024-05-09

## 2024-05-08 RX ORDER — ONDANSETRON 2 MG/ML
8 INJECTION INTRAMUSCULAR; INTRAVENOUS
Status: CANCELLED | OUTPATIENT
Start: 2024-05-09

## 2024-05-08 RX ORDER — SODIUM CHLORIDE 9 MG/ML
5-250 INJECTION, SOLUTION INTRAVENOUS PRN
Status: CANCELLED | OUTPATIENT
Start: 2024-05-09

## 2024-05-08 RX ORDER — SODIUM CHLORIDE 0.9 % (FLUSH) 0.9 %
5-40 SYRINGE (ML) INJECTION PRN
Status: DISCONTINUED | OUTPATIENT
Start: 2024-05-08 | End: 2024-05-12 | Stop reason: HOSPADM

## 2024-05-08 RX ADMIN — HEPARIN 500 UNITS: 100 SYRINGE at 09:33

## 2024-05-08 RX ADMIN — SODIUM CHLORIDE, PRESERVATIVE FREE 10 ML: 5 INJECTION INTRAVENOUS at 09:31

## 2024-05-08 RX ADMIN — SODIUM CHLORIDE, PRESERVATIVE FREE 10 ML: 5 INJECTION INTRAVENOUS at 09:30

## 2024-05-08 NOTE — PROGRESS NOTES
[facility] Progress Note    Date: May 8, 2024    Name: Rina Prajapati    MRN: [id number]         : 1960    Weekly PICC care and pre-chemo labs    Ms Prajapati arrived to [facility], ambulatory at 0915. Pt was assessed and education was provided. Pt states she is doing fine.    Ms. Prajapati's vitals were reviewed.    Patient Vitals for the past 12 hrs:   Temp Pulse Resp BP SpO2   24 0919 97.7 °F (36.5 °C) (!) 101 22 109/65 91 %     Left arm double lumen picc line with drsg clean, dry and intact. Flushes easily with no blood return noted in both lumens. Microclaves and end caps changed. PICC dressing and stat lock changed per protocol. No redness, streaking, warmth, swelling or drainage noted at site. Lumens wrapped with gauze and coban. Both lumens flushed with 10 ml NS and 250 units heparin each. Pt's stockinette re-applied.    Ms. Prajapati tolerated procedure, and had no complaints.    Blood for labs (CBC, CMP) drawn via butterfly needle to left hand x 1 attempt. Site dressed with gauze and coban. Pt tolerated well.    Patient armband removed and shredded.    Ms. Prajapati was discharged from [facility] in stable condition at 0945. She is to return on 24 at 0900 for her next appointment for Docetaxel and Neulasta.    Nadira Perdomo RN  May 8, 2024  9:58 AM

## 2024-05-08 NOTE — PROGRESS NOTES
Pharmacy Note     Name: Rina Prajapati  : 1960  Estimated body surface area is 1.68 meters squared as calculated from the following:    Height as of 24: 1.702 m (5' 7\").    Weight as of 24: 59.6 kg (131 lb 6.4 oz).    Treatment Plan: Docetaxel  Cycle/Day: Cycle 20D1  Cycle Start Date: 2024    Lab Results   Component Value Date/Time    WBC 4.8 2024 09:40 AM     2024 09:40 AM     No components found for: \"ANEU\"  Lab Results   Component Value Date/Time    CREAPOC 0.7 2021 04:21 PM     Most Recent Creatinine Clearance:  CrCl: 100 mL/min  (based on Adjusted Body Weight)  Creatinine: 0.53 mg/dL (2024 09:40 AM)    Pharmacy Intervention:  Per Deidre on behalf of Dr. Li, proceed with chemotherapy treatment tomorrow  Patient is a day early for Docetaxel but ok to treat  Continue to monitor    Thank you,  Sonia Smith, PharmD, M.S.  Outpatient Clinical Pharmacy Services   [facility]   [facility]   [phone]    Pharmacist: Sonia Smith [facility]

## 2024-05-09 ENCOUNTER — HOSPITAL ENCOUNTER (OUTPATIENT)
Facility: HOSPITAL | Age: 64
Setting detail: INFUSION SERIES
End: 2024-05-09
Payer: MEDICARE

## 2024-05-09 VITALS
TEMPERATURE: 98.1 F | DIASTOLIC BLOOD PRESSURE: 87 MMHG | SYSTOLIC BLOOD PRESSURE: 139 MMHG | HEART RATE: 102 BPM | RESPIRATION RATE: 28 BRPM | OXYGEN SATURATION: 100 %

## 2024-05-09 DIAGNOSIS — C34.90 NON-SMALL CELL LUNG CANCER, UNSPECIFIED LATERALITY (HCC): Primary | ICD-10-CM

## 2024-05-09 PROCEDURE — 96375 TX/PRO/DX INJ NEW DRUG ADDON: CPT

## 2024-05-09 PROCEDURE — 96367 TX/PROPH/DG ADDL SEQ IV INF: CPT

## 2024-05-09 PROCEDURE — 6360000002 HC RX W HCPCS: Performed by: INTERNAL MEDICINE

## 2024-05-09 PROCEDURE — 2580000003 HC RX 258: Performed by: INTERNAL MEDICINE

## 2024-05-09 PROCEDURE — 96413 CHEMO IV INFUSION 1 HR: CPT

## 2024-05-09 PROCEDURE — 96377 APPLICATON ON-BODY INJECTOR: CPT

## 2024-05-09 RX ORDER — SODIUM CHLORIDE 9 MG/ML
5-250 INJECTION, SOLUTION INTRAVENOUS PRN
Status: ACTIVE | OUTPATIENT
Start: 2024-05-09 | End: 2024-05-10

## 2024-05-09 RX ORDER — PALONOSETRON 0.05 MG/ML
0.25 INJECTION, SOLUTION INTRAVENOUS ONCE
Status: COMPLETED | OUTPATIENT
Start: 2024-05-09 | End: 2024-05-09

## 2024-05-09 RX ORDER — HEPARIN 100 UNIT/ML
500 SYRINGE INTRAVENOUS PRN
Status: DISPENSED | OUTPATIENT
Start: 2024-05-09 | End: 2024-05-10

## 2024-05-09 RX ORDER — DEXAMETHASONE SODIUM PHOSPHATE 4 MG/ML
8 INJECTION, SOLUTION INTRA-ARTICULAR; INTRALESIONAL; INTRAMUSCULAR; INTRAVENOUS; SOFT TISSUE ONCE
Status: COMPLETED | OUTPATIENT
Start: 2024-05-09 | End: 2024-05-09

## 2024-05-09 RX ORDER — SODIUM CHLORIDE 0.9 % (FLUSH) 0.9 %
5-40 SYRINGE (ML) INJECTION PRN
Status: ACTIVE | OUTPATIENT
Start: 2024-05-09 | End: 2024-05-10

## 2024-05-09 RX ADMIN — SODIUM CHLORIDE, PRESERVATIVE FREE 20 ML: 5 INJECTION INTRAVENOUS at 12:26

## 2024-05-09 RX ADMIN — PALONOSETRON HYDROCHLORIDE 0.25 MG: 0.25 INJECTION INTRAVENOUS at 09:56

## 2024-05-09 RX ADMIN — PEGFILGRASTIM 6 MG: KIT SUBCUTANEOUS at 12:30

## 2024-05-09 RX ADMIN — DEXAMETHASONE SODIUM PHOSPHATE 8 MG: 4 INJECTION INTRA-ARTICULAR; INTRALESIONAL; INTRAMUSCULAR; INTRAVENOUS; SOFT TISSUE at 09:50

## 2024-05-09 RX ADMIN — SODIUM CHLORIDE, PRESERVATIVE FREE 20 ML: 5 INJECTION INTRAVENOUS at 09:36

## 2024-05-09 RX ADMIN — SODIUM CHLORIDE, PRESERVATIVE FREE 20 ML: 5 INJECTION INTRAVENOUS at 09:35

## 2024-05-09 RX ADMIN — HEPARIN 500 UNITS: 100 SYRINGE at 12:26

## 2024-05-09 RX ADMIN — DOCETAXEL ANHYDROUS 130 MG: 10 INJECTION, SOLUTION INTRAVENOUS at 11:00

## 2024-05-09 RX ADMIN — SODIUM CHLORIDE 30 ML/HR: 9 INJECTION, SOLUTION INTRAVENOUS at 09:40

## 2024-05-09 RX ADMIN — HEPARIN 500 UNITS: 100 SYRINGE at 12:25

## 2024-05-09 RX ADMIN — SODIUM CHLORIDE, PRESERVATIVE FREE 20 ML: 5 INJECTION INTRAVENOUS at 12:25

## 2024-05-09 RX ADMIN — SODIUM CHLORIDE 150 MG: 900 INJECTION, SOLUTION INTRAVENOUS at 10:00

## 2024-05-09 ASSESSMENT — PAIN - FUNCTIONAL ASSESSMENT
PAIN_FUNCTIONAL_ASSESSMENT: NONE - DENIES PAIN
PAIN_FUNCTIONAL_ASSESSMENT: NONE - DENIES PAIN

## 2024-05-09 NOTE — PROGRESS NOTES
[facility] Progress Note    Date: May 9, 2024    Name: Rina Prajapati    MRN: [id number]         : 1960    Ms. Prajapati arrived in the [facility] today at 0915, in stable condition, here for CYCLE 20, DAY 1, IV TAXOTERE CHEMOTHERAPY REGIMEN (EVERY 21 DAY CYCLE). She was assessed and education was provided.       THE RAMUCIRUMAB HAS BEEN DISCONTINUED.       Ms. Prajapati's vitals were reviewed.  Vitals:    24 0915   BP: 126/78   Pulse: 91   Resp: 24   Temp: 97.6 °F (36.4 °C)   SpO2: 93%       WEIGHT FROM YESTERDAY WAS  59.6 KG     MOST CURRENT BSA 1.68 (BSA USED FOR CALCULATION 1.71 PER PHARMACY)        Ms. Prajapati presented to the infusion center today stating that she was doing well, and voicing no major complaints. However, she stated that she was feeling tired today, and wasn't feeling her absolute best, but had no specific complaints.       CURRENT AND SIGNED CHEMOTHERAPY CONSENT WAS VIEWED IN HER ELECTRONIC RECORD.       HER PRE-CHEMO LABS OBTAINED ON YESTERDAY, 24 WERE ALL REVIEWED, AND WERE ALL NOTED TO BE SATISFACTORY FOR TREATMENT TODAY, AND WERE AS FOLLOWS:    Please note however, that the low Potassium level of 3.1 was reviewed. (She has been having a frequently low Potassium level lately, that Dr. Li is already aware of.) But again, I stressed to her the importance of taking her oral Potassium daily as prescribed, and she verbalized understanding.        Latest Reference Range & Units 24 09:40   Sodium 136 - 145 mmol/L 140   Potassium 3.5 - 5.5 mmol/L 3.1 (L)   Chloride 100 - 111 mmol/L 104   CARBON DIOXIDE 21 - 32 mmol/L 31   BUN,BUNPL 7.0 - 18 MG/DL 5 (L)   Creatinine 0.6 - 1.3 MG/DL 0.53 (L)   Bun/Cre Ratio 12 - 20   9 (L)   Anion Gap 3.0 - 18 mmol/L 5   Est, Glom Filt Rate >60 ml/min/1.73m2 >90   Glucose 74 - 99 mg/dL 75   Calcium 8.5 - 10.1 MG/DL 8.4 (L)   Albumin/Globulin Ratio 0.8 - 1.7   0.9   Total Protein 6.4 - 8.2 g/dL 5.7 (L)   Albumin 3.4 - 5.0 g/dL 2.7 (L)   Globulin 2.0 - 4.0  g/dL 3.0   Alkaline Phosphatase 45 - 117 U/L 68   ALT 13 - 56 U/L 9 (L)   AST 10 - 38 U/L 10   Total Bilirubin 0.2 - 1.0 MG/DL 0.4   WBC 4.5 - 13.0 K/uL 4.8   RBC 4.10 - 5.10 M/uL 4.25   Hemoglobin Quant 12.0 - 16.0 g/dL 9.9 (L)   Hematocrit 36 - 48 % 34.8 (L)   MCV 78 - 102 FL 81.9   MCH 25.0 - 35.0 PG 23.3 (L)   MCHC 31 - 37 g/dL 28.4 (L)   RDW 11.5 - 14.5 % 21.1 (H)   Platelet Count 140 - 440 K/uL 318   Neutrophils % 40 - 70 % 76 (H)   Lymphocyte % 14 - 44 % 16   Neutrophils Absolute 1.8 - 9.5 K/UL 3.6   Lymphocytes Absolute 1.1 - 5.9 K/UL 0.8 (L)   Differential Type -   AUTOMATED   (L): Data is abnormally low  (H): Data is abnormally high          Her LEFT UPPER ARM DOUBLE LUMEN PICC LINE WAS NOTED TO BE COMPLETELY DRY AND INTACT, AND BOTH LUMENS FLUSHED VERY EASILY WITH NS, AND BOTH LUMENS HAD A BRISK BLOOD RETURN.            THE RED LUMEN OF HER PICC LINE WAS USED FOR ALL MEDICATION ADMINISTRATION TODAY.           ml IV BAG was initiated to infuse @ [facility] throughout treatment today.       The following pre-medications were administered 30-60 minutes prior to starting the chemotherapy: DECADRON 8 MG IV (she DID take her oral Decadron at home as instructed on yesterday, and is aware to take again tomorrow), EMEND 150 MG IV, & ALOXI 0.25 MG IV.        Docetaxel (TAXOTERE) 130 mg IV (75 mg/m2), was administered per order, over approximately 60 minutes, and without incident.           After completion of the TAXOTERE chemotherapy, both lumens of her PICC line were flushed well with NS & Heparin, and the PICC line was left completely dry and intact. (BOTH LUMENS OF HER PICC LINE HAD A BRISK BLOOD RETURN.)      NEULASTA 6 MG SQ ON BODY INJECTOR, was applied to her LEFT ABDOMEN at 1230. And after application, the green light was noted to be flashing appropriately.           Ms. Prajapati tolerated treatment very well today, and voiced no complaints.     Ms. Prajapati was discharged from [facility] in

## 2024-05-15 ENCOUNTER — HOSPITAL ENCOUNTER (OUTPATIENT)
Facility: HOSPITAL | Age: 64
Setting detail: INFUSION SERIES
Discharge: HOME | End: 2024-05-18
Payer: MEDICARE

## 2024-05-15 VITALS
RESPIRATION RATE: 24 BRPM | SYSTOLIC BLOOD PRESSURE: 124 MMHG | OXYGEN SATURATION: 100 % | DIASTOLIC BLOOD PRESSURE: 80 MMHG | HEART RATE: 110 BPM | TEMPERATURE: 98.1 F

## 2024-05-15 LAB
ALBUMIN SERPL-MCNC: 3 G/DL (ref 3.4–5)
ALBUMIN/GLOB SERPL: 1.2 (ref 0.8–1.7)
ALP SERPL-CCNC: 87 U/L (ref 45–117)
ALT SERPL-CCNC: 9 U/L (ref 13–56)
ANION GAP SERPL CALC-SCNC: 9 MMOL/L (ref 3–18)
AST SERPL-CCNC: 13 U/L (ref 10–38)
BASO+EOS+MONOS # BLD AUTO: 0.7 K/UL (ref 0–2.3)
BASO+EOS+MONOS NFR BLD AUTO: 5 % (ref 0.1–17)
BILIRUB SERPL-MCNC: 0.9 MG/DL (ref 0.2–1)
BUN SERPL-MCNC: 7 MG/DL (ref 7–18)
BUN/CREAT SERPL: 11 (ref 12–20)
CALCIUM SERPL-MCNC: 8.8 MG/DL (ref 8.5–10.1)
CHLORIDE SERPL-SCNC: 100 MMOL/L (ref 100–111)
CO2 SERPL-SCNC: 29 MMOL/L (ref 21–32)
CREAT SERPL-MCNC: 0.65 MG/DL (ref 0.6–1.3)
DIFFERENTIAL METHOD BLD: ABNORMAL
ERYTHROCYTE [DISTWIDTH] IN BLOOD BY AUTOMATED COUNT: 20.6 % (ref 11.5–14.5)
GLOBULIN SER CALC-MCNC: 2.6 G/DL (ref 2–4)
GLUCOSE SERPL-MCNC: 95 MG/DL (ref 74–99)
HCT VFR BLD AUTO: 32.8 % (ref 36–48)
HGB BLD-MCNC: 9.9 G/DL (ref 12–16)
LYMPHOCYTES # BLD: 1 K/UL (ref 1.1–5.9)
LYMPHOCYTES NFR BLD: 7 % (ref 14–44)
MCH RBC QN AUTO: 24.2 PG (ref 25–35)
MCHC RBC AUTO-ENTMCNC: 30.2 G/DL (ref 31–37)
MCV RBC AUTO: 80.2 FL (ref 78–102)
NEUTS SEG # BLD: 11.2 K/UL (ref 1.8–9.5)
NEUTS SEG NFR BLD: 88 % (ref 40–70)
PLATELET # BLD AUTO: 61 K/UL (ref 135–420)
POTASSIUM SERPL-SCNC: 3 MMOL/L (ref 3.5–5.5)
PROT SERPL-MCNC: 5.6 G/DL (ref 6.4–8.2)
RBC # BLD AUTO: 4.09 M/UL (ref 4.1–5.1)
SODIUM SERPL-SCNC: 138 MMOL/L (ref 136–145)
WBC # BLD AUTO: 12.9 K/UL (ref 4.5–13)

## 2024-05-15 PROCEDURE — 36415 COLL VENOUS BLD VENIPUNCTURE: CPT

## 2024-05-15 PROCEDURE — 85025 COMPLETE CBC W/AUTO DIFF WBC: CPT

## 2024-05-15 PROCEDURE — 99211 OFF/OP EST MAY X REQ PHY/QHP: CPT

## 2024-05-15 PROCEDURE — 96361 HYDRATE IV INFUSION ADD-ON: CPT

## 2024-05-15 PROCEDURE — 96374 THER/PROPH/DIAG INJ IV PUSH: CPT

## 2024-05-15 PROCEDURE — 6360000002 HC RX W HCPCS

## 2024-05-15 PROCEDURE — 2700000000 HC OXYGEN THERAPY PER DAY

## 2024-05-15 PROCEDURE — 85049 AUTOMATED PLATELET COUNT: CPT

## 2024-05-15 PROCEDURE — 6360000002 HC RX W HCPCS: Performed by: INTERNAL MEDICINE

## 2024-05-15 PROCEDURE — 80053 COMPREHEN METABOLIC PANEL: CPT

## 2024-05-15 PROCEDURE — 2580000003 HC RX 258: Performed by: INTERNAL MEDICINE

## 2024-05-15 RX ORDER — SODIUM CHLORIDE 0.9 % (FLUSH) 0.9 %
5-40 SYRINGE (ML) INJECTION PRN
Status: DISCONTINUED | OUTPATIENT
Start: 2024-05-15 | End: 2024-05-19 | Stop reason: HOSPADM

## 2024-05-15 RX ORDER — 0.9 % SODIUM CHLORIDE 0.9 %
1000 INTRAVENOUS SOLUTION INTRAVENOUS ONCE
Status: COMPLETED | OUTPATIENT
Start: 2024-05-15 | End: 2024-05-15

## 2024-05-15 RX ORDER — HEPARIN 100 UNIT/ML
SYRINGE INTRAVENOUS
Status: COMPLETED
Start: 2024-05-15 | End: 2024-05-15

## 2024-05-15 RX ORDER — ONDANSETRON 2 MG/ML
8 INJECTION INTRAMUSCULAR; INTRAVENOUS ONCE
Status: COMPLETED | OUTPATIENT
Start: 2024-05-15 | End: 2024-05-15

## 2024-05-15 RX ORDER — HEPARIN SODIUM (PORCINE) LOCK FLUSH IV SOLN 100 UNIT/ML 100 UNIT/ML
500 SOLUTION INTRAVENOUS PRN
Status: DISCONTINUED | OUTPATIENT
Start: 2024-05-15 | End: 2024-05-19 | Stop reason: HOSPADM

## 2024-05-15 RX ADMIN — HEPARIN 500 UNITS: 100 SYRINGE at 13:55

## 2024-05-15 RX ADMIN — SODIUM CHLORIDE, PRESERVATIVE FREE 10 ML: 5 INJECTION INTRAVENOUS at 10:45

## 2024-05-15 RX ADMIN — ONDANSETRON 8 MG: 2 INJECTION INTRAMUSCULAR; INTRAVENOUS at 12:20

## 2024-05-15 RX ADMIN — SODIUM CHLORIDE 1000 ML: 9 INJECTION, SOLUTION INTRAVENOUS at 11:00

## 2024-05-15 RX ADMIN — SODIUM CHLORIDE, PRESERVATIVE FREE 10 ML: 5 INJECTION INTRAVENOUS at 13:55

## 2024-05-15 RX ADMIN — HEPARIN 500 UNITS: 100 SYRINGE at 13:56

## 2024-05-15 RX ADMIN — SODIUM CHLORIDE, PRESERVATIVE FREE 10 ML: 5 INJECTION INTRAVENOUS at 13:56

## 2024-05-15 RX ADMIN — SODIUM CHLORIDE, PRESERVATIVE FREE 10 ML: 5 INJECTION INTRAVENOUS at 10:46

## 2024-05-15 ASSESSMENT — PAIN - FUNCTIONAL ASSESSMENT
PAIN_FUNCTIONAL_ASSESSMENT: NONE - DENIES PAIN

## 2024-05-15 NOTE — PROGRESS NOTES
[facility] Progress Note    Date: May 15, 2024    Name: Rina Prajapati    MRN: [id number]         : 1960    Ms. Prajapati arrived in the [facility] today at 1030, in stable condition, here for her WEEKLY PICC LINE CARE + IV HYDRATION & LABS. She was assessed and education was provided.     Ms. Prajapati's vitals were reviewed.  Vitals:    05/15/24 1030   BP: 125/79   Pulse: (!) 114   Resp: (!) 32   Temp: 98.7 °F (37.1 °C)   SpO2: 100%       Ms. Prajapati presented to the infusion center today stating that she wasn't feeling well at all. (She had called Dr. Li's office prior to her arrival here and had made CORTES Fletcher aware that she wasn't feeling well.) She stated that since her chemo on 24 of last week, she had been feeling very weak and like she was going to fall, and had been having very decreased appetite, diarrhea, and increased shortness of breath. She stated that she was having about 3-4 diarrhea stools per day, even with her Rx. Anti-diarrheal medication.       The above vital signs were reviewed, and her rapid HR and respirations were both noted.     Telephone order was received from Dr. Jameel Li, via his nurse Justine Shabazz RN, to administer 1 Liter NS IV Hydration today over 90 minutes, along with Zofran 8 mg IV. And, order also received to draw a CBC & CMP STAT, and call results to Justine @ [phone].         HER LEFT UPPER ARM DOUBLE LUMEN PICC LINE WAS NOTED TO BE COMPLETELY DRY AND INTACT, AND BOTH LUMENS FLUSHED EASILY WITH NS, HOWEVER, NEITHER LUMEN HAD A BLOOD RETURN TODAY.      Blood for the above ordered labs (CBC & CMP) was drawn from her left posterior hand at 1056, without incident. The CBC was processed on site, with the exception of the Platelet Count (The specimen was sent out to the [facility] lab STAT for the final Platelet count results to be processed. ) And then the CMP was also sent out STAT by  to the [facility] lab for processing.       The CBC & CMP results from today were as  follows, and were all reported to Justine Shabazz RN, at Dr. Jameel Li's office at 1250 today. No new orders were received.     Results for orders placed or performed during the hospital encounter of 05/15/24   CBC with Partial Differential   Result Value Ref Range    WBC 12.9 4.5 - 13.0 K/uL    RBC 4.09 (L) 4.10 - 5.10 M/uL    Hemoglobin 9.9 (L) 12.0 - 16.0 g/dL    Hematocrit 32.8 (L) 36 - 48 %    MCV 80.2 78 - 102 FL    MCH 24.2 (L) 25.0 - 35.0 PG    MCHC 30.2 (L) 31 - 37 g/dL    RDW 20.6 (H) 11.5 - 14.5 %    Neutrophils % 88 (H) 40 - 70 %    Mixed Cells 5 0.1 - 17 %    Lymphocytes % 7 (L) 14 - 44 %    Neutrophils Absolute 11.2 (H) 1.8 - 9.5 K/UL    ABSOLUTE MIXED CELLS 0.7 0.0 - 2.3 K/uL    Lymphocytes Absolute 1.0 (L) 1.1 - 5.9 K/UL    Differential Type AUTOMATED     Comprehensive Metabolic Panel   Result Value Ref Range    Sodium 138 136 - 145 mmol/L    Potassium 3.0 (L) 3.5 - 5.5 mmol/L    Chloride 100 100 - 111 mmol/L    CO2 29 21 - 32 mmol/L    Anion Gap 9 3.0 - 18 mmol/L    Glucose 95 74 - 99 mg/dL    BUN 7 7.0 - 18 MG/DL    Creatinine 0.65 0.6 - 1.3 MG/DL    BUN/Creatinine Ratio 11 (L) 12 - 20      Est, Glom Filt Rate >90 >60 ml/min/1.73m2    Calcium 8.8 8.5 - 10.1 MG/DL    Total Bilirubin 0.9 0.2 - 1.0 MG/DL    ALT 9 (L) 13 - 56 U/L    AST 13 10 - 38 U/L    Alk Phosphatase 87 45 - 117 U/L    Total Protein 5.6 (L) 6.4 - 8.2 g/dL    Albumin 3.0 (L) 3.4 - 5.0 g/dL    Globulin 2.6 2.0 - 4.0 g/dL    Albumin/Globulin Ratio 1.2 0.8 - 1.7     Platelet Count   Result Value Ref Range    Platelets 61 (L) 135 - 420 K/uL            1 Liter NS IV Hydration was administered via the RED lumen of her PICC line today, over approximately 90 minutes, per order.     ZOFRAN 8 mg IV was also administered per order.           After completion of all above ordered medications and hydration, her PICC line dressing was changed today per policy, and without incident, then both lumens of her PICC line were flushed well per policy

## 2024-05-17 ENCOUNTER — APPOINTMENT (OUTPATIENT)
Facility: HOSPITAL | Age: 64
DRG: 193 | End: 2024-05-17
Payer: MEDICARE

## 2024-05-17 ENCOUNTER — HOSPITAL ENCOUNTER (INPATIENT)
Facility: HOSPITAL | Age: 64
LOS: 4 days | Discharge: HOME | DRG: 193 | End: 2024-05-21
Attending: STUDENT IN AN ORGANIZED HEALTH CARE EDUCATION/TRAINING PROGRAM
Payer: MEDICARE

## 2024-05-17 DIAGNOSIS — D75.839 THROMBOCYTOSIS: ICD-10-CM

## 2024-05-17 DIAGNOSIS — D50.9 MICROCYTIC ANEMIA: ICD-10-CM

## 2024-05-17 DIAGNOSIS — R06.02 SHORTNESS OF BREATH: Primary | ICD-10-CM

## 2024-05-17 PROBLEM — J18.9 PNEUMONIA DUE TO INFECTIOUS ORGANISM: Status: ACTIVE | Noted: 2024-05-17

## 2024-05-17 PROBLEM — J96.91 HYPOXIC RESPIRATORY FAILURE (HCC): Status: ACTIVE | Noted: 2024-05-17

## 2024-05-17 LAB
ANION GAP SERPL CALC-SCNC: 8 MMOL/L (ref 3–18)
APPEARANCE UR: ABNORMAL
B PERT DNA SPEC QL NAA+PROBE: NOT DETECTED
BACTERIA URNS QL MICRO: NEGATIVE /HPF
BASOPHILS # BLD: 0 K/UL (ref 0–0.1)
BASOPHILS NFR BLD: 0 % (ref 0–2)
BILIRUB UR QL: NEGATIVE
BORDETELLA PARAPERTUSSIS BY PCR: NOT DETECTED
BUN SERPL-MCNC: 4 MG/DL (ref 7–18)
BUN/CREAT SERPL: 4 (ref 12–20)
C PNEUM DNA SPEC QL NAA+PROBE: NOT DETECTED
CALCIUM SERPL-MCNC: 9.2 MG/DL (ref 8.5–10.1)
CHLORIDE SERPL-SCNC: 100 MMOL/L (ref 100–111)
CO2 SERPL-SCNC: 28 MMOL/L (ref 21–32)
COLOR UR: YELLOW
CREAT SERPL-MCNC: 0.9 MG/DL (ref 0.6–1.3)
DIFFERENTIAL METHOD BLD: ABNORMAL
EKG ATRIAL RATE: 129 BPM
EKG DIAGNOSIS: NORMAL
EKG P-R INTERVAL: 160 MS
EKG Q-T INTERVAL: 276 MS
EKG QRS DURATION: 68 MS
EKG QTC CALCULATION (BAZETT): 407 MS
EKG R AXIS: 140 DEGREES
EKG T AXIS: -11 DEGREES
EKG VENTRICULAR RATE: 131 BPM
EOSINOPHIL # BLD: 0 K/UL (ref 0–0.4)
EOSINOPHIL NFR BLD: 0 % (ref 0–5)
EPITH CASTS URNS QL MICRO: ABNORMAL /LPF (ref 0–5)
ERYTHROCYTE [DISTWIDTH] IN BLOOD BY AUTOMATED COUNT: 21.7 % (ref 11.6–14.5)
FLUAV SUBTYP SPEC NAA+PROBE: NOT DETECTED
FLUBV RNA SPEC QL NAA+PROBE: NOT DETECTED
GLUCOSE SERPL-MCNC: 116 MG/DL (ref 74–99)
GLUCOSE UR STRIP.AUTO-MCNC: NEGATIVE MG/DL
HADV DNA SPEC QL NAA+PROBE: NOT DETECTED
HCOV 229E RNA SPEC QL NAA+PROBE: NOT DETECTED
HCOV HKU1 RNA SPEC QL NAA+PROBE: NOT DETECTED
HCOV NL63 RNA SPEC QL NAA+PROBE: NOT DETECTED
HCOV OC43 RNA SPEC QL NAA+PROBE: NOT DETECTED
HCT VFR BLD AUTO: 34 % (ref 35–45)
HGB BLD-MCNC: 10.4 G/DL (ref 12–16)
HGB UR QL STRIP: ABNORMAL
HMPV RNA SPEC QL NAA+PROBE: NOT DETECTED
HPIV1 RNA SPEC QL NAA+PROBE: NOT DETECTED
HPIV2 RNA SPEC QL NAA+PROBE: NOT DETECTED
HPIV3 RNA SPEC QL NAA+PROBE: NOT DETECTED
HPIV4 RNA SPEC QL NAA+PROBE: NOT DETECTED
IMM GRANULOCYTES # BLD AUTO: 0 K/UL
IMM GRANULOCYTES NFR BLD AUTO: 0 %
KETONES UR QL STRIP.AUTO: NEGATIVE MG/DL
LACTATE BLD-SCNC: 1.58 MMOL/L (ref 0.4–2)
LEUKOCYTE ESTERASE UR QL STRIP.AUTO: ABNORMAL
LYMPHOCYTES # BLD: 1.3 K/UL (ref 0.9–3.6)
LYMPHOCYTES NFR BLD: 3 % (ref 21–52)
M PNEUMO DNA SPEC QL NAA+PROBE: NOT DETECTED
MAGNESIUM SERPL-MCNC: 1.4 MG/DL (ref 1.6–2.6)
MCH RBC QN AUTO: 23.8 PG (ref 24–34)
MCHC RBC AUTO-ENTMCNC: 30.6 G/DL (ref 31–37)
MCV RBC AUTO: 77.8 FL (ref 78–100)
MONOCYTES # BLD: 1.7 K/UL (ref 0.05–1.2)
MONOCYTES NFR BLD: 4 % (ref 3–10)
NEUTS BAND NFR BLD MANUAL: 1 % (ref 0–5)
NEUTS SEG # BLD: 39.2 K/UL (ref 1.8–8)
NEUTS SEG NFR BLD: 92 % (ref 40–73)
NITRITE UR QL STRIP.AUTO: POSITIVE
NRBC # BLD: 0.18 K/UL (ref 0–0.01)
NRBC BLD-RTO: 0.4 PER 100 WBC
NT PRO BNP: 6779 PG/ML (ref 0–900)
PH UR STRIP: 7.5 (ref 5–8)
PLATELET # BLD AUTO: 115 K/UL (ref 135–420)
PLATELET COMMENT: ABNORMAL
POTASSIUM SERPL-SCNC: 4 MMOL/L (ref 3.5–5.5)
PROCALCITONIN SERPL-MCNC: 0.94 NG/ML
PROT UR STRIP-MCNC: NEGATIVE MG/DL
RBC # BLD AUTO: 4.37 M/UL (ref 4.2–5.3)
RBC #/AREA URNS HPF: ABNORMAL /HPF (ref 0–5)
RBC MORPH BLD: ABNORMAL
RSV RNA SPEC QL NAA+PROBE: NOT DETECTED
RV+EV RNA SPEC QL NAA+PROBE: NOT DETECTED
SARS-COV-2 RNA RESP QL NAA+PROBE: NOT DETECTED
SODIUM SERPL-SCNC: 136 MMOL/L (ref 136–145)
SP GR UR REFRACTOMETRY: 1.02 (ref 1–1.03)
TRICHOMONAS UR QL MICRO: ABNORMAL
TROPONIN I SERPL HS-MCNC: 12 NG/L (ref 0–54)
TROPONIN I SERPL HS-MCNC: 16 NG/L (ref 0–54)
UROBILINOGEN UR QL STRIP.AUTO: 0.2 EU/DL (ref 0.2–1)
WBC # BLD AUTO: 42.2 K/UL (ref 4.6–13.2)
WBC URNS QL MICRO: ABNORMAL /HPF (ref 0–4)

## 2024-05-17 PROCEDURE — 96375 TX/PRO/DX INJ NEW DRUG ADDON: CPT

## 2024-05-17 PROCEDURE — 6360000002 HC RX W HCPCS: Performed by: STUDENT IN AN ORGANIZED HEALTH CARE EDUCATION/TRAINING PROGRAM

## 2024-05-17 PROCEDURE — 83735 ASSAY OF MAGNESIUM: CPT

## 2024-05-17 PROCEDURE — 2700000000 HC OXYGEN THERAPY PER DAY

## 2024-05-17 PROCEDURE — 71045 X-RAY EXAM CHEST 1 VIEW: CPT

## 2024-05-17 PROCEDURE — 96365 THER/PROPH/DIAG IV INF INIT: CPT

## 2024-05-17 PROCEDURE — 99285 EMERGENCY DEPT VISIT HI MDM: CPT

## 2024-05-17 PROCEDURE — 6370000000 HC RX 637 (ALT 250 FOR IP): Performed by: HOSPITALIST

## 2024-05-17 PROCEDURE — 80048 BASIC METABOLIC PNL TOTAL CA: CPT

## 2024-05-17 PROCEDURE — 2500000003 HC RX 250 WO HCPCS: Performed by: STUDENT IN AN ORGANIZED HEALTH CARE EDUCATION/TRAINING PROGRAM

## 2024-05-17 PROCEDURE — 2580000003 HC RX 258: Performed by: HOSPITALIST

## 2024-05-17 PROCEDURE — 85025 COMPLETE CBC W/AUTO DIFF WBC: CPT

## 2024-05-17 PROCEDURE — 6360000004 HC RX CONTRAST MEDICATION: Performed by: STUDENT IN AN ORGANIZED HEALTH CARE EDUCATION/TRAINING PROGRAM

## 2024-05-17 PROCEDURE — 0202U NFCT DS 22 TRGT SARS-COV-2: CPT

## 2024-05-17 PROCEDURE — 84145 PROCALCITONIN (PCT): CPT

## 2024-05-17 PROCEDURE — 87040 BLOOD CULTURE FOR BACTERIA: CPT

## 2024-05-17 PROCEDURE — 83605 ASSAY OF LACTIC ACID: CPT

## 2024-05-17 PROCEDURE — 6360000002 HC RX W HCPCS: Performed by: HOSPITALIST

## 2024-05-17 PROCEDURE — 93005 ELECTROCARDIOGRAM TRACING: CPT | Performed by: STUDENT IN AN ORGANIZED HEALTH CARE EDUCATION/TRAINING PROGRAM

## 2024-05-17 PROCEDURE — 99223 1ST HOSP IP/OBS HIGH 75: CPT | Performed by: HOSPITALIST

## 2024-05-17 PROCEDURE — 6370000000 HC RX 637 (ALT 250 FOR IP): Performed by: STUDENT IN AN ORGANIZED HEALTH CARE EDUCATION/TRAINING PROGRAM

## 2024-05-17 PROCEDURE — 94640 AIRWAY INHALATION TREATMENT: CPT

## 2024-05-17 PROCEDURE — 81001 URINALYSIS AUTO W/SCOPE: CPT

## 2024-05-17 PROCEDURE — 2580000003 HC RX 258: Performed by: STUDENT IN AN ORGANIZED HEALTH CARE EDUCATION/TRAINING PROGRAM

## 2024-05-17 PROCEDURE — 6360000002 HC RX W HCPCS: Performed by: INTERNAL MEDICINE

## 2024-05-17 PROCEDURE — 71275 CT ANGIOGRAPHY CHEST: CPT

## 2024-05-17 PROCEDURE — 1100000000 HC RM PRIVATE

## 2024-05-17 PROCEDURE — 93010 ELECTROCARDIOGRAM REPORT: CPT | Performed by: INTERNAL MEDICINE

## 2024-05-17 PROCEDURE — 84484 ASSAY OF TROPONIN QUANT: CPT

## 2024-05-17 PROCEDURE — 83880 ASSAY OF NATRIURETIC PEPTIDE: CPT

## 2024-05-17 RX ORDER — ONDANSETRON 2 MG/ML
4 INJECTION INTRAMUSCULAR; INTRAVENOUS EVERY 6 HOURS PRN
Status: DISCONTINUED | OUTPATIENT
Start: 2024-05-17 | End: 2024-05-21 | Stop reason: HOSPADM

## 2024-05-17 RX ORDER — POLYETHYLENE GLYCOL 3350 17 G/17G
17 POWDER, FOR SOLUTION ORAL DAILY PRN
Status: DISCONTINUED | OUTPATIENT
Start: 2024-05-17 | End: 2024-05-21 | Stop reason: HOSPADM

## 2024-05-17 RX ORDER — FUROSEMIDE 10 MG/ML
20 INJECTION INTRAMUSCULAR; INTRAVENOUS
Status: COMPLETED | OUTPATIENT
Start: 2024-05-17 | End: 2024-05-17

## 2024-05-17 RX ORDER — ASPIRIN 81 MG/1
81 TABLET ORAL DAILY
Status: DISCONTINUED | OUTPATIENT
Start: 2024-05-17 | End: 2024-05-21 | Stop reason: HOSPADM

## 2024-05-17 RX ORDER — AMLODIPINE BESYLATE 5 MG/1
5 TABLET ORAL DAILY
Status: DISCONTINUED | OUTPATIENT
Start: 2024-05-17 | End: 2024-05-21 | Stop reason: HOSPADM

## 2024-05-17 RX ORDER — MAGNESIUM SULFATE IN WATER 40 MG/ML
2000 INJECTION, SOLUTION INTRAVENOUS PRN
Status: DISCONTINUED | OUTPATIENT
Start: 2024-05-17 | End: 2024-05-21 | Stop reason: HOSPADM

## 2024-05-17 RX ORDER — POTASSIUM CHLORIDE 7.45 MG/ML
10 INJECTION INTRAVENOUS PRN
Status: DISCONTINUED | OUTPATIENT
Start: 2024-05-17 | End: 2024-05-21 | Stop reason: HOSPADM

## 2024-05-17 RX ORDER — 0.9 % SODIUM CHLORIDE 0.9 %
1000 INTRAVENOUS SOLUTION INTRAVENOUS ONCE
Status: COMPLETED | OUTPATIENT
Start: 2024-05-17 | End: 2024-05-17

## 2024-05-17 RX ORDER — POTASSIUM CHLORIDE 20 MEQ/1
40 TABLET, EXTENDED RELEASE ORAL PRN
Status: DISCONTINUED | OUTPATIENT
Start: 2024-05-17 | End: 2024-05-21 | Stop reason: HOSPADM

## 2024-05-17 RX ORDER — ACETAMINOPHEN 325 MG/1
650 TABLET ORAL EVERY 6 HOURS PRN
Status: DISCONTINUED | OUTPATIENT
Start: 2024-05-17 | End: 2024-05-21 | Stop reason: HOSPADM

## 2024-05-17 RX ORDER — ALBUTEROL SULFATE 2.5 MG/3ML
2.5 SOLUTION RESPIRATORY (INHALATION) EVERY 4 HOURS PRN
Status: DISCONTINUED | OUTPATIENT
Start: 2024-05-17 | End: 2024-05-19

## 2024-05-17 RX ORDER — CETIRIZINE HYDROCHLORIDE 10 MG/1
10 TABLET ORAL DAILY
Status: DISCONTINUED | OUTPATIENT
Start: 2024-05-17 | End: 2024-05-21 | Stop reason: HOSPADM

## 2024-05-17 RX ORDER — FUROSEMIDE 10 MG/ML
20 INJECTION INTRAMUSCULAR; INTRAVENOUS DAILY
Status: DISCONTINUED | OUTPATIENT
Start: 2024-05-18 | End: 2024-05-21

## 2024-05-17 RX ORDER — ACETAMINOPHEN 650 MG/1
650 SUPPOSITORY RECTAL EVERY 6 HOURS PRN
Status: DISCONTINUED | OUTPATIENT
Start: 2024-05-17 | End: 2024-05-21 | Stop reason: HOSPADM

## 2024-05-17 RX ORDER — MONTELUKAST SODIUM 10 MG/1
10 TABLET ORAL DAILY
Status: DISCONTINUED | OUTPATIENT
Start: 2024-05-17 | End: 2024-05-21 | Stop reason: HOSPADM

## 2024-05-17 RX ORDER — ONDANSETRON 4 MG/1
4 TABLET, ORALLY DISINTEGRATING ORAL EVERY 8 HOURS PRN
Status: DISCONTINUED | OUTPATIENT
Start: 2024-05-17 | End: 2024-05-21 | Stop reason: HOSPADM

## 2024-05-17 RX ORDER — FERROUS SULFATE 325(65) MG
325 TABLET ORAL
Status: DISCONTINUED | OUTPATIENT
Start: 2024-05-18 | End: 2024-05-21 | Stop reason: HOSPADM

## 2024-05-17 RX ORDER — LEVOTHYROXINE SODIUM 88 UG/1
88 TABLET ORAL
Status: DISCONTINUED | OUTPATIENT
Start: 2024-05-18 | End: 2024-05-21 | Stop reason: HOSPADM

## 2024-05-17 RX ORDER — IPRATROPIUM BROMIDE AND ALBUTEROL SULFATE 2.5; .5 MG/3ML; MG/3ML
1 SOLUTION RESPIRATORY (INHALATION)
Status: COMPLETED | OUTPATIENT
Start: 2024-05-17 | End: 2024-05-17

## 2024-05-17 RX ORDER — ALBUTEROL SULFATE 90 UG/1
1 AEROSOL, METERED RESPIRATORY (INHALATION) EVERY 4 HOURS PRN
Status: DISCONTINUED | OUTPATIENT
Start: 2024-05-17 | End: 2024-05-17 | Stop reason: CLARIF

## 2024-05-17 RX ORDER — MAGNESIUM SULFATE IN WATER 40 MG/ML
2000 INJECTION, SOLUTION INTRAVENOUS ONCE
Status: COMPLETED | OUTPATIENT
Start: 2024-05-17 | End: 2024-05-17

## 2024-05-17 RX ADMIN — SERTRALINE 50 MG: 50 TABLET, FILM COATED ORAL at 14:39

## 2024-05-17 RX ADMIN — APIXABAN 5 MG: 5 TABLET, FILM COATED ORAL at 20:47

## 2024-05-17 RX ADMIN — IPRATROPIUM BROMIDE AND ALBUTEROL SULFATE 1 DOSE: .5; 3 SOLUTION RESPIRATORY (INHALATION) at 06:21

## 2024-05-17 RX ADMIN — DOXYCYCLINE 100 MG: 100 INJECTION, POWDER, LYOPHILIZED, FOR SOLUTION INTRAVENOUS at 07:19

## 2024-05-17 RX ADMIN — AZITHROMYCIN MONOHYDRATE 500 MG: 500 INJECTION, POWDER, LYOPHILIZED, FOR SOLUTION INTRAVENOUS at 14:42

## 2024-05-17 RX ADMIN — ALBUTEROL SULFATE 2.5 MG: 2.5 SOLUTION RESPIRATORY (INHALATION) at 20:23

## 2024-05-17 RX ADMIN — AMLODIPINE BESYLATE 5 MG: 5 TABLET ORAL at 14:39

## 2024-05-17 RX ADMIN — MAGNESIUM SULFATE HEPTAHYDRATE 2000 MG: 40 INJECTION, SOLUTION INTRAVENOUS at 08:54

## 2024-05-17 RX ADMIN — IPRATROPIUM BROMIDE 0.5 MG: 0.5 SOLUTION RESPIRATORY (INHALATION) at 20:23

## 2024-05-17 RX ADMIN — CETIRIZINE HYDROCHLORIDE 10 MG: 10 TABLET, FILM COATED ORAL at 14:39

## 2024-05-17 RX ADMIN — WATER 1000 MG: 1 INJECTION INTRAMUSCULAR; INTRAVENOUS; SUBCUTANEOUS at 07:15

## 2024-05-17 RX ADMIN — SODIUM CHLORIDE 1000 ML: 9 INJECTION, SOLUTION INTRAVENOUS at 07:12

## 2024-05-17 RX ADMIN — IOPAMIDOL 80 ML: 755 INJECTION, SOLUTION INTRAVENOUS at 08:07

## 2024-05-17 RX ADMIN — FUROSEMIDE 20 MG: 10 INJECTION, SOLUTION INTRAMUSCULAR; INTRAVENOUS at 08:53

## 2024-05-17 RX ADMIN — MONTELUKAST 10 MG: 10 TABLET, FILM COATED ORAL at 14:39

## 2024-05-17 RX ADMIN — ASPIRIN 81 MG: 81 TABLET, COATED ORAL at 14:40

## 2024-05-17 ASSESSMENT — PAIN - FUNCTIONAL ASSESSMENT: PAIN_FUNCTIONAL_ASSESSMENT: NONE - DENIES PAIN

## 2024-05-17 ASSESSMENT — LIFESTYLE VARIABLES
HOW OFTEN DO YOU HAVE A DRINK CONTAINING ALCOHOL: NEVER
HOW MANY STANDARD DRINKS CONTAINING ALCOHOL DO YOU HAVE ON A TYPICAL DAY: PATIENT DOES NOT DRINK

## 2024-05-17 ASSESSMENT — PAIN SCALES - GENERAL
PAINLEVEL_OUTOF10: 0

## 2024-05-17 NOTE — ED NOTES
TRANSFER - OUT REPORT:    Verbal report given to Aristides Clayton RN on Rina Prajapati  being transferred to [address] [place] [address] for routine progression of patient care       Report consisted of patient's Situation, Background, Assessment and   Recommendations(SBAR).     Information from the following report(s) Nurse Handoff Report was reviewed with the receiving nurse.    [employer] Fall Assessment:    Presents to emergency department  because of falls (Syncope, seizure, or loss of consciousness): No  Age > 70: No  Altered Mental Status, Intoxication with alcohol or substance confusion (Disorientation, impaired judgment, poor safety awaremess, or inability to follow instructions): No  Impaired Mobility: Ambulates or transfers with assistive devices or assistance; Unable to ambulate or transer.: No  Nursing Judgement: No          Lines:   PICC Double Lumen 03/02/23 Left Brachial (Active)       Peripheral IV 05/17/24 Right Antecubital (Active)        Opportunity for questions and clarification was provided.      Patient transported with:  [facility] ALS Transport

## 2024-05-17 NOTE — PROGRESS NOTES
Contacted regarding admission for this patient.  Patient came in with acute hypoxic respiratory failure and does not wear oxygen at home was noted to be on 5 L.  proBNP elevated and pleural effusions noted on CTA.  Patient received 20 mg IV Lasix, antibiotics magnesium over at [facility].  Patient likely needs echocardiogram, possible further diuresis and antibiotic therapy until her oxygen status improves.    Patient will remain under the care and management of [facility] Physician at this time until Patient physically arrives at [facility]; advice and recommendations given at this time do not indicate that Hospitalist service will be ordering initial tests or stabilizing treatments (changes in Vitals and Critical Lab Results are neither directly nor emergently reported to [facility] Hospitalist service by Nursing Staff or any other mechanism, but presumably are reported to [facility] Physicians who must manage the Patient).    General Recommendations for Patients include (except where contraindicated): obtaining CBC and CMP daily while in [facility].  Recommend obtaining PT/INR for Patients on Warfarin or who have INR >3.0 in any case.  Recommend that any Patient with an increased FiO2 requirement have Pulse Oximetry ordered.   Recommend that any Patient placed on Cardiac Monitoring have this ordered for the maximum of 72 hours.  Recommend obtaining Vitals at least q8hrs or at the rate for Level of Care Requested, whichever is more frequent.  Recommend H&H be performed 1 hour after final Unit of PRBCs ordered is Transfused to Patient receiving PRBCs.  Recommend that any Patient that is ordered Single-Dose Antibiotics by signed out to be reviewed daily by other [facility] Physicians, as they are still responsible for Patient in [facility] receiving Standards of Care even if Transfer is delayed (please refer to paragraph preceding this one).  Recommend pH (Venous or Arterial) be evaluated when CO2 (i.e. Bicarbonate) is <18 mmol/L or rapidly dropping

## 2024-05-17 NOTE — ED NOTES
States \"I am feeling so much better\". Speaks in full sentences, denies CP or SOB. Call bell in reach and SR x2, denies urge to void for UA. Will continue to monitor.

## 2024-05-17 NOTE — ED PROVIDER NOTES
Patient care assumed from Dr. Gonzalez.  Refer to original note for more details.  In brief, 63-year-old female with history of NSCLC on active chemotherapy coming in with shortness of breath.  Was saturating mid 80s on room air was placed on 5 L nasal cannula here.  No history of oxygen use at home.  Pending CTA of the chest to rule out PE at time of signout.  This is negative for PE.  Does demonstrate small pericardial effusion and bilateral pleural effusion as well as consolidative density in the left lower lobe.  Patient has already been started on antibiotics for possible pneumonia.  Patient administered Lasix for effusion.  Discussed case with the hospitalist for admission for hypoxic respiratory failure      IMPRESSION:  1. No pulmonary embolism.     2. Sequela of fluid overload, including new trace bilateral pleural effusions,  partially visualized trace ascites, anasarca, and mildly increased small  pericardial effusion.     3. New groundglass and more consolidative density in the left lower lobe. New  groundglass density in the left upper lobe, with mild interlobular septal  thickening. This could represent asymmetric edema and/or multifocal  infection/inflammation.     4. Similar 2.4 cm left upper lobe nodule and areas of scarring and nodularity in  the right lung. Recommend continued close attention on follow-up.     5. Bronchial wall thickening and mild secretions in the trachea, recommend  correlation for bronchitis.     6. Mild contrast reflux from the right heart into the IVC is nonspecific, can be  seen with elevated right heart pressure or due to vigorous contrast injection.     Stiven Vogt,   05/17/24 0819

## 2024-05-17 NOTE — ED PROVIDER NOTES
[facility]          EMERGENCY DEPARTMENT COURSE and DIFFERENTIAL DIAGNOSIS/MDM   Vitals:    Vitals:    05/17/24 0614 05/17/24 0615 05/17/24 0630   BP: (!) 148/98 124/81 115/68   Pulse: (!) 110 99 (!) 102   Resp: 15  10   Temp: 98.8 °F (37.1 °C)     TempSrc: Oral     SpO2: 91% 100% 100%   Weight: 58.5 kg (129 lb)     Height: 1.702 m (5' 7\")              Patient was given the following medications:  Medications   sodium chloride 0.9 % bolus 1,000 mL (has no administration in time range)   cefTRIAXone (ROCEPHIN) 1,000 mg in sterile water 10 mL IV syringe (has no administration in time range)   doxycycline (VIBRAMYCIN) 100 mg in sodium chloride 0.9 % 100 mL IVPB (mini-bag) (has no administration in time range)   ipratropium 0.5 mg-albuterol 2.5 mg (DUONEB) nebulizer solution 1 Dose (1 Dose Inhalation Given 5/17/24 0621)       CONSULTS: (Who and What was discussed)  None      Chronic Conditions: NSSLC on chemo (last received 5/15/24), HTN, subclavian thrombus on eliquis, COPD, iron deficience anemia    Social Determinants affecting Dx or Tx: None    Records Reviewed (source and summary of external notes): Nursing Notes, Old Medical Records, Previous electrocardiograms, Previous Radiology Studies, and Previous Laboratory Studies    CC/HPI Summary, DDx, ED Course, and Reassessment: 63-year-old female presents with several days of worsening shortness of breath in the setting of active treatment for non-small cell lung cancer as well as COPD.  On arrival patient was hypoxic to the 70s as well as tachypneic with increased work of breathing.  She is bilateral diffuse coarse breath sounds and a wet cough that occurs frequently throughout her exam.  Patient was placed on nonrebreather and had subjective improvement in her symptoms as well as improvement in her SpO2 and respiratory rate.  Discussed with nursing goal SpO2 of 90% .  Patient treated with DuoNeb which did mildly improve her coarse breath sounds.  Initial EKG is    ramucirumab 500 MG/50ML Soln     sertraline 50 MG tablet  Commonly known as: ZOLOFT  take 1 tablet by mouth once daily     tiotropium 18 MCG inhalation capsule  Commonly known as: Spiriva HandiHaler  Inhale 1 capsule into the lungs daily           * This list has 2 medication(s) that are the same as other medications prescribed for you. Read the directions carefully, and ask your doctor or other care provider to review them with you.                    DISCONTINUED MEDICATIONS:  Current Discharge Medication List          I am the Primary Clinician of Record.   MARYANN SENA MD (electronically signed)      (Please note that parts of this dictation were completed with voice recognition software. Quite often unanticipated grammatical, syntax, homophones, and other interpretive errors are inadvertently transcribed by the computer software. Please disregards these errors. Please excuse any errors that have escaped final proofreading.)          Maryann Sena MD  05/17/24 0713

## 2024-05-17 NOTE — H&P
HISTORY & PHYSICAL      Patient: Rina Prajapati MRN: [id number]  [facility]: [id number]    YOB: 1960  Age: 63 y.o.  Sex: female    DOA: 5/17/2024 LOS:  LOS: 0 days        DOA: 5/17/2024        Assessment/Plan     Principal Problem:    Hypoxic respiratory failure (HCC)  Active Problems:    Pneumonia due to infectious organism  Resolved Problems:    * No resolved hospital problems. 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  Acute respiratory failure with hypoxia secondary to community-acquired pneumonia and CHF-continue supplemental oxygen.  Patient mentions she is not on oxygen at home.  History of lung cancer, on chemotherapy q. 3 weeks, patient mentions she follows up with Dr. Li.  History of COPD-continue bronchodilator therapy.  Pulmonology consulted.  H/o CHF-IV Lasix, monitor strict I's and O's, avoid fluid overload.  Chronic smoker, patient mentions she has mainly quit but once in a while still continues to smoke.  History of hypertension-resume home medications, continue to monitor blood pressure.  DVT prophylaxis-Lovenox  Full code          History of Presenting Illness:    63-year-old female with a history of lung cancer diagnosed in 2015 currently on chemotherapy every 3 weeks and follows up with oncology, history of COPD, hypertension, CHF presents to the emergency room secondary to shortness of  breath.  Patient mentions that shortness of breath developed 2 to 3 days ago and has been progressively getting worse.  She also mentions she has bilateral lower extremity and foot swelling.    ER evaluation patient underwent CT chest which showed no evidence of PE, sequela of fluid overload including new trace bilateral pleural effusions, new groundglass and more consolidated density left lower lobe.  Patient started on broad-spectrum IV antibiotics, IV Lasix.  Patient is being admitted to the hospital for further evaluation.        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    resolved    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       Past Surgical History:   Procedure Laterality Date    ANGIOPLASTY Right 3/20/2016    IJ, subclavian, and brachiocephalic veins    BREAST SURGERY      biopsy    CT BIOPSY LYMPH NODES SUPERFICIAL  11/21/2022    CT BIOPSY LYMPH NODES SUPERFICIAL 11/21/2022 [facility] RAD CT    HYSTERECTOMY (CERVIX STATUS UNKNOWN)      due to menorrhagia     OTHER SURGICAL HISTORY Right 3/20/2016    2 placed in right IJ, subclavian/brachiocephalic    THROMBECTOMY  3/20/2016    with thrombolysis    VASCULAR SURGERY Left     double lumen       Family History   Problem Relation Age of Onset    No Known Problems Child     Cancer Sister 50        breast    Liver Disease Sister         cirrhosis    Heart Attack Mother 43       Social History     Socioeconomic History    Marital status: Single     Spouse name: None    Number of children: None    Years of education: None    Highest education level: None   Tobacco Use    Smoking

## 2024-05-17 NOTE — ED TRIAGE NOTES
Pt c/o SOB and fatigue since day.    Pt stated \"that she was seen by a lung doctor and started having SOB today\".    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    resolved    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     Pt was wheeled into the room by the ED Tech.

## 2024-05-17 NOTE — ED NOTES
Patient initially placed on NRB for O2 of 85%.     0645 O2 decreased to 10L NRB. Maintaining 100% on pulse ox     0655 patient decreased to 5Lpm NC. Maintaining 99% on pulse ox

## 2024-05-17 NOTE — ED NOTES
Report given to [facility] for ALS transport to [facility] room 460.

## 2024-05-18 PROBLEM — R06.02 SHORTNESS OF BREATH: Status: ACTIVE | Noted: 2024-05-18

## 2024-05-18 PROBLEM — J81.0 ACUTE PULMONARY EDEMA (HCC): Status: ACTIVE | Noted: 2024-05-18

## 2024-05-18 LAB
ANION GAP SERPL CALC-SCNC: 7 MMOL/L (ref 3–18)
BASOPHILS # BLD: 0 K/UL (ref 0–0.1)
BASOPHILS NFR BLD: 0 % (ref 0–2)
BUN SERPL-MCNC: 3 MG/DL (ref 7–18)
BUN/CREAT SERPL: 5 (ref 12–20)
CALCIUM SERPL-MCNC: 8 MG/DL (ref 8.5–10.1)
CHLORIDE SERPL-SCNC: 101 MMOL/L (ref 100–111)
CO2 SERPL-SCNC: 30 MMOL/L (ref 21–32)
CREAT SERPL-MCNC: 0.63 MG/DL (ref 0.6–1.3)
DIFFERENTIAL METHOD BLD: ABNORMAL
EOSINOPHIL # BLD: 0 K/UL (ref 0–0.4)
EOSINOPHIL NFR BLD: 0 % (ref 0–5)
ERYTHROCYTE [DISTWIDTH] IN BLOOD BY AUTOMATED COUNT: 21.5 % (ref 11.6–14.5)
GLUCOSE SERPL-MCNC: 87 MG/DL (ref 74–99)
HCT VFR BLD AUTO: 29.5 % (ref 35–45)
HGB BLD-MCNC: 8.6 G/DL (ref 12–16)
IMM GRANULOCYTES # BLD AUTO: 0 K/UL (ref 0–0.04)
IMM GRANULOCYTES NFR BLD AUTO: 0 % (ref 0–0.5)
LYMPHOCYTES # BLD: 4.7 K/UL (ref 0.9–3.6)
LYMPHOCYTES NFR BLD: 12 % (ref 21–52)
MAGNESIUM SERPL-MCNC: 1.9 MG/DL (ref 1.6–2.6)
MCH RBC QN AUTO: 23.8 PG (ref 24–34)
MCHC RBC AUTO-ENTMCNC: 29.2 G/DL (ref 31–37)
MCV RBC AUTO: 81.5 FL (ref 78–100)
MONOCYTES # BLD: 0.4 K/UL (ref 0.05–1.2)
MONOCYTES NFR BLD: 1 % (ref 3–10)
NEUTS BAND NFR BLD MANUAL: 8 %
NEUTS SEG # BLD: 33.8 K/UL (ref 1.8–8)
NEUTS SEG NFR BLD: 79 % (ref 40–73)
NRBC # BLD: 0.08 K/UL (ref 0–0.01)
NRBC BLD-RTO: 0.2 PER 100 WBC
PLATELET # BLD AUTO: 112 K/UL (ref 135–420)
PLATELET COMMENT: ABNORMAL
POTASSIUM SERPL-SCNC: 2.5 MMOL/L (ref 3.5–5.5)
POTASSIUM SERPL-SCNC: 2.8 MMOL/L (ref 3.5–5.5)
RBC # BLD AUTO: 3.62 M/UL (ref 4.2–5.3)
RBC MORPH BLD: ABNORMAL
SODIUM SERPL-SCNC: 138 MMOL/L (ref 136–145)
WBC # BLD AUTO: 38.9 K/UL (ref 4.6–13.2)
WBC MORPH BLD: ABNORMAL

## 2024-05-18 PROCEDURE — 6370000000 HC RX 637 (ALT 250 FOR IP): Performed by: INTERNAL MEDICINE

## 2024-05-18 PROCEDURE — 99221 1ST HOSP IP/OBS SF/LOW 40: CPT | Performed by: INTERNAL MEDICINE

## 2024-05-18 PROCEDURE — 6370000000 HC RX 637 (ALT 250 FOR IP): Performed by: HOSPITALIST

## 2024-05-18 PROCEDURE — 1100000000 HC RM PRIVATE

## 2024-05-18 PROCEDURE — 36415 COLL VENOUS BLD VENIPUNCTURE: CPT

## 2024-05-18 PROCEDURE — 84132 ASSAY OF SERUM POTASSIUM: CPT

## 2024-05-18 PROCEDURE — 6360000002 HC RX W HCPCS: Performed by: HOSPITALIST

## 2024-05-18 PROCEDURE — 80048 BASIC METABOLIC PNL TOTAL CA: CPT

## 2024-05-18 PROCEDURE — 2700000000 HC OXYGEN THERAPY PER DAY

## 2024-05-18 PROCEDURE — 83735 ASSAY OF MAGNESIUM: CPT

## 2024-05-18 PROCEDURE — 94761 N-INVAS EAR/PLS OXIMETRY MLT: CPT

## 2024-05-18 PROCEDURE — 99232 SBSQ HOSP IP/OBS MODERATE 35: CPT | Performed by: HOSPITALIST

## 2024-05-18 PROCEDURE — 85025 COMPLETE CBC W/AUTO DIFF WBC: CPT

## 2024-05-18 PROCEDURE — 6360000002 HC RX W HCPCS: Performed by: INTERNAL MEDICINE

## 2024-05-18 PROCEDURE — 2580000003 HC RX 258: Performed by: HOSPITALIST

## 2024-05-18 PROCEDURE — 94640 AIRWAY INHALATION TREATMENT: CPT

## 2024-05-18 RX ORDER — BUDESONIDE 1 MG/2ML
1 INHALANT ORAL
Status: DISCONTINUED | OUTPATIENT
Start: 2024-05-18 | End: 2024-05-21 | Stop reason: HOSPADM

## 2024-05-18 RX ORDER — NICOTINE 21 MG/24HR
1 PATCH, TRANSDERMAL 24 HOURS TRANSDERMAL DAILY
Status: DISCONTINUED | OUTPATIENT
Start: 2024-05-18 | End: 2024-05-21 | Stop reason: HOSPADM

## 2024-05-18 RX ORDER — ARFORMOTEROL TARTRATE 15 UG/2ML
15 SOLUTION RESPIRATORY (INHALATION)
Status: DISCONTINUED | OUTPATIENT
Start: 2024-05-18 | End: 2024-05-21 | Stop reason: HOSPADM

## 2024-05-18 RX ADMIN — FUROSEMIDE 20 MG: 10 INJECTION, SOLUTION INTRAMUSCULAR; INTRAVENOUS at 08:56

## 2024-05-18 RX ADMIN — BUDESONIDE 1 MG: 1 SUSPENSION RESPIRATORY (INHALATION) at 21:30

## 2024-05-18 RX ADMIN — BUDESONIDE 1 MG: 1 SUSPENSION RESPIRATORY (INHALATION) at 12:29

## 2024-05-18 RX ADMIN — CETIRIZINE HYDROCHLORIDE 10 MG: 10 TABLET, FILM COATED ORAL at 08:56

## 2024-05-18 RX ADMIN — AZITHROMYCIN MONOHYDRATE 500 MG: 500 INJECTION, POWDER, LYOPHILIZED, FOR SOLUTION INTRAVENOUS at 16:02

## 2024-05-18 RX ADMIN — APIXABAN 5 MG: 5 TABLET, FILM COATED ORAL at 08:56

## 2024-05-18 RX ADMIN — LEVOTHYROXINE SODIUM 88 MCG: 88 TABLET ORAL at 06:46

## 2024-05-18 RX ADMIN — POTASSIUM CHLORIDE 10 MEQ: 7.46 INJECTION, SOLUTION INTRAVENOUS at 05:20

## 2024-05-18 RX ADMIN — POTASSIUM CHLORIDE 10 MEQ: 7.46 INJECTION, SOLUTION INTRAVENOUS at 19:40

## 2024-05-18 RX ADMIN — SERTRALINE 50 MG: 50 TABLET, FILM COATED ORAL at 08:55

## 2024-05-18 RX ADMIN — IPRATROPIUM BROMIDE 0.5 MG: 0.5 SOLUTION RESPIRATORY (INHALATION) at 21:30

## 2024-05-18 RX ADMIN — AMLODIPINE BESYLATE 5 MG: 5 TABLET ORAL at 08:55

## 2024-05-18 RX ADMIN — POTASSIUM CHLORIDE 10 MEQ: 7.46 INJECTION, SOLUTION INTRAVENOUS at 06:21

## 2024-05-18 RX ADMIN — POTASSIUM CHLORIDE 10 MEQ: 7.46 INJECTION, SOLUTION INTRAVENOUS at 22:35

## 2024-05-18 RX ADMIN — APIXABAN 5 MG: 5 TABLET, FILM COATED ORAL at 21:29

## 2024-05-18 RX ADMIN — ASPIRIN 81 MG: 81 TABLET, COATED ORAL at 08:55

## 2024-05-18 RX ADMIN — WATER 1000 MG: 1 INJECTION INTRAMUSCULAR; INTRAVENOUS; SUBCUTANEOUS at 08:55

## 2024-05-18 RX ADMIN — POTASSIUM CHLORIDE 10 MEQ: 7.46 INJECTION, SOLUTION INTRAVENOUS at 23:40

## 2024-05-18 RX ADMIN — MONTELUKAST 10 MG: 10 TABLET, FILM COATED ORAL at 08:55

## 2024-05-18 RX ADMIN — POTASSIUM CHLORIDE 10 MEQ: 7.46 INJECTION, SOLUTION INTRAVENOUS at 07:37

## 2024-05-18 RX ADMIN — ARFORMOTEROL TARTRATE 15 MCG: 15 SOLUTION RESPIRATORY (INHALATION) at 21:30

## 2024-05-18 RX ADMIN — ARFORMOTEROL TARTRATE 15 MCG: 15 SOLUTION RESPIRATORY (INHALATION) at 12:29

## 2024-05-18 RX ADMIN — POTASSIUM CHLORIDE 10 MEQ: 7.46 INJECTION, SOLUTION INTRAVENOUS at 12:47

## 2024-05-18 RX ADMIN — IPRATROPIUM BROMIDE 0.5 MG: 0.5 SOLUTION RESPIRATORY (INHALATION) at 09:02

## 2024-05-18 RX ADMIN — POTASSIUM CHLORIDE 10 MEQ: 7.46 INJECTION, SOLUTION INTRAVENOUS at 21:30

## 2024-05-18 RX ADMIN — POTASSIUM CHLORIDE 10 MEQ: 7.46 INJECTION, SOLUTION INTRAVENOUS at 08:55

## 2024-05-18 RX ADMIN — FERROUS SULFATE TAB 325 MG (65 MG ELEMENTAL FE) 325 MG: 325 (65 FE) TAB at 08:55

## 2024-05-18 RX ADMIN — POTASSIUM CHLORIDE 10 MEQ: 7.46 INJECTION, SOLUTION INTRAVENOUS at 10:00

## 2024-05-18 ASSESSMENT — PAIN SCALES - GENERAL
PAINLEVEL_OUTOF10: 0

## 2024-05-18 NOTE — PROGRESS NOTES
RN called stating that patient's potassium was 2.5.  Potassium replacement protocol has been ordered and RN will replace accordingly.  RN also mentioned that earlier in the night patient had a possible fall and was found kneeling at the side of her bed.  I discussed with RN and she will reach out to the resident doctor to do a fall assessment

## 2024-05-18 NOTE — SIGNIFICANT EVENT
[facility]  Fall Assessment    Patient: Rina Prajapati, 63 y.o. female, : 1960  Patient MRN: [id number]    Admission Date: 2024   Admission Diagnosis: Shortness of breath [R06.02]  Microcytic anemia [D50.9]  Thrombocytosis [D75.839]  Hypoxic respiratory failure (HCC) [J96.91]  Pneumonia due to infectious organism [J18.9]    Subjective  Called to bedside to evaluate pt who has fallen***      Objective  /88   Pulse 100   Temp 97.9 °F (36.6 °C) (Oral)   Resp 20   Ht 1.702 m (5' 7\")   Wt 58.5 kg (129 lb)   SpO2 94%   BMI 20.20 kg/m²     PHYSICAL EXAM  General: The patient appears *** {GENERAL APPEARANCE:31523}.  HEENT: NCAT, PERRLA, EOM intact; oral mucosa well perfused, oropharynx clear  Neck: {Exam; neck:49880::\"JVD difficult to assess due to obesity\"}  CVS: {Exam; heart:5510::\"regular rate and rhythm, S1, S2 normal, no murmur, click, rub or gallop\"}  Lungs: {Exam; lungs:5033}    Abdomen: Soft, non-distended, non-TTP, BS+, no organomegaly, no masses  Ext: No calf tenderness, peripheral pulses present, no significant edema.  Skin: Warm, Dry, Intact , No significant rashes/petechia/ecchymosis  Neuro: No focal neurologic deficits or gross abnormalities  Psych: A&Ox3, appropriate mood and affect    Assessment and Plan  Rina Prajapati 63 y.o. year old female admitted for Hypoxic respiratory failure (HCC) who is status/post fall in house    Status/Post Fall    **below blerp if exam normal  Patient was without complications caused from this event. Per patient report, nursing, and my assessment, patient is stable and at baseline condition as prior to the fall. There are no neurologic findings on my physical exam, with cranial nerves II-XII grossly intact, no neck tenderness to palpation, nor are there structural abnormalities. Pt/nursing reports no change in mental status/function and a mini mental status exam was without abnormality. Pt has baseline ROM, muscle strength, and is

## 2024-05-18 NOTE — CONSULTS
[facility].  Pulmonary, Critical Care, and Sleep Medicine    Initial Patient Consult    Name: Rina Prajapati MRN: [id number]   : 1960 Hospital: [facility]   Date: 2024        IMPRESSION:   Acute Hypoxic Respiratory Failure  - 2/2 pulmonary edema +/- pneumonia  LLL infiltrates  - possible pneumonia, inconsistent symptoms, WBCs >40K  Pulmonary edema/Fluid overload  - unclear cause, recent echo reportedly good  - no renal failure, normal albumin  Hx of lung cancer  - Dx , s/p xrt, on chemo currently  - followed by [facility]  Probable COPD  - follows with outside pulmonologist- on inhalers- doesn't know which ones  Active tobacco Abuse  Hx of DVT?- on Eliquis     Patient Active Problem List   Diagnosis    SVC syndrome    Non-small cell lung cancer (HCC)    Abnormal mammogram    Iron deficiency anemia    Recurrent major depressive disorder (HCC)    Essential hypertension    Allergic rhinitis due to pollen    Breast mass seen on mammogram    Perennial allergic rhinitis with seasonal variation    Other primary thrombophilia (HCC)    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  Added nicotine patch  Assess home Oxygen needs at discharge  OT, PT, OOB and ambulate    Will Follow    Can follow up with outside pulmonologist on D/C- may need home nebulizer machine     Subjective:     This patient has been seen and evaluated at the request of Dr. Tapia for respiratory failure. Patient is a 63 y.o. female with PMHx of tobacco abuse,  vein. Veins are not well assessed due  to contrast bolus timing.  Heart Strain assessment:  -  RV/LV ratio (normal <0.9): Normal  -  Dysfunction or bowing of interventricular septum: None  -  There is mild visualization of contrast reflux from the right heart into the  IVC/hepatic veins, nonspecific.    Upper abdominal structures: Partially visualized trace ascites.    Chest wall: Anasarca    Bones: No acute osseous abnormality.    Impression  1. No pulmonary embolism.    2. Sequela of fluid overload, including new trace bilateral pleural effusions,  partially visualized trace ascites, anasarca, and mildly increased small  pericardial effusion.    3. New groundglass and more consolidative density in the left lower lobe. New  groundglass density in the left upper lobe, with mild interlobular septal  thickening. This could represent asymmetric edema and/or multifocal  infection/inflammation.    4. Similar 2.4 cm left upper lobe nodule and areas of scarring and nodularity in  the right lung. Recommend continued close attention on follow-up.    5. Bronchial wall thickening and mild secretions in the trachea, recommend  correlation for bronchitis.    6. Mild contrast reflux from the right heart into the IVC is nonspecific, can be  seen with elevated right heart pressure or due to vigorous contrast injection.    Xray Result (most recent):  XR CHEST PORTABLE 05/17/2024    Narrative  EXAM: XR CHEST PORTABLE    Indications: SOB    Comparison: Prior CT most recently January 2024    Findings:  A reversed lordotic configuration with chin tuck over the upper lungs. Slight  hypoinflation. There is blunting of the right sulcus with partial obscuration of  the hemidiaphragm as seen previously. Vague area of nodular opacity at the left  heart region. No pneumothorax. Tandem vascular stents right upper chest. Left  arm PICC line tip at the mid aspect of subclavian vein region.    Impression  :    1. Nothing clearly acute; chronic

## 2024-05-18 NOTE — PROGRESS NOTES
1205 CNA came to nurses station to inform this nurse patient was found kneeling at the side of the bed. When this nurse entered the room patient was kneeling on the floor leaned on the bed. Patient told this nurse and CNA she forgot she was not at home and rolled out of the bed and got tangled in the blanket. Patient assisted to [facility] and back in bed. This nurse assessed patient and continues to be oriented x4, no complaints of pain, denies hitting her head. Patient was an independent patient prior to the fall, but had yellow socks at the time of the fall. This nurse educated patient that the bed alarm will be placed for her safety and she will need to call prior to getting out of bed for assistance. Patient verbalized understanding. Provider paged awaiting call back. Charge nurse informed.    8718 nursing supervisor notified. Unit manager notified.

## 2024-05-18 NOTE — PLAN OF CARE
Problem: Safety - Adult  Goal: Free from fall injury  5/18/2024 0658 by Desirae Ochoa RN  Outcome: Progressing  Flowsheets (Taken 5/17/2024 2023)  Free From Fall Injury: Instruct family/caregiver on patient safety  5/17/2024 1755 by Mary Clayton RN  Outcome: Progressing     Problem: Pain  Goal: Verbalizes/displays adequate comfort level or baseline comfort level  5/18/2024 0658 by Desirae Ochoa RN  Outcome: Progressing  Flowsheets (Taken 5/17/2024 2023)  Verbalizes/displays adequate comfort level or baseline comfort level:   Encourage patient to monitor pain and request assistance   Assess pain using appropriate pain scale   Administer analgesics based on type and severity of pain and evaluate response  5/17/2024 1755 by Mary Clayton RN  Outcome: Progressing     Problem: Chronic Conditions and Co-morbidities  Goal: Patient's chronic conditions and co-morbidity symptoms are monitored and maintained or improved  Outcome: Progressing  Flowsheets (Taken 5/17/2024 2023)  Care Plan - Patient's Chronic Conditions and Co-Morbidity Symptoms are Monitored and Maintained or Improved: Monitor and assess patient's chronic conditions and comorbid symptoms for stability, deterioration, or improvement

## 2024-05-18 NOTE — PROGRESS NOTES
[facility]  Progress Note    Patient: Rina Prajapati Age: 63 y.o. : 1960 MR#: [id number] SSN: [id number]  Date/Time: 2024     Subjective:     Patient seen evaluated, lying in bed, no acute distress.  Patient mentions shortness of breath has improved.    63-year-old female with a history of lung cancer diagnosed in  currently on chemotherapy every 3 weeks and follows up with oncology, history of COPD, hypertension, CHF presents to the emergency room secondary to shortness of breath.  Patient mentions that shortness of breath developed 2 to 3 days ago and has been progressively getting worse.        Assessment/Plan:     Community-acquired pneumonia-continue IV antibiotics.  Acute respiratory failure with hypoxia-continue supplemental oxygen, pulmonology consulted.  History of lung cancer on chemotherapy every 3 weeks  History of COPD-continue bronchodilator therapy-pulmonology on board.  History of CHF-continue IV Lasix, monitor strict I's and O's, echocardiogram.  Patient has been counseled to stop smoking.  History of hypertension-resume home medications, continue to monitor blood pressure.  DVT prophylaxis-Lovenox  Full code            Anticipated Discharge:         Se Tapia MD  24      Case discussed with:  [x]Patient  []Family  []Nursing  []Case Management  DVT Prophylaxis:  [x]Lovenox  []Hep SQ  []SCDs  []Coumadin   []On Heparin gtt    Objective:   VS:   Vitals:    24 1129   BP: 103/69   Pulse: 90   Resp: 20   Temp: 98.3 °F (36.8 °C)   SpO2: 94%        Intake/Output Summary (Last 24 hours) at 2024 1201  Last data filed at 2024 1023  Gross per 24 hour   Intake 240 ml   Output --   Net 240 ml       General:  Alert, cooperative, no acute distress    Pulmonary: Decreased breath sound bases bilaterally  Cardiovascular: Regular rate and Rhythm.  GI:  Soft, Non distended, Non tender. + Bowel sounds.  Extremities:  No  edema, cyanosis, clubbing. No calf tenderness.   Neurologic: Alert and oriented X 3. No acute neuro deficits.  Additional:    Medications:   Current Facility-Administered Medications   Medication Dose Route Frequency    amLODIPine (NORVASC) tablet 5 mg  5 mg Oral Daily    apixaban (ELIQUIS) tablet 5 mg  5 mg Oral BID    aspirin EC tablet 81 mg  81 mg Oral Daily    cetirizine (ZYRTEC) tablet 10 mg  10 mg Oral Daily    ferrous sulfate (IRON 325) tablet 325 mg  325 mg Oral Daily with breakfast    levothyroxine (SYNTHROID) tablet 88 mcg  88 mcg Oral QAM AC    montelukast (SINGULAIR) tablet 10 mg  10 mg Oral Daily    sertraline (ZOLOFT) tablet 50 mg  50 mg Oral Daily    potassium chloride (KLOR-CON M) extended release tablet 40 mEq  40 mEq Oral PRN    Or    potassium bicarb-citric acid (EFFER-K) effervescent tablet 40 mEq  40 mEq Oral PRN    Or    potassium chloride 10 mEq/100 mL IVPB (Peripheral Line)  10 mEq IntraVENous PRN    magnesium sulfate 2000 mg in 50 mL IVPB premix  2,000 mg IntraVENous PRN    ondansetron (ZOFRAN-ODT) disintegrating tablet 4 mg  4 mg Oral Q8H PRN    Or    ondansetron (ZOFRAN) injection 4 mg  4 mg IntraVENous Q6H PRN    polyethylene glycol (GLYCOLAX) packet 17 g  17 g Oral Daily PRN    acetaminophen (TYLENOL) tablet 650 mg  650 mg Oral Q6H PRN    Or    acetaminophen (TYLENOL) suppository 650 mg  650 mg Rectal Q6H PRN    cefTRIAXone (ROCEPHIN) 1,000 mg in sterile water 10 mL IV syringe  1,000 mg IntraVENous Q24H    azithromycin (ZITHROMAX) 500 mg in sodium chloride 0.9 % 250 mL IVPB (Cmfr6Lmi)  500 mg IntraVENous Q24H    albuterol (PROVENTIL) (2.5 MG/3ML) 0.083% nebulizer solution 2.5 mg  2.5 mg Nebulization Q4H PRN    furosemide (LASIX) injection 20 mg  20 mg IntraVENous Daily    ipratropium (ATROVENT) 0.02 % nebulizer solution 0.5 mg  0.5 mg Nebulization TID RT     Facility-Administered Medications Ordered in Other Encounters   Medication Dose Route Frequency    sodium chloride flush 0.9 %

## 2024-05-18 NOTE — PLAN OF CARE
Problem: Safety - Adult  Goal: Free from fall injury  5/18/2024 1622 by Charlotte Rose RN  Outcome: Progressing  Flowsheets (Taken 5/18/2024 0759)  Free From Fall Injury: Instruct family/caregiver on patient safety  5/18/2024 0658 by Desirae Ochoa RN  Outcome: Progressing  Flowsheets (Taken 5/17/2024 2023)  Free From Fall Injury: Instruct family/caregiver on patient safety     Problem: Pain  Goal: Verbalizes/displays adequate comfort level or baseline comfort level  5/18/2024 1622 by Charlotte Rose RN  Outcome: Progressing  Flowsheets (Taken 5/18/2024 1555)  Verbalizes/displays adequate comfort level or baseline comfort level:   Encourage patient to monitor pain and request assistance   Assess pain using appropriate pain scale  5/18/2024 0658 by Desirae Ochoa RN  Outcome: Progressing  Flowsheets (Taken 5/17/2024 2023)  Verbalizes/displays adequate comfort level or baseline comfort level:   Encourage patient to monitor pain and request assistance   Assess pain using appropriate pain scale   Administer analgesics based on type and severity of pain and evaluate response     Problem: Chronic Conditions and Co-morbidities  Goal: Patient's chronic conditions and co-morbidity symptoms are monitored and maintained or improved  5/18/2024 1622 by Charlotte Rose RN  Outcome: Progressing  Flowsheets (Taken 5/18/2024 0759)  Care Plan - Patient's Chronic Conditions and Co-Morbidity Symptoms are Monitored and Maintained or Improved:   Monitor and assess patient's chronic conditions and comorbid symptoms for stability, deterioration, or improvement   Collaborate with multidisciplinary team to address chronic and comorbid conditions and prevent exacerbation or deterioration  5/18/2024 0658 by Desirae Ochoa RN  Outcome: Progressing  Flowsheets (Taken 5/17/2024 2023)  Care Plan - Patient's Chronic Conditions and Co-Morbidity Symptoms are Monitored and Maintained or Improved: Monitor and assess patient's chronic conditions and

## 2024-05-18 NOTE — PROGRESS NOTES
Spoke with Dr. Núñez in regards to patient potassium and fall from this evening. Dr. Núñez directed this nurse to notify resident to assess patient. This nurse notified resident and made aware of the fall. Per resident, they will assess patient on dayshift.

## 2024-05-19 ENCOUNTER — APPOINTMENT (OUTPATIENT)
Facility: HOSPITAL | Age: 64
DRG: 193 | End: 2024-05-19
Attending: HOSPITALIST
Payer: MEDICARE

## 2024-05-19 LAB
ANION GAP SERPL CALC-SCNC: 5 MMOL/L (ref 3–18)
BASOPHILS # BLD: 0 K/UL (ref 0–0.1)
BASOPHILS NFR BLD: 0 % (ref 0–2)
BUN SERPL-MCNC: 3 MG/DL (ref 7–18)
BUN/CREAT SERPL: 6 (ref 12–20)
CALCIUM SERPL-MCNC: 7.7 MG/DL (ref 8.5–10.1)
CHLORIDE SERPL-SCNC: 102 MMOL/L (ref 100–111)
CO2 SERPL-SCNC: 30 MMOL/L (ref 21–32)
CREAT SERPL-MCNC: 0.49 MG/DL (ref 0.6–1.3)
DIFFERENTIAL METHOD BLD: ABNORMAL
ECHO AO ASC DIAM: 2.5 CM
ECHO AO ASCENDING AORTA INDEX: 1.49 CM/M2
ECHO AO ROOT DIAM: 2.3 CM
ECHO AO ROOT INDEX: 1.37 CM/M2
ECHO AV AREA PEAK VELOCITY: 1.9 CM2
ECHO AV AREA/BSA PEAK VELOCITY: 1.1 CM2/M2
ECHO AV PEAK GRADIENT: 4 MMHG
ECHO AV PEAK VELOCITY: 1.1 M/S
ECHO AV VELOCITY RATIO: 0.64
ECHO BSA: 1.66 M2
ECHO LA DIAMETER INDEX: 1.79 CM/M2
ECHO LA DIAMETER: 3 CM
ECHO LA TO AORTIC ROOT RATIO: 1.3
ECHO LA VOL A-L A4C: 40 ML (ref 22–52)
ECHO LA VOL MOD A4C: 37 ML (ref 22–52)
ECHO LA VOLUME INDEX A-L A4C: 24 ML/M2 (ref 16–34)
ECHO LA VOLUME INDEX MOD A4C: 22 ML/M2 (ref 16–34)
ECHO LV E' LATERAL VELOCITY: 9 CM/S
ECHO LV E' SEPTAL VELOCITY: 7 CM/S
ECHO LV FRACTIONAL SHORTENING: 28 % (ref 28–44)
ECHO LV INTERNAL DIMENSION DIASTOLE INDEX: 2.74 CM/M2
ECHO LV INTERNAL DIMENSION DIASTOLIC: 4.6 CM (ref 3.9–5.3)
ECHO LV INTERNAL DIMENSION SYSTOLIC INDEX: 1.96 CM/M2
ECHO LV INTERNAL DIMENSION SYSTOLIC: 3.3 CM
ECHO LV IVSD: 1.1 CM (ref 0.6–0.9)
ECHO LV MASS 2D: 181.2 G (ref 67–162)
ECHO LV MASS INDEX 2D: 107.9 G/M2 (ref 43–95)
ECHO LV POSTERIOR WALL DIASTOLIC: 1.1 CM (ref 0.6–0.9)
ECHO LV RELATIVE WALL THICKNESS RATIO: 0.48
ECHO LVOT AREA: 2.8 CM2
ECHO LVOT DIAM: 1.9 CM
ECHO LVOT PEAK GRADIENT: 2 MMHG
ECHO LVOT PEAK VELOCITY: 0.7 M/S
ECHO MV A VELOCITY: 0.8 M/S
ECHO MV E DECELERATION TIME (DT): 189.1 MS
ECHO MV E VELOCITY: 0.68 M/S
ECHO MV E/A RATIO: 0.85
ECHO MV E/E' LATERAL: 7.56
ECHO MV E/E' RATIO (AVERAGED): 8.63
ECHO MV E/E' SEPTAL: 9.71
ECHO PV MAX VELOCITY: 0.8 M/S
ECHO PV PEAK GRADIENT: 2 MMHG
ECHO RV FREE WALL PEAK S': 12 CM/S
ECHO RV TAPSE: 1.8 CM (ref 1.7–?)
ECHO RVOT PEAK GRADIENT: 1 MMHG
ECHO RVOT PEAK VELOCITY: 0.6 M/S
ECHO TV REGURGITANT MAX VELOCITY: 2.28 M/S
ECHO TV REGURGITANT PEAK GRADIENT: 21 MMHG
EOSINOPHIL # BLD: 0 K/UL (ref 0–0.4)
EOSINOPHIL NFR BLD: 0 % (ref 0–5)
ERYTHROCYTE [DISTWIDTH] IN BLOOD BY AUTOMATED COUNT: 21.7 % (ref 11.6–14.5)
GLUCOSE SERPL-MCNC: 76 MG/DL (ref 74–99)
HCT VFR BLD AUTO: 28.5 % (ref 35–45)
HGB BLD-MCNC: 8.3 G/DL (ref 12–16)
IMM GRANULOCYTES # BLD AUTO: 0 K/UL (ref 0–0.04)
IMM GRANULOCYTES NFR BLD AUTO: 0 % (ref 0–0.5)
LYMPHOCYTES # BLD: 0.3 K/UL (ref 0.9–3.6)
LYMPHOCYTES NFR BLD: 1 % (ref 21–52)
MAGNESIUM SERPL-MCNC: 1.8 MG/DL (ref 1.6–2.6)
MCH RBC QN AUTO: 24.1 PG (ref 24–34)
MCHC RBC AUTO-ENTMCNC: 29.1 G/DL (ref 31–37)
MCV RBC AUTO: 82.6 FL (ref 78–100)
MONOCYTES # BLD: 0.9 K/UL (ref 0.05–1.2)
MONOCYTES NFR BLD: 3 % (ref 3–10)
NEUTS BAND NFR BLD MANUAL: 4 %
NEUTS SEG # BLD: 28.1 K/UL (ref 1.8–8)
NEUTS SEG NFR BLD: 92 % (ref 40–73)
NRBC # BLD: 0.04 K/UL (ref 0–0.01)
NRBC BLD-RTO: 0.1 PER 100 WBC
PLATELET # BLD AUTO: 106 K/UL (ref 135–420)
PLATELET COMMENT: ABNORMAL
POTASSIUM SERPL-SCNC: 3.3 MMOL/L (ref 3.5–5.5)
RBC # BLD AUTO: 3.45 M/UL (ref 4.2–5.3)
RBC MORPH BLD: ABNORMAL
SODIUM SERPL-SCNC: 137 MMOL/L (ref 136–145)
WBC # BLD AUTO: 29.3 K/UL (ref 4.6–13.2)

## 2024-05-19 PROCEDURE — 94640 AIRWAY INHALATION TREATMENT: CPT

## 2024-05-19 PROCEDURE — 83735 ASSAY OF MAGNESIUM: CPT

## 2024-05-19 PROCEDURE — 93306 TTE W/DOPPLER COMPLETE: CPT | Performed by: INTERNAL MEDICINE

## 2024-05-19 PROCEDURE — 80048 BASIC METABOLIC PNL TOTAL CA: CPT

## 2024-05-19 PROCEDURE — 36415 COLL VENOUS BLD VENIPUNCTURE: CPT

## 2024-05-19 PROCEDURE — 6360000002 HC RX W HCPCS: Performed by: HOSPITALIST

## 2024-05-19 PROCEDURE — 99232 SBSQ HOSP IP/OBS MODERATE 35: CPT | Performed by: HOSPITALIST

## 2024-05-19 PROCEDURE — 6370000000 HC RX 637 (ALT 250 FOR IP): Performed by: HOSPITALIST

## 2024-05-19 PROCEDURE — C8929 TTE W OR WO FOL WCON,DOPPLER: HCPCS

## 2024-05-19 PROCEDURE — 94761 N-INVAS EAR/PLS OXIMETRY MLT: CPT

## 2024-05-19 PROCEDURE — 6370000000 HC RX 637 (ALT 250 FOR IP): Performed by: INTERNAL MEDICINE

## 2024-05-19 PROCEDURE — 85025 COMPLETE CBC W/AUTO DIFF WBC: CPT

## 2024-05-19 PROCEDURE — 2700000000 HC OXYGEN THERAPY PER DAY

## 2024-05-19 PROCEDURE — 1100000000 HC RM PRIVATE

## 2024-05-19 PROCEDURE — 94664 DEMO&/EVAL PT USE INHALER: CPT

## 2024-05-19 PROCEDURE — 6360000004 HC RX CONTRAST MEDICATION: Performed by: HOSPITALIST

## 2024-05-19 PROCEDURE — 99232 SBSQ HOSP IP/OBS MODERATE 35: CPT | Performed by: INTERNAL MEDICINE

## 2024-05-19 PROCEDURE — 2580000003 HC RX 258: Performed by: HOSPITALIST

## 2024-05-19 PROCEDURE — 6360000002 HC RX W HCPCS: Performed by: INTERNAL MEDICINE

## 2024-05-19 RX ORDER — ALBUTEROL SULFATE 2.5 MG/3ML
2.5 SOLUTION RESPIRATORY (INHALATION) EVERY 6 HOURS
Status: DISCONTINUED | OUTPATIENT
Start: 2024-05-19 | End: 2024-05-20

## 2024-05-19 RX ORDER — PREDNISONE 20 MG/1
40 TABLET ORAL DAILY
Status: DISCONTINUED | OUTPATIENT
Start: 2024-05-19 | End: 2024-05-21 | Stop reason: HOSPADM

## 2024-05-19 RX ADMIN — IPRATROPIUM BROMIDE 0.5 MG: 0.5 SOLUTION RESPIRATORY (INHALATION) at 20:37

## 2024-05-19 RX ADMIN — APIXABAN 5 MG: 5 TABLET, FILM COATED ORAL at 21:30

## 2024-05-19 RX ADMIN — CETIRIZINE HYDROCHLORIDE 10 MG: 10 TABLET, FILM COATED ORAL at 09:02

## 2024-05-19 RX ADMIN — ALBUTEROL SULFATE 2.5 MG: 2.5 SOLUTION RESPIRATORY (INHALATION) at 20:37

## 2024-05-19 RX ADMIN — IPRATROPIUM BROMIDE 0.5 MG: 0.5 SOLUTION RESPIRATORY (INHALATION) at 08:42

## 2024-05-19 RX ADMIN — ARFORMOTEROL TARTRATE 15 MCG: 15 SOLUTION RESPIRATORY (INHALATION) at 08:42

## 2024-05-19 RX ADMIN — POTASSIUM CHLORIDE 10 MEQ: 7.46 INJECTION, SOLUTION INTRAVENOUS at 01:40

## 2024-05-19 RX ADMIN — FUROSEMIDE 20 MG: 10 INJECTION, SOLUTION INTRAMUSCULAR; INTRAVENOUS at 09:02

## 2024-05-19 RX ADMIN — ARFORMOTEROL TARTRATE 15 MCG: 15 SOLUTION RESPIRATORY (INHALATION) at 20:37

## 2024-05-19 RX ADMIN — ALBUTEROL SULFATE 2.5 MG: 2.5 SOLUTION RESPIRATORY (INHALATION) at 13:18

## 2024-05-19 RX ADMIN — ASPIRIN 81 MG: 81 TABLET, COATED ORAL at 09:02

## 2024-05-19 RX ADMIN — BUDESONIDE 1 MG: 1 SUSPENSION RESPIRATORY (INHALATION) at 20:37

## 2024-05-19 RX ADMIN — POTASSIUM BICARBONATE 40 MEQ: 782 TABLET, EFFERVESCENT ORAL at 09:14

## 2024-05-19 RX ADMIN — BUDESONIDE 1 MG: 1 SUSPENSION RESPIRATORY (INHALATION) at 08:42

## 2024-05-19 RX ADMIN — FERROUS SULFATE TAB 325 MG (65 MG ELEMENTAL FE) 325 MG: 325 (65 FE) TAB at 09:03

## 2024-05-19 RX ADMIN — LEVOTHYROXINE SODIUM 88 MCG: 88 TABLET ORAL at 06:14

## 2024-05-19 RX ADMIN — WATER 1000 MG: 1 INJECTION INTRAMUSCULAR; INTRAVENOUS; SUBCUTANEOUS at 09:03

## 2024-05-19 RX ADMIN — APIXABAN 5 MG: 5 TABLET, FILM COATED ORAL at 09:02

## 2024-05-19 RX ADMIN — AMLODIPINE BESYLATE 5 MG: 5 TABLET ORAL at 09:03

## 2024-05-19 RX ADMIN — PREDNISONE 40 MG: 20 TABLET ORAL at 13:44

## 2024-05-19 RX ADMIN — PERFLUTREN 2 ML: 6.52 INJECTION, SUSPENSION INTRAVENOUS at 12:26

## 2024-05-19 RX ADMIN — MONTELUKAST 10 MG: 10 TABLET, FILM COATED ORAL at 09:02

## 2024-05-19 RX ADMIN — POTASSIUM CHLORIDE 10 MEQ: 7.46 INJECTION, SOLUTION INTRAVENOUS at 00:40

## 2024-05-19 RX ADMIN — IPRATROPIUM BROMIDE 0.5 MG: 0.5 SOLUTION RESPIRATORY (INHALATION) at 13:18

## 2024-05-19 RX ADMIN — AZITHROMYCIN MONOHYDRATE 500 MG: 500 INJECTION, POWDER, LYOPHILIZED, FOR SOLUTION INTRAVENOUS at 15:27

## 2024-05-19 RX ADMIN — SERTRALINE 50 MG: 50 TABLET, FILM COATED ORAL at 09:01

## 2024-05-19 ASSESSMENT — PAIN SCALES - GENERAL
PAINLEVEL_OUTOF10: 0

## 2024-05-19 NOTE — PROGRESS NOTES
Patient ambulated for walk test. RA patient at 90%. Patient ambulated to hallway and was 88% placed on 5 liters O2

## 2024-05-19 NOTE — PROGRESS NOTES
[facility].  Pulmonary, Critical Care, and Sleep Medicine    Initial Patient Consult    Name: Rina Prajapati MRN: [id number]   : 1960 Hospital: [facility]   Date: 2024        IMPRESSION:   Acute Hypoxic Respiratory Failure  - 2/2 pulmonary edema +/- pneumonia  LLL infiltrates  - possible pneumonia, inconsistent symptoms, WBCs >40K  Pulmonary edema/Fluid overload  - unclear cause, recent echo reportedly good  - no renal failure, normal albumin  Hx of lung cancer  - Dx , s/p xrt, on chemo currently  - followed by [facility]  Probable COPD  - follows with outside pulmonologist- on inhalers- doesn't know which ones  Active tobacco Abuse  Hx of DVT?- on Eliquis     Patient Active Problem List   Diagnosis    SVC syndrome    Non-small cell lung cancer (HCC)    Abnormal mammogram    Iron deficiency anemia    Recurrent major depressive disorder (HCC)    Essential hypertension    Allergic rhinitis due to pollen    Breast mass seen on mammogram    Perennial allergic rhinitis with seasonal variation    Other primary thrombophilia (HCC)    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   Would order 2Decho  Continue ABX, procal .94- unclear significance in the setting of lung CA  No need for steroids  Continue bronchodilators- added brovana and pulmicort  Lasix per primary service.  Added nicotine patch  Assess home Oxygen needs at discharge  OT, PT, OOB and ambulate    Will Follow    Can follow up with outside pulmonologist on D/C- may need home nebulizer machine     Subjective:     Pt is overall much improved. States she thinks it was retaining fluid that is the problem. Remains  using a 3D multislice MIP (maximal  intensity projection) methodology.  CT scans at this facility are performed using dose optimization technique as  appropriate to the performed exam, to include automated exposure control,  adjustment of the mA and/or kV according to patient size (including appropriate  matching for site specific examinations), or use of iterative reconstruction  technique.      FINDINGS:    Lung/Airway: Emphysema. New trace bilateral pleural effusions. Areas of  bronchial wall thickening. Similar appearance of presumed scarring right  suprahilar to medial right upper lobe apex. Similar areas of nodularity,  including a 1.1 cm nodule medial right upper lobe (2, 57). Similar probable  nodular scarring medial right lower lobe. Only increased consolidation anterior  right middle lobe.  New groundglass and more consolidative density left lower lobe. Increasing  density of an ill-defined opacity in the anterior left upper lobe measuring 2.4  x 2.0 cm. New groundglass density and septal thickening left upper lobe. 2.7 x  1.9 cm medial left upper lobe nodule is similar to prior when measured  similarly.  Trace amount of secretions in the trachea.    Lower neck: Unremarkable.    Mediastinum: No definite adenopathy. Some ill-defined soft tissue in the medial  right upper mediastinum may be related to posttreatment change.    Pulmonary arteries (includes assessment of MIP images): Main pulmonary artery  measures less than 3 cm. No filling defect is seen in the main, lobar, or  visualized segmental pulmonary arteries.    Aorta and other cardiovascular structures: No aortic aneurysm or dissection.  Mild coronary artery calcifications. Small pericardial effusion is mildly  increased from prior. Right subclavian and internal jugular to SVC stents. Left  upper extremity PICC tip is in the left subclavian vein. Stable probable  calcification at the left brachiocephalic vein. Veins are not well assessed due  to

## 2024-05-19 NOTE — PLAN OF CARE
Problem: Safety - Adult  Goal: Free from fall injury  5/19/2024 0054 by Shana Hunter RN  Outcome: Progressing  Flowsheets (Taken 5/18/2024 0759 by Charlotte Rose RN)  Free From Fall Injury: Instruct family/caregiver on patient safety  5/18/2024 1622 by Charlotte Rose RN  Outcome: Progressing  Flowsheets (Taken 5/18/2024 0759)  Free From Fall Injury: Instruct family/caregiver on patient safety     Problem: Pain  Goal: Verbalizes/displays adequate comfort level or baseline comfort level  5/19/2024 0054 by Shana Hunter RN  Outcome: Progressing  Flowsheets (Taken 5/19/2024 0054)  Verbalizes/displays adequate comfort level or baseline comfort level:   Encourage patient to monitor pain and request assistance   Assess pain using appropriate pain scale   Administer analgesics based on type and severity of pain and evaluate response   Implement non-pharmacological measures as appropriate and evaluate response  5/18/2024 1622 by Charlotte Rose RN  Outcome: Progressing  Flowsheets (Taken 5/18/2024 1555)  Verbalizes/displays adequate comfort level or baseline comfort level:   Encourage patient to monitor pain and request assistance   Assess pain using appropriate pain scale     Problem: Chronic Conditions and Co-morbidities  Goal: Patient's chronic conditions and co-morbidity symptoms are monitored and maintained or improved  5/19/2024 0054 by Shana Hunter RN  Outcome: Progressing  Flowsheets (Taken 5/18/2024 2015)  Care Plan - Patient's Chronic Conditions and Co-Morbidity Symptoms are Monitored and Maintained or Improved: Monitor and assess patient's chronic conditions and comorbid symptoms for stability, deterioration, or improvement  5/18/2024 1622 by Charlotte Rose RN  Outcome: Progressing  Flowsheets (Taken 5/18/2024 0759)  Care Plan - Patient's Chronic Conditions and Co-Morbidity Symptoms are Monitored and Maintained or Improved:   Monitor and assess patient's chronic conditions and comorbid symptoms for

## 2024-05-19 NOTE — PROGRESS NOTES
[facility]  Progress Note    Patient: Rina Prajapati Age: 63 y.o. : 1960 MR#: [id number] SSN: [id number]  Date/Time: 2024     Subjective:     Patient seen evaluated, lying in bed, no acute distress.  Patient mentions shortness of breath has improved.    63-year-old female with a history of lung cancer diagnosed in  currently on chemotherapy every 3 weeks and follows up with oncology, history of COPD, hypertension, CHF presents to the emergency room secondary to shortness of breath.  Patient mentions that shortness of breath developed 2 to 3 days ago and has been progressively getting worse.    -continue current treatment plan.  Will do walk test to determine if patient needs home oxygen.  Appreciate pulmonology input.  Echocardiogram completed, report pending.    Assessment/Plan:     Community-acquired pneumonia-continue IV antibiotics.  Acute respiratory failure with hypoxia-continue supplemental oxygen, pulmonology consulted.  History of lung cancer on chemotherapy every 3 weeks  History of COPD-continue bronchodilator therapy-pulmonology on board.  History of CHF-continue IV Lasix, monitor strict I's and O's, echocardiogram.  Patient has been counseled to stop smoking.  History of hypertension-resume home medications, continue to monitor blood pressure.  DVT prophylaxis-Lovenox  Full code            Anticipated Discharge:         Se Tapia MD  24      Case discussed with:  [x]Patient  []Family  []Nursing  []Case Management  DVT Prophylaxis:  [x]Lovenox  []Hep SQ  []SCDs  []Coumadin   []On Heparin gtt    Objective:   VS:   Vitals:    24 0842   BP:    Pulse: 96   Resp: 20   Temp:    SpO2: 94%        Intake/Output Summary (Last 24 hours) at 2024 1132  Last data filed at 2024 0827  Gross per 24 hour   Intake 640 ml   Output 426 ml   Net 214 ml         General:  Alert, cooperative, no acute distress    Pulmonary:

## 2024-05-20 LAB
ANION GAP SERPL CALC-SCNC: 4 MMOL/L (ref 3–18)
BASOPHILS # BLD: 0 K/UL (ref 0–0.1)
BASOPHILS NFR BLD: 0 % (ref 0–2)
BUN SERPL-MCNC: 6 MG/DL (ref 7–18)
BUN/CREAT SERPL: 10 (ref 12–20)
CALCIUM SERPL-MCNC: 7.9 MG/DL (ref 8.5–10.1)
CHLORIDE SERPL-SCNC: 99 MMOL/L (ref 100–111)
CO2 SERPL-SCNC: 33 MMOL/L (ref 21–32)
CREAT SERPL-MCNC: 0.59 MG/DL (ref 0.6–1.3)
DIFFERENTIAL METHOD BLD: ABNORMAL
EOSINOPHIL # BLD: 0 K/UL (ref 0–0.4)
EOSINOPHIL NFR BLD: 0 % (ref 0–5)
ERYTHROCYTE [DISTWIDTH] IN BLOOD BY AUTOMATED COUNT: 21.6 % (ref 11.6–14.5)
GLUCOSE BLD STRIP.AUTO-MCNC: 181 MG/DL (ref 70–110)
GLUCOSE BLD STRIP.AUTO-MCNC: 216 MG/DL (ref 70–110)
GLUCOSE SERPL-MCNC: 208 MG/DL (ref 74–99)
HCT VFR BLD AUTO: 28.7 % (ref 35–45)
HGB BLD-MCNC: 8.3 G/DL (ref 12–16)
IMM GRANULOCYTES # BLD AUTO: 0 K/UL (ref 0–0.04)
IMM GRANULOCYTES NFR BLD AUTO: 0 % (ref 0–0.5)
LYMPHOCYTES # BLD: 0.4 K/UL (ref 0.9–3.6)
LYMPHOCYTES NFR BLD: 1 % (ref 21–52)
MAGNESIUM SERPL-MCNC: 1.6 MG/DL (ref 1.6–2.6)
MCH RBC QN AUTO: 23.7 PG (ref 24–34)
MCHC RBC AUTO-ENTMCNC: 28.9 G/DL (ref 31–37)
MCV RBC AUTO: 82 FL (ref 78–100)
MONOCYTES # BLD: 0.4 K/UL (ref 0.05–1.2)
MONOCYTES NFR BLD: 1 % (ref 3–10)
NEUTS BAND NFR BLD MANUAL: 4 %
NEUTS SEG # BLD: 36.8 K/UL (ref 1.8–8)
NEUTS SEG NFR BLD: 94 % (ref 40–73)
NRBC # BLD: 0.03 K/UL (ref 0–0.01)
NRBC BLD-RTO: 0.1 PER 100 WBC
PLATELET # BLD AUTO: 121 K/UL (ref 135–420)
PLATELET COMMENT: ABNORMAL
POTASSIUM SERPL-SCNC: 3.5 MMOL/L (ref 3.5–5.5)
RBC # BLD AUTO: 3.5 M/UL (ref 4.2–5.3)
RBC MORPH BLD: ABNORMAL
SODIUM SERPL-SCNC: 136 MMOL/L (ref 136–145)
WBC # BLD AUTO: 37.6 K/UL (ref 4.6–13.2)

## 2024-05-20 PROCEDURE — 6360000002 HC RX W HCPCS: Performed by: HOSPITALIST

## 2024-05-20 PROCEDURE — 6370000000 HC RX 637 (ALT 250 FOR IP): Performed by: HOSPITALIST

## 2024-05-20 PROCEDURE — 85025 COMPLETE CBC W/AUTO DIFF WBC: CPT

## 2024-05-20 PROCEDURE — 1100000000 HC RM PRIVATE

## 2024-05-20 PROCEDURE — APPSS15 APP SPLIT SHARED TIME 0-15 MINUTES

## 2024-05-20 PROCEDURE — 82962 GLUCOSE BLOOD TEST: CPT

## 2024-05-20 PROCEDURE — 94761 N-INVAS EAR/PLS OXIMETRY MLT: CPT

## 2024-05-20 PROCEDURE — 6370000000 HC RX 637 (ALT 250 FOR IP): Performed by: INTERNAL MEDICINE

## 2024-05-20 PROCEDURE — 2580000003 HC RX 258: Performed by: HOSPITALIST

## 2024-05-20 PROCEDURE — 94664 DEMO&/EVAL PT USE INHALER: CPT

## 2024-05-20 PROCEDURE — 83735 ASSAY OF MAGNESIUM: CPT

## 2024-05-20 PROCEDURE — 36415 COLL VENOUS BLD VENIPUNCTURE: CPT

## 2024-05-20 PROCEDURE — 80048 BASIC METABOLIC PNL TOTAL CA: CPT

## 2024-05-20 PROCEDURE — 2700000000 HC OXYGEN THERAPY PER DAY

## 2024-05-20 PROCEDURE — 94640 AIRWAY INHALATION TREATMENT: CPT

## 2024-05-20 PROCEDURE — 99233 SBSQ HOSP IP/OBS HIGH 50: CPT | Performed by: INTERNAL MEDICINE

## 2024-05-20 PROCEDURE — 6360000002 HC RX W HCPCS: Performed by: INTERNAL MEDICINE

## 2024-05-20 RX ORDER — DEXTROSE MONOHYDRATE 100 MG/ML
INJECTION, SOLUTION INTRAVENOUS CONTINUOUS PRN
Status: DISCONTINUED | OUTPATIENT
Start: 2024-05-20 | End: 2024-05-21 | Stop reason: HOSPADM

## 2024-05-20 RX ORDER — INSULIN LISPRO 100 [IU]/ML
0-4 INJECTION, SOLUTION INTRAVENOUS; SUBCUTANEOUS
Status: DISCONTINUED | OUTPATIENT
Start: 2024-05-20 | End: 2024-05-21 | Stop reason: HOSPADM

## 2024-05-20 RX ORDER — INSULIN LISPRO 100 [IU]/ML
0-4 INJECTION, SOLUTION INTRAVENOUS; SUBCUTANEOUS NIGHTLY
Status: DISCONTINUED | OUTPATIENT
Start: 2024-05-20 | End: 2024-05-21 | Stop reason: HOSPADM

## 2024-05-20 RX ADMIN — FUROSEMIDE 20 MG: 10 INJECTION, SOLUTION INTRAMUSCULAR; INTRAVENOUS at 08:07

## 2024-05-20 RX ADMIN — AMLODIPINE BESYLATE 5 MG: 5 TABLET ORAL at 08:04

## 2024-05-20 RX ADMIN — PREDNISONE 40 MG: 20 TABLET ORAL at 08:03

## 2024-05-20 RX ADMIN — CETIRIZINE HYDROCHLORIDE 10 MG: 10 TABLET, FILM COATED ORAL at 08:03

## 2024-05-20 RX ADMIN — INSULIN LISPRO 1 UNITS: 100 INJECTION, SOLUTION INTRAVENOUS; SUBCUTANEOUS at 15:57

## 2024-05-20 RX ADMIN — ARFORMOTEROL TARTRATE 15 MCG: 15 SOLUTION RESPIRATORY (INHALATION) at 08:52

## 2024-05-20 RX ADMIN — BUDESONIDE 1 MG: 1 SUSPENSION RESPIRATORY (INHALATION) at 08:53

## 2024-05-20 RX ADMIN — FERROUS SULFATE TAB 325 MG (65 MG ELEMENTAL FE) 325 MG: 325 (65 FE) TAB at 08:03

## 2024-05-20 RX ADMIN — ARFORMOTEROL TARTRATE 15 MCG: 15 SOLUTION RESPIRATORY (INHALATION) at 20:13

## 2024-05-20 RX ADMIN — SERTRALINE 50 MG: 50 TABLET, FILM COATED ORAL at 08:03

## 2024-05-20 RX ADMIN — LEVOTHYROXINE SODIUM 88 MCG: 88 TABLET ORAL at 06:00

## 2024-05-20 RX ADMIN — ALBUTEROL SULFATE 2.5 MG: 2.5 SOLUTION RESPIRATORY (INHALATION) at 14:42

## 2024-05-20 RX ADMIN — IPRATROPIUM BROMIDE 0.5 MG: 0.5 SOLUTION RESPIRATORY (INHALATION) at 14:42

## 2024-05-20 RX ADMIN — POTASSIUM BICARBONATE 40 MEQ: 782 TABLET, EFFERVESCENT ORAL at 11:48

## 2024-05-20 RX ADMIN — ALBUTEROL SULFATE 2.5 MG: 2.5 SOLUTION RESPIRATORY (INHALATION) at 08:52

## 2024-05-20 RX ADMIN — ASPIRIN 81 MG: 81 TABLET, COATED ORAL at 08:04

## 2024-05-20 RX ADMIN — APIXABAN 5 MG: 5 TABLET, FILM COATED ORAL at 08:03

## 2024-05-20 RX ADMIN — AZITHROMYCIN MONOHYDRATE 500 MG: 500 INJECTION, POWDER, LYOPHILIZED, FOR SOLUTION INTRAVENOUS at 14:58

## 2024-05-20 RX ADMIN — MONTELUKAST 10 MG: 10 TABLET, FILM COATED ORAL at 08:03

## 2024-05-20 RX ADMIN — WATER 1000 MG: 1 INJECTION INTRAMUSCULAR; INTRAVENOUS; SUBCUTANEOUS at 08:06

## 2024-05-20 RX ADMIN — IPRATROPIUM BROMIDE 0.5 MG: 0.5 SOLUTION RESPIRATORY (INHALATION) at 20:13

## 2024-05-20 RX ADMIN — BUDESONIDE 1 MG: 1 SUSPENSION RESPIRATORY (INHALATION) at 20:13

## 2024-05-20 RX ADMIN — IPRATROPIUM BROMIDE 0.5 MG: 0.5 SOLUTION RESPIRATORY (INHALATION) at 08:52

## 2024-05-20 RX ADMIN — ALBUTEROL SULFATE 2.5 MG: 2.5 SOLUTION RESPIRATORY (INHALATION) at 02:49

## 2024-05-20 RX ADMIN — APIXABAN 5 MG: 5 TABLET, FILM COATED ORAL at 22:04

## 2024-05-20 ASSESSMENT — PAIN SCALES - GENERAL
PAINLEVEL_OUTOF10: 0

## 2024-05-20 ASSESSMENT — ENCOUNTER SYMPTOMS
ABDOMINAL DISTENTION: 0
ABDOMINAL PAIN: 0
COUGH: 1
VOMITING: 0
WHEEZING: 0
SHORTNESS OF BREATH: 0

## 2024-05-20 NOTE — CARE COORDINATION
05/20/24 1618   Service Assessment   Patient Orientation Alert and Oriented   Cognition Alert   History Provided By Patient   Primary Caregiver Self   Accompanied By/Relationship None   Support Systems None   Patient's Healthcare Decision Maker is: Named in Scanned ACP Document   PCP Verified by CM Yes   Last Visit to PCP Within last 3 months   Prior Functional Level Independent in ADLs/IADLs   Current Functional Level Independent in ADLs/IADLs   Can patient return to prior living arrangement Yes   Ability to make needs known: Good   Family able to assist with home care needs: Yes   Would you like for me to discuss the discharge plan with any other family members/significant others, and if so, who? Yes   Financial Resources Medicare   Community Resources None   Social/Functional History   Lives With Significant other   Type of Home House   Home Layout One level   Home Access Stairs to enter without rails   Entrance Stairs - Number of Steps 1 step   Bathroom Shower/Tub Tub/Shower unit   Bathroom Toilet Standard   Bathroom Equipment None   Bathroom Accessibility Accessible   Home Equipment None   Receives Help From Friend(s);Family   ADL Assistance Independent   Homemaking Assistance Independent   Homemaking Responsibilities Yes   Ambulation Assistance Independent   Transfer Assistance Independent   Active  Yes   Mode of Transportation Car   Occupation On disability   Discharge Planning   Type of Residence House   Living Arrangements Spouse/Significant Other   Current Services Prior To Admission None   Potential Assistance Needed N/A   DME Ordered? No   Potential Assistance Purchasing Medications No   Type of Home Care Services None   Patient expects to be discharged to: House   One/Two Story Residence One story   History of falls? 0   Services At/After Discharge   Transition of Care Consult (CM Consult) Discharge Planning   Services At/After Discharge Home Health    Resource Information Provided? No

## 2024-05-20 NOTE — PLAN OF CARE
Problem: Safety - Adult  Goal: Free from fall injury  5/19/2024 2332 by Shana Hunter RN  Outcome: Progressing  5/19/2024 2332 by Shana Hunter RN  Outcome: Progressing  5/19/2024 1037 by Charlotte Rose RN  Outcome: Progressing  Flowsheets (Taken 5/19/2024 0842)  Free From Fall Injury: Instruct family/caregiver on patient safety     Problem: Chronic Conditions and Co-morbidities  Goal: Patient's chronic conditions and co-morbidity symptoms are monitored and maintained or improved  5/19/2024 2332 by Shana Hunter RN  Outcome: Progressing  5/19/2024 1037 by Charlotte Rose RN  Outcome: Progressing  Flowsheets (Taken 5/19/2024 0744)  Care Plan - Patient's Chronic Conditions and Co-Morbidity Symptoms are Monitored and Maintained or Improved:   Monitor and assess patient's chronic conditions and comorbid symptoms for stability, deterioration, or improvement   Collaborate with multidisciplinary team to address chronic and comorbid conditions and prevent exacerbation or deterioration

## 2024-05-20 NOTE — PROGRESS NOTES
Comprehensive Nutrition Assessment      Type and Reason for Visit:  Initial, Positive Nutrition Screen (MST >/= 2)    Nutrition Recommendations/Plan:   Diet as ordered,   Add Ensure Enlive (each provides 350 kcal, 20g protein) once Daily to maximize calories/protein intake opportunity  Continue to monitor tolerance of PO, compliance of oral supplements, weight, labs, and plan of care during admission.         Malnutrition Assessment:  Malnutrition Status:  Moderate malnutrition (05/20/24 1222)    Context:  Chronic Illness     Findings of the 6 clinical characteristics of malnutrition:  Energy Intake:  No significant decrease in energy intake  Weight Loss:  Mild weight loss (specify amount and time period) (-6.5% x 1 yr)     Body Fat Loss:  Mild body fat loss Orbital, Triceps   Muscle Mass Loss:  Mild muscle mass loss Temples (temporalis), Clavicles (pectoralis & deltoids), Thigh (quadriceps), Calf (gastrocnemius)  Fluid Accumulation:  Mild Extremities (Lower ex, bilateral, +1)   Strength:  Not Performed    Nutrition History and Allergies:   Hx of lung cancer (dx in 2015 currently on chemotherapy Q 3 weeks and follows up with oncology), hx of COPD, hypertension, CHF. Pt report good appetite prior to admission despite chemotherapy. No teeth, no denture.  Wt hx: 62.6 kg (5/10/23), 62.1 kg (11/9/23), 60.5 kg (2/14/24), 60.4 kg (4/19/14).  Gradual wt loss over the past year but insignificant: -6.5% x 1yr. Lactose intolerance reported (can tolerate certain dairy products - unable to specify)    Nutrition Assessment:    Rina Prajapati is a 63 y.o. female presents to the ED secondary to shortness of breath. Admitted for further evaluation.   Seen pt for positive nutrition screen for wt loss- inisiginficant amount noted. Still, NFPE indicates some muscle and subcutaneous fat loss.   Pt reports good appetite at present, % w/ all meals per pt. No chewing issue reported.   CLOVIS current wt as pt was having lunch.

## 2024-05-20 NOTE — PROGRESS NOTES
RT called by doctor to provide spacer for patient for when she is discharged. RT demonstrated how to properly use spacer with MDI and patient verbalized understanding             05/20/24 6960   RT Misc Charges   $RT Education $HHN/MDI/IPPB Demo or Rehana

## 2024-05-20 NOTE — RT PROTOCOL NOTE
NEB PROTOCOL:  ASSESSMENT    Patient  Rina Prajapati     63 y.o.   female     5/20/2024  2:49 PM    Breath Sounds Pre Procedure:                      Diminished/scattered wheeze                         Breath Sounds Post Procedure:                                             diminished    Breathing pattern: Pre procedure   unlabored          Post procedure  unlabored     Cough: Pre procedure   none               Post procedure  nonproductive    Heart Rate: Pre procedure  98           Post procedure  93    Resp Rate: Pre procedure   18           Post procedure   18       Nebulizer Therapy: Current medications         Problem List:   Patient Active Problem List   Diagnosis    SVC syndrome    Non-small cell lung cancer (HCC)    Abnormal mammogram    Iron deficiency anemia    Recurrent major depressive disorder (HCC)    Essential hypertension    Allergic rhinitis due to pollen    Breast mass seen on mammogram    Perennial allergic rhinitis with seasonal variation    Other primary thrombophilia (HCC)    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)    Protein calorie malnutrition    Hypoxic respiratory failure (HCC)    Pneumonia due to infectious organism    Shortness of breath    Acute pulmonary edema (HCC)       Patient alert and cooperative to use MDI: Yes    Home Respiratory Therapy Regimen/Frequency: Yes  Medication   Device  Frequency     SEVERITY INDEX:    ITEM 0 1 2 3 4 Score   Respiratory Pattern and or Rate Regular  10-19 Regular  20-24   24-30    30-34 Severe SOB or   Greater than 35 0   Breath Sounds Clear Occasional Wheeze Mild Wheezing Moderate Wheezing  wheezing/Absent breath sounds 1   Shortness of Breath None Dyspnea on Exertion Dyspnea at Rest Moderate Shortness of Breath at Rest Severe Shortness of Breath - Limited Speech 1       Total Score:  2    Scoring Guidelines  0-4 pts:  PRN-BID   5-7 pts:  BID, TID, QID  8-9 pts:  TID, QID, Q6  10-12 pts:  Q4-Q6  * - Guidelines used

## 2024-05-20 NOTE — RT PROTOCOL NOTE
RT called by doctor to provide spacer for patient for when she is discharged. RT demonstrated how to properly use spacer with MDI and patient verbalized understanding

## 2024-05-20 NOTE — PROGRESS NOTES
montelukast (SINGULAIR) tablet 10 mg  10 mg Oral Daily    sertraline (ZOLOFT) tablet 50 mg  50 mg Oral Daily    potassium chloride (KLOR-CON M) extended release tablet 40 mEq  40 mEq Oral PRN    Or    potassium bicarb-citric acid (EFFER-K) effervescent tablet 40 mEq  40 mEq Oral PRN    Or    potassium chloride 10 mEq/100 mL IVPB (Peripheral Line)  10 mEq IntraVENous PRN    magnesium sulfate 2000 mg in 50 mL IVPB premix  2,000 mg IntraVENous PRN    ondansetron (ZOFRAN-ODT) disintegrating tablet 4 mg  4 mg Oral Q8H PRN    Or    ondansetron (ZOFRAN) injection 4 mg  4 mg IntraVENous Q6H PRN    polyethylene glycol (GLYCOLAX) packet 17 g  17 g Oral Daily PRN    acetaminophen (TYLENOL) tablet 650 mg  650 mg Oral Q6H PRN    Or    acetaminophen (TYLENOL) suppository 650 mg  650 mg Rectal Q6H PRN    cefTRIAXone (ROCEPHIN) 1,000 mg in sterile water 10 mL IV syringe  1,000 mg IntraVENous Q24H    azithromycin (ZITHROMAX) 500 mg in sodium chloride 0.9 % 250 mL IVPB ([id number])  500 mg IntraVENous Q24H    furosemide (LASIX) injection 20 mg  20 mg IntraVENous Daily    ipratropium (ATROVENT) 0.02 % nebulizer solution 0.5 mg  0.5 mg Nebulization TID RT       Labs:    Recent Results (from the past 24 hour(s))   Basic Metabolic Panel w/ Reflex to MG    Collection Time: 05/20/24  1:14 AM   Result Value Ref Range    Sodium 136 136 - 145 mmol/L    Potassium 3.5 3.5 - 5.5 mmol/L    Chloride 99 (L) 100 - 111 mmol/L    CO2 33 (H) 21 - 32 mmol/L    Anion Gap 4 3.0 - 18 mmol/L    Glucose 208 (H) 74 - 99 mg/dL    BUN 6 (L) 7.0 - 18 MG/DL    Creatinine 0.59 (L) 0.6 - 1.3 MG/DL    BUN/Creatinine Ratio 10 (L) 12 - 20      Est, Glom Filt Rate >90 >60 ml/min/1.73m2    Calcium 7.9 (L) 8.5 - 10.1 MG/DL   CBC with Auto Differential    Collection Time: 05/20/24  1:14 AM   Result Value Ref Range    WBC 37.6 (H) 4.6 - 13.2 K/uL    RBC 3.50 (L) 4.20 - 5.30 M/uL    Hemoglobin 8.3 (L) 12.0 - 16.0 g/dL    Hematocrit 28.7 (L) 35.0 - 45.0 %    MCV 82.0 78.0 -  100.0 FL    MCH 23.7 (L) 24.0 - 34.0 PG    MCHC 28.9 (L) 31.0 - 37.0 g/dL    RDW 21.6 (H) 11.6 - 14.5 %    Platelets 121 (L) 135 - 420 K/uL    Nucleated RBCs 0.1 (H) 0  WBC    nRBC 0.03 (H) 0.00 - 0.01 K/uL    Neutrophils % 94 (H) 40 - 73 %    Band Neutrophils 4 %    Lymphocytes % 1 (L) 21 - 52 %    Monocytes % 1 (L) 3 - 10 %    Eosinophils % 0 0 - 5 %    Basophils % 0 0 - 2 %    Immature Granulocytes % 0 0.0 - 0.5 %    Neutrophils Absolute 36.8 (H) 1.8 - 8.0 K/UL    Lymphocytes Absolute 0.4 (L) 0.9 - 3.6 K/UL    Monocytes Absolute 0.4 0.05 - 1.2 K/UL    Eosinophils Absolute 0.0 0.0 - 0.4 K/UL    Basophils Absolute 0.0 0.0 - 0.1 K/UL    Immature Granulocytes Absolute 0.0 0.00 - 0.04 K/UL    Differential Type MANUAL      Platelet Comment DECREASED PLATELETS      RBC Comment ANISOCYTOSIS  2+       Magnesium    Collection Time: 05/20/24  1:14 AM   Result Value Ref Range    Magnesium 1.6 1.6 - 2.6 mg/dL   POCT Glucose    Collection Time: 05/20/24  3:28 PM   Result Value Ref Range    POC Glucose 216 (H) 70 - 110 mg/dL       Signed By: Herminio Sadler DO     May 20, 2024      Disclaimer: Sections of this note are dictated using utilizing voice recognition software.  Minor typographical errors may be present. If questions arise, please do not hesitate to contact me or call our department.

## 2024-05-20 NOTE — PROGRESS NOTES
WALK TEST    Test #1 Room Air at rest: 87% ; Recovery 93% at 3LPM.  Test #2 Room air while ambulatin%  Test #3 Ambulating on 3LPM with Oxygen sats at 93% but still drops to 90%  Oxygen Saturation at 4LPM ; 94-95%

## 2024-05-20 NOTE — PLAN OF CARE
Problem: Safety - Adult  Goal: Free from fall injury  5/20/2024 0914 by Mary Clayton, RN  Outcome: Progressing  5/19/2024 2332 by Shana Hunter RN  Outcome: Progressing  5/19/2024 2332 by Shana Hunter, RN  Outcome: Progressing     Problem: Pain  Goal: Verbalizes/displays adequate comfort level or baseline comfort level  Outcome: Progressing

## 2024-05-20 NOTE — PROGRESS NOTES
[facility]  Pulmonary, Critical Care, and Sleep Medicine    Name: Rina Prajapati MRN: [id number]   : 1960 Hospital: [facility]   Date: 2024        Pulmonary Medicine: Daily Progress Note    Admission Date:   2024  LOS: 3  MAR reviewed and pertinent medications noted or modified as needed    IMPRESSION:   Acute Hypoxic Respiratory Failure  - 2/2 pulmonary edema +/- pneumonia  No prior supplementary oxygen requirement, now on 3L  LLL infiltrates  - possible pneumonia, inconsistent symptoms, WBCs >40K. Procal 0.94  Pulmonary edema/Fluid overload  - unclear cause, recent echo reportedly good  - no renal failure, normal albumin  Hx of lung cancer  - Dx , s/p xrt, on chemo currently  - followed by Guillermo  VINCE nodule, 2.4cm, similar to prior  Probable COPD  - follows with outside pulmonologist- (Charissa Murguia) on inhalers- doesn't know which ones,   Thrombocytopenia, improving  Active tobacco Abuse  Hx of DVT?- on Eliquis  BMI Body mass index is 20.19 kg/m².  CODE STATUS: Full Code No additional code details       Patient Active Problem List   Diagnosis    SVC syndrome    Non-small cell lung cancer (HCC)    Abnormal mammogram    Iron deficiency anemia    Recurrent major depressive disorder (HCC)    Essential hypertension    Allergic rhinitis due to pollen    Breast mass seen on mammogram    Perennial allergic rhinitis with seasonal variation    Other primary thrombophilia (HCC)    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)    Protein calorie malnutrition    Hypoxic respiratory failure (HCC)    Pneumonia due to infectious organism    Shortness of breath    Acute pulmonary edema (HCC)          RECOMMENDATIONS:   Discussed with Dr. DELMY Potts and Maye MARQUES.  See Attending notes for further Recs     Subjective/History:       Interval Follow HPI  HPI per Dr. POSADAS on : This patient has been seen and evaluated at the request of Dr. Tapia for

## 2024-05-21 VITALS
OXYGEN SATURATION: 96 % | HEART RATE: 107 BPM | HEIGHT: 67 IN | RESPIRATION RATE: 18 BRPM | SYSTOLIC BLOOD PRESSURE: 110 MMHG | BODY MASS INDEX: 20.24 KG/M2 | WEIGHT: 128.97 LBS | TEMPERATURE: 98.5 F | DIASTOLIC BLOOD PRESSURE: 72 MMHG

## 2024-05-21 PROBLEM — D50.9 MICROCYTIC ANEMIA: Status: ACTIVE | Noted: 2017-01-04

## 2024-05-21 PROBLEM — D75.839 THROMBOCYTOSIS: Status: ACTIVE | Noted: 2024-05-21

## 2024-05-21 PROBLEM — Z85.118 HISTORY OF LUNG CANCER: Status: ACTIVE | Noted: 2024-05-21

## 2024-05-21 PROBLEM — R91.8 PULMONARY INFILTRATES: Status: ACTIVE | Noted: 2024-05-21

## 2024-05-21 LAB
ALBUMIN SERPL-MCNC: 2.5 G/DL (ref 3.4–5)
ALBUMIN/GLOB SERPL: 0.9 (ref 0.8–1.7)
ALP SERPL-CCNC: 93 U/L (ref 45–117)
ALT SERPL-CCNC: 13 U/L (ref 13–56)
ANION GAP SERPL CALC-SCNC: 2 MMOL/L (ref 3–18)
AST SERPL-CCNC: 13 U/L (ref 10–38)
BASOPHILS # BLD: 0 K/UL (ref 0–0.1)
BASOPHILS NFR BLD: 0 % (ref 0–2)
BILIRUB SERPL-MCNC: 0.4 MG/DL (ref 0.2–1)
BUN SERPL-MCNC: 13 MG/DL (ref 7–18)
BUN/CREAT SERPL: 21 (ref 12–20)
CALCIUM SERPL-MCNC: 8.3 MG/DL (ref 8.5–10.1)
CHLORIDE SERPL-SCNC: 98 MMOL/L (ref 100–111)
CO2 SERPL-SCNC: 35 MMOL/L (ref 21–32)
CREAT SERPL-MCNC: 0.61 MG/DL (ref 0.6–1.3)
DIFFERENTIAL METHOD BLD: ABNORMAL
EOSINOPHIL # BLD: 0 K/UL (ref 0–0.4)
EOSINOPHIL NFR BLD: 0 % (ref 0–5)
ERYTHROCYTE [DISTWIDTH] IN BLOOD BY AUTOMATED COUNT: 21.8 % (ref 11.6–14.5)
EST. AVERAGE GLUCOSE BLD GHB EST-MCNC: 117 MG/DL
GLOBULIN SER CALC-MCNC: 2.8 G/DL (ref 2–4)
GLUCOSE BLD STRIP.AUTO-MCNC: 113 MG/DL (ref 70–110)
GLUCOSE BLD STRIP.AUTO-MCNC: 130 MG/DL (ref 70–110)
GLUCOSE BLD STRIP.AUTO-MCNC: 167 MG/DL (ref 70–110)
GLUCOSE SERPL-MCNC: 100 MG/DL (ref 74–99)
HBA1C MFR BLD: 5.7 % (ref 4.2–5.6)
HCT VFR BLD AUTO: 27.9 % (ref 35–45)
HGB BLD-MCNC: 8.2 G/DL (ref 12–16)
IMM GRANULOCYTES # BLD AUTO: 0 K/UL (ref 0–0.04)
IMM GRANULOCYTES NFR BLD AUTO: 0 % (ref 0–0.5)
LYMPHOCYTES # BLD: 3.1 K/UL (ref 0.9–3.6)
LYMPHOCYTES NFR BLD: 9 % (ref 21–52)
MCH RBC QN AUTO: 23.8 PG (ref 24–34)
MCHC RBC AUTO-ENTMCNC: 29.4 G/DL (ref 31–37)
MCV RBC AUTO: 80.9 FL (ref 78–100)
MONOCYTES # BLD: 0.3 K/UL (ref 0.05–1.2)
MONOCYTES NFR BLD: 1 % (ref 3–10)
MYELOCYTES NFR BLD MANUAL: 1 %
NEUTS BAND NFR BLD MANUAL: 2 %
NEUTS SEG # BLD: 30.2 K/UL (ref 1.8–8)
NEUTS SEG NFR BLD: 87 % (ref 40–73)
NRBC # BLD: 0 K/UL (ref 0–0.01)
NRBC BLD-RTO: 0 PER 100 WBC
PLATELET # BLD AUTO: 122 K/UL (ref 135–420)
PLATELET COMMENT: ABNORMAL
POTASSIUM SERPL-SCNC: 3.8 MMOL/L (ref 3.5–5.5)
PROT SERPL-MCNC: 5.3 G/DL (ref 6.4–8.2)
RBC # BLD AUTO: 3.45 M/UL (ref 4.2–5.3)
RBC MORPH BLD: ABNORMAL
RBC MORPH BLD: ABNORMAL
SODIUM SERPL-SCNC: 135 MMOL/L (ref 136–145)
WBC # BLD AUTO: 33.9 K/UL (ref 4.6–13.2)
WBC MORPH BLD: ABNORMAL

## 2024-05-21 PROCEDURE — 36415 COLL VENOUS BLD VENIPUNCTURE: CPT

## 2024-05-21 PROCEDURE — 6360000002 HC RX W HCPCS: Performed by: INTERNAL MEDICINE

## 2024-05-21 PROCEDURE — 80053 COMPREHEN METABOLIC PANEL: CPT

## 2024-05-21 PROCEDURE — 85025 COMPLETE CBC W/AUTO DIFF WBC: CPT

## 2024-05-21 PROCEDURE — 94760 N-INVAS EAR/PLS OXIMETRY 1: CPT

## 2024-05-21 PROCEDURE — 83036 HEMOGLOBIN GLYCOSYLATED A1C: CPT

## 2024-05-21 PROCEDURE — 97162 PT EVAL MOD COMPLEX 30 MIN: CPT

## 2024-05-21 PROCEDURE — 94640 AIRWAY INHALATION TREATMENT: CPT

## 2024-05-21 PROCEDURE — 82962 GLUCOSE BLOOD TEST: CPT

## 2024-05-21 PROCEDURE — 99239 HOSP IP/OBS DSCHRG MGMT >30: CPT | Performed by: INTERNAL MEDICINE

## 2024-05-21 PROCEDURE — 6370000000 HC RX 637 (ALT 250 FOR IP): Performed by: INTERNAL MEDICINE

## 2024-05-21 PROCEDURE — 97535 SELF CARE MNGMENT TRAINING: CPT

## 2024-05-21 PROCEDURE — 6360000002 HC RX W HCPCS: Performed by: HOSPITALIST

## 2024-05-21 PROCEDURE — 99233 SBSQ HOSP IP/OBS HIGH 50: CPT | Performed by: INTERNAL MEDICINE

## 2024-05-21 PROCEDURE — 6370000000 HC RX 637 (ALT 250 FOR IP): Performed by: HOSPITALIST

## 2024-05-21 PROCEDURE — 97530 THERAPEUTIC ACTIVITIES: CPT

## 2024-05-21 PROCEDURE — 2580000003 HC RX 258: Performed by: HOSPITALIST

## 2024-05-21 PROCEDURE — 97166 OT EVAL MOD COMPLEX 45 MIN: CPT

## 2024-05-21 PROCEDURE — 2700000000 HC OXYGEN THERAPY PER DAY

## 2024-05-21 PROCEDURE — APPSS15 APP SPLIT SHARED TIME 0-15 MINUTES

## 2024-05-21 RX ORDER — PREDNISONE 10 MG/1
TABLET ORAL
Qty: 18 TABLET | Refills: 0 | Status: SHIPPED | OUTPATIENT
Start: 2024-05-22 | End: 2024-05-31

## 2024-05-21 RX ORDER — AMOXICILLIN AND CLAVULANATE POTASSIUM 875; 125 MG/1; MG/1
1 TABLET, FILM COATED ORAL 2 TIMES DAILY
Qty: 6 TABLET | Refills: 0 | Status: SHIPPED | OUTPATIENT
Start: 2024-05-21 | End: 2024-05-24

## 2024-05-21 RX ORDER — AZITHROMYCIN 500 MG/1
500 TABLET, FILM COATED ORAL DAILY
Qty: 3 TABLET | Refills: 0 | Status: SHIPPED | OUTPATIENT
Start: 2024-05-21 | End: 2024-05-24

## 2024-05-21 RX ORDER — FUROSEMIDE 40 MG/1
40 TABLET ORAL DAILY
Qty: 30 TABLET | Refills: 1 | Status: SHIPPED | OUTPATIENT
Start: 2024-05-21

## 2024-05-21 RX ORDER — FUROSEMIDE 10 MG/ML
40 INJECTION INTRAMUSCULAR; INTRAVENOUS ONCE
Status: COMPLETED | OUTPATIENT
Start: 2024-05-21 | End: 2024-05-21

## 2024-05-21 RX ADMIN — MONTELUKAST 10 MG: 10 TABLET, FILM COATED ORAL at 09:44

## 2024-05-21 RX ADMIN — IPRATROPIUM BROMIDE 0.5 MG: 0.5 SOLUTION RESPIRATORY (INHALATION) at 14:42

## 2024-05-21 RX ADMIN — AZITHROMYCIN MONOHYDRATE 500 MG: 500 INJECTION, POWDER, LYOPHILIZED, FOR SOLUTION INTRAVENOUS at 16:09

## 2024-05-21 RX ADMIN — FUROSEMIDE 40 MG: 10 INJECTION, SOLUTION INTRAMUSCULAR; INTRAVENOUS at 13:01

## 2024-05-21 RX ADMIN — APIXABAN 5 MG: 5 TABLET, FILM COATED ORAL at 09:44

## 2024-05-21 RX ADMIN — IPRATROPIUM BROMIDE 0.5 MG: 0.5 SOLUTION RESPIRATORY (INHALATION) at 08:56

## 2024-05-21 RX ADMIN — FERROUS SULFATE TAB 325 MG (65 MG ELEMENTAL FE) 325 MG: 325 (65 FE) TAB at 09:44

## 2024-05-21 RX ADMIN — BUDESONIDE 1 MG: 1 SUSPENSION RESPIRATORY (INHALATION) at 08:56

## 2024-05-21 RX ADMIN — LEVOTHYROXINE SODIUM 88 MCG: 88 TABLET ORAL at 08:16

## 2024-05-21 RX ADMIN — WATER 1000 MG: 1 INJECTION INTRAMUSCULAR; INTRAVENOUS; SUBCUTANEOUS at 09:43

## 2024-05-21 RX ADMIN — PREDNISONE 40 MG: 20 TABLET ORAL at 09:44

## 2024-05-21 RX ADMIN — AMLODIPINE BESYLATE 5 MG: 5 TABLET ORAL at 09:44

## 2024-05-21 RX ADMIN — ASPIRIN 81 MG: 81 TABLET, COATED ORAL at 09:44

## 2024-05-21 RX ADMIN — SERTRALINE 50 MG: 50 TABLET, FILM COATED ORAL at 09:44

## 2024-05-21 RX ADMIN — CETIRIZINE HYDROCHLORIDE 10 MG: 10 TABLET, FILM COATED ORAL at 09:44

## 2024-05-21 RX ADMIN — ARFORMOTEROL TARTRATE 15 MCG: 15 SOLUTION RESPIRATORY (INHALATION) at 08:56

## 2024-05-21 RX ADMIN — FUROSEMIDE 20 MG: 10 INJECTION, SOLUTION INTRAMUSCULAR; INTRAVENOUS at 09:44

## 2024-05-21 ASSESSMENT — ENCOUNTER SYMPTOMS
COUGH: 1
ABDOMINAL PAIN: 0
ABDOMINAL DISTENTION: 0
VOMITING: 0
SHORTNESS OF BREATH: 0
WHEEZING: 0

## 2024-05-21 ASSESSMENT — PAIN SCALES - GENERAL
PAINLEVEL_OUTOF10: 0

## 2024-05-21 NOTE — PROGRESS NOTES
[facility]  Pulmonary, Critical Care, and Sleep Medicine    Name: Rina Prajapati MRN: [id number]   : 1960 Hospital: [facility]   Date: 2024        Pulmonary Medicine: Daily Progress Note    Admission Date:   2024  LOS: 4  MAR reviewed and pertinent medications noted or modified as needed    IMPRESSION:   Acute Hypoxic Respiratory Failure  - 2/2 pulmonary edema +/- pneumonia  No prior supplementary oxygen requirement, failed walk test, now on 3L  LLL infiltrates  - possible pneumonia, inconsistent symptoms, WBCs >40K. Procal 0.94  Pulmonary edema/Fluid overload  - unclear cause, recent echo reportedly good  - no renal failure, normal albumin  Hx of lung cancer  - Dx , s/p xrt, on chemo currently  - followed by Guillermo  VINCE nodule, 2.4cm, similar to prior  Probable COPD  - follows with outside pulmonologist- (Charissa Murguia) on inhalers- doesn't know which ones,   Thrombocytopenia, improving  Active tobacco Abuse  Microcytic hypochromic anemia  Hx of DVT?- on Eliquis  BMI Body mass index is 20.2 kg/m².  CODE STATUS: Full Code No additional code details       Patient Active Problem List   Diagnosis    SVC syndrome    Non-small cell lung cancer (HCC)    Abnormal mammogram    Iron deficiency anemia    Recurrent major depressive disorder (HCC)    Essential hypertension    Allergic rhinitis due to pollen    Breast mass seen on mammogram    Perennial allergic rhinitis with seasonal variation    Other primary thrombophilia (HCC)    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)    Protein calorie malnutrition    Hypoxic respiratory failure (HCC)    Pneumonia due to infectious organism    Shortness of breath    Acute pulmonary edema (HCC)          RECOMMENDATIONS:   Discussed with Dr. DELMY Potts   See Attending attestation below for Recs     Subjective/History:       Interval Follow HPI  HPI per Dr. POSADAS on : This patient has been seen and

## 2024-05-21 NOTE — PLAN OF CARE
Problem: Safety - Adult  Goal: Free from fall injury  5/21/2024 1755 by Charlotte Rose RN  Outcome: Adequate for Discharge  5/21/2024 1755 by Charlotte Rose RN  Outcome: Adequate for Discharge  Flowsheets (Taken 5/21/2024 1753)  Free From Fall Injury: Instruct family/caregiver on patient safety  5/21/2024 0356 by Tania Hensley RN  Outcome: Progressing     Problem: Pain  Goal: Verbalizes/displays adequate comfort level or baseline comfort level  5/21/2024 1755 by Charlotte Rose RN  Outcome: Adequate for Discharge  5/21/2024 1755 by Charlotte Rose RN  Outcome: Adequate for Discharge  5/21/2024 0356 by Tania Hensley RN  Outcome: Progressing     Problem: Chronic Conditions and Co-morbidities  Goal: Patient's chronic conditions and co-morbidity symptoms are monitored and maintained or improved  5/21/2024 1755 by Charlotte Rose RN  Outcome: Adequate for Discharge  5/21/2024 1755 by Charlotte Rose RN  Outcome: Adequate for Discharge  Flowsheets (Taken 5/21/2024 0729)  Care Plan - Patient's Chronic Conditions and Co-Morbidity Symptoms are Monitored and Maintained or Improved:   Monitor and assess patient's chronic conditions and comorbid symptoms for stability, deterioration, or improvement   Collaborate with multidisciplinary team to address chronic and comorbid conditions and prevent exacerbation or deterioration  5/21/2024 0356 by Tania Hensley RN  Outcome: Progressing  Flowsheets (Taken 5/20/2024 2010)  Care Plan - Patient's Chronic Conditions and Co-Morbidity Symptoms are Monitored and Maintained or Improved: Monitor and assess patient's chronic conditions and comorbid symptoms for stability, deterioration, or improvement     Problem: Nutrition Deficit:  Goal: Optimize nutritional status  5/21/2024 1755 by Charlotte Rose RN  Outcome: Adequate for Discharge  5/21/2024 1755 by Charlotte Rose RN  Outcome: Adequate for Discharge

## 2024-05-21 NOTE — DISCHARGE INSTRUCTIONS
DISCHARGE SUMMARY from Nurse    PATIENT INSTRUCTIONS:    After general anesthesia or intravenous sedation, for 24 hours or while taking prescription Narcotics:  Limit your activities  Do not drive and operate hazardous machinery  Do not make important personal or business decisions  Do  not drink alcoholic beverages  If you have not urinated within 8 hours after discharge, please contact your surgeon on call.    Report the following to your surgeon:  Excessive pain, swelling, redness or odor of or around the surgical area  Temperature over 100.5  Nausea and vomiting lasting longer than 4 hours or if unable to take medications  Any signs of decreased circulation or nerve impairment to extremity: change in color, persistent  numbness, tingling, coldness or increase pain  Any questions    What to do at Home:  Recommended activity: activity as tolerated,  home     If you experience any of the following symptoms nausea, vomiting, shortness of breath, fever over 101 dizziness, fainting, or any issues or concerns, please follow up with  primary care physician or dial 911 for emergencies.    *  Please give a list of your current medications to your Primary Care Provider.    *  Please update this list whenever your medications are discontinued, doses are      changed, or new medications (including over-the-counter products) are added.    *  Please carry medication information at all times in case of emergency situations.    These are general instructions for a healthy lifestyle:    No smoking/ No tobacco products/ Avoid exposure to second hand smoke  Surgeon General's Warning:  Quitting smoking now greatly reduces serious risk to your health.    Obesity, smoking, and sedentary lifestyle greatly increases your risk for illness    A healthy diet, regular physical exercise & weight monitoring are important for maintaining a healthy lifestyle    You may be retaining fluid if you have a history of heart failure or if you

## 2024-05-21 NOTE — PROGRESS NOTES
Physical Therapy    PHYSICAL THERAPY EVALUATION/DISCHARGE    Patient: Rina Prajapati (63 y.o. female)  Date: 5/21/2024  Primary Diagnosis: Shortness of breath [R06.02]  Microcytic anemia [D50.9]  Thrombocytosis [D75.839]  Hypoxic respiratory failure (HCC) [J96.91]  Pneumonia due to infectious organism [J18.9]       Precautions: General Precautions    PLOF: pt lives with SO in a 1 SH with 1 NOEL, ind PTA, no DME      ASSESSMENT AND RECOMMENDATIONS:  Pt received sitting EOB, on 3L via NC. Denies pain. ROM and strength to BLE WFL. Pt used BSC first followed by amb approx 60 ft of gait within room with no AD. No gait deficits, given education on safety with management of O2 cord as this is a new need for her. Pt left sitting EOB with all needs met. Will sign off as she does not have further acute PT needs.     Patient does not require further skilled physical therapy intervention at this level of care.    Further Equipment Recommendations for Discharge: n/a    [facility]: AM-PAC Inpatient Mobility Raw Score : 24      At this time and based on an AM-PAC score, no further PT is recommended upon discharge due to (i.e. patient at baseline functional status…etc…).  Recommend patient returns to prior setting with prior services.    This [facility] score should be considered in conjunction with interdisciplinary team recommendations to determine the most appropriate discharge setting. Patient's social support, diagnosis, medical stability, and prior level of function should also be taken into consideration.     SUBJECTIVE:   Patient stated “I need to use the commode first.”    OBJECTIVE DATA SUMMARY:     Past Medical History:   Diagnosis Date    Anemia, iron deficiency 2/2016    Depression     H/O seasonal allergies     Hypertension     Non-small cell carcinoma of lung (HCC) 2/2016    adenocarcinoma of right lung    Positive occult stool blood test 2/29/2016    Pulmonary embolism (HCC) 2/28/2016    small, bilateral PEs- on Xarelto     independent  Balance:     Balance  Sitting: Intact  Standing: Intact    Ambulation/Gait Training:    Gait  Gait Training: Yes  Assistive Device: None  Speed/Xiao: Pace decreased (< 100 feet/min)  Step Length: Right shortened;Left shortened  Gait Abnormalities: Decreased step clearance    Pain:  Intensity Pre-treatment: 0/10   Intensity Post-treatment: 0/10      Activity Tolerance:   Activity Tolerance: Patient tolerated evaluation without incident  Please refer to the flowsheet for vital signs taken during this treatment.    After treatment:   []         Patient left in no apparent distress sitting up in chair  [x]         Patient left in no apparent distress in bed, sitting up  [x]         Call bell left within reach  [x]         Nursing notified  []         Caregiver present  []         Bed alarm activated  []         SCDs applied    COMMUNICATION/EDUCATION:   Patient Education  Education Given To: Patient  Education Provided: Role of Therapy;Plan of Care  Education Method: Demonstration;Verbal;Teach Back  Barriers to Learning: None  Education Outcome: Verbalized understanding    Thank you for this referral.  Cherelle Norman, PT  Minutes: 16      Eval Complexity: Decision Making: Medium Complexity

## 2024-05-21 NOTE — CARE COORDINATION
Discharge order noted for today. Orders received.     DME order noted for rolling walker and BSC. DME ordered through [employer] via [employer] website. RW assembled and delivered to patients room from the hospital consignment closet. BSC to be delivered to pts home directly from the DME company.    No other needs identified at this time. Case management remains available as needed.    Patient's fiance to transport pt home at time of discharge.    Lara MALLOY,RN, [facility]  Case Management  [phone]

## 2024-05-21 NOTE — FLOWSHEET NOTE
05/21/24 1744   AVS Reviewed   AVS & discharge instructions reviewed with patient and/or representative? Yes   Reviewed instructions with Patient   Level of Understanding Questions answered;Verbalized understanding;Teach back completed;Return demonstration

## 2024-05-21 NOTE — PROGRESS NOTES
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    resolved    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     Past Surgical History:   Procedure Laterality Date    ANGIOPLASTY Right 3/20/2016    IJ, subclavian, and brachiocephalic veins    BREAST SURGERY      biopsy    CT BIOPSY LYMPH NODES SUPERFICIAL  11/21/2022    CT BIOPSY LYMPH NODES SUPERFICIAL 11/21/2022 [facility] RAD CT    HYSTERECTOMY (CERVIX STATUS UNKNOWN)      due to menorrhagia     OTHER SURGICAL HISTORY Right 3/20/2016    2 placed in right IJ, subclavian/brachiocephalic    THROMBECTOMY  3/20/2016    with thrombolysis    VASCULAR SURGERY Left     double lumen       Home Situation:   Social/Functional History  Lives With: Significant other  Type of Home: House  Home Layout: One level  Home Access: Stairs to enter without rails  Entrance Stairs - Number of Steps: 1 step  Bathroom Shower/Tub: Tub/Shower unit  Bathroom Toilet: Standard  Bathroom Equipment: None  Bathroom Accessibility: Accessible  Home Equipment: None  Has the patient had two or more falls in the past year or any fall with injury in the past year?: Unknown  Receives Help From: Friend(s), Family  ADL Assistance: Independent  Homemaking Assistance: Independent  Homemaking Responsibilities: Yes  Ambulation Assistance: Independent  Transfer Assistance: Independent  Active : Yes  Mode of Transportation: Car  Occupation: On disability  []  Right hand dominant   []  Left hand dominant    Cognitive/Behavioral Status:  Orientation  Overall Orientation Status: Within Normal Limits  Orientation Level: Oriented X4  Cognition  Overall Cognitive Status: WNL    Skin: appears intact  Edema: none noted    Vision/Perceptual:    Vision  Vision: Within Functional Limits       Coordination: BUE  Coordination: Within functional limits       Balance:      Balance  Sitting: Intact  Standing: Intact    Strength: BUE  Strength: Within functional limits    Tone & Sensation: BUE  Tone: Normal       Range of Motion: BUE  AROM: Within functional limits       Functional Mobility and Transfers for ADLs:  Bed Mobility:     Bed Mobility Training  Bed Mobility Training: Yes  Rolling: Modified independent  Supine to Sit: Modified independent  Sit to Supine: Modified independent  Scooting: Modified independent  Transfers:                 Transfer Training  Transfer Training: Yes  Sit to Stand: Modified independent  Stand to Sit: Modified independent    ADL Assessment:   Feeding: Independent  Grooming: Modified independent   UE Bathing: Modified independent   LE Bathing: Modified independent   UE Dressing: Modified independent   LE Dressing: Modified independent   Toileting: Modified independent   Functional Mobility: Modified independent         Pain:  Intensity Pre-treatment: 0/10   Intensity Post-treatment: 0/10      Activity Tolerance:   Activity Tolerance: Patient Tolerated treatment well  Please refer to the flowsheet for vital signs taken during this treatment.    After treatment:   [] Patient left in no apparent distress sitting up in chair  [x] Patient left in no apparent distress in bed  [x] Call bell left within reach  [x] Nursing notified  [] Caregiver present  [] Bed alarm activated    COMMUNICATION/EDUCATION:   Patient Education  Education Given To: Patient  Education Provided: Role of Therapy;ADL Adaptive Strategies;Transfer Training;Fall Prevention Strategies;Equipment  Education Method: Verbal;Teach Back  Barriers to Learning: None  Education Outcome: Verbalized understanding    Thank you for this referral.  Adriana Edwards OT  Minutes: 16    Eval Complexity: Decision Making: Medium Complexity

## 2024-05-21 NOTE — PROGRESS NOTES
Advance Care Planning   Healthcare Decision Maker:    Primary Decision Maker: Radha Jolly - Brother/Sister - [phone]    Secondary Decision Maker: Dylan Hector - Other - [phone]    Today we documented Decision Maker(s) consistent with ACP documents on file.      conducted an initial consultation and Spiritual Assessment for Rina Prajapati, who is a 63 y.o.,female. Patient's Primary Language is: English.   According to the patient's EMR Latter day Affiliation is: None.     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   Provided information about Spiritual Care Services.  Chart reviewed.    The following outcomes where achieved:  Patient already has a scanned Advance Medical Directive in the chart. See ADV Care Plan. No changes need to be made.  Patient expressed gratitude for 's visit.    Assessment:  Patient does not have any Quaker/cultural needs that will affect patient's preferences in health care.  There are no spiritual or Quaker  issues which require intervention at this time.     Plan:  Chaplains will continue to follow and will provide pastoral care on an as needed/requested basis.   recommends bedside caregivers page  on duty if patient shows signs of acute spiritual or emotional distress.    becca Gomez  Spiritual Care   [phone]

## 2024-05-21 NOTE — PROGRESS NOTES
pneumonia without Sepsis  -- Other - I will add my own diagnosis  -- Disagree - Not applicable / Not valid  -- Disagree - Clinically unable to determine / Unknown  -- Refer to Clinical Documentation Reviewer    PROVIDER RESPONSE TEXT:    This patient has CAP pneumonia, without Sepsis.    Query created by: Disha Chawla on 5/19/2024 6:35 PM      QUERY TEXT:    Pt admitted with CAP , Acute respiratory failure with hypoxia  and has hx of   CHF documented. If possible, please document in progress notes and discharge   summary further specificity regarding the type and acuity of CHF:    The medical record reflects the following:    Risk Factors: HX CHF ,lung cancer on chemotherapy every 3 weeks last dose   5/15-    Clinical Indicators: IM PN  Community-acquired pneumonia-continue IV   antibiotics. Acute respiratory failure with hypoxia ,History of CHF-continue   IV Lasix  5/19--ECHO--Normal left ventricular systolic function with a visually   estimated EF of 50 - 55%. Left ventricle size is normal. Normal wall   thickness. Normal wall motion. Grade I diastolic dysfunction with normal LAP.      PULM--5/20--?Acute Hypoxic Respiratory Failure  ? - 2/2 pulmonary edema +/- pneumonia  ? No prior supplementary oxygen requirement, now on 3L  ? LLL infiltrates  ? - possible pneumonia, inconsistent symptoms, WBCs >40K. Procal 0.94  ? Pulmonary edema/Fluid overload  ? - unclear cause, recent echo reportedly good  ? - no renal failure, normal albumin    5/17/24 -NT Pro-BNP  0 - 900 PG/ML 6,779      CT Chest 5/17-Sequela of fluid overload, including new trace bilateral pleural   effusions,  partially visualized trace ascites, anasarca, and mildly increased small  pericardial effusion.    Treatment: continue IV Lasix, monitor strict I's and O's, echocardiogram    Thank you  Disha Chawla RN  CRCR  CDI    [phone] [facility]/[place]  email  [email or web address]  Options provided:  -- Acute on Chronic Systolic CHF/HFrEF  --

## 2024-05-21 NOTE — DISCHARGE SUMMARY
Discharge Summary    Patient: Rina Prajapati MRN: [id number]  CSN: [id number]    YOB: 1960  Age: 63 y.o.  Sex: female    DOA: 5/17/2024 LOS:  LOS: 4 days   Discharge Date:      Admission Diagnosis: Shortness of breath [R06.02]  Microcytic anemia [D50.9]  Thrombocytosis [D75.839]  Hypoxic respiratory failure (HCC) [J96.91]  Pneumonia due to infectious organism [J18.9]    Discharge Diagnosis:    Community-acquired pneumonia  Acute respiratory failure with hypoxia  History of lung cancer on chemotherapy every 3 weeks  History of COPD  History of CHF  Active tobacco user  History of hypertension  History of thrombus on Eliquis    Discharge Condition: Stable    PHYSICAL EXAM  Visit Vitals  /71   Pulse (!) 112   Temp 98.1 °F (36.7 °C) (Oral)   Resp 20   Ht 1.702 m (5' 7\")   Wt 58.5 kg (128 lb 15.5 oz)   SpO2 98%   BMI 20.20 kg/m²       General:  Alert, cooperative, no acute distress    HEENT: PERRLA, anicteric sclerae.  Pulmonary: Decreased breath sounds bilaterally, expiratory wheezing heard bilaterally  Cardiovascular: Regular rate and Rhythm.  GI:  Soft, Non distended, Non tender. + Bowel sounds.  Extremities:  No edema. No calf tenderness.   Psych: Good insight. Not anxious or agitated.  Neurologic: Alert and oriented X 3. Moves all ext.  Additional:    Hospital Course: Per the H&P:63-year-old female with a history of lung cancer diagnosed in 2015 currently on chemotherapy every 3 weeks and follows up with oncology, history of COPD, hypertension, CHF presents to the emergency room secondary to shortness of breath.  Patient mentions that shortness of breath developed 2 to 3 days ago and has been progressively getting worse.She also mentions she has bilateral lower extremity and foot swelling.ER evaluation patient underwent CT chest which showed no evidence of PE, sequela of fluid overload including new trace bilateral pleural effusions, new groundglass and more consolidated density left lower

## 2024-05-21 NOTE — CARE COORDINATION
DME order noted for home oxygen. Oxygen ordered through [employer] via the [employer] website.     H2H provided to patient from hospital case management office.  Serial # [id number]  6928 HRS     Patient signed proof of delivery sheet. POD and oxygen testing faxed to [employer]. The company to deliver home oxygen concentrator directly to patient's house. Patient updated and was instructed to look out for [employer]'s call regarding delivery. Patient states understanding.    Lara MALLOY,RN, [facility]  Case Management  [phone]

## 2024-05-22 ENCOUNTER — APPOINTMENT (OUTPATIENT)
Facility: HOSPITAL | Age: 64
End: 2024-05-22
Payer: MEDICARE

## 2024-05-22 ENCOUNTER — CARE COORDINATION (OUTPATIENT)
Facility: CLINIC | Age: 64
End: 2024-05-22

## 2024-05-22 DIAGNOSIS — J96.91 RESPIRATORY FAILURE WITH HYPOXIA, UNSPECIFIED CHRONICITY (HCC): Primary | ICD-10-CM

## 2024-05-22 NOTE — CARE COORDINATION
Care Transitions Initial Follow Up Call    Call within 2 business days of discharge: Yes    Patient Current Location:  [place]    Care Transition Nurse contacted the patient by telephone to perform post hospital discharge assessment. Verified name and  with patient as identifiers. Provided introduction to self, and explanation of the Care Transition Nurse role.     Patient: Rina Prajapati Patient : 1960   MRN: [id number]      Reason for Admission, Per chart review  Community-acquired pneumonia  Acute respiratory failure with hypoxia    Please see discharge summary of 24 for additional information, discharge diagnoses, or verification as needed.     Discharge Date: 24 RARS: Readmission Risk Score: 19.5      Last Discharge Facility       Date Complaint Diagnosis Description Type Department Provider    24 Shortness of Breath; Fatigue Shortness of breath ... ED to Hosp-Admission (Discharged) (ADMITTED) [id number] Herminio Sadler, DO; Rica Sena...            Was this an external facility discharge? No     Discharge Facility: [facility]     Challenges to be reviewed by the provider   Additional needs identified to be addressed with provider: No  none               Method of communication with provider: none.    Summary Note     Per review of Discharge Summary note of 2024:   ...history of lung cancer diagnosed in  currently on chemotherapy every 3 weeks and follows up with oncology, history of COPD, hypertension, CHF presents to the emergency room secondary to shortness of breath.       Care Transition Nurse reviewed discharge instructions, medical action plan, and red flags with patient who verbalized understanding. The patient was given an opportunity to ask questions and does not have any further questions or concerns at this time. Were discharge instructions available to patient? Yes. Reviewed appropriate site of care based on symptoms and resources available to

## 2024-05-23 ENCOUNTER — HOSPITAL ENCOUNTER (OUTPATIENT)
Facility: HOSPITAL | Age: 64
Setting detail: INFUSION SERIES
End: 2024-05-23
Payer: MEDICARE

## 2024-05-23 VITALS
HEART RATE: 86 BPM | OXYGEN SATURATION: 96 % | SYSTOLIC BLOOD PRESSURE: 95 MMHG | TEMPERATURE: 98.5 F | RESPIRATION RATE: 18 BRPM | DIASTOLIC BLOOD PRESSURE: 61 MMHG

## 2024-05-23 LAB
BACTERIA SPEC CULT: NORMAL
BACTERIA SPEC CULT: NORMAL
SERVICE CMNT-IMP: NORMAL
SERVICE CMNT-IMP: NORMAL

## 2024-05-23 PROCEDURE — 2580000003 HC RX 258: Performed by: INTERNAL MEDICINE

## 2024-05-23 PROCEDURE — 6360000002 HC RX W HCPCS

## 2024-05-23 PROCEDURE — 96523 IRRIG DRUG DELIVERY DEVICE: CPT

## 2024-05-23 RX ORDER — SODIUM CHLORIDE 0.9 % (FLUSH) 0.9 %
5-40 SYRINGE (ML) INJECTION PRN
Status: DISCONTINUED | OUTPATIENT
Start: 2024-05-23 | End: 2024-05-27 | Stop reason: HOSPADM

## 2024-05-23 RX ORDER — HEPARIN 100 UNIT/ML
SYRINGE INTRAVENOUS
Status: DISPENSED
Start: 2024-05-23 | End: 2024-05-24

## 2024-05-23 RX ORDER — HEPARIN SODIUM (PORCINE) LOCK FLUSH IV SOLN 100 UNIT/ML 100 UNIT/ML
500 SOLUTION INTRAVENOUS PRN
Status: DISCONTINUED | OUTPATIENT
Start: 2024-05-23 | End: 2024-05-27 | Stop reason: HOSPADM

## 2024-05-23 RX ORDER — HEPARIN 100 UNIT/ML
SYRINGE INTRAVENOUS
Status: COMPLETED
Start: 2024-05-23 | End: 2024-05-23

## 2024-05-23 RX ADMIN — HEPARIN 500 UNITS: 100 SYRINGE at 15:52

## 2024-05-23 RX ADMIN — SODIUM CHLORIDE, PRESERVATIVE FREE 20 ML: 5 INJECTION INTRAVENOUS at 15:47

## 2024-05-23 RX ADMIN — HEPARIN SODIUM (PORCINE) LOCK FLUSH IV SOLN 100 UNIT/ML 500 UNITS: 100 SOLUTION at 15:52

## 2024-05-23 ASSESSMENT — PAIN - FUNCTIONAL ASSESSMENT: PAIN_FUNCTIONAL_ASSESSMENT: NONE - DENIES PAIN

## 2024-05-23 NOTE — PROGRESS NOTES
[facility] Progress Note    Date: May 23, 2024    Name: Rina Prajapati    MRN: [id number]         : 1960      WEEKLY PICC LINE CARE      Ms. Prajapati arrived to [facility] at 1510.    Ms. Prajapati was assessed and education was provided.     Ms. Prajapati's vitals were reviewed.  Vitals:    24 1510   BP: 95/61   Pulse: 86   Temp: 98.5 °F (36.9 °C)   SpO2: 96%       L upper arm double lumen PICC line dressing changed per order using sterile technique. Patient tolerated well. No blood return noted from either the red or purple lumen. Red lumen flushes without any resistance or difficulty. Unable to flush purple lumen. End caps changed and new alcohol caps placed.     Ms. Prajapati tolerated well without complaints.    Discharge/ follow-up instructions discussed w/ pt. Pt verbalized understanding.    Pt armband removed & shredded.    [facility] at 1600.  She is to return on 24 at 9:00 AM for her next appointment.    Sada Loera RN  May 23, 2024

## 2024-05-29 ENCOUNTER — CARE COORDINATION (OUTPATIENT)
Facility: CLINIC | Age: 64
End: 2024-05-29

## 2024-05-29 ENCOUNTER — HOSPITAL ENCOUNTER (OUTPATIENT)
Facility: HOSPITAL | Age: 64
Setting detail: INFUSION SERIES
Discharge: HOME | End: 2024-06-01
Payer: MEDICARE

## 2024-05-29 VITALS
HEART RATE: 86 BPM | OXYGEN SATURATION: 99 % | TEMPERATURE: 98.1 F | SYSTOLIC BLOOD PRESSURE: 117 MMHG | RESPIRATION RATE: 16 BRPM | DIASTOLIC BLOOD PRESSURE: 72 MMHG

## 2024-05-29 PROCEDURE — 6360000002 HC RX W HCPCS

## 2024-05-29 PROCEDURE — 2580000003 HC RX 258: Performed by: INTERNAL MEDICINE

## 2024-05-29 PROCEDURE — 96523 IRRIG DRUG DELIVERY DEVICE: CPT

## 2024-05-29 RX ORDER — HEPARIN SODIUM (PORCINE) LOCK FLUSH IV SOLN 100 UNIT/ML 100 UNIT/ML
500 SOLUTION INTRAVENOUS PRN
Status: DISCONTINUED | OUTPATIENT
Start: 2024-05-29 | End: 2024-06-02 | Stop reason: HOSPADM

## 2024-05-29 RX ORDER — HEPARIN 100 UNIT/ML
SYRINGE INTRAVENOUS
Status: COMPLETED
Start: 2024-05-29 | End: 2024-05-29

## 2024-05-29 RX ORDER — SODIUM CHLORIDE 0.9 % (FLUSH) 0.9 %
5-40 SYRINGE (ML) INJECTION PRN
Status: DISCONTINUED | OUTPATIENT
Start: 2024-05-29 | End: 2024-06-02 | Stop reason: HOSPADM

## 2024-05-29 RX ADMIN — SODIUM CHLORIDE, PRESERVATIVE FREE 10 ML: 5 INJECTION INTRAVENOUS at 09:40

## 2024-05-29 RX ADMIN — HEPARIN 500 UNITS: 100 SYRINGE at 09:46

## 2024-05-29 RX ADMIN — SODIUM CHLORIDE, PRESERVATIVE FREE 10 ML: 5 INJECTION INTRAVENOUS at 09:42

## 2024-05-29 RX ADMIN — HEPARIN SODIUM (PORCINE) LOCK FLUSH IV SOLN 100 UNIT/ML 500 UNITS: 100 SOLUTION at 09:46

## 2024-05-29 ASSESSMENT — PAIN - FUNCTIONAL ASSESSMENT: PAIN_FUNCTIONAL_ASSESSMENT: NONE - DENIES PAIN

## 2024-05-29 NOTE — PROGRESS NOTES
[facility] Progress Note    Date: May 29, 2024    Name: Rina Prajapati    MRN: [id number]         : 1960      WEEKLY PICC LINE CARE & PRE-CHEMO LABS      Ms. Prajapati arrived to [facility] at 0925.    Ms. Prajapati was assessed and education was provided.     Ms. Prajapati's vitals were reviewed.  Vitals:    24 0927   BP: 117/72   Pulse: 86   Resp: 16   Temp: 98.1 °F (36.7 °C)   SpO2: 99%     Per Sonia Smith, PharmD, patient will not be receiving chemotherapy until completion of her antibiotics, therefore no labs are needed during this visit. Sonia spoke with nurse May at Dr. Li's office, written order to be sent to [facility] confirming chemo restart date.    Pt's PICC line dressing to left upper arm is clean, dry and intact. No blood return noted from either the red or purple lumen. Red lumen flushes without any resistance or difficulty. Flushed with 20mL's normal saline &  500 units heparin. Unable to flush purple lumen. Claves to both lumens changed and new alcohol caps placed. L upper arm double lumen PICC line dressing changed per order using sterile technique. Patient tolerated well.     Discharge/ follow-up instructions discussed w/ pt. Pt verbalized understanding.    Pt armband removed & shredded.    Ms. Prajapati [facility] in [facility] condition at 1010.  She is to return on 24 at 9:30 AM for her next appointment (weekly PICC care). Next chemo appointment date pending written order from provider.    Sada Loera RN  May 29, 2024

## 2024-05-29 NOTE — CARE COORDINATION
Care Transitions Follow Up Call    Patient Current Location:  [place]    Care Transition Nurse contacted the patient by telephone to follow up after admission on 2024 .  Verified name and  with patient as identifiers.    Patient: Rina Prajapati  Patient : 1960   MRN: [id number]      Reason for Admission, per chart review     Community-acquired pneumonia  Acute respiratory failure with hypoxia     Please see discharge summary of 24 for additional information, discharge diagnoses, or verification as needed.     Discharge Date: 24 RARS: Readmission Risk Score: 19.5      Needs to be reviewed by the provider   Additional needs identified to be addressed with provider: No  none             Method of communication with provider: none.    Addressed changes since last contact:   - Patient reports that she is doing good   - Home Oxygen has been set up.   -  She has not received bedside commode.            Discussed follow-up appointments. If no appointment was previously scheduled, appointment scheduling offered: PCP appointment discussed with patient. .   Is follow up appointment scheduled within 7 days of discharge? Yes.    Patient reports that she has attended follow up appt. With Dr. Mejia and has a follow up appointment  with pulmonary provider scheduled.             Follow Up  Future Appointments   Date Time Provider Department Center   2024  9:30 AM HBV FAST TRACK NURSE [facility]   2024  9:00 AM HBV INFUSION NURSE 2 [facility]   2024  9:30 AM HBV FAST TRACK NURSE [facility]   2024  9:00 AM HBV FAST TRACK NURSE [facility]   2024  1:00 PM PPA SPIROMETRY [facility] AMB   2024  3:00 PM Jonn Ames, DO [facility] AMB       Patients top risk factors for readmission:   Medical Condition       Interventions to address risk factors:   CTN followed up with [facility], per care coordination note of 24.   CTN called [facility] to

## 2024-05-30 ENCOUNTER — HOSPITAL ENCOUNTER (OUTPATIENT)
Facility: HOSPITAL | Age: 64
Setting detail: INFUSION SERIES
End: 2024-05-30

## 2024-05-30 ENCOUNTER — CARE COORDINATION (OUTPATIENT)
Facility: CLINIC | Age: 64
End: 2024-05-30

## 2024-05-30 NOTE — CARE COORDINATION
Care Transitions Follow Up Call    Patient Current Location:  Home: [address] [place] [id number]    LPN Care Coordinator contacted the patient by telephone to follow up after admission.  Verified name and  with patient as identifiers.    Patient: Rina Prajapati  Patient : 1960   MRN: [id number]  Reason for Admission: Community-acquired pneumonia  Acute respiratory failure with hypoxia  Discharge Date: 24 RARS: Readmission Risk Score: 19.5      Needs to be reviewed by the provider   Additional needs identified to be addressed with provider:   none             Method of communication with provider: none.    Spoke with patient. Patient states she is doing fine.  Explained to patient that writer was asked to follow up on bedside commode for patient by CTN. Patient states she does not think she needs a bedside commode anymore.  CTN Karolina Shabazz made aware.    Addressed changes since last contact:  DME-bedside commode-see above      Follow Up  Future Appointments   Date Time Provider Department Center   2024  9:30 AM HBV FAST TRACK NURSE [facility]   2024  9:00 AM HBV INFUSION NURSE 2 [facility]   2024  9:30 AM HBV FAST TRACK NURSE [facility]   2024  9:00 AM HBV FAST TRACK NURSE [facility]   2024  1:00 PM PPA SPIROMETRY [facility] AMB   2024  3:00 PM Jonn Ames, DO [facility] AMB          Care Transitions Subsequent and Final Call    Subsequent and Final Calls  Care Transitions Interventions  Other Interventions:             LPN Care Coordinator provided contact information for future needs. Plan for follow-up call in 7-10 days based on severity of symptoms and risk factors.  Plan for next call:  assist as needed    Liz Fong LPN

## 2024-06-03 RX ORDER — DEXAMETHASONE SODIUM PHOSPHATE 4 MG/ML
8 INJECTION, SOLUTION INTRA-ARTICULAR; INTRALESIONAL; INTRAMUSCULAR; INTRAVENOUS; SOFT TISSUE ONCE
Status: CANCELLED | OUTPATIENT
Start: 2024-06-20 | End: 2024-06-06

## 2024-06-03 RX ORDER — HYDROCORTISONE SODIUM SUCCINATE 100 MG/2ML
100 INJECTION INTRAMUSCULAR; INTRAVENOUS
Status: CANCELLED | OUTPATIENT
Start: 2024-06-20

## 2024-06-03 RX ORDER — SODIUM CHLORIDE 9 MG/ML
5-250 INJECTION, SOLUTION INTRAVENOUS PRN
Status: CANCELLED | OUTPATIENT
Start: 2024-06-20

## 2024-06-03 RX ORDER — MEPERIDINE HYDROCHLORIDE 25 MG/ML
12.5 INJECTION INTRAMUSCULAR; INTRAVENOUS; SUBCUTANEOUS PRN
Status: CANCELLED | OUTPATIENT
Start: 2024-06-20

## 2024-06-03 RX ORDER — DIPHENHYDRAMINE HYDROCHLORIDE 50 MG/ML
50 INJECTION, SOLUTION INTRAMUSCULAR; INTRAVENOUS
Status: CANCELLED | OUTPATIENT
Start: 2024-06-20

## 2024-06-03 RX ORDER — PALONOSETRON 0.05 MG/ML
0.25 INJECTION, SOLUTION INTRAVENOUS ONCE
Status: CANCELLED | OUTPATIENT
Start: 2024-06-20 | End: 2024-06-06

## 2024-06-03 RX ORDER — HEPARIN 100 UNIT/ML
500 SYRINGE INTRAVENOUS PRN
Status: CANCELLED | OUTPATIENT
Start: 2024-06-20

## 2024-06-03 RX ORDER — ALBUTEROL SULFATE 90 UG/1
4 INHALANT RESPIRATORY (INHALATION) PRN
Status: CANCELLED | OUTPATIENT
Start: 2024-06-20

## 2024-06-03 RX ORDER — EPINEPHRINE 1 MG/ML
0.3 INJECTION, SOLUTION, CONCENTRATE INTRAVENOUS PRN
Status: CANCELLED | OUTPATIENT
Start: 2024-06-20

## 2024-06-03 RX ORDER — ACETAMINOPHEN 325 MG/1
650 TABLET ORAL
Status: CANCELLED | OUTPATIENT
Start: 2024-06-20

## 2024-06-03 RX ORDER — SODIUM CHLORIDE 0.9 % (FLUSH) 0.9 %
5-40 SYRINGE (ML) INJECTION PRN
Status: CANCELLED | OUTPATIENT
Start: 2024-06-20

## 2024-06-03 RX ORDER — SODIUM CHLORIDE 9 MG/ML
INJECTION, SOLUTION INTRAVENOUS CONTINUOUS
Status: CANCELLED | OUTPATIENT
Start: 2024-06-20

## 2024-06-03 RX ORDER — ONDANSETRON 2 MG/ML
8 INJECTION INTRAMUSCULAR; INTRAVENOUS
Status: CANCELLED | OUTPATIENT
Start: 2024-06-20

## 2024-06-05 ENCOUNTER — HOSPITAL ENCOUNTER (OUTPATIENT)
Facility: HOSPITAL | Age: 64
Setting detail: INFUSION SERIES
Discharge: HOME | End: 2024-06-08
Payer: MEDICARE

## 2024-06-05 VITALS
HEART RATE: 90 BPM | WEIGHT: 141.8 LBS | RESPIRATION RATE: 20 BRPM | TEMPERATURE: 98.4 F | DIASTOLIC BLOOD PRESSURE: 70 MMHG | SYSTOLIC BLOOD PRESSURE: 145 MMHG | BODY MASS INDEX: 22.21 KG/M2 | OXYGEN SATURATION: 95 %

## 2024-06-05 LAB
ALBUMIN SERPL-MCNC: 3.1 G/DL (ref 3.4–5)
ALBUMIN/GLOB SERPL: 1.1 (ref 0.8–1.7)
ALP SERPL-CCNC: 47 U/L (ref 45–117)
ALT SERPL-CCNC: 18 U/L (ref 13–56)
ANION GAP SERPL CALC-SCNC: 7 MMOL/L (ref 3–18)
AST SERPL-CCNC: 14 U/L (ref 10–38)
BASO+EOS+MONOS # BLD AUTO: 0.7 K/UL (ref 0–2.3)
BASO+EOS+MONOS NFR BLD AUTO: 14 % (ref 0.1–17)
BILIRUB SERPL-MCNC: 0.4 MG/DL (ref 0.2–1)
BUN SERPL-MCNC: 12 MG/DL (ref 7–18)
BUN/CREAT SERPL: 27 (ref 12–20)
CALCIUM SERPL-MCNC: 8.9 MG/DL (ref 8.5–10.1)
CHLORIDE SERPL-SCNC: 105 MMOL/L (ref 100–111)
CO2 SERPL-SCNC: 29 MMOL/L (ref 21–32)
CREAT SERPL-MCNC: 0.45 MG/DL (ref 0.6–1.3)
DIFFERENTIAL METHOD BLD: ABNORMAL
ERYTHROCYTE [DISTWIDTH] IN BLOOD BY AUTOMATED COUNT: 22.4 % (ref 11.5–14.5)
GLOBULIN SER CALC-MCNC: 2.8 G/DL (ref 2–4)
GLUCOSE SERPL-MCNC: 112 MG/DL (ref 74–99)
HCT VFR BLD AUTO: 33 % (ref 36–48)
HGB BLD-MCNC: 9.5 G/DL (ref 12–16)
LYMPHOCYTES # BLD: 0.9 K/UL (ref 1.1–5.9)
LYMPHOCYTES NFR BLD: 17 % (ref 14–44)
MCH RBC QN AUTO: 24.5 PG (ref 25–35)
MCHC RBC AUTO-ENTMCNC: 28.8 G/DL (ref 31–37)
MCV RBC AUTO: 85.3 FL (ref 78–102)
NEUTS SEG # BLD: 3.7 K/UL (ref 1.8–9.5)
NEUTS SEG NFR BLD: 69 % (ref 40–70)
PLATELET # BLD AUTO: 186 K/UL (ref 140–440)
POTASSIUM SERPL-SCNC: 3.7 MMOL/L (ref 3.5–5.5)
PROT SERPL-MCNC: 5.9 G/DL (ref 6.4–8.2)
RBC # BLD AUTO: 3.87 M/UL (ref 4.1–5.1)
SODIUM SERPL-SCNC: 141 MMOL/L (ref 136–145)
WBC # BLD AUTO: 5.3 K/UL (ref 4.5–13)

## 2024-06-05 PROCEDURE — 2580000003 HC RX 258: Performed by: INTERNAL MEDICINE

## 2024-06-05 PROCEDURE — 36415 COLL VENOUS BLD VENIPUNCTURE: CPT

## 2024-06-05 PROCEDURE — 96523 IRRIG DRUG DELIVERY DEVICE: CPT

## 2024-06-05 PROCEDURE — 80053 COMPREHEN METABOLIC PANEL: CPT

## 2024-06-05 PROCEDURE — 6360000002 HC RX W HCPCS: Performed by: INTERNAL MEDICINE

## 2024-06-05 PROCEDURE — 85025 COMPLETE CBC W/AUTO DIFF WBC: CPT

## 2024-06-05 RX ORDER — SODIUM CHLORIDE 0.9 % (FLUSH) 0.9 %
5-40 SYRINGE (ML) INJECTION PRN
Status: DISCONTINUED | OUTPATIENT
Start: 2024-06-05 | End: 2024-06-09 | Stop reason: HOSPADM

## 2024-06-05 RX ORDER — HEPARIN SODIUM (PORCINE) LOCK FLUSH IV SOLN 100 UNIT/ML 100 UNIT/ML
500 SOLUTION INTRAVENOUS PRN
Status: DISCONTINUED | OUTPATIENT
Start: 2024-06-05 | End: 2024-06-09 | Stop reason: HOSPADM

## 2024-06-05 RX ADMIN — HEPARIN 500 UNITS: 100 SYRINGE at 11:51

## 2024-06-05 RX ADMIN — SODIUM CHLORIDE, PRESERVATIVE FREE 20 ML: 5 INJECTION INTRAVENOUS at 11:49

## 2024-06-05 RX ADMIN — SODIUM CHLORIDE, PRESERVATIVE FREE 20 ML: 5 INJECTION INTRAVENOUS at 11:25

## 2024-06-05 NOTE — PROGRESS NOTES
[facility] Progress Note    Date: 2024    Name: Rina Prajapati    MRN: [id number]         : 1960      WEEKLY PICC LINE CARE & PRE-CHEMO LABS      Ms. Prajapati arrived to [facility] at 1015 via wheelchair.    Ms. Prajapati was assessed and education was provided.     Ms. Prajapati's vitals were reviewed.  Vitals:    24 1020   BP: (!) 145/70   Pulse: 90   Resp: 19   Temp: 98.4 °F (36.9 °C)   SpO2: 95%     Patient's oxygen tubing noted to be kinked, instructed patient to ensure that her tubing doesn't have any kinks so she receives adequate oxygen coming from the machine. She verbalized understanding.     Pt's PICC line dressing to left upper arm is clean, dry and intact. No blood return noted from either the red or purple lumen. Red lumen flushes without difficulty. Unable to flush purple lumen.     CBC drawn from right hand x1 attempt. Patient tolerated well. Results as follows:    Results for orders placed or performed during the hospital encounter of 24   Comprehensive Metabolic Panel   Result Value Ref Range    Sodium 141 136 - 145 mmol/L    Potassium 3.7 3.5 - 5.5 mmol/L    Chloride 105 100 - 111 mmol/L    CO2 29 21 - 32 mmol/L    Anion Gap 7 3.0 - 18 mmol/L    Glucose 112 (H) 74 - 99 mg/dL    BUN 12 7.0 - 18 MG/DL    Creatinine 0.45 (L) 0.6 - 1.3 MG/DL    BUN/Creatinine Ratio 27 (H) 12 - 20      Est, Glom Filt Rate >90 >60 ml/min/1.73m2    Calcium 8.9 8.5 - 10.1 MG/DL    Total Bilirubin 0.4 0.2 - 1.0 MG/DL    ALT 18 13 - 56 U/L    AST 14 10 - 38 U/L    Alk Phosphatase 47 45 - 117 U/L    Total Protein 5.9 (L) 6.4 - 8.2 g/dL    Albumin 3.1 (L) 3.4 - 5.0 g/dL    Globulin 2.8 2.0 - 4.0 g/dL    Albumin/Globulin Ratio 1.1 0.8 - 1.7     CBC with Partial Differential   Result Value Ref Range    WBC 5.3 4.5 - 13.0 K/uL    RBC 3.87 (L) 4.10 - 5.10 M/uL    Hemoglobin 9.5 (L) 12.0 - 16.0 g/dL    Hematocrit 33.0 (L) 36 - 48 %    MCV 85.3 78 - 102 FL    MCH 24.5 (L) 25.0 - 35.0 PG    MCHC 28.8 (L) 31 - 37 g/dL

## 2024-06-05 NOTE — PROGRESS NOTES
MS. KEMP WAS SEEN HERE IN [facility] TODAY. MS KEMP COMPLAINED OF SHORTNESS OF BREATH.  SHE IS ON 2L OXYGEN.  OXYGEN SATURATONIS 93%.      MS. KEMP IS EDEMATOUS IN ARMS & LEGS    MS KEMP HAS A 14 POUND WEIGHT GAIN SINCE LAST DOCUMENTED WEIGHT MAY 17.    SUGGESTED TO MS. KEMP IF SHE IS HAVING DIFFICULTY BREATHING THAT SHE SHOULD GO TO THE EMERGENCY ROOM TO BE EXAMINED.    MS KEMP'S LEFT UPPER ARM PICC LINE DOUBLE LUMEN HAS BEEN IN SINCE 3/2/2023.  PURPLE LUMEN OCCLUDED.  RED LUMEN FLUSHES WITH EASE WITHOUT BLOOD RETURN.    MS. KEMP HAS BEEN GETTING CATHFLO FREQUENTLY DUE TO LUMEN BEING CLOTTED.    DR. BARON'S NURSE BILL HAS BEEN NOTIFIED OF TODAY'S FINDINGS AND THAT MS KEMP NEEDS A NEW PICC LINE PLACED IF SHE IS GOING TO CONTINUE TO GET TREATMENT.    BILL KOLB STATED THAT SHE WILL REACH OUT TO MS. KEMP.    CORTES HENDERSON  6/5/24

## 2024-06-06 ENCOUNTER — HOSPITAL ENCOUNTER (OUTPATIENT)
Facility: HOSPITAL | Age: 64
Setting detail: INFUSION SERIES
End: 2024-06-06

## 2024-06-06 DIAGNOSIS — C34.90 NON-SMALL CELL LUNG CANCER, UNSPECIFIED LATERALITY (HCC): Primary | ICD-10-CM

## 2024-06-10 ENCOUNTER — CARE COORDINATION (OUTPATIENT)
Facility: CLINIC | Age: 64
End: 2024-06-10

## 2024-06-10 RX ORDER — PREDNISONE 20 MG/1
TABLET ORAL
COMMUNITY
Start: 2024-06-08

## 2024-06-10 RX ORDER — DOXYCYCLINE HYCLATE 100 MG/1
100 CAPSULE ORAL EVERY 12 HOURS
COMMUNITY
Start: 2024-06-08 | End: 2024-06-13

## 2024-06-10 NOTE — CARE COORDINATION
Care Transitions Note  Initial Call - Call within 2 business days of discharge: Yes    Patient Current Location:  [place]    Care Transition Nurse contacted the patient by telephone to perform post hospital discharge assessment, verified name and  as identifiers. Provided introduction to self, and explanation of the Care Transition Nurse role.     Patient: Rina Prajapati    Patient : 1960   MRN: [id number]        Reason for Admission  Per chart review, [facility] Discharge of 2024.   - Acute respiratory distress   - Acute exacerbation of chronic obstructive pulmonary disease   CHF     Please see discharge summary of 2024 for additional information, verification or discharge diagnoses as needed.       Discharge Date: 24  RURS: Readmission Risk Score: 19.5      Last Discharge Facility       Date Complaint Diagnosis Description Type Department Provider    24 Shortness of Breath; Fatigue Shortness of breath ... ED to Hosp-Admission (Discharged) (ADMITTED) [id number] Herminio Sadler, ; Rica Sena...            Was this an external facility discharge? Yes. Discharge Date: 2024 . Facility Name: [facility]       Additional needs identified to be addressed with provider   Per discharge Summary of 2024 from [facility].    Amlodipine is listed as 2.5 mg   1 tab po qd   Patient advises that she is taking 5 mg daily   Please review and or verify as needed.     Losartan is listed on patient's discharge summary as a continued medication.  Losartan  is not noted to be listed on the [facility] medication record.   Patient advises that she does not have Losartan.   CTN unsure if this is a current medication   Please review / verify as needed.      Patient has PCP follow up appointment scheduled for 24.                  Method of communication with provider: chart routing.    Patients top risk factors for readmission:   Medical Condition   Hospital readmission

## 2024-06-12 ENCOUNTER — HOSPITAL ENCOUNTER (OUTPATIENT)
Facility: HOSPITAL | Age: 64
Setting detail: INFUSION SERIES
End: 2024-06-12

## 2024-06-13 ENCOUNTER — HOSPITAL ENCOUNTER (OUTPATIENT)
Facility: HOSPITAL | Age: 64
Setting detail: INFUSION SERIES
End: 2024-06-13

## 2024-06-14 ENCOUNTER — OFFICE VISIT (OUTPATIENT)
Facility: CLINIC | Age: 64
End: 2024-06-14

## 2024-06-14 ENCOUNTER — CARE COORDINATION (OUTPATIENT)
Facility: CLINIC | Age: 64
End: 2024-06-14

## 2024-06-14 VITALS
OXYGEN SATURATION: 94 % | HEIGHT: 67 IN | SYSTOLIC BLOOD PRESSURE: 136 MMHG | BODY MASS INDEX: 21.35 KG/M2 | TEMPERATURE: 97.8 F | RESPIRATION RATE: 16 BRPM | HEART RATE: 90 BPM | DIASTOLIC BLOOD PRESSURE: 76 MMHG | WEIGHT: 136 LBS

## 2024-06-14 DIAGNOSIS — Z09 HOSPITAL DISCHARGE FOLLOW-UP: ICD-10-CM

## 2024-06-14 DIAGNOSIS — J43.2 CENTRILOBULAR EMPHYSEMA (HCC): ICD-10-CM

## 2024-06-14 DIAGNOSIS — C34.90 NON-SMALL CELL LUNG CANCER, UNSPECIFIED LATERALITY (HCC): ICD-10-CM

## 2024-06-14 DIAGNOSIS — I10 ESSENTIAL HYPERTENSION: ICD-10-CM

## 2024-06-14 DIAGNOSIS — I50.32 CHRONIC DIASTOLIC HEART FAILURE (HCC): ICD-10-CM

## 2024-06-14 DIAGNOSIS — J96.11 CHRONIC RESPIRATORY FAILURE WITH HYPOXIA (HCC): Primary | ICD-10-CM

## 2024-06-14 DIAGNOSIS — F33.0 MILD EPISODE OF RECURRENT MAJOR DEPRESSIVE DISORDER (HCC): ICD-10-CM

## 2024-06-14 DIAGNOSIS — E03.9 ACQUIRED HYPOTHYROIDISM: ICD-10-CM

## 2024-06-14 DIAGNOSIS — Z72.0 TOBACCO USE: ICD-10-CM

## 2024-06-14 DIAGNOSIS — I82.291 CHRONIC EMBOLISM AND THROMBOSIS OF OTHER THORACIC VEINS (HCC): ICD-10-CM

## 2024-06-14 PROBLEM — J44.9 CHRONIC OBSTRUCTIVE PULMONARY DISEASE, UNSPECIFIED (HCC): Status: ACTIVE | Noted: 2024-06-14

## 2024-06-14 RX ORDER — FUROSEMIDE 40 MG/1
40 TABLET ORAL DAILY
Qty: 90 TABLET | Refills: 0 | Status: SHIPPED | OUTPATIENT
Start: 2024-06-14

## 2024-06-14 RX ORDER — AMLODIPINE BESYLATE 5 MG/1
5 TABLET ORAL DAILY
Qty: 90 TABLET | Refills: 3 | Status: SHIPPED | OUTPATIENT
Start: 2024-06-14

## 2024-06-14 RX ORDER — LEVOTHYROXINE SODIUM 88 UG/1
88 TABLET ORAL
Qty: 90 TABLET | Refills: 3 | Status: SHIPPED | OUTPATIENT
Start: 2024-06-14

## 2024-06-14 RX ORDER — POTASSIUM CHLORIDE 20 MEQ/1
20 TABLET, EXTENDED RELEASE ORAL DAILY
Qty: 90 TABLET | Refills: 0 | Status: SHIPPED | OUTPATIENT
Start: 2024-06-14

## 2024-06-14 NOTE — CARE COORDINATION
Care Transitions Outreach Attempt    Call within 2 business days of discharge: Yes   Attempted to reach patient for transitions of care follow up. Unable to reach patient.    Care Transitions Nurse ( CTN) attempted to contact patient via telephone call.   There was no response and no option to leave a voicemail message at this time.     Patient: Rina Prajapati Patient : 1960 MRN: [id number]    Last Discharge Facility       Date Complaint Diagnosis Description Type Department Provider    24 Shortness of Breath; Fatigue Shortness of breath ... ED to Hosp-Admission (Discharged) (ADMITTED) [id number] Herminio Sadler DO; Dalton Sena..          Per chart review, PCP visit is scheduled for this date.         Noted following upcoming appointments from discharge chart review:   [facility] follow up appointment(s):   Future Appointments   Date Time Provider Department Center   2024  1:45 PM Cora Her MD [facility] AMB   2024  9:00 AM HBV FAST TRACK NURSE [facility]   2024  9:00 AM HBV INFUSION NURSE 2 [facility]   2024  9:00 AM HBV FAST TRACK NURSE [facility]   2024  1:00 PM PPA SPIROMETRY [facility] AMB   2024  3:00 PM Jonn Ames DO [facility] AMB

## 2024-06-14 NOTE — PROGRESS NOTES
\"Have you been to the ER, urgent care clinic since your last visit?  Hospitalized since your last visit?\"    NO    “Have you seen or consulted any other health care providers outside of [facility] since your last visit?”    Dr. Guillermo Carrington            Click Here for Release of Records Request  
ASSESSMENT/PLAN    Rina was seen today for follow-up from hospital, copd, lung cancer and anemia.    Diagnoses and all orders for this visit:    Chronic respiratory failure with hypoxia (HCC)  On O2 NC  Respiratory status improved posthospitalization  To complete p.o. steroids and doxycycline  To continue Lasix 40 mg plus Kcl  Discussion on advanced care planning.  Patient reports she has the form, offered ACP nurse assistance ,declines   home health established    Chronic diastolic heart failure   EF 55% during admission 6/20/2024  -     [facility] - Javier Potts MD, Cardiology, [place] ([address])    Centrilobular emphysema (HCC)  Following Dr. 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 Prajapati stated that she just picked up a refill of her Potassium tablets on 8-23-23  -     amLODIPine (NORVASC) 5 MG tablet; Take 1 tablet by mouth daily         Ff-up in 12 weeks or sooner prn      Patient understands plan of care. Patient has provided input and agrees with goals.

## 2024-06-19 ENCOUNTER — HOSPITAL ENCOUNTER (OUTPATIENT)
Facility: HOSPITAL | Age: 64
Setting detail: INFUSION SERIES
Discharge: HOME | End: 2024-06-22
Payer: MEDICARE

## 2024-06-19 VITALS
HEART RATE: 94 BPM | WEIGHT: 135.5 LBS | SYSTOLIC BLOOD PRESSURE: 106 MMHG | OXYGEN SATURATION: 99 % | TEMPERATURE: 98.5 F | RESPIRATION RATE: 16 BRPM | BODY MASS INDEX: 21.22 KG/M2 | DIASTOLIC BLOOD PRESSURE: 68 MMHG

## 2024-06-19 LAB
ALBUMIN SERPL-MCNC: 3 G/DL (ref 3.4–5)
ALBUMIN/GLOB SERPL: 1 (ref 0.8–1.7)
ALP SERPL-CCNC: 38 U/L (ref 45–117)
ALT SERPL-CCNC: 17 U/L (ref 13–56)
ANION GAP SERPL CALC-SCNC: 5 MMOL/L (ref 3–18)
AST SERPL-CCNC: 19 U/L (ref 10–38)
BASO+EOS+MONOS # BLD AUTO: 0.4 K/UL (ref 0–2.3)
BASO+EOS+MONOS NFR BLD AUTO: 4 % (ref 0.1–17)
BILIRUB SERPL-MCNC: 0.3 MG/DL (ref 0.2–1)
BUN SERPL-MCNC: 19 MG/DL (ref 7–18)
BUN/CREAT SERPL: 37 (ref 12–20)
CALCIUM SERPL-MCNC: 8.8 MG/DL (ref 8.5–10.1)
CHLORIDE SERPL-SCNC: 102 MMOL/L (ref 100–111)
CO2 SERPL-SCNC: 33 MMOL/L (ref 21–32)
CREAT SERPL-MCNC: 0.51 MG/DL (ref 0.6–1.3)
DIFFERENTIAL METHOD BLD: ABNORMAL
ERYTHROCYTE [DISTWIDTH] IN BLOOD BY AUTOMATED COUNT: 22.6 % (ref 11.5–14.5)
GLOBULIN SER CALC-MCNC: 2.9 G/DL (ref 2–4)
GLUCOSE SERPL-MCNC: 94 MG/DL (ref 74–99)
HCT VFR BLD AUTO: 34 % (ref 36–48)
HGB BLD-MCNC: 9.9 G/DL (ref 12–16)
LYMPHOCYTES # BLD: 0.7 K/UL (ref 1.1–5.9)
LYMPHOCYTES NFR BLD: 9 % (ref 14–44)
MCH RBC QN AUTO: 25.2 PG (ref 25–35)
MCHC RBC AUTO-ENTMCNC: 29.1 G/DL (ref 31–37)
MCV RBC AUTO: 86.5 FL (ref 78–102)
NEUTS SEG # BLD: 7.3 K/UL (ref 1.8–9.5)
NEUTS SEG NFR BLD: 87 % (ref 40–70)
PLATELET # BLD AUTO: 190 K/UL (ref 140–440)
POTASSIUM SERPL-SCNC: 4.2 MMOL/L (ref 3.5–5.5)
PROT SERPL-MCNC: 5.9 G/DL (ref 6.4–8.2)
RBC # BLD AUTO: 3.93 M/UL (ref 4.1–5.1)
SODIUM SERPL-SCNC: 140 MMOL/L (ref 136–145)
WBC # BLD AUTO: 8.4 K/UL (ref 4.5–13)

## 2024-06-19 PROCEDURE — 2580000003 HC RX 258: Performed by: INTERNAL MEDICINE

## 2024-06-19 PROCEDURE — 36592 COLLECT BLOOD FROM PICC: CPT

## 2024-06-19 PROCEDURE — 85025 COMPLETE CBC W/AUTO DIFF WBC: CPT

## 2024-06-19 PROCEDURE — 80053 COMPREHEN METABOLIC PANEL: CPT

## 2024-06-19 RX ORDER — SODIUM CHLORIDE 0.9 % (FLUSH) 0.9 %
5-40 SYRINGE (ML) INJECTION PRN
Status: DISCONTINUED | OUTPATIENT
Start: 2024-06-19 | End: 2024-06-23 | Stop reason: HOSPADM

## 2024-06-19 RX ADMIN — SODIUM CHLORIDE, PRESERVATIVE FREE 20 ML: 5 INJECTION INTRAVENOUS at 09:35

## 2024-06-19 NOTE — PROGRESS NOTES
[facility] Progress Note    Date: 2024    Name: Rina Prajapati    MRN: [id number]         : 1960      WEEKLY PICC LINE CARE & PRE-CHEMO LABS    Ms. Prajapati arrived to [facility] at 0920 via wheelchair.    Ms. Prajapati was assessed and education was provided. Pt recently in [facility], new Midline inserted into left upper arm on 24.    Ms. Prajapati's vitals were reviewed.  Vitals:    24 0921   BP: 106/68   Pulse: 94   Resp: 16   Temp: 98.5 °F (36.9 °C)   SpO2: 99%     Pt's midline dressing to left upper arm is clean, dry and intact. Single lumen flushes well, and gives good blood return, CBC  and CMP drawn per orders. Patient tolerated well. Results as follows:    Results for orders placed or performed during the hospital encounter of 24   CBC with Partial Differential   Result Value Ref Range    WBC 8.4 4.5 - 13.0 K/uL    RBC 3.93 (L) 4.10 - 5.10 M/uL    Hemoglobin 9.9 (L) 12.0 - 16.0 g/dL    Hematocrit 34.0 (L) 36 - 48 %    MCV 86.5 78 - 102 FL    MCH 25.2 25.0 - 35.0 PG    MCHC 29.1 (L) 31 - 37 g/dL    RDW 22.6 (H) 11.5 - 14.5 %    Platelets 190 140 - 440 K/uL    Neutrophils % 87 (H) 40 - 70 %    Mixed Cells 4 0.1 - 17 %    Lymphocytes % 9 (L) 14 - 44 %    Neutrophils Absolute 7.3 1.8 - 9.5 K/UL    ABSOLUTE MIXED CELLS 0.4 0.0 - 2.3 K/uL    Lymphocytes Absolute 0.7 (L) 1.1 - 5.9 K/UL    Differential Type AUTOMATED       Flushed lumen with 20mLs normal saline. Midline dressing changed per order using sterile technique. Patient tolerated well.     Discharge/ follow-up instructions discussed w/ pt. Pt verbalized understanding.    Ms. Prajapati [facility] condition at 0955.  She is to return on 2024 at 0900 for her next chemo appointment.    Nadira Perdomo RN  2024

## 2024-06-20 ENCOUNTER — APPOINTMENT (OUTPATIENT)
Facility: HOSPITAL | Age: 64
End: 2024-06-20
Payer: MEDICARE

## 2024-06-20 ENCOUNTER — HOSPITAL ENCOUNTER (OUTPATIENT)
Facility: HOSPITAL | Age: 64
Setting detail: INFUSION SERIES
End: 2024-06-20
Payer: MEDICARE

## 2024-06-20 VITALS
OXYGEN SATURATION: 95 % | RESPIRATION RATE: 16 BRPM | HEART RATE: 92 BPM | DIASTOLIC BLOOD PRESSURE: 77 MMHG | TEMPERATURE: 98.5 F | SYSTOLIC BLOOD PRESSURE: 135 MMHG

## 2024-06-20 DIAGNOSIS — C34.90 NON-SMALL CELL LUNG CANCER, UNSPECIFIED LATERALITY (HCC): Primary | ICD-10-CM

## 2024-06-20 PROCEDURE — 2580000003 HC RX 258: Performed by: INTERNAL MEDICINE

## 2024-06-20 PROCEDURE — 96413 CHEMO IV INFUSION 1 HR: CPT

## 2024-06-20 PROCEDURE — 2700000000 HC OXYGEN THERAPY PER DAY

## 2024-06-20 PROCEDURE — 96366 THER/PROPH/DIAG IV INF ADDON: CPT

## 2024-06-20 PROCEDURE — 96367 TX/PROPH/DG ADDL SEQ IV INF: CPT

## 2024-06-20 PROCEDURE — 96377 APPLICATON ON-BODY INJECTOR: CPT

## 2024-06-20 PROCEDURE — 96375 TX/PRO/DX INJ NEW DRUG ADDON: CPT

## 2024-06-20 PROCEDURE — 6360000002 HC RX W HCPCS: Performed by: INTERNAL MEDICINE

## 2024-06-20 RX ORDER — MEPERIDINE HYDROCHLORIDE 25 MG/ML
12.5 INJECTION INTRAMUSCULAR; INTRAVENOUS; SUBCUTANEOUS PRN
Status: DISCONTINUED | OUTPATIENT
Start: 2024-06-20 | End: 2024-06-24 | Stop reason: HOSPADM

## 2024-06-20 RX ORDER — PALONOSETRON 0.05 MG/ML
0.25 INJECTION, SOLUTION INTRAVENOUS ONCE
Status: COMPLETED | OUTPATIENT
Start: 2024-06-20 | End: 2024-06-20

## 2024-06-20 RX ORDER — DEXAMETHASONE SODIUM PHOSPHATE 4 MG/ML
8 INJECTION, SOLUTION INTRA-ARTICULAR; INTRALESIONAL; INTRAMUSCULAR; INTRAVENOUS; SOFT TISSUE ONCE
Status: DISCONTINUED | OUTPATIENT
Start: 2024-06-20 | End: 2024-06-24 | Stop reason: HOSPADM

## 2024-06-20 RX ORDER — ONDANSETRON 2 MG/ML
8 INJECTION INTRAMUSCULAR; INTRAVENOUS
Status: ACTIVE | OUTPATIENT
Start: 2024-06-20 | End: 2024-06-21

## 2024-06-20 RX ORDER — ALBUTEROL SULFATE 90 UG/1
4 AEROSOL, METERED RESPIRATORY (INHALATION) PRN
Status: ACTIVE | OUTPATIENT
Start: 2024-06-20 | End: 2024-06-21

## 2024-06-20 RX ORDER — ACETAMINOPHEN 325 MG/1
650 TABLET ORAL
Status: ACTIVE | OUTPATIENT
Start: 2024-06-20 | End: 2024-06-21

## 2024-06-20 RX ORDER — SODIUM CHLORIDE 9 MG/ML
5-250 INJECTION, SOLUTION INTRAVENOUS PRN
Status: ACTIVE | OUTPATIENT
Start: 2024-06-20 | End: 2024-06-21

## 2024-06-20 RX ORDER — EPINEPHRINE 1 MG/ML
0.3 INJECTION, SOLUTION, CONCENTRATE INTRAVENOUS PRN
Status: DISCONTINUED | OUTPATIENT
Start: 2024-06-20 | End: 2024-06-24 | Stop reason: HOSPADM

## 2024-06-20 RX ORDER — DIPHENHYDRAMINE HYDROCHLORIDE 50 MG/ML
50 INJECTION INTRAMUSCULAR; INTRAVENOUS
Status: ACTIVE | OUTPATIENT
Start: 2024-06-20 | End: 2024-06-21

## 2024-06-20 RX ORDER — SODIUM CHLORIDE 9 MG/ML
INJECTION, SOLUTION INTRAVENOUS CONTINUOUS
Status: DISPENSED | OUTPATIENT
Start: 2024-06-20 | End: 2024-06-21

## 2024-06-20 RX ORDER — SODIUM CHLORIDE 0.9 % (FLUSH) 0.9 %
5-40 SYRINGE (ML) INJECTION PRN
Status: ACTIVE | OUTPATIENT
Start: 2024-06-20 | End: 2024-06-21

## 2024-06-20 RX ORDER — HEPARIN 100 UNIT/ML
500 SYRINGE INTRAVENOUS PRN
Status: ACTIVE | OUTPATIENT
Start: 2024-06-20 | End: 2024-06-21

## 2024-06-20 RX ADMIN — SODIUM CHLORIDE, PRESERVATIVE FREE 10 ML: 5 INJECTION INTRAVENOUS at 12:23

## 2024-06-20 RX ADMIN — PALONOSETRON HYDROCHLORIDE 0.25 MG: 0.25 INJECTION INTRAVENOUS at 09:40

## 2024-06-20 RX ADMIN — SODIUM CHLORIDE, PRESERVATIVE FREE 10 ML: 5 INJECTION INTRAVENOUS at 12:22

## 2024-06-20 RX ADMIN — DOCETAXEL ANHYDROUS 130 MG: 10 INJECTION, SOLUTION INTRAVENOUS at 11:10

## 2024-06-20 RX ADMIN — PEGFILGRASTIM 6 MG: KIT SUBCUTANEOUS at 12:19

## 2024-06-20 RX ADMIN — SODIUM CHLORIDE 50 ML/HR: 9 INJECTION, SOLUTION INTRAVENOUS at 09:30

## 2024-06-20 RX ADMIN — DEXAMETHASONE SODIUM PHOSPHATE 12 MG: 10 INJECTION, SOLUTION INTRAMUSCULAR; INTRAVENOUS at 10:00

## 2024-06-20 RX ADMIN — SODIUM CHLORIDE, PRESERVATIVE FREE 10 ML: 5 INJECTION INTRAVENOUS at 09:29

## 2024-06-20 RX ADMIN — FOSAPREPITANT 150 MG: 150 INJECTION, POWDER, LYOPHILIZED, FOR SOLUTION INTRAVENOUS at 10:23

## 2024-06-20 NOTE — PROGRESS NOTES
[facility] Progress Note    Date: 2024    Name: Rina Prajapati    MRN: [id number]         : 1960    Ms. Prajapati arrived in the [facility] today at 0915, in stable condition, here for CYCLE 21, DAY 1, IV TAXOTERE CHEMOTHERAPY REGIMEN (EVERY 21 DAY CYCLE). She was assessed and education was provided.       THE RAMUCIRUMAB HAS BEEN DISCONTINUED.       Ms. Prajapati's vitals were reviewed.  Vitals:    24 1218   BP: 135/77   Pulse: 92   Resp: 16   Temp: 98.5 °F (36.9 °C)   SpO2: 95%       WEIGHT FROM YESTERDAY WAS  61.5 KG     MOST CURRENT BSA 1.71 (BSA USED FOR CALCULATION 1.71 PER PHARMACY)        Ms. Prajapati presented to the infusion center today stating that she was doing well, and voicing no major complaints.     CURRENT AND SIGNED CHEMOTHERAPY CONSENT WAS VIEWED IN HER ELECTRONIC RECORD.     HER PRE-CHEMO LABS OBTAINED ON YESTERDAY, 24 WERE ALL REVIEWED, AND WERE ALL NOTED TO BE SATISFACTORY FOR TREATMENT TODAY, AND WERE AS FOLLOWS:  Hospital Outpatient Visit on 2024   Component Date Value Ref Range Status    WBC 2024 8.4  4.5 - 13.0 K/uL Final    RBC 2024 3.93 (L)  4.10 - 5.10 M/uL Final    Hemoglobin 2024 9.9 (L)  12.0 - 16.0 g/dL Final    Hematocrit 2024 34.0 (L)  36 - 48 % Final    MCV 2024 86.5  78 - 102 FL Final    MCH 2024 25.2  25.0 - 35.0 PG Final    MCHC 2024 29.1 (L)  31 - 37 g/dL Final    RDW 2024 22.6 (H)  11.5 - 14.5 % Final    Platelets 2024 190  140 - 440 K/uL Final    Neutrophils % 2024 87 (H)  40 - 70 % Final    Mixed Cells 2024 4  0.1 - 17 % Final    Lymphocytes % 2024 9 (L)  14 - 44 % Final    Neutrophils Absolute 2024 7.3  1.8 - 9.5 K/UL Final    ABSOLUTE MIXED CELLS 2024 0.4  0.0 - 2.3 K/uL Final    Lymphocytes Absolute 2024 0.7 (L)  1.1 - 5.9 K/UL Final    Test performed at [facility] Location.  Reviewed by Medical Director.    Differential Type

## 2024-06-21 ENCOUNTER — TELEPHONE (OUTPATIENT)
Facility: CLINIC | Age: 64
End: 2024-06-21

## 2024-06-21 NOTE — TELEPHONE ENCOUNTER
Called patient and advised that she will need to call Dr. Li's office for refills on her Eliquis . Patient verbalizes understanding.

## 2024-06-21 NOTE — TELEPHONE ENCOUNTER
This patient contacted office for the following prescriptions to be filled:    Medication requested : Eliquis  PCP: Dr. Her   Pharmacy or Print: Pharmacy   Mail order or Local pharmacy [facility]     Scheduled appointment if not seen by current providers in office: 09/1/9/2024

## 2024-06-25 ENCOUNTER — CARE COORDINATION (OUTPATIENT)
Facility: CLINIC | Age: 64
End: 2024-06-25

## 2024-06-25 NOTE — CARE COORDINATION
Care Transitions Note    Follow Up Call     Patient Current Location:  Home: [address] [place] [id number]    [facility] Care Coordinator contacted the patient by telephone. Verified name and  as identifiers.    Additional needs identified to be addressed with provider   No needs identified                 Method of communication with provider: none.    Care Summary Note: Spoke with patient. Patient states she is doing ok.  Patient followed up with pcp on 24.  Referral to cardiology pending    Plan of care updates since last contact:  Review of patient management of conditions/medications: no issues  Home Health: following patient for therapy. Patient unable to name Emailage company   Referral: to Dr. Potts cardiology     Advance Care Planning:   Does patient have an Advance Directive: reviewed and current.    Medication Review:  No changes since last call.       Follow Up Appointment:   Reviewed upcoming appointment(s). and BERNDA appointment attended as scheduled   Future Appointments         Provider Specialty Dept Phone    2024 9:00 AM [facility] FAST TRACK NURSE Infusion Therapy [phone]    7/3/2024 10:00 AM [facility] FAST TRACK NURSE Infusion Therapy [phone]    7/10/2024 9:00 AM [facility] FAST TRACK NURSE Infusion Therapy [phone]    2024 9:00 AM [facility] INFUSION NURSE 2 Infusion Therapy [phone]    2024 1:00 PM [facility] Pulmonology [phone]    2024 3:00 PM Jonn Ames, DO Pulmonology [phone]    2024 10:45 AM Cora Her MD Family Medicine [phone]            N Care Coordinator provided contact information.  Plan for follow-up call in 6-10 days based on severity of symptoms and risk factors.  Plan for next call: follow-up appointment-attend/scheduled with cardiology  medication management-have patient completed prednisone and abt    RPM     Liz Fong LPN

## 2024-06-26 ENCOUNTER — HOSPITAL ENCOUNTER (OUTPATIENT)
Facility: HOSPITAL | Age: 64
Setting detail: INFUSION SERIES
Discharge: HOME | End: 2024-06-29
Payer: MEDICARE

## 2024-06-26 VITALS
TEMPERATURE: 98.9 F | RESPIRATION RATE: 16 BRPM | OXYGEN SATURATION: 97 % | DIASTOLIC BLOOD PRESSURE: 67 MMHG | SYSTOLIC BLOOD PRESSURE: 101 MMHG | HEART RATE: 101 BPM

## 2024-06-26 PROCEDURE — 6360000002 HC RX W HCPCS: Performed by: INTERNAL MEDICINE

## 2024-06-26 PROCEDURE — 96523 IRRIG DRUG DELIVERY DEVICE: CPT

## 2024-06-26 PROCEDURE — 2580000003 HC RX 258: Performed by: INTERNAL MEDICINE

## 2024-06-26 RX ORDER — SODIUM CHLORIDE 0.9 % (FLUSH) 0.9 %
5-40 SYRINGE (ML) INJECTION PRN
Status: DISCONTINUED | OUTPATIENT
Start: 2024-06-26 | End: 2024-06-30 | Stop reason: HOSPADM

## 2024-06-26 RX ORDER — HEPARIN SODIUM (PORCINE) LOCK FLUSH IV SOLN 100 UNIT/ML 100 UNIT/ML
500 SOLUTION INTRAVENOUS PRN
Status: DISCONTINUED | OUTPATIENT
Start: 2024-06-26 | End: 2024-06-30 | Stop reason: HOSPADM

## 2024-06-26 RX ADMIN — SODIUM CHLORIDE, PRESERVATIVE FREE 20 ML: 5 INJECTION INTRAVENOUS at 09:30

## 2024-06-26 RX ADMIN — HEPARIN 500 UNITS: 100 SYRINGE at 09:30

## 2024-06-26 RX ADMIN — SODIUM CHLORIDE, PRESERVATIVE FREE 10 ML: 5 INJECTION INTRAVENOUS at 09:20

## 2024-06-26 NOTE — PROGRESS NOTES
[facility] OPIC Progress Note    Date: 2024    Name: Rina Prajapati    MRN: [id number]         : 1960      WEEKLY PICC LINE CARE     Ms. Prajapati arrived to \[facility]\"" at 0915 via wheelchair.    Ms. Prajapati was assessed and education was provided. Pt recently had new Midline inserted into left upper arm on 24.    Ms. Prajapati's vitals were reviewed.  Vitals:    24 0915   BP: 101/67   Pulse: (!) 101   Resp: 16   Temp: 98.9 °F (37.2 °C)   SpO2: 97%     Pt's midline dressing to left upper arm is clean, dry and intact. Single lumen flushes well, and gives good blood return,     Flushed lumen with 20mLs normal saline and heparin per protocol. Midline dressing changed per order using sterile technique. Patient tolerated well.     Discharge/ follow-up instructions discussed w/ pt. Pt verbalized understanding.    [facility] at 0945.  She is to return on 7/3/2024 at 1000 for her next PICC line care appointment.    Safia Norris RN  2024

## 2024-06-27 ENCOUNTER — APPOINTMENT (OUTPATIENT)
Facility: HOSPITAL | Age: 64
End: 2024-06-27
Payer: MEDICARE

## 2024-07-02 ENCOUNTER — CARE COORDINATION (OUTPATIENT)
Facility: CLINIC | Age: 64
End: 2024-07-02

## 2024-07-02 NOTE — CARE COORDINATION
Care Transitions Note    Follow Up Call     Patient Current Location:  [place]    Care Transition Nurse contacted the patient by telephone. Verified name and  as identifiers.    Additional needs identified to be addressed with provider   No needs identified                 Method of communication with provider: none.      Plan of care updates since last contact:  CTN reviewed general medical action plan with patient to include:   - PCP   - After hours provider     CTN follow up as needed.      Patient advises that she is receiving home health care services.   However services may be ending after 1-2 more visits.           Remote Patient Monitoring:  Offered patient enrollment in the Remote Patient Monitoring (RPM) program for in-home monitoring: Yes, but did not enroll at this time: declined to enroll in the program becausepatient reports she is good right now.  .      Future Appointments         Provider Specialty Dept Phone    7/3/2024 10:00 AM [facility] FAST TRACK NURSE Infusion Therapy [phone]    7/10/2024 9:00 AM [facility] FAST TRACK NURSE Infusion Therapy [phone]    2024  9:00 AM [facility] INFUSION NURSE 2 Infusion Therapy [phone]    2024 10:00 AM [facility] FAST TRACK NURSE Infusion Therapy [phone]    2024 1:00 PM [facility] Pulmonology [phone]    2024 3:00 PM Jonn Ames,  Pulmonology [phone]    2024 1:00 PM Javier Potts MD Cardiology [phone]    2024 10:45 AM Cora Her MD Family Medicine [phone]            Care Transition Nurse provided contact information.  Plan for follow up call in 6-10 days / sooner as indicated or needed based on severity of symptoms and risk factors.  Plan for next call:   Attempt to review ACP   Review for closure of the care Transitions program.      Karolina Shabazz RN

## 2024-07-03 ENCOUNTER — HOSPITAL ENCOUNTER (OUTPATIENT)
Facility: HOSPITAL | Age: 64
Setting detail: INFUSION SERIES
Discharge: HOME | End: 2024-07-06
Payer: MEDICARE

## 2024-07-03 VITALS
RESPIRATION RATE: 16 BRPM | DIASTOLIC BLOOD PRESSURE: 63 MMHG | SYSTOLIC BLOOD PRESSURE: 105 MMHG | HEART RATE: 104 BPM | TEMPERATURE: 98.1 F | OXYGEN SATURATION: 94 %

## 2024-07-03 PROCEDURE — 2580000003 HC RX 258: Performed by: INTERNAL MEDICINE

## 2024-07-03 PROCEDURE — 96523 IRRIG DRUG DELIVERY DEVICE: CPT

## 2024-07-03 RX ORDER — SODIUM CHLORIDE 0.9 % (FLUSH) 0.9 %
5-40 SYRINGE (ML) INJECTION PRN
Status: DISCONTINUED | OUTPATIENT
Start: 2024-07-03 | End: 2024-07-07 | Stop reason: HOSPADM

## 2024-07-03 RX ADMIN — SODIUM CHLORIDE, PRESERVATIVE FREE 30 ML: 5 INJECTION INTRAVENOUS at 10:45

## 2024-07-03 ASSESSMENT — PAIN SCALES - GENERAL: PAINLEVEL_OUTOF10: 0

## 2024-07-03 NOTE — PROGRESS NOTES
[facility] Progress Note    Date: July 3, 2024    Name: Rina Prajapati    MRN: [id number]         : 1960      WEEKLY PICC LINE CARE     Ms. Prajapati arrived to [facility] at 1020 via wheelchair.    Ms. Prajapati was assessed and education was provided. Patient recently in [facility], new Midline inserted into left upper arm on 24.    Ms. Prajapati's vitals were reviewed.  Vitals:    24 1024   BP: 105/63   Pulse: (!) 104   Resp: 16   Temp: 98.1 °F (36.7 °C)   SpO2: 94%     Pt's midline dressing to left upper arm is clean, dry and intact. Single lumen flushes well, and gives good blood return.    Flushed lumen with 20mLs normal saline. Midline dressing & cap changed per order using sterile technique. Patient tolerated well.     Discharge/ follow-up instructions discussed w/ patient. Patient verbalized understanding.    [facility]. Pra[facility]ap[facility] in [facility] condition at 1045.  She is to return on 7/10/2024 at 0900 for her next pre-chemo labs & midline dressing change appointment.    SOPHIE HENDERSON RN  July 3, 2024

## 2024-07-09 ENCOUNTER — CARE COORDINATION (OUTPATIENT)
Facility: CLINIC | Age: 64
End: 2024-07-09

## 2024-07-09 RX ORDER — SODIUM CHLORIDE 9 MG/ML
5-250 INJECTION, SOLUTION INTRAVENOUS PRN
Status: CANCELLED | OUTPATIENT
Start: 2024-07-11

## 2024-07-09 RX ORDER — MEPERIDINE HYDROCHLORIDE 25 MG/ML
12.5 INJECTION INTRAMUSCULAR; INTRAVENOUS; SUBCUTANEOUS PRN
Status: CANCELLED | OUTPATIENT
Start: 2024-07-11

## 2024-07-09 RX ORDER — ONDANSETRON 2 MG/ML
8 INJECTION INTRAMUSCULAR; INTRAVENOUS
Status: CANCELLED | OUTPATIENT
Start: 2024-07-11

## 2024-07-09 RX ORDER — DIPHENHYDRAMINE HYDROCHLORIDE 50 MG/ML
50 INJECTION INTRAMUSCULAR; INTRAVENOUS
Status: CANCELLED | OUTPATIENT
Start: 2024-07-11

## 2024-07-09 RX ORDER — EPINEPHRINE 1 MG/ML
0.3 INJECTION, SOLUTION, CONCENTRATE INTRAVENOUS PRN
Status: CANCELLED | OUTPATIENT
Start: 2024-07-11

## 2024-07-09 RX ORDER — ALBUTEROL SULFATE 90 UG/1
4 AEROSOL, METERED RESPIRATORY (INHALATION) PRN
Status: CANCELLED | OUTPATIENT
Start: 2024-07-11

## 2024-07-09 RX ORDER — ACETAMINOPHEN 325 MG/1
650 TABLET ORAL
Status: CANCELLED | OUTPATIENT
Start: 2024-07-11

## 2024-07-09 RX ORDER — SODIUM CHLORIDE 9 MG/ML
INJECTION, SOLUTION INTRAVENOUS CONTINUOUS
Status: CANCELLED | OUTPATIENT
Start: 2024-07-11

## 2024-07-09 NOTE — CARE COORDINATION
Care Transitions Note    Final Call     Patient Current Location:  Home: [address] [place] [id number]    [facility] Care Coordinator contacted the patient by telephone. Verified name and  as identifiers.    Patient graduated from the Care Transitions program on 24.  Patient/family verbalizes confidence in the ability to self-manage at this time..      Advance Care Planning:   Does patient have an Advance Directive: reviewed during previous call, see note. .    Handoff:   Patient was not referred to the ACM team due to no additional needs identified.       Care Summary Note: Spoke with patient.  Patient states she is doing fine.  Patient has no questions or concerns.    Assessments:  Care Transitions Subsequent and Final Call    Subsequent and Final Calls  Care Transitions Interventions  Other Interventions:              Upcoming Appointments:    Future Appointments         Provider Specialty Dept Phone    7/10/2024  9:00 AM [facility] FAST TRACK NURSE Infusion Therapy [phone]    2024  9:00 AM HBV INFUSION NURSE 2 Infusion Therapy [phone]    2024 10:00 AM [facility] FAST TRACK NURSE Infusion Therapy [phone]    2024 9:00 AM [facility] FAST TRACK NURSE Infusion Therapy [phone]    2024 9:00 AM [facility] INFUSION NURSE 2 Infusion Therapy [phone]    2024 1:00 PM [facility] Pulmonology [phone]    2024 3:00 PM Jonn Ames DO Pulmonology [phone]    2024 9:00 AM [facility] FAST TRACK NURSE Infusion Therapy [phone]    2024 1:00 PM Javier Potts MD Cardiology [phone]    2024 10:45 AM Cora Her MD Family Medicine [phone]            Patient has agreed to contact primary care provider and/or specialist for any further questions, concerns, or needs.    Liz Fong LPN

## 2024-07-10 ENCOUNTER — CARE COORDINATION (OUTPATIENT)
Facility: CLINIC | Age: 64
End: 2024-07-10

## 2024-07-10 ENCOUNTER — HOSPITAL ENCOUNTER (OUTPATIENT)
Facility: HOSPITAL | Age: 64
Setting detail: INFUSION SERIES
Discharge: HOME | End: 2024-07-13
Payer: MEDICARE

## 2024-07-10 VITALS
HEART RATE: 96 BPM | RESPIRATION RATE: 18 BRPM | SYSTOLIC BLOOD PRESSURE: 98 MMHG | TEMPERATURE: 97.5 F | DIASTOLIC BLOOD PRESSURE: 66 MMHG | OXYGEN SATURATION: 95 %

## 2024-07-10 LAB
ALBUMIN SERPL-MCNC: 2.8 G/DL (ref 3.4–5)
ALBUMIN/GLOB SERPL: 0.8 (ref 0.8–1.7)
ALP SERPL-CCNC: 53 U/L (ref 45–117)
ALT SERPL-CCNC: 11 U/L (ref 13–56)
ANION GAP SERPL CALC-SCNC: 8 MMOL/L (ref 3–18)
AST SERPL-CCNC: 10 U/L (ref 10–38)
BASO+EOS+MONOS # BLD AUTO: 0.4 K/UL (ref 0–2.3)
BASO+EOS+MONOS NFR BLD AUTO: 6 % (ref 0.1–17)
BILIRUB SERPL-MCNC: 0.4 MG/DL (ref 0.2–1)
BUN SERPL-MCNC: 8 MG/DL (ref 7–18)
BUN/CREAT SERPL: 16 (ref 12–20)
CALCIUM SERPL-MCNC: 8.8 MG/DL (ref 8.5–10.1)
CHLORIDE SERPL-SCNC: 99 MMOL/L (ref 100–111)
CO2 SERPL-SCNC: 34 MMOL/L (ref 21–32)
CREAT SERPL-MCNC: 0.51 MG/DL (ref 0.6–1.3)
DIFFERENTIAL METHOD BLD: ABNORMAL
ERYTHROCYTE [DISTWIDTH] IN BLOOD BY AUTOMATED COUNT: 19.5 % (ref 11.5–14.5)
GLOBULIN SER CALC-MCNC: 3.4 G/DL (ref 2–4)
GLUCOSE SERPL-MCNC: 69 MG/DL (ref 74–99)
HCT VFR BLD AUTO: 32 % (ref 36–48)
HGB BLD-MCNC: 9.6 G/DL (ref 12–16)
LYMPHOCYTES # BLD: 0.9 K/UL (ref 1.1–5.9)
LYMPHOCYTES NFR BLD: 17 % (ref 14–44)
MCH RBC QN AUTO: 25.3 PG (ref 25–35)
MCHC RBC AUTO-ENTMCNC: 30 G/DL (ref 31–37)
MCV RBC AUTO: 84.4 FL (ref 78–102)
NEUTS SEG # BLD: 4.4 K/UL (ref 1.8–9.5)
NEUTS SEG NFR BLD: 77 % (ref 40–70)
PLATELET # BLD AUTO: 382 K/UL (ref 140–440)
POTASSIUM SERPL-SCNC: 3 MMOL/L (ref 3.5–5.5)
PROT SERPL-MCNC: 6.2 G/DL (ref 6.4–8.2)
RBC # BLD AUTO: 3.79 M/UL (ref 4.1–5.1)
SODIUM SERPL-SCNC: 141 MMOL/L (ref 136–145)
WBC # BLD AUTO: 5.7 K/UL (ref 4.5–13)

## 2024-07-10 PROCEDURE — 36592 COLLECT BLOOD FROM PICC: CPT

## 2024-07-10 PROCEDURE — 80053 COMPREHEN METABOLIC PANEL: CPT

## 2024-07-10 PROCEDURE — 85025 COMPLETE CBC W/AUTO DIFF WBC: CPT

## 2024-07-10 PROCEDURE — 2580000003 HC RX 258: Performed by: INTERNAL MEDICINE

## 2024-07-10 RX ORDER — SODIUM CHLORIDE 0.9 % (FLUSH) 0.9 %
5-40 SYRINGE (ML) INJECTION PRN
Status: DISCONTINUED | OUTPATIENT
Start: 2024-07-10 | End: 2024-07-14 | Stop reason: HOSPADM

## 2024-07-10 RX ADMIN — SODIUM CHLORIDE, PRESERVATIVE FREE 10 ML: 5 INJECTION INTRAVENOUS at 09:21

## 2024-07-10 RX ADMIN — SODIUM CHLORIDE, PRESERVATIVE FREE 20 ML: 5 INJECTION INTRAVENOUS at 09:25

## 2024-07-10 ASSESSMENT — PAIN - FUNCTIONAL ASSESSMENT: PAIN_FUNCTIONAL_ASSESSMENT: NONE - DENIES PAIN

## 2024-07-10 NOTE — CARE COORDINATION
Care Transitions     Per chart review, patient is not noted to have been readmitted to an acute care hospital facility, within 30 days of hospital discharge.   Target date completed.      Addendum:   Care Transitions program closed.

## 2024-07-10 NOTE — PROGRESS NOTES
[facility] OPI Progress Note    Date: July 10, 2024    Name: Rina Prajapati    MRN: [id number]         : 1960      WEEKLY MIDLINE LINE CARE & PRE-CHEMO LABS    Ms. Prajapati arrived to [facility] at 0905 via wheelchair.    Ms. Prajapati was assessed and education was provided.     Ms. Prajapati's vitals were reviewed.  Vitals:    07/10/24 0910   BP: 98/66   Pulse: 96   Resp: 18   Temp: 97.5 °F (36.4 °C)   SpO2: 91%     Pt's midline dressing to left upper arm is clean, dry and intact. Single lumen flushes well, blood returns without difficulty, CBC  and CMP drawn per orders with 10mL waste drawn prior. Patient tolerated well. CBC ran in [facility] with results as follows:    Results for orders placed or performed during the hospital encounter of 07/10/24   CBC with Partial Differential   Result Value Ref Range    WBC 5.7 4.5 - 13.0 K/uL    RBC 3.79 (L) 4.10 - 5.10 M/uL    Hemoglobin 9.6 (L) 12.0 - 16.0 g/dL    Hematocrit 32.0 (L) 36 - 48 %    MCV 84.4 78 - 102 FL    MCH 25.3 25.0 - 35.0 PG    MCHC 30.0 (L) 31 - 37 g/dL    RDW 19.5 (H) 11.5 - 14.5 %    Platelets 382 140 - 440 K/uL    Neutrophils % 77 (H) 40 - 70 %    Mixed Cells 6 0.1 - 17 %    Lymphocytes % 17 14 - 44 %    Neutrophils Absolute 4.4 1.8 - 9.5 K/UL    ABSOLUTE MIXED CELLS 0.4 0.0 - 2.3 K/uL    Lymphocytes Absolute 0.9 (L) 1.1 - 5.9 K/UL    Differential Type AUTOMATED       CMP sent with  to lab. Flushed lumen with 20mLs normal saline. Midline dressing changed per order using sterile technique. Patient tolerated well.     Discharge/ follow-up instructions discussed w/ pt. Pt verbalized understanding.    Ms. Prajapati [facility] in [facility] condition at 1005.  She is to return on 2024 at 0900 for her next chemo appointment.    Sada Loera RN  July 10, 2024

## 2024-07-11 ENCOUNTER — HOSPITAL ENCOUNTER (OUTPATIENT)
Facility: HOSPITAL | Age: 64
Setting detail: INFUSION SERIES
End: 2024-07-11
Payer: MEDICARE

## 2024-07-11 VITALS
RESPIRATION RATE: 18 BRPM | SYSTOLIC BLOOD PRESSURE: 157 MMHG | OXYGEN SATURATION: 97 % | TEMPERATURE: 98.7 F | HEART RATE: 82 BPM | DIASTOLIC BLOOD PRESSURE: 88 MMHG

## 2024-07-11 DIAGNOSIS — C34.90 NON-SMALL CELL LUNG CANCER, UNSPECIFIED LATERALITY (HCC): Primary | ICD-10-CM

## 2024-07-11 PROCEDURE — 96365 THER/PROPH/DIAG IV INF INIT: CPT

## 2024-07-11 PROCEDURE — 96377 APPLICATON ON-BODY INJECTOR: CPT

## 2024-07-11 PROCEDURE — 96366 THER/PROPH/DIAG IV INF ADDON: CPT

## 2024-07-11 PROCEDURE — 96413 CHEMO IV INFUSION 1 HR: CPT

## 2024-07-11 PROCEDURE — 2700000000 HC OXYGEN THERAPY PER DAY

## 2024-07-11 PROCEDURE — 6360000002 HC RX W HCPCS: Performed by: INTERNAL MEDICINE

## 2024-07-11 PROCEDURE — 96367 TX/PROPH/DG ADDL SEQ IV INF: CPT

## 2024-07-11 PROCEDURE — 99213 OFFICE O/P EST LOW 20 MIN: CPT

## 2024-07-11 PROCEDURE — 2580000003 HC RX 258: Performed by: INTERNAL MEDICINE

## 2024-07-11 PROCEDURE — 96375 TX/PRO/DX INJ NEW DRUG ADDON: CPT

## 2024-07-11 RX ORDER — HEPARIN 100 UNIT/ML
500 SYRINGE INTRAVENOUS PRN
Status: ACTIVE | OUTPATIENT
Start: 2024-07-11 | End: 2024-07-12

## 2024-07-11 RX ORDER — SODIUM CHLORIDE 9 MG/ML
5-250 INJECTION, SOLUTION INTRAVENOUS PRN
Status: ACTIVE | OUTPATIENT
Start: 2024-07-11 | End: 2024-07-12

## 2024-07-11 RX ORDER — SODIUM CHLORIDE 0.9 % (FLUSH) 0.9 %
5-40 SYRINGE (ML) INJECTION PRN
Status: ACTIVE | OUTPATIENT
Start: 2024-07-11 | End: 2024-07-12

## 2024-07-11 RX ORDER — PALONOSETRON 0.05 MG/ML
0.25 INJECTION, SOLUTION INTRAVENOUS ONCE
Status: COMPLETED | OUTPATIENT
Start: 2024-07-11 | End: 2024-07-11

## 2024-07-11 RX ORDER — DEXAMETHASONE SODIUM PHOSPHATE 4 MG/ML
8 INJECTION, SOLUTION INTRA-ARTICULAR; INTRALESIONAL; INTRAMUSCULAR; INTRAVENOUS; SOFT TISSUE ONCE
Status: COMPLETED | OUTPATIENT
Start: 2024-07-11 | End: 2024-07-11

## 2024-07-11 RX ADMIN — SODIUM CHLORIDE, PRESERVATIVE FREE 10 ML: 5 INJECTION INTRAVENOUS at 09:27

## 2024-07-11 RX ADMIN — SODIUM CHLORIDE, PRESERVATIVE FREE 10 ML: 5 INJECTION INTRAVENOUS at 12:05

## 2024-07-11 RX ADMIN — PEGFILGRASTIM 6 MG: KIT SUBCUTANEOUS at 13:11

## 2024-07-11 RX ADMIN — DOCETAXEL ANHYDROUS 130 MG: 10 INJECTION, SOLUTION INTRAVENOUS at 10:56

## 2024-07-11 RX ADMIN — DEXAMETHASONE SODIUM PHOSPHATE 8 MG: 4 INJECTION INTRA-ARTICULAR; INTRALESIONAL; INTRAMUSCULAR; INTRAVENOUS; SOFT TISSUE at 09:36

## 2024-07-11 RX ADMIN — PALONOSETRON HYDROCHLORIDE 0.25 MG: 0.25 INJECTION INTRAVENOUS at 10:21

## 2024-07-11 RX ADMIN — SODIUM CHLORIDE 25 ML/HR: 9 INJECTION, SOLUTION INTRAVENOUS at 09:29

## 2024-07-11 RX ADMIN — FOSAPREPITANT 150 MG: 150 INJECTION, POWDER, LYOPHILIZED, FOR SOLUTION INTRAVENOUS at 09:40

## 2024-07-11 ASSESSMENT — PAIN SCALES - GENERAL: PAINLEVEL_OUTOF10: 0

## 2024-07-11 NOTE — PROGRESS NOTES
[facility] Progress Note    Date: 2024    Name: Rina Prajapati    MRN: [id number]         : 1960          Ms. Prajapati arrived to \[facility]\"" at 0910 ambulatory, via wheelchair. No complaints or concerns voiced. Patient on home oxygen and was switched to opic oxygen at 3 liters per nasal cannula.    Pt is here today for Docetaxel, Cycle 22.    Ms. Prajapati was assessed and education was provided.     Ms. Prajapati's vitals were reviewed.  Vitals:    24 1306   BP: (!) 157/88   Pulse: 82   Resp: 18   Temp: 98.7 °F (37.1 °C)   SpO2: 97%       Patient's left upper arm mid line intact with dry drsg at site. No bruising, drainage, swelling, streaking or pain noted at, around or along arm. Single lumen with brisk blood returns and brisk flush.    Lab results were obtained and reviewed.  Labs per order okay for chemo. Potassium level noted to be 3.0. This same level was reported several times in the recent past to Dr Li, and he stated to remind Ms Prajapati to take her potassium PO as prescribed, and encourage her to eat potassium rich foods. Same done. Patient stated she is taking her potassium as prescribed.         Normal saline flush bag hung.         Latest Reference Range & Units 07/10/24 09:25   Sodium 136 - 145 mmol/L 141   Potassium 3.5 - 5.5 mmol/L 3.0 (L)   Chloride 100 - 111 mmol/L 99 (L)   CARBON DIOXIDE 21 - 32 mmol/L 34 (H)   BUN,BUNPL 7.0 - 18 MG/DL 8   Creatinine 0.6 - 1.3 MG/DL 0.51 (L)   Bun/Cre 12 - 20   16   Anion Gap 3.0 - 18 mmol/L 8   Est, Glom Filt Rate >60 ml/min/1.73m2 >90   Glucose 74 - 99 mg/dL 69 (L)   Calcium 8.5 - 10.1 MG/DL 8.8   Albumin/Globulin Ratio 0.8 - 1.7   0.8   Total Protein 6.4 - 8.2 g/dL 6.2 (L)   Albumin 3.4 - 5.0 g/dL 2.8 (L)   Globulin 2.0 - 4.0 g/dL 3.4   Alkaline Phosphatase 45 - 117 U/L 53   ALT 13 - 56 U/L 11 (L)   AST 10 - 38 U/L 10   Total Bilirubin 0.2 - 1.0 MG/DL 0.4            Latest Reference Range & Units 07/10/24 09:25   Total Protein 6.4 - 8.2 g/dL 6.2 (L)  flashed appropiately, and patient given instructions on when to remove tomorrow evening, along with general discharge instructions post chemotherapy. She verbalized understanding.    PIV removed. Site without bleeding, bruising, swelling or tenderness. Gauze/coban applied.    Armband removed and shredded.    Ms. Prajapati [facility] in stable condition at 1335.  She is to return on  7/31/24 at 0900 for pre chemo labs and on 8/1/24 at 0900 for her chemotherapy appointment.    VALENTE GALLAGHER RN  July 11, 2024

## 2024-07-12 ENCOUNTER — HOSPITAL ENCOUNTER (OUTPATIENT)
Facility: HOSPITAL | Age: 64
End: 2024-07-12
Attending: INTERNAL MEDICINE
Payer: MEDICARE

## 2024-07-12 DIAGNOSIS — C34.11 MALIGNANT NEOPLASM OF UPPER LOBE OF RIGHT LUNG (HCC): ICD-10-CM

## 2024-07-12 PROCEDURE — C1751 CATH, INF, PER/CENT/MIDLINE: HCPCS

## 2024-07-12 NOTE — PROCEDURES
PROCEDURE NOTE  Date: 7/12/2024   Name: Rina Prajapati  YOB: 1960    Procedures        INTERVENTIONAL RADIOLOGY POST PICC LINE NOTE     July 12, 2024       7:50 AM     Preoperative Diagnosis:   IV Access    Postoperative Diagnosis:  Same.    : CARLOS Galindo, PA    Assistant:  None.    Attending Physician: Dr. Moran    Type of Anesthesia:  1% local lidocaine    Procedure/Description: Left upper extremity PICC Line.    Findings:  Left brachial 4 German single lumen catheter placed. Centrally stenosed. Tip at the subclavian vein. OK to use. Length 16 cm.    Estimated blood Loss: Minimal    Specimen Removed: None    Drains: None     Complications:  None.    Condition:  Stable.    Discharge Plan:  discharge home

## 2024-07-12 NOTE — H&P
OUTPATIENT HISTORY AND PHYSICAL    Today 7/12/2024     Indication/Symptoms:   Rina Prajapati is a 63 y.o. female with lung cancer and SVC syndrome requiring long-term IV access for chemotherapy, here for PICC exchange due to malfunctioning PICC line.    Current Meds:    Prior to Admission medications    Medication Sig Start Date End Date Taking? Authorizing Provider   sertraline (ZOLOFT) 50 MG tablet TAKE 1 TABLET BY MOUTH DAILY 6/28/24   Cora Her MD   levothyroxine (SYNTHROID) 88 MCG tablet Take 1 tablet by mouth every morning (before breakfast) 6/14/24   Cora Her MD   furosemide (LASIX) 40 MG tablet Take 1 tablet by mouth daily 6/14/24   Cora Her MD   potassium chloride (KLOR-CON M) 20 MEQ extended release tablet Take 1 tablet by mouth daily Ms. Prajapati stated that she just picked up a refill of her Potassium tablets on 8-23-23 6/14/24   Cora Her MD   amLODIPine (NORVASC) 5 MG tablet Take 1 tablet by mouth daily 6/14/24   Cora Her MD   predniSONE (DELTASONE) 20 MG tablet Take 60mg(3 tab) QD x 3 days, then 40mg(2 tab) QD x 3 days, then take 20mg(1 tab) QD x 3 days, then take 10mg(1/2 tab) QD x 4 days, then d/c 6/8/24   Adilson Severino MD   tiotropium (SPIRIVA HANDIHALER) 18 MCG inhalation capsule Inhale 1 capsule into the lungs daily 4/10/24   Cora Her MD   montelukast (SINGULAIR) 10 MG tablet Take 1 tablet by mouth daily Take by mouth 3/5/24   Cora Her MD   amLODIPine (NORVASC) 5 MG tablet take 1 tablet by mouth once daily 12/1/23   Cora Her MD   apixaban (ELIQUIS) 5 MG TABS tablet Take 1 tablet by mouth 2 times daily    Adilson Severino MD   loperamide (IMODIUM A-D) 2 MG tablet  3/24/23   Adilson Severino MD   dexamethasone (DECADRON) 4 MG tablet take 2 tablets by mouth twice a day TAKE THE DAY BEFORE, THE DAY OF, AND THE DAY AFTER DOCETAXEL  Patient not taking: Reported on 6/10/2024 2/17/23   Provider,  MD Adilson   DOCEtaxel (TAXOTERE) 80 MG/8ML chemo injection Infuse intravenously 3/9/23   Adilson Severino MD   ondansetron (ZOFRAN-ODT) 8 MG TBDP disintegrating tablet dissolve 1 tablet ON TONGUE every 8 hours if needed for nausea and vomiting  Patient not taking: Reported on 6/14/2024 2/17/23   Adilson Severino MD   promethazine (PHENERGAN) 12.5 MG tablet take 1 tablet by mouth every 6 hours if needed for nausea and vomiting  Patient not taking: Reported on 6/14/2024 2/17/23   Adilson Severino MD   albuterol sulfate HFA (PROVENTIL;VENTOLIN;PROAIR) 108 (90 Base) MCG/ACT inhaler inhale 1 to 2 puffs by mouth and INTO THE LUNGS every 4 to 6 hour...  (REFER TO PRESCRIPTION NOTES). 3/25/22   Adilson Severino MD   ferrous sulfate (IRON 325) 325 (65 Fe) MG tablet Take by mouth 5/6/21   Adilson Severino MD       Allergies:      Allergies   Allergen Reactions    Metronidazole Hives and Other (See Comments)     Other Reaction(s): Not available       Comorbid Conditions:    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

## 2024-07-16 RX ORDER — NITROGLYCERIN 20 MG/100ML
INJECTION INTRAVENOUS
Status: DISPENSED
Start: 2024-07-16 | End: 2024-07-16

## 2024-07-25 ENCOUNTER — APPOINTMENT (OUTPATIENT)
Facility: HOSPITAL | Age: 64
End: 2024-07-25
Payer: MEDICARE

## 2024-07-29 ENCOUNTER — OFFICE VISIT (OUTPATIENT)
Age: 64
End: 2024-07-29

## 2024-07-29 VITALS
TEMPERATURE: 97 F | HEART RATE: 88 BPM | DIASTOLIC BLOOD PRESSURE: 64 MMHG | RESPIRATION RATE: 24 BRPM | BODY MASS INDEX: 20.56 KG/M2 | OXYGEN SATURATION: 94 % | SYSTOLIC BLOOD PRESSURE: 126 MMHG | WEIGHT: 131 LBS | HEIGHT: 67 IN

## 2024-07-29 DIAGNOSIS — J44.9 CHRONIC OBSTRUCTIVE PULMONARY DISEASE, UNSPECIFIED COPD TYPE (HCC): ICD-10-CM

## 2024-07-29 DIAGNOSIS — I27.20 PULMONARY HYPERTENSION (HCC): Primary | ICD-10-CM

## 2024-07-29 NOTE — PATIENT INSTRUCTIONS
What is the plan?  -Start on Anoro ellipta   -Stop Spiriva   -Continue Albuterol rescue inhaler  -Oxygen - continue on 1-3.5L/min with activity and at night    -Stop use of ALL tobacco products and Marijuana

## 2024-07-29 NOTE — PROGRESS NOTES
Rina Gena Alo presents today for   Chief Complaint   Patient presents with    Pulmonary Hypertension       Is someone accompanying this pt? No    Is the patient using any DME equipment during OV? Oxygen 2.5-3 litters    -DME Company [employer]    Depression Screenin/10/2024    10:03 AM   PHQ-9 Questionaire   Little interest or pleasure in doing things 0   Feeling down, depressed, or hopeless 0   PHQ-9 Total Score 0       Learning Assessment:    Failed to redirect to the Timeline version of the [employer] SmartLink.    Abuse Screening:         No data to display                Fall Risk    Failed to redirect to the Timeline version of the [employer] SmartLink.    Coordination of Care:    1. Have you been to the ER, urgent care clinic since your last visit? Hospitalized since your last visit? No    2. Have you seen or consulted any other health care providers outside of the [facility] since your last visit? Include any pap smears or colon screening. No    Medication list has been update per patient.    
No     Lack of Transportation (Non-Medical): No   Physical Activity: Insufficiently Active (10/10/2023)    Exercise Vital Sign     Days of Exercise per Week: 3 days     Minutes of Exercise per Session: 20 min   Housing Stability: Low Risk  (5/17/2024)    Housing Stability Vital Sign     Unable to Pay for Housing in the Last Year: No     Number of Places Lived in the Last Year: 1     Unstable Housing in the Last Year: No        Family History   Problem Relation Age of Onset    No Known Problems Child     Cancer Sister 50        breast    Liver Disease Sister         cirrhosis    Heart Attack Mother 43        Allergies   Allergen Reactions    Metronidazole Hives and Other (See Comments)     Other Reaction(s): Not available        Current Outpatient Medications   Medication Sig Dispense Refill    sertraline (ZOLOFT) 50 MG tablet TAKE 1 TABLET BY MOUTH DAILY 90 tablet 1    levothyroxine (SYNTHROID) 88 MCG tablet Take 1 tablet by mouth every morning (before breakfast) 90 tablet 3    furosemide (LASIX) 40 MG tablet Take 1 tablet by mouth daily 90 tablet 0    potassium chloride (KLOR-CON M) 20 MEQ extended release tablet Take 1 tablet by mouth daily Ms. Prajapati stated that she just picked up a refill of her Potassium tablets on 8-23-23 90 tablet 0    amLODIPine (NORVASC) 5 MG tablet Take 1 tablet by mouth daily 90 tablet 3    tiotropium (SPIRIVA HANDIHALER) 18 MCG inhalation capsule Inhale 1 capsule into the lungs daily 90 capsule 0    montelukast (SINGULAIR) 10 MG tablet Take 1 tablet by mouth daily Take by mouth 30 tablet 11    apixaban (ELIQUIS) 5 MG TABS tablet Take 1 tablet by mouth 2 times daily      DOCEtaxel (TAXOTERE) 80 MG/8ML chemo injection Infuse intravenously      albuterol sulfate HFA (PROVENTIL;VENTOLIN;PROAIR) 108 (90 Base) MCG/ACT inhaler inhale 1 to 2 puffs by mouth and INTO THE LUNGS every 4 to 6 hour...  (REFER TO PRESCRIPTION NOTES).      ferrous sulfate (IRON 325) 325 (65 Fe) MG tablet Take by mouth

## 2024-07-31 ENCOUNTER — HOSPITAL ENCOUNTER (OUTPATIENT)
Facility: HOSPITAL | Age: 64
Setting detail: INFUSION SERIES
Discharge: HOME | End: 2024-08-03
Payer: MEDICARE

## 2024-07-31 VITALS
RESPIRATION RATE: 20 BRPM | HEIGHT: 67 IN | BODY MASS INDEX: 20.23 KG/M2 | TEMPERATURE: 98.4 F | DIASTOLIC BLOOD PRESSURE: 86 MMHG | SYSTOLIC BLOOD PRESSURE: 149 MMHG | WEIGHT: 128.9 LBS | OXYGEN SATURATION: 95 % | HEART RATE: 78 BPM

## 2024-07-31 LAB
ALBUMIN SERPL-MCNC: 3.1 G/DL (ref 3.4–5)
ALBUMIN/GLOB SERPL: 1.1 (ref 0.8–1.7)
ALP SERPL-CCNC: 67 U/L (ref 45–117)
ALT SERPL-CCNC: 12 U/L (ref 13–56)
ANION GAP SERPL CALC-SCNC: 6 MMOL/L (ref 3–18)
AST SERPL-CCNC: 13 U/L (ref 10–38)
BASO+EOS+MONOS # BLD AUTO: 0.6 K/UL (ref 0–2.3)
BASO+EOS+MONOS NFR BLD AUTO: 7 % (ref 0.1–17)
BILIRUB SERPL-MCNC: 0.3 MG/DL (ref 0.2–1)
BUN SERPL-MCNC: 6 MG/DL (ref 7–18)
BUN/CREAT SERPL: 13 (ref 12–20)
CALCIUM SERPL-MCNC: 9 MG/DL (ref 8.5–10.1)
CHLORIDE SERPL-SCNC: 104 MMOL/L (ref 100–111)
CO2 SERPL-SCNC: 32 MMOL/L (ref 21–32)
CREAT SERPL-MCNC: 0.46 MG/DL (ref 0.6–1.3)
DIFFERENTIAL METHOD BLD: ABNORMAL
ERYTHROCYTE [DISTWIDTH] IN BLOOD BY AUTOMATED COUNT: 20.2 % (ref 11.5–14.5)
GLOBULIN SER CALC-MCNC: 2.9 G/DL (ref 2–4)
GLUCOSE SERPL-MCNC: 82 MG/DL (ref 74–99)
HCT VFR BLD AUTO: 32.9 % (ref 36–48)
HGB BLD-MCNC: 9.7 G/DL (ref 12–16)
LYMPHOCYTES # BLD: 1 K/UL (ref 1.1–5.9)
LYMPHOCYTES NFR BLD: 13 % (ref 14–44)
MCH RBC QN AUTO: 25.2 PG (ref 25–35)
MCHC RBC AUTO-ENTMCNC: 29.5 G/DL (ref 31–37)
MCV RBC AUTO: 85.5 FL (ref 78–102)
NEUTS SEG # BLD: 5.9 K/UL (ref 1.8–9.5)
NEUTS SEG NFR BLD: 80 % (ref 40–70)
PLATELET # BLD AUTO: 437 K/UL (ref 140–440)
POTASSIUM SERPL-SCNC: 3.6 MMOL/L (ref 3.5–5.5)
PROT SERPL-MCNC: 6 G/DL (ref 6.4–8.2)
RBC # BLD AUTO: 3.85 M/UL (ref 4.1–5.1)
SODIUM SERPL-SCNC: 142 MMOL/L (ref 136–145)
WBC # BLD AUTO: 7.5 K/UL (ref 4.5–13)

## 2024-07-31 PROCEDURE — 2580000003 HC RX 258: Performed by: INTERNAL MEDICINE

## 2024-07-31 PROCEDURE — 6360000002 HC RX W HCPCS

## 2024-07-31 PROCEDURE — 85025 COMPLETE CBC W/AUTO DIFF WBC: CPT

## 2024-07-31 PROCEDURE — 96523 IRRIG DRUG DELIVERY DEVICE: CPT

## 2024-07-31 PROCEDURE — 36415 COLL VENOUS BLD VENIPUNCTURE: CPT

## 2024-07-31 PROCEDURE — 80053 COMPREHEN METABOLIC PANEL: CPT

## 2024-07-31 RX ORDER — HEPARIN SODIUM (PORCINE) LOCK FLUSH IV SOLN 100 UNIT/ML 100 UNIT/ML
500 SOLUTION INTRAVENOUS PRN
Status: DISCONTINUED | OUTPATIENT
Start: 2024-07-31 | End: 2024-08-04 | Stop reason: HOSPADM

## 2024-07-31 RX ORDER — EPINEPHRINE 1 MG/ML
0.3 INJECTION, SOLUTION, CONCENTRATE INTRAVENOUS PRN
Status: CANCELLED | OUTPATIENT
Start: 2024-08-01

## 2024-07-31 RX ORDER — HEPARIN 100 UNIT/ML
SYRINGE INTRAVENOUS
Status: COMPLETED
Start: 2024-07-31 | End: 2024-07-31

## 2024-07-31 RX ORDER — SODIUM CHLORIDE 0.9 % (FLUSH) 0.9 %
5-40 SYRINGE (ML) INJECTION PRN
Status: DISCONTINUED | OUTPATIENT
Start: 2024-07-31 | End: 2024-08-04 | Stop reason: HOSPADM

## 2024-07-31 RX ORDER — ONDANSETRON 2 MG/ML
8 INJECTION INTRAMUSCULAR; INTRAVENOUS
Status: CANCELLED | OUTPATIENT
Start: 2024-08-01

## 2024-07-31 RX ORDER — MEPERIDINE HYDROCHLORIDE 25 MG/ML
12.5 INJECTION INTRAMUSCULAR; INTRAVENOUS; SUBCUTANEOUS PRN
Status: CANCELLED | OUTPATIENT
Start: 2024-08-01

## 2024-07-31 RX ORDER — ACETAMINOPHEN 325 MG/1
650 TABLET ORAL
Status: CANCELLED | OUTPATIENT
Start: 2024-08-01

## 2024-07-31 RX ORDER — DIPHENHYDRAMINE HYDROCHLORIDE 50 MG/ML
50 INJECTION INTRAMUSCULAR; INTRAVENOUS
Status: CANCELLED | OUTPATIENT
Start: 2024-08-01

## 2024-07-31 RX ORDER — SODIUM CHLORIDE 9 MG/ML
INJECTION, SOLUTION INTRAVENOUS CONTINUOUS
Status: CANCELLED | OUTPATIENT
Start: 2024-08-01

## 2024-07-31 RX ORDER — ALBUTEROL SULFATE 90 UG/1
4 AEROSOL, METERED RESPIRATORY (INHALATION) PRN
Status: CANCELLED | OUTPATIENT
Start: 2024-08-01

## 2024-07-31 RX ORDER — SODIUM CHLORIDE 9 MG/ML
5-250 INJECTION, SOLUTION INTRAVENOUS PRN
Status: CANCELLED | OUTPATIENT
Start: 2024-08-01

## 2024-07-31 RX ADMIN — SODIUM CHLORIDE, PRESERVATIVE FREE 30 ML: 5 INJECTION INTRAVENOUS at 09:30

## 2024-07-31 RX ADMIN — HEPARIN SODIUM (PORCINE) LOCK FLUSH IV SOLN 100 UNIT/ML 500 UNITS: 100 SOLUTION at 09:30

## 2024-07-31 RX ADMIN — HEPARIN 500 UNITS: 100 SYRINGE at 09:30

## 2024-07-31 ASSESSMENT — PAIN - FUNCTIONAL ASSESSMENT: PAIN_FUNCTIONAL_ASSESSMENT: NONE - DENIES PAIN

## 2024-07-31 NOTE — PROGRESS NOTES
[facility] Progress Note    Date: 2024    Name: Rina Prajapati    MRN: [id number]         : 1960    Ms. Prajapati arrived in the [facility] today at 0910, in stable condition, here for her WEEKLY PICC LINE CARE & PRE-CHEMO LABS. She was assessed and education was provided.       Ms. Prajapati's vitals were reviewed.  Vitals:    24 0910   BP: (!) 149/86   Pulse: 78   Resp: 20   Temp: 98.4 °F (36.9 °C)   SpO2: 95%         WEIGHT TODAY 128.9 LBS (58.5 KG)        Please note that the last time Ms. Prajapati was here in our infusion center was on 24, when she received her last cycle of Taxotere. As of that visit, upon arrival here to the [facility], she still had a MID LINE CATH in place. However, the MID LINE CATH started leaking around the insertion site during her chemo infusion, and therefore, the cath had to be removed, and her chemo completed via a peripheral IV.    Therefore, when she left here on 24, she had no further appointments other than her appointment to return today for her pre-chemo labs.       However, when she presented to the [facility] today, she informed me that she had a NEW PICC LINE in place, that had been inserted about 2 weeks ago.       So, upon further investigation, it was noted that in fact she did have a new PICC line that according to her electronic record, had been inserted on 24, almost 3 weeks ago. And so since no one made us aware that she had a new PICC  line in place, the PICC has not been flushed & the dressing has not been changed since it was placed on 24.       HER NEW LEFT UPPER ARM SINGLE LUMEN PICC LINE WAS NOTED TO BE DRY AND INTACT. THE PICC LINE FLUSHED VERY EASILY WITH NS, BUT NO BLOOD RETURN COULD BE OBTAINED.          Her PICC line dressing was changed today per policy, and without incident, then the single lumen of the PICC line was flushed well per policy with NS & Heparin, and then the end cap was changed, and alcohol cap applied.        BLOOD FOR THE

## 2024-08-01 ENCOUNTER — HOSPITAL ENCOUNTER (OUTPATIENT)
Facility: HOSPITAL | Age: 64
Setting detail: INFUSION SERIES
End: 2024-08-01
Payer: MEDICARE

## 2024-08-01 VITALS
TEMPERATURE: 98.1 F | OXYGEN SATURATION: 93 % | SYSTOLIC BLOOD PRESSURE: 166 MMHG | DIASTOLIC BLOOD PRESSURE: 90 MMHG | RESPIRATION RATE: 28 BRPM | HEART RATE: 115 BPM

## 2024-08-01 DIAGNOSIS — C34.90 NON-SMALL CELL LUNG CANCER, UNSPECIFIED LATERALITY (HCC): Primary | ICD-10-CM

## 2024-08-01 PROCEDURE — 96413 CHEMO IV INFUSION 1 HR: CPT

## 2024-08-01 PROCEDURE — 2580000003 HC RX 258: Performed by: INTERNAL MEDICINE

## 2024-08-01 PROCEDURE — 2700000000 HC OXYGEN THERAPY PER DAY

## 2024-08-01 PROCEDURE — 96375 TX/PRO/DX INJ NEW DRUG ADDON: CPT

## 2024-08-01 PROCEDURE — 96377 APPLICATON ON-BODY INJECTOR: CPT

## 2024-08-01 PROCEDURE — 96367 TX/PROPH/DG ADDL SEQ IV INF: CPT

## 2024-08-01 PROCEDURE — 6360000002 HC RX W HCPCS: Performed by: INTERNAL MEDICINE

## 2024-08-01 RX ORDER — DEXAMETHASONE SODIUM PHOSPHATE 4 MG/ML
8 INJECTION, SOLUTION INTRA-ARTICULAR; INTRALESIONAL; INTRAMUSCULAR; INTRAVENOUS; SOFT TISSUE ONCE
Status: COMPLETED | OUTPATIENT
Start: 2024-08-01 | End: 2024-08-01

## 2024-08-01 RX ORDER — PALONOSETRON 0.05 MG/ML
0.25 INJECTION, SOLUTION INTRAVENOUS ONCE
Status: COMPLETED | OUTPATIENT
Start: 2024-08-01 | End: 2024-08-01

## 2024-08-01 RX ORDER — SODIUM CHLORIDE 9 MG/ML
5-250 INJECTION, SOLUTION INTRAVENOUS PRN
Status: ACTIVE | OUTPATIENT
Start: 2024-08-01 | End: 2024-08-02

## 2024-08-01 RX ORDER — SODIUM CHLORIDE 0.9 % (FLUSH) 0.9 %
5-40 SYRINGE (ML) INJECTION PRN
Status: ACTIVE | OUTPATIENT
Start: 2024-08-01 | End: 2024-08-02

## 2024-08-01 RX ORDER — HEPARIN 100 UNIT/ML
500 SYRINGE INTRAVENOUS PRN
Status: DISPENSED | OUTPATIENT
Start: 2024-08-01 | End: 2024-08-02

## 2024-08-01 RX ADMIN — SODIUM CHLORIDE, PRESERVATIVE FREE 20 ML: 5 INJECTION INTRAVENOUS at 12:40

## 2024-08-01 RX ADMIN — PALONOSETRON HYDROCHLORIDE 0.25 MG: 0.25 INJECTION INTRAVENOUS at 09:42

## 2024-08-01 RX ADMIN — DOCETAXEL ANHYDROUS 130 MG: 10 INJECTION, SOLUTION INTRAVENOUS at 11:13

## 2024-08-01 RX ADMIN — SODIUM CHLORIDE 30 ML/HR: 9 INJECTION, SOLUTION INTRAVENOUS at 09:28

## 2024-08-01 RX ADMIN — FOSAPREPITANT 150 MG: 150 INJECTION, POWDER, LYOPHILIZED, FOR SOLUTION INTRAVENOUS at 09:56

## 2024-08-01 RX ADMIN — HEPARIN 500 UNITS: 100 SYRINGE at 12:40

## 2024-08-01 RX ADMIN — SODIUM CHLORIDE, PRESERVATIVE FREE 20 ML: 5 INJECTION INTRAVENOUS at 09:25

## 2024-08-01 RX ADMIN — PEGFILGRASTIM 6 MG: KIT SUBCUTANEOUS at 12:45

## 2024-08-01 RX ADMIN — DEXAMETHASONE SODIUM PHOSPHATE 8 MG: 4 INJECTION, SOLUTION INTRA-ARTICULAR; INTRALESIONAL; INTRAMUSCULAR; INTRAVENOUS; SOFT TISSUE at 09:35

## 2024-08-01 ASSESSMENT — PAIN - FUNCTIONAL ASSESSMENT
PAIN_FUNCTIONAL_ASSESSMENT: NONE - DENIES PAIN
PAIN_FUNCTIONAL_ASSESSMENT: NONE - DENIES PAIN

## 2024-08-01 NOTE — PROGRESS NOTES
140 - 440 K/uL  437   Neutrophils % 40 - 70 %  80 (H)   Lymphocyte % 14 - 44 %  13 (L)   Neutrophils Absolute 1.8 - 9.5 K/UL  5.9   Lymphocytes Absolute 1.1 - 5.9 K/UL  1.0 (L)   Differential Type -    AUTOMATED   (L): Data is abnormally low  (H): Data is abnormally high          HER NEW LEFT UPPER ARM SINGLE LUMEN PICC LINE (INSERTED ON 7-12-24) WAS NOTED TO BE COMPLETELY DRY AND INTACT, AND FLUSHED VERY EASILY WITH NS, AND HAD A BRISK BLOOD RETURN.             ml IV BAG was initiated to infuse @ KVO throughout treatment today.       The following pre-medications were administered 30-60 minutes prior to starting the chemotherapy: DECADRON 8 MG IV (she DID take her oral Decadron at home as instructed on yesterday, and is aware to take again tomorrow), EMEND 150 MG IV, & ALOXI 0.25 MG IV.        Docetaxel (TAXOTERE) 130 mg IV (75 mg/m2), was administered per order, over approximately 60 minutes, and without incident.           After completion of the TAXOTERE chemotherapy, her PICC line was flushed well with NS & Heparin, and the PICC line was left completely dry and intact. (HER PICC LINE CONTINUED TO HAVE A BRISK BLOOD RETURN.)      NEULASTA 6 MG SQ ON BODY INJECTOR, was applied to her LEFT ABDOMEN at 1245. And after application, the green light was noted to be flashing appropriately.           Ms. Prajapati tolerated treatment very well today, and voiced no complaints.     [facility] at 1250.     She is to return on next Wednesday, 8-7-24 at 1000, for her next appointment, for weekly PICC CARE.     She also has a WEEKLY PICC CARE appointment scheduled on Wednesday, 8-14-24 @ 0900.        And then, she is scheduled to return on Wednesday, 8-21-24 at 0900, for PICC CARE & PRE-CHEMO LABS.     And then, she is scheduled to return in 3 weeks, on Thursday, 8-22-24 at 0900, for her next cycle of chemotherapy (Cycle 24).         Jeramie Adams,  RN  August 1, 2024  9:20 AM

## 2024-08-07 ENCOUNTER — HOSPITAL ENCOUNTER (OUTPATIENT)
Facility: HOSPITAL | Age: 64
Setting detail: INFUSION SERIES
Discharge: HOME | End: 2024-08-10
Payer: MEDICARE

## 2024-08-07 VITALS
RESPIRATION RATE: 20 BRPM | DIASTOLIC BLOOD PRESSURE: 66 MMHG | OXYGEN SATURATION: 95 % | SYSTOLIC BLOOD PRESSURE: 97 MMHG | TEMPERATURE: 98.4 F | HEART RATE: 99 BPM

## 2024-08-07 PROCEDURE — 96523 IRRIG DRUG DELIVERY DEVICE: CPT

## 2024-08-07 PROCEDURE — 2580000003 HC RX 258: Performed by: INTERNAL MEDICINE

## 2024-08-07 PROCEDURE — 6360000002 HC RX W HCPCS

## 2024-08-07 RX ORDER — SODIUM CHLORIDE 0.9 % (FLUSH) 0.9 %
5-40 SYRINGE (ML) INJECTION PRN
Status: DISCONTINUED | OUTPATIENT
Start: 2024-08-07 | End: 2024-08-11 | Stop reason: HOSPADM

## 2024-08-07 RX ORDER — HEPARIN SODIUM (PORCINE) LOCK FLUSH IV SOLN 100 UNIT/ML 100 UNIT/ML
500 SOLUTION INTRAVENOUS PRN
Status: DISCONTINUED | OUTPATIENT
Start: 2024-08-07 | End: 2024-08-11 | Stop reason: HOSPADM

## 2024-08-07 RX ORDER — HEPARIN 100 UNIT/ML
SYRINGE INTRAVENOUS
Status: COMPLETED
Start: 2024-08-07 | End: 2024-08-07

## 2024-08-07 RX ADMIN — HEPARIN SODIUM (PORCINE) LOCK FLUSH IV SOLN 100 UNIT/ML 500 UNITS: 100 SOLUTION at 10:14

## 2024-08-07 RX ADMIN — SODIUM CHLORIDE, PRESERVATIVE FREE 20 ML: 5 INJECTION INTRAVENOUS at 10:12

## 2024-08-07 RX ADMIN — HEPARIN 500 UNITS: 100 SYRINGE at 10:14

## 2024-08-07 ASSESSMENT — PAIN - FUNCTIONAL ASSESSMENT: PAIN_FUNCTIONAL_ASSESSMENT: NONE - DENIES PAIN

## 2024-08-07 NOTE — PROGRESS NOTES
[facility] Progress Note    Date: 2024    Name: Rina Prajapati    MRN: [id number]         : 1960    Ms. Prajapati arrived in the [facility] today at 0955, in stable condition via wheelchair, here for her WEEKLY PICC LINE CARE. She was assessed and education was provided.       Ms. Prajapati's vitals were reviewed.  Vitals:    24 0958   BP: 97/66   Pulse: 99   Resp: 20   Temp: 98.4 °F (36.9 °C)   SpO2: 95%     Patient reports feeling great today and has no complaints at this time.    Patient's left upper arm single lumen PICC is clean, dry and intact. PICC has brisk blood return and flushes without difficulty. PICC flushed with NS & Heparin per order. Needleless connector changed and new Curos cap placed. PICC dressing changed using sterile technique. New stockinette placed. Patient tolerated well.      Ms. Prajapati was [facility] in stable condition at 1040, with her PICC line completely dry and intact.     She is scheduled to return on 24 at 0900, for her next weekly PICC care appointment.      Sada Loera RN  2024  10:55 AM

## 2024-08-08 ENCOUNTER — TELEPHONE (OUTPATIENT)
Facility: CLINIC | Age: 64
End: 2024-08-08

## 2024-08-12 ENCOUNTER — OFFICE VISIT (OUTPATIENT)
Age: 64
End: 2024-08-12

## 2024-08-12 VITALS
DIASTOLIC BLOOD PRESSURE: 60 MMHG | BODY MASS INDEX: 20.25 KG/M2 | WEIGHT: 129 LBS | HEIGHT: 67 IN | HEART RATE: 102 BPM | SYSTOLIC BLOOD PRESSURE: 100 MMHG | OXYGEN SATURATION: 96 %

## 2024-08-12 DIAGNOSIS — I49.9 IRREGULAR HEART BEAT: ICD-10-CM

## 2024-08-12 DIAGNOSIS — I49.9 IRREGULAR HEART BEAT: Primary | ICD-10-CM

## 2024-08-12 RX ORDER — FUROSEMIDE 40 MG/1
40 TABLET ORAL DAILY PRN
Qty: 90 TABLET | Refills: 1 | Status: SHIPPED | OUTPATIENT
Start: 2024-08-12

## 2024-08-12 RX ORDER — POTASSIUM CHLORIDE 20 MEQ/1
20 TABLET, EXTENDED RELEASE ORAL DAILY PRN
Qty: 90 TABLET | Refills: 1 | Status: SHIPPED | OUTPATIENT
Start: 2024-08-12

## 2024-08-14 ENCOUNTER — HOSPITAL ENCOUNTER (OUTPATIENT)
Facility: HOSPITAL | Age: 64
Setting detail: INFUSION SERIES
Discharge: HOME | End: 2024-08-17
Payer: MEDICARE

## 2024-08-14 VITALS
HEART RATE: 98 BPM | TEMPERATURE: 97.8 F | OXYGEN SATURATION: 96 % | RESPIRATION RATE: 18 BRPM | DIASTOLIC BLOOD PRESSURE: 66 MMHG | SYSTOLIC BLOOD PRESSURE: 115 MMHG

## 2024-08-14 PROCEDURE — 6360000002 HC RX W HCPCS

## 2024-08-14 PROCEDURE — 2580000003 HC RX 258: Performed by: INTERNAL MEDICINE

## 2024-08-14 PROCEDURE — 96523 IRRIG DRUG DELIVERY DEVICE: CPT

## 2024-08-14 RX ORDER — HEPARIN 100 UNIT/ML
SYRINGE INTRAVENOUS
Status: COMPLETED
Start: 2024-08-14 | End: 2024-08-14

## 2024-08-14 RX ORDER — HEPARIN SODIUM (PORCINE) LOCK FLUSH IV SOLN 100 UNIT/ML 100 UNIT/ML
500 SOLUTION INTRAVENOUS PRN
Status: DISCONTINUED | OUTPATIENT
Start: 2024-08-14 | End: 2024-08-18 | Stop reason: HOSPADM

## 2024-08-14 RX ORDER — SODIUM CHLORIDE 0.9 % (FLUSH) 0.9 %
5-40 SYRINGE (ML) INJECTION PRN
Status: DISCONTINUED | OUTPATIENT
Start: 2024-08-14 | End: 2024-08-18 | Stop reason: HOSPADM

## 2024-08-14 RX ADMIN — HEPARIN SODIUM (PORCINE) LOCK FLUSH IV SOLN 100 UNIT/ML 500 UNITS: 100 SOLUTION at 09:20

## 2024-08-14 RX ADMIN — HEPARIN 500 UNITS: 100 SYRINGE at 09:20

## 2024-08-14 RX ADMIN — SODIUM CHLORIDE, PRESERVATIVE FREE 20 ML: 5 INJECTION INTRAVENOUS at 09:18

## 2024-08-14 ASSESSMENT — PAIN - FUNCTIONAL ASSESSMENT: PAIN_FUNCTIONAL_ASSESSMENT: NONE - DENIES PAIN

## 2024-08-14 NOTE — PROGRESS NOTES
[facility] Progress Note    Date: 2024    Name: Rina Prajapati    MRN: [id number]         : 1960    Ms. Prajapati arrived in the [facility] today at 0910, in stable condition via wheelchair, here for her WEEKLY PICC LINE CARE. She was assessed and education was provided.       Ms. Prajapati's vitals were reviewed.  Vitals:    24 0914   BP: 115/66   Pulse: 98   Resp: 18   Temp: 97.8 °F (36.6 °C)   SpO2: 96%     Patient has no complaints at this time. VSS.    Patient's left upper arm single lumen PICC is clean, dry and intact. PICC has brisk blood return and flushes without difficulty. PICC flushed with NS & Heparin per order. Needleless connector changed and new Curos cap placed. PICC dressing changed using sterile technique. New stockinette placed. Patient tolerated well.      Ms. Prajapati was [facility] in [facility] condition at 0945, with her PICC line completely dry and intact.     She is scheduled to return on 24 at 0900, for her next weekly PICC care and pre-chemo labs.      Sada Loera RN  2024  9:55 AM

## 2024-08-21 ENCOUNTER — HOSPITAL ENCOUNTER (OUTPATIENT)
Facility: HOSPITAL | Age: 64
Setting detail: INFUSION SERIES
Discharge: HOME | End: 2024-08-24
Payer: MEDICARE

## 2024-08-21 VITALS
HEIGHT: 67 IN | BODY MASS INDEX: 19.93 KG/M2 | HEART RATE: 104 BPM | OXYGEN SATURATION: 94 % | WEIGHT: 127 LBS | TEMPERATURE: 98.4 F | SYSTOLIC BLOOD PRESSURE: 156 MMHG | DIASTOLIC BLOOD PRESSURE: 83 MMHG | RESPIRATION RATE: 20 BRPM

## 2024-08-21 LAB
ALBUMIN SERPL-MCNC: 3 G/DL (ref 3.4–5)
ALBUMIN/GLOB SERPL: 1 (ref 0.8–1.7)
ALP SERPL-CCNC: 68 U/L (ref 45–117)
ALT SERPL-CCNC: 9 U/L (ref 13–56)
ANION GAP SERPL CALC-SCNC: 8 MMOL/L (ref 3–18)
AST SERPL-CCNC: 11 U/L (ref 10–38)
BASO+EOS+MONOS # BLD AUTO: 0.1 K/UL (ref 0–2.3)
BASO+EOS+MONOS NFR BLD AUTO: 2 % (ref 0.1–17)
BILIRUB SERPL-MCNC: 0.3 MG/DL (ref 0.2–1)
BUN SERPL-MCNC: 7 MG/DL (ref 7–18)
BUN/CREAT SERPL: 13 (ref 12–20)
CALCIUM SERPL-MCNC: 8.7 MG/DL (ref 8.5–10.1)
CHLORIDE SERPL-SCNC: 104 MMOL/L (ref 100–111)
CO2 SERPL-SCNC: 30 MMOL/L (ref 21–32)
CREAT SERPL-MCNC: 0.54 MG/DL (ref 0.6–1.3)
DIFFERENTIAL METHOD BLD: ABNORMAL
ERYTHROCYTE [DISTWIDTH] IN BLOOD BY AUTOMATED COUNT: 19.2 % (ref 11.5–14.5)
GLOBULIN SER CALC-MCNC: 3 G/DL (ref 2–4)
GLUCOSE SERPL-MCNC: 84 MG/DL (ref 74–99)
HCT VFR BLD AUTO: 32.3 % (ref 36–48)
HGB BLD-MCNC: 9.7 G/DL (ref 12–16)
LYMPHOCYTES # BLD: 1.5 K/UL (ref 1.1–5.9)
LYMPHOCYTES NFR BLD: 18 % (ref 14–44)
MCH RBC QN AUTO: 26.1 PG (ref 25–35)
MCHC RBC AUTO-ENTMCNC: 30 G/DL (ref 31–37)
MCV RBC AUTO: 86.8 FL (ref 78–102)
NEUTS SEG # BLD: 6.9 K/UL (ref 1.8–9.5)
NEUTS SEG NFR BLD: 81 % (ref 40–70)
PLATELET # BLD AUTO: 299 K/UL (ref 140–440)
POTASSIUM SERPL-SCNC: 3 MMOL/L (ref 3.5–5.5)
PROT SERPL-MCNC: 6 G/DL (ref 6.4–8.2)
RBC # BLD AUTO: 3.72 M/UL (ref 4.1–5.1)
SODIUM SERPL-SCNC: 142 MMOL/L (ref 136–145)
WBC # BLD AUTO: 8.5 K/UL (ref 4.5–13)

## 2024-08-21 PROCEDURE — 80053 COMPREHEN METABOLIC PANEL: CPT

## 2024-08-21 PROCEDURE — 85025 COMPLETE CBC W/AUTO DIFF WBC: CPT

## 2024-08-21 PROCEDURE — 2580000003 HC RX 258: Performed by: INTERNAL MEDICINE

## 2024-08-21 PROCEDURE — 6360000002 HC RX W HCPCS

## 2024-08-21 PROCEDURE — 96523 IRRIG DRUG DELIVERY DEVICE: CPT

## 2024-08-21 RX ORDER — DIPHENHYDRAMINE HYDROCHLORIDE 50 MG/ML
50 INJECTION INTRAMUSCULAR; INTRAVENOUS
Status: CANCELLED | OUTPATIENT
Start: 2024-08-22

## 2024-08-21 RX ORDER — ALBUTEROL SULFATE 90 UG/1
4 AEROSOL, METERED RESPIRATORY (INHALATION) PRN
Status: CANCELLED | OUTPATIENT
Start: 2024-08-22

## 2024-08-21 RX ORDER — SODIUM CHLORIDE 0.9 % (FLUSH) 0.9 %
5-40 SYRINGE (ML) INJECTION PRN
Status: DISCONTINUED | OUTPATIENT
Start: 2024-08-21 | End: 2024-08-25 | Stop reason: HOSPADM

## 2024-08-21 RX ORDER — ONDANSETRON 2 MG/ML
8 INJECTION INTRAMUSCULAR; INTRAVENOUS
Status: CANCELLED | OUTPATIENT
Start: 2024-08-22

## 2024-08-21 RX ORDER — EPINEPHRINE 1 MG/ML
0.3 INJECTION, SOLUTION, CONCENTRATE INTRAVENOUS PRN
Status: CANCELLED | OUTPATIENT
Start: 2024-08-22

## 2024-08-21 RX ORDER — SODIUM CHLORIDE 9 MG/ML
5-250 INJECTION, SOLUTION INTRAVENOUS PRN
Status: CANCELLED | OUTPATIENT
Start: 2024-08-22

## 2024-08-21 RX ORDER — ACETAMINOPHEN 325 MG/1
650 TABLET ORAL
Status: CANCELLED | OUTPATIENT
Start: 2024-08-22

## 2024-08-21 RX ORDER — HEPARIN SODIUM (PORCINE) LOCK FLUSH IV SOLN 100 UNIT/ML 100 UNIT/ML
500 SOLUTION INTRAVENOUS PRN
Status: DISCONTINUED | OUTPATIENT
Start: 2024-08-21 | End: 2024-08-25 | Stop reason: HOSPADM

## 2024-08-21 RX ORDER — MEPERIDINE HYDROCHLORIDE 25 MG/ML
12.5 INJECTION INTRAMUSCULAR; INTRAVENOUS; SUBCUTANEOUS PRN
Status: CANCELLED | OUTPATIENT
Start: 2024-08-22

## 2024-08-21 RX ORDER — SODIUM CHLORIDE 9 MG/ML
INJECTION, SOLUTION INTRAVENOUS CONTINUOUS
Status: CANCELLED | OUTPATIENT
Start: 2024-08-22

## 2024-08-21 RX ORDER — HEPARIN 100 UNIT/ML
SYRINGE INTRAVENOUS
Status: COMPLETED
Start: 2024-08-21 | End: 2024-08-21

## 2024-08-21 RX ADMIN — HEPARIN 500 UNITS: 100 SYRINGE at 09:28

## 2024-08-21 RX ADMIN — SODIUM CHLORIDE, PRESERVATIVE FREE 10 ML: 5 INJECTION INTRAVENOUS at 09:22

## 2024-08-21 RX ADMIN — HEPARIN SODIUM (PORCINE) LOCK FLUSH IV SOLN 100 UNIT/ML 500 UNITS: 100 SOLUTION at 09:28

## 2024-08-21 RX ADMIN — SODIUM CHLORIDE, PRESERVATIVE FREE 20 ML: 5 INJECTION INTRAVENOUS at 09:25

## 2024-08-21 ASSESSMENT — PAIN - FUNCTIONAL ASSESSMENT: PAIN_FUNCTIONAL_ASSESSMENT: NONE - DENIES PAIN

## 2024-08-21 NOTE — PROGRESS NOTES
[facility] Progress Note    Date: 2024    Name: Rina Prajapati    MRN: [id number]         : 1960    Ms. Prajapati arrived in the [facility] today at 0915, in stable condition, here for her WEEKLY PICC LINE CARE & PRE-CHEMO LABS. She was assessed and education was provided.       Ms. Prajapati's vitals were reviewed.  Vitals:    24 0915   BP: (!) 156/83   Pulse: (!) 104   Resp: 20   Temp: 98.4 °F (36.9 °C)   SpO2: 94%       WEIGHT TODAY 127.0 LBS (57.6kg)    Mrs. Prajapati presents to [facility] today saying, \"My breathing might be a little off, I get anxiety whenever I have to leave the house.\" Patient reports this anxiety has been going on for a while and isn't a new occurrence. Encouraged patient to discuss her anxiety with her provider.    PATIENT'S LEFT UPPER ARM SINGLE LUMEN PICC LINE WAS NOTED TO BE DRY AND INTACT. PICC FLUSHES EASILY AND BRISK BLOOD RETURN OBTAINED.     10ML waste prior to drawing blood for the ordered pre-chemo labs (CBC & CMP). CBC ran on site, CMP sent to main lab for processing.   Results as follows:    Results for orders placed or performed during the hospital encounter of 24   CBC with Partial Differential   Result Value Ref Range    WBC 8.5 4.5 - 13.0 K/uL    RBC 3.72 (L) 4.10 - 5.10 M/uL    Hemoglobin 9.7 (L) 12.0 - 16.0 g/dL    Hematocrit 32.3 (L) 36 - 48 %    MCV 86.8 78 - 102 FL    MCH 26.1 25.0 - 35.0 PG    MCHC 30.0 (L) 31 - 37 g/dL    RDW 19.2 (H) 11.5 - 14.5 %    Platelets 299 140 - 440 K/uL    Neutrophils % 81 (H) 40 - 70 %    Mixed Cells 2 0.1 - 17 %    Lymphocytes % 18 14 - 44 %    Neutrophils Absolute 6.9 1.8 - 9.5 K/UL    ABSOLUTE MIXED CELLS 0.1 0.0 - 2.3 K/uL    Lymphocytes Absolute 1.5 1.1 - 5.9 K/UL    Differential Type AUTOMATED     Comprehensive Metabolic Panel   Result Value Ref Range    Sodium 142 136 - 145 mmol/L    Potassium 3.0 (L) 3.5 - 5.5 mmol/L    Chloride 104 100 - 111 mmol/L    CO2 30 21 - 32 mmol/L    Anion Gap 8 3.0 - 18 mmol/L    Glucose 84 74 - 99  mg/dL    BUN 7 7.0 - 18 MG/DL    Creatinine 0.54 (L) 0.6 - 1.3 MG/DL    BUN/Creatinine Ratio 13 12 - 20      Est, Glom Filt Rate >90 >60 ml/min/1.73m2    Calcium 8.7 8.5 - 10.1 MG/DL    Total Bilirubin 0.3 0.2 - 1.0 MG/DL    ALT 9 (L) 13 - 56 U/L    AST 11 10 - 38 U/L    Alk Phosphatase 68 45 - 117 U/L    Total Protein 6.0 (L) 6.4 - 8.2 g/dL    Albumin 3.0 (L) 3.4 - 5.0 g/dL    Globulin 3.0 2.0 - 4.0 g/dL    Albumin/Globulin Ratio 1.0 0.8 - 1.7       PICC line was flushed per policy with NS & Heparin, end cap was changed, and new alcohol cap applied.  PICC line dressing was changed today per policy, and without incident.    Ms. Prajapati tolerated well and voiced no complaints.     Ms. Pra[facility]apati [facility] in [facility] condition at 0955, with her PICC line completely dry and intact.     She is scheduled to return on tomorrow, Thursday, 8-22-24 at 0900.    Sada Loera RN  August 21, 2024  9:18 AM

## 2024-08-22 ENCOUNTER — HOSPITAL ENCOUNTER (OUTPATIENT)
Facility: HOSPITAL | Age: 64
Setting detail: INFUSION SERIES
End: 2024-08-22
Payer: MEDICARE

## 2024-08-22 VITALS
TEMPERATURE: 98.2 F | DIASTOLIC BLOOD PRESSURE: 85 MMHG | SYSTOLIC BLOOD PRESSURE: 144 MMHG | RESPIRATION RATE: 18 BRPM | HEART RATE: 99 BPM | OXYGEN SATURATION: 94 %

## 2024-08-22 DIAGNOSIS — C34.90 NON-SMALL CELL LUNG CANCER, UNSPECIFIED LATERALITY (HCC): Primary | ICD-10-CM

## 2024-08-22 PROCEDURE — 96413 CHEMO IV INFUSION 1 HR: CPT

## 2024-08-22 PROCEDURE — 2580000003 HC RX 258: Performed by: INTERNAL MEDICINE

## 2024-08-22 PROCEDURE — 96375 TX/PRO/DX INJ NEW DRUG ADDON: CPT

## 2024-08-22 PROCEDURE — 6360000002 HC RX W HCPCS: Performed by: INTERNAL MEDICINE

## 2024-08-22 PROCEDURE — 96377 APPLICATON ON-BODY INJECTOR: CPT

## 2024-08-22 PROCEDURE — 96372 THER/PROPH/DIAG INJ SC/IM: CPT

## 2024-08-22 PROCEDURE — 96367 TX/PROPH/DG ADDL SEQ IV INF: CPT

## 2024-08-22 PROCEDURE — 2700000000 HC OXYGEN THERAPY PER DAY

## 2024-08-22 RX ORDER — DEXAMETHASONE SODIUM PHOSPHATE 4 MG/ML
8 INJECTION, SOLUTION INTRA-ARTICULAR; INTRALESIONAL; INTRAMUSCULAR; INTRAVENOUS; SOFT TISSUE ONCE
Status: COMPLETED | OUTPATIENT
Start: 2024-08-22 | End: 2024-08-22

## 2024-08-22 RX ORDER — SODIUM CHLORIDE 0.9 % (FLUSH) 0.9 %
5-40 SYRINGE (ML) INJECTION PRN
Status: ACTIVE | OUTPATIENT
Start: 2024-08-22 | End: 2024-08-23

## 2024-08-22 RX ORDER — HEPARIN 100 UNIT/ML
500 SYRINGE INTRAVENOUS PRN
Status: DISPENSED | OUTPATIENT
Start: 2024-08-22 | End: 2024-08-23

## 2024-08-22 RX ORDER — SODIUM CHLORIDE 9 MG/ML
5-250 INJECTION, SOLUTION INTRAVENOUS PRN
Status: ACTIVE | OUTPATIENT
Start: 2024-08-22 | End: 2024-08-23

## 2024-08-22 RX ORDER — PALONOSETRON 0.05 MG/ML
0.25 INJECTION, SOLUTION INTRAVENOUS ONCE
Status: COMPLETED | OUTPATIENT
Start: 2024-08-22 | End: 2024-08-22

## 2024-08-22 RX ADMIN — SODIUM CHLORIDE, PRESERVATIVE FREE 20 ML: 5 INJECTION INTRAVENOUS at 12:24

## 2024-08-22 RX ADMIN — PEGFILGRASTIM 6 MG: KIT SUBCUTANEOUS at 12:40

## 2024-08-22 RX ADMIN — DEXAMETHASONE SODIUM PHOSPHATE 8 MG: 4 INJECTION INTRA-ARTICULAR; INTRALESIONAL; INTRAMUSCULAR; INTRAVENOUS; SOFT TISSUE at 09:34

## 2024-08-22 RX ADMIN — SODIUM CHLORIDE 25 ML/HR: 9 INJECTION, SOLUTION INTRAVENOUS at 09:32

## 2024-08-22 RX ADMIN — SODIUM CHLORIDE, PRESERVATIVE FREE 10 ML: 5 INJECTION INTRAVENOUS at 09:30

## 2024-08-22 RX ADMIN — HEPARIN 500 UNITS: 100 SYRINGE at 12:25

## 2024-08-22 RX ADMIN — FOSAPREPITANT 150 MG: 150 INJECTION, POWDER, LYOPHILIZED, FOR SOLUTION INTRAVENOUS at 09:52

## 2024-08-22 RX ADMIN — DOCETAXEL ANHYDROUS 130 MG: 10 INJECTION, SOLUTION INTRAVENOUS at 10:59

## 2024-08-22 RX ADMIN — PALONOSETRON HYDROCHLORIDE 0.25 MG: 0.25 INJECTION INTRAVENOUS at 09:42

## 2024-08-22 ASSESSMENT — PAIN - FUNCTIONAL ASSESSMENT
PAIN_FUNCTIONAL_ASSESSMENT: NONE - DENIES PAIN
PAIN_FUNCTIONAL_ASSESSMENT: NONE - DENIES PAIN

## 2024-08-22 NOTE — PROGRESS NOTES
[facility] Progress Note    Date: 2024    Name: Rina Prajapati    MRN: [id number]         : 1960    Ms. Prajapati arrived in the [facility] today at 0910, in stable condition, here for CYCLE 24, DAY 1, IV TAXOTERE CHEMOTHERAPY REGIMEN (EVERY 21 DAY CYCLE). She was assessed and education was provided.       RAMUCIRUMAB HAS BEEN DISCONTINUED.       Ms. Prajapati's vitals were reviewed.  Vitals:    24 0913   BP: 127/74   Pulse: 97   Resp: 20   Temp: 98.1 °F (36.7 °C)   SpO2: 95%       WEIGHT FROM YESTERDAY WAS  57.6 KG. Pharmacist Siva Gonzalez [facility] made aware of weight trending down and she states she will continue to monitor. No dose adjustment indicated for today.    MOST CURRENT BSA 1.65 (BSA USED FOR CALCULATION 1.71 PER PHARMACY)      Ms. Prajapati presented to the infusion center today stating that she was doing well, she feels less anxious than yesterday and her ease of breathing is better.       CURRENT AND SIGNED CHEMOTHERAPY CONSENT WAS VIEWED IN HER ELECTRONIC RECORD.       HER PRE-CHEMO LABS OBTAINED ON YESTERDAY, 24 WERE ALL REVIEWED, AND WERE ALL NOTED TO BE SATISFACTORY FOR TREATMENT TODAY, AND WERE AS FOLLOWS:        Hospital Outpatient Visit on 2024   Component Date Value Ref Range Status    WBC 2024 8.5  4.5 - 13.0 K/uL Final    RBC 2024 3.72 (L)  4.10 - 5.10 M/uL Final    Hemoglobin 2024 9.7 (L)  12.0 - 16.0 g/dL Final    Hematocrit 2024 32.3 (L)  36 - 48 % Final    MCV 2024 86.8  78 - 102 FL Final    MCH 2024 26.1  25.0 - 35.0 PG Final    MCHC 2024 30.0 (L)  31 - 37 g/dL Final    RDW 2024 19.2 (H)  11.5 - 14.5 % Final    Platelets 2024 299  140 - 440 K/uL Final    Neutrophils % 2024 81 (H)  40 - 70 % Final    Mixed Cells 2024 2  0.1 - 17 % Final    Lymphocytes % 2024 18  14 - 44 % Final    Neutrophils Absolute 2024 6.9  1.8 - 9.5 K/UL Final    ABSOLUTE MIXED CELLS 2024 0.1  0.0 - 2.3 K/uL Final     potassium. No further intervention required at this time.    Patient's left upper arm single lumen PICC is clean, dry and intact at this time. Brisk blood return noted and PICC flushes without difficulty.       ml IV BAG was initiated to infuse @ KVO throughout treatment today.       The following pre-medications were administered 30-60 minutes prior to starting the chemotherapy: DECADRON 8 MG IV (she DID take her oral Decadron at home as instructed on yesterday, and is aware to take again tomorrow), EMEND 150 MG IV, & ALOXI 0.25 MG IV.      Docetaxel (TAXOTERE) 130 mg IV (75 mg/m2), was administered per order, over approximately 60 minutes followed by NS flush from primary bag.        After completion of the TAXOTERE chemotherapy, her PICC line was flushed with NS & Heparin, and the PICC line was left completely dry and intact. (HER PICC LINE CONTINUED TO HAVE A BRISK BLOOD RETURN.)      NEULASTA 6 MG SQ ON BODY INJECTOR, was applied to her LEFT ABDOMEN. And after application, the green light was noted to be flashing appropriately.      Ms. Prajapati tolerated treatment well today, and voiced no complaints.     Ms. Prajapati [facility] in [facility] condition at 1245.     She is to return on 8-28-24 at 0900, for her next appointment, for weekly PICC care.     She also has a weekly PICC care appointment scheduled on  9-4-24 at 0900.    And then, she is scheduled to return on 9-11-24 at 0900, for PICC CARE & PRE-CHEMO LABS.     And then, she is scheduled to return on 9-12-24 at 0900, for her next cycle of chemotherapy (Cycle 25).         Sada Loera RN  August 22, 2024  9:21 AM

## 2024-08-28 ENCOUNTER — HOSPITAL ENCOUNTER (OUTPATIENT)
Facility: HOSPITAL | Age: 64
Setting detail: INFUSION SERIES
Discharge: HOME | End: 2024-08-31
Payer: MEDICARE

## 2024-08-28 VITALS
TEMPERATURE: 98.8 F | DIASTOLIC BLOOD PRESSURE: 65 MMHG | OXYGEN SATURATION: 94 % | HEART RATE: 113 BPM | SYSTOLIC BLOOD PRESSURE: 115 MMHG | RESPIRATION RATE: 24 BRPM

## 2024-08-28 PROCEDURE — 6360000002 HC RX W HCPCS

## 2024-08-28 PROCEDURE — 2580000003 HC RX 258: Performed by: INTERNAL MEDICINE

## 2024-08-28 PROCEDURE — 96523 IRRIG DRUG DELIVERY DEVICE: CPT

## 2024-08-28 PROCEDURE — 36591 DRAW BLOOD OFF VENOUS DEVICE: CPT

## 2024-08-28 RX ORDER — HEPARIN 100 UNIT/ML
SYRINGE INTRAVENOUS
Status: COMPLETED
Start: 2024-08-28 | End: 2024-08-28

## 2024-08-28 RX ORDER — HEPARIN SODIUM (PORCINE) LOCK FLUSH IV SOLN 100 UNIT/ML 100 UNIT/ML
500 SOLUTION INTRAVENOUS PRN
Status: DISCONTINUED | OUTPATIENT
Start: 2024-08-28 | End: 2024-09-01 | Stop reason: HOSPADM

## 2024-08-28 RX ORDER — SODIUM CHLORIDE 0.9 % (FLUSH) 0.9 %
5-40 SYRINGE (ML) INJECTION PRN
Status: DISCONTINUED | OUTPATIENT
Start: 2024-08-28 | End: 2024-09-01 | Stop reason: HOSPADM

## 2024-08-28 RX ADMIN — SODIUM CHLORIDE, PRESERVATIVE FREE 20 ML: 5 INJECTION INTRAVENOUS at 09:30

## 2024-08-28 RX ADMIN — HEPARIN 500 UNITS: 100 SYRINGE at 09:30

## 2024-08-28 RX ADMIN — HEPARIN SODIUM (PORCINE) LOCK FLUSH IV SOLN 100 UNIT/ML 500 UNITS: 100 SOLUTION at 09:30

## 2024-08-28 ASSESSMENT — PAIN - FUNCTIONAL ASSESSMENT: PAIN_FUNCTIONAL_ASSESSMENT: NONE - DENIES PAIN

## 2024-08-28 NOTE — PROGRESS NOTES
hospitals Progress Note    Date: 2024    Name: Rina Prajapati    MRN: [id number]         : 1960    Ms. Prajapati arrived in the hospitals today at 0910, in stable condition, here for her WEEKLY PICC LINE CARE. She was assessed and education was provided.       Ms. Prajapati's vitals were reviewed.  Vitals:    24 0910   BP: 115/65   Pulse: (!) 113   Resp: 24   Temp: 98.8 °F (37.1 °C)   SpO2: 94%                   HER LEFT UPPER ARM SINGLE LUMEN PICC LINE WAS NOTED TO BE DRY AND INTACT. THE PICC LINE FLUSHED VERY EASILY WITH NS, BUT NO BLOOD RETURN COULD BE OBTAINED.          Her PICC line dressing was changed today per policy, and without incident, then the single lumen of the PICC line was flushed well per policy with NS & Heparin, and then the end cap was changed, and alcohol cap applied.            Ms. Prajapati tolerated well and voiced no complaints.     Ms. Prajapati was [facility] in [facility] condition at 0945, with her PICC line completely dry and intact.       She is scheduled to return to the hospitals in one week, on next Wednesday, 24 at 0900, for her next appointment, to have her WEEKLY PICC LINE CARE.         Jeramie Adams RN  2024  9:24 AM

## 2024-09-04 ENCOUNTER — HOSPITAL ENCOUNTER (OUTPATIENT)
Facility: HOSPITAL | Age: 64
Setting detail: INFUSION SERIES
Discharge: HOME | End: 2024-09-07
Payer: MEDICARE

## 2024-09-04 VITALS
DIASTOLIC BLOOD PRESSURE: 64 MMHG | RESPIRATION RATE: 22 BRPM | TEMPERATURE: 99.9 F | HEART RATE: 88 BPM | SYSTOLIC BLOOD PRESSURE: 101 MMHG | OXYGEN SATURATION: 94 %

## 2024-09-04 PROCEDURE — 2580000003 HC RX 258: Performed by: INTERNAL MEDICINE

## 2024-09-04 PROCEDURE — 96523 IRRIG DRUG DELIVERY DEVICE: CPT

## 2024-09-04 PROCEDURE — 6360000002 HC RX W HCPCS: Performed by: INTERNAL MEDICINE

## 2024-09-04 RX ORDER — SODIUM CHLORIDE 0.9 % (FLUSH) 0.9 %
5-40 SYRINGE (ML) INJECTION PRN
Status: DISCONTINUED | OUTPATIENT
Start: 2024-09-04 | End: 2024-09-08 | Stop reason: HOSPADM

## 2024-09-04 RX ORDER — HEPARIN SODIUM (PORCINE) LOCK FLUSH IV SOLN 100 UNIT/ML 100 UNIT/ML
500 SOLUTION INTRAVENOUS PRN
Status: DISCONTINUED | OUTPATIENT
Start: 2024-09-04 | End: 2024-09-08 | Stop reason: HOSPADM

## 2024-09-04 RX ADMIN — HEPARIN 500 UNITS: 100 SYRINGE at 10:06

## 2024-09-04 RX ADMIN — SODIUM CHLORIDE, PRESERVATIVE FREE 20 ML: 5 INJECTION INTRAVENOUS at 10:05

## 2024-09-04 NOTE — PROGRESS NOTES
[facility] Progress Note    Date: 2024    Name: Rina Prajapati    MRN: [id number]         : 1960    Ms. Prajapati arrived in the [facility] today via W/C and in stable condition at 0925;  here for her WEEKLY PICC CARE. She was assessed and education was provided.       Ms. Prajapati's vitals were reviewed.  Vitals:    24 0925   BP: 101/64   Pulse: 88   Resp: 22   Temp: 99.9 °F (37.7 °C)   SpO2: 94%         HER LEFT UPPER ARM SINGLE LUMEN PICC  WAS NOTED TO BE DRY AND INTACT. THE PICC  FLUSHED VERY EASILY WITH NS, BUT NO BLOOD RETURN COULD BE OBTAINED.          Her PICC  dressing was changed today per policy, and without incident. The PICC was flushed well per policy with NS & Heparin,  the injection  cap was changed, and a Curos cap applied.        Ms. Prajapati tolerated well and voiced no complaints.     Ms. Prajapati was [facility] in stable condition at 1015, with her PICC completely dry and intact.       She is scheduled to return to the [facility] in one week,  Wednesday, 24 at 0900, for her next appointment, to have her WEEKLY PICC  CARE and her PRE-CHEMO LABS DRAWN.         Kathleen Gallagher RN  2024

## 2024-09-09 RX ORDER — MEPERIDINE HYDROCHLORIDE 25 MG/ML
12.5 INJECTION INTRAMUSCULAR; INTRAVENOUS; SUBCUTANEOUS PRN
OUTPATIENT
Start: 2024-09-12

## 2024-09-09 RX ORDER — DIPHENHYDRAMINE HYDROCHLORIDE 50 MG/ML
50 INJECTION INTRAMUSCULAR; INTRAVENOUS
OUTPATIENT
Start: 2024-09-12

## 2024-09-09 RX ORDER — SODIUM CHLORIDE 9 MG/ML
INJECTION, SOLUTION INTRAVENOUS CONTINUOUS
OUTPATIENT
Start: 2024-09-12

## 2024-09-09 RX ORDER — ACETAMINOPHEN 325 MG/1
650 TABLET ORAL
OUTPATIENT
Start: 2024-09-12

## 2024-09-09 RX ORDER — EPINEPHRINE 1 MG/ML
0.3 INJECTION, SOLUTION, CONCENTRATE INTRAVENOUS PRN
OUTPATIENT
Start: 2024-09-12

## 2024-09-09 RX ORDER — SODIUM CHLORIDE 9 MG/ML
5-250 INJECTION, SOLUTION INTRAVENOUS PRN
OUTPATIENT
Start: 2024-09-12

## 2024-09-09 RX ORDER — ONDANSETRON 2 MG/ML
8 INJECTION INTRAMUSCULAR; INTRAVENOUS
OUTPATIENT
Start: 2024-09-12

## 2024-09-09 RX ORDER — ALBUTEROL SULFATE 90 UG/1
4 INHALANT RESPIRATORY (INHALATION) PRN
OUTPATIENT
Start: 2024-09-12

## 2024-09-11 ENCOUNTER — HOSPITAL ENCOUNTER (OUTPATIENT)
Facility: HOSPITAL | Age: 64
Setting detail: INFUSION SERIES
Discharge: HOME | End: 2024-09-14
Payer: MEDICARE

## 2024-09-11 VITALS
BODY MASS INDEX: 19.81 KG/M2 | WEIGHT: 126.2 LBS | HEART RATE: 106 BPM | HEIGHT: 67 IN | RESPIRATION RATE: 24 BRPM | OXYGEN SATURATION: 89 % | DIASTOLIC BLOOD PRESSURE: 79 MMHG | SYSTOLIC BLOOD PRESSURE: 131 MMHG | TEMPERATURE: 99.1 F

## 2024-09-11 LAB
ALBUMIN SERPL-MCNC: 2.9 G/DL (ref 3.4–5)
ALBUMIN/GLOB SERPL: 0.9 (ref 0.8–1.7)
ALP SERPL-CCNC: 67 U/L (ref 45–117)
ALT SERPL-CCNC: <6 U/L (ref 13–56)
ANION GAP SERPL CALC-SCNC: 7 MMOL/L (ref 3–18)
AST SERPL-CCNC: 11 U/L (ref 10–38)
BASO+EOS+MONOS # BLD AUTO: 0.3 K/UL (ref 0–2.3)
BASO+EOS+MONOS NFR BLD AUTO: 2 % (ref 0.1–17)
BILIRUB SERPL-MCNC: 0.3 MG/DL (ref 0.2–1)
BUN SERPL-MCNC: 8 MG/DL (ref 7–18)
BUN/CREAT SERPL: 17 (ref 12–20)
CALCIUM SERPL-MCNC: 8.9 MG/DL (ref 8.5–10.1)
CHLORIDE SERPL-SCNC: 99 MMOL/L (ref 100–111)
CO2 SERPL-SCNC: 34 MMOL/L (ref 21–32)
CREAT SERPL-MCNC: 0.46 MG/DL (ref 0.6–1.3)
DIFFERENTIAL METHOD BLD: ABNORMAL
ERYTHROCYTE [DISTWIDTH] IN BLOOD BY AUTOMATED COUNT: 18.5 % (ref 11.5–14.5)
GLOBULIN SER CALC-MCNC: 3.3 G/DL (ref 2–4)
GLUCOSE SERPL-MCNC: 95 MG/DL (ref 74–99)
HCT VFR BLD AUTO: 31.4 % (ref 36–48)
HGB BLD-MCNC: 9.4 G/DL (ref 12–16)
LYMPHOCYTES # BLD: 0.9 K/UL (ref 1.1–5.9)
LYMPHOCYTES NFR BLD: 7 % (ref 14–44)
MCH RBC QN AUTO: 26.6 PG (ref 25–35)
MCHC RBC AUTO-ENTMCNC: 29.9 G/DL (ref 31–37)
MCV RBC AUTO: 88.7 FL (ref 78–102)
NEUTS SEG # BLD: 12.7 K/UL (ref 1.8–9.5)
NEUTS SEG NFR BLD: 91 % (ref 40–70)
PLATELET # BLD AUTO: 350 K/UL (ref 140–440)
POTASSIUM SERPL-SCNC: 3.1 MMOL/L (ref 3.5–5.5)
PROT SERPL-MCNC: 6.2 G/DL (ref 6.4–8.2)
RBC # BLD AUTO: 3.54 M/UL (ref 4.1–5.1)
SODIUM SERPL-SCNC: 140 MMOL/L (ref 136–145)
WBC # BLD AUTO: 13.9 K/UL (ref 4.5–13)

## 2024-09-11 PROCEDURE — 6360000002 HC RX W HCPCS: Performed by: INTERNAL MEDICINE

## 2024-09-11 PROCEDURE — 36415 COLL VENOUS BLD VENIPUNCTURE: CPT

## 2024-09-11 PROCEDURE — 80053 COMPREHEN METABOLIC PANEL: CPT

## 2024-09-11 PROCEDURE — 2580000003 HC RX 258: Performed by: INTERNAL MEDICINE

## 2024-09-11 PROCEDURE — 96523 IRRIG DRUG DELIVERY DEVICE: CPT

## 2024-09-11 PROCEDURE — 85025 COMPLETE CBC W/AUTO DIFF WBC: CPT

## 2024-09-11 RX ORDER — HEPARIN SODIUM (PORCINE) LOCK FLUSH IV SOLN 100 UNIT/ML 100 UNIT/ML
500 SOLUTION INTRAVENOUS PRN
Status: DISCONTINUED | OUTPATIENT
Start: 2024-09-11 | End: 2024-09-15 | Stop reason: HOSPADM

## 2024-09-11 RX ORDER — SODIUM CHLORIDE 0.9 % (FLUSH) 0.9 %
5-40 SYRINGE (ML) INJECTION PRN
Status: DISCONTINUED | OUTPATIENT
Start: 2024-09-11 | End: 2024-09-15 | Stop reason: HOSPADM

## 2024-09-11 RX ADMIN — HEPARIN 500 UNITS: 100 SYRINGE at 09:28

## 2024-09-11 RX ADMIN — SODIUM CHLORIDE, PRESERVATIVE FREE 20 ML: 5 INJECTION INTRAVENOUS at 09:27

## 2024-09-11 RX ADMIN — SODIUM CHLORIDE, PRESERVATIVE FREE 10 ML: 5 INJECTION INTRAVENOUS at 09:25

## 2024-09-12 ENCOUNTER — HOSPITAL ENCOUNTER (OUTPATIENT)
Facility: HOSPITAL | Age: 64
Setting detail: INFUSION SERIES
Discharge: HOME | End: 2024-09-15
Payer: MEDICARE

## 2024-09-12 VITALS
RESPIRATION RATE: 20 BRPM | TEMPERATURE: 98.2 F | DIASTOLIC BLOOD PRESSURE: 79 MMHG | HEART RATE: 99 BPM | OXYGEN SATURATION: 89 % | SYSTOLIC BLOOD PRESSURE: 149 MMHG

## 2024-09-12 DIAGNOSIS — C34.90 NON-SMALL CELL LUNG CANCER, UNSPECIFIED LATERALITY (HCC): Primary | ICD-10-CM

## 2024-09-12 PROCEDURE — 96367 TX/PROPH/DG ADDL SEQ IV INF: CPT

## 2024-09-12 PROCEDURE — 96366 THER/PROPH/DIAG IV INF ADDON: CPT

## 2024-09-12 PROCEDURE — 96375 TX/PRO/DX INJ NEW DRUG ADDON: CPT

## 2024-09-12 PROCEDURE — 2700000000 HC OXYGEN THERAPY PER DAY

## 2024-09-12 PROCEDURE — 2580000003 HC RX 258: Performed by: INTERNAL MEDICINE

## 2024-09-12 PROCEDURE — 96377 APPLICATON ON-BODY INJECTOR: CPT

## 2024-09-12 PROCEDURE — 96413 CHEMO IV INFUSION 1 HR: CPT

## 2024-09-12 PROCEDURE — 6360000002 HC RX W HCPCS: Performed by: INTERNAL MEDICINE

## 2024-09-12 RX ORDER — SODIUM CHLORIDE 0.9 % (FLUSH) 0.9 %
5-40 SYRINGE (ML) INJECTION PRN
Status: ACTIVE | OUTPATIENT
Start: 2024-09-12 | End: 2024-09-13

## 2024-09-12 RX ORDER — PALONOSETRON 0.05 MG/ML
0.25 INJECTION, SOLUTION INTRAVENOUS ONCE
Status: COMPLETED | OUTPATIENT
Start: 2024-09-12 | End: 2024-09-12

## 2024-09-12 RX ORDER — HEPARIN 100 UNIT/ML
500 SYRINGE INTRAVENOUS PRN
Status: DISPENSED | OUTPATIENT
Start: 2024-09-12 | End: 2024-09-13

## 2024-09-12 RX ORDER — DEXAMETHASONE SODIUM PHOSPHATE 4 MG/ML
8 INJECTION, SOLUTION INTRA-ARTICULAR; INTRALESIONAL; INTRAMUSCULAR; INTRAVENOUS; SOFT TISSUE ONCE
Status: COMPLETED | OUTPATIENT
Start: 2024-09-12 | End: 2024-09-12

## 2024-09-12 RX ORDER — SODIUM CHLORIDE 9 MG/ML
5-250 INJECTION, SOLUTION INTRAVENOUS PRN
Status: ACTIVE | OUTPATIENT
Start: 2024-09-12 | End: 2024-09-13

## 2024-09-12 RX ADMIN — HEPARIN 500 UNITS: 100 SYRINGE at 12:05

## 2024-09-12 RX ADMIN — SODIUM CHLORIDE 50 ML/HR: 9 INJECTION, SOLUTION INTRAVENOUS at 09:46

## 2024-09-12 RX ADMIN — PALONOSETRON 0.25 MG: 0.05 INJECTION, SOLUTION INTRAVENOUS at 09:49

## 2024-09-12 RX ADMIN — PEGFILGRASTIM 6 MG: KIT SUBCUTANEOUS at 12:06

## 2024-09-12 RX ADMIN — SODIUM CHLORIDE, PRESERVATIVE FREE 20 ML: 5 INJECTION INTRAVENOUS at 09:46

## 2024-09-12 RX ADMIN — SODIUM CHLORIDE 150 MG: 900 INJECTION, SOLUTION INTRAVENOUS at 09:49

## 2024-09-12 RX ADMIN — SODIUM CHLORIDE, PRESERVATIVE FREE 20 ML: 5 INJECTION INTRAVENOUS at 12:05

## 2024-09-12 RX ADMIN — DOCETAXEL ANHYDROUS 130 MG: 10 INJECTION, SOLUTION INTRAVENOUS at 10:52

## 2024-09-12 RX ADMIN — DEXAMETHASONE SODIUM PHOSPHATE 8 MG: 4 INJECTION, SOLUTION INTRA-ARTICULAR; INTRALESIONAL; INTRAMUSCULAR; INTRAVENOUS; SOFT TISSUE at 09:51

## 2024-09-18 ENCOUNTER — HOSPITAL ENCOUNTER (OUTPATIENT)
Facility: HOSPITAL | Age: 64
Setting detail: INFUSION SERIES
Discharge: HOME | End: 2024-09-21
Payer: MEDICARE

## 2024-09-18 VITALS
DIASTOLIC BLOOD PRESSURE: 66 MMHG | SYSTOLIC BLOOD PRESSURE: 100 MMHG | OXYGEN SATURATION: 95 % | TEMPERATURE: 98 F | RESPIRATION RATE: 18 BRPM | HEART RATE: 106 BPM

## 2024-09-18 PROCEDURE — 96523 IRRIG DRUG DELIVERY DEVICE: CPT

## 2024-09-18 PROCEDURE — 6360000002 HC RX W HCPCS

## 2024-09-18 PROCEDURE — 2580000003 HC RX 258: Performed by: INTERNAL MEDICINE

## 2024-09-18 RX ORDER — SODIUM CHLORIDE 0.9 % (FLUSH) 0.9 %
5-40 SYRINGE (ML) INJECTION PRN
Status: DISCONTINUED | OUTPATIENT
Start: 2024-09-18 | End: 2024-09-22 | Stop reason: HOSPADM

## 2024-09-18 RX ORDER — HEPARIN 100 UNIT/ML
SYRINGE INTRAVENOUS
Status: COMPLETED
Start: 2024-09-18 | End: 2024-09-18

## 2024-09-18 RX ORDER — HEPARIN SODIUM (PORCINE) LOCK FLUSH IV SOLN 100 UNIT/ML 100 UNIT/ML
500 SOLUTION INTRAVENOUS PRN
Status: DISCONTINUED | OUTPATIENT
Start: 2024-09-18 | End: 2024-09-22 | Stop reason: HOSPADM

## 2024-09-18 RX ADMIN — SODIUM CHLORIDE, PRESERVATIVE FREE 10 ML: 5 INJECTION INTRAVENOUS at 09:39

## 2024-09-18 RX ADMIN — HEPARIN 500 UNITS: 100 SYRINGE at 09:59

## 2024-09-18 RX ADMIN — SODIUM CHLORIDE, PRESERVATIVE FREE 20 ML: 5 INJECTION INTRAVENOUS at 09:57

## 2024-09-18 RX ADMIN — HEPARIN SODIUM (PORCINE) LOCK FLUSH IV SOLN 100 UNIT/ML 500 UNITS: 100 SOLUTION at 09:59

## 2024-09-18 ASSESSMENT — PAIN - FUNCTIONAL ASSESSMENT: PAIN_FUNCTIONAL_ASSESSMENT: NONE - DENIES PAIN

## 2024-09-19 ENCOUNTER — OFFICE VISIT (OUTPATIENT)
Facility: CLINIC | Age: 64
End: 2024-09-19

## 2024-09-19 VITALS
BODY MASS INDEX: 18.9 KG/M2 | RESPIRATION RATE: 16 BRPM | SYSTOLIC BLOOD PRESSURE: 110 MMHG | OXYGEN SATURATION: 95 % | DIASTOLIC BLOOD PRESSURE: 50 MMHG | HEIGHT: 67 IN | HEART RATE: 107 BPM | TEMPERATURE: 97.8 F | WEIGHT: 120.4 LBS

## 2024-09-19 DIAGNOSIS — Z00.00 MEDICARE ANNUAL WELLNESS VISIT, SUBSEQUENT: Primary | ICD-10-CM

## 2024-09-19 DIAGNOSIS — I10 ESSENTIAL HYPERTENSION: ICD-10-CM

## 2024-09-19 DIAGNOSIS — I82.291 CHRONIC EMBOLISM AND THROMBOSIS OF OTHER THORACIC VEINS (HCC): ICD-10-CM

## 2024-09-19 DIAGNOSIS — F33.0 MILD EPISODE OF RECURRENT MAJOR DEPRESSIVE DISORDER (HCC): ICD-10-CM

## 2024-09-19 DIAGNOSIS — J96.01 ACUTE RESPIRATORY FAILURE WITH HYPOXIA (HCC): ICD-10-CM

## 2024-09-19 DIAGNOSIS — I50.22 CHRONIC SYSTOLIC (CONGESTIVE) HEART FAILURE (HCC): ICD-10-CM

## 2024-09-19 DIAGNOSIS — J43.2 CENTRILOBULAR EMPHYSEMA (HCC): ICD-10-CM

## 2024-09-19 DIAGNOSIS — Z23 NEEDS FLU SHOT: ICD-10-CM

## 2024-09-19 DIAGNOSIS — E87.6 HYPOKALEMIA: ICD-10-CM

## 2024-09-19 DIAGNOSIS — C34.90 NON-SMALL CELL LUNG CANCER, UNSPECIFIED LATERALITY (HCC): ICD-10-CM

## 2024-09-19 SDOH — ECONOMIC STABILITY: FOOD INSECURITY: WITHIN THE PAST 12 MONTHS, THE FOOD YOU BOUGHT JUST DIDN'T LAST AND YOU DIDN'T HAVE MONEY TO GET MORE.: NEVER TRUE

## 2024-09-19 SDOH — ECONOMIC STABILITY: FOOD INSECURITY: WITHIN THE PAST 12 MONTHS, YOU WORRIED THAT YOUR FOOD WOULD RUN OUT BEFORE YOU GOT MONEY TO BUY MORE.: NEVER TRUE

## 2024-09-19 SDOH — ECONOMIC STABILITY: INCOME INSECURITY: HOW HARD IS IT FOR YOU TO PAY FOR THE VERY BASICS LIKE FOOD, HOUSING, MEDICAL CARE, AND HEATING?: NOT VERY HARD

## 2024-09-19 ASSESSMENT — PATIENT HEALTH QUESTIONNAIRE - PHQ9
5. POOR APPETITE OR OVEREATING: SEVERAL DAYS
SUM OF ALL RESPONSES TO PHQ QUESTIONS 1-9: 3
4. FEELING TIRED OR HAVING LITTLE ENERGY: SEVERAL DAYS
9. THOUGHTS THAT YOU WOULD BE BETTER OFF DEAD, OR OF HURTING YOURSELF: NOT AT ALL
8. MOVING OR SPEAKING SO SLOWLY THAT OTHER PEOPLE COULD HAVE NOTICED. OR THE OPPOSITE, BEING SO FIGETY OR RESTLESS THAT YOU HAVE BEEN MOVING AROUND A LOT MORE THAN USUAL: NOT AT ALL
6. FEELING BAD ABOUT YOURSELF - OR THAT YOU ARE A FAILURE OR HAVE LET YOURSELF OR YOUR FAMILY DOWN: NOT AT ALL
3. TROUBLE FALLING OR STAYING ASLEEP: NOT AT ALL
7. TROUBLE CONCENTRATING ON THINGS, SUCH AS READING THE NEWSPAPER OR WATCHING TELEVISION: NOT AT ALL
SUM OF ALL RESPONSES TO PHQ QUESTIONS 1-9: 3
1. LITTLE INTEREST OR PLEASURE IN DOING THINGS: SEVERAL DAYS
SUM OF ALL RESPONSES TO PHQ9 QUESTIONS 1 & 2: 1
SUM OF ALL RESPONSES TO PHQ QUESTIONS 1-9: 3
2. FEELING DOWN, DEPRESSED OR HOPELESS: NOT AT ALL
10. IF YOU CHECKED OFF ANY PROBLEMS, HOW DIFFICULT HAVE THESE PROBLEMS MADE IT FOR YOU TO DO YOUR WORK, TAKE CARE OF THINGS AT HOME, OR GET ALONG WITH OTHER PEOPLE: NOT DIFFICULT AT ALL
SUM OF ALL RESPONSES TO PHQ QUESTIONS 1-9: 3

## 2024-09-25 ENCOUNTER — HOSPITAL ENCOUNTER (OUTPATIENT)
Facility: HOSPITAL | Age: 64
Setting detail: INFUSION SERIES
Discharge: HOME | End: 2024-09-28
Payer: MEDICARE

## 2024-09-25 VITALS
TEMPERATURE: 97.5 F | HEART RATE: 91 BPM | RESPIRATION RATE: 18 BRPM | DIASTOLIC BLOOD PRESSURE: 61 MMHG | OXYGEN SATURATION: 93 % | SYSTOLIC BLOOD PRESSURE: 105 MMHG

## 2024-09-25 PROCEDURE — 6360000002 HC RX W HCPCS

## 2024-09-25 PROCEDURE — 96523 IRRIG DRUG DELIVERY DEVICE: CPT

## 2024-09-25 PROCEDURE — 2580000003 HC RX 258: Performed by: INTERNAL MEDICINE

## 2024-09-25 RX ORDER — SODIUM CHLORIDE 0.9 % (FLUSH) 0.9 %
5-40 SYRINGE (ML) INJECTION PRN
Status: DISCONTINUED | OUTPATIENT
Start: 2024-09-25 | End: 2024-09-29 | Stop reason: HOSPADM

## 2024-09-25 RX ORDER — HEPARIN 100 UNIT/ML
SYRINGE INTRAVENOUS
Status: COMPLETED
Start: 2024-09-25 | End: 2024-09-25

## 2024-09-25 RX ORDER — HEPARIN SODIUM (PORCINE) LOCK FLUSH IV SOLN 100 UNIT/ML 100 UNIT/ML
500 SOLUTION INTRAVENOUS PRN
Status: DISCONTINUED | OUTPATIENT
Start: 2024-09-25 | End: 2024-09-29 | Stop reason: HOSPADM

## 2024-09-25 RX ADMIN — HEPARIN SODIUM (PORCINE) LOCK FLUSH IV SOLN 100 UNIT/ML 500 UNITS: 100 SOLUTION at 09:22

## 2024-09-25 RX ADMIN — SODIUM CHLORIDE, PRESERVATIVE FREE 20 ML: 5 INJECTION INTRAVENOUS at 09:21

## 2024-09-25 RX ADMIN — SODIUM CHLORIDE, PRESERVATIVE FREE 10 ML: 5 INJECTION INTRAVENOUS at 09:15

## 2024-09-25 RX ADMIN — HEPARIN 500 UNITS: 100 SYRINGE at 09:22

## 2024-09-25 ASSESSMENT — PAIN - FUNCTIONAL ASSESSMENT: PAIN_FUNCTIONAL_ASSESSMENT: NONE - DENIES PAIN

## 2024-09-25 NOTE — PROGRESS NOTES
[facility] Progress Note    Date: 2024    Name: Rina Prajapati    MRN: [id number]         : 1960    Ms. Prajapati arrived in the [facility] today at 0910, in stable condition via wheelchair, here for her WEEKLY PICC LINE CARE. She was assessed and education was provided.       Ms. Prajapati's vitals were reviewed.  Vitals:    24 0910   BP: 105/61   Pulse: 91   Resp: 18   Temp: 97.5 °F (36.4 °C)   SpO2: 93%       Patient's presents to [facility] with no new complaints stating she is feeling very good today. Noted left upper arm single lumen PICC dressing is clean, dry, and intact. Blood return sluggish, and PICC flushes without difficulty. Needleless connector changed and new Curos cap placed after PICC was flushed with NS & Heparin per order.  PICC dressing changed using sterile technique. New stockinette placed. Patient tolerated well.      Ms. Prajapati was [facility] in stable condition at 0940, with her PICC line completely dry and intact.     She is scheduled to return on 10/2/24 at 1000, for her next weekly PICC care and pre-chemo labs.      Sada Loera RN  2024  10:27 AM

## 2024-09-26 RX ORDER — SODIUM CHLORIDE 9 MG/ML
INJECTION, SOLUTION INTRAVENOUS CONTINUOUS
Status: CANCELLED | OUTPATIENT
Start: 2024-10-03

## 2024-09-26 RX ORDER — ALBUTEROL SULFATE 90 UG/1
4 INHALANT RESPIRATORY (INHALATION) PRN
Status: CANCELLED | OUTPATIENT
Start: 2024-10-03

## 2024-09-26 RX ORDER — SODIUM CHLORIDE 9 MG/ML
5-250 INJECTION, SOLUTION INTRAVENOUS PRN
Status: CANCELLED | OUTPATIENT
Start: 2024-10-03

## 2024-09-26 RX ORDER — ONDANSETRON 2 MG/ML
8 INJECTION INTRAMUSCULAR; INTRAVENOUS
Status: CANCELLED | OUTPATIENT
Start: 2024-10-03

## 2024-09-26 RX ORDER — DIPHENHYDRAMINE HYDROCHLORIDE 50 MG/ML
50 INJECTION INTRAMUSCULAR; INTRAVENOUS
Status: CANCELLED | OUTPATIENT
Start: 2024-10-03

## 2024-09-26 RX ORDER — DEXAMETHASONE SODIUM PHOSPHATE 4 MG/ML
8 INJECTION, SOLUTION INTRA-ARTICULAR; INTRALESIONAL; INTRAMUSCULAR; INTRAVENOUS; SOFT TISSUE ONCE
Status: CANCELLED | OUTPATIENT
Start: 2024-10-03 | End: 2024-10-03

## 2024-09-26 RX ORDER — EPINEPHRINE 1 MG/ML
0.3 INJECTION, SOLUTION, CONCENTRATE INTRAVENOUS PRN
Status: CANCELLED | OUTPATIENT
Start: 2024-10-03

## 2024-09-26 RX ORDER — ACETAMINOPHEN 325 MG/1
650 TABLET ORAL
Status: CANCELLED | OUTPATIENT
Start: 2024-10-03

## 2024-09-26 RX ORDER — MEPERIDINE HYDROCHLORIDE 25 MG/ML
12.5 INJECTION INTRAMUSCULAR; INTRAVENOUS; SUBCUTANEOUS PRN
Status: CANCELLED | OUTPATIENT
Start: 2024-10-03

## 2024-10-02 ENCOUNTER — HOSPITAL ENCOUNTER (OUTPATIENT)
Facility: HOSPITAL | Age: 64
Setting detail: INFUSION SERIES
Discharge: HOME | End: 2024-10-05
Payer: MEDICARE

## 2024-10-02 VITALS
SYSTOLIC BLOOD PRESSURE: 131 MMHG | BODY MASS INDEX: 20.09 KG/M2 | DIASTOLIC BLOOD PRESSURE: 68 MMHG | RESPIRATION RATE: 18 BRPM | OXYGEN SATURATION: 98 % | HEART RATE: 98 BPM | WEIGHT: 128.3 LBS | TEMPERATURE: 98.3 F

## 2024-10-02 LAB
ALBUMIN SERPL-MCNC: 2.7 G/DL (ref 3.4–5)
ALBUMIN/GLOB SERPL: 0.9 (ref 0.8–1.7)
ALP SERPL-CCNC: 62 U/L (ref 45–117)
ALT SERPL-CCNC: 7 U/L (ref 13–56)
ANION GAP SERPL CALC-SCNC: 7 MMOL/L (ref 3–18)
AST SERPL-CCNC: 9 U/L (ref 10–38)
BASO+EOS+MONOS # BLD AUTO: 0.7 K/UL (ref 0–2.3)
BASO+EOS+MONOS NFR BLD AUTO: 9 % (ref 0.1–17)
BILIRUB DIRECT SERPL-MCNC: <0.1 MG/DL (ref 0–0.2)
BILIRUB SERPL-MCNC: 0.2 MG/DL (ref 0.2–1)
BUN SERPL-MCNC: 6 MG/DL (ref 7–18)
BUN/CREAT SERPL: 12 (ref 12–20)
CALCIUM SERPL-MCNC: 8.5 MG/DL (ref 8.5–10.1)
CHLORIDE SERPL-SCNC: 103 MMOL/L (ref 100–111)
CO2 SERPL-SCNC: 29 MMOL/L (ref 21–32)
CREAT SERPL-MCNC: 0.49 MG/DL (ref 0.6–1.3)
DIFFERENTIAL METHOD BLD: ABNORMAL
ERYTHROCYTE [DISTWIDTH] IN BLOOD BY AUTOMATED COUNT: 17.3 % (ref 11.5–14.5)
GLOBULIN SER CALC-MCNC: 3.1 G/DL (ref 2–4)
GLUCOSE SERPL-MCNC: 87 MG/DL (ref 74–99)
HCT VFR BLD AUTO: 29.6 % (ref 36–48)
HGB BLD-MCNC: 8.9 G/DL (ref 12–16)
LYMPHOCYTES # BLD: 1.1 K/UL (ref 1.1–5.9)
LYMPHOCYTES NFR BLD: 16 % (ref 14–44)
MCH RBC QN AUTO: 26.4 PG (ref 25–35)
MCHC RBC AUTO-ENTMCNC: 30.1 G/DL (ref 31–37)
MCV RBC AUTO: 87.8 FL (ref 78–102)
NEUTS SEG # BLD: 5.2 K/UL (ref 1.8–9.5)
NEUTS SEG NFR BLD: 75 % (ref 40–70)
PLATELET # BLD AUTO: 265 K/UL (ref 140–440)
POTASSIUM SERPL-SCNC: 3.5 MMOL/L (ref 3.5–5.5)
PROT SERPL-MCNC: 5.8 G/DL (ref 6.4–8.2)
RBC # BLD AUTO: 3.37 M/UL (ref 4.1–5.1)
SODIUM SERPL-SCNC: 139 MMOL/L (ref 136–145)
WBC # BLD AUTO: 7 K/UL (ref 4.5–13)

## 2024-10-02 PROCEDURE — 6360000002 HC RX W HCPCS: Performed by: INTERNAL MEDICINE

## 2024-10-02 PROCEDURE — 96523 IRRIG DRUG DELIVERY DEVICE: CPT

## 2024-10-02 PROCEDURE — 80048 BASIC METABOLIC PNL TOTAL CA: CPT

## 2024-10-02 PROCEDURE — 36415 COLL VENOUS BLD VENIPUNCTURE: CPT

## 2024-10-02 PROCEDURE — 2580000003 HC RX 258: Performed by: INTERNAL MEDICINE

## 2024-10-02 PROCEDURE — 80076 HEPATIC FUNCTION PANEL: CPT

## 2024-10-02 PROCEDURE — 2700000000 HC OXYGEN THERAPY PER DAY

## 2024-10-02 PROCEDURE — 85025 COMPLETE CBC W/AUTO DIFF WBC: CPT

## 2024-10-02 RX ORDER — SODIUM CHLORIDE 0.9 % (FLUSH) 0.9 %
5-40 SYRINGE (ML) INJECTION 2 TIMES DAILY
Status: DISCONTINUED | OUTPATIENT
Start: 2024-10-02 | End: 2024-10-06 | Stop reason: HOSPADM

## 2024-10-02 RX ORDER — HEPARIN SODIUM (PORCINE) LOCK FLUSH IV SOLN 100 UNIT/ML 100 UNIT/ML
500 SOLUTION INTRAVENOUS PRN
Status: DISCONTINUED | OUTPATIENT
Start: 2024-10-02 | End: 2024-10-06 | Stop reason: HOSPADM

## 2024-10-02 RX ADMIN — HEPARIN SODIUM (PORCINE) LOCK FLUSH IV SOLN 100 UNIT/ML 500 UNITS: 100 SOLUTION at 10:50

## 2024-10-02 RX ADMIN — SODIUM CHLORIDE, PRESERVATIVE FREE 20 ML: 5 INJECTION INTRAVENOUS at 10:30

## 2024-10-02 NOTE — PROGRESS NOTES
[facility] Progress Note    Date: 2024    Name: Rina Prajapait    MRN: [id number]         : 1960    Ms. Prajapati arrived in the [facility] today via W/C and in stable condition at 1010.  Here for her WEEKLY PICC CARE AND LAB DRAW. She was assessed and education was provided. No complaints of pain. Dyspnea  on exertion and at rest intermittently. Patient is on 3 liters oxygen N.[place] with sat's 98%      Ms. Prajapati's vitals were reviewed.  Vitals:    10/02/24 1013   BP: 131/68   Pulse: 98   Resp: 18   Temp: 98.3 °F (36.8 °C)   SpO2: 98%         HER LEFT UPPER ARM SINGLE LUMEN PICC WITH DRESSING WAS NOTED TO BE DRY AND INTACT. THE PICC  FLUSHED VERY EASILY WITH NS, BUT NO BLOOD RETURN NOTED.    Blood was drawn from RIGHT posterior hand on 1st attempt for CBC, BMP and HEPATIC FUNCTION.. The CBC was processed on-site, and the rest of labs transported to the main hospital lab via  for processing.    CBC results were reviewed:   Latest Reference Range & Units 24 09:30   WBC 4.5 - 13.0 K/uL 13.9 (H)   RBC 4.10 - 5.10 M/uL 3.54 (L)   Hemoglobin Quant 12.0 - 16.0 g/dL 9.4 (L)   Hematocrit 36 - 48 % 31.4 (L)   MCV 78 - 102 FL 88.7   MCH 25.0 - 35.0 PG 26.6   MCHC 31 - 37 g/dL 29.9 (L)   RDW 11.5 - 14.5 % 18.5 (H)   Platelet Count 140 - 440 K/uL 350   Neutrophils % 40 - 70 % 91 (H)   Lymphocyte % 14 - 44 % 7 (L)   Neutrophils Absolute 1.8 - 9.5 K/UL 12.7 (H)   Lymphocytes Absolute 1.1 - 5.9 K/UL 0.9 (L)   Differential Type -   AUTOMATED        Her PICC  dressing was changed today per policy, using sterile dressing kit, stat lock and biopatch. No bleeding, bruising, swelling, streaking or tenderness noted at site or along arm of picc line. The PICC was flushed well per policy with NS & Heparin,  end cap changed, and a Curos cap applied and picc line drsg secured with stockinette.    Ms. Prajapati tolerated the dressing change  and sisi-puncture. No Complaints or acute distress on discharge.     Discharge instructions

## 2024-10-03 ENCOUNTER — HOSPITAL ENCOUNTER (OUTPATIENT)
Facility: HOSPITAL | Age: 64
Setting detail: INFUSION SERIES
Discharge: HOME | End: 2024-10-06
Payer: MEDICARE

## 2024-10-03 VITALS
RESPIRATION RATE: 16 BRPM | OXYGEN SATURATION: 97 % | HEART RATE: 76 BPM | TEMPERATURE: 98 F | WEIGHT: 128.3 LBS | SYSTOLIC BLOOD PRESSURE: 154 MMHG | BODY MASS INDEX: 20.09 KG/M2 | DIASTOLIC BLOOD PRESSURE: 90 MMHG

## 2024-10-03 DIAGNOSIS — C34.90 NON-SMALL CELL LUNG CANCER, UNSPECIFIED LATERALITY (HCC): Primary | ICD-10-CM

## 2024-10-03 PROCEDURE — 2700000000 HC OXYGEN THERAPY PER DAY

## 2024-10-03 PROCEDURE — 96377 APPLICATON ON-BODY INJECTOR: CPT

## 2024-10-03 PROCEDURE — 6360000002 HC RX W HCPCS: Performed by: INTERNAL MEDICINE

## 2024-10-03 PROCEDURE — 96367 TX/PROPH/DG ADDL SEQ IV INF: CPT

## 2024-10-03 PROCEDURE — 96413 CHEMO IV INFUSION 1 HR: CPT

## 2024-10-03 PROCEDURE — 2580000003 HC RX 258: Performed by: INTERNAL MEDICINE

## 2024-10-03 PROCEDURE — 96375 TX/PRO/DX INJ NEW DRUG ADDON: CPT

## 2024-10-03 RX ORDER — SODIUM CHLORIDE 0.9 % (FLUSH) 0.9 %
5-40 SYRINGE (ML) INJECTION PRN
Status: ACTIVE | OUTPATIENT
Start: 2024-10-03 | End: 2024-10-04

## 2024-10-03 RX ORDER — DEXAMETHASONE SODIUM PHOSPHATE 4 MG/ML
8 INJECTION, SOLUTION INTRA-ARTICULAR; INTRALESIONAL; INTRAMUSCULAR; INTRAVENOUS; SOFT TISSUE ONCE
Status: COMPLETED | OUTPATIENT
Start: 2024-10-03 | End: 2024-10-03

## 2024-10-03 RX ORDER — PALONOSETRON 0.05 MG/ML
0.25 INJECTION, SOLUTION INTRAVENOUS ONCE
Status: COMPLETED | OUTPATIENT
Start: 2024-10-03 | End: 2024-10-03

## 2024-10-03 RX ORDER — SODIUM CHLORIDE 9 MG/ML
5-250 INJECTION, SOLUTION INTRAVENOUS PRN
Status: ACTIVE | OUTPATIENT
Start: 2024-10-03 | End: 2024-10-04

## 2024-10-03 RX ORDER — HEPARIN 100 UNIT/ML
500 SYRINGE INTRAVENOUS PRN
Status: ACTIVE | OUTPATIENT
Start: 2024-10-03 | End: 2024-10-04

## 2024-10-03 RX ADMIN — SODIUM CHLORIDE 25 ML/HR: 9 INJECTION, SOLUTION INTRAVENOUS at 09:37

## 2024-10-03 RX ADMIN — SODIUM CHLORIDE, PRESERVATIVE FREE 10 ML: 5 INJECTION INTRAVENOUS at 09:35

## 2024-10-03 RX ADMIN — HEPARIN 500 UNITS: 100 SYRINGE at 12:43

## 2024-10-03 RX ADMIN — DOCETAXEL ANHYDROUS 130 MG: 10 INJECTION, SOLUTION INTRAVENOUS at 11:20

## 2024-10-03 RX ADMIN — SODIUM CHLORIDE, PRESERVATIVE FREE 20 ML: 5 INJECTION INTRAVENOUS at 12:43

## 2024-10-03 RX ADMIN — SODIUM CHLORIDE 150 MG: 900 INJECTION, SOLUTION INTRAVENOUS at 10:02

## 2024-10-03 RX ADMIN — PEGFILGRASTIM 6 MG: KIT SUBCUTANEOUS at 13:00

## 2024-10-03 RX ADMIN — PALONOSETRON 0.25 MG: 0.05 INJECTION, SOLUTION INTRAVENOUS at 09:56

## 2024-10-03 RX ADMIN — DEXAMETHASONE SODIUM PHOSPHATE 8 MG: 4 INJECTION INTRA-ARTICULAR; INTRALESIONAL; INTRAMUSCULAR; INTRAVENOUS; SOFT TISSUE at 09:50

## 2024-10-03 ASSESSMENT — PAIN - FUNCTIONAL ASSESSMENT
PAIN_FUNCTIONAL_ASSESSMENT: NONE - DENIES PAIN
PAIN_FUNCTIONAL_ASSESSMENT: NONE - DENIES PAIN

## 2024-10-03 NOTE — PROGRESS NOTES
[facility] Progress Note    Date: October 3, 2024    Name: Rina Prajapati    MRN: [id number]         : 1960    Ms. Prajapati arrived in the [facility] today at 0910, in stable condition, here for CYCLE 26, DAY 1, IV TAXOTERE CHEMOTHERAPY REGIMEN (EVERY 21 DAY CYCLE). She was assessed and education was provided.       RAMUCIRUMAB HAS BEEN DISCONTINUED.       Ms. Prajapati's vitals were reviewed.  Vitals:    10/03/24 0917   BP: (!) 150/84   Pulse: 95   Resp: 18   Temp: 98 °F (36.7 °C)   SpO2: 95%       WEIGHT TODAY WAS  58.2 KG.     CURRENT BSA 1.66 (BSA USED FOR CALCULATION 1.71 PER PHARMACY)      Ms. Prajapati presented to the infusion center today with no new complaints.      CURRENT AND SIGNED CHEMOTHERAPY CONSENT WAS VIEWED IN HER ELECTRONIC RECORD.       HER PRE-CHEMO LABS OBTAINED ON YESTERDAY, 10-2-24 WERE ALL REVIEWED, AND WERE ALL NOTED TO BE SATISFACTORY FOR TREATMENT TODAY, AND WERE AS FOLLOWS:        Hospital Outpatient Visit on 10/02/2024   Component Date Value Ref Range Status    WBC 10/02/2024 7.0  4.5 - 13.0 K/uL Final    RBC 10/02/2024 3.37 (L)  4.10 - 5.10 M/uL Final    Hemoglobin 10/02/2024 8.9 (L)  12.0 - 16.0 g/dL Final    Hematocrit 10/02/2024 29.6 (L)  36 - 48 % Final    MCV 10/02/2024 87.8  78 - 102 FL Final    MCH 10/02/2024 26.4  25.0 - 35.0 PG Final    MCHC 10/02/2024 30.1 (L)  31 - 37 g/dL Final    RDW 10/02/2024 17.3 (H)  11.5 - 14.5 % Final    Platelets 10/02/2024 265  140 - 440 K/uL Final    Neutrophils % 10/02/2024 75 (H)  40 - 70 % Final    Mixed Cells 10/02/2024 9  0.1 - 17 % Final    Lymphocytes % 10/02/2024 16  14 - 44 % Final    Neutrophils Absolute 10/02/2024 5.2  1.8 - 9.5 K/UL Final    ABSOLUTE MIXED CELLS 10/02/2024 0.7  0.0 - 2.3 K/uL Final    Lymphocytes Absolute 10/02/2024 1.1  1.1 - 5.9 K/UL Final    Test performed at [facility] Location.  Reviewed by Medical Director.    Differential Type 10/02/2024 AUTOMATED    Final    Sodium 10/02/2024 139  136  - 145 mmol/L Final    Potassium 10/02/2024 3.5  3.5 - 5.5 mmol/L Final    Chloride 10/02/2024 103  100 - 111 mmol/L Final    CO2 10/02/2024 29  21 - 32 mmol/L Final    Anion Gap 10/02/2024 7  3.0 - 18 mmol/L Final    Glucose 10/02/2024 87  74 - 99 mg/dL Final    BUN 10/02/2024 6 (L)  7.0 - 18 MG/DL Final    Creatinine 10/02/2024 0.49 (L)  0.6 - 1.3 MG/DL Final    BUN/Creatinine Ratio 10/02/2024 12  12 - 20   Final    Est, Glom Filt Rate 10/02/2024 >90  >60 ml/min/1.73m2 Final    Comment:    Pediatric calculator link: https://www.kidney.org/professionals/kdoqi/gfr_calculatorped     These results are not intended for use in patients <18 years of age.     eGFR results are calculated without a race factor using  the 2021 CKD-EPI equation. Careful clinical correlation is recommended, particularly when comparing to results calculated using previous equations.  The CKD-EPI equation is less accurate in patients with extremes of muscle mass, extra-renal metabolism of creatinine, excessive creatine ingestion, or following therapy that affects renal tubular secretion.      Calcium 10/02/2024 8.5  8.5 - 10.1 MG/DL Final    Total Protein 10/02/2024 5.8 (L)  6.4 - 8.2 g/dL Final    Albumin 10/02/2024 2.7 (L)  3.4 - 5.0 g/dL Final    Globulin 10/02/2024 3.1  2.0 - 4.0 g/dL Final    Albumin/Globulin Ratio 10/02/2024 0.9  0.8 - 1.7   Final    Total Bilirubin 10/02/2024 0.2  0.2 - 1.0 MG/DL Final    Bilirubin, Direct 10/02/2024 <0.1  0.0 - 0.2 MG/DL Final    Alk Phosphatase 10/02/2024 62  45 - 117 U/L Final    AST 10/02/2024 9 (L)  10 - 38 U/L Final    ALT 10/02/2024 7 (L)  13 - 56 U/L Final       Patient's left upper arm single lumen PICC is clean, dry and intact at this time. Brisk blood return noted and PICC flushes without difficulty.     ml IV BAG was initiated to infuse @ KVO throughout treatment today.       The following pre-medications were administered 30-60 minutes prior to starting the chemotherapy: DECADRON 8 MG IV

## 2024-10-09 ENCOUNTER — HOSPITAL ENCOUNTER (OUTPATIENT)
Facility: HOSPITAL | Age: 64
Setting detail: INFUSION SERIES
Discharge: HOME | End: 2024-10-12
Payer: MEDICARE

## 2024-10-09 ENCOUNTER — TRANSCRIBE ORDERS (OUTPATIENT)
Facility: HOSPITAL | Age: 64
End: 2024-10-09

## 2024-10-09 VITALS
DIASTOLIC BLOOD PRESSURE: 69 MMHG | SYSTOLIC BLOOD PRESSURE: 130 MMHG | HEART RATE: 115 BPM | RESPIRATION RATE: 16 BRPM | TEMPERATURE: 98.6 F | OXYGEN SATURATION: 96 %

## 2024-10-09 PROCEDURE — 96523 IRRIG DRUG DELIVERY DEVICE: CPT

## 2024-10-09 PROCEDURE — 6360000002 HC RX W HCPCS

## 2024-10-09 PROCEDURE — 2580000003 HC RX 258: Performed by: INTERNAL MEDICINE

## 2024-10-09 PROCEDURE — 99211 OFF/OP EST MAY X REQ PHY/QHP: CPT

## 2024-10-09 RX ORDER — SODIUM CHLORIDE 0.9 % (FLUSH) 0.9 %
5-40 SYRINGE (ML) INJECTION PRN
Status: DISCONTINUED | OUTPATIENT
Start: 2024-10-09 | End: 2024-10-13 | Stop reason: HOSPADM

## 2024-10-09 RX ORDER — HEPARIN SODIUM (PORCINE) LOCK FLUSH IV SOLN 100 UNIT/ML 100 UNIT/ML
500 SOLUTION INTRAVENOUS PRN
Status: DISCONTINUED | OUTPATIENT
Start: 2024-10-09 | End: 2024-10-13 | Stop reason: HOSPADM

## 2024-10-09 RX ORDER — HEPARIN 100 UNIT/ML
SYRINGE INTRAVENOUS
Status: COMPLETED
Start: 2024-10-09 | End: 2024-10-09

## 2024-10-09 RX ADMIN — HEPARIN SODIUM (PORCINE) LOCK FLUSH IV SOLN 100 UNIT/ML 500 UNITS: 100 SOLUTION at 09:23

## 2024-10-09 RX ADMIN — HEPARIN 500 UNITS: 100 SYRINGE at 09:23

## 2024-10-09 RX ADMIN — SODIUM CHLORIDE, PRESERVATIVE FREE 10 ML: 5 INJECTION INTRAVENOUS at 09:22

## 2024-10-09 NOTE — PROGRESS NOTES
[facility] Progress Note    Date: 2024    Name: Rina Prajapati    MRN: [id number]         : 1960    Ms. Prajapati [facility] in the [facility] today at 0915, in stable condition via wheelchair, here for her WEEKLY PICC LINE CARE. She was assessed and education was provided.       Ms. Prajapati's vitals were reviewed.  Vitals:    10/09/24 1046   BP: 130/69   Pulse: (!) 115   Resp: 16   Temp: 98.6 °F (37 °C)   SpO2: 96%       Left upper arm single lumen PICC dressing is clean, dry, intact. PICC has no blood return and flushes without difficulty. Needleless connector changed and new Curos cap placed. PICC flushed with NS & Heparin per order. During cleaning, PICC caught on chlorhexidene wand and pulled out about inch, secured and dressed with tegaderm.    Call placed to Dr Li's nurse   Justine, she instructed me to pull PICC line and she would call [facility] for an appt for a new PICC to be inserted. Ms Prajapati informed.    PICC pulled, pressure dressing applied, pt kept for observation for 30 minutes, no issues. She is to go to IR at [facility] tomorrow morning at 0730.     [facility]. Prajapati [facility] in stable condition at 1050.     She is scheduled to return on 10/16/24 at 0930 for her next weekly PICC care.      Nadira Perdomo RN  2024  12:59 PM

## 2024-10-10 ENCOUNTER — HOSPITAL ENCOUNTER (OUTPATIENT)
Facility: HOSPITAL | Age: 64
Discharge: HOME | End: 2024-10-13
Attending: INTERNAL MEDICINE
Payer: MEDICARE

## 2024-10-10 DIAGNOSIS — C34.90 NON-SMALL CELL LUNG CANCER, UNSPECIFIED LATERALITY (HCC): ICD-10-CM

## 2024-10-10 PROCEDURE — 76937 US GUIDE VASCULAR ACCESS: CPT

## 2024-10-10 PROCEDURE — 76000 FLUOROSCOPY <1 HR PHYS/QHP: CPT

## 2024-10-10 NOTE — PROCEDURES
INTERVENTIONAL RADIOLOGY POST PICC LINE NOTE     October 10, 2024       8:41 AM     Preoperative Diagnosis:   IV Access    Postoperative Diagnosis:  Same.    : CARLOS Pitts    Assistant:  None.    Attending Physician: Dr. Moran    Type of Anesthesia:  1% local lidocaine    Procedure/Description: left upper extremity PICC Line.    Findings:  left brachial 4 Polish catheter placed. Tip at subclavian vein. The catheter could not be advanced centrally due to central occlusion. OK to use. Length 15 cm.    Estimated blood Loss: Minimal    Specimen Removed: None    Drains: None     Complications:  None.    Condition:  Stable.    Discharge Plan:  discharge home

## 2024-10-10 NOTE — H&P
History and Physical    Patient: Rina Prajapati           Sex: female       DOA: 10/10/2024  YOB: 1960      Age:  63 y.o.     LOS:  LOS: 0 days        HPI:     Rina Prajapati is a 63 y.o. female with non small cell lung cancer presents for PICC line placement for oncological therapy     Past Medical History:   Diagnosis Date    Anemia, iron deficiency 2016    Depression     H/O seasonal allergies     Hypertension     Non-small cell carcinoma of lung (HCC) 2016    adenocarcinoma of right lung    Positive occult stool blood test 2016    Pulmonary embolism (HCC) 2016    small, bilateral PEs- on Xarelto    Right leg DVT (HCC) 2016    on Xarelto    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HCC) 2016    resolved    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       Past Surgical History:   Procedure Laterality Date    ANGIOPLASTY Right 3/20/2016    IJ, subclavian, and brachiocephalic veins    BREAST SURGERY      biopsy    CT BIOPSY LYMPH NODES SUPERFICIAL  2022    CT BIOPSY LYMPH NODES SUPERFICIAL 2022 [facility] RAD CT    HYSTERECTOMY (CERVIX STATUS UNKNOWN)      due to menorrhagia     OTHER SURGICAL HISTORY Right 3/20/2016    2 placed in right IJ, subclavian/brachiocephalic    THROMBECTOMY  3/20/2016    with thrombolysis    VASCULAR SURGERY Left     double lumen    XR MIDLINE EQUAL OR GREATER THAN 5 YEARS  2024    XR MIDLINE EQUAL OR GREATER THAN 5 YEARS 2024       Family History   Problem Relation Age of Onset    No Known Problems Child     Cancer Sister 50        breast    Liver Disease Sister         cirrhosis    Heart Attack Mother 43       Social History     Socioeconomic History    Marital status: Single   Tobacco Use    Smoking status: Every Day     Current packs/day: 0.00     Average packs/day: 0.5 packs/day for 32.0 years (16.0 ttl pk-yrs)     Types: Cigarettes     Start date: 1984     Last attempt to quit: 2016     Years since quittin.6  Javier CARABALLO MD   umeclidinium-vilanterol (ANORO ELLIPTA) 62.5-25 MCG/ACT inhaler Inhale 1 puff into the lungs daily  Patient not taking: Reported on 9/19/2024 7/29/24   Jonn Ames DO   sertraline (ZOLOFT) 50 MG tablet TAKE 1 TABLET BY MOUTH DAILY 6/28/24   Cora Her MD   levothyroxine (SYNTHROID) 88 MCG tablet Take 1 tablet by mouth every morning (before breakfast) 6/14/24   Cora Her MD   tiotropium (SPIRIVA HANDIHALER) 18 MCG inhalation capsule Inhale 1 capsule into the lungs daily  Patient not taking: Reported on 9/19/2024 4/10/24   Cora Her MD   apixaban (ELIQUIS) 5 MG TABS tablet Take 1 tablet by mouth 2 times daily    ProviderAdilson MD   loperamide (IMODIUM A-D) 2 MG tablet  3/24/23   Adilson Severino MD   dexamethasone (DECADRON) 4 MG tablet  2/17/23   Adilson Severino MD   DOCEtaxel (TAXOTERE) 80 MG/8ML chemo injection Infuse intravenously 3/9/23   Adilson Severino MD   albuterol sulfate HFA (PROVENTIL;VENTOLIN;PROAIR) 108 (90 Base) MCG/ACT inhaler inhale 1 to 2 puffs by mouth and INTO THE LUNGS every 4 to 6 hour...  (REFER TO PRESCRIPTION NOTES).  Patient not taking: Reported on 9/19/2024 3/25/22   Adilson Severino MD   ferrous sulfate (IRON 325) 325 (65 Fe) MG tablet Take by mouth 5/6/21   ProviderAdilson MD       Allergies   Allergen Reactions    Metronidazole Hives and Other (See Comments)     Other Reaction(s): Not available       Physical Exam:      There were no vitals taken for this visit.    Physical Exam:  General: A&O x 4, NAD  Heart: RRR  Lungs: Normal work of breathing on room air    Labs Reviewed:  All lab results for the last 24 hours reviewed  Assessment/Plan     The patient is an appropriate candidate to undergo the planned procedure    CARLOS Pitts

## 2024-10-16 ENCOUNTER — HOSPITAL ENCOUNTER (OUTPATIENT)
Facility: HOSPITAL | Age: 64
Setting detail: INFUSION SERIES
Discharge: HOME | End: 2024-10-19
Payer: MEDICARE

## 2024-10-16 VITALS
SYSTOLIC BLOOD PRESSURE: 123 MMHG | HEART RATE: 98 BPM | TEMPERATURE: 98.7 F | RESPIRATION RATE: 16 BRPM | DIASTOLIC BLOOD PRESSURE: 77 MMHG | OXYGEN SATURATION: 95 %

## 2024-10-16 PROCEDURE — 96523 IRRIG DRUG DELIVERY DEVICE: CPT

## 2024-10-16 PROCEDURE — 2580000003 HC RX 258: Performed by: INTERNAL MEDICINE

## 2024-10-16 PROCEDURE — 6360000002 HC RX W HCPCS

## 2024-10-16 RX ORDER — SODIUM CHLORIDE 0.9 % (FLUSH) 0.9 %
5-40 SYRINGE (ML) INJECTION PRN
Status: DISCONTINUED | OUTPATIENT
Start: 2024-10-16 | End: 2024-10-20 | Stop reason: HOSPADM

## 2024-10-16 RX ORDER — HEPARIN 100 UNIT/ML
SYRINGE INTRAVENOUS
Status: COMPLETED
Start: 2024-10-16 | End: 2024-10-16

## 2024-10-16 RX ORDER — HEPARIN SODIUM (PORCINE) LOCK FLUSH IV SOLN 100 UNIT/ML 100 UNIT/ML
500 SOLUTION INTRAVENOUS PRN
Status: DISCONTINUED | OUTPATIENT
Start: 2024-10-16 | End: 2024-10-20 | Stop reason: HOSPADM

## 2024-10-16 RX ADMIN — SODIUM CHLORIDE, PRESERVATIVE FREE 20 ML: 5 INJECTION INTRAVENOUS at 11:18

## 2024-10-16 RX ADMIN — HEPARIN 500 UNITS: 100 SYRINGE at 11:19

## 2024-10-16 RX ADMIN — HEPARIN SODIUM (PORCINE) LOCK FLUSH IV SOLN 100 UNIT/ML 500 UNITS: 100 SOLUTION at 11:19

## 2024-10-16 ASSESSMENT — PAIN - FUNCTIONAL ASSESSMENT: PAIN_FUNCTIONAL_ASSESSMENT: NONE - DENIES PAIN

## 2024-10-16 NOTE — PROGRESS NOTES
Called Ms. Prajapati due to her missing her PICC care appointment this morning, she states she is having difficulties with her oxygen machine not working properly so she will call [facility] back and let us know if she can make it today.

## 2024-10-16 NOTE — PROGRESS NOTES
[facility] Progress Note    Date: 2024    Name: Rina Prajapati    MRN: [id number]         : 1960    Ms. Prajapati arrived in the [facility] today at 1105, here for her WEEKLY PICC LINE CARE. She was assessed and education was provided.       Ms. Prajapati's vitals were reviewed.  Vitals:    10/16/24 1107   BP: 123/77   Pulse: 98   Resp: 16   Temp: 98.7 °F (37.1 °C)   SpO2: 95%     Patient to [facility] via wheelchair on 3L nasal cannula. She has no new complaints at this time. Reports she was able to get her oxygen machine working properly, O2 sats stable and respirations unlabored.    Left upper arm single lumen PICC (placed 10/10/24) dressing is clean, dry, intact. PICC has no blood return and flushes without difficulty. Needleless connector changed and new Curos cap placed. PICC flushed with NS & Heparin per order.     PICC dressing changed per order and protocol using sterile technique. Patient tolerated well. New stockinette placed.       [facility]. Pra[facility]ap[facility] in stable condition at 1145.     She is scheduled to return on 10/23/24 at 1000 for her next weekly PICC care and pre chemo labs.      Sada Loera RN  2024  11:44 AM

## 2024-10-17 RX ORDER — SODIUM CHLORIDE 9 MG/ML
INJECTION, SOLUTION INTRAVENOUS CONTINUOUS
OUTPATIENT
Start: 2024-10-24

## 2024-10-17 RX ORDER — SODIUM CHLORIDE 9 MG/ML
5-250 INJECTION, SOLUTION INTRAVENOUS PRN
OUTPATIENT
Start: 2024-10-24

## 2024-10-17 RX ORDER — ONDANSETRON 2 MG/ML
8 INJECTION INTRAMUSCULAR; INTRAVENOUS
OUTPATIENT
Start: 2024-10-24

## 2024-10-17 RX ORDER — EPINEPHRINE 1 MG/ML
0.3 INJECTION, SOLUTION, CONCENTRATE INTRAVENOUS PRN
OUTPATIENT
Start: 2024-10-24

## 2024-10-17 RX ORDER — MEPERIDINE HYDROCHLORIDE 25 MG/ML
12.5 INJECTION INTRAMUSCULAR; INTRAVENOUS; SUBCUTANEOUS PRN
OUTPATIENT
Start: 2024-10-24

## 2024-10-17 RX ORDER — ACETAMINOPHEN 325 MG/1
650 TABLET ORAL
OUTPATIENT
Start: 2024-10-24

## 2024-10-17 RX ORDER — DEXAMETHASONE SODIUM PHOSPHATE 10 MG/ML
10 INJECTION, SOLUTION INTRAMUSCULAR; INTRAVENOUS ONCE
Start: 2024-10-24 | End: 2024-10-24

## 2024-10-17 RX ORDER — DIPHENHYDRAMINE HYDROCHLORIDE 50 MG/ML
50 INJECTION INTRAMUSCULAR; INTRAVENOUS
OUTPATIENT
Start: 2024-10-24

## 2024-10-17 RX ORDER — ALBUTEROL SULFATE 90 UG/1
4 INHALANT RESPIRATORY (INHALATION) PRN
OUTPATIENT
Start: 2024-10-24

## 2024-10-17 NOTE — TELEPHONE ENCOUNTER
This patient contacted office for the following prescriptions to be filled:    Medication requested :   montelukast (SINGULAIR) tablet 10 mg QTY 90   PCP:  Arden  Pharmacy or Print: [facility]   Mail order or Local pharmacy [address] [place] [address]     Scheduled appointment if not seen by current providers in office: LOV 9/19/2024 JONATHON 3/19/2025

## 2024-10-17 NOTE — PROGRESS NOTES
Chemotherapy Verification  Rina Prajapati  63 y.o. (1960)   female    Wt Readings from Last 1 Encounters:   10/03/24 58.2 kg (128 lb 4.8 oz)      Ht Readings from Last 1 Encounters:   09/19/24 1.702 m (5' 7\")          Estimated body surface area is 1.66 meters squared as calculated from the following:    Height as of 9/19/24: 1.702 m (5' 7\").    Weight as of 10/3/24: 58.2 kg (128 lb 4.8 oz).  Physician: Dr. Li  Cycle #: 27   Day #: 1   Treatment Date: 10/24/2024    Lab Results   Component Value Date    WBC 7.0 10/02/2024    HGB 8.9 (L) 10/02/2024    HCT 29.6 (L) 10/02/2024     10/02/2024    NEUTROABS 5.2 10/02/2024       Lab Results   Component Value Date    TSH 6.65 (H) 12/07/2022    ALKPHOS 62 10/02/2024    ALT 7 (L) 10/02/2024    AST 9 (L) 10/02/2024    BILITOT 0.2 10/02/2024    BILIDIR <0.1 10/02/2024        Most Recent Creatinine Clearance:  CrCl: CrCl cannot be calculated (Unknown ideal weight.). mL/min (based on Adjusted Body Weight)  Creatinine:   Lab Results   Component Value Date    CREATININE 0.49 (L) 10/02/2024            Rx  (Y/N)  Chemotherapy Order Verification Process   yes Verification and review of allergies against treatment plan   yes BSA calculation verified against height and weight documented on order   yes Dose calculations double-checked against BSA and/or weight    yes Prescribed dose(s) supported by appropriate protocol/reference   yes Has the patient been screened for Hepatitis B prior to initiating therapy       -  If positive, provider has documented awareness or been contacted:  Y/N   Patient was screened on 3/8/2023- results were negative    Has the TSH level been drawn: patient on Synthroid 88 mcg        Cycle length: 21 days  Verify enough time has elapsed since last treatment date: Y/N y         No data to display                 yes Pre-medications, hydration and PRN medications entered   yes  is accurate and complete       - Right cycle date in

## 2024-10-21 RX ORDER — MONTELUKAST SODIUM 10 MG/1
10 TABLET ORAL DAILY
Qty: 30 TABLET | Refills: 11 | Status: SHIPPED | OUTPATIENT
Start: 2024-10-21

## 2024-10-23 ENCOUNTER — HOSPITAL ENCOUNTER (OUTPATIENT)
Facility: HOSPITAL | Age: 64
Setting detail: INFUSION SERIES
Discharge: HOME | End: 2024-10-26
Payer: MEDICARE

## 2024-10-23 ENCOUNTER — TELEPHONE (OUTPATIENT)
Facility: CLINIC | Age: 64
End: 2024-10-23

## 2024-10-23 VITALS
BODY MASS INDEX: 19.86 KG/M2 | DIASTOLIC BLOOD PRESSURE: 81 MMHG | RESPIRATION RATE: 16 BRPM | SYSTOLIC BLOOD PRESSURE: 132 MMHG | TEMPERATURE: 98.3 F | WEIGHT: 126.8 LBS | HEART RATE: 116 BPM | OXYGEN SATURATION: 95 %

## 2024-10-23 LAB
ALBUMIN SERPL-MCNC: 2.8 G/DL (ref 3.4–5)
ALBUMIN/GLOB SERPL: 0.9 (ref 0.8–1.7)
ALP SERPL-CCNC: 65 U/L (ref 45–117)
ALT SERPL-CCNC: 9 U/L (ref 13–56)
ANION GAP SERPL CALC-SCNC: 8 MMOL/L (ref 3–18)
AST SERPL-CCNC: 11 U/L (ref 10–38)
BASO+EOS+MONOS # BLD AUTO: 0.6 K/UL (ref 0–2.3)
BASO+EOS+MONOS NFR BLD AUTO: 7 % (ref 0.1–17)
BILIRUB DIRECT SERPL-MCNC: <0.1 MG/DL (ref 0–0.2)
BILIRUB SERPL-MCNC: 0.3 MG/DL (ref 0.2–1)
BUN SERPL-MCNC: 8 MG/DL (ref 7–18)
BUN/CREAT SERPL: 15 (ref 12–20)
CALCIUM SERPL-MCNC: 8.6 MG/DL (ref 8.5–10.1)
CHLORIDE SERPL-SCNC: 100 MMOL/L (ref 100–111)
CO2 SERPL-SCNC: 32 MMOL/L (ref 21–32)
CREAT SERPL-MCNC: 0.53 MG/DL (ref 0.6–1.3)
DIFFERENTIAL METHOD BLD: ABNORMAL
ERYTHROCYTE [DISTWIDTH] IN BLOOD BY AUTOMATED COUNT: 17.4 % (ref 11.5–14.5)
GLOBULIN SER CALC-MCNC: 3 G/DL (ref 2–4)
GLUCOSE SERPL-MCNC: 157 MG/DL (ref 74–99)
HCT VFR BLD AUTO: 31.6 % (ref 36–48)
HGB BLD-MCNC: 9.4 G/DL (ref 12–16)
LYMPHOCYTES # BLD: 0.9 K/UL (ref 1.1–5.9)
LYMPHOCYTES NFR BLD: 11 % (ref 14–44)
MCH RBC QN AUTO: 25.5 PG (ref 25–35)
MCHC RBC AUTO-ENTMCNC: 29.7 G/DL (ref 31–37)
MCV RBC AUTO: 85.9 FL (ref 78–102)
NEUTS SEG # BLD: 6.5 K/UL (ref 1.8–9.5)
NEUTS SEG NFR BLD: 82 % (ref 40–70)
PLATELET # BLD AUTO: 334 K/UL (ref 140–440)
POTASSIUM SERPL-SCNC: 2.7 MMOL/L (ref 3.5–5.5)
PROT SERPL-MCNC: 5.8 G/DL (ref 6.4–8.2)
RBC # BLD AUTO: 3.68 M/UL (ref 4.1–5.1)
SODIUM SERPL-SCNC: 140 MMOL/L (ref 136–145)
WBC # BLD AUTO: 8 K/UL (ref 4.5–13)

## 2024-10-23 PROCEDURE — 80048 BASIC METABOLIC PNL TOTAL CA: CPT

## 2024-10-23 PROCEDURE — 6360000002 HC RX W HCPCS: Performed by: INTERNAL MEDICINE

## 2024-10-23 PROCEDURE — 80076 HEPATIC FUNCTION PANEL: CPT

## 2024-10-23 PROCEDURE — 85025 COMPLETE CBC W/AUTO DIFF WBC: CPT

## 2024-10-23 PROCEDURE — 36415 COLL VENOUS BLD VENIPUNCTURE: CPT

## 2024-10-23 PROCEDURE — 96523 IRRIG DRUG DELIVERY DEVICE: CPT

## 2024-10-23 PROCEDURE — 2580000003 HC RX 258: Performed by: INTERNAL MEDICINE

## 2024-10-23 RX ORDER — SODIUM CHLORIDE 0.9 % (FLUSH) 0.9 %
5-40 SYRINGE (ML) INJECTION PRN
Status: DISCONTINUED | OUTPATIENT
Start: 2024-10-23 | End: 2024-10-27 | Stop reason: HOSPADM

## 2024-10-23 RX ORDER — HEPARIN SODIUM (PORCINE) LOCK FLUSH IV SOLN 100 UNIT/ML 100 UNIT/ML
500 SOLUTION INTRAVENOUS PRN
Status: DISCONTINUED | OUTPATIENT
Start: 2024-10-23 | End: 2024-10-27 | Stop reason: HOSPADM

## 2024-10-23 RX ADMIN — SODIUM CHLORIDE, PRESERVATIVE FREE 10 ML: 5 INJECTION INTRAVENOUS at 10:19

## 2024-10-23 RX ADMIN — HEPARIN 500 UNITS: 100 SYRINGE at 10:39

## 2024-10-23 RX ADMIN — SODIUM CHLORIDE, PRESERVATIVE FREE 10 ML: 5 INJECTION INTRAVENOUS at 10:35

## 2024-10-23 NOTE — TELEPHONE ENCOUNTER
Pt potassium is low and is reading 2.7 and [facility] wants to know if she can be changed from Lasix to Aldactone?

## 2024-10-23 NOTE — PROGRESS NOTES
[facility] Progress Note    Date: 2024    Name: Rina Prajapati    MRN: [id number]         : 1960    Ms. Prajapati arrived in the [facility] today at 1015, in stable condition via wheelchair, here for her WEEKLY PICC LINE CARE and PRECHEMO LABS. She was assessed and education was provided.     Ms. Prajapati's vitals were reviewed.  Vitals:    10/23/24 1017   BP: 132/81   Pulse: (!) 116   Resp: 16   Temp: 98.3 °F (36.8 °C)   SpO2: 95%       Noted left upper arm single lumen PICC dressing wet with blood, pt said it bled last night, no pain, swelling. No blood return but PICC flushes without difficulty. Needleless connector changed and new Curos cap placed after PICC was flushed with NS & Heparin per order.  PICC dressing changed using sterile technique. New stockinette placed. Patient tolerated well.    CBC, BMP, and Hep function panel drawn via butterfly to right hand.    Ms. Prajapati was [facility] in stable condition at 1055, with her PICC line completely dry and intact.     She is scheduled to return on 10/24/24 at 0900 for her chemotherapy.      Nadira Perdomo RN  2024  11:24 AM

## 2024-10-24 ENCOUNTER — HOSPITAL ENCOUNTER (OUTPATIENT)
Facility: HOSPITAL | Age: 64
Setting detail: INFUSION SERIES
Discharge: HOME | End: 2024-10-27
Payer: MEDICARE

## 2024-10-24 VITALS
HEART RATE: 67 BPM | SYSTOLIC BLOOD PRESSURE: 164 MMHG | OXYGEN SATURATION: 96 % | DIASTOLIC BLOOD PRESSURE: 85 MMHG | TEMPERATURE: 97.6 F | RESPIRATION RATE: 16 BRPM

## 2024-10-24 DIAGNOSIS — C34.11 MALIGNANT NEOPLASM OF UPPER LOBE OF RIGHT LUNG (HCC): ICD-10-CM

## 2024-10-24 DIAGNOSIS — E87.6 HYPOKALEMIA: Primary | ICD-10-CM

## 2024-10-24 DIAGNOSIS — C34.90 NON-SMALL CELL LUNG CANCER, UNSPECIFIED LATERALITY (HCC): Primary | ICD-10-CM

## 2024-10-24 PROCEDURE — 36593 DECLOT VASCULAR DEVICE: CPT

## 2024-10-24 PROCEDURE — 6360000002 HC RX W HCPCS

## 2024-10-24 PROCEDURE — 2700000000 HC OXYGEN THERAPY PER DAY

## 2024-10-24 PROCEDURE — 96377 APPLICATON ON-BODY INJECTOR: CPT

## 2024-10-24 PROCEDURE — 96367 TX/PROPH/DG ADDL SEQ IV INF: CPT

## 2024-10-24 PROCEDURE — 6360000002 HC RX W HCPCS: Performed by: INTERNAL MEDICINE

## 2024-10-24 PROCEDURE — 96375 TX/PRO/DX INJ NEW DRUG ADDON: CPT

## 2024-10-24 PROCEDURE — 2580000003 HC RX 258: Performed by: INTERNAL MEDICINE

## 2024-10-24 PROCEDURE — 96413 CHEMO IV INFUSION 1 HR: CPT

## 2024-10-24 PROCEDURE — 96366 THER/PROPH/DIAG IV INF ADDON: CPT

## 2024-10-24 RX ORDER — POTASSIUM CHLORIDE 7.45 MG/ML
INJECTION INTRAVENOUS
Status: COMPLETED
Start: 2024-10-24 | End: 2024-10-24

## 2024-10-24 RX ORDER — ALBUTEROL SULFATE 90 UG/1
4 INHALANT RESPIRATORY (INHALATION) PRN
Status: CANCELLED | OUTPATIENT
Start: 2024-10-24

## 2024-10-24 RX ORDER — SODIUM CHLORIDE 0.9 % (FLUSH) 0.9 %
5-40 SYRINGE (ML) INJECTION PRN
OUTPATIENT
Start: 2024-10-24

## 2024-10-24 RX ORDER — DIPHENHYDRAMINE HYDROCHLORIDE 50 MG/ML
50 INJECTION INTRAMUSCULAR; INTRAVENOUS
Status: CANCELLED | OUTPATIENT
Start: 2024-10-24

## 2024-10-24 RX ORDER — SODIUM CHLORIDE 0.9 % (FLUSH) 0.9 %
5-40 SYRINGE (ML) INJECTION PRN
Status: ACTIVE | OUTPATIENT
Start: 2024-10-24 | End: 2024-10-25

## 2024-10-24 RX ORDER — SPIRONOLACTONE 25 MG/1
25 TABLET ORAL DAILY
Qty: 30 TABLET | Refills: 0 | Status: SHIPPED | OUTPATIENT
Start: 2024-10-24

## 2024-10-24 RX ORDER — POTASSIUM CHLORIDE 7.45 MG/ML
10 INJECTION INTRAVENOUS
Status: CANCELLED | OUTPATIENT
Start: 2024-10-24

## 2024-10-24 RX ORDER — HEPARIN 100 UNIT/ML
500 SYRINGE INTRAVENOUS PRN
Status: DISPENSED | OUTPATIENT
Start: 2024-10-24 | End: 2024-10-25

## 2024-10-24 RX ORDER — SODIUM CHLORIDE 9 MG/ML
INJECTION, SOLUTION INTRAVENOUS CONTINUOUS
Status: CANCELLED | OUTPATIENT
Start: 2024-10-24

## 2024-10-24 RX ORDER — ALBUTEROL SULFATE 90 UG/1
4 INHALANT RESPIRATORY (INHALATION) PRN
OUTPATIENT
Start: 2024-10-24

## 2024-10-24 RX ORDER — SODIUM CHLORIDE 9 MG/ML
INJECTION, SOLUTION INTRAVENOUS CONTINUOUS
OUTPATIENT
Start: 2024-10-24

## 2024-10-24 RX ORDER — ACETAMINOPHEN 325 MG/1
650 TABLET ORAL
OUTPATIENT
Start: 2024-10-24

## 2024-10-24 RX ORDER — ONDANSETRON 2 MG/ML
8 INJECTION INTRAMUSCULAR; INTRAVENOUS
Status: CANCELLED | OUTPATIENT
Start: 2024-10-24

## 2024-10-24 RX ORDER — SODIUM CHLORIDE 9 MG/ML
5-250 INJECTION, SOLUTION INTRAVENOUS PRN
Status: ACTIVE | OUTPATIENT
Start: 2024-10-24 | End: 2024-10-25

## 2024-10-24 RX ORDER — SODIUM CHLORIDE 9 MG/ML
5-250 INJECTION, SOLUTION INTRAVENOUS PRN
OUTPATIENT
Start: 2024-10-24

## 2024-10-24 RX ORDER — SODIUM CHLORIDE 9 MG/ML
5-250 INJECTION, SOLUTION INTRAVENOUS PRN
Status: CANCELLED | OUTPATIENT
Start: 2024-10-24

## 2024-10-24 RX ORDER — DIPHENHYDRAMINE HYDROCHLORIDE 50 MG/ML
50 INJECTION INTRAMUSCULAR; INTRAVENOUS
OUTPATIENT
Start: 2024-10-24

## 2024-10-24 RX ORDER — EPINEPHRINE 1 MG/ML
0.3 INJECTION, SOLUTION, CONCENTRATE INTRAVENOUS PRN
OUTPATIENT
Start: 2024-10-24

## 2024-10-24 RX ORDER — HEPARIN 100 UNIT/ML
500 SYRINGE INTRAVENOUS PRN
OUTPATIENT
Start: 2024-10-24

## 2024-10-24 RX ORDER — POTASSIUM CHLORIDE 7.45 MG/ML
10 INJECTION INTRAVENOUS
Status: ACTIVE | OUTPATIENT
Start: 2024-10-24 | End: 2024-10-24

## 2024-10-24 RX ORDER — HEPARIN 100 UNIT/ML
500 SYRINGE INTRAVENOUS PRN
Status: CANCELLED | OUTPATIENT
Start: 2024-10-24

## 2024-10-24 RX ORDER — EPINEPHRINE 1 MG/ML
0.3 INJECTION, SOLUTION, CONCENTRATE INTRAVENOUS PRN
Status: CANCELLED | OUTPATIENT
Start: 2024-10-24

## 2024-10-24 RX ORDER — ACETAMINOPHEN 325 MG/1
650 TABLET ORAL
Status: CANCELLED | OUTPATIENT
Start: 2024-10-24

## 2024-10-24 RX ORDER — ONDANSETRON 2 MG/ML
8 INJECTION INTRAMUSCULAR; INTRAVENOUS
OUTPATIENT
Start: 2024-10-24

## 2024-10-24 RX ORDER — DEXAMETHASONE SODIUM PHOSPHATE 4 MG/ML
8 INJECTION, SOLUTION INTRA-ARTICULAR; INTRALESIONAL; INTRAMUSCULAR; INTRAVENOUS; SOFT TISSUE ONCE
Status: COMPLETED | OUTPATIENT
Start: 2024-10-24 | End: 2024-10-24

## 2024-10-24 RX ORDER — PALONOSETRON 0.05 MG/ML
0.25 INJECTION, SOLUTION INTRAVENOUS ONCE
Status: COMPLETED | OUTPATIENT
Start: 2024-10-24 | End: 2024-10-24

## 2024-10-24 RX ORDER — SODIUM CHLORIDE 0.9 % (FLUSH) 0.9 %
5-40 SYRINGE (ML) INJECTION PRN
Status: CANCELLED | OUTPATIENT
Start: 2024-10-24

## 2024-10-24 RX ADMIN — DEXAMETHASONE SODIUM PHOSPHATE 8 MG: 4 INJECTION INTRA-ARTICULAR; INTRALESIONAL; INTRAMUSCULAR; INTRAVENOUS; SOFT TISSUE at 10:53

## 2024-10-24 RX ADMIN — PALONOSETRON 0.25 MG: 0.05 INJECTION, SOLUTION INTRAVENOUS at 10:52

## 2024-10-24 RX ADMIN — DOCETAXEL ANHYDROUS 130 MG: 10 INJECTION, SOLUTION INTRAVENOUS at 12:57

## 2024-10-24 RX ADMIN — SODIUM CHLORIDE, PRESERVATIVE FREE 20 ML: 5 INJECTION INTRAVENOUS at 14:10

## 2024-10-24 RX ADMIN — PEGFILGRASTIM 6 MG: KIT SUBCUTANEOUS at 14:08

## 2024-10-24 RX ADMIN — SODIUM CHLORIDE 250 ML/HR: 9 INJECTION, SOLUTION INTRAVENOUS at 10:51

## 2024-10-24 RX ADMIN — SODIUM CHLORIDE 150 MG: 900 INJECTION, SOLUTION INTRAVENOUS at 10:57

## 2024-10-24 RX ADMIN — HEPARIN 500 UNITS: 100 SYRINGE at 14:10

## 2024-10-24 RX ADMIN — WATER 2 MG: 1 INJECTION INTRAMUSCULAR; INTRAVENOUS; SUBCUTANEOUS at 09:44

## 2024-10-24 RX ADMIN — POTASSIUM CHLORIDE 10 MEQ: 7.46 INJECTION, SOLUTION INTRAVENOUS at 11:31

## 2024-10-24 RX ADMIN — SODIUM CHLORIDE, PRESERVATIVE FREE 20 ML: 5 INJECTION INTRAVENOUS at 09:45

## 2024-10-24 NOTE — PROGRESS NOTES
[facility] Progress Note    Date: 2024    Name: Rina Prajapati    MRN: [id number]         : 1960    Ms. Prajapati arrived in the [facility] today at 0920, in stable condition, here for CYCLE 27, DAY 1, IV TAXOTERE CHEMOTHERAPY REGIMEN (EVERY 21 DAY CYCLE). She was assessed and education was provided.       RAMUCIRUMAB HAS BEEN DISCONTINUED.       Ms. Prajapati's vitals were reviewed.  Vitals:    10/24/24 1405   BP: (!) 164/85   Pulse: 67   Resp: 16   Temp: 97.6 °F (36.4 °C)   SpO2: 96%       WEIGHT TODAY WAS  57.5 KG.     CURRENT BSA 1.65 (BSA USED FOR CALCULATION 1.71 PER PHARMACY)      Ms. Prajapati presented to the infusion center today with no new complaints.      CURRENT AND SIGNED CHEMOTHERAPY CONSENT WAS VIEWED IN HER ELECTRONIC RECORD.       HER PRE-CHEMO LABS OBTAINED ON YESTERDAY, 10-23-24 WERE ALL REVIEWED, AND WERE ALL NOTED TO BE SATISFACTORY FOR TREATMENT TODAY, AND WERE AS FOLLOWS:        Hospital Outpatient Visit on 10/23/2024   Component Date Value Ref Range Status    WBC 10/23/2024 8.0  4.5 - 13.0 K/uL Final    RBC 10/23/2024 3.68 (L)  4.10 - 5.10 M/uL Final    Hemoglobin 10/23/2024 9.4 (L)  12.0 - 16.0 g/dL Final    Hematocrit 10/23/2024 31.6 (L)  36 - 48 % Final    MCV 10/23/2024 85.9  78 - 102 FL Final    MCH 10/23/2024 25.5  25.0 - 35.0 PG Final    MCHC 10/23/2024 29.7 (L)  31 - 37 g/dL Final    RDW 10/23/2024 17.4 (H)  11.5 - 14.5 % Final    Platelets 10/23/2024 334  140 - 440 K/uL Final    Neutrophils % 10/23/2024 82 (H)  40 - 70 % Final    Mixed Cells 10/23/2024 7  0.1 - 17 % Final    Lymphocytes % 10/23/2024 11 (L)  14 - 44 % Final    Neutrophils Absolute 10/23/2024 6.5  1.8 - 9.5 K/UL Final    ABSOLUTE MIXED CELLS 10/23/2024 0.6  0.0 - 2.3 K/uL Final    Lymphocytes Absolute 10/23/2024 0.9 (L)  1.1 - 5.9 K/UL Final    Test performed at [facility] Location.  Reviewed by Medical Director.    Differential Type 10/23/2024 AUTOMATED    Final    Sodium  10/23/2024 140  136 - 145 mmol/L Final    Potassium 10/23/2024 2.7 (LL)  3.5 - 5.5 mmol/L Final    Comment: Critical value verified. Called to and read back by:  CORTES VASQUEZ, [facility], AT 1421 ON [id number] TO SLT      Chloride 10/23/2024 100  100 - 111 mmol/L Final    CO2 10/23/2024 32  21 - 32 mmol/L Final    Anion Gap 10/23/2024 8  3.0 - 18 mmol/L Final    Glucose 10/23/2024 157 (H)  74 - 99 mg/dL Final    BUN 10/23/2024 8  7.0 - 18 MG/DL Final    Creatinine 10/23/2024 0.53 (L)  0.6 - 1.3 MG/DL Final    BUN/Creatinine Ratio 10/23/2024 15  12 - 20   Final    Est, Glom Filt Rate 10/23/2024 >90  >60 ml/min/1.73m2 Final    Comment:    Pediatric calculator link: https://www.kidney.org/professionals/kdoqi/gfr_calculatorped     These results are not intended for use in patients <18 years of age.     eGFR results are calculated without a race factor using  the 2021 CKD-EPI equation. Careful clinical correlation is recommended, particularly when comparing to results calculated using previous equations.  The CKD-EPI equation is less accurate in patients with extremes of muscle mass, extra-renal metabolism of creatinine, excessive creatine ingestion, or following therapy that affects renal tubular secretion.      Calcium 10/23/2024 8.6  8.5 - 10.1 MG/DL Final    Total Protein 10/23/2024 5.8 (L)  6.4 - 8.2 g/dL Final    Albumin 10/23/2024 2.8 (L)  3.4 - 5.0 g/dL Final    Globulin 10/23/2024 3.0  2.0 - 4.0 g/dL Final    Albumin/Globulin Ratio 10/23/2024 0.9  0.8 - 1.7   Final    Total Bilirubin 10/23/2024 0.3  0.2 - 1.0 MG/DL Final    Bilirubin, Direct 10/23/2024 <0.1  0.0 - 0.2 MG/DL Final    Alk Phosphatase 10/23/2024 65  45 - 117 U/L Final    AST 10/23/2024 11  10 - 38 U/L Final    ALT 10/23/2024 9 (L)  13 - 56 U/L Final       Patient's left upper arm single lumen PICC is clean, dry and intact at this time. No blood return noted and PICC flushes without difficulty.    Cath paola instilled and set for one hour.  At end of

## 2024-10-25 NOTE — TELEPHONE ENCOUNTER
Late entry:n  Pat barrett patient 10/24/2024 at 5:00 pm.   Patient identified with 2 identifiers (name and ).  Patient aware of :   Noted hypoK (2.7) concern by oncology  Medication management by cardiology (Dr. Javier Potts), patient with history of diastolic heart failure grade 1 on Lasix with KCl and Entresto  Creatinine normal  Will plan to hold Lasix and KCl, start on Aldactone 25 mg, E Rx sent   Patient verbalizes understanding.

## 2024-10-30 ENCOUNTER — APPOINTMENT (OUTPATIENT)
Facility: HOSPITAL | Age: 64
End: 2024-10-30
Payer: MEDICARE

## 2024-10-30 ENCOUNTER — HOSPITAL ENCOUNTER (OUTPATIENT)
Facility: HOSPITAL | Age: 64
Discharge: HOME | End: 2024-11-02
Attending: INTERNAL MEDICINE
Payer: MEDICARE

## 2024-10-30 DIAGNOSIS — C34.11 MALIGNANT NEOPLASM OF UPPER LOBE OF RIGHT LUNG (HCC): ICD-10-CM

## 2024-10-30 PROCEDURE — 2500000003 HC RX 250 WO HCPCS: Performed by: INTERNAL MEDICINE

## 2024-10-30 PROCEDURE — 6360000004 HC RX CONTRAST MEDICATION: Performed by: INTERNAL MEDICINE

## 2024-10-30 PROCEDURE — 71260 CT THORAX DX C+: CPT

## 2024-10-30 RX ORDER — IOPAMIDOL 612 MG/ML
100 INJECTION, SOLUTION INTRAVASCULAR
Status: COMPLETED | OUTPATIENT
Start: 2024-10-30 | End: 2024-10-30

## 2024-10-30 RX ADMIN — BARIUM SULFATE 900 ML: 20 SUSPENSION ORAL at 10:30

## 2024-10-30 RX ADMIN — IOPAMIDOL 100 ML: 612 INJECTION, SOLUTION INTRAVENOUS at 12:05

## 2024-11-01 ENCOUNTER — HOSPITAL ENCOUNTER (OUTPATIENT)
Facility: HOSPITAL | Age: 64
Setting detail: INFUSION SERIES
Discharge: HOME | End: 2024-11-04
Payer: MEDICARE

## 2024-11-01 VITALS
OXYGEN SATURATION: 95 % | DIASTOLIC BLOOD PRESSURE: 63 MMHG | TEMPERATURE: 98.4 F | SYSTOLIC BLOOD PRESSURE: 105 MMHG | RESPIRATION RATE: 16 BRPM | HEART RATE: 113 BPM

## 2024-11-01 PROCEDURE — 2580000003 HC RX 258: Performed by: INTERNAL MEDICINE

## 2024-11-01 PROCEDURE — 96523 IRRIG DRUG DELIVERY DEVICE: CPT

## 2024-11-01 PROCEDURE — 6360000002 HC RX W HCPCS

## 2024-11-01 RX ORDER — SODIUM CHLORIDE 0.9 % (FLUSH) 0.9 %
5-40 SYRINGE (ML) INJECTION PRN
Status: DISCONTINUED | OUTPATIENT
Start: 2024-11-01 | End: 2024-11-05 | Stop reason: HOSPADM

## 2024-11-01 RX ORDER — HEPARIN SODIUM (PORCINE) LOCK FLUSH IV SOLN 100 UNIT/ML 100 UNIT/ML
500 SOLUTION INTRAVENOUS PRN
Status: DISCONTINUED | OUTPATIENT
Start: 2024-11-01 | End: 2024-11-05 | Stop reason: HOSPADM

## 2024-11-01 RX ORDER — HEPARIN 100 UNIT/ML
SYRINGE INTRAVENOUS
Status: COMPLETED
Start: 2024-11-01 | End: 2024-11-01

## 2024-11-01 RX ADMIN — HEPARIN 500 UNITS: 100 SYRINGE at 12:21

## 2024-11-01 RX ADMIN — HEPARIN SODIUM (PORCINE) LOCK FLUSH IV SOLN 100 UNIT/ML 500 UNITS: 100 SOLUTION at 12:21

## 2024-11-01 RX ADMIN — SODIUM CHLORIDE, PRESERVATIVE FREE 20 ML: 5 INJECTION INTRAVENOUS at 12:20

## 2024-11-01 NOTE — PROGRESS NOTES
[facility] Progress Note    Date: 2024    Name: Rina Prajapati    MRN: [id number]         : 1960    Ms. Prajapati arrived in the [facility] today at 1225, via W/C;  here for her WEEKLY PICC LINE CARE. She was assessed and education was provided.       Ms. Prajapati's vitals were reviewed.  Vitals:    24 1225   BP: 105/63   Pulse: (!) 113   Resp: 16   Temp: 98.4 °F (36.9 °C)   SpO2: 95%     Patient to [facility] via wheelchair on 3L O2 via nasal cannula. She has no new complaints at this time. Reports she was able to get her oxygen machine working properly, O2 sats stable and respirations unlabored.    Left upper arm single lumen PICC (placed 10/10/24) dressing is clean, dry, intact. PICC has no blood return and flushes without difficulty. Needleless connector changed and new Curos cap placed. PICC flushed with NS & Heparin per order.     PICC dressing changed per order and protocol using sterile technique. Patient tolerated well. New stockinette placed.       Ms. Prajapati [facility], via W/C in stable condition at 1315.     She is scheduled to return on 24 at 0900 for her next weekly PICC care.     Kathleen Gallagher RN  2024

## 2024-11-05 RX ORDER — POTASSIUM CHLORIDE 7.45 MG/ML
10 INJECTION INTRAVENOUS
Status: DISPENSED | OUTPATIENT
Start: 2024-11-06 | End: 2024-11-06

## 2024-11-06 ENCOUNTER — HOSPITAL ENCOUNTER (OUTPATIENT)
Facility: HOSPITAL | Age: 64
Setting detail: INFUSION SERIES
Discharge: HOME | End: 2024-11-09
Payer: MEDICARE

## 2024-11-06 VITALS
DIASTOLIC BLOOD PRESSURE: 76 MMHG | OXYGEN SATURATION: 98 % | TEMPERATURE: 98.4 F | HEART RATE: 101 BPM | SYSTOLIC BLOOD PRESSURE: 131 MMHG | RESPIRATION RATE: 20 BRPM

## 2024-11-06 DIAGNOSIS — C34.11 MALIGNANT NEOPLASM OF UPPER LOBE OF RIGHT LUNG (HCC): Primary | ICD-10-CM

## 2024-11-06 PROCEDURE — 36593 DECLOT VASCULAR DEVICE: CPT

## 2024-11-06 PROCEDURE — 96366 THER/PROPH/DIAG IV INF ADDON: CPT

## 2024-11-06 PROCEDURE — 6360000002 HC RX W HCPCS: Performed by: INTERNAL MEDICINE

## 2024-11-06 PROCEDURE — 2580000003 HC RX 258: Performed by: INTERNAL MEDICINE

## 2024-11-06 PROCEDURE — 96365 THER/PROPH/DIAG IV INF INIT: CPT

## 2024-11-06 RX ORDER — ACETAMINOPHEN 325 MG/1
650 TABLET ORAL
OUTPATIENT
Start: 2024-11-06

## 2024-11-06 RX ORDER — HEPARIN 100 UNIT/ML
500 SYRINGE INTRAVENOUS PRN
Status: DISPENSED | OUTPATIENT
Start: 2024-11-06 | End: 2024-11-07

## 2024-11-06 RX ORDER — HEPARIN 100 UNIT/ML
500 SYRINGE INTRAVENOUS PRN
OUTPATIENT
Start: 2024-11-06

## 2024-11-06 RX ORDER — SODIUM CHLORIDE 9 MG/ML
5-250 INJECTION, SOLUTION INTRAVENOUS PRN
OUTPATIENT
Start: 2024-11-06

## 2024-11-06 RX ORDER — ONDANSETRON 2 MG/ML
8 INJECTION INTRAMUSCULAR; INTRAVENOUS
OUTPATIENT
Start: 2024-11-06

## 2024-11-06 RX ORDER — ALBUTEROL SULFATE 90 UG/1
4 INHALANT RESPIRATORY (INHALATION) PRN
OUTPATIENT
Start: 2024-11-06

## 2024-11-06 RX ORDER — DIPHENHYDRAMINE HYDROCHLORIDE 50 MG/ML
50 INJECTION INTRAMUSCULAR; INTRAVENOUS
OUTPATIENT
Start: 2024-11-06

## 2024-11-06 RX ORDER — SODIUM CHLORIDE 9 MG/ML
5-250 INJECTION, SOLUTION INTRAVENOUS PRN
Status: ACTIVE | OUTPATIENT
Start: 2024-11-06 | End: 2024-11-07

## 2024-11-06 RX ORDER — SODIUM CHLORIDE 0.9 % (FLUSH) 0.9 %
5-40 SYRINGE (ML) INJECTION PRN
OUTPATIENT
Start: 2024-11-06

## 2024-11-06 RX ORDER — SODIUM CHLORIDE 9 MG/ML
INJECTION, SOLUTION INTRAVENOUS CONTINUOUS
OUTPATIENT
Start: 2024-11-06

## 2024-11-06 RX ORDER — EPINEPHRINE 1 MG/ML
0.3 INJECTION, SOLUTION, CONCENTRATE INTRAVENOUS PRN
OUTPATIENT
Start: 2024-11-06

## 2024-11-06 RX ORDER — SODIUM CHLORIDE 0.9 % (FLUSH) 0.9 %
5-40 SYRINGE (ML) INJECTION PRN
Status: ACTIVE | OUTPATIENT
Start: 2024-11-06 | End: 2024-11-07

## 2024-11-06 RX ORDER — POTASSIUM CHLORIDE 7.45 MG/ML
10 INJECTION INTRAVENOUS
Status: COMPLETED | OUTPATIENT
Start: 2024-11-06 | End: 2024-11-06

## 2024-11-06 RX ADMIN — SODIUM CHLORIDE, PRESERVATIVE FREE 10 ML: 5 INJECTION INTRAVENOUS at 10:10

## 2024-11-06 RX ADMIN — POTASSIUM CHLORIDE 10 MEQ: 7.46 INJECTION, SOLUTION INTRAVENOUS at 11:33

## 2024-11-06 RX ADMIN — SODIUM CHLORIDE, PRESERVATIVE FREE 20 ML: 5 INJECTION INTRAVENOUS at 09:30

## 2024-11-06 RX ADMIN — SODIUM CHLORIDE 25 ML/HR: 9 INJECTION, SOLUTION INTRAVENOUS at 10:18

## 2024-11-06 RX ADMIN — HEPARIN 500 UNITS: 100 SYRINGE at 12:55

## 2024-11-06 RX ADMIN — POTASSIUM CHLORIDE 10 MEQ: 7.46 INJECTION, SOLUTION INTRAVENOUS at 10:20

## 2024-11-06 RX ADMIN — SODIUM CHLORIDE, PRESERVATIVE FREE 20 ML: 5 INJECTION INTRAVENOUS at 12:55

## 2024-11-06 RX ADMIN — WATER 2 MG: 1 INJECTION INTRAMUSCULAR; INTRAVENOUS; SUBCUTANEOUS at 09:40

## 2024-11-06 ASSESSMENT — PAIN - FUNCTIONAL ASSESSMENT
PAIN_FUNCTIONAL_ASSESSMENT: NONE - DENIES PAIN
PAIN_FUNCTIONAL_ASSESSMENT: NONE - DENIES PAIN

## 2024-11-06 NOTE — PROGRESS NOTES
\[facility]\"" Progress Note    Date: 2024    Name: Rina Prajapati    MRN: [id number]         : 1960    Ms. Prajapati arrived in the \[facility]\"" today at 0910, in stable condition, here for her WEEKLY PICC LINE CARE + IV POTASSIUM REPLACEMENT INFUSION. She was assessed and education was provided.       Ms. Prajapati's vitals were reviewed.  Vitals:    24 0910   BP: (!) 143/80   Pulse: (!) 110   Resp: 28   Temp: 98.4 °F (36.9 °C)   SpO2: 92%           Ms. Prajapati presented to the infusion center today, stating that she was doing fairly well, and voicing no new or major complaints or concerns.         HER LEFT UPPER ARM SINGLE LUMEN PICC LINE WAS NOTED TO BE COMPLETELY DRY AND INTACT. THE PICC LINE FLUSHED VERY EASILY WITH NS, BUT NO BLOOD RETURN COULD BE OBTAINED.        CATHFLO (ALTEPLASE//ACTIVASE) 2 mg, was instilled into her PICC line at 0940, to try and restore brisk blood return.     By 1010, brisk blood return was obtained from her PICC line, and 10 ml blood was withdrawn and discarded without incident. And then the PICC line was flushed with 10 ml NS, also without incident.             Her PICC line dressing was changed today per policy, and without incident.            Her most recent lab results scanned into her electronic record from 24, revealed a very low Potassium Level of 2.8.      20 MEQ IV POTASSIUM WAS ORDERED TO BE ADMINISTERED TODAY.           POTASSIUM 10 MEQ IV WAS INFUSED OVER 1 HOUR, PER ORDER AND WITHOUT INCIDENT.  (1ST DOSE).    POTASSIUM 10 MEQ IV WAS INFUSED OVER 1 HOUR, PER ORDER AND WITHOUT INCIDENT. (2ND DOSE).        After completion of all ordered medication today, her PICC line was flushed well per policy with NS + Heparin, and then the PICC line was left completely dry and intact.         Ms. Prajapati tolerated well and voiced no complaints.     Ms. Prajapati was [facility] in stable condition at 1300, with her PICC line completely dry and intact.

## 2024-11-13 ENCOUNTER — APPOINTMENT (OUTPATIENT)
Facility: HOSPITAL | Age: 64
DRG: 871 | End: 2024-11-13
Payer: MEDICARE

## 2024-11-13 ENCOUNTER — HOSPITAL ENCOUNTER (INPATIENT)
Facility: HOSPITAL | Age: 64
LOS: 7 days | Discharge: HOME | DRG: 871 | End: 2024-11-20
Attending: EMERGENCY MEDICINE | Admitting: STUDENT IN AN ORGANIZED HEALTH CARE EDUCATION/TRAINING PROGRAM
Payer: MEDICARE

## 2024-11-13 DIAGNOSIS — R60.9 EDEMA, UNSPECIFIED TYPE: ICD-10-CM

## 2024-11-13 DIAGNOSIS — C34.90 NON-SMALL CELL LUNG CANCER, UNSPECIFIED LATERALITY (HCC): Primary | ICD-10-CM

## 2024-11-13 DIAGNOSIS — C34.91 NON-SMALL CELL CANCER OF RIGHT LUNG (HCC): ICD-10-CM

## 2024-11-13 DIAGNOSIS — C34.90 MALIGNANT NEOPLASM OF LUNG, UNSPECIFIED LATERALITY, UNSPECIFIED PART OF LUNG (HCC): ICD-10-CM

## 2024-11-13 DIAGNOSIS — R06.03 RESPIRATORY DISTRESS: ICD-10-CM

## 2024-11-13 DIAGNOSIS — J18.9 MULTIFOCAL PNEUMONIA: ICD-10-CM

## 2024-11-13 PROBLEM — R65.20 SEVERE SEPSIS (HCC): Status: ACTIVE | Noted: 2024-11-13

## 2024-11-13 PROBLEM — A41.9 SEVERE SEPSIS (HCC): Status: ACTIVE | Noted: 2024-11-13

## 2024-11-13 LAB
ALBUMIN SERPL-MCNC: 2.8 G/DL (ref 3.4–5)
ALBUMIN/GLOB SERPL: 0.8 (ref 0.8–1.7)
ALP SERPL-CCNC: 75 U/L (ref 45–117)
ALT SERPL-CCNC: 10 U/L (ref 13–56)
ANION GAP SERPL CALC-SCNC: 7 MMOL/L (ref 3–18)
AST SERPL-CCNC: 15 U/L (ref 10–38)
BASOPHILS # BLD: 0.1 K/UL (ref 0–0.1)
BASOPHILS NFR BLD: 0 % (ref 0–2)
BILIRUB SERPL-MCNC: 0.5 MG/DL (ref 0.2–1)
BUN SERPL-MCNC: 9 MG/DL (ref 7–18)
BUN/CREAT SERPL: 13 (ref 12–20)
CALCIUM SERPL-MCNC: 8.6 MG/DL (ref 8.5–10.1)
CHLORIDE SERPL-SCNC: 101 MMOL/L (ref 100–111)
CO2 SERPL-SCNC: 30 MMOL/L (ref 21–32)
CREAT SERPL-MCNC: 0.68 MG/DL (ref 0.6–1.3)
D DIMER PPP FEU-MCNC: 0.73 UG/ML(FEU)
DIFFERENTIAL METHOD BLD: ABNORMAL
EOSINOPHIL # BLD: 0 K/UL (ref 0–0.4)
EOSINOPHIL NFR BLD: 0 % (ref 0–5)
ERYTHROCYTE [DISTWIDTH] IN BLOOD BY AUTOMATED COUNT: 18.2 % (ref 11.6–14.5)
FLUAV RNA SPEC QL NAA+PROBE: NOT DETECTED
FLUBV RNA SPEC QL NAA+PROBE: NOT DETECTED
GLOBULIN SER CALC-MCNC: 3.5 G/DL (ref 2–4)
GLUCOSE SERPL-MCNC: 126 MG/DL (ref 74–99)
HCT VFR BLD AUTO: 32.9 % (ref 35–45)
HGB BLD-MCNC: 9.9 G/DL (ref 12–16)
IMM GRANULOCYTES # BLD AUTO: 0.1 K/UL (ref 0–0.04)
IMM GRANULOCYTES NFR BLD AUTO: 1 % (ref 0–0.5)
LACTATE BLD-SCNC: 2 MMOL/L (ref 0.4–2)
LACTATE BLD-SCNC: 2.11 MMOL/L (ref 0.4–2)
LYMPHOCYTES # BLD: 1.4 K/UL (ref 0.9–3.6)
LYMPHOCYTES NFR BLD: 12 % (ref 21–52)
MAGNESIUM SERPL-MCNC: 1.6 MG/DL (ref 1.6–2.6)
MCH RBC QN AUTO: 25.3 PG (ref 24–34)
MCHC RBC AUTO-ENTMCNC: 30.1 G/DL (ref 31–37)
MCV RBC AUTO: 84.1 FL (ref 78–100)
MONOCYTES # BLD: 1 K/UL (ref 0.05–1.2)
MONOCYTES NFR BLD: 8 % (ref 3–10)
NEUTS SEG # BLD: 9.5 K/UL (ref 1.8–8)
NEUTS SEG NFR BLD: 79 % (ref 40–73)
NRBC # BLD: 0 K/UL (ref 0–0.01)
NRBC BLD-RTO: 0 PER 100 WBC
NT PRO BNP: 3622 PG/ML (ref 0–900)
PLATELET # BLD AUTO: 387 K/UL (ref 135–420)
PMV BLD AUTO: 9.6 FL (ref 9.2–11.8)
POTASSIUM SERPL-SCNC: 3.4 MMOL/L (ref 3.5–5.5)
PROT SERPL-MCNC: 6.3 G/DL (ref 6.4–8.2)
RBC # BLD AUTO: 3.91 M/UL (ref 4.2–5.3)
SARS-COV-2 RNA RESP QL NAA+PROBE: NOT DETECTED
SODIUM SERPL-SCNC: 138 MMOL/L (ref 136–145)
SOURCE: NORMAL
TROPONIN I SERPL HS-MCNC: 15 NG/L (ref 0–54)
TROPONIN I SERPL HS-MCNC: 18 NG/L (ref 0–54)
WBC # BLD AUTO: 12.1 K/UL (ref 4.6–13.2)

## 2024-11-13 PROCEDURE — 93005 ELECTROCARDIOGRAM TRACING: CPT | Performed by: EMERGENCY MEDICINE

## 2024-11-13 PROCEDURE — 94640 AIRWAY INHALATION TREATMENT: CPT

## 2024-11-13 PROCEDURE — 99291 CRITICAL CARE FIRST HOUR: CPT | Performed by: PHYSICIAN ASSISTANT

## 2024-11-13 PROCEDURE — 85379 FIBRIN DEGRADATION QUANT: CPT

## 2024-11-13 PROCEDURE — 6360000002 HC RX W HCPCS: Performed by: PHYSICIAN ASSISTANT

## 2024-11-13 PROCEDURE — 2580000003 HC RX 258: Performed by: EMERGENCY MEDICINE

## 2024-11-13 PROCEDURE — 84484 ASSAY OF TROPONIN QUANT: CPT

## 2024-11-13 PROCEDURE — 6370000000 HC RX 637 (ALT 250 FOR IP): Performed by: EMERGENCY MEDICINE

## 2024-11-13 PROCEDURE — 94762 N-INVAS EAR/PLS OXIMTRY CONT: CPT

## 2024-11-13 PROCEDURE — 71275 CT ANGIOGRAPHY CHEST: CPT

## 2024-11-13 PROCEDURE — 96375 TX/PRO/DX INJ NEW DRUG ADDON: CPT

## 2024-11-13 PROCEDURE — 2580000003 HC RX 258: Performed by: PHYSICIAN ASSISTANT

## 2024-11-13 PROCEDURE — 96365 THER/PROPH/DIAG IV INF INIT: CPT

## 2024-11-13 PROCEDURE — 80053 COMPREHEN METABOLIC PANEL: CPT

## 2024-11-13 PROCEDURE — 99285 EMERGENCY DEPT VISIT HI MDM: CPT

## 2024-11-13 PROCEDURE — 85025 COMPLETE CBC W/AUTO DIFF WBC: CPT

## 2024-11-13 PROCEDURE — 83605 ASSAY OF LACTIC ACID: CPT

## 2024-11-13 PROCEDURE — 6360000004 HC RX CONTRAST MEDICATION: Performed by: EMERGENCY MEDICINE

## 2024-11-13 PROCEDURE — 6360000002 HC RX W HCPCS: Performed by: EMERGENCY MEDICINE

## 2024-11-13 PROCEDURE — 83735 ASSAY OF MAGNESIUM: CPT

## 2024-11-13 PROCEDURE — 1100000000 HC RM PRIVATE

## 2024-11-13 PROCEDURE — 71045 X-RAY EXAM CHEST 1 VIEW: CPT

## 2024-11-13 PROCEDURE — 87636 SARSCOV2 & INF A&B AMP PRB: CPT

## 2024-11-13 PROCEDURE — 87040 BLOOD CULTURE FOR BACTERIA: CPT

## 2024-11-13 PROCEDURE — 83880 ASSAY OF NATRIURETIC PEPTIDE: CPT

## 2024-11-13 RX ORDER — IOPAMIDOL 755 MG/ML
80 INJECTION, SOLUTION INTRAVASCULAR
Status: COMPLETED | OUTPATIENT
Start: 2024-11-13 | End: 2024-11-13

## 2024-11-13 RX ORDER — FUROSEMIDE 10 MG/ML
20 INJECTION INTRAMUSCULAR; INTRAVENOUS ONCE
Status: COMPLETED | OUTPATIENT
Start: 2024-11-13 | End: 2024-11-13

## 2024-11-13 RX ORDER — ONDANSETRON 2 MG/ML
4 INJECTION INTRAMUSCULAR; INTRAVENOUS EVERY 6 HOURS PRN
Status: DISCONTINUED | OUTPATIENT
Start: 2024-11-13 | End: 2024-11-20 | Stop reason: HOSPADM

## 2024-11-13 RX ORDER — IPRATROPIUM BROMIDE AND ALBUTEROL SULFATE 2.5; .5 MG/3ML; MG/3ML
1 SOLUTION RESPIRATORY (INHALATION)
Status: COMPLETED | OUTPATIENT
Start: 2024-11-13 | End: 2024-11-13

## 2024-11-13 RX ORDER — ARFORMOTEROL TARTRATE 15 UG/2ML
15 SOLUTION RESPIRATORY (INHALATION)
Status: DISCONTINUED | OUTPATIENT
Start: 2024-11-13 | End: 2024-11-20 | Stop reason: HOSPADM

## 2024-11-13 RX ORDER — HYDRALAZINE HYDROCHLORIDE 20 MG/ML
10 INJECTION INTRAMUSCULAR; INTRAVENOUS EVERY 6 HOURS PRN
Status: DISCONTINUED | OUTPATIENT
Start: 2024-11-13 | End: 2024-11-20 | Stop reason: HOSPADM

## 2024-11-13 RX ORDER — IPRATROPIUM BROMIDE AND ALBUTEROL SULFATE 2.5; .5 MG/3ML; MG/3ML
1 SOLUTION RESPIRATORY (INHALATION)
Status: DISCONTINUED | OUTPATIENT
Start: 2024-11-14 | End: 2024-11-19

## 2024-11-13 RX ORDER — BUDESONIDE 1 MG/2ML
1 INHALANT ORAL 2 TIMES DAILY
Status: DISCONTINUED | OUTPATIENT
Start: 2024-11-13 | End: 2024-11-20 | Stop reason: HOSPADM

## 2024-11-13 RX ORDER — HEPARIN SODIUM 5000 [USP'U]/ML
5000 INJECTION, SOLUTION INTRAVENOUS; SUBCUTANEOUS EVERY 8 HOURS SCHEDULED
Status: DISCONTINUED | OUTPATIENT
Start: 2024-11-13 | End: 2024-11-13

## 2024-11-13 RX ORDER — HEPARIN SODIUM 1000 [USP'U]/ML
80 INJECTION, SOLUTION INTRAVENOUS; SUBCUTANEOUS PRN
Status: DISCONTINUED | OUTPATIENT
Start: 2024-11-13 | End: 2024-11-19 | Stop reason: ALTCHOICE

## 2024-11-13 RX ORDER — HEPARIN SODIUM 1000 [USP'U]/ML
40 INJECTION, SOLUTION INTRAVENOUS; SUBCUTANEOUS PRN
Status: DISCONTINUED | OUTPATIENT
Start: 2024-11-13 | End: 2024-11-19 | Stop reason: ALTCHOICE

## 2024-11-13 RX ORDER — LORAZEPAM 2 MG/ML
0.5 INJECTION INTRAMUSCULAR EVERY 6 HOURS PRN
Status: DISCONTINUED | OUTPATIENT
Start: 2024-11-13 | End: 2024-11-15

## 2024-11-13 RX ORDER — HEPARIN SODIUM 10000 [USP'U]/100ML
5-30 INJECTION, SOLUTION INTRAVENOUS CONTINUOUS
Status: DISPENSED | OUTPATIENT
Start: 2024-11-13 | End: 2024-11-17

## 2024-11-13 RX ORDER — VANCOMYCIN 1.5 G/300ML
25 INJECTION, SOLUTION INTRAVENOUS ONCE
Status: COMPLETED | OUTPATIENT
Start: 2024-11-13 | End: 2024-11-14

## 2024-11-13 RX ORDER — MORPHINE SULFATE 2 MG/ML
2 INJECTION, SOLUTION INTRAMUSCULAR; INTRAVENOUS EVERY 6 HOURS PRN
Status: DISCONTINUED | OUTPATIENT
Start: 2024-11-13 | End: 2024-11-15

## 2024-11-13 RX ADMIN — AZITHROMYCIN MONOHYDRATE 500 MG: 500 INJECTION, POWDER, LYOPHILIZED, FOR SOLUTION INTRAVENOUS at 17:24

## 2024-11-13 RX ADMIN — FUROSEMIDE 20 MG: 10 INJECTION, SOLUTION INTRAMUSCULAR; INTRAVENOUS at 23:14

## 2024-11-13 RX ADMIN — IPRATROPIUM BROMIDE AND ALBUTEROL SULFATE 1 DOSE: .5; 3 SOLUTION RESPIRATORY (INHALATION) at 16:37

## 2024-11-13 RX ADMIN — HEPARIN SODIUM 18 UNITS/KG/HR: 10000 INJECTION, SOLUTION INTRAVENOUS at 23:51

## 2024-11-13 RX ADMIN — IPRATROPIUM BROMIDE AND ALBUTEROL SULFATE 1 DOSE: .5; 3 SOLUTION RESPIRATORY (INHALATION) at 18:24

## 2024-11-13 RX ADMIN — IOPAMIDOL 80 ML: 755 INJECTION, SOLUTION INTRAVENOUS at 20:17

## 2024-11-13 RX ADMIN — WATER 1000 MG: 1 INJECTION INTRAMUSCULAR; INTRAVENOUS; SUBCUTANEOUS at 17:18

## 2024-11-13 RX ADMIN — METHYLPREDNISOLONE SODIUM SUCCINATE 40 MG: 40 INJECTION, POWDER, LYOPHILIZED, FOR SOLUTION INTRAMUSCULAR; INTRAVENOUS at 23:12

## 2024-11-13 RX ADMIN — PIPERACILLIN AND TAZOBACTAM 3375 MG: 3; .375 INJECTION, POWDER, LYOPHILIZED, FOR SOLUTION INTRAVENOUS at 23:24

## 2024-11-13 RX ADMIN — WATER 125 MG: 1 INJECTION INTRAMUSCULAR; INTRAVENOUS; SUBCUTANEOUS at 16:37

## 2024-11-13 ASSESSMENT — PAIN SCALES - GENERAL: PAINLEVEL_OUTOF10: 0

## 2024-11-13 ASSESSMENT — PAIN - FUNCTIONAL ASSESSMENT: PAIN_FUNCTIONAL_ASSESSMENT: 0-10

## 2024-11-13 NOTE — ED NOTES
Pt medicated with IV antibiotics as ordered. Pt states she feels like she is breathing a little better, but is still anxious.

## 2024-11-13 NOTE — ED PROVIDER NOTES
EMERGENCY DEPARTMENT HISTORY AND PHYSICAL EXAM    7:40 PM      Date: 11/13/2024  Patient Name: Rina Prajapati    History of Presenting Illness     Chief Complaint   Patient presents with    Shortness of Breath       History From: Patient    Rina Prajapati is a 64 y.o. female   Patient is a 64-year-old female with a history of SVC syndrome, non-small cell lung carcinoma, hypertension, malnutrition, pulmonary edema, CHF, presents emergency department with several days of increasing shortness of breath and was planning to go have blood work drawn today to start a new round of chemotherapy but had increasing shortness of breath for the last 24 hours so eventually family was brought her to the emergency department for evaluation.  Patient says she has been short of breath with any movements and she has been coughing more.  Patient said that other people in the family are coughing as well.  Patient has also had some leg swelling and has been compliant with all of her medications.  Patient denies any other aggravating relieving factors.  Patient is not a current smoker, nonalcohol user, not a drug user.  The patient has an oxygen concentrator at home but it seems to be helping more in the hospital when she is getting oxygen.           Nursing Notes were all reviewed and agreed with or any disagreements were addressed in the HPI.    PCP: Cora Her MD    Current Facility-Administered Medications   Medication Dose Route Frequency Provider Last Rate Last Admin    iopamidol (ISOVUE-370) 76 % injection 80 mL  80 mL IntraVENous ONCE PRN Jameel Ibarra MD         Current Outpatient Medications   Medication Sig Dispense Refill    spironolactone (ALDACTONE) 25 MG tablet Take 1 tablet by mouth daily 30 tablet 0    montelukast (SINGULAIR) 10 MG tablet Take 1 tablet by mouth daily Take by mouth 30 tablet 11    sertraline (ZOLOFT) 50 MG tablet TAKE 1 TABLET BY MOUTH DAILY 90 tablet 1    levothyroxine (SYNTHROID) 
No

## 2024-11-13 NOTE — ED TRIAGE NOTES
Patient came to room in wheelchair, patient has lung cancer on oxygen at home 2.5 L, today complains of SOB.     Patient to start new chemotherapy treatment tomorrow old treatment stopped working.

## 2024-11-13 NOTE — ED NOTES
Pt requested another breathing tx. Pt sitting up in bed, labored breathing. MD at bedside. Duoneb ordered and administered.

## 2024-11-14 ENCOUNTER — APPOINTMENT (OUTPATIENT)
Facility: HOSPITAL | Age: 64
DRG: 871 | End: 2024-11-14
Payer: MEDICARE

## 2024-11-14 ENCOUNTER — APPOINTMENT (OUTPATIENT)
Facility: HOSPITAL | Age: 64
DRG: 871 | End: 2024-11-14
Attending: INTERNAL MEDICINE
Payer: MEDICARE

## 2024-11-14 PROBLEM — I42.9 CARDIOMYOPATHY (HCC): Status: ACTIVE | Noted: 2024-11-14

## 2024-11-14 PROBLEM — I50.33 ACUTE ON CHRONIC HEART FAILURE WITH PRESERVED EJECTION FRACTION (HCC): Status: ACTIVE | Noted: 2024-11-14

## 2024-11-14 PROBLEM — R79.89 ELEVATED TROPONIN: Status: ACTIVE | Noted: 2024-11-14

## 2024-11-14 LAB
ALBUMIN SERPL-MCNC: 2.5 G/DL (ref 3.4–5)
ANION GAP SERPL CALC-SCNC: 10 MMOL/L (ref 3–18)
ANION GAP SERPL CALC-SCNC: 4 MMOL/L (ref 3–18)
APTT PPP: 47.2 SEC (ref 23–36.4)
APTT PPP: 55.7 SEC (ref 23–36.4)
ARTERIAL PATENCY WRIST A: POSITIVE
B PERT DNA SPEC QL NAA+PROBE: NOT DETECTED
BASE EXCESS BLD CALC-SCNC: 5.9 MMOL/L
BASOPHILS # BLD: 0 K/UL (ref 0–0.1)
BASOPHILS NFR BLD: 0 % (ref 0–2)
BDY SITE: ABNORMAL
BORDETELLA PARAPERTUSSIS BY PCR: NOT DETECTED
BUN SERPL-MCNC: 12 MG/DL (ref 7–18)
BUN SERPL-MCNC: 16 MG/DL (ref 7–18)
BUN/CREAT SERPL: 18 (ref 12–20)
BUN/CREAT SERPL: 22 (ref 12–20)
C PNEUM DNA SPEC QL NAA+PROBE: NOT DETECTED
CALCIUM SERPL-MCNC: 8.8 MG/DL (ref 8.5–10.1)
CALCIUM SERPL-MCNC: 9.1 MG/DL (ref 8.5–10.1)
CHLORIDE SERPL-SCNC: 100 MMOL/L (ref 100–111)
CHLORIDE SERPL-SCNC: 99 MMOL/L (ref 100–111)
CO2 SERPL-SCNC: 28 MMOL/L (ref 21–32)
CO2 SERPL-SCNC: 31 MMOL/L (ref 21–32)
CREAT SERPL-MCNC: 0.68 MG/DL (ref 0.6–1.3)
CREAT SERPL-MCNC: 0.74 MG/DL (ref 0.6–1.3)
DIFFERENTIAL METHOD BLD: ABNORMAL
ECHO BSA: 1.66 M2
ECHO EST RA PRESSURE: 15 MMHG
ECHO LV EF PHYSICIAN: 45 %
ECHO LV FRACTIONAL SHORTENING: 19 % (ref 28–44)
ECHO LV INTERNAL DIMENSION DIASTOLE INDEX: 2.51 CM/M2
ECHO LV INTERNAL DIMENSION DIASTOLIC: 4.2 CM (ref 3.9–5.3)
ECHO LV INTERNAL DIMENSION SYSTOLIC INDEX: 2.04 CM/M2
ECHO LV INTERNAL DIMENSION SYSTOLIC: 3.4 CM
ECHO LV IVSD: 0.8 CM (ref 0.6–0.9)
ECHO LV MASS 2D: 93 G (ref 67–162)
ECHO LV MASS INDEX 2D: 55.7 G/M2 (ref 43–95)
ECHO LV POSTERIOR WALL DIASTOLIC: 0.7 CM (ref 0.6–0.9)
ECHO LV RELATIVE WALL THICKNESS RATIO: 0.33
ECHO RA END SYSTOLIC VOLUME APICAL 4 CHAMBER INDEX BSA: 28 ML/M2
ECHO RA VOLUME BIPLANE METHOD OF DISKS: 44 ML
ECHO RA VOLUME INDEX BP: 26 ML/M2
ECHO RA VOLUME: 46 ML
ECHO RIGHT VENTRICULAR SYSTOLIC PRESSURE (RVSP): 45 MMHG
ECHO RV BASAL DIMENSION: 4.8 CM
ECHO RV TAPSE: 2 CM (ref 1.7–?)
ECHO TV REGURGITANT MAX VELOCITY: 2.75 M/S
ECHO TV REGURGITANT PEAK GRADIENT: 30 MMHG
EKG ATRIAL RATE: 112 BPM
EKG DIAGNOSIS: NORMAL
EKG P AXIS: 112 DEGREES
EKG P-R INTERVAL: 120 MS
EKG Q-T INTERVAL: 440 MS
EKG QRS DURATION: 70 MS
EKG QTC CALCULATION (BAZETT): 600 MS
EKG R AXIS: 87 DEGREES
EKG T AXIS: 250 DEGREES
EKG VENTRICULAR RATE: 112 BPM
EOSINOPHIL # BLD: 0 K/UL (ref 0–0.4)
EOSINOPHIL NFR BLD: 0 % (ref 0–5)
ERYTHROCYTE [DISTWIDTH] IN BLOOD BY AUTOMATED COUNT: 18.1 % (ref 11.6–14.5)
FLUAV SUBTYP SPEC NAA+PROBE: NOT DETECTED
FLUBV RNA SPEC QL NAA+PROBE: NOT DETECTED
GAS FLOW.O2 O2 DELIVERY SYS: ABNORMAL
GLUCOSE BLD STRIP.AUTO-MCNC: 175 MG/DL (ref 70–110)
GLUCOSE BLD STRIP.AUTO-MCNC: 200 MG/DL (ref 70–110)
GLUCOSE BLD STRIP.AUTO-MCNC: 254 MG/DL (ref 70–110)
GLUCOSE SERPL-MCNC: 170 MG/DL (ref 74–99)
GLUCOSE SERPL-MCNC: 222 MG/DL (ref 74–99)
HADV DNA SPEC QL NAA+PROBE: NOT DETECTED
HCO3 BLD-SCNC: 31.2 MMOL/L (ref 21–28)
HCOV 229E RNA SPEC QL NAA+PROBE: NOT DETECTED
HCOV HKU1 RNA SPEC QL NAA+PROBE: NOT DETECTED
HCOV NL63 RNA SPEC QL NAA+PROBE: NOT DETECTED
HCOV OC43 RNA SPEC QL NAA+PROBE: NOT DETECTED
HCT VFR BLD AUTO: 29.1 % (ref 35–45)
HGB BLD-MCNC: 9 G/DL (ref 12–16)
HMPV RNA SPEC QL NAA+PROBE: NOT DETECTED
HPIV1 RNA SPEC QL NAA+PROBE: NOT DETECTED
HPIV2 RNA SPEC QL NAA+PROBE: NOT DETECTED
HPIV3 RNA SPEC QL NAA+PROBE: NOT DETECTED
HPIV4 RNA SPEC QL NAA+PROBE: NOT DETECTED
IMM GRANULOCYTES # BLD AUTO: 0.1 K/UL (ref 0–0.04)
IMM GRANULOCYTES NFR BLD AUTO: 1 % (ref 0–0.5)
LYMPHOCYTES # BLD: 0.4 K/UL (ref 0.9–3.6)
LYMPHOCYTES NFR BLD: 5 % (ref 21–52)
M PNEUMO DNA SPEC QL NAA+PROBE: NOT DETECTED
MAGNESIUM SERPL-MCNC: 1.6 MG/DL (ref 1.6–2.6)
MCH RBC QN AUTO: 25.7 PG (ref 24–34)
MCHC RBC AUTO-ENTMCNC: 30.9 G/DL (ref 31–37)
MCV RBC AUTO: 83.1 FL (ref 78–100)
MONOCYTES # BLD: 0.1 K/UL (ref 0.05–1.2)
MONOCYTES NFR BLD: 1 % (ref 3–10)
NEUTS SEG # BLD: 7.5 K/UL (ref 1.8–8)
NEUTS SEG NFR BLD: 93 % (ref 40–73)
NRBC # BLD: 0 K/UL (ref 0–0.01)
NRBC BLD-RTO: 0 PER 100 WBC
O2/TOTAL GAS SETTING VFR VENT: 55 %
PCO2 BLD: 47.5 MMHG (ref 35–48)
PH BLD: 7.43 (ref 7.35–7.45)
PHOSPHATE SERPL-MCNC: 4.7 MG/DL (ref 2.5–4.9)
PLATELET # BLD AUTO: 307 K/UL (ref 135–420)
PMV BLD AUTO: 10.7 FL (ref 9.2–11.8)
PO2 BLD: 84 MMHG (ref 83–108)
POTASSIUM SERPL-SCNC: 3.4 MMOL/L (ref 3.5–5.5)
POTASSIUM SERPL-SCNC: 3.4 MMOL/L (ref 3.5–5.5)
PROCALCITONIN SERPL-MCNC: 0.11 NG/ML
RBC # BLD AUTO: 3.5 M/UL (ref 4.2–5.3)
RSV RNA SPEC QL NAA+PROBE: NOT DETECTED
RV+EV RNA SPEC QL NAA+PROBE: NOT DETECTED
SAO2 % BLD: 96.4 % (ref 92–97)
SARS-COV-2 RNA RESP QL NAA+PROBE: NOT DETECTED
SERVICE CMNT-IMP: ABNORMAL
SODIUM SERPL-SCNC: 134 MMOL/L (ref 136–145)
SODIUM SERPL-SCNC: 138 MMOL/L (ref 136–145)
SPECIMEN TYPE: ABNORMAL
WBC # BLD AUTO: 8.1 K/UL (ref 4.6–13.2)

## 2024-11-14 PROCEDURE — 6370000000 HC RX 637 (ALT 250 FOR IP): Performed by: HOSPITALIST

## 2024-11-14 PROCEDURE — 6370000000 HC RX 637 (ALT 250 FOR IP): Performed by: PHYSICIAN ASSISTANT

## 2024-11-14 PROCEDURE — C8924 2D TTE W OR W/O FOL W/CON,FU: HCPCS

## 2024-11-14 PROCEDURE — 6360000004 HC RX CONTRAST MEDICATION: Performed by: INTERNAL MEDICINE

## 2024-11-14 PROCEDURE — 84145 PROCALCITONIN (PCT): CPT

## 2024-11-14 PROCEDURE — 0202U NFCT DS 22 TRGT SARS-COV-2: CPT

## 2024-11-14 PROCEDURE — 93970 EXTREMITY STUDY: CPT | Performed by: SURGERY

## 2024-11-14 PROCEDURE — 99232 SBSQ HOSP IP/OBS MODERATE 35: CPT | Performed by: HOSPITALIST

## 2024-11-14 PROCEDURE — 2580000003 HC RX 258: Performed by: STUDENT IN AN ORGANIZED HEALTH CARE EDUCATION/TRAINING PROGRAM

## 2024-11-14 PROCEDURE — 94761 N-INVAS EAR/PLS OXIMETRY MLT: CPT

## 2024-11-14 PROCEDURE — 80069 RENAL FUNCTION PANEL: CPT

## 2024-11-14 PROCEDURE — 6370000000 HC RX 637 (ALT 250 FOR IP): Performed by: STUDENT IN AN ORGANIZED HEALTH CARE EDUCATION/TRAINING PROGRAM

## 2024-11-14 PROCEDURE — 6360000002 HC RX W HCPCS: Performed by: STUDENT IN AN ORGANIZED HEALTH CARE EDUCATION/TRAINING PROGRAM

## 2024-11-14 PROCEDURE — 99223 1ST HOSP IP/OBS HIGH 75: CPT | Performed by: INTERNAL MEDICINE

## 2024-11-14 PROCEDURE — 6360000002 HC RX W HCPCS: Performed by: PHYSICIAN ASSISTANT

## 2024-11-14 PROCEDURE — 94640 AIRWAY INHALATION TREATMENT: CPT

## 2024-11-14 PROCEDURE — 93970 EXTREMITY STUDY: CPT

## 2024-11-14 PROCEDURE — 80048 BASIC METABOLIC PNL TOTAL CA: CPT

## 2024-11-14 PROCEDURE — 83735 ASSAY OF MAGNESIUM: CPT

## 2024-11-14 PROCEDURE — 93010 ELECTROCARDIOGRAM REPORT: CPT | Performed by: INTERNAL MEDICINE

## 2024-11-14 PROCEDURE — 36415 COLL VENOUS BLD VENIPUNCTURE: CPT

## 2024-11-14 PROCEDURE — 82803 BLOOD GASES ANY COMBINATION: CPT

## 2024-11-14 PROCEDURE — 2580000003 HC RX 258: Performed by: PHYSICIAN ASSISTANT

## 2024-11-14 PROCEDURE — 1100000000 HC RM PRIVATE

## 2024-11-14 PROCEDURE — 36600 WITHDRAWAL OF ARTERIAL BLOOD: CPT

## 2024-11-14 PROCEDURE — 82962 GLUCOSE BLOOD TEST: CPT

## 2024-11-14 PROCEDURE — 2700000000 HC OXYGEN THERAPY PER DAY

## 2024-11-14 PROCEDURE — 85730 THROMBOPLASTIN TIME PARTIAL: CPT

## 2024-11-14 PROCEDURE — 99222 1ST HOSP IP/OBS MODERATE 55: CPT | Performed by: INTERNAL MEDICINE

## 2024-11-14 PROCEDURE — 85025 COMPLETE CBC W/AUTO DIFF WBC: CPT

## 2024-11-14 RX ORDER — FUROSEMIDE 10 MG/ML
20 INJECTION INTRAMUSCULAR; INTRAVENOUS DAILY
Status: DISCONTINUED | OUTPATIENT
Start: 2024-11-15 | End: 2024-11-19

## 2024-11-14 RX ORDER — MECOBALAMIN 5000 MCG
5 TABLET,DISINTEGRATING ORAL NIGHTLY
Status: DISCONTINUED | OUTPATIENT
Start: 2024-11-14 | End: 2024-11-20 | Stop reason: HOSPADM

## 2024-11-14 RX ORDER — MULTIVITAMIN WITH IRON
1 TABLET ORAL DAILY
Status: DISCONTINUED | OUTPATIENT
Start: 2024-11-14 | End: 2024-11-20 | Stop reason: HOSPADM

## 2024-11-14 RX ORDER — BENZONATATE 100 MG/1
100 CAPSULE ORAL 3 TIMES DAILY PRN
Status: DISCONTINUED | OUTPATIENT
Start: 2024-11-14 | End: 2024-11-20 | Stop reason: HOSPADM

## 2024-11-14 RX ORDER — LANOLIN ALCOHOL/MO/W.PET/CERES
400 CREAM (GRAM) TOPICAL DAILY
Status: COMPLETED | OUTPATIENT
Start: 2024-11-14 | End: 2024-11-19

## 2024-11-14 RX ORDER — INSULIN LISPRO 100 [IU]/ML
0-8 INJECTION, SOLUTION INTRAVENOUS; SUBCUTANEOUS
Status: DISCONTINUED | OUTPATIENT
Start: 2024-11-14 | End: 2024-11-20 | Stop reason: HOSPADM

## 2024-11-14 RX ORDER — POTASSIUM CHLORIDE 1500 MG/1
20 TABLET, EXTENDED RELEASE ORAL ONCE
Status: COMPLETED | OUTPATIENT
Start: 2024-11-14 | End: 2024-11-14

## 2024-11-14 RX ADMIN — METHYLPREDNISOLONE SODIUM SUCCINATE 40 MG: 40 INJECTION, POWDER, LYOPHILIZED, FOR SOLUTION INTRAMUSCULAR; INTRAVENOUS at 12:23

## 2024-11-14 RX ADMIN — AZITHROMYCIN MONOHYDRATE 500 MG: 500 INJECTION, POWDER, LYOPHILIZED, FOR SOLUTION INTRAVENOUS at 16:18

## 2024-11-14 RX ADMIN — HEPARIN SODIUM 4700 UNITS: 1000 INJECTION INTRAVENOUS; SUBCUTANEOUS at 18:36

## 2024-11-14 RX ADMIN — PIPERACILLIN AND TAZOBACTAM 3375 MG: 3; .375 INJECTION, POWDER, LYOPHILIZED, FOR SOLUTION INTRAVENOUS at 22:28

## 2024-11-14 RX ADMIN — BUDESONIDE 1 MG: 1 SUSPENSION RESPIRATORY (INHALATION) at 00:36

## 2024-11-14 RX ADMIN — Medication 5 MG: at 02:44

## 2024-11-14 RX ADMIN — INSULIN LISPRO 4 UNITS: 100 INJECTION, SOLUTION INTRAVENOUS; SUBCUTANEOUS at 20:55

## 2024-11-14 RX ADMIN — IPRATROPIUM BROMIDE AND ALBUTEROL SULFATE 1 DOSE: .5; 3 SOLUTION RESPIRATORY (INHALATION) at 09:46

## 2024-11-14 RX ADMIN — IPRATROPIUM BROMIDE AND ALBUTEROL SULFATE 1 DOSE: .5; 3 SOLUTION RESPIRATORY (INHALATION) at 03:34

## 2024-11-14 RX ADMIN — ARFORMOTEROL TARTRATE 15 MCG: 15 SOLUTION RESPIRATORY (INHALATION) at 20:16

## 2024-11-14 RX ADMIN — METHYLPREDNISOLONE SODIUM SUCCINATE 40 MG: 40 INJECTION, POWDER, LYOPHILIZED, FOR SOLUTION INTRAMUSCULAR; INTRAVENOUS at 22:19

## 2024-11-14 RX ADMIN — IPRATROPIUM BROMIDE AND ALBUTEROL SULFATE 1 DOSE: .5; 3 SOLUTION RESPIRATORY (INHALATION) at 20:16

## 2024-11-14 RX ADMIN — ARFORMOTEROL TARTRATE 15 MCG: 15 SOLUTION RESPIRATORY (INHALATION) at 00:36

## 2024-11-14 RX ADMIN — Medication 5 MG: at 20:56

## 2024-11-14 RX ADMIN — PIPERACILLIN AND TAZOBACTAM 3375 MG: 3; .375 INJECTION, POWDER, LYOPHILIZED, FOR SOLUTION INTRAVENOUS at 09:02

## 2024-11-14 RX ADMIN — ARFORMOTEROL TARTRATE 15 MCG: 15 SOLUTION RESPIRATORY (INHALATION) at 09:46

## 2024-11-14 RX ADMIN — HEPARIN SODIUM 4700 UNITS: 1000 INJECTION INTRAVENOUS; SUBCUTANEOUS at 07:09

## 2024-11-14 RX ADMIN — IPRATROPIUM BROMIDE AND ALBUTEROL SULFATE 1 DOSE: .5; 3 SOLUTION RESPIRATORY (INHALATION) at 00:36

## 2024-11-14 RX ADMIN — BUDESONIDE 1 MG: 1 SUSPENSION RESPIRATORY (INHALATION) at 09:46

## 2024-11-14 RX ADMIN — METHYLPREDNISOLONE SODIUM SUCCINATE 40 MG: 40 INJECTION, POWDER, LYOPHILIZED, FOR SOLUTION INTRAMUSCULAR; INTRAVENOUS at 05:56

## 2024-11-14 RX ADMIN — IPRATROPIUM BROMIDE AND ALBUTEROL SULFATE 1 DOSE: .5; 3 SOLUTION RESPIRATORY (INHALATION) at 23:15

## 2024-11-14 RX ADMIN — PIPERACILLIN AND TAZOBACTAM 3375 MG: 3; .375 INJECTION, POWDER, LYOPHILIZED, FOR SOLUTION INTRAVENOUS at 15:19

## 2024-11-14 RX ADMIN — VANCOMYCIN HYDROCHLORIDE 1000 MG: 1 INJECTION, POWDER, LYOPHILIZED, FOR SOLUTION INTRAVENOUS at 17:41

## 2024-11-14 RX ADMIN — POTASSIUM BICARBONATE 20 MEQ: 782 TABLET, EFFERVESCENT ORAL at 20:55

## 2024-11-14 RX ADMIN — INSULIN LISPRO 2 UNITS: 100 INJECTION, SOLUTION INTRAVENOUS; SUBCUTANEOUS at 16:40

## 2024-11-14 RX ADMIN — PERFLUTREN 2 ML: 6.52 INJECTION, SUSPENSION INTRAVENOUS at 11:45

## 2024-11-14 RX ADMIN — VANCOMYCIN 1500 MG: 1.5 INJECTION, SOLUTION INTRAVENOUS at 06:08

## 2024-11-14 RX ADMIN — BUDESONIDE 1 MG: 1 SUSPENSION RESPIRATORY (INHALATION) at 20:16

## 2024-11-14 RX ADMIN — POTASSIUM CHLORIDE 20 MEQ: 1500 TABLET, EXTENDED RELEASE ORAL at 12:22

## 2024-11-14 RX ADMIN — METHYLPREDNISOLONE SODIUM SUCCINATE 40 MG: 40 INJECTION, POWDER, LYOPHILIZED, FOR SOLUTION INTRAMUSCULAR; INTRAVENOUS at 16:13

## 2024-11-14 RX ADMIN — IPRATROPIUM BROMIDE AND ALBUTEROL SULFATE 1 DOSE: .5; 3 SOLUTION RESPIRATORY (INHALATION) at 17:58

## 2024-11-14 RX ADMIN — Medication 400 MG: at 17:44

## 2024-11-14 RX ADMIN — HEPARIN SODIUM 24 UNITS/KG/HR: 10000 INJECTION, SOLUTION INTRAVENOUS at 22:16

## 2024-11-14 RX ADMIN — PANTOPRAZOLE SODIUM 40 MG: 40 INJECTION, POWDER, FOR SOLUTION INTRAVENOUS at 08:57

## 2024-11-14 ASSESSMENT — PAIN SCALES - GENERAL: PAINLEVEL_OUTOF10: 0

## 2024-11-14 NOTE — H&P
History & Physical    Patient: Rina Prajapati MRN: [id number]  CSN: [id number]    YOB: 1960  Age: 64 y.o.  Sex: female      DOA: 11/13/2024  CC:     PCP: Cora Her MD       HPI:     Rina Prajapati is a 64 y.o. female with a past medical history of non-small cell carcinoma on active chemo, DVT/PE, SVC syndrome, CHF preserved ejection fraction, COPD Gold stage III, group D, tobacco abuse presenting to [facility] from [facility] where she presented on 11/13 with worsening shortness of breath.  She is being admitted for hypoxic respiratory failure.  Patient reports she has been decompensating over the last 5 days with increasing shortness of breath.  However she notes that over the last 3 weeks she has had to cut down on her smoking of tobacco and marijuana due to inability to inhale.  Review of system was positive for fatigue. she denies fevers, chills, abdominal pain, anorexia, body aches, sick contacts, chest pain .  She does admit to swelling in her lower and upper extremities but this is chronic and not much worse than her baseline . patient's last chemo treatment was 10/30-appears to be Taxotere.  Patient states she was switching to a new form chemo as his chemo has not been working.  She follows with Dr. Li outpatient.     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

## 2024-11-14 NOTE — CARE COORDINATION
Attempted to see pt for CM initial assessment but pt eating lunch at this time.          BALTAZAR CheN RN  Care Management

## 2024-11-14 NOTE — ED NOTES
Per Dr. Ibarra, place patient on high flow. Placed on high flow   15L/ min, 37 C, 30%. Remained at 82%.     Increased to 20L, 37 C, 40%/ O2 increased to 90-94%. Will remain on these settings. RR decreased, and work of breathing improved. Patient states she feels relief of anxiety

## 2024-11-14 NOTE — ACP (ADVANCE CARE PLANNING)
Advance Care Planning     Palliative Team Advance Care Planning (ACP) Conversation    Date of Conversation: 11/14/24     ACP documents on file prior to discussion:  -Power of  for Healthcare    Healthcare Decision Maker:    Primary Decision Maker: Radha Jolly - Brother/Sister - [phone]    Secondary Decision Maker: Dylan Hector - Other - [phone]     Conversation Summary:    Palliative team visited patient for new consult. She is lying in bed with head elevated. RT at bedside administering breathing treatment. Patient is awake and alert. Remains on hi paola oxygen at 25L/50%. Endorses some back discomfort due to the \"uncomfortable\" bed. Denies any other pain at this time.    Patient does have an Advanced Medical Directive on file naming her sister Radha as her primary medical decision maker, and her brother Dylan as the secondary decision maker.  She is followed by Dr. Li and is currently receiving chemo treatments. Is planning on continuing with her treatments. States that Dr. Li was in to see her earlier this morning.     Goals of care discussion regarding the benefits and burdens of intubation and CPR in the event of respiratory decline or cardiopulmonary arrest. Patient voiced understanding, states that she would be accepting of \"CPR\", but does not want to be on \"life support\" long term. Encouraged patient to talk with her brother and sister to make sure they are aware of her wishes.  Patient was very appreciative of our visit and information.    Palliative team remains available to provide support to Ms Prajapati and her family during this hospital stay.    Resuscitation Status:   Code Status: Full Code     Documentation Completed:  -No new documents completed.    Beatrice Morales RN  Palliative Medicine Inpatient RN  Palliative COPE Line: [phone]

## 2024-11-14 NOTE — ED NOTES
All proper paperwork signed and reviewed with charge nurse then handed over to transport team (TMT)  Complete report given to transport team, all questions and concerns addressed.   Personal belongings collected by patient / family member and placed into belongings bag, then given to transport team.     Patient cleansed, new brief placed. Patient SOB with turning. Placed on NRB for transport and this improved her SOB.     Patient is TULIO with transport team.

## 2024-11-14 NOTE — ED NOTES
TRANSFER - OUT REPORT:    Verbal report given to Alexis MACIAS RN on Rina Prajapati  being transferred to [facility] 2313 for change in patient condition (admission)       Report consisted of patient's Situation, Background, Assessment and   Recommendations(SBAR).     Information from the following report(s) Nurse Handoff Report, Index, ED Encounter Summary, ED SBAR, Adult Overview, and Surgery Report was reviewed with the receiving nurse.    [place] Fall Assessment:    Presents to emergency department  because of falls (Syncope, seizure, or loss of consciousness): No  Age > 70: No  Altered Mental Status, Intoxication with alcohol or substance confusion (Disorientation, impaired judgment, poor safety awaremess, or inability to follow instructions): No  Impaired Mobility: Ambulates or transfers with assistive devices or assistance; Unable to ambulate or transer.: No  Nursing Judgement: No          Lines:   PICC 10/10/24 Left Brachial (Active)       Peripheral IV 11/13/24 Right Antecubital (Active)   Site Assessment Clean, dry & intact 11/13/24 1540   Line Status Blood return noted 11/13/24 1540   Line Care Connections checked and tightened 11/13/24 1540   Dressing Type Transparent 11/13/24 1540       Peripheral IV 11/13/24 Posterior;Right Hand (Active)        Opportunity for questions and clarification was provided.      Patient transported with:  Monitor and high flow / NRB

## 2024-11-14 NOTE — CARE COORDINATION
11/14/24 1512   IMM Letter   IMM Letter given to Patient/Family/Significant other/Guardian/POA/by: Sandy Lang   IMM Letter date given: 11/14/24   IMM Letter time given: 1505       Medicare pt has received, reviewed, and signed 2nd IM letter informing them of their right to appeal the discharge.  Signed copy has been placed on pt bedside chart.      MELLISSA Che RN  Care Management

## 2024-11-14 NOTE — CARE COORDINATION
11/14/24 1515   Service Assessment   Patient Orientation Alert and Oriented   Cognition Alert   History Provided By Patient   Primary Caregiver Self   Support Systems Spouse/Significant Other;Family Members   Patient's Healthcare Decision Maker is: Named in Scanned ACP Document   PCP Verified by CM Yes   Last Visit to PCP Within last 3 months   Prior Functional Level Independent in ADLs/IADLs   Current Functional Level Independent in ADLs/IADLs   Can patient return to prior living arrangement Yes   Ability to make needs known: Good   Family able to assist with home care needs: Yes   Would you like for me to discuss the discharge plan with any other family members/significant others, and if so, who? Yes  (her sister Sudhir)   Financial Resources Medicare   Community Resources None   Social/Functional History   Lives With Significant other  (lives with her tasha Calzada)   Type of Home House   Home Layout One level   Home Access Stairs to enter with rails   Entrance Stairs - Number of Steps 1   Entrance Stairs - Rails None   Bathroom Shower/Tub Tub/Shower unit   Bathroom Toilet Standard   Bathroom Equipment Grab bars in shower;Shower chair   Bathroom Accessibility Accessible   Home Equipment Oxygen;Walker - Rolling   Receives Help From Family   ADL Assistance Independent   Ambulation Assistance Independent   Transfer Assistance Independent   Active  No   Patient's  Info [place]   Mode of Transportation Car   Occupation Retired   Discharge Planning   Type of Residence House   Living Arrangements Spouse/Significant Other   Current Services Prior To Admission Oxygen Therapy   Potential Assistance Needed Home Care   DME Ordered? No   Potential Assistance Purchasing Medications No   Type of Home Care Services None   Patient expects to be discharged to: House   Services At/After Discharge   Transition of Care Consult (CM Consult) Home Health   Internal Home Health Yes   Services At/After Discharge

## 2024-11-15 PROBLEM — C34.90 MALIGNANT NEOPLASM OF LUNG (HCC): Status: ACTIVE | Noted: 2018-04-05

## 2024-11-15 PROBLEM — R09.02 HYPOXIA: Status: ACTIVE | Noted: 2024-11-15

## 2024-11-15 PROBLEM — C34.91 NON-SMALL CELL CANCER OF RIGHT LUNG (HCC): Status: ACTIVE | Noted: 2024-11-15

## 2024-11-15 PROBLEM — Z51.5 PALLIATIVE CARE ENCOUNTER: Status: ACTIVE | Noted: 2024-11-15

## 2024-11-15 PROBLEM — Z71.89 GOALS OF CARE, COUNSELING/DISCUSSION: Status: ACTIVE | Noted: 2024-11-15

## 2024-11-15 LAB
ANION GAP SERPL CALC-SCNC: 8 MMOL/L (ref 3–18)
APTT PPP: 83.4 SEC (ref 23–36.4)
APTT PPP: 93.6 SEC (ref 23–36.4)
APTT PPP: >180 SEC (ref 23–36.4)
BACTERIA SPEC CULT: NORMAL
BACTERIA SPEC CULT: NORMAL
BUN SERPL-MCNC: 20 MG/DL (ref 7–18)
BUN/CREAT SERPL: 29 (ref 12–20)
CALCIUM SERPL-MCNC: 9 MG/DL (ref 8.5–10.1)
CHLORIDE SERPL-SCNC: 102 MMOL/L (ref 100–111)
CO2 SERPL-SCNC: 29 MMOL/L (ref 21–32)
CREAT SERPL-MCNC: 0.68 MG/DL (ref 0.6–1.3)
ERYTHROCYTE [DISTWIDTH] IN BLOOD BY AUTOMATED COUNT: 18.3 % (ref 11.6–14.5)
EST. AVERAGE GLUCOSE BLD GHB EST-MCNC: 108 MG/DL
GLUCOSE BLD STRIP.AUTO-MCNC: 154 MG/DL (ref 70–110)
GLUCOSE BLD STRIP.AUTO-MCNC: 183 MG/DL (ref 70–110)
GLUCOSE BLD STRIP.AUTO-MCNC: 196 MG/DL (ref 70–110)
GLUCOSE BLD STRIP.AUTO-MCNC: 223 MG/DL (ref 70–110)
GLUCOSE SERPL-MCNC: 155 MG/DL (ref 74–99)
HBA1C MFR BLD: 5.4 % (ref 4.2–5.6)
HCT VFR BLD AUTO: 27.6 % (ref 35–45)
HGB BLD-MCNC: 8.6 G/DL (ref 12–16)
MCH RBC QN AUTO: 26.1 PG (ref 24–34)
MCHC RBC AUTO-ENTMCNC: 31.2 G/DL (ref 31–37)
MCV RBC AUTO: 83.6 FL (ref 78–100)
NRBC # BLD: 0 K/UL (ref 0–0.01)
NRBC BLD-RTO: 0 PER 100 WBC
PHOSPHATE SERPL-MCNC: 3.9 MG/DL (ref 2.5–4.9)
PLATELET # BLD AUTO: 305 K/UL (ref 135–420)
PMV BLD AUTO: 10.1 FL (ref 9.2–11.8)
POTASSIUM SERPL-SCNC: 3.6 MMOL/L (ref 3.5–5.5)
RBC # BLD AUTO: 3.3 M/UL (ref 4.2–5.3)
SERVICE CMNT-IMP: NORMAL
SODIUM SERPL-SCNC: 139 MMOL/L (ref 136–145)
TSH SERPL DL<=0.05 MIU/L-ACNC: 1.93 UIU/ML (ref 0.36–3.74)
VANCOMYCIN SERPL-MCNC: 15 UG/ML (ref 5–40)
WBC # BLD AUTO: 15.2 K/UL (ref 4.6–13.2)

## 2024-11-15 PROCEDURE — 94640 AIRWAY INHALATION TREATMENT: CPT

## 2024-11-15 PROCEDURE — 2580000003 HC RX 258: Performed by: PHYSICIAN ASSISTANT

## 2024-11-15 PROCEDURE — 6370000000 HC RX 637 (ALT 250 FOR IP): Performed by: HOSPITALIST

## 2024-11-15 PROCEDURE — 6360000002 HC RX W HCPCS: Performed by: STUDENT IN AN ORGANIZED HEALTH CARE EDUCATION/TRAINING PROGRAM

## 2024-11-15 PROCEDURE — 97530 THERAPEUTIC ACTIVITIES: CPT

## 2024-11-15 PROCEDURE — 99232 SBSQ HOSP IP/OBS MODERATE 35: CPT | Performed by: INTERNAL MEDICINE

## 2024-11-15 PROCEDURE — 84443 ASSAY THYROID STIM HORMONE: CPT

## 2024-11-15 PROCEDURE — 36415 COLL VENOUS BLD VENIPUNCTURE: CPT

## 2024-11-15 PROCEDURE — 97166 OT EVAL MOD COMPLEX 45 MIN: CPT

## 2024-11-15 PROCEDURE — 6370000000 HC RX 637 (ALT 250 FOR IP): Performed by: PHYSICIAN ASSISTANT

## 2024-11-15 PROCEDURE — 2580000003 HC RX 258: Performed by: STUDENT IN AN ORGANIZED HEALTH CARE EDUCATION/TRAINING PROGRAM

## 2024-11-15 PROCEDURE — 94761 N-INVAS EAR/PLS OXIMETRY MLT: CPT

## 2024-11-15 PROCEDURE — 2700000000 HC OXYGEN THERAPY PER DAY

## 2024-11-15 PROCEDURE — 85730 THROMBOPLASTIN TIME PARTIAL: CPT

## 2024-11-15 PROCEDURE — 99223 1ST HOSP IP/OBS HIGH 75: CPT | Performed by: INTERNAL MEDICINE

## 2024-11-15 PROCEDURE — 80202 ASSAY OF VANCOMYCIN: CPT

## 2024-11-15 PROCEDURE — 80048 BASIC METABOLIC PNL TOTAL CA: CPT

## 2024-11-15 PROCEDURE — 6370000000 HC RX 637 (ALT 250 FOR IP): Performed by: HEALTH CARE PROVIDER

## 2024-11-15 PROCEDURE — 85027 COMPLETE CBC AUTOMATED: CPT

## 2024-11-15 PROCEDURE — 6370000000 HC RX 637 (ALT 250 FOR IP): Performed by: STUDENT IN AN ORGANIZED HEALTH CARE EDUCATION/TRAINING PROGRAM

## 2024-11-15 PROCEDURE — 6360000002 HC RX W HCPCS: Performed by: PHYSICIAN ASSISTANT

## 2024-11-15 PROCEDURE — 84100 ASSAY OF PHOSPHORUS: CPT

## 2024-11-15 PROCEDURE — 83036 HEMOGLOBIN GLYCOSYLATED A1C: CPT

## 2024-11-15 PROCEDURE — 82962 GLUCOSE BLOOD TEST: CPT

## 2024-11-15 PROCEDURE — 99232 SBSQ HOSP IP/OBS MODERATE 35: CPT | Performed by: HOSPITALIST

## 2024-11-15 PROCEDURE — 97535 SELF CARE MNGMENT TRAINING: CPT

## 2024-11-15 PROCEDURE — 93005 ELECTROCARDIOGRAM TRACING: CPT | Performed by: HOSPITALIST

## 2024-11-15 PROCEDURE — 97163 PT EVAL HIGH COMPLEX 45 MIN: CPT

## 2024-11-15 PROCEDURE — 6360000002 HC RX W HCPCS: Performed by: INTERNAL MEDICINE

## 2024-11-15 PROCEDURE — 1100000000 HC RM PRIVATE

## 2024-11-15 RX ORDER — MONTELUKAST SODIUM 10 MG/1
10 TABLET ORAL NIGHTLY
Status: DISCONTINUED | OUTPATIENT
Start: 2024-11-15 | End: 2024-11-20 | Stop reason: HOSPADM

## 2024-11-15 RX ORDER — VANCOMYCIN 1.75 G/350ML
1250 INJECTION, SOLUTION INTRAVENOUS
Status: DISCONTINUED | OUTPATIENT
Start: 2024-11-16 | End: 2024-11-16

## 2024-11-15 RX ORDER — PANTOPRAZOLE SODIUM 40 MG/1
40 TABLET, DELAYED RELEASE ORAL
Status: DISCONTINUED | OUTPATIENT
Start: 2024-11-15 | End: 2024-11-20 | Stop reason: HOSPADM

## 2024-11-15 RX ORDER — LORAZEPAM 2 MG/ML
0.5 INJECTION INTRAMUSCULAR EVERY 8 HOURS PRN
Status: DISCONTINUED | OUTPATIENT
Start: 2024-11-15 | End: 2024-11-20 | Stop reason: HOSPADM

## 2024-11-15 RX ORDER — IPRATROPIUM BROMIDE AND ALBUTEROL SULFATE 2.5; .5 MG/3ML; MG/3ML
1 SOLUTION RESPIRATORY (INHALATION) EVERY 4 HOURS PRN
Status: DISCONTINUED | OUTPATIENT
Start: 2024-11-15 | End: 2024-11-20 | Stop reason: HOSPADM

## 2024-11-15 RX ORDER — MORPHINE SULFATE 2 MG/ML
1 INJECTION, SOLUTION INTRAMUSCULAR; INTRAVENOUS EVERY 6 HOURS PRN
Status: DISCONTINUED | OUTPATIENT
Start: 2024-11-15 | End: 2024-11-20 | Stop reason: HOSPADM

## 2024-11-15 RX ADMIN — THERA TABS 1 TABLET: TAB at 10:14

## 2024-11-15 RX ADMIN — INSULIN LISPRO 2 UNITS: 100 INJECTION, SOLUTION INTRAVENOUS; SUBCUTANEOUS at 10:15

## 2024-11-15 RX ADMIN — VANCOMYCIN HYDROCHLORIDE 1000 MG: 1 INJECTION, POWDER, LYOPHILIZED, FOR SOLUTION INTRAVENOUS at 06:29

## 2024-11-15 RX ADMIN — METHYLPREDNISOLONE SODIUM SUCCINATE 40 MG: 40 INJECTION, POWDER, LYOPHILIZED, FOR SOLUTION INTRAMUSCULAR; INTRAVENOUS at 10:15

## 2024-11-15 RX ADMIN — IPRATROPIUM BROMIDE AND ALBUTEROL SULFATE 1 DOSE: .5; 3 SOLUTION RESPIRATORY (INHALATION) at 20:17

## 2024-11-15 RX ADMIN — BUDESONIDE 1 MG: 1 SUSPENSION RESPIRATORY (INHALATION) at 08:47

## 2024-11-15 RX ADMIN — HEPARIN SODIUM 21 UNITS/KG/HR: 10000 INJECTION, SOLUTION INTRAVENOUS at 19:26

## 2024-11-15 RX ADMIN — Medication 400 MG: at 10:14

## 2024-11-15 RX ADMIN — PIPERACILLIN AND TAZOBACTAM 3375 MG: 3; .375 INJECTION, POWDER, LYOPHILIZED, FOR SOLUTION INTRAVENOUS at 07:45

## 2024-11-15 RX ADMIN — PANTOPRAZOLE SODIUM 40 MG: 40 TABLET, DELAYED RELEASE ORAL at 10:21

## 2024-11-15 RX ADMIN — PIPERACILLIN AND TAZOBACTAM 3375 MG: 3; .375 INJECTION, POWDER, LYOPHILIZED, FOR SOLUTION INTRAVENOUS at 23:06

## 2024-11-15 RX ADMIN — IPRATROPIUM BROMIDE AND ALBUTEROL SULFATE 1 DOSE: .5; 3 SOLUTION RESPIRATORY (INHALATION) at 13:58

## 2024-11-15 RX ADMIN — METHYLPREDNISOLONE SODIUM SUCCINATE 40 MG: 40 INJECTION, POWDER, LYOPHILIZED, FOR SOLUTION INTRAMUSCULAR; INTRAVENOUS at 06:21

## 2024-11-15 RX ADMIN — ARFORMOTEROL TARTRATE 15 MCG: 15 SOLUTION RESPIRATORY (INHALATION) at 20:17

## 2024-11-15 RX ADMIN — AZITHROMYCIN MONOHYDRATE 500 MG: 500 INJECTION, POWDER, LYOPHILIZED, FOR SOLUTION INTRAVENOUS at 17:01

## 2024-11-15 RX ADMIN — IPRATROPIUM BROMIDE AND ALBUTEROL SULFATE 1 DOSE: .5; 3 SOLUTION RESPIRATORY (INHALATION) at 16:02

## 2024-11-15 RX ADMIN — METHYLPREDNISOLONE SODIUM SUCCINATE 40 MG: 40 INJECTION, POWDER, LYOPHILIZED, FOR SOLUTION INTRAMUSCULAR; INTRAVENOUS at 22:59

## 2024-11-15 RX ADMIN — METHYLPREDNISOLONE SODIUM SUCCINATE 40 MG: 40 INJECTION, POWDER, LYOPHILIZED, FOR SOLUTION INTRAMUSCULAR; INTRAVENOUS at 17:07

## 2024-11-15 RX ADMIN — INSULIN LISPRO 2 UNITS: 100 INJECTION, SOLUTION INTRAVENOUS; SUBCUTANEOUS at 16:55

## 2024-11-15 RX ADMIN — MONTELUKAST 10 MG: 10 TABLET, FILM COATED ORAL at 17:59

## 2024-11-15 RX ADMIN — IPRATROPIUM BROMIDE AND ALBUTEROL SULFATE 1 DOSE: .5; 3 SOLUTION RESPIRATORY (INHALATION) at 12:17

## 2024-11-15 RX ADMIN — FUROSEMIDE 20 MG: 10 INJECTION, SOLUTION INTRAMUSCULAR; INTRAVENOUS at 10:17

## 2024-11-15 RX ADMIN — IPRATROPIUM BROMIDE AND ALBUTEROL SULFATE 1 DOSE: .5; 3 SOLUTION RESPIRATORY (INHALATION) at 08:46

## 2024-11-15 RX ADMIN — IPRATROPIUM BROMIDE AND ALBUTEROL SULFATE 1 DOSE: .5; 3 SOLUTION RESPIRATORY (INHALATION) at 03:57

## 2024-11-15 RX ADMIN — ARFORMOTEROL TARTRATE 15 MCG: 15 SOLUTION RESPIRATORY (INHALATION) at 08:47

## 2024-11-15 RX ADMIN — BUDESONIDE 1 MG: 1 SUSPENSION RESPIRATORY (INHALATION) at 20:17

## 2024-11-15 RX ADMIN — Medication 5 MG: at 20:48

## 2024-11-15 RX ADMIN — IPRATROPIUM BROMIDE AND ALBUTEROL SULFATE 1 DOSE: .5; 3 SOLUTION RESPIRATORY (INHALATION) at 01:45

## 2024-11-15 RX ADMIN — PIPERACILLIN AND TAZOBACTAM 3375 MG: 3; .375 INJECTION, POWDER, LYOPHILIZED, FOR SOLUTION INTRAVENOUS at 15:46

## 2024-11-15 RX ADMIN — INSULIN LISPRO 2 UNITS: 100 INJECTION, SOLUTION INTRAVENOUS; SUBCUTANEOUS at 20:42

## 2024-11-15 ASSESSMENT — PAIN SCALES - GENERAL
PAINLEVEL_OUTOF10: 0

## 2024-11-15 NOTE — ACP (ADVANCE CARE PLANNING)
Advance Care Planning     Palliative Team Advance Care Planning (ACP) Conversation    Date of Conversation: 11/15/24       ACP documents on file prior to discussion:  -Power of  for Healthcare    Healthcare Decision Maker:    Primary Decision Maker: Radha Jolly - Brother/Sister - [phone]    Secondary Decision Maker: Dylan Hector - Brother/Sister - [phone]     Conversation Summary:    Follow up visit made to patient along with Palliative team member Dr. RASHMI Potts. Patient is resting comfortably in bed, she is awake and alert. Denies any pain or discomfort at this time. Respirations are easy with slight shortness of breath when speaking. Remains of hi paola oxygen at 20L/40%.   Patient continues to voice her wishes for Full Code, stating that she does not want to be on \"machines\" long term. She understands that her sister and brother will have to make the decisions as to when to take her off life support.  Patient was very appreciative of our visit.    Palliative team remains available to provide support to Ms Prajapati and her family during this hospital stay.    Resuscitation Status:   Code Status: Full Code     Documentation Completed:  -No new documents completed.    Beatrice Morales RN  Palliative Medicine Inpatient RN  Palliative COPE Line: [phone]

## 2024-11-15 NOTE — CONSULTS
[facility]  Pulmonary, Critical Care, and Sleep Medicine    Initial Patient Consult    Name: Rina Prajapati MRN: [id number]   : 1960 Hospital: [facility]   Date: 2024        IMPRESSION:   Acute on chronic hypoxic respiratory failure with high FiO2 requirements, likely multifactorial, with COPD exacerbation, NSCLC, diastolic HF decompensation, possible pneumonia vs non infectious pneumonitis. RVP negative  Severe sepsis with lactic acidosis and tachycardia and left shift  NSCLC on Taxotere, prev treated with Keytruda  COPD FEV1 in 2024 31% predicted with air trapping and reduced DLCO. On LTOT  Diastolic HF, decompensated  Hypokalemia  Hypomagnesemia    Current tobacco and MJ use  Possible history of DVT on Eliquis  Chronic pericardial effusion      RECOMMENDATIONS:   Titrate FiO2 to saturation greater than 90%  Diurese to maintain negative balance  Continue Zosyn/Vanco, streamline to cultures.  Bronchodilators, Pulmicort  Agree with systemic steroids for COPD exacerbation. Would also benefit immunotherapy related pneumonitis although doubt at this time   Continue Eliquis  Check Procalcitonin  Electrolyte replacement per primary team  Glycemic control  Repeat Echo to follow up on EF and pericardial effusion  Will follow with you. Thank you for consulting     Subjective:     This patient has been seen and evaluated at the request of Dr. Perdomo for acute hypoxic respiratory failure. Patient is a 64 y.o. female with NSCLC followed by Dr. Li who presented with progressive shortness of breath of 5 days duration. Symptoms have affected pt to an extent where she had to cut down on smoking tobacco and MJ. Pt also reports that her diuretics were stopped prior to symptom onset. She denies fever or productive cough, myalgias, hemoptysis, chills or sick contacts.   Pt was admitted and reports improvement in SOB on therapy which included diuresis, antibiotics and steroids. 
Performance Scale (PPS):  PPS: 70    ECOG:        Modified ESAS:  Modified-[place] Symptom Assessment Scale (ESAS)  Tiredness Score: Not tired  Pain Score: No pain  Nausea Score: Not nauseated  Dyspnea Score: 5    Clinical Pain Assessment (nonverbal scale for severity on nonverbal patients):   Clinical Pain Assessment  Severity: 0            PSYCHOSOCIAL/SPIRITUAL SCREENING:       Any spiritual / Advent concerns:  [] Yes /  [x] No    Caregiver Burnout:  [] Yes /  [] No /  [x] No Caregiver Present      Anticipatory grief assessment:   [] Normal  / [] Maladaptive       ESAS Anxiety:      ESAS Depression:        REVIEW OF SYSTEMS:     Positive and pertinent negative findings in ROS are noted above in HPI.  The following systems were [x] reviewed / [] unable to be reviewed as noted in HPI  Other findings are noted below.  Systems: constitutional, ears/nose/mouth/throat, respiratory, gastrointestinal, genitourinary, musculoskeletal, integumentary, neurologic, psychiatric, endocrine. Positive findings noted below.    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 
before 17-MAY-2024)  Abnormal ECG  When compared with ECG of 17-MAY-2024 06:14,  Previous ECG has undetermined rhythm, needs review  Questionable change in initial forces of Lateral leads  Inverted T waves have replaced nonspecific T wave abnormality in Inferior   leads  T wave inversion now evident in Lateral leads  Confirmed by Javier Potts MD ([id number]) on 11/14/2024 8:08:41 AM       11/13/24    ECHO (TTE) LIMITED (PRN CONTRAST/BUBBLE/STRAIN/3D) 11/14/2024 12:32 PM (Final)    Interpretation Summary    Image quality is technically difficult. Contrast used: Definity. Technically difficult study due to patient's body habitus, limited Doppler study due to patient's condition, limited study due to patient's ability to tolerate test and procedure performed with the patient in a supine position.    Left Ventricle: Mildly reduced left ventricular systolic function with a visually estimated EF of 45 - 50%. Left ventricle size is normal. Normal wall thickness. There are regional wall motion abnormalities.    Right Ventricle: Right ventricle is dilated. Normal systolic function.    Tricuspid Valve: Mildly elevated RVSP, consistent with mild pulmonary hypertension. The estimated RVSP is 45 mmHg.    Pericardium: Small to moderate (1-2 cm) circumferential pericardial effusion present. No indication of cardiac tamponade. Largest accumulation near right heart.    Signed by: Jayce Huang MD on 11/14/2024 12:32 PM    No results found for this or any previous visit.    No results found for this or any previous visit.    Xray Result (most recent):  XR CHEST LIMITED ONE VIEW 11/13/2024    Narrative  EXAM:  CHEST portable  30 hours    CLINICAL HISTORY/INDICATION: Dyspnea  Additional: Lung cancer scheduled to begin new chemotherapy tomorrow    COMPARISON: 5/17/2024.    TECHNIQUE: Single AP view    FINDINGS:    LINES/TUBES/DEVICES: Vascular stents at the distal jugular and right  brachiocephalic veins.    HEART AND MEDIASTINUM: No

## 2024-11-16 LAB
ANION GAP SERPL CALC-SCNC: 7 MMOL/L (ref 3–18)
APTT PPP: 86.7 SEC (ref 23–36.4)
BUN SERPL-MCNC: 21 MG/DL (ref 7–18)
BUN/CREAT SERPL: 30 (ref 12–20)
CALCIUM SERPL-MCNC: 8.9 MG/DL (ref 8.5–10.1)
CHLORIDE SERPL-SCNC: 101 MMOL/L (ref 100–111)
CO2 SERPL-SCNC: 30 MMOL/L (ref 21–32)
CREAT SERPL-MCNC: 0.69 MG/DL (ref 0.6–1.3)
EKG ATRIAL RATE: 126 BPM
EKG DIAGNOSIS: NORMAL
EKG P AXIS: 60 DEGREES
EKG P-R INTERVAL: 118 MS
EKG Q-T INTERVAL: 318 MS
EKG QRS DURATION: 90 MS
EKG QTC CALCULATION (BAZETT): 460 MS
EKG R AXIS: 86 DEGREES
EKG T AXIS: 32 DEGREES
EKG VENTRICULAR RATE: 126 BPM
ERYTHROCYTE [DISTWIDTH] IN BLOOD BY AUTOMATED COUNT: 18.4 % (ref 11.6–14.5)
FERRITIN SERPL-MCNC: 249 NG/ML (ref 8–388)
FOLATE SERPL-MCNC: 6.4 NG/ML (ref 3.1–17.5)
GLUCOSE BLD STRIP.AUTO-MCNC: 171 MG/DL (ref 70–110)
GLUCOSE BLD STRIP.AUTO-MCNC: 176 MG/DL (ref 70–110)
GLUCOSE BLD STRIP.AUTO-MCNC: 178 MG/DL (ref 70–110)
GLUCOSE BLD STRIP.AUTO-MCNC: 192 MG/DL (ref 70–110)
GLUCOSE SERPL-MCNC: 159 MG/DL (ref 74–99)
HCT VFR BLD AUTO: 29.1 % (ref 35–45)
HGB BLD-MCNC: 8.8 G/DL (ref 12–16)
IRON SATN MFR SERPL: 8 % (ref 20–50)
IRON SERPL-MCNC: 21 UG/DL (ref 50–175)
MCH RBC QN AUTO: 25.7 PG (ref 24–34)
MCHC RBC AUTO-ENTMCNC: 30.2 G/DL (ref 31–37)
MCV RBC AUTO: 85.1 FL (ref 78–100)
NRBC # BLD: 0 K/UL (ref 0–0.01)
NRBC BLD-RTO: 0 PER 100 WBC
PLATELET # BLD AUTO: 298 K/UL (ref 135–420)
PMV BLD AUTO: 10.3 FL (ref 9.2–11.8)
POTASSIUM SERPL-SCNC: 3.2 MMOL/L (ref 3.5–5.5)
RBC # BLD AUTO: 3.42 M/UL (ref 4.2–5.3)
SODIUM SERPL-SCNC: 138 MMOL/L (ref 136–145)
TIBC SERPL-MCNC: 254 UG/DL (ref 250–450)
VIT B12 SERPL-MCNC: 1906 PG/ML (ref 211–911)
WBC # BLD AUTO: 13.6 K/UL (ref 4.6–13.2)

## 2024-11-16 PROCEDURE — 6370000000 HC RX 637 (ALT 250 FOR IP): Performed by: STUDENT IN AN ORGANIZED HEALTH CARE EDUCATION/TRAINING PROGRAM

## 2024-11-16 PROCEDURE — 94640 AIRWAY INHALATION TREATMENT: CPT

## 2024-11-16 PROCEDURE — 6370000000 HC RX 637 (ALT 250 FOR IP): Performed by: HEALTH CARE PROVIDER

## 2024-11-16 PROCEDURE — 36415 COLL VENOUS BLD VENIPUNCTURE: CPT

## 2024-11-16 PROCEDURE — 94761 N-INVAS EAR/PLS OXIMETRY MLT: CPT

## 2024-11-16 PROCEDURE — 82607 VITAMIN B-12: CPT

## 2024-11-16 PROCEDURE — 6370000000 HC RX 637 (ALT 250 FOR IP): Performed by: HOSPITALIST

## 2024-11-16 PROCEDURE — 99232 SBSQ HOSP IP/OBS MODERATE 35: CPT | Performed by: HOSPITALIST

## 2024-11-16 PROCEDURE — 1100000000 HC RM PRIVATE

## 2024-11-16 PROCEDURE — 82962 GLUCOSE BLOOD TEST: CPT

## 2024-11-16 PROCEDURE — 2700000000 HC OXYGEN THERAPY PER DAY

## 2024-11-16 PROCEDURE — 2580000003 HC RX 258: Performed by: PHYSICIAN ASSISTANT

## 2024-11-16 PROCEDURE — 85730 THROMBOPLASTIN TIME PARTIAL: CPT

## 2024-11-16 PROCEDURE — 82728 ASSAY OF FERRITIN: CPT

## 2024-11-16 PROCEDURE — 82746 ASSAY OF FOLIC ACID SERUM: CPT

## 2024-11-16 PROCEDURE — 85027 COMPLETE CBC AUTOMATED: CPT

## 2024-11-16 PROCEDURE — 99232 SBSQ HOSP IP/OBS MODERATE 35: CPT | Performed by: INTERNAL MEDICINE

## 2024-11-16 PROCEDURE — 80048 BASIC METABOLIC PNL TOTAL CA: CPT

## 2024-11-16 PROCEDURE — 6360000002 HC RX W HCPCS: Performed by: PHYSICIAN ASSISTANT

## 2024-11-16 PROCEDURE — 83550 IRON BINDING TEST: CPT

## 2024-11-16 PROCEDURE — 93010 ELECTROCARDIOGRAM REPORT: CPT | Performed by: INTERNAL MEDICINE

## 2024-11-16 PROCEDURE — 6360000002 HC RX W HCPCS: Performed by: INTERNAL MEDICINE

## 2024-11-16 PROCEDURE — 83540 ASSAY OF IRON: CPT

## 2024-11-16 PROCEDURE — 6370000000 HC RX 637 (ALT 250 FOR IP): Performed by: PHYSICIAN ASSISTANT

## 2024-11-16 PROCEDURE — 2580000003 HC RX 258: Performed by: HOSPITALIST

## 2024-11-16 PROCEDURE — 6360000002 HC RX W HCPCS: Performed by: HOSPITALIST

## 2024-11-16 RX ORDER — FOLIC ACID 1 MG/1
1 TABLET ORAL DAILY
Status: DISCONTINUED | OUTPATIENT
Start: 2024-11-17 | End: 2024-11-20 | Stop reason: HOSPADM

## 2024-11-16 RX ORDER — POTASSIUM CHLORIDE 7.45 MG/ML
10 INJECTION INTRAVENOUS PRN
Status: DISCONTINUED | OUTPATIENT
Start: 2024-11-16 | End: 2024-11-20 | Stop reason: HOSPADM

## 2024-11-16 RX ORDER — POTASSIUM CHLORIDE 1500 MG/1
40 TABLET, EXTENDED RELEASE ORAL PRN
Status: DISCONTINUED | OUTPATIENT
Start: 2024-11-16 | End: 2024-11-20 | Stop reason: HOSPADM

## 2024-11-16 RX ADMIN — IPRATROPIUM BROMIDE AND ALBUTEROL SULFATE 1 DOSE: .5; 3 SOLUTION RESPIRATORY (INHALATION) at 20:35

## 2024-11-16 RX ADMIN — Medication 5 MG: at 21:02

## 2024-11-16 RX ADMIN — INSULIN LISPRO 2 UNITS: 100 INJECTION, SOLUTION INTRAVENOUS; SUBCUTANEOUS at 08:22

## 2024-11-16 RX ADMIN — IPRATROPIUM BROMIDE AND ALBUTEROL SULFATE 1 DOSE: .5; 3 SOLUTION RESPIRATORY (INHALATION) at 12:27

## 2024-11-16 RX ADMIN — IPRATROPIUM BROMIDE AND ALBUTEROL SULFATE 1 DOSE: .5; 3 SOLUTION RESPIRATORY (INHALATION) at 00:20

## 2024-11-16 RX ADMIN — IPRATROPIUM BROMIDE AND ALBUTEROL SULFATE 1 DOSE: .5; 3 SOLUTION RESPIRATORY (INHALATION) at 04:52

## 2024-11-16 RX ADMIN — WATER 40 MG: 1 INJECTION INTRAMUSCULAR; INTRAVENOUS; SUBCUTANEOUS at 20:56

## 2024-11-16 RX ADMIN — HEPARIN SODIUM 21 UNITS/KG/HR: 10000 INJECTION, SOLUTION INTRAVENOUS at 19:06

## 2024-11-16 RX ADMIN — ARFORMOTEROL TARTRATE 15 MCG: 15 SOLUTION RESPIRATORY (INHALATION) at 08:17

## 2024-11-16 RX ADMIN — PANTOPRAZOLE SODIUM 40 MG: 40 TABLET, DELAYED RELEASE ORAL at 05:47

## 2024-11-16 RX ADMIN — MONTELUKAST 10 MG: 10 TABLET, FILM COATED ORAL at 21:02

## 2024-11-16 RX ADMIN — PIPERACILLIN AND TAZOBACTAM 3375 MG: 3; .375 INJECTION, POWDER, LYOPHILIZED, FOR SOLUTION INTRAVENOUS at 14:44

## 2024-11-16 RX ADMIN — METHYLPREDNISOLONE SODIUM SUCCINATE 40 MG: 40 INJECTION, POWDER, LYOPHILIZED, FOR SOLUTION INTRAMUSCULAR; INTRAVENOUS at 11:17

## 2024-11-16 RX ADMIN — METHYLPREDNISOLONE SODIUM SUCCINATE 40 MG: 40 INJECTION, POWDER, LYOPHILIZED, FOR SOLUTION INTRAMUSCULAR; INTRAVENOUS at 05:01

## 2024-11-16 RX ADMIN — PIPERACILLIN AND TAZOBACTAM 3375 MG: 3; .375 INJECTION, POWDER, LYOPHILIZED, FOR SOLUTION INTRAVENOUS at 06:46

## 2024-11-16 RX ADMIN — IPRATROPIUM BROMIDE AND ALBUTEROL SULFATE 1 DOSE: .5; 3 SOLUTION RESPIRATORY (INHALATION) at 08:17

## 2024-11-16 RX ADMIN — ARFORMOTEROL TARTRATE 15 MCG: 15 SOLUTION RESPIRATORY (INHALATION) at 20:35

## 2024-11-16 RX ADMIN — PIPERACILLIN AND TAZOBACTAM 3375 MG: 3; .375 INJECTION, POWDER, LYOPHILIZED, FOR SOLUTION INTRAVENOUS at 23:08

## 2024-11-16 RX ADMIN — Medication 400 MG: at 08:21

## 2024-11-16 RX ADMIN — FUROSEMIDE 20 MG: 10 INJECTION, SOLUTION INTRAMUSCULAR; INTRAVENOUS at 08:21

## 2024-11-16 RX ADMIN — THERA TABS 1 TABLET: TAB at 08:21

## 2024-11-16 RX ADMIN — IPRATROPIUM BROMIDE AND ALBUTEROL SULFATE 1 DOSE: .5; 3 SOLUTION RESPIRATORY (INHALATION) at 15:55

## 2024-11-16 RX ADMIN — BUDESONIDE 1 MG: 1 SUSPENSION RESPIRATORY (INHALATION) at 08:18

## 2024-11-16 RX ADMIN — BUDESONIDE 1 MG: 1 SUSPENSION RESPIRATORY (INHALATION) at 20:34

## 2024-11-16 RX ADMIN — IPRATROPIUM BROMIDE AND ALBUTEROL SULFATE 1 DOSE: .5; 3 SOLUTION RESPIRATORY (INHALATION) at 01:51

## 2024-11-16 ASSESSMENT — PAIN SCALES - GENERAL
PAINLEVEL_OUTOF10: 0
PAINLEVEL_OUTOF10: 0

## 2024-11-17 ENCOUNTER — APPOINTMENT (OUTPATIENT)
Facility: HOSPITAL | Age: 64
DRG: 871 | End: 2024-11-17
Payer: MEDICARE

## 2024-11-17 PROBLEM — R19.7 DIARRHEA: Status: ACTIVE | Noted: 2024-11-17

## 2024-11-17 LAB
ANION GAP SERPL CALC-SCNC: 8 MMOL/L (ref 3–18)
APTT PPP: 92.8 SEC (ref 23–36.4)
BUN SERPL-MCNC: 22 MG/DL (ref 7–18)
BUN/CREAT SERPL: 29 (ref 12–20)
CALCIUM SERPL-MCNC: 8.8 MG/DL (ref 8.5–10.1)
CHLORIDE SERPL-SCNC: 100 MMOL/L (ref 100–111)
CO2 SERPL-SCNC: 30 MMOL/L (ref 21–32)
CREAT SERPL-MCNC: 0.77 MG/DL (ref 0.6–1.3)
ERYTHROCYTE [DISTWIDTH] IN BLOOD BY AUTOMATED COUNT: 18.4 % (ref 11.6–14.5)
GLUCOSE BLD STRIP.AUTO-MCNC: 151 MG/DL (ref 70–110)
GLUCOSE BLD STRIP.AUTO-MCNC: 165 MG/DL (ref 70–110)
GLUCOSE BLD STRIP.AUTO-MCNC: 191 MG/DL (ref 70–110)
GLUCOSE BLD STRIP.AUTO-MCNC: 199 MG/DL (ref 70–110)
GLUCOSE SERPL-MCNC: 182 MG/DL (ref 74–99)
HCT VFR BLD AUTO: 29.9 % (ref 35–45)
HGB BLD-MCNC: 9.2 G/DL (ref 12–16)
MCH RBC QN AUTO: 25.6 PG (ref 24–34)
MCHC RBC AUTO-ENTMCNC: 30.8 G/DL (ref 31–37)
MCV RBC AUTO: 83.1 FL (ref 78–100)
NRBC # BLD: 0 K/UL (ref 0–0.01)
NRBC BLD-RTO: 0 PER 100 WBC
PLATELET # BLD AUTO: 295 K/UL (ref 135–420)
PMV BLD AUTO: 9.7 FL (ref 9.2–11.8)
POTASSIUM SERPL-SCNC: 3.4 MMOL/L (ref 3.5–5.5)
RBC # BLD AUTO: 3.6 M/UL (ref 4.2–5.3)
SODIUM SERPL-SCNC: 138 MMOL/L (ref 136–145)
WBC # BLD AUTO: 12.4 K/UL (ref 4.6–13.2)

## 2024-11-17 PROCEDURE — 82962 GLUCOSE BLOOD TEST: CPT

## 2024-11-17 PROCEDURE — 85730 THROMBOPLASTIN TIME PARTIAL: CPT

## 2024-11-17 PROCEDURE — 6360000002 HC RX W HCPCS: Performed by: HOSPITALIST

## 2024-11-17 PROCEDURE — 99232 SBSQ HOSP IP/OBS MODERATE 35: CPT | Performed by: INTERNAL MEDICINE

## 2024-11-17 PROCEDURE — 80048 BASIC METABOLIC PNL TOTAL CA: CPT

## 2024-11-17 PROCEDURE — 85027 COMPLETE CBC AUTOMATED: CPT

## 2024-11-17 PROCEDURE — 36415 COLL VENOUS BLD VENIPUNCTURE: CPT

## 2024-11-17 PROCEDURE — 6360000002 HC RX W HCPCS: Performed by: INTERNAL MEDICINE

## 2024-11-17 PROCEDURE — 1100000000 HC RM PRIVATE

## 2024-11-17 PROCEDURE — 6370000000 HC RX 637 (ALT 250 FOR IP): Performed by: PHYSICIAN ASSISTANT

## 2024-11-17 PROCEDURE — 94669 MECHANICAL CHEST WALL OSCILL: CPT

## 2024-11-17 PROCEDURE — 94664 DEMO&/EVAL PT USE INHALER: CPT

## 2024-11-17 PROCEDURE — 6370000000 HC RX 637 (ALT 250 FOR IP): Performed by: STUDENT IN AN ORGANIZED HEALTH CARE EDUCATION/TRAINING PROGRAM

## 2024-11-17 PROCEDURE — 6370000000 HC RX 637 (ALT 250 FOR IP): Performed by: HOSPITALIST

## 2024-11-17 PROCEDURE — 2700000000 HC OXYGEN THERAPY PER DAY

## 2024-11-17 PROCEDURE — 2580000003 HC RX 258: Performed by: HOSPITALIST

## 2024-11-17 PROCEDURE — 99232 SBSQ HOSP IP/OBS MODERATE 35: CPT | Performed by: HOSPITALIST

## 2024-11-17 PROCEDURE — 6360000002 HC RX W HCPCS: Performed by: PHYSICIAN ASSISTANT

## 2024-11-17 PROCEDURE — 94761 N-INVAS EAR/PLS OXIMETRY MLT: CPT

## 2024-11-17 PROCEDURE — 2580000003 HC RX 258: Performed by: PHYSICIAN ASSISTANT

## 2024-11-17 PROCEDURE — 6370000000 HC RX 637 (ALT 250 FOR IP): Performed by: HEALTH CARE PROVIDER

## 2024-11-17 PROCEDURE — 94640 AIRWAY INHALATION TREATMENT: CPT

## 2024-11-17 PROCEDURE — 71045 X-RAY EXAM CHEST 1 VIEW: CPT

## 2024-11-17 RX ORDER — CHOLESTYRAMINE LIGHT 4 G/5.7G
4 POWDER, FOR SUSPENSION ORAL 2 TIMES DAILY
Status: DISCONTINUED | OUTPATIENT
Start: 2024-11-17 | End: 2024-11-18

## 2024-11-17 RX ORDER — LACTOBACILLUS RHAMNOSUS GG 10B CELL
1 CAPSULE ORAL
Status: DISCONTINUED | OUTPATIENT
Start: 2024-11-17 | End: 2024-11-18

## 2024-11-17 RX ORDER — GUAIFENESIN 200 MG/10ML
200 LIQUID ORAL EVERY 4 HOURS PRN
Status: DISCONTINUED | OUTPATIENT
Start: 2024-11-17 | End: 2024-11-20 | Stop reason: HOSPADM

## 2024-11-17 RX ORDER — CHOLESTYRAMINE 4 G/9G
1 POWDER, FOR SUSPENSION ORAL 2 TIMES DAILY
Status: DISCONTINUED | OUTPATIENT
Start: 2024-11-17 | End: 2024-11-17 | Stop reason: CLARIF

## 2024-11-17 RX ADMIN — ARFORMOTEROL TARTRATE 15 MCG: 15 SOLUTION RESPIRATORY (INHALATION) at 23:08

## 2024-11-17 RX ADMIN — Medication 1 CAPSULE: at 12:16

## 2024-11-17 RX ADMIN — ARFORMOTEROL TARTRATE 15 MCG: 15 SOLUTION RESPIRATORY (INHALATION) at 07:51

## 2024-11-17 RX ADMIN — WATER 40 MG: 1 INJECTION INTRAMUSCULAR; INTRAVENOUS; SUBCUTANEOUS at 20:44

## 2024-11-17 RX ADMIN — IPRATROPIUM BROMIDE AND ALBUTEROL SULFATE 1 DOSE: .5; 3 SOLUTION RESPIRATORY (INHALATION) at 23:08

## 2024-11-17 RX ADMIN — FOLIC ACID 1 MG: 1 TABLET ORAL at 08:01

## 2024-11-17 RX ADMIN — INSULIN LISPRO 2 UNITS: 100 INJECTION, SOLUTION INTRAVENOUS; SUBCUTANEOUS at 11:52

## 2024-11-17 RX ADMIN — HEPARIN SODIUM 21 UNITS/KG/HR: 10000 INJECTION, SOLUTION INTRAVENOUS at 17:14

## 2024-11-17 RX ADMIN — PIPERACILLIN AND TAZOBACTAM 3375 MG: 3; .375 INJECTION, POWDER, LYOPHILIZED, FOR SOLUTION INTRAVENOUS at 23:17

## 2024-11-17 RX ADMIN — PIPERACILLIN AND TAZOBACTAM 3375 MG: 3; .375 INJECTION, POWDER, LYOPHILIZED, FOR SOLUTION INTRAVENOUS at 14:46

## 2024-11-17 RX ADMIN — IPRATROPIUM BROMIDE AND ALBUTEROL SULFATE 1 DOSE: .5; 3 SOLUTION RESPIRATORY (INHALATION) at 14:40

## 2024-11-17 RX ADMIN — CHOLESTYRAMINE 4 G: 4 POWDER, FOR SUSPENSION ORAL at 20:45

## 2024-11-17 RX ADMIN — IPRATROPIUM BROMIDE AND ALBUTEROL SULFATE 1 DOSE: .5; 3 SOLUTION RESPIRATORY (INHALATION) at 11:31

## 2024-11-17 RX ADMIN — THERA TABS 1 TABLET: TAB at 08:01

## 2024-11-17 RX ADMIN — PANTOPRAZOLE SODIUM 40 MG: 40 TABLET, DELAYED RELEASE ORAL at 06:38

## 2024-11-17 RX ADMIN — Medication 5 MG: at 20:44

## 2024-11-17 RX ADMIN — Medication 1 CAPSULE: at 17:13

## 2024-11-17 RX ADMIN — IPRATROPIUM BROMIDE AND ALBUTEROL SULFATE 1 DOSE: .5; 3 SOLUTION RESPIRATORY (INHALATION) at 00:32

## 2024-11-17 RX ADMIN — IPRATROPIUM BROMIDE AND ALBUTEROL SULFATE 1 DOSE: .5; 3 SOLUTION RESPIRATORY (INHALATION) at 07:51

## 2024-11-17 RX ADMIN — POTASSIUM CHLORIDE 40 MEQ: 1500 TABLET, EXTENDED RELEASE ORAL at 00:17

## 2024-11-17 RX ADMIN — IPRATROPIUM BROMIDE AND ALBUTEROL SULFATE 1 DOSE: .5; 3 SOLUTION RESPIRATORY (INHALATION) at 02:50

## 2024-11-17 RX ADMIN — FUROSEMIDE 20 MG: 10 INJECTION, SOLUTION INTRAMUSCULAR; INTRAVENOUS at 08:01

## 2024-11-17 RX ADMIN — INSULIN LISPRO 2 UNITS: 100 INJECTION, SOLUTION INTRAVENOUS; SUBCUTANEOUS at 20:45

## 2024-11-17 RX ADMIN — CHOLESTYRAMINE 4 G: 4 POWDER, FOR SUSPENSION ORAL at 13:04

## 2024-11-17 RX ADMIN — WATER 40 MG: 1 INJECTION INTRAMUSCULAR; INTRAVENOUS; SUBCUTANEOUS at 08:01

## 2024-11-17 RX ADMIN — IPRATROPIUM BROMIDE AND ALBUTEROL SULFATE 1 DOSE: .5; 3 SOLUTION RESPIRATORY (INHALATION) at 05:35

## 2024-11-17 RX ADMIN — MONTELUKAST 10 MG: 10 TABLET, FILM COATED ORAL at 20:44

## 2024-11-17 RX ADMIN — Medication 400 MG: at 08:01

## 2024-11-17 RX ADMIN — POTASSIUM BICARBONATE 40 MEQ: 782 TABLET, EFFERVESCENT ORAL at 06:37

## 2024-11-17 RX ADMIN — BUDESONIDE 1 MG: 1 SUSPENSION RESPIRATORY (INHALATION) at 23:08

## 2024-11-17 RX ADMIN — BUDESONIDE 1 MG: 1 SUSPENSION RESPIRATORY (INHALATION) at 07:51

## 2024-11-17 RX ADMIN — PIPERACILLIN AND TAZOBACTAM 3375 MG: 3; .375 INJECTION, POWDER, LYOPHILIZED, FOR SOLUTION INTRAVENOUS at 06:40

## 2024-11-17 RX ADMIN — APIXABAN 5 MG: 5 TABLET, FILM COATED ORAL at 20:44

## 2024-11-17 ASSESSMENT — PAIN SCALES - GENERAL
PAINLEVEL_OUTOF10: 0

## 2024-11-18 LAB
ANION GAP SERPL CALC-SCNC: 6 MMOL/L (ref 3–18)
BASOPHILS # BLD: 0 K/UL (ref 0–0.1)
BASOPHILS NFR BLD: 0 % (ref 0–2)
BUN SERPL-MCNC: 23 MG/DL (ref 7–18)
BUN/CREAT SERPL: 35 (ref 12–20)
CALCIUM SERPL-MCNC: 8.9 MG/DL (ref 8.5–10.1)
CHLORIDE SERPL-SCNC: 102 MMOL/L (ref 100–111)
CO2 SERPL-SCNC: 31 MMOL/L (ref 21–32)
CREAT SERPL-MCNC: 0.66 MG/DL (ref 0.6–1.3)
DIFFERENTIAL METHOD BLD: ABNORMAL
EOSINOPHIL # BLD: 0 K/UL (ref 0–0.4)
EOSINOPHIL NFR BLD: 0 % (ref 0–5)
ERYTHROCYTE [DISTWIDTH] IN BLOOD BY AUTOMATED COUNT: 18.4 % (ref 11.6–14.5)
GLUCOSE BLD STRIP.AUTO-MCNC: 128 MG/DL (ref 70–110)
GLUCOSE BLD STRIP.AUTO-MCNC: 150 MG/DL (ref 70–110)
GLUCOSE BLD STRIP.AUTO-MCNC: 157 MG/DL (ref 70–110)
GLUCOSE BLD STRIP.AUTO-MCNC: 181 MG/DL (ref 70–110)
GLUCOSE SERPL-MCNC: 138 MG/DL (ref 74–99)
HCT VFR BLD AUTO: 32.7 % (ref 35–45)
HGB BLD-MCNC: 9.7 G/DL (ref 12–16)
IMM GRANULOCYTES # BLD AUTO: 0.1 K/UL (ref 0–0.04)
IMM GRANULOCYTES NFR BLD AUTO: 1 % (ref 0–0.5)
LYMPHOCYTES # BLD: 0.5 K/UL (ref 0.9–3.6)
LYMPHOCYTES NFR BLD: 3 % (ref 21–52)
MAGNESIUM SERPL-MCNC: 1.9 MG/DL (ref 1.6–2.6)
MCH RBC QN AUTO: 24.8 PG (ref 24–34)
MCHC RBC AUTO-ENTMCNC: 29.7 G/DL (ref 31–37)
MCV RBC AUTO: 83.6 FL (ref 78–100)
MONOCYTES # BLD: 0.4 K/UL (ref 0.05–1.2)
MONOCYTES NFR BLD: 2 % (ref 3–10)
NEUTS SEG # BLD: 13.9 K/UL (ref 1.8–8)
NEUTS SEG NFR BLD: 94 % (ref 40–73)
NRBC # BLD: 0 K/UL (ref 0–0.01)
NRBC BLD-RTO: 0 PER 100 WBC
PHOSPHATE SERPL-MCNC: 4.2 MG/DL (ref 2.5–4.9)
PLATELET # BLD AUTO: 290 K/UL (ref 135–420)
PMV BLD AUTO: 9.6 FL (ref 9.2–11.8)
POTASSIUM SERPL-SCNC: 4 MMOL/L (ref 3.5–5.5)
RBC # BLD AUTO: 3.91 M/UL (ref 4.2–5.3)
SODIUM SERPL-SCNC: 139 MMOL/L (ref 136–145)
WBC # BLD AUTO: 14.9 K/UL (ref 4.6–13.2)

## 2024-11-18 PROCEDURE — 97530 THERAPEUTIC ACTIVITIES: CPT

## 2024-11-18 PROCEDURE — 2580000003 HC RX 258: Performed by: PHYSICIAN ASSISTANT

## 2024-11-18 PROCEDURE — 84100 ASSAY OF PHOSPHORUS: CPT

## 2024-11-18 PROCEDURE — 94640 AIRWAY INHALATION TREATMENT: CPT

## 2024-11-18 PROCEDURE — 2580000003 HC RX 258: Performed by: HOSPITALIST

## 2024-11-18 PROCEDURE — 6370000000 HC RX 637 (ALT 250 FOR IP): Performed by: HOSPITALIST

## 2024-11-18 PROCEDURE — 2500000003 HC RX 250 WO HCPCS: Performed by: STUDENT IN AN ORGANIZED HEALTH CARE EDUCATION/TRAINING PROGRAM

## 2024-11-18 PROCEDURE — 97535 SELF CARE MNGMENT TRAINING: CPT

## 2024-11-18 PROCEDURE — 85025 COMPLETE CBC W/AUTO DIFF WBC: CPT

## 2024-11-18 PROCEDURE — 94669 MECHANICAL CHEST WALL OSCILL: CPT

## 2024-11-18 PROCEDURE — 1100000000 HC RM PRIVATE

## 2024-11-18 PROCEDURE — 80048 BASIC METABOLIC PNL TOTAL CA: CPT

## 2024-11-18 PROCEDURE — APPSS15 APP SPLIT SHARED TIME 0-15 MINUTES

## 2024-11-18 PROCEDURE — 6370000000 HC RX 637 (ALT 250 FOR IP): Performed by: STUDENT IN AN ORGANIZED HEALTH CARE EDUCATION/TRAINING PROGRAM

## 2024-11-18 PROCEDURE — 2700000000 HC OXYGEN THERAPY PER DAY

## 2024-11-18 PROCEDURE — 6360000002 HC RX W HCPCS: Performed by: PHYSICIAN ASSISTANT

## 2024-11-18 PROCEDURE — 6370000000 HC RX 637 (ALT 250 FOR IP): Performed by: PHYSICIAN ASSISTANT

## 2024-11-18 PROCEDURE — 99232 SBSQ HOSP IP/OBS MODERATE 35: CPT | Performed by: INTERNAL MEDICINE

## 2024-11-18 PROCEDURE — 99232 SBSQ HOSP IP/OBS MODERATE 35: CPT | Performed by: HOSPITALIST

## 2024-11-18 PROCEDURE — 36415 COLL VENOUS BLD VENIPUNCTURE: CPT

## 2024-11-18 PROCEDURE — 83735 ASSAY OF MAGNESIUM: CPT

## 2024-11-18 PROCEDURE — 6360000002 HC RX W HCPCS: Performed by: INTERNAL MEDICINE

## 2024-11-18 PROCEDURE — 82962 GLUCOSE BLOOD TEST: CPT

## 2024-11-18 PROCEDURE — 94761 N-INVAS EAR/PLS OXIMETRY MLT: CPT

## 2024-11-18 PROCEDURE — 6360000002 HC RX W HCPCS: Performed by: HOSPITALIST

## 2024-11-18 RX ORDER — LACTOBACILLUS RHAMNOSUS GG 10B CELL
2 CAPSULE ORAL
Status: DISCONTINUED | OUTPATIENT
Start: 2024-11-18 | End: 2024-11-20 | Stop reason: HOSPADM

## 2024-11-18 RX ORDER — CHOLESTYRAMINE LIGHT 4 G/5.7G
4 POWDER, FOR SUSPENSION ORAL 3 TIMES DAILY
Status: DISCONTINUED | OUTPATIENT
Start: 2024-11-18 | End: 2024-11-20 | Stop reason: HOSPADM

## 2024-11-18 RX ADMIN — THERA TABS 1 TABLET: TAB at 08:11

## 2024-11-18 RX ADMIN — CHOLESTYRAMINE 4 G: 4 POWDER, FOR SUSPENSION ORAL at 08:11

## 2024-11-18 RX ADMIN — GUAIFENESIN 200 MG: 200 SOLUTION ORAL at 01:06

## 2024-11-18 RX ADMIN — IPRATROPIUM BROMIDE AND ALBUTEROL SULFATE 1 DOSE: .5; 3 SOLUTION RESPIRATORY (INHALATION) at 11:54

## 2024-11-18 RX ADMIN — Medication 1 CAPSULE: at 12:06

## 2024-11-18 RX ADMIN — ARFORMOTEROL TARTRATE 15 MCG: 15 SOLUTION RESPIRATORY (INHALATION) at 20:18

## 2024-11-18 RX ADMIN — MONTELUKAST 10 MG: 10 TABLET, FILM COATED ORAL at 21:31

## 2024-11-18 RX ADMIN — WATER 40 MG: 1 INJECTION INTRAMUSCULAR; INTRAVENOUS; SUBCUTANEOUS at 08:11

## 2024-11-18 RX ADMIN — IPRATROPIUM BROMIDE AND ALBUTEROL SULFATE 1 DOSE: .5; 3 SOLUTION RESPIRATORY (INHALATION) at 20:18

## 2024-11-18 RX ADMIN — Medication 400 MG: at 08:11

## 2024-11-18 RX ADMIN — CHOLESTYRAMINE 4 G: 4 POWDER, FOR SUSPENSION ORAL at 16:31

## 2024-11-18 RX ADMIN — CHOLESTYRAMINE 4 G: 4 POWDER, FOR SUSPENSION ORAL at 21:32

## 2024-11-18 RX ADMIN — IPRATROPIUM BROMIDE AND ALBUTEROL SULFATE 1 DOSE: .5; 3 SOLUTION RESPIRATORY (INHALATION) at 15:49

## 2024-11-18 RX ADMIN — WATER 40 MG: 1 INJECTION INTRAMUSCULAR; INTRAVENOUS; SUBCUTANEOUS at 21:34

## 2024-11-18 RX ADMIN — PIPERACILLIN AND TAZOBACTAM 3375 MG: 3; .375 INJECTION, POWDER, LYOPHILIZED, FOR SOLUTION INTRAVENOUS at 06:46

## 2024-11-18 RX ADMIN — APIXABAN 5 MG: 5 TABLET, FILM COATED ORAL at 08:11

## 2024-11-18 RX ADMIN — PIPERACILLIN AND TAZOBACTAM 3375 MG: 3; .375 INJECTION, POWDER, FOR SOLUTION INTRAVENOUS at 14:50

## 2024-11-18 RX ADMIN — APIXABAN 5 MG: 5 TABLET, FILM COATED ORAL at 21:31

## 2024-11-18 RX ADMIN — Medication 1 CAPSULE: at 08:11

## 2024-11-18 RX ADMIN — PIPERACILLIN AND TAZOBACTAM 3375 MG: 3; .375 INJECTION, POWDER, FOR SOLUTION INTRAVENOUS at 22:38

## 2024-11-18 RX ADMIN — Medication 5 MG: at 21:31

## 2024-11-18 RX ADMIN — PANTOPRAZOLE SODIUM 40 MG: 40 TABLET, DELAYED RELEASE ORAL at 06:40

## 2024-11-18 RX ADMIN — IPRATROPIUM BROMIDE AND ALBUTEROL SULFATE 1 DOSE: .5; 3 SOLUTION RESPIRATORY (INHALATION) at 02:51

## 2024-11-18 RX ADMIN — Medication 2 CAPSULE: at 16:31

## 2024-11-18 RX ADMIN — FOLIC ACID 1 MG: 1 TABLET ORAL at 08:11

## 2024-11-18 RX ADMIN — IPRATROPIUM BROMIDE AND ALBUTEROL SULFATE 1 DOSE: .5; 3 SOLUTION RESPIRATORY (INHALATION) at 08:07

## 2024-11-18 RX ADMIN — BUDESONIDE 1 MG: 1 SUSPENSION RESPIRATORY (INHALATION) at 20:18

## 2024-11-18 RX ADMIN — FUROSEMIDE 20 MG: 10 INJECTION, SOLUTION INTRAMUSCULAR; INTRAVENOUS at 08:11

## 2024-11-18 RX ADMIN — BUDESONIDE 1 MG: 1 SUSPENSION RESPIRATORY (INHALATION) at 08:07

## 2024-11-18 RX ADMIN — ARFORMOTEROL TARTRATE 15 MCG: 15 SOLUTION RESPIRATORY (INHALATION) at 08:07

## 2024-11-18 RX ADMIN — INSULIN LISPRO 2 UNITS: 100 INJECTION, SOLUTION INTRAVENOUS; SUBCUTANEOUS at 16:35

## 2024-11-18 ASSESSMENT — PAIN SCALES - GENERAL
PAINLEVEL_OUTOF10: 0

## 2024-11-19 LAB
ANION GAP SERPL CALC-SCNC: 5 MMOL/L (ref 3–18)
BACTERIA SPEC CULT: NORMAL
BACTERIA SPEC CULT: NORMAL
BASOPHILS # BLD: 0 K/UL (ref 0–0.1)
BASOPHILS NFR BLD: 0 % (ref 0–2)
BUN SERPL-MCNC: 22 MG/DL (ref 7–18)
BUN/CREAT SERPL: 26 (ref 12–20)
CALCIUM SERPL-MCNC: 8.6 MG/DL (ref 8.5–10.1)
CHLORIDE SERPL-SCNC: 101 MMOL/L (ref 100–111)
CO2 SERPL-SCNC: 31 MMOL/L (ref 21–32)
CREAT SERPL-MCNC: 0.85 MG/DL (ref 0.6–1.3)
DIFFERENTIAL METHOD BLD: ABNORMAL
EOSINOPHIL # BLD: 0 K/UL (ref 0–0.4)
EOSINOPHIL NFR BLD: 0 % (ref 0–5)
ERYTHROCYTE [DISTWIDTH] IN BLOOD BY AUTOMATED COUNT: 18.3 % (ref 11.6–14.5)
GLUCOSE BLD STRIP.AUTO-MCNC: 117 MG/DL (ref 70–110)
GLUCOSE BLD STRIP.AUTO-MCNC: 137 MG/DL (ref 70–110)
GLUCOSE BLD STRIP.AUTO-MCNC: 182 MG/DL (ref 70–110)
GLUCOSE BLD STRIP.AUTO-MCNC: 199 MG/DL (ref 70–110)
GLUCOSE SERPL-MCNC: 172 MG/DL (ref 74–99)
HCT VFR BLD AUTO: 34.4 % (ref 35–45)
HGB BLD-MCNC: 10.4 G/DL (ref 12–16)
IMM GRANULOCYTES # BLD AUTO: 0.1 K/UL (ref 0–0.04)
IMM GRANULOCYTES NFR BLD AUTO: 1 % (ref 0–0.5)
LYMPHOCYTES # BLD: 0.5 K/UL (ref 0.9–3.6)
LYMPHOCYTES NFR BLD: 4 % (ref 21–52)
MCH RBC QN AUTO: 25.1 PG (ref 24–34)
MCHC RBC AUTO-ENTMCNC: 30.2 G/DL (ref 31–37)
MCV RBC AUTO: 83.1 FL (ref 78–100)
MONOCYTES # BLD: 0.4 K/UL (ref 0.05–1.2)
MONOCYTES NFR BLD: 3 % (ref 3–10)
NEUTS SEG # BLD: 12.9 K/UL (ref 1.8–8)
NEUTS SEG NFR BLD: 93 % (ref 40–73)
NRBC # BLD: 0 K/UL (ref 0–0.01)
NRBC BLD-RTO: 0 PER 100 WBC
PLATELET # BLD AUTO: 319 K/UL (ref 135–420)
PMV BLD AUTO: 10.1 FL (ref 9.2–11.8)
POTASSIUM SERPL-SCNC: 4.6 MMOL/L (ref 3.5–5.5)
RBC # BLD AUTO: 4.14 M/UL (ref 4.2–5.3)
SERVICE CMNT-IMP: NORMAL
SERVICE CMNT-IMP: NORMAL
SODIUM SERPL-SCNC: 137 MMOL/L (ref 136–145)
WBC # BLD AUTO: 13.8 K/UL (ref 4.6–13.2)

## 2024-11-19 PROCEDURE — 6370000000 HC RX 637 (ALT 250 FOR IP): Performed by: PHYSICIAN ASSISTANT

## 2024-11-19 PROCEDURE — 82962 GLUCOSE BLOOD TEST: CPT

## 2024-11-19 PROCEDURE — 94664 DEMO&/EVAL PT USE INHALER: CPT

## 2024-11-19 PROCEDURE — 94761 N-INVAS EAR/PLS OXIMETRY MLT: CPT

## 2024-11-19 PROCEDURE — 6360000002 HC RX W HCPCS: Performed by: INTERNAL MEDICINE

## 2024-11-19 PROCEDURE — 80048 BASIC METABOLIC PNL TOTAL CA: CPT

## 2024-11-19 PROCEDURE — 99232 SBSQ HOSP IP/OBS MODERATE 35: CPT | Performed by: INTERNAL MEDICINE

## 2024-11-19 PROCEDURE — 2500000003 HC RX 250 WO HCPCS: Performed by: STUDENT IN AN ORGANIZED HEALTH CARE EDUCATION/TRAINING PROGRAM

## 2024-11-19 PROCEDURE — 6370000000 HC RX 637 (ALT 250 FOR IP): Performed by: HOSPITALIST

## 2024-11-19 PROCEDURE — 6360000002 HC RX W HCPCS: Performed by: HOSPITALIST

## 2024-11-19 PROCEDURE — 6370000000 HC RX 637 (ALT 250 FOR IP): Performed by: HEALTH CARE PROVIDER

## 2024-11-19 PROCEDURE — 94640 AIRWAY INHALATION TREATMENT: CPT

## 2024-11-19 PROCEDURE — 1100000000 HC RM PRIVATE

## 2024-11-19 PROCEDURE — 99232 SBSQ HOSP IP/OBS MODERATE 35: CPT | Performed by: STUDENT IN AN ORGANIZED HEALTH CARE EDUCATION/TRAINING PROGRAM

## 2024-11-19 PROCEDURE — 85025 COMPLETE CBC W/AUTO DIFF WBC: CPT

## 2024-11-19 PROCEDURE — 2580000003 HC RX 258: Performed by: HOSPITALIST

## 2024-11-19 PROCEDURE — 6370000000 HC RX 637 (ALT 250 FOR IP): Performed by: STUDENT IN AN ORGANIZED HEALTH CARE EDUCATION/TRAINING PROGRAM

## 2024-11-19 PROCEDURE — 6370000000 HC RX 637 (ALT 250 FOR IP)

## 2024-11-19 PROCEDURE — 99231 SBSQ HOSP IP/OBS SF/LOW 25: CPT | Performed by: INTERNAL MEDICINE

## 2024-11-19 PROCEDURE — APPSS15 APP SPLIT SHARED TIME 0-15 MINUTES

## 2024-11-19 PROCEDURE — 36415 COLL VENOUS BLD VENIPUNCTURE: CPT

## 2024-11-19 PROCEDURE — 2700000000 HC OXYGEN THERAPY PER DAY

## 2024-11-19 PROCEDURE — 97530 THERAPEUTIC ACTIVITIES: CPT

## 2024-11-19 PROCEDURE — 6360000002 HC RX W HCPCS: Performed by: PHYSICIAN ASSISTANT

## 2024-11-19 RX ORDER — FUROSEMIDE 40 MG/1
40 TABLET ORAL DAILY
Status: DISCONTINUED | OUTPATIENT
Start: 2024-11-20 | End: 2024-11-20 | Stop reason: HOSPADM

## 2024-11-19 RX ORDER — IPRATROPIUM BROMIDE AND ALBUTEROL SULFATE 2.5; .5 MG/3ML; MG/3ML
1 SOLUTION RESPIRATORY (INHALATION)
Status: DISCONTINUED | OUTPATIENT
Start: 2024-11-19 | End: 2024-11-20 | Stop reason: HOSPADM

## 2024-11-19 RX ORDER — PREDNISONE 20 MG/1
40 TABLET ORAL DAILY
Status: DISCONTINUED | OUTPATIENT
Start: 2024-11-20 | End: 2024-11-20 | Stop reason: HOSPADM

## 2024-11-19 RX ADMIN — IPRATROPIUM BROMIDE AND ALBUTEROL SULFATE 1 DOSE: .5; 3 SOLUTION RESPIRATORY (INHALATION) at 13:14

## 2024-11-19 RX ADMIN — IPRATROPIUM BROMIDE AND ALBUTEROL SULFATE 1 DOSE: .5; 3 SOLUTION RESPIRATORY (INHALATION) at 08:13

## 2024-11-19 RX ADMIN — CHOLESTYRAMINE 4 G: 4 POWDER, FOR SUSPENSION ORAL at 21:05

## 2024-11-19 RX ADMIN — PANTOPRAZOLE SODIUM 40 MG: 40 TABLET, DELAYED RELEASE ORAL at 06:00

## 2024-11-19 RX ADMIN — ARFORMOTEROL TARTRATE 15 MCG: 15 SOLUTION RESPIRATORY (INHALATION) at 08:13

## 2024-11-19 RX ADMIN — IPRATROPIUM BROMIDE AND ALBUTEROL SULFATE 1 DOSE: .5; 3 SOLUTION RESPIRATORY (INHALATION) at 00:09

## 2024-11-19 RX ADMIN — FOLIC ACID 1 MG: 1 TABLET ORAL at 08:06

## 2024-11-19 RX ADMIN — INSULIN LISPRO 2 UNITS: 100 INJECTION, SOLUTION INTRAVENOUS; SUBCUTANEOUS at 17:03

## 2024-11-19 RX ADMIN — THERA TABS 1 TABLET: TAB at 08:06

## 2024-11-19 RX ADMIN — BUDESONIDE 1 MG: 1 SUSPENSION RESPIRATORY (INHALATION) at 08:13

## 2024-11-19 RX ADMIN — PIPERACILLIN AND TAZOBACTAM 3375 MG: 3; .375 INJECTION, POWDER, FOR SOLUTION INTRAVENOUS at 14:38

## 2024-11-19 RX ADMIN — FUROSEMIDE 20 MG: 10 INJECTION, SOLUTION INTRAMUSCULAR; INTRAVENOUS at 08:06

## 2024-11-19 RX ADMIN — Medication 2 CAPSULE: at 12:33

## 2024-11-19 RX ADMIN — APIXABAN 5 MG: 5 TABLET, FILM COATED ORAL at 08:30

## 2024-11-19 RX ADMIN — WATER 40 MG: 1 INJECTION INTRAMUSCULAR; INTRAVENOUS; SUBCUTANEOUS at 08:06

## 2024-11-19 RX ADMIN — Medication 5 MG: at 21:05

## 2024-11-19 RX ADMIN — PIPERACILLIN AND TAZOBACTAM 3375 MG: 3; .375 INJECTION, POWDER, FOR SOLUTION INTRAVENOUS at 06:35

## 2024-11-19 RX ADMIN — Medication 400 MG: at 08:06

## 2024-11-19 RX ADMIN — CHOLESTYRAMINE 4 G: 4 POWDER, FOR SUSPENSION ORAL at 08:06

## 2024-11-19 RX ADMIN — Medication 2 CAPSULE: at 17:03

## 2024-11-19 RX ADMIN — INSULIN LISPRO 2 UNITS: 100 INJECTION, SOLUTION INTRAVENOUS; SUBCUTANEOUS at 12:33

## 2024-11-19 RX ADMIN — BUDESONIDE 1 MG: 1 SUSPENSION RESPIRATORY (INHALATION) at 20:11

## 2024-11-19 RX ADMIN — PIPERACILLIN AND TAZOBACTAM 3375 MG: 3; .375 INJECTION, POWDER, FOR SOLUTION INTRAVENOUS at 22:57

## 2024-11-19 RX ADMIN — IPRATROPIUM BROMIDE AND ALBUTEROL SULFATE 1 DOSE: 2.5; .5 SOLUTION RESPIRATORY (INHALATION) at 20:10

## 2024-11-19 RX ADMIN — IPRATROPIUM BROMIDE AND ALBUTEROL SULFATE 1 DOSE: .5; 3 SOLUTION RESPIRATORY (INHALATION) at 03:26

## 2024-11-19 RX ADMIN — APIXABAN 5 MG: 5 TABLET, FILM COATED ORAL at 21:05

## 2024-11-19 RX ADMIN — CHOLESTYRAMINE 4 G: 4 POWDER, FOR SUSPENSION ORAL at 12:33

## 2024-11-19 RX ADMIN — MONTELUKAST 10 MG: 10 TABLET, FILM COATED ORAL at 21:05

## 2024-11-19 RX ADMIN — GUAIFENESIN 200 MG: 200 SOLUTION ORAL at 22:59

## 2024-11-19 RX ADMIN — ARFORMOTEROL TARTRATE 15 MCG: 15 SOLUTION RESPIRATORY (INHALATION) at 20:11

## 2024-11-19 RX ADMIN — Medication 2 CAPSULE: at 08:06

## 2024-11-19 ASSESSMENT — PAIN SCALES - GENERAL
PAINLEVEL_OUTOF10: 0

## 2024-11-20 ENCOUNTER — HOSPITAL ENCOUNTER (OUTPATIENT)
Facility: HOSPITAL | Age: 64
Setting detail: INFUSION SERIES
End: 2024-11-20

## 2024-11-20 VITALS
HEART RATE: 108 BPM | OXYGEN SATURATION: 94 % | SYSTOLIC BLOOD PRESSURE: 123 MMHG | RESPIRATION RATE: 28 BRPM | TEMPERATURE: 97.8 F | DIASTOLIC BLOOD PRESSURE: 81 MMHG | HEIGHT: 66 IN | WEIGHT: 135.6 LBS | BODY MASS INDEX: 21.79 KG/M2

## 2024-11-20 PROBLEM — R53.81 DEBILITY: Status: ACTIVE | Noted: 2024-11-20

## 2024-11-20 PROBLEM — Z51.5 ENCOUNTER FOR PALLIATIVE CARE: Status: ACTIVE | Noted: 2024-11-20

## 2024-11-20 PROBLEM — J96.00 ACUTE RESPIRATORY FAILURE: Status: ACTIVE | Noted: 2024-11-20

## 2024-11-20 LAB
ANION GAP SERPL CALC-SCNC: 7 MMOL/L (ref 3–18)
BASOPHILS # BLD: 0 K/UL (ref 0–0.1)
BASOPHILS NFR BLD: 0 % (ref 0–2)
BUN SERPL-MCNC: 23 MG/DL (ref 7–18)
BUN/CREAT SERPL: 38 (ref 12–20)
CALCIUM SERPL-MCNC: 8.6 MG/DL (ref 8.5–10.1)
CHLORIDE SERPL-SCNC: 102 MMOL/L (ref 100–111)
CO2 SERPL-SCNC: 26 MMOL/L (ref 21–32)
CREAT SERPL-MCNC: 0.6 MG/DL (ref 0.6–1.3)
DIFFERENTIAL METHOD BLD: ABNORMAL
EOSINOPHIL # BLD: 0.1 K/UL (ref 0–0.4)
EOSINOPHIL NFR BLD: 1 % (ref 0–5)
ERYTHROCYTE [DISTWIDTH] IN BLOOD BY AUTOMATED COUNT: 18.9 % (ref 11.6–14.5)
GLUCOSE BLD STRIP.AUTO-MCNC: 116 MG/DL (ref 70–110)
GLUCOSE BLD STRIP.AUTO-MCNC: 142 MG/DL (ref 70–110)
GLUCOSE BLD STRIP.AUTO-MCNC: 147 MG/DL (ref 70–110)
GLUCOSE SERPL-MCNC: 122 MG/DL (ref 74–99)
HCT VFR BLD AUTO: 35.6 % (ref 35–45)
HGB BLD-MCNC: 11 G/DL (ref 12–16)
IMM GRANULOCYTES # BLD AUTO: 0.1 K/UL (ref 0–0.04)
IMM GRANULOCYTES NFR BLD AUTO: 1 % (ref 0–0.5)
LYMPHOCYTES # BLD: 1.2 K/UL (ref 0.9–3.6)
LYMPHOCYTES NFR BLD: 8 % (ref 21–52)
MAGNESIUM SERPL-MCNC: 1.9 MG/DL (ref 1.6–2.6)
MCH RBC QN AUTO: 25.8 PG (ref 24–34)
MCHC RBC AUTO-ENTMCNC: 30.9 G/DL (ref 31–37)
MCV RBC AUTO: 83.4 FL (ref 78–100)
MONOCYTES # BLD: 0.9 K/UL (ref 0.05–1.2)
MONOCYTES NFR BLD: 6 % (ref 3–10)
NEUTS SEG # BLD: 12.5 K/UL (ref 1.8–8)
NEUTS SEG NFR BLD: 85 % (ref 40–73)
NRBC # BLD: 0 K/UL (ref 0–0.01)
NRBC BLD-RTO: 0 PER 100 WBC
PLATELET # BLD AUTO: 239 K/UL (ref 135–420)
PMV BLD AUTO: 10.7 FL (ref 9.2–11.8)
POTASSIUM SERPL-SCNC: 4.2 MMOL/L (ref 3.5–5.5)
RBC # BLD AUTO: 4.27 M/UL (ref 4.2–5.3)
SODIUM SERPL-SCNC: 135 MMOL/L (ref 136–145)
WBC # BLD AUTO: 14.7 K/UL (ref 4.6–13.2)

## 2024-11-20 PROCEDURE — 36415 COLL VENOUS BLD VENIPUNCTURE: CPT

## 2024-11-20 PROCEDURE — 85025 COMPLETE CBC W/AUTO DIFF WBC: CPT

## 2024-11-20 PROCEDURE — 80048 BASIC METABOLIC PNL TOTAL CA: CPT

## 2024-11-20 PROCEDURE — 6370000000 HC RX 637 (ALT 250 FOR IP): Performed by: PHYSICIAN ASSISTANT

## 2024-11-20 PROCEDURE — 6370000000 HC RX 637 (ALT 250 FOR IP): Performed by: HEALTH CARE PROVIDER

## 2024-11-20 PROCEDURE — 6370000000 HC RX 637 (ALT 250 FOR IP): Performed by: STUDENT IN AN ORGANIZED HEALTH CARE EDUCATION/TRAINING PROGRAM

## 2024-11-20 PROCEDURE — 6360000002 HC RX W HCPCS: Performed by: PHYSICIAN ASSISTANT

## 2024-11-20 PROCEDURE — 6370000000 HC RX 637 (ALT 250 FOR IP): Performed by: HOSPITALIST

## 2024-11-20 PROCEDURE — 94640 AIRWAY INHALATION TREATMENT: CPT

## 2024-11-20 PROCEDURE — 83735 ASSAY OF MAGNESIUM: CPT

## 2024-11-20 PROCEDURE — 2700000000 HC OXYGEN THERAPY PER DAY

## 2024-11-20 PROCEDURE — 6370000000 HC RX 637 (ALT 250 FOR IP)

## 2024-11-20 PROCEDURE — 99239 HOSP IP/OBS DSCHRG MGMT >30: CPT | Performed by: STUDENT IN AN ORGANIZED HEALTH CARE EDUCATION/TRAINING PROGRAM

## 2024-11-20 PROCEDURE — 94761 N-INVAS EAR/PLS OXIMETRY MLT: CPT

## 2024-11-20 PROCEDURE — 97535 SELF CARE MNGMENT TRAINING: CPT

## 2024-11-20 PROCEDURE — 82962 GLUCOSE BLOOD TEST: CPT

## 2024-11-20 RX ORDER — FOLIC ACID 1 MG/1
1 TABLET ORAL DAILY
Qty: 30 TABLET | Refills: 3 | Status: SHIPPED | OUTPATIENT
Start: 2024-11-21

## 2024-11-20 RX ORDER — LACTOBACILLUS RHAMNOSUS GG 10B CELL
2 CAPSULE ORAL
Qty: 90 CAPSULE | Refills: 0 | Status: SHIPPED | OUTPATIENT
Start: 2024-11-20

## 2024-11-20 RX ORDER — CHOLESTYRAMINE LIGHT 4 G/5.7G
4 POWDER, FOR SUSPENSION ORAL 3 TIMES DAILY
Qty: 15 PACKET | Refills: 1 | Status: SHIPPED | OUTPATIENT
Start: 2024-11-20

## 2024-11-20 RX ORDER — PREDNISONE 20 MG/1
40 TABLET ORAL DAILY
Qty: 8 TABLET | Refills: 0 | Status: SHIPPED | OUTPATIENT
Start: 2024-11-21 | End: 2024-11-25

## 2024-11-20 RX ORDER — GUAIFENESIN 200 MG/10ML
200 LIQUID ORAL EVERY 4 HOURS PRN
Qty: 236 ML | Refills: 0 | Status: SHIPPED | OUTPATIENT
Start: 2024-11-20

## 2024-11-20 RX ADMIN — THERA TABS 1 TABLET: TAB at 08:58

## 2024-11-20 RX ADMIN — Medication 2 CAPSULE: at 06:42

## 2024-11-20 RX ADMIN — IPRATROPIUM BROMIDE AND ALBUTEROL SULFATE 1 DOSE: .5; 3 SOLUTION RESPIRATORY (INHALATION) at 11:08

## 2024-11-20 RX ADMIN — FUROSEMIDE 40 MG: 40 TABLET ORAL at 08:58

## 2024-11-20 RX ADMIN — CHOLESTYRAMINE 4 G: 4 POWDER, FOR SUSPENSION ORAL at 12:35

## 2024-11-20 RX ADMIN — IPRATROPIUM BROMIDE AND ALBUTEROL SULFATE 1 DOSE: 2.5; .5 SOLUTION RESPIRATORY (INHALATION) at 16:23

## 2024-11-20 RX ADMIN — PANTOPRAZOLE SODIUM 40 MG: 40 TABLET, DELAYED RELEASE ORAL at 06:32

## 2024-11-20 RX ADMIN — ARFORMOTEROL TARTRATE 15 MCG: 15 SOLUTION RESPIRATORY (INHALATION) at 09:52

## 2024-11-20 RX ADMIN — Medication 2 CAPSULE: at 12:36

## 2024-11-20 RX ADMIN — BUDESONIDE 1 MG: 1 SUSPENSION RESPIRATORY (INHALATION) at 09:52

## 2024-11-20 RX ADMIN — FOLIC ACID 1 MG: 1 TABLET ORAL at 08:59

## 2024-11-20 RX ADMIN — CHOLESTYRAMINE 4 G: 4 POWDER, FOR SUSPENSION ORAL at 08:59

## 2024-11-20 RX ADMIN — APIXABAN 5 MG: 5 TABLET, FILM COATED ORAL at 08:59

## 2024-11-20 RX ADMIN — IPRATROPIUM BROMIDE AND ALBUTEROL SULFATE 1 DOSE: 2.5; .5 SOLUTION RESPIRATORY (INHALATION) at 09:52

## 2024-11-20 RX ADMIN — PREDNISONE 40 MG: 20 TABLET ORAL at 08:58

## 2024-11-20 ASSESSMENT — PAIN SCALES - GENERAL
PAINLEVEL_OUTOF10: 0
PAINLEVEL_OUTOF10: 0

## 2024-11-20 NOTE — CARE COORDINATION
Home health orders sent in [facility].          BALTAZAR CheN RN  Care Management

## 2024-11-20 NOTE — CARE COORDINATION
11/20/24 1343   IMM Letter   IMM Letter given to Patient/Family/Significant other/Guardian/POA/by: Sandy Lang   IMM Letter date given: 11/20/24   IMM Letter time given: 1030       Medicare pt has received, reviewed, and signed 2nd IM letter informing them of their right to appeal the discharge.  Signed copy has been placed on pt bedside chart.      MELLISSA Che RN  Care Management

## 2024-11-20 NOTE — PLAN OF CARE
Problem: Discharge Planning  Goal: Discharge to home or other facility with appropriate resources  Outcome: Not Progressing  Flowsheets (Taken 11/14/2024 0800 by Chantelle Duval, RN)  Discharge to home or other facility with appropriate resources: Identify barriers to discharge with patient and caregiver     Problem: ABCDS Injury Assessment  Goal: Absence of physical injury  Outcome: Progressing  Flowsheets (Taken 11/14/2024 0540)  Absence of Physical Injury: Implement safety measures based on patient assessment     Problem: Nutrition Deficit:  Goal: Optimize nutritional status  Outcome: Progressing  Flowsheets (Taken 11/15/2024 0351)  Nutrient intake appropriate for improving, restoring, or maintaining nutritional needs:   Assess nutritional status and recommend course of action   Monitor oral intake, labs, and treatment plans   Recommend appropriate diets, oral nutritional supplements, and vitamin/mineral supplements     Problem: Safety - Adult  Goal: Free from fall injury  Flowsheets (Taken 11/14/2024 0540)  Free From Fall Injury: Instruct family/caregiver on patient safety     Problem: Chronic Conditions and Co-morbidities  Goal: Patient's chronic conditions and co-morbidity symptoms are monitored and maintained or improved  Flowsheets (Taken 11/14/2024 0800 by Chantelle Duval, RN)  Care Plan - Patient's Chronic Conditions and Co-Morbidity Symptoms are Monitored and Maintained or Improved:   Monitor and assess patient's chronic conditions and comorbid symptoms for stability, deterioration, or improvement   Collaborate with multidisciplinary team to address chronic and comorbid conditions and prevent exacerbation or deterioration   Update acute care plan with appropriate goals if chronic or comorbid symptoms are exacerbated and prevent overall improvement and discharge     Problem: Pain  Goal: Verbalizes/displays adequate comfort level or baseline comfort level  Flowsheets (Taken 11/14/2024 
  Problem: Discharge Planning  Goal: Discharge to home or other facility with appropriate resources  Outcome: Progressing  Flowsheets (Taken 11/17/2024 1936)  Discharge to home or other facility with appropriate resources:   Identify barriers to discharge with patient and caregiver   Arrange for needed discharge resources and transportation as appropriate   Identify discharge learning needs (meds, wound care, etc)   Refer to discharge planning if patient needs post-hospital services based on physician order or complex needs related to functional status, cognitive ability or social support system     Problem: Safety - Adult  Goal: Free from fall injury  11/17/2024 2322 by Tracy Umana, RN  Outcome: Progressing  11/17/2024 1307 by Vladislav Macedo, RN  Outcome: Progressing     Problem: Chronic Conditions and Co-morbidities  Goal: Patient's chronic conditions and co-morbidity symptoms are monitored and maintained or improved  Outcome: Progressing  Flowsheets (Taken 11/17/2024 1936)  Care Plan - Patient's Chronic Conditions and Co-Morbidity Symptoms are Monitored and Maintained or Improved:   Monitor and assess patient's chronic conditions and comorbid symptoms for stability, deterioration, or improvement   Collaborate with multidisciplinary team to address chronic and comorbid conditions and prevent exacerbation or deterioration   Update acute care plan with appropriate goals if chronic or comorbid symptoms are exacerbated and prevent overall improvement and discharge     Problem: Discharge Planning  Goal: Discharge to home or other facility with appropriate resources  Outcome: Progressing  Flowsheets (Taken 11/17/2024 1936)  Discharge to home or other facility with appropriate resources:   Identify barriers to discharge with patient and caregiver   Arrange for needed discharge resources and transportation as appropriate   Identify discharge learning needs (meds, wound care, etc)   Refer to discharge planning if 
  Problem: Occupational Therapy - Adult  Goal: By Discharge: Performs self-care activities at highest level of function for planned discharge setting.  See evaluation for individualized goals.  Description: Occupational Therapy Goals:  Initiated 11/15/2024 to be met within 7-10 days.    1.  Patient will perform bed mobility in preparation for ADLs with modified independence.   2.  Patient will perform grooming with modified independence standing > 3 minutes, G balance.  3.  Patient will perform lower body dressing  with modified independence.  4.  Patient will perform toilet transfers with modified independence  5.  Patient will perform all aspects of toileting with modified independence.  6.  Patient will participate in upper extremity therapeutic exercise/activities with modified independence for 8-10 minutes to increase strength/endurance for ADLs.    7.  Patient will utilize energy conservation techniques during functional activities with verbal cues.      PLOF: Pt was independent with self-care and functional mobility. Pt has RW but reports did not need it PTA.  11/20/2024 1145 by Kristine Andersen OTA  Outcome: Progressing     Problem: Safety - Adult  Goal: Free from fall injury  Outcome: Adequate for Discharge     Problem: Chronic Conditions and Co-morbidities  Goal: Patient's chronic conditions and co-morbidity symptoms are monitored and maintained or improved  Outcome: Adequate for Discharge  Flowsheets (Taken 11/20/2024 0822)  Care Plan - Patient's Chronic Conditions and Co-Morbidity Symptoms are Monitored and Maintained or Improved: Monitor and assess patient's chronic conditions and comorbid symptoms for stability, deterioration, or improvement     Problem: Discharge Planning  Goal: Discharge to home or other facility with appropriate resources  Outcome: Adequate for Discharge  Flowsheets (Taken 11/20/2024 0822)  Discharge to home or other facility with appropriate resources:   Identify barriers to 
  Problem: Occupational Therapy - Adult  Goal: By Discharge: Performs self-care activities at highest level of function for planned discharge setting.  See evaluation for individualized goals.  Description: Occupational Therapy Goals:  Initiated 11/15/2024 to be met within 7-10 days.    1.  Patient will perform bed mobility in preparation for ADLs with modified independence.   2.  Patient will perform grooming with modified independence standing > 3 minutes, G balance.  3.  Patient will perform lower body dressing  with modified independence.  4.  Patient will perform toilet transfers with modified independence  5.  Patient will perform all aspects of toileting with modified independence.  6.  Patient will participate in upper extremity therapeutic exercise/activities with modified independence for 8-10 minutes to increase strength/endurance for ADLs.    7.  Patient will utilize energy conservation techniques during functional activities with verbal cues.      PLOF: Pt was independent with self-care and functional mobility. Pt has RW but reports did not need it PTA.  Outcome: Progressing   OCCUPATIONAL THERAPY TREATMENT    Patient: Rina Prajapati (64 y.o. female)  Date: 11/18/2024  Diagnosis: Severe sepsis (HCC) [A41.9, R65.20] Severe sepsis (HCC)      Precautions: General Precautions, Fall Risk,  ,  ,  ,  ,  ,  ,      Chart, occupational therapy assessment, plan of care, and goals were reviewed.  ASSESSMENT:  Pt presented supine in bed upon entry, on 6L O2NC, and  requesting to use BSC. Pt assisted to EOB in prep for toileting ADL. STS transfer CGA with RW and performed SPT to BSC. Pt Supervision while sitting on BSC then given CGA for libby area and donning adult brief 2/2 decreased dyn standing balance/tolerance. Pt's SPO2 desating to 85% on 6L while performing PLB technique. Pt requires extended time in between each transition 2/2 SOB. Pt was returned back to supine at end of session and positioned for 
  Problem: Physical Therapy - Adult  Goal: By Discharge: Performs mobility at highest level of function for planned discharge setting.  See evaluation for individualized goals.  Description: Physical Therapy Goals:  Initiated 11/15/2024 to be met within 7-10 days.    1.  Patient will move from supine to sit and sit to supine , scoot up and down, and roll side to side in bed with supervision/set-up in order to safely get into/out of bed.  2.  Patient will transfer from bed to chair and chair to bed with supervision/set-up using RW in order to safely partake in OOB mobility.   3.  Patient will perform sit to stand with supervision/set-up in order to safely partake in OOB mobility.  4.  Patient will ambulate with supervision/set-up for 50 feet using RW in order to safely navigate household distances.     PLOF: lives with significant other in 1 level home with threshold entry, independent at baseline without AD      Outcome: Progressing      PHYSICAL THERAPY EVALUATION    Patient: Rina Prajapati (64 y.o. female)  Date: 11/15/2024  Primary Diagnosis: Severe sepsis (HCC) [A41.9, R65.20]       Precautions: General Precautions, Fall Risk,  ,  ,  ,  ,  ,  ,      ASSESSMENT :  Patient seen for PT evaluation and treatment. Received supine; agreeable, on 25L 50% FiO2 via HFNC. Presenting with deficits as listed below, impacting her safety and independence mobilizing. Most notably impacted by SOB/NEAL, poor activity tolerance, and overall decreased functional strength. Able to complete bed mobility with CGA-Kamlesh and transfers with CGA-Kamlesh. Demonstrates inability to remain standing >5 seconds before initiating return to sitting. Gait deferred. Heavy work of breathing observed both at rest and with minimal activity. SpO2 ranging from 91% at rest to 81% during stand pivot transfer to bedside commode; RR ranging from 25 at rest to 40 with activity; and HR fluctuating between 106 and 125. Heavy cues provided throughout for 
  Problem: Physical Therapy - Adult  Goal: By Discharge: Performs mobility at highest level of function for planned discharge setting.  See evaluation for individualized goals.  Description: Physical Therapy Goals:  Initiated 11/15/2024 to be met within 7-10 days.    1.  Patient will move from supine to sit and sit to supine , scoot up and down, and roll side to side in bed with supervision/set-up in order to safely get into/out of bed.  2.  Patient will transfer from bed to chair and chair to bed with supervision/set-up using RW in order to safely partake in OOB mobility.   3.  Patient will perform sit to stand with supervision/set-up in order to safely partake in OOB mobility.  4.  Patient will ambulate with supervision/set-up for 50 feet using RW in order to safely navigate household distances.     PLOF: lives with significant other in 1 level home with threshold entry, independent at baseline without AD      Outcome: Progressing     PHYSICAL THERAPY TREATMENT    Patient: Rina Prajapati (64 y.o. female)  Date: 11/19/2024  Diagnosis: Severe sepsis (HCC) [A41.9, R65.20] Severe sepsis (HCC)      Precautions: General Precautions, Fall Risk,  ,  ,  ,  ,  ,  ,      ASSESSMENT:  Patient seen for PT follow up session with focus on progressing OOB mobility. Received supine; agreeable, on 4L supplemental O2. Able to complete bed mobility with SBA, transfers with CGA and short distance gait trial without AD with CGA. Demonstrates cautious movement quality with mild postural sway in standing. Light steadying provided. Heavy education required throughout session for PLB, activity pacing and implementation of RPE scale with activity. RR ranging from 23 at rest to 30 with activity; HR in low 100s throughout; and SpO2 ranging from 93% at rest to 88% with activity. Up to 3 minutes required for recovery following minimal upright tasks. Continue PT POC.    Progression toward goals:   []      Improving appropriately and 
  Problem: Safety - Adult  Goal: Free from fall injury  11/15/2024 1038 by Max Corbett RN  Outcome: Progressing  Flowsheets (Taken 11/15/2024 1013)  Free From Fall Injury: Instruct family/caregiver on patient safety  11/15/2024 0351 by David Clayton RN  Flowsheets (Taken 11/14/2024 0540)  Free From Fall Injury: Instruct family/caregiver on patient safety     Problem: Chronic Conditions and Co-morbidities  Goal: Patient's chronic conditions and co-morbidity symptoms are monitored and maintained or improved  11/15/2024 1038 by Max Corbett RN  Outcome: Progressing  Flowsheets (Taken 11/15/2024 0800)  Care Plan - Patient's Chronic Conditions and Co-Morbidity Symptoms are Monitored and Maintained or Improved:   Monitor and assess patient's chronic conditions and comorbid symptoms for stability, deterioration, or improvement   Collaborate with multidisciplinary team to address chronic and comorbid conditions and prevent exacerbation or deterioration   Update acute care plan with appropriate goals if chronic or comorbid symptoms are exacerbated and prevent overall improvement and discharge  11/15/2024 0351 by David Clayton RN  Flowsheets (Taken 11/14/2024 0800 by Chantelle Duval RN)  Care Plan - Patient's Chronic Conditions and Co-Morbidity Symptoms are Monitored and Maintained or Improved:   Monitor and assess patient's chronic conditions and comorbid symptoms for stability, deterioration, or improvement   Collaborate with multidisciplinary team to address chronic and comorbid conditions and prevent exacerbation or deterioration   Update acute care plan with appropriate goals if chronic or comorbid symptoms are exacerbated and prevent overall improvement and discharge     Problem: Discharge Planning  Goal: Discharge to home or other facility with appropriate resources  11/15/2024 1038 by Max Corbett RN  Outcome: Progressing  Flowsheets (Taken 11/15/2024 0800)  Discharge to home or other facility with 
  Problem: Safety - Adult  Goal: Free from fall injury  11/18/2024 1252 by Vladislav Macedo RN  Outcome: Progressing  Flowsheets (Taken 11/18/2024 1252)  Free From Fall Injury:   Instruct family/caregiver on patient safety   Based on caregiver fall risk screen, instruct family/caregiver to ask for assistance with transferring infant if caregiver noted to have fall risk factors  11/17/2024 2322 by Tracy Umana RN  Outcome: Progressing     Problem: Chronic Conditions and Co-morbidities  Goal: Patient's chronic conditions and co-morbidity symptoms are monitored and maintained or improved  11/18/2024 1252 by Vladislav Macedo RN  Outcome: Progressing  Flowsheets (Taken 11/18/2024 1252)  Care Plan - Patient's Chronic Conditions and Co-Morbidity Symptoms are Monitored and Maintained or Improved:   Monitor and assess patient's chronic conditions and comorbid symptoms for stability, deterioration, or improvement   Collaborate with multidisciplinary team to address chronic and comorbid conditions and prevent exacerbation or deterioration  11/17/2024 2322 by Tracy Umana RN  Outcome: Progressing  Flowsheets (Taken 11/17/2024 1936)  Care Plan - Patient's Chronic Conditions and Co-Morbidity Symptoms are Monitored and Maintained or Improved:   Monitor and assess patient's chronic conditions and comorbid symptoms for stability, deterioration, or improvement   Collaborate with multidisciplinary team to address chronic and comorbid conditions and prevent exacerbation or deterioration   Update acute care plan with appropriate goals if chronic or comorbid symptoms are exacerbated and prevent overall improvement and discharge     Problem: Discharge Planning  Goal: Discharge to home or other facility with appropriate resources  11/17/2024 2322 by Tracy Umana RN  Outcome: Progressing  Flowsheets (Taken 11/17/2024 1936)  Discharge to home or other facility with appropriate resources:   Identify barriers to discharge 
  Problem: Safety - Adult  Goal: Free from fall injury  11/19/2024 0907 by Judi Mayes RN  Outcome: Progressing  Flowsheets (Taken 11/19/2024 0907)  Free From Fall Injury: Instruct family/caregiver on patient safety  Note: Call bell within reach      Problem: Chronic Conditions and Co-morbidities  Goal: Patient's chronic conditions and co-morbidity symptoms are monitored and maintained or improved  11/19/2024 0907 by Judi Mayes RN  Outcome: Progressing  Flowsheets  Taken 11/19/2024 0907  Care Plan - Patient's Chronic Conditions and Co-Morbidity Symptoms are Monitored and Maintained or Improved: Monitor and assess patient's chronic conditions and comorbid symptoms for stability, deterioration, or improvement  Taken 11/19/2024 0806  Care Plan - Patient's Chronic Conditions and Co-Morbidity Symptoms are Monitored and Maintained or Improved: Monitor and assess patient's chronic conditions and comorbid symptoms for stability, deterioration, or improvement  Note: Identify barriers to discharge      Problem: Discharge Planning  Goal: Discharge to home or other facility with appropriate resources  11/19/2024 0907 by Judi Mayes RN  Outcome: Progressing  Flowsheets  Taken 11/19/2024 0907  Discharge to home or other facility with appropriate resources: Identify discharge learning needs (meds, wound care, etc)  Taken 11/19/2024 0806  Discharge to home or other facility with appropriate resources:   Identify barriers to discharge with patient and caregiver   Arrange for needed discharge resources and transportation as appropriate  Note: Identify barriers to discharge      Problem: Pain  Goal: Verbalizes/displays adequate comfort level or baseline comfort level  11/19/2024 0907 by Judi Mayes RN  Outcome: Progressing  Flowsheets  Taken 11/19/2024 0907  Verbalizes/displays adequate comfort level or baseline comfort level: Assess pain using appropriate pain scale  Taken 11/19/2024 0806  Verbalizes/displays adequate comfort level 
  Problem: Safety - Adult  Goal: Free from fall injury  Outcome: Progressing     Problem: Pain  Goal: Verbalizes/displays adequate comfort level or baseline comfort level  Outcome: Progressing     Problem: Respiratory - Adult  Goal: Achieves optimal ventilation and oxygenation  Outcome: Progressing  Flowsheets (Taken 11/17/2024 0866)  Achieves optimal ventilation and oxygenation:   Assess for changes in respiratory status   Assess for changes in mentation and behavior   Position to facilitate oxygenation and minimize respiratory effort   Oxygen supplementation based on oxygen saturation or arterial blood gases     
  Problem: Safety - Adult  Goal: Free from fall injury  Outcome: Progressing  Flowsheets (Taken 11/14/2024 0540)  Free From Fall Injury: Instruct family/caregiver on patient safety     Problem: Chronic Conditions and Co-morbidities  Goal: Patient's chronic conditions and co-morbidity symptoms are monitored and maintained or improved  Outcome: Progressing  Flowsheets (Taken 11/13/2024 2236)  Care Plan - Patient's Chronic Conditions and Co-Morbidity Symptoms are Monitored and Maintained or Improved:   Monitor and assess patient's chronic conditions and comorbid symptoms for stability, deterioration, or improvement   Collaborate with multidisciplinary team to address chronic and comorbid conditions and prevent exacerbation or deterioration   Update acute care plan with appropriate goals if chronic or comorbid symptoms are exacerbated and prevent overall improvement and discharge     Problem: Discharge Planning  Goal: Discharge to home or other facility with appropriate resources  Outcome: Progressing  Flowsheets (Taken 11/13/2024 2236)  Discharge to home or other facility with appropriate resources: Identify barriers to discharge with patient and caregiver     Problem: Pain  Goal: Verbalizes/displays adequate comfort level or baseline comfort level  Outcome: Progressing  Flowsheets (Taken 11/14/2024 0540)  Verbalizes/displays adequate comfort level or baseline comfort level:   Encourage patient to monitor pain and request assistance   Assess pain using appropriate pain scale     Problem: ABCDS Injury Assessment  Goal: Absence of physical injury  Outcome: Progressing  Flowsheets (Taken 11/14/2024 0540)  Absence of Physical Injury: Implement safety measures based on patient assessment     
  Problem: Safety - Adult  Goal: Free from fall injury  Outcome: Progressing  Flowsheets (Taken 11/16/2024 1320)  Free From Fall Injury: Instruct family/caregiver on patient safety     Problem: Chronic Conditions and Co-morbidities  Goal: Patient's chronic conditions and co-morbidity symptoms are monitored and maintained or improved  Outcome: Progressing  Flowsheets (Taken 11/16/2024 1320)  Care Plan - Patient's Chronic Conditions and Co-Morbidity Symptoms are Monitored and Maintained or Improved:   Monitor and assess patient's chronic conditions and comorbid symptoms for stability, deterioration, or improvement   Collaborate with multidisciplinary team to address chronic and comorbid conditions and prevent exacerbation or deterioration     Problem: Respiratory - Adult  Goal: Achieves optimal ventilation and oxygenation  Outcome: Progressing  Flowsheets (Taken 11/16/2024 1320)  Achieves optimal ventilation and oxygenation:   Oxygen supplementation based on oxygen saturation or arterial blood gases   Position to facilitate oxygenation and minimize respiratory effort   Assess for changes in mentation and behavior   Assess for changes in respiratory status     Problem: Respiratory - Adult  Goal: Achieves optimal ventilation and oxygenation  Outcome: Progressing  Flowsheets (Taken 11/16/2024 1320)  Achieves optimal ventilation and oxygenation:   Oxygen supplementation based on oxygen saturation or arterial blood gases   Position to facilitate oxygenation and minimize respiratory effort   Assess for changes in mentation and behavior   Assess for changes in respiratory status     
Verbalizes/displays adequate comfort level or baseline comfort level  Outcome: Progressing     Problem: Chronic Conditions and Co-morbidities  Goal: Patient's chronic conditions and co-morbidity symptoms are monitored and maintained or improved  Outcome: Progressing     Problem: Safety - Adult  Goal: Free from fall injury  Outcome: Progressing     Problem: Discharge Planning  Goal: Discharge to home or other facility with appropriate resources  Outcome: Progressing     
FERN  Minutes: 10  
saturation probe site  4.  Every 4-6 hours:  If on nasal continuous positive airway pressure, respiratory therapy assess nares and determine need for appliance change or resting period.  11/15/2024 2203 by Tracy Umana RN  Outcome: Progressing  11/15/2024 2202 by Tracy Umana RN  Outcome: Progressing  11/15/2024 1038 by Max Corbett RN  Outcome: Progressing  Note: Turning and repositioning to prevent skin breakdown. Utilize silicone foam pads on bony prominences.      Problem: Nutrition Deficit:  Goal: Optimize nutritional status  11/15/2024 2203 by Tracy Umana RN  Outcome: Progressing  11/15/2024 2202 by Tracy Umana RN  Outcome: Progressing  11/15/2024 1038 by Max Corbett RN  Outcome: Progressing  Flowsheets (Taken 11/15/2024 0351 by David Clayton RN)  Nutrient intake appropriate for improving, restoring, or maintaining nutritional needs:   Assess nutritional status and recommend course of action   Monitor oral intake, labs, and treatment plans   Recommend appropriate diets, oral nutritional supplements, and vitamin/mineral supplements     Problem: Occupational Therapy - Adult  Goal: By Discharge: Performs self-care activities at highest level of function for planned discharge setting.  See evaluation for individualized goals.  Description: Occupational Therapy Goals:  Initiated 11/15/2024 to be met within 7-10 days.    1.  Patient will perform bed mobility in preparation for ADLs with modified independence.   2.  Patient will perform grooming with modified independence standing > 3 minutes, G balance.  3.  Patient will perform lower body dressing  with modified independence.  4.  Patient will perform toilet transfers with modified independence  5.  Patient will perform all aspects of toileting with modified independence.  6.  Patient will participate in upper extremity therapeutic exercise/activities with modified independence for 8-10 minutes to increase strength/endurance 
Rolling  Receives Help From: Family  ADL Assistance: Independent  Ambulation Assistance: Independent  Transfer Assistance: Independent  Active : No  Patient's  Info: Fiancee  Mode of Transportation: Car  Occupation: Retired  [x]  Right hand dominant   []  Left hand dominant    Cognitive/Behavioral Status:  Orientation  Orientation Level: Oriented X4  Cognition  Overall Cognitive Status: WFL    Skin: Intact  Edema: None noted    Vision/Perceptual:    Vision  Vision: Within Functional Limits        Coordination: BUE  Coordination: Within functional limits    Balance:  Balance  Sitting: Intact  Standing: Impaired  Standing - Static: Fair (+)  Standing - Dynamic: Fair    Strength: BUE  Strength: Generally decreased, functional    Tone & Sensation: BUE  Tone: Normal  Sensation: Intact    Range of Motion: BUE  AROM: Within functional limits    Functional Mobility and Transfers for ADLs:  Bed Mobility:  Bed Mobility Training  Bed Mobility Training: Yes  Supine to Sit: Minimum assistance  Sit to Supine: Contact-guard assistance  Scooting: Contact-guard assistance    Transfers:  Transfer Training  Transfer Training: Yes  Sit to Stand: Contact-guard assistance  Stand to Sit: Stand-by assistance    ADL Assessment:   Feeding: Independent  Grooming: Supervision (standing)  UE Bathing: Modified independent   LE Bathing: Stand by assistance;Contact guard assistance  UE Dressing: Modified independent   LE Dressing: Stand by assistance;Contact guard assistance  Toileting: Stand by assistance;Contact guard assistance    Pain:  Intensity Pre-treatment: 0/10   Intensity Post-treatment: 0/10    Activity Tolerance:   Activity Tolerance: Patient limited by fatigue  Please refer to the flowsheet for vital signs taken during this treatment.    After treatment:   [] Patient left in no apparent distress sitting up in chair  [x] Patient left in no apparent distress in bed  [x] Call bell left within reach  [x] Nursing notified  []

## 2024-11-20 NOTE — CARE COORDINATION
Home health orders sent in [facility].      Discharge order noted for today. Pt has been accepted to [facility]. Met with patient  and are agreeable to the transition plan today. Transport has been arranged through her fiancee. Patient's discharge summary and home health  orders have been forwarded to [facility]  [facility] via ***. Updated bedside RN, ***,  to the transition plan.  Discharge information has been documented on the AVS.       ***          BALTAZAR CheN RN  Care Management

## 2024-11-21 ENCOUNTER — HOSPITAL ENCOUNTER (OUTPATIENT)
Facility: HOSPITAL | Age: 64
Setting detail: INFUSION SERIES
End: 2024-11-21

## 2024-11-21 ENCOUNTER — CARE COORDINATION (OUTPATIENT)
Facility: CLINIC | Age: 64
End: 2024-11-21

## 2024-11-21 ENCOUNTER — TELEPHONE (OUTPATIENT)
Facility: CLINIC | Age: 64
End: 2024-11-21

## 2024-11-21 NOTE — CARE COORDINATION
Care Transitions Note    Initial Call - Call within 2 business days of discharge: Yes    Patient Current Location:  Home: [address] [place] [id number]    Care Transition Nurse contacted the patient by telephone to perform post hospital discharge assessment, verified name and  as identifiers. Provided introduction to self, and explanation of the Care Transition Nurse role.     Patient: Rina Prajapati    Patient : 1960   MRN: [id number]    Reason for Admission: Severe sepsis   Discharge Date: 24  RURS: Readmission Risk Score: 17.6      Last Discharge Facility       Date Complaint Diagnosis Description Type Department Provider    24 Shortness of Breath Non-small cell lung cancer, unspecified laterality (HCC) ... ED to Hosp-Admission (Discharged) (ADMITTED) [id number] Gris Cohen, ; Fred,...            Was this an external facility discharge? No    Additional needs identified to be addressed with provider   No needs identified             Method of communication with provider: none.    Patients top risk factors for readmission:  recent hospital admission, chronic medical conditions.     Interventions to address risk factors:   Continue to take all medications as prescribed . Call CTN, PCP, or specialist office for any questions , concerns and/or needs.     Care Summary Note:     Patient reports that she is feeling okay.   No new, ongoing and/or worsening of symptoms as per Pt.   Pt. Denied CP, SOB, fever , and chills at this time.   Pt. Reported that Home health called and informed her that they will see her Monday.   Pt. Reported that she Uses oxygen and has oxygen at home.   Pt. Reports that she Has all the equipments she needs to keep her well at home.   Pt. States that she will attend her appt with  Dr. Carrington pulmonology.   CTN Offer PCP appt. , Pt. Declined.  No questions, concerns and/or needs at this time as per Pt.      Pt. Kept the conversation short.

## 2024-11-21 NOTE — PROGRESS NOTES
Hematology / Oncology Progress Note  [facility]      Patient ID:  Rina Prajapati  64 y.o.  1960    Admit Date: 2024    Assessment:     Principal Problem:    Severe sepsis (HCC)  Active Problems:    Non-small cell lung cancer (HCC)    Acute on chronic heart failure with preserved ejection fraction (HCC)    Cardiomyopathy (HCC)    Elevated troponin    Hypoxia    Palliative care encounter    Goals of care, counseling/discussion    Non-small cell cancer of right lung (HCC)    Diarrhea  Resolved Problems:    * No resolved hospital problems. *    Acute on chronic respiratory failure, likely multifactorial, improving with decreasing oxygen requiremennts    NSCLC, IV  CT chest does not suggest rapid disease progression, although CT from 10/30/24 consistent with disease progression while on taxotere (since 3/2023)  Suspect, right heart failure, copd exacerbation, atypical pneumonia  Improved on steroids and diuresis     PE on DOAC  Plan:     Track cbc, lytes,   Cont current mx: abx, diuretics, apixaban, steroids, pulm mx  Will move chemo 1 week out    Subjective:   Feels better.  Able to move to commode on current oxygen settings  Denies chest pain, bleeding    Objective:     BP (!) 145/96   Pulse (!) 107   Temp 98.3 °F (36.8 °C) (Oral)   Resp 23   Ht 1.676 m (5' 6\")   Wt 62.1 kg (137 lb)   SpO2 93%   BMI 22.11 kg/m²           Temp (24hrs), Av.3 °F (36.8 °C), Min:98 °F (36.7 °C), Max:98.9 °F (37.2 °C)        Intake/Output Summary (Last 24 hours) at 2024 0801  Last data filed at 2024 0647  Gross per 24 hour   Intake 995.52 ml   Output 1170 ml   Net -174.48 ml       Review of Systems:   Constitutional:  No Fever; No chills; No weight loss    Skin:  No rash; No itching   HEENT:  No changes in vision or hearing   Cardiovascular:  No palpitations, chest pain, angina   Respiratory:  No shortness of breath, no wheezing, no pleurisy, no cough, no  hemoptysis 
   Hematology / Oncology Progress Note  [facility]      Patient ID:  Rina Prajapati  64 y.o.  1960    Admit Date: 2024    Assessment:     Principal Problem:    Severe sepsis (HCC)  Resolved Problems:    * No resolved hospital problems. *    Acute on chronic respiratory failure, likely multifactorial  NSCLC, IV  CT chest does not suggest rapid disease progression, although CT from 10/30/24 consistent with disease progression while on taxotere (since 3/2023)  Suspect, right heart failure, copd exacerbation, atypical pneumonia  Improved on steroids and diuresis    PE on DOAC    Plan:     Track cbc, lytes  Cont steroids, cont abx  Cont current mx    Planning to commence chemotherapy with Gemzar next week if able  Subjective:   Had to proceed to ER yesterday because she was progressively getting SOB and swollen.  No fevers, no hemoptysis no chest pain,     Objective:     /71   Pulse (!) 131   Temp 98.2 °F (36.8 °C) (Oral)   Resp (!) 32   Ht 1.676 m (5' 6\")   Wt 59 kg (130 lb)   SpO2 94%   BMI 20.98 kg/m²           Temp (24hrs), Av.8 °F (36.6 °C), Min:97.2 °F (36.2 °C), Max:98.6 °F (37 °C)        Intake/Output Summary (Last 24 hours) at 2024 191  Last data filed at 2024 1848  Gross per 24 hour   Intake 1542.76 ml   Output 1500 ml   Net 42.76 ml       Review of Systems:   Constitutional:  No Fever; No chills; No weight loss    Skin:  No rash; No itching   HEENT:  No changes in vision or hearing   Cardiovascular:  No palpitations, chest pain, angina   Respiratory:  No shortness of breath, no wheezing, no pleurisy, no cough, no  hemoptysis   Gastrointestinal:  No nausea or vomiting; No diarrhea, no dyspepsis   Genitourinary:  No dysuria; No increase in urinary frequency   Musculoskeletal:  No weakness or muscle pains   Endo:  No polyuria; no symptoms of thyroid dysfunction   Heme:  No bruising; No bleeding, no adenopathy   Neurological:  No Seizures; No focal 
  [facility]  Progress Note    Patient: Rina Prajapati Age: 64 y.o. : 1960 MR#: [id number] SSN: [id number]  Date/Time: 2024    Subjective:     Yesterday afternoon, HF decreased to 20 L/min, FiO2 30%. Sat 93%. 5:30 am today, got prn duoneb.     Remains on IV Lasix.    K 3.4.     Started on flutter valve, and ics. S/e at bedside, she is using both of them, coughing up sputum into tissue. Overall feels better. RT has removed high flow, she is on 6 L via NC. Patient denies chest pain, abdominal pain. For breakfast, she has waffles, turkey sausage. Having post prandial loose stool which she feels is related to antibiotic. No family at bedside.       Assessment/Plan:     1. Acute on chronic respiratory failure with hypoxia, multifactorial, on 6 L NC trial at this time. Wean as tolerated, RT working w/ her. Continue flutter valve, ics. On 2.5 L oxygen at home. Pulmonology follows. .   2. Non-small cell lung carcinoma on Taxotere, prev treated with Keytruda. Oncology follows. commence chemotherapy with Gemzar next week as outpatient if able, oncology input appreciated.   3. COPD with acute exacerbation. Continue current dose iv steroids. BD. Tobacco cessation. Avoid 2nd hand smoke exposure. Pulmonology input appreciated.   4.  HCAP. history of chemo-GGO noted on her CTA . Zosyn. RVP negative. Blood cx () ngtd. Mrsa nares negative --> vanco stopped. Consider 5 day course.   5.  Acute on chronic HFrEF. Iv lasix. Entresto and aldactone held. Cardiology follows.   6. Tachycardia poa  7. Lactic acidosis  8. Hx  PE/DVT- no procedures planned -- xsition heparin gtt --> Eliquis. Chronic non-occlusive deep vein thrombosis in the left axillary vein. (acute non-occlusive on 2023).   9. Moderate pericardial effusion  10.  Hypokalemia replete as needed.  11. Steroid induced hyperglycemia. Ssi / ADA diet /  A1c 5.4.   12. 2nd hand smoke exposure.  She will ask her 
  [facility] Hospitalist [facility]  Progress Note    Patient: Rina Prajapati Age: 64 y.o. : 1960 MR#: [id number] SSN: [id number]  Date/Time: 2024    Subjective:     Yesterday evening HR 130s, had gotten 2 duoneb tx. Improved to 110s w/ rest.     Home Montelukast resumed yesterday evening. She is able to cough up her secretions. No bleeding. Breathing gradual improvement. She denies constipation. No chest pain, abdominal pain. No family at bedside.       Assessment/Plan:     1.  Acute on chronic respiratory failure with hypoxia, on high flow.  On 2.5 L oxygen at home. Multifactorial. Pulm consult.   2.  Non-small cell lung carcinoma on Taxotere, prev treated with Keytruda. Oncology follows. commence chemotherapy with Gemzar next week as outpatient if able, oncology input appreciated.   3.  COPD with acute exacerbation. reduce iv steroids. BD. Tobacco cessation. Avoid 2nd hand smoke exposure. Pulmonology input appreciated.   4.  HCAP. history of chemo-GGO noted on her CTA . Zosyn. RVP negative. Blood cx () ngtd. Mrsa nares negative --> discontinue vanco.   5.  Acute on chronic HFrEF. Iv lasix. Entresto and aldactone held. Cardiology follows.   6. Tachycardia poa  7. Lactic acidosis  8. Hx  PE/DVT-on outpatient Eliquis -- on heparin gtt. Chronic non-occlusive deep vein thrombosis in the left axillary vein. (acute non-occlusive on 2023).   9. Moderate pericardial effusion  10.  Hypokalemia replete as needed.  11. Steroid induced hyperglycemia. Ssi / ADA diet /  A1c 5.4.   12. 2nd hand smoke exposure.  She will ask her fiancé to smoke outside.  13. Lactose intolerance. Diet and supplement adjusted.    14. hx SVC syndrome   15. Tobacco use d/o. She has cut back to ~ 1 cigarette daily.   16. Mild pulmonary hypertension  17. Anemia multifactorial. Supplement folic acid.   18. DVT prophylaxis  19.  Full code. AMD on file. Pallia care input appreciated.  Continue stepdown 
  [facility] Hospitalist [facility]  Progress Note    Patient: Rina Prajapati Age: 64 y.o. : 1960 MR#: [id number] SSN: [id number]  Date/Time: 2024    Subjective:     Weaned to 4L NC from 6L NC this AM.     Oncology plans to move chemo 1 week out.    Patient seen and examined at bedside, she is slowly coughing up sputum. Flutter valve helping her break up sputum.  She is still having loose stool, but somewhat better than yesterday.  Denies chest pain, abdominal pain.  No family at bedside.      Assessment/Plan:     1. Acute on chronic respiratory failure with hypoxia, multifactorial, on 4L NC at this time.  Wean as tolerated, RT working w/ her. Continue flutter valve, ics. On 2.5 L oxygen at home. Pulmonology follows.   2. Non-small cell lung carcinoma on Taxotere, prev treated with Keytruda. Oncology follows. commence chemotherapy with Gemzar in 2 weeks as outpatient if able, oncology input appreciated.   3. COPD with acute exacerbation. Continue current dose iv steroids. BD. Tobacco cessation. Avoid 2nd hand smoke exposure. Pulmonology input appreciated.   4.  HCAP. history of chemo-GGO noted on her CTA . Zosyn. RVP negative. Blood cx () ngtd. Mrsa nares negative --> vanco stopped. 5 day course to be completed.   5.  Acute on chronic HFrEF. Iv lasix. Entresto and aldactone held. Cardiology follows.   6. Tachycardia poa  7. Lactic acidosis  8. Hx  PE/DVT- no procedures planned -- transitioned heparin gtt --> Eliquis. Chronic non-occlusive deep vein thrombosis in the left axillary vein. (acute non-occlusive on 2023).   9. Moderate pericardial effusion  10.  Hypokalemia replete as needed.  11. Steroid induced hyperglycemia. Ssi / ADA diet /  A1c 5.4.   12. 2nd hand smoke exposure.  She will ask her fiancé to smoke outside.  13. Lactose intolerance. Diet and supplement adjusted.    14. hx SVC syndrome   15. Tobacco use d/o. She has cut back to ~ 1 cigarette daily.   16. 
  [facility]  Pulmonary, Critical Care, and Sleep Medicine    Name: Rina Prajapati MRN: [id number]   : 1960 Hospital: [facility]   Date: 2024        Pulmonary Medicine: Daily Progress Note    Admission Date:   2024  LOS: 5  MAR reviewed and pertinent medications noted or modified as needed    IMPRESSION:   Acute on chronic hypoxic respiratory failure with high FiO2 requirements, likely multifactorial, with COPD exacerbation, NSCLC, diastolic/systolic HF decompensation, possible pneumonia vs non infectious pneumonitis. RVP negative. Decreasing FIO2 requirements, weaned from HFNC to 6L NC this morning  Increased VINCE interstitial opacities ? Pulmonary edema vs atypical pneumonia  Severe sepsis with lactic acidosis and tachycardia and leukocytosis  NSCLC on Taxotere, possible start Gemzar next week  Severe COPD FEV1 in 2024 31% predicted with air trapping and reduced DLCO. On LTOT  Diastolic HF, current echo with mild systolic dysfunction EF 45%  Pulmonary hypertension WHO Grp 2/3  Hypokalemia related to diuretic use  Hypomagnesemia    Current tobacco and MJ use  Possible history of DVT on Eliquis  Chronic pericardial effusion          Patient Active Problem List   Diagnosis    SVC syndrome    Non-small cell lung cancer (HCC)    Abnormal mammogram    Microcytic anemia    Recurrent major depressive disorder (HCC)    Essential hypertension    Allergic rhinitis due to pollen    Breast mass seen on mammogram    Perennial allergic rhinitis with seasonal variation    Other primary thrombophilia (HCC)    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 
 Latest Reference Range & Units 11/14/24 00:33   POC pH 7.35 - 7.45   7.43   POC pCO2 35.0 - 48.0 MMHG 47.5   POC PO2 83 - 108 MMHG 84   POC HCO3 21 - 28 MMOL/L 31.2 (H)   POC O2 SAT 92 - 97 % 96.4   POC Nhan's Test -   Positive   FIO2 % 55   Base Excess mmol/L 5.9   (H): Data is abnormally high  
1000 O2 weaned to 4L nc, O2 sat 95% at this time    1500 Attempted to wean O2 to 3.5L, pt refused O2 sat 95% on 4L, pt denies SOB at this time.  
1000 O2 weaned to 4L nc, O2 sat 95% at this time    1500 Attempted to wean O2 to 3.5L, pt refused O2 sat 95% on 4L, pt denies SOB at this time.  
Advance Care Planning   Healthcare Decision Maker:    Primary Decision Maker: Radha Jolly - Brother/Sister - [phone]    Secondary Decision Maker: KrunalEmmanuele - Brother/Sister - [phone]    Today we documented Decision Maker(s) consistent with ACP documents on file.     Spiritual Health History and Assessment/Progress Note  [facility]    Spiritual/Emotional Needs,  ,  ,      Name: Rina Prajapati MRN: [id number]    Age: 64 y.o.     Sex: female   Language: English   Jain: None   Severe sepsis (HCC)     Date: 11/15/2024            Total Time Calculated: 7 min              Spiritual Assessment began in [facility] 2 CV STEPDOWN        Referral/Consult From: Multi-disciplinary team   Encounter Overview/Reason: Spiritual/Emotional Needs  Service Provided For: Patient    Tara, Belief, Meaning:   Patient Other: none  Family/Friends No family/friends present      Importance and Influence:  Patient has no beliefs influential to healthcare decision-making identified during this visit  Family/Friends No family/friends present    Community:  Patient feels well-supported. Support system includes: Other: family  Family/Friends No family/friends present    Assessment and Plan of Care:     Patient Interventions include: Facilitated expression of thoughts and feelings  Family/Friends Interventions include: No family/friends present    Patient Plan of Care: Spiritual Care available upon further referral  Family/Friends Plan of Care: No family/friends present    Electronically signed by IAN Ann on 11/15/2024 at 5:42 PM        
Bedside and Verbal shift change report given to CORTES Agudelo (oncoming nurse) by CORTES Lau (offgoing nurse). Report included the following information Nurse Handoff Report, Intake/Output, MAR, and Recent Results.     1933:    Patient was provided with discharge instructions and education. The patient verbalized understanding of the discharge information. Time for questions was provided. No questions voiced at this time. The patient was discharged home via wheelchair with .     
Bedside and Verbal shift change report given to CORTES bah (oncoming nurse) by CORTES Hernandez (offgoing nurse). Report included the following information Nurse Handoff Report, Adult Overview, Recent Results, and Cardiac Rhythm SR-ST .     
Bedside and Verbal shift change report given to Jud RN (oncoming nurse) by Max RN (offgoing nurse). Report included the following information Nurse Handoff Report, Intake/Output, MAR, Recent Results, and Cardiac Rhythm sinus tach w/ PVCs .     
Bedside and Verbal shift change report given to Vladislav Macedo RN   (oncoming nurse) by Tracy KOLB (offgoing nurse). Report included the following information Nurse Handoff Report, Index, MAR, Recent Results, and Cardiac Rhythm SR .     
Bedside shift change report given to CORTES Molina (oncoming nurse) by CORTES Santana (offgoing nurse). Report included the following information Nurse Handoff Report, Recent Results, Cardiac Rhythm sinus tachy wth PVC's, and Alarm Parameters.    
Bedside shift change report given to CORTES Molina, rn (oncoming nurse) by CORTES Loomis (offgoing nurse). Report included the following information Nurse Handoff Report, Recent Results, Cardiac Rhythm sinus tachy with PVC'S, and Alarm Parameters.    
Bedside shift change report given to Tracy RN (oncoming nurse) by CORTES Loomis (offgoing nurse). Report included the following information Nurse Handoff Report, Med Rec Status, Cardiac Rhythm sinus tachy w/ PVC'S, and Alarm Parameters.      0530: pt had episodes of breathlessness, given 1 dose of nebs PRN.  0600: advocated for PEP/flutter valve as pt is congested , spoken to DR Anderson and added expectorant .        
[facility] - [facility]    Admit Date: 11/13/2024  Attending Cardiologist: Dr. Huang    Assessment:     -Acute on chronic hypoxic respiratory failure, multifactorial in setting of non-small cell lung cancer, COPD exacerbation, concern for pneumonia, possible component of CHF  -Cardiomyopathy, Echo 11/14/2024 with LVEF 45-50% with hypokinetic basal anteroseptal and mid anteroseptal wall segments  -NSCLC, on Taxotere, followed by Dr. Li  -Upper extremity venous duplex study 11/14/2024 with age indeterminate non-occlusive DVT in 1 of 2 L brachial veins, chronic non-occlusive DVT in L axillary vein (acute non-occlusive on 9/20/2023)  -COPD  -Tobacco use disorder     Primary cardiologist is Dr. Javier Potts, recently established    Plan:     -Continue gentle diuresis with IV Lasix.  -Treatment for DVT per primary team; started on Eliquis last night.  -Pulmonology, heme-onc following; appreciate assistance.    Subjective:     No new complaints.     Objective:      Patient Vitals for the past 8 hrs:   Temp Pulse Resp BP SpO2   11/18/24 0800 98.3 °F (36.8 °C) (!) 101 28 (!) 144/94 93 %   11/18/24 0723 98.3 °F (36.8 °C) -- -- -- --   11/18/24 0320 98.1 °F (36.7 °C) (!) 107 23 (!) 145/96 93 %         Patient Vitals for the past 96 hrs:   Weight   11/17/24 0529 62.1 kg (137 lb)   11/15/24 0629 61.6 kg (135 lb 12.8 oz)   11/14/24 1146 59 kg (130 lb)       TELE: sinus rhythm with PAC's               Current Facility-Administered Medications   Medication Dose Route Frequency    guaiFENesin (ROBITUSSIN) 100 MG/5ML liquid 200 mg  200 mg Oral Q4H PRN    lactobacillus (CULTURELLE) capsule 1 capsule  1 capsule Oral TID WC    apixaban (ELIQUIS) tablet 5 mg  5 mg Oral BID    cholestyramine light packet 4 g  4 g Oral BID    methylPREDNISolone sodium succ (SOLU-MEDROL) 40 mg in sterile water 1 mL injection  40 mg IntraVENous Q12H    folic acid (FOLVITE) tablet 1 mg  1 mg Oral Daily    potassium chloride (KLOR-CON M) 
[facility] - Progress Note    Admit Date: 11/13/2024  Attending Cardiologist: Dr. Huang    Assessment:     -Acute on chronic hypoxic respiratory failure, multifactorial in setting of non-small cell lung cancer, COPD exacerbation, concern for pneumonia, possible component of CHF  -Cardiomyopathy, Echo 11/14/2024 with LVEF 45-50% with hypokinetic basal anteroseptal and mid anteroseptal wall segments  -NSCLC, on Taxotere, followed by Dr. Li  -Upper extremity venous duplex study 11/14/2024 with age indeterminate non-occlusive DVT in 1 of 2 L brachial veins, chronic non-occlusive DVT in L axillary vein (acute non-occlusive on 9/20/2023)  -COPD  -Tobacco use disorder     Primary cardiologist is Dr. Javier Potts, recently established    Plan:     -Transition to PO Lasix starting tomorrow; received dose of IV Lasix this AM..  -Not on BB due to underlying pulmonary issues.  -Continued on Eliquis for DVT per primary team.  -Pulmonology, heme-onc following; appreciate assistance.  -Continue to wean supplemental O2 as able.  Pt states typically on 2.5LNC at baseline.  -No new recommendations from cardiac standpoint.  Will be available as needed if additional concerns arise.    Subjective:     No new complaints. Breathing improving, currently ~85% back to baseline.    Objective:      Patient Vitals for the past 8 hrs:   Temp Pulse Resp SpO2   11/19/24 0806 98.4 °F (36.9 °C) -- -- --   11/19/24 0400 97 °F (36.1 °C) -- -- --   11/19/24 0326 -- (!) 102 18 100 %         Patient Vitals for the past 96 hrs:   Weight   11/19/24 0600 61.5 kg (135 lb 9.6 oz)   11/17/24 0529 62.1 kg (137 lb)          Current Facility-Administered Medications   Medication Dose Route Frequency    piperacillin-tazobactam (ZOSYN) 3,375 mg in sodium chloride 0.9 % 50 mL IVPB (mini-bag)  3,375 mg IntraVENous Q8H    cholestyramine light packet 4 g  4 g Oral TID    lactobacillus (CULTURELLE) capsule 2 capsule  2 capsule Oral TID WC    
[facility] - Progress Note    Admit Date: 11/13/2024  Attending Cardiologist: Dr. Huang    Assessment:     -Acute on chronic hypoxic respiratory failure, multifactorial in setting of non-small cell lung cancer, COPD exacerbation, concern for pneumonia, possible component of CHF  -Cardiomyopathy, Echo 11/14/2024 with LVEF 45-50% with hypokinetic basal anteroseptal and mid anteroseptal wall segments  -NSCLC, on Taxotere, followed by Dr. Li  -Upper extremity venous duplex study 11/14/2024 with age indeterminate non-occlusive DVT in 1 of 2 L brachial veins, chronic non-occlusive DVT in L axillary vein (acute non-occlusive on 9/20/2023)  -COPD  -Tobacco use disorder    Primary cardiologist is Dr. Javier Potts, recently established    Plan:     -Continue gentle IV diuresis with Lasix 20 mg daily; strict I/O's (ordered) while on IV diuretics.  -Pulmonology following, appreciate assistance.  -Antibiotics per primary team.  -Continue to wean supplemental O2 as tolerated, currently on HFNC at 20L.  -Anticoagulation for DVT per primary team; currently on Heparin infusion.    Subjective:     No new complaints. Says breathing seems to be improving.    Objective:      Patient Vitals for the past 8 hrs:   Temp Pulse Resp BP SpO2   11/15/24 1215 -- (!) 105 25 -- 98 %   11/15/24 1200 98.2 °F (36.8 °C) (!) 106 28 136/83 100 %   11/15/24 1000 -- (!) 121 18 123/74 93 %   11/15/24 0849 -- (!) 120 29 -- 99 %   11/15/24 0824 98.2 °F (36.8 °C) 97 18 136/86 100 %         Patient Vitals for the past 96 hrs:   Weight   11/15/24 0629 61.6 kg (135 lb 12.8 oz)   11/14/24 1146 59 kg (130 lb)   11/13/24 2236 59 kg (130 lb)   11/13/24 1527 57.6 kg (127 lb)        Current Facility-Administered Medications   Medication Dose Route Frequency    ipratropium 0.5 mg-albuterol 2.5 mg (DUONEB) nebulizer solution 1 Dose  1 Dose Inhalation Q4H PRN    [START ON 11/16/2024] vancomycin (VANCOCIN) 1250 mg in 250 mL IVPB  1,250 mg IntraVENous 
Cardiology Progress Note    Admit Date: 11/13/2024  Attending Cardiologist: Dr. Dwyer    IMPRESSION  Acute on chronic hypoxic respiratory failure, multifactorial including COPD, CHF, possible infectious etiology, NSCLC  Acute on Chronic heart failure preserved ejection fraction, elevated NT proBNP greater than 3600, chest x-ray 11/13/2024 multifocal bilateral opacities of edema, CTA of chest 11/13/2024 no evidence for acute pulmonary embolism, evidence for right heart strain and reflux of contrast into the IVC and hepatic veins may be related to CHF.  Cardiomyopathy mildly reduced 45 to 50%, EKG 11/15/2024 Sinus tachycardia with PVC, T wave abnormality anterior ischemia  Mild pulmonary hypertension by 2D echo 11/14/2024  Small to moderate circumferential pericardial effusion 1 to 2 cm by 2D echo 11/14/2024  Elevated troponin 15 ng/L, sinus tachycardia premature ventricular complex, lateral infarct  History of PE DVT, ultrasound venous lower extremity bilaterally 11/14/2024 no evidence of DVT right and left lower extremities bilaterally  History is of SVC syndrome  Tobacco abuse     PLAN  Monitor fluid status, adjust as necessary, fluid restriction, salt intake <2g per day.  Okay for lasix 20mg IV daily for now as blood pressure can tolerate, monitor renal index, home dose 40mg po daily PRN edema  Currently on heparin gtt. with history of outpatient Eliquis PE DVT  Recommend Guideline Directed medical therapy as can tolerate.  Aldactone and entresto currently held.    2d echo reviewed as above does have some reducing ejection fraction through the year, may consider for ischemic evaluation after acute issues resolve with Dr. DOLORES Potts primary cardiologist.       Primary Cardiologist Dr. DOLORES Potts    Subjective:     Denies chest pains, denies shortness of breath while on high flow oxygen, denies abdominal pains    Objective:      Patient Vitals for the past 8 hrs:   Temp Pulse Resp BP SpO2   11/16/24 0740 98.2 °F (36.8 
[facility]   Pharmacy Pharmacokinetic Monitoring Service - Vancomycin    Indication: Pneumonia (HAP)  Target Concentration: Goal AUC/BAHMAN 400-600 mg*hr/L  Day of Therapy: 2  Additional Antimicrobials: Piperacillin/Tazobactam, Azithromycin    Pertinent Laboratory Values:   Temp: 97.6 °F (36.4 °C), Weight - Scale: 61.6 kg (135 lb 12.8 oz)  Recent Labs     11/14/24  0522 11/14/24  1530 11/15/24  0402   CREATININE 0.68 0.74 0.68   BUN 12 16 20*   WBC 8.1  --  15.2*   PROCAL 0.11  --   --        Estimated Creatinine Clearance: 76 mL/min (based on SCr of 0.68 mg/dL).    Pertinent Cultures:  Culture Date Source Results   11/13 blood ngtd   MRSA Nasal Swab: Ordered by provider, awaiting results    Assessment:  Date/Time Current Dose Concentration Timing of Concentration (h) AUC   1/14 1,500mg x1  1,000mg q12h - - -   11/15 1,000mg q12h  1250mg q18h 15 10.5 620     Note: Serum concentrations collected for AUC dosing may appear elevated if collected in close proximity to the dose administered, this is not necessarily an indication of toxicity    Plan:  Vancomycin 1,250mg q18h  Projected AUCss/T = 519/12  Renal labs as indicated  Vancomycin concentration to be repeated as indicated  Pharmacy will continue to monitor patient and adjust therapy as indicated    Thank you for the consult,  SEMAJ RYAN RPH  11/15/2024  
[facility]   Pharmacy Pharmacokinetic Monitoring Service - Vancomycin     Rina Prajapati is a 64 y.o. female starting on vancomycin therapy for Pneumonia (HAP). Pharmacy consulted for monitoring and adjustment.    Target Concentration: Goal AUC/BAHMAN 400-600 mg*hr/L    Additional Antimicrobials: Piperacillin/Tazobactam    Pertinent Laboratory Values:   Temp: 97.6 °F (36.4 °C), Weight - Scale: 59 kg (130 lb)  Recent Labs     11/13/24  1530   CREATININE 0.68   BUN 9   WBC 12.1     Estimated Creatinine Clearance: 76 mL/min (based on SCr of 0.68 mg/dL).    Pertinent Cultures:  Culture Date Source Results   11/13 Blood Pending   MRSA Nasal Swab: Not ordered. Order placed by pharmacy    Plan:  Dosing recommendations based on Bayesian software  Start vancomycin 1500 mg IV x 1 followed by 1 gm IV q12h  Anticipated AUC of 567 and trough concentration of 15 at steady state  Renal labs as indicated   Vancomycin concentration ordered for  11/15 @ 0400  Pharmacy will continue to monitor patient and adjust therapy as indicated    Thank you for the consult,  WING CALABRESE RPH  11/13/2024   
Comprehensive Nutrition Assessment    Type and Reason for Visit:  Initial, Positive nutrition screen    Nutrition Recommendations/Plan:   Continue current diet as tolerated.  Order Ensure Plus High Protein (each provides 350 kcal, 20g protein) BID  Plan to order MVI/min supplementation.   Daily wts.  Continue to monitor tolerance of PO, compliance of oral supplements, weight, labs, and plan of care during admission.     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     Weight Hx: per EMR review, wt change: +1.7 lb (1.3%) x 30 days PTA,  -1.4 lb (1.1%) x 6 months, and -6.9 lb (5%) x 1 year PTA- not significant.     Wt Readings from Last 10 Encounters:   11/14/24 59 kg (130 lb)   10/23/24 57.5 kg (126 lb 12.8 oz)   10/03/24 58.2 kg (128 lb 4.8 oz)   10/02/24 58.2 kg (128 lb 4.8 oz)   09/19/24 54.6 kg (120 lb 6.4 oz)   09/11/24 57.2 kg (126 lb 3.2 oz)   08/21/24 57.6 kg (127 lb)   08/12/24 58.5 kg (129 lb)   07/31/24 58.5 kg (128 lb 14.4 oz)   07/29/24 59.4 kg (131 lb)     NFPE: unable to perform NFPE. Food Allergies: NKFA    Nutrition Assessment:    Admitted for management of severe sepsis. Pt seen for - Positive Nutrition Screen: MST (2-13 lbs) wt loss and decreased appetite. Attempted to visit pt- RN in room. Plan to order oral nutrition supplements to encourage PO calorie/protein intake 
Decreased Fio2 and flow on HFNC. Pt was trialed off HFNC yesterday and had desaturations and increased tachypnea and WOB. Plan is to wean slower off HF to eventually 6L NC again. Pt becomes very SOB and anxious with any exertion, to include getting on and off the bedpan. Will keep HF at bedside for now, Pass to night RT to trial off. If pt maintains Spo2 and becomes less dyspneic with movements.   11/16/24 1555   Oxygen Therapy/Pulse Ox   O2 Therapy Oxygen humidified   O2 Device Heated high flow cannula   O2 Flow Rate (L/min) (S)  20 L/min   FiO2  (S)  30 %   Pulse (!) 113   Respirations 29   SpO2 93 %       
Discharge instructions reviewed with pt and all questions answered.  Pt denies pain and SOB at this time.  PIV access removed.  Pt awaiting  at this time, will report off to oncoming nurse.   
Palliative Medicine Consult  [facility]: [phone]  [facility]: [phone]     Patient Name: Rina Prajapati  YOB: 1960    Date of Service: 11/20/2024  Provider: ROSA Geronimo NP  Hospital Day: 8  Admit Date: 11/13/2024  Referring Provider:      Dulce Hoffmann PA-C       Reason for Consult: goals of care discussion/ACP/symptoms management      SUMMARY:     Rina Prajapati is a 64 y.o. with a past history of non-small cell lung cancer on chemotherapy, DVT/PE, SVC syndrome, CHF, COPD, chronic hypoxic respiratory failure and tobacco use disorder, who was admitted on 11/13/2024 from home with a diagnosis of acute on chronic hypoxic respiratory failure, pneumonia and sepsis.  Patient initially presented to hospital with complaint of shortness of breath.  Patient was started on high flow oxygen and antibiotics.  Patient was also started on Lasix.  She was switched to heparin drip from Eliquis for management of history of DVT and PE.  CTA chest was done that was negative for PE and showed left upper lobe pulmonary mass.  She was seen by cardiology and pulmonology service. Current medical issues leading to Palliative Medicine involvement include: To establish goals of care.    11/20/2024 Patient sitting on side of bed. Alert no complaints of dyspnea or pain. Possible d/c later today.    November 15, 2024: Patient was seen in presence of palliative care staff member.  Patient is awake and oriented x 3.  Denies any chest or abdominal pain.  No nausea or vomiting.  Had a bowel movement.  Dyspnea on exertion present without any cough.  No family members at bedside currently     HISTORY:     History obtained from: Patient    CHIEF COMPLAINT: Worsening shortness of breath    HPI/SUBJECTIVE:    The patient is:   [x] Verbal and participatory  [] Non-participatory due to:         PHYSICAL EXAM:     From RN flowsheet:  Wt Readings from Last 3 Encounters: 
Progress Note  Pulmonary, Critical Care, and Sleep Medicine    Name: Rina Prajapati MRN: [id number]   : 1960 Hospital: [facility]   Date: 2024        IMPRESSION:   Acute on chronic hypoxic respiratory failure with high FiO2 requirements, likely multifactorial, with COPD exacerbation, NSCLC, diastolic/systolic HF decompensation, possible pneumonia vs non infectious pneumonitis. RVP negative. Decreasing FIO2 requirements to 40% but remains on HFNC  Increased VINCE interstitial opacities ? Pulmonary edema vs atypical pnaumonia  Severe sepsis with lactic acidosis and tachycardia and leukocytosis  NSCLC on Taxotere, possible start Gemzar next week  Severe COPD FEV1 in 2024 31% predicted with air trapping and reduced DLCO. On LTOT  Diastolic HF, current echo with mild systolic dysfunction EF 45%  Pulmonary hypertension WHO Grp 2/3  Hypokalemia related to diuretic use  Hypomagnesemia    Current tobacco and MJ use  Possible history of DVT on Eliquis  Chronic pericardial effusion       PLAN:   Titrate FiO2 to SpO2 >90%  Diuresis per cardiology and primary teams  CXR results and imaging reviewed. Increased interstitial markings likely due to fluid overload, pt still net positive  Continue Zosyn, Vanco discontinued as MRSA screen negative  Brovana/Pulmicort  Consider switch back to Anoro on discharge  Continue systemic steroids and taper with improvement  Glycemic control  Electrolyte replacement per primary team.   Will follow with you     Subjective/Interval History:   I have reviewed the flowsheet and previous day’s notes. Reviewed interval history. Discussed management with nursing staff.    24  Afebrile  Tachycardic   In good spirits. Less dyspneic  Remains on HFNC, briefly at 30% but back to 40% with 25 LPM      ROS:Pertinent items are noted in HPI.  Orders reviewed including medications. Changes made if indicated.   Telemetry monitor reviewed at the bedside.  Objective:   Vital 
Progress Note  Pulmonary, Critical Care, and Sleep Medicine    Name: Rina Prajapati MRN: [id number]   : 1960 Hospital: [facility]   Date: 2024        IMPRESSION:   Acute on chronic hypoxic respiratory failure with high FiO2 requirements, likely multifactorial, with COPD exacerbation, NSCLC, diastolic/systolic HF decompensation, possible pneumonia vs non infectious pneumonitis. RVP negative. Decreasing FIO2 requirements, now at NC 4 LPM  Increased VINCE interstitial opacities ? Pulmonary edema vs atypical pnaumonia  Severe sepsis with lactic acidosis and tachycardia and leukocytosis  NSCLC on Taxotere, possible start Gemzar next week  Severe COPD FEV1 in 2024 31% predicted with air trapping and reduced DLCO. On LTOT  Diastolic HF, current echo with mild systolic dysfunction EF 45%  Pulmonary hypertension WHO Grp 2/3  Hypokalemia related to diuretic use, resolved  Hypomagnesemia    Current tobacco and MJ use  Possible history of DVT on Eliquis  Chronic pericardial effusion       PLAN:   Titrate FiO2 to SpO2 >90%  Assess home O2 needs prior to discharge  Diuresis per cardiology and primary teams  CXR results and imaging reviewed. Increased interstitial markings likely due to fluid overload, now net negative  Completing Zosyn today  Brovana/Pulmicort  Switch back to Anoro on discharge  Continue Prednisone taper  Glycemic control  Electrolyte replacement per primary team.   Will follow with you     Subjective/Interval History:   I have reviewed the flowsheet and previous day’s notes. Reviewed interval history. Discussed management with nursing staff.    24  Afebrile  Tachycardic   Less dyspneic, in good spirits  Continues on NC 4 LPM      ROS:Pertinent items are noted in HPI.  Orders reviewed including medications. Changes made if indicated.   Telemetry monitor reviewed at the bedside.  Objective:   Vital Signs:    BP (!) 148/72   Pulse (!) 104   Temp 97.1 °F (36.2 °C) (Temporal)   
Progress Note  Pulmonary, Critical Care, and Sleep Medicine    Name: Rina Prajapati MRN: [id number]   : 1960 Hospital: [facility]   Date: 2024        IMPRESSION:   Acute on chronic hypoxic respiratory failure with high FiO2 requirements, likely multifactorial, with COPD exacerbation, NSCLC, diastolic/systolic HF decompensation, possible pneumonia vs non infectious pneumonitis. RVP negative. Decreasing FIO2 requirements to 40% but remains on HFNC  Severe sepsis with lactic acidosis and tachycardia and leukocytosis  NSCLC on Taxotere, possible start Gemzar next week  Severe COPD FEV1 in 2024 31% predicted with air trapping and reduced DLCO. On LTOT  Diastolic HF, current echo with mild systolic dysfunction EF 45%  Pulmonary hypertension WHO Grp 2/3  Hypokalemia related to diuretic use  Hypomagnesemia    Current tobacco and MJ use  Possible history of DVT on Eliquis  Chronic pericardial effusion       PLAN:   Titrate FiO2 to SpO2 >90%  Maintain neutral to negative fluid balance  CXR in am  Continue Zosyn, discontinue Vanco as MRSA screen negative  Brovana/Pulmicort  Continue systemic steroids and taper with improvement  Glycemic control  Electrolyte replacement per primary team.   Will follow with you     Subjective/Interval History:   I have reviewed the flowsheet and previous day’s notes. Reviewed interval history. Discussed management with nursing staff.    24  Afebrile  Intermittently tachycardic   Remains on HFNC, gradually decreasing FiO2 requirements      ROS:Pertinent items are noted in HPI.  Orders reviewed including medications. Changes made if indicated.   Telemetry monitor reviewed at the bedside.  Objective:   Vital Signs:    /75   Pulse 99   Temp 98.2 °F (36.8 °C) (Oral)   Resp 28   Ht 1.676 m (5' 6\")   Wt 61.6 kg (135 lb 12.8 oz)   SpO2 100%   BMI 21.92 kg/m²             Temp (24hrs), Av.9 °F (36.6 °C), Min:97.4 °F (36.3 °C), Max:98.2 °F (36.8 °C)   
Pt desaturated and had increased WOB off HFNC, placed back on.    11/15/24 1635   Oxygen Therapy/Pulse Ox   O2 Therapy Oxygen humidified   O2 Device (S)  Heated high flow cannula  (failed NC trial)   O2 Flow Rate (L/min) (S)  30 L/min   FiO2  (S)  50 %   Pulse (!) 126   Respirations 28   SpO2 (!) 85 %       
Pt. Noted to have inspiratory&expiratory wheezing, labored breathing, tachypneic, O2 sat of 85%-86% heated high flow setting of 25L and 40Fio2. Called RT to change settings and Called Hospitalist for PRN breathing treatment.    -Rt. Change setting from 25L &40fio2 to 35L & 268niy5.   - Dr. Monson at bedside  evaluating pt. Agree on Giving PRN breathing treatment for Wheezing or SOB. Pt. Given PRN  Breathing treatment, pt. Report feeling better, O2 sat is 100% unlabored regular breathing pattern.  
TRANSFER - IN REPORT:    Verbal report received from Erinn Kennedy RN on Rina Prajapati  being received from [facility] ED for routine progression of patient care      Report consisted of patient's Situation, Background, Assessment and   Recommendations(SBAR).     Information from the following report(s) Nurse Handoff Report, ED SBAR, Intake/Output, MAR, Recent Results, Med Rec Status, and Cardiac Rhythm Sinus Tach  was reviewed with the receiving nurse.    Opportunity for questions and clarification was provided.      Assessment completed upon patient's arrival to unit and care assumed.    
This patient meets the following P&T approved criteria and has been converted from IV to oral therapy for the following medication: Pantoprazole    Functional GI Tract   I have verified that the patient meets the following criteria below  (Please note that a “No” response means the patient is not a candidate for IV to PO conversion)   Has no active NPO order (check order review activity) Yes   Is taking enteral meds (PO, NG, GT, etc) (check medication activity order review for a tube) Yes   Is tolerating tube feeds at goal rate or a full liquid, soft, or regular diet  (check progress notes, order review activity, intake/output flowsheet) Diet = ADULT DIET; Regular; 4 carb choices (60 gm/meal); No Concentrated sweets  ADULT ORAL NUTRITION SUPPLEMENT; Breakfast, Dinner; Standard High Calorie/High Protein Oral Supplement  Yes     If with an NG tube placed, is not on continuous suction   (check progress notes) Not Applicable   If on Continuous tube feedings, can be interrupted for an extended period of time for the administration of drugs where absorption and effectiveness are reduced in the presence of enteral feedings (e.g. Ciprofloxacin) (check progress notes, order review) Not Applicable   Has no nausea and/or vomiting or severe diarrhea within past 24 hours (check progress notes, use of antiemetics, antidiarrheal agents) Yes   Has no malabsorption  syndromes   (e.g. gastrectomy, intestinal resection, bariatric surgery, uncontrolled diarrhea, short bowel syndrome, ileus, gastrointestinal obstruction)   (check progress notes) Yes   Doesn't have active upper gastrointestinal bleeding  (check progress notes) Yes   Has no significant painful oral ulceration (check progress notes, use of meds for oral ulceration such as magic mouthwash, phenol spray) Yes   2. Miscellaneous Criteria    I have verified that the patient meets the following criteria below  (Please note that a “No” response means the patient is not a 
Trialing off HFNC    11/15/24 3885   Oxygen Therapy/Pulse Ox   O2 Device (S)  Nasal cannula  (trialing off HFNC)       
[facility]   11/15/24 1215   Oxygen Therapy/Pulse Ox   O2 Therapy Oxygen humidified   O2 Device Heated high flow cannula   O2 Flow Rate (L/min) (S)  20 L/min   FiO2  (S)  40 %   Pulse (!) 105   Respirations 25   SpO2 98 %       
Mild pulmonary hypertension  17. Anemia multifactorial. Supplement folic acid.   18. Loose stool, related to antibiotic. Culturelle, questran increased.   19. DVT prophylaxis  20.  Full code. AMD on file. Pallia care input appreciated.  Continue stepdown monitoring at this time.  PT OT.       Additional Notes:      Case discussed with:  [x]patient  []family  [x]nursing  []case management   [x] discussed on IDT.   DVT Prophylaxis:  []Lovenox  []Hep SQ  []SCDs  []Coumadin     [x]  noac    Objective:   VS: BP (!) 144/94   Pulse (!) 101   Temp 98.3 °F (36.8 °C) (Oral)   Resp 28   Ht 1.676 m (5' 6\")   Wt 62.1 kg (137 lb)   SpO2 93%   BMI 22.11 kg/m²    Tmax/24hrs: Temp (24hrs), Av.3 °F (36.8 °C), Min:98 °F (36.7 °C), Max:98.9 °F (37.2 °C)    Input/Output:   Intake/Output Summary (Last 24 hours) at 2024 0919  Last data filed at 2024 0647  Gross per 24 hour   Intake 995.52 ml   Output 1170 ml   Net -174.48 ml       General:  Awake and alert, oriented.  Speaking in full sentences on 6 L oxygen via NC.   HEENT: Normocephalic atraumatic PERRL.  Oropharynx clear  Cardiovascular: Tachycardic with regular rhythm   Pulmonary: improved air entry. No wheeze. +coarse rhonchi b.l.  GI: Soft nontender nondistended nabs  Extremities: no edema bilaterally. Dp 2+. Left UE picc poa.   Neuro:    No focal deficit  Skin:    No rash to visible skin    Labs:    Recent Results (from the past 24 hour(s))   POCT Glucose    Collection Time: 24 11:12 AM   Result Value Ref Range    POC Glucose 191 (H) 70 - 110 mg/dL   POCT Glucose    Collection Time: 24  4:40 PM   Result Value Ref Range    POC Glucose 165 (H) 70 - 110 mg/dL   POCT Glucose    Collection Time: 24  7:55 PM   Result Value Ref Range    POC Glucose 199 (H) 70 - 110 mg/dL   CBC with Auto Differential    Collection Time: 24  3:45 AM   Result Value Ref Range    WBC 14.9 (H) 4.6 - 13.2 K/uL    RBC 3.91 (L) 4.20 - 5.30 M/uL    Hemoglobin 9.7 (L) 
   Improved   []        Worsening  High complexity decision making was performed during this consultation and evaluation. Critical care time exclusive of procedures spent managing the patient:      minutes    Carina Skinner MD    
55.7 43 - 95 g/m2    IVSd 0.8 0.6 - 0.9 cm    LVIDd 4.2 3.9 - 5.3 cm    LVIDs 3.4 cm    LVPWd 0.7 0.6 - 0.9 cm    RV Basal Dimension 4.8 cm    RA Volume 46 ml    TAPSE 2.0 1.7 cm    TR Peak Gradient 30 mmHg    TR Max Velocity 2.75 m/s    RA Volume Index A4C 28 mL/m2    RA Volume BP 44 mL    RA Volume Index BP 26 mL/m2    Est. RA Pressure 15 mmHg    RVSP 45 mmHg    EF Physician 45 %   POCT Glucose    Collection Time: 11/14/24 12:14 PM   Result Value Ref Range    POC Glucose 175 (H) 70 - 110 mg/dL   Vascular duplex lower extremity venous bilateral    Collection Time: 11/14/24  2:10 PM   Result Value Ref Range    Body Surface Area 1.66 m2   Vascular duplex upper extremity venous bilateral    Collection Time: 11/14/24  2:17 PM   Result Value Ref Range    Body Surface Area 1.66 m2   APTT    Collection Time: 11/14/24  3:30 PM   Result Value Ref Range    APTT 55.7 (H) 23.0 - 36.4 SEC   Basic Metabolic Panel    Collection Time: 11/14/24  3:30 PM   Result Value Ref Range    Sodium 138 136 - 145 mmol/L    Potassium 3.4 (L) 3.5 - 5.5 mmol/L    Chloride 100 100 - 111 mmol/L    CO2 28 21 - 32 mmol/L    Anion Gap 10 3.0 - 18 mmol/L    Glucose 170 (H) 74 - 99 mg/dL    BUN 16 7.0 - 18 MG/DL    Creatinine 0.74 0.6 - 1.3 MG/DL    BUN/Creatinine Ratio 22 (H) 12 - 20      Est, Glom Filt Rate 89 >60 ml/min/1.73m2    Calcium 9.1 8.5 - 10.1 MG/DL   POCT Glucose    Collection Time: 11/14/24  4:37 PM   Result Value Ref Range    POC Glucose 200 (H) 70 - 110 mg/dL   POCT Glucose    Collection Time: 11/14/24  8:36 PM   Result Value Ref Range    POC Glucose 254 (H) 70 - 110 mg/dL   APTT    Collection Time: 11/14/24 11:22 PM   Result Value Ref Range    APTT >180.0 (HH) 23.0 - 36.4 SEC   Vancomycin Level, Random    Collection Time: 11/15/24  4:02 AM   Result Value Ref Range    Vancomycin Rm 15.0 5.0 - 40.0 UG/ML   APTT    Collection Time: 11/15/24  4:02 AM   Result Value Ref Range    APTT 93.6 (H) 23.0 - 36.4 SEC   TSH    Collection Time: 
1.3 MG/DL    BUN/Creatinine Ratio 18 12 - 20      Est, Glom Filt Rate >90 >60 ml/min/1.73m2    Calcium 8.8 8.5 - 10.1 MG/DL    Phosphorus 4.7 2.5 - 4.9 MG/DL    Albumin 2.5 (L) 3.4 - 5.0 g/dL   Magnesium    Collection Time: 11/14/24  5:22 AM   Result Value Ref Range    Magnesium 1.6 1.6 - 2.6 mg/dL   APTT    Collection Time: 11/14/24  5:22 AM   Result Value Ref Range    APTT 47.2 (H) 23.0 - 36.4 SEC     Additional Data Reviewed:      Signed By: Maxine Rosas MD     November 14, 2024 9:36 AM     
Maxine Rosas MD   5 mg at 11/19/24 0830    methylPREDNISolone sodium succ (SOLU-MEDROL) 40 mg in sterile water 1 mL injection  40 mg IntraVENous Q12H Maxine Rosas MD   40 mg at 11/19/24 0806    folic acid (FOLVITE) tablet 1 mg  1 mg Oral Daily Maxine Rosas MD   1 mg at 11/19/24 0806    potassium chloride (KLOR-CON M) extended release tablet 40 mEq  40 mEq Oral PRN Hieu Anderson MD   40 mEq at 11/17/24 0017    Or    potassium bicarb-citric acid (EFFER-K) effervescent tablet 40 mEq  40 mEq Oral PRN Hieu Anderson MD   40 mEq at 11/17/24 0637    Or    potassium chloride 10 mEq/100 mL IVPB (Peripheral Line)  10 mEq IntraVENous PRN Hieu Anderson MD        ipratropium 0.5 mg-albuterol 2.5 mg (DUONEB) nebulizer solution 1 Dose  1 Dose Inhalation Q4H PRN Yusuf Monson DO   1 Dose at 11/17/24 0535    pantoprazole (PROTONIX) tablet 40 mg  40 mg Oral QAM AC Hieu Anderson MD   40 mg at 11/19/24 0600    morphine (PF) injection 1 mg  1 mg IntraVENous Q6H PRN Maxine Rosas MD        LORazepam (ATIVAN) injection 0.5 mg  0.5 mg IntraVENous Q8H PRN Maxine Rosas MD        montelukast (SINGULAIR) tablet 10 mg  10 mg Oral Nightly Maxine Rosas MD   10 mg at 11/18/24 2131    benzonatate (TESSALON) capsule 100 mg  100 mg Oral TID PRN ConorGerryoydewayne Garcia DO        melatonin disintegrating tablet 5 mg  5 mg Oral Nightly ConorStacey DO   5 mg at 11/18/24 2131    insulin lispro (HUMALOG,ADMELOG) injection vial 0-8 Units  0-8 Units SubCUTAneous 4x Daily AC & HS Maxine Rosas MD   2 Units at 11/18/24 1635    multivitamin 1 tablet  1 tablet Oral Daily Maxine Rosas MD   1 tablet at 11/19/24 0806    furosemide (LASIX) injection 20 mg  20 mg IntraVENous Daily Conrad Dwyer MD   20 mg at 11/19/24 0806    ipratropium 0.5 mg-albuterol 2.5 mg (DUONEB) nebulizer solution 1 Dose  1 Dose Inhalation Q4H RT Dulce Hoffmann PA-C   1 Dose at 11/19/24 0813    budesonide (PULMICORT) nebulizer

## 2024-11-21 NOTE — TELEPHONE ENCOUNTER
Incoming Call for personal home touch said that the Patient was discharged from [facility] on yesterday for pneumonia and substance. They will be sending plan of treatment to Dr. Her

## 2024-11-22 ENCOUNTER — CARE COORDINATION (OUTPATIENT)
Facility: CLINIC | Age: 64
End: 2024-11-22

## 2024-11-22 NOTE — CARE COORDINATION
Care Transitions Note    Initial Call - Call within 2 business days of discharge: Yes    Patient Current Location:  Home: [address] [place] [id number]    Care Transition Nurse contacted the patient by telephone to perform post hospital discharge assessment, verified name and  as identifiers. Provided introduction to self, and explanation of the Care Transition Nurse role.     Patient: Rina Prajapati    Patient : 1960   MRN: [id number]      Discharge Date: 24  RURS: Readmission Risk Score: 17.6      Last Discharge Facility       Date Complaint Diagnosis Description Type Department Provider    24 Shortness of Breath Non-small cell lung cancer, unspecified laterality (HCC) ... ED to Hosp-Admission (Discharged) (ADMITTED) [id number] Gris Cohen, ; Fred,...                Additional needs identified to be addressed with provider   No needs identified             Method of communication with provider: none.      Care Summary Note:     Patient reports that she is doing good.   No new or worsening of symptoms reported by Pt. At this time.   Pt. Reported that she has CTN phone number .  Pt. Declined to review medication with CTN as she states that she is still waiting for the rest of her medications.   No other questions, concerns and/or needs at this time as per Pt.   Pt. Kept the conversation short.     Patient reminded that there is a physician on call 24 hours a day / 7 days a week (M-F 5pm to 8am and from Friday 5pm until Monday 8a for the weekend) should the patient have questions or concerns.  Patient reminded to call 911 if situation is emergent ( such as chest pain, shortness of breath, unstoppable bleeding, feeling of passing out,  worsening of symptoms)or patient feels the situation is emergent.  Pt verbalizes understanding.       Future Appointments         Provider Specialty Dept Phone    2024  9:00 AM [facility] FAST TRACK NURSE Infusion Therapy

## 2024-11-26 ENCOUNTER — HOSPITAL ENCOUNTER (OUTPATIENT)
Facility: HOSPITAL | Age: 64
Setting detail: INFUSION SERIES
End: 2024-11-26
Payer: MEDICARE

## 2024-11-27 ENCOUNTER — HOSPITAL ENCOUNTER (OUTPATIENT)
Facility: HOSPITAL | Age: 64
Setting detail: INFUSION SERIES
End: 2024-11-27

## 2024-11-27 RX ORDER — ACETAMINOPHEN 325 MG/1
650 TABLET ORAL
OUTPATIENT
Start: 2024-12-19

## 2024-11-27 RX ORDER — DEXAMETHASONE SODIUM PHOSPHATE 4 MG/ML
10 INJECTION, SOLUTION INTRA-ARTICULAR; INTRALESIONAL; INTRAMUSCULAR; INTRAVENOUS; SOFT TISSUE ONCE
Start: 2024-12-12 | End: 2024-12-12

## 2024-11-27 RX ORDER — SODIUM CHLORIDE 9 MG/ML
5-250 INJECTION, SOLUTION INTRAVENOUS PRN
OUTPATIENT
Start: 2024-12-12

## 2024-11-27 RX ORDER — DIPHENHYDRAMINE HYDROCHLORIDE 50 MG/ML
50 INJECTION INTRAMUSCULAR; INTRAVENOUS
OUTPATIENT
Start: 2024-12-19

## 2024-11-27 RX ORDER — SODIUM CHLORIDE 9 MG/ML
5-250 INJECTION, SOLUTION INTRAVENOUS PRN
OUTPATIENT
Start: 2024-12-19

## 2024-11-27 RX ORDER — SODIUM CHLORIDE 9 MG/ML
INJECTION, SOLUTION INTRAVENOUS CONTINUOUS
OUTPATIENT
Start: 2024-12-05

## 2024-11-27 RX ORDER — EPINEPHRINE 1 MG/ML
0.3 INJECTION, SOLUTION, CONCENTRATE INTRAVENOUS PRN
OUTPATIENT
Start: 2024-12-12

## 2024-11-27 RX ORDER — HYDROCORTISONE SODIUM SUCCINATE 100 MG/2ML
100 INJECTION INTRAMUSCULAR; INTRAVENOUS
OUTPATIENT
Start: 2024-12-19

## 2024-11-27 RX ORDER — HYDROCORTISONE SODIUM SUCCINATE 100 MG/2ML
100 INJECTION INTRAMUSCULAR; INTRAVENOUS
OUTPATIENT
Start: 2024-12-05

## 2024-11-27 RX ORDER — SODIUM CHLORIDE 9 MG/ML
INJECTION, SOLUTION INTRAVENOUS CONTINUOUS
OUTPATIENT
Start: 2024-12-12

## 2024-11-27 RX ORDER — DIPHENHYDRAMINE HYDROCHLORIDE 50 MG/ML
50 INJECTION INTRAMUSCULAR; INTRAVENOUS
OUTPATIENT
Start: 2024-12-05

## 2024-11-27 RX ORDER — DIPHENHYDRAMINE HYDROCHLORIDE 50 MG/ML
50 INJECTION INTRAMUSCULAR; INTRAVENOUS
OUTPATIENT
Start: 2024-12-12

## 2024-11-27 RX ORDER — MEPERIDINE HYDROCHLORIDE 25 MG/ML
12.5 INJECTION INTRAMUSCULAR; INTRAVENOUS; SUBCUTANEOUS PRN
OUTPATIENT
Start: 2024-12-19

## 2024-11-27 RX ORDER — SODIUM CHLORIDE 9 MG/ML
5-250 INJECTION, SOLUTION INTRAVENOUS PRN
OUTPATIENT
Start: 2024-12-05

## 2024-11-27 RX ORDER — EPINEPHRINE 1 MG/ML
0.3 INJECTION, SOLUTION, CONCENTRATE INTRAVENOUS PRN
OUTPATIENT
Start: 2024-12-05

## 2024-11-27 RX ORDER — ONDANSETRON 2 MG/ML
8 INJECTION INTRAMUSCULAR; INTRAVENOUS ONCE
OUTPATIENT
Start: 2024-12-19 | End: 2024-12-19

## 2024-11-27 RX ORDER — HEPARIN 100 UNIT/ML
500 SYRINGE INTRAVENOUS PRN
OUTPATIENT
Start: 2024-12-12

## 2024-11-27 RX ORDER — SODIUM CHLORIDE 0.9 % (FLUSH) 0.9 %
5-40 SYRINGE (ML) INJECTION PRN
OUTPATIENT
Start: 2024-12-12

## 2024-11-27 RX ORDER — ONDANSETRON 2 MG/ML
8 INJECTION INTRAMUSCULAR; INTRAVENOUS
OUTPATIENT
Start: 2024-12-12

## 2024-11-27 RX ORDER — ACETAMINOPHEN 325 MG/1
650 TABLET ORAL
OUTPATIENT
Start: 2024-12-12

## 2024-11-27 RX ORDER — ONDANSETRON 2 MG/ML
8 INJECTION INTRAMUSCULAR; INTRAVENOUS ONCE
OUTPATIENT
Start: 2024-12-12 | End: 2024-12-12

## 2024-11-27 RX ORDER — ALBUTEROL SULFATE 90 UG/1
4 INHALANT RESPIRATORY (INHALATION) PRN
OUTPATIENT
Start: 2024-12-12

## 2024-11-27 RX ORDER — HEPARIN 100 UNIT/ML
500 SYRINGE INTRAVENOUS PRN
OUTPATIENT
Start: 2024-12-19

## 2024-11-27 RX ORDER — SODIUM CHLORIDE 0.9 % (FLUSH) 0.9 %
5-40 SYRINGE (ML) INJECTION PRN
OUTPATIENT
Start: 2024-12-19

## 2024-11-27 RX ORDER — EPINEPHRINE 1 MG/ML
0.3 INJECTION, SOLUTION, CONCENTRATE INTRAVENOUS PRN
OUTPATIENT
Start: 2024-12-19

## 2024-11-27 RX ORDER — MEPERIDINE HYDROCHLORIDE 25 MG/ML
12.5 INJECTION INTRAMUSCULAR; INTRAVENOUS; SUBCUTANEOUS PRN
OUTPATIENT
Start: 2024-12-12

## 2024-11-27 RX ORDER — MEPERIDINE HYDROCHLORIDE 25 MG/ML
12.5 INJECTION INTRAMUSCULAR; INTRAVENOUS; SUBCUTANEOUS PRN
OUTPATIENT
Start: 2024-12-05

## 2024-11-27 RX ORDER — ONDANSETRON 2 MG/ML
8 INJECTION INTRAMUSCULAR; INTRAVENOUS
OUTPATIENT
Start: 2024-12-19

## 2024-11-27 RX ORDER — ALBUTEROL SULFATE 90 UG/1
4 INHALANT RESPIRATORY (INHALATION) PRN
OUTPATIENT
Start: 2024-12-05

## 2024-11-27 RX ORDER — ACETAMINOPHEN 325 MG/1
650 TABLET ORAL
OUTPATIENT
Start: 2024-12-05

## 2024-11-27 RX ORDER — ONDANSETRON 2 MG/ML
8 INJECTION INTRAMUSCULAR; INTRAVENOUS
OUTPATIENT
Start: 2024-12-05

## 2024-11-27 RX ORDER — HYDROCORTISONE SODIUM SUCCINATE 100 MG/2ML
100 INJECTION INTRAMUSCULAR; INTRAVENOUS
OUTPATIENT
Start: 2024-12-12

## 2024-11-27 RX ORDER — DEXAMETHASONE SODIUM PHOSPHATE 4 MG/ML
10 INJECTION, SOLUTION INTRA-ARTICULAR; INTRALESIONAL; INTRAMUSCULAR; INTRAVENOUS; SOFT TISSUE ONCE
Start: 2024-12-19 | End: 2024-12-19

## 2024-11-27 RX ORDER — ALBUTEROL SULFATE 90 UG/1
4 INHALANT RESPIRATORY (INHALATION) PRN
OUTPATIENT
Start: 2024-12-19

## 2024-11-27 RX ORDER — SODIUM CHLORIDE 9 MG/ML
INJECTION, SOLUTION INTRAVENOUS CONTINUOUS
OUTPATIENT
Start: 2024-12-19

## 2024-11-29 ENCOUNTER — CARE COORDINATION (OUTPATIENT)
Facility: CLINIC | Age: 64
End: 2024-11-29

## 2024-11-29 NOTE — CARE COORDINATION
Care Transitions Note    Follow Up Call     Patient Current Location:  Home: [address] [place] [id number]    Care Transition Nurse contacted the patient by telephone. Verified name and  as identifiers.    Additional needs identified to be addressed with provider   No needs identified                 Method of communication with provider: none.    Care Summary Note:     Patient reports that she is doing okay.  No new or worsening of symptoms reported by Pt. At this time.   Patient prefers for CTN to call back some other time.   Pt. Kept the conversation short.     Patient reminded that there is a physician on call 24 hours a day / 7 days a week (M-F 5pm to 8am and from Friday 5pm until Monday 8a for the weekend) should the patient have questions or concerns.     Pt. Concluded the call.     Future Appointments         Provider Specialty Dept Phone    2024  9:00 AM [facility] FAST TRACK NURSE Infusion Therapy [phone]    2024  9:00 AM [facility] INFUSION NURSE 2 Infusion Therapy [phone]    2024 9:00 AM [facility] INFUSION NURSE 2 Infusion Therapy [phone]    2024 9:00 AM [facility] INFUSION NURSE 2 Infusion Therapy [phone]    2024 9:00 AM [facility] FAST TRACK NURSE Infusion Therapy [phone]    3/19/2025 11:00 AM Cora Her MD Family Medicine [phone]            Care Transition Nurse provided contact information.  Plan for follow-up call in 6-10 days based on severity of symptoms and risk factors.  Plan for next call:  follow up symptoms, assess for any needs.        Danilo Diego RN

## 2024-12-04 ENCOUNTER — HOSPITAL ENCOUNTER (OUTPATIENT)
Facility: HOSPITAL | Age: 64
Setting detail: INFUSION SERIES
Discharge: HOME | End: 2024-12-07
Payer: MEDICARE

## 2024-12-04 ENCOUNTER — CARE COORDINATION (OUTPATIENT)
Facility: CLINIC | Age: 64
End: 2024-12-04

## 2024-12-04 VITALS
DIASTOLIC BLOOD PRESSURE: 88 MMHG | RESPIRATION RATE: 28 BRPM | SYSTOLIC BLOOD PRESSURE: 138 MMHG | BODY MASS INDEX: 19.47 KG/M2 | TEMPERATURE: 97 F | WEIGHT: 120.6 LBS | OXYGEN SATURATION: 93 % | HEART RATE: 108 BPM

## 2024-12-04 LAB
ALBUMIN SERPL-MCNC: 2.6 G/DL (ref 3.4–5)
ALBUMIN/GLOB SERPL: 0.7 (ref 0.8–1.7)
ALP SERPL-CCNC: 55 U/L (ref 45–117)
ALT SERPL-CCNC: 13 U/L (ref 13–56)
ANION GAP SERPL CALC-SCNC: 8 MMOL/L (ref 3–18)
AST SERPL-CCNC: 13 U/L (ref 10–38)
BASO+EOS+MONOS # BLD AUTO: 0.7 K/UL (ref 0–2.3)
BASO+EOS+MONOS NFR BLD AUTO: 7 % (ref 0.1–17)
BILIRUB DIRECT SERPL-MCNC: 0.2 MG/DL (ref 0–0.2)
BILIRUB SERPL-MCNC: 0.9 MG/DL (ref 0.2–1)
BUN SERPL-MCNC: 13 MG/DL (ref 7–18)
BUN/CREAT SERPL: 20 (ref 12–20)
CALCIUM SERPL-MCNC: 8.9 MG/DL (ref 8.5–10.1)
CHLORIDE SERPL-SCNC: 104 MMOL/L (ref 100–111)
CO2 SERPL-SCNC: 25 MMOL/L (ref 21–32)
CREAT SERPL-MCNC: 0.65 MG/DL (ref 0.6–1.3)
DIFFERENTIAL METHOD BLD: ABNORMAL
ERYTHROCYTE [DISTWIDTH] IN BLOOD BY AUTOMATED COUNT: 16.9 % (ref 11.5–14.5)
GLOBULIN SER CALC-MCNC: 3.6 G/DL (ref 2–4)
GLUCOSE SERPL-MCNC: 126 MG/DL (ref 74–99)
HCT VFR BLD AUTO: 33.8 % (ref 36–48)
HGB BLD-MCNC: 10.3 G/DL (ref 12–16)
LYMPHOCYTES # BLD: 0.6 K/UL (ref 1.1–5.9)
LYMPHOCYTES NFR BLD: 6 % (ref 14–44)
MCH RBC QN AUTO: 24.6 PG (ref 25–35)
MCHC RBC AUTO-ENTMCNC: 30.5 G/DL (ref 31–37)
MCV RBC AUTO: 80.9 FL (ref 78–102)
NEUTS SEG # BLD: 8.2 K/UL (ref 1.8–9.5)
NEUTS SEG NFR BLD: 87 % (ref 40–70)
PLATELET # BLD AUTO: 232 K/UL (ref 140–440)
POTASSIUM SERPL-SCNC: 4.4 MMOL/L (ref 3.5–5.5)
PROT SERPL-MCNC: 6.2 G/DL (ref 6.4–8.2)
RBC # BLD AUTO: 4.18 M/UL (ref 4.1–5.1)
SODIUM SERPL-SCNC: 137 MMOL/L (ref 136–145)
WBC # BLD AUTO: 9.5 K/UL (ref 4.5–13)

## 2024-12-04 PROCEDURE — 80076 HEPATIC FUNCTION PANEL: CPT

## 2024-12-04 PROCEDURE — 2580000003 HC RX 258: Performed by: INTERNAL MEDICINE

## 2024-12-04 PROCEDURE — 85025 COMPLETE CBC W/AUTO DIFF WBC: CPT

## 2024-12-04 PROCEDURE — 36592 COLLECT BLOOD FROM PICC: CPT

## 2024-12-04 PROCEDURE — 2700000000 HC OXYGEN THERAPY PER DAY

## 2024-12-04 PROCEDURE — 80048 BASIC METABOLIC PNL TOTAL CA: CPT

## 2024-12-04 PROCEDURE — 6360000002 HC RX W HCPCS

## 2024-12-04 PROCEDURE — 36591 DRAW BLOOD OFF VENOUS DEVICE: CPT

## 2024-12-04 PROCEDURE — 96523 IRRIG DRUG DELIVERY DEVICE: CPT

## 2024-12-04 RX ORDER — SODIUM CHLORIDE 0.9 % (FLUSH) 0.9 %
5-40 SYRINGE (ML) INJECTION PRN
Status: DISCONTINUED | OUTPATIENT
Start: 2024-12-04 | End: 2024-12-08 | Stop reason: HOSPADM

## 2024-12-04 RX ORDER — HEPARIN SODIUM (PORCINE) LOCK FLUSH IV SOLN 100 UNIT/ML 100 UNIT/ML
500 SOLUTION INTRAVENOUS PRN
Status: DISCONTINUED | OUTPATIENT
Start: 2024-12-04 | End: 2024-12-08 | Stop reason: HOSPADM

## 2024-12-04 RX ORDER — HEPARIN 100 UNIT/ML
SYRINGE INTRAVENOUS
Status: COMPLETED
Start: 2024-12-04 | End: 2024-12-04

## 2024-12-04 RX ADMIN — SODIUM CHLORIDE, PRESERVATIVE FREE 10 ML: 5 INJECTION INTRAVENOUS at 09:46

## 2024-12-04 RX ADMIN — SODIUM CHLORIDE, PRESERVATIVE FREE 20 ML: 5 INJECTION INTRAVENOUS at 09:51

## 2024-12-04 RX ADMIN — HEPARIN 500 UNITS: 100 SYRINGE at 09:52

## 2024-12-04 RX ADMIN — HEPARIN SODIUM (PORCINE) LOCK FLUSH IV SOLN 100 UNIT/ML 500 UNITS: 100 SOLUTION at 09:52

## 2024-12-04 ASSESSMENT — PAIN - FUNCTIONAL ASSESSMENT: PAIN_FUNCTIONAL_ASSESSMENT: NONE - DENIES PAIN

## 2024-12-04 NOTE — PROGRESS NOTES
[facility] Progress Note    Date: 2024    Name: Rina Prajapati    MRN: [id number]         : 1960    Ms. Prajapati arrived in the [facility] today at 0930, in stable condition via wheelchair with her friend, here for her WEEKLY PICC LINE CARE and PRECHEMO LABS. She was assessed and education was provided.     Ms. Prajapati's vitals were reviewed.  Vitals:    24 0938   BP: 138/88   Pulse: (!) 108   Resp: 28   Temp: 97 °F (36.1 °C)   SpO2: 93%     Weight today 120.6lb (54.7kg)    Noted left upper arm single lumen PICC clean, dry and intact. Flushes without difficulty and blood return brisk.     CBC, BMP, and hepatic function panel drawn from PICC per order. CBC ran on site, results as follows:  Results for orders placed or performed during the hospital encounter of 24   CBC with Partial Differential   Result Value Ref Range    WBC 9.5 4.5 - 13.0 K/uL    RBC 4.18 4.10 - 5.10 M/uL    Hemoglobin 10.3 (L) 12.0 - 16.0 g/dL    Hematocrit 33.8 (L) 36 - 48 %    MCV 80.9 78 - 102 FL    MCH 24.6 (L) 25.0 - 35.0 PG    MCHC 30.5 (L) 31 - 37 g/dL    RDW 16.9 (H) 11.5 - 14.5 %    Platelets 232 140 - 440 K/uL    Neutrophils % 87 (H) 40 - 70 %    Mixed Cells 7 0.1 - 17 %    Lymphocytes % 6 (L) 14 - 44 %    Neutrophils Absolute 8.2 1.8 - 9.5 K/UL    ABSOLUTE MIXED CELLS 0.7 0.0 - 2.3 K/uL    Lymphocytes Absolute 0.6 (L) 1.1 - 5.9 K/UL    Differential Type AUTOMATED       PICC end cap changed, flushed with NS and heparin per protocol/order. Curos cap placed. PICC dressing changed per protocol using sterile technique. PICC secured with stockinette. Pt tolerated well.    [facility]. Pra[facility]ap[facility] condition at 1020 via wheelchair with her friend.    She is scheduled to return on 24 at 0900 for her chemotherapy appt.      Sada Loera RN  2024  10:24 AM

## 2024-12-04 NOTE — CARE COORDINATION
Care Transitions Note    Follow Up Call     Patient Current Location:  Home: [address] [place] [address]    Care Transition Nurse contacted the patient by telephone. Verified name and  as identifiers.    Additional needs identified to be addressed with provider     CTN called and spoke with Pt. On phone for weekly follow up. Pt. Reports that she is doing good. No new or worsening of symptoms as per Pt. Pt. Requesting order for wheelchair.          Method of communication with provider: chart route    Care Summary Note:     Patient reports that she is doing good.   No new or worsening of symptoms as per Pt.   Pt. is Requesting order for wheelchair.   Pt. Reports that Home health is still following.   Pt. Denied CP, SOB, fever, chills and worsening of symptoms.     No questions, concerns and/or needs at this time as per Pt.     Pt. Kept the conversation short.     Patient reminded that there is a physician on call 24 hours a day / 7 days a week (M-F 5pm to 8am and from Friday 5pm until Monday 8a for the weekend) should the patient have questions or concerns.  Patient reminded to call 911 if situation is emergent ( such as chest pain, shortness of breath, unstoppable bleeding, feeling of passing out,  worsening of symptoms)or patient feels the situation is emergent.  Pt verbalizes understanding.    Assessments:  Care Transitions Subsequent and Final Call    Subsequent and Final Calls  Do you have any ongoing symptoms?: No  Have your medications changed?: No  Do you have any questions related to your medications?: No  Do you currently have any active services?: Yes  Are you currently active with any services?: Home Health  Do you have any needs or concerns that I can assist you with?: No  Care Transitions Interventions  No Identified Needs  Other Interventions:            Follow Up Appointment:     Future Appointments         Provider Specialty Dept Phone    2024  9:00 AM [facility] INFUSION NURSE 2

## 2024-12-05 ENCOUNTER — HOSPITAL ENCOUNTER (INPATIENT)
Facility: HOSPITAL | Age: 64
LOS: 9 days | Discharge: NURSING, REHAB OR LONG-TERM CARE | DRG: 177 | End: 2024-12-14
Attending: STUDENT IN AN ORGANIZED HEALTH CARE EDUCATION/TRAINING PROGRAM | Admitting: STUDENT IN AN ORGANIZED HEALTH CARE EDUCATION/TRAINING PROGRAM
Payer: MEDICARE

## 2024-12-05 ENCOUNTER — APPOINTMENT (OUTPATIENT)
Facility: HOSPITAL | Age: 64
DRG: 177 | End: 2024-12-05
Payer: MEDICARE

## 2024-12-05 ENCOUNTER — HOSPITAL ENCOUNTER (OUTPATIENT)
Facility: HOSPITAL | Age: 64
Setting detail: INFUSION SERIES
Discharge: HOME | End: 2024-12-08
Payer: MEDICARE

## 2024-12-05 VITALS — HEART RATE: 120 BPM | OXYGEN SATURATION: 81 %

## 2024-12-05 DIAGNOSIS — J96.21 ACUTE ON CHRONIC RESPIRATORY FAILURE WITH HYPOXIA: Primary | ICD-10-CM

## 2024-12-05 DIAGNOSIS — C34.90 NON-SMALL CELL LUNG CANCER, UNSPECIFIED LATERALITY (HCC): ICD-10-CM

## 2024-12-05 DIAGNOSIS — J18.9 PNEUMONIA OF BOTH LUNGS DUE TO INFECTIOUS ORGANISM, UNSPECIFIED PART OF LUNG: ICD-10-CM

## 2024-12-05 DIAGNOSIS — J81.0 ACUTE PULMONARY EDEMA: ICD-10-CM

## 2024-12-05 DIAGNOSIS — I50.9 ACUTE ON CHRONIC CONGESTIVE HEART FAILURE, UNSPECIFIED HEART FAILURE TYPE (HCC): ICD-10-CM

## 2024-12-05 DIAGNOSIS — C34.11 MALIGNANT NEOPLASM OF UPPER LOBE OF RIGHT LUNG (HCC): Primary | ICD-10-CM

## 2024-12-05 PROBLEM — J96.01 ACUTE HYPOXIC RESPIRATORY FAILURE: Status: ACTIVE | Noted: 2024-12-05

## 2024-12-05 LAB
ALBUMIN SERPL-MCNC: 2.6 G/DL (ref 3.4–5)
ALBUMIN/GLOB SERPL: 0.7 (ref 0.8–1.7)
ALP SERPL-CCNC: 57 U/L (ref 45–117)
ALT SERPL-CCNC: 15 U/L (ref 13–56)
AMPHET UR QL SCN: NEGATIVE
ANION GAP SERPL CALC-SCNC: 8 MMOL/L (ref 3–18)
APPEARANCE UR: CLEAR
APTT PPP: 30.6 SEC (ref 23–36.4)
ARTERIAL PATENCY WRIST A: POSITIVE
AST SERPL-CCNC: 11 U/L (ref 10–38)
B PERT DNA SPEC QL NAA+PROBE: NOT DETECTED
BACTERIA URNS QL MICRO: ABNORMAL /HPF
BARBITURATES UR QL SCN: NEGATIVE
BASE EXCESS BLD CALC-SCNC: 1.7 MMOL/L
BASOPHILS # BLD: 0 K/UL (ref 0–0.1)
BASOPHILS NFR BLD: 0 % (ref 0–2)
BDY SITE: ABNORMAL
BENZODIAZ UR QL: NEGATIVE
BILIRUB SERPL-MCNC: 0.5 MG/DL (ref 0.2–1)
BILIRUB UR QL: NEGATIVE
BORDETELLA PARAPERTUSSIS BY PCR: NOT DETECTED
BUN SERPL-MCNC: 16 MG/DL (ref 7–18)
BUN/CREAT SERPL: 24 (ref 12–20)
C PNEUM DNA SPEC QL NAA+PROBE: NOT DETECTED
CALCIUM SERPL-MCNC: 9 MG/DL (ref 8.5–10.1)
CANNABINOIDS UR QL SCN: NEGATIVE
CHLORIDE SERPL-SCNC: 103 MMOL/L (ref 100–111)
CHP ED QC CHECK: YES
CO2 SERPL-SCNC: 23 MMOL/L (ref 21–32)
COCAINE UR QL SCN: NEGATIVE
COLOR UR: YELLOW
CREAT SERPL-MCNC: 0.66 MG/DL (ref 0.6–1.3)
DIFFERENTIAL METHOD BLD: ABNORMAL
EOSINOPHIL # BLD: 0 K/UL (ref 0–0.4)
EOSINOPHIL NFR BLD: 0 % (ref 0–5)
EPITH CASTS URNS QL MICRO: ABNORMAL /LPF (ref 0–5)
ERYTHROCYTE [DISTWIDTH] IN BLOOD BY AUTOMATED COUNT: 17.2 % (ref 11.6–14.5)
FLUAV SUBTYP SPEC NAA+PROBE: NOT DETECTED
FLUBV RNA SPEC QL NAA+PROBE: NOT DETECTED
GAS FLOW.O2 O2 DELIVERY SYS: ABNORMAL
GLOBULIN SER CALC-MCNC: 3.6 G/DL (ref 2–4)
GLUCOSE BLD STRIP.AUTO-MCNC: 181 MG/DL (ref 70–110)
GLUCOSE BLD STRIP.AUTO-MCNC: 203 MG/DL (ref 70–110)
GLUCOSE BLD-MCNC: 181 MG/DL
GLUCOSE BLD-MCNC: 203 MG/DL
GLUCOSE SERPL-MCNC: 157 MG/DL (ref 74–99)
GLUCOSE UR STRIP.AUTO-MCNC: NEGATIVE MG/DL
HADV DNA SPEC QL NAA+PROBE: NOT DETECTED
HCO3 BLD-SCNC: 27 MMOL/L (ref 21–28)
HCOV 229E RNA SPEC QL NAA+PROBE: NOT DETECTED
HCOV HKU1 RNA SPEC QL NAA+PROBE: NOT DETECTED
HCOV NL63 RNA SPEC QL NAA+PROBE: NOT DETECTED
HCOV OC43 RNA SPEC QL NAA+PROBE: NOT DETECTED
HCT VFR BLD AUTO: 33 % (ref 35–45)
HGB BLD-MCNC: 10.1 G/DL (ref 12–16)
HGB UR QL STRIP: ABNORMAL
HMPV RNA SPEC QL NAA+PROBE: NOT DETECTED
HPIV1 RNA SPEC QL NAA+PROBE: NOT DETECTED
HPIV2 RNA SPEC QL NAA+PROBE: NOT DETECTED
HPIV3 RNA SPEC QL NAA+PROBE: NOT DETECTED
HPIV4 RNA SPEC QL NAA+PROBE: NOT DETECTED
IMM GRANULOCYTES # BLD AUTO: 0.1 K/UL (ref 0–0.04)
IMM GRANULOCYTES NFR BLD AUTO: 1 % (ref 0–0.5)
KETONES UR QL STRIP.AUTO: NEGATIVE MG/DL
L PNEUMO AG UR QL IA: NEGATIVE
LACTATE SERPL-SCNC: 1.7 MMOL/L (ref 0.4–2)
LEUKOCYTE ESTERASE UR QL STRIP.AUTO: ABNORMAL
LYMPHOCYTES # BLD: 0.3 K/UL (ref 0.9–3.6)
LYMPHOCYTES NFR BLD: 4 % (ref 21–52)
Lab: NORMAL
M PNEUMO DNA SPEC QL NAA+PROBE: NOT DETECTED
MAGNESIUM SERPL-MCNC: 1.7 MG/DL (ref 1.6–2.6)
MCH RBC QN AUTO: 24.6 PG (ref 24–34)
MCHC RBC AUTO-ENTMCNC: 30.6 G/DL (ref 31–37)
MCV RBC AUTO: 80.3 FL (ref 78–100)
METHADONE UR QL: NEGATIVE
MONOCYTES # BLD: 0.1 K/UL (ref 0.05–1.2)
MONOCYTES NFR BLD: 2 % (ref 3–10)
NEUTS SEG # BLD: 5.7 K/UL (ref 1.8–8)
NEUTS SEG NFR BLD: 93 % (ref 40–73)
NITRITE UR QL STRIP.AUTO: NEGATIVE
NRBC # BLD: 0 K/UL (ref 0–0.01)
NRBC BLD-RTO: 0 PER 100 WBC
NT PRO BNP: 8746 PG/ML (ref 0–900)
O2/TOTAL GAS SETTING VFR VENT: 50 %
OPIATES UR QL: NEGATIVE
PCO2 BLD: 44 MMHG (ref 35–48)
PCP UR QL: NEGATIVE
PH BLD: 7.4 (ref 7.35–7.45)
PH UR STRIP: 5.5 (ref 5–8)
PLATELET # BLD AUTO: 196 K/UL (ref 135–420)
PMV BLD AUTO: 10 FL (ref 9.2–11.8)
PO2 BLD: 73 MMHG (ref 83–108)
POTASSIUM SERPL-SCNC: 5.1 MMOL/L (ref 3.5–5.5)
PROT SERPL-MCNC: 6.2 G/DL (ref 6.4–8.2)
PROT UR STRIP-MCNC: ABNORMAL MG/DL
RBC # BLD AUTO: 4.11 M/UL (ref 4.2–5.3)
RBC #/AREA URNS HPF: ABNORMAL /HPF (ref 0–5)
RSV RNA SPEC QL NAA+PROBE: NOT DETECTED
RV+EV RNA SPEC QL NAA+PROBE: NOT DETECTED
S PNEUM AG UR QL: NEGATIVE
SAO2 % BLD: 94.3 % (ref 92–97)
SARS-COV-2 RNA RESP QL NAA+PROBE: NOT DETECTED
SERVICE CMNT-IMP: ABNORMAL
SODIUM SERPL-SCNC: 134 MMOL/L (ref 136–145)
SP GR UR REFRACTOMETRY: 1.01 (ref 1–1.03)
SPECIMEN TYPE: ABNORMAL
TROPONIN I SERPL HS-MCNC: 22 NG/L (ref 0–54)
TSH SERPL DL<=0.05 MIU/L-ACNC: 2.53 UIU/ML (ref 0.36–3.74)
UROBILINOGEN UR QL STRIP.AUTO: 0.2 EU/DL (ref 0.2–1)
WBC # BLD AUTO: 6.2 K/UL (ref 4.6–13.2)
WBC URNS QL MICRO: ABNORMAL /HPF (ref 0–4)

## 2024-12-05 PROCEDURE — 82962 GLUCOSE BLOOD TEST: CPT

## 2024-12-05 PROCEDURE — 0202U NFCT DS 22 TRGT SARS-COV-2: CPT

## 2024-12-05 PROCEDURE — 81001 URINALYSIS AUTO W/SCOPE: CPT

## 2024-12-05 PROCEDURE — 83735 ASSAY OF MAGNESIUM: CPT

## 2024-12-05 PROCEDURE — 6360000004 HC RX CONTRAST MEDICATION: Performed by: STUDENT IN AN ORGANIZED HEALTH CARE EDUCATION/TRAINING PROGRAM

## 2024-12-05 PROCEDURE — 71045 X-RAY EXAM CHEST 1 VIEW: CPT

## 2024-12-05 PROCEDURE — 94762 N-INVAS EAR/PLS OXIMTRY CONT: CPT

## 2024-12-05 PROCEDURE — 84443 ASSAY THYROID STIM HORMONE: CPT

## 2024-12-05 PROCEDURE — 6370000000 HC RX 637 (ALT 250 FOR IP): Performed by: STUDENT IN AN ORGANIZED HEALTH CARE EDUCATION/TRAINING PROGRAM

## 2024-12-05 PROCEDURE — 87449 NOS EACH ORGANISM AG IA: CPT

## 2024-12-05 PROCEDURE — 84484 ASSAY OF TROPONIN QUANT: CPT

## 2024-12-05 PROCEDURE — 2700000000 HC OXYGEN THERAPY PER DAY

## 2024-12-05 PROCEDURE — 99223 1ST HOSP IP/OBS HIGH 75: CPT | Performed by: STUDENT IN AN ORGANIZED HEALTH CARE EDUCATION/TRAINING PROGRAM

## 2024-12-05 PROCEDURE — 96374 THER/PROPH/DIAG INJ IV PUSH: CPT

## 2024-12-05 PROCEDURE — 85025 COMPLETE CBC W/AUTO DIFF WBC: CPT

## 2024-12-05 PROCEDURE — 80053 COMPREHEN METABOLIC PANEL: CPT

## 2024-12-05 PROCEDURE — 85730 THROMBOPLASTIN TIME PARTIAL: CPT

## 2024-12-05 PROCEDURE — 36600 WITHDRAWAL OF ARTERIAL BLOOD: CPT

## 2024-12-05 PROCEDURE — 99213 OFFICE O/P EST LOW 20 MIN: CPT

## 2024-12-05 PROCEDURE — 6360000002 HC RX W HCPCS: Performed by: STUDENT IN AN ORGANIZED HEALTH CARE EDUCATION/TRAINING PROGRAM

## 2024-12-05 PROCEDURE — 82803 BLOOD GASES ANY COMBINATION: CPT

## 2024-12-05 PROCEDURE — 94664 DEMO&/EVAL PT USE INHALER: CPT

## 2024-12-05 PROCEDURE — 87040 BLOOD CULTURE FOR BACTERIA: CPT

## 2024-12-05 PROCEDURE — 2580000003 HC RX 258: Performed by: STUDENT IN AN ORGANIZED HEALTH CARE EDUCATION/TRAINING PROGRAM

## 2024-12-05 PROCEDURE — 83605 ASSAY OF LACTIC ACID: CPT

## 2024-12-05 PROCEDURE — 5A0935A ASSISTANCE WITH RESPIRATORY VENTILATION, LESS THAN 24 CONSECUTIVE HOURS, HIGH NASAL FLOW/VELOCITY: ICD-10-PCS | Performed by: STUDENT IN AN ORGANIZED HEALTH CARE EDUCATION/TRAINING PROGRAM

## 2024-12-05 PROCEDURE — 80307 DRUG TEST PRSMV CHEM ANLYZR: CPT

## 2024-12-05 PROCEDURE — 93005 ELECTROCARDIOGRAM TRACING: CPT | Performed by: STUDENT IN AN ORGANIZED HEALTH CARE EDUCATION/TRAINING PROGRAM

## 2024-12-05 PROCEDURE — 94761 N-INVAS EAR/PLS OXIMETRY MLT: CPT

## 2024-12-05 PROCEDURE — 83880 ASSAY OF NATRIURETIC PEPTIDE: CPT

## 2024-12-05 PROCEDURE — 71275 CT ANGIOGRAPHY CHEST: CPT

## 2024-12-05 PROCEDURE — 94640 AIRWAY INHALATION TREATMENT: CPT

## 2024-12-05 PROCEDURE — 1100000000 HC RM PRIVATE

## 2024-12-05 PROCEDURE — 99285 EMERGENCY DEPT VISIT HI MDM: CPT

## 2024-12-05 RX ORDER — BENZONATATE 100 MG/1
200 CAPSULE ORAL 3 TIMES DAILY PRN
Status: DISCONTINUED | OUTPATIENT
Start: 2024-12-05 | End: 2024-12-14 | Stop reason: HOSPADM

## 2024-12-05 RX ORDER — NICOTINE 21 MG/24HR
1 PATCH, TRANSDERMAL 24 HOURS TRANSDERMAL DAILY
Status: DISCONTINUED | OUTPATIENT
Start: 2024-12-06 | End: 2024-12-14 | Stop reason: HOSPADM

## 2024-12-05 RX ORDER — POTASSIUM CHLORIDE 1500 MG/1
40 TABLET, EXTENDED RELEASE ORAL PRN
Status: DISCONTINUED | OUTPATIENT
Start: 2024-12-05 | End: 2024-12-14 | Stop reason: HOSPADM

## 2024-12-05 RX ORDER — IPRATROPIUM BROMIDE AND ALBUTEROL SULFATE 2.5; .5 MG/3ML; MG/3ML
1 SOLUTION RESPIRATORY (INHALATION)
Status: DISCONTINUED | OUTPATIENT
Start: 2024-12-05 | End: 2024-12-05 | Stop reason: SDUPTHER

## 2024-12-05 RX ORDER — SODIUM CHLORIDE 0.9 % (FLUSH) 0.9 %
5-40 SYRINGE (ML) INJECTION PRN
Status: ACTIVE | OUTPATIENT
Start: 2024-12-05 | End: 2024-12-06

## 2024-12-05 RX ORDER — FUROSEMIDE 10 MG/ML
60 INJECTION INTRAMUSCULAR; INTRAVENOUS
Status: COMPLETED | OUTPATIENT
Start: 2024-12-05 | End: 2024-12-05

## 2024-12-05 RX ORDER — POTASSIUM CHLORIDE 1500 MG/1
20 TABLET, EXTENDED RELEASE ORAL DAILY
COMMUNITY
Start: 2024-10-23

## 2024-12-05 RX ORDER — ALBUTEROL SULFATE 0.83 MG/ML
2.5 SOLUTION RESPIRATORY (INHALATION)
Status: DISCONTINUED | OUTPATIENT
Start: 2024-12-05 | End: 2024-12-06

## 2024-12-05 RX ORDER — ONDANSETRON 4 MG/1
4 TABLET, ORALLY DISINTEGRATING ORAL EVERY 8 HOURS PRN
Status: DISCONTINUED | OUTPATIENT
Start: 2024-12-05 | End: 2024-12-14 | Stop reason: HOSPADM

## 2024-12-05 RX ORDER — SODIUM CHLORIDE 9 MG/ML
INJECTION, SOLUTION INTRAVENOUS PRN
Status: DISCONTINUED | OUTPATIENT
Start: 2024-12-05 | End: 2024-12-14 | Stop reason: HOSPADM

## 2024-12-05 RX ORDER — ARFORMOTEROL TARTRATE 15 UG/2ML
15 SOLUTION RESPIRATORY (INHALATION)
Status: DISCONTINUED | OUTPATIENT
Start: 2024-12-05 | End: 2024-12-06

## 2024-12-05 RX ORDER — SODIUM CHLORIDE 0.9 % (FLUSH) 0.9 %
5-40 SYRINGE (ML) INJECTION PRN
Status: DISCONTINUED | OUTPATIENT
Start: 2024-12-05 | End: 2024-12-14 | Stop reason: HOSPADM

## 2024-12-05 RX ORDER — ONDANSETRON 2 MG/ML
8 INJECTION INTRAMUSCULAR; INTRAVENOUS ONCE
Status: DISCONTINUED | OUTPATIENT
Start: 2024-12-05 | End: 2024-12-09 | Stop reason: HOSPADM

## 2024-12-05 RX ORDER — POTASSIUM CHLORIDE 7.45 MG/ML
10 INJECTION INTRAVENOUS PRN
Status: DISCONTINUED | OUTPATIENT
Start: 2024-12-05 | End: 2024-12-14 | Stop reason: HOSPADM

## 2024-12-05 RX ORDER — HEPARIN SODIUM 10000 [USP'U]/100ML
5-30 INJECTION, SOLUTION INTRAVENOUS CONTINUOUS
Status: DISCONTINUED | OUTPATIENT
Start: 2024-12-05 | End: 2024-12-07

## 2024-12-05 RX ORDER — VANCOMYCIN 1.75 G/350ML
1250 INJECTION, SOLUTION INTRAVENOUS ONCE
Status: COMPLETED | OUTPATIENT
Start: 2024-12-05 | End: 2024-12-05

## 2024-12-05 RX ORDER — SPIRONOLACTONE 25 MG/1
25 TABLET ORAL DAILY
Status: DISCONTINUED | OUTPATIENT
Start: 2024-12-05 | End: 2024-12-14 | Stop reason: HOSPADM

## 2024-12-05 RX ORDER — INSULIN LISPRO 100 [IU]/ML
0-8 INJECTION, SOLUTION INTRAVENOUS; SUBCUTANEOUS
Status: DISCONTINUED | OUTPATIENT
Start: 2024-12-05 | End: 2024-12-14 | Stop reason: HOSPADM

## 2024-12-05 RX ORDER — IOPAMIDOL 755 MG/ML
100 INJECTION, SOLUTION INTRAVASCULAR
Status: COMPLETED | OUTPATIENT
Start: 2024-12-05 | End: 2024-12-05

## 2024-12-05 RX ORDER — HEPARIN SODIUM 1000 [USP'U]/ML
80 INJECTION, SOLUTION INTRAVENOUS; SUBCUTANEOUS PRN
Status: DISCONTINUED | OUTPATIENT
Start: 2024-12-05 | End: 2024-12-13

## 2024-12-05 RX ORDER — ACETAMINOPHEN 325 MG/1
650 TABLET ORAL EVERY 6 HOURS PRN
Status: DISCONTINUED | OUTPATIENT
Start: 2024-12-05 | End: 2024-12-14 | Stop reason: HOSPADM

## 2024-12-05 RX ORDER — SODIUM CHLORIDE 9 MG/ML
5-250 INJECTION, SOLUTION INTRAVENOUS PRN
Status: ACTIVE | OUTPATIENT
Start: 2024-12-05 | End: 2024-12-06

## 2024-12-05 RX ORDER — ALBUTEROL SULFATE 90 UG/1
1 INHALANT RESPIRATORY (INHALATION)
Status: DISCONTINUED | OUTPATIENT
Start: 2024-12-05 | End: 2024-12-05 | Stop reason: CLARIF

## 2024-12-05 RX ORDER — FOLIC ACID 1 MG/1
1 TABLET ORAL DAILY
Status: DISCONTINUED | OUTPATIENT
Start: 2024-12-05 | End: 2024-12-14 | Stop reason: HOSPADM

## 2024-12-05 RX ORDER — LEVOTHYROXINE SODIUM 88 UG/1
88 TABLET ORAL
Status: DISCONTINUED | OUTPATIENT
Start: 2024-12-06 | End: 2024-12-14 | Stop reason: HOSPADM

## 2024-12-05 RX ORDER — FERROUS SULFATE 325(65) MG
325 TABLET ORAL
Status: DISCONTINUED | OUTPATIENT
Start: 2024-12-06 | End: 2024-12-14 | Stop reason: HOSPADM

## 2024-12-05 RX ORDER — HEPARIN 100 UNIT/ML
500 SYRINGE INTRAVENOUS PRN
Status: ACTIVE | OUTPATIENT
Start: 2024-12-05 | End: 2024-12-06

## 2024-12-05 RX ORDER — DEXAMETHASONE SODIUM PHOSPHATE 4 MG/ML
10 INJECTION, SOLUTION INTRA-ARTICULAR; INTRALESIONAL; INTRAMUSCULAR; INTRAVENOUS; SOFT TISSUE ONCE
Status: DISCONTINUED | OUTPATIENT
Start: 2024-12-05 | End: 2024-12-09 | Stop reason: HOSPADM

## 2024-12-05 RX ORDER — MAGNESIUM SULFATE IN WATER 40 MG/ML
2000 INJECTION, SOLUTION INTRAVENOUS PRN
Status: DISCONTINUED | OUTPATIENT
Start: 2024-12-05 | End: 2024-12-14 | Stop reason: HOSPADM

## 2024-12-05 RX ORDER — HEPARIN SODIUM 1000 [USP'U]/ML
40 INJECTION, SOLUTION INTRAVENOUS; SUBCUTANEOUS PRN
Status: DISCONTINUED | OUTPATIENT
Start: 2024-12-05 | End: 2024-12-13

## 2024-12-05 RX ORDER — POLYETHYLENE GLYCOL 3350 17 G/17G
17 POWDER, FOR SOLUTION ORAL DAILY PRN
Status: DISCONTINUED | OUTPATIENT
Start: 2024-12-05 | End: 2024-12-14 | Stop reason: HOSPADM

## 2024-12-05 RX ORDER — ONDANSETRON 2 MG/ML
4 INJECTION INTRAMUSCULAR; INTRAVENOUS EVERY 6 HOURS PRN
Status: DISCONTINUED | OUTPATIENT
Start: 2024-12-05 | End: 2024-12-14 | Stop reason: HOSPADM

## 2024-12-05 RX ORDER — GUAIFENESIN 600 MG/1
1200 TABLET, EXTENDED RELEASE ORAL 2 TIMES DAILY
Status: DISCONTINUED | OUTPATIENT
Start: 2024-12-05 | End: 2024-12-14 | Stop reason: HOSPADM

## 2024-12-05 RX ORDER — FUROSEMIDE 10 MG/ML
20 INJECTION INTRAMUSCULAR; INTRAVENOUS 2 TIMES DAILY
Status: DISCONTINUED | OUTPATIENT
Start: 2024-12-06 | End: 2024-12-06

## 2024-12-05 RX ORDER — HEPARIN SODIUM 1000 [USP'U]/ML
80 INJECTION, SOLUTION INTRAVENOUS; SUBCUTANEOUS ONCE
Status: COMPLETED | OUTPATIENT
Start: 2024-12-05 | End: 2024-12-05

## 2024-12-05 RX ORDER — ACETAMINOPHEN 650 MG/1
650 SUPPOSITORY RECTAL EVERY 6 HOURS PRN
Status: DISCONTINUED | OUTPATIENT
Start: 2024-12-05 | End: 2024-12-14 | Stop reason: HOSPADM

## 2024-12-05 RX ORDER — MONTELUKAST SODIUM 10 MG/1
10 TABLET ORAL DAILY
Status: DISCONTINUED | OUTPATIENT
Start: 2024-12-05 | End: 2024-12-14 | Stop reason: HOSPADM

## 2024-12-05 RX ORDER — SODIUM CHLORIDE 0.9 % (FLUSH) 0.9 %
5-40 SYRINGE (ML) INJECTION EVERY 12 HOURS SCHEDULED
Status: DISCONTINUED | OUTPATIENT
Start: 2024-12-05 | End: 2024-12-14 | Stop reason: HOSPADM

## 2024-12-05 RX ADMIN — ALBUTEROL SULFATE 2.5 MG: 2.5 SOLUTION RESPIRATORY (INHALATION) at 20:21

## 2024-12-05 RX ADMIN — FUROSEMIDE 60 MG: 10 INJECTION, SOLUTION INTRAMUSCULAR; INTRAVENOUS at 14:19

## 2024-12-05 RX ADMIN — SODIUM CHLORIDE, PRESERVATIVE FREE 10 ML: 5 INJECTION INTRAVENOUS at 22:29

## 2024-12-05 RX ADMIN — IOPAMIDOL 100 ML: 755 INJECTION, SOLUTION INTRAVENOUS at 15:29

## 2024-12-05 RX ADMIN — INSULIN LISPRO 2 UNITS: 100 INJECTION, SOLUTION INTRAVENOUS; SUBCUTANEOUS at 21:22

## 2024-12-05 RX ADMIN — AZITHROMYCIN MONOHYDRATE 500 MG: 500 INJECTION, POWDER, LYOPHILIZED, FOR SOLUTION INTRAVENOUS at 18:28

## 2024-12-05 RX ADMIN — WATER 1000 MG: 1 INJECTION INTRAMUSCULAR; INTRAVENOUS; SUBCUTANEOUS at 18:29

## 2024-12-05 RX ADMIN — HEPARIN SODIUM 4800 UNITS: 1000 INJECTION INTRAVENOUS; SUBCUTANEOUS at 19:12

## 2024-12-05 RX ADMIN — PIPERACILLIN AND TAZOBACTAM 4500 MG: 4; .5 INJECTION, POWDER, FOR SOLUTION INTRAVENOUS at 21:28

## 2024-12-05 RX ADMIN — MONTELUKAST 10 MG: 10 TABLET, FILM COATED ORAL at 22:28

## 2024-12-05 RX ADMIN — ARFORMOTEROL TARTRATE 15 MCG: 15 SOLUTION RESPIRATORY (INHALATION) at 20:21

## 2024-12-05 RX ADMIN — SPIRONOLACTONE 25 MG: 25 TABLET ORAL at 19:48

## 2024-12-05 RX ADMIN — GUAIFENESIN 1200 MG: 600 TABLET, EXTENDED RELEASE ORAL at 19:48

## 2024-12-05 RX ADMIN — IPRATROPIUM BROMIDE 0.5 MG: 0.5 SOLUTION RESPIRATORY (INHALATION) at 20:21

## 2024-12-05 RX ADMIN — WATER 40 MG: 1 INJECTION INTRAMUSCULAR; INTRAVENOUS; SUBCUTANEOUS at 19:15

## 2024-12-05 RX ADMIN — BENZONATATE 200 MG: 100 CAPSULE ORAL at 19:48

## 2024-12-05 RX ADMIN — HEPARIN SODIUM 18 UNITS/KG/HR: 10000 INJECTION, SOLUTION INTRAVENOUS at 19:54

## 2024-12-05 RX ADMIN — ALBUTEROL SULFATE 2.5 MG: 2.5 SOLUTION RESPIRATORY (INHALATION) at 23:23

## 2024-12-05 RX ADMIN — VANCOMYCIN 1250 MG: 1.75 INJECTION, SOLUTION INTRAVENOUS at 22:28

## 2024-12-05 ASSESSMENT — PAIN - FUNCTIONAL ASSESSMENT: PAIN_FUNCTIONAL_ASSESSMENT: 0-10

## 2024-12-05 ASSESSMENT — PAIN SCALES - GENERAL
PAINLEVEL_OUTOF10: 0
PAINLEVEL_OUTOF10: 0

## 2024-12-05 NOTE — PROGRESS NOTES
Ms. Prajapati arrived to [facility] today at 1000 one hour late for her 0900 chemotherapy appointment.    Upon arrival Ms. Prajapati was short of breath.  She kept asking me to give her time to catch her breath.      Once Ms. Prajapati transferred from wheelchair to our recliner chair I checked her oxygen saturation level an it was 70% on her portable concentrator machine at 3L.      Ms. Prajapati was placed on our oxygen tank at 3L and then up to 4L saturation was 76% & .  After 5 minutes her oxygen saturation came up to 81%.  The highest came up to 86%.  When she talks her oxygen saturation drops.  Educated Ms. Prajapati on breathing techniques.    Called Dr. Li's office to make aware of patients condition.  We were instructed not to give treatment today and to send her to ER via ambulance.    Ambulance called and came to pick Ms. Prajapati up to take her to ER.

## 2024-12-05 NOTE — H&P
History and Physical        Subjective     Chief Complaint   Patient presents with    Shortness of Breath     HPI: Rina Prajapati is a 64 y.o. female with a PMHx of non-small cell carcinoma on active chemo, DVT/PE (on Eliquis), SVC syndrome, CHF preserved ejection fraction, COPD Gold stage III (on 2.5L at baseline),tobacco abuse presenting to [facility] for shortness of breath.  She reports being short of breath ongoing for the past 2 days.  Patient initially scheduled for chemotherapy today however, noted to have desaturations to 70% on concentrator machine 3 L while at infusion center, patient transferred to the emergency department for further evaluation. Of note, patient was similar admission couple weeks ago for similar complaints she says this feels pretty similar in terms of her breathing.  She is compliant with her 2.5 L oxygen.  She is not taking home maintenance inhalers for COPD due to cost.  Smokes 1 to 2 cigarettes daily.  Denies viral illnesses, productive cough, chest pain, nausea, dizziness headaches or other acute complaints.  Upon arrival to the emergency department, patient was placed initially on nonrebreather mask with escalation to high flow nasal cannula.  ABG performed with pH 7.4, bicarb 27, O2 94.    CT abdomen pelvis and chest XR -diffuse lung infiltrates, redemonstration of multiple pulmonary nodules likely representing metastatic mass, negative for pulmonary embolus. Lab work grossly unremarkable.  BNP elevated at 8746. Patient received Lasix IV 60 mg, initiated on azithromycin and Rocephin. Hospitalist team asked to admit for further management and evaluation of acute on chronic hypoxic respiratory failure.    PMHx:  Past Medical History:   Diagnosis Date    Anemia, iron deficiency 2/2016    Depression     H/O seasonal allergies     Hypertension     Non-small cell carcinoma of lung (HCC) 2/2016    adenocarcinoma of right lung    Positive occult stool blood test 2/29/2016    Pulmonary  who are in agreement with the plan, questions answered to the best my ability.    Medications were reconciled while awaiting pharmacy review. Request pharmacy notify provider with any changes in current medication list.    Disclaimer: Sections of this note are dictated using utilizing voice recognition software. Minor typographical errors may be present. If questions arise, please do not hesitate to contact me or call our department.       Jeff Ware,   12/5/2024 6:59 PM

## 2024-12-05 NOTE — PROGRESS NOTES
Pharmacy Note     Piperacillin/Tazobactam 3.375 gm q8h ordered for treatment of Pneumonia (HAP). Per [facility] Policy, Piperacillin/Tazobactam will be changed to 4.5 gm once then 3.375 gm q8h.     Estimated Creatinine Clearance: Estimated Creatinine Clearance: 79 mL/min (based on SCr of 0.66 mg/dL).  Dialysis Status, FLORIAN, CKD: -    BMI:  Body mass index is 20.64 kg/m².    Rationale for Adjustment:  [facility] B-Lactam extended infusion policy    Pharmacy will continue to monitor and adjust dose as necessary.      Please call with any questions.    Thank you,  Suzy Russ [facility]

## 2024-12-05 NOTE — ED TRIAGE NOTES
Pt presented to ED via EMS with c/o SOB ;     Per EMS pt is Aox4 c/o SOB started yestered; rales noted; speaks in short sentenced;non rebreather -98%; @ 6L n/c was only -79%; infusion PICC Left upper arm for chem tx    COPD, CHF, PNA (nov)    Pt placed on monitor.

## 2024-12-05 NOTE — ED PROVIDER NOTES
EMERGENCY DEPARTMENT HISTORY AND PHYSICAL EXAM      Date: 12/5/2024  Patient Name: Rina Prajapati    History of Presenting Illness     Chief Complaint   Patient presents with    Shortness of Breath       History (Context): Rina Prajapati is a 64 y.o. female  presents to the ED today with chief complaint of dyspnea.  Patient says dyspnea has been ongoing for quite a few days however acutely worsened over the last 24 to 48 hours.  States that she does have a history of lung cancer currently not undergoing chemotherapy treatment.  States that she normally is on chronic supplemental oxygen at baseline however per EMS prior to arrival she was satting 79% on 6 L/min nasal cannula and was placed on a nonrebreather.  Patient denies any fever, chills, chest pain, abdominal pain, nausea, vomiting, or diarrhea associated with her symptoms.      PCP: Cora Her MD    Current Facility-Administered Medications   Medication Dose Route Frequency Provider Last Rate Last Admin    vancomycin (VANCOCIN) intermittent dosing (placeholder)   Other RX Placeholder Conor, Stacey Garcia,         budesonide (PULMICORT) nebulizer suspension 1,000 mcg  1 mg Nebulization BID RT Srinath Malhotra, OLGA        ferrous sulfate (IRON 325) tablet 325 mg  325 mg Oral Daily with breakfast IsmairJeff Rojeh, DO   325 mg at 12/06/24 0854    folic acid (FOLVITE) tablet 1 mg  1 mg Oral Daily IsmairJeff Rojeh, DO   1 mg at 12/06/24 0854    levothyroxine (SYNTHROID) tablet 88 mcg  88 mcg Oral QAM AC IsmairJeff Rojeh, DO   88 mcg at 12/06/24 0603    montelukast (SINGULAIR) tablet 10 mg  10 mg Oral Daily IsmairJeff Rojeh, DO   10 mg at 12/06/24 0854    sertraline (ZOLOFT) tablet 50 mg  50 mg Oral Daily IsmairJeff Rojeh, DO   50 mg at 12/06/24 0854    spironolactone (ALDACTONE) tablet 25 mg  25 mg Oral Daily IsmairJeff Rojeh, DO   25 mg at 12/06/24 0854    arformoterol tartrate (BROVANA) nebulizer solution 15 mcg  15 mcg

## 2024-12-06 PROBLEM — I50.9 CHF EXACERBATION (HCC): Status: ACTIVE | Noted: 2024-11-14

## 2024-12-06 PROBLEM — J96.20 ACUTE ON CHRONIC RESPIRATORY FAILURE: Status: ACTIVE | Noted: 2024-12-05

## 2024-12-06 PROBLEM — J43.9 EMPHYSEMA LUNG (HCC): Status: ACTIVE | Noted: 2024-06-14

## 2024-12-06 PROBLEM — J69.0 ASPIRATION PNEUMONIA (HCC): Status: ACTIVE | Noted: 2024-05-17

## 2024-12-06 PROBLEM — I31.39 PERICARDIAL EFFUSION: Status: ACTIVE | Noted: 2024-12-06

## 2024-12-06 LAB
ALBUMIN SERPL-MCNC: 3.8 G/DL (ref 3.4–5)
ALBUMIN/GLOB SERPL: 0.9 (ref 0.8–1.7)
ALP SERPL-CCNC: 90 U/L (ref 45–117)
ALT SERPL-CCNC: 19 U/L (ref 13–56)
ANION GAP SERPL CALC-SCNC: 10 MMOL/L (ref 3–18)
ANION GAP SERPL CALC-SCNC: 9 MMOL/L (ref 3–18)
APTT PPP: 146.4 SEC (ref 23–36.4)
APTT PPP: 32.8 SEC (ref 23–36.4)
APTT PPP: 52.9 SEC (ref 23–36.4)
AST SERPL-CCNC: 23 U/L (ref 10–38)
BILIRUB SERPL-MCNC: 0.4 MG/DL (ref 0.2–1)
BUN SERPL-MCNC: 112 MG/DL (ref 7–18)
BUN SERPL-MCNC: 22 MG/DL (ref 7–18)
BUN/CREAT SERPL: 27 (ref 12–20)
BUN/CREAT SERPL: 42 (ref 12–20)
CALCIUM SERPL-MCNC: 10.1 MG/DL (ref 8.5–10.1)
CALCIUM SERPL-MCNC: 9.3 MG/DL (ref 8.5–10.1)
CHLORIDE SERPL-SCNC: 105 MMOL/L (ref 100–111)
CHLORIDE SERPL-SCNC: 91 MMOL/L (ref 100–111)
CO2 SERPL-SCNC: 23 MMOL/L (ref 21–32)
CO2 SERPL-SCNC: 31 MMOL/L (ref 21–32)
CREAT SERPL-MCNC: 0.82 MG/DL (ref 0.6–1.3)
CREAT SERPL-MCNC: 2.64 MG/DL (ref 0.6–1.3)
EKG ATRIAL RATE: 111 BPM
EKG DIAGNOSIS: NORMAL
EKG P AXIS: -7 DEGREES
EKG P-R INTERVAL: 118 MS
EKG Q-T INTERVAL: 362 MS
EKG QRS DURATION: 72 MS
EKG QTC CALCULATION (BAZETT): 492 MS
EKG R AXIS: -55 DEGREES
EKG T AXIS: 2 DEGREES
EKG VENTRICULAR RATE: 111 BPM
ERYTHROCYTE [DISTWIDTH] IN BLOOD BY AUTOMATED COUNT: 17.4 % (ref 11.6–14.5)
GLOBULIN SER CALC-MCNC: 4.4 G/DL (ref 2–4)
GLUCOSE BLD STRIP.AUTO-MCNC: 146 MG/DL (ref 70–110)
GLUCOSE BLD STRIP.AUTO-MCNC: 175 MG/DL (ref 70–110)
GLUCOSE BLD STRIP.AUTO-MCNC: 216 MG/DL (ref 70–110)
GLUCOSE BLD STRIP.AUTO-MCNC: 247 MG/DL (ref 70–110)
GLUCOSE SERPL-MCNC: 120 MG/DL (ref 74–99)
GLUCOSE SERPL-MCNC: 165 MG/DL (ref 74–99)
HCT VFR BLD AUTO: 33.4 % (ref 35–45)
HGB BLD-MCNC: 10.3 G/DL (ref 12–16)
MAGNESIUM SERPL-MCNC: 2.4 MG/DL (ref 1.6–2.6)
MCH RBC QN AUTO: 24.9 PG (ref 24–34)
MCHC RBC AUTO-ENTMCNC: 30.8 G/DL (ref 31–37)
MCV RBC AUTO: 80.9 FL (ref 78–100)
NRBC # BLD: 0 K/UL (ref 0–0.01)
NRBC BLD-RTO: 0 PER 100 WBC
PLATELET # BLD AUTO: 242 K/UL (ref 135–420)
PMV BLD AUTO: 11.1 FL (ref 9.2–11.8)
POTASSIUM SERPL-SCNC: 3.1 MMOL/L (ref 3.5–5.5)
POTASSIUM SERPL-SCNC: 4.1 MMOL/L (ref 3.5–5.5)
PROT SERPL-MCNC: 8.2 G/DL (ref 6.4–8.2)
RBC # BLD AUTO: 4.13 M/UL (ref 4.2–5.3)
SODIUM SERPL-SCNC: 131 MMOL/L (ref 136–145)
SODIUM SERPL-SCNC: 138 MMOL/L (ref 136–145)
VANCOMYCIN SERPL-MCNC: 11.1 UG/ML (ref 5–40)
WBC # BLD AUTO: 8.6 K/UL (ref 4.6–13.2)

## 2024-12-06 PROCEDURE — 6370000000 HC RX 637 (ALT 250 FOR IP): Performed by: HOSPITALIST

## 2024-12-06 PROCEDURE — 2580000003 HC RX 258: Performed by: STUDENT IN AN ORGANIZED HEALTH CARE EDUCATION/TRAINING PROGRAM

## 2024-12-06 PROCEDURE — 97530 THERAPEUTIC ACTIVITIES: CPT

## 2024-12-06 PROCEDURE — 99232 SBSQ HOSP IP/OBS MODERATE 35: CPT | Performed by: STUDENT IN AN ORGANIZED HEALTH CARE EDUCATION/TRAINING PROGRAM

## 2024-12-06 PROCEDURE — APPSS15 APP SPLIT SHARED TIME 0-15 MINUTES

## 2024-12-06 PROCEDURE — 82962 GLUCOSE BLOOD TEST: CPT

## 2024-12-06 PROCEDURE — 80053 COMPREHEN METABOLIC PANEL: CPT

## 2024-12-06 PROCEDURE — 6360000002 HC RX W HCPCS: Performed by: HOSPITALIST

## 2024-12-06 PROCEDURE — 85730 THROMBOPLASTIN TIME PARTIAL: CPT

## 2024-12-06 PROCEDURE — 6360000002 HC RX W HCPCS: Performed by: STUDENT IN AN ORGANIZED HEALTH CARE EDUCATION/TRAINING PROGRAM

## 2024-12-06 PROCEDURE — 99223 1ST HOSP IP/OBS HIGH 75: CPT | Performed by: INTERNAL MEDICINE

## 2024-12-06 PROCEDURE — 83735 ASSAY OF MAGNESIUM: CPT

## 2024-12-06 PROCEDURE — 80202 ASSAY OF VANCOMYCIN: CPT

## 2024-12-06 PROCEDURE — 97162 PT EVAL MOD COMPLEX 30 MIN: CPT

## 2024-12-06 PROCEDURE — 2700000000 HC OXYGEN THERAPY PER DAY

## 2024-12-06 PROCEDURE — 94640 AIRWAY INHALATION TREATMENT: CPT

## 2024-12-06 PROCEDURE — 99232 SBSQ HOSP IP/OBS MODERATE 35: CPT | Performed by: INTERNAL MEDICINE

## 2024-12-06 PROCEDURE — 93010 ELECTROCARDIOGRAM REPORT: CPT | Performed by: INTERNAL MEDICINE

## 2024-12-06 PROCEDURE — 6370000000 HC RX 637 (ALT 250 FOR IP): Performed by: STUDENT IN AN ORGANIZED HEALTH CARE EDUCATION/TRAINING PROGRAM

## 2024-12-06 PROCEDURE — 97166 OT EVAL MOD COMPLEX 45 MIN: CPT

## 2024-12-06 PROCEDURE — 6360000002 HC RX W HCPCS

## 2024-12-06 PROCEDURE — 85027 COMPLETE CBC AUTOMATED: CPT

## 2024-12-06 PROCEDURE — 1100000000 HC RM PRIVATE

## 2024-12-06 PROCEDURE — 36415 COLL VENOUS BLD VENIPUNCTURE: CPT

## 2024-12-06 PROCEDURE — 97535 SELF CARE MNGMENT TRAINING: CPT

## 2024-12-06 PROCEDURE — 94761 N-INVAS EAR/PLS OXIMETRY MLT: CPT

## 2024-12-06 RX ORDER — IPRATROPIUM BROMIDE AND ALBUTEROL SULFATE 2.5; .5 MG/3ML; MG/3ML
1 SOLUTION RESPIRATORY (INHALATION)
Status: DISCONTINUED | OUTPATIENT
Start: 2024-12-06 | End: 2024-12-08

## 2024-12-06 RX ORDER — ARFORMOTEROL TARTRATE 15 UG/2ML
15 SOLUTION RESPIRATORY (INHALATION)
Status: DISCONTINUED | OUTPATIENT
Start: 2024-12-06 | End: 2024-12-14 | Stop reason: HOSPADM

## 2024-12-06 RX ORDER — BUDESONIDE 0.5 MG/2ML
0.5 INHALANT ORAL
Status: DISCONTINUED | OUTPATIENT
Start: 2024-12-06 | End: 2024-12-06

## 2024-12-06 RX ORDER — BUDESONIDE 0.5 MG/2ML
1 INHALANT ORAL
Status: DISCONTINUED | OUTPATIENT
Start: 2024-12-06 | End: 2024-12-10

## 2024-12-06 RX ADMIN — FUROSEMIDE 20 MG: 10 INJECTION, SOLUTION INTRAMUSCULAR; INTRAVENOUS at 08:53

## 2024-12-06 RX ADMIN — GUAIFENESIN 1200 MG: 600 TABLET, EXTENDED RELEASE ORAL at 22:25

## 2024-12-06 RX ADMIN — WATER 40 MG: 1 INJECTION INTRAMUSCULAR; INTRAVENOUS; SUBCUTANEOUS at 10:56

## 2024-12-06 RX ADMIN — IPRATROPIUM BROMIDE 0.5 MG: 0.5 SOLUTION RESPIRATORY (INHALATION) at 08:18

## 2024-12-06 RX ADMIN — ARFORMOTEROL TARTRATE 15 MCG: 15 SOLUTION RESPIRATORY (INHALATION) at 08:18

## 2024-12-06 RX ADMIN — HEPARIN SODIUM 4800 UNITS: 1000 INJECTION INTRAVENOUS; SUBCUTANEOUS at 19:58

## 2024-12-06 RX ADMIN — PIPERACILLIN AND TAZOBACTAM 3375 MG: 3; .375 INJECTION, POWDER, LYOPHILIZED, FOR SOLUTION INTRAVENOUS at 02:11

## 2024-12-06 RX ADMIN — ALBUTEROL SULFATE 2.5 MG: 2.5 SOLUTION RESPIRATORY (INHALATION) at 08:18

## 2024-12-06 RX ADMIN — SODIUM CHLORIDE, PRESERVATIVE FREE 10 ML: 5 INJECTION INTRAVENOUS at 22:25

## 2024-12-06 RX ADMIN — FERROUS SULFATE TAB 325 MG (65 MG ELEMENTAL FE) 325 MG: 325 (65 FE) TAB at 08:54

## 2024-12-06 RX ADMIN — GUAIFENESIN 1200 MG: 600 TABLET, EXTENDED RELEASE ORAL at 08:54

## 2024-12-06 RX ADMIN — VANCOMYCIN HYDROCHLORIDE 1000 MG: 1 INJECTION, POWDER, LYOPHILIZED, FOR SOLUTION INTRAVENOUS at 12:38

## 2024-12-06 RX ADMIN — IPRATROPIUM BROMIDE 0.5 MG: 0.5 SOLUTION RESPIRATORY (INHALATION) at 13:12

## 2024-12-06 RX ADMIN — MONTELUKAST 10 MG: 10 TABLET, FILM COATED ORAL at 08:54

## 2024-12-06 RX ADMIN — BUDESONIDE 1000 MCG: 0.5 INHALANT RESPIRATORY (INHALATION) at 19:38

## 2024-12-06 RX ADMIN — SODIUM CHLORIDE, PRESERVATIVE FREE 10 ML: 5 INJECTION INTRAVENOUS at 08:54

## 2024-12-06 RX ADMIN — WATER 40 MG: 1 INJECTION INTRAMUSCULAR; INTRAVENOUS; SUBCUTANEOUS at 03:54

## 2024-12-06 RX ADMIN — HEPARIN SODIUM 4800 UNITS: 1000 INJECTION INTRAVENOUS; SUBCUTANEOUS at 04:50

## 2024-12-06 RX ADMIN — HEPARIN SODIUM 19 UNITS/KG/HR: 10000 INJECTION, SOLUTION INTRAVENOUS at 19:57

## 2024-12-06 RX ADMIN — INSULIN LISPRO 2 UNITS: 100 INJECTION, SOLUTION INTRAVENOUS; SUBCUTANEOUS at 22:24

## 2024-12-06 RX ADMIN — SERTRALINE HYDROCHLORIDE 50 MG: 50 TABLET ORAL at 08:54

## 2024-12-06 RX ADMIN — LEVOTHYROXINE SODIUM 88 MCG: 88 TABLET ORAL at 06:03

## 2024-12-06 RX ADMIN — SPIRONOLACTONE 25 MG: 25 TABLET ORAL at 08:54

## 2024-12-06 RX ADMIN — PIPERACILLIN AND TAZOBACTAM 3375 MG: 3; .375 INJECTION, POWDER, LYOPHILIZED, FOR SOLUTION INTRAVENOUS at 17:58

## 2024-12-06 RX ADMIN — WATER 40 MG: 1 INJECTION INTRAMUSCULAR; INTRAVENOUS; SUBCUTANEOUS at 17:50

## 2024-12-06 RX ADMIN — HEPARIN SODIUM 22 UNITS/KG/HR: 10000 INJECTION, SOLUTION INTRAVENOUS at 07:29

## 2024-12-06 RX ADMIN — INSULIN LISPRO 2 UNITS: 100 INJECTION, SOLUTION INTRAVENOUS; SUBCUTANEOUS at 08:55

## 2024-12-06 RX ADMIN — ARFORMOTEROL TARTRATE 15 MCG: 15 SOLUTION RESPIRATORY (INHALATION) at 19:38

## 2024-12-06 RX ADMIN — IPRATROPIUM BROMIDE AND ALBUTEROL SULFATE 1 DOSE: .5; 3 SOLUTION RESPIRATORY (INHALATION) at 19:38

## 2024-12-06 RX ADMIN — FOLIC ACID 1 MG: 1 TABLET ORAL at 08:54

## 2024-12-06 RX ADMIN — ALBUTEROL SULFATE 2.5 MG: 2.5 SOLUTION RESPIRATORY (INHALATION) at 13:12

## 2024-12-06 RX ADMIN — PIPERACILLIN AND TAZOBACTAM 3375 MG: 3; .375 INJECTION, POWDER, LYOPHILIZED, FOR SOLUTION INTRAVENOUS at 10:21

## 2024-12-06 ASSESSMENT — PAIN SCALES - GENERAL
PAINLEVEL_OUTOF10: 0

## 2024-12-06 NOTE — PROGRESS NOTES
12/05/24 2018   Oxygen Therapy/Pulse Ox   O2 Device Nasal cannula   O2 Flow Rate (L/min) 4 L/min   Pulse 95   Respirations 27   SpO2 98 %     Pt wean from HFNC to 4LPM nasal cannula. Pt denies SOB. No respiratory issue at this time.

## 2024-12-06 NOTE — PROGRESS NOTES
[facility]ist [facility]  Progress Note  Date:2024       Room:[facility]  Patient Name:Rina Prajapati     YOB: 1960     Age:64 y.o.        Subjective    Subjective:  Symptoms:  Improved.  She reports shortness of breath and cough.    Diet:  Adequate intake.    Activity level: Impaired due to weakness.       Review of Systems   Respiratory:  Positive for cough and shortness of breath.      Shortness of breath has improved.  Patient is unable to afford bronchodilator for COPD.  Patient also notes that her Eliquis is also expensive.  Discussed with son at bedside.    Disposition: Patient is being treated for COPD exacerbation, CHF, pneumonia.  Patient is still above her baseline oxygen requirement.  Likely stable for discharge on .    Objective         Vitals Last 24 Hours:  TEMPERATURE:  Temp  Av.6 °F (36.4 °C)  Min: 97.2 °F (36.2 °C)  Max: 98.1 °F (36.7 °C)  RESPIRATIONS RANGE: Resp  Av.9  Min: 15  Max: 33  PULSE OXIMETRY RANGE: SpO2  Av.8 %  Min: 70 %  Max: 100 %  PULSE RANGE: Pulse  Av.3  Min: 88  Max: 122  BLOOD PRESSURE RANGE: Systolic (24hrs), Av , Min:96 , Max:140     ; Diastolic (24hrs), Av, Min:73, Max:100      I/O (24Hr):    Intake/Output Summary (Last 24 hours) at 2024 0903  Last data filed at 2024 0415  Gross per 24 hour   Intake 860.73 ml   Output 400 ml   Net 460.73 ml     Objective:  General Appearance:  Comfortable.    Vital signs: (most recent): Blood pressure 121/81, pulse 100, temperature 97.9 °F (36.6 °C), temperature source Oral, resp. rate 20, height 1.702 m (5' 7.01\"), weight 59.8 kg (131 lb 12.8 oz), SpO2 92%, not currently breastfeeding.    Output: Producing urine.    HEENT: Normal HEENT exam.    Lungs:  Normal effort and normal respiratory rate.  Breath sounds clear to auscultation.  There are decreased breath sounds.    Heart: Tachycardia.  Regular rhythm.    Abdomen: Abdomen is soft and flat.  Bowel

## 2024-12-06 NOTE — PLAN OF CARE
Problem: Occupational Therapy - Adult  Goal: By Discharge: Performs self-care activities at highest level of function for planned discharge setting.  See evaluation for individualized goals.  Description:   Occupational Therapy Goals:  Initiated 12/6/2024 to be met within 7-10 days.    1.  Patient will perform grooming with supervision/set-up while standing at the sink for > 2 min with Good balance.   2.  Patient will perform bathing with supervision/set-up.  3.  Patient will perform lower body dressing with supervision/set-up.  4.  Patient will perform toilet transfers with supervision/set-up.  5.  Patient will perform all aspects of toileting with supervision/set-up.  6.  Patient will participate in upper extremity therapeutic exercise/activities with supervision/set-up for 8 minutes to improve endurance and UB strength needed for ADLs    7.  Patient will utilize energy conservation techniques during functional activities with verbal cues.    PLOF: Pt lives with family, mainly Mod Ind for ADLs and functional mobility with walker, recently had decline in functional status, using BSC for toileting.  Outcome: Progressing  OCCUPATIONAL THERAPY EVALUATION    Patient: Rina Prajapati (64 y.o. female)  Date: 12/6/2024  Primary Diagnosis: Acute hypoxic respiratory failure [J96.01]       Precautions: Fall Risk    ASSESSMENT :    Pt is agreeable to OT evaluation this pm, son present. Pt requested to use BR, required additional time with mult RBs to maneuver to EOB and then to BSC for toileting. Max A for toileting hygiene after BM. Max A for LB dressing. Pt's O2 sats 77% on 5.5L NC with activity, recover to 80% with extended time PLB with visual cues. Respiratory therapist present at the end of the session, administering breathing tx, O2 sats increased to 90%.    DEFICITS/IMPAIRMENTS:  Performance deficits / Impairments: Decreased functional mobility ;Decreased ADL status;Decreased strength;Decreased  endurance;Decreased balance;Decreased posture    Patient will benefit from skilled intervention to address the above impairments.  Patient's rehabilitation potential/Prognosis: Fair.  Factors which may influence rehabilitation potential include:   []             None noted  []             Mental ability/status  [x]             Medical condition  []             Home/family situation and support systems  []             Safety awareness  []             Pain tolerance/management  []             Other:      PLAN :  Recommendations and Planned Interventions:   [x]               Self Care Training                  [x]      Therapeutic Activities  [x]               Functional Mobility Training   []      Cognitive Retraining  [x]               Therapeutic Exercises           [x]      Endurance Activities  [x]               Balance Training                    []      Neuromuscular Re-Education  []               Visual/Perceptual Training     [x]      Home Safety Training  [x]               Patient Education                   [x]      Family Training/Education  []               Other (comment):    Frequency/Duration: Patient will be followed by occupational therapy to address goals, 1-2 times per day/3-5 days per week to address goals.    Further Equipment Recommendations for Discharge: wheelchair 18 inch    AMPAC: AM-PAC Inpatient Daily Activity Raw Score: 17    Current research shows that an AM-PAC score of 17 or less is not associated with a discharge to the patient's home setting.    This AMPA score should be considered in conjunction with interdisciplinary team recommendations to determine the most appropriate discharge setting. Patient's social support, diagnosis, medical stability, and prior level of function should also be taken into consideration.     SUBJECTIVE:   Patient stated, “I get excited.”    OBJECTIVE DATA SUMMARY:     Past Medical History:   Diagnosis Date    Anemia, iron deficiency 2/2016    Depression

## 2024-12-06 NOTE — CONSULTS
Palliative Medicine Consult  [facility]: [phone] ([phone])  [facility]: [phone]     Patient Name: Rina Prajapati  YOB: 1960    Date of Service: 12/6/2024  Provider: Micky Potts MD  Hospital Day: 2  Admit Date: 12/5/2024  Referring Provider:  Jeff Ware DO     Reason for Consult: goals of care discussion/ACP/symptoms management      SUMMARY:     Rina Prajapati is a 64 y.o. with a past history of non-small cell lung cancer, DVT/PE, SVC syndrome, CHF with EF around 40-45 percentage, COPD and tobacco use disorder, who was admitted on 12/5/2024 from home with a diagnosis of acute on chronic hypoxic respiratory failure secondary to CHF exacerbation and possible pneumonia.  Patient initially presented to emergency room with complaint of shortness of breath.  Patient was found to be hypoxic.  CT abdomen pelvis and chest x-ray showed lung infiltrates, redemonstration's of lung masses without any PE.  Patient was found to have elevated BNP and was given IV Lasix.  She was started on IV antibiotics and admitted to the hospital.  Patient is being followed by pulmonology and cardiology service.  Due to recurrent admission and current medical issues leading to Palliative Medicine involvement include: To establish goals of care.    December 6, 2024: Patient is feeling much better.  Patient was seen in presence of palliative care staff member and her fiancé.  Continues to have dyspnea on exertion.  Off-and-on cough present without any chest pain or abdominal pain.  No nausea or vomiting.  No headaches or dizziness.     HISTORY:     History obtained from: Patient    CHIEF COMPLAINT: Shortness of breath    HPI/SUBJECTIVE:    The patient is:   [x] Verbal and participatory  [] Non-participatory due to:         PHYSICAL EXAM:     From RN flowsheet:  Wt Readings from Last 3 Encounters:   12/05/24 59.8 kg (131 lb 12.8 oz)   12/04/24 54.7 kg (120 lb 9.6 oz)   11/19/24  RX Placeholder    budesonide (PULMICORT) nebulizer suspension 1,000 mcg  1 mg Nebulization BID RT    ferrous sulfate (IRON 325) tablet 325 mg  325 mg Oral Daily with breakfast    folic acid (FOLVITE) tablet 1 mg  1 mg Oral Daily    levothyroxine (SYNTHROID) tablet 88 mcg  88 mcg Oral QAM AC    montelukast (SINGULAIR) tablet 10 mg  10 mg Oral Daily    sertraline (ZOLOFT) tablet 50 mg  50 mg Oral Daily    spironolactone (ALDACTONE) tablet 25 mg  25 mg Oral Daily    arformoterol tartrate (BROVANA) nebulizer solution 15 mcg  15 mcg Nebulization BID RT    And    ipratropium (ATROVENT) 0.02 % nebulizer solution 0.5 mg  0.5 mg Nebulization TID RT    heparin (porcine) injection 4,800 Units  80 Units/kg IntraVENous PRN    heparin (porcine) injection 2,400 Units  40 Units/kg IntraVENous PRN    heparin 25,000 units in dextrose 5% 250 mL (premix) infusion  5-30 Units/kg/hr IntraVENous Continuous    guaiFENesin (MUCINEX) extended release tablet 1,200 mg  1,200 mg Oral BID    benzonatate (TESSALON) capsule 200 mg  200 mg Oral TID PRN    sodium chloride flush 0.9 % injection 5-40 mL  5-40 mL IntraVENous 2 times per day    sodium chloride flush 0.9 % injection 5-40 mL  5-40 mL IntraVENous PRN    0.9 % sodium chloride infusion   IntraVENous PRN    potassium chloride (KLOR-CON M) extended release tablet 40 mEq  40 mEq Oral PRN    Or    potassium bicarb-citric acid (EFFER-K) effervescent tablet 40 mEq  40 mEq Oral PRN    Or    potassium chloride 10 mEq/100 mL IVPB (Peripheral Line)  10 mEq IntraVENous PRN    magnesium sulfate 2000 mg in 50 mL IVPB premix  2,000 mg IntraVENous PRN    ondansetron (ZOFRAN-ODT) disintegrating tablet 4 mg  4 mg Oral Q8H PRN    Or    ondansetron (ZOFRAN) injection 4 mg  4 mg IntraVENous Q6H PRN    polyethylene glycol (GLYCOLAX) packet 17 g  17 g Oral Daily PRN    acetaminophen (TYLENOL) tablet 650 mg  650 mg Oral Q6H PRN    Or    acetaminophen (TYLENOL) suppository 650 mg  650 mg Rectal Q6H PRN    melatonin

## 2024-12-06 NOTE — PLAN OF CARE
Problem: Chronic Conditions and Co-morbidities  Goal: Patient's chronic conditions and co-morbidity symptoms are monitored and maintained or improved  Outcome: Progressing  Flowsheets  Taken 12/6/2024 0254  Care Plan - Patient's Chronic Conditions and Co-Morbidity Symptoms are Monitored and Maintained or Improved:   Monitor and assess patient's chronic conditions and comorbid symptoms for stability, deterioration, or improvement   Collaborate with multidisciplinary team to address chronic and comorbid conditions and prevent exacerbation or deterioration   Update acute care plan with appropriate goals if chronic or comorbid symptoms are exacerbated and prevent overall improvement and discharge  Taken 12/6/2024 0010  Care Plan - Patient's Chronic Conditions and Co-Morbidity Symptoms are Monitored and Maintained or Improved: Monitor and assess patient's chronic conditions and comorbid symptoms for stability, deterioration, or improvement  Note: Monitoring with vital signs and labs for abnormality.     Problem: Pain  Goal: Verbalizes/displays adequate comfort level or baseline comfort level  Outcome: Progressing  Flowsheets (Taken 12/6/2024 0259)  Verbalizes/displays adequate comfort level or baseline comfort level:   Encourage patient to monitor pain and request assistance   Assess pain using appropriate pain scale   Administer analgesics based on type and severity of pain and evaluate response  Note: Administer pain medication as order schedule or prn.     Problem: Safety - Adult  Goal: Free from fall injury  Outcome: Progressing  Flowsheets  Taken 12/6/2024 0259  Free From Fall Injury: Instruct family/caregiver on patient safety  Taken 12/6/2024 0010  Free From Fall Injury: Instruct family/caregiver on patient safety  Note: Call light and frequent items within reach, rounding on patient at intervals.

## 2024-12-06 NOTE — PROGRESS NOTES
[facility]   Pharmacy Pharmacokinetic Monitoring Service - Vancomycin     Rina Prajapati is a 64 y.o. female starting on vancomycin therapy for Pneumonia (HAP). Pharmacy consulted for monitoring and adjustment.    Target Concentration: Goal AUC/BAHMAN 400-600 mg*hr/L    Additional Antimicrobials: Piperacillin/Tazobactam, Ceftriaxone, Azithromycin All x 1 doses in the ED    Pertinent Laboratory Values:   Temp: 98.1 °F (36.7 °C), Weight - Scale: 59.8 kg (131 lb 12.8 oz)  Recent Labs     12/04/24  0953 12/05/24  1204   CREATININE 0.65 0.66   BUN 13 16   WBC 9.5 6.2     Estimated Creatinine Clearance: 79 mL/min (based on SCr of 0.66 mg/dL).    Pertinent Cultures:  Culture Date Source Results   12/5/24 Blood pending   MRSA Nasal Swab: Not ordered. Order placed by pharmacy    Plan:  Dosing recommendations based on Bayesian software  Start vancomycin 1250 mg IV x 1 dose in the ED, followed by 1000 mg IV q12hr  Anticipated AUC of 489 and trough concentration of 14.3 at steady state  Renal labs as indicated   Vancomycin concentration ordered for  AM labs on Saturday, 12/7  Pharmacy will continue to monitor patient and adjust therapy as indicated    Thank you for the consult,  ALLEGRA NARVAEZ RPH  12/5/2024

## 2024-12-06 NOTE — CONSULTS
[facility]  Pulmonary, Critical Care, and Sleep Medicine    Name: Rina Prajapati MRN: [id number]   : 1960 Hospital: [facility]   Date: 2024        Pulmonary Medicine: Initial Patient Consult    Admission Date:   2024  LOS: 1  MAR reviewed and pertinent medications noted or modified as needed    IMPRESSION:   Acute on chronic hypoxic respiratory failure with increased oxygen requirements on home oxygen concentrator, desaturations to 70%, required HFNC on admission, now on 5L with 94%  Etiology likely multifactorial, with COPD exacerbation, NSCLC, diastolic/systolic HF decompensation, worsening metastatic disease,  possible pneumonia vs non infectious pneumonitis. RVP negative. CT-A negative for PE, evidence of pulmonary hypertension, RVP neg, strp PNA/Legionella neg, low likelihood of bacterial infection with procal 0.11  L>R BL opacities on CT imaging  Bilateral pneumonia, viral vs bacterial vs atypical infectious process  Possible aspiration, with debris in trachea and bronchi  NSCLC, actively on chemo. Followed by Dr. Li, with [facility]. Noted last admission with disease progression while on Taxotere, starting on Gemzar. Hx of SVC syndrome  VINCE pulmonary mass, 3.5cm, unchanged from  to , and multiple BL nodules  SIRS vs Sepsis with pulmonary source  ?FLORIAN, with Cr 2.64 above baseline 0.66. Now resolved in AM labs. Likely pre-renal vs ATN vs lab error  Severe COPD FEV1 in 2024 31% predicted with air trapping and reduced DLCO.  Possible exacerbation, CT with enphesemous changes. On LTOT. Has not been receiving prescribed bronchodilators due to high cost.  Combined systolic and Diastolic HF, current echo with mild systolic dysfunction EF 45% on   Possible exacerbation with pedal edema, hypoxia, elevated BNP 8746mg/mL above 3.622 ion   Pulmonary hypertension WHO Grp 2/3 with dilated RV  RVSP 45mmHg on , CT on  showing cardiomegaly      melatonin tablet 3 mg  3 mg Oral Nightly PRN IsJeff rod, DO        nicotine (NICODERM CQ) 14 MG/24HR 1 patch  1 patch TransDERmal Daily IsJeff rodh, DO        furosemide (LASIX) injection 20 mg  20 mg IntraVENous BID IsmairJeffh, DO        albuterol (PROVENTIL) (2.5 MG/3ML) 0.083% nebulizer solution 2.5 mg  2.5 mg Nebulization Q6H RT IsJeff rod, DO   2.5 mg at 12/05/24 2323    methylPREDNISolone sodium succ (SOLU-MEDROL) 40 mg in sterile water 1 mL injection  40 mg IntraVENous Q8H IsJeff rod, DO   40 mg at 12/06/24 0354    insulin lispro (HUMALOG,ADMELOG) injection vial 0-8 Units  0-8 Units SubCUTAneous 4x Daily AC & HS IsmaJeff le, DO   2 Units at 12/05/24 2122    piperacillin-tazobactam (ZOSYN) 3,375 mg in sodium chloride 0.9 % 50 mL IVPB (mini-bag)  3,375 mg IntraVENous Q8H IsJeff rod, DO   Stopped at 12/06/24 0611    vancomycin (VANCOCIN) 1,000 mg in sodium chloride 0.9 % 250 mL IVPB ([id number])  1,000 mg IntraVENous Q12H IsmaJeff leh, DO         Facility-Administered Medications Ordered in Other Encounters   Medication Dose Route Frequency Provider Last Rate Last Admin    ondansetron (ZOFRAN) injection 8 mg  8 mg IntraVENous Once Jameel Li MD        dexAMETHasone (DECADRON) injection 10 mg  10 mg IntraVENous Once Jameel Li MD        gemcitabine HCl (GEMZAR) 1,670 mg in sodium chloride 0.9 % 250 mL chemo IVPB  1,000 mg/m2 (Order-Specific) IntraVENous Once Jameel Li MD        sodium chloride flush 0.9 % injection 5-40 mL  5-40 mL IntraVENous PRN Jameel Li MD   20 mL at 12/04/24 0951    heparin 100 UNIT/ML injection 500 Units  500 Units IntraCATHeter PRN Jameel Li MD   500 Units at 12/04/24 0952                 Imaging:  I have personally reviewed the patient’s radiographs and have reviewed the reports:    CTA CHEST W WO CONTRAST PE Eval  Narrative: EXAM: CT Angiogram of the Chest    CLINICAL

## 2024-12-06 NOTE — NURSE NAVIGATOR
Met with patient education provided on living with heart failure provided, patient verbalized understanding with no questions asked.

## 2024-12-06 NOTE — CONSULTS
Comprehensive Nutrition Assessment    Type and Reason for Visit:  Initial, Positive nutrition screen, Consult    Nutrition Recommendations/Plan:   Continue current diet as tolerated.  Order Ensure Plus High Protein (each provides 350 kcal, 20g protein) Once daily and Magic Cup (each provides 290 kcal, 9g protein) BID  Recommend/order virt-caps supplementation daily, Daily wts.  Continue to monitor tolerance of PO, compliance of oral supplements, weight, labs, and plan of care during admission.     Malnutrition Assessment:  Malnutrition Status:  Mild malnutrition (12/06/24 1001)    Context:  Acute Illness     Findings of the 6 clinical characteristics of malnutrition:  Energy Intake:  75% or less of estimated energy requirements for 7 or more days  Weight Loss:  No weight loss     Body Fat Loss:  No body fat loss     Muscle Mass Loss:  Mild muscle mass loss Scapula (trapezius), Clavicles (pectoralis & deltoids)  Fluid Accumulation:  No fluid accumulation     Strength:  Not Performed    Nutrition History and Allergies:      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 Pt reports  lbs and 5 lbs weight loss in the past week.     Weight Hx: 128 lbs Wt change: +2 lb (+1.5%) x 2 months - not significant.   Wt Readings from Last 10 Encounters:   12/05/24 59.8 kg (131 lb 12.8 oz)   12/04/24 54.7 kg (120 lb 9.6 oz)   11/19/24 61.5 kg (135 lb 9.6 oz)   10/23/24 57.5 kg  SUPPLEMENT; Breakfast; Standard High Calorie/High Protein Oral Supplement  ADULT ORAL NUTRITION SUPPLEMENT; Lunch, Dinner; Frozen Oral Supplement    Anthropometric Measures:  Height: 170.2 cm (5' 7.01\")  Ideal Body Weight (IBW): 135 lbs (61 kg)    Admission Body Weight: 59.8 kg (131 lb 13.4 oz)  Current Body Weight: 60.5 kg (133 lb 6.4 oz) (taken by RD), 98.8 % IBW. Weight Source: Bed scale  Current BMI (kg/m2): 20.9  Usual Body Weight: 59.4 kg (131 lb)  % Weight Change (Calculated): 1.8    BMI Categories: Underweight (BMI less than 22) age over 65    Estimated Daily Nutrient Needs:  Energy Requirements Based On: Kcal/kg  Weight Used for Energy Requirements: Current  Energy (kcal/day): 6860-7668 kcal/d (25-30 kcal/kg)  Weight Used for Protein Requirements: Current  Protein (g/day): 60-73g pro/d (1-1.2g pro/kg)  Method Used for Fluid Requirements: 1 ml/kcal  Fluid (ml/day): 1657-4639 mL/d or per MD    Nutrition Diagnosis:   Inadequate protein-energy intake related to acute injury/trauma as evidenced by poor intake prior to admission, variable po intake (since admission)    Nutrition Interventions:   Food and/or Nutrient Delivery: Continue Current Diet, Vitamin Supplement, Start Oral Nutrition Supplement  Nutrition Education/Counseling: No recommendation at this time  Coordination of Nutrition Care: Continue to monitor while inpatient  Plan of Care discussed with: Pt/family friend    Goals:     Goals: PO intake 50% or greater, by next RD assessment       Nutrition Monitoring and Evaluation:   Behavioral-Environmental Outcomes: None Identified  Food/Nutrient Intake Outcomes: Food and Nutrient Intake, Vitamin/Mineral Intake, Supplement Intake  Physical Signs/Symptoms Outcomes: Biochemical Data, Chewing or Swallowing, Fluid Status or Edema, Nausea or Vomiting, Nutrition Focused Physical Findings, Skin, Weight    Discharge Planning:    Continue current diet, Continue Oral Nutrition Supplement     Brenda Bennett MS, RDN,

## 2024-12-06 NOTE — PROGRESS NOTES
[facility]   Pharmacy Pharmacokinetic Monitoring Service - Vancomycin    Indication: Pneumonia (HAP)  Target Concentration: Dosing based on anticipated concentration <15 mg/L due to renal impairment/insufficiency  Day of Therapy: 2  Additional Antimicrobials: Piperacillin/Tazobactam    Pertinent Laboratory Values:   Temp: 97.7 °F (36.5 °C), Weight - Scale: 59.8 kg (131 lb 12.8 oz)  Recent Labs     12/05/24  1204 12/06/24  0020 12/06/24  0857   CREATININE 0.66 2.64* 0.82   BUN 16 112* 22*   WBC 6.2  --  8.6       Estimated Creatinine Clearance: 64 mL/min (based on SCr of 0.82 mg/dL).    Pertinent Cultures:  Culture Date Source Results   12/5 Blood  NGTD   MRSA Nasal Swab: Not ordered. Order placed by pharmacy    Assessment:  Date/Time Current Dose Concentration Timing of Concentration (h) AUC   12/5 1250 mg x1 - - -   12/6 1000 mg x1 11.1 13 -     Note: Serum concentrations collected for AUC dosing may appear elevated if collected in close proximity to the dose administered, this is not necessarily an indication of toxicity    Plan:  Concentration-guided dosing due to renal impairment   Labs collected on 12/6 at 0020 may have been an error due to rapid increase and decrease in serum creatinine. Will continue with dosing by levels for now   Dose: vancomycin 1000 mg once  Renal labs as indicated   Vancomycin level ordered for AM labs tomorrow  Pharmacy will continue to monitor patient and adjust therapy as indicated    Thank you for the consult,  Elizabeth Gabriel RPH  12/6/2024

## 2024-12-06 NOTE — CARE COORDINATION
Discharge Barrier: Wean O2 to base line 3 liters.   Planned Disposition: SNF/HH  Anticipated Discharge Date: 12/9  Current LOS/ GMLOS: 1/0      Patient not medically ready to discharge. Per Palliative progress note,   Patient was to remain \"full code with full interventions, AMD is on file, patient wants to pursue aggressive care including chemotherapy\". CM proactively sent out HH/SNF referrals and will follow up with patient to discuss transition plan.           Alicia Cullen, MSN, RN  Care Manager    [place] [address]  Office: [phone]  Fax: [phone]

## 2024-12-06 NOTE — PLAN OF CARE
Problem: Physical Therapy - Adult  Goal: By Discharge: Performs mobility at highest level of function for planned discharge setting.  See evaluation for individualized goals.  Description: Initiated  12/6/24  to be met within 7-10 days.    1.  Patient will move from supine to sit and sit to supine , scoot up and down, and roll side to side in bed with independence.    2.  Patient will transfer from bed to chair and chair to bed with modified independence using the least restrictive device.  3.  Patient will perform sit to stand with modified independence.  4.  Patient will ambulate with modified independence for 15 feet with the least restrictive device.   5.  Patient will ascend/descend 1 stairs with 0 handrail(s) with modified independence.    PLOF: Lives with fiance in a 1 sstory home with 1 NOEL. Mod I RW a month ago per pt, however has just been doing stand pivots to BSC last couple of weeks.    Outcome: Progressing   PHYSICAL THERAPY EVALUATION    Patient: Rina Prajapati (64 y.o. female)  Date: 12/6/2024  Primary Diagnosis: Acute hypoxic respiratory failure [J96.01]       Precautions: Fall Risk,  ,  ,  ,  ,  ,  ,      ASSESSMENT :  Pt cleared by nursing. Pt received in bed in [facility] and educated on PT role and evaluation process,agreeable. SpO2 at 99% at rest on 6L O2 via NC. Pt supervision to EOB, increase WOB noted with increase cues for PLB and pacing her breathing. Pt tolerates sitting for 10+ minutes SpO2 varies from 80-97% depending on pt talking, or needing frequent cueing for PLB. Once pt begins PLB SpO2 quickly jumps back up into 90's and educated pt on importance. Pt reports needing have to use to bathroom. Sit to stand CGA and takes a few steps to BSC with RW, SpO2 88% and sits down and voids. BM liquid. Returns to bed in similar fashion and sat up to finish eating breakfast. Pt demonstrates deficits in strength, mobility, balance, gait, and safety and will benefit from skilled acute care PT  Back  Barriers to Learning: None  Education Outcome: Verbalized understanding;Demonstrated understanding;Continued education needed    Thank you for this referral.  Shannen Lima, PT  Minutes: 35      Eval Complexity: Decision Making: Medium Complexity

## 2024-12-06 NOTE — PROGRESS NOTES
4 Eyes Skin Assessment     NAME:  Rina Prajapati  YOB: 1960  MEDICAL RECORD NUMBER:  [id number]    The patient is being assessed for  Shift Handoff    I agree that at least one RN has performed a thorough Head to Toe Skin Assessment on the patient. ALL assessment sites listed below have been assessed.      Areas assessed by both nurses:    Head, Face, Ears, Shoulders, Back, Chest, Arms, Elbows, Hands, Sacrum. Buttock, Coccyx, Ischium, Legs. Feet and Heels, and Under Medical Devices         Does the Patient have a Wound? No noted wound(s)       Isidro Prevention initiated by RN: Yes  Wound Care Orders initiated by RN: No    Pressure Injury (Stage 3,4, Unstageable, DTI, NWPT, and Complex wounds) if present, place Wound referral order by RN under : No    New Ostomies, if present place, Ostomy referral order under : No     Nurse 1 eSignature: Electronically signed by MAIRA BAKER RN on 12/6/24 at 8:44 AM EST    **SHARE this note so that the co-signing nurse can place an eSignature**    Nurse 2 eSignature: Electronically signed by Goldy Medina RN on 12/6/24 at 8:07 PM EST

## 2024-12-06 NOTE — CONSULTS
[facility] - Consult Note    Cardiology consultation request from Dr. Ware for evaluation and management/treatment of CHF    Date of  Admission: 12/5/2024 11:30 AM   Primary Care Physician:  Cora Her MD    Attending Cardiologist: Dr. Javier Potts      Assessment:     -A/c hypoxic respiratory failure, multifactorial in setting of COPD, NSCLC, CHF and possible PNA. S/p HFNC.   -Aspiration.   -FLORIAN?  Scr 0.6>2.64>0.82, lab error??  -a/c HFmrEF exacerbation, (proBNP 8746)   Echo 11/2024, EF 45-50% with RWMA.   -Small-moderate pericardial effusion on TTE 11/14/24 without tamponade   -COPD; NSCLC, on Taxotere   -Hx PE and DVT, on Eliquis   -SVC syndrome   -Tobacco abuse      Primary cardiologist is Dr. Javier Potts      Plan:       I saw, evaluated, interviewed and examined the patient personally.  Patient was admitted with shortness of breath over last 1 week.  Coughing without any phlegm.  No fever or chills.  Taking Lasix only 20 mg every other day.  No significant edema.  No chest pain or chest tightness  CT chest and chest x-ray suggestive of possible pneumonia and fluid overload  On exam hemodynamically stable, decreased breath sound, soft abdomen, no edema.  No obvious murmur  CT chest finding and x-ray finding noted.  Pertinent labs noted  EKG reviewed    Recommendation  - Acute respiratory distress: Multifactorial with pneumonia diagnosed by CT as well as probably mild HFrEF exacerbation  - Patient with history of DVT PE and  - History of SVT syndrome  Lung cancer    Would recommend continue IV Lasix for another 24-48 hours.  Watch creatinine  Had a lengthy discussion with the patient and the significant other regarding salt restriction fluid restriction and how to adjust Lasix dosing based on weight gain, symptoms at home.    Significant time spent in reviewing the case, multiple EMR databases, physician notes, reviewing pertinent labs and imaging studies  I spent significant amount of time    12/04/24 138/88   11/20/24 123/81     Pulse Readings from Last 3 Encounters:   12/06/24 90   12/05/24 (!) 120   12/04/24 (!) 108     Wt Readings from Last 3 Encounters:   12/05/24 59.8 kg (131 lb 12.8 oz)   12/04/24 54.7 kg (120 lb 9.6 oz)   11/19/24 61.5 kg (135 lb 9.6 oz)       General:  alert, appears stated age, and cooperative  Neck:  no JVD  Lungs:  diminished BS BL   Heart:  regular rate and rhythm, S1, S2 normal, no murmur, click, rub or gallop  Abdomen:  abdomen is soft without significant tenderness, masses, organomegaly or guarding  Extremities:  extremities normal, atraumatic, no cyanosis or edema  Skin: warm and dry, no hyperpigmentation, vitiligo, or suspicious lesions  Neuro: alert, oriented x3, affect appropriate  Psych: non focal     Data Review:     Recent Labs     12/04/24  0953 12/05/24  1204   WBC 9.5 6.2   HGB 10.3* 10.1*   HCT 33.8* 33.0*    196     Recent Labs     12/04/24  0953 12/05/24  1204 12/06/24  0020    134* 131*   K 4.4 5.1 3.1*    103 91*   CO2 25 23 31   BUN 13 16 112*   CREATININE 0.65 0.66 2.64*   MG  --  1.7 2.4   ALT 13 15 19       All Cardiac Markers in the last 24 hours:    Lab Results   Component Value Date/Time    TROPHS 22 12/05/2024 12:04 PM    TROPHS 15 11/13/2024 04:55 PM    TROPHS 18 11/13/2024 03:30 PM     No results found for: \"NTPROBNP\"    Last Lipid:  No results found for: \"CHOL\", \"CHLST\", \"CHOLV\", \"HDL\", \"HDLC\", \"LDL\", \"LDLC\"    Cardiographics:     Encounter Date: 12/05/24   EKG 12 Lead (SOB)   Result Value    Ventricular Rate 111    Atrial Rate 111    P-R Interval 118    QRS Duration 72    Q-T Interval 362    QTc Calculation (Bazett) 492    P Axis -7    R Axis -55    T Axis 2    Diagnosis      Sinus tachycardia with premature atrial complexes  Left anterior fascicular block  Abnormal ECG  When compared with ECG of 15-NOV-2024 17:53,  premature ventricular complexes are no longer present  Left anterior fascicular block is now  present  Nonspecific T wave abnormality no longer evident in Lateral leads       11/13/24    ECHO (TTE) LIMITED (PRN CONTRAST/BUBBLE/STRAIN/3D) 11/14/2024 12:32 PM (Final)    Interpretation Summary    Image quality is technically difficult. Contrast used: Definity. Technically difficult study due to patient's body habitus, limited Doppler study due to patient's condition, limited study due to patient's ability to tolerate test and procedure performed with the patient in a supine position.    Left Ventricle: Mildly reduced left ventricular systolic function with a visually estimated EF of 45 - 50%. Left ventricle size is normal. Normal wall thickness. There are regional wall motion abnormalities.    Right Ventricle: Right ventricle is dilated. Normal systolic function.    Tricuspid Valve: Mildly elevated RVSP, consistent with mild pulmonary hypertension. The estimated RVSP is 45 mmHg.    Pericardium: Small to moderate (1-2 cm) circumferential pericardial effusion present. No indication of cardiac tamponade. Largest accumulation near right heart.    Signed by: Jayce Huang MD on 11/14/2024 12:32 PM    No results found for this or any previous visit.    No results found for this or any previous visit.    Xray Result (most recent):  XR CHEST PORTABLE 12/05/2024    Narrative  INDICATION: Shortness of Breath    TECHNIQUE: Portable chest radiograph    COMPARISON: 17 November 2024, 13 November 2024    FINDINGS:  Cardiac silhouette remains enlarged. Asymmetric diffuse left lung interstitial  infiltrate. Bilateral lung nodules better appreciated on prior chest CT. Mild  bilateral pleural effusions. No pneumothorax. Left upper extremity catheter tip  overlying the mid subclavian vein.    Impression  No significant interval change. Diffuse left lung interstitial infiltrates.  Enlarged cardiac silhouette. Mild bilateral pleural effusions.    Electronically signed by Aram Godinez      Signed By: Lena Jamil PA-C

## 2024-12-06 NOTE — ACP (ADVANCE CARE PLANNING)
Advance Care Planning     Palliative Team Advance Care Planning (ACP) Conversation    Date of Conversation: 12/06/24     ACP documents on file prior to discussion:  -Power of  for Healthcare    Healthcare Decision Maker:    Primary Decision Maker: Radha Jolly - Brother/Sister - [phone]    Secondary Decision Maker: Dylan Hector - Brother/Sister - [phone]     Conversation Summary:    Visited patient for new consult along with Palliative team member Dr. RASHMI Potts.  Patient is known to our team, last visits 11/14/24 and 11/15/24. She is resting quietly in bed. Awake and alert, pleasant and talkative with our team. States she is feeling \"a lot better today\". Fiance at the bedside.     Patient does have an Advanced Medical Directive on file naming her sister Radha as her primary medical decision maker, and her brother Dylan as the secondary decision maker. She remains in agreement with this AMD, no changes at this time.     She is followed by Dr. Li and is currently receiving chemo treatments. Missed her chemo appointment yesterday, did have her blood work done on Wednesday. Is planning on continuing with her treatments. Uses oxygen at home at 2.5L N/C     Goals of care discussion regarding the benefits and burdens of intubation and CPR in the event of respiratory decline or cardiopulmonary arrest. Patient voiced understanding, states that she continues to be accepting of \"CPR\".  Encouraged patient to talk with her brother and sister, along with her fiance to make sure they are aware of her wishes.  Patient was very appreciative of our visit and information      Palliative team remains available to provide support to Ms Prajapati and her family during this hospital stay.    Resuscitation Status:   Code Status: Full Code     Documentation Completed:  -No new documents completed.    Beatrice Morales RN  Palliative Medicine Inpatient RN  Palliative COPE Line: [phone]

## 2024-12-06 NOTE — PROGRESS NOTES
Received patient from ED via stretcher. Alert and oriented X 4. Denies pain or discomfort at present. Patient noted with allergy and sneezing constantly. Oxygen level decrease upon transfer

## 2024-12-06 NOTE — ED NOTES
TRANSFER - OUT REPORT:    Verbal report given to CORTES Saldana on Rina Prajapati  being transferred to [facility] for routine progression of patient care       Report consisted of patient's Situation, Background, Assessment and   Recommendations(SBAR).     Information from the following report(s) Nurse Handoff Report was reviewed with the receiving nurse.    Casa Fall Assessment:    Presents to emergency department  because of falls (Syncope, seizure, or loss of consciousness): No  Age > 70: No  Altered Mental Status, Intoxication with alcohol or substance confusion (Disorientation, impaired judgment, poor safety awaremess, or inability to follow instructions): No  Impaired Mobility: Ambulates or transfers with assistive devices or assistance; Unable to ambulate or transer.: Yes  Nursing Judgement: Yes          Lines:   PICC 10/10/24 Left Brachial (Active)       Peripheral IV 12/05/24 Right Antecubital (Active)       Peripheral IV 12/05/24 Right;Anterior Cephalic (Active)   Site Assessment Clean, dry & intact 12/05/24 2108   Line Status Blood return noted 12/05/24 2108   Line Care Cap changed 12/05/24 2108   Phlebitis Assessment No symptoms 12/05/24 2108   Infiltration Assessment 0 12/05/24 2108   Alcohol Cap Used No 12/05/24 2108   Dressing Status Clean, dry & intact 12/05/24 2108   Dressing Type Transparent 12/05/24 2108   Dressing Intervention New 12/05/24 2108        Opportunity for questions and clarification was provided.

## 2024-12-06 NOTE — PROGRESS NOTES
4 Eyes Skin Assessment     NAME:  Rina Prajapati  YOB: 1960  MEDICAL RECORD NUMBER:  [id number]    The patient is being assessed for  Admission    I agree that at least one RN has performed a thorough Head to Toe Skin Assessment on the patient. ALL assessment sites listed below have been assessed.      Areas assessed by both nurses:    Head, Face, Ears, Shoulders, Back, Chest, Arms, Elbows, Hands, Sacrum. Buttock, Coccyx, Ischium, Legs. Feet and Heels, and Under Medical Devices         Does the Patient have a Wound? No noted wound(s)       Isidro Prevention initiated by RN: Yes  Wound Care Orders initiated by RN: No    Pressure Injury (Stage 3,4, Unstageable, DTI, NWPT, and Complex wounds) if present, place Wound referral order by RN under : No    New Ostomies, if present place, Ostomy referral order under : No     Nurse 1 eSignature: Electronically signed by MAIRA BAKER RN on 12/6/24 at 3:07 AM EST    **SHARE this note so that the co-signing nurse can place an eSignature**    Nurse 2 eSignature: {Esignature:[id number]}

## 2024-12-06 NOTE — PROGRESS NOTES
Advance Care Planning   Healthcare Decision Maker:    Primary Decision Maker: Radha Jolly - Brother/Sister - [phone]    Secondary Decision Maker: Dylan Hector - Brother/Sister - [phone]    Today we documented Decision Maker(s) consistent with ACP documents on file.     Spiritual Health History and Assessment/Progress Note  [facility]    (P) Spiritual/Emotional Needs, Advance Care Planning,  ,  ,      Name: Rina Prajapati MRN: [id number]    Age: 64 y.o.     Sex: female   Language: English   Gnosticist: None   Acute on chronic respiratory failure     Date: 12/6/2024            Total Time Calculated: (P) 7 min              Spiritual Assessment began in [address] MEDICAL        Referral/Consult From: (P) Multi-disciplinary team   Encounter Overview/Reason: (P) Spiritual/Emotional Needs, Advance Care Planning  Service Provided For: (P) Patient    Tara, Belief, Meaning:   Patient Other: none  Family/Friends No family/friends present      Importance and Influence:  Patient has no beliefs influential to healthcare decision-making identified during this visit  Family/Friends No family/friends present    Community:  Patient feels well-supported. Support system includes: Children and Extended family  Family/Friends No family/friends present    Assessment and Plan of Care:     Patient Interventions include: Facilitated expression of thoughts and feelings  Family/Friends Interventions include: No family/friends present    Patient Plan of Care: Spiritual Care available upon further referral  Family/Friends Plan of Care: No family/friends present    Electronically signed by IAN Ann on 12/6/2024 at 11:35 AM

## 2024-12-06 NOTE — PROGRESS NOTES
RN called because patient is SOB and desaturated with movement. Patient given Q6 Neb early and O2 turned up to 5l pm  nc. Pt is a readmit for similar symptoms acute SOB.

## 2024-12-07 PROBLEM — Z86.711 HISTORY OF PULMONARY EMBOLISM: Status: ACTIVE | Noted: 2024-12-07

## 2024-12-07 LAB
ANION GAP SERPL CALC-SCNC: 5 MMOL/L (ref 3–18)
APTT PPP: 163 SEC (ref 23–36.4)
APTT PPP: >180 SEC (ref 23–36.4)
BACTERIA SPEC CULT: NORMAL
BACTERIA SPEC CULT: NORMAL
BUN SERPL-MCNC: 23 MG/DL (ref 7–18)
BUN/CREAT SERPL: 32 (ref 12–20)
CALCIUM SERPL-MCNC: 8.9 MG/DL (ref 8.5–10.1)
CHLORIDE SERPL-SCNC: 104 MMOL/L (ref 100–111)
CO2 SERPL-SCNC: 27 MMOL/L (ref 21–32)
CREAT SERPL-MCNC: 0.71 MG/DL (ref 0.6–1.3)
ERYTHROCYTE [DISTWIDTH] IN BLOOD BY AUTOMATED COUNT: 17.4 % (ref 11.6–14.5)
GLUCOSE BLD STRIP.AUTO-MCNC: 169 MG/DL (ref 70–110)
GLUCOSE BLD STRIP.AUTO-MCNC: 186 MG/DL (ref 70–110)
GLUCOSE BLD STRIP.AUTO-MCNC: 203 MG/DL (ref 70–110)
GLUCOSE BLD STRIP.AUTO-MCNC: 210 MG/DL (ref 70–110)
GLUCOSE SERPL-MCNC: 194 MG/DL (ref 74–99)
HCT VFR BLD AUTO: 30.9 % (ref 35–45)
HGB BLD-MCNC: 9.3 G/DL (ref 12–16)
MCH RBC QN AUTO: 24.8 PG (ref 24–34)
MCHC RBC AUTO-ENTMCNC: 30.1 G/DL (ref 31–37)
MCV RBC AUTO: 82.4 FL (ref 78–100)
NRBC # BLD: 0 K/UL (ref 0–0.01)
NRBC BLD-RTO: 0 PER 100 WBC
PLATELET # BLD AUTO: 200 K/UL (ref 135–420)
PMV BLD AUTO: 9.9 FL (ref 9.2–11.8)
POTASSIUM SERPL-SCNC: 3.9 MMOL/L (ref 3.5–5.5)
RBC # BLD AUTO: 3.75 M/UL (ref 4.2–5.3)
SERVICE CMNT-IMP: NORMAL
SODIUM SERPL-SCNC: 136 MMOL/L (ref 136–145)
VANCOMYCIN SERPL-MCNC: 13.2 UG/ML (ref 5–40)
WBC # BLD AUTO: 8.5 K/UL (ref 4.6–13.2)

## 2024-12-07 PROCEDURE — 6360000002 HC RX W HCPCS

## 2024-12-07 PROCEDURE — 6370000000 HC RX 637 (ALT 250 FOR IP): Performed by: STUDENT IN AN ORGANIZED HEALTH CARE EDUCATION/TRAINING PROGRAM

## 2024-12-07 PROCEDURE — 1100000000 HC RM PRIVATE

## 2024-12-07 PROCEDURE — 6370000000 HC RX 637 (ALT 250 FOR IP): Performed by: HOSPITALIST

## 2024-12-07 PROCEDURE — 80202 ASSAY OF VANCOMYCIN: CPT

## 2024-12-07 PROCEDURE — 94640 AIRWAY INHALATION TREATMENT: CPT

## 2024-12-07 PROCEDURE — 36415 COLL VENOUS BLD VENIPUNCTURE: CPT

## 2024-12-07 PROCEDURE — 80048 BASIC METABOLIC PNL TOTAL CA: CPT

## 2024-12-07 PROCEDURE — 85730 THROMBOPLASTIN TIME PARTIAL: CPT

## 2024-12-07 PROCEDURE — 2580000003 HC RX 258: Performed by: STUDENT IN AN ORGANIZED HEALTH CARE EDUCATION/TRAINING PROGRAM

## 2024-12-07 PROCEDURE — 99222 1ST HOSP IP/OBS MODERATE 55: CPT | Performed by: INTERNAL MEDICINE

## 2024-12-07 PROCEDURE — 2700000000 HC OXYGEN THERAPY PER DAY

## 2024-12-07 PROCEDURE — 94664 DEMO&/EVAL PT USE INHALER: CPT

## 2024-12-07 PROCEDURE — 6360000002 HC RX W HCPCS: Performed by: HOSPITALIST

## 2024-12-07 PROCEDURE — 94761 N-INVAS EAR/PLS OXIMETRY MLT: CPT

## 2024-12-07 PROCEDURE — 85027 COMPLETE CBC AUTOMATED: CPT

## 2024-12-07 PROCEDURE — 99232 SBSQ HOSP IP/OBS MODERATE 35: CPT | Performed by: STUDENT IN AN ORGANIZED HEALTH CARE EDUCATION/TRAINING PROGRAM

## 2024-12-07 PROCEDURE — 6370000000 HC RX 637 (ALT 250 FOR IP): Performed by: INTERNAL MEDICINE

## 2024-12-07 PROCEDURE — 6360000002 HC RX W HCPCS: Performed by: STUDENT IN AN ORGANIZED HEALTH CARE EDUCATION/TRAINING PROGRAM

## 2024-12-07 PROCEDURE — 82962 GLUCOSE BLOOD TEST: CPT

## 2024-12-07 PROCEDURE — 6360000002 HC RX W HCPCS: Performed by: INTERNAL MEDICINE

## 2024-12-07 RX ORDER — FUROSEMIDE 20 MG/1
20 TABLET ORAL
Status: DISCONTINUED | OUTPATIENT
Start: 2024-12-08 | End: 2024-12-11

## 2024-12-07 RX ORDER — PREDNISONE 20 MG/1
40 TABLET ORAL DAILY
Status: DISCONTINUED | OUTPATIENT
Start: 2024-12-07 | End: 2024-12-08

## 2024-12-07 RX ORDER — FUROSEMIDE 10 MG/ML
20 INJECTION INTRAMUSCULAR; INTRAVENOUS ONCE
Status: COMPLETED | OUTPATIENT
Start: 2024-12-07 | End: 2024-12-07

## 2024-12-07 RX ADMIN — PIPERACILLIN AND TAZOBACTAM 3375 MG: 3; .375 INJECTION, POWDER, LYOPHILIZED, FOR SOLUTION INTRAVENOUS at 18:07

## 2024-12-07 RX ADMIN — IPRATROPIUM BROMIDE AND ALBUTEROL SULFATE 1 DOSE: .5; 3 SOLUTION RESPIRATORY (INHALATION) at 01:47

## 2024-12-07 RX ADMIN — VANCOMYCIN HYDROCHLORIDE 750 MG: 750 INJECTION, POWDER, LYOPHILIZED, FOR SOLUTION INTRAVENOUS at 21:41

## 2024-12-07 RX ADMIN — PREDNISONE 40 MG: 20 TABLET ORAL at 10:04

## 2024-12-07 RX ADMIN — PIPERACILLIN AND TAZOBACTAM 3375 MG: 3; .375 INJECTION, POWDER, LYOPHILIZED, FOR SOLUTION INTRAVENOUS at 10:13

## 2024-12-07 RX ADMIN — IPRATROPIUM BROMIDE AND ALBUTEROL SULFATE 1 DOSE: .5; 3 SOLUTION RESPIRATORY (INHALATION) at 15:42

## 2024-12-07 RX ADMIN — MONTELUKAST 10 MG: 10 TABLET, FILM COATED ORAL at 08:48

## 2024-12-07 RX ADMIN — FERROUS SULFATE TAB 325 MG (65 MG ELEMENTAL FE) 325 MG: 325 (65 FE) TAB at 08:48

## 2024-12-07 RX ADMIN — APIXABAN 5 MG: 5 TABLET, FILM COATED ORAL at 21:36

## 2024-12-07 RX ADMIN — ARFORMOTEROL TARTRATE 15 MCG: 15 SOLUTION RESPIRATORY (INHALATION) at 19:20

## 2024-12-07 RX ADMIN — SERTRALINE HYDROCHLORIDE 50 MG: 50 TABLET ORAL at 08:49

## 2024-12-07 RX ADMIN — INSULIN LISPRO 2 UNITS: 100 INJECTION, SOLUTION INTRAVENOUS; SUBCUTANEOUS at 12:24

## 2024-12-07 RX ADMIN — SODIUM CHLORIDE, PRESERVATIVE FREE 10 ML: 5 INJECTION INTRAVENOUS at 21:36

## 2024-12-07 RX ADMIN — SPIRONOLACTONE 25 MG: 25 TABLET ORAL at 08:49

## 2024-12-07 RX ADMIN — FUROSEMIDE 20 MG: 10 INJECTION, SOLUTION INTRAMUSCULAR; INTRAVENOUS at 15:11

## 2024-12-07 RX ADMIN — IPRATROPIUM BROMIDE AND ALBUTEROL SULFATE 1 DOSE: .5; 3 SOLUTION RESPIRATORY (INHALATION) at 08:16

## 2024-12-07 RX ADMIN — LEVOTHYROXINE SODIUM 88 MCG: 88 TABLET ORAL at 06:18

## 2024-12-07 RX ADMIN — WATER 40 MG: 1 INJECTION INTRAMUSCULAR; INTRAVENOUS; SUBCUTANEOUS at 01:59

## 2024-12-07 RX ADMIN — VANCOMYCIN HYDROCHLORIDE 750 MG: 750 INJECTION, POWDER, LYOPHILIZED, FOR SOLUTION INTRAVENOUS at 10:13

## 2024-12-07 RX ADMIN — APIXABAN 5 MG: 5 TABLET, FILM COATED ORAL at 10:04

## 2024-12-07 RX ADMIN — PIPERACILLIN AND TAZOBACTAM 3375 MG: 3; .375 INJECTION, POWDER, LYOPHILIZED, FOR SOLUTION INTRAVENOUS at 02:00

## 2024-12-07 RX ADMIN — IPRATROPIUM BROMIDE AND ALBUTEROL SULFATE 1 DOSE: .5; 3 SOLUTION RESPIRATORY (INHALATION) at 19:20

## 2024-12-07 RX ADMIN — BUDESONIDE 1000 MCG: 0.5 INHALANT RESPIRATORY (INHALATION) at 19:20

## 2024-12-07 RX ADMIN — INSULIN LISPRO 2 UNITS: 100 INJECTION, SOLUTION INTRAVENOUS; SUBCUTANEOUS at 07:27

## 2024-12-07 RX ADMIN — GUAIFENESIN 1200 MG: 600 TABLET, EXTENDED RELEASE ORAL at 08:49

## 2024-12-07 RX ADMIN — BUDESONIDE 1000 MCG: 0.5 INHALANT RESPIRATORY (INHALATION) at 08:16

## 2024-12-07 RX ADMIN — FOLIC ACID 1 MG: 1 TABLET ORAL at 08:48

## 2024-12-07 RX ADMIN — ARFORMOTEROL TARTRATE 15 MCG: 15 SOLUTION RESPIRATORY (INHALATION) at 08:16

## 2024-12-07 RX ADMIN — INSULIN LISPRO 2 UNITS: 100 INJECTION, SOLUTION INTRAVENOUS; SUBCUTANEOUS at 17:09

## 2024-12-07 RX ADMIN — GUAIFENESIN 1200 MG: 600 TABLET, EXTENDED RELEASE ORAL at 21:36

## 2024-12-07 ASSESSMENT — PAIN SCALES - GENERAL
PAINLEVEL_OUTOF10: 0

## 2024-12-07 NOTE — CONSULTS
[facility]  Pulmonary, Critical Care, and Sleep Medicine    Name: Rina Prajapati MRN: [id number]   : 1960 Hospital: [facility]   Date: 2024        Pulmonary Medicine: Initial Patient Consult    Admission Date:   2024  LOS: 2  MAR reviewed and pertinent medications noted or modified as needed    IMPRESSION:   Acute on chronic hypoxic respiratory failure with increased oxygen requirements on home oxygen concentrator, desaturations to 70%, required HFNC on admission, now on 5L with 94%  Etiology likely multifactorial, with COPD exacerbation, NSCLC, diastolic/systolic HF decompensation, worsening metastatic disease,  possible pneumonia vs non infectious pneumonitis. RVP negative. CT-A negative for PE, evidence of pulmonary hypertension, RVP neg, strp PNA/Legionella neg, low likelihood of bacterial infection with procal 0.11  L>R BL opacities on CT imaging  Bilateral pneumonia, viral vs bacterial vs atypical infectious process  Possible aspiration, with debris in trachea and bronchi  NSCLC, actively on chemo. Followed by Dr. Li, with [facility]. Noted last admission with disease progression while on Taxotere, starting on Gemzar. Hx of SVC syndrome  VINCE pulmonary mass, 3.5cm, unchanged from  to , and multiple BL nodules  SIRS vs Sepsis with pulmonary source  ?FLORIAN, with Cr 2.64 above baseline 0.66. Now resolved in AM labs. Likely pre-renal vs ATN vs lab error  Severe COPD FEV1 in 2024 31% predicted with air trapping and reduced DLCO.  Possible exacerbation, CT with enphesemous changes. On LTOT. Has not been receiving prescribed bronchodilators due to high cost.  Combined systolic and Diastolic HF, current echo with mild systolic dysfunction EF 45% on   Possible exacerbation with pedal edema, hypoxia, elevated BNP 8746mg/mL above 3.622 ion   Pulmonary hypertension WHO Grp 2/3 with dilated RV  RVSP 45mmHg on , CT on  showing cardiomegaly  IsmairJeff, DO        Or    potassium chloride 10 mEq/100 mL IVPB (Peripheral Line)  10 mEq IntraVENous PRN IsmairJeffh, DO        magnesium sulfate 2000 mg in 50 mL IVPB premix  2,000 mg IntraVENous PRN IsmairJeffh, DO        ondansetron (ZOFRAN-ODT) disintegrating tablet 4 mg  4 mg Oral Q8H PRN IsJeff rod, DO        Or    ondansetron (ZOFRAN) injection 4 mg  4 mg IntraVENous Q6H PRN IsJeff rod, DO        polyethylene glycol (GLYCOLAX) packet 17 g  17 g Oral Daily PRN IsmairJeffh, DO        acetaminophen (TYLENOL) tablet 650 mg  650 mg Oral Q6H PRN IsmairJeff, DO        Or    acetaminophen (TYLENOL) suppository 650 mg  650 mg Rectal Q6H PRN IsJeff rod, DO        melatonin tablet 3 mg  3 mg Oral Nightly PRN IsJeff rod, DO        nicotine (NICODERM CQ) 14 MG/24HR 1 patch  1 patch TransDERmal Daily IsJeff rod, DO   1 patch at 12/07/24 0850    methylPREDNISolone sodium succ (SOLU-MEDROL) 40 mg in sterile water 1 mL injection  40 mg IntraVENous Q8H IsJeff rod, DO   40 mg at 12/07/24 0159    insulin lispro (HUMALOG,ADMELOG) injection vial 0-8 Units  0-8 Units SubCUTAneous 4x Daily AC & HS IsmaJeff le, DO   2 Units at 12/07/24 0727    piperacillin-tazobactam (ZOSYN) 3,375 mg in sodium chloride 0.9 % 50 mL IVPB (mini-bag)  3,375 mg IntraVENous Q8H IsJeff rod, DO   Stopped at 12/07/24 0616     Facility-Administered Medications Ordered in Other Encounters   Medication Dose Route Frequency Provider Last Rate Last Admin    ondansetron (ZOFRAN) injection 8 mg  8 mg IntraVENous Once Jameel Li MD        dexAMETHasone (DECADRON) injection 10 mg  10 mg IntraVENous Once Jameel Li MD        gemcitabine HCl (GEMZAR) 1,670 mg in sodium chloride 0.9 % 250 mL chemo IVPB  1,000 mg/m2 (Order-Specific) IntraVENous Once Jameel Li MD        sodium chloride flush 0.9 % injection 5-40 mL  5-40 mL

## 2024-12-07 NOTE — PLAN OF CARE
Problem: Chronic Conditions and Co-morbidities  Goal: Patient's chronic conditions and co-morbidity symptoms are monitored and maintained or improved  12/7/2024 0931 by Goldy Hoffmann RN  Outcome: Progressing  Flowsheets (Taken 12/6/2024 1958 by Mulugeta George, RN)  Care Plan - Patient's Chronic Conditions and Co-Morbidity Symptoms are Monitored and Maintained or Improved: Monitor and assess patient's chronic conditions and comorbid symptoms for stability, deterioration, or improvement  12/6/2024 1943 by Goldy Hoffmann RN  Outcome: Progressing  Flowsheets (Taken 12/6/2024 0745)  Care Plan - Patient's Chronic Conditions and Co-Morbidity Symptoms are Monitored and Maintained or Improved: Monitor and assess patient's chronic conditions and comorbid symptoms for stability, deterioration, or improvement     Problem: Safety - Adult  Goal: Free from fall injury  12/7/2024 0931 by Goldy Hoffmann RN  Flowsheets (Taken 12/7/2024 0927)  Free From Fall Injury: Instruct family/caregiver on patient safety  Note: Bed alarm on at all times.  Call bell within reach.  12/6/2024 1943 by Goldy Hoffmann RN  Outcome: Progressing  Note: Educate patient to ask assistance with transferring.     Problem: Skin/Tissue Integrity  Goal: Absence of new skin breakdown  Description: 1.  Monitor for areas of redness and/or skin breakdown  2.  Assess vascular access sites hourly  3.  Every 4-6 hours minimum:  Change oxygen saturation probe site  4.  Every 4-6 hours:  If on nasal continuous positive airway pressure, respiratory therapy assess nares and determine need for appliance change or resting period.  12/7/2024 0931 by Goldy Hoffmann RN  Outcome: Progressing  12/6/2024 1943 by Goldy Hoffmann RN  Outcome: Progressing  Note: Educate fall prevention

## 2024-12-07 NOTE — PLAN OF CARE
Problem: Chronic Conditions and Co-morbidities  Goal: Patient's chronic conditions and co-morbidity symptoms are monitored and maintained or improved  12/7/2024 0931 by Goldy Hoffmann RN  Outcome: Progressing  Flowsheets (Taken 12/6/2024 1958 by Mulugeta George RN)  Care Plan - Patient's Chronic Conditions and Co-Morbidity Symptoms are Monitored and Maintained or Improved: Monitor and assess patient's chronic conditions and comorbid symptoms for stability, deterioration, or improvement  12/6/2024 1943 by Goldy Hoffmann RN  Outcome: Progressing  Flowsheets (Taken 12/6/2024 0745)  Care Plan - Patient's Chronic Conditions and Co-Morbidity Symptoms are Monitored and Maintained or Improved: Monitor and assess patient's chronic conditions and comorbid symptoms for stability, deterioration, or improvement     Problem: Pain  Goal: Verbalizes/displays adequate comfort level or baseline comfort level  Recent Flowsheet Documentation  Taken 12/7/2024 0415 by Mulugeta George RN  Verbalizes/displays adequate comfort level or baseline comfort level:   Assess pain using appropriate pain scale   Administer analgesics based on type and severity of pain and evaluate response  Taken 12/7/2024 0009 by Mulugeta George RN  Verbalizes/displays adequate comfort level or baseline comfort level:   Assess pain using appropriate pain scale   Administer analgesics based on type and severity of pain and evaluate response  12/6/2024 1943 by Goldy Hoffmann RN  Outcome: Progressing  Flowsheets (Taken 12/6/2024 0259 by Shana Hunter RN)  Verbalizes/displays adequate comfort level or baseline comfort level:   Encourage patient to monitor pain and request assistance   Assess pain using appropriate pain scale   Administer analgesics based on type and severity of pain and evaluate response     Problem: Safety - Adult  Goal: Free from fall injury  12/7/2024 0931 by Goldy Hoffmann RN  Flowsheets (Taken 12/7/2024 0927)  Free From  Fall Injury: Instruct family/caregiver on patient safety  Note: Bed alarm on at all times.  Call bell within reach.  12/6/2024 1943 by Goldy Hoffmann RN  Outcome: Progressing  Note: Educate patient to ask assistance with transferring.     Problem: Skin/Tissue Integrity  Goal: Absence of new skin breakdown  Description: 1.  Monitor for areas of redness and/or skin breakdown  2.  Assess vascular access sites hourly  3.  Every 4-6 hours minimum:  Change oxygen saturation probe site  4.  Every 4-6 hours:  If on nasal continuous positive airway pressure, respiratory therapy assess nares and determine need for appliance change or resting period.  12/7/2024 0931 by Goldy Hoffmann, RN  Outcome: Progressing  12/6/2024 1943 by Goldy Hoffmann RN  Outcome: Progressing  Note: Educate fall prevention     Problem: Nutrition Deficit:  Goal: Optimize nutritional status  Outcome: Progressing  Flowsheets (Taken 12/6/2024 1009 by Bernda Bennett RD)  Nutrient intake appropriate for improving, restoring, or maintaining nutritional needs:   Assess nutritional status and recommend course of action   Monitor oral intake, labs, and treatment plans   Recommend appropriate diets, oral nutritional supplements, and vitamin/mineral supplements     Problem: Respiratory - Adult  Goal: Achieves optimal ventilation and oxygenation  Outcome: Progressing  Flowsheets (Taken 12/6/2024 1958 by Mulugeta George, RN)  Achieves optimal ventilation and oxygenation:   Assess for changes in respiratory status   Assess for changes in mentation and behavior   Position to facilitate oxygenation and minimize respiratory effort  Note: Educate the patient on how to do deep breathing exercise.

## 2024-12-07 NOTE — CARE COORDINATION
Case Management Assessment  Initial Evaluation    Date/Time of Evaluation: 12/7/2024 2:59 PM  Assessment Completed by: Lakshmi Nunez    If patient is discharged prior to next notation, then this note serves as note for discharge by case management.    Patient Name: Rina Prajapati                   YOB: 1960  Diagnosis: Acute hypoxic respiratory failure [J96.01]                   Date / Time: 12/5/2024 11:30 AM    Patient Admission Status: Inpatient   Readmission Risk (Low < 19, Mod (19-27), High > 27): Readmission Risk Score: 24.9    Current PCP: Cora Her MD  PCP verified by CM?      Chart Reviewed: Yes      History Provided by: (P) Patient  Patient Orientation: (P) Alert and Oriented    Patient Cognition: (P) Alert      Advance Directives:      Code Status: Full Code   Patient's Primary Decision Maker is: Named in Scanned ACP Document    Primary Decision Maker: Radha Jolly - Brother/Sister - [phone]    Secondary Decision Maker: Dylan Hector - Brother/Sister - [phone]    Discharge Planning:    Patient lives with: (P) Spouse/Significant Other Type of Home: House  Primary Care Giver: (P) Self  Patient Support Systems include: Spouse/Significant Other, Children   Current Financial resources: (P) Medicare  Current community resources:    Current services prior to admission: (P) Durable Medical Equipment            Current DME: (P) Walker, Oxygen Therapy (Comment) (Oxygen at home 2.5 L)            Type of Home Care services:  (P) Aide Services, Housekeeping (Patient would like assistance with home care needs)    ADLS  Prior functional level: (P) Independent in ADLs/IADLs  Current functional level: (P) Independent in ADLs/IADLs    PT AM-PAC: 16 /24  OT AM-PAC: 17 /24    Family can provide assistance at DC:    Would you like Case Management to discuss the discharge plan with any other family members/significant others, and if so, who?    Plans to Return to Present Housing:    Patient Orientation Alert and Oriented   Cognition Alert   History Provided By Patient   Primary Caregiver Self   Prior Functional Level Independent in ADLs/IADLs   Current Functional Level Independent in ADLs/IADLs   Can patient return to prior living arrangement Yes   Ability to make needs known: Good   Financial Resources Medicare   Social/Functional History   Lives With Significant other   Type of Home House   Home Layout One level   Bathroom Shower/Tub Tub/Shower unit   Bathroom Toilet Standard   Bathroom Equipment Shower chair   Bathroom Accessibility Accessible   Home Equipment Oxygen   Prior Level of Assist for ADLs Independent   Prior Level of Assist for Homemaking Independent   Discharge Planning   Living Arrangements Spouse/Significant Other   Current Services Prior To Admission Durable Medical Equipment   Current DME Prior to Arrival Walker;Oxygen Therapy (Comment)  (Oxygen at home 2.5 L)   Potential Assistance Needed Home Care;Other (Comment)  (Patient would like assistance with getting home care set up, the patient would like someone who can assist her at home with home care needs like breakfast etc.,)   Type of Home Care Services Aide Services;Housekeeping  (Patient would like assistance with home care needs)   Patient expects to be discharged to: House   Services At/After Discharge   Mode of Transport at Discharge Other (see comment)  (Patient's fiance will transport home at D/C)   Condition of Participation: Discharge Planning   The Patient and/or Patient Representative was provided with a Choice of Provider? Patient   The Patient and/Or Patient Representative agree with the Discharge Plan? Yes   Freedom of Choice list was provided with basic dialogue that supports the patient's individualized plan of care/goals, treatment preferences, and shares the quality data associated with the providers?  Yes  (okay with Ene DELACRUZ if needed.)         Lakshmi Nunez  Case Management Department

## 2024-12-07 NOTE — PROGRESS NOTES
4 Eyes Skin Assessment     NAME:  Rina Prajapati  YOB: 1960  MEDICAL RECORD NUMBER:  [id number]    The patient is being assessed for  Shift Handoff    I agree that at least one RN has performed a thorough Head to Toe Skin Assessment on the patient. ALL assessment sites listed below have been assessed.      Areas assessed by both nurses:    Head, Face, Ears, Shoulders, Back, Chest, Arms, Elbows, Hands, Sacrum. Buttock, Coccyx, Ischium, and Legs. Feet and Heels        Does the Patient have a Wound? No noted wound(s)       Isidro Prevention initiated by RN: Yes  Wound Care Orders initiated by RN: No    Pressure Injury (Stage 3,4, Unstageable, DTI, NWPT, and Complex wounds) if present, place Wound referral order by RN under : No    New Ostomies, if present place, Ostomy referral order under : No     Nurse 1 eSignature: Electronically signed by Goldy Medina RN on 12/6/24 at 8:07 PM EST    **SHARE this note so that the co-signing nurse can place an eSignature**    Nurse 2 eSignature: Electronically signed by KRISTIN CID RN on 12/7/24 at 8:16 AM EST

## 2024-12-07 NOTE — PROGRESS NOTES
Cardiology Progress Note    Admit Date: 12/5/2024  Attending Cardiologist: Dr. Dwyer    IMPRESSION  Acute on chronic hypoxic respiratory failure, in part possible pneumonia, congestive heart failure decompensation, appears more euvolemic now.    Acute on chronic heart failure medium reduced ejection fraction  Small to moderate pericardial effusion by 2D echo November 14, 2024 without tamponade  History of pulmonary embolism  SVC syndrome  Tobacco abuse    PLAN  Monitor fluid status, adjust as necessary, fluid restriction, salt intake <2g per day.  Lasix 20 mg every 48 hours, lasix 20mg IV x 1  Recommend Guideline Directed medical therapy as can tolerate.  Continue Aldactone 25 mg p.o. daily  Continue with eliquis 5mg po BID  Ischemic evaluation by primary cardiologist after acute issues resolved for cardiomyopathy  Signing off this patient at this time, please call for questions    Thank you for this consultation and for allowing us to participate in this patient's care.    Primary cardiologist Dr. DOLORES Potts    Subjective:     Denies chest pain  Denies shortness of breath while on NC oxygen  Denies abdominal pain    Objective:      Patient Vitals for the past 8 hrs:   Temp Pulse Resp BP SpO2   12/07/24 0447 97.3 °F (36.3 °C) 94 20 136/89 --   12/07/24 0147 -- -- -- -- 98 %   12/07/24 0009 97.5 °F (36.4 °C) 95 20 136/74 99 %         Patient Vitals for the past 96 hrs:   Weight   12/05/24 1154 59.8 kg (131 lb 12.8 oz)           Intake/Output Summary (Last 24 hours) at 12/7/2024 0804  Last data filed at 12/7/2024 0255  Gross per 24 hour   Intake 400 ml   Output --   Net 400 ml       EXAMINATION:  General:  Alert, cooperative, no distress  Head:  Normocephalic, without obvious abnormality, atraumatic.  Eyes:  Conjunctivae/corneas clear  Lungs:   Clear to auscultation bilaterally, no wheezes, no rales, no rhonchi  Heart:  Regular rate and rhythm, S1, S2 normal, no murmur, click, rub or gallop.  Abdomen:   Soft,

## 2024-12-07 NOTE — PLAN OF CARE
Problem: Chronic Conditions and Co-morbidities  Goal: Patient's chronic conditions and co-morbidity symptoms are monitored and maintained or improved  Outcome: Progressing  Flowsheets (Taken 12/6/2024 7954)  Care Plan - Patient's Chronic Conditions and Co-Morbidity Symptoms are Monitored and Maintained or Improved: Monitor and assess patient's chronic conditions and comorbid symptoms for stability, deterioration, or improvement     Problem: Pain  Goal: Verbalizes/displays adequate comfort level or baseline comfort level  Outcome: Progressing  Flowsheets (Taken 12/6/2024 0259 by Shana Hunter, RN)  Verbalizes/displays adequate comfort level or baseline comfort level:   Encourage patient to monitor pain and request assistance   Assess pain using appropriate pain scale   Administer analgesics based on type and severity of pain and evaluate response     Problem: Safety - Adult  Goal: Free from fall injury  Outcome: Progressing  Note: Educate patient to ask assistance with transferring.     Problem: Skin/Tissue Integrity  Goal: Absence of new skin breakdown  Description: 1.  Monitor for areas of redness and/or skin breakdown  2.  Assess vascular access sites hourly  3.  Every 4-6 hours minimum:  Change oxygen saturation probe site  4.  Every 4-6 hours:  If on nasal continuous positive airway pressure, respiratory therapy assess nares and determine need for appliance change or resting period.  Outcome: Progressing  Note: Educate fall prevention     Problem: Nutrition Deficit:  Goal: Optimize nutritional status  Outcome: Progressing  Flowsheets (Taken 12/6/2024 1009 by Brenda Bennett, KATERINA)  Nutrient intake appropriate for improving, restoring, or maintaining nutritional needs:   Assess nutritional status and recommend course of action   Monitor oral intake, labs, and treatment plans   Recommend appropriate diets, oral nutritional supplements, and vitamin/mineral supplements

## 2024-12-07 NOTE — PROGRESS NOTES
[facility]  Progress Note  Date:2024       Room:[facility]  Patient Name:Rina Prajapati     YOB: 1960     Age:64 y.o.        Subjective    Subjective:  Symptoms:  Improved.  She reports shortness of breath and cough.    Diet:  Adequate intake.    Activity level: Impaired due to weakness.       Review of Systems   Respiratory:  Positive for cough and shortness of breath.      Shortness of breath has improved.  Patient is unable to afford bronchodilator for COPD.  Patient also notes that her Eliquis is also expensive. Patient smokes 1 cigarette a day. Nicotine patch helps with her cravings.    Disposition: Patient is being treated for COPD exacerbation, CHF, pneumonia.  Patient is still above her baseline oxygen requirement.  Likely stable for discharge on .    Objective         Vitals Last 24 Hours:  TEMPERATURE:  Temp  Av.6 °F (36.4 °C)  Min: 97.3 °F (36.3 °C)  Max: 97.9 °F (36.6 °C)  RESPIRATIONS RANGE: Resp  Av.7  Min: 18  Max: 20  PULSE OXIMETRY RANGE: SpO2  Av.7 %  Min: 92 %  Max: 99 %  PULSE RANGE: Pulse  Av.8  Min: 94  Max: 110  BLOOD PRESSURE RANGE: Systolic (24hrs), Av , Min:121 , Max:143     ; Diastolic (24hrs), Av, Min:74, Max:90      I/O (24Hr):    Intake/Output Summary (Last 24 hours) at 2024 0916  Last data filed at 2024 0255  Gross per 24 hour   Intake 400 ml   Output --   Net 400 ml     Objective:  General Appearance:  Comfortable.    Vital signs: (most recent): Blood pressure 129/78, pulse (!) 102, temperature 97.6 °F (36.4 °C), temperature source Oral, resp. rate 20, height 1.702 m (5' 7.01\"), weight 59.8 kg (131 lb 12.8 oz), SpO2 96%, not currently breastfeeding.    Output: Producing urine.    HEENT: Normal HEENT exam.    Lungs:  Normal effort and normal respiratory rate.  Breath sounds clear to auscultation.  There are decreased breath sounds.    Heart: Tachycardia.  Regular rhythm.     Abdomen: Abdomen is soft and flat.  Bowel sounds are normal.   There is no abdominal tenderness.     Extremities: Normal range of motion.    Pulses: Distal pulses are intact.    Neurological: Patient is alert and oriented to person, place and time.    Skin:  Warm and dry.          Labs/Imaging/Diagnostics    Labs:  CBC:  Recent Labs     12/05/24  1204 12/06/24  0857 12/07/24  0141   WBC 6.2 8.6 8.5   RBC 4.11* 4.13* 3.75*   HGB 10.1* 10.3* 9.3*   HCT 33.0* 33.4* 30.9*   MCV 80.3 80.9 82.4   RDW 17.2* 17.4* 17.4*    242 200     CHEMISTRIES:  Recent Labs     12/05/24  1204 12/05/24  1908 12/06/24  0020 12/06/24  0857 12/07/24  0141   *  --  131* 138 136   K 5.1  --  3.1* 4.1 3.9     --  91* 105 104   CO2 23  --  31 23 27   BUN 16  --  112* 22* 23*   CREATININE 0.66  --  2.64* 0.82 0.71   GLUCOSE 157*   < > 120* 165* 194*   MG 1.7  --  2.4  --   --     < > = values in this interval not displayed.     PT/INR:No results for input(s): \"PROTIME\", \"INR\" in the last 72 hours.  APTT:  Recent Labs     12/06/24  0857 12/06/24  1836 12/07/24  0141   APTT 146.4* 52.9* 163.0*     LIVER PROFILE:  Recent Labs     12/04/24  0953 12/05/24  1204 12/06/24  0020   AST 13 11 23   ALT 13 15 19   BILIDIR 0.2  --   --    BILITOT 0.9 0.5 0.4   ALKPHOS 55 57 90       Imaging:  CTA CHEST W WO CONTRAST PE Eval    Result Date: 12/5/2024  No CT evidence of pulmonary embolus. Cardiomegaly with reflux of contrast into the IVC and likely represents a degree of cardiac dysfunction/CHF. Bilateral opacities in the left greater than right lung suggestive of infectious process/pneumonia. Setting of debris in the trachea and bronchi could consider aspiration. Redemonstrated left upper lobe 3.5 cm pulmonary mass. Multiple nodules bilateral lungs may represent metastatic disease. Emphysema. Electronically signed by Zane OLIVA CHEST PORTABLE    Result Date: 12/5/2024  No significant interval change. Diffuse left lung

## 2024-12-07 NOTE — PROGRESS NOTES
4 Eyes Skin Assessment     NAME:  Rina Prajapati  YOB: 1960  MEDICAL RECORD NUMBER:  [id number]    The patient is being assessed for  Shift Handoff    I agree that at least one RN has performed a thorough Head to Toe Skin Assessment on the patient. ALL assessment sites listed below have been assessed.      Areas assessed by both nurses:    Head, Face, Ears, Shoulders, Back, Chest, Arms, Elbows, Hands, Sacrum. Buttock, Coccyx, Ischium, Legs. Feet and Heels, and Under Medical Devices         Does the Patient have a Wound? No noted wound(s)       Isidro Prevention initiated by RN: Yes  Wound Care Orders initiated by RN: No    Pressure Injury (Stage 3,4, Unstageable, DTI, NWPT, and Complex wounds) if present, place Wound referral order by RN under : No    New Ostomies, if present place, Ostomy referral order under : No     Nurse 1 eSignature: Electronically signed by KRISTIN CID RN on 12/7/24 at 8:28 AM EST    **SHARE this note so that the co-signing nurse can place an eSignature**    Nurse 2 eSignature: Electronically signed by Goldy Medina RN on 12/7/24 at 12:11 PM EST

## 2024-12-07 NOTE — PROGRESS NOTES
[facility]   Pharmacy Pharmacokinetic Monitoring Service - Vancomycin    Indication: Pneumonia (HAP)  Target Concentration: Dosing based on anticipated concentration <15 mg/L due to renal impairment/insufficiency  Day of Therapy: 3  Additional Antimicrobials: Piperacillin/Tazobactam    Pertinent Laboratory Values:   Temp: 97.6 °F (36.4 °C), Weight - Scale: 59.8 kg (131 lb 12.8 oz)  Recent Labs     12/06/24  0857 12/07/24  0141   CREATININE 0.82 0.71   BUN 22* 23*   WBC 8.6 8.5       Estimated Creatinine Clearance: 74 mL/min (based on SCr of 0.71 mg/dL).    Pertinent Cultures:  Culture Date Source Results   12/5 Blood  NGTD   MRSA Nasal Swab: Not ordered. Order placed by pharmacy    Assessment:  Date/Time Current Dose Concentration Timing of Concentration (h) AUC   12/5 1250 mg x1 - - -   12/6 1000 mg x1 11.1 13 -   12/7 12/7   750mg q12h 13.2 13   487     Note: Serum concentrations collected for AUC dosing may appear elevated if collected in close proximity to the dose administered, this is not necessarily an indication of toxicity    Plan:  Scr back at 0.71 this morning, consistent with theory that Scr level of 2.64 was erroneous.  Starting 750mg q12h regimen  Renal labs as indicated   Vancomycin level ordered for 12/9 at 0600  Pharmacy will continue to monitor patient and adjust therapy as indicated      Thank you for the consult,  Jeffrey Artis RPH  12/7/2024

## 2024-12-07 NOTE — PROGRESS NOTES
4 Eyes Skin Assessment     NAME:  Rina Prajapati  YOB: 1960  MEDICAL RECORD NUMBER:  [id number]    The patient is being assessed for  Shift Handoff    I agree that at least one RN has performed a thorough Head to Toe Skin Assessment on the patient. ALL assessment sites listed below have been assessed.      Areas assessed by both nurses:    Head, Face, Ears, Shoulders, Back, Chest, Arms, Elbows, Hands, Sacrum. Buttock, Coccyx, Ischium, and Legs. Feet and Heels        Does the Patient have a Wound? No noted wound(s)       Isidro Prevention initiated by RN: Yes  Wound Care Orders initiated by RN: No    Pressure Injury (Stage 3,4, Unstageable, DTI, NWPT, and Complex wounds) if present, place Wound referral order by RN under : No    New Ostomies, if present place, Ostomy referral order under : No     Nurse 1 eSignature: Electronically signed by Goldy Medina RN on 12/7/24 at 6:58 PM EST    **SHARE this note so that the co-signing nurse can place an eSignature**    Nurse 2 eSignature: {Esignature:[id number]}

## 2024-12-08 ENCOUNTER — APPOINTMENT (OUTPATIENT)
Facility: HOSPITAL | Age: 64
DRG: 177 | End: 2024-12-08
Payer: MEDICARE

## 2024-12-08 LAB
ANION GAP SERPL CALC-SCNC: 9 MMOL/L (ref 3–18)
BUN SERPL-MCNC: 23 MG/DL (ref 7–18)
BUN/CREAT SERPL: 34 (ref 12–20)
CALCIUM SERPL-MCNC: 8.7 MG/DL (ref 8.5–10.1)
CHLORIDE SERPL-SCNC: 107 MMOL/L (ref 100–111)
CO2 SERPL-SCNC: 25 MMOL/L (ref 21–32)
CREAT SERPL-MCNC: 0.67 MG/DL (ref 0.6–1.3)
GLUCOSE BLD STRIP.AUTO-MCNC: 117 MG/DL (ref 70–110)
GLUCOSE BLD STRIP.AUTO-MCNC: 220 MG/DL (ref 70–110)
GLUCOSE BLD STRIP.AUTO-MCNC: 239 MG/DL (ref 70–110)
GLUCOSE BLD STRIP.AUTO-MCNC: 256 MG/DL (ref 70–110)
GLUCOSE SERPL-MCNC: 108 MG/DL (ref 74–99)
MAGNESIUM SERPL-MCNC: 1.9 MG/DL (ref 1.6–2.6)
POTASSIUM SERPL-SCNC: 3.5 MMOL/L (ref 3.5–5.5)
PROCALCITONIN SERPL-MCNC: <0.05 NG/ML
SODIUM SERPL-SCNC: 141 MMOL/L (ref 136–145)

## 2024-12-08 PROCEDURE — 6360000002 HC RX W HCPCS

## 2024-12-08 PROCEDURE — 36415 COLL VENOUS BLD VENIPUNCTURE: CPT

## 2024-12-08 PROCEDURE — 6360000002 HC RX W HCPCS: Performed by: HOSPITALIST

## 2024-12-08 PROCEDURE — 83735 ASSAY OF MAGNESIUM: CPT

## 2024-12-08 PROCEDURE — 80048 BASIC METABOLIC PNL TOTAL CA: CPT

## 2024-12-08 PROCEDURE — 6360000002 HC RX W HCPCS: Performed by: STUDENT IN AN ORGANIZED HEALTH CARE EDUCATION/TRAINING PROGRAM

## 2024-12-08 PROCEDURE — 94640 AIRWAY INHALATION TREATMENT: CPT

## 2024-12-08 PROCEDURE — 6370000000 HC RX 637 (ALT 250 FOR IP): Performed by: HOSPITALIST

## 2024-12-08 PROCEDURE — 6370000000 HC RX 637 (ALT 250 FOR IP): Performed by: STUDENT IN AN ORGANIZED HEALTH CARE EDUCATION/TRAINING PROGRAM

## 2024-12-08 PROCEDURE — 6370000000 HC RX 637 (ALT 250 FOR IP): Performed by: INTERNAL MEDICINE

## 2024-12-08 PROCEDURE — 2580000003 HC RX 258: Performed by: STUDENT IN AN ORGANIZED HEALTH CARE EDUCATION/TRAINING PROGRAM

## 2024-12-08 PROCEDURE — 99232 SBSQ HOSP IP/OBS MODERATE 35: CPT | Performed by: INTERNAL MEDICINE

## 2024-12-08 PROCEDURE — 84145 PROCALCITONIN (PCT): CPT

## 2024-12-08 PROCEDURE — 71045 X-RAY EXAM CHEST 1 VIEW: CPT

## 2024-12-08 PROCEDURE — 2700000000 HC OXYGEN THERAPY PER DAY

## 2024-12-08 PROCEDURE — 1100000000 HC RM PRIVATE

## 2024-12-08 PROCEDURE — 82962 GLUCOSE BLOOD TEST: CPT

## 2024-12-08 PROCEDURE — 99232 SBSQ HOSP IP/OBS MODERATE 35: CPT | Performed by: STUDENT IN AN ORGANIZED HEALTH CARE EDUCATION/TRAINING PROGRAM

## 2024-12-08 RX ORDER — IPRATROPIUM BROMIDE AND ALBUTEROL SULFATE 2.5; .5 MG/3ML; MG/3ML
1 SOLUTION RESPIRATORY (INHALATION)
Status: DISCONTINUED | OUTPATIENT
Start: 2024-12-08 | End: 2024-12-14 | Stop reason: HOSPADM

## 2024-12-08 RX ADMIN — SODIUM CHLORIDE, PRESERVATIVE FREE 10 ML: 5 INJECTION INTRAVENOUS at 21:10

## 2024-12-08 RX ADMIN — INSULIN LISPRO 4 UNITS: 100 INJECTION, SOLUTION INTRAVENOUS; SUBCUTANEOUS at 22:22

## 2024-12-08 RX ADMIN — FUROSEMIDE 20 MG: 20 TABLET ORAL at 09:46

## 2024-12-08 RX ADMIN — IPRATROPIUM BROMIDE AND ALBUTEROL SULFATE 1 DOSE: .5; 3 SOLUTION RESPIRATORY (INHALATION) at 13:37

## 2024-12-08 RX ADMIN — IPRATROPIUM BROMIDE AND ALBUTEROL SULFATE 1 DOSE: .5; 3 SOLUTION RESPIRATORY (INHALATION) at 20:28

## 2024-12-08 RX ADMIN — LEVOTHYROXINE SODIUM 88 MCG: 88 TABLET ORAL at 07:47

## 2024-12-08 RX ADMIN — IPRATROPIUM BROMIDE AND ALBUTEROL SULFATE 1 DOSE: .5; 3 SOLUTION RESPIRATORY (INHALATION) at 01:33

## 2024-12-08 RX ADMIN — PIPERACILLIN AND TAZOBACTAM 3375 MG: 3; .375 INJECTION, POWDER, LYOPHILIZED, FOR SOLUTION INTRAVENOUS at 09:45

## 2024-12-08 RX ADMIN — PIPERACILLIN AND TAZOBACTAM 3375 MG: 3; .375 INJECTION, POWDER, LYOPHILIZED, FOR SOLUTION INTRAVENOUS at 17:35

## 2024-12-08 RX ADMIN — APIXABAN 5 MG: 5 TABLET, FILM COATED ORAL at 21:12

## 2024-12-08 RX ADMIN — MONTELUKAST 10 MG: 10 TABLET, FILM COATED ORAL at 09:47

## 2024-12-08 RX ADMIN — IPRATROPIUM BROMIDE AND ALBUTEROL SULFATE 1 DOSE: .5; 3 SOLUTION RESPIRATORY (INHALATION) at 23:21

## 2024-12-08 RX ADMIN — INSULIN LISPRO 2 UNITS: 100 INJECTION, SOLUTION INTRAVENOUS; SUBCUTANEOUS at 17:30

## 2024-12-08 RX ADMIN — PIPERACILLIN AND TAZOBACTAM 3375 MG: 3; .375 INJECTION, POWDER, LYOPHILIZED, FOR SOLUTION INTRAVENOUS at 02:31

## 2024-12-08 RX ADMIN — GUAIFENESIN 1200 MG: 600 TABLET, EXTENDED RELEASE ORAL at 09:46

## 2024-12-08 RX ADMIN — BUDESONIDE 1000 MCG: 0.5 INHALANT RESPIRATORY (INHALATION) at 08:04

## 2024-12-08 RX ADMIN — BUDESONIDE 1000 MCG: 0.5 INHALANT RESPIRATORY (INHALATION) at 20:28

## 2024-12-08 RX ADMIN — SPIRONOLACTONE 25 MG: 25 TABLET ORAL at 09:46

## 2024-12-08 RX ADMIN — VANCOMYCIN HYDROCHLORIDE 750 MG: 750 INJECTION, POWDER, LYOPHILIZED, FOR SOLUTION INTRAVENOUS at 09:39

## 2024-12-08 RX ADMIN — AZITHROMYCIN MONOHYDRATE 500 MG: 500 INJECTION, POWDER, LYOPHILIZED, FOR SOLUTION INTRAVENOUS at 21:11

## 2024-12-08 RX ADMIN — APIXABAN 5 MG: 5 TABLET, FILM COATED ORAL at 09:46

## 2024-12-08 RX ADMIN — WATER 40 MG: 1 INJECTION INTRAMUSCULAR; INTRAVENOUS; SUBCUTANEOUS at 21:12

## 2024-12-08 RX ADMIN — SODIUM CHLORIDE, PRESERVATIVE FREE 10 ML: 5 INJECTION INTRAVENOUS at 09:47

## 2024-12-08 RX ADMIN — ARFORMOTEROL TARTRATE 15 MCG: 15 SOLUTION RESPIRATORY (INHALATION) at 08:04

## 2024-12-08 RX ADMIN — ARFORMOTEROL TARTRATE 15 MCG: 15 SOLUTION RESPIRATORY (INHALATION) at 20:28

## 2024-12-08 RX ADMIN — PREDNISONE 40 MG: 20 TABLET ORAL at 09:46

## 2024-12-08 RX ADMIN — SERTRALINE HYDROCHLORIDE 50 MG: 50 TABLET ORAL at 09:46

## 2024-12-08 RX ADMIN — FOLIC ACID 1 MG: 1 TABLET ORAL at 09:47

## 2024-12-08 RX ADMIN — GUAIFENESIN 1200 MG: 600 TABLET, EXTENDED RELEASE ORAL at 21:12

## 2024-12-08 RX ADMIN — INSULIN LISPRO 2 UNITS: 100 INJECTION, SOLUTION INTRAVENOUS; SUBCUTANEOUS at 12:25

## 2024-12-08 RX ADMIN — IPRATROPIUM BROMIDE AND ALBUTEROL SULFATE 1 DOSE: .5; 3 SOLUTION RESPIRATORY (INHALATION) at 08:04

## 2024-12-08 RX ADMIN — FERROUS SULFATE TAB 325 MG (65 MG ELEMENTAL FE) 325 MG: 325 (65 FE) TAB at 09:46

## 2024-12-08 ASSESSMENT — PAIN SCALES - GENERAL
PAINLEVEL_OUTOF10: 0

## 2024-12-08 NOTE — PROGRESS NOTES
Cardiology Progress Note    Admit Date: 12/5/2024  Attending Cardiologist: Dr. Dwyer    IMPRESSION  Acute on chronic hypoxic respiratory failure, in part possible pneumonia, congestive heart failure decompensation, appears more euvolemic now.    Acute on chronic heart failure medium reduced ejection fraction  Small to moderate pericardial effusion by 2D echo November 14, 2024 without tamponade  History of pulmonary embolism  SVC syndrome  Tobacco abuse     PLAN  Monitor fluid status, adjust as necessary, fluid restriction, salt intake <2g per day.  Lasix 20 mg every 48 hours, lasix 20mg IV x 1  Recommend Guideline Directed medical therapy as can tolerate.  Continue Aldactone 25 mg p.o. daily  Continue with eliquis 5mg po BID  Ischemic evaluation by primary cardiologist after acute issues resolved for cardiomyopathy  Signing off this patient at this time, please call for questions     Thank you for this consultation and for allowing us to participate in this patient's care.     Primary cardiologist Dr. DOLORES Potts    Subjective:     Denies chest pains, denies shortness of break while on [place] oxygen, denies abdominal pains    Objective:      Patient Vitals for the past 8 hrs:   Temp Pulse Resp BP SpO2   12/08/24 0812 97.7 °F (36.5 °C) 100 18 125/75 100 %   12/08/24 0805 -- -- -- -- 94 %   12/08/24 0500 -- 98 -- -- --   12/08/24 0357 97.9 °F (36.6 °C) (!) 102 15 127/80 93 %         Patient Vitals for the past 96 hrs:   Weight   12/05/24 1154 59.8 kg (131 lb 12.8 oz)         No intake or output data in the 24 hours ending 12/08/24 1139    EXAMINATION:  General:         Alert, cooperative, no distress  Head:  Normocephalic, without obvious abnormality, atraumatic.  Eyes:   Conjunctivae/corneas clear  Lungs:             no wheezes, rales present left upper and left mid segments  Heart:  Regular rate and rhythm, S1, S2 normal, no murmur, click, rub or gallop.  Abdomen:      Soft, non-tender. Bowel sounds normal.

## 2024-12-08 NOTE — PLAN OF CARE
Problem: Chronic Conditions and Co-morbidities  Goal: Patient's chronic conditions and co-morbidity symptoms are monitored and maintained or improved  12/8/2024 1125 by Jennifer Alves, RN  Outcome: Progressing  Flowsheets (Taken 12/8/2024 0730)  Care Plan - Patient's Chronic Conditions and Co-Morbidity Symptoms are Monitored and Maintained or Improved:   Monitor and assess patient's chronic conditions and comorbid symptoms for stability, deterioration, or improvement   Collaborate with multidisciplinary team to address chronic and comorbid conditions and prevent exacerbation or deterioration   Update acute care plan with appropriate goals if chronic or comorbid symptoms are exacerbated and prevent overall improvement and discharge     Problem: Pain  Goal: Verbalizes/displays adequate comfort level or baseline comfort level  12/8/2024 1125 by Jennifer Alves, RN  Outcome: Progressing  Flowsheets (Taken 12/8/2024 0248 by Tania Hensley RN)  Verbalizes/displays adequate comfort level or baseline comfort level:   Encourage patient to monitor pain and request assistance   Assess pain using appropriate pain scale   Administer analgesics based on type and severity of pain and evaluate response   Implement non-pharmacological measures as appropriate and evaluate response   Notify Licensed Independent Practitioner if interventions unsuccessful or patient reports new pain     Problem: Safety - Adult  Goal: Free from fall injury  12/8/2024 1125 by Jennifer Alves, RN  Outcome: Progressing  Flowsheets (Taken 12/8/2024 0730)  Free From Fall Injury: Instruct family/caregiver on patient safety     Problem: Skin/Tissue Integrity  Goal: Absence of new skin breakdown  Description: 1.  Monitor for areas of redness and/or skin breakdown  2.  Assess vascular access sites hourly  3.  Every 4-6 hours minimum:  Change oxygen saturation probe site  4.  Every 4-6 hours:  If on nasal continuous positive airway pressure, respiratory therapy  Safest Level of Function:   Assess patient stability and activity tolerance for standing, transferring and ambulating with or without assistive devices   Assist with transfers and ambulation using safe patient handling equipment as needed   Ensure adequate protection for wounds/incisions during mobilization

## 2024-12-08 NOTE — CONSULTS
[facility]  Pulmonary, Critical Care, and Sleep Medicine    Name: Rina Prajapati MRN: [id number]   : 1960 Hospital: [facility]   Date: 2024        Daily pulmonary progress note    Admission Date:   2024  LOS: 3  MAR reviewed and pertinent medications noted or modified as needed    IMPRESSION:   Acute on chronic hypoxic respiratory failure with increased oxygen requirements on home oxygen concentrator, desaturations to 70%, required HFNC on admission, now on 5L with 94%  Etiology likely multifactorial, with COPD exacerbation, NSCLC, diastolic/systolic HF decompensation, worsening metastatic disease,  possible pneumonia vs non infectious pneumonitis. RVP negative. CT-A negative for PE, evidence of pulmonary hypertension, RVP neg, strp PNA/Legionella neg, low likelihood of bacterial infection with procal 0.11  L>R BL opacities on CT imaging  Bilateral pneumonia, viral vs bacterial vs atypical infectious process  Possible aspiration, with debris in trachea and bronchi  NSCLC, actively on chemo. Followed by Dr. Li, with [facility]. Noted last admission with disease progression while on Taxotere, starting on Gemzar. Hx of SVC syndrome  VINCE pulmonary mass, 3.5cm, unchanged from  to , and multiple BL nodules  SIRS vs Sepsis with pulmonary source  ?FLORIAN, with Cr 2.64 above baseline 0.66. Now resolved in AM labs. Likely pre-renal vs ATN vs lab error  Severe COPD FEV1 in 2024 31% predicted with air trapping and reduced DLCO.  Possible exacerbation, CT with enphesemous changes. On LTOT. Has not been receiving prescribed bronchodilators due to high cost.  Combined systolic and Diastolic HF, current echo with mild systolic dysfunction EF 45% on   Possible exacerbation with pedal edema, hypoxia, elevated BNP 8746mg/mL above 3.622 ion   Pulmonary hypertension WHO Grp 2/3 with dilated RV  RVSP 45mmHg on , CT on  showing cardiomegaly with reflux  1 cm left supraclavicular lymph node.    Axilla/Chest wall: Diffuse wall thickening of the bilateral breasts with  associated stranding.    Upper Abdomen: Nodular thickening of the left adrenal    Musculoskeletal: No acute osseous findings. Degenerative disc disease of the  thoracic spine.  Impression: No CT evidence of pulmonary embolus.    Cardiomegaly with reflux of contrast into the IVC and likely represents a degree  of cardiac dysfunction/CHF.    Bilateral opacities in the left greater than right lung suggestive of infectious  process/pneumonia.  Setting of debris in the trachea and bronchi could consider aspiration.     Redemonstrated left upper lobe 3.5 cm pulmonary mass. Multiple nodules bilateral  lungs may represent metastatic disease.    Emphysema.    Electronically signed by Zane Albert  XR CHEST PORTABLE  Narrative: INDICATION: Shortness of Breath    TECHNIQUE: Portable chest radiograph    COMPARISON: 17 November 2024, 13 November 2024    FINDINGS:  Cardiac silhouette remains enlarged. Asymmetric diffuse left lung interstitial  infiltrate. Bilateral lung nodules better appreciated on prior chest CT. Mild  bilateral pleural effusions. No pneumothorax. Left upper extremity catheter tip  overlying the mid subclavian vein.  Impression: No significant interval change. Diffuse left lung interstitial infiltrates.  Enlarged cardiac silhouette. Mild bilateral pleural effusions.    Electronically signed by Aram Godinez                 Care Time:  The services I provided to this patient were to treat and/or prevent clinically significant deterioration that could result in the failure of one or more body systems and/or organ systems due to respiratory distress, hypoxia, cardiac dysrhythmia.     Services included the following:  -reviewing nursing notes and old charts  -vital sign assessments   -direct patient care  -medication orders and management  -interpreting and reviewing diagnostic

## 2024-12-08 NOTE — PROGRESS NOTES
[facility] Hospitalist [facility]  Progress Note  Date:2024       Room:[facility]  Patient Name:Rina Prajapati     YOB: 1960     Age:64 y.o.        Subjective    Subjective:  Symptoms:  Improved.  She reports shortness of breath.    Diet:  Adequate intake.    Activity level: Impaired due to weakness.       Review of Systems   Respiratory:  Positive for shortness of breath.      Patient is wheezing.    Disposition: Patient is being treated for COPD exacerbation, CHF, pneumonia.  Patient is still above her baseline oxygen requirement.  Likely stable for discharge on 12/10 vs .    Objective         Vitals Last 24 Hours:  TEMPERATURE:  Temp  Av.8 °F (36.6 °C)  Min: 97.7 °F (36.5 °C)  Max: 97.9 °F (36.6 °C)  RESPIRATIONS RANGE: Resp  Av.9  Min: 14  Max: 20  PULSE OXIMETRY RANGE: SpO2  Av.6 %  Min: 92 %  Max: 100 %  PULSE RANGE: Pulse  Av.9  Min: 77  Max: 104  BLOOD PRESSURE RANGE: Systolic (24hrs), Av , Min:124 , Max:135     ; Diastolic (24hrs), Av, Min:75, Max:102      I/O (24Hr):  No intake or output data in the 24 hours ending 24 0950    Objective:  General Appearance:  Comfortable.    Vital signs: (most recent): Blood pressure 128/80, pulse (!) 107, temperature 97.7 °F (36.5 °C), temperature source Oral, resp. rate 18, height 1.702 m (5' 7.01\"), weight 59.8 kg (131 lb 12.8 oz), SpO2 99%, not currently breastfeeding.    Output: Producing urine.    HEENT: Normal HEENT exam.    Lungs:  Normal effort and normal respiratory rate.  Breath sounds clear to auscultation.  There are decreased breath sounds and wheezes.    Heart: Tachycardia.  Regular rhythm.    Abdomen: Abdomen is soft and flat.  Bowel sounds are normal.   There is no abdominal tenderness.     Extremities: Normal range of motion.    Pulses: Distal pulses are intact.    Neurological: Patient is alert and oriented to person, place and time.    Skin:  Warm and dry.   50%  #Small to moderate pericardial effusion  #Non-small cell lung cancer on Taxotere  #History of PE/DVT.  On Eliquis  #SVC syndrome  #Tobacco dependence).     Plan:   Wean off oxygen, continue respiratory treatments and check weaning parameters.  Consults: cardiology and pulmonology.  Regular diet.  Add diuretic and administer medications as ordered.   (    - Continue IV Lasix for another 24 to 48 hours  - Titrate oxygen to keep sPO2 88-93%  - Monitor renal function  - Maintain K above 4 magnesium above 2  - Continue Zosyn  -Due to increased oxygen requirement and wheezing, switch patient back to Solu-Medrol 40 mg every 12 hours, add azithromycin, increase Duonebs to every 4 hours  - Continue bronchodilators  - Pulm will consider a different inhaler that patient can afford upon discharge  - Palliative care consult.  Patient wishes to continue to be full code.  Patient will continue to get chemotherapy  - Will order nicotine patch upon discharge).       Case discussed with:  [x]Patient  [x]Family  [x]Nursing  []Case Management  DVT Prophylaxis:  []Lovenox  []Hep SQ  []SCDs  []Coumadin   []Heparin gtt   []Xarelto   [x]Eliquis    Stacey Perdomo DO, [place], [place]  [facility]  Hospitalist

## 2024-12-08 NOTE — PROGRESS NOTES
4 Eyes Skin Assessment     NAME:  Rina Prjaapati  YOB: 1960  MEDICAL RECORD NUMBER:  [id number]    The patient is being assessed for  {Reason for Assessment:12978}    I agree that at least one RN has performed a thorough Head to Toe Skin Assessment on the patient. ALL assessment sites listed below have been assessed.      Areas assessed by both nurses:    Head, Face, Ears, Shoulders, Back, Chest, Arms, Elbows, Hands, Sacrum. Buttock, Coccyx, Ischium, Legs. Feet and Heels, and Under Medical Devices         Does the Patient have a Wound? No noted wound(s)       Isidro Prevention initiated by RN: Yes  Wound Care Orders initiated by RN: No    Pressure Injury (Stage 3,4, Unstageable, DTI, NWPT, and Complex wounds) if present, place Wound referral order by RN under : No    New Ostomies, if present place, Ostomy referral order under : No     Nurse 1 eSignature: Electronically signed by Jennifer Alves RN on 12/8/24 at 3:02 PM EST    **SHARE this note so that the co-signing nurse can place an eSignature**    Nurse 2 eSignature: {Esignature:[id number]}

## 2024-12-08 NOTE — PLAN OF CARE
Problem: Chronic Conditions and Co-morbidities  Goal: Patient's chronic conditions and co-morbidity symptoms are monitored and maintained or improved  Outcome: Progressing  Flowsheets (Taken 12/8/2024 0248)  Care Plan - Patient's Chronic Conditions and Co-Morbidity Symptoms are Monitored and Maintained or Improved:   Monitor and assess patient's chronic conditions and comorbid symptoms for stability, deterioration, or improvement   Update acute care plan with appropriate goals if chronic or comorbid symptoms are exacerbated and prevent overall improvement and discharge     Problem: Pain  Goal: Verbalizes/displays adequate comfort level or baseline comfort level  Outcome: Progressing  Flowsheets (Taken 12/8/2024 0248)  Verbalizes/displays adequate comfort level or baseline comfort level:   Encourage patient to monitor pain and request assistance   Assess pain using appropriate pain scale   Administer analgesics based on type and severity of pain and evaluate response   Implement non-pharmacological measures as appropriate and evaluate response   Notify Licensed Independent Practitioner if interventions unsuccessful or patient reports new pain     Problem: Safety - Adult  Goal: Free from fall injury  Outcome: Progressing  Flowsheets (Taken 12/8/2024 0248)  Free From Fall Injury: Based on caregiver fall risk screen, instruct family/caregiver to ask for assistance with transferring infant if caregiver noted to have fall risk factors     Problem: Skin/Tissue Integrity  Goal: Absence of new skin breakdown  Description: 1.  Monitor for areas of redness and/or skin breakdown  2.  Assess vascular access sites hourly  3.  Every 4-6 hours minimum:  Change oxygen saturation probe site  4.  Every 4-6 hours:  If on nasal continuous positive airway pressure, respiratory therapy assess nares and determine need for appliance change or resting period.  Outcome: Progressing  Note: Patient will remain free from new skin breakdown

## 2024-12-09 PROBLEM — J18.9 PNEUMONIA OF BOTH LUNGS DUE TO INFECTIOUS ORGANISM: Status: ACTIVE | Noted: 2024-12-09

## 2024-12-09 LAB
ANION GAP SERPL CALC-SCNC: 6 MMOL/L (ref 3–18)
BUN SERPL-MCNC: 20 MG/DL (ref 7–18)
BUN/CREAT SERPL: 31 (ref 12–20)
CALCIUM SERPL-MCNC: 9 MG/DL (ref 8.5–10.1)
CHLORIDE SERPL-SCNC: 107 MMOL/L (ref 100–111)
CO2 SERPL-SCNC: 26 MMOL/L (ref 21–32)
CREAT SERPL-MCNC: 0.64 MG/DL (ref 0.6–1.3)
ERYTHROCYTE [DISTWIDTH] IN BLOOD BY AUTOMATED COUNT: 17.4 % (ref 11.6–14.5)
GLUCOSE BLD STRIP.AUTO-MCNC: 133 MG/DL (ref 70–110)
GLUCOSE BLD STRIP.AUTO-MCNC: 207 MG/DL (ref 70–110)
GLUCOSE BLD STRIP.AUTO-MCNC: 232 MG/DL (ref 70–110)
GLUCOSE BLD STRIP.AUTO-MCNC: 261 MG/DL (ref 70–110)
GLUCOSE SERPL-MCNC: 146 MG/DL (ref 74–99)
HCT VFR BLD AUTO: 34.1 % (ref 35–45)
HGB BLD-MCNC: 10.1 G/DL (ref 12–16)
MCH RBC QN AUTO: 24.3 PG (ref 24–34)
MCHC RBC AUTO-ENTMCNC: 29.6 G/DL (ref 31–37)
MCV RBC AUTO: 82 FL (ref 78–100)
NRBC # BLD: 0 K/UL (ref 0–0.01)
NRBC BLD-RTO: 0 PER 100 WBC
PLATELET # BLD AUTO: 252 K/UL (ref 135–420)
PMV BLD AUTO: 10.7 FL (ref 9.2–11.8)
POTASSIUM SERPL-SCNC: 3.9 MMOL/L (ref 3.5–5.5)
RBC # BLD AUTO: 4.16 M/UL (ref 4.2–5.3)
SODIUM SERPL-SCNC: 139 MMOL/L (ref 136–145)
WBC # BLD AUTO: 8.3 K/UL (ref 4.6–13.2)

## 2024-12-09 PROCEDURE — 2580000003 HC RX 258: Performed by: STUDENT IN AN ORGANIZED HEALTH CARE EDUCATION/TRAINING PROGRAM

## 2024-12-09 PROCEDURE — 6360000002 HC RX W HCPCS: Performed by: STUDENT IN AN ORGANIZED HEALTH CARE EDUCATION/TRAINING PROGRAM

## 2024-12-09 PROCEDURE — 85027 COMPLETE CBC AUTOMATED: CPT

## 2024-12-09 PROCEDURE — 80048 BASIC METABOLIC PNL TOTAL CA: CPT

## 2024-12-09 PROCEDURE — 36415 COLL VENOUS BLD VENIPUNCTURE: CPT

## 2024-12-09 PROCEDURE — 1100000000 HC RM PRIVATE

## 2024-12-09 PROCEDURE — 6370000000 HC RX 637 (ALT 250 FOR IP): Performed by: STUDENT IN AN ORGANIZED HEALTH CARE EDUCATION/TRAINING PROGRAM

## 2024-12-09 PROCEDURE — 6360000002 HC RX W HCPCS

## 2024-12-09 PROCEDURE — 82962 GLUCOSE BLOOD TEST: CPT

## 2024-12-09 PROCEDURE — 94761 N-INVAS EAR/PLS OXIMETRY MLT: CPT

## 2024-12-09 PROCEDURE — 2700000000 HC OXYGEN THERAPY PER DAY

## 2024-12-09 PROCEDURE — 99232 SBSQ HOSP IP/OBS MODERATE 35: CPT | Performed by: INTERNAL MEDICINE

## 2024-12-09 PROCEDURE — 6360000002 HC RX W HCPCS: Performed by: HOSPITALIST

## 2024-12-09 PROCEDURE — 94640 AIRWAY INHALATION TREATMENT: CPT

## 2024-12-09 RX ORDER — LIDOCAINE 4 G/G
1 PATCH TOPICAL DAILY
Status: CANCELLED | OUTPATIENT
Start: 2024-12-09

## 2024-12-09 RX ORDER — PREDNISONE 20 MG/1
40 TABLET ORAL DAILY
Status: DISCONTINUED | OUTPATIENT
Start: 2024-12-09 | End: 2024-12-09

## 2024-12-09 RX ORDER — PREDNISONE 20 MG/1
40 TABLET ORAL DAILY
Status: DISCONTINUED | OUTPATIENT
Start: 2024-12-10 | End: 2024-12-14 | Stop reason: HOSPADM

## 2024-12-09 RX ADMIN — INSULIN LISPRO 4 UNITS: 100 INJECTION, SOLUTION INTRAVENOUS; SUBCUTANEOUS at 18:38

## 2024-12-09 RX ADMIN — LEVOTHYROXINE SODIUM 88 MCG: 88 TABLET ORAL at 07:29

## 2024-12-09 RX ADMIN — PIPERACILLIN AND TAZOBACTAM 3375 MG: 3; .375 INJECTION, POWDER, LYOPHILIZED, FOR SOLUTION INTRAVENOUS at 09:58

## 2024-12-09 RX ADMIN — ARFORMOTEROL TARTRATE 15 MCG: 15 SOLUTION RESPIRATORY (INHALATION) at 08:36

## 2024-12-09 RX ADMIN — SERTRALINE HYDROCHLORIDE 50 MG: 50 TABLET ORAL at 09:07

## 2024-12-09 RX ADMIN — IPRATROPIUM BROMIDE AND ALBUTEROL SULFATE 1 DOSE: .5; 3 SOLUTION RESPIRATORY (INHALATION) at 15:27

## 2024-12-09 RX ADMIN — INSULIN LISPRO 2 UNITS: 100 INJECTION, SOLUTION INTRAVENOUS; SUBCUTANEOUS at 22:13

## 2024-12-09 RX ADMIN — IPRATROPIUM BROMIDE AND ALBUTEROL SULFATE 1 DOSE: .5; 3 SOLUTION RESPIRATORY (INHALATION) at 02:58

## 2024-12-09 RX ADMIN — BUDESONIDE 1000 MCG: 0.5 INHALANT RESPIRATORY (INHALATION) at 19:30

## 2024-12-09 RX ADMIN — MONTELUKAST 10 MG: 10 TABLET, FILM COATED ORAL at 09:07

## 2024-12-09 RX ADMIN — FERROUS SULFATE TAB 325 MG (65 MG ELEMENTAL FE) 325 MG: 325 (65 FE) TAB at 09:07

## 2024-12-09 RX ADMIN — IPRATROPIUM BROMIDE AND ALBUTEROL SULFATE 1 DOSE: .5; 3 SOLUTION RESPIRATORY (INHALATION) at 08:36

## 2024-12-09 RX ADMIN — IPRATROPIUM BROMIDE AND ALBUTEROL SULFATE 1 DOSE: .5; 3 SOLUTION RESPIRATORY (INHALATION) at 11:51

## 2024-12-09 RX ADMIN — GUAIFENESIN 1200 MG: 600 TABLET, EXTENDED RELEASE ORAL at 22:14

## 2024-12-09 RX ADMIN — ARFORMOTEROL TARTRATE 15 MCG: 15 SOLUTION RESPIRATORY (INHALATION) at 19:31

## 2024-12-09 RX ADMIN — BUDESONIDE 1000 MCG: 0.5 INHALANT RESPIRATORY (INHALATION) at 08:36

## 2024-12-09 RX ADMIN — SODIUM CHLORIDE, PRESERVATIVE FREE 10 ML: 5 INJECTION INTRAVENOUS at 22:14

## 2024-12-09 RX ADMIN — APIXABAN 5 MG: 5 TABLET, FILM COATED ORAL at 09:07

## 2024-12-09 RX ADMIN — IPRATROPIUM BROMIDE AND ALBUTEROL SULFATE 1 DOSE: .5; 3 SOLUTION RESPIRATORY (INHALATION) at 19:30

## 2024-12-09 RX ADMIN — APIXABAN 5 MG: 5 TABLET, FILM COATED ORAL at 22:14

## 2024-12-09 RX ADMIN — FOLIC ACID 1 MG: 1 TABLET ORAL at 09:07

## 2024-12-09 RX ADMIN — INSULIN LISPRO 2 UNITS: 100 INJECTION, SOLUTION INTRAVENOUS; SUBCUTANEOUS at 12:29

## 2024-12-09 RX ADMIN — PIPERACILLIN AND TAZOBACTAM 3375 MG: 3; .375 INJECTION, POWDER, LYOPHILIZED, FOR SOLUTION INTRAVENOUS at 18:40

## 2024-12-09 RX ADMIN — PIPERACILLIN AND TAZOBACTAM 3375 MG: 3; .375 INJECTION, POWDER, LYOPHILIZED, FOR SOLUTION INTRAVENOUS at 02:30

## 2024-12-09 RX ADMIN — SODIUM CHLORIDE, PRESERVATIVE FREE 10 ML: 5 INJECTION INTRAVENOUS at 09:07

## 2024-12-09 RX ADMIN — SPIRONOLACTONE 25 MG: 25 TABLET ORAL at 09:07

## 2024-12-09 RX ADMIN — WATER 40 MG: 1 INJECTION INTRAMUSCULAR; INTRAVENOUS; SUBCUTANEOUS at 09:06

## 2024-12-09 RX ADMIN — GUAIFENESIN 1200 MG: 600 TABLET, EXTENDED RELEASE ORAL at 09:07

## 2024-12-09 ASSESSMENT — PAIN SCALES - GENERAL
PAINLEVEL_OUTOF10: 0

## 2024-12-09 NOTE — PLAN OF CARE
Problem: Chronic Conditions and Co-morbidities  Goal: Patient's chronic conditions and co-morbidity symptoms are monitored and maintained or improved  Outcome: Progressing  Flowsheets (Taken 12/8/2024 7018 by Jennifer Alves RN)  Care Plan - Patient's Chronic Conditions and Co-Morbidity Symptoms are Monitored and Maintained or Improved:   Monitor and assess patient's chronic conditions and comorbid symptoms for stability, deterioration, or improvement   Collaborate with multidisciplinary team to address chronic and comorbid conditions and prevent exacerbation or deterioration   Update acute care plan with appropriate goals if chronic or comorbid symptoms are exacerbated and prevent overall improvement and discharge     Problem: Pain  Goal: Verbalizes/displays adequate comfort level or baseline comfort level  Outcome: Progressing  Flowsheets (Taken 12/8/2024 0248)  Verbalizes/displays adequate comfort level or baseline comfort level:   Encourage patient to monitor pain and request assistance   Assess pain using appropriate pain scale   Administer analgesics based on type and severity of pain and evaluate response   Implement non-pharmacological measures as appropriate and evaluate response   Notify Licensed Independent Practitioner if interventions unsuccessful or patient reports new pain     Problem: Safety - Adult  Goal: Free from fall injury  Outcome: Progressing  Flowsheets (Taken 12/9/2024 0536)  Free From Fall Injury: Based on caregiver fall risk screen, instruct family/caregiver to ask for assistance with transferring infant if caregiver noted to have fall risk factors     Problem: Skin/Tissue Integrity  Goal: Absence of new skin breakdown  Description: 1.  Monitor for areas of redness and/or skin breakdown  2.  Assess vascular access sites hourly  3.  Every 4-6 hours minimum:  Change oxygen saturation probe site  4.  Every 4-6 hours:  If on nasal continuous positive airway pressure, respiratory therapy

## 2024-12-09 NOTE — PROGRESS NOTES
[facility]  Pulmonary, Critical Care, and Sleep Medicine    Name: Rina Prajapati MRN: [id number]   : 1960 Hospital: [facility]   Date: 2024        IMPRESSION:   Acute on chronic hypoxic respiratory failure with increased oxygen requirements on home oxygen concentrator, desaturations to 70%, required HFNC on admission, now on 5L with 94%  Etiology likely multifactorial, with COPD exacerbation, NSCLC, diastolic/systolic HF decompensation, worsening metastatic disease,  possible pneumonia vs non infectious pneumonitis. RVP negative. CT-A negative for PE, evidence of pulmonary hypertension, RVP neg, strp PNA/Legionella neg, low likelihood of bacterial infection with procal 0.11  L>R BL opacities on CT imaging  Bilateral pneumonia, viral vs bacterial vs atypical infectious process  Possible aspiration, with debris in trachea and bronchi  NSCLC, actively on chemo. Followed by Dr. Li, with [facility]. Noted last admission with disease progression while on Taxotere, starting on Gemzar. Hx of SVC syndrome  VINCE pulmonary mass, 3.5cm, unchanged from  to , and multiple BL nodules  SIRS vs Sepsis with pulmonary source  ?FLORIAN, with Cr 2.64 above baseline 0.66. Now resolved in AM labs. Likely pre-renal vs ATN vs lab error  Severe COPD FEV1 in 2024 31% predicted with air trapping and reduced DLCO.  Possible exacerbation, CT with enphesemous changes. On LTOT. Has not been receiving prescribed bronchodilators due to high cost.  Combined systolic and Diastolic HF, current echo with mild systolic dysfunction EF 45% on   Possible exacerbation with pedal edema, hypoxia, elevated BNP 8746mg/mL above 3.622 ion   Pulmonary hypertension WHO Grp 2/3 with dilated RV  RVSP 45mmHg on , CT on  showing cardiomegaly with reflux of contrast into IVC  Electrolyte derangements:   Hypokalemia now resolved  Hyponatremia, now resolved  Normocytic normochromic anemia   prednisone today - may taper on discharge.  Images: CXR/CT data reviewed.  Last echo on   Infectious screens: Procal, resp PCR, Legionella/strep pneumo ag, all tested negative  Collect and follow up on Respiratory Cultures  Follow temp/WBC  Anti-infectives: per primary service; on Zosyn; off Vanco after negative MRSA nares. On Zithromax 250 mg daily;  reasonable to continue given inti-inflammatory properties in the setting of COPD.  Streamline antibiotics per culture data and clinical course.  Gentle diuresis/Fluid homeostasis per primary services/cardiology, maintain net negative fluid balance. Monitor I&Os  Electrolyte management, Stress Ulcer-prophylaxis and glycemic control per primary and respective consultants  Anti-Coagulation/DVT prophylaxis per  primary service and respective consultants  SLP, nutrition  OOB/PT/OT as tolerated  Exposure avoidance/Counseling offered. Patient comfortable with nicotine patch, commended on cutting back on tobacco use.  Oncology  to follow for chemotherapy management     Pulmonary medicine will continue to follow     Subjective/Interval History:   2024  Patient without acute overnight events. States her breathing overall is improved but not yet at baseline.  Cough and bronchospasm improved.  No fevers, chills, chest pain or abdominal distress at this time    ROS:Pertinent items are noted in HPI.    Objective:   Vital Signs:    BP (!) 158/95   Pulse (!) 109   Temp 97.5 °F (36.4 °C) (Axillary)   Resp 18   Ht 1.702 m (5' 7.01\")   Wt 59.8 kg (131 lb 12.8 oz)   SpO2 90%   BMI 20.64 kg/m²             Temp (24hrs), Av.7 °F (36.5 °C), Min:97.3 °F (36.3 °C), Max:98.1 °F (36.7 °C)       Intake/Output:   Last shift:      701 - 1900  In: 810 [P.O.:800; I.V.:10]  Out: 300 [Urine:300]  Last 3 shifts: 1901 -  0700  In: 600 [P.O.:600]  Out: 0     Intake/Output Summary (Last 24 hours) at 2024 5857  Last data filed at 2024 1358  Gross per 24

## 2024-12-10 LAB
ALBUMIN SERPL-MCNC: 2.3 G/DL (ref 3.4–5)
ALBUMIN/GLOB SERPL: 0.7 (ref 0.8–1.7)
ALP SERPL-CCNC: 42 U/L (ref 45–117)
ALT SERPL-CCNC: 24 U/L (ref 13–56)
ANION GAP SERPL CALC-SCNC: 5 MMOL/L (ref 3–18)
AST SERPL-CCNC: 9 U/L (ref 10–38)
BASOPHILS # BLD: 0 K/UL (ref 0–0.1)
BASOPHILS NFR BLD: 0 % (ref 0–2)
BILIRUB SERPL-MCNC: 0.4 MG/DL (ref 0.2–1)
BUN SERPL-MCNC: 21 MG/DL (ref 7–18)
BUN/CREAT SERPL: 39 (ref 12–20)
CALCIUM SERPL-MCNC: 8.8 MG/DL (ref 8.5–10.1)
CHLORIDE SERPL-SCNC: 107 MMOL/L (ref 100–111)
CO2 SERPL-SCNC: 28 MMOL/L (ref 21–32)
CREAT SERPL-MCNC: 0.54 MG/DL (ref 0.6–1.3)
DIFFERENTIAL METHOD BLD: ABNORMAL
EOSINOPHIL # BLD: 0 K/UL (ref 0–0.4)
EOSINOPHIL NFR BLD: 0 % (ref 0–5)
ERYTHROCYTE [DISTWIDTH] IN BLOOD BY AUTOMATED COUNT: 17.6 % (ref 11.6–14.5)
GLOBULIN SER CALC-MCNC: 3.2 G/DL (ref 2–4)
GLUCOSE BLD STRIP.AUTO-MCNC: 139 MG/DL (ref 70–110)
GLUCOSE BLD STRIP.AUTO-MCNC: 169 MG/DL (ref 70–110)
GLUCOSE BLD STRIP.AUTO-MCNC: 233 MG/DL (ref 70–110)
GLUCOSE BLD STRIP.AUTO-MCNC: 254 MG/DL (ref 70–110)
GLUCOSE SERPL-MCNC: 113 MG/DL (ref 74–99)
HCT VFR BLD AUTO: 33.1 % (ref 35–45)
HGB BLD-MCNC: 9.8 G/DL (ref 12–16)
IMM GRANULOCYTES # BLD AUTO: 0.1 K/UL (ref 0–0.04)
IMM GRANULOCYTES NFR BLD AUTO: 1 % (ref 0–0.5)
LYMPHOCYTES # BLD: 0.7 K/UL (ref 0.9–3.6)
LYMPHOCYTES NFR BLD: 8 % (ref 21–52)
MCH RBC QN AUTO: 24.1 PG (ref 24–34)
MCHC RBC AUTO-ENTMCNC: 29.6 G/DL (ref 31–37)
MCV RBC AUTO: 81.5 FL (ref 78–100)
MONOCYTES # BLD: 0.4 K/UL (ref 0.05–1.2)
MONOCYTES NFR BLD: 4 % (ref 3–10)
NEUTS SEG # BLD: 8.4 K/UL (ref 1.8–8)
NEUTS SEG NFR BLD: 87 % (ref 40–73)
NRBC # BLD: 0 K/UL (ref 0–0.01)
NRBC BLD-RTO: 0 PER 100 WBC
PLATELET # BLD AUTO: 248 K/UL (ref 135–420)
PMV BLD AUTO: 9.6 FL (ref 9.2–11.8)
POTASSIUM SERPL-SCNC: 3.2 MMOL/L (ref 3.5–5.5)
PROT SERPL-MCNC: 5.5 G/DL (ref 6.4–8.2)
RBC # BLD AUTO: 4.06 M/UL (ref 4.2–5.3)
SODIUM SERPL-SCNC: 140 MMOL/L (ref 136–145)
WBC # BLD AUTO: 9.6 K/UL (ref 4.6–13.2)

## 2024-12-10 PROCEDURE — 99232 SBSQ HOSP IP/OBS MODERATE 35: CPT | Performed by: INTERNAL MEDICINE

## 2024-12-10 PROCEDURE — 94761 N-INVAS EAR/PLS OXIMETRY MLT: CPT

## 2024-12-10 PROCEDURE — 82962 GLUCOSE BLOOD TEST: CPT

## 2024-12-10 PROCEDURE — 2580000003 HC RX 258: Performed by: STUDENT IN AN ORGANIZED HEALTH CARE EDUCATION/TRAINING PROGRAM

## 2024-12-10 PROCEDURE — 6370000000 HC RX 637 (ALT 250 FOR IP): Performed by: STUDENT IN AN ORGANIZED HEALTH CARE EDUCATION/TRAINING PROGRAM

## 2024-12-10 PROCEDURE — 6360000002 HC RX W HCPCS: Performed by: HOSPITALIST

## 2024-12-10 PROCEDURE — 6360000002 HC RX W HCPCS: Performed by: INTERNAL MEDICINE

## 2024-12-10 PROCEDURE — 1100000000 HC RM PRIVATE

## 2024-12-10 PROCEDURE — 85025 COMPLETE CBC W/AUTO DIFF WBC: CPT

## 2024-12-10 PROCEDURE — 80053 COMPREHEN METABOLIC PANEL: CPT

## 2024-12-10 PROCEDURE — 36415 COLL VENOUS BLD VENIPUNCTURE: CPT

## 2024-12-10 PROCEDURE — 2700000000 HC OXYGEN THERAPY PER DAY

## 2024-12-10 PROCEDURE — 6360000002 HC RX W HCPCS: Performed by: STUDENT IN AN ORGANIZED HEALTH CARE EDUCATION/TRAINING PROGRAM

## 2024-12-10 PROCEDURE — 97530 THERAPEUTIC ACTIVITIES: CPT

## 2024-12-10 PROCEDURE — 97535 SELF CARE MNGMENT TRAINING: CPT

## 2024-12-10 PROCEDURE — 6370000000 HC RX 637 (ALT 250 FOR IP): Performed by: INTERNAL MEDICINE

## 2024-12-10 PROCEDURE — 94640 AIRWAY INHALATION TREATMENT: CPT

## 2024-12-10 RX ORDER — BUDESONIDE 1 MG/2ML
1 INHALANT ORAL
Status: DISCONTINUED | OUTPATIENT
Start: 2024-12-10 | End: 2024-12-14 | Stop reason: HOSPADM

## 2024-12-10 RX ADMIN — FOLIC ACID 1 MG: 1 TABLET ORAL at 09:06

## 2024-12-10 RX ADMIN — SODIUM CHLORIDE, PRESERVATIVE FREE 10 ML: 5 INJECTION INTRAVENOUS at 21:36

## 2024-12-10 RX ADMIN — IPRATROPIUM BROMIDE AND ALBUTEROL SULFATE 1 DOSE: .5; 3 SOLUTION RESPIRATORY (INHALATION) at 14:34

## 2024-12-10 RX ADMIN — GUAIFENESIN 1200 MG: 600 TABLET, EXTENDED RELEASE ORAL at 09:05

## 2024-12-10 RX ADMIN — SODIUM CHLORIDE, PRESERVATIVE FREE 10 ML: 5 INJECTION INTRAVENOUS at 09:07

## 2024-12-10 RX ADMIN — FERROUS SULFATE TAB 325 MG (65 MG ELEMENTAL FE) 325 MG: 325 (65 FE) TAB at 09:07

## 2024-12-10 RX ADMIN — GUAIFENESIN 1200 MG: 600 TABLET, EXTENDED RELEASE ORAL at 21:30

## 2024-12-10 RX ADMIN — POTASSIUM BICARBONATE 40 MEQ: 782 TABLET, EFFERVESCENT ORAL at 09:03

## 2024-12-10 RX ADMIN — FUROSEMIDE 20 MG: 20 TABLET ORAL at 09:05

## 2024-12-10 RX ADMIN — PREDNISONE 40 MG: 20 TABLET ORAL at 09:06

## 2024-12-10 RX ADMIN — APIXABAN 5 MG: 5 TABLET, FILM COATED ORAL at 09:07

## 2024-12-10 RX ADMIN — ARFORMOTEROL TARTRATE 15 MCG: 15 SOLUTION RESPIRATORY (INHALATION) at 09:13

## 2024-12-10 RX ADMIN — SODIUM CHLORIDE, PRESERVATIVE FREE 10 ML: 5 INJECTION INTRAVENOUS at 09:01

## 2024-12-10 RX ADMIN — IPRATROPIUM BROMIDE AND ALBUTEROL SULFATE 1 DOSE: .5; 3 SOLUTION RESPIRATORY (INHALATION) at 17:23

## 2024-12-10 RX ADMIN — IPRATROPIUM BROMIDE AND ALBUTEROL SULFATE 1 DOSE: .5; 3 SOLUTION RESPIRATORY (INHALATION) at 23:10

## 2024-12-10 RX ADMIN — APIXABAN 5 MG: 5 TABLET, FILM COATED ORAL at 21:30

## 2024-12-10 RX ADMIN — IPRATROPIUM BROMIDE AND ALBUTEROL SULFATE 1 DOSE: .5; 3 SOLUTION RESPIRATORY (INHALATION) at 09:13

## 2024-12-10 RX ADMIN — INSULIN LISPRO 4 UNITS: 100 INJECTION, SOLUTION INTRAVENOUS; SUBCUTANEOUS at 17:13

## 2024-12-10 RX ADMIN — IPRATROPIUM BROMIDE AND ALBUTEROL SULFATE 1 DOSE: .5; 3 SOLUTION RESPIRATORY (INHALATION) at 00:39

## 2024-12-10 RX ADMIN — AZITHROMYCIN MONOHYDRATE 250 MG: 500 INJECTION, POWDER, LYOPHILIZED, FOR SOLUTION INTRAVENOUS at 00:38

## 2024-12-10 RX ADMIN — ARFORMOTEROL TARTRATE 15 MCG: 15 SOLUTION RESPIRATORY (INHALATION) at 20:06

## 2024-12-10 RX ADMIN — IPRATROPIUM BROMIDE AND ALBUTEROL SULFATE 1 DOSE: .5; 3 SOLUTION RESPIRATORY (INHALATION) at 20:06

## 2024-12-10 RX ADMIN — AZITHROMYCIN MONOHYDRATE 250 MG: 500 INJECTION, POWDER, LYOPHILIZED, FOR SOLUTION INTRAVENOUS at 23:31

## 2024-12-10 RX ADMIN — SERTRALINE HYDROCHLORIDE 50 MG: 50 TABLET ORAL at 09:06

## 2024-12-10 RX ADMIN — PIPERACILLIN AND TAZOBACTAM 3375 MG: 3; .375 INJECTION, POWDER, LYOPHILIZED, FOR SOLUTION INTRAVENOUS at 10:13

## 2024-12-10 RX ADMIN — INSULIN LISPRO 2 UNITS: 100 INJECTION, SOLUTION INTRAVENOUS; SUBCUTANEOUS at 21:35

## 2024-12-10 RX ADMIN — LEVOTHYROXINE SODIUM 88 MCG: 88 TABLET ORAL at 06:44

## 2024-12-10 RX ADMIN — BUDESONIDE 1 MG: 1 SUSPENSION RESPIRATORY (INHALATION) at 20:06

## 2024-12-10 RX ADMIN — SPIRONOLACTONE 25 MG: 25 TABLET ORAL at 09:07

## 2024-12-10 RX ADMIN — MONTELUKAST 10 MG: 10 TABLET, FILM COATED ORAL at 09:07

## 2024-12-10 RX ADMIN — PIPERACILLIN AND TAZOBACTAM 3375 MG: 3; .375 INJECTION, POWDER, LYOPHILIZED, FOR SOLUTION INTRAVENOUS at 03:18

## 2024-12-10 RX ADMIN — BUDESONIDE 1 MG: 1 SUSPENSION RESPIRATORY (INHALATION) at 09:13

## 2024-12-10 ASSESSMENT — PAIN SCALES - GENERAL
PAINLEVEL_OUTOF10: 0

## 2024-12-10 NOTE — PLAN OF CARE
Problem: Occupational Therapy - Adult  Goal: By Discharge: Performs self-care activities at highest level of function for planned discharge setting.  See evaluation for individualized goals.  Description:   Occupational Therapy Goals:  Initiated 12/6/2024 to be met within 7-10 days.    1.  Patient will perform grooming with supervision/set-up while standing at the sink for > 2 min with Good balance.   2.  Patient will perform bathing with supervision/set-up.  3.  Patient will perform lower body dressing with supervision/set-up.  4.  Patient will perform toilet transfers with supervision/set-up.  5.  Patient will perform all aspects of toileting with supervision/set-up.  6.  Patient will participate in upper extremity therapeutic exercise/activities with supervision/set-up for 8 minutes to improve endurance and UB strength needed for ADLs    7.  Patient will utilize energy conservation techniques during functional activities with verbal cues.    PLOF: Pt lives with family, mainly Mod Ind for ADLs and functional mobility with walker, recently had decline in functional status, using BSC for toileting.  Outcome: Progressing   OCCUPATIONAL THERAPY TREATMENT    Patient: Rina Prajapati (64 y.o. female)  Date: 12/10/2024  Diagnosis: Acute hypoxic respiratory failure [J96.01] Acute on chronic respiratory failure      Precautions: Fall Risk, General Precautions,  ,  ,  ,  ,  ,  ,      Chart, occupational therapy assessment, plan of care, and goals were reviewed.  ASSESSMENT:  Pt presented supine in bed upon entry, on 4L O2NC, and agreeable to work with OT. Pt came to EOB SBA in prep for functional tasks. Once sitting, pt demo G balance however required MAX A donning her socks 2/2 SOB. She benefits from heavy cueing to perform PLB  throughout session with desat to 82% at lowest. It took ~ 3 mins to recover back to 90%. Pt continues to request to use BSC. She performed SPT to BSC CGA. Pt with close Supervision while  (unsupported)  Standing: Impaired;With support  Standing - Static: Good  Standing - Dynamic: Fair    ADL Intervention:     LE Dressing: Maximum assistance  Toileting: Contact guard assistance       Pain:  Intensity Pre-treatment: 0/10   Intensity Post-treatment: 0/10      Activity Tolerance:    Activity Tolerance: Patient limited by endurance  Please refer to the flowsheet for vital signs taken during this treatment.  After treatment:   []  Patient left in no apparent distress sitting up in chair  [x]  Patient left in no apparent distress in bed  [x]  Call bell left within reach  [x]  Nursing notified  []  Caregiver present  [x]  Bed alarm activated    COMMUNICATION/EDUCATION:   Patient Education  Education Given To: Patient  Education Provided: Role of Therapy;Plan of Care;Transfer Training;Energy Conservation;Fall Prevention Strategies;ADL Adaptive Strategies  Education Method: Demonstration;Verbal;Teach Back  Barriers to Learning: None  Education Outcome: Verbalized understanding;Continued education needed      Thank you for this referral.  FERN Mace  Minutes: 26

## 2024-12-10 NOTE — PROGRESS NOTES
New OT order received and chart reviewed.  Patient evaluated on 12/6/2024 and currently on caseload.  Will acknowledge the order.  Thank you for the referral.  Janet Fox MS OTR/L

## 2024-12-10 NOTE — PROGRESS NOTES
4 Eyes Skin Assessment     NAME:  Rina Prajapati  YOB: 1960  MEDICAL RECORD NUMBER:  [id number]    The patient is being assessed for  Shift Handoff    I agree that at least one RN has performed a thorough Head to Toe Skin Assessment on the patient. ALL assessment sites listed below have been assessed.      Areas assessed by both nurses:    Head, Face, Ears, Shoulders, Back, Chest, Arms, Elbows, Hands, Sacrum. Buttock, Coccyx, Ischium, Legs. Feet and Heels, and Under Medical Devices         Does the Patient have a Wound? No noted wound(s)       Isidro Prevention initiated by RN: Yes  Wound Care Orders initiated by RN: No    Pressure Injury (Stage 3,4, Unstageable, DTI, NWPT, and Complex wounds) if present, place Wound referral order by RN under : No    New Ostomies, if present place, Ostomy referral order under : No     Nurse 1 eSignature: Electronically signed by Nataly Mcgowan RN on 12/10/24 at 8:09 AM EST    **SHARE this note so that the co-signing nurse can place an eSignature**    Nurse 2 eSignature: Electronically signed by Lisette Hoffmann RN on 12/10/24 at 2:33 PM EST

## 2024-12-10 NOTE — PROGRESS NOTES
[facility]  Progress Note    Patient: Rina Prajapati Age: 64 y.o. : 1960 MR#: [id number] SSN: [id number]  Date/Time: 12/10/2024     Subjective:   Patient doing well.  No major issues overnight.  Breathing continues to improve but not yet at baseline.    Disposition: SNF  Discharge 2024    Review of systems  General: No fevers or chills.  Cardiovascular: No chest pain or pressure. No palpitations.   Pulmonary: No shortness of breath, cough or wheeze.   Gastrointestinal: No abdominal pain, nausea, vomiting or diarrhea.   Genitourinary: No urinary frequency, urgency, hesitancy or dysuria.   Musculoskeletal: No joint or muscle pain, no back pain, no recent trauma.    Neurologic: No headache, numbness, tingling or weakness.   Assessment/Plan:   #Acute on chronic hypoxic respiratory failure.  Etiology is likely multifactorial in the setting of non-small cell lung cancer, COPD exacerbation, CHF exacerbation, pneumonia.  Baseline home oxygen is 2 to 3 L. Back up to 5L patient was satting 90% on 5 L.  Patient appeared more short of breath and associated wheezing.  #Bilateral pneumonia, possible aspiration vs viral  #COPD exacerbation  #CHF exacerbation.  EF 45 to 50%  #Small to moderate pericardial effusion  #Non-small cell lung cancer on Taxotere  #History of PE/DVT.  On Eliquis  #SVC syndrome  #Tobacco dependence).   #pulmonary HTN    Admitted to  N.  Cardiac monitoring  Continue IV Lasix  Monitor renal function  Continue Eliquis twice daily  Maintain K and magnesium above 4 and 2 respectively  Patient transition to oral steroids at this point  Discontinue Zosyn, patient is status post 5 days of treatment  Continue azithromycin  DuoNebs as needed  Continue bronchodilators  Palliative care consult  Nicotine patch on discharge  Talk to nursing about placing incentive spirometer in patient's room      I spent 40 minutes with the patient in face-to-face  consultation, of which greater than 50% was spent in counseling and coordination of care as described above.    Case discussed with:  [x]Patient  []Family  []Nursing  []Case Management  DVT Prophylaxis:  [x]Lovenox  []Hep SQ  []SCDs  []Coumadin   []Eliquis/Xarelto     Objective:   VS: BP (!) 130/95   Pulse 98   Temp 98.4 °F (36.9 °C) (Oral)   Resp 22   Ht 1.702 m (5' 7.01\")   Wt 59.8 kg (131 lb 12.8 oz)   SpO2 93%   BMI 20.64 kg/m²    Tmax/24hrs: Temp (24hrs), Av.9 °F (36.6 °C), Min:97 °F (36.1 °C), Max:98.4 °F (36.9 °C)  IOBRIEF  Intake/Output Summary (Last 24 hours) at 12/10/2024 1323  Last data filed at 12/10/2024 0445  Gross per 24 hour   Intake 1160 ml   Output --   Net 1160 ml       General:  Alert, cooperative, no acute distress    HEENT: PERRLA, anicteric sclerae.  Pulmonary:  bilateral rales  Cardiovascular: Regular rate and Rhythm.  GI:  Soft, Non distended, Non tender. + Bowel sounds.  Extremities:  No edema. No calf tenderness.   Psych: Good insight. Not anxious or agitated.  Neurologic: Alert and oriented X 3. Moves all ext.  Additional:    Medications:   Current Facility-Administered Medications   Medication Dose Route Frequency    budesonide (PULMICORT) nebulizer suspension 1 mg  1 mg Nebulization BID RT    predniSONE (DELTASONE) tablet 40 mg  40 mg Oral Daily    ipratropium 0.5 mg-albuterol 2.5 mg (DUONEB) nebulizer solution 1 Dose  1 Dose Inhalation Q4H RT    azithromycin (ZITHROMAX) 250 mg in sodium chloride 0.9 % 250 mL IVPB  250 mg IntraVENous Q24H    apixaban (ELIQUIS) tablet 5 mg  5 mg Oral BID    furosemide (LASIX) tablet 20 mg  20 mg Oral Q48H    arformoterol tartrate (BROVANA) nebulizer solution 15 mcg  15 mcg Nebulization BID RT    ferrous sulfate (IRON 325) tablet 325 mg  325 mg Oral Daily with breakfast    folic acid (FOLVITE) tablet 1 mg  1 mg Oral Daily    levothyroxine (SYNTHROID) tablet 88 mcg  88 mcg Oral QAM AC    montelukast (SINGULAIR) tablet 10 mg  10 mg Oral Daily

## 2024-12-10 NOTE — PROGRESS NOTES
Physical Therapy  PT order received and chart reviewed.  Pt is currently on caseload, evaluated 12/6, will continue to follow pt and see as time allows.  Thank you for this referral.      Nikky Pond, PT, DPT

## 2024-12-10 NOTE — PROGRESS NOTES
[facility]  Pulmonary, Critical Care, and Sleep Medicine    Name: Rina Prajapati MRN: [id number]   : 1960 Hospital: [facility]   Date: 12/10/2024        IMPRESSION:   Acute on chronic hypoxic respiratory failure with increased oxygen requirements on home oxygen concentrator, desaturations to 70%, required HFNC on admission, now on 5L with 94%  Etiology likely multifactorial, with COPD exacerbation, NSCLC, diastolic/systolic HF decompensation, worsening metastatic disease,  possible pneumonia vs non infectious pneumonitis. RVP negative. CT-A negative for PE, evidence of pulmonary hypertension, RVP neg, strp PNA/Legionella neg, low likelihood of bacterial infection with procal 0.11  L>R BL opacities on CT imaging  Bilateral pneumonia, viral vs bacterial vs atypical infectious process  Possible aspiration, with debris in trachea and bronchi.  NSCLC, actively on chemo. Followed by Dr. Li, with [facility]. Noted last admission with disease progression while on Taxotere, starting on Gemzar. Hx of SVC syndrome  VINCE pulmonary mass, 3.5cm, unchanged from  to , and multiple BL nodules  SIRS vs Sepsis with pulmonary source  ?FLORIAN, with Cr 2.64 above baseline 0.66. Now resolved in AM labs. Likely pre-renal vs ATN vs lab error  Severe COPD FEV1 in 2024 31% predicted with air trapping and reduced DLCO.  Possible exacerbation, CT with enphesemous changes. On LTOT. Has not been receiving prescribed bronchodilators due to high cost.  Combined systolic and Diastolic HF, current echo with mild systolic dysfunction EF 45% on   Possible exacerbation with pedal edema, hypoxia, elevated BNP 8746mg/mL above 3.622 ion   Pulmonary hypertension WHO Grp 2/3 with dilated RV  RVSP 45mmHg on , CT on  showing cardiomegaly with reflux of contrast into IVC  Electrolyte derangements:   Hypokalemia now resolved  Hyponatremia, now resolved  Normocytic normochromic anemia

## 2024-12-10 NOTE — PLAN OF CARE
Problem: Chronic Conditions and Co-morbidities  Goal: Patient's chronic conditions and co-morbidity symptoms are monitored and maintained or improved  Outcome: Progressing  Flowsheets  Taken 12/10/2024 1621  Care Plan - Patient's Chronic Conditions and Co-Morbidity Symptoms are Monitored and Maintained or Improved: Update acute care plan with appropriate goals if chronic or comorbid symptoms are exacerbated and prevent overall improvement and discharge  Taken 12/10/2024 0820  Care Plan - Patient's Chronic Conditions and Co-Morbidity Symptoms are Monitored and Maintained or Improved: Monitor and assess patient's chronic conditions and comorbid symptoms for stability, deterioration, or improvement     Problem: Pain  Goal: Verbalizes/displays adequate comfort level or baseline comfort level  Outcome: Progressing  Flowsheets (Taken 12/10/2024 0800)  Verbalizes/displays adequate comfort level or baseline comfort level:   Encourage patient to monitor pain and request assistance   Assess pain using appropriate pain scale   Administer analgesics based on type and severity of pain and evaluate response   Implement non-pharmacological measures as appropriate and evaluate response   Consider cultural and social influences on pain and pain management     Problem: Safety - Adult  Goal: Free from fall injury  Outcome: Progressing  Flowsheets (Taken 12/9/2024 0536 by Tania Hensley, RN)  Free From Fall Injury: Based on caregiver fall risk screen, instruct family/caregiver to ask for assistance with transferring infant if caregiver noted to have fall risk factors     Problem: Skin/Tissue Integrity  Goal: Absence of new skin breakdown  Description: 1.  Monitor for areas of redness and/or skin breakdown  2.  Assess vascular access sites hourly  3.  Every 4-6 hours minimum:  Change oxygen saturation probe site  4.  Every 4-6 hours:  If on nasal continuous positive airway pressure, respiratory therapy assess nares and determine need  for appliance change or resting period.  Outcome: Progressing  Note: To educate patient for wound/skin breakdown prevention     Problem: Nutrition Deficit:  Goal: Optimize nutritional status  Outcome: Progressing  Flowsheets (Taken 12/6/2024 1009 by Brenda Bennett RD)  Nutrient intake appropriate for improving, restoring, or maintaining nutritional needs:   Assess nutritional status and recommend course of action   Monitor oral intake, labs, and treatment plans   Recommend appropriate diets, oral nutritional supplements, and vitamin/mineral supplements     Problem: Physical Therapy - Adult  Goal: By Discharge: Performs mobility at highest level of function for planned discharge setting.  See evaluation for individualized goals.  Description: Initiated  12/6/24  to be met within 7-10 days.    1.  Patient will move from supine to sit and sit to supine , scoot up and down, and roll side to side in bed with independence.    2.  Patient will transfer from bed to chair and chair to bed with modified independence using the least restrictive device.  3.  Patient will perform sit to stand with modified independence.  4.  Patient will ambulate with modified independence for 15 feet with the least restrictive device.   5.  Patient will ascend/descend 1 stairs with 0 handrail(s) with modified independence.    PLOF: Lives with fiance in a 1 sstory home with 1 NOEL. Mod I RW a month ago per pt, however has just been doing stand pivots to [facility] last couple of weeks.    12/10/2024 1204 by Nikky Pond, PT  Outcome: Progressing     Problem: Occupational Therapy - Adult  Goal: By Discharge: Performs self-care activities at highest level of function for planned discharge setting.  See evaluation for individualized goals.  Description:   Occupational Therapy Goals:  Initiated 12/6/2024 to be met within 7-10 days.    1.  Patient will perform grooming with supervision/set-up while standing at the sink for > 2 min with Good balance.   2.

## 2024-12-10 NOTE — PLAN OF CARE
Problem: Respiratory - Adult  Goal: Achieves optimal ventilation and oxygenation  Outcome: Progressing  Flowsheets (Taken 12/9/2024 2100)  Achieves optimal ventilation and oxygenation: Assess for changes in respiratory status     Problem: Cardiovascular - Adult  Goal: Maintains optimal cardiac output and hemodynamic stability  Outcome: Progressing  Flowsheets (Taken 12/9/2024 2100)  Maintains optimal cardiac output and hemodynamic stability:   Monitor blood pressure and heart rate   Assess for signs of decreased cardiac output

## 2024-12-10 NOTE — CARE COORDINATION
Spoke to patient at bedside and she would like to go to a skilled nursing facility. Patient would like [facility]. Talked to Tamanna with admissions who stated she will start insurance authorization today.     Gris LEDESMAN, RN   Case Management   [phone]

## 2024-12-10 NOTE — PROGRESS NOTES
4 Eyes Skin Assessment     NAME:  Rina Prajapati  YOB: 1960  MEDICAL RECORD NUMBER:  [id number]    The patient is being assessed for  Shift Handoff    I agree that at least one RN has performed a thorough Head to Toe Skin Assessment on the patient. ALL assessment sites listed below have been assessed.      Areas assessed by both nurses:    Head, Face, Ears, Shoulders, Back, Chest, Arms, Elbows, Hands, Sacrum. Buttock, Coccyx, Ischium, Legs. Feet and Heels, and Under Medical Devices         Does the Patient have a Wound? No noted wound(s)       Isidro Prevention initiated by RN: Yes  Wound Care Orders initiated by RN: No    Pressure Injury (Stage 3,4, Unstageable, DTI, NWPT, and Complex wounds) if present, place Wound referral order by RN under : No    New Ostomies, if present place, Ostomy referral order under : No     Nurse 1 eSignature: Electronically signed by Lisette Hoffmann RN on 12/10/24 at 6:48 PM EST    **SHARE this note so that the co-signing nurse can place an eSignature**    Nurse 2 eSignature: {Esignature:[id number]}

## 2024-12-10 NOTE — PROGRESS NOTES
[facility] Hospitalist Group  Progress Note    Patient: Rina Prajapati Age: 64 y.o. : 1960 MR#: [id number] SSN: [id number]  Date/Time: 2024     Subjective:   Patient doing well. No major issues. Breathing improved but not at baseline. No chest pain.    Dispo; HH  DC 24    Review of systems  General: No fevers or chills.  Cardiovascular: No chest pain or pressure. No palpitations.   Pulmonary: No shortness of breath, cough or wheeze.   Gastrointestinal: No abdominal pain, nausea, vomiting or diarrhea.   Genitourinary: No urinary frequency, urgency, hesitancy or dysuria.   Musculoskeletal: No joint or muscle pain, no back pain, no recent trauma.    Neurologic: No headache, numbness, tingling or weakness.   Assessment/Plan:   #Acute on chronic hypoxic respiratory failure.  Etiology is likely multifactorial in the setting of non-small cell lung cancer, COPD exacerbation, CHF exacerbation, pneumonia.  Baseline home oxygen is 2 to 3 L. Back up to 5L patient was satting 90% on 5 L.  Patient appeared more short of breath and associated wheezing.  #Bilateral pneumonia, possible aspiration vs cviral  #COPD exacerbation  #CHF exacerbation.  EF 45 to 50%  #Small to moderate pericardial effusion  #Non-small cell lung cancer on Taxotere  #History of PE/DVT.  On Eliquis  #SVC syndrome  #Tobacco dependence).   #pulmonary HTN    Admitted to 4n  Cardiac monitoring  Continue IV lasix  Monitor renal function  Continue eliquis BID  Maintain K and magnesium above  4 and 2.  Continue zosyn  Patient on solumedrol 40 mg Q12 and azithromycin  Duonebs PRN  Continue bronchodilators  Palliative care consult.  Patient wishes to continue to be full code.  Patient will continue to get chemotherapy  Will order nicotine patch upon discharge      I spent 40 minutes with the patient in face-to-face consultation, of which greater than 50% was spent in counseling and coordination of care as described

## 2024-12-10 NOTE — PLAN OF CARE
Problem: Physical Therapy - Adult  Goal: By Discharge: Performs mobility at highest level of function for planned discharge setting.  See evaluation for individualized goals.  Description: Initiated  12/6/24  to be met within 7-10 days.    1.  Patient will move from supine to sit and sit to supine , scoot up and down, and roll side to side in bed with independence.    2.  Patient will transfer from bed to chair and chair to bed with modified independence using the least restrictive device.  3.  Patient will perform sit to stand with modified independence.  4.  Patient will ambulate with modified independence for 15 feet with the least restrictive device.   5.  Patient will ascend/descend 1 stairs with 0 handrail(s) with modified independence.    PLOF: Lives with fiance in a 1 sstory home with 1 NOEL. Mod I RW a month ago per pt, however has just been doing stand pivots to [facility] last couple of weeks.    Outcome: Progressing     PHYSICAL THERAPY TREATMENT    Patient: Rina Prajapati (64 y.o. female)  Date: 12/10/2024  Diagnosis: Acute hypoxic respiratory failure [J96.01] Acute on chronic respiratory failure      Precautions: Fall Risk, General Precautions,  ,  ,  ,  ,  ,  ,      ASSESSMENT:  Pt resting in bed upon entering room, agreeable to PT treatment.  Pt is on 4L supplemental O2 through NC.  Pt was able to perform supine to sit transfer with SBA, good sitting balance.  Pt noted some SOB upon sitting EOB, O2 sats checked dropping to 82% at the lowest.  Pt educated on PLB and after ~ 2 min sats still not back to the 90s, increased O2 to 5L and it took pt another 2-3 min to return to mid 90's.  Pt continues to note need to have BM, performed SPT transfer with CGA and no AD, keeping O2 monitor on while on commode with sats staying between 88 and 93%, HR fluctuating between mid 90's and 120 and it's highest.  RN entered room at this time, pt able to perform self pericare and returned supine with CGA, positioned    Ambulation/Gait Training:     Gait  Gait Training: No        Pain:  Intensity Pre-treatment: 0/10   Intensity Post-treatment: 0/10    Activity Tolerance:   Activity Tolerance: Patient limited by endurance  Please refer to the flowsheet for vital signs taken during this treatment.  After treatment:   [] Patient left in no apparent distress sitting up in chair  [x] Patient left in no apparent distress in bed  [x] Call bell left within reach  [x] Nursing notified  [] Caregiver present  [x] Bed alarm activated  [] SCDs applied      COMMUNICATION/EDUCATION:   Patient Education  Education Given To: Patient  Education Provided: Role of Therapy;Plan of Care;Transfer Training;Energy Conservation  Education Method: Demonstration;Verbal;Teach Back  Barriers to Learning: None  Education Outcome: Verbalized understanding;Demonstrated understanding;Continued education needed      Nikky Pond PT  Minutes: 26

## 2024-12-11 ENCOUNTER — APPOINTMENT (OUTPATIENT)
Facility: HOSPITAL | Age: 64
DRG: 177 | End: 2024-12-11
Payer: MEDICARE

## 2024-12-11 ENCOUNTER — CARE COORDINATION (OUTPATIENT)
Facility: CLINIC | Age: 64
End: 2024-12-11

## 2024-12-11 LAB
ALBUMIN SERPL-MCNC: 2.4 G/DL (ref 3.4–5)
ALBUMIN/GLOB SERPL: 0.8 (ref 0.8–1.7)
ALP SERPL-CCNC: 43 U/L (ref 45–117)
ALT SERPL-CCNC: 27 U/L (ref 13–56)
ANION GAP SERPL CALC-SCNC: 6 MMOL/L (ref 3–18)
AST SERPL-CCNC: 10 U/L (ref 10–38)
BACTERIA SPEC CULT: NORMAL
BACTERIA SPEC CULT: NORMAL
BILIRUB SERPL-MCNC: 0.5 MG/DL (ref 0.2–1)
BUN SERPL-MCNC: 22 MG/DL (ref 7–18)
BUN/CREAT SERPL: 37 (ref 12–20)
CALCIUM SERPL-MCNC: 8.8 MG/DL (ref 8.5–10.1)
CHLORIDE SERPL-SCNC: 106 MMOL/L (ref 100–111)
CO2 SERPL-SCNC: 28 MMOL/L (ref 21–32)
CREAT SERPL-MCNC: 0.6 MG/DL (ref 0.6–1.3)
ERYTHROCYTE [DISTWIDTH] IN BLOOD BY AUTOMATED COUNT: 17.7 % (ref 11.6–14.5)
GLOBULIN SER CALC-MCNC: 3.1 G/DL (ref 2–4)
GLUCOSE BLD STRIP.AUTO-MCNC: 103 MG/DL (ref 70–110)
GLUCOSE BLD STRIP.AUTO-MCNC: 196 MG/DL (ref 70–110)
GLUCOSE BLD STRIP.AUTO-MCNC: 261 MG/DL (ref 70–110)
GLUCOSE BLD STRIP.AUTO-MCNC: 88 MG/DL (ref 70–110)
GLUCOSE SERPL-MCNC: 114 MG/DL (ref 74–99)
HCT VFR BLD AUTO: 32 % (ref 35–45)
HGB BLD-MCNC: 9.6 G/DL (ref 12–16)
MCH RBC QN AUTO: 24 PG (ref 24–34)
MCHC RBC AUTO-ENTMCNC: 30 G/DL (ref 31–37)
MCV RBC AUTO: 80 FL (ref 78–100)
NRBC # BLD: 0 K/UL (ref 0–0.01)
NRBC BLD-RTO: 0 PER 100 WBC
PLATELET # BLD AUTO: 273 K/UL (ref 135–420)
PMV BLD AUTO: 9.7 FL (ref 9.2–11.8)
POTASSIUM SERPL-SCNC: 3.8 MMOL/L (ref 3.5–5.5)
PROT SERPL-MCNC: 5.5 G/DL (ref 6.4–8.2)
RBC # BLD AUTO: 4 M/UL (ref 4.2–5.3)
SERVICE CMNT-IMP: NORMAL
SERVICE CMNT-IMP: NORMAL
SODIUM SERPL-SCNC: 140 MMOL/L (ref 136–145)
WBC # BLD AUTO: 9.1 K/UL (ref 4.6–13.2)

## 2024-12-11 PROCEDURE — 36415 COLL VENOUS BLD VENIPUNCTURE: CPT

## 2024-12-11 PROCEDURE — 2580000003 HC RX 258: Performed by: INTERNAL MEDICINE

## 2024-12-11 PROCEDURE — 2700000000 HC OXYGEN THERAPY PER DAY

## 2024-12-11 PROCEDURE — 99291 CRITICAL CARE FIRST HOUR: CPT

## 2024-12-11 PROCEDURE — 6370000000 HC RX 637 (ALT 250 FOR IP): Performed by: STUDENT IN AN ORGANIZED HEALTH CARE EDUCATION/TRAINING PROGRAM

## 2024-12-11 PROCEDURE — 99231 SBSQ HOSP IP/OBS SF/LOW 25: CPT | Performed by: INTERNAL MEDICINE

## 2024-12-11 PROCEDURE — 6360000002 HC RX W HCPCS: Performed by: HOSPITALIST

## 2024-12-11 PROCEDURE — 6360000002 HC RX W HCPCS: Performed by: INTERNAL MEDICINE

## 2024-12-11 PROCEDURE — 6360000002 HC RX W HCPCS

## 2024-12-11 PROCEDURE — 82962 GLUCOSE BLOOD TEST: CPT

## 2024-12-11 PROCEDURE — 2580000003 HC RX 258: Performed by: STUDENT IN AN ORGANIZED HEALTH CARE EDUCATION/TRAINING PROGRAM

## 2024-12-11 PROCEDURE — 2580000003 HC RX 258

## 2024-12-11 PROCEDURE — 6370000000 HC RX 637 (ALT 250 FOR IP): Performed by: INTERNAL MEDICINE

## 2024-12-11 PROCEDURE — 71045 X-RAY EXAM CHEST 1 VIEW: CPT

## 2024-12-11 PROCEDURE — 85027 COMPLETE CBC AUTOMATED: CPT

## 2024-12-11 PROCEDURE — 80053 COMPREHEN METABOLIC PANEL: CPT

## 2024-12-11 PROCEDURE — 1100000000 HC RM PRIVATE

## 2024-12-11 PROCEDURE — 94640 AIRWAY INHALATION TREATMENT: CPT

## 2024-12-11 PROCEDURE — 94761 N-INVAS EAR/PLS OXIMETRY MLT: CPT

## 2024-12-11 RX ORDER — FUROSEMIDE 10 MG/ML
40 INJECTION INTRAMUSCULAR; INTRAVENOUS ONCE
Status: COMPLETED | OUTPATIENT
Start: 2024-12-11 | End: 2024-12-11

## 2024-12-11 RX ORDER — SODIUM CHLORIDE FOR INHALATION 3 %
4 VIAL, NEBULIZER (ML) INHALATION
Status: DISCONTINUED | OUTPATIENT
Start: 2024-12-11 | End: 2024-12-14 | Stop reason: HOSPADM

## 2024-12-11 RX ORDER — FUROSEMIDE 20 MG/1
20 TABLET ORAL DAILY
Status: DISCONTINUED | OUTPATIENT
Start: 2024-12-12 | End: 2024-12-11

## 2024-12-11 RX ORDER — FUROSEMIDE 10 MG/ML
40 INJECTION INTRAMUSCULAR; INTRAVENOUS 2 TIMES DAILY
Status: DISCONTINUED | OUTPATIENT
Start: 2024-12-11 | End: 2024-12-14 | Stop reason: HOSPADM

## 2024-12-11 RX ADMIN — INSULIN LISPRO 4 UNITS: 100 INJECTION, SOLUTION INTRAVENOUS; SUBCUTANEOUS at 20:47

## 2024-12-11 RX ADMIN — INSULIN LISPRO 2 UNITS: 100 INJECTION, SOLUTION INTRAVENOUS; SUBCUTANEOUS at 16:32

## 2024-12-11 RX ADMIN — LEVOTHYROXINE SODIUM 88 MCG: 88 TABLET ORAL at 06:39

## 2024-12-11 RX ADMIN — SODIUM CHLORIDE SOLN NEBU 3% 4 ML: 3 NEBU SOLN at 19:45

## 2024-12-11 RX ADMIN — MONTELUKAST 10 MG: 10 TABLET, FILM COATED ORAL at 08:26

## 2024-12-11 RX ADMIN — SODIUM CHLORIDE, PRESERVATIVE FREE 10 ML: 5 INJECTION INTRAVENOUS at 21:00

## 2024-12-11 RX ADMIN — WATER 120 MG: 1 INJECTION INTRAMUSCULAR; INTRAVENOUS; SUBCUTANEOUS at 16:09

## 2024-12-11 RX ADMIN — GUAIFENESIN 1200 MG: 600 TABLET, EXTENDED RELEASE ORAL at 20:43

## 2024-12-11 RX ADMIN — APIXABAN 5 MG: 5 TABLET, FILM COATED ORAL at 20:43

## 2024-12-11 RX ADMIN — ARFORMOTEROL TARTRATE 15 MCG: 15 SOLUTION RESPIRATORY (INHALATION) at 19:45

## 2024-12-11 RX ADMIN — APIXABAN 5 MG: 5 TABLET, FILM COATED ORAL at 08:27

## 2024-12-11 RX ADMIN — GUAIFENESIN 1200 MG: 600 TABLET, EXTENDED RELEASE ORAL at 08:24

## 2024-12-11 RX ADMIN — SPIRONOLACTONE 25 MG: 25 TABLET ORAL at 08:25

## 2024-12-11 RX ADMIN — BUDESONIDE 1 MG: 1 SUSPENSION RESPIRATORY (INHALATION) at 19:45

## 2024-12-11 RX ADMIN — PREDNISONE 40 MG: 20 TABLET ORAL at 08:25

## 2024-12-11 RX ADMIN — FUROSEMIDE 40 MG: 10 INJECTION, SOLUTION INTRAMUSCULAR; INTRAVENOUS at 17:23

## 2024-12-11 RX ADMIN — FOLIC ACID 1 MG: 1 TABLET ORAL at 08:25

## 2024-12-11 RX ADMIN — BENZONATATE 200 MG: 100 CAPSULE ORAL at 03:45

## 2024-12-11 RX ADMIN — FERROUS SULFATE TAB 325 MG (65 MG ELEMENTAL FE) 325 MG: 325 (65 FE) TAB at 08:25

## 2024-12-11 RX ADMIN — SODIUM CHLORIDE, PRESERVATIVE FREE 10 ML: 5 INJECTION INTRAVENOUS at 08:27

## 2024-12-11 RX ADMIN — IPRATROPIUM BROMIDE AND ALBUTEROL SULFATE 1 DOSE: .5; 3 SOLUTION RESPIRATORY (INHALATION) at 08:17

## 2024-12-11 RX ADMIN — ARFORMOTEROL TARTRATE 15 MCG: 15 SOLUTION RESPIRATORY (INHALATION) at 08:16

## 2024-12-11 RX ADMIN — BUDESONIDE 1 MG: 1 SUSPENSION RESPIRATORY (INHALATION) at 08:17

## 2024-12-11 RX ADMIN — IPRATROPIUM BROMIDE AND ALBUTEROL SULFATE 1 DOSE: .5; 3 SOLUTION RESPIRATORY (INHALATION) at 15:18

## 2024-12-11 RX ADMIN — IPRATROPIUM BROMIDE AND ALBUTEROL SULFATE 1 DOSE: .5; 3 SOLUTION RESPIRATORY (INHALATION) at 19:45

## 2024-12-11 RX ADMIN — SERTRALINE HYDROCHLORIDE 50 MG: 50 TABLET ORAL at 08:25

## 2024-12-11 ASSESSMENT — PAIN SCALES - GENERAL
PAINLEVEL_OUTOF10: 0

## 2024-12-11 NOTE — DISCHARGE INSTRUCTIONS
DISCHARGE SUMMARY from Nurse    PATIENT INSTRUCTIONS:    After general anesthesia or intravenous sedation, for 24 hours or while taking prescription Narcotics:  Limit your activities  Do not drive and operate hazardous machinery  Do not make important personal or business decisions  Do  not drink alcoholic beverages  If you have not urinated within 8 hours after discharge, please contact your surgeon on call.    Report the following to your surgeon:  Excessive pain, swelling, redness or odor of or around the surgical area  Temperature over 100.5  Nausea and vomiting lasting longer than 4 hours or if unable to take medications  Any signs of decreased circulation or nerve impairment to extremity: change in color, persistent  numbness, tingling, coldness or increase pain  Any questions    What to do at Home:  Recommended activity: activity as tolerated,     If you experience any of the following symptoms Discharging [facility], please follow up with [facility].    *  Please give a list of your current medications to your Primary Care Provider.    *  Please update this list whenever your medications are discontinued, doses are      changed, or new medications (including over-the-counter products) are added.    *  Please carry medication information at all times in case of emergency situations.    These are general instructions for a healthy lifestyle:    No smoking/ No tobacco products/ Avoid exposure to second hand smoke  Surgeon General's Warning:  Quitting smoking now greatly reduces serious risk to your health.    Obesity, smoking, and sedentary lifestyle greatly increases your risk for illness    A healthy diet, regular physical exercise & weight monitoring are important for maintaining a healthy lifestyle    You may be retaining fluid if you have a history of heart failure or if you experience any of the following symptoms:  Weight gain of 3 pounds or more overnight or 5 pounds in a week, increased

## 2024-12-11 NOTE — PROGRESS NOTES
[facility]  Pulmonary, Critical Care, and Sleep Medicine    Name: Rina Prajapati MRN: [id number]   : 1960 Hospital: [facility]   Date: 2024        IMPRESSION:   Acute on chronic hypoxic respiratory failure with increased oxygen requirements on home oxygen concentrator, desaturations to 70%, required HFNC on admission, now on 5L with 94%  Etiology likely multifactorial, with COPD exacerbation, NSCLC, diastolic/systolic HF decompensation, worsening metastatic disease,  possible pneumonia vs non infectious pneumonitis. RVP negative. CT-A negative for PE, evidence of pulmonary hypertension, RVP neg, strp PNA/Legionella neg, low likelihood of bacterial infection with procal 0.11  L>R BL opacities on CT imaging  Bilateral pneumonia, viral vs bacterial vs atypical infectious process  Possible aspiration, with debris in trachea and bronchi.  NSCLC, actively on chemo. Followed by Dr. Li, with [facility]. Noted last admission with disease progression while on Taxotere, starting on Gemzar. Hx of SVC syndrome  VINCE pulmonary mass, 3.5cm, unchanged from  to , and multiple BL nodules  SIRS vs Sepsis with pulmonary source  ?FLORIAN, with Cr 2.64 above baseline 0.66. Now resolved in AM labs. Likely pre-renal vs ATN vs lab error  Severe COPD FEV1 in 2024 31% predicted with air trapping and reduced DLCO.  Possible exacerbation, CT with enphesemous changes. On LTOT. Has not been receiving prescribed bronchodilators due to high cost.  Combined systolic and Diastolic HF, current echo with mild systolic dysfunction EF 45% on   Possible exacerbation with pedal edema, hypoxia, elevated BNP 8746mg/mL above 3.622 ion   Pulmonary hypertension WHO Grp 2/3 with dilated RV  RVSP 45mmHg on , CT on  showing cardiomegaly with reflux of contrast into IVC  Electrolyte derangements:   Hypokalemia now resolved  Hyponatremia, now resolved  Normocytic normochromic anemia

## 2024-12-11 NOTE — PLAN OF CARE
Problem: Chronic Conditions and Co-morbidities  Goal: Patient's chronic conditions and co-morbidity symptoms are monitored and maintained or improved  12/11/2024 1341 by Lisette Hoffmann RN  Outcome: Progressing  Flowsheets (Taken 12/11/2024 0850)  Care Plan - Patient's Chronic Conditions and Co-Morbidity Symptoms are Monitored and Maintained or Improved: Monitor and assess patient's chronic conditions and comorbid symptoms for stability, deterioration, or improvement     Problem: Safety - Adult  Goal: Free from fall injury  12/11/2024 1341 by Lisette Hoffmann RN  Outcome: Progressing  Flowsheets (Taken 12/9/2024 0536 by Tania Hensley, RN)  Free From Fall Injury: Based on caregiver fall risk screen, instruct family/caregiver to ask for assistance with transferring infant if caregiver noted to have fall risk factors  Note: Educate fall prevention      Problem: Nutrition Deficit:  Goal: Optimize nutritional status  12/11/2024 1341 by Lisette Hoffmann RN  Outcome: Completed     Problem: Respiratory - Adult  Goal: Achieves optimal ventilation and oxygenation  12/11/2024 1341 by Lisette Hoffmann RN  Outcome: Progressing  Flowsheets (Taken 12/10/2024 2311 by Ginny Jordan, RN)  Achieves optimal ventilation and oxygenation:   Assess for changes in respiratory status   Assess for changes in mentation and behavior     Problem: Cardiovascular - Adult  Goal: Maintains optimal cardiac output and hemodynamic stability  12/11/2024 1341 by Lisette Hoffmann RN  Outcome: Progressing  Flowsheets (Taken 12/11/2024 1341)  Maintains optimal cardiac output and hemodynamic stability: Monitor blood pressure and heart rate     Problem: Cardiovascular - Adult  Goal: Absence of cardiac dysrhythmias or at baseline  12/11/2024 1341 by Lisette Hoffmann RN  Outcome: Progressing  Flowsheets (Taken 12/11/2024 0850)  Absence of cardiac dysrhythmias or at baseline: Monitor cardiac rate and rhythm     Problem: Skin/Tissue Integrity - Adult  Goal: Skin  integrity remains intact  12/11/2024 1341 by Lisette Hoffmann, RN  Outcome: Progressing  Flowsheets (Taken 12/11/2024 0850)  Skin Integrity Remains Intact: Monitor for areas of redness and/or skin breakdown     Problem: Musculoskeletal - Adult  Goal: Return mobility to safest level of function  12/11/2024 1341 by Lisette Hoffmann, RN  Outcome: Progressing  Flowsheets (Taken 12/11/2024 0850)  Return Mobility to Safest Level of Function: Assess patient stability and activity tolerance for standing, transferring and ambulating with or without assistive devices

## 2024-12-11 NOTE — PROGRESS NOTES
4 Eyes Skin Assessment     NAME:  Rina Prajapati  YOB: 1960  MEDICAL RECORD NUMBER:  [id number]    The patient is being assessed for  Shift Handoff    I agree that at least one RN has performed a thorough Head to Toe Skin Assessment on the patient. ALL assessment sites listed below have been assessed.      Areas assessed by both nurses:    Head, Face, Ears, Shoulders, Back, Chest, Arms, Elbows, Hands, Sacrum. Buttock, Coccyx, Ischium, Legs. Feet and Heels, and Under Medical Devices         Does the Patient have a Wound? No noted wound(s)       Isidro Prevention initiated by RN: Yes  Wound Care Orders initiated by RN: No    Pressure Injury (Stage 3,4, Unstageable, DTI, NWPT, and Complex wounds) if present, place Wound referral order by RN under : No    New Ostomies, if present place, Ostomy referral order under : No     Nurse 1 eSignature: Electronically signed by Liestte Hoffmann RN on 12/11/24 at 6:44 PM EST    **SHARE this note so that the co-signing nurse can place an eSignature**    Nurse 2 eSignature: Electronically signed by Ginny Wilson RN on 12/11/24 at 7:29 PM EST

## 2024-12-11 NOTE — CARE COORDINATION
Requested Case Management specialist to assist with transportation to [facility].  Address is [address], [place] [address] and phone number is [phone]  Any steps at the residence? No  Patient will require BLS transport.   Pt requires Stretcher If stretcher, reason: CHF, non small cell lung cancer, acute respiratory failure.  Patient is currently requiring oxygen Yes 4L NC  Height:5'7\"   Weight: 131lbs  Pt is on isolation: No   Is the pt ready now? yes  Requested time: 12/12/24 at 1000  PCS Faxed: No  Insurance verified on face sheet: Yes  Auth needed for transport: Yes  CM completed PCS/ Envelope and placed on chart.     Gris LEDESMAN, RN   Case Management   [phone]

## 2024-12-11 NOTE — CARE COORDINATION
[facility] scheduled transport for 10:00 am tomorrow (Thursday, December 12, 2024) to [facility] ([facility] [place] [address]) with Alma Rosa and received [facility] ID [id number].

## 2024-12-11 NOTE — CARE COORDINATION
Care Transitions Note    Noted Patient is currently admitted into [facility].

## 2024-12-11 NOTE — PLAN OF CARE
Problem: Chronic Conditions and Co-morbidities  Goal: Patient's chronic conditions and co-morbidity symptoms are monitored and maintained or improved  12/10/2024 2311 by Ginny Jordan, RN  Outcome: Progressing  Monitor and assess patient's chronic conditions and comorbid symptoms for stability, deterioration, or improvement  Note: Monitor vital signs every four hours     Problem: Pain  Goal: Verbalizes/displays adequate comfort level or baseline comfort level  12/10/2024 2311 by Ginny Jordan, RN  Outcome: Progressing   Assess pain using appropriate pain scale   Administer analgesics based on type and severity of pain and evaluate response   Implement non-pharmacological measures as appropriate and evaluate response     Problem: Safety - Adult  Goal: Free from fall injury  12/10/2024 2311 by Ginny Jordan RN  Outcome: Progressing  Note: Round on patient every two hours  Encourage patient to utilize the call bell when assistance is needed      Problem: Skin/Tissue Integrity  Goal: Absence of new skin breakdown  Description: 1.  Monitor for areas of redness and/or skin breakdown  2.  Assess vascular access sites every four hours  12/10/2024 2311 by Ginny Jordan, RN  Outcome: Progressing  Note: Encourage patient to make frequent turns while in bed     Problem: Nutrition Deficit:  Goal: Optimize nutritional status  12/10/2024 2311 by Ginny Jordan, RN  Outcome: Progressing   Assess nutritional status and recommend course of action     Problem: Cardiovascular - Adult  Goal: Maintains optimal cardiac output and hemodynamic stability  12/10/2024 2311 by Ginny Jordan, RN  Outcome: Progressing  Monitor blood pressure and heart rate     Problem: Musculoskeletal - Adult  Goal: Return mobility to safest level of function  12/10/2024 2311 by Ginny Jordan, RN  Outcome: Progressing  Assess patient stability and activity tolerance for standing, transferring and ambulating with or  without assistive devices

## 2024-12-11 NOTE — PROGRESS NOTES
PCCM UPDATE  4:05 PM  Called to bedside by nursing because patient was short of breath after receiving duoneb treatment.  At bedside, she was sitting up, appears to have increased work of breathing and using accessory muscles. She denies chest pain, \"feels like phlegm got stuck\".  Oxygen saturation was in the 60-70ss on 4L, increased flow and encouraged deep breathing.  Also escalated to NRM. WOB improved, and saturation improved to 100%.  Stat CXR ordered, and IV solumedrol 125mg ordered.  Will placed patient on high flow.   Discussed with Dr. Ames who is in agreement to plan. Also added hypertonic saline nebs.  RT Juliette and nursing at bedside.    5:11 PM  CXR: Reviewed, noted more pleural effusion, with blunting of R costerphrenic angle and increased opacities, concerning for pulmonary edema. Discussed with Dr. Ames.  Will give 1x dose of lasix. She is schedule q48hrs. Renal indice stable.  Discussed with nursing. Monitor I/Os.    Srinath Malhotra, PhD., PA-C.  4:08 PM  Pulmonary, Critical Care Medicine  [facility]

## 2024-12-11 NOTE — PROGRESS NOTES
1518: Breathing treatment given with no complaints.     1527: [place] patient talking to nurse about being SOB.    Patient keeps requesting Neb mask to use to help her breath. She is'nt requesting medicine just the mask with oxygen flow.     1535: RT to room to discuss with patient and RN.     PCC in to take vitals but patient wanted RT in room while they were being done.     1542: RT at bedside. RN back to bedside.     1548: Patient sitting up on side of bed to catch breath with RN assist. RT remains at bedside.     1605: Pulmonary PA to room.     1610: RT placed on NRB and went to get Opti-Flow HHFNC.    1615: Placed on Opti-Flow at 55% and 40L. SpO2 93-95%.     Patient states she feel her breathing is much improved.

## 2024-12-12 ENCOUNTER — HOSPITAL ENCOUNTER (OUTPATIENT)
Facility: HOSPITAL | Age: 64
Setting detail: INFUSION SERIES
End: 2024-12-12

## 2024-12-12 PROBLEM — F17.200 TOBACCO USE DISORDER: Status: ACTIVE | Noted: 2024-12-12

## 2024-12-12 LAB
ANION GAP SERPL CALC-SCNC: 4 MMOL/L (ref 3–18)
BUN SERPL-MCNC: 22 MG/DL (ref 7–18)
BUN/CREAT SERPL: 41 (ref 12–20)
CALCIUM SERPL-MCNC: 9 MG/DL (ref 8.5–10.1)
CHLORIDE SERPL-SCNC: 103 MMOL/L (ref 100–111)
CO2 SERPL-SCNC: 31 MMOL/L (ref 21–32)
CREAT SERPL-MCNC: 0.54 MG/DL (ref 0.6–1.3)
GLUCOSE BLD STRIP.AUTO-MCNC: 188 MG/DL (ref 70–110)
GLUCOSE BLD STRIP.AUTO-MCNC: 211 MG/DL (ref 70–110)
GLUCOSE BLD STRIP.AUTO-MCNC: 243 MG/DL (ref 70–110)
GLUCOSE BLD STRIP.AUTO-MCNC: 244 MG/DL (ref 70–110)
GLUCOSE SERPL-MCNC: 136 MG/DL (ref 74–99)
POTASSIUM SERPL-SCNC: 3.9 MMOL/L (ref 3.5–5.5)
SODIUM SERPL-SCNC: 138 MMOL/L (ref 136–145)

## 2024-12-12 PROCEDURE — 2580000003 HC RX 258: Performed by: INTERNAL MEDICINE

## 2024-12-12 PROCEDURE — 97535 SELF CARE MNGMENT TRAINING: CPT

## 2024-12-12 PROCEDURE — 80048 BASIC METABOLIC PNL TOTAL CA: CPT

## 2024-12-12 PROCEDURE — 6360000002 HC RX W HCPCS: Performed by: HOSPITALIST

## 2024-12-12 PROCEDURE — 6360000002 HC RX W HCPCS

## 2024-12-12 PROCEDURE — 94669 MECHANICAL CHEST WALL OSCILL: CPT

## 2024-12-12 PROCEDURE — 97530 THERAPEUTIC ACTIVITIES: CPT

## 2024-12-12 PROCEDURE — 2580000003 HC RX 258

## 2024-12-12 PROCEDURE — 94761 N-INVAS EAR/PLS OXIMETRY MLT: CPT

## 2024-12-12 PROCEDURE — 6360000002 HC RX W HCPCS: Performed by: INTERNAL MEDICINE

## 2024-12-12 PROCEDURE — 6370000000 HC RX 637 (ALT 250 FOR IP): Performed by: STUDENT IN AN ORGANIZED HEALTH CARE EDUCATION/TRAINING PROGRAM

## 2024-12-12 PROCEDURE — 94640 AIRWAY INHALATION TREATMENT: CPT

## 2024-12-12 PROCEDURE — 36415 COLL VENOUS BLD VENIPUNCTURE: CPT

## 2024-12-12 PROCEDURE — 1100000000 HC RM PRIVATE

## 2024-12-12 PROCEDURE — 99232 SBSQ HOSP IP/OBS MODERATE 35: CPT | Performed by: INTERNAL MEDICINE

## 2024-12-12 PROCEDURE — 2580000003 HC RX 258: Performed by: STUDENT IN AN ORGANIZED HEALTH CARE EDUCATION/TRAINING PROGRAM

## 2024-12-12 PROCEDURE — 97164 PT RE-EVAL EST PLAN CARE: CPT

## 2024-12-12 PROCEDURE — 82962 GLUCOSE BLOOD TEST: CPT

## 2024-12-12 PROCEDURE — 99232 SBSQ HOSP IP/OBS MODERATE 35: CPT | Performed by: NURSE PRACTITIONER

## 2024-12-12 PROCEDURE — 2700000000 HC OXYGEN THERAPY PER DAY

## 2024-12-12 RX ADMIN — SODIUM CHLORIDE SOLN NEBU 3% 4 ML: 3 NEBU SOLN at 16:48

## 2024-12-12 RX ADMIN — IPRATROPIUM BROMIDE AND ALBUTEROL SULFATE 1 DOSE: .5; 3 SOLUTION RESPIRATORY (INHALATION) at 09:15

## 2024-12-12 RX ADMIN — FOLIC ACID 1 MG: 1 TABLET ORAL at 08:28

## 2024-12-12 RX ADMIN — IPRATROPIUM BROMIDE AND ALBUTEROL SULFATE 1 DOSE: .5; 3 SOLUTION RESPIRATORY (INHALATION) at 00:00

## 2024-12-12 RX ADMIN — SODIUM CHLORIDE SOLN NEBU 3% 4 ML: 3 NEBU SOLN at 20:40

## 2024-12-12 RX ADMIN — LEVOTHYROXINE SODIUM 88 MCG: 88 TABLET ORAL at 06:01

## 2024-12-12 RX ADMIN — MONTELUKAST 10 MG: 10 TABLET, FILM COATED ORAL at 08:28

## 2024-12-12 RX ADMIN — SODIUM CHLORIDE SOLN NEBU 3% 4 ML: 3 NEBU SOLN at 13:33

## 2024-12-12 RX ADMIN — FUROSEMIDE 40 MG: 10 INJECTION, SOLUTION INTRAMUSCULAR; INTRAVENOUS at 17:02

## 2024-12-12 RX ADMIN — IPRATROPIUM BROMIDE AND ALBUTEROL SULFATE 1 DOSE: .5; 3 SOLUTION RESPIRATORY (INHALATION) at 22:58

## 2024-12-12 RX ADMIN — SERTRALINE HYDROCHLORIDE 50 MG: 50 TABLET ORAL at 08:28

## 2024-12-12 RX ADMIN — IPRATROPIUM BROMIDE AND ALBUTEROL SULFATE 1 DOSE: .5; 3 SOLUTION RESPIRATORY (INHALATION) at 13:33

## 2024-12-12 RX ADMIN — WATER 40 MG: 1 INJECTION INTRAMUSCULAR; INTRAVENOUS; SUBCUTANEOUS at 08:28

## 2024-12-12 RX ADMIN — FERROUS SULFATE TAB 325 MG (65 MG ELEMENTAL FE) 325 MG: 325 (65 FE) TAB at 08:28

## 2024-12-12 RX ADMIN — AZITHROMYCIN MONOHYDRATE 250 MG: 500 INJECTION, POWDER, LYOPHILIZED, FOR SOLUTION INTRAVENOUS at 00:09

## 2024-12-12 RX ADMIN — IPRATROPIUM BROMIDE AND ALBUTEROL SULFATE 1 DOSE: .5; 3 SOLUTION RESPIRATORY (INHALATION) at 20:40

## 2024-12-12 RX ADMIN — GUAIFENESIN 1200 MG: 600 TABLET, EXTENDED RELEASE ORAL at 21:03

## 2024-12-12 RX ADMIN — IPRATROPIUM BROMIDE AND ALBUTEROL SULFATE 1 DOSE: .5; 3 SOLUTION RESPIRATORY (INHALATION) at 03:20

## 2024-12-12 RX ADMIN — INSULIN LISPRO 2 UNITS: 100 INJECTION, SOLUTION INTRAVENOUS; SUBCUTANEOUS at 22:11

## 2024-12-12 RX ADMIN — IPRATROPIUM BROMIDE AND ALBUTEROL SULFATE 1 DOSE: .5; 3 SOLUTION RESPIRATORY (INHALATION) at 16:48

## 2024-12-12 RX ADMIN — APIXABAN 5 MG: 5 TABLET, FILM COATED ORAL at 08:28

## 2024-12-12 RX ADMIN — WATER 40 MG: 1 INJECTION INTRAMUSCULAR; INTRAVENOUS; SUBCUTANEOUS at 00:04

## 2024-12-12 RX ADMIN — WATER 40 MG: 1 INJECTION INTRAMUSCULAR; INTRAVENOUS; SUBCUTANEOUS at 16:39

## 2024-12-12 RX ADMIN — INSULIN LISPRO 2 UNITS: 100 INJECTION, SOLUTION INTRAVENOUS; SUBCUTANEOUS at 06:08

## 2024-12-12 RX ADMIN — ARFORMOTEROL TARTRATE 15 MCG: 15 SOLUTION RESPIRATORY (INHALATION) at 09:15

## 2024-12-12 RX ADMIN — SPIRONOLACTONE 25 MG: 25 TABLET ORAL at 08:28

## 2024-12-12 RX ADMIN — ARFORMOTEROL TARTRATE 15 MCG: 15 SOLUTION RESPIRATORY (INHALATION) at 20:40

## 2024-12-12 RX ADMIN — SODIUM CHLORIDE SOLN NEBU 3% 4 ML: 3 NEBU SOLN at 09:15

## 2024-12-12 RX ADMIN — SODIUM CHLORIDE, PRESERVATIVE FREE 10 ML: 5 INJECTION INTRAVENOUS at 22:16

## 2024-12-12 RX ADMIN — BUDESONIDE 1 MG: 1 SUSPENSION RESPIRATORY (INHALATION) at 09:15

## 2024-12-12 RX ADMIN — FUROSEMIDE 40 MG: 10 INJECTION, SOLUTION INTRAMUSCULAR; INTRAVENOUS at 08:28

## 2024-12-12 RX ADMIN — INSULIN LISPRO 2 UNITS: 100 INJECTION, SOLUTION INTRAVENOUS; SUBCUTANEOUS at 16:39

## 2024-12-12 RX ADMIN — APIXABAN 5 MG: 5 TABLET, FILM COATED ORAL at 21:04

## 2024-12-12 RX ADMIN — INSULIN LISPRO 2 UNITS: 100 INJECTION, SOLUTION INTRAVENOUS; SUBCUTANEOUS at 11:50

## 2024-12-12 RX ADMIN — GUAIFENESIN 1200 MG: 600 TABLET, EXTENDED RELEASE ORAL at 08:28

## 2024-12-12 RX ADMIN — BUDESONIDE 1 MG: 1 SUSPENSION RESPIRATORY (INHALATION) at 20:40

## 2024-12-12 RX ADMIN — SODIUM CHLORIDE, PRESERVATIVE FREE 10 ML: 5 INJECTION INTRAVENOUS at 08:29

## 2024-12-12 ASSESSMENT — PAIN SCALES - WONG BAKER
WONGBAKER_NUMERICALRESPONSE: NO HURT

## 2024-12-12 ASSESSMENT — PAIN SCALES - GENERAL
PAINLEVEL_OUTOF10: 0

## 2024-12-12 NOTE — CONSULTS
Comprehensive Nutrition Assessment    Type and Reason for Visit:  Reassess    Nutrition Recommendations/Plan:   Recommend adding 4 CHO choice modifier r/t elevated BG levels.  Recommend discontinuing Ensure Plus HP x1/day  and Magic Cup BID, and recommend Glucerna Shake TID (220 kcals, 10 gm protein each) r/t pt with elevated BG levels.  Recommend/order virt-caps supplementation daily, Daily wts.  Continue to monitor tolerance of PO, compliance of oral supplements, weight, labs, and plan of care during admission.     Malnutrition Assessment:  Malnutrition Status:  Mild malnutrition (12/06/24 1001)    Context:  Acute Illness     Findings of the 6 clinical characteristics of malnutrition:  Energy Intake:  75% or less of estimated energy requirements for 7 or more days  Weight Loss:  No weight loss     Body Fat Loss:  No body fat loss     Muscle Mass Loss:  Mild muscle mass loss Scapula (trapezius), Clavicles (pectoralis & deltoids)  Fluid Accumulation:  No fluid accumulation     Strength:  Not Performed    Nutrition History and Allergies:      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 Pt reports  lbs and 5 lbs weight loss in the past week.     Weight Hx: 128 lbs Wt change: +2 lb (+1.5%) x 2 months - not significant.   Wt Readings from Last 10 Encounters:   12/05/24 59.8 kg (131 lb 12.8 oz)   12/04/24 54.7 kg (120 lb 9.6 oz)   11/19/24 61.5 kg (135 lb 9.6 oz)  Outcomes: Food and Nutrient Intake, Vitamin/Mineral Intake, Supplement Intake  Physical Signs/Symptoms Outcomes: Biochemical Data, Chewing or Swallowing, Fluid Status or Edema, Nausea or Vomiting, Nutrition Focused Physical Findings, Skin, Weight    Discharge Planning:    Continue current diet, Continue Oral Nutrition Supplement     Rohini Wu MS, RD  Contact: [place]

## 2024-12-12 NOTE — PROGRESS NOTES
12/12/24 1333   Oxygen Therapy/Pulse Ox   O2 Device Heated high flow cannula   O2 Flow Rate (L/min) (S)  35 L/min  (Decreased from 45L)   FiO2  (S)  35 %  (Decreased from 40%)   Pulse (!) 101   Respirations (!) 52   SpO2 95 %

## 2024-12-12 NOTE — PLAN OF CARE
Problem: Occupational Therapy - Adult  Goal: By Discharge: Performs self-care activities at highest level of function for planned discharge setting.  See evaluation for individualized goals.  Description:   Occupational Therapy Goals:  Initiated 12/6/2024 to be met within 7-10 days.    1.  Patient will perform grooming with supervision/set-up while standing at the sink for > 2 min with Good balance.   2.  Patient will perform bathing with supervision/set-up.  3.  Patient will perform lower body dressing with supervision/set-up.  4.  Patient will perform toilet transfers with supervision/set-up.  5.  Patient will perform all aspects of toileting with supervision/set-up.  6.  Patient will participate in upper extremity therapeutic exercise/activities with supervision/set-up for 8 minutes to improve endurance and UB strength needed for ADLs    7.  Patient will utilize energy conservation techniques during functional activities with verbal cues.    PLOF: Pt lives with family, mainly Mod Ind for ADLs and functional mobility with walker, recently had decline in functional status, using BSC for toileting.  Outcome: Progressing   OCCUPATIONAL THERAPY TREATMENT    Patient: Rina Prajapati (64 y.o. female)  Date: 12/12/2024  Diagnosis: Acute hypoxic respiratory failure [J96.01] Acute on chronic respiratory failure      Precautions: Fall Risk, General Precautions,  ,  ,  ,  ,  ,  ,      Chart, occupational therapy assessment, plan of care, and goals were reviewed.  ASSESSMENT:  Pt presented sitting EOB upon entry and on 45L HFNC. Pt tolerated sitting EOB > 20 mins completing her breakfast and performed simple grooming tasks to improve functional activity tolerance for ADL carryover demonstrating G balance. STS transfer CGA with RW. Pt able to take lateral steps towards HOB for optimal bed positioning. SPO2 monitored maintaining in 90s on 45L HFNC. Pt re educated on importance of PLB for optimal oxygenation. She was left

## 2024-12-12 NOTE — PLAN OF CARE
Problem: Physical Therapy - Adult  Goal: By Discharge: Performs mobility at highest level of function for planned discharge setting.  See evaluation for individualized goals.  Description: Initiated  12/6/24, reviewed and continued 12/12/24  to be met within 7-10 days.    1.  Patient will move from supine to sit and sit to supine , scoot up and down, and roll side to side in bed with independence.    2.  Patient will transfer from bed to chair and chair to bed with modified independence using the least restrictive device.  3.  Patient will perform sit to stand with modified independence.  4.  Patient will ambulate with modified independence for 15 feet with the least restrictive device.   5.  Patient will ascend/descend 1 stairs with 0 handrail(s) with modified independence.    PLOF: Lives with fiance in a 1 sstory home with 1 NOEL. Mod I RW a month ago per pt, however has just been doing stand pivots [place] [facility] [place] couple of weeks.    Outcome: Progressing   PHYSICAL THERAPY RE-EVALUATION    Patient: Rina Prajapati (64 y.o. female)  Date: 12/12/2024  Primary Diagnosis: Acute hypoxic respiratory failure [J96.01]       Precautions: Fall Risk, General Precautions,    ASSESSMENT :  Pt is in bed and agreeable to PT treatment.  Pt was placed on 45L of hi-paola O2 last night and on arrival her O2 sat was 99%, with activity patient's O2 sat decreased to 93% briefly and then returned to 97%.  Pt performed bed mobility with SBA and performed standing transfer with CGA.  Pt sidestepped 2 feet with RW and CGA with decreased step length and foot clearance.  Pt returned to supine with SBA. Pt was left in bed with needs in reach and MD present.    DEFICITS/IMPAIRMENTS:    , Body Structures, Functions, Activity Limitations Requiring Skilled Therapeutic Intervention: Decreased functional mobility ;Decreased strength;Decreased tolerance to work activity;Decreased endurance;Decreased balance    Patient will benefit from skilled  Training: Yes  Rolling: Supervision  Supine to Sit: Stand-by assistance  Sit to Supine: Stand-by assistance  Transfers:  Transfer Training  Transfer Training: Yes  Sit to Stand: Contact-guard assistance  Stand to Sit: Contact-guard assistance  Balance:   Balance  Sitting: Intact  Standing: Impaired;With support  Standing - Static: Good (with RW)  Standing - Dynamic: Fair (with RW)  Ambulation/Gait Training:  Gait  Gait Training: Yes  Left Side Weight Bearing: Full  Right Side Weight Bearing: Full  Overall Level of Assistance: Contact-guard assistance  Distance (ft): 2 Feet (sidestepping along the edge of the bed)  Assistive Device: Walker, rolling  Interventions: Safety awareness training;Verbal cues  Speed/Xiao: Slow  Step Length: Right shortened;Left shortened  Gait Abnormalities: Decreased step clearance     Therapeutic Exercises/Neuromuscular Re-education:   Ankle pumps, heel slides, LAQ, seated marching x10    Pain:  Intensity Pre-treatment: 0/10   Intensity Post-treatment: 0/10  Scale: Numeric Rating Scale  Location: n/a      Activity Tolerance:   Activity Tolerance: Treatment limited secondary to medical complications (requiring hi-flow O2)  Please refer to the flowsheet for vital signs taken during this treatment.    After treatment:   []         Patient left in no apparent distress sitting up in chair  [x]         Patient left in no apparent distress in bed  [x]         Call bell left within reach  [x]         Nursing notified  []         Caregiver present  [x]         Bed alarm activated  []         SCDs applied    COMMUNICATION/EDUCATION:   Patient Education  Education Given To: Patient  Education Provided: Plan of Care;Mobility Training;Fall Prevention Strategies;Transfer Training  Education Method: Demonstration;Verbal;Teach Back  Barriers to Learning: None  Education Outcome: Verbalized understanding;Demonstrated understanding;Continued education needed    Thank you for this referral.  Debbie GALVAN

## 2024-12-12 NOTE — PROGRESS NOTES
[x] Verbal and participatory  [] Non-participatory due to:         PHYSICAL EXAM:     From RN flowsheet:  Wt Readings from Last 3 Encounters:   12/05/24 59.8 kg (131 lb 12.8 oz)   12/04/24 54.7 kg (120 lb 9.6 oz)   11/19/24 61.5 kg (135 lb 9.6 oz)       BP (!) 110/96   Pulse (!) 116   Temp 98.4 °F (36.9 °C) (Oral)   Resp 20   Ht 1.702 m (5' 7.01\")   Wt 59.8 kg (131 lb 12.8 oz)   SpO2 96%   BMI 20.64 kg/m²     Constitutional: reclining in bed alert oriented x 3 on high flow comfortable appearing  Eyes: pupils equal, anicteric  ENMT: no nasal discharge  Cardiovascular: S1-S2 heard, tachycardia  Respiratory: resp not labored on high flow   Gastrointestinal: soft non-tender, nondistended, +bowel sounds  Musculoskeletal: No pedal edema  Skin: warm, dry  Neurologic: following commands, moving all extremities, no tremors  Psychiatric: No agitation, no hallucinations         PALLIATIVE DIAGNOSES:   goals of care discussion/Advance care planning   2.  Palliative care encounter and DNR discussion  3.  Acute on chronic hypoxic respiratory failure secondary to CHF exacerbation  4.  Non-small cell lung cancer with PE and DVT  5.  SVC syndrome, COPD  6.  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  Cardiology is recommending continuing IV Lasix for next 24 to 48 hours.  Patient is aware about her cancer treatment plan and stated she did not get chemotherapy yesterday as she developed shortness of breath.  She wants to continue chemotherapy once she gets out of the hospital.  Prior to coming to hospital, patient states she lives with danielle.  She has a son named Good who lives in [facility].  She has total 8 siblings.  She has a rolling walker.  She has home oxygen.  Patient has AMD on file naming her sister Ms. Garcia to be primary medical power of  and Mr. Richardson to be a secondary medical power of .  She does not want to make any changes to eat.  We also discussed with her about risk of sudden cardiac death and cardiac arrhythmia due to his underlying CHF.  She verbalized understanding about it.  She understands benefits and burdens of CPR, shocks and intubation and she stated she would like us to continue everything possible and her brother and sister can make medical decision when she is unable to talk to us.  Her plan is to continue chemotherapy.  She appreciated our visit.  Patient does not have any uncontrolled symptoms in form of shortness of breath and pain currently.  Plan: Full code with full interventions, AMD is on file, patient wants to pursue aggressive care including chemotherapy.    TREATMENT PREFERENCES:   Code Status: Full Code    [facility]/12/2024    10:00 AM   Demographics   Marital Status Single          Other Instructions: Consider oncology consult for prognostication          FUNCTIONAL ASSESSMENT:     Palliative Performance Scale (PPS):  PPS: 70    ECOG:        Modified ESAS:  Modified-[place] Symptom Assessment Scale (ESAS)  Tiredness Score: 4  Drowsiness Score: Not drowsy  Depression Score: Not depressed  Pain Score: No pain  Anxiety Score: Not anxious  Nausea Score: Not  AC    montelukast (SINGULAIR) tablet 10 mg  10 mg Oral Daily    sertraline (ZOLOFT) tablet 50 mg  50 mg Oral Daily    spironolactone (ALDACTONE) tablet 25 mg  25 mg Oral Daily    heparin (porcine) injection 4,800 Units  80 Units/kg IntraVENous PRN    heparin (porcine) injection 2,400 Units  40 Units/kg IntraVENous PRN    guaiFENesin (MUCINEX) extended release tablet 1,200 mg  1,200 mg Oral BID    benzonatate (TESSALON) capsule 200 mg  200 mg Oral TID PRN    sodium chloride flush 0.9 % injection 5-40 mL  5-40 mL IntraVENous 2 times per day    sodium chloride flush 0.9 % injection 5-40 mL  5-40 mL IntraVENous PRN    0.9 % sodium chloride infusion   IntraVENous PRN    potassium chloride (KLOR-CON M) extended release tablet 40 mEq  40 mEq Oral PRN    Or    potassium bicarb-citric acid (EFFER-K) effervescent tablet 40 mEq  40 mEq Oral PRN    Or    potassium chloride 10 mEq/100 mL IVPB (Peripheral Line)  10 mEq IntraVENous PRN    magnesium sulfate 2000 mg in 50 mL IVPB premix  2,000 mg IntraVENous PRN    ondansetron (ZOFRAN-ODT) disintegrating tablet 4 mg  4 mg Oral Q8H PRN    Or    ondansetron (ZOFRAN) injection 4 mg  4 mg IntraVENous Q6H PRN    polyethylene glycol (GLYCOLAX) packet 17 g  17 g Oral Daily PRN    acetaminophen (TYLENOL) tablet 650 mg  650 mg Oral Q6H PRN    Or    acetaminophen (TYLENOL) suppository 650 mg  650 mg Rectal Q6H PRN    melatonin tablet 3 mg  3 mg Oral Nightly PRN    nicotine (NICODERM CQ) 14 MG/24HR 1 patch  1 patch TransDERmal Daily    insulin lispro (HUMALOG,ADMELOG) injection vial 0-8 Units  0-8 Units SubCUTAneous 4x Daily AC & HS        LAB AND IMAGING FINDINGS:     Recent Results (from the past 24 hour(s))   POCT Glucose    Collection Time: 12/11/24  4:04 PM   Result Value Ref Range    POC Glucose 196 (H) 70 - 110 mg/dL   POCT Glucose    Collection Time: 12/11/24  8:42 PM   Result Value Ref Range    POC Glucose 261 (H) 70 - 110 mg/dL   Basic Metabolic Panel w/ Reflex to MG    Collection

## 2024-12-12 NOTE — PALLIATIVE CARE
Palliative Medicine     Palliative Medicine team members Keila Lowry NP and this writer for follow up visit. Events of overnight noted. Patient now back on HF O2 at a rate of  45 liters with FiO2 of 50. Palliative team readdressed goals of care with a focus on intubation for respiratory failure. Discussed and educated on the benefits and burdens of intubation in the event of respiratory decline. Patient expressed her desire to undergo intubation. Team requested her to consider what medical interventions she would want if not able to be medically extubated(removal from the tube). This writer provided patient with printed information of CPR and ventilator facts with photo.  Patient had no other questions for our team.     Lena LEDESMAN, RN  Palliative Medicine Inpatient RN  Palliative [facility] Line: [phone]

## 2024-12-12 NOTE — PROGRESS NOTES
12/12/24 0415   Oxygen Therapy/Pulse Ox   O2 Therapy Oxygen humidified   O2 Device Heated high flow cannula  (opti)   O2 Flow Rate (L/min) 45 L/min   FiO2  55 %   Respirations 20   SpO2 98 %     RT called at the bedside due to desating to 80's. RT increased flow/fio2. Spo2 improved after the changes.

## 2024-12-12 NOTE — PROGRESS NOTES
12/12/24 0915   Oxygen Therapy/Pulse Ox   O2 Device Heated high flow cannula   O2 Flow Rate (L/min) 45 L/min   FiO2  (S)  50 %  (decreased from 60%)   Pulse (!) 103   Respirations 23   SpO2 96 %

## 2024-12-12 NOTE — PROGRESS NOTES
[facility]ist Group  Progress Note    Patient: Rina Prajapati Age: 64 y.o. : 1960 MR#: [id number] SSN: [id number]  Date/Time: 2024     Subjective:   Patient worsened late this afternoon. Unfortunately became more hypoxic. Now on HFNC. No pain complaints.    Review of systems  General: No fevers or chills.  Cardiovascular: No chest pain or pressure. No palpitations.   Pulmonary: No shortness of breath, cough or wheeze.   Gastrointestinal: No abdominal pain, nausea, vomiting or diarrhea.   Genitourinary: No urinary frequency, urgency, hesitancy or dysuria.   Musculoskeletal: No joint or muscle pain, no back pain, no recent trauma.    Neurologic: No headache, numbness, tingling or weakness.   Assessment/Plan:   #Acute on chronic hypoxic respiratory failure.  Etiology is likely multifactorial in the setting of non-small cell lung cancer, COPD exacerbation, CHF exacerbation, pneumonia.  Baseline home oxygen is 2 to 3 L. Back up to 5L patient was satting 90% on 5 L.  Patient appeared more short of breath and associated wheezing.  #Bilateral pneumonia, possible aspiration vs viral  #COPD exacerbation  #CHF exacerbation.  EF 45 to 50%  #Small to moderate pericardial effusion  #Non-small cell lung cancer on Taxotere  #History of PE/DVT.  On Eliquis  #SVC syndrome  #Tobacco dependence).   #pulmonary HTN     Admitted to 4 N.  Cardiac monitoring  Continue IV Lasix, changed to BID  S/p solumedrol dose  Monitor renal function  Continue Eliquis twice daily  Maintain K and magnesium above 4 and 2 respectively  Patient transition to oral steroids at this point  Discontinue Zosyn, patient is status post 5 days of treatment  Continue azithromycin  DuoNebs as needed  Continue bronchodilators  Palliative care consult  Nicotine patch on discharge  Talk to nursing about placing incentive spirometer in patient's room      I spent 40 minutes with the patient in face-to-face consultation,  Collection Time: 12/11/24  3:44 AM   Result Value Ref Range    WBC 9.1 4.6 - 13.2 K/uL    RBC 4.00 (L) 4.20 - 5.30 M/uL    Hemoglobin 9.6 (L) 12.0 - 16.0 g/dL    Hematocrit 32.0 (L) 35.0 - 45.0 %    MCV 80.0 78.0 - 100.0 FL    MCH 24.0 24.0 - 34.0 PG    MCHC 30.0 (L) 31.0 - 37.0 g/dL    RDW 17.7 (H) 11.6 - 14.5 %    Platelets 273 135 - 420 K/uL    MPV 9.7 9.2 - 11.8 FL    Nucleated RBCs 0.0 0  WBC    nRBC 0.00 0.00 - 0.01 K/uL   Comprehensive Metabolic Panel    Collection Time: 12/11/24  3:44 AM   Result Value Ref Range    Sodium 140 136 - 145 mmol/L    Potassium 3.8 3.5 - 5.5 mmol/L    Chloride 106 100 - 111 mmol/L    CO2 28 21 - 32 mmol/L    Anion Gap 6 3.0 - 18 mmol/L    Glucose 114 (H) 74 - 99 mg/dL    BUN 22 (H) 7.0 - 18 MG/DL    Creatinine 0.60 0.6 - 1.3 MG/DL    BUN/Creatinine Ratio 37 (H) 12 - 20      Est, Glom Filt Rate >90 >60 ml/min/1.73m2    Calcium 8.8 8.5 - 10.1 MG/DL    Total Bilirubin 0.5 0.2 - 1.0 MG/DL    ALT 27 13 - 56 U/L    AST 10 10 - 38 U/L    Alk Phosphatase 43 (L) 45 - 117 U/L    Total Protein 5.5 (L) 6.4 - 8.2 g/dL    Albumin 2.4 (L) 3.4 - 5.0 g/dL    Globulin 3.1 2.0 - 4.0 g/dL    Albumin/Globulin Ratio 0.8 0.8 - 1.7     POCT Glucose    Collection Time: 12/11/24  6:40 AM   Result Value Ref Range    POC Glucose 88 70 - 110 mg/dL   POCT Glucose    Collection Time: 12/11/24 11:23 AM   Result Value Ref Range    POC Glucose 103 70 - 110 mg/dL   POCT Glucose    Collection Time: 12/11/24  4:04 PM   Result Value Ref Range    POC Glucose 196 (H) 70 - 110 mg/dL       Signed By: Herminio Sadler,      December 11, 2024      Disclaimer: Sections of this note are dictated using utilizing voice recognition software.  Minor typographical errors may be present. If questions arise, please do not hesitate to contact me or call our department.

## 2024-12-12 NOTE — PROGRESS NOTES
[facility]  Pulmonary, Critical Care, and Sleep Medicine    Name: Rina Prajapati MRN: [id number]   : 1960 Hospital: [facility]   Date: 2024        IMPRESSION:   Acute on chronic hypoxic respiratory failure with increased oxygen requirements on home oxygen concentrator, desaturations to 70%, required HFNC on admission, now on HFNC 40% - SpO2 96%  Etiology likely multifactorial, with COPD exacerbation, NSCLC, diastolic/systolic HF decompensation, worsening metastatic disease,  possible pneumonia vs non infectious pneumonitis. RVP negative. CT-A negative for PE, evidence of pulmonary hypertension, RVP neg, strp PNA/Legionella neg, low likelihood of bacterial infection with procal 0.11  L>R BL opacities on CT imaging  Bilateral pneumonia, viral vs bacterial vs atypical infectious process  Possible aspiration, with debris in trachea and bronchi.  NSCLC, actively on chemo. Followed by Dr. Li, with [facility]. Noted last admission with disease progression while on Taxotere, starting on Gemzar. Hx of SVC syndrome  VINCE pulmonary mass, 3.5cm, unchanged from  to , and multiple BL nodules  SIRS vs Sepsis with pulmonary source  ?FLORIAN, with Cr 2.64 above baseline 0.66. Now resolved in AM labs. Likely pre-renal vs ATN vs lab error  Severe COPD FEV1 in 2024 31% predicted with air trapping and reduced DLCO.  Possible exacerbation, CT with enphesemous changes. On LTOT. Has not been receiving prescribed bronchodilators due to high cost.  Combined systolic and Diastolic HF, current echo with mild systolic dysfunction EF 45% on   Possible exacerbation with pedal edema, hypoxia, elevated BNP 8746mg/mL above 3.622 ion   Pulmonary hypertension WHO Grp 2/3 with dilated RV  RVSP 45mmHg on , CT on  showing cardiomegaly with reflux of contrast into IVC  Electrolyte derangements:   Hypokalemia now resolved  Hyponatremia, now resolved  Normocytic normochromic  abnormalities.    Right Ventricle: Right ventricle is dilated. Normal systolic function.    Tricuspid Valve: Mildly elevated RVSP, consistent with mild pulmonary hypertension. The estimated RVSP is 45 mmHg.    Pericardium: Small to moderate (1-2 cm) circumferential pericardial effusion present. No indication of cardiac tamponade. Largest accumulation near right heart.    Signed by: Jayce Huang MD on 11/14/2024 12:32 PM      Imaging:  [x]I have personally reviewed the patient’s radiographs  []Radiographs reviewed with radiologist   []No change from prior, tubes and lines in adequate position  []Improved   []Worsening    High complexity decision making was performed during the evaluation of this patient at high risk for decompensation with multiple organ involvement     Above mentioned total time spent on reviewing the case/medical record/data/notes/EMR/patient examination/documentation/coordinating care with nurse/consultants, exclusive of procedures with complex decision making performed and > 50% time spent in face to face evaluation.    Jonn Ames DO   12/12/2024  Pulmonary, Critical Care Medicine  [facility]

## 2024-12-12 NOTE — PLAN OF CARE
Problem: Chronic Conditions and Co-morbidities  Goal: Patient's chronic conditions and co-morbidity symptoms are monitored and maintained or improved  Outcome: Progressing  Flowsheets (Taken 12/12/2024 0800)  Care Plan - Patient's Chronic Conditions and Co-Morbidity Symptoms are Monitored and Maintained or Improved:   Monitor and assess patient's chronic conditions and comorbid symptoms for stability, deterioration, or improvement   Collaborate with multidisciplinary team to address chronic and comorbid conditions and prevent exacerbation or deterioration     Problem: Safety - Adult  Goal: Free from fall injury  Outcome: Progressing  Flowsheets (Taken 12/9/2024 0536 by Tania Hensley, RN)  Free From Fall Injury: Based on caregiver fall risk screen, instruct family/caregiver to ask for assistance with transferring infant if caregiver noted to have fall risk factors  Note: Pt will remain free of fall or injury during the shift. Patient instructed to call for assistance when getting out of bed. Bed in lowest position, wheels locked, call bell and other personal belongings within reach.      Problem: Respiratory - Adult  Goal: Achieves optimal ventilation and oxygenation  Outcome: Progressing  Flowsheets (Taken 12/11/2024 2327 by Ginny Jordan, RN)  Achieves optimal ventilation and oxygenation:   Assess for changes in respiratory status   Assess for changes in mentation and behavior  Note: Will collaborate with respiratory to wean oxygen appropriately.      Problem: Cardiovascular - Adult  Goal: Maintains optimal cardiac output and hemodynamic stability  Outcome: Progressing  Flowsheets (Taken 12/11/2024 2327 by Ginny Jordan, RN)  Maintains optimal cardiac output and hemodynamic stability:   Monitor blood pressure and heart rate   Monitor urine output and notify Licensed Independent Practitioner for values outside of normal range  Note: Will continue to monitor blood pressure, labs and urine output to  assess for worsening heart failure.      Problem: Musculoskeletal - Adult  Goal: Return mobility to safest level of function  Outcome: Progressing  Flowsheets (Taken 12/11/2024 4051 by Lisette Hoffmann RN)  Return Mobility to Safest Level of Function: Assess patient stability and activity tolerance for standing, transferring and ambulating with or without assistive devices  Note: Will collaborate with PT/OT to get patient back to baseline as soon as possible.      Problem: Genitourinary - Adult  Goal: Absence of urinary retention  Outcome: Progressing

## 2024-12-12 NOTE — PROGRESS NOTES
[facility] Hospitalist Group  Progress Note    Patient: Rina Prajapati Age: 64 y.o. : 1960 MR#: [id number] SSN: [id number]  Date/Time: 2024     Subjective:   Patient doing well.  Respiratory decompensation yesterday/.  Improved today and oxygen requirements coming down.  Patient continues to cough and is coughing up moderate amounts of thick, dark phlegm.  Long discussion with patient and has been eating high salt foods bedside with trail mix visualized and Chick-trace-[employer].  Likely why she had an increase in pulmonary edema    Disposition SNF  Discharge 2024    Review of systems  General: No fevers or chills.  Cardiovascular: No chest pain or pressure. No palpitations.   Pulmonary: No shortness of breath, cough or wheeze.   Gastrointestinal: No abdominal pain, nausea, vomiting or diarrhea.   Genitourinary: No urinary frequency, urgency, hesitancy or dysuria.   Musculoskeletal: No joint or muscle pain, no back pain, no recent trauma.    Neurologic: No headache, numbness, tingling or weakness.   Assessment/Plan:   #Acute on chronic hypoxic respiratory failure.  Etiology is likely multifactorial in the setting of non-small cell lung cancer, COPD exacerbation, CHF exacerbation, pneumonia.  Baseline home oxygen is 2 to 3 L. Back up to 5L patient was satting 90% on 5 L.  Patient appeared more short of breath and associated wheezing.  #Bilateral pneumonia, possible aspiration vs viral  #COPD exacerbation  #CHF exacerbation.  EF 45 to 50%  #Small to moderate pericardial effusion  #Non-small cell lung cancer on Taxotere  #History of PE/DVT.  On Eliquis  #SVC syndrome  #Tobacco dependence).   #pulmonary HTN  #hypokalemia/hyponatremia     Admitted to 4 N.  Cardiac monitoring  Continue IV Lasix, changed to BID  S/p solumedrol dose  Continue IV steroids every 8 hours, wean when able  Monitor renal function  Pro-Ian, respiratory PCR, Legionella/strep pneumo antigen all  suspension 1 mg  1 mg Nebulization BID RT    [Held by provider] predniSONE (DELTASONE) tablet 40 mg  40 mg Oral Daily    ipratropium 0.5 mg-albuterol 2.5 mg (DUONEB) nebulizer solution 1 Dose  1 Dose Inhalation Q4H RT    azithromycin (ZITHROMAX) 250 mg in sodium chloride 0.9 % 250 mL IVPB  250 mg IntraVENous Q24H    apixaban (ELIQUIS) tablet 5 mg  5 mg Oral BID    arformoterol tartrate (BROVANA) nebulizer solution 15 mcg  15 mcg Nebulization BID RT    ferrous sulfate (IRON 325) tablet 325 mg  325 mg Oral Daily with breakfast    folic acid (FOLVITE) tablet 1 mg  1 mg Oral Daily    levothyroxine (SYNTHROID) tablet 88 mcg  88 mcg Oral QAM AC    montelukast (SINGULAIR) tablet 10 mg  10 mg Oral Daily    sertraline (ZOLOFT) tablet 50 mg  50 mg Oral Daily    spironolactone (ALDACTONE) tablet 25 mg  25 mg Oral Daily    heparin (porcine) injection 4,800 Units  80 Units/kg IntraVENous PRN    heparin (porcine) injection 2,400 Units  40 Units/kg IntraVENous PRN    guaiFENesin (MUCINEX) extended release tablet 1,200 mg  1,200 mg Oral BID    benzonatate (TESSALON) capsule 200 mg  200 mg Oral TID PRN    sodium chloride flush 0.9 % injection 5-40 mL  5-40 mL IntraVENous 2 times per day    sodium chloride flush 0.9 % injection 5-40 mL  5-40 mL IntraVENous PRN    0.9 % sodium chloride infusion   IntraVENous PRN    potassium chloride (KLOR-CON M) extended release tablet 40 mEq  40 mEq Oral PRN    Or    potassium bicarb-citric acid (EFFER-K) effervescent tablet 40 mEq  40 mEq Oral PRN    Or    potassium chloride 10 mEq/100 mL IVPB (Peripheral Line)  10 mEq IntraVENous PRN    magnesium sulfate 2000 mg in 50 mL IVPB premix  2,000 mg IntraVENous PRN    ondansetron (ZOFRAN-ODT) disintegrating tablet 4 mg  4 mg Oral Q8H PRN    Or    ondansetron (ZOFRAN) injection 4 mg  4 mg IntraVENous Q6H PRN    polyethylene glycol (GLYCOLAX) packet 17 g  17 g Oral Daily PRN    acetaminophen (TYLENOL) tablet 650 mg  650 mg Oral Q6H PRN    Or

## 2024-12-12 NOTE — PLAN OF CARE
Problem: Chronic Conditions and Co-morbidities  Goal: Patient's chronic conditions and co-morbidity symptoms are monitored and maintained or improved  12/11/2024 2327 by Ginny Jordan, RN  Outcome: Progressing   Monitor and assess patient's chronic conditions and comorbid symptoms for stability, deterioration, or improvement     Problem: Safety - Adult  Goal: Free from fall injury  12/11/2024 2327 by Ginny Jordan, RN  Outcome: Progressing  Note: Round on patient every two hours  Encourage patient to utilize the call bell when assistance is needed     Problem: Respiratory - Adult  Goal: Achieves optimal ventilation and oxygenation  12/11/2024 2327 by Ginny Jordan, RN  Outcome: Progressing   Assess for changes in respiratory status   Assess for changes in mentation and behavior

## 2024-12-12 NOTE — PROGRESS NOTES
When offering patient a bed bath she refused stated that PT had already washed her face and back this morning , however she allowed me to wash her bottom after I took her off the bed pan. This was reported to the RN that's assigned to her .

## 2024-12-12 NOTE — PROGRESS NOTES
12/12/24 1333   Oxygen Therapy/Pulse Ox   O2 Device Heated high flow cannula   O2 Flow Rate (L/min) (S)  35 L/min  (Decreased from 45L)   FiO2  (S)  35 %  (Decreased from 40%)   Pulse (!) 101   Respirations 20   SpO2 95 %

## 2024-12-13 LAB
ALBUMIN SERPL-MCNC: 2.4 G/DL (ref 3.4–5)
ALBUMIN/GLOB SERPL: 0.7 (ref 0.8–1.7)
ALP SERPL-CCNC: 45 U/L (ref 45–117)
ALT SERPL-CCNC: 33 U/L (ref 13–56)
ANION GAP SERPL CALC-SCNC: 3 MMOL/L (ref 3–18)
AST SERPL-CCNC: 13 U/L (ref 10–38)
BILIRUB SERPL-MCNC: 0.4 MG/DL (ref 0.2–1)
BUN SERPL-MCNC: 23 MG/DL (ref 7–18)
BUN/CREAT SERPL: 38 (ref 12–20)
CALCIUM SERPL-MCNC: 9 MG/DL (ref 8.5–10.1)
CHLORIDE SERPL-SCNC: 100 MMOL/L (ref 100–111)
CO2 SERPL-SCNC: 33 MMOL/L (ref 21–32)
CREAT SERPL-MCNC: 0.61 MG/DL (ref 0.6–1.3)
ERYTHROCYTE [DISTWIDTH] IN BLOOD BY AUTOMATED COUNT: 17.6 % (ref 11.6–14.5)
GLOBULIN SER CALC-MCNC: 3.3 G/DL (ref 2–4)
GLUCOSE BLD STRIP.AUTO-MCNC: 155 MG/DL (ref 70–110)
GLUCOSE BLD STRIP.AUTO-MCNC: 173 MG/DL (ref 70–110)
GLUCOSE BLD STRIP.AUTO-MCNC: 219 MG/DL (ref 70–110)
GLUCOSE BLD STRIP.AUTO-MCNC: 235 MG/DL (ref 70–110)
GLUCOSE SERPL-MCNC: 236 MG/DL (ref 74–99)
HCT VFR BLD AUTO: 34 % (ref 35–45)
HGB BLD-MCNC: 10.2 G/DL (ref 12–16)
MCH RBC QN AUTO: 24.1 PG (ref 24–34)
MCHC RBC AUTO-ENTMCNC: 30 G/DL (ref 31–37)
MCV RBC AUTO: 80.2 FL (ref 78–100)
NRBC # BLD: 0 K/UL (ref 0–0.01)
NRBC BLD-RTO: 0 PER 100 WBC
PLATELET # BLD AUTO: 333 K/UL (ref 135–420)
PMV BLD AUTO: 9.6 FL (ref 9.2–11.8)
POTASSIUM SERPL-SCNC: 3.3 MMOL/L (ref 3.5–5.5)
PROT SERPL-MCNC: 5.7 G/DL (ref 6.4–8.2)
RBC # BLD AUTO: 4.24 M/UL (ref 4.2–5.3)
SODIUM SERPL-SCNC: 136 MMOL/L (ref 136–145)
WBC # BLD AUTO: 8.6 K/UL (ref 4.6–13.2)

## 2024-12-13 PROCEDURE — 36415 COLL VENOUS BLD VENIPUNCTURE: CPT

## 2024-12-13 PROCEDURE — 6360000002 HC RX W HCPCS: Performed by: HOSPITALIST

## 2024-12-13 PROCEDURE — 82962 GLUCOSE BLOOD TEST: CPT

## 2024-12-13 PROCEDURE — 6370000000 HC RX 637 (ALT 250 FOR IP): Performed by: STUDENT IN AN ORGANIZED HEALTH CARE EDUCATION/TRAINING PROGRAM

## 2024-12-13 PROCEDURE — 94761 N-INVAS EAR/PLS OXIMETRY MLT: CPT

## 2024-12-13 PROCEDURE — 2580000003 HC RX 258: Performed by: INTERNAL MEDICINE

## 2024-12-13 PROCEDURE — 99232 SBSQ HOSP IP/OBS MODERATE 35: CPT | Performed by: STUDENT IN AN ORGANIZED HEALTH CARE EDUCATION/TRAINING PROGRAM

## 2024-12-13 PROCEDURE — 97110 THERAPEUTIC EXERCISES: CPT

## 2024-12-13 PROCEDURE — 6360000002 HC RX W HCPCS

## 2024-12-13 PROCEDURE — 1100000000 HC RM PRIVATE

## 2024-12-13 PROCEDURE — 94640 AIRWAY INHALATION TREATMENT: CPT

## 2024-12-13 PROCEDURE — 97530 THERAPEUTIC ACTIVITIES: CPT

## 2024-12-13 PROCEDURE — 97535 SELF CARE MNGMENT TRAINING: CPT

## 2024-12-13 PROCEDURE — 80053 COMPREHEN METABOLIC PANEL: CPT

## 2024-12-13 PROCEDURE — 85027 COMPLETE CBC AUTOMATED: CPT

## 2024-12-13 PROCEDURE — 6360000002 HC RX W HCPCS: Performed by: INTERNAL MEDICINE

## 2024-12-13 PROCEDURE — 2580000003 HC RX 258: Performed by: STUDENT IN AN ORGANIZED HEALTH CARE EDUCATION/TRAINING PROGRAM

## 2024-12-13 PROCEDURE — 2580000003 HC RX 258

## 2024-12-13 PROCEDURE — 2700000000 HC OXYGEN THERAPY PER DAY

## 2024-12-13 RX ORDER — FUROSEMIDE 40 MG/1
40 TABLET ORAL 2 TIMES DAILY
Qty: 60 TABLET | Refills: 3 | Status: SHIPPED | OUTPATIENT
Start: 2024-12-13

## 2024-12-13 RX ORDER — PREDNISONE 20 MG/1
TABLET ORAL
Qty: 15 TABLET | Refills: 0 | Status: SHIPPED | OUTPATIENT
Start: 2024-12-13 | End: 2024-12-25

## 2024-12-13 RX ORDER — GUAIFENESIN 600 MG/1
1200 TABLET, EXTENDED RELEASE ORAL 2 TIMES DAILY
Qty: 60 TABLET | Refills: 1 | Status: SHIPPED | OUTPATIENT
Start: 2024-12-13

## 2024-12-13 RX ORDER — BENZONATATE 200 MG/1
200 CAPSULE ORAL 3 TIMES DAILY PRN
Qty: 90 CAPSULE | Refills: 1 | Status: SHIPPED | OUTPATIENT
Start: 2024-12-13 | End: 2025-02-11

## 2024-12-13 RX ORDER — NICOTINE 21 MG/24HR
1 PATCH, TRANSDERMAL 24 HOURS TRANSDERMAL DAILY
Qty: 42 PATCH | Refills: 0 | Status: SHIPPED | OUTPATIENT
Start: 2024-12-13 | End: 2025-01-24

## 2024-12-13 RX ADMIN — SPIRONOLACTONE 25 MG: 25 TABLET ORAL at 08:36

## 2024-12-13 RX ADMIN — MONTELUKAST 10 MG: 10 TABLET, FILM COATED ORAL at 08:36

## 2024-12-13 RX ADMIN — WATER 40 MG: 1 INJECTION INTRAMUSCULAR; INTRAVENOUS; SUBCUTANEOUS at 16:18

## 2024-12-13 RX ADMIN — SODIUM CHLORIDE SOLN NEBU 3% 4 ML: 3 NEBU SOLN at 08:01

## 2024-12-13 RX ADMIN — SODIUM CHLORIDE SOLN NEBU 3% 4 ML: 3 NEBU SOLN at 20:36

## 2024-12-13 RX ADMIN — APIXABAN 5 MG: 5 TABLET, FILM COATED ORAL at 21:22

## 2024-12-13 RX ADMIN — LEVOTHYROXINE SODIUM 88 MCG: 88 TABLET ORAL at 07:43

## 2024-12-13 RX ADMIN — SODIUM CHLORIDE, PRESERVATIVE FREE 10 ML: 5 INJECTION INTRAVENOUS at 21:23

## 2024-12-13 RX ADMIN — GUAIFENESIN 1200 MG: 600 TABLET, EXTENDED RELEASE ORAL at 21:22

## 2024-12-13 RX ADMIN — WATER 40 MG: 1 INJECTION INTRAMUSCULAR; INTRAVENOUS; SUBCUTANEOUS at 23:21

## 2024-12-13 RX ADMIN — SODIUM CHLORIDE, PRESERVATIVE FREE 10 ML: 5 INJECTION INTRAVENOUS at 08:36

## 2024-12-13 RX ADMIN — IPRATROPIUM BROMIDE AND ALBUTEROL SULFATE 1 DOSE: .5; 3 SOLUTION RESPIRATORY (INHALATION) at 20:36

## 2024-12-13 RX ADMIN — WATER 40 MG: 1 INJECTION INTRAMUSCULAR; INTRAVENOUS; SUBCUTANEOUS at 07:43

## 2024-12-13 RX ADMIN — FERROUS SULFATE TAB 325 MG (65 MG ELEMENTAL FE) 325 MG: 325 (65 FE) TAB at 08:36

## 2024-12-13 RX ADMIN — BUDESONIDE 1 MG: 1 SUSPENSION RESPIRATORY (INHALATION) at 20:36

## 2024-12-13 RX ADMIN — IPRATROPIUM BROMIDE AND ALBUTEROL SULFATE 1 DOSE: .5; 3 SOLUTION RESPIRATORY (INHALATION) at 03:46

## 2024-12-13 RX ADMIN — SODIUM CHLORIDE SOLN NEBU 3% 4 ML: 3 NEBU SOLN at 16:40

## 2024-12-13 RX ADMIN — ARFORMOTEROL TARTRATE 15 MCG: 15 SOLUTION RESPIRATORY (INHALATION) at 20:36

## 2024-12-13 RX ADMIN — IPRATROPIUM BROMIDE AND ALBUTEROL SULFATE 1 DOSE: .5; 3 SOLUTION RESPIRATORY (INHALATION) at 16:39

## 2024-12-13 RX ADMIN — SODIUM CHLORIDE SOLN NEBU 3% 4 ML: 3 NEBU SOLN at 12:53

## 2024-12-13 RX ADMIN — INSULIN LISPRO 2 UNITS: 100 INJECTION, SOLUTION INTRAVENOUS; SUBCUTANEOUS at 21:22

## 2024-12-13 RX ADMIN — FUROSEMIDE 40 MG: 10 INJECTION, SOLUTION INTRAMUSCULAR; INTRAVENOUS at 18:29

## 2024-12-13 RX ADMIN — GUAIFENESIN 1200 MG: 600 TABLET, EXTENDED RELEASE ORAL at 08:35

## 2024-12-13 RX ADMIN — INSULIN LISPRO 2 UNITS: 100 INJECTION, SOLUTION INTRAVENOUS; SUBCUTANEOUS at 08:35

## 2024-12-13 RX ADMIN — ARFORMOTEROL TARTRATE 15 MCG: 15 SOLUTION RESPIRATORY (INHALATION) at 08:00

## 2024-12-13 RX ADMIN — BUDESONIDE 1 MG: 1 SUSPENSION RESPIRATORY (INHALATION) at 08:01

## 2024-12-13 RX ADMIN — SERTRALINE HYDROCHLORIDE 50 MG: 50 TABLET ORAL at 08:36

## 2024-12-13 RX ADMIN — IPRATROPIUM BROMIDE AND ALBUTEROL SULFATE 1 DOSE: .5; 3 SOLUTION RESPIRATORY (INHALATION) at 08:01

## 2024-12-13 RX ADMIN — FOLIC ACID 1 MG: 1 TABLET ORAL at 08:36

## 2024-12-13 RX ADMIN — WATER 40 MG: 1 INJECTION INTRAMUSCULAR; INTRAVENOUS; SUBCUTANEOUS at 01:36

## 2024-12-13 RX ADMIN — APIXABAN 5 MG: 5 TABLET, FILM COATED ORAL at 08:36

## 2024-12-13 RX ADMIN — IPRATROPIUM BROMIDE AND ALBUTEROL SULFATE 1 DOSE: .5; 3 SOLUTION RESPIRATORY (INHALATION) at 12:53

## 2024-12-13 RX ADMIN — FUROSEMIDE 40 MG: 10 INJECTION, SOLUTION INTRAMUSCULAR; INTRAVENOUS at 08:36

## 2024-12-13 ASSESSMENT — PAIN SCALES - GENERAL
PAINLEVEL_OUTOF10: 0

## 2024-12-13 NOTE — PROGRESS NOTES
Telemetry reported sinus tach episode up to 146. Patient is up sitting on the side of the bed eating breakfast. No distress.

## 2024-12-13 NOTE — PROGRESS NOTES
4 Eyes Skin Assessment     NAME:  Rina Prajapati  YOB: 1960  MEDICAL RECORD NUMBER:  [id number]    The patient is being assessed for  Shift Handoff    I agree that at least one RN has performed a thorough Head to Toe Skin Assessment on the patient. ALL assessment sites listed below have been assessed.      Areas assessed by both nurses:    Head, Face, Ears, Shoulders, Back, Chest, Arms, Elbows, Hands, Sacrum. Buttock, Coccyx, Ischium, Legs. Feet and Heels, and Under Medical Devices         Does the Patient have a Wound? No noted wound(s)       Isidro Prevention initiated by RN: Yes  Wound Care Orders initiated by RN: No    Pressure Injury (Stage 3,4, Unstageable, DTI, NWPT, and Complex wounds) if present, place Wound referral order by RN under : No    New Ostomies, if present place, Ostomy referral order under : No     Nurse 1 eSignature: Electronically signed by Jennifer Alves RN on 12/13/24 at 6:20 PM EST    **SHARE this note so that the co-signing nurse can place an eSignature**    Nurse 2 eSignature: Electronically signed by Jessie Larson RN on 12/13/24 at 7:43 PM EST

## 2024-12-13 NOTE — PROGRESS NOTES
[facility]  Pulmonary, Critical Care, and Sleep Medicine    Name: Rina Prajapati MRN: [id number]   : 1960 Hospital: [facility]   Date: 2024        IMPRESSION:   Acute on chronic hypoxic respiratory failure with increased oxygen requirements on home oxygen concentrator, desaturations to 70%, required HFNC on admission, now on HFNC 40% - SpO2 96%  Etiology likely multifactorial, with COPD exacerbation, NSCLC, diastolic/systolic HF decompensation, worsening metastatic disease,  possible pneumonia vs non infectious pneumonitis. RVP negative. CT-A negative for PE, evidence of pulmonary hypertension, RVP neg, strp PNA/Legionella neg, low likelihood of bacterial infection with procal 0.11  L>R BL opacities on CT imaging  Bilateral pneumonia, viral vs bacterial vs atypical infectious process  Possible aspiration, with debris in trachea and bronchi.  NSCLC, actively on chemo. Followed by Dr. Li, with [facility]. Noted last admission with disease progression while on Taxotere, starting on Gemzar. Hx of SVC syndrome  VINCE pulmonary mass, 3.5cm, unchanged from  to , and multiple BL nodules  SIRS vs Sepsis with pulmonary source  ?FLORIAN, with Cr 2.64 above baseline 0.66. Now resolved in AM labs. Likely pre-renal vs ATN vs lab error  Severe COPD FEV1 in 2024 31% predicted with air trapping and reduced DLCO.  Possible exacerbation, CT with enphesemous changes. On LTOT. Has not been receiving prescribed bronchodilators due to high cost.  Combined systolic and Diastolic HF, current echo with mild systolic dysfunction EF 45% on   Possible exacerbation with pedal edema, hypoxia, elevated BNP 8746mg/mL above 3.622 ion   Pulmonary hypertension WHO Grp 2/3 with dilated RV  RVSP 45mmHg on , CT on  showing cardiomegaly with reflux of contrast into IVC  Electrolyte derangements:   Hypokalemia now resolved  Hyponatremia, now resolved  Normocytic normochromic  upon discharge  Steroids: Continue Solu-Medrol 40 mg; decreased to every 8 hours. Taper as tolerated  Images: CXR/CT data reviewed.  Last echo on .  Infectious screens: Procal, resp PCR, Legionella/strep pneumo ag, all tested negative  Collect and follow up on Respiratory Cultures  Continue mucinex and add 3% nebulized saline q4WA  Anti-infectives: per primary service; s/p Vanco/Zosyn. On Zithromax 250 mg daily; reasonable to continue given inti-inflammatory properties in the setting of COPD. On discharge, change to MWF schedule.  Gentle diuresis/Fluid homeostasis per primary services/cardiology, maintain net negative fluid balance. Monitor I&Os  Electrolyte management, Stress Ulcer-prophylaxis and glycemic control per primary and respective consultants  Anti-Coagulation/DVT prophylaxis per  primary service and respective consultants  SLP, nutrition  OOB/PT/OT as tolerated  Exposure avoidance/Counseling offered. Patient comfortable with nicotine patch, commended on cutting back on tobacco use.  Oncology  to follow for chemotherapy management     Pulmonary medicine will continue to follow     Subjective/Interval History:   2024  No acute events overnight. Doing well today. Now on 4L NC. Mild cough with some sputum production. \"Breathing is better.\" \"Its been slow but getting there.\" Tolerating PO intake. Continues to work with PT.         ROS:Pertinent items are noted in HPI.    Objective:   Vital Signs:    /89   Pulse 62   Temp 98.1 °F (36.7 °C) (Oral)   Resp 20   Ht 1.702 m (5' 7.01\")   Wt 59.8 kg (131 lb 12.8 oz)   SpO2 93%   BMI 20.64 kg/m²             Temp (24hrs), Av.1 °F (36.7 °C), Min:97.9 °F (36.6 °C), Max:98.4 °F (36.9 °C)       Intake/Output:   Last shift:      No intake/output data recorded.  Last 3 shifts:  1901 -  0700  In: 1920 [P.O.:1920]  Out: 3650 [Urine:3650]    Intake/Output Summary (Last 24 hours) at 2024 1219  Last data filed at 2024 0636  Gross

## 2024-12-13 NOTE — CARE COORDINATION
Requested Case Management specialist to assist with transportation to [facility].  Address is [address], [place] [address] and phone number is [phone]  Any steps at the residence? No  Patient will require BLS transport.   Pt requires Stretcher If stretcher, reason: CHF, non small cell lung cancer, acute respiratory failure.  Patient is currently requiring oxygen Yes 6L NC  Height:5'7\"   Weight: 131lbs  Pt is on isolation: No   Is the pt ready now? yes  Requested time: next available  PCS Faxed: No  Insurance verified on face sheet: Yes  Auth needed for transport: Yes  CM completed PCS/ Envelope and placed on chart.     Gris LEDESMAN, RN   Case Management   [phone]

## 2024-12-13 NOTE — PROGRESS NOTES
Taken off HHFNC and place on 4L NC at this time. SpO2 93-94%. Tolerating NC well at this time with no signs or symptoms of respiratory distress noted.      RT will monitor respiratory status.               12/13/24 0927   Oxygen Therapy/Pulse Ox   O2 Device (S)  Nasal cannula   O2 Flow Rate (L/min) (S)  4 L/min   SpO2 93 %

## 2024-12-13 NOTE — CARE COORDINATION
Called Tamanna with admissions at [facility] [phone], who stated the patient's insurance authorization is valid until 12/15/24.     Gris MALLOY, RN   Case Management   [phone]

## 2024-12-13 NOTE — PROGRESS NOTES
1228: Increased Oxygen to 6L at this time. Spo2 88% on 4L. Patient with some mild increased WOB, but sitting up on side of bed eating lunch at this time.     RT will return to give neb treatment.        12/13/24 1228   Oxygen Therapy/Pulse Ox   O2 Flow Rate (L/min) (S)  6 L/min  (Increased from 4)   SpO2 92 %     1253: RT back to administer neb treatment. Patient remains on 6L NC and tolerating well with SpO2 93%. Will leave off HHFNC for now.     Patient states her breathing is \"not back to home baseline, but does feel somewhat better\".

## 2024-12-13 NOTE — CARE COORDINATION
[facility] [phone] spoke to Juanis. Transportation arranged for tomorrow 12/14/24 at 11:00am to [facility]. Trip number: [id number].    Gris MALLOY, RN   Case Management   [phone]

## 2024-12-13 NOTE — PLAN OF CARE
Problem: Chronic Conditions and Co-morbidities  Goal: Patient's chronic conditions and co-morbidity symptoms are monitored and maintained or improved  12/13/2024 1514 by Jennifer Alves RN  Outcome: Progressing  Flowsheets (Taken 12/13/2024 0745)  Care Plan - Patient's Chronic Conditions and Co-Morbidity Symptoms are Monitored and Maintained or Improved:   Monitor and assess patient's chronic conditions and comorbid symptoms for stability, deterioration, or improvement   Collaborate with multidisciplinary team to address chronic and comorbid conditions and prevent exacerbation or deterioration   Update acute care plan with appropriate goals if chronic or comorbid symptoms are exacerbated and prevent overall improvement and discharge     Problem: Safety - Adult  Goal: Free from fall injury  12/13/2024 1514 by Jennifer Alves RN  Outcome: Progressing  Flowsheets (Taken 12/13/2024 0745)  Free From Fall Injury: Instruct family/caregiver on patient safety     Problem: Respiratory - Adult  Goal: Achieves optimal ventilation and oxygenation  12/13/2024 1514 by Jennifer Alves RN  Outcome: Progressing  Flowsheets (Taken 12/13/2024 0745)  Achieves optimal ventilation and oxygenation:   Assess for changes in respiratory status   Assess for changes in mentation and behavior   Position to facilitate oxygenation and minimize respiratory effort     Problem: Cardiovascular - Adult  Goal: Maintains optimal cardiac output and hemodynamic stability  12/13/2024 1514 by Jennifer Alves RN  Outcome: Progressing  Flowsheets (Taken 12/13/2024 0745)  Maintains optimal cardiac output and hemodynamic stability: Monitor blood pressure and heart rate     Problem: Cardiovascular - Adult  Goal: Absence of cardiac dysrhythmias or at baseline  12/13/2024 1514 by Jennifer Alves RN  Outcome: Progressing  Flowsheets (Taken 12/13/2024 0745)  Absence of cardiac dysrhythmias or at baseline:   Assess for signs of decreased cardiac output   Monitor

## 2024-12-13 NOTE — PROGRESS NOTES
4 Eyes Skin Assessment     NAME:  Rina Prajapati  YOB: 1960  MEDICAL RECORD NUMBER:  [id number]    The patient is being assessed for  Shift Handoff    I agree that at least one RN has performed a thorough Head to Toe Skin Assessment on the patient. ALL assessment sites listed below have been assessed.      Areas assessed by both nurses:    Head, Face, Ears, Shoulders, Back, Chest, Arms, Elbows, Hands, Sacrum. Buttock, Coccyx, Ischium, Legs. Feet and Heels, and Under Medical Devices         Does the Patient have a Wound? No noted wound(s)       Isidro Prevention initiated by RN: Yes  Wound Care Orders initiated by RN: No    Pressure Injury (Stage 3,4, Unstageable, DTI, NWPT, and Complex wounds) if present, place Wound referral order by RN under : No    New Ostomies, if present place, Ostomy referral order under : No     Nurse 1 eSignature: Electronically signed by Isabel Ewing RN on 12/13/24 at 6:55 AM EST    **SHARE this note so that the co-signing nurse can place an eSignature**    Nurse 2 eSignature: Electronically signed by Jennifer Alves RN on 12/13/24 at 6:11 PM EST

## 2024-12-13 NOTE — PLAN OF CARE
Problem: Occupational Therapy - Adult  Goal: By Discharge: Performs self-care activities at highest level of function for planned discharge setting.  See evaluation for individualized goals.  Description:   Occupational Therapy Goals:  Initiated 12/6/2024 to be met within 7-10 days.    1.  Patient will perform grooming with supervision/set-up while standing at the sink for > 2 min with Good balance.   2.  Patient will perform bathing with supervision/set-up.  3.  Patient will perform lower body dressing with supervision/set-up.  4.  Patient will perform toilet transfers with supervision/set-up.  5.  Patient will perform all aspects of toileting with supervision/set-up.  6.  Patient will participate in upper extremity therapeutic exercise/activities with supervision/set-up for 8 minutes to improve endurance and UB strength needed for ADLs    7.  Patient will utilize energy conservation techniques during functional activities with verbal cues.    PLOF: Pt lives with family, mainly Mod Ind for ADLs and functional mobility with walker, recently had decline in functional status, using BSC for toileting.  Outcome: Progressing   OCCUPATIONAL THERAPY TREATMENT    Patient: Rina Prajapati (64 y.o. female)  Date: 12/13/2024  Diagnosis: Acute hypoxic respiratory failure [J96.01] Acute on chronic respiratory failure      Precautions: Fall Risk, General Precautions,  ,  ,  ,  ,  ,  ,      Chart, occupational therapy assessment, plan of care, and goals were reviewed.  ASSESSMENT:  Pt presented supine in bed upon entry, on 4L O2NC, and agreeable to work with OT. Pt able to come to EOB in prep for functional tasks. STS transfer CGA with RW. She performed libby area hygiene CGA while in stance tolerating ~ 30 seconds. Pt's SPO2 desat to 88% on 4L requiring ~ 1 min to recover to 90%. Pt re educated on importance of PLB. She was returned back to supine and positioned self for comfort. Pt left with all needs within reach. RN made  aware.  Progression toward goals:  []          Improving appropriately and progressing toward goals  [x]          Improving slowly and progressing toward goals  []          Not making progress toward goals and plan of care will be adjusted     PLAN:  Patient continues to benefit from skilled intervention to address the above impairments.  Continue treatment per established plan of care.    Further Equipment Recommendations for Discharge: Patient has all needed DME for home safety.    AMPA: AM-PAC Inpatient Daily Activity Raw Score: 17    Current research shows that an AM-PAC score of 17 or less is not associated with a discharge to the patient's home setting.    This AMPAC score should be considered in conjunction with interdisciplinary team recommendations to determine the most appropriate discharge setting. Patient's social support, diagnosis, medical stability, and prior level of function should also be taken into consideration.     SUBJECTIVE:   Patient stated, \".\"    OBJECTIVE DATA SUMMARY:     Functional Mobility and Transfers for ADLs:   Bed Mobility:  Bed Mobility Training  Supine to Sit: Stand-by assistance  Sit to Supine: Stand-by assistance  Scooting: Supervision   Transfers:  Transfer Training  Transfer Training: Yes  Sit to Stand: Contact-guard assistance  Stand to Sit: Contact-guard assistance    Balance:  Balance  Sitting: Intact  Standing: Impaired;With support  Standing - Static: Good  Standing - Dynamic: Fair    ADL Intervention:     Toileting: Contact guard assistance     Pain:  Intensity Pre-treatment: 0/10   Intensity Post-treatment: 0/10      Activity Tolerance:    Activity Tolerance: Patient tolerated treatment well  Please refer to the flowsheet for vital signs taken during this treatment.  After treatment:   []  Patient left in no apparent distress sitting up in chair  [x]  Patient left in no apparent distress in bed  [x]  Call bell left within reach  [x]  Nursing notified  []  Caregiver

## 2024-12-13 NOTE — PLAN OF CARE
Problem: Physical Therapy - Adult  Goal: By Discharge: Performs mobility at highest level of function for planned discharge setting.  See evaluation for individualized goals.  Description: Initiated  12/6/24, reviewed and continued 12/12/24  to be met within 7-10 days.    1.  Patient will move from supine to sit and sit to supine , scoot up and down, and roll side to side in bed with independence.    2.  Patient will transfer from bed to chair and chair to bed with modified independence using the least restrictive device.  3.  Patient will perform sit to stand with modified independence.  4.  Patient will ambulate with modified independence for 15 feet with the least restrictive device.   5.  Patient will ascend/descend 1 stairs with 0 handrail(s) with modified independence.    PLOF: Lives with fiance in a 1 sstory home with 1 NOEL. Mod I RW a month ago per pt, however has just been doing stand pivots to BSC last couple of weeks.    Outcome: Progressing     PHYSICAL THERAPY TREATMENT    Patient: Rina Prajapati (64 y.o. female)  Date: 12/13/2024  Diagnosis: Acute hypoxic respiratory failure [J96.01] Acute on chronic respiratory failure      Precautions: Fall Risk, General Precautions,  ,  ,  ,  ,  ,  ,      ASSESSMENT:  Pt sitting EOB upon entering room, on 35L HFNC, and agreeable to PT treatment.  Pt notes urgent need to get to BSC, requiring CGA for SPT with no AD.  Pt was totalA  for libby care in standing, returned sitting EOB to take a seated rest break.  Pt with O2 sats at 95% and HR at 115.  Pt was then able to perform standing exercises consisting of 1x10 of HR, mini squats and marches, pt becoming SOB requiring seated rest break with O2 still at 94%.  Pt then returned supine with SBA and was able to position herself comfortably, pt left with RN present in room.     Progression toward goals:   []      Improving appropriately and progressing toward goals  [x]      Improving slowly and progressing toward  taken during this treatment.  After treatment:   [] Patient left in no apparent distress sitting up in chair  [x] Patient left in no apparent distress in bed  [x] Call bell left within reach  [x] Nursing notified  [] Caregiver present  [x] Bed alarm activated  [] SCDs applied      COMMUNICATION/EDUCATION:   Patient Education  Education Given To: Patient  Education Provided: Plan of Care;Mobility Training;Fall Prevention Strategies;Transfer Training  Education Method: Demonstration;Verbal;Teach Back  Barriers to Learning: None  Education Outcome: Verbalized understanding;Demonstrated understanding      Nikky Pond PT  Minutes: 23

## 2024-12-13 NOTE — PROGRESS NOTES
12/13/24 0358   Oxygen Therapy/Pulse Ox   O2 Device Heated high flow cannula   O2 Flow Rate (L/min) 35 L/min   FiO2  35 %

## 2024-12-13 NOTE — DISCHARGE SUMMARY
Discharge Summary    Patient: Rina Prajapati MRN: [id number]  CSN: [id number]    YOB: 1960  Age: 64 y.o.  Sex: female    DOA: 12/5/2024 LOS:  LOS: 9 days   Discharge Date:      Admission Diagnosis: Acute hypoxic respiratory failure [J96.01]    Discharge Diagnosis:    #Acute on chronic hypoxic respiratory failure.  Etiology is likely multifactorial in the setting of non-small cell lung cancer, COPD exacerbation, CHF exacerbation, pneumonia.  Baseline home oxygen is 2 to 3 L. Back up to 5L patient was satting 90% on 5 L.  Patient appeared more short of breath and associated wheezing.  #Bilateral pneumonia, possible aspiration vs viral  #COPD exacerbation  #CHF exacerbation.  EF 45 to 50%  #Small to moderate pericardial effusion  #Non-small cell lung cancer on Taxotere  #History of PE/DVT.  On Eliquis  #SVC syndrome  #Tobacco dependence).   #pulmonary HTN  #hypokalemia/hyponatremia    Discharge Condition: Stable    PHYSICAL EXAM  Visit Vitals  /86   Pulse (!) 101   Temp 97.9 °F (36.6 °C) (Oral)   Resp 20   Ht 1.702 m (5' 7.01\")   Wt 59.8 kg (131 lb 12.8 oz)   SpO2 100%   BMI 20.64 kg/m²       General:  Alert, cooperative, no acute distress    HEENT: PERRLA, anicteric sclerae.  Pulmonary: Wheezing expiratory bilaterally  Cardiovascular: Regular rate and Rhythm.  GI:  Soft, Non distended, Non tender. + Bowel sounds.  Extremities:  No edema. No calf tenderness.   Psych: Good insight. Not anxious or agitated.  Neurologic: Alert and oriented X 3. Moves all ext.  Additional:    Hospital Course: Per  the H&P:Rina Prajapati is a 64 y.o. female with a PMHx of non-small cell carcinoma on active chemo, DVT/PE (on Eliquis), SVC syndrome, CHF preserved ejection fraction, COPD Gold stage III (on 2.5L at baseline),tobacco abuse presenting to [facility] for shortness of breath.  She reports being short of breath ongoing for the past 2 days.  Patient initially scheduled for chemotherapy today however, noted to  lungs daily            CONTINUE taking these medications      albuterol sulfate  (90 Base) MCG/ACT inhaler  Commonly known as: PROVENTIL;VENTOLIN;PROAIR     DOCEtaxel 80 MG/8ML chemo injection  Commonly known as: TAXOTERE     Eliquis 5 MG Tabs tablet  Generic drug: apixaban     ferrous sulfate 325 (65 Fe) MG tablet  Commonly known as: IRON 325     folic acid 1 MG tablet  Commonly known as: FOLVITE  Take 1 tablet by mouth daily     levothyroxine 88 MCG tablet  Commonly known as: SYNTHROID  Take 1 tablet by mouth every morning (before breakfast)     montelukast 10 MG tablet  Commonly known as: SINGULAIR  Take 1 tablet by mouth daily Take by mouth     ONE-A-DAY WOMENS PO     potassium chloride 20 MEQ Tbcr extended release tablet  Commonly known as: K-TAB     sertraline 50 MG tablet  Commonly known as: ZOLOFT  TAKE 1 TABLET BY MOUTH DAILY     spironolactone 25 MG tablet  Commonly known as: Aldactone  Take 1 tablet by mouth daily            STOP taking these medications      cholestyramine light 4 g packet     guaiFENesin 100 MG/5ML liquid  Commonly known as: ROBITUSSIN  Replaced by: guaiFENesin 600 MG extended release tablet     lactobacillus capsule     loperamide 2 MG tablet  Commonly known as: IMODIUM A-D               Where to Get Your Medications        These medications were sent to [facility] [id number] - [place], [place] - [address] [place] [address] [phone] -  [phone]  [address] [place] [address]      Phone: [phone]   benzonatate 200 MG capsule  furosemide 40 MG tablet  guaiFENesin 600 MG extended release tablet  nicotine 21 MG/24HR  predniSONE 20 MG tablet           Activity: activity as tolerated    Diet: diabetic diet    Wound Care: none needed    Follow-up: with PCP, Cora Her MD in 7-10days    Minutes spent on discharge: >30 minutes spent coordinating this discharge (review instructions/follow-up, prescriptions, preparing report for sign off)

## 2024-12-13 NOTE — PROGRESS NOTES
PACU nurse called and gave report stating that surgeon assessed IND site dressing and he is okay with the drainage that is present and to reinforce as needed. When patient arrived to unit within two hours there was new drainage present. I reinforced the dressing with 4x4 gauze and ace wrap.    room air

## 2024-12-14 VITALS
WEIGHT: 131.8 LBS | SYSTOLIC BLOOD PRESSURE: 118 MMHG | RESPIRATION RATE: 22 BRPM | HEIGHT: 67 IN | HEART RATE: 99 BPM | TEMPERATURE: 97.9 F | DIASTOLIC BLOOD PRESSURE: 82 MMHG | OXYGEN SATURATION: 98 % | BODY MASS INDEX: 20.69 KG/M2

## 2024-12-14 PROBLEM — R79.89 ELEVATED TROPONIN: Status: RESOLVED | Noted: 2024-11-14 | Resolved: 2024-12-14

## 2024-12-14 LAB
ANION GAP SERPL CALC-SCNC: 7 MMOL/L (ref 3–18)
BUN SERPL-MCNC: 23 MG/DL (ref 7–18)
BUN/CREAT SERPL: 39 (ref 12–20)
CALCIUM SERPL-MCNC: 8.8 MG/DL (ref 8.5–10.1)
CHLORIDE SERPL-SCNC: 98 MMOL/L (ref 100–111)
CO2 SERPL-SCNC: 33 MMOL/L (ref 21–32)
CREAT SERPL-MCNC: 0.59 MG/DL (ref 0.6–1.3)
GLUCOSE BLD STRIP.AUTO-MCNC: 166 MG/DL (ref 70–110)
GLUCOSE BLD STRIP.AUTO-MCNC: 255 MG/DL (ref 70–110)
GLUCOSE SERPL-MCNC: 196 MG/DL (ref 74–99)
MAGNESIUM SERPL-MCNC: 1.6 MG/DL (ref 1.6–2.6)
POTASSIUM SERPL-SCNC: 3.2 MMOL/L (ref 3.5–5.5)
SODIUM SERPL-SCNC: 138 MMOL/L (ref 136–145)

## 2024-12-14 PROCEDURE — 2580000003 HC RX 258

## 2024-12-14 PROCEDURE — 2580000003 HC RX 258: Performed by: STUDENT IN AN ORGANIZED HEALTH CARE EDUCATION/TRAINING PROGRAM

## 2024-12-14 PROCEDURE — 82962 GLUCOSE BLOOD TEST: CPT

## 2024-12-14 PROCEDURE — 80048 BASIC METABOLIC PNL TOTAL CA: CPT

## 2024-12-14 PROCEDURE — 6370000000 HC RX 637 (ALT 250 FOR IP): Performed by: STUDENT IN AN ORGANIZED HEALTH CARE EDUCATION/TRAINING PROGRAM

## 2024-12-14 PROCEDURE — 94761 N-INVAS EAR/PLS OXIMETRY MLT: CPT

## 2024-12-14 PROCEDURE — 36415 COLL VENOUS BLD VENIPUNCTURE: CPT

## 2024-12-14 PROCEDURE — 2700000000 HC OXYGEN THERAPY PER DAY

## 2024-12-14 PROCEDURE — 94640 AIRWAY INHALATION TREATMENT: CPT

## 2024-12-14 PROCEDURE — 83735 ASSAY OF MAGNESIUM: CPT

## 2024-12-14 PROCEDURE — 6360000002 HC RX W HCPCS: Performed by: INTERNAL MEDICINE

## 2024-12-14 PROCEDURE — 6360000002 HC RX W HCPCS: Performed by: HOSPITALIST

## 2024-12-14 PROCEDURE — 6360000002 HC RX W HCPCS

## 2024-12-14 PROCEDURE — 2580000003 HC RX 258: Performed by: INTERNAL MEDICINE

## 2024-12-14 RX ADMIN — SPIRONOLACTONE 25 MG: 25 TABLET ORAL at 09:05

## 2024-12-14 RX ADMIN — ARFORMOTEROL TARTRATE 15 MCG: 15 SOLUTION RESPIRATORY (INHALATION) at 07:36

## 2024-12-14 RX ADMIN — BUDESONIDE 1 MG: 1 SUSPENSION RESPIRATORY (INHALATION) at 07:36

## 2024-12-14 RX ADMIN — SODIUM CHLORIDE, PRESERVATIVE FREE 10 ML: 5 INJECTION INTRAVENOUS at 09:07

## 2024-12-14 RX ADMIN — FUROSEMIDE 40 MG: 10 INJECTION, SOLUTION INTRAMUSCULAR; INTRAVENOUS at 09:05

## 2024-12-14 RX ADMIN — INSULIN LISPRO 4 UNITS: 100 INJECTION, SOLUTION INTRAVENOUS; SUBCUTANEOUS at 06:31

## 2024-12-14 RX ADMIN — SODIUM CHLORIDE SOLN NEBU 3% 4 ML: 3 NEBU SOLN at 07:36

## 2024-12-14 RX ADMIN — APIXABAN 5 MG: 5 TABLET, FILM COATED ORAL at 09:04

## 2024-12-14 RX ADMIN — WATER 40 MG: 1 INJECTION INTRAMUSCULAR; INTRAVENOUS; SUBCUTANEOUS at 06:45

## 2024-12-14 RX ADMIN — SERTRALINE HYDROCHLORIDE 50 MG: 50 TABLET ORAL at 09:04

## 2024-12-14 RX ADMIN — IPRATROPIUM BROMIDE AND ALBUTEROL SULFATE 1 DOSE: .5; 3 SOLUTION RESPIRATORY (INHALATION) at 07:36

## 2024-12-14 RX ADMIN — FERROUS SULFATE TAB 325 MG (65 MG ELEMENTAL FE) 325 MG: 325 (65 FE) TAB at 09:04

## 2024-12-14 RX ADMIN — FOLIC ACID 1 MG: 1 TABLET ORAL at 09:04

## 2024-12-14 RX ADMIN — POTASSIUM BICARBONATE 40 MEQ: 782 TABLET, EFFERVESCENT ORAL at 06:26

## 2024-12-14 RX ADMIN — IPRATROPIUM BROMIDE AND ALBUTEROL SULFATE 1 DOSE: .5; 3 SOLUTION RESPIRATORY (INHALATION) at 04:42

## 2024-12-14 RX ADMIN — GUAIFENESIN 1200 MG: 600 TABLET, EXTENDED RELEASE ORAL at 09:04

## 2024-12-14 RX ADMIN — MONTELUKAST 10 MG: 10 TABLET, FILM COATED ORAL at 09:04

## 2024-12-14 RX ADMIN — LEVOTHYROXINE SODIUM 88 MCG: 88 TABLET ORAL at 06:32

## 2024-12-14 ASSESSMENT — PAIN SCALES - GENERAL
PAINLEVEL_OUTOF10: 0

## 2024-12-14 NOTE — PLAN OF CARE
Problem: Chronic Conditions and Co-morbidities  Goal: Patient's chronic conditions and co-morbidity symptoms are monitored and maintained or improved  12/14/2024 0930 by Goldy Hoffmann RN  Outcome: Progressing  Flowsheets (Taken 12/14/2024 0807)  Care Plan - Patient's Chronic Conditions and Co-Morbidity Symptoms are Monitored and Maintained or Improved: Monitor and assess patient's chronic conditions and comorbid symptoms for stability, deterioration, or improvement  12/14/2024 0218 by Jessie Larson RN  Outcome: Progressing  Flowsheets (Taken 12/13/2024 1932)  Care Plan - Patient's Chronic Conditions and Co-Morbidity Symptoms are Monitored and Maintained or Improved: Monitor and assess patient's chronic conditions and comorbid symptoms for stability, deterioration, or improvement     Problem: Safety - Adult  Goal: Free from fall injury  12/14/2024 0930 by Goldy Hoffmann RN  Outcome: Progressing  Flowsheets (Taken 12/14/2024 0925)  Free From Fall Injury: Instruct family/caregiver on patient safety  Note: Call bell within reach.  Bed alarm on at all times.  12/14/2024 0218 by Jessie Larson RN  Outcome: Progressing     Problem: Respiratory - Adult  Goal: Achieves optimal ventilation and oxygenation  12/14/2024 0930 by Goldy Hoffmann RN  Outcome: Progressing  Flowsheets (Taken 12/14/2024 0807)  Achieves optimal ventilation and oxygenation: Assess for changes in respiratory status  Note: Educate on how to do deep breathing exercise.  12/14/2024 0218 by Jessie Larson RN  Outcome: Progressing  Flowsheets (Taken 12/13/2024 1932)  Achieves optimal ventilation and oxygenation: Assess for changes in respiratory status     Problem: Skin/Tissue Integrity - Adult  Goal: Skin integrity remains intact  12/14/2024 0930 by Goldy Hoffmann RN  Outcome: Progressing  Flowsheets  Taken 12/14/2024 0925  Skin Integrity Remains Intact: Monitor for areas of redness and/or skin breakdown  Taken  12/14/2024 0807  Skin Integrity Remains Intact: Monitor for areas of redness and/or skin breakdown  Note: Encourage to turn every 2 hours.  12/14/2024 0218 by Jessie Larson, RN  Outcome: Progressing  Flowsheets (Taken 12/13/2024 1932)  Skin Integrity Remains Intact: Monitor for areas of redness and/or skin breakdown     Problem: Discharge Planning  Goal: Discharge to home or other facility with appropriate resources  Outcome: Progressing  Flowsheets (Taken 12/14/2024 0807)  Discharge to home or other facility with appropriate resources: Identify barriers to discharge with patient and caregiver  Note: Patient will be discharging to facility. Hand off report to [facility] staff of [facility]. IV line remove. Hand off to transport team.

## 2024-12-14 NOTE — CARE COORDINATION
Transition of Care Plan to SNF/Rehab      Patient Name: Rina Prajapati                   YOB: 1960    SNF/Rehab Transition:  Patient has been accepted to [facility] SNF/Rehab and meets criteria for admission. Confirmed with Tamanna that bed is available for today.   Patient will transported by medical transport and expected to leave at 1200.  Met with patient  and they are agreeable to the transition plan.    Medicaid Long-term Services and Supports(LTSS) screening completion: NA    Three Inpatient Midnights for Medicare: NA    Current Code Status:   Code Status: Full Code     Last Weight:   Wt Readings from Last 1 Encounters:   12/05/24 59.8 kg (131 lb 12.8 oz)       Weightbearing Status:     IV Medication, IV Site, Device Type: No    Dialysis: No      O2 Needs (including O2, Bipap, Cpap, ect.): Yes: Comment: 4L NC    Covid Vaccine Dates/ if known:    Internal Administration   First Dose COVID-19, PFIZER PURPLE top, DILUTE for use, (age 12 y+), 30mcg/0.3mL  03/17/2021   Second Dose COVID-19, PFIZER PURPLE top, DILUTE for use, (age 12 y+), 30mcg/0.3mL   04/07/2021       Last COVID Lab SARS-CoV-2, PCR ( )   Date Value   12/05/2024 Not detected            Last COVID Lab SARS-CoV-2, PCR ( )   Date Value   12/05/2024 Not detected     Rapid Influenza A By PCR ( )   Date Value   11/13/2024 Not detected     Rapid Influenza B By PCR ( )   Date Value   11/13/2024 Not detected              Current Diet: ADULT DIET; Regular; 4 carb choices (60 gm/meal); Low Sodium (2 gm); 1800 ml  ADULT ORAL NUTRITION SUPPLEMENT; Breakfast, Lunch, Dinner; Diabetic Oral Supplement    Wound Vac or Other Equipment Needs: no    Patient requires Isolation: No    Follow-up Appointment needed or scheduled: No    Communication to SNF/Rehab:  Bedside RN, Goldy, has been notified to update the transition plan to the facility and call report (phone number).  Discharge information has been updated on the

## 2024-12-14 NOTE — CARE COORDINATION
12/14/24 1008   IMM Letter   IMM Letter given to Patient/Family/Significant other/Guardian/POA/by: Gris Fiore RN   IMM Letter date given: 12/14/24   IMM Letter time given: 1004     Patient waived her right to a 4 hour decision to appeal discharge.     Gris Fiore BSN, RN   Case Management   [phone]

## 2024-12-14 NOTE — PLAN OF CARE
Problem: Chronic Conditions and Co-morbidities  Goal: Patient's chronic conditions and co-morbidity symptoms are monitored and maintained or improved  12/14/2024 0218 by Jessie Larson RN  Outcome: Progressing  Flowsheets (Taken 12/13/2024 1932)  Care Plan - Patient's Chronic Conditions and Co-Morbidity Symptoms are Monitored and Maintained or Improved: Monitor and assess patient's chronic conditions and comorbid symptoms for stability, deterioration, or improvement  12/13/2024 1514 by Jennifer Alves, RN  Outcome: Progressing  Flowsheets (Taken 12/13/2024 0745)  Care Plan - Patient's Chronic Conditions and Co-Morbidity Symptoms are Monitored and Maintained or Improved:   Monitor and assess patient's chronic conditions and comorbid symptoms for stability, deterioration, or improvement   Collaborate with multidisciplinary team to address chronic and comorbid conditions and prevent exacerbation or deterioration   Update acute care plan with appropriate goals if chronic or comorbid symptoms are exacerbated and prevent overall improvement and discharge     Problem: Safety - Adult  Goal: Free from fall injury  12/14/2024 0218 by Jessie Larson RN  Outcome: Progressing  12/13/2024 1514 by Jennifer Alves RN  Outcome: Progressing  Flowsheets (Taken 12/13/2024 0745)  Free From Fall Injury: Instruct family/caregiver on patient safety     Problem: Physical Therapy - Adult  Goal: By Discharge: Performs mobility at highest level of function for planned discharge setting.  See evaluation for individualized goals.  Description: Initiated  12/6/24, reviewed and continued 12/12/24  to be met within 7-10 days.    1.  Patient will move from supine to sit and sit to supine , scoot up and down, and roll side to side in bed with independence.    2.  Patient will transfer from bed to chair and chair to bed with modified independence using the least restrictive device.  3.  Patient will perform sit to stand with modified  independence.  4.  Patient will ambulate with modified independence for 15 feet with the least restrictive device.   5.  Patient will ascend/descend 1 stairs with 0 handrail(s) with modified independence.    PLOF: Lives with fiance in a 1 sstory home with 1 NOEL. Mod I RW a month ago per pt, however has just been doing stand pivots to [facility] last couple of weeks.    12/13/2024 1217 by Nikky Pond, PT  Outcome: Progressing     Problem: Respiratory - Adult  Goal: Achieves optimal ventilation and oxygenation  12/14/2024 0218 by Jessie Larson RN  Outcome: Progressing  Flowsheets (Taken 12/13/2024 1932)  Achieves optimal ventilation and oxygenation: Assess for changes in respiratory status  12/13/2024 1514 by Jennifer Alves RN  Outcome: Progressing  Flowsheets (Taken 12/13/2024 0745)  Achieves optimal ventilation and oxygenation:   Assess for changes in respiratory status   Assess for changes in mentation and behavior   Position to facilitate oxygenation and minimize respiratory effort     Problem: Cardiovascular - Adult  Goal: Maintains optimal cardiac output and hemodynamic stability  12/14/2024 0218 by Jessie Larson RN  Outcome: Progressing  Flowsheets (Taken 12/13/2024 1932)  Maintains optimal cardiac output and hemodynamic stability: Monitor blood pressure and heart rate  12/13/2024 1514 by Jennifer Alves RN  Outcome: Progressing  Flowsheets (Taken 12/13/2024 0745)  Maintains optimal cardiac output and hemodynamic stability: Monitor blood pressure and heart rate  Goal: Absence of cardiac dysrhythmias or at baseline  12/14/2024 0218 by Jessie Larson RN  Outcome: Progressing  Flowsheets (Taken 12/13/2024 1932)  Absence of cardiac dysrhythmias or at baseline: Monitor cardiac rate and rhythm  12/13/2024 1514 by Jennifer Alves, RN  Outcome: Progressing  Flowsheets (Taken 12/13/2024 0745)  Absence of cardiac dysrhythmias or at baseline:   Assess for signs of decreased cardiac output   Monitor cardiac

## 2024-12-14 NOTE — PROGRESS NOTES
4 Eyes Skin Assessment     NAME:  Rina Prajapati  YOB: 1960  MEDICAL RECORD NUMBER:  [id number]    The patient is being assessed for  Shift Handoff    I agree that at least one RN has performed a thorough Head to Toe Skin Assessment on the patient. ALL assessment sites listed below have been assessed.      Areas assessed by both nurses:    Head, Face, Ears, Shoulders, Back, Chest, Arms, Elbows, Hands, Sacrum. Buttock, Coccyx, Ischium, Legs. Feet and Heels, and Under Medical Devices         Does the Patient have a Wound? No noted wound(s)       Isidro Prevention initiated by RN: No  Wound Care Orders initiated by RN: No    Pressure Injury (Stage 3,4, Unstageable, DTI, NWPT, and Complex wounds) if present, place Wound referral order by RN under : No    New Ostomies, if present place, Ostomy referral order under : No     Nurse 1 eSignature: Electronically signed by Jessie Larson RN on 12/14/24 at 7:53 AM EST    **SHARE this note so that the co-signing nurse can place an eSignature**    Nurse 2 eSignature: Electronically signed by Goldy Medina RN on 12/14/24 at 7:53 AM EST

## 2024-12-16 ENCOUNTER — CARE COORDINATION (OUTPATIENT)
Facility: CLINIC | Age: 64
End: 2024-12-16

## 2024-12-16 NOTE — CARE COORDINATION
Care Transitions Note    Initial Call - Call within 2 business days of discharge: No, Patient discharged to Skilled Nursing Facility [facility] SNF/Rehab     Transition of care outreach postponed for 14 days due to patient's discharge to [facility] SNF/Rehab.

## 2024-12-18 ENCOUNTER — HOSPITAL ENCOUNTER (OUTPATIENT)
Facility: HOSPITAL | Age: 64
Setting detail: INFUSION SERIES
End: 2024-12-18

## 2024-12-18 ENCOUNTER — TELEPHONE (OUTPATIENT)
Facility: HOSPITAL | Age: 64
End: 2024-12-18

## 2024-12-18 NOTE — TELEPHONE ENCOUNTER
Justine with [facility] called stating that patient will be SNF ([facility]) for about 20 days. Dr Li will evaluate patient after that to see if she will be able to receive chemotherapy treatment.

## 2024-12-26 ENCOUNTER — APPOINTMENT (OUTPATIENT)
Facility: HOSPITAL | Age: 64
DRG: 177 | End: 2024-12-26
Payer: MEDICARE

## 2024-12-27 ENCOUNTER — CARE COORDINATION (OUTPATIENT)
Facility: CLINIC | Age: 64
End: 2024-12-27

## 2024-12-27 NOTE — CARE COORDINATION
Care Transitions Note    Ctn called [facility] and spoke with Ms. Dougherty dnd she verified that Patient is still admitted into the facility. No d/c date given by Ms. Dougherty at this time.

## 2024-12-31 ENCOUNTER — TELEPHONE (OUTPATIENT)
Facility: CLINIC | Age: 64
End: 2024-12-31

## 2024-12-31 NOTE — TELEPHONE ENCOUNTER
Returned patients call. Patient is concerned about taking Spirolactone and lasix together. Patient feels like its taking to much fluid off her. Patient states she feels all dried out.   Please advise.     Patient has been scheduled for a hospital f/u 01/15/2025

## 2024-12-31 NOTE — TELEPHONE ENCOUNTER
Pt called in stating that she would like to speak with a nurse regarding the fluid medications that she is currently taking. Please advise!

## 2025-01-01 ENCOUNTER — APPOINTMENT (OUTPATIENT)
Facility: HOSPITAL | Age: 65
End: 2025-01-01
Payer: MEDICARE

## 2025-01-01 ENCOUNTER — HOSPITAL ENCOUNTER (OUTPATIENT)
Facility: HOSPITAL | Age: 65
Setting detail: INFUSION SERIES
End: 2025-01-01

## 2025-01-01 ENCOUNTER — HOSPITAL ENCOUNTER (EMERGENCY)
Facility: HOSPITAL | Age: 65
End: 2025-08-29
Attending: EMERGENCY MEDICINE
Payer: MEDICARE

## 2025-01-01 VITALS
RESPIRATION RATE: 16 BRPM | OXYGEN SATURATION: 85 % | SYSTOLIC BLOOD PRESSURE: 94 MMHG | WEIGHT: 127 LBS | DIASTOLIC BLOOD PRESSURE: 76 MMHG | TEMPERATURE: 91.4 F | BODY MASS INDEX: 21.68 KG/M2 | HEART RATE: 117 BPM | HEIGHT: 64 IN

## 2025-01-01 DIAGNOSIS — J96.02 ACUTE RESPIRATORY FAILURE WITH HYPOXIA AND HYPERCAPNIA (HCC): ICD-10-CM

## 2025-01-01 DIAGNOSIS — I46.9 CARDIAC ARREST (HCC): Primary | ICD-10-CM
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
## 2025-01-01 DIAGNOSIS — J96.01 ACUTE RESPIRATORY FAILURE WITH HYPOXIA AND HYPERCAPNIA (HCC): ICD-10-CM

## 2025-01-01 LAB
ALBUMIN SERPL-MCNC: 2.7 G/DL (ref 3.4–5)
ALBUMIN/GLOB SERPL: 0.8 (ref 0.8–1.7)
ALP SERPL-CCNC: 100 U/L (ref 45–117)
ALT SERPL-CCNC: 85 U/L (ref 10–35)
ANION GAP BLD CALC-SCNC: ABNORMAL MMOL/L (ref 10–20)
ANION GAP SERPL CALC-SCNC: 29 MMOL/L (ref 3–18)
APTT PPP: 37.7 SEC (ref 21.7–34.2)
AST SERPL-CCNC: 184 U/L (ref 10–38)
BASE DEFICIT BLD-SCNC: 18.6 MMOL/L
BASE DEFICIT BLD-SCNC: 20.8 MMOL/L
BASOPHILS # BLD: 0 K/UL (ref 0–0.1)
BASOPHILS NFR BLD: 0 % (ref 0–2)
BILIRUB SERPL-MCNC: 0.9 MG/DL (ref 0.2–1)
BLASTS NFR BLD MANUAL: 7 %
BUN SERPL-MCNC: 11 MG/DL (ref 6–23)
BUN/CREAT SERPL: 11 (ref 12–20)
CA-I BLD-MCNC: 0.38 MMOL/L (ref 1.15–1.33)
CA-I BLD-MCNC: 1.05 MMOL/L (ref 1.15–1.33)
CA-I BLD-MCNC: 1.16 MMOL/L (ref 1.15–1.33)
CALCIUM SERPL-MCNC: 9 MG/DL (ref 8.5–10.1)
CHLORIDE BLD-SCNC: 104 MMOL/L (ref 98–107)
CHLORIDE BLD-SCNC: 109 MMOL/L (ref 98–107)
CHLORIDE BLD-SCNC: 121 MMOL/L (ref 98–107)
CHLORIDE SERPL-SCNC: 98 MMOL/L (ref 98–107)
CO2 BLD-SCNC: 16 MMOL/L (ref 22–29)
CO2 BLD-SCNC: 17 MMOL/L (ref 22–29)
CO2 SERPL-SCNC: 15 MMOL/L (ref 21–32)
CREAT BLD-MCNC: 0.65 MG/DL (ref 0.6–1.3)
CREAT BLD-MCNC: 0.86 MG/DL (ref 0.6–1.3)
CREAT BLD-MCNC: 0.95 MG/DL (ref 0.6–1.3)
CREAT SERPL-MCNC: 1.06 MG/DL (ref 0.6–1.3)
DIFFERENTIAL METHOD BLD: ABNORMAL
EOSINOPHIL # BLD: 0.13 K/UL (ref 0–0.4)
EOSINOPHIL NFR BLD: 1 % (ref 0–5)
ERYTHROCYTE [DISTWIDTH] IN BLOOD BY AUTOMATED COUNT: 20.3 % (ref 11.6–14.5)
FLUAV RNA SPEC QL NAA+PROBE: NOT DETECTED
FLUBV RNA SPEC QL NAA+PROBE: NOT DETECTED
GLOBULIN SER CALC-MCNC: 3.3 G/DL (ref 2–4)
GLUCOSE BLD-MCNC: 130 MG/DL (ref 74–99)
GLUCOSE BLD-MCNC: 227 MG/DL (ref 74–99)
GLUCOSE BLD-MCNC: 78 MG/DL (ref 74–99)
GLUCOSE SERPL-MCNC: 222 MG/DL (ref 74–108)
HCO3 BLD-SCNC: 14.1 MMOL/L (ref 21–28)
HCO3 BLD-SCNC: 16.1 MMOL/L (ref 21–28)
HCT VFR BLD AUTO: 50.3 % (ref 35–45)
HGB BLD-MCNC: 13 G/DL (ref 12–16)
IMM GRANULOCYTES # BLD AUTO: 0 K/UL
IMM GRANULOCYTES NFR BLD AUTO: 0 %
INR PPP: 1.7 (ref 0.9–1.2)
LACTATE BLD-SCNC: 13.16 MMOL/L (ref 0.4–2)
LACTATE BLD-SCNC: 16.23 MMOL/L (ref 0.4–2)
LACTATE BLD-SCNC: 9.09 MMOL/L (ref 0.4–2)
LYMPHOCYTES # BLD: 4.03 K/UL (ref 0.9–3.6)
LYMPHOCYTES NFR BLD: 31 % (ref 21–52)
MAGNESIUM SERPL-MCNC: 2.6 MG/DL (ref 1.6–2.6)
MCH RBC QN AUTO: 22.4 PG (ref 24–34)
MCHC RBC AUTO-ENTMCNC: 25.8 G/DL (ref 31–37)
MCV RBC AUTO: 86.6 FL (ref 78–100)
MONOCYTES # BLD: 0.39 K/UL (ref 0.05–1.2)
MONOCYTES NFR BLD: 3 % (ref 3–10)
NEUTS BAND NFR BLD MANUAL: 3 % (ref 0–5)
NEUTS SEG # BLD: 7.54 K/UL (ref 1.8–8)
NEUTS SEG NFR BLD: 55 % (ref 40–73)
NRBC # BLD: 0.1 K/UL (ref 0–0.01)
NRBC BLD-RTO: 0.8 PER 100 WBC
NT PRO BNP: 7739 PG/ML (ref 36–900)
PCO2 BLDV: 85.8 MMHG (ref 41–51)
PCO2 BLDV: 94.7 MMHG (ref 41–51)
PCO2 BLDV: >110 MMHG (ref 41–51)
PH BLDV: 6.78 (ref 7.32–7.42)
PH BLDV: 6.84 (ref 7.32–7.42)
PH BLDV: 6.88 (ref 7.32–7.42)
PLATELET # BLD AUTO: 178 K/UL (ref 135–420)
PLATELET COMMENT: ABNORMAL
PMV BLD AUTO: 10.9 FL (ref 9.2–11.8)
PO2 BLDV: 42 MMHG (ref 25–40)
PO2 BLDV: 45 MMHG (ref 25–40)
PO2 BLDV: 50 MMHG (ref 25–40)
POTASSIUM BLD-SCNC: 2.3 MMOL/L (ref 3.5–5.1)
POTASSIUM BLD-SCNC: 4.4 MMOL/L (ref 3.5–5.1)
POTASSIUM BLD-SCNC: 4.4 MMOL/L (ref 3.5–5.1)
POTASSIUM SERPL-SCNC: 4.5 MMOL/L (ref 3.5–5.5)
PROCALCITONIN SERPL-MCNC: 0.03 NG/ML
PROT SERPL-MCNC: 6 G/DL (ref 6.4–8.2)
PROTHROMBIN TIME: 20.7 SEC (ref 12–15.1)
RBC # BLD AUTO: 5.81 M/UL (ref 4.2–5.3)
RBC MORPH BLD: ABNORMAL
RBC MORPH BLD: ABNORMAL
SAO2 % BLD: 48 % (ref 94–98)
SAO2 % BLD: 48 % (ref 94–98)
SARS-COV-2 RNA RESP QL NAA+PROBE: NOT DETECTED
SERVICE CMNT-IMP: ABNORMAL
SODIUM BLD-SCNC: 138 MMOL/L (ref 136–145)
SODIUM BLD-SCNC: 144 MMOL/L (ref 136–145)
SODIUM BLD-SCNC: 152 MMOL/L (ref 136–145)
SODIUM SERPL-SCNC: 142 MMOL/L (ref 136–145)
SOURCE: NORMAL
SPECIMEN SITE: ABNORMAL
TROPONIN T SERPL HS-MCNC: 40.4 NG/L (ref 0–14)
WBC # BLD AUTO: 13 K/UL (ref 4.6–13.2)

## 2025-01-01 PROCEDURE — 99285 EMERGENCY DEPT VISIT HI MDM: CPT

## 2025-01-01 PROCEDURE — 71045 X-RAY EXAM CHEST 1 VIEW: CPT

## 2025-01-01 PROCEDURE — 92950 HEART/LUNG RESUSCITATION CPR: CPT

## 2025-01-01 PROCEDURE — 85730 THROMBOPLASTIN TIME PARTIAL: CPT

## 2025-01-01 PROCEDURE — 82330 ASSAY OF CALCIUM: CPT

## 2025-01-01 PROCEDURE — 83880 ASSAY OF NATRIURETIC PEPTIDE: CPT

## 2025-01-01 PROCEDURE — 85014 HEMATOCRIT: CPT

## 2025-01-01 PROCEDURE — 83735 ASSAY OF MAGNESIUM: CPT

## 2025-01-01 PROCEDURE — 93005 ELECTROCARDIOGRAM TRACING: CPT | Performed by: EMERGENCY MEDICINE

## 2025-01-01 PROCEDURE — 96376 TX/PRO/DX INJ SAME DRUG ADON: CPT

## 2025-01-01 PROCEDURE — 80053 COMPREHEN METABOLIC PANEL: CPT

## 2025-01-01 PROCEDURE — 84295 ASSAY OF SERUM SODIUM: CPT

## 2025-01-01 PROCEDURE — 2500000003 HC RX 250 WO HCPCS

## 2025-01-01 PROCEDURE — 6360000002 HC RX W HCPCS: Performed by: EMERGENCY MEDICINE

## 2025-01-01 PROCEDURE — 96367 TX/PROPH/DG ADDL SEQ IV INF: CPT

## 2025-01-01 PROCEDURE — 82803 BLOOD GASES ANY COMBINATION: CPT

## 2025-01-01 PROCEDURE — 84132 ASSAY OF SERUM POTASSIUM: CPT

## 2025-01-01 PROCEDURE — 82947 ASSAY GLUCOSE BLOOD QUANT: CPT

## 2025-01-01 PROCEDURE — 2700000000 HC OXYGEN THERAPY PER DAY

## 2025-01-01 PROCEDURE — 93010 ELECTROCARDIOGRAM REPORT: CPT | Performed by: INTERNAL MEDICINE

## 2025-01-01 PROCEDURE — 84484 ASSAY OF TROPONIN QUANT: CPT

## 2025-01-01 PROCEDURE — 96365 THER/PROPH/DIAG IV INF INIT: CPT

## 2025-01-01 PROCEDURE — 94761 N-INVAS EAR/PLS OXIMETRY MLT: CPT

## 2025-01-01 PROCEDURE — 2580000003 HC RX 258: Performed by: EMERGENCY MEDICINE

## 2025-01-01 PROCEDURE — 96375 TX/PRO/DX INJ NEW DRUG ADDON: CPT

## 2025-01-01 PROCEDURE — 84145 PROCALCITONIN (PCT): CPT

## 2025-01-01 PROCEDURE — 87636 SARSCOV2 & INF A&B AMP PRB: CPT

## 2025-01-01 PROCEDURE — 2500000003 HC RX 250 WO HCPCS: Performed by: EMERGENCY MEDICINE

## 2025-01-01 PROCEDURE — 83605 ASSAY OF LACTIC ACID: CPT

## 2025-01-01 PROCEDURE — 85025 COMPLETE CBC W/AUTO DIFF WBC: CPT

## 2025-01-01 PROCEDURE — 87040 BLOOD CULTURE FOR BACTERIA: CPT

## 2025-01-01 PROCEDURE — 85610 PROTHROMBIN TIME: CPT

## 2025-01-01 RX ORDER — VASOPRESSIN IN DEXTROSE 5 % 20/100 ML
.01-.03 PLASTIC BAG, INJECTION (ML) INTRAVENOUS CONTINUOUS
Status: DISCONTINUED | OUTPATIENT
Start: 2025-01-01 | End: 2025-01-01 | Stop reason: SDUPTHER

## 2025-01-01 RX ORDER — EPINEPHRINE IN SOD CHLOR,ISO 1 MG/10 ML
SYRINGE (ML) INTRAVENOUS DAILY PRN
Status: COMPLETED | OUTPATIENT
Start: 2025-01-01 | End: 2025-01-01

## 2025-01-01 RX ORDER — INDOMETHACIN 25 MG/1
CAPSULE ORAL
Status: DISCONTINUED
Start: 2025-01-01 | End: 2025-08-29 | Stop reason: HOSPADM

## 2025-01-01 RX ORDER — INDOMETHACIN 25 MG/1
CAPSULE ORAL DAILY PRN
Status: COMPLETED | OUTPATIENT
Start: 2025-01-01 | End: 2025-01-01

## 2025-01-01 RX ORDER — SODIUM CHLORIDE 9 MG/ML
INJECTION, SOLUTION INTRAVENOUS CONTINUOUS PRN
Status: COMPLETED | OUTPATIENT
Start: 2025-01-01 | End: 2025-01-01

## 2025-01-01 RX ADMIN — EPINEPHRINE 1 MG: 0.1 INJECTION, SOLUTION ENDOTRACHEAL; INTRACARDIAC; INTRAVENOUS at 16:08

## 2025-01-01 RX ADMIN — NOREPINEPHRINE BITARTRATE 100 MCG/MIN: 32 SOLUTION INTRAVENOUS at 17:52

## 2025-01-01 RX ADMIN — NOREPINEPHRINE BITARTRATE 5 MCG/MIN: 32 SOLUTION INTRAVENOUS at 15:43

## 2025-01-01 RX ADMIN — SODIUM BICARBONATE 50 MEQ: 84 INJECTION, SOLUTION INTRAVENOUS at 15:32

## 2025-01-01 RX ADMIN — EPINEPHRINE 1 MG: 0.1 INJECTION, SOLUTION ENDOTRACHEAL; INTRACARDIAC; INTRAVENOUS at 15:39

## 2025-01-01 RX ADMIN — EPINEPHRINE 1 MG: 0.1 INJECTION, SOLUTION ENDOTRACHEAL; INTRACARDIAC; INTRAVENOUS at 17:56

## 2025-01-01 RX ADMIN — EPINEPHRINE 1 MG: 0.1 INJECTION, SOLUTION ENDOTRACHEAL; INTRACARDIAC; INTRAVENOUS at 16:36

## 2025-01-01 RX ADMIN — SODIUM BICARBONATE 50 MEQ: 84 INJECTION, SOLUTION INTRAVENOUS at 15:46

## 2025-01-01 RX ADMIN — VASOPRESSIN 0.03 UNITS/MIN: 0.2 SOLUTION INTRAVENOUS at 18:06

## 2025-01-01 RX ADMIN — EPINEPHRINE 1 MG: 0.1 INJECTION, SOLUTION ENDOTRACHEAL; INTRACARDIAC; INTRAVENOUS at 18:01

## 2025-01-01 RX ADMIN — SODIUM CHLORIDE 500 ML: 9 INJECTION, SOLUTION INTRAVENOUS at 15:47

## 2025-01-01 RX ADMIN — EPINEPHRINE 1 MG: 0.1 INJECTION, SOLUTION ENDOTRACHEAL; INTRACARDIAC; INTRAVENOUS at 17:57

## 2025-01-01 RX ADMIN — EPINEPHRINE 1 MG: 0.1 INJECTION, SOLUTION ENDOTRACHEAL; INTRACARDIAC; INTRAVENOUS at 16:05

## 2025-01-01 RX ADMIN — CEFEPIME 2000 MG: 2 INJECTION, POWDER, FOR SOLUTION INTRAVENOUS at 16:23

## 2025-01-01 RX ADMIN — NOREPINEPHRINE BITARTRATE 100 MCG/MIN: 32 SOLUTION INTRAVENOUS at 19:20

## 2025-01-01 RX ADMIN — EPINEPHRINE 1 MG: 0.1 INJECTION, SOLUTION ENDOTRACHEAL; INTRACARDIAC; INTRAVENOUS at 16:39

## 2025-01-01 ASSESSMENT — PULMONARY FUNCTION TESTS: PIF_VALUE: 50

## 2025-01-02 ENCOUNTER — CARE COORDINATION (OUTPATIENT)
Facility: CLINIC | Age: 65
End: 2025-01-02

## 2025-01-02 DIAGNOSIS — I50.22 CHRONIC SYSTOLIC (CONGESTIVE) HEART FAILURE (HCC): Primary | ICD-10-CM

## 2025-01-02 NOTE — CARE COORDINATION
that someone is waiting on her.   Pt. Kept the conversation short and concluded the call.       Future Appointments         Provider Specialty Dept Phone    1/15/2025 11:45 AM Cora Her MD Family Medicine [phone]    3/19/2025 11:00 AM Cora Her MD Family Medicine [phone]            Care Transition Nurse provided contact information.  Plan for follow-up call in 6-10 days based on severity of symptoms and risk factors.  Plan for next call:  follow up symptoms, assess for any needs.       Danilo Diego RN

## 2025-01-02 NOTE — TELEPHONE ENCOUNTER
Called patient in regards to Dr. Her's recommendation on taking lasix and spirolactone:  Cora Her MD   to Me    She was admitted for respiratory failure- combined COPD/Pneumonia/CHF with EF 45-50%  Aldactone and lasix are supposed to help with this heart decompensation  Does she have home health nurse? Will check BMP to check for hydration status.have HH nurse draw soon  Have nurse/pt check daily weights as well  Depending on wt checks/ BMP will determine if can go down on lasix  Keep appt with me next week    Spoke with Yulissa at [facility] and she advised that patients insurance will not allow daily weights.   BMP will be drawn 01/07/2024 when nurse makes her next home visit. Insurance will not allow nurse to see patient any sooner  BMP order has been faxed to [facility] [phone]    Spoke with patient and she is aware of Home Health not doing weights.   Patient states she has a scale and has already been doing daily weights with log.   Patients weight today is 113lbs

## 2025-01-02 NOTE — TELEPHONE ENCOUNTER
Toni OT, Called from [facility] regarding patient O2 sat.   Toni was helping patient transfer from the bed to bedside commode when patients O2 sat dropped to 83%. On 3 LPM of O2. Took about 2-3 min. Before O2 sat increased to 93%  Toni is requesting some kind of parameters for O2  sat when working with patient     Verbal order from Dr. Her given .   Parameters for 02 sat less than 80% patient to go to ER   Watch for shortness of breath, fever  May increase 02 to 4 LPM when patient has therapy    Spoke with Toni and she is aware of above orders on patient.

## 2025-01-09 ENCOUNTER — HOSPITAL ENCOUNTER (OUTPATIENT)
Facility: HOSPITAL | Age: 65
Setting detail: INFUSION SERIES
End: 2025-01-09

## 2025-01-09 ENCOUNTER — CARE COORDINATION (OUTPATIENT)
Facility: CLINIC | Age: 65
End: 2025-01-09

## 2025-01-09 NOTE — CARE COORDINATION
Care Transitions Note    Follow Up Call     Patient Current Location:  Home: [address] [place] [address]    Care Transition Nurse contacted the patient by telephone. Verified name and  as identifiers.    Additional needs identified to be addressed with provider   No needs identified                 Method of communication with provider: none.    Care Summary Note:     Patient reports that she is doing okay.   No new or worsening of symptoms as per Pt.   Pt. Denied CP, SOB, fever and chills at this time.   Pt. Reports that Home health is still following.   Pt. Reports that she Has all her medications.  Pt. States that she can manage okay and has everything she needs to keep her well at home.  Pt. Is Aware of her upcoming  PCP appt.     No questions, concerns and/or needs at this time as per Pt.     Patient reminded that there is a physician on call 24 hours a day / 7 days a week (M-F 5pm to 8am and from Friday 5pm until Monday 8a for the weekend) should the patient have questions or concerns.  Patient reminded to call 911 if situation is emergent ( such as chest pain, shortness of breath, unstoppable bleeding, feeling of passing out,  worsening of symptoms)or patient feels the situation is emergent.  Pt verbalizes understanding.    Future Appointments         Provider Specialty Dept Phone    2025 2:00 PM [facility] INFUSION NURSE 4 Infusion Therapy [phone]    1/15/2025 11:45 AM Cora Her MD Family Medicine [phone]    3/19/2025 11:00 AM Cora Her MD Family Medicine [phone]            Care Transition Nurse provided contact information.  Plan for follow-up call in 6-10 days based on severity of symptoms and risk factors.  Plan for next call:  follow up symptoms, assess for any needs.       Danilo Diego RN

## 2025-01-16 ENCOUNTER — CARE COORDINATION (OUTPATIENT)
Facility: CLINIC | Age: 65
End: 2025-01-16

## 2025-01-16 NOTE — CARE COORDINATION
Care Transitions Note    Final Call     Patient Current Location:  Home: [facility] [address] [facility] [address]    Care Transition Nurse contacted the patient by telephone. Verified name and  as identifiers.    Patient graduated from the Care Transitions program on 25.  Patient/family verbalizes confidence in the ability to self-manage at this time.     Handoff:   Patient/family agreeable to ACM outreach.      Care Transition Nurse identified needs for Ambulatory Care Management.   The following challenges have been identified high risk for readmission. Readmitted   Needs addressed: encourage following up with providers as scheduled advised.   Patients top risk factors for readmission: PCP relationship  And specialist relationship.       Request for ACM for continued care sent.     Care Summary Note:     Patient reports that she is doing okay.   No new, ongoing or worsening of symptoms as per Pt.   Pt. Reports that she Can manage and has everything she needs to keep her well at home.   Pt. Reports that her Wheel chair came.   No questions, concerns and/or needs at this time as per Pt.     Assessments:  Care Transitions Subsequent and Final Call    Subsequent and Final Calls  Do you have any ongoing symptoms?: No  Have your medications changed?: No  Do you have any questions related to your medications?: No  Do you currently have any active services?: No  Are you currently active with any services?: Home Health  Do you have any needs or concerns that I can assist you with?: No  Care Transitions Interventions  No Identified Needs  Other Interventions:              Upcoming Appointments:    Future Appointments         Provider Specialty Dept Phone    2025 10:15 AM Cora Her MD Family Medicine [phone]    3/19/2025 11:00 AM Cora Her MD Family Medicine [phone]            Patient has agreed to contact primary care provider and/or specialist for any further questions, concerns,

## 2025-01-16 NOTE — CARE COORDINATION
Ambulatory Care Coordination Note     2025 12:36 PM     Patient Current Location:  Home: [address] [place] [id number]     This patient was received as a referral from Care Transition Nurse.    ACM contacted the patient by telephone. Verified name and  with patient as identifiers. Provided introduction to self, and explanation of the ACM role.   Patient accepted care management services at this time.          ACM: Narayan Newberry RN     Challenges to be reviewed by the provider   Additional needs identified to be addressed with provider No  none               Method of communication with provider: none.    Utilization: N/A - Initial Call     Care Summary Note:     Hx of Depression, HTN, PE, Lung Ca, CHF  HRCM after [facility] post admission for resp fail w/ pneumonia and acute on chronic CHF.  Pt states doing well today  Of note, cancelled last 2 card appts, needs reschedule  Also saw pulm in July, no f/u scheduled    Offered patient enrollment in the Remote Patient Monitoring (RPM) program for in-home monitoring: Deferred at this time, will address on next outreach.     Assessments Completed:   Ambulatory Care Coordination Assessment    Care Coordination Protocol  Referral from Primary Care Provider: No  Week 1 - Initial Assessment     Do you have all of your prescriptions and are they filled?: Yes  Are you able to afford your medications?: Yes  How often do you have trouble taking your medications the way you have been told to take them?: I always take them as prescribed.     Do you have Home O2 Therapy?: Yes   Oxygen Regimen: Continuous Flow - Enter rate/FIO2: 2   Method of Delivery: Nasal Cannula   CPAP Use: None      Is patient able to live independently?: Yes     Current Housing: Private Residence  Who do you live with?: Partner/Spouse/SO  Are you an active caregiver in your home?: No     Do you have any DME?: Yes  Patient Home Equipment: Oxygen              Thinking about your patient's

## 2025-01-17 ENCOUNTER — CARE COORDINATION (OUTPATIENT)
Facility: CLINIC | Age: 65
End: 2025-01-17

## 2025-01-17 NOTE — CARE COORDINATION
Ambulatory Care Coordination Note     2025 8:49 AM     Patient Current Location:  Home: [address] [place] [id number]     ACM contacted the patient by telephone after an after hours missed call. Verified name and  with patient as identifiers.         ACM: Narayan Newberry RN     Challenges to be reviewed by the provider   Additional needs identified to be addressed with provider No  none               Method of communication with provider: none.    Utilization: Has the patient been seen in the ED since your last call? no    Care Summary Note:     Hx of Depression, HTN, PE, Lung Ca, CHF  HRCM after BRENDA post admission for resp fail w/ pneumonia and acute on chronic CHF.  Returning missed call from patient. Pt states PICC line dressing change last done during last infusion on  but next infusion cancelled.  Pt states will call infusion group to reschedule.  Also saw pulm in July, no f/u scheduled    PCP/Specialist follow up:   Future Appointments         Provider Specialty Dept Phone    2025 10:15 AM Cora Her MD Family Medicine [phone]    3/19/2025 11:00 AM Cora Her MD Family Medicine [phone]            Follow Up:   Plan for next AC outreach in approximately 1 week to complete:  - disease specific assessments  - SDOH assessments  - medication review   - advance care planning   - goal progression.   Patient  is agreeable to this plan.

## 2025-01-21 ENCOUNTER — CARE COORDINATION (OUTPATIENT)
Facility: CLINIC | Age: 65
End: 2025-01-21

## 2025-01-21 NOTE — CARE COORDINATION
Ambulatory Care Coordination Note     1/21/2025 12:28 PM     Patient outreach attempt by this ACM today to perform care management follow up . ACM was unable to reach the patient by telephone today;   left voice message requesting a return phone call to this ACM.     ACM: Narayan Newberry RN

## 2025-01-23 ENCOUNTER — APPOINTMENT (OUTPATIENT)
Facility: HOSPITAL | Age: 65
End: 2025-01-23
Payer: MEDICARE

## 2025-01-28 NOTE — PROGRESS NOTES
\"Have you been to the ER, urgent care clinic since your last visit?  Hospitalized since your last visit?\"    YES - When: approximately 2 months ago.  Where and Why: [facility] ed .    “Have you seen or consulted any other health care providers outside our system since your last visit?”    NO    Have you had a mammogram?”   NO    Date of last Mammogram: 10/31/2022       “Have you had a colorectal cancer screening such as a colonoscopy/FIT/Cologuard?    NO    Date of last Colonoscopy: 11/7/2014  No cologuard on file  No FIT/FOBT on file   No flexible sigmoidoscopy on file

## 2025-01-29 ENCOUNTER — OFFICE VISIT (OUTPATIENT)
Facility: CLINIC | Age: 65
End: 2025-01-29

## 2025-01-29 ENCOUNTER — HOSPITAL ENCOUNTER (OUTPATIENT)
Facility: HOSPITAL | Age: 65
Setting detail: INFUSION SERIES
Discharge: HOME | End: 2025-02-01
Payer: MEDICARE

## 2025-01-29 VITALS
TEMPERATURE: 98.4 F | BODY MASS INDEX: 20.56 KG/M2 | RESPIRATION RATE: 18 BRPM | HEIGHT: 67 IN | DIASTOLIC BLOOD PRESSURE: 66 MMHG | OXYGEN SATURATION: 93 % | HEART RATE: 124 BPM | SYSTOLIC BLOOD PRESSURE: 124 MMHG | WEIGHT: 131 LBS

## 2025-01-29 VITALS
DIASTOLIC BLOOD PRESSURE: 82 MMHG | RESPIRATION RATE: 20 BRPM | SYSTOLIC BLOOD PRESSURE: 131 MMHG | HEART RATE: 111 BPM | OXYGEN SATURATION: 87 % | TEMPERATURE: 97.3 F

## 2025-01-29 DIAGNOSIS — I10 ESSENTIAL HYPERTENSION: ICD-10-CM

## 2025-01-29 DIAGNOSIS — J44.1 CHRONIC OBSTRUCTIVE PULMONARY DISEASE WITH (ACUTE) EXACERBATION (HCC): ICD-10-CM

## 2025-01-29 DIAGNOSIS — I50.22 CHRONIC SYSTOLIC (CONGESTIVE) HEART FAILURE (HCC): ICD-10-CM

## 2025-01-29 DIAGNOSIS — C34.11 MALIGNANT NEOPLASM OF UPPER LOBE OF RIGHT LUNG (HCC): ICD-10-CM

## 2025-01-29 DIAGNOSIS — I82.291 CHRONIC EMBOLISM AND THROMBOSIS OF OTHER THORACIC VEINS (HCC): ICD-10-CM

## 2025-01-29 DIAGNOSIS — C34.90 MALIGNANT NEOPLASM OF UNSPECIFIED PART OF UNSPECIFIED BRONCHUS OR LUNG (HCC): ICD-10-CM

## 2025-01-29 DIAGNOSIS — I50.33 ACUTE ON CHRONIC DIASTOLIC (CONGESTIVE) HEART FAILURE (HCC): Primary | ICD-10-CM

## 2025-01-29 DIAGNOSIS — F33.0 MILD EPISODE OF RECURRENT MAJOR DEPRESSIVE DISORDER (HCC): ICD-10-CM

## 2025-01-29 DIAGNOSIS — C34.90 NON-SMALL CELL LUNG CANCER, UNSPECIFIED LATERALITY (HCC): ICD-10-CM

## 2025-01-29 DIAGNOSIS — J96.21 ACUTE AND CHRONIC RESPIRATORY FAILURE WITH HYPOXIA: ICD-10-CM

## 2025-01-29 DIAGNOSIS — J43.2 CENTRILOBULAR EMPHYSEMA (HCC): ICD-10-CM

## 2025-01-29 PROBLEM — Z85.118 HISTORY OF LUNG CANCER: Status: RESOLVED | Noted: 2024-05-21 | Resolved: 2025-01-29

## 2025-01-29 PROBLEM — R92.8 ABNORMAL MAMMOGRAM: Status: RESOLVED | Noted: 2017-06-21 | Resolved: 2025-01-29

## 2025-01-29 PROBLEM — R09.02 HYPOXIA: Status: RESOLVED | Noted: 2024-11-15 | Resolved: 2025-01-29

## 2025-01-29 PROBLEM — D50.9 MICROCYTIC ANEMIA: Status: RESOLVED | Noted: 2017-01-04 | Resolved: 2025-01-29

## 2025-01-29 PROBLEM — J96.91 HYPOXIC RESPIRATORY FAILURE: Status: RESOLVED | Noted: 2024-05-17 | Resolved: 2025-01-29

## 2025-01-29 PROBLEM — I50.9 ACUTE ON CHRONIC CONGESTIVE HEART FAILURE (HCC): Status: RESOLVED | Noted: 2024-11-14 | Resolved: 2025-01-29

## 2025-01-29 PROBLEM — R91.8 PULMONARY INFILTRATES: Status: RESOLVED | Noted: 2024-05-21 | Resolved: 2025-01-29

## 2025-01-29 PROBLEM — J30.1 ALLERGIC RHINITIS DUE TO POLLEN: Status: RESOLVED | Noted: 2022-03-15 | Resolved: 2025-01-29

## 2025-01-29 PROBLEM — A41.9 SEVERE SEPSIS (HCC): Status: RESOLVED | Noted: 2024-11-13 | Resolved: 2025-01-29

## 2025-01-29 PROBLEM — J96.00 ACUTE RESPIRATORY FAILURE: Status: RESOLVED | Noted: 2024-11-20 | Resolved: 2025-01-29

## 2025-01-29 PROBLEM — J96.20 ACUTE ON CHRONIC RESPIRATORY FAILURE: Status: RESOLVED | Noted: 2024-12-05 | Resolved: 2025-01-29

## 2025-01-29 PROBLEM — R65.20 SEVERE SEPSIS (HCC): Status: RESOLVED | Noted: 2024-11-13 | Resolved: 2025-01-29

## 2025-01-29 PROBLEM — Z71.89 GOALS OF CARE, COUNSELING/DISCUSSION: Status: RESOLVED | Noted: 2024-11-15 | Resolved: 2025-01-29

## 2025-01-29 PROBLEM — J69.0 ASPIRATION PNEUMONIA (HCC): Status: RESOLVED | Noted: 2024-05-17 | Resolved: 2025-01-29

## 2025-01-29 PROBLEM — J30.89 PERENNIAL ALLERGIC RHINITIS WITH SEASONAL VARIATION: Status: RESOLVED | Noted: 2022-03-15 | Resolved: 2025-01-29

## 2025-01-29 PROBLEM — Z86.711 HISTORY OF PULMONARY EMBOLISM: Status: RESOLVED | Noted: 2024-12-07 | Resolved: 2025-01-29

## 2025-01-29 PROBLEM — J30.2 PERENNIAL ALLERGIC RHINITIS WITH SEASONAL VARIATION: Status: RESOLVED | Noted: 2022-03-15 | Resolved: 2025-01-29

## 2025-01-29 PROBLEM — C34.91 NON-SMALL CELL CANCER OF RIGHT LUNG (HCC): Status: RESOLVED | Noted: 2024-11-15 | Resolved: 2025-01-29

## 2025-01-29 PROBLEM — I31.39 PERICARDIAL EFFUSION: Status: RESOLVED | Noted: 2024-12-06 | Resolved: 2025-01-29

## 2025-01-29 PROBLEM — R06.02 SHORTNESS OF BREATH: Status: RESOLVED | Noted: 2024-05-18 | Resolved: 2025-01-29

## 2025-01-29 PROBLEM — R19.7 DIARRHEA: Status: RESOLVED | Noted: 2024-11-17 | Resolved: 2025-01-29

## 2025-01-29 PROBLEM — I42.9 CARDIOMYOPATHY (HCC): Status: RESOLVED | Noted: 2024-11-14 | Resolved: 2025-01-29

## 2025-01-29 PROBLEM — J81.0 ACUTE PULMONARY EDEMA: Status: RESOLVED | Noted: 2024-05-18 | Resolved: 2025-01-29

## 2025-01-29 PROBLEM — N63.0 BREAST MASS SEEN ON MAMMOGRAM: Status: RESOLVED | Noted: 2023-04-05 | Resolved: 2025-01-29

## 2025-01-29 PROBLEM — J18.9 PNEUMONIA OF BOTH LUNGS DUE TO INFECTIOUS ORGANISM: Status: RESOLVED | Noted: 2024-12-09 | Resolved: 2025-01-29

## 2025-01-29 PROBLEM — D68.59 OTHER PRIMARY THROMBOPHILIA (HCC): Status: RESOLVED | Noted: 2023-04-06 | Resolved: 2025-01-29

## 2025-01-29 PROCEDURE — 99212 OFFICE O/P EST SF 10 MIN: CPT

## 2025-01-29 PROCEDURE — 2500000003 HC RX 250 WO HCPCS: Performed by: INTERNAL MEDICINE

## 2025-01-29 RX ORDER — SODIUM CHLORIDE 0.9 % (FLUSH) 0.9 %
5-40 SYRINGE (ML) INJECTION PRN
Status: DISCONTINUED | OUTPATIENT
Start: 2025-01-29 | End: 2025-02-02 | Stop reason: HOSPADM

## 2025-01-29 RX ORDER — HEPARIN SODIUM (PORCINE) LOCK FLUSH IV SOLN 100 UNIT/ML 100 UNIT/ML
500 SOLUTION INTRAVENOUS PRN
Status: DISCONTINUED | OUTPATIENT
Start: 2025-01-29 | End: 2025-02-02 | Stop reason: HOSPADM

## 2025-01-29 RX ADMIN — SODIUM CHLORIDE, PRESERVATIVE FREE 10 ML: 5 INJECTION INTRAVENOUS at 09:56

## 2025-01-29 ASSESSMENT — PATIENT HEALTH QUESTIONNAIRE - PHQ9
8. MOVING OR SPEAKING SO SLOWLY THAT OTHER PEOPLE COULD HAVE NOTICED. OR THE OPPOSITE, BEING SO FIGETY OR RESTLESS THAT YOU HAVE BEEN MOVING AROUND A LOT MORE THAN USUAL: NOT AT ALL
5. POOR APPETITE OR OVEREATING: NOT AT ALL
SUM OF ALL RESPONSES TO PHQ QUESTIONS 1-9: 0
7. TROUBLE CONCENTRATING ON THINGS, SUCH AS READING THE NEWSPAPER OR WATCHING TELEVISION: NOT AT ALL
SUM OF ALL RESPONSES TO PHQ9 QUESTIONS 1 & 2: 0
1. LITTLE INTEREST OR PLEASURE IN DOING THINGS: NOT AT ALL
2. FEELING DOWN, DEPRESSED OR HOPELESS: NOT AT ALL
SUM OF ALL RESPONSES TO PHQ QUESTIONS 1-9: 0
3. TROUBLE FALLING OR STAYING ASLEEP: NOT AT ALL
4. FEELING TIRED OR HAVING LITTLE ENERGY: NOT AT ALL
6. FEELING BAD ABOUT YOURSELF - OR THAT YOU ARE A FAILURE OR HAVE LET YOURSELF OR YOUR FAMILY DOWN: NOT AT ALL
10. IF YOU CHECKED OFF ANY PROBLEMS, HOW DIFFICULT HAVE THESE PROBLEMS MADE IT FOR YOU TO DO YOUR WORK, TAKE CARE OF THINGS AT HOME, OR GET ALONG WITH OTHER PEOPLE: NOT DIFFICULT AT ALL
9. THOUGHTS THAT YOU WOULD BE BETTER OFF DEAD, OR OF HURTING YOURSELF: NOT AT ALL

## 2025-01-29 NOTE — PROGRESS NOTES
hospitals Progress Note    Date: 2025    Name: Rina Prajapati    MRN: [id number]         : 1960    Ms. Prajapati arrived in the hospitals today at 0925, in stable condition via electric wheelchair with her sister in law, here for her WEEKLY PICC LINE CARE. She was assessed and education was provided.     Ms. Prajapati's vitals were reviewed.  Vitals:    25 0930   BP: 131/82   Pulse: (!) 111   Resp: 20   Temp: 97.3 °F (36.3 °C)   SpO2: (!) 87%         Noted left upper arm single lumen PICC dirty with date of last dressing change 2024. Ms. Prajapati stated that she doesn't know when the last time her PICC was flushed.    Dressing changed using sterile technique. New stat lock applied.  New cap applied.  Tried flushing lumen and was unsuccessful.  Dry looking blood noted in lumen.    PICC secured with stockinette. Patient tolerated well.    Ms. Prajapati was not able to stay for me to call dr. Li's office due to having another doctors appointment.  Ms. Prajapati is aware that the PICC line is not working and will probably have to be removed.    [facility] at 1000 via wheelchair with her sister in law.    She has no appointments scheduled at this time. Waiting for Dr. Li's office for further orders.      SOPHIE HENDERSON RN  2025  2:52 PM

## 2025-01-29 NOTE — PROGRESS NOTES
SUBJECTIVE  Hospital ff-up acute on chronic respiratory failure December 5, 2024    9-day stay  Communication with nurse navigator none    Reviewed discharge summary.  Acute and chronic hypoxic respiratory failure multifactorial with non-small cell lung cancer, COPD, CHF exacerbation pneumonia.  She is back to her baseline respiration and chronic dyspnea, on same baseline 2 to 3 L of home oxygen.  Systolic CHF with ejection fraction 45 to 50%  Repeat BMP without significant abnormality  Still ongoing palliative chemo with [facility] Dr. Li       HTN w/ diastolic CHF- continues to take aldactone and lasix ,  increased from hospital discharge 40 mg BID from OD, a Also on entresto,was following cardiology dr. Javier guerrero but lost to ff-up, advised to schedule, will have NN assist as well.  She has quit smoking     Depression- about the same, fairly  well on zoloft      Non small cell lung cancer/iron def anemia-dx 2015, following dr. Li.   Reports recent diagnosis of R subclavian vein thrombus, on eliquis    COPD w/ chronic respiratory failure- following pulm dr. andrade     Hypothyroidism- taking levothyroxine, TSH 6/2024 during admission wnl       ROS:   See HPI, all others negative       Social Determinants of Health            Financial Resource Strain: Not on file     Food Insecurity: Not on file     Transportation Needs: Not on file     Physical Activity: Not on file     Stress: Not on file     Social Connections: Not on file     Intimate Partner Violence: Not on file       Housing Stability: Not on file                OBJECTIVE      Physical Exam:       /66   Pulse (!) 124   Temp 98.4 °F (36.9 °C) (Temporal)   Resp 18   Ht 1.702 m (5' 7\")   Wt 59.4 kg (131 lb)   SpO2 93%   BMI 20.52 kg/m²         General: alert, chronically ill- appearing, AA, in no apparent distress or pain, with O2 nasal cannula   CVS: normal rate, regular rhythm, distinct S1 and S2   Lungs

## 2025-01-29 NOTE — PROGRESS NOTES
[facility] Progress Note    Date: 2025    Name: Rina Prajapati    MRN: [id number]         : 1960    Ms. Prajapati arrived in the [facility] today at 0925, in stable condition via electric wheelchair with her sister in law, here for her WEEKLY PICC LINE CARE. She was assessed and education was provided.     Ms. Prajapati's vitals were reviewed.  Vitals:    25 0930   BP: 131/82   Pulse: (!) 111   Resp: 20   Temp: 97.3 °F (36.3 °C)   SpO2: (!) 87%         Noted left upper arm single lumen PICC dirty with date of last dressing change 2024. Ms. Prajapati stated that she doesn't know when the last time her PICC was flushed.    Dressing changed using sterile technique. New stat lock applied.  New cap applied.  Tried flushing lumen and was unsuccessful.  Dry looking blood noted in lumen.    PICC secured with stockinette. Patient tolerated well.    Ms. Prajapati was not able to stay for me to call dr. Li's office due to having another doctors appointment.  Ms. Prajapati is aware that the PICC line is not working and will probably have to be removed.    Ms. Prajapati [facility] in [facility] condition at 1000 via wheelchair with her sister in law.    She has no appointments scheduled at this time. Waiting for Dr. Li's office for further orders.      SOPHIE HENDERSON RN  2025  4:38 PM

## 2025-01-29 NOTE — PATIENT INSTRUCTIONS

## 2025-01-29 NOTE — PROGRESS NOTES
[facility] Progress Note    Date: 2025    Name: Rina Prajapati    MRN: [id number]         : 1960    Ms. Prajapati arrived in the [facility] today at 0930, in stable condition via wheelchair with her friend, here for her WEEKLY PICC LINE CARE and PRECHEMO LABS. She was assessed and education was provided.     Ms. Prajapati's vitals were reviewed.  Vitals:    25 0930   BP: 131/82   Pulse: (!) 111   Resp: 20   Temp: 97.3 °F (36.3 °C)   SpO2: (!) 87%     Weight today 120.6lb (54.7kg)    Noted left upper arm single lumen PICC clean, dry and intact. Flushes without difficulty and blood return brisk.     CBC, BMP, and hepatic function panel drawn from PICC per order. CBC ran on site, results as follows:  No results found for this visit on 25.    PICC end cap changed, flushed with NS and heparin per protocol/order. Curos cap placed. PICC dressing changed per protocol using sterile technique. PICC secured with stockinette. Pt tolerated well.    Ms. Prajapati [facility] in [facility] condition at 1020 via wheelchair with her friend.    She is scheduled to return on 24 at 0900 for her chemotherapy appt.      SOPHIE HENDERSON RN  2025  9:58 AM

## 2025-01-29 NOTE — PROGRESS NOTES
Advance Care Planning   Discussed the patient’s choices for care and treatment preferences in case of a health event that adversely affects decision-making abilities or is life-limiting. Recommended the patient document care preferences in state-specific advance directives. Also reviewed the process of designating a trusted capable adult as an Agent (or Health Care Power of ) to make health care decisions for the patient if the patient becomes unable due to incapacity. Patient was asked to complete advance directive forms, if not already done, and to provide a dated, signed and witnessed (or notarized) copy, per the forms' requirements, to the practice office.    Time spent (minutes): {TIME; ACP time 16 min - 1 hour:23964}

## 2025-01-30 ENCOUNTER — TELEPHONE (OUTPATIENT)
Facility: HOSPITAL | Age: 65
End: 2025-01-30

## 2025-01-30 DIAGNOSIS — C34.90 NON-SMALL CELL LUNG CANCER, UNSPECIFIED LATERALITY (HCC): Primary | ICD-10-CM

## 2025-01-30 NOTE — TELEPHONE ENCOUNTER
Spk to pt and she's stopping her Eliquis 2/6/25. She'll be here 7:45am 2/10/25 for her PICC replacement. [place] 1/30/25

## 2025-01-31 ENCOUNTER — CARE COORDINATION (OUTPATIENT)
Facility: CLINIC | Age: 65
End: 2025-01-31

## 2025-01-31 ASSESSMENT — SOCIAL DETERMINANTS OF HEALTH (SDOH)
DO YOU BELONG TO ANY CLUBS OR ORGANIZATIONS SUCH AS CHURCH GROUPS UNIONS, FRATERNAL OR ATHLETIC GROUPS, OR SCHOOL GROUPS?: NO
IN A TYPICAL WEEK, HOW MANY TIMES DO YOU TALK ON THE PHONE WITH FAMILY, FRIENDS, OR NEIGHBORS?: THREE TIMES A WEEK
ARE YOU MARRIED, WIDOWED, DIVORCED, SEPARATED, NEVER MARRIED, OR LIVING WITH A PARTNER?: LIVING WITH PARTNER
WITHIN THE LAST YEAR, HAVE YOU BEEN KICKED, HIT, SLAPPED, OR OTHERWISE PHYSICALLY HURT BY YOUR PARTNER OR EX-PARTNER?: NO
WITHIN THE LAST YEAR, HAVE TO BEEN RAPED OR FORCED TO HAVE ANY KIND OF SEXUAL ACTIVITY BY YOUR PARTNER OR EX-PARTNER?: NO
WITHIN THE LAST YEAR, HAVE YOU BEEN AFRAID OF YOUR PARTNER OR EX-PARTNER?: NO
WITHIN THE LAST YEAR, HAVE YOU BEEN HUMILIATED OR EMOTIONALLY ABUSED IN OTHER WAYS BY YOUR PARTNER OR EX-PARTNER?: NO
HOW OFTEN DO YOU GET TOGETHER WITH FRIENDS OR RELATIVES?: THREE TIMES A WEEK
HOW OFTEN DO YOU ATTEND CHURCH OR RELIGIOUS SERVICES?: NEVER
HOW OFTEN DO YOU ATTENT MEETINGS OF THE CLUB OR ORGANIZATION YOU BELONG TO?: NEVER

## 2025-01-31 NOTE — CARE COORDINATION
Ambulatory Care Coordination Note     2025 9:22 AM     Patient Current Location:  Home: [address] [place] [id number]     ACM contacted the patient by telephone. Verified name and  with patient as identifiers.         ACM: Narayan Newberry RN     Challenges to be reviewed by the provider   Additional needs identified to be addressed with provider No  none               Method of communication with provider: none.    Utilization: Has the patient been seen in the ED since your last call? no    Care Summary Note:     Hx of Depression, HTN, PE, Lung Ca, CHF  HRCM after BRENDA post admission for resp fail w/ pneumonia and acute on chronic CHF.  Pt states doing ok  Recent PCP appt. PCP noted lost to f/u w/ cardiology as ACM previously noted.  Offered assistance w/ scheduling f/u w/ Cards today, but patient declined. Wishes to schedule after upcoming visit w/ her oncologist which is on 25.  Will address after that visit.  Also saw pulm in July, no f/u scheduled    Offered patient enrollment in the Remote Patient Monitoring (RPM) program for in-home monitoring: Yes, but did not enroll at this time: declined to enroll in the program because: states not interested .     Assessments Completed:   General Assessment    Do you have any symptoms that are causing concern?: No      ,   Social Determinants of Health     Tobacco Use: High Risk (2025)    Patient History     Smoking Tobacco Use: Every Day     Smokeless Tobacco Use: Never     Passive Exposure: Not on file   Alcohol Use: Not At Risk (2024)    AUDIT-C     Frequency of Alcohol Consumption: Never     Average Number of Drinks: Patient does not drink     Frequency of Binge Drinking: Never   Financial Resource Strain: Low Risk  (2024)    Overall Financial Resource Strain (CARDIA)     Difficulty of Paying Living Expenses: Not very hard   Food Insecurity: No Food Insecurity (2025)    Received from [facility]    [employer] Vital Sign

## 2025-02-07 ENCOUNTER — APPOINTMENT (OUTPATIENT)
Facility: HOSPITAL | Age: 65
DRG: 291 | End: 2025-02-07
Payer: MEDICARE

## 2025-02-07 ENCOUNTER — HOSPITAL ENCOUNTER (OUTPATIENT)
Facility: HOSPITAL | Age: 65
Discharge: HOME | End: 2025-02-10
Attending: INTERNAL MEDICINE

## 2025-02-07 ENCOUNTER — HOSPITAL ENCOUNTER (INPATIENT)
Facility: HOSPITAL | Age: 65
LOS: 3 days | Discharge: HOME | DRG: 291 | End: 2025-02-10
Attending: EMERGENCY MEDICINE | Admitting: INTERNAL MEDICINE
Payer: MEDICARE

## 2025-02-07 DIAGNOSIS — R06.00 DYSPNEA, UNSPECIFIED TYPE: Primary | ICD-10-CM

## 2025-02-07 DIAGNOSIS — C34.90 NON-SMALL CELL LUNG CANCER, UNSPECIFIED LATERALITY (HCC): ICD-10-CM

## 2025-02-07 DIAGNOSIS — I31.39 PERICARDIAL EFFUSION: ICD-10-CM

## 2025-02-07 PROBLEM — J96.01 ACUTE HYPOXIC RESPIRATORY FAILURE: Status: ACTIVE | Noted: 2025-02-07

## 2025-02-07 PROBLEM — Z86.718 HISTORY OF DEEP VENOUS THROMBOSIS (DVT) OF DISTAL VEIN OF RIGHT LOWER EXTREMITY: Chronic | Status: ACTIVE | Noted: 2025-02-07

## 2025-02-07 PROBLEM — Z86.711 HISTORY OF PULMONARY EMBOLISM: Chronic | Status: ACTIVE | Noted: 2024-12-07

## 2025-02-07 PROBLEM — J44.1 ACUTE EXACERBATION OF CHRONIC OBSTRUCTIVE PULMONARY DISEASE (COPD) (HCC): Chronic | Status: ACTIVE | Noted: 2024-06-14

## 2025-02-07 PROBLEM — I82.291: Chronic | Status: ACTIVE | Noted: 2024-04-10

## 2025-02-07 PROBLEM — J43.9 EMPHYSEMA LUNG (HCC): Chronic | Status: ACTIVE | Noted: 2024-06-14

## 2025-02-07 PROBLEM — I50.23 ACUTE ON CHRONIC HEART FAILURE WITH MILDLY REDUCED EJECTION FRACTION (HFMREF, 41-49%) (HCC): Status: ACTIVE | Noted: 2025-01-29

## 2025-02-07 PROBLEM — Z79.01 CURRENT USE OF LONG TERM ANTICOAGULATION: Status: ACTIVE | Noted: 2025-02-07

## 2025-02-07 PROBLEM — Z79.01 CURRENT USE OF LONG TERM ANTICOAGULATION: Chronic | Status: ACTIVE | Noted: 2025-02-07

## 2025-02-07 PROBLEM — E03.9 HYPOTHYROIDISM: Chronic | Status: ACTIVE | Noted: 2025-02-07

## 2025-02-07 PROBLEM — E03.9 HYPOTHYROIDISM: Status: ACTIVE | Noted: 2025-02-07

## 2025-02-07 PROBLEM — Z86.718 HISTORY OF DEEP VENOUS THROMBOSIS (DVT) OF DISTAL VEIN OF RIGHT LOWER EXTREMITY: Status: ACTIVE | Noted: 2025-02-07

## 2025-02-07 PROBLEM — J96.21 ACUTE ON CHRONIC HYPOXIC RESPIRATORY FAILURE: Status: ACTIVE | Noted: 2025-02-07

## 2025-02-07 PROBLEM — J44.1 ACUTE EXACERBATION OF CHRONIC OBSTRUCTIVE PULMONARY DISEASE (COPD) (HCC): Status: ACTIVE | Noted: 2024-06-14

## 2025-02-07 LAB
ALBUMIN SERPL-MCNC: 3.3 G/DL (ref 3.4–5)
ALBUMIN/GLOB SERPL: 0.9 (ref 0.8–1.7)
ALP SERPL-CCNC: 52 U/L (ref 45–117)
ALT SERPL-CCNC: 19 U/L (ref 13–56)
ANION GAP BLD CALC-SCNC: ABNORMAL MMOL/L (ref 10–20)
ANION GAP SERPL CALC-SCNC: 10 MMOL/L (ref 3–18)
AST SERPL-CCNC: 13 U/L (ref 10–38)
B PERT DNA SPEC QL NAA+PROBE: NOT DETECTED
BASE DEFICIT BLD-SCNC: 0.8 MMOL/L
BASOPHILS # BLD: 0.02 K/UL (ref 0–0.1)
BASOPHILS NFR BLD: 0.2 % (ref 0–2)
BDY SITE: ABNORMAL
BILIRUB DIRECT SERPL-MCNC: 0.2 MG/DL (ref 0–0.2)
BILIRUB SERPL-MCNC: 0.5 MG/DL (ref 0.2–1)
BODY TEMPERATURE: 98.2
BORDETELLA PARAPERTUSSIS BY PCR: NOT DETECTED
BUN SERPL-MCNC: 13 MG/DL (ref 7–18)
BUN/CREAT SERPL: 22 (ref 12–20)
C PNEUM DNA SPEC QL NAA+PROBE: NOT DETECTED
CA-I BLD-MCNC: 1.27 MMOL/L (ref 1.15–1.33)
CALCIUM SERPL-MCNC: 9.2 MG/DL (ref 8.5–10.1)
CHLORIDE BLD-SCNC: 107 MMOL/L (ref 98–107)
CHLORIDE SERPL-SCNC: 107 MMOL/L (ref 100–111)
CO2 BLD-SCNC: 22 MMOL/L (ref 22–29)
CO2 SERPL-SCNC: 25 MMOL/L (ref 21–32)
CREAT BLD-MCNC: 0.61 MG/DL (ref 0.6–1.3)
CREAT SERPL-MCNC: 0.6 MG/DL (ref 0.6–1.3)
D DIMER PPP FEU-MCNC: 1.51 UG/ML(FEU)
DIFFERENTIAL METHOD BLD: ABNORMAL
EOSINOPHIL # BLD: 0.03 K/UL (ref 0–0.4)
EOSINOPHIL NFR BLD: 0.3 % (ref 0–5)
ERYTHROCYTE [DISTWIDTH] IN BLOOD BY AUTOMATED COUNT: 20.1 % (ref 11.6–14.5)
FIO2 ON VENT: 52 %
FLUAV SUBTYP SPEC NAA+PROBE: NOT DETECTED
FLUBV RNA SPEC QL NAA+PROBE: NOT DETECTED
GAS FLOW.O2 O2 DELIVERY SYS: ABNORMAL
GLOBULIN SER CALC-MCNC: 3.8 G/DL (ref 2–4)
GLUCOSE BLD-MCNC: 121 MG/DL (ref 74–99)
GLUCOSE SERPL-MCNC: 117 MG/DL (ref 74–99)
HADV DNA SPEC QL NAA+PROBE: NOT DETECTED
HCO3 BLD-SCNC: 23.1 MMOL/L (ref 21–28)
HCOV 229E RNA SPEC QL NAA+PROBE: NOT DETECTED
HCOV HKU1 RNA SPEC QL NAA+PROBE: NOT DETECTED
HCOV NL63 RNA SPEC QL NAA+PROBE: NOT DETECTED
HCOV OC43 RNA SPEC QL NAA+PROBE: NOT DETECTED
HCT VFR BLD AUTO: 37.3 % (ref 35–45)
HGB BLD-MCNC: 10.9 G/DL (ref 12–16)
HMPV RNA SPEC QL NAA+PROBE: NOT DETECTED
HPIV1 RNA SPEC QL NAA+PROBE: NOT DETECTED
HPIV2 RNA SPEC QL NAA+PROBE: NOT DETECTED
HPIV3 RNA SPEC QL NAA+PROBE: NOT DETECTED
HPIV4 RNA SPEC QL NAA+PROBE: NOT DETECTED
IMM GRANULOCYTES # BLD AUTO: 0.04 K/UL (ref 0–0.04)
IMM GRANULOCYTES NFR BLD AUTO: 0.4 % (ref 0–0.5)
LACTATE BLD-SCNC: 2.49 MMOL/L (ref 0.4–2)
LYMPHOCYTES # BLD: 0.5 K/UL (ref 0.9–3.6)
LYMPHOCYTES NFR BLD: 5.4 % (ref 21–52)
M PNEUMO DNA SPEC QL NAA+PROBE: NOT DETECTED
MAGNESIUM SERPL-MCNC: 2 MG/DL (ref 1.6–2.6)
MCH RBC QN AUTO: 23.6 PG (ref 24–34)
MCHC RBC AUTO-ENTMCNC: 29.2 G/DL (ref 31–37)
MCV RBC AUTO: 80.9 FL (ref 78–100)
MONOCYTES # BLD: 0.41 K/UL (ref 0.05–1.2)
MONOCYTES NFR BLD: 4.4 % (ref 3–10)
NEUTS SEG # BLD: 8.33 K/UL (ref 1.8–8)
NEUTS SEG NFR BLD: 89.3 % (ref 40–73)
NRBC # BLD: 0 K/UL (ref 0–0.01)
NRBC BLD-RTO: 0 PER 100 WBC
NT PRO BNP: 3300 PG/ML (ref 0–900)
PCO2 BLD: 34.4 MMHG (ref 35–48)
PH BLD: 7.44 (ref 7.35–7.45)
PLATELET # BLD AUTO: 242 K/UL (ref 135–420)
PMV BLD AUTO: 10.5 FL (ref 9.2–11.8)
PO2 BLD: 60 MMHG (ref 83–108)
POTASSIUM BLD-SCNC: 3.7 MMOL/L (ref 3.5–5.1)
POTASSIUM SERPL-SCNC: 4.2 MMOL/L (ref 3.5–5.5)
PROCALCITONIN SERPL-MCNC: 0.1 NG/ML
PROT SERPL-MCNC: 7.1 G/DL (ref 6.4–8.2)
RBC # BLD AUTO: 4.61 M/UL (ref 4.2–5.3)
RESPIRATORY RATE, POC: 23 (ref 5–40)
RSV RNA SPEC QL NAA+PROBE: NOT DETECTED
RV+EV RNA SPEC QL NAA+PROBE: NOT DETECTED
SAO2 % BLD: 92 % (ref 94–98)
SARS-COV-2 RNA RESP QL NAA+PROBE: NOT DETECTED
SERVICE CMNT-IMP: ABNORMAL
SODIUM BLD-SCNC: 141 MMOL/L (ref 136–145)
SODIUM SERPL-SCNC: 142 MMOL/L (ref 136–145)
SPECIMEN SITE: ABNORMAL
TROPONIN I SERPL HS-MCNC: 18 NG/L (ref 0–54)
WBC # BLD AUTO: 9.3 K/UL (ref 4.6–13.2)

## 2025-02-07 PROCEDURE — 83880 ASSAY OF NATRIURETIC PEPTIDE: CPT

## 2025-02-07 PROCEDURE — 2140000001 HC CVICU INTERMEDIATE R&B

## 2025-02-07 PROCEDURE — 2580000003 HC RX 258: Performed by: INTERNAL MEDICINE

## 2025-02-07 PROCEDURE — 84484 ASSAY OF TROPONIN QUANT: CPT

## 2025-02-07 PROCEDURE — 85014 HEMATOCRIT: CPT

## 2025-02-07 PROCEDURE — 6370000000 HC RX 637 (ALT 250 FOR IP)

## 2025-02-07 PROCEDURE — 82947 ASSAY GLUCOSE BLOOD QUANT: CPT

## 2025-02-07 PROCEDURE — 94640 AIRWAY INHALATION TREATMENT: CPT

## 2025-02-07 PROCEDURE — 6360000002 HC RX W HCPCS

## 2025-02-07 PROCEDURE — 6360000002 HC RX W HCPCS: Performed by: EMERGENCY MEDICINE

## 2025-02-07 PROCEDURE — 6370000000 HC RX 637 (ALT 250 FOR IP): Performed by: INTERNAL MEDICINE

## 2025-02-07 PROCEDURE — 99223 1ST HOSP IP/OBS HIGH 75: CPT | Performed by: INTERNAL MEDICINE

## 2025-02-07 PROCEDURE — 96366 THER/PROPH/DIAG IV INF ADDON: CPT

## 2025-02-07 PROCEDURE — 84145 PROCALCITONIN (PCT): CPT

## 2025-02-07 PROCEDURE — 6360000002 HC RX W HCPCS: Performed by: INTERNAL MEDICINE

## 2025-02-07 PROCEDURE — 2500000003 HC RX 250 WO HCPCS: Performed by: INTERNAL MEDICINE

## 2025-02-07 PROCEDURE — 6370000000 HC RX 637 (ALT 250 FOR IP): Performed by: EMERGENCY MEDICINE

## 2025-02-07 PROCEDURE — 36600 WITHDRAWAL OF ARTERIAL BLOOD: CPT

## 2025-02-07 PROCEDURE — 82803 BLOOD GASES ANY COMBINATION: CPT

## 2025-02-07 PROCEDURE — 83605 ASSAY OF LACTIC ACID: CPT

## 2025-02-07 PROCEDURE — 85025 COMPLETE CBC W/AUTO DIFF WBC: CPT

## 2025-02-07 PROCEDURE — 80048 BASIC METABOLIC PNL TOTAL CA: CPT

## 2025-02-07 PROCEDURE — 96365 THER/PROPH/DIAG IV INF INIT: CPT

## 2025-02-07 PROCEDURE — 0202U NFCT DS 22 TRGT SARS-COV-2: CPT

## 2025-02-07 PROCEDURE — 2700000000 HC OXYGEN THERAPY PER DAY

## 2025-02-07 PROCEDURE — 82330 ASSAY OF CALCIUM: CPT

## 2025-02-07 PROCEDURE — 93005 ELECTROCARDIOGRAM TRACING: CPT | Performed by: EMERGENCY MEDICINE

## 2025-02-07 PROCEDURE — 80076 HEPATIC FUNCTION PANEL: CPT

## 2025-02-07 PROCEDURE — 85379 FIBRIN DEGRADATION QUANT: CPT

## 2025-02-07 PROCEDURE — 84295 ASSAY OF SERUM SODIUM: CPT

## 2025-02-07 PROCEDURE — 96375 TX/PRO/DX INJ NEW DRUG ADDON: CPT

## 2025-02-07 PROCEDURE — 94761 N-INVAS EAR/PLS OXIMETRY MLT: CPT

## 2025-02-07 PROCEDURE — 71045 X-RAY EXAM CHEST 1 VIEW: CPT

## 2025-02-07 PROCEDURE — 84132 ASSAY OF SERUM POTASSIUM: CPT

## 2025-02-07 PROCEDURE — 83735 ASSAY OF MAGNESIUM: CPT

## 2025-02-07 PROCEDURE — 99285 EMERGENCY DEPT VISIT HI MDM: CPT

## 2025-02-07 RX ORDER — SODIUM CHLORIDE 0.9 % (FLUSH) 0.9 %
5-40 SYRINGE (ML) INJECTION PRN
Status: DISCONTINUED | OUTPATIENT
Start: 2025-02-07 | End: 2025-02-10 | Stop reason: HOSPADM

## 2025-02-07 RX ORDER — MECOBALAMIN 5000 MCG
5 TABLET,DISINTEGRATING ORAL NIGHTLY PRN
Status: DISCONTINUED | OUTPATIENT
Start: 2025-02-07 | End: 2025-02-10 | Stop reason: HOSPADM

## 2025-02-07 RX ORDER — ARFORMOTEROL TARTRATE 15 UG/2ML
15 SOLUTION RESPIRATORY (INHALATION)
Status: DISCONTINUED | OUTPATIENT
Start: 2025-02-07 | End: 2025-02-10 | Stop reason: HOSPADM

## 2025-02-07 RX ORDER — SPIRONOLACTONE 25 MG/1
25 TABLET ORAL DAILY
Status: DISCONTINUED | OUTPATIENT
Start: 2025-02-07 | End: 2025-02-10 | Stop reason: HOSPADM

## 2025-02-07 RX ORDER — ALBUTEROL SULFATE 0.83 MG/ML
2.5 SOLUTION RESPIRATORY (INHALATION)
Status: COMPLETED | OUTPATIENT
Start: 2025-02-07 | End: 2025-02-07

## 2025-02-07 RX ORDER — FUROSEMIDE 10 MG/ML
40 INJECTION INTRAMUSCULAR; INTRAVENOUS 2 TIMES DAILY
Status: DISCONTINUED | OUTPATIENT
Start: 2025-02-07 | End: 2025-02-09

## 2025-02-07 RX ORDER — ONDANSETRON 4 MG/1
4 TABLET, ORALLY DISINTEGRATING ORAL EVERY 8 HOURS PRN
Status: DISCONTINUED | OUTPATIENT
Start: 2025-02-07 | End: 2025-02-10 | Stop reason: HOSPADM

## 2025-02-07 RX ORDER — FOLIC ACID 1 MG/1
1 TABLET ORAL DAILY
Status: DISCONTINUED | OUTPATIENT
Start: 2025-02-07 | End: 2025-02-10 | Stop reason: HOSPADM

## 2025-02-07 RX ORDER — POTASSIUM CHLORIDE 7.45 MG/ML
10 INJECTION INTRAVENOUS PRN
Status: DISCONTINUED | OUTPATIENT
Start: 2025-02-07 | End: 2025-02-10 | Stop reason: HOSPADM

## 2025-02-07 RX ORDER — INSULIN LISPRO 100 [IU]/ML
0-8 INJECTION, SOLUTION INTRAVENOUS; SUBCUTANEOUS
Status: DISCONTINUED | OUTPATIENT
Start: 2025-02-08 | End: 2025-02-10 | Stop reason: HOSPADM

## 2025-02-07 RX ORDER — POTASSIUM CHLORIDE 1500 MG/1
40 TABLET, EXTENDED RELEASE ORAL PRN
Status: DISCONTINUED | OUTPATIENT
Start: 2025-02-07 | End: 2025-02-10 | Stop reason: HOSPADM

## 2025-02-07 RX ORDER — GUAIFENESIN 600 MG/1
600 TABLET, EXTENDED RELEASE ORAL 2 TIMES DAILY
Status: DISCONTINUED | OUTPATIENT
Start: 2025-02-07 | End: 2025-02-08

## 2025-02-07 RX ORDER — ONDANSETRON 2 MG/ML
4 INJECTION INTRAMUSCULAR; INTRAVENOUS EVERY 6 HOURS PRN
Status: DISCONTINUED | OUTPATIENT
Start: 2025-02-07 | End: 2025-02-10 | Stop reason: HOSPADM

## 2025-02-07 RX ORDER — MULTIVITAMIN WITH IRON
1 TABLET ORAL DAILY
Status: DISCONTINUED | OUTPATIENT
Start: 2025-02-07 | End: 2025-02-10 | Stop reason: HOSPADM

## 2025-02-07 RX ORDER — IPRATROPIUM BROMIDE AND ALBUTEROL SULFATE 2.5; .5 MG/3ML; MG/3ML
1 SOLUTION RESPIRATORY (INHALATION)
Status: COMPLETED | OUTPATIENT
Start: 2025-02-07 | End: 2025-02-07

## 2025-02-07 RX ORDER — SODIUM CHLORIDE 0.9 % (FLUSH) 0.9 %
5-40 SYRINGE (ML) INJECTION EVERY 12 HOURS SCHEDULED
Status: DISCONTINUED | OUTPATIENT
Start: 2025-02-07 | End: 2025-02-10 | Stop reason: HOSPADM

## 2025-02-07 RX ORDER — MAGNESIUM SULFATE IN WATER 40 MG/ML
2000 INJECTION, SOLUTION INTRAVENOUS
Status: COMPLETED | OUTPATIENT
Start: 2025-02-07 | End: 2025-02-07

## 2025-02-07 RX ORDER — IPRATROPIUM BROMIDE AND ALBUTEROL SULFATE 2.5; .5 MG/3ML; MG/3ML
1 SOLUTION RESPIRATORY (INHALATION) EVERY 4 HOURS PRN
Status: DISCONTINUED | OUTPATIENT
Start: 2025-02-07 | End: 2025-02-10 | Stop reason: HOSPADM

## 2025-02-07 RX ORDER — DEXAMETHASONE SODIUM PHOSPHATE 4 MG/ML
10 INJECTION, SOLUTION INTRA-ARTICULAR; INTRALESIONAL; INTRAMUSCULAR; INTRAVENOUS; SOFT TISSUE
Status: COMPLETED | OUTPATIENT
Start: 2025-02-07 | End: 2025-02-07

## 2025-02-07 RX ORDER — FERROUS SULFATE 325(65) MG
325 TABLET ORAL
Status: DISCONTINUED | OUTPATIENT
Start: 2025-02-08 | End: 2025-02-07

## 2025-02-07 RX ORDER — ACETAMINOPHEN 650 MG/1
650 SUPPOSITORY RECTAL EVERY 6 HOURS PRN
Status: DISCONTINUED | OUTPATIENT
Start: 2025-02-07 | End: 2025-02-09

## 2025-02-07 RX ORDER — ALBUTEROL SULFATE 0.83 MG/ML
SOLUTION RESPIRATORY (INHALATION)
Status: COMPLETED
Start: 2025-02-07 | End: 2025-02-07

## 2025-02-07 RX ORDER — MAGNESIUM SULFATE IN WATER 40 MG/ML
2000 INJECTION, SOLUTION INTRAVENOUS PRN
Status: DISCONTINUED | OUTPATIENT
Start: 2025-02-07 | End: 2025-02-10 | Stop reason: HOSPADM

## 2025-02-07 RX ORDER — FERROUS SULFATE 325(65) MG
325 TABLET ORAL
Status: DISCONTINUED | OUTPATIENT
Start: 2025-02-07 | End: 2025-02-10 | Stop reason: HOSPADM

## 2025-02-07 RX ORDER — ACETAMINOPHEN 325 MG/1
650 TABLET ORAL EVERY 6 HOURS PRN
Status: DISCONTINUED | OUTPATIENT
Start: 2025-02-07 | End: 2025-02-09

## 2025-02-07 RX ORDER — POLYETHYLENE GLYCOL 3350 17 G/17G
17 POWDER, FOR SOLUTION ORAL DAILY PRN
Status: DISCONTINUED | OUTPATIENT
Start: 2025-02-07 | End: 2025-02-10 | Stop reason: HOSPADM

## 2025-02-07 RX ORDER — DEXTROSE MONOHYDRATE 100 MG/ML
INJECTION, SOLUTION INTRAVENOUS CONTINUOUS PRN
Status: DISCONTINUED | OUTPATIENT
Start: 2025-02-07 | End: 2025-02-10 | Stop reason: HOSPADM

## 2025-02-07 RX ORDER — IOPAMIDOL 612 MG/ML
100 INJECTION, SOLUTION INTRAVASCULAR
Status: DISCONTINUED | OUTPATIENT
Start: 2025-02-07 | End: 2025-02-10 | Stop reason: HOSPADM

## 2025-02-07 RX ORDER — SODIUM CHLORIDE 9 MG/ML
INJECTION, SOLUTION INTRAVENOUS PRN
Status: DISCONTINUED | OUTPATIENT
Start: 2025-02-07 | End: 2025-02-10 | Stop reason: HOSPADM

## 2025-02-07 RX ORDER — MONTELUKAST SODIUM 10 MG/1
10 TABLET ORAL DAILY
Status: DISCONTINUED | OUTPATIENT
Start: 2025-02-07 | End: 2025-02-10 | Stop reason: HOSPADM

## 2025-02-07 RX ORDER — IPRATROPIUM BROMIDE AND ALBUTEROL SULFATE 2.5; .5 MG/3ML; MG/3ML
SOLUTION RESPIRATORY (INHALATION)
Status: COMPLETED
Start: 2025-02-07 | End: 2025-02-07

## 2025-02-07 RX ORDER — LEVOTHYROXINE SODIUM 88 UG/1
88 TABLET ORAL
Status: DISCONTINUED | OUTPATIENT
Start: 2025-02-08 | End: 2025-02-10 | Stop reason: HOSPADM

## 2025-02-07 RX ORDER — NICOTINE 21 MG/24HR
1 PATCH, TRANSDERMAL 24 HOURS TRANSDERMAL DAILY
Status: DISCONTINUED | OUTPATIENT
Start: 2025-02-07 | End: 2025-02-10 | Stop reason: HOSPADM

## 2025-02-07 RX ADMIN — FUROSEMIDE 40 MG: 10 INJECTION, SOLUTION INTRAMUSCULAR; INTRAVENOUS at 18:10

## 2025-02-07 RX ADMIN — SODIUM CHLORIDE, PRESERVATIVE FREE 10 ML: 5 INJECTION INTRAVENOUS at 21:13

## 2025-02-07 RX ADMIN — SPIRONOLACTONE 25 MG: 25 TABLET ORAL at 16:38

## 2025-02-07 RX ADMIN — ALBUTEROL SULFATE 2.5 MG: 2.5 SOLUTION RESPIRATORY (INHALATION) at 09:53

## 2025-02-07 RX ADMIN — GUAIFENESIN 600 MG: 600 TABLET ORAL at 21:10

## 2025-02-07 RX ADMIN — APIXABAN 5 MG: 5 TABLET, FILM COATED ORAL at 21:10

## 2025-02-07 RX ADMIN — DOXYCYCLINE 100 MG: 100 INJECTION, POWDER, LYOPHILIZED, FOR SOLUTION INTRAVENOUS at 16:45

## 2025-02-07 RX ADMIN — THERA TABS 1 TABLET: TAB at 16:38

## 2025-02-07 RX ADMIN — IPRATROPIUM BROMIDE AND ALBUTEROL SULFATE 3 ML: .5; 3 SOLUTION RESPIRATORY (INHALATION) at 12:32

## 2025-02-07 RX ADMIN — IPRATROPIUM BROMIDE 0.5 MG: 0.5 SOLUTION RESPIRATORY (INHALATION) at 20:07

## 2025-02-07 RX ADMIN — MONTELUKAST 10 MG: 10 TABLET, FILM COATED ORAL at 16:38

## 2025-02-07 RX ADMIN — FOLIC ACID 1 MG: 1 TABLET ORAL at 16:38

## 2025-02-07 RX ADMIN — WATER 40 MG: 1 INJECTION INTRAMUSCULAR; INTRAVENOUS; SUBCUTANEOUS at 21:10

## 2025-02-07 RX ADMIN — IPRATROPIUM BROMIDE AND ALBUTEROL SULFATE 1 DOSE: .5; 3 SOLUTION RESPIRATORY (INHALATION) at 09:52

## 2025-02-07 RX ADMIN — SERTRALINE HYDROCHLORIDE 50 MG: 50 TABLET ORAL at 16:38

## 2025-02-07 RX ADMIN — ARFORMOTEROL TARTRATE 15 MCG: 15 SOLUTION RESPIRATORY (INHALATION) at 20:04

## 2025-02-07 RX ADMIN — DEXAMETHASONE SODIUM PHOSPHATE 10 MG: 4 INJECTION INTRA-ARTICULAR; INTRALESIONAL; INTRAMUSCULAR; INTRAVENOUS; SOFT TISSUE at 09:36

## 2025-02-07 RX ADMIN — MAGNESIUM SULFATE HEPTAHYDRATE 2000 MG: 40 INJECTION, SOLUTION INTRAVENOUS at 09:41

## 2025-02-07 RX ADMIN — POTASSIUM BICARBONATE 20 MEQ: 782 TABLET, EFFERVESCENT ORAL at 16:38

## 2025-02-07 RX ADMIN — IPRATROPIUM BROMIDE 0.5 MG: 0.5 SOLUTION RESPIRATORY (INHALATION) at 15:37

## 2025-02-07 RX ADMIN — ALBUTEROL SULFATE 2.5 MG: 2.5 SOLUTION RESPIRATORY (INHALATION) at 12:33

## 2025-02-07 NOTE — H&P
History and Physical    Patient: Rina Prajapati MRN: [id number]  SSN: [id number]    YOB: 1960  Age: 64 y.o.  Sex: female      Subjective:      Rina Prajapati is a 64 y.o. female who presents to [facility] ([facility]) ER with complaint of Shortness of Breath and Hypoxia.  Patient reports that for \"the last couple of weeks\" she has been having bilateral lower extremity edema, dyspnea on exertion, weight gain, and orthopnea.  Patient corroborates that she became very short of breath when she was laid down to have a Peripherally Inserted Central Venous Catheter (a.k.a PICC Line) inserted in [facility] today.  Patient was brought to [facility] ER at that time.  Nursing Staff report that Patient could not tolerate a CT Chest w/ w/o Contrast for the same reason---Patient desaturated when she was laid flat for imaging.  Patient reports that she has been on the same Chemotherapy since 2015 for her Lung Cancer, but was recently told by her Primary Oncologist (Dr. Li) that she would be changed to a new regimen.  Patient last received Chemotherapy \"months\" ago.  Patient reports that she Quit Smoking 2 months ago and has been on Nicotine Patches at home (and requests them now).  Patient reports that she has Smoked 0.5 PPD x40 years.  Patient endorses a history of Congestive Heart Failure (a.k.a. CHF) and reports that she tries to follow a Low Sodium Diet, tries to Weight herself daily, and tries to Fluid Restrict herself.  Patient was seen in 8/2024 by Cardiological services for an irregular heart beat and it was found to be PVCs at that time.  Patient verifies her Home Medication List.  Patient denies fevers, chills, nausea, vomiting, diarrhea, dysuria, and cough.    In [facility] ER, Patient is noted to have Temperature 98.2°F, Heart Rate 117 bpm, Respiration Rate 31 bpm, Blood Pressure 122/79 mm Hg (only one blood pressure measured/verified/validated), SpO2 93% (unclear if Patient was on 6 L/min

## 2025-02-07 NOTE — ED NOTES
PT desaturated while in CT. PT on 4lpm supplemental oxygen via NC. When laid flat at CT for scan, PT oxygen saturation dropped below 80%. PT brought back to ED without scan being completed. MD to bedside. PT sat upright and given breathing treatment. RT called to bedside.

## 2025-02-07 NOTE — ED TRIAGE NOTES
PT presents to the emergency department c/o SOB s/t RRT. PT was being seen for outpatient PICC replacement with known history of vessel occlusion.

## 2025-02-07 NOTE — H&P
History and Physical    Patient: Rina Prajapati           Sex: female       DOA: 2025  YOB: 1960      Age:  64 y.o.     LOS:  LOS: 0 days        HPI:     Rina Prajapati is a 64 y.o. female with history of non small cell lung cancer presents for PICC line exchange for chemotherapy     Past Medical History:   Diagnosis Date    Anemia, iron deficiency 2016    Depression     H/O seasonal allergies     Hypertension     Non-small cell carcinoma of lung (HCC) 2016    adenocarcinoma of right lung    Positive occult stool blood test 2016    Pulmonary embolism (HCC) 2016    small, bilateral PEs- on Xarelto    Right leg DVT (HCC) 2016    on Xarelto    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HCC) 2016    resolved    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       Past Surgical History:   Procedure Laterality Date    ANGIOPLASTY Right 3/20/2016    IJ, subclavian, and brachiocephalic veins    BREAST SURGERY      biopsy    CT BIOPSY LYMPH NODES SUPERFICIAL  2022    CT BIOPSY LYMPH NODES SUPERFICIAL 2022 [facility] RAD CT    HYSTERECTOMY (CERVIX STATUS UNKNOWN)      due to menorrhagia     OTHER SURGICAL HISTORY Right 3/20/2016    2 placed in right IJ, subclavian/brachiocephalic    THROMBECTOMY  3/20/2016    with thrombolysis    VASCULAR SURGERY Left     double lumen    XR MIDLINE EQUAL OR GREATER THAN 5 YEARS  2024    XR MIDLINE EQUAL OR GREATER THAN 5 YEARS 2024       Family History   Problem Relation Age of Onset    No Known Problems Child     Cancer Sister 50        breast    Liver Disease Sister         cirrhosis    Heart Attack Mother 43       Social History     Socioeconomic History    Marital status: Single   Tobacco Use    Smoking status: Every Day     Current packs/day: 0.00     Average packs/day: 0.5 packs/day for 32.0 years (16.0 ttl pk-yrs)     Types: Cigarettes     Start date: 1984     Last attempt to quit: 2016     Years since quittin.9

## 2025-02-07 NOTE — PROGRESS NOTES
INTERIM UPDATE - 1304 EST on 2/07/2025    [facility] ([facility]) calls requesting admission for a 64-year-old female who presents with complaint of Shortness of Breath and Hypoxia.    [facility] ER Physician reports that Patient came to [facility] to have a Peripherally Inserted Central Venous Catheter (a.k.a PICC Line) placed for a diagnosis of Lung Cancer.  However, Patient reportedly became short of breath when she was laid flat and could not tolerate the procedure.  Patient was then reportedly sent to [facility] ER.    [facility] ER Physician reports that Patient's CXR was abnormal and appeared to show congestion.  Per [facility] ER Physician, SpO2s were reportedly in 70s% and Patient was placed on High Flow Oxygen---currently at 6 L/min.      Patient reportedly stated that her issues started sometime this AM (ostensibly prior to lying flat for PICC Line).  Patient had Tachycardia (117 bpm) and Tachypnea (31 bpm), as well as Elevated Lactic Acid 2.49 mmol/L.    Notably, [facility] ER Physician reports that EKG appears to show Atrial Fibrillation, but Patient does not have a history of Atrial Fibrillation.  Chart Review shows that Patient was evaluated by Cardiological services on 8/12/2024 and was determined to have PVCs rather than an irregular heart rhythm at that time.    Patient reportedly takes Apixaban (a.k.a. Eliquis) and concern for Pulmonary Embolism is low for this reason---Patient states she only missed a single dose in the last week or so.  Patient is noted to have a history of Chronic Systolic Congestive Heart Failure (a.k.a. CHF).    Plan:  Will admit Patient to [facility] Stepdown Unit for management of Acute Hypoxic Respiratory Failure thought 2°/2 Acute on Chronic Heart Failure with Mildly Reduced Ejection Fraction (a.k.a. HFmrEF) with Elevated Lactic Acid and QTc Prolongation.  Concern for possible Atrial Fibrillation.

## 2025-02-07 NOTE — ED PROVIDER NOTES
[facility] EMERGENCY DEPARTMENT  EMERGENCY DEPARTMENT ENCOUNTER      Pt Name: Rina Prajapati  MRN: [id number]  Birthdate 1960  Date of evaluation: 2/7/2025  Provider: SEMAJ ARENAS MD  1:20 PM    CHIEF COMPLAINT       Chief Complaint   Patient presents with    Shortness of Breath         HISTORY OF PRESENT ILLNESS    Rina Prajapati is a 64 y.o. female who presents to the emergency department     64-year-old female past medical history of lung cancer pulmonary embolism on Eliquis hypertension depression and anemia presents the emergency department with shortness of breath.  Patient states she was in her normal state until this morning when she got into a truck to come to the hospital to get a PICC line.  When she laid down she became acutely short of breath sats was in the 70s and she was referred to the emergency department.  Patient has no fevers or chills no nausea no vomiting.  She is compliant with her medications.  No other issues expressed,.    The history is provided by the patient and medical records. No  was used.       Nursing Notes were reviewed.    REVIEW OF SYSTEMS       Review of Systems   Respiratory:  Positive for chest tightness and shortness of breath.    Cardiovascular: Negative.    Gastrointestinal: Negative.    Neurological:  Negative for dizziness and weakness.       Except as noted above the remainder of the review of systems was reviewed and negative.       PAST MEDICAL HISTORY     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    resolved    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

## 2025-02-07 NOTE — PROGRESS NOTES
completed the initial Spiritual Assessment of the patient, and offered Pastoral Care support to the patient in bed 9 of the emergency room where she will be admitted to the hospital due to respiratory issues, There is an advance directive on file. Patient is connected to a local [employer].  Patient does not have any Spiritism/cultural needs that will affect patient’s preferences in health care. Chaplains will continue to follow and will provide pastoral care on an as needed/requested basis.    Spiritual Health History and Assessment/Progress Note  [facility]    Crisis, Initial Encounter (iv-sa-eje), Follow up,  ,      Name: Rina Prajapati MRN: [id number]    Age: 64 y.o.     Sex: female   Language: English   Yazidism: None   Dyspnea     Date: 2/7/2025            Total Time Calculated: 8 min              Spiritual Assessment began in [facility] EMERGENCY DEPARTMENT        Referral/Consult From: Rounding   Encounter Overview/Reason: Crisis, Initial Encounter (iv-sa-eje)  Service Provided For: Patient    Tara, Belief, Meaning:   Patient identifies as spiritual and is connected with a tara tradition or spiritual practice  Family/Friends No family/friends present      Importance and Influence:  Patient has spiritual/personal beliefs that influence decisions regarding their health  Family/Friends No family/friends present    Community:  Patient feels well-supported. Support system includes: Spouse/Partner and Children  Family/Friends No family/friends present    Assessment and Plan of Care:     Patient Interventions include: Other:    Family/Friends Interventions include:     Patient Plan of Care: Spiritual Care available upon further referral  Family/Friends Plan of Care: No family/friends present    Electronically signed by Bipin Ramirez Jr., [facility] on 2/7/2025 at 2:01 PM

## 2025-02-08 ENCOUNTER — APPOINTMENT (OUTPATIENT)
Facility: HOSPITAL | Age: 65
DRG: 291 | End: 2025-02-08
Attending: INTERNAL MEDICINE
Payer: MEDICARE

## 2025-02-08 ENCOUNTER — APPOINTMENT (OUTPATIENT)
Facility: HOSPITAL | Age: 65
DRG: 291 | End: 2025-02-08
Payer: MEDICARE

## 2025-02-08 PROBLEM — R06.00 DYSPNEA: Status: ACTIVE | Noted: 2024-05-18

## 2025-02-08 LAB
ALBUMIN SERPL-MCNC: 3 G/DL (ref 3.4–5)
ALBUMIN/GLOB SERPL: 0.8 (ref 0.8–1.7)
ALP SERPL-CCNC: 48 U/L (ref 45–117)
ALT SERPL-CCNC: 15 U/L (ref 13–56)
ANION GAP SERPL CALC-SCNC: 9 MMOL/L (ref 3–18)
AST SERPL-CCNC: 10 U/L (ref 10–38)
BASOPHILS # BLD: 0.01 K/UL (ref 0–0.1)
BASOPHILS NFR BLD: 0.1 % (ref 0–2)
BILIRUB SERPL-MCNC: 0.6 MG/DL (ref 0.2–1)
BUN SERPL-MCNC: 21 MG/DL (ref 7–18)
BUN/CREAT SERPL: 31 (ref 12–20)
CALCIUM SERPL-MCNC: 9.1 MG/DL (ref 8.5–10.1)
CHLORIDE SERPL-SCNC: 105 MMOL/L (ref 100–111)
CO2 SERPL-SCNC: 23 MMOL/L (ref 21–32)
CREAT SERPL-MCNC: 0.67 MG/DL (ref 0.6–1.3)
DIFFERENTIAL METHOD BLD: ABNORMAL
EKG ATRIAL RATE: 119 BPM
EKG ATRIAL RATE: 97 BPM
EKG DIAGNOSIS: NORMAL
EKG DIAGNOSIS: NORMAL
EKG P AXIS: 69 DEGREES
EKG P-R INTERVAL: 130 MS
EKG Q-T INTERVAL: 370 MS
EKG Q-T INTERVAL: 452 MS
EKG QRS DURATION: 70 MS
EKG QRS DURATION: 74 MS
EKG QTC CALCULATION (BAZETT): 469 MS
EKG QTC CALCULATION (BAZETT): 635 MS
EKG R AXIS: 105 DEGREES
EKG R AXIS: 111 DEGREES
EKG T AXIS: -51 DEGREES
EKG T AXIS: 23 DEGREES
EKG VENTRICULAR RATE: 119 BPM
EKG VENTRICULAR RATE: 97 BPM
EOSINOPHIL # BLD: 0 K/UL (ref 0–0.4)
EOSINOPHIL NFR BLD: 0 % (ref 0–5)
ERYTHROCYTE [DISTWIDTH] IN BLOOD BY AUTOMATED COUNT: 19.7 % (ref 11.6–14.5)
GLOBULIN SER CALC-MCNC: 4 G/DL (ref 2–4)
GLUCOSE BLD STRIP.AUTO-MCNC: 110 MG/DL (ref 70–110)
GLUCOSE BLD STRIP.AUTO-MCNC: 117 MG/DL (ref 70–110)
GLUCOSE BLD STRIP.AUTO-MCNC: 136 MG/DL (ref 70–110)
GLUCOSE BLD STRIP.AUTO-MCNC: 190 MG/DL (ref 70–110)
GLUCOSE SERPL-MCNC: 163 MG/DL (ref 74–99)
HCT VFR BLD AUTO: 32.5 % (ref 35–45)
HGB BLD-MCNC: 9.6 G/DL (ref 12–16)
IMM GRANULOCYTES # BLD AUTO: 0.02 K/UL (ref 0–0.04)
IMM GRANULOCYTES NFR BLD AUTO: 0.3 % (ref 0–0.5)
LYMPHOCYTES # BLD: 0.31 K/UL (ref 0.9–3.6)
LYMPHOCYTES NFR BLD: 4.4 % (ref 21–52)
MCH RBC QN AUTO: 23 PG (ref 24–34)
MCHC RBC AUTO-ENTMCNC: 29.5 G/DL (ref 31–37)
MCV RBC AUTO: 77.9 FL (ref 78–100)
MONOCYTES # BLD: 0.12 K/UL (ref 0.05–1.2)
MONOCYTES NFR BLD: 1.7 % (ref 3–10)
NEUTS SEG # BLD: 6.52 K/UL (ref 1.8–8)
NEUTS SEG NFR BLD: 93.5 % (ref 40–73)
NRBC # BLD: 0 K/UL (ref 0–0.01)
NRBC BLD-RTO: 0 PER 100 WBC
PLATELET # BLD AUTO: 186 K/UL (ref 135–420)
PMV BLD AUTO: 10.1 FL (ref 9.2–11.8)
POTASSIUM SERPL-SCNC: 4.3 MMOL/L (ref 3.5–5.5)
PROT SERPL-MCNC: 7 G/DL (ref 6.4–8.2)
RBC # BLD AUTO: 4.17 M/UL (ref 4.2–5.3)
SODIUM SERPL-SCNC: 137 MMOL/L (ref 136–145)
T4 FREE SERPL-MCNC: 1.5 NG/DL (ref 0.7–1.5)
TROPONIN I SERPL HS-MCNC: 21 NG/L (ref 0–54)
TSH SERPL DL<=0.05 MIU/L-ACNC: 0.84 UIU/ML (ref 0.36–3.74)
WBC # BLD AUTO: 7 K/UL (ref 4.6–13.2)

## 2025-02-08 PROCEDURE — 6370000000 HC RX 637 (ALT 250 FOR IP): Performed by: INTERNAL MEDICINE

## 2025-02-08 PROCEDURE — 94761 N-INVAS EAR/PLS OXIMETRY MLT: CPT

## 2025-02-08 PROCEDURE — 6360000002 HC RX W HCPCS: Performed by: INTERNAL MEDICINE

## 2025-02-08 PROCEDURE — 87449 NOS EACH ORGANISM AG IA: CPT

## 2025-02-08 PROCEDURE — 85025 COMPLETE CBC W/AUTO DIFF WBC: CPT

## 2025-02-08 PROCEDURE — 93005 ELECTROCARDIOGRAM TRACING: CPT | Performed by: INTERNAL MEDICINE

## 2025-02-08 PROCEDURE — 93010 ELECTROCARDIOGRAM REPORT: CPT | Performed by: INTERNAL MEDICINE

## 2025-02-08 PROCEDURE — 99233 SBSQ HOSP IP/OBS HIGH 50: CPT | Performed by: STUDENT IN AN ORGANIZED HEALTH CARE EDUCATION/TRAINING PROGRAM

## 2025-02-08 PROCEDURE — 97165 OT EVAL LOW COMPLEX 30 MIN: CPT

## 2025-02-08 PROCEDURE — 94640 AIRWAY INHALATION TREATMENT: CPT

## 2025-02-08 PROCEDURE — 2500000003 HC RX 250 WO HCPCS: Performed by: INTERNAL MEDICINE

## 2025-02-08 PROCEDURE — 6370000000 HC RX 637 (ALT 250 FOR IP): Performed by: STUDENT IN AN ORGANIZED HEALTH CARE EDUCATION/TRAINING PROGRAM

## 2025-02-08 PROCEDURE — 2700000000 HC OXYGEN THERAPY PER DAY

## 2025-02-08 PROCEDURE — 93321 DOPPLER ECHO F-UP/LMTD STD: CPT

## 2025-02-08 PROCEDURE — 2580000003 HC RX 258: Performed by: INTERNAL MEDICINE

## 2025-02-08 PROCEDURE — 84484 ASSAY OF TROPONIN QUANT: CPT

## 2025-02-08 PROCEDURE — 84443 ASSAY THYROID STIM HORMONE: CPT

## 2025-02-08 PROCEDURE — 36415 COLL VENOUS BLD VENIPUNCTURE: CPT

## 2025-02-08 PROCEDURE — 82962 GLUCOSE BLOOD TEST: CPT

## 2025-02-08 PROCEDURE — 84439 ASSAY OF FREE THYROXINE: CPT

## 2025-02-08 PROCEDURE — 80053 COMPREHEN METABOLIC PANEL: CPT

## 2025-02-08 PROCEDURE — 97530 THERAPEUTIC ACTIVITIES: CPT

## 2025-02-08 PROCEDURE — 2140000001 HC CVICU INTERMEDIATE R&B

## 2025-02-08 PROCEDURE — 99232 SBSQ HOSP IP/OBS MODERATE 35: CPT | Performed by: INTERNAL MEDICINE

## 2025-02-08 RX ORDER — DIAZEPAM 10 MG/2ML
2.5 INJECTION, SOLUTION INTRAMUSCULAR; INTRAVENOUS
Status: DISCONTINUED | OUTPATIENT
Start: 2025-02-08 | End: 2025-02-09

## 2025-02-08 RX ORDER — PREDNISONE 20 MG/1
40 TABLET ORAL DAILY
Status: DISCONTINUED | OUTPATIENT
Start: 2025-02-09 | End: 2025-02-10 | Stop reason: HOSPADM

## 2025-02-08 RX ORDER — DOXYCYCLINE 100 MG/1
100 CAPSULE ORAL EVERY 12 HOURS SCHEDULED
Status: DISCONTINUED | OUTPATIENT
Start: 2025-02-08 | End: 2025-02-10 | Stop reason: HOSPADM

## 2025-02-08 RX ORDER — BENZONATATE 100 MG/1
200 CAPSULE ORAL 3 TIMES DAILY PRN
Status: DISCONTINUED | OUTPATIENT
Start: 2025-02-08 | End: 2025-02-10 | Stop reason: HOSPADM

## 2025-02-08 RX ORDER — GUAIFENESIN 600 MG/1
1200 TABLET, EXTENDED RELEASE ORAL 2 TIMES DAILY
Status: DISCONTINUED | OUTPATIENT
Start: 2025-02-08 | End: 2025-02-10 | Stop reason: HOSPADM

## 2025-02-08 RX ORDER — METOPROLOL SUCCINATE 25 MG/1
25 TABLET, EXTENDED RELEASE ORAL DAILY
Status: DISCONTINUED | OUTPATIENT
Start: 2025-02-08 | End: 2025-02-10 | Stop reason: HOSPADM

## 2025-02-08 RX ADMIN — SODIUM CHLORIDE, PRESERVATIVE FREE 10 ML: 5 INJECTION INTRAVENOUS at 09:19

## 2025-02-08 RX ADMIN — LEVOTHYROXINE SODIUM 88 MCG: 0.09 TABLET ORAL at 05:54

## 2025-02-08 RX ADMIN — GUAIFENESIN 600 MG: 600 TABLET ORAL at 09:17

## 2025-02-08 RX ADMIN — INSULIN LISPRO 2 UNITS: 100 INJECTION, SOLUTION INTRAVENOUS; SUBCUTANEOUS at 18:36

## 2025-02-08 RX ADMIN — DOXYCYCLINE 100 MG: 100 INJECTION, POWDER, LYOPHILIZED, FOR SOLUTION INTRAVENOUS at 03:48

## 2025-02-08 RX ADMIN — GUAIFENESIN 1200 MG: 600 TABLET ORAL at 21:17

## 2025-02-08 RX ADMIN — ARFORMOTEROL TARTRATE 15 MCG: 15 SOLUTION RESPIRATORY (INHALATION) at 07:45

## 2025-02-08 RX ADMIN — METOPROLOL SUCCINATE 25 MG: 25 TABLET, FILM COATED, EXTENDED RELEASE ORAL at 16:36

## 2025-02-08 RX ADMIN — IPRATROPIUM BROMIDE 0.5 MG: 0.5 SOLUTION RESPIRATORY (INHALATION) at 20:56

## 2025-02-08 RX ADMIN — IPRATROPIUM BROMIDE 0.5 MG: 0.5 SOLUTION RESPIRATORY (INHALATION) at 07:46

## 2025-02-08 RX ADMIN — IPRATROPIUM BROMIDE 0.5 MG: 0.5 SOLUTION RESPIRATORY (INHALATION) at 15:00

## 2025-02-08 RX ADMIN — MONTELUKAST 10 MG: 10 TABLET, FILM COATED ORAL at 09:17

## 2025-02-08 RX ADMIN — ARFORMOTEROL TARTRATE 15 MCG: 15 SOLUTION RESPIRATORY (INHALATION) at 20:56

## 2025-02-08 RX ADMIN — IPRATROPIUM BROMIDE 0.5 MG: 0.5 SOLUTION RESPIRATORY (INHALATION) at 02:45

## 2025-02-08 RX ADMIN — APIXABAN 5 MG: 5 TABLET, FILM COATED ORAL at 09:17

## 2025-02-08 RX ADMIN — APIXABAN 5 MG: 5 TABLET, FILM COATED ORAL at 21:17

## 2025-02-08 RX ADMIN — SERTRALINE HYDROCHLORIDE 50 MG: 50 TABLET ORAL at 09:17

## 2025-02-08 RX ADMIN — SPIRONOLACTONE 25 MG: 25 TABLET ORAL at 09:17

## 2025-02-08 RX ADMIN — FUROSEMIDE 40 MG: 10 INJECTION, SOLUTION INTRAMUSCULAR; INTRAVENOUS at 16:36

## 2025-02-08 RX ADMIN — POTASSIUM BICARBONATE 20 MEQ: 782 TABLET, EFFERVESCENT ORAL at 09:19

## 2025-02-08 RX ADMIN — WATER 40 MG: 1 INJECTION INTRAMUSCULAR; INTRAVENOUS; SUBCUTANEOUS at 09:18

## 2025-02-08 RX ADMIN — FOLIC ACID 1 MG: 1 TABLET ORAL at 09:17

## 2025-02-08 RX ADMIN — SODIUM CHLORIDE, PRESERVATIVE FREE 10 ML: 5 INJECTION INTRAVENOUS at 21:18

## 2025-02-08 RX ADMIN — FERROUS SULFATE TAB 325 MG (65 MG ELEMENTAL FE) 325 MG: 325 (65 FE) TAB at 09:17

## 2025-02-08 RX ADMIN — DOXYCYCLINE HYCLATE 100 MG: 100 CAPSULE ORAL at 21:17

## 2025-02-08 RX ADMIN — THERA TABS 1 TABLET: TAB at 09:17

## 2025-02-08 RX ADMIN — FUROSEMIDE 40 MG: 10 INJECTION, SOLUTION INTRAMUSCULAR; INTRAVENOUS at 09:18

## 2025-02-08 NOTE — PROGRESS NOTES
Attempted to call floor to discuss transporting the pt to CT for exam, phone rang continuously. Pt is currently on active tele orders and CT will need those orders changed or the RN will have to come down with the patient for this exam.     Please call CT department @ [phone]  Thank you!

## 2025-02-08 NOTE — PLAN OF CARE
Problem: Chronic Conditions and Co-morbidities  Goal: Patient's chronic conditions and co-morbidity symptoms are monitored and maintained or improved  2/8/2025 1335 by Carlita Bergman RN  Outcome: Progressing  Flowsheets (Taken 2/8/2025 1335)  Care Plan - Patient's Chronic Conditions and Co-Morbidity Symptoms are Monitored and Maintained or Improved: Monitor and assess patient's chronic conditions and comorbid symptoms for stability, deterioration, or improvement  2/8/2025 0006 by Gamaliel Hsu RN  Outcome: Progressing  2/8/2025 0006 by Gamaliel Hsu RN  Outcome: Progressing  Flowsheets (Taken 2/7/2025 2000)  Care Plan - Patient's Chronic Conditions and Co-Morbidity Symptoms are Monitored and Maintained or Improved:   Monitor and assess patient's chronic conditions and comorbid symptoms for stability, deterioration, or improvement   Collaborate with multidisciplinary team to address chronic and comorbid conditions and prevent exacerbation or deterioration   Update acute care plan with appropriate goals if chronic or comorbid symptoms are exacerbated and prevent overall improvement and discharge     Problem: Discharge Planning  Goal: Discharge to home or other facility with appropriate resources  2/8/2025 1335 by Carlita Bergman, RN  Outcome: Progressing  Flowsheets (Taken 2/8/2025 1335)  Discharge to home or other facility with appropriate resources: Identify barriers to discharge with patient and caregiver  2/8/2025 0006 by Gamaliel Hsu RN  Outcome: Progressing  2/8/2025 0006 by Gamaliel Hsu RN  Outcome: Progressing  2/8/2025 0005 by Gamaliel Hsu RN  Outcome: Progressing  Flowsheets (Taken 2/7/2025 2000)  Discharge to home or other facility with appropriate resources:   Identify barriers to discharge with patient and caregiver   Identify discharge learning needs (meds, wound care, etc)     Problem: Skin/Tissue Integrity  Goal: Skin integrity remains intact  Description: 1.

## 2025-02-08 NOTE — PROGRESS NOTES
Pt is alert and awake able to verbalize wants/needs. Pt denied of pain, or discomfort at this time.    Hygiene assistance completed    Pt's spouse at the bedside

## 2025-02-08 NOTE — PLAN OF CARE
Occupational Therapy Goals:  Initiated 2/8/2025 to be met within 7-10 days.    1.  Patient will demonstrate [place] with pacing and modified proper breathing techniques in preparation for her efficient completion of her self care routine  2.  Patient will demonstrate 4/5 Upper trunk strength in preparation for her efficient completion of her self care routine  3.  Patient will perform UB and LB dressing without increased SOB      PLOF: she reports she lives with her significant other and is completing her self care routine although is becoming SOB and it is uncomfortable for her and difficult for her to recover once she gets SOB    OCCUPATIONAL THERAPY EVALUATION    Patient: Rina Prajapati (64 y.o. female)  Date: 2/8/2025  Primary Diagnosis: Dyspnea [R06.00]  Dyspnea, unspecified type [R06.00]       Precautions: fall     ASSESSMENT :    This patient presents with SOB at rest and this increases with all activity    Recommend for next OT session:  Energy conservation in conjunction with ADL retraining    DEFICITS/IMPAIRMENTS:  Performance deficits / Impairments: Decreased endurance    Patient will benefit from skilled intervention to address the above impairments.  Patient's rehabilitation potential/Prognosis: Fair.  Factors which may influence rehabilitation potential include:   []             None noted  []             Mental ability/status  [x]             Medical condition  []             Home/family situation and support systems  []             Safety awareness  []             Pain tolerance/management  []             Other:      PLAN :  Recommendations and Planned Interventions:   [x]               Self Care Training                  [x]      Therapeutic Activities  [x]               Functional Mobility Training   []      Cognitive Retraining  [x]               Therapeutic Exercises           [x]      Endurance Activities  [x]               Balance Training                    []      Neuromuscular

## 2025-02-08 NOTE — PROGRESS NOTES
Comprehensive Nutrition Assessment    Type and Reason for Visit:  Initial (BMI)    Nutrition Recommendations/Plan:   Continue current diet as tolerated.  Pt declining supplements at this time, continue to monitor PO intake and add if pt meeting <75% of estimated needs.  Continue multivitamin supplementation daily  Continue to monitor tolerance of PO, weight, labs, and plan of care during admission.     Malnutrition Assessment:  Malnutrition Status:  No malnutrition (02/08/25 1420)    Context:  Acute Illness     Findings of the 6 clinical characteristics of malnutrition:  Energy Intake:  No decrease in energy intake  Weight Loss:  No weight loss     Body Fat Loss:  No body fat loss     Muscle Mass Loss:  No muscle mass loss    Fluid Accumulation:  No fluid accumulation     Strength:  Not Performed    Nutrition History and Allergies:      Past Medical History:   Diagnosis Date    Anemia, iron deficiency 2/2016    Chronic hypoxic respiratory failure     3.5 L/min O2 via NC at baseline (as of 2/07/2025)    Current use of long term anticoagulation     on Apixaban (Eliquis) as of 2/07/2025    Depression     H/O seasonal allergies     Hypertension     Non-small cell carcinoma of lung (HCC) 2/2016    adenocarcinoma of right lung    Positive occult stool blood test 2/29/2016    Pulmonary embolism (HCC) 2/28/2016    small, bilateral PEs- on Xarelto    Right leg DVT (HCC) 02/28/2016    on Apixaban (Eliquis) as of 2/07/2025    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    Weight Hx: Reported weight fluctuates 125-135 lbs related to fluid shifts. No significant weight changes.  Wt Readings from Last 10 Encounters:   02/08/25 57.4 kg (126 lb 8.7 oz)   01/29/25 59.4 kg (131 lb)   12/05/24 59.8 kg (131 lb 12.8 oz)   12/04/24 54.7 kg (120 lb 9.6 oz)   11/19/24 61.5 kg (135 lb 9.6 oz)   10/23/24

## 2025-02-08 NOTE — PLAN OF CARE
Problem: Chronic Conditions and Co-morbidities  Goal: Patient's chronic conditions and co-morbidity symptoms are monitored and maintained or improved  2/8/2025 0006 by Gamaliel Hsu RN  Outcome: Progressing  2/8/2025 0006 by Gamaliel Hsu RN  Outcome: Progressing     Problem: Discharge Planning  Goal: Discharge to home or other facility with appropriate resources  2/8/2025 0006 by Gamaliel Hsu RN  Outcome: Progressing  2/8/2025 0006 by Gamaliel Hsu RN  Outcome: Progressing  2/8/2025 0005 by Gamaliel Hsu RN  Outcome: Progressing     Problem: Skin/Tissue Integrity  Goal: Skin integrity remains intact  Description: 1.  Monitor for areas of redness and/or skin breakdown  2.  Assess vascular access sites hourly  3.  Every 4-6 hours minimum:  Change oxygen saturation probe site  4.  Every 4-6 hours:  If on nasal continuous positive airway pressure, respiratory therapy assess nares and determine need for appliance change or resting period  2/8/2025 0006 by Gamaliel Hsu RN  Outcome: Progressing  2/8/2025 0005 by Gamaliel Hsu RN  Outcome: Progressing     Problem: Pain  Goal: Verbalizes/displays adequate comfort level or baseline comfort level  2/8/2025 0006 by Gamaliel Hsu RN  Outcome: Progressing  2/8/2025 0005 by Gamaliel Hsu RN  Outcome: Progressing     Problem: Safety - Adult  Goal: Free from fall injury  2/8/2025 0006 by Gamaliel Hsu RN  Outcome: Progressing  2/8/2025 0005 by Gamaliel Hsu RN  Outcome: Progressing

## 2025-02-08 NOTE — PROGRESS NOTES
[facility] - Progress Note    Admit Date: 2/7/2025      Assessment:     Acute on chronic heart failure mildly reduced ejection fraction, patient still with significant pulmonary vascular congestion and fluid overload.    PAF.  Since converted to sinus rhythm  Cardiomyopathy by 2D echo November 2024 ejection fraction 45 to 50%  Advanced non-small cell lung cancer.  Patient still with ongoing chemotherapy.,  Not a surgical candidate  History of pericardial effusion small to moderate 1 to 2 cm circumferential pericardial effusion present November 2024  Tobacco Abuse disorder  History of pulmonary embolism  SVC Syndrome  HTN       Plan:     Continue to diurese with IV furosemide  We will add a low-dose of metoprolol to tartrate to help with rate control  Eliquis for OAC  Reasonable to repeat echocardiogram during this admission to primarily reevaluate pericardial effusion given history of lung cancer      Subjective:     Still complain of shortness of breath    Objective:      Patient Vitals for the past 8 hrs:   Temp Pulse Resp BP SpO2   02/08/25 1221 98.5 °F (36.9 °C) (!) 101 18 137/82 98 %   02/08/25 1100 -- 100 -- -- --   02/08/25 0857 98.4 °F (36.9 °C) (!) 104 29 121/76 95 %   02/08/25 0750 -- 80 18 -- 100 %   02/08/25 0709 -- 96 -- -- --         Patient Vitals for the past 96 hrs:   Weight   02/08/25 0000 57.4 kg (126 lb 8.7 oz)       TELE: normal sinus rhythm               Current Facility-Administered Medications   Medication Dose Route Frequency    perflutren lipid microspheres (DEFINITY) injection 2 mL  2 mL IntraVENous ONCE PRN    guaiFENesin (MUCINEX) extended release tablet 1,200 mg  1,200 mg Oral BID    iopamidol (ISOVUE-300) 61 % injection 100 mL  100 mL IntraVENous ONCE PRN    furosemide (LASIX) injection 40 mg  40 mg IntraVENous BID    sodium chloride flush 0.9 % injection 5-40 mL  5-40 mL IntraVENous 2 times per day    sodium chloride flush 0.9 % injection 5-40 mL  5-40 mL IntraVENous

## 2025-02-08 NOTE — PROGRESS NOTES
[facility]  Progress Note    Patient: Rina Prajapati Age: 64 y.o. : 1960 MR#: [id number] SSN: [id number]  Date/Time: 2025     Chief Complaint   Patient presents with    Shortness of Breath       Subjective:   Patient seen and examined.  Admitted overnight.  Reports breathing is much better today.  Denies additional complaints such as chest pain, nausea/emesis/headaches.  Discussed cardiology evaluation.  Questions answered the best my ability.  Assessment/Plan:   Acute on chronic hypoxic respiratory failure secondary to acute on chronic heart failure with mildly reduced ejection fraction  -Chronically on 3.5 L of oxygen at baseline, wean to home oxygen needs as tolerated.  -Cardiology following, appreciate assistance in care.  Continue diuresis, I/O, daily weights, monitor renal function/electrolytes.  -TTE pending.    Acute COPD exacerbation.  -Doxycycline, steroids, montelukast, formulary equivalent home inhalers, antitussives, DuoNebs.  -CT chest pending.  Tachyarrhythmia with concern for new onset atrial fibrillation with possible rapid ventricular response  -Cardiology following patient already on Eliquis, will continue telemetry.  Low-dose beta-blocker added per cardiology.    Non-small cell adenocarcinoma of the right lung  Patient was here for placement of Peripherally Inserted Central Venous Catheter (a.k.a PICC Line) placement.  Does not appear that Patient tolerated initial attempt at placement due to #1.  Hypertension  -No home medication  Hypothyroidism  -TSH/T4 within normal limits.  Continue home supplementation.  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  Continue home nicotine

## 2025-02-08 NOTE — PROGRESS NOTES
Review of Systems    Physical Exam  Skin:             Dual skin completed with Brie GUO RN    4 Eyes Skin Assessment     NAME:  Rina Prajapati  YOB: 1960  MEDICAL RECORD NUMBER:  [id number]    The patient is being assessed for  Admission    I agree that at least one RN has performed a thorough Head to Toe Skin Assessment on the patient. ALL assessment sites listed below have been assessed.      Areas assessed by both nurses:    Head, Face, Ears, Shoulders, Back, Chest, Arms, Elbows, Hands, Sacrum. Buttock, Coccyx, Ischium, and Legs. Feet and Heels        Does the Patient have a Wound? No noted wound(s)       Isidro Prevention initiated by RN: No  Wound Care Orders initiated by RN: No    Pressure Injury (Stage 3,4, Unstageable, DTI, NWPT, and Complex wounds) if present, place Wound referral order by RN under : No    New Ostomies, if present place, Ostomy referral order under : No     Nurse 1 eSignature: Electronically signed by Madison Arceo RN on 2/7/25 at 8:12 PM EST    **SHARE this note so that the co-signing nurse can place an eSignature**    Nurse 2 eSignature: Electronically signed by Brie Sandoval RN on 2/7/25 at 8:12 PM EST     Patient /Family /Designee has been informed that [facility] is not responsible for patient belongings per policy and the signed [facility] Patient Agreement document.  Personal items should be sent home or checked in with security.  Patient /Family /Designee selected the following action:                            []  Send personal items home with a family member or friend                                                 []  Check in personal items with security, excluding clothing                            [x]  Maintain personal items at the bedside, against recommendation                                 by [facility]                                   ** If patient /family /designee chooses to maintain personal

## 2025-02-09 ENCOUNTER — APPOINTMENT (OUTPATIENT)
Facility: HOSPITAL | Age: 65
DRG: 291 | End: 2025-02-09
Payer: MEDICARE

## 2025-02-09 LAB
ANION GAP SERPL CALC-SCNC: 4 MMOL/L (ref 3–18)
BASOPHILS # BLD: 0.01 K/UL (ref 0–0.1)
BASOPHILS NFR BLD: 0.1 % (ref 0–2)
BUN SERPL-MCNC: 33 MG/DL (ref 7–18)
BUN/CREAT SERPL: 38 (ref 12–20)
CALCIUM SERPL-MCNC: 9.5 MG/DL (ref 8.5–10.1)
CHLORIDE SERPL-SCNC: 101 MMOL/L (ref 100–111)
CO2 SERPL-SCNC: 29 MMOL/L (ref 21–32)
CREAT SERPL-MCNC: 0.88 MG/DL (ref 0.6–1.3)
DIFFERENTIAL METHOD BLD: ABNORMAL
ECHO BSA: 1.68 M2
ECHO EST RA PRESSURE: 15 MMHG
ECHO LA VOL A-L A2C: 39 ML (ref 22–52)
ECHO LA VOL A-L A4C: 40 ML (ref 22–52)
ECHO LA VOL BP: 42 ML (ref 22–52)
ECHO LA VOL MOD A2C: 38 ML (ref 22–52)
ECHO LA VOL MOD A4C: 37 ML (ref 22–52)
ECHO LA VOL/BSA BIPLANE: 25 ML/M2 (ref 16–34)
ECHO LA VOLUME AREA LENGTH: 45 ML
ECHO LA VOLUME INDEX A-L A2C: 23 ML/M2 (ref 16–34)
ECHO LA VOLUME INDEX A-L A4C: 23 ML/M2 (ref 16–34)
ECHO LA VOLUME INDEX AREA LENGTH: 26 ML/M2 (ref 16–34)
ECHO LA VOLUME INDEX MOD A2C: 22 ML/M2 (ref 16–34)
ECHO LA VOLUME INDEX MOD A4C: 22 ML/M2 (ref 16–34)
ECHO LV EF PHYSICIAN: 50 %
ECHO LV FRACTIONAL SHORTENING: 27 % (ref 28–44)
ECHO LV INTERNAL DIMENSION DIASTOLE INDEX: 2.63 CM/M2
ECHO LV INTERNAL DIMENSION DIASTOLIC: 4.5 CM (ref 3.9–5.3)
ECHO LV INTERNAL DIMENSION SYSTOLIC INDEX: 1.93 CM/M2
ECHO LV INTERNAL DIMENSION SYSTOLIC: 3.3 CM
ECHO LV IVSD: 0.7 CM (ref 0.6–0.9)
ECHO LV MASS 2D: 104.5 G (ref 67–162)
ECHO LV MASS INDEX 2D: 61.1 G/M2 (ref 43–95)
ECHO LV POSTERIOR WALL DIASTOLIC: 0.8 CM (ref 0.6–0.9)
ECHO LV RELATIVE WALL THICKNESS RATIO: 0.36
ECHO RA END SYSTOLIC VOLUME APICAL 4 CHAMBER INDEX BSA: 28 ML/M2
ECHO RA VOLUME: 48 ML
ECHO RIGHT VENTRICULAR SYSTOLIC PRESSURE (RVSP): 65 MMHG
ECHO RV BASAL DIMENSION: 4.9 CM
ECHO RV TAPSE: 1.4 CM (ref 1.7–?)
ECHO TV REGURGITANT MAX VELOCITY: 3.52 M/S
ECHO TV REGURGITANT PEAK GRADIENT: 50 MMHG
EOSINOPHIL # BLD: 0.01 K/UL (ref 0–0.4)
EOSINOPHIL NFR BLD: 0.1 % (ref 0–5)
ERYTHROCYTE [DISTWIDTH] IN BLOOD BY AUTOMATED COUNT: 19.9 % (ref 11.6–14.5)
GLUCOSE BLD STRIP.AUTO-MCNC: 103 MG/DL (ref 70–110)
GLUCOSE BLD STRIP.AUTO-MCNC: 137 MG/DL (ref 70–110)
GLUCOSE BLD STRIP.AUTO-MCNC: 170 MG/DL (ref 70–110)
GLUCOSE BLD STRIP.AUTO-MCNC: 277 MG/DL (ref 70–110)
GLUCOSE SERPL-MCNC: 96 MG/DL (ref 74–99)
HCT VFR BLD AUTO: 31.9 % (ref 35–45)
HGB BLD-MCNC: 9.5 G/DL (ref 12–16)
IMM GRANULOCYTES # BLD AUTO: 0.05 K/UL (ref 0–0.04)
IMM GRANULOCYTES NFR BLD AUTO: 0.4 % (ref 0–0.5)
L PNEUMO AG UR QL IA: NEGATIVE
LYMPHOCYTES # BLD: 1.02 K/UL (ref 0.9–3.6)
LYMPHOCYTES NFR BLD: 9.1 % (ref 21–52)
MAGNESIUM SERPL-MCNC: 2.3 MG/DL (ref 1.6–2.6)
MCH RBC QN AUTO: 23.5 PG (ref 24–34)
MCHC RBC AUTO-ENTMCNC: 29.8 G/DL (ref 31–37)
MCV RBC AUTO: 79 FL (ref 78–100)
MONOCYTES # BLD: 0.73 K/UL (ref 0.05–1.2)
MONOCYTES NFR BLD: 6.5 % (ref 3–10)
NEUTS SEG # BLD: 9.39 K/UL (ref 1.8–8)
NEUTS SEG NFR BLD: 83.8 % (ref 40–73)
NRBC # BLD: 0 K/UL (ref 0–0.01)
NRBC BLD-RTO: 0 PER 100 WBC
PLATELET # BLD AUTO: 227 K/UL (ref 135–420)
PMV BLD AUTO: 10.2 FL (ref 9.2–11.8)
POTASSIUM SERPL-SCNC: 4.1 MMOL/L (ref 3.5–5.5)
RBC # BLD AUTO: 4.04 M/UL (ref 4.2–5.3)
S PNEUM AG UR QL: NEGATIVE
SODIUM SERPL-SCNC: 134 MMOL/L (ref 136–145)
WBC # BLD AUTO: 11.2 K/UL (ref 4.6–13.2)

## 2025-02-09 PROCEDURE — 85025 COMPLETE CBC W/AUTO DIFF WBC: CPT

## 2025-02-09 PROCEDURE — 83735 ASSAY OF MAGNESIUM: CPT

## 2025-02-09 PROCEDURE — 6360000002 HC RX W HCPCS: Performed by: INTERNAL MEDICINE

## 2025-02-09 PROCEDURE — 82962 GLUCOSE BLOOD TEST: CPT

## 2025-02-09 PROCEDURE — 99233 SBSQ HOSP IP/OBS HIGH 50: CPT | Performed by: STUDENT IN AN ORGANIZED HEALTH CARE EDUCATION/TRAINING PROGRAM

## 2025-02-09 PROCEDURE — 97162 PT EVAL MOD COMPLEX 30 MIN: CPT

## 2025-02-09 PROCEDURE — 94640 AIRWAY INHALATION TREATMENT: CPT

## 2025-02-09 PROCEDURE — 99232 SBSQ HOSP IP/OBS MODERATE 35: CPT | Performed by: INTERNAL MEDICINE

## 2025-02-09 PROCEDURE — 6370000000 HC RX 637 (ALT 250 FOR IP): Performed by: INTERNAL MEDICINE

## 2025-02-09 PROCEDURE — 80048 BASIC METABOLIC PNL TOTAL CA: CPT

## 2025-02-09 PROCEDURE — 6370000000 HC RX 637 (ALT 250 FOR IP): Performed by: STUDENT IN AN ORGANIZED HEALTH CARE EDUCATION/TRAINING PROGRAM

## 2025-02-09 PROCEDURE — 94761 N-INVAS EAR/PLS OXIMETRY MLT: CPT

## 2025-02-09 PROCEDURE — 36415 COLL VENOUS BLD VENIPUNCTURE: CPT

## 2025-02-09 PROCEDURE — 97530 THERAPEUTIC ACTIVITIES: CPT

## 2025-02-09 PROCEDURE — 2500000003 HC RX 250 WO HCPCS: Performed by: INTERNAL MEDICINE

## 2025-02-09 PROCEDURE — 2140000001 HC CVICU INTERMEDIATE R&B

## 2025-02-09 PROCEDURE — 2700000000 HC OXYGEN THERAPY PER DAY

## 2025-02-09 RX ORDER — ACETAMINOPHEN 325 MG/1
650 TABLET ORAL EVERY 6 HOURS PRN
Status: DISCONTINUED | OUTPATIENT
Start: 2025-02-09 | End: 2025-02-10 | Stop reason: HOSPADM

## 2025-02-09 RX ORDER — FUROSEMIDE 40 MG/1
40 TABLET ORAL 2 TIMES DAILY
Status: DISCONTINUED | OUTPATIENT
Start: 2025-02-10 | End: 2025-02-10 | Stop reason: HOSPADM

## 2025-02-09 RX ORDER — ACETAMINOPHEN 650 MG/1
650 SUPPOSITORY RECTAL EVERY 6 HOURS PRN
Status: DISCONTINUED | OUTPATIENT
Start: 2025-02-09 | End: 2025-02-10 | Stop reason: HOSPADM

## 2025-02-09 RX ADMIN — METOPROLOL SUCCINATE 25 MG: 25 TABLET, FILM COATED, EXTENDED RELEASE ORAL at 09:00

## 2025-02-09 RX ADMIN — FOLIC ACID 1 MG: 1 TABLET ORAL at 08:59

## 2025-02-09 RX ADMIN — DOXYCYCLINE HYCLATE 100 MG: 100 CAPSULE ORAL at 20:47

## 2025-02-09 RX ADMIN — GUAIFENESIN 1200 MG: 600 TABLET ORAL at 09:00

## 2025-02-09 RX ADMIN — INSULIN LISPRO 4 UNITS: 100 INJECTION, SOLUTION INTRAVENOUS; SUBCUTANEOUS at 18:14

## 2025-02-09 RX ADMIN — SODIUM CHLORIDE, PRESERVATIVE FREE 10 ML: 5 INJECTION INTRAVENOUS at 09:01

## 2025-02-09 RX ADMIN — MONTELUKAST 10 MG: 10 TABLET, FILM COATED ORAL at 08:59

## 2025-02-09 RX ADMIN — POTASSIUM BICARBONATE 20 MEQ: 782 TABLET, EFFERVESCENT ORAL at 08:59

## 2025-02-09 RX ADMIN — APIXABAN 5 MG: 5 TABLET, FILM COATED ORAL at 09:00

## 2025-02-09 RX ADMIN — DOXYCYCLINE HYCLATE 100 MG: 100 CAPSULE ORAL at 09:00

## 2025-02-09 RX ADMIN — SPIRONOLACTONE 25 MG: 25 TABLET ORAL at 09:00

## 2025-02-09 RX ADMIN — SERTRALINE HYDROCHLORIDE 50 MG: 50 TABLET ORAL at 09:00

## 2025-02-09 RX ADMIN — ARFORMOTEROL TARTRATE 15 MCG: 15 SOLUTION RESPIRATORY (INHALATION) at 19:58

## 2025-02-09 RX ADMIN — LEVOTHYROXINE SODIUM 88 MCG: 0.09 TABLET ORAL at 06:42

## 2025-02-09 RX ADMIN — FUROSEMIDE 40 MG: 10 INJECTION, SOLUTION INTRAMUSCULAR; INTRAVENOUS at 08:59

## 2025-02-09 RX ADMIN — SODIUM CHLORIDE, PRESERVATIVE FREE 10 ML: 5 INJECTION INTRAVENOUS at 20:48

## 2025-02-09 RX ADMIN — IPRATROPIUM BROMIDE 0.5 MG: 0.5 SOLUTION RESPIRATORY (INHALATION) at 19:58

## 2025-02-09 RX ADMIN — APIXABAN 5 MG: 5 TABLET, FILM COATED ORAL at 20:47

## 2025-02-09 RX ADMIN — PREDNISONE 40 MG: 20 TABLET ORAL at 09:00

## 2025-02-09 RX ADMIN — GUAIFENESIN 1200 MG: 600 TABLET ORAL at 20:47

## 2025-02-09 RX ADMIN — ARFORMOTEROL TARTRATE 15 MCG: 15 SOLUTION RESPIRATORY (INHALATION) at 08:29

## 2025-02-09 RX ADMIN — FERROUS SULFATE TAB 325 MG (65 MG ELEMENTAL FE) 325 MG: 325 (65 FE) TAB at 08:59

## 2025-02-09 RX ADMIN — THERA TABS 1 TABLET: TAB at 08:59

## 2025-02-09 RX ADMIN — IPRATROPIUM BROMIDE 0.5 MG: 0.5 SOLUTION RESPIRATORY (INHALATION) at 08:29

## 2025-02-09 RX ADMIN — IPRATROPIUM BROMIDE 0.5 MG: 0.5 SOLUTION RESPIRATORY (INHALATION) at 13:50

## 2025-02-09 NOTE — PROGRESS NOTES
[facility] - Progress Note    Admit Date: 2/7/2025      Assessment:     Patient Active Problem List    Diagnosis Date Noted    Acute on chronic hypoxic respiratory failure 02/07/2025    History of deep venous thrombosis (DVT) of distal vein of right lower extremity 02/07/2025    Current use of long term anticoagulation 02/07/2025    Hypothyroidism 02/07/2025    Acute on chronic heart failure with mildly reduced ejection fraction (HFmrEF, 41-49%) ([facility]) 01/29/2025    Tobacco use disorder 12/12/2024    History of pulmonary embolism 12/07/2024    Debility 11/20/2024    Encounter for palliative care 11/20/2024    Palliative care encounter 11/15/2024    Malignant neoplasm of upper lobe of right lung ([facility]) 10/24/2024    Chronic systolic (congestive) heart failure 09/19/2024    Acute exacerbation of chronic obstructive pulmonary disease (COPD) ([facility]) 06/14/2024    Thrombocytosis 05/21/2024    Dyspnea 05/18/2024    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  Improved with IV diuretic echocardiogram 2/8 with unchanged LVEF 45-50, decreased RV function with increasing pulmonary hypertension, likely due to her lung cancer.  PAF.  Since converted to sinus rhythm  Cardiomyopathy by 2D echo November 2024 ejection fraction 45 to 50%  Chronic hypoxic respiratory failure.  Patient on 4 L at home via nasal cannula, she is now back to her baseline oxygen.  non-small cell lung cancer.  Patient still with ongoing chemotherapy.,  Not a surgical candidate  History of pericardial effusion small to moderate 1 to 2 cm circumferential pericardial effusion present November 2024  Tobacco Abuse disorder  History of pulmonary embolism  SVC Syndrome  HTN

## 2025-02-09 NOTE — PLAN OF CARE
Problem: Chronic Conditions and Co-morbidities  Goal: Patient's chronic conditions and co-morbidity symptoms are monitored and maintained or improved  2/9/2025 0353 by Gamaliel Hsu RN  Outcome: Progressing  2/9/2025 0352 by Gamaliel Hsu RN  Outcome: Progressing  Flowsheets (Taken 2/8/2025 2000)  Care Plan - Patient's Chronic Conditions and Co-Morbidity Symptoms are Monitored and Maintained or Improved:   Monitor and assess patient's chronic conditions and comorbid symptoms for stability, deterioration, or improvement   Collaborate with multidisciplinary team to address chronic and comorbid conditions and prevent exacerbation or deterioration   Update acute care plan with appropriate goals if chronic or comorbid symptoms are exacerbated and prevent overall improvement and discharge     Problem: Discharge Planning  Goal: Discharge to home or other facility with appropriate resources  Outcome: Progressing  Flowsheets (Taken 2/8/2025 2000)  Discharge to home or other facility with appropriate resources:   Identify barriers to discharge with patient and caregiver   Identify discharge learning needs (meds, wound care, etc)     Problem: Skin/Tissue Integrity  Goal: Skin integrity remains intact  Description: 1.  Monitor for areas of redness and/or skin breakdown  2.  Assess vascular access sites hourly  3.  Every 4-6 hours minimum:  Change oxygen saturation probe site  4.  Every 4-6 hours:  If on nasal continuous positive airway pressure, respiratory therapy assess nares and determine need for appliance change or resting period  2/9/2025 0353 by Gamaliel Hsu RN  Outcome: Progressing  2/9/2025 0352 by Gamaliel Hsu RN  Outcome: Progressing  Flowsheets (Taken 2/8/2025 2000)  Skin Integrity Remains Intact: Monitor for areas of redness and/or skin breakdown     Problem: Pain  Goal: Verbalizes/displays adequate comfort level or baseline comfort level  Outcome: Progressing     Problem: Safety - Adult  Goal:

## 2025-02-09 NOTE — PLAN OF CARE
Problem: Chronic Conditions and Co-morbidities  Goal: Patient's chronic conditions and co-morbidity symptoms are monitored and maintained or improved  2/9/2025 1253 by Carlita Bergman RN  Outcome: Progressing  Flowsheets (Taken 2/9/2025 1253)  Care Plan - Patient's Chronic Conditions and Co-Morbidity Symptoms are Monitored and Maintained or Improved: Monitor and assess patient's chronic conditions and comorbid symptoms for stability, deterioration, or improvement  2/9/2025 0353 by Gamaliel Hsu RN  Outcome: Progressing  2/9/2025 0352 by Gamaliel Hsu RN  Outcome: Progressing  Flowsheets (Taken 2/8/2025 2000)  Care Plan - Patient's Chronic Conditions and Co-Morbidity Symptoms are Monitored and Maintained or Improved:   Monitor and assess patient's chronic conditions and comorbid symptoms for stability, deterioration, or improvement   Collaborate with multidisciplinary team to address chronic and comorbid conditions and prevent exacerbation or deterioration   Update acute care plan with appropriate goals if chronic or comorbid symptoms are exacerbated and prevent overall improvement and discharge     Problem: Discharge Planning  Goal: Discharge to home or other facility with appropriate resources  2/9/2025 1253 by Carlita Bergman, RN  Outcome: Progressing  Flowsheets (Taken 2/9/2025 1253)  Discharge to home or other facility with appropriate resources: Identify barriers to discharge with patient and caregiver  2/9/2025 0353 by Gmaaliel Hsu RN  Outcome: Progressing  Flowsheets (Taken 2/8/2025 2000)  Discharge to home or other facility with appropriate resources:   Identify barriers to discharge with patient and caregiver   Identify discharge learning needs (meds, wound care, etc)     Problem: Skin/Tissue Integrity  Goal: Skin integrity remains intact  Description: 1.  Monitor for areas of redness and/or skin breakdown  2.  Assess vascular access sites hourly  3.  Every 4-6 hours minimum:

## 2025-02-09 NOTE — PROGRESS NOTES
[facility]  Progress Note    Patient: Rina Prajapati Age: 64 y.o. : 1960 MR#: [id number] SSN: [id number]  Date/Time: 2025     Chief Complaint   Patient presents with    Shortness of Breath     Subjective:   Patient seen and examined.  Seen while working with therapy earlier this morning.   Reports breathing is much better today.  Denies additional complaints such as chest pain, nausea/emesis/headaches, discussed palliative care evaluation, transition to oral diuretics.  Questions addressed.  Assessment/Plan:   Acute on chronic hypoxic respiratory failure secondary to acute on chronic heart failure with mildly reduced ejection fraction  -Chronically on 3.5 L of oxygen at baseline, wean to home oxygen needs as tolerated.  -Cardiology following, appreciate assistance in care.  Continue diuresis, I/O, daily weights, monitor renal function/electrolytes.  -TTE results reviewed discussed with patient.    Acute COPD exacerbation.  -Doxycycline, steroids, montelukast, formulary equivalent home inhalers, antitussives, DuoNebs.  -CT chest attempted however canceled due to claustrophobia even with sedative medication/patient refusal, low suspicion for acute PE.  Will hold off on obtaining for now.    Pulmonary hypertension  -RVSP 65    Hx of small-moderate pericardial effusion  -1 cm commented on most recent TTE.  No indication of cardiac tamponade.  Continue diuresis    Tachyarrhythmia with concern for new onset atrial fibrillation with possible rapid ventricular response  -Cardiology following patient already on Eliquis, will continue telemetry.  Low-dose beta-blocker added per cardiology.    Non-small cell adenocarcinoma of the right lung  Patient was here for placement of Peripherally Inserted Central Venous Catheter (a.k.a PICC Line) placement.  Does not appear that Patient tolerated initial attempt at placement due to #1.  Palliative care

## 2025-02-09 NOTE — PROGRESS NOTES
Physical Therapy    PHYSICAL THERAPY EVALUATION/DISCHARGE    Patient: Rina Prajapati (64 y.o. female)  Date: 2/9/2025  Primary Diagnosis: Dyspnea [R06.00]  Dyspnea, unspecified type [R06.00]       Precautions: General Precautions  PLOF: pt lives with SO in a 1 SH with 1 NOEL, rollator PRN, mod ind PTA     ASSESSMENT AND RECOMMENDATIONS:  Pt received in bed in NAD and agreeable. On 4L via NC (baseline O2 level). Pt with ROM and strength grossly intact. Pt mod ind with bed mobility and SBA to stand to turn and sit to recliner with no AD, assist given for line management. Pt encouraged continued OOB to bathroom/BSC/recliner during admission to increase activity tolerance as well as educated on PLB when SOB. No further acute PT needs at this time. Left with all needs met in the recliner, nursing updated on session. Will sign off.     Patient does not require further skilled physical therapy intervention at this level of care.    Further Equipment Recommendations for Discharge: n/a    [facility]: AM-PAC Inpatient Mobility Raw Score : 22      Current research shows that an AM-PAC score of 18 (14 without stairs) or greater is associated with a discharge to the patient's home setting.  **would benefit from a cardiac/pulmonary rehab referral    This [facility] score should be considered in conjunction with interdisciplinary team recommendations to determine the most appropriate discharge setting. Patient's social support, diagnosis, medical stability, and prior level of function should also be taken into consideration.     SUBJECTIVE:   Patient stated “I was hoping to go home today.”    OBJECTIVE DATA SUMMARY:     Past Medical History:   Diagnosis Date    Anemia, iron deficiency 2/2016    Chronic hypoxic respiratory failure     3.5 L/min O2 via NC at baseline (as of 2/07/2025)    Current use of long term anticoagulation     on Apixaban (Eliquis) as of 2/07/2025    Depression     H/O seasonal allergies     Hypertension     Non-small

## 2025-02-10 VITALS
HEIGHT: 70 IN | DIASTOLIC BLOOD PRESSURE: 75 MMHG | SYSTOLIC BLOOD PRESSURE: 121 MMHG | TEMPERATURE: 98.6 F | BODY MASS INDEX: 18.12 KG/M2 | OXYGEN SATURATION: 100 % | RESPIRATION RATE: 17 BRPM | WEIGHT: 126.54 LBS | HEART RATE: 75 BPM

## 2025-02-10 PROBLEM — I50.9 ACUTE ON CHRONIC HEART FAILURE (HCC): Status: ACTIVE | Noted: 2025-02-10

## 2025-02-10 PROBLEM — I27.20 PULMONARY HYPERTENSION (HCC): Status: ACTIVE | Noted: 2025-02-10

## 2025-02-10 LAB
ANION GAP SERPL CALC-SCNC: 6 MMOL/L (ref 3–18)
BASOPHILS # BLD: 0.01 K/UL (ref 0–0.1)
BASOPHILS NFR BLD: 0.1 % (ref 0–2)
BUN SERPL-MCNC: 34 MG/DL (ref 7–18)
BUN/CREAT SERPL: 52 (ref 12–20)
CALCIUM SERPL-MCNC: 8.4 MG/DL (ref 8.5–10.1)
CHLORIDE SERPL-SCNC: 104 MMOL/L (ref 100–111)
CO2 SERPL-SCNC: 28 MMOL/L (ref 21–32)
CREAT SERPL-MCNC: 0.66 MG/DL (ref 0.6–1.3)
DIFFERENTIAL METHOD BLD: ABNORMAL
EOSINOPHIL # BLD: 0.02 K/UL (ref 0–0.4)
EOSINOPHIL NFR BLD: 0.2 % (ref 0–5)
ERYTHROCYTE [DISTWIDTH] IN BLOOD BY AUTOMATED COUNT: 19.2 % (ref 11.6–14.5)
GLUCOSE BLD STRIP.AUTO-MCNC: 137 MG/DL (ref 70–110)
GLUCOSE BLD STRIP.AUTO-MCNC: 142 MG/DL (ref 70–110)
GLUCOSE BLD STRIP.AUTO-MCNC: 196 MG/DL (ref 70–110)
GLUCOSE SERPL-MCNC: 154 MG/DL (ref 74–99)
HCT VFR BLD AUTO: 32.7 % (ref 35–45)
HGB BLD-MCNC: 9.5 G/DL (ref 12–16)
IMM GRANULOCYTES # BLD AUTO: 0.04 K/UL (ref 0–0.04)
IMM GRANULOCYTES NFR BLD AUTO: 0.4 % (ref 0–0.5)
LYMPHOCYTES # BLD: 1.07 K/UL (ref 0.9–3.6)
LYMPHOCYTES NFR BLD: 10.6 % (ref 21–52)
MCH RBC QN AUTO: 23.1 PG (ref 24–34)
MCHC RBC AUTO-ENTMCNC: 29.1 G/DL (ref 31–37)
MCV RBC AUTO: 79.4 FL (ref 78–100)
MONOCYTES # BLD: 0.56 K/UL (ref 0.05–1.2)
MONOCYTES NFR BLD: 5.5 % (ref 3–10)
NEUTS SEG # BLD: 8.41 K/UL (ref 1.8–8)
NEUTS SEG NFR BLD: 83.2 % (ref 40–73)
NRBC # BLD: 0 K/UL (ref 0–0.01)
NRBC BLD-RTO: 0 PER 100 WBC
PLATELET # BLD AUTO: 247 K/UL (ref 135–420)
PMV BLD AUTO: 9.9 FL (ref 9.2–11.8)
POTASSIUM SERPL-SCNC: 3.9 MMOL/L (ref 3.5–5.5)
RBC # BLD AUTO: 4.12 M/UL (ref 4.2–5.3)
SODIUM SERPL-SCNC: 138 MMOL/L (ref 136–145)
WBC # BLD AUTO: 10.1 K/UL (ref 4.6–13.2)

## 2025-02-10 PROCEDURE — 6360000002 HC RX W HCPCS: Performed by: INTERNAL MEDICINE

## 2025-02-10 PROCEDURE — 82962 GLUCOSE BLOOD TEST: CPT

## 2025-02-10 PROCEDURE — 99223 1ST HOSP IP/OBS HIGH 75: CPT | Performed by: NURSE PRACTITIONER

## 2025-02-10 PROCEDURE — 85025 COMPLETE CBC W/AUTO DIFF WBC: CPT

## 2025-02-10 PROCEDURE — 94640 AIRWAY INHALATION TREATMENT: CPT

## 2025-02-10 PROCEDURE — 6370000000 HC RX 637 (ALT 250 FOR IP): Performed by: INTERNAL MEDICINE

## 2025-02-10 PROCEDURE — 80048 BASIC METABOLIC PNL TOTAL CA: CPT

## 2025-02-10 PROCEDURE — 2700000000 HC OXYGEN THERAPY PER DAY

## 2025-02-10 PROCEDURE — 6370000000 HC RX 637 (ALT 250 FOR IP): Performed by: STUDENT IN AN ORGANIZED HEALTH CARE EDUCATION/TRAINING PROGRAM

## 2025-02-10 PROCEDURE — 36415 COLL VENOUS BLD VENIPUNCTURE: CPT

## 2025-02-10 PROCEDURE — 2500000003 HC RX 250 WO HCPCS: Performed by: INTERNAL MEDICINE

## 2025-02-10 PROCEDURE — 94761 N-INVAS EAR/PLS OXIMETRY MLT: CPT

## 2025-02-10 PROCEDURE — 99239 HOSP IP/OBS DSCHRG MGMT >30: CPT | Performed by: STUDENT IN AN ORGANIZED HEALTH CARE EDUCATION/TRAINING PROGRAM

## 2025-02-10 RX ORDER — PREDNISONE 20 MG/1
40 TABLET ORAL DAILY
Qty: 2 TABLET | Refills: 0 | Status: SHIPPED | OUTPATIENT
Start: 2025-02-11 | End: 2025-02-12

## 2025-02-10 RX ORDER — DOXYCYCLINE 100 MG/1
100 CAPSULE ORAL EVERY 12 HOURS SCHEDULED
Qty: 3 CAPSULE | Refills: 0 | Status: SHIPPED | OUTPATIENT
Start: 2025-02-10 | End: 2025-02-12

## 2025-02-10 RX ORDER — METOPROLOL SUCCINATE 25 MG/1
25 TABLET, EXTENDED RELEASE ORAL DAILY
Qty: 30 TABLET | Refills: 3 | Status: SHIPPED | OUTPATIENT
Start: 2025-02-11

## 2025-02-10 RX ADMIN — IPRATROPIUM BROMIDE 0.5 MG: 0.5 SOLUTION RESPIRATORY (INHALATION) at 01:34

## 2025-02-10 RX ADMIN — APIXABAN 5 MG: 5 TABLET, FILM COATED ORAL at 08:50

## 2025-02-10 RX ADMIN — THERA TABS 1 TABLET: TAB at 08:50

## 2025-02-10 RX ADMIN — SODIUM CHLORIDE, PRESERVATIVE FREE 10 ML: 5 INJECTION INTRAVENOUS at 08:53

## 2025-02-10 RX ADMIN — MONTELUKAST 10 MG: 10 TABLET, FILM COATED ORAL at 08:50

## 2025-02-10 RX ADMIN — PREDNISONE 40 MG: 20 TABLET ORAL at 08:50

## 2025-02-10 RX ADMIN — FUROSEMIDE 40 MG: 40 TABLET ORAL at 16:20

## 2025-02-10 RX ADMIN — IPRATROPIUM BROMIDE 0.5 MG: 0.5 SOLUTION RESPIRATORY (INHALATION) at 07:27

## 2025-02-10 RX ADMIN — DOXYCYCLINE HYCLATE 100 MG: 100 CAPSULE ORAL at 08:50

## 2025-02-10 RX ADMIN — FOLIC ACID 1 MG: 1 TABLET ORAL at 08:50

## 2025-02-10 RX ADMIN — POTASSIUM BICARBONATE 20 MEQ: 782 TABLET, EFFERVESCENT ORAL at 08:51

## 2025-02-10 RX ADMIN — GUAIFENESIN 1200 MG: 600 TABLET ORAL at 08:50

## 2025-02-10 RX ADMIN — ARFORMOTEROL TARTRATE 15 MCG: 15 SOLUTION RESPIRATORY (INHALATION) at 07:27

## 2025-02-10 RX ADMIN — LEVOTHYROXINE SODIUM 88 MCG: 0.09 TABLET ORAL at 06:11

## 2025-02-10 RX ADMIN — SPIRONOLACTONE 25 MG: 25 TABLET ORAL at 08:49

## 2025-02-10 RX ADMIN — METOPROLOL SUCCINATE 25 MG: 25 TABLET, FILM COATED, EXTENDED RELEASE ORAL at 08:50

## 2025-02-10 RX ADMIN — INSULIN LISPRO 2 UNITS: 100 INJECTION, SOLUTION INTRAVENOUS; SUBCUTANEOUS at 16:20

## 2025-02-10 RX ADMIN — SERTRALINE HYDROCHLORIDE 50 MG: 50 TABLET ORAL at 08:50

## 2025-02-10 RX ADMIN — FERROUS SULFATE TAB 325 MG (65 MG ELEMENTAL FE) 325 MG: 325 (65 FE) TAB at 08:49

## 2025-02-10 RX ADMIN — FUROSEMIDE 40 MG: 40 TABLET ORAL at 08:50

## 2025-02-10 RX ADMIN — IPRATROPIUM BROMIDE 0.5 MG: 0.5 SOLUTION RESPIRATORY (INHALATION) at 14:26

## 2025-02-10 NOTE — DISCHARGE INSTRUCTIONS
Continue doxycycline (important finish course of antibiotics even if you start to feel better).  And prednisone as prescribed.  Follow with your primary care provider, oncology.  Follow with cardiology (Dr. Obregon in 4 to 6 weeks)  Follow with interventional radiology for placement of PICC line for your chemotherapy.  Continue present medications, continue home oxygen.  Return to the ED if symptoms worsen or fail to improve.

## 2025-02-10 NOTE — PROGRESS NOTES
0815: Bedside and Verbal shift change report given to CORTES Enrique (oncoming nurse) by CORTES Alston (offgoing nurse). Report included the following information Nurse Handoff Report, Adult Overview, Intake/Output, MAR, and Recent Results.    0815: 4 Eyes Skin Assessment     NAME:  Rina Prajapati  YOB: 1960  MEDICAL RECORD NUMBER:  [id number]    The patient is being assessed for  Shift Handoff    I agree that at least one RN has performed a thorough Head to Toe Skin Assessment on the patient. ALL assessment sites listed below have been assessed.      Areas assessed by both nurses:    Head, Face, Ears, Shoulders, Back, Chest, Arms, Elbows, Hands, Sacrum. Buttock, Coccyx, Ischium, Legs. Feet and Heels, and Under Medical Devices         Does the Patient have a Wound? No noted wound(s)       Isidro Prevention initiated by RN: No  Wound Care Orders initiated by RN: No    Pressure Injury (Stage 3,4, Unstageable, DTI, NWPT, and Complex wounds) if present, place Wound referral order by RN under : No    New Ostomies, if present place, Ostomy referral order under : No     Nurse 1 eSignature: Electronically signed by Esme Robison RN on 2/10/25 at 8:15 AM EST    **SHARE this note so that the co-signing nurse can place an eSignature**    Nurse 2 eSignature: Electronically signed by CORTES Alston on 2/10/25 at 8:15 AM EST

## 2025-02-10 NOTE — DISCHARGE SUMMARY
Recommend repeat chest imaging for 6 weeks to observe resolution of patchy opacities.    Labs: None pending  Imaging: see below  Micro: MRSA nares pending.  Telemetry: NSR    Significant Diagnostic Studies:     Recent Results (from the past 24 hour(s))   POCT Glucose    Collection Time: 02/09/25  5:11 PM   Result Value Ref Range    POC Glucose 277 (H) 70 - 110 mg/dL   POCT Glucose    Collection Time: 02/09/25  8:28 PM   Result Value Ref Range    POC Glucose 170 (H) 70 - 110 mg/dL   Basic Metabolic Panel    Collection Time: 02/10/25  3:10 AM   Result Value Ref Range    Sodium 138 136 - 145 mmol/L    Potassium 3.9 3.5 - 5.5 mmol/L    Chloride 104 100 - 111 mmol/L    CO2 28 21 - 32 mmol/L    Anion Gap 6 3.0 - 18 mmol/L    Glucose 154 (H) 74 - 99 mg/dL    BUN 34 (H) 7.0 - 18 MG/DL    Creatinine 0.66 0.6 - 1.3 MG/DL    BUN/Creatinine Ratio 52 (H) 12 - 20      Est, Glom Filt Rate >90 >60 ml/min/1.73m2    Calcium 8.4 (L) 8.5 - 10.1 MG/DL   CBC with Auto Differential    Collection Time: 02/10/25  3:10 AM   Result Value Ref Range    WBC 10.1 4.6 - 13.2 K/uL    RBC 4.12 (L) 4.20 - 5.30 M/uL    Hemoglobin 9.5 (L) 12.0 - 16.0 g/dL    Hematocrit 32.7 (L) 35.0 - 45.0 %    MCV 79.4 78.0 - 100.0 FL    MCH 23.1 (L) 24.0 - 34.0 PG    MCHC 29.1 (L) 31.0 - 37.0 g/dL    RDW 19.2 (H) 11.6 - 14.5 %    Platelets 247 135 - 420 K/uL    MPV 9.9 9.2 - 11.8 FL    Nucleated RBCs 0.0 0  WBC    nRBC 0.00 0.00 - 0.01 K/uL    Neutrophils % 83.2 (H) 40.0 - 73.0 %    Lymphocytes % 10.6 (L) 21.0 - 52.0 %    Monocytes % 5.5 3.0 - 10.0 %    Eosinophils % 0.2 0.0 - 5.0 %    Basophils % 0.1 0.0 - 2.0 %    Immature Granulocytes % 0.4 0.0 - 0.5 %    Neutrophils Absolute 8.41 (H) 1.80 - 8.00 K/UL    Lymphocytes Absolute 1.07 0.90 - 3.60 K/UL    Monocytes Absolute 0.56 0.05 - 1.20 K/UL    Eosinophils Absolute 0.02 0.00 - 0.40 K/UL    Basophils Absolute 0.01 0.00 - 0.10 K/UL    Immature Granulocytes Absolute 0.04 0.00 - 0.04 K/UL    Differential Type

## 2025-02-10 NOTE — PLAN OF CARE
Problem: Chronic Conditions and Co-morbidities  Goal: Patient's chronic conditions and co-morbidity symptoms are monitored and maintained or improved  Outcome: Progressing  Flowsheets (Taken 2/10/2025 0725)  Care Plan - Patient's Chronic Conditions and Co-Morbidity Symptoms are Monitored and Maintained or Improved:   Monitor and assess patient's chronic conditions and comorbid symptoms for stability, deterioration, or improvement   Collaborate with multidisciplinary team to address chronic and comorbid conditions and prevent exacerbation or deterioration  Note: Nurse will monitor patient's chronic conditions and comorbidities and treat using prescribed medications as needed. Patients O2 saturations remain in the 90s.     Problem: Discharge Planning  Goal: Discharge to home or other facility with appropriate resources  Outcome: Progressing  Flowsheets (Taken 2/10/2025 0725)  Discharge to home or other facility with appropriate resources:   Identify barriers to discharge with patient and caregiver   Arrange for needed discharge resources and transportation as appropriate   Identify discharge learning needs (meds, wound care, etc)  Note: Patient is to be discharged to home. Patient states that she is on oxygen supplementation at home as well and already has the supplies at home. Will discuss with treatment team and case management to determine any barriers to discharge.      Problem: Skin/Tissue Integrity  Goal: Skin integrity remains intact  Description: 1.  Monitor for areas of redness and/or skin breakdown  2.  Assess vascular access sites hourly  3.  Every 4-6 hours minimum:  Change oxygen saturation probe site  4.  Every 4-6 hours:  If on nasal continuous positive airway pressure, respiratory therapy assess nares and determine need for appliance change or resting period  Outcome: Progressing  Flowsheets (Taken 2/10/2025 0725)  Skin Integrity Remains Intact: Monitor for areas of redness and/or skin

## 2025-02-10 NOTE — NURSE NAVIGATOR
Met with patient provided education on living with heart failure, reinforcing low sodium diet, fluid restriction, heart failure zones, will continue to follow.  HF Navigator discussed cardiac university information with pt:    [    ] Pt does plan on attnding via   [   ] inperson          [   ] zoom  [    ] Pt does not wish to attend--- unsure    Dorothy Mendes RN  2/10/2025

## 2025-02-10 NOTE — CONSULTS
[facility] - Consult Note    Date of  Admission: 2/7/2025  8:43 AM   Primary Care Physician:  Cora Her MD       Assessment:     Patient Active Problem List    Diagnosis Date Noted    Acute on chronic hypoxic respiratory failure 02/07/2025    History of deep venous thrombosis (DVT) of distal vein of right lower extremity 02/07/2025    Current use of long term anticoagulation 02/07/2025    Hypothyroidism 02/07/2025    Acute on chronic heart failure with mildly reduced ejection fraction (HFmrEF, 41-49%) ([facility]) 01/29/2025    Tobacco use disorder 12/12/2024    History of pulmonary embolism 12/07/2024    Debility 11/20/2024    Encounter for palliative care 11/20/2024    Palliative care encounter 11/15/2024    Malignant neoplasm of upper lobe of right lung ([facility]) 10/24/2024    Chronic systolic (congestive) heart failure 09/19/2024    Acute exacerbation of chronic obstructive pulmonary disease (COPD) ([facility]) 06/14/2024    Thrombocytosis 05/21/2024    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 ([facility]) 04/10/2024    Protein calorie malnutrition 04/10/2024    Non-small cell lung cancer ([facility]) 04/05/2018    Recurrent major depressive disorder ([facility]) 07/01/2016    Essential hypertension 04/06/2016       IMPRESSION  Acute on chronic hypoxic respiratory failure, secondary to decompensated congestive heart failure  New onset atrial fibrillation with rapid ventricular response, EKG 2/7/2025 atrial fibrillation with rapid ventricular response with premature ventricular conducted complexes, T wave abnormality inferior ischemia  Acute on chronic heart failure medium reduced ejection fraction, Chest x ray 2/7/2025 with increase in diffuse interstitial and patchy opacities in the right lung with obscuration of the right costophrenic angle consistent with atelectasis and effusion.    Elevated troponin 18ng/L in intermediate range, secondary to CHF decompensation,   Cardiomyopathy by 2D echo November 2024 ejection 
[]Ill appearing  []Other:  Mental Status: [x] Normal mental status exam  [] Drowsy  [] Confused  []Other:  Cardiovascular: [x] Regular rate/rhythm  [] Arrhythmia  [] Other:  Chest: [x] Effort normal  []Lungs clear  [] Respiratory distress  []Tachypnea  [] Other:  Abdomen: [x] Soft/non-tender  [] Normal appearance  [] Distended  [] Ascites  [] Other:  Neurological: [x] Normal speech  [] Normal sensation  []Deficits present:    Wt Readings from Last 15 Encounters:   02/08/25 57.4 kg (126 lb 8.7 oz)   01/29/25 59.4 kg (131 lb)   12/05/24 59.8 kg (131 lb 12.8 oz)   12/04/24 54.7 kg (120 lb 9.6 oz)   11/19/24 61.5 kg (135 lb 9.6 oz)   10/23/24 57.5 kg (126 lb 12.8 oz)   10/03/24 58.2 kg (128 lb 4.8 oz)   10/02/24 58.2 kg (128 lb 4.8 oz)   09/19/24 54.6 kg (120 lb 6.4 oz)   09/11/24 57.2 kg (126 lb 3.2 oz)   08/21/24 57.6 kg (127 lb)   08/12/24 58.5 kg (129 lb)   07/31/24 58.5 kg (128 lb 14.4 oz)   07/29/24 59.4 kg (131 lb)   06/19/24 61.5 kg (135 lb 8 oz)        Current Diet: ADULT DIET; Regular; 4 carb choices (60 gm/meal); Low Fat/Low Chol/High Fiber/JOHNATHAN; Low Sodium (2 gm); 1500 ml       PSYCHOSOCIAL/SPIRITUAL SCREENING:   Palliative IDT has assessed this patient for cultural preferences / practices and a referral made as appropriate to needs (Cultural Services, Patient Advocacy, Ethics, etc.)    Spiritual Affiliation: None    Any spiritual / Mandaen concerns:  [] Yes /  [x] No   If \"Yes\" to discuss with pastoral care during IDT     Does caregiver feel burdened by caring for their loved one:   [] Yes /  [x] No /  [] No Caregiver Present/Available [] No Caregiver [] Pt Lives at Facility  If \"Yes\" to discuss with social work during IDT    Anticipatory grief assessment:   [x] Normal  / [] Maladaptive     If \"Maladaptive\" to discuss with social work during IDT    ESAS Anxiety: Anxiety Score: Not anxious    ESAS Depression: Depression Score: Not depressed        LAB AND IMAGING FINDINGS:   Objective data reviewed:  labs,

## 2025-02-10 NOTE — ACP (ADVANCE CARE PLANNING)
Advance Care Planning     Palliative Team Advance Care Planning (ACP) Conversation    Date of Conversation: 02/10/25    Individuals present for the conversation: Patient with decision making capacity     ACP documents on file prior to discussion:  -Power of  for Healthcare    Previously completed document/s not on file: Patient / participant reports that there are no previously executed ACP documents.    Healthcare Decision Maker:    Primary Decision Maker: Radha Jolly - Brother/Sister - [phone]    Secondary Decision Maker: Dylan Hector - Brother/Sister - [phone]     Conversation Summary:  Rina Prajapati is a 64 y.o. female with history of non small cell lung cancer presents for PICC line exchange for chemotherapy. Patient was not able to undergo the PICC line procedure due to increased shortness of breath and signs of CHF varun she has had for the last few weeks.  Ms Prajapati was seen in [facility] ED and admitted to hospital for treatment. Patient is followed by Oncologist (Dr. Li) . Patient shared her treatment is on hold until March. The goal is to give her time to get stronger. Ms Prajapati reports that she has her cousin staying with her who does most of the cooking. She has made attempts to follow a no salt diet and fluid restriction but it is difficult.    Palliative Medicine team Keila Lowry NP and this writer reviewed the AMD on File. She confirmed Her sister Radha Jolly as primary agent And her brother Dylan Hector as surrogate.  Team reviewed goals of care including wishes at the time of cardiac/respiratory arrest. At This time Ms. Prajapati shared that she and her sister have spoken and she does not want to be on Any type of machines and does not want the efforts of CPR or Intubation. Introduced the completion of a DDNR. The form was signed by Ms Prajapati and Keila Lowry NP. Original and copies were provided to the patient. Copy was scanned into the EMR. Code status was updated to

## 2025-02-10 NOTE — CARE COORDINATION
Discharge order noted for today.  Orders reviewed.  Patient ready to discharge home. Spouse will transport home. No needs identified at this time.    Alicia Cullen, MSN, RN  Care Manager    [place] [address]  Office: [phone]  Fax: [phone]           
Services Prior To Admission None   DME Ordered? No   Patient expects to be discharged to: House   One/Two Story Residence One story   History of falls? 0   Services At/After Discharge   Transition of Care Consult (CM Consult) Transportation Assistance   Services At/After Discharge None    Resource Information Provided? No   Mode of Transport at Discharge Other (see comment)  (Spouse to transport)   Confirm Follow Up Transport Self   Condition of Participation: Discharge Planning   The Plan for Transition of Care is related to the following treatment goals: Dispo home with spouse to transport   Freedom of Choice list was provided with basic dialogue that supports the patient's individualized plan of care/goals, treatment preferences, and shares the quality data associated with the providers?  No     JARED Guillermo

## 2025-02-11 ENCOUNTER — TELEPHONE (OUTPATIENT)
Facility: CLINIC | Age: 65
End: 2025-02-11

## 2025-02-11 ENCOUNTER — CLINICAL DOCUMENTATION (OUTPATIENT)
Facility: CLINIC | Age: 65
End: 2025-02-11

## 2025-02-11 LAB
BACTERIA SPEC CULT: ABNORMAL
BACTERIA SPEC CULT: ABNORMAL
SERVICE CMNT-IMP: ABNORMAL

## 2025-02-11 NOTE — TELEPHONE ENCOUNTER
Care Transitions Initial Follow Up Call    Outreach made within 2 business days of discharge: Yes    Patient: Rina Prajapati Patient : 1960   MRN: [id number]  Reason for Admission: difficulty breathing  Discharge Date: 2/10/25       Spoke with: patient    Discharge department/facility: Home    TCM Interactive Patient Contact:  Was patient able to fill all prescriptions: Yes  Was patient instructed to bring all medications to the follow-up visit: Yes  Is patient taking all medications as directed in the discharge summary? Yes  Does patient understand their discharge instructions: Yes  Does patient have questions or concerns that need addressed prior to 7-14 day follow up office visit: No    Additional needs identified to be addressed with provider  No needs identified             Scheduled appointment with PCP within 7-14 days    Follow Up  Future Appointments   Date Time Provider Department Center   2025 11:45 AM Cora Her MD HR [facility] DEP   3/18/2025 11:00 AM Trev Obregon MD [facility]   2025 10:00 AM Cora Her MD HR [facility] DEP       Tonja Lake LPN

## 2025-02-11 NOTE — PROGRESS NOTES
PATIENT REQUIRES TCM OUTREACH    MRN: [id number]     PATIENT:  Rina Prajapati    ADMIT DATE:  2/7/25    DISCHARGE DATE:  2/10/25     ADMITTING DX: acute respiratory failure with hypoxia, acute on chronic CHF, new onset a-fib with RVR    MEDICATION CHANGES:   Start: Vibramycin, Toprol XL, prednisone, Anoro Ellipta  Stop:   Change:    SPECIALISTS:   cardiology    HOME HEALTH/WOUND CARE/PT/OT:  none    SCHEDULED FOLLOW UP APPTS:    Future Appointments   Date Time Provider Department Center   2/13/2025 11:45 AM Cora Her MD HR [facility] DEP   3/18/2025 11:00 AM Trev Obregon MD [facility] BS [facility]   9/29/2025 10:00 AM Cora Her MD HR [facility] DEP       *Please contact patient within 2 business days and document under .TCMOFFICE.  A scheduled Hospital Follow-up for patient within 7 days is preferred*

## 2025-02-16 PROBLEM — I48.91 NEW ONSET ATRIAL FIBRILLATION (HCC): Status: ACTIVE | Noted: 2025-02-16

## 2025-02-17 ENCOUNTER — TELEPHONE (OUTPATIENT)
Facility: HOSPITAL | Age: 65
End: 2025-02-17

## 2025-02-17 NOTE — TELEPHONE ENCOUNTER
Spk to pt about scheduling PICC exchange and she's going to call me back after her therapy. Edinson 2/17/25

## 2025-02-19 ENCOUNTER — CARE COORDINATION (OUTPATIENT)
Facility: CLINIC | Age: 65
End: 2025-02-19

## 2025-02-19 NOTE — CARE COORDINATION
Ambulatory Care Coordination Note     2/19/2025 12:41 PM     Patient outreach attempt by this ACM today to perform care management follow up . ACM was unable to reach the patient by telephone today;   left voice message requesting a return phone call to this ACM.     ACM: Narayan Newberry RN

## 2025-03-06 ENCOUNTER — CARE COORDINATION (OUTPATIENT)
Facility: CLINIC | Age: 65
End: 2025-03-06

## 2025-03-06 NOTE — CARE COORDINATION
problems with medications, like adverse effects or side effects.  I will notify my provider/Care Coordinator if I am unable to afford my medications.  I will notify my provider for advice before I stop taking any of my medication.    Barriers: none  Plan for overcoming my barriers: N/A  Confidence: 10/10  Anticipated Goal Completion Date: 4/16/25       Reduce Risk of Hospitalization   On track     I will take appropriate steps to reduce my risk of Hospitalization to include:  Take all of medications as prescribed  Visit w/ all of my recommended specialists.  Visit w/ my PCP as recommended.  Avoid activities that can trigger my chronic conditions.    Barriers: none  Plan for overcoming my barriers: N/A  Confidence: 10/10  Anticipated Goal Completion Date: 4/16/25                 PCP/Specialist follow up:   Future Appointments         Provider Specialty Dept Phone    3/18/2025 11:00 AM Trev Obregon MD Cardiology [phone]    9/29/2025 10:00 AM Cora Her MD Family Medicine [phone]            Follow Up:   Plan for next ACM outreach in approximately 2 weeks to complete:  - medication review   - goal progression.   Patient  is agreeable to this plan.

## 2025-03-07 ENCOUNTER — TELEPHONE (OUTPATIENT)
Facility: CLINIC | Age: 65
End: 2025-03-07

## 2025-03-07 NOTE — TELEPHONE ENCOUNTER
Pt states that she was put on   metoprolol succinate (TOPROL XL) 25 MG extended release tablet nancy   Jeff Waldronrey and heart rate is elevated. Told she was to contact her PCP . Please advise.

## 2025-03-10 NOTE — TELEPHONE ENCOUNTER
Called and spoke with patient and she states her HR was 125 during PT on Friday.   Came back down to 80's  Patient states HR has stayed in the 80's over the weekend.   Patient will be doing PT today.   Patient is going to check HR during PT and let nurse know.   Patient is not having any palpitations, chest pains

## 2025-03-20 ENCOUNTER — CARE COORDINATION (OUTPATIENT)
Facility: CLINIC | Age: 65
End: 2025-03-20

## 2025-03-20 NOTE — CARE COORDINATION
Ambulatory Care Coordination Note     3/20/2025 10:23 AM     Patient outreach attempt by this ACM today to perform care management follow up . ACM was unable to reach the patient by telephone today;   left voice message requesting a return phone call to this ACM.     ACM: Narayan Newberry RN

## 2025-04-01 ENCOUNTER — OFFICE VISIT (OUTPATIENT)
Age: 65
End: 2025-04-01
Payer: MEDICARE

## 2025-04-01 VITALS
SYSTOLIC BLOOD PRESSURE: 122 MMHG | WEIGHT: 132 LBS | BODY MASS INDEX: 18.9 KG/M2 | HEART RATE: 111 BPM | HEIGHT: 70 IN | OXYGEN SATURATION: 85 % | DIASTOLIC BLOOD PRESSURE: 80 MMHG

## 2025-04-01 DIAGNOSIS — I31.39 PERICARDIAL EFFUSION: Primary | ICD-10-CM

## 2025-04-01 DIAGNOSIS — I50.22 CHRONIC SYSTOLIC (CONGESTIVE) HEART FAILURE: ICD-10-CM

## 2025-04-01 DIAGNOSIS — I10 ESSENTIAL HYPERTENSION: Chronic | ICD-10-CM

## 2025-04-01 DIAGNOSIS — Z86.711 HISTORY OF PULMONARY EMBOLISM: Chronic | ICD-10-CM

## 2025-04-01 DIAGNOSIS — Z99.81 CHRONIC HYPOXIC RESPIRATORY FAILURE, ON HOME OXYGEN THERAPY: ICD-10-CM

## 2025-04-01 DIAGNOSIS — J96.11 CHRONIC HYPOXIC RESPIRATORY FAILURE, ON HOME OXYGEN THERAPY: ICD-10-CM

## 2025-04-01 DIAGNOSIS — C34.91 NON-SMALL CELL CANCER OF RIGHT LUNG (HCC): Chronic | ICD-10-CM

## 2025-04-01 PROCEDURE — 99215 OFFICE O/P EST HI 40 MIN: CPT | Performed by: INTERNAL MEDICINE

## 2025-04-01 PROCEDURE — 1126F AMNT PAIN NOTED NONE PRSNT: CPT | Performed by: INTERNAL MEDICINE

## 2025-04-01 PROCEDURE — 1160F RVW MEDS BY RX/DR IN RCRD: CPT | Performed by: INTERNAL MEDICINE

## 2025-04-01 PROCEDURE — 1159F MED LIST DOCD IN RCRD: CPT | Performed by: INTERNAL MEDICINE

## 2025-04-01 PROCEDURE — 3079F DIAST BP 80-89 MM HG: CPT | Performed by: INTERNAL MEDICINE

## 2025-04-01 PROCEDURE — 1124F ACP DISCUSS-NO DSCNMKR DOCD: CPT | Performed by: INTERNAL MEDICINE

## 2025-04-01 PROCEDURE — 93000 ELECTROCARDIOGRAM COMPLETE: CPT | Performed by: INTERNAL MEDICINE

## 2025-04-01 PROCEDURE — 3074F SYST BP LT 130 MM HG: CPT | Performed by: INTERNAL MEDICINE

## 2025-04-01 ASSESSMENT — PATIENT HEALTH QUESTIONNAIRE - PHQ9
3. TROUBLE FALLING OR STAYING ASLEEP: NOT AT ALL
10. IF YOU CHECKED OFF ANY PROBLEMS, HOW DIFFICULT HAVE THESE PROBLEMS MADE IT FOR YOU TO DO YOUR WORK, TAKE CARE OF THINGS AT HOME, OR GET ALONG WITH OTHER PEOPLE: NOT DIFFICULT AT ALL
9. THOUGHTS THAT YOU WOULD BE BETTER OFF DEAD, OR OF HURTING YOURSELF: NOT AT ALL
6. FEELING BAD ABOUT YOURSELF - OR THAT YOU ARE A FAILURE OR HAVE LET YOURSELF OR YOUR FAMILY DOWN: NOT AT ALL
2. FEELING DOWN, DEPRESSED OR HOPELESS: NOT AT ALL
SUM OF ALL RESPONSES TO PHQ QUESTIONS 1-9: 0
8. MOVING OR SPEAKING SO SLOWLY THAT OTHER PEOPLE COULD HAVE NOTICED. OR THE OPPOSITE, BEING SO FIGETY OR RESTLESS THAT YOU HAVE BEEN MOVING AROUND A LOT MORE THAN USUAL: NOT AT ALL
SUM OF ALL RESPONSES TO PHQ QUESTIONS 1-9: 0
1. LITTLE INTEREST OR PLEASURE IN DOING THINGS: NOT AT ALL
7. TROUBLE CONCENTRATING ON THINGS, SUCH AS READING THE NEWSPAPER OR WATCHING TELEVISION: NOT AT ALL
5. POOR APPETITE OR OVEREATING: NOT AT ALL
4. FEELING TIRED OR HAVING LITTLE ENERGY: NOT AT ALL
SUM OF ALL RESPONSES TO PHQ QUESTIONS 1-9: 0
SUM OF ALL RESPONSES TO PHQ QUESTIONS 1-9: 0

## 2025-04-01 ASSESSMENT — ANXIETY QUESTIONNAIRES
7. FEELING AFRAID AS IF SOMETHING AWFUL MIGHT HAPPEN: NOT AT ALL
GAD7 TOTAL SCORE: 0
3. WORRYING TOO MUCH ABOUT DIFFERENT THINGS: NOT AT ALL
5. BEING SO RESTLESS THAT IT IS HARD TO SIT STILL: NOT AT ALL
1. FEELING NERVOUS, ANXIOUS, OR ON EDGE: NOT AT ALL
6. BECOMING EASILY ANNOYED OR IRRITABLE: NOT AT ALL
4. TROUBLE RELAXING: NOT AT ALL
2. NOT BEING ABLE TO STOP OR CONTROL WORRYING: NOT AT ALL

## 2025-04-01 ASSESSMENT — ENCOUNTER SYMPTOMS: SHORTNESS OF BREATH: 1

## 2025-04-01 NOTE — PROGRESS NOTES
Rina Prajapati presents today for   Chief Complaint   Patient presents with    Follow-Up from Hospital     Acute on chronic hypoxic respiratory failure       Rina Prajapati preferred language for health care discussion is english/other.    Is someone accompanying this pt? yes    Is the patient using any DME equipment during OV? wheelchair    Depression Screening:  Depression: Not at risk (4/1/2025)    PHQ-2     PHQ-2 Score: 0        Learning Assessment:  Who is the primary learner? Patient    What is the preferred language for health care of the primary learner? ENGLISH    How does the primary learner prefer to learn new concepts? DEMONSTRATION    Answered By patient    Relationship to Learner SELF           Pt currently taking Anticoagulant therapy? Eliquis 5 mg bid    Pt currently taking Antiplatelet therapy ? no      Coordination of Care:  1. Have you been to the ER, urgent care clinic since your last visit? Hospitalized since your last visit? no    2. Have you seen or consulted any other health care providers outside of the [facility] since your last visit? Include any pap smears or colon screening. no

## 2025-04-01 NOTE — PROGRESS NOTES
04/01/25     Rina Prajapati  is a 64 y.o. female     Chief Complaint   Patient presents with    Follow-Up from Hospital     Acute on chronic hypoxic respiratory failure       HPI    Patient presents for a post hospital follow-up office visit.  She has an extensive past medical history including non-small cell lung cancer which has not surgical resectable treated with ongoing intravenous chemotherapy by her medical oncologist.  She also has a history of chronic heart failure with mildly reduced ejection fraction, EF 45 to 50% on echocardiogram in February 2025.  She also has a history of paroxysmal atrial fibrillation, recurrent DVT with PE, hypertension, SVC syndrome, chronic hypoxic respiratory failure on 4 L nasal cannula at home.  She also has a known pericardial effusion which has been small to moderate in size first diagnosed in November 2024 and was relatively unchanged on repeat echocardiogram earlier this year.    She was hospitalized for several days in February 2025 for acute on chronic hypoxic respiratory failure which is felt to be multifactorial from a combination of COPD and CHF.  She states she was participating in home PT, but there were some concerns of worsening hypoxemia.  She currently feels that her breathing is at its baseline.  She denies any worsening shortness of breath, no chest pain, no heart palpitations, dizziness or syncope.  No major change in her weight since she left the hospital.    Past Medical History:   Diagnosis Date    Anemia, iron deficiency 2/2016    Chronic hypoxic respiratory failure     3.5 L/min O2 via NC at baseline (as of 2/07/2025)    Current use of long term anticoagulation     on Apixaban (Eliquis) as of 2/07/2025    Depression     H/O seasonal allergies     Hypertension     Non-small cell carcinoma of lung 2/2016    adenocarcinoma of right lung    Positive occult stool blood test 2/29/2016    Pulmonary embolism 2/28/2016    small, bilateral PEs- on Xarelto

## 2025-04-03 ENCOUNTER — CARE COORDINATION (OUTPATIENT)
Facility: CLINIC | Age: 65
End: 2025-04-03

## 2025-04-03 NOTE — CARE COORDINATION
Ambulatory Care Coordination Note     4/3/2025 10:36 AM     Patient Current Location:  Home: [address] [place] [id number]     ACM contacted the patient by telephone. Verified name and  with patient as identifiers.         ACM: Narayan Newberry RN     Challenges to be reviewed by the provider   Additional needs identified to be addressed with provider No  none               Method of communication with provider: none.    Utilization: Has the patient been seen in the ED since your last call? no    Care Summary Note:     Hx of Depression, HTN, PE, Lung Ca, CHF  HRCM after BRENDA post admission for resp fail w/ pneumonia and acute on chronic CHF.  Pt states doing ok  Recent pall visit.  Pt states no other questions/issues at this time.  Of note, pt due for pulm f/u. Will address on next outreach.    Offered patient enrollment in the Remote Patient Monitoring (RPM) program for in-home monitoring: Yes, but did not enroll at this time: declined to enroll in the program because: states not interested .     Assessments Completed:   General Assessment    Do you have any symptoms that are causing concern?: No       Medications Reviewed:   Patient denies any changes with medications and reports taking all medications as prescribed.    Advance Care Planning:   Reviewed during previous call      Care Planning:    Goals Addressed                   This Visit's Progress     Conditions and Symptoms   On track     I will schedule office visits, as directed by my provider.  I will keep my appointment or reschedule if I have to cancel.  I will notify my provider of any barriers to my plan of care.  I will notify my provider of any symptoms that indicate a worsening of my condition.    Barriers: none  Plan for overcoming my barriers: N/A  Confidence: 10/10  Anticipated Goal Completion Date: 25         Medication Management   On track     I will take my medication as directed.  I will notify my provider of any

## 2025-04-24 ENCOUNTER — CARE COORDINATION (OUTPATIENT)
Facility: CLINIC | Age: 65
End: 2025-04-24

## 2025-04-24 NOTE — CARE COORDINATION
Ambulatory Care Coordination Note     2025 9:09 AM     Patient Current Location:  Home: [address] [place] [id number]     ACM contacted the patient by telephone. Verified name and  with patient as identifiers.         ACM: Narayan Newberry RN     Challenges to be reviewed by the provider   Additional needs identified to be addressed with provider No  none               Method of communication with provider: none.    Utilization: Has the patient been seen in the ED since your last call? no    Care Summary Note:     Hx of Depression, HTN, PE, Lung Ca, CHF  HRCM after BRENDA post admission for resp fail w/ pneumonia and acute on chronic CHF.  Pt states doing ok  Pt due for Pulm f/u. Declined assistance w/ scheduling.  Pt states no other questions/issues at this time.    Offered patient enrollment in the Remote Patient Monitoring (RPM) program for in-home monitoring: Yes, but did not enroll at this time: declined to enroll in the program because: states not interested .     Assessments Completed:   General Assessment    Do you have any symptoms that are causing concern?: No       Medications Reviewed:   Patient denies any changes with medications and reports taking all medications as prescribed.    Advance Care Planning:   Reviewed during previous call      Care Planning:    Goals Addressed                   This Visit's Progress     Conditions and Symptoms   On track     I will schedule office visits, as directed by my provider.  I will keep my appointment or reschedule if I have to cancel.  I will notify my provider of any barriers to my plan of care.  I will notify my provider of any symptoms that indicate a worsening of my condition.    Barriers: none  Plan for overcoming my barriers: N/A  Confidence: 10/10  Anticipated Goal Completion Date: 25         Medication Management   On track     I will take my medication as directed.  I will notify my provider of any problems with medications,

## 2025-05-02 NOTE — TELEPHONE ENCOUNTER
This patient contacted office for the following prescriptions to be filled:    Medication requested : Furosemide   PCP:  Dr. Her   Pharmacy or Print:  [facility]   Mail order or Local pharmacy [facility]     Scheduled appointment if not seen by current providers in office: F/u 09/29/2025

## 2025-05-02 NOTE — TELEPHONE ENCOUNTER
Last OV: 01/29/2025  Last labs:no resulted labs noted on file from [facility] since 10/25/2018  Next OV and labs: 09/29/2025

## 2025-05-05 RX ORDER — FUROSEMIDE 40 MG/1
40 TABLET ORAL 2 TIMES DAILY
Qty: 60 TABLET | Refills: 1 | OUTPATIENT
Start: 2025-05-05

## 2025-05-07 ENCOUNTER — CARE COORDINATION (OUTPATIENT)
Facility: CLINIC | Age: 65
End: 2025-05-07

## 2025-05-07 RX ORDER — FOLIC ACID 1 MG/1
1 TABLET ORAL DAILY
Qty: 30 TABLET | Refills: 3 | OUTPATIENT
Start: 2025-05-07

## 2025-05-07 RX ORDER — FERROUS SULFATE 325(65) MG
325 TABLET ORAL
Qty: 30 TABLET | OUTPATIENT
Start: 2025-05-07

## 2025-05-07 NOTE — CARE COORDINATION
Ambulatory Care Coordination Note     2025 3:21 PM     Patient Current Location:  Home: [address] [place] [id number]     ACM contacted the patient by telephone. Verified name and  with patient as identifiers.     Patient graduated from the High Risk Care Management program on 2025.  Patient verbalizes confidence in the ability to self-manage at this time..  Care management goals have been completed. No further Ambulatory Care Manager follow up scheduled.      ACM: Narayan Newberry RN     Care Planning:    Goals Addressed                   This Visit's Progress     COMPLETED: Conditions and Symptoms        I will schedule office visits, as directed by my provider.  I will keep my appointment or reschedule if I have to cancel.  I will notify my provider of any barriers to my plan of care.  I will notify my provider of any symptoms that indicate a worsening of my condition.    Barriers: none  Plan for overcoming my barriers: N/A  Confidence: 10/10  Anticipated Goal Completion Date: 25         COMPLETED: Medication Management        I will take my medication as directed.  I will notify my provider of any problems with medications, like adverse effects or side effects.  I will notify my provider/Care Coordinator if I am unable to afford my medications.  I will notify my provider for advice before I stop taking any of my medication.    Barriers: none  Plan for overcoming my barriers: N/A  Confidence: 10/10  Anticipated Goal Completion Date: 25       COMPLETED: Reduce Risk of Hospitalization        I will take appropriate steps to reduce my risk of Hospitalization to include:  Take all of medications as prescribed  Visit w/ all of my recommended specialists.  Visit w/ my PCP as recommended.  Avoid activities that can trigger my chronic conditions.    Barriers: none  Plan for overcoming my barriers: N/A  Confidence: 10/10  Anticipated Goal Completion Date: 25

## 2025-05-07 NOTE — TELEPHONE ENCOUNTER
This patient contacted office for the following prescriptions to be filled:    Medication requested : ferrous sulfate (IRON 325) 325 (65 Fe) MG tablet QTY 90   PCP: [facility]  Ph[facility]acy or [facility]: [facility]   Mail order or Local pharmacy [address] [place] [address]   Last office visit 1/29/2025  Next office visit 9/29/2025

## 2025-05-08 RX ORDER — FERROUS SULFATE 325(65) MG
325 TABLET ORAL
Qty: 30 TABLET | Refills: 3 | OUTPATIENT
Start: 2025-05-08

## 2025-05-17 ENCOUNTER — HOSPITAL ENCOUNTER (EMERGENCY)
Age: 65
Discharge: ANOTHER HOSPITAL | End: 2025-05-17
Attending: EMERGENCY MEDICINE
Payer: MEDICARE

## 2025-05-17 ENCOUNTER — APPOINTMENT (OUTPATIENT)
Age: 65
End: 2025-05-17
Payer: MEDICARE

## 2025-05-17 ENCOUNTER — HOSPITAL ENCOUNTER (INPATIENT)
Facility: HOSPITAL | Age: 65
LOS: 5 days | Discharge: HOME HEALTH | DRG: 252 | End: 2025-05-22
Attending: STUDENT IN AN ORGANIZED HEALTH CARE EDUCATION/TRAINING PROGRAM | Admitting: FAMILY MEDICINE
Payer: MEDICARE

## 2025-05-17 VITALS
OXYGEN SATURATION: 93 % | BODY MASS INDEX: 28.17 KG/M2 | TEMPERATURE: 98.1 F | DIASTOLIC BLOOD PRESSURE: 76 MMHG | SYSTOLIC BLOOD PRESSURE: 123 MMHG | HEIGHT: 64 IN | WEIGHT: 165 LBS | RESPIRATION RATE: 24 BRPM | HEART RATE: 87 BPM

## 2025-05-17 DIAGNOSIS — I87.1 CHRONIC SUPERIOR VENA CAVA OCCLUSION: ICD-10-CM

## 2025-05-17 DIAGNOSIS — R06.02 SHORTNESS OF BREATH: Primary | ICD-10-CM

## 2025-05-17 DIAGNOSIS — J96.21 ACUTE ON CHRONIC RESPIRATORY FAILURE WITH HYPOXEMIA (HCC): ICD-10-CM

## 2025-05-17 DIAGNOSIS — C34.91 NON-SMALL CELL CANCER OF RIGHT LUNG (HCC): Chronic | ICD-10-CM

## 2025-05-17 DIAGNOSIS — J81.0 ACUTE PULMONARY EDEMA (HCC): Primary | ICD-10-CM

## 2025-05-17 DIAGNOSIS — J96.21 ACUTE ON CHRONIC RESPIRATORY FAILURE WITH HYPOXIA (HCC): ICD-10-CM

## 2025-05-17 PROBLEM — M62.81 MUSCLE WEAKNESS (GENERALIZED): Status: ACTIVE | Noted: 2024-12-16

## 2025-05-17 PROBLEM — C80.1 MALIGNANT NEOPLASTIC DISEASE (HCC): Status: ACTIVE | Noted: 2024-04-21

## 2025-05-17 PROBLEM — I50.9 CONGESTIVE HEART FAILURE (HCC): Status: ACTIVE | Noted: 2024-06-06

## 2025-05-17 PROBLEM — C34.90 MALIGNANT NEOPLASM OF LUNG (HCC): Status: ACTIVE | Noted: 2025-01-28

## 2025-05-17 PROBLEM — F17.210 TOBACCO DEPENDENCE DUE TO CIGARETTES: Status: ACTIVE | Noted: 2024-04-22

## 2025-05-17 LAB
ALBUMIN SERPL-MCNC: 3.1 G/DL (ref 3.4–5)
ALBUMIN/GLOB SERPL: 0.8 (ref 1–1.9)
ALP SERPL-CCNC: 68 U/L (ref 45–117)
ALT SERPL-CCNC: 11 U/L (ref 10–35)
ANION GAP SERPL CALC-SCNC: 18 MMOL/L (ref 7–16)
APTT PPP: 31.7 SEC (ref 23–36.4)
AST SERPL-CCNC: 23 U/L (ref 10–38)
BASOPHILS # BLD: 0.04 K/UL (ref 0–0.1)
BASOPHILS NFR BLD: 0.7 % (ref 0–2)
BILIRUB SERPL-MCNC: 0.8 MG/DL (ref 0.2–1)
BUN SERPL-MCNC: 13 MG/DL (ref 6–23)
BUN/CREAT SERPL: 16
CALCIUM SERPL-MCNC: 9 MG/DL (ref 8.5–10.1)
CHLORIDE SERPL-SCNC: 96 MMOL/L (ref 98–107)
CO2 SERPL-SCNC: 24 MMOL/L (ref 21–32)
CREAT SERPL-MCNC: 0.79 MG/DL (ref 0.6–1.3)
DIFFERENTIAL METHOD BLD: ABNORMAL
EKG DIAGNOSIS: NORMAL
EKG Q-T INTERVAL: 302 MS
EKG QRS DURATION: 78 MS
EKG QTC CALCULATION (BAZETT): 401 MS
EKG R AXIS: 136 DEGREES
EKG T AXIS: -4 DEGREES
EKG VENTRICULAR RATE: 106 BPM
EOSINOPHIL # BLD: 0.02 K/UL (ref 0–0.4)
EOSINOPHIL NFR BLD: 0.3 % (ref 0–5)
ERYTHROCYTE [DISTWIDTH] IN BLOOD BY AUTOMATED COUNT: 19.2 % (ref 11.6–14.5)
FLUAV RNA SPEC QL NAA+PROBE: NOT DETECTED
FLUBV RNA SPEC QL NAA+PROBE: NOT DETECTED
GLOBULIN SER CALC-MCNC: 4 G/DL (ref 2.3–3.5)
GLUCOSE SERPL-MCNC: 173 MG/DL (ref 74–108)
HCT VFR BLD AUTO: 40.2 % (ref 35–45)
HGB BLD-MCNC: 12 G/DL (ref 12–16)
IMM GRANULOCYTES # BLD AUTO: 0.03 K/UL (ref 0–0.04)
IMM GRANULOCYTES NFR BLD AUTO: 0.5 % (ref 0–0.5)
INR PPP: 1.4 (ref 0.9–1.1)
LYMPHOCYTES # BLD: 0.55 K/UL (ref 0.9–3.6)
LYMPHOCYTES NFR BLD: 9.1 % (ref 21–52)
MAGNESIUM SERPL-MCNC: 1.6 MG/DL (ref 1.6–2.6)
MCH RBC QN AUTO: 23.1 PG (ref 24–34)
MCHC RBC AUTO-ENTMCNC: 29.9 G/DL (ref 31–37)
MCV RBC AUTO: 77.5 FL (ref 78–100)
MONOCYTES # BLD: 0.41 K/UL (ref 0.05–1.2)
MONOCYTES NFR BLD: 6.8 % (ref 3–10)
NEUTS SEG # BLD: 4.98 K/UL (ref 1.8–8)
NEUTS SEG NFR BLD: 82.6 % (ref 40–73)
NRBC # BLD: 0 K/UL (ref 0–0.01)
NRBC BLD-RTO: 0 PER 100 WBC
NT PRO BNP: 5985 PG/ML (ref 36–900)
PLATELET # BLD AUTO: 263 K/UL (ref 135–420)
PMV BLD AUTO: 10.1 FL (ref 9.2–11.8)
POTASSIUM SERPL-SCNC: 3.4 MMOL/L (ref 3.5–5.5)
PROT SERPL-MCNC: 7.1 G/DL (ref 6.4–8.2)
PROTHROMBIN TIME: 17.6 SEC (ref 11.9–14.9)
RBC # BLD AUTO: 5.19 M/UL (ref 4.2–5.3)
SARS-COV-2 RNA RESP QL NAA+PROBE: NOT DETECTED
SODIUM SERPL-SCNC: 138 MMOL/L (ref 136–145)
SOURCE: NORMAL
TROPONIN T SERPL HS-MCNC: 19.9 NG/L (ref 0–14)
TROPONIN T SERPL HS-MCNC: 22.9 NG/L (ref 0–14)
WBC # BLD AUTO: 6 K/UL (ref 4.6–13.2)

## 2025-05-17 PROCEDURE — 96374 THER/PROPH/DIAG INJ IV PUSH: CPT

## 2025-05-17 PROCEDURE — 87040 BLOOD CULTURE FOR BACTERIA: CPT

## 2025-05-17 PROCEDURE — 83605 ASSAY OF LACTIC ACID: CPT

## 2025-05-17 PROCEDURE — 6360000002 HC RX W HCPCS: Performed by: EMERGENCY MEDICINE

## 2025-05-17 PROCEDURE — 2500000003 HC RX 250 WO HCPCS: Performed by: FAMILY MEDICINE

## 2025-05-17 PROCEDURE — 80053 COMPREHEN METABOLIC PANEL: CPT

## 2025-05-17 PROCEDURE — 83735 ASSAY OF MAGNESIUM: CPT

## 2025-05-17 PROCEDURE — 85025 COMPLETE CBC W/AUTO DIFF WBC: CPT

## 2025-05-17 PROCEDURE — 1100000003 HC PRIVATE W/ TELEMETRY

## 2025-05-17 PROCEDURE — 85610 PROTHROMBIN TIME: CPT

## 2025-05-17 PROCEDURE — 6370000000 HC RX 637 (ALT 250 FOR IP): Performed by: EMERGENCY MEDICINE

## 2025-05-17 PROCEDURE — 93005 ELECTROCARDIOGRAM TRACING: CPT

## 2025-05-17 PROCEDURE — 87636 SARSCOV2 & INF A&B AMP PRB: CPT

## 2025-05-17 PROCEDURE — 83880 ASSAY OF NATRIURETIC PEPTIDE: CPT

## 2025-05-17 PROCEDURE — 84484 ASSAY OF TROPONIN QUANT: CPT

## 2025-05-17 PROCEDURE — 71045 X-RAY EXAM CHEST 1 VIEW: CPT

## 2025-05-17 PROCEDURE — 85730 THROMBOPLASTIN TIME PARTIAL: CPT

## 2025-05-17 PROCEDURE — 6370000000 HC RX 637 (ALT 250 FOR IP): Performed by: FAMILY MEDICINE

## 2025-05-17 PROCEDURE — 99285 EMERGENCY DEPT VISIT HI MDM: CPT

## 2025-05-17 RX ORDER — FUROSEMIDE 10 MG/ML
60 INJECTION INTRAMUSCULAR; INTRAVENOUS
Status: COMPLETED | OUTPATIENT
Start: 2025-05-17 | End: 2025-05-17

## 2025-05-17 RX ORDER — METOPROLOL SUCCINATE 25 MG/1
25 TABLET, EXTENDED RELEASE ORAL DAILY
Status: DISCONTINUED | OUTPATIENT
Start: 2025-05-17 | End: 2025-05-22 | Stop reason: HOSPADM

## 2025-05-17 RX ORDER — POLYETHYLENE GLYCOL 3350 17 G/17G
17 POWDER, FOR SOLUTION ORAL DAILY PRN
Status: DISCONTINUED | OUTPATIENT
Start: 2025-05-17 | End: 2025-05-22 | Stop reason: HOSPADM

## 2025-05-17 RX ORDER — POTASSIUM CHLORIDE 1500 MG/1
40 TABLET, EXTENDED RELEASE ORAL PRN
Status: DISCONTINUED | OUTPATIENT
Start: 2025-05-17 | End: 2025-05-22 | Stop reason: HOSPADM

## 2025-05-17 RX ORDER — NICOTINE 21 MG/24HR
1 PATCH, TRANSDERMAL 24 HOURS TRANSDERMAL DAILY
Status: DISCONTINUED | OUTPATIENT
Start: 2025-05-17 | End: 2025-05-22 | Stop reason: HOSPADM

## 2025-05-17 RX ORDER — ACETAMINOPHEN 650 MG/1
650 SUPPOSITORY RECTAL EVERY 6 HOURS PRN
Status: DISCONTINUED | OUTPATIENT
Start: 2025-05-17 | End: 2025-05-22 | Stop reason: HOSPADM

## 2025-05-17 RX ORDER — LORAZEPAM 0.5 MG/1
1 TABLET ORAL
Status: ON HOLD | COMMUNITY
Start: 2025-05-13 | End: 2025-05-22 | Stop reason: HOSPADM

## 2025-05-17 RX ORDER — SODIUM CHLORIDE 9 MG/ML
INJECTION, SOLUTION INTRAVENOUS PRN
Status: DISCONTINUED | OUTPATIENT
Start: 2025-05-17 | End: 2025-05-22 | Stop reason: HOSPADM

## 2025-05-17 RX ORDER — ONDANSETRON 2 MG/ML
4 INJECTION INTRAMUSCULAR; INTRAVENOUS EVERY 6 HOURS PRN
Status: DISCONTINUED | OUTPATIENT
Start: 2025-05-17 | End: 2025-05-22 | Stop reason: HOSPADM

## 2025-05-17 RX ORDER — ACETAMINOPHEN 325 MG/1
650 TABLET ORAL EVERY 6 HOURS PRN
Status: DISCONTINUED | OUTPATIENT
Start: 2025-05-17 | End: 2025-05-22 | Stop reason: HOSPADM

## 2025-05-17 RX ORDER — FOLIC ACID 1 MG/1
1 TABLET ORAL DAILY
Status: DISCONTINUED | OUTPATIENT
Start: 2025-05-17 | End: 2025-05-22 | Stop reason: HOSPADM

## 2025-05-17 RX ORDER — FUROSEMIDE 10 MG/ML
40 INJECTION INTRAMUSCULAR; INTRAVENOUS ONCE
Status: COMPLETED | OUTPATIENT
Start: 2025-05-18 | End: 2025-05-18

## 2025-05-17 RX ORDER — SODIUM CHLORIDE 0.9 % (FLUSH) 0.9 %
5-40 SYRINGE (ML) INJECTION EVERY 12 HOURS SCHEDULED
Status: DISCONTINUED | OUTPATIENT
Start: 2025-05-17 | End: 2025-05-22 | Stop reason: HOSPADM

## 2025-05-17 RX ORDER — MULTIVITAMIN
TABLET ORAL
COMMUNITY
Start: 2024-12-14

## 2025-05-17 RX ORDER — MONTELUKAST SODIUM 10 MG/1
10 TABLET ORAL DAILY
Status: DISCONTINUED | OUTPATIENT
Start: 2025-05-17 | End: 2025-05-22 | Stop reason: HOSPADM

## 2025-05-17 RX ORDER — SODIUM CHLORIDE 0.9 % (FLUSH) 0.9 %
5-40 SYRINGE (ML) INJECTION PRN
Status: DISCONTINUED | OUTPATIENT
Start: 2025-05-17 | End: 2025-05-22 | Stop reason: HOSPADM

## 2025-05-17 RX ORDER — DEXAMETHASONE 6 MG/1
6 TABLET ORAL
Status: ON HOLD | COMMUNITY
Start: 2025-03-26 | End: 2025-05-22 | Stop reason: HOSPADM

## 2025-05-17 RX ORDER — LEVOTHYROXINE SODIUM 88 UG/1
88 TABLET ORAL
Status: DISCONTINUED | OUTPATIENT
Start: 2025-05-18 | End: 2025-05-22 | Stop reason: HOSPADM

## 2025-05-17 RX ORDER — POTASSIUM CHLORIDE 7.45 MG/ML
10 INJECTION INTRAVENOUS PRN
Status: DISCONTINUED | OUTPATIENT
Start: 2025-05-17 | End: 2025-05-22 | Stop reason: HOSPADM

## 2025-05-17 RX ORDER — GUAIFENESIN 600 MG/1
1200 TABLET, EXTENDED RELEASE ORAL 2 TIMES DAILY
Status: DISCONTINUED | OUTPATIENT
Start: 2025-05-17 | End: 2025-05-22 | Stop reason: HOSPADM

## 2025-05-17 RX ORDER — MULTIVITAMIN WITH IRON
1 TABLET ORAL DAILY
Status: DISCONTINUED | OUTPATIENT
Start: 2025-05-18 | End: 2025-05-22 | Stop reason: HOSPADM

## 2025-05-17 RX ORDER — ONDANSETRON 4 MG/1
4 TABLET, ORALLY DISINTEGRATING ORAL EVERY 8 HOURS PRN
Status: DISCONTINUED | OUTPATIENT
Start: 2025-05-17 | End: 2025-05-22 | Stop reason: HOSPADM

## 2025-05-17 RX ORDER — MAGNESIUM SULFATE IN WATER 40 MG/ML
2000 INJECTION, SOLUTION INTRAVENOUS PRN
Status: DISCONTINUED | OUTPATIENT
Start: 2025-05-17 | End: 2025-05-22 | Stop reason: HOSPADM

## 2025-05-17 RX ORDER — FERROUS SULFATE 325(65) MG
325 TABLET ORAL
Status: DISCONTINUED | OUTPATIENT
Start: 2025-05-18 | End: 2025-05-22 | Stop reason: HOSPADM

## 2025-05-17 RX ADMIN — SERTRALINE HYDROCHLORIDE 50 MG: 50 TABLET ORAL at 21:15

## 2025-05-17 RX ADMIN — MONTELUKAST 10 MG: 10 TABLET, FILM COATED ORAL at 21:15

## 2025-05-17 RX ADMIN — FOLIC ACID 1 MG: 1 TABLET ORAL at 21:15

## 2025-05-17 RX ADMIN — APIXABAN 5 MG: 5 TABLET, FILM COATED ORAL at 21:15

## 2025-05-17 RX ADMIN — GUAIFENESIN 1200 MG: 600 TABLET, MULTILAYER, EXTENDED RELEASE ORAL at 21:15

## 2025-05-17 RX ADMIN — NITROGLYCERIN 1 INCH: 20 OINTMENT TOPICAL at 11:49

## 2025-05-17 RX ADMIN — METOPROLOL SUCCINATE 25 MG: 25 TABLET, FILM COATED, EXTENDED RELEASE ORAL at 21:15

## 2025-05-17 RX ADMIN — SODIUM CHLORIDE, PRESERVATIVE FREE 10 ML: 5 INJECTION INTRAVENOUS at 21:16

## 2025-05-17 RX ADMIN — FUROSEMIDE 60 MG: 10 INJECTION, SOLUTION INTRAMUSCULAR; INTRAVENOUS at 11:48

## 2025-05-17 ASSESSMENT — ENCOUNTER SYMPTOMS
COUGH: 1
ABDOMINAL PAIN: 0
CHEST TIGHTNESS: 0
SHORTNESS OF BREATH: 1
EYES NEGATIVE: 1

## 2025-05-17 ASSESSMENT — PAIN SCALES - GENERAL
PAINLEVEL_OUTOF10: 0
PAINLEVEL_OUTOF10: 0

## 2025-05-17 NOTE — ED PROVIDER NOTES
EMERGENCY DEPARTMENT HISTORY AND PHYSICAL EXAM    1:07 PM      Date: 5/17/2025  Patient Name: Rina Prajapati    History of Presenting Illness     Chief Complaint   Patient presents with   • Shortness of Breath       History From: Patient    Rina Prajapati is a 64 y.o. female   Patient is a 64-year-old female with a history of chronic heart failure, chronic hypoxic respiratory failure on oxygen at home, lung cancer, pulmonary hypertension, pulmonary embolism, history of cigarette smoking, PICC line in place, presents emergency department with an complaint of increasing shortness of breath.  Patient was in significant respiratory distress initially and required BiPAP.  After the placement patient was feeling much better.  Patient notes she has been having some increasing shortness of breath for the last several days and said that she started feeling she was feeling up with fluids with 2 doses of her furosemide yesterday and that she had a little bit of improvement so decided to come to the ER.  Patient is morning felt even more short of breath so decided to come in.  Patient has been planning to have a CT scan to help further characterize the treatment for her cancer and has a PICC line in her left arm for her cancer treatment.  Patient said the fluid in her lungs has been a recurrent challenge for her and she is on oxygen at home.  Patient that she is on 4 L of oxygen at home.  The patient was brought in by her fiancé today.  Patient has a history of cigarette smoking, denies any alcohol or active drug use.  Patient that she has a difficult time with her CT scans because she has a hard time lying flat.         Nursing Notes were all reviewed and agreed with or any disagreements were addressed in the HPI.    PCP: Cora Her MD    No current facility-administered medications for this encounter.     Current Outpatient Medications   Medication Sig Dispense Refill   • sertraline (ZOLOFT) 50 MG tablet Take  cancer, depression, thyroid disease, COPD, heart failure    Social Determinants affecting Dx or Tx:  Fiancé at the bedside, progressive illness noted    Records Reviewed (source and summary of external notes): Nursing Notes, Old Medical Records, Previous Radiology Studies, and Previous Laboratory Studies    Procedures    Critical Care Time: Critical Care Time:  The services I provided to this patient were to treat and/or prevent clinically significant deterioration that could result in the failure of one or more body systems and/or organ systems due to respiratory distress requiring noninvasive positive pressure ventilation and reevaluation.    Services included the following:  -reviewing nursing notes and old charts  -vital sign assessments  -direct patient care  -medication orders and management  -interpreting and reviewing diagnostic studies/labs  -re-evaluations  -documentation time    Aggregate critical care time was 45 minutes, which includes only time during which I was engaged in work directly related to the patient's care as described above, whether I was at bedside or elsewhere in the Emergency Department. It did not include time spent performing other reported procedures or the services of residents, students, nurses, or advance practice providers.       Jameel Ibarra DO 5:43 PM        Diagnosis     Clinical Impression:   1. Acute pulmonary edema (HCC)    2. Acute on chronic respiratory failure with hypoxemia (HCC)        Disposition: Transfer    No follow-up provider specified.     Disclaimer: Sections of this note are dictated using utilizing voice recognition software.  Minor typographical errors may be present. If questions arise, please do not hesitate to contact me or call our department.         Jameel Ibarra MD  05/19/25 0606

## 2025-05-17 NOTE — ED NOTES
Pt presents to ED with audible rales and SOB. Pt brought to ED8 in WC with home O2 set at 4L. O2 transferred to wall O2 at 4L, pt reports she is normally 88% on home O2, pt reports she is very SOB and needs to wait to catch her breath prior to moving to stretcher. Pt placed on O2 monitor with read of 77%. Provider notified and at bedside evaluating pt. Pt assisted into stretcher with gown on.  Pt to be placed on BIPAP.

## 2025-05-17 NOTE — ED NOTES
Pt's tray delivered and  at bedside set up tray for pt. Increased O2 to 6LPM via NC while eating.

## 2025-05-17 NOTE — ED NOTES
Bedside report with Augustina KOLB. Pt has a PICC line with last drsg changed on 1/29/25. Pt states that it does not work and she was supposed to get a new one but she couldn't lay flat. Dr. Ibarra aware and advised not to do anything at this time.

## 2025-05-17 NOTE — ED NOTES
TRANSFER - OUT REPORT:    Verbal report given to Inder Bo RN on Rina Prajapati  being transferred to [facility] Room  for routine progression of patient care       Report consisted of patient's Situation, Background, Assessment and   Recommendations(SBAR).     Information from the following report(s) ED SBAR was reviewed with the receiving nurse.    [place] Fall Assessment:    Presents to emergency department  because of falls (Syncope, seizure, or loss of consciousness): No  Age > 70: Yes  Altered Mental Status, Intoxication with alcohol or substance confusion (Disorientation, impaired judgment, poor safety awaremess, or inability to follow instructions): Yes  Impaired Mobility: Ambulates or transfers with assistive devices or assistance; Unable to ambulate or transer.: No  Nursing Judgement: Yes          Lines:   PICC 10/10/24 Left Brachial (Active)       Peripheral IV 05/17/25 Right Antecubital (Active)   Site Assessment Clean, dry & intact 05/17/25 1058   Line Status Blood return noted 05/17/25 1058   Line Care Connections checked and tightened 05/17/25 1058   Dressing Status Clean, dry & intact 05/17/25 1058   Dressing Type Transparent 05/17/25 1058        Opportunity for questions and clarification was provided.      Patient transported with:  O2 @ 6lpm

## 2025-05-17 NOTE — PROGRESS NOTES
Patient is a 64-year-old female with a history of congestive heart failure, EF of 45 to 50%, lung cancer, currently not being treated and has a PICC line in her left upper extremity, chronic respiratory failure on 4L, remote history of pulmonary embolism, SVC syndrome, DVT, the presents emergency department complaint of increasing shortness of breath. Patient had JVD, hypertension, hypoxemia on 6 L nasal cannula with respiratory distress so BiPAP was started on arrival. The patient has tolerated the BiPAP very well with improvement of her oxygenation and the ABG showed no evidence of hypercapnia. The patient is currently on anticoagulation making VTE less likely. The patient however does have a increased oxygen requirement with a 50% FiO2. Patient is now off BIPAP and on 6L NC. Patient will be admitted to stepdown for CHF exacerbation.    Stacey Perdomo DO, MBA, MS [facility]  Hospitalist

## 2025-05-17 NOTE — ED NOTES
Called Elizabeth and Mallorie answered and said she would find out who is getting the patient. I advised that the patient had a 1900  time. I waitied on the phone for  over 10 min and Michelle Charge nurse said the nurse that is in a code rapid and unable to take report. I advised pt has a  time of 1900 and she said she will call us back.

## 2025-05-18 LAB
ANION GAP SERPL CALC-SCNC: 11 MMOL/L (ref 3–18)
BACTERIA SPEC CULT: NORMAL
BACTERIA SPEC CULT: NORMAL
BASE EXCESS BLD CALC-SCNC: 4.8 MMOL/L
BASOPHILS # BLD: 0.02 K/UL (ref 0–0.1)
BASOPHILS NFR BLD: 0.4 % (ref 0–2)
BUN SERPL-MCNC: 15 MG/DL (ref 6–23)
BUN/CREAT SERPL: 25 (ref 12–20)
CALCIUM SERPL-MCNC: 8.6 MG/DL (ref 8.5–10.1)
CHLORIDE SERPL-SCNC: 100 MMOL/L (ref 98–107)
CO2 SERPL-SCNC: 30 MMOL/L (ref 21–32)
CREAT SERPL-MCNC: 0.6 MG/DL (ref 0.6–1.3)
DIFFERENTIAL METHOD BLD: ABNORMAL
EOSINOPHIL # BLD: 0.03 K/UL (ref 0–0.4)
EOSINOPHIL NFR BLD: 0.6 % (ref 0–5)
ERYTHROCYTE [DISTWIDTH] IN BLOOD BY AUTOMATED COUNT: 18.6 % (ref 11.6–14.5)
GLUCOSE SERPL-MCNC: 126 MG/DL (ref 74–108)
HCO3 BLD-SCNC: 30.2 MMOL/L (ref 21–28)
HCT VFR BLD AUTO: 34.8 % (ref 35–45)
HGB BLD-MCNC: 10.1 G/DL (ref 12–16)
IMM GRANULOCYTES # BLD AUTO: 0.03 K/UL (ref 0–0.04)
IMM GRANULOCYTES NFR BLD AUTO: 0.6 % (ref 0–0.5)
LACTATE BLD-SCNC: 2.24 MMOL/L (ref 0.4–2)
LYMPHOCYTES # BLD: 0.6 K/UL (ref 0.9–3.6)
LYMPHOCYTES NFR BLD: 12.3 % (ref 21–52)
MAGNESIUM SERPL-MCNC: 1.6 MG/DL (ref 1.6–2.6)
MCH RBC QN AUTO: 22.9 PG (ref 24–34)
MCHC RBC AUTO-ENTMCNC: 29 G/DL (ref 31–37)
MCV RBC AUTO: 78.7 FL (ref 78–100)
MONOCYTES # BLD: 0.44 K/UL (ref 0.05–1.2)
MONOCYTES NFR BLD: 9.1 % (ref 3–10)
NEUTS SEG # BLD: 3.74 K/UL (ref 1.8–8)
NEUTS SEG NFR BLD: 77 % (ref 40–73)
NRBC # BLD: 0 K/UL (ref 0–0.01)
NRBC BLD-RTO: 0 PER 100 WBC
PCO2 BLD: 46.4 MMHG (ref 35–48)
PH BLD: 7.42 (ref 7.35–7.45)
PLATELET # BLD AUTO: 165 K/UL (ref 135–420)
PMV BLD AUTO: 10.8 FL (ref 9.2–11.8)
PO2 BLD: 83 MMHG (ref 83–108)
POTASSIUM SERPL-SCNC: 3 MMOL/L (ref 3.5–5.5)
RBC # BLD AUTO: 4.42 M/UL (ref 4.2–5.3)
SAO2 % BLD: 96.3 % (ref 92–97)
SERVICE CMNT-IMP: NORMAL
SERVICE CMNT-IMP: NORMAL
SODIUM SERPL-SCNC: 141 MMOL/L (ref 136–145)
SPECIMEN TYPE: ABNORMAL
TROPONIN T SERPL HS-MCNC: 21.4 NG/L (ref 0–14)
WBC # BLD AUTO: 4.9 K/UL (ref 4.6–13.2)

## 2025-05-18 PROCEDURE — 1100000003 HC PRIVATE W/ TELEMETRY

## 2025-05-18 PROCEDURE — 99232 SBSQ HOSP IP/OBS MODERATE 35: CPT | Performed by: STUDENT IN AN ORGANIZED HEALTH CARE EDUCATION/TRAINING PROGRAM

## 2025-05-18 PROCEDURE — 83735 ASSAY OF MAGNESIUM: CPT

## 2025-05-18 PROCEDURE — 6360000002 HC RX W HCPCS: Performed by: FAMILY MEDICINE

## 2025-05-18 PROCEDURE — 84484 ASSAY OF TROPONIN QUANT: CPT

## 2025-05-18 PROCEDURE — 85025 COMPLETE CBC W/AUTO DIFF WBC: CPT

## 2025-05-18 PROCEDURE — 6360000002 HC RX W HCPCS: Performed by: STUDENT IN AN ORGANIZED HEALTH CARE EDUCATION/TRAINING PROGRAM

## 2025-05-18 PROCEDURE — 6370000000 HC RX 637 (ALT 250 FOR IP): Performed by: FAMILY MEDICINE

## 2025-05-18 PROCEDURE — 36415 COLL VENOUS BLD VENIPUNCTURE: CPT

## 2025-05-18 PROCEDURE — 80048 BASIC METABOLIC PNL TOTAL CA: CPT

## 2025-05-18 PROCEDURE — 2500000003 HC RX 250 WO HCPCS: Performed by: FAMILY MEDICINE

## 2025-05-18 RX ORDER — FUROSEMIDE 10 MG/ML
40 INJECTION INTRAMUSCULAR; INTRAVENOUS 2 TIMES DAILY
Status: DISCONTINUED | OUTPATIENT
Start: 2025-05-18 | End: 2025-05-21

## 2025-05-18 RX ORDER — POTASSIUM CHLORIDE 1500 MG/1
40 TABLET, EXTENDED RELEASE ORAL ONCE
Status: COMPLETED | OUTPATIENT
Start: 2025-05-18 | End: 2025-05-18

## 2025-05-18 RX ADMIN — THERA TABS 1 TABLET: TAB at 06:34

## 2025-05-18 RX ADMIN — MONTELUKAST 10 MG: 10 TABLET, FILM COATED ORAL at 10:01

## 2025-05-18 RX ADMIN — POTASSIUM CHLORIDE 40 MEQ: 1500 TABLET, EXTENDED RELEASE ORAL at 04:13

## 2025-05-18 RX ADMIN — GUAIFENESIN 1200 MG: 600 TABLET, MULTILAYER, EXTENDED RELEASE ORAL at 20:30

## 2025-05-18 RX ADMIN — LEVOTHYROXINE SODIUM 88 MCG: 88 TABLET ORAL at 06:34

## 2025-05-18 RX ADMIN — SODIUM CHLORIDE, PRESERVATIVE FREE 10 ML: 5 INJECTION INTRAVENOUS at 20:32

## 2025-05-18 RX ADMIN — SERTRALINE HYDROCHLORIDE 50 MG: 50 TABLET ORAL at 10:01

## 2025-05-18 RX ADMIN — FUROSEMIDE 40 MG: 10 INJECTION, SOLUTION INTRAMUSCULAR; INTRAVENOUS at 17:18

## 2025-05-18 RX ADMIN — APIXABAN 5 MG: 5 TABLET, FILM COATED ORAL at 10:01

## 2025-05-18 RX ADMIN — FUROSEMIDE 40 MG: 10 INJECTION, SOLUTION INTRAMUSCULAR; INTRAVENOUS at 10:02

## 2025-05-18 RX ADMIN — FOLIC ACID 1 MG: 1 TABLET ORAL at 10:01

## 2025-05-18 RX ADMIN — FERROUS SULFATE TAB 325 MG (65 MG ELEMENTAL FE) 325 MG: 325 (65 FE) TAB at 10:01

## 2025-05-18 RX ADMIN — SODIUM CHLORIDE, PRESERVATIVE FREE 10 ML: 5 INJECTION INTRAVENOUS at 10:02

## 2025-05-18 RX ADMIN — GUAIFENESIN 1200 MG: 600 TABLET, MULTILAYER, EXTENDED RELEASE ORAL at 10:02

## 2025-05-18 RX ADMIN — APIXABAN 5 MG: 5 TABLET, FILM COATED ORAL at 20:31

## 2025-05-18 ASSESSMENT — PAIN SCALES - GENERAL
PAINLEVEL_OUTOF10: 0

## 2025-05-18 NOTE — H&P
= text not underlined  General:  fever, chills, sweats, generalized weakness, weight loss/gain,      loss of appetite, malaise  Eyes:    blurred vision, eye pain, loss of vision, double vision  ENT:    rhinorrhea, pharyngitis   Respiratory:   cough, sputum production, SOB, NEAL, wheezing, pleuritic pain   Cardiology:   chest pain, palpitations, orthopnea, PND, edema, syncope   Gastrointestinal:  abdominal pain , N/V, diarrhea, dysphagia, constipation, bleeding   Genitourinary:  frequency, urgency, dysuria, hematuria, incontinence   Muskuloskeletal :  arthralgia, myalgia, back pain  Hematology:  easy bruising, nose or gum bleeding, lymphadenopathy   Dermatological: rash, ulceration, pruritis, color change / jaundice  Endocrine:   hot flashes or polydipsia   Neurological:  headache, dizziness, confusion, focal weakness, paresthesia,     Speech difficulties, memory loss, gait difficulty  Psychological: Feelings of anxiety, depression, agitation    Objective:   VITALS:    There were no vitals filed for this visit.    PHYSICAL EXAM:    General:    In NAD.  Nontoxic-appearing.  Currently oxygenating 96% on 4 L of oxygen via nasal cannula.  HEENT: Head NCAT.  Pupils equal round sclerae anicteric, EOMI.  OP clear.  Neck:  Supple, trachea midline.  Lungs:   Faint crackles in the bases.  Good air movement in upper lobes.  Effort nonlabored, no accessory muscle use.  Symmetric chest rise with respirations.  Chest wall:  No chest wall tenderness.  Chest expansion equal and symmetrical bilaterally.  Heart:   RRR.  No JVD.  Abdomen:   Soft, NT/ND.  Bowel sounds normoactive.  Extremities: Warm, no edema.  No evidence for ischemia.  Skin:     Not pale.  Not Jaundiced  No rashes   Psych:  Mood normal.  Calm, no agitation.  Neurologic: Awake and alert, moves extremities spontaneously.      _______________________________________________________________________  Care Plan discussed with:    Comments   Patient x    Family      RN x

## 2025-05-19 ENCOUNTER — HOSPITAL ENCOUNTER (INPATIENT)
Facility: HOSPITAL | Age: 65
Discharge: HOME | DRG: 252 | End: 2025-05-22
Attending: STUDENT IN AN ORGANIZED HEALTH CARE EDUCATION/TRAINING PROGRAM
Payer: MEDICARE

## 2025-05-19 ENCOUNTER — APPOINTMENT (OUTPATIENT)
Facility: HOSPITAL | Age: 65
DRG: 252 | End: 2025-05-19
Attending: STUDENT IN AN ORGANIZED HEALTH CARE EDUCATION/TRAINING PROGRAM
Payer: MEDICARE

## 2025-05-19 LAB
ANION GAP BLD CALC-SCNC: ABNORMAL MMOL/L (ref 10–20)
ANION GAP SERPL CALC-SCNC: 14 MMOL/L (ref 3–18)
ARTERIAL PATENCY WRIST A: POSITIVE
B PERT DNA SPEC QL NAA+PROBE: NOT DETECTED
BASE EXCESS BLD CALC-SCNC: 6.1 MMOL/L
BASOPHILS # BLD: 0.03 K/UL (ref 0–0.1)
BASOPHILS NFR BLD: 0.5 % (ref 0–2)
BDY SITE: ABNORMAL
BODY TEMPERATURE: 98
BORDETELLA PARAPERTUSSIS BY PCR: NOT DETECTED
BUN SERPL-MCNC: 13 MG/DL (ref 6–23)
BUN/CREAT SERPL: 24 (ref 12–20)
C PNEUM DNA SPEC QL NAA+PROBE: NOT DETECTED
CA-I BLD-MCNC: 1.15 MMOL/L (ref 1.15–1.33)
CALCIUM SERPL-MCNC: 9.7 MG/DL (ref 8.5–10.1)
CHLORIDE BLD-SCNC: 99 MMOL/L (ref 98–107)
CHLORIDE SERPL-SCNC: 99 MMOL/L (ref 98–107)
CO2 BLD-SCNC: 30 MMOL/L (ref 22–29)
CO2 SERPL-SCNC: 28 MMOL/L (ref 21–32)
CREAT BLD-MCNC: 0.67 MG/DL (ref 0.6–1.3)
CREAT SERPL-MCNC: 0.53 MG/DL (ref 0.6–1.3)
CRP SERPL-MCNC: 4.1 MG/DL (ref 0–5)
DIFFERENTIAL METHOD BLD: ABNORMAL
EOSINOPHIL # BLD: 0.03 K/UL (ref 0–0.4)
EOSINOPHIL NFR BLD: 0.5 % (ref 0–5)
ERYTHROCYTE [DISTWIDTH] IN BLOOD BY AUTOMATED COUNT: 18.9 % (ref 11.6–14.5)
FIO2 ON VENT: 60 %
FLUAV SUBTYP SPEC NAA+PROBE: NOT DETECTED
FLUBV RNA SPEC QL NAA+PROBE: NOT DETECTED
GAS FLOW.O2 O2 DELIVERY SYS: ABNORMAL
GLUCOSE BLD-MCNC: 101 MG/DL (ref 74–99)
GLUCOSE SERPL-MCNC: 114 MG/DL (ref 74–108)
HADV DNA SPEC QL NAA+PROBE: NOT DETECTED
HCO3 BLD-SCNC: 31.7 MMOL/L (ref 21–28)
HCOV 229E RNA SPEC QL NAA+PROBE: NOT DETECTED
HCOV HKU1 RNA SPEC QL NAA+PROBE: NOT DETECTED
HCOV NL63 RNA SPEC QL NAA+PROBE: NOT DETECTED
HCOV OC43 RNA SPEC QL NAA+PROBE: NOT DETECTED
HCT VFR BLD AUTO: 39.9 % (ref 35–45)
HGB BLD-MCNC: 11.7 G/DL (ref 12–16)
HMPV RNA SPEC QL NAA+PROBE: NOT DETECTED
HPIV1 RNA SPEC QL NAA+PROBE: NOT DETECTED
HPIV2 RNA SPEC QL NAA+PROBE: NOT DETECTED
HPIV3 RNA SPEC QL NAA+PROBE: NOT DETECTED
HPIV4 RNA SPEC QL NAA+PROBE: NOT DETECTED
IMM GRANULOCYTES # BLD AUTO: 0.02 K/UL (ref 0–0.04)
IMM GRANULOCYTES NFR BLD AUTO: 0.3 % (ref 0–0.5)
LACTATE BLD-SCNC: 0.99 MMOL/L (ref 0.4–2)
LYMPHOCYTES # BLD: 0.66 K/UL (ref 0.9–3.6)
LYMPHOCYTES NFR BLD: 10.9 % (ref 21–52)
M PNEUMO DNA SPEC QL NAA+PROBE: NOT DETECTED
MCH RBC QN AUTO: 22.6 PG (ref 24–34)
MCHC RBC AUTO-ENTMCNC: 29.3 G/DL (ref 31–37)
MCV RBC AUTO: 77 FL (ref 78–100)
MONOCYTES # BLD: 0.31 K/UL (ref 0.05–1.2)
MONOCYTES NFR BLD: 5.1 % (ref 3–10)
NEUTS SEG # BLD: 4.98 K/UL (ref 1.8–8)
NEUTS SEG NFR BLD: 82.7 % (ref 40–73)
NRBC # BLD: 0 K/UL (ref 0–0.01)
NRBC BLD-RTO: 0 PER 100 WBC
PCO2 BLD: 48.2 MMHG (ref 35–48)
PH BLD: 7.43 (ref 7.35–7.45)
PLATELET # BLD AUTO: 201 K/UL (ref 135–420)
PMV BLD AUTO: 10.7 FL (ref 9.2–11.8)
PO2 BLD: 106 MMHG (ref 83–108)
POTASSIUM BLD-SCNC: 3.5 MMOL/L (ref 3.5–5.1)
POTASSIUM SERPL-SCNC: 3.2 MMOL/L (ref 3.5–5.5)
POTASSIUM SERPL-SCNC: 3.7 MMOL/L (ref 3.5–5.5)
PROCALCITONIN SERPL-MCNC: 0.66 NG/ML
RBC # BLD AUTO: 5.18 M/UL (ref 4.2–5.3)
RESPIRATORY RATE, POC: 18 (ref 5–40)
RSV RNA SPEC QL NAA+PROBE: NOT DETECTED
RV+EV RNA SPEC QL NAA+PROBE: NOT DETECTED
SAO2 % BLD: 98 % (ref 94–98)
SARS-COV-2 RNA RESP QL NAA+PROBE: NOT DETECTED
SERVICE CMNT-IMP: ABNORMAL
SODIUM BLD-SCNC: 141 MMOL/L (ref 136–145)
SODIUM SERPL-SCNC: 141 MMOL/L (ref 136–145)
SPECIMEN SITE: ABNORMAL
WBC # BLD AUTO: 6 K/UL (ref 4.6–13.2)

## 2025-05-19 PROCEDURE — 99222 1ST HOSP IP/OBS MODERATE 55: CPT | Performed by: INTERNAL MEDICINE

## 2025-05-19 PROCEDURE — 6360000002 HC RX W HCPCS: Performed by: FAMILY MEDICINE

## 2025-05-19 PROCEDURE — 6360000002 HC RX W HCPCS

## 2025-05-19 PROCEDURE — 94761 N-INVAS EAR/PLS OXIMETRY MLT: CPT

## 2025-05-19 PROCEDURE — 83605 ASSAY OF LACTIC ACID: CPT

## 2025-05-19 PROCEDURE — 85025 COMPLETE CBC W/AUTO DIFF WBC: CPT

## 2025-05-19 PROCEDURE — 2140000001 HC CVICU INTERMEDIATE R&B

## 2025-05-19 PROCEDURE — 99233 SBSQ HOSP IP/OBS HIGH 50: CPT | Performed by: HOSPITALIST

## 2025-05-19 PROCEDURE — 6370000000 HC RX 637 (ALT 250 FOR IP): Performed by: HOSPITALIST

## 2025-05-19 PROCEDURE — 82947 ASSAY GLUCOSE BLOOD QUANT: CPT

## 2025-05-19 PROCEDURE — 36600 WITHDRAWAL OF ARTERIAL BLOOD: CPT

## 2025-05-19 PROCEDURE — APPSS15 APP SPLIT SHARED TIME 0-15 MINUTES

## 2025-05-19 PROCEDURE — 84132 ASSAY OF SERUM POTASSIUM: CPT

## 2025-05-19 PROCEDURE — 36415 COLL VENOUS BLD VENIPUNCTURE: CPT

## 2025-05-19 PROCEDURE — 6370000000 HC RX 637 (ALT 250 FOR IP)

## 2025-05-19 PROCEDURE — 84295 ASSAY OF SERUM SODIUM: CPT

## 2025-05-19 PROCEDURE — 80048 BASIC METABOLIC PNL TOTAL CA: CPT

## 2025-05-19 PROCEDURE — 2500000003 HC RX 250 WO HCPCS

## 2025-05-19 PROCEDURE — 84145 PROCALCITONIN (PCT): CPT

## 2025-05-19 PROCEDURE — 2700000000 HC OXYGEN THERAPY PER DAY

## 2025-05-19 PROCEDURE — 6360000002 HC RX W HCPCS: Performed by: STUDENT IN AN ORGANIZED HEALTH CARE EDUCATION/TRAINING PROGRAM

## 2025-05-19 PROCEDURE — 82330 ASSAY OF CALCIUM: CPT

## 2025-05-19 PROCEDURE — 6370000000 HC RX 637 (ALT 250 FOR IP): Performed by: FAMILY MEDICINE

## 2025-05-19 PROCEDURE — 86140 C-REACTIVE PROTEIN: CPT

## 2025-05-19 PROCEDURE — 0202U NFCT DS 22 TRGT SARS-COV-2: CPT

## 2025-05-19 PROCEDURE — 82803 BLOOD GASES ANY COMBINATION: CPT

## 2025-05-19 PROCEDURE — 71045 X-RAY EXAM CHEST 1 VIEW: CPT

## 2025-05-19 PROCEDURE — 87899 AGENT NOS ASSAY W/OPTIC: CPT

## 2025-05-19 PROCEDURE — 85014 HEMATOCRIT: CPT

## 2025-05-19 PROCEDURE — 99223 1ST HOSP IP/OBS HIGH 75: CPT | Performed by: INTERNAL MEDICINE

## 2025-05-19 PROCEDURE — 94640 AIRWAY INHALATION TREATMENT: CPT

## 2025-05-19 PROCEDURE — 2500000003 HC RX 250 WO HCPCS: Performed by: FAMILY MEDICINE

## 2025-05-19 RX ORDER — AZITHROMYCIN 250 MG/1
250 TABLET, FILM COATED ORAL DAILY
Status: DISCONTINUED | OUTPATIENT
Start: 2025-05-20 | End: 2025-05-22 | Stop reason: HOSPADM

## 2025-05-19 RX ORDER — IPRATROPIUM BROMIDE AND ALBUTEROL SULFATE 2.5; .5 MG/3ML; MG/3ML
1 SOLUTION RESPIRATORY (INHALATION)
Status: DISCONTINUED | OUTPATIENT
Start: 2025-05-19 | End: 2025-05-22 | Stop reason: HOSPADM

## 2025-05-19 RX ORDER — ARFORMOTEROL TARTRATE 15 UG/2ML
15 SOLUTION RESPIRATORY (INHALATION)
Status: DISCONTINUED | OUTPATIENT
Start: 2025-05-19 | End: 2025-05-22 | Stop reason: HOSPADM

## 2025-05-19 RX ORDER — BUDESONIDE 1 MG/2ML
1 INHALANT ORAL
Status: DISCONTINUED | OUTPATIENT
Start: 2025-05-19 | End: 2025-05-22 | Stop reason: HOSPADM

## 2025-05-19 RX ORDER — AZITHROMYCIN 250 MG/1
500 TABLET, FILM COATED ORAL ONCE
Status: COMPLETED | OUTPATIENT
Start: 2025-05-19 | End: 2025-05-19

## 2025-05-19 RX ORDER — POTASSIUM CHLORIDE 1500 MG/1
40 TABLET, EXTENDED RELEASE ORAL ONCE
Status: COMPLETED | OUTPATIENT
Start: 2025-05-19 | End: 2025-05-19

## 2025-05-19 RX ADMIN — SODIUM CHLORIDE, PRESERVATIVE FREE 10 ML: 5 INJECTION INTRAVENOUS at 09:01

## 2025-05-19 RX ADMIN — METHYLPREDNISOLONE SODIUM SUCCINATE 40 MG: 40 INJECTION, POWDER, LYOPHILIZED, FOR SOLUTION INTRAMUSCULAR; INTRAVENOUS at 22:00

## 2025-05-19 RX ADMIN — GUAIFENESIN 1200 MG: 600 TABLET, MULTILAYER, EXTENDED RELEASE ORAL at 22:00

## 2025-05-19 RX ADMIN — MONTELUKAST 10 MG: 10 TABLET, FILM COATED ORAL at 08:59

## 2025-05-19 RX ADMIN — ARFORMOTEROL TARTRATE 15 MCG: 15 SOLUTION RESPIRATORY (INHALATION) at 19:32

## 2025-05-19 RX ADMIN — POTASSIUM CHLORIDE 40 MEQ: 1500 TABLET, EXTENDED RELEASE ORAL at 11:39

## 2025-05-19 RX ADMIN — FOLIC ACID 1 MG: 1 TABLET ORAL at 08:59

## 2025-05-19 RX ADMIN — GUAIFENESIN 1200 MG: 600 TABLET, MULTILAYER, EXTENDED RELEASE ORAL at 08:59

## 2025-05-19 RX ADMIN — LEVOTHYROXINE SODIUM 88 MCG: 88 TABLET ORAL at 06:40

## 2025-05-19 RX ADMIN — SERTRALINE HYDROCHLORIDE 50 MG: 50 TABLET ORAL at 08:59

## 2025-05-19 RX ADMIN — FERROUS SULFATE TAB 325 MG (65 MG ELEMENTAL FE) 325 MG: 325 (65 FE) TAB at 09:00

## 2025-05-19 RX ADMIN — APIXABAN 5 MG: 5 TABLET, FILM COATED ORAL at 22:01

## 2025-05-19 RX ADMIN — METOPROLOL SUCCINATE 25 MG: 25 TABLET, FILM COATED, EXTENDED RELEASE ORAL at 09:00

## 2025-05-19 RX ADMIN — APIXABAN 5 MG: 5 TABLET, FILM COATED ORAL at 09:00

## 2025-05-19 RX ADMIN — IPRATROPIUM BROMIDE AND ALBUTEROL SULFATE 1 DOSE: .5; 3 SOLUTION RESPIRATORY (INHALATION) at 19:32

## 2025-05-19 RX ADMIN — POTASSIUM CHLORIDE 10 MEQ: 7.46 INJECTION, SOLUTION INTRAVENOUS at 10:41

## 2025-05-19 RX ADMIN — THERA TABS 1 TABLET: TAB at 06:40

## 2025-05-19 RX ADMIN — BUDESONIDE 1 MG: 1 SUSPENSION RESPIRATORY (INHALATION) at 19:32

## 2025-05-19 RX ADMIN — FUROSEMIDE 40 MG: 10 INJECTION, SOLUTION INTRAMUSCULAR; INTRAVENOUS at 16:11

## 2025-05-19 RX ADMIN — AZITHROMYCIN DIHYDRATE 500 MG: 250 TABLET ORAL at 22:01

## 2025-05-19 RX ADMIN — POTASSIUM CHLORIDE 10 MEQ: 7.46 INJECTION, SOLUTION INTRAVENOUS at 08:57

## 2025-05-19 RX ADMIN — FUROSEMIDE 40 MG: 10 INJECTION, SOLUTION INTRAMUSCULAR; INTRAVENOUS at 09:00

## 2025-05-19 ASSESSMENT — ENCOUNTER SYMPTOMS
NAUSEA: 0
COUGH: 1
SHORTNESS OF BREATH: 1
CHEST TIGHTNESS: 1
RHINORRHEA: 0
VOMITING: 0
ABDOMINAL PAIN: 0

## 2025-05-19 ASSESSMENT — PAIN SCALES - GENERAL
PAINLEVEL_OUTOF10: 0

## 2025-05-19 NOTE — PROGRESS NOTES
Pt came down to IR in bed on 4L per NC sitting completely upright in bed. Placed pt on procedure table with a high wedge, sats unable to get above 75%, pt stated that \"it was a little hard to breathe\" O2 to 6L, went to 78-79%,Dr Buck at bedside at this time. Pt then placed in bed sitting completely upright, and told Dr. Buck that she felt better even with O2 sats at 78-79%.  It was decided not to proceed with picc exchange. Called Goldy KOLB on 4N and advised him of situation, he stated that she routinely stayed in the \"80's\" and wanted to know how high it had to be to proceed. It was decided to place her on NRB @ 15L and transport her back to floor. Pt was comfortable transported to floor, respiratory present upon arrival to 4N.

## 2025-05-19 NOTE — CARDIO/PULMONARY
Cardiopulmonary Rehab Screen:    Pt seen this day to discuss cardiopulmonary rehabilitation s/p discharge. Pt appropriate for Pulmonary rehab; please enter ambulatory order for rehab upon discharge.      Dorothy Mendes, RN, RN, BSN, CCRP  5/19/2025

## 2025-05-19 NOTE — CONSULTS
[facility]  Pulmonary, Critical Care, and Sleep Medicine    Name: Rina Prajapati MRN: [id number]   : 1960 Hospital: [facility]   Date: 2025        Pulmonary Medicine: Initial Patient Consult    Admission Date:   2025  LOS: 2  MAR reviewed and pertinent medications noted or modified as needed    IMPRESSION:   Acute on chronic hypoxic respiratory failure, 4L at baseline. Orthopnea with oxygen saturation in the low 80s, improved on HFNC 15L  Worsening R>L diffuse hazy pulmonary opacities due to PNA vs CHF/pulmonary edema vs metastatic in nature vs Gemza induced pneumonitis. RVP negative  R pleural effusion, likely CHF related vs PNA  Severe COPD FEV1 in 2024 31% predicted with air trapping and reduced DLCO.  Possible exacerbation, CT with enphesemous changes. On 4L LTOT  Combined systolic and Diastolic HF, last echo  with mild systolic dysfunction EF 45% Possible exacerbation with hypoxia, pulmonary opacites, and elevated BNP 5985ng/mL above 3300 ion 25  Pulmonary hypertension WHO Grp 2/3 with dilated RV  RVSP 45mmHg on 24, CT on 24 showing cardiomegaly with reflux of contrast into IVC  Non small cell Lung cancer, IV  Followed by Dr. Li, with [facility]. Noted last admission with disease progression while on Taxotere, starting on Gemzar. 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

## 2025-05-19 NOTE — CARE COORDINATION
05/19/25 1057   Service Assessment   Patient Orientation Alert and Oriented   Cognition Alert   History Provided By Patient   Primary Caregiver Self   Accompanied By/Relationship no one at bedside   Support Systems Spouse/Significant Other;Family Members   Patient's Healthcare Decision Maker is: Named in Scanned ACP Document   PCP Verified by CM Yes   Last Visit to PCP Within last 3 months   Prior Functional Level Independent in ADLs/IADLs   Current Functional Level Independent in ADLs/IADLs   Can patient return to prior living arrangement Yes   Ability to make needs known: Good   Family able to assist with home care needs: Yes   Would you like for me to discuss the discharge plan with any other family members/significant others, and if so, who? Yes  (Robin- fiance)   Financial Resources Medicare   Community Resources None   CM/SW Referral Other (see comment)  (not applicable)   Social/Functional History   Lives With Significant other   Type of Home House   Home Layout One level   Home Access Stairs to enter with rails   Entrance Stairs - Number of Steps 3   Entrance Stairs - Rails Both   Bathroom Shower/Tub Tub/Shower unit   Bathroom Toilet Standard   Bathroom Equipment Shower chair   Bathroom Accessibility Accessible   Home Equipment Rollator;Oxygen  (Patient is on 4L NC at baseline but does not know the comapny)   Receives Help From Family   Prior Level of Assist for ADLs Independent   Prior Level of Assist for Homemaking Independent   Homemaking Responsibilities Yes   Ambulation Assistance Independent   Prior Level of Assist for Transfers Independent   Active  No   Patient's  Info Patient's fiance   Mode of Transportation Car   Education not applicable   Occupation On disability   Type of Occupation not applicable   Discharge Planning   Type of Residence House   Living Arrangements Spouse/Significant Other   Current Services Prior To Admission Oxygen Therapy   Potential Assistance Needed N/A

## 2025-05-19 NOTE — NURSE NAVIGATOR
Met with patient provided education on Living with Heart Failure, reviewed heart failure zones, reinforced low sodium diet and fluid restrictions. Provided time for questions. Will continue to follow

## 2025-05-19 NOTE — PROGRESS NOTES
Interventional Radiology    Patient was to  for PICC exchange    The patient was unable to tolerate lying at 30-45 degrees on the procedure table despite 6 L nasal cannula, SpO2 staying in the 75 to 79% range.    Dr. Evans was called.  The patient was moved back to her bed and seated upright.  SpO2 remained less than 80% and the patient continues to demonstrate tachypnea.  Respiratory was called and the patient was placed to 15 L on nonrebreather mask.    SpO2 responded appropriately to 95%.    Procedure canceled at this time due to patient unable to tolerate being reclined to 45 degrees on procedure room table.    We can potentially reattempt PICC exchange once the patient is able to tolerate procedural positioning.    Thank you,  Kathleen Gonzalez, PA  [phone]

## 2025-05-19 NOTE — CONSULTS
Interventional Radiology Consult Note  Patient: Rina Prajapati               Sex: female          DOA: 5/17/2025       YOB: 1960      Age:  64 y.o.        LOS:  LOS: 2 days              Assessment   Central venous stenosis  Non-small cell cancer on outpatient chemotherapy  History of CHF exacerbation  Left basilic 15 cm 4 Palestinian single-lumen PICC placed 10/2024 for outpatient chemotherapy    PICC is currently withdrawn about 5 cm and needs to be adjusted prior to outpatient use    Case and images reviewed by Dr. Buck. Discussed with Dr. Evans    Plan     PICC exchange v new placement today as IR schedule allows    If unable to successfully place PICC, we will consider tunneled central venous catheter placement v angioplasty and recannalization. If extensive vascular work will need to be done then we will plan for tomorrow with moderate sedation after NPO at MN.    Thank you,  CARLOS Galindo  [id number]    HPI:     Consult for evaluation of PICC received from Dr. Clark and reviewed with Dr. Buck.    Rina Prajapati is a 64 y.o. female with a PMH of non-small cell lung cancer, central venous occlusion, last PICC placement 10/2024 in the left basilic at 15 cm, CHF, chronic respiratory failure on 4 L nasal cannula at home who presented to [facility] on 5/17/2025 for dyspnea.  Labs show no leukocytosis patient was admitted for further evaluation and management.  Not on blood thinning medication and no contrast allergy. Today the patient denies pain or swelling at her left arm PICC site.  She denies nausea, vomiting, fevers, chills, dyspnea, chest pain, headache or dizziness.    The anticipated procedure was discussed in detail including risk of injury, infection, and bleeding. All questions were answered and concerns addressed. Informed consents were obtained.    Past Medical History:   Diagnosis Date    Anemia, iron deficiency 2/2016    Chronic hypoxic respiratory failure (HCC)     3.5 L/min

## 2025-05-19 NOTE — CONSULTS
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)    Protein calorie malnutrition    Dyspnea    Thrombocytosis    Acute exacerbation of chronic obstructive pulmonary disease (COPD) (HCC)    Chronic systolic (congestive) heart failure    Malignant neoplasm of upper lobe of right lung (HCC)    Palliative care encounter    Debility    Encounter for palliative care    Pericardial effusion    History of pulmonary embolism    Tobacco use disorder    Acute on chronic heart failure with mildly reduced ejection fraction (HFmrEF, 41-49%) (HCC)    Acute on chronic hypoxic respiratory failure (HCC)    History of deep venous thrombosis (DVT) of distal vein of right lower extremity    Current use of long term anticoagulation    Hypothyroidism    Pulmonary hypertension (HCC)    Acute on chronic heart failure (HCC)    New onset atrial fibrillation (HCC)    Acute on chronic respiratory failure with hypoxia (HCC)    Congestive heart failure (HCC)    Malignant neoplasm of lung (HCC)    Malignant neoplastic disease (HCC)    Muscle weakness (generalized)    Tobacco dependence due to cigarettes        Plan:     Currently, she is without new worsening shortness of breath.  Her extremity swelling has returned to baseline.  She states that she was holding onto too much fluid because of dietary indiscretion recently.    She has chronic shortness of breath and orthopnea.  Would continue her current medication regimen.  On

## 2025-05-20 ENCOUNTER — APPOINTMENT (OUTPATIENT)
Facility: HOSPITAL | Age: 65
DRG: 252 | End: 2025-05-20
Attending: STUDENT IN AN ORGANIZED HEALTH CARE EDUCATION/TRAINING PROGRAM
Payer: MEDICARE

## 2025-05-20 PROBLEM — E87.6 HYPOKALEMIA: Status: ACTIVE | Noted: 2025-05-20

## 2025-05-20 LAB
ANION GAP SERPL CALC-SCNC: 11 MMOL/L (ref 3–18)
BASOPHILS # BLD: 0.02 K/UL (ref 0–0.1)
BASOPHILS NFR BLD: 0.3 % (ref 0–2)
BUN SERPL-MCNC: 17 MG/DL (ref 6–23)
BUN/CREAT SERPL: 28 (ref 12–20)
CALCIUM SERPL-MCNC: 9.3 MG/DL (ref 8.5–10.1)
CHLORIDE SERPL-SCNC: 100 MMOL/L (ref 98–107)
CO2 SERPL-SCNC: 28 MMOL/L (ref 21–32)
CREAT SERPL-MCNC: 0.6 MG/DL (ref 0.6–1.3)
DIFFERENTIAL METHOD BLD: ABNORMAL
ECHO BSA: 1.8 M2
ECHO EST RA PRESSURE: 15 MMHG
ECHO LV EDV A2C: 54 ML
ECHO LV EDV A4C: 44 ML
ECHO LV EDV BP: 50 ML (ref 56–104)
ECHO LV EDV INDEX A4C: 25 ML/M2
ECHO LV EDV INDEX BP: 28 ML/M2
ECHO LV EDV NDEX A2C: 31 ML/M2
ECHO LV EF PHYSICIAN: 45 %
ECHO LV EJECTION FRACTION A2C: 53 %
ECHO LV EJECTION FRACTION A4C: 42 %
ECHO LV EJECTION FRACTION BIPLANE: 50 % (ref 55–100)
ECHO LV ESV A2C: 25 ML
ECHO LV ESV A4C: 25 ML
ECHO LV ESV BP: 25 ML (ref 19–49)
ECHO LV ESV INDEX A2C: 14 ML/M2
ECHO LV ESV INDEX A4C: 14 ML/M2
ECHO LV ESV INDEX BP: 14 ML/M2
ECHO LV FRACTIONAL SHORTENING: 25 % (ref 28–44)
ECHO LV INTERNAL DIMENSION DIASTOLE INDEX: 2.49 CM/M2
ECHO LV INTERNAL DIMENSION DIASTOLIC: 4.4 CM (ref 3.9–5.3)
ECHO LV INTERNAL DIMENSION SYSTOLIC INDEX: 1.86 CM/M2
ECHO LV INTERNAL DIMENSION SYSTOLIC: 3.3 CM
ECHO LV IVSD: 0.9 CM (ref 0.6–0.9)
ECHO LV MASS 2D: 128 G (ref 67–162)
ECHO LV MASS INDEX 2D: 72.3 G/M2 (ref 43–95)
ECHO LV POSTERIOR WALL DIASTOLIC: 0.9 CM (ref 0.6–0.9)
ECHO LV RELATIVE WALL THICKNESS RATIO: 0.41
ECHO PV MAX VELOCITY: 0.8 M/S
ECHO PV PEAK GRADIENT: 3 MMHG
ECHO RA VOLUME: 70 ML
ECHO RA VOLUME: 72 ML
ECHO RIGHT VENTRICULAR SYSTOLIC PRESSURE (RVSP): 73 MMHG
ECHO RV BASAL DIMENSION: 4.9 CM
ECHO RV TAPSE: 1.4 CM (ref 1.7–?)
ECHO TV ALIASING VELOCITY (NYQUIST): 39 CM/S
ECHO TV EROA: 0.1 CM2
ECHO TV REGURGITANT MAX VELOCITY: 3.82 M/S
ECHO TV REGURGITANT PEAK GRADIENT: 58 MMHG
ECHO TV REGURGITANT VELOCITY PISA: 3.7 M/S
ECHO TV REGURGITANT VTI: 126.1 CM
EOSINOPHIL # BLD: 0.01 K/UL (ref 0–0.4)
EOSINOPHIL NFR BLD: 0.1 % (ref 0–5)
ERYTHROCYTE [DISTWIDTH] IN BLOOD BY AUTOMATED COUNT: 18.7 % (ref 11.6–14.5)
ERYTHROCYTE [SEDIMENTATION RATE] IN BLOOD: 62 MM/HR (ref 0–30)
GLUCOSE SERPL-MCNC: 158 MG/DL (ref 74–108)
HCT VFR BLD AUTO: 37.8 % (ref 35–45)
HGB BLD-MCNC: 11.1 G/DL (ref 12–16)
IMM GRANULOCYTES # BLD AUTO: 0.03 K/UL (ref 0–0.04)
IMM GRANULOCYTES NFR BLD AUTO: 0.4 % (ref 0–0.5)
LV EF: 50 ML
LYMPHOCYTES # BLD: 0.3 K/UL (ref 0.9–3.6)
LYMPHOCYTES NFR BLD: 3.9 % (ref 21–52)
MAGNESIUM SERPL-MCNC: 1.6 MG/DL (ref 1.6–2.6)
MCH RBC QN AUTO: 22.9 PG (ref 24–34)
MCHC RBC AUTO-ENTMCNC: 29.4 G/DL (ref 31–37)
MCV RBC AUTO: 78.1 FL (ref 78–100)
MONOCYTES # BLD: 0.11 K/UL (ref 0.05–1.2)
MONOCYTES NFR BLD: 1.4 % (ref 3–10)
NEUTS SEG # BLD: 7.17 K/UL (ref 1.8–8)
NEUTS SEG NFR BLD: 93.9 % (ref 40–73)
NRBC # BLD: 0 K/UL (ref 0–0.01)
NRBC BLD-RTO: 0 PER 100 WBC
NT PRO BNP: 6362 PG/ML (ref 36–900)
PLATELET # BLD AUTO: 212 K/UL (ref 135–420)
PMV BLD AUTO: 10.5 FL (ref 9.2–11.8)
POTASSIUM SERPL-SCNC: 3.8 MMOL/L (ref 3.5–5.5)
PROCALCITONIN SERPL-MCNC: 0.47 NG/ML
RBC # BLD AUTO: 4.84 M/UL (ref 4.2–5.3)
SODIUM SERPL-SCNC: 139 MMOL/L (ref 136–145)
WBC # BLD AUTO: 7.6 K/UL (ref 4.6–13.2)

## 2025-05-20 PROCEDURE — 80048 BASIC METABOLIC PNL TOTAL CA: CPT

## 2025-05-20 PROCEDURE — 6370000000 HC RX 637 (ALT 250 FOR IP): Performed by: FAMILY MEDICINE

## 2025-05-20 PROCEDURE — 83880 ASSAY OF NATRIURETIC PEPTIDE: CPT

## 2025-05-20 PROCEDURE — 2700000000 HC OXYGEN THERAPY PER DAY

## 2025-05-20 PROCEDURE — 99232 SBSQ HOSP IP/OBS MODERATE 35: CPT | Performed by: INTERNAL MEDICINE

## 2025-05-20 PROCEDURE — 83735 ASSAY OF MAGNESIUM: CPT

## 2025-05-20 PROCEDURE — 85025 COMPLETE CBC W/AUTO DIFF WBC: CPT

## 2025-05-20 PROCEDURE — 85652 RBC SED RATE AUTOMATED: CPT

## 2025-05-20 PROCEDURE — 6370000000 HC RX 637 (ALT 250 FOR IP)

## 2025-05-20 PROCEDURE — 84145 PROCALCITONIN (PCT): CPT

## 2025-05-20 PROCEDURE — 2140000001 HC CVICU INTERMEDIATE R&B

## 2025-05-20 PROCEDURE — 2500000003 HC RX 250 WO HCPCS

## 2025-05-20 PROCEDURE — 2500000003 HC RX 250 WO HCPCS: Performed by: FAMILY MEDICINE

## 2025-05-20 PROCEDURE — 99232 SBSQ HOSP IP/OBS MODERATE 35: CPT | Performed by: HOSPITALIST

## 2025-05-20 PROCEDURE — 6360000002 HC RX W HCPCS

## 2025-05-20 PROCEDURE — 2500000003 HC RX 250 WO HCPCS: Performed by: INTERNAL MEDICINE

## 2025-05-20 PROCEDURE — 36415 COLL VENOUS BLD VENIPUNCTURE: CPT

## 2025-05-20 PROCEDURE — 94760 N-INVAS EAR/PLS OXIMETRY 1: CPT

## 2025-05-20 PROCEDURE — 94640 AIRWAY INHALATION TREATMENT: CPT

## 2025-05-20 PROCEDURE — 6360000002 HC RX W HCPCS: Performed by: INTERNAL MEDICINE

## 2025-05-20 PROCEDURE — 93308 TTE F-UP OR LMTD: CPT

## 2025-05-20 PROCEDURE — 99223 1ST HOSP IP/OBS HIGH 75: CPT | Performed by: NURSE PRACTITIONER

## 2025-05-20 PROCEDURE — 6360000002 HC RX W HCPCS: Performed by: STUDENT IN AN ORGANIZED HEALTH CARE EDUCATION/TRAINING PROGRAM

## 2025-05-20 RX ADMIN — APIXABAN 5 MG: 5 TABLET, FILM COATED ORAL at 08:41

## 2025-05-20 RX ADMIN — THERA TABS 1 TABLET: TAB at 06:39

## 2025-05-20 RX ADMIN — FOLIC ACID 1 MG: 1 TABLET ORAL at 08:41

## 2025-05-20 RX ADMIN — BUDESONIDE 1 MG: 1 SUSPENSION RESPIRATORY (INHALATION) at 10:16

## 2025-05-20 RX ADMIN — FUROSEMIDE 40 MG: 10 INJECTION, SOLUTION INTRAMUSCULAR; INTRAVENOUS at 17:14

## 2025-05-20 RX ADMIN — FERROUS SULFATE TAB 325 MG (65 MG ELEMENTAL FE) 325 MG: 325 (65 FE) TAB at 08:41

## 2025-05-20 RX ADMIN — SODIUM CHLORIDE, PRESERVATIVE FREE 10 ML: 5 INJECTION INTRAVENOUS at 01:29

## 2025-05-20 RX ADMIN — GUAIFENESIN 1200 MG: 600 TABLET, MULTILAYER, EXTENDED RELEASE ORAL at 08:41

## 2025-05-20 RX ADMIN — ARFORMOTEROL TARTRATE 15 MCG: 15 SOLUTION RESPIRATORY (INHALATION) at 20:51

## 2025-05-20 RX ADMIN — APIXABAN 5 MG: 5 TABLET, FILM COATED ORAL at 21:18

## 2025-05-20 RX ADMIN — IPRATROPIUM BROMIDE AND ALBUTEROL SULFATE 1 DOSE: .5; 3 SOLUTION RESPIRATORY (INHALATION) at 13:07

## 2025-05-20 RX ADMIN — METOPROLOL SUCCINATE 25 MG: 25 TABLET, FILM COATED, EXTENDED RELEASE ORAL at 08:41

## 2025-05-20 RX ADMIN — METHYLPREDNISOLONE SODIUM SUCCINATE 40 MG: 40 INJECTION, POWDER, LYOPHILIZED, FOR SOLUTION INTRAMUSCULAR; INTRAVENOUS at 06:39

## 2025-05-20 RX ADMIN — METHYLPREDNISOLONE SODIUM SUCCINATE 40 MG: 40 INJECTION, POWDER, LYOPHILIZED, FOR SOLUTION INTRAMUSCULAR; INTRAVENOUS at 17:14

## 2025-05-20 RX ADMIN — FUROSEMIDE 40 MG: 10 INJECTION, SOLUTION INTRAMUSCULAR; INTRAVENOUS at 08:37

## 2025-05-20 RX ADMIN — MONTELUKAST 10 MG: 10 TABLET, FILM COATED ORAL at 08:41

## 2025-05-20 RX ADMIN — GUAIFENESIN 1200 MG: 600 TABLET, MULTILAYER, EXTENDED RELEASE ORAL at 21:18

## 2025-05-20 RX ADMIN — AZITHROMYCIN DIHYDRATE 250 MG: 250 TABLET ORAL at 08:41

## 2025-05-20 RX ADMIN — SERTRALINE HYDROCHLORIDE 50 MG: 50 TABLET ORAL at 08:41

## 2025-05-20 RX ADMIN — BUDESONIDE 1 MG: 1 SUSPENSION RESPIRATORY (INHALATION) at 20:51

## 2025-05-20 RX ADMIN — SODIUM CHLORIDE, PRESERVATIVE FREE 10 ML: 5 INJECTION INTRAVENOUS at 21:19

## 2025-05-20 RX ADMIN — SODIUM CHLORIDE, PRESERVATIVE FREE 10 ML: 5 INJECTION INTRAVENOUS at 08:42

## 2025-05-20 RX ADMIN — IPRATROPIUM BROMIDE AND ALBUTEROL SULFATE 1 DOSE: .5; 3 SOLUTION RESPIRATORY (INHALATION) at 20:51

## 2025-05-20 RX ADMIN — IPRATROPIUM BROMIDE AND ALBUTEROL SULFATE 1 DOSE: .5; 3 SOLUTION RESPIRATORY (INHALATION) at 16:56

## 2025-05-20 RX ADMIN — ARFORMOTEROL TARTRATE 15 MCG: 15 SOLUTION RESPIRATORY (INHALATION) at 10:16

## 2025-05-20 RX ADMIN — IPRATROPIUM BROMIDE AND ALBUTEROL SULFATE 1 DOSE: .5; 3 SOLUTION RESPIRATORY (INHALATION) at 10:16

## 2025-05-20 RX ADMIN — LEVOTHYROXINE SODIUM 88 MCG: 88 TABLET ORAL at 06:39

## 2025-05-20 ASSESSMENT — PAIN SCALES - GENERAL
PAINLEVEL_OUTOF10: 0
PAINLEVEL_OUTOF10: 0

## 2025-05-20 NOTE — NURSE NAVIGATOR
Followed up with patient reinforced low sodium diet, fluid restriction, heart failure zones, will continue to follow

## 2025-05-20 NOTE — ACP (ADVANCE CARE PLANNING)
Advance Care Planning     Palliative Team Advance Care Planning (ACP) Conversation    Date of Conversation: 05/20/25       ACP documents on file prior to discussion:  -Power of  for Healthcare  -Portable DNR    Healthcare Decision Maker:    Primary Decision Maker: Radha Jolly - Brother/Sister - [phone]    Secondary Decision Maker: Dylan Hector - Brother/Sister - [phone]     Conversation Summary:    Visited patient for new consult along with Palliative team member MARIELOS Lowry NP. Patient is resting quietly in bed, awake and alert. Remembers our team from previous visits (2/10/25, 12/6/24, 11/15/24 and 11/14/24). States she is feeling \"better\" and her breathing is \"good\". Oxygen in use. Does use oxygen at home, usually 4L N/C.     Patient does have an Advanced Medical Directive on file naming her sister Radha as her primary medical decision maker, and her brother Dylan as the secondary decision maker.  Reviewed document with patient, remains in agreement with her decision makers. No changes at this time.    Noted that patient voiced to attending that she wanted to be a \"full code\". Patient does have a signed DDNR on file. Discussed the benefits and burdens of resuscitation, patient stated that she did not want to \"scare\" her fiance if he saw the \"purple wrist band\". Re-addressed goals of care with patient and confirmed that she would like to remain a \"full code\" at this time. DDNR form marked as \"revoked\" and re-scanned into the EMR.  Patient was very appreciative of our visit and glad to see us again.     Palliative team remains available to provide support to Ms Prajapati and her family during this hospital stay.    Resuscitation Status:   Code Status: Full Code     Documentation Completed:  -Portable DNR-REVOKED    Beatrice Morales RN  Palliative Medicine Inpatient RN  Palliative [facility] Line: [phone]

## 2025-05-20 NOTE — CONSULTS
Palliative Medicine  Patient Name: Rina Prajapati  YOB: 1960  MRN: [id number]  Age: 64 y.o.  Gender: female    Date of Initial Consult: 5/20/2025   Date of Service: 5/20/2025  Time: 3:10 PM  Provider: ROSA Geronimo NP  Hospital Day: 4  Admit Date: 5/17/2025  Referring Provider: Dr Evans        Reasons for Consultation:  Goals of Care    HISTORY OF PRESENT ILLNESS (HPI):   Rina Prajapati is a 64 y.o. female with a past medical history of lung cancer, pulmonary hypertension, heart failure and chronic respiratory failure, who was admitted on 5/17/2025 from home with a diagnosis of acute on chronic respiratory failure secondary to acute on chronic heart failure exacerbation.  Patient presented to the ER with shortness of breath.  X-ray showed pulmonary edema BNP 5985.  Palliative medicine is consulted for clarification of goals of care.    May 20, 2025 patient sitting up in bed she is alert and oriented x 3.  Recognized our team from previous admissions.  She is currently on nasal cannula oxygen.  Denies shortness of breath or pain and shares she is feeling much better.          PALLIATIVE DIAGNOSES:    Encounter for palliative care/advance care plan discussions  Goals of care  Acute on chronic respiratory failure  Acute congestive heart failure  Severe COPD  Non-small cell lung cancer stage IV    ASSESSMENT AND PLAN:   Encounter for palliative care/advance care planning discussions    May 20, 2025 Patient seen along with Ms Andrew RN.  Mr. Terrell is reclining in bed she is alert and oriented x 3 and able to participate in her goals of care discussions.  Well-known to her team from previous admissions.  She recognized our team.  She currently has no complaints of dyspnea or pain.  Told her she came back to the hospital because she ate the wrong foods for her congestive heart failure.     AMD patient has previously completed an AMD naming her sister Radha Jolly.  She wished no changes

## 2025-05-21 ENCOUNTER — ANESTHESIA EVENT (OUTPATIENT)
Facility: HOSPITAL | Age: 65
DRG: 252 | End: 2025-05-21
Payer: MEDICARE

## 2025-05-21 ENCOUNTER — ANESTHESIA (OUTPATIENT)
Facility: HOSPITAL | Age: 65
DRG: 252 | End: 2025-05-21
Payer: MEDICARE

## 2025-05-21 ENCOUNTER — APPOINTMENT (OUTPATIENT)
Facility: HOSPITAL | Age: 65
DRG: 252 | End: 2025-05-21
Attending: STUDENT IN AN ORGANIZED HEALTH CARE EDUCATION/TRAINING PROGRAM
Payer: MEDICARE

## 2025-05-21 LAB
ANION GAP SERPL CALC-SCNC: 11 MMOL/L (ref 3–18)
BASOPHILS # BLD: 0.01 K/UL (ref 0–0.1)
BASOPHILS NFR BLD: 0.1 % (ref 0–2)
BUN SERPL-MCNC: 21 MG/DL (ref 6–23)
BUN/CREAT SERPL: 31 (ref 12–20)
CALCIUM SERPL-MCNC: 9.1 MG/DL (ref 8.5–10.1)
CHLORIDE SERPL-SCNC: 98 MMOL/L (ref 98–107)
CO2 SERPL-SCNC: 29 MMOL/L (ref 21–32)
CREAT SERPL-MCNC: 0.67 MG/DL (ref 0.6–1.3)
DIFFERENTIAL METHOD BLD: ABNORMAL
EOSINOPHIL # BLD: 0 K/UL (ref 0–0.4)
EOSINOPHIL NFR BLD: 0 % (ref 0–5)
ERYTHROCYTE [DISTWIDTH] IN BLOOD BY AUTOMATED COUNT: 18.7 % (ref 11.6–14.5)
GLUCOSE BLD STRIP.AUTO-MCNC: 146 MG/DL (ref 70–110)
GLUCOSE SERPL-MCNC: 253 MG/DL (ref 74–108)
HCT VFR BLD AUTO: 34.8 % (ref 35–45)
HGB BLD-MCNC: 10 G/DL (ref 12–16)
IMM GRANULOCYTES # BLD AUTO: 0.03 K/UL (ref 0–0.04)
IMM GRANULOCYTES NFR BLD AUTO: 0.4 % (ref 0–0.5)
LYMPHOCYTES # BLD: 0.3 K/UL (ref 0.9–3.6)
LYMPHOCYTES NFR BLD: 4 % (ref 21–52)
MAGNESIUM SERPL-MCNC: 1.7 MG/DL (ref 1.6–2.6)
MCH RBC QN AUTO: 22.5 PG (ref 24–34)
MCHC RBC AUTO-ENTMCNC: 28.7 G/DL (ref 31–37)
MCV RBC AUTO: 78.4 FL (ref 78–100)
MONOCYTES # BLD: 0.31 K/UL (ref 0.05–1.2)
MONOCYTES NFR BLD: 4.2 % (ref 3–10)
NEUTS SEG # BLD: 6.8 K/UL (ref 1.8–8)
NEUTS SEG NFR BLD: 91.3 % (ref 40–73)
NRBC # BLD: 0 K/UL (ref 0–0.01)
NRBC BLD-RTO: 0 PER 100 WBC
NT PRO BNP: 6809 PG/ML (ref 36–900)
PLATELET # BLD AUTO: 176 K/UL (ref 135–420)
PMV BLD AUTO: 10.1 FL (ref 9.2–11.8)
POTASSIUM SERPL-SCNC: 3.5 MMOL/L (ref 3.5–5.5)
RBC # BLD AUTO: 4.44 M/UL (ref 4.2–5.3)
SODIUM SERPL-SCNC: 138 MMOL/L (ref 136–145)
WBC # BLD AUTO: 7.5 K/UL (ref 4.6–13.2)

## 2025-05-21 PROCEDURE — 6360000002 HC RX W HCPCS: Performed by: NURSE ANESTHETIST, CERTIFIED REGISTERED

## 2025-05-21 PROCEDURE — 2700000000 HC OXYGEN THERAPY PER DAY

## 2025-05-21 PROCEDURE — 99232 SBSQ HOSP IP/OBS MODERATE 35: CPT | Performed by: INTERNAL MEDICINE

## 2025-05-21 PROCEDURE — 6360000002 HC RX W HCPCS: Performed by: INTERNAL MEDICINE

## 2025-05-21 PROCEDURE — 2500000003 HC RX 250 WO HCPCS: Performed by: HOSPITALIST

## 2025-05-21 PROCEDURE — 6370000000 HC RX 637 (ALT 250 FOR IP): Performed by: FAMILY MEDICINE

## 2025-05-21 PROCEDURE — 6370000000 HC RX 637 (ALT 250 FOR IP)

## 2025-05-21 PROCEDURE — 2140000001 HC CVICU INTERMEDIATE R&B

## 2025-05-21 PROCEDURE — C1725 CATH, TRANSLUMIN NON-LASER: HCPCS

## 2025-05-21 PROCEDURE — 94640 AIRWAY INHALATION TREATMENT: CPT

## 2025-05-21 PROCEDURE — 85025 COMPLETE CBC W/AUTO DIFF WBC: CPT

## 2025-05-21 PROCEDURE — 94761 N-INVAS EAR/PLS OXIMETRY MLT: CPT

## 2025-05-21 PROCEDURE — 36415 COLL VENOUS BLD VENIPUNCTURE: CPT

## 2025-05-21 PROCEDURE — 2500000003 HC RX 250 WO HCPCS: Performed by: FAMILY MEDICINE

## 2025-05-21 PROCEDURE — 83880 ASSAY OF NATRIURETIC PEPTIDE: CPT

## 2025-05-21 PROCEDURE — 99231 SBSQ HOSP IP/OBS SF/LOW 25: CPT | Performed by: INTERNAL MEDICINE

## 2025-05-21 PROCEDURE — 05HY33Z INSERTION OF INFUSION DEVICE INTO UPPER VEIN, PERCUTANEOUS APPROACH: ICD-10-PCS | Performed by: STUDENT IN AN ORGANIZED HEALTH CARE EDUCATION/TRAINING PROGRAM

## 2025-05-21 PROCEDURE — 2500000003 HC RX 250 WO HCPCS: Performed by: INTERNAL MEDICINE

## 2025-05-21 PROCEDURE — 2580000003 HC RX 258: Performed by: NURSE ANESTHETIST, CERTIFIED REGISTERED

## 2025-05-21 PROCEDURE — 82962 GLUCOSE BLOOD TEST: CPT

## 2025-05-21 PROCEDURE — 6360000002 HC RX W HCPCS: Performed by: STUDENT IN AN ORGANIZED HEALTH CARE EDUCATION/TRAINING PROGRAM

## 2025-05-21 PROCEDURE — 6360000002 HC RX W HCPCS

## 2025-05-21 PROCEDURE — 83735 ASSAY OF MAGNESIUM: CPT

## 2025-05-21 PROCEDURE — 80048 BASIC METABOLIC PNL TOTAL CA: CPT

## 2025-05-21 PROCEDURE — 6360000002 HC RX W HCPCS: Performed by: HOSPITALIST

## 2025-05-21 PROCEDURE — 5A0945A ASSISTANCE WITH RESPIRATORY VENTILATION, 24-96 CONSECUTIVE HOURS, HIGH NASAL FLOW/VELOCITY: ICD-10-PCS | Performed by: STUDENT IN AN ORGANIZED HEALTH CARE EDUCATION/TRAINING PROGRAM

## 2025-05-21 PROCEDURE — 05LN0DZ OCCLUSION OF LEFT INTERNAL JUGULAR VEIN WITH INTRALUMINAL DEVICE, OPEN APPROACH: ICD-10-PCS | Performed by: STUDENT IN AN ORGANIZED HEALTH CARE EDUCATION/TRAINING PROGRAM

## 2025-05-21 RX ORDER — FENTANYL CITRATE 50 UG/ML
INJECTION, SOLUTION INTRAMUSCULAR; INTRAVENOUS
Status: DISCONTINUED | OUTPATIENT
Start: 2025-05-21 | End: 2025-05-21 | Stop reason: SDUPTHER

## 2025-05-21 RX ORDER — FUROSEMIDE 40 MG/1
40 TABLET ORAL 2 TIMES DAILY
Status: DISCONTINUED | OUTPATIENT
Start: 2025-05-21 | End: 2025-05-22 | Stop reason: HOSPADM

## 2025-05-21 RX ORDER — PREDNISONE 20 MG/1
40 TABLET ORAL DAILY
Status: DISCONTINUED | OUTPATIENT
Start: 2025-05-22 | End: 2025-05-22 | Stop reason: HOSPADM

## 2025-05-21 RX ORDER — SODIUM CHLORIDE 9 MG/ML
INJECTION, SOLUTION INTRAVENOUS
Status: DISCONTINUED | OUTPATIENT
Start: 2025-05-21 | End: 2025-05-21 | Stop reason: SDUPTHER

## 2025-05-21 RX ORDER — MIDAZOLAM HYDROCHLORIDE 1 MG/ML
INJECTION, SOLUTION INTRAMUSCULAR; INTRAVENOUS
Status: DISCONTINUED | OUTPATIENT
Start: 2025-05-21 | End: 2025-05-21 | Stop reason: SDUPTHER

## 2025-05-21 RX ADMIN — FENTANYL CITRATE 25 MCG: 50 INJECTION INTRAMUSCULAR; INTRAVENOUS at 15:01

## 2025-05-21 RX ADMIN — IPRATROPIUM BROMIDE AND ALBUTEROL SULFATE 1 DOSE: .5; 3 SOLUTION RESPIRATORY (INHALATION) at 08:00

## 2025-05-21 RX ADMIN — AZITHROMYCIN DIHYDRATE 250 MG: 250 TABLET ORAL at 09:11

## 2025-05-21 RX ADMIN — SODIUM CHLORIDE: 9 INJECTION, SOLUTION INTRAVENOUS at 13:54

## 2025-05-21 RX ADMIN — SODIUM CHLORIDE, PRESERVATIVE FREE 10 ML: 5 INJECTION INTRAVENOUS at 09:12

## 2025-05-21 RX ADMIN — FUROSEMIDE 40 MG: 40 TABLET ORAL at 16:29

## 2025-05-21 RX ADMIN — FENTANYL CITRATE 25 MCG: 50 INJECTION INTRAMUSCULAR; INTRAVENOUS at 13:58

## 2025-05-21 RX ADMIN — MONTELUKAST 10 MG: 10 TABLET, FILM COATED ORAL at 09:11

## 2025-05-21 RX ADMIN — METOPROLOL SUCCINATE 25 MG: 25 TABLET, FILM COATED, EXTENDED RELEASE ORAL at 09:12

## 2025-05-21 RX ADMIN — FOLIC ACID 1 MG: 1 TABLET ORAL at 09:13

## 2025-05-21 RX ADMIN — BUDESONIDE 1 MG: 1 SUSPENSION RESPIRATORY (INHALATION) at 20:22

## 2025-05-21 RX ADMIN — MIDAZOLAM 0.5 MG: 1 INJECTION INTRAMUSCULAR; INTRAVENOUS at 13:55

## 2025-05-21 RX ADMIN — MIDAZOLAM 0.5 MG: 1 INJECTION INTRAMUSCULAR; INTRAVENOUS at 14:31

## 2025-05-21 RX ADMIN — METHYLPREDNISOLONE SODIUM SUCCINATE 40 MG: 40 INJECTION INTRAMUSCULAR; INTRAVENOUS at 16:29

## 2025-05-21 RX ADMIN — APIXABAN 5 MG: 5 TABLET, FILM COATED ORAL at 21:15

## 2025-05-21 RX ADMIN — IPRATROPIUM BROMIDE AND ALBUTEROL SULFATE 1 DOSE: .5; 3 SOLUTION RESPIRATORY (INHALATION) at 20:22

## 2025-05-21 RX ADMIN — SODIUM CHLORIDE, PRESERVATIVE FREE 10 ML: 5 INJECTION INTRAVENOUS at 21:16

## 2025-05-21 RX ADMIN — IPRATROPIUM BROMIDE AND ALBUTEROL SULFATE 1 DOSE: .5; 3 SOLUTION RESPIRATORY (INHALATION) at 11:38

## 2025-05-21 RX ADMIN — FUROSEMIDE 40 MG: 10 INJECTION, SOLUTION INTRAMUSCULAR; INTRAVENOUS at 09:10

## 2025-05-21 RX ADMIN — MIDAZOLAM 0.5 MG: 1 INJECTION INTRAMUSCULAR; INTRAVENOUS at 15:07

## 2025-05-21 RX ADMIN — GUAIFENESIN 1200 MG: 600 TABLET, MULTILAYER, EXTENDED RELEASE ORAL at 09:12

## 2025-05-21 RX ADMIN — ARFORMOTEROL TARTRATE 15 MCG: 15 SOLUTION RESPIRATORY (INHALATION) at 08:00

## 2025-05-21 RX ADMIN — BUDESONIDE 1 MG: 1 SUSPENSION RESPIRATORY (INHALATION) at 08:00

## 2025-05-21 RX ADMIN — GUAIFENESIN 1200 MG: 600 TABLET, MULTILAYER, EXTENDED RELEASE ORAL at 21:55

## 2025-05-21 RX ADMIN — SERTRALINE HYDROCHLORIDE 50 MG: 50 TABLET ORAL at 09:12

## 2025-05-21 RX ADMIN — ARFORMOTEROL TARTRATE 15 MCG: 15 SOLUTION RESPIRATORY (INHALATION) at 20:22

## 2025-05-21 RX ADMIN — METHYLPREDNISOLONE SODIUM SUCCINATE 40 MG: 40 INJECTION, POWDER, LYOPHILIZED, FOR SOLUTION INTRAMUSCULAR; INTRAVENOUS at 05:45

## 2025-05-21 ASSESSMENT — PAIN SCALES - GENERAL
PAINLEVEL_OUTOF10: 0

## 2025-05-21 ASSESSMENT — ENCOUNTER SYMPTOMS: SHORTNESS OF BREATH: 1

## 2025-05-21 ASSESSMENT — COPD QUESTIONNAIRES: CAT_SEVERITY: MODERATE

## 2025-05-21 NOTE — ANESTHESIA PRE PROCEDURE
tobacco: Never   • Tobacco comments:     1-2 a day cigs   Substance Use Topics   • Alcohol use: No                                Ready to quit: Not Answered  Counseling given: Not Answered  Tobacco comments: 1-2 a day cigs      Vital Signs (Current):   Vitals:    05/21/25 0900 05/21/25 0910 05/21/25 1138 05/21/25 1145   BP: 127/86 127/86  136/88   Pulse: 99  84 85   Resp: 20  15 18   Temp: 97.9 °F (36.6 °C)   97.8 °F (36.6 °C)   TempSrc: Oral   Oral   SpO2: 94%  95% 95%   Weight:       Height:                                                  BP Readings from Last 3 Encounters:   05/21/25 136/88   05/17/25 123/76   04/01/25 122/80       NPO Status:                                                                                 BMI:   Wt Readings from Last 3 Encounters:   05/20/25 71.7 kg (158 lb)   05/17/25 74.8 kg (165 lb)   04/01/25 59.9 kg (132 lb)     Body mass index is 27.12 kg/m².    CBC:   Lab Results   Component Value Date/Time    WBC 7.5 05/21/2025 02:29 AM    RBC 4.44 05/21/2025 02:29 AM    HGB 10.0 05/21/2025 02:29 AM    HCT 34.8 05/21/2025 02:29 AM    MCV 78.4 05/21/2025 02:29 AM    RDW 18.7 05/21/2025 02:29 AM     05/21/2025 02:29 AM       CMP:   Lab Results   Component Value Date/Time     05/21/2025 02:29 AM    K 3.5 05/21/2025 02:29 AM    CL 98 05/21/2025 02:29 AM    CO2 29 05/21/2025 02:29 AM    BUN 21 05/21/2025 02:29 AM    CREATININE 0.67 05/21/2025 02:29 AM    GFRAA >60 09/27/2022 02:10 PM    AGRATIO 0.9 12/07/2022 01:45 PM    LABGLOM >90 05/21/2025 02:29 AM    LABGLOM >90 04/17/2024 10:10 AM    LABGLOM >60 12/07/2022 01:45 PM    GLUCOSE 253 05/21/2025 02:29 AM    CALCIUM 9.1 05/21/2025 02:29 AM    BILITOT 0.8 05/17/2025 10:45 AM    ALKPHOS 68 05/17/2025 10:45 AM    ALKPHOS 93 12/07/2022 01:45 PM    AST 23 05/17/2025 10:45 AM    ALT 11 05/17/2025 10:45 AM       POC Tests:   Recent Labs     05/19/25  1827 05/21/25  1152   POCGLU 101* 146*   POCNA 141  --    POCK 3.5  --    POCCL 99  --

## 2025-05-21 NOTE — PROCEDURES
PROCEDURE NOTE  Date: 5/21/2025   Name: Rina Prajapati  YOB: 1960    Procedures        RADIOLOGY POST PROCEDURE NOTE     May 21, 2025       3:34 PM     Preoperative Diagnosis:   Central venous occlusion    Postoperative Diagnosis:  Same.    :  Dr. Buck    Assistant:  None.    Type of Anesthesia: 1% plain lidocaine, monitored anesthesia care    Procedure/Description:  Venogram and venoplasty    Findings: Venogram with near complete occlusion of the peripheral aspect of the R subclavian to SVC stent. Multiple attempts to cannulate the stent antegrade were unsuccessful. The stent was successfully catheterized from a retrograde fashion and venography demonstrated multifocal severe stenosis. The central aspect of the stent was balloon dilated to 4 mm. Multiple attempts to recanalize the peripheral portion of the stent were unsuccessful. A PICC line was therefore placed in the right axillary vein.    Estimated blood Loss:  less than 50 ml    Specimen Removed:  no    Blood transfusions:  None.    Complications: None    Condition: Stable    Dispo:  continue present therapy    Plan:  - Right sided PICC (axillary vein) may be used immediately.  - Okay to remove left upper extremity PICC.  - Consider outpatient IR consult for repeat attempt at venous recanalization vs tunneled PICC via R IJ stent.     Narayan Buck MD

## 2025-05-21 NOTE — ANESTHESIA POSTPROCEDURE EVALUATION
Department of Anesthesiology  Postprocedure Note    Patient: Rina Prajapati  MRN: [id number]  YOB: 1960  Date of evaluation: 5/21/2025    Procedure Summary       Date: 05/21/25 Room / Location: [facility] ANGIO IR; [facility] CARDIAC CATH LAB    Anesthesia Start: 1336 Anesthesia Stop: 1533    Procedure: IR PICC INSERT WO PORT OVER 5 YEARS WO IMG Diagnosis: (chemotherapy, PICC pulled back)    Scheduled Providers:  Responsible Provider: Charlie Epperson MD    Anesthesia Type: MAC ASA Status: 3            Anesthesia Type: MAC    Rodri Phase I:      Rodri Phase II:      Anesthesia Post Evaluation    Patient location during evaluation: PACU  Patient participation: complete - patient participated  Level of consciousness: awake  Airway patency: patent  Nausea & Vomiting: no nausea  Cardiovascular status: hemodynamically stable  Respiratory status: acceptable  Hydration status: euvolemic  Pain management: adequate    No notable events documented.

## 2025-05-22 ENCOUNTER — TELEPHONE (OUTPATIENT)
Age: 65
End: 2025-05-22

## 2025-05-22 VITALS
BODY MASS INDEX: 26.61 KG/M2 | WEIGHT: 155.87 LBS | DIASTOLIC BLOOD PRESSURE: 65 MMHG | TEMPERATURE: 98.1 F | SYSTOLIC BLOOD PRESSURE: 106 MMHG | HEART RATE: 93 BPM | HEIGHT: 64 IN | RESPIRATION RATE: 18 BRPM | OXYGEN SATURATION: 93 %

## 2025-05-22 PROBLEM — J96.20 ACUTE ON CHRONIC RESPIRATORY FAILURE (HCC): Status: RESOLVED | Noted: 2024-12-05 | Resolved: 2025-05-22

## 2025-05-22 LAB
ANION GAP SERPL CALC-SCNC: 11 MMOL/L (ref 3–18)
BASOPHILS # BLD: 0.01 K/UL (ref 0–0.1)
BASOPHILS NFR BLD: 0.1 % (ref 0–2)
BUN SERPL-MCNC: 24 MG/DL (ref 6–23)
BUN/CREAT SERPL: 31 (ref 12–20)
CALCIUM SERPL-MCNC: 9.3 MG/DL (ref 8.5–10.1)
CHLORIDE SERPL-SCNC: 98 MMOL/L (ref 98–107)
CO2 SERPL-SCNC: 30 MMOL/L (ref 21–32)
CREAT SERPL-MCNC: 0.78 MG/DL (ref 0.6–1.3)
DIFFERENTIAL METHOD BLD: ABNORMAL
EOSINOPHIL # BLD: 0 K/UL (ref 0–0.4)
EOSINOPHIL NFR BLD: 0 % (ref 0–5)
ERYTHROCYTE [DISTWIDTH] IN BLOOD BY AUTOMATED COUNT: 18.8 % (ref 11.6–14.5)
GLUCOSE BLD STRIP.AUTO-MCNC: 126 MG/DL (ref 70–110)
GLUCOSE BLD STRIP.AUTO-MCNC: 146 MG/DL (ref 70–110)
GLUCOSE SERPL-MCNC: 138 MG/DL (ref 74–108)
HCT VFR BLD AUTO: 34.2 % (ref 35–45)
HGB BLD-MCNC: 9.8 G/DL (ref 12–16)
IMM GRANULOCYTES # BLD AUTO: 0.04 K/UL (ref 0–0.04)
IMM GRANULOCYTES NFR BLD AUTO: 0.5 % (ref 0–0.5)
LYMPHOCYTES # BLD: 0.29 K/UL (ref 0.9–3.6)
LYMPHOCYTES NFR BLD: 3.3 % (ref 21–52)
MAGNESIUM SERPL-MCNC: 1.7 MG/DL (ref 1.6–2.6)
MCH RBC QN AUTO: 22.7 PG (ref 24–34)
MCHC RBC AUTO-ENTMCNC: 28.7 G/DL (ref 31–37)
MCV RBC AUTO: 79.4 FL (ref 78–100)
MONOCYTES # BLD: 0.39 K/UL (ref 0.05–1.2)
MONOCYTES NFR BLD: 4.4 % (ref 3–10)
NEUTS SEG # BLD: 8.15 K/UL (ref 1.8–8)
NEUTS SEG NFR BLD: 91.7 % (ref 40–73)
NRBC # BLD: 0 K/UL (ref 0–0.01)
NRBC BLD-RTO: 0 PER 100 WBC
PLATELET # BLD AUTO: 199 K/UL (ref 135–420)
PMV BLD AUTO: 10.7 FL (ref 9.2–11.8)
POTASSIUM SERPL-SCNC: 3.7 MMOL/L (ref 3.5–5.5)
RBC # BLD AUTO: 4.31 M/UL (ref 4.2–5.3)
SODIUM SERPL-SCNC: 139 MMOL/L (ref 136–145)
WBC # BLD AUTO: 8.9 K/UL (ref 4.6–13.2)

## 2025-05-22 PROCEDURE — 2700000000 HC OXYGEN THERAPY PER DAY

## 2025-05-22 PROCEDURE — 82962 GLUCOSE BLOOD TEST: CPT

## 2025-05-22 PROCEDURE — 36415 COLL VENOUS BLD VENIPUNCTURE: CPT

## 2025-05-22 PROCEDURE — 85025 COMPLETE CBC W/AUTO DIFF WBC: CPT

## 2025-05-22 PROCEDURE — 99232 SBSQ HOSP IP/OBS MODERATE 35: CPT | Performed by: INTERNAL MEDICINE

## 2025-05-22 PROCEDURE — 94761 N-INVAS EAR/PLS OXIMETRY MLT: CPT

## 2025-05-22 PROCEDURE — 83735 ASSAY OF MAGNESIUM: CPT

## 2025-05-22 PROCEDURE — 80048 BASIC METABOLIC PNL TOTAL CA: CPT

## 2025-05-22 PROCEDURE — 6370000000 HC RX 637 (ALT 250 FOR IP)

## 2025-05-22 PROCEDURE — 94640 AIRWAY INHALATION TREATMENT: CPT

## 2025-05-22 PROCEDURE — 6370000000 HC RX 637 (ALT 250 FOR IP): Performed by: HOSPITALIST

## 2025-05-22 PROCEDURE — 2500000003 HC RX 250 WO HCPCS: Performed by: FAMILY MEDICINE

## 2025-05-22 PROCEDURE — 99239 HOSP IP/OBS DSCHRG MGMT >30: CPT | Performed by: HOSPITALIST

## 2025-05-22 PROCEDURE — 6370000000 HC RX 637 (ALT 250 FOR IP): Performed by: FAMILY MEDICINE

## 2025-05-22 RX ORDER — IPRATROPIUM BROMIDE AND ALBUTEROL SULFATE 2.5; .5 MG/3ML; MG/3ML
3 SOLUTION RESPIRATORY (INHALATION) EVERY 4 HOURS PRN
Qty: 100 EACH | Refills: 0 | Status: SHIPPED | OUTPATIENT
Start: 2025-05-22

## 2025-05-22 RX ORDER — PREDNISONE 10 MG/1
TABLET ORAL
Qty: 30 TABLET | Refills: 0 | Status: SHIPPED | OUTPATIENT
Start: 2025-05-23 | End: 2025-06-04

## 2025-05-22 RX ORDER — GUAIFENESIN 600 MG/1
1200 TABLET, EXTENDED RELEASE ORAL 2 TIMES DAILY
Qty: 20 TABLET | Refills: 0 | Status: SHIPPED | OUTPATIENT
Start: 2025-05-22

## 2025-05-22 RX ORDER — MONTELUKAST SODIUM 10 MG/1
10 TABLET ORAL DAILY
Qty: 30 TABLET | Refills: 0 | Status: SHIPPED | OUTPATIENT
Start: 2025-05-22

## 2025-05-22 RX ORDER — AZITHROMYCIN 250 MG/1
250 TABLET, FILM COATED ORAL DAILY
Qty: 1 TABLET | Refills: 0 | Status: SHIPPED | OUTPATIENT
Start: 2025-05-23 | End: 2025-05-24

## 2025-05-22 RX ADMIN — GUAIFENESIN 1200 MG: 600 TABLET, MULTILAYER, EXTENDED RELEASE ORAL at 09:09

## 2025-05-22 RX ADMIN — FOLIC ACID 1 MG: 1 TABLET ORAL at 09:09

## 2025-05-22 RX ADMIN — APIXABAN 5 MG: 5 TABLET, FILM COATED ORAL at 09:09

## 2025-05-22 RX ADMIN — SODIUM CHLORIDE, PRESERVATIVE FREE 10 ML: 5 INJECTION INTRAVENOUS at 09:10

## 2025-05-22 RX ADMIN — AZITHROMYCIN DIHYDRATE 250 MG: 250 TABLET ORAL at 09:09

## 2025-05-22 RX ADMIN — FUROSEMIDE 40 MG: 40 TABLET ORAL at 17:16

## 2025-05-22 RX ADMIN — PREDNISONE 40 MG: 20 TABLET ORAL at 09:08

## 2025-05-22 RX ADMIN — FUROSEMIDE 40 MG: 40 TABLET ORAL at 09:09

## 2025-05-22 RX ADMIN — FERROUS SULFATE TAB 325 MG (65 MG ELEMENTAL FE) 325 MG: 325 (65 FE) TAB at 09:09

## 2025-05-22 RX ADMIN — THERA TABS 1 TABLET: TAB at 06:09

## 2025-05-22 RX ADMIN — IPRATROPIUM BROMIDE AND ALBUTEROL SULFATE 1 DOSE: .5; 3 SOLUTION RESPIRATORY (INHALATION) at 11:29

## 2025-05-22 RX ADMIN — SERTRALINE HYDROCHLORIDE 50 MG: 50 TABLET ORAL at 09:09

## 2025-05-22 RX ADMIN — LEVOTHYROXINE SODIUM 88 MCG: 88 TABLET ORAL at 06:09

## 2025-05-22 RX ADMIN — MONTELUKAST 10 MG: 10 TABLET, FILM COATED ORAL at 09:09

## 2025-05-22 ASSESSMENT — PAIN SCALES - GENERAL
PAINLEVEL_OUTOF10: 0

## 2025-05-22 NOTE — PLAN OF CARE
Problem: Chronic Conditions and Co-morbidities  Goal: Patient's chronic conditions and co-morbidity symptoms are monitored and maintained or improved  5/21/2025 0019 by Alta Walls, RN  Outcome: Progressing  Flowsheets (Taken 5/21/2025 0019)  Care Plan - Patient's Chronic Conditions and Co-Morbidity Symptoms are Monitored and Maintained or Improved:   Monitor and assess patient's chronic conditions and comorbid symptoms for stability, deterioration, or improvement   Collaborate with multidisciplinary team to address chronic and comorbid conditions and prevent exacerbation or deterioration   Update acute care plan with appropriate goals if chronic or comorbid symptoms are exacerbated and prevent overall improvement and discharge     Problem: Discharge Planning  Goal: Discharge to home or other facility with appropriate resources  5/21/2025 0019 by Alta Walls RN  Outcome: Progressing  Flowsheets (Taken 5/21/2025 0019)  Discharge to home or other facility with appropriate resources:   Identify barriers to discharge with patient and caregiver   Arrange for needed discharge resources and transportation as appropriate   Identify discharge learning needs (meds, wound care, etc)   Arrange for interpreters to assist at discharge as needed     Problem: Pain  Goal: Verbalizes/displays adequate comfort level or baseline comfort level  5/21/2025 0019 by Alta Walls, RN  Outcome: Progressing  Flowsheets (Taken 5/21/2025 0019)  Verbalizes/displays adequate comfort level or baseline comfort level:   Encourage patient to monitor pain and request assistance   Assess pain using appropriate pain scale   Administer analgesics based on type and severity of pain and evaluate response   Implement non-pharmacological measures as appropriate and evaluate response   Consider cultural and social influences on pain and pain management     Problem: Safety - Adult  Goal: Free from fall injury  5/21/2025 0019 by Alta Walls, 
  Problem: Chronic Conditions and Co-morbidities  Goal: Patient's chronic conditions and co-morbidity symptoms are monitored and maintained or improved  5/21/2025 2315 by Yashira Mandujano, RN  Outcome: Progressing  Flowsheets (Taken 5/21/2025 2315)  Care Plan - Patient's Chronic Conditions and Co-Morbidity Symptoms are Monitored and Maintained or Improved:   Monitor and assess patient's chronic conditions and comorbid symptoms for stability, deterioration, or improvement   Collaborate with multidisciplinary team to address chronic and comorbid conditions and prevent exacerbation or deterioration   Update acute care plan with appropriate goals if chronic or comorbid symptoms are exacerbated and prevent overall improvement and discharge  Note: Monitor patients comorbidities and intervene as needed to prevent worsening of symptoms, primarily pain management at this time.     Problem: Pain  Goal: Verbalizes/displays adequate comfort level or baseline comfort level  5/21/2025 2315 by Yashira Mandujano, RN  Outcome: Progressing  Flowsheets  Taken 5/21/2025 2315  Verbalizes/displays adequate comfort level or baseline comfort level:   Encourage patient to monitor pain and request assistance   Administer analgesics based on type and severity of pain and evaluate response   Assess pain using appropriate pain scale   Implement non-pharmacological measures as appropriate and evaluate response   Notify Licensed Independent Practitioner if interventions unsuccessful or patient reports new pain  Taken 5/21/2025 2034  Verbalizes/displays adequate comfort level or baseline comfort level:   Encourage patient to monitor pain and request assistance   Assess pain using appropriate pain scale   Administer analgesics based on type and severity of pain and evaluate response   Implement non-pharmacological measures as appropriate and evaluate response   Notify Licensed Independent Practitioner if interventions unsuccessful or patient reports 
  Problem: Chronic Conditions and Co-morbidities  Goal: Patient's chronic conditions and co-morbidity symptoms are monitored and maintained or improved  5/22/2025 1046 by Dorcas Hunter RN  Outcome: Progressing  Flowsheets (Taken 5/22/2025 1044)  Care Plan - Patient's Chronic Conditions and Co-Morbidity Symptoms are Monitored and Maintained or Improved:   Monitor and assess patient's chronic conditions and comorbid symptoms for stability, deterioration, or improvement   Collaborate with multidisciplinary team to address chronic and comorbid conditions and prevent exacerbation or deterioration  Note: Monitor patients comorbidities and intervene as needed to prevent worsening of symptoms, primarily pain management at this time.     Problem: Discharge Planning  Goal: Discharge to home or other facility with appropriate resources  5/22/2025 1046 by Dorcas Hunter RN  Outcome: Progressing  Note: Discharge expected to be today, may 22nd, 2025. Anticipated to transport home with .     Problem: Pain  Goal: Verbalizes/displays adequate comfort level or baseline comfort level  5/22/2025 1046 by Dorcas Hunter RN  Outcome: Progressing  Flowsheets (Taken 5/22/2025 1045)  Verbalizes/displays adequate comfort level or baseline comfort level:   Encourage patient to monitor pain and request assistance   Assess pain using appropriate pain scale   Administer analgesics based on type and severity of pain and evaluate response  Note: Regularly monitor patients pain and intervene as appropriate. Encourage patient to also utilize non-pharmacological measures. Such as, distractions or music.     Problem: Safety - Adult  Goal: Free from fall injury  5/22/2025 1046 by oDrcas Hunter, RN  Outcome: Progressing  Flowsheets (Taken 5/22/2025 1046)  Free From Fall Injury:   Instruct family/caregiver on patient safety   Based on caregiver fall risk screen, instruct family/caregiver to ask for assistance with transferring infant if caregiver 
  Problem: Chronic Conditions and Co-morbidities  Goal: Patient's chronic conditions and co-morbidity symptoms are monitored and maintained or improved  Outcome: Progressing  Flowsheets  Taken 5/18/2025 0125 by Yashira Mandujano RN  Care Plan - Patient's Chronic Conditions and Co-Morbidity Symptoms are Monitored and Maintained or Improved:   Monitor and assess patient's chronic conditions and comorbid symptoms for stability, deterioration, or improvement   Collaborate with multidisciplinary team to address chronic and comorbid conditions and prevent exacerbation or deterioration   Update acute care plan with appropriate goals if chronic or comorbid symptoms are exacerbated and prevent overall improvement and discharge  Taken 5/17/2025 2033 by Inder Bo RN  Care Plan - Patient's Chronic Conditions and Co-Morbidity Symptoms are Monitored and Maintained or Improved:   Monitor and assess patient's chronic conditions and comorbid symptoms for stability, deterioration, or improvement   Collaborate with multidisciplinary team to address chronic and comorbid conditions and prevent exacerbation or deterioration   Update acute care plan with appropriate goals if chronic or comorbid symptoms are exacerbated and prevent overall improvement and discharge  Note: Monitor patient for signs of worsening comorbidities and treat as indicated.     Problem: Pain  Goal: Verbalizes/displays adequate comfort level or baseline comfort level  Outcome: Progressing  Flowsheets (Taken 5/18/2025 0125)  Verbalizes/displays adequate comfort level or baseline comfort level:   Encourage patient to monitor pain and request assistance   Assess pain using appropriate pain scale   Administer analgesics based on type and severity of pain and evaluate response   Implement non-pharmacological measures as appropriate and evaluate response  Note: Monitor for pain and encourage alternative methods as appropriate.     Problem: Safety - Adult  Goal: Free 
  Problem: Chronic Conditions and Co-morbidities  Goal: Patient's chronic conditions and co-morbidity symptoms are monitored and maintained or improved  Outcome: Progressing  Flowsheets (Taken 5/18/2025 0125 by Yashira Mandujano, RN)  Care Plan - Patient's Chronic Conditions and Co-Morbidity Symptoms are Monitored and Maintained or Improved:   Monitor and assess patient's chronic conditions and comorbid symptoms for stability, deterioration, or improvement   Collaborate with multidisciplinary team to address chronic and comorbid conditions and prevent exacerbation or deterioration   Update acute care plan with appropriate goals if chronic or comorbid symptoms are exacerbated and prevent overall improvement and discharge     Problem: Pain  Goal: Verbalizes/displays adequate comfort level or baseline comfort level  5/19/2025 1148 by Goldy Hoffmann RN  Outcome: Progressing  Flowsheets (Taken 5/18/2025 2224 by Michell Palumbo RN)  Verbalizes/displays adequate comfort level or baseline comfort level:   Encourage patient to monitor pain and request assistance   Administer analgesics based on type and severity of pain and evaluate response   Assess pain using appropriate pain scale  5/18/2025 2224 by Michell Palumbo RN  Outcome: Progressing  Flowsheets (Taken 5/18/2025 2224)  Verbalizes/displays adequate comfort level or baseline comfort level:   Encourage patient to monitor pain and request assistance   Administer analgesics based on type and severity of pain and evaluate response   Assess pain using appropriate pain scale  Note: Pt. Educated on pain medication availability.  Pt. Verbalized understanding.  Denies pain.     Problem: Safety - Adult  Goal: Free from fall injury  5/19/2025 1148 by Goldy Hoffmann RN  Outcome: Progressing  Flowsheets (Taken 5/18/2025 2224 by Michell Palumbo RN)  Free From Fall Injury: Based on caregiver fall risk screen, instruct family/caregiver to ask for assistance with transferring infant 
  Problem: Discharge Planning  Goal: Discharge to home or other facility with appropriate resources  5/20/2025 1139 by Amairani Gamez, RN  Outcome: Progressing  Flowsheets (Taken 5/20/2025 1139)  Discharge to home or other facility with appropriate resources:   Arrange for needed discharge resources and transportation as appropriate   Identify discharge learning needs (meds, wound care, etc)   Identify barriers to discharge with patient and caregiver  Note: Pt will d/c home once medically stable. Pt needs PICC line exchange prior to d/c and receiving IV steroids.     Problem: Pain  Goal: Verbalizes/displays adequate comfort level or baseline comfort level  5/20/2025 1139 by Amairani Gamez, RN  Outcome: Progressing  Flowsheets (Taken 5/20/2025 0115 by Yashira Mandujano, RN)  Verbalizes/displays adequate comfort level or baseline comfort level:   Encourage patient to monitor pain and request assistance   Assess pain using appropriate pain scale   Administer analgesics based on type and severity of pain and evaluate response   Implement non-pharmacological measures as appropriate and evaluate response  Note: Pt has no c/o pain. Will assess pain using numeric pain scale at least once per shift and PRN.       Problem: Safety - Adult  Goal: Free from fall injury  5/20/2025 1139 by Amairani Gamez, RN  Outcome: Progressing  Flowsheets (Taken 5/20/2025 1139)  Free From Fall Injury: Instruct family/caregiver on patient safety  Note: Pt will remain free from falls. Pt has bed alarm and gripper socks on, call bell and bedside table within reach, and side rails x3. Will hourly round on pt to address 5 P's.        Problem: Metabolic/Fluid and Electrolytes - Adult  Goal: Electrolytes maintained within normal limits  5/20/2025 1139 by Amairani Gamez, RN  Outcome: Progressing  Flowsheets (Taken 5/20/2025 1137)  Electrolytes maintained within normal limits:   Monitor labs and assess patient for signs and symptoms of 
  Problem: Discharge Planning  Goal: Discharge to home or other facility with appropriate resources  Outcome: Progressing  Flowsheets (Taken 5/18/2025 1215)  Discharge to home or other facility with appropriate resources: Identify barriers to discharge with patient and caregiver  Note: Maintain oxygen saturation and ween oxygen requirements as ordered.     Problem: Safety - Adult  Goal: Free from fall injury  5/18/2025 1215 by Shanae Alex RN  Outcome: Progressing  Flowsheets (Taken 5/18/2025 1215)  Free From Fall Injury: Instruct family/caregiver on patient safety  Note: Bed in the lowest position, non skid socks on, call bell within reach.     
  Problem: Pain  Goal: Verbalizes/displays adequate comfort level or baseline comfort level  5/20/2025 0115 by Yashira Mandujano, RN  Outcome: Progressing  Flowsheets (Taken 5/20/2025 0115)  Verbalizes/displays adequate comfort level or baseline comfort level:   Encourage patient to monitor pain and request assistance   Assess pain using appropriate pain scale   Administer analgesics based on type and severity of pain and evaluate response   Implement non-pharmacological measures as appropriate and evaluate response  Note: Patient will remain pain free for duration of shift.     Problem: Safety - Adult  Goal: Free from fall injury  5/20/2025 0115 by Yashira Mandujano, RN  Outcome: Progressing  Flowsheets (Taken 5/20/2025 0115)  Free From Fall Injury: Instruct family/caregiver on patient safety  Note: Patient will remain free of falls for duration of shift.     Problem: Chronic Conditions and Co-morbidities  Goal: Patient's chronic conditions and co-morbidity symptoms are monitored and maintained or improved  5/20/2025 0115 by Yashira Mandujano, RN  Outcome: Progressing  Flowsheets (Taken 5/20/2025 0115)  Care Plan - Patient's Chronic Conditions and Co-Morbidity Symptoms are Monitored and Maintained or Improved:   Monitor and assess patient's chronic conditions and comorbid symptoms for stability, deterioration, or improvement   Collaborate with multidisciplinary team to address chronic and comorbid conditions and prevent exacerbation or deterioration   Update acute care plan with appropriate goals if chronic or comorbid symptoms are exacerbated and prevent overall improvement and discharge  Note: Monitor patient for changes in comorbid symptoms and intervene as appropriate.     
  Problem: Pain  Goal: Verbalizes/displays adequate comfort level or baseline comfort level  Outcome: Progressing  Flowsheets (Taken 5/18/2025 2224)  Verbalizes/displays adequate comfort level or baseline comfort level:   Encourage patient to monitor pain and request assistance   Administer analgesics based on type and severity of pain and evaluate response   Assess pain using appropriate pain scale  Note: Pt. Educated on pain medication availability.  Pt. Verbalized understanding.  Denies pain.     Problem: Safety - Adult  Goal: Free from fall injury  5/18/2025 2224 by Michell Palumbo RN  Outcome: Progressing  Flowsheets (Taken 5/18/2025 2224)  Free From Fall Injury: Based on caregiver fall risk screen, instruct family/caregiver to ask for assistance with transferring infant if caregiver noted to have fall risk factors  Note: Pt. Educated on call bell when in need of help and assistance.  Pt. Verbalized understanding. Bed alarm on. Overhead table within reach     
family/caregiver on patient safety   Based on caregiver fall risk screen, instruct family/caregiver to ask for assistance with transferring infant if caregiver noted to have fall risk factors  Note: Proper fall precautions are in place: bed locked and in lowest position. Non-skid socks are on, bed alarm is on.

## 2025-05-22 NOTE — CARE COORDINATION
05/22/25 1129   IMM Letter   IMM Letter given to Patient/Family/Significant other/Guardian/POA/by: Patrice Siegel   IMM Letter date given: 05/22/25   IMM Letter time given: 1130     Sw discussed the IMM with the patient, she acknowledged understanding. Original placed in the chart.

## 2025-05-22 NOTE — TELEPHONE ENCOUNTER
----- Message from ROSA Dhillon NP sent at 5/22/2025 10:52 AM EDT -----  Needs 4 week follow up in the office with Dr. Obregon

## 2025-05-22 NOTE — CARE COORDINATION
[facility] waiting on accepting home health facilities.     Update: 1:11 pm    [facility] has accepted.     Update: 4:04 pm    [facility] is unable to accept.

## 2025-05-22 NOTE — DISCHARGE SUMMARY
Discharge Summary    Patient: Rina Prajapati MRN: [id number]  CSN: [id number]    YOB: 1960  Age: 64 y.o.  Sex: female    DOA: 5/17/2025 LOS:  LOS: 5 days        Disposition: Home with [facility]    Discharge Date: 5/22/2025    Admission Diagnosis: CHF exacerbation (HCC) [I50.9]  Acute on chronic respiratory failure with hypoxia (HCC) [J96.21]    Discharge Diagnosis:    Acute on chronic hypoxic respiratory failure  Acute on chronic heart failure, EF 50%  Right lung worsening opacities, edema versus lung cancer related  Elevated troponin  Hypokalemia  Hypothyroidism  Non small cell lung cancer  Hx PE on Eliquis  Pulmonary hypertension  COPD  Hypertension  Hypokalemia  Lactic acidemia resolved  Hypochloremia  Hypoalbuminemia  Mild anemia      Discharge Condition: Stable      PHYSICAL EXAM  Visit Vitals  /62   Pulse 74   Temp 98.4 °F (36.9 °C) (Oral)   Resp 18   Ht 1.626 m (5' 4\")   Wt 70.7 kg (155 lb 13.8 oz)   SpO2 95%   BMI 26.75 kg/m²       General: Alert, cooperative, no acute distress    Lungs:  CTA Bilaterally. Few exp Wheezing, no Rales.  Heart:             Regular rate and Rhythm.  Abdomen: Soft, Non distended, Non tender. + Bowel sounds.  Extremities: No edema.  Psych:   Good insight. Not anxious or agitated.  Neurologic:  AA, oriented X 3. Moves all ext                               HISTORY OF PRESENT ILLNESS:     Rina Prajapati is a 64 y.o.   female with past medical history of chronic hypoxic respiratory failure on 4 L of oxygen daily, pulmonary embolism on Eliquis, depression, hypertension, and non-small cell lung cancer who presents to the ER this evening with complaints of shortness of breath that is progressively worsened over the past several days.  Ms. Prajapati denies lower extremity edema but endorses orthopnea, nonproductive cough, wheezing, shortness of breath, dyspnea on exertion.  She states she took 2 doses of Lasix yesterday out of concern for heart failure.

## 2025-05-22 NOTE — PROGRESS NOTES
Interventional Radiology Progress Note    Patient: Rina Prajapati               Sex: female          DOA: 5/17/2025       YOB: 1960      Age:  64 y.o.        LOS: 3 days       Assessment/Plan     Patient is POD#1 s/p PICC replacement attempt (aborted due to hypoxia)    The patient is seen bedside on 8 L nasal cannula.     (+) Broderick's sign (hypoxia with arm raise over head, sats dropped <88% on 8L NC) indicating ongoing SVC syndrome despite right brachiocephalic stent, with CTA showing continued central venous occlusion from 12/2024.    Left arm PICC is not a good candidate for exchange with extensive internal blood clot, not recently flushed. This can come out.    We will plan on right arm PICC placement with angioplasty and possible SVC stent 5/21/25 with anesthesia support. Estimated timing 12PM.    NPO after MN for sedation.    Subjective:     The patient denies discomfort at the left arm PICC site    The patient denies difficulty breathing or facial swelling, but also will not lay back in bed because \"I can't catch my breath.\"    The patient denies N/V, HA, dizziness, fever, chills, abdominal pain, chest pain    Objective:      Visit Vitals  /79   Pulse 99   Temp 97.6 °F (36.4 °C) (Oral)   Resp 20   Ht 1.626 m (5' 4\")   Wt 71.7 kg (158 lb)   SpO2 94%   BMI 27.12 kg/m²     Recent images:  Procedure images are available for review in PACS  CTA 12/2025    Interval history:  The patient has been seen by cardiology and pulmonology to optimize respiratory status prior to further procedure     Physical Exam:  Constitutional: NAD. A&Ox4.  Respiratory: Normal respiratory effort. Tachypnea and dyspnea with arms raised overhead    Cardiovascular: Regular rate and rhythm. Tachycardia present when arms raised overhead  Gastrointestinal: Soft, NT, ND.  Procedure site: left arm PICC with dark red clotting internally, no external erythema or edema, NTTP    Thank you,  Kathleen Gonzalez, 
    Interventional Radiology Progress Note    Patient: Rina Prajapati               Sex: female          DOA: 5/17/2025       YOB: 1960      Age:  64 y.o.        LOS: 5 days       Assessment/Plan     Patient is POD#1 s/p right axillary vein catheter placement by Dr. Buck with partial central venous angioplasty.     We will plan to continue to treat the patient's central venous occlusion in the outpatient setting. Tunneled central venous catheter placement with further angioplasty of the central venous stenosis    Right arm axillary vein dual lumen power injectable catheter does not aspirate, but does flush and is OK for use for chemotherapy until alternative access can be achieved.    Subjective:     The patient denies discomfort at the procedure access sites  The patient denies N/V, HA, dizziness, fever, chills, abdominal pain, dyspnea    Objective:      Visit Vitals  /60   Pulse 72   Temp 98.2 °F (36.8 °C) (Oral)   Resp 18   Ht 1.626 m (5' 4\")   Wt 70.7 kg (155 lb 13.8 oz)   SpO2 95%   BMI 26.75 kg/m²       Recent images:  Procedure images are available for review in PACS    Interval history:  NAEO    Physical Exam:  Constitutional: NAD. A&Ox4.  Respiratory: Normal respiratory effort on 4 L nasal cannula  Cardiovascular: Regular rate.   Gastrointestinal: Soft, NT, ND.  Procedure site: Right neck and groin access sites without sign of hematoma or ecchymosis, right arm dual-lumen venous catheter does not aspirate via either port but does flush easily with dressings CDI    Thank you,  CARLOS Galindo  [phone]    
   05/20/25 0310   Oxygen Therapy/Pulse Ox   O2 Therapy Oxygen humidified   $Oxygen $Daily Charge   O2 Device High flow nasal cannula  (salter)   O2 Flow Rate (L/min) (S)  7 L/min  (found on 9L)   Pulse 98   Respirations 22   SpO2 98 %   Skin Assessment Clean, dry, & intact       
   05/21/25 2025   Oxygen Therapy/Pulse Ox   O2 Therapy Oxygen humidified   $Oxygen $Daily Charge   O2 Device Nasal cannula   O2 Flow Rate (L/min) (S)  4 L/min  (patient on 4L at home)   Pulse 84   Respirations 18   SpO2 94 %   Skin Assessment Clean, dry, & intact       
0100: Patient had PICC line dressing dated back to 1/29/25. Dressing changed by this RN on 5/18/25.  
1910-Bedside and Verbal shift change report given to CORTES Larry (oncoming nurse) by CORTES Toscano (offgoing nurse). Report included the following information Nurse Handoff Report, Adult Overview, Recent Results, and Cardiac Rhythm NSR .      0710-Bedside and Verbal shift change report given to CORTES Toscano (oncoming nurse) by CORTES Larry (offgoing nurse). Report included the following information Nurse Handoff Report, Adult Overview, Recent Results, and Cardiac Rhythm NSR.    
4 Eyes Skin Assessment     NAME:  Rina Prajapati  YOB: 1960  MEDICAL RECORD NUMBER:  [id number]    The patient is being assessed for  Transfer to New Unit    I agree that at least one RN has performed a thorough Head to Toe Skin Assessment on the patient. ALL assessment sites listed below have been assessed.      Areas assessed by both nurses:    Head, Face, Ears, Shoulders, Back, Chest, Arms, Elbows, Hands, Sacrum. Buttock, Coccyx, Ischium, Legs. Feet and Heels, and Under Medical Devices         Does the Patient have a Wound? No noted wound(s)       Isidro Prevention initiated by RN: Yes  Wound Care Orders initiated by RN: No    Pressure Injury (Stage 3,4, Unstageable, DTI, NWPT, and Complex wounds) if present, place Wound referral order by RN under : No    New Ostomies, if present place, Ostomy referral order under : No     Nurse 1 eSignature: Electronically signed by Nataly Mcgowan RN on 5/19/25 at 5:59 AM EDT    **SHARE this note so that the co-signing nurse can place an eSignature**    Nurse 2 eSignature: Electronically signed by MAIRA BAKER RN on 5/19/25 at 8:26 AM EDT   
Advance Care Planning   Healthcare Decision Maker:    Primary Decision Maker: Radha Jolly - Brother/Sister - [phone]    Secondary Decision Maker: KrunalDylan - Brother/Sister - [phone]    Today we documented Decision Maker(s) consistent with ACP documents on file.       Spiritual Health History and Assessment/Progress Note  [facility]    Spiritual/Emotional Needs, Advance Care Planning,  ,  ,      Name: Rina Prajapati MRN: [id number]    Age: 64 y.o.     Sex: female   Language: English   Moravian: None   Acute on chronic respiratory failure with hypoxia (HCC)     Date: 5/20/2025            Total Time Calculated: (P) 7 min              Spiritual Assessment began in [facility]        Referral/Consult From: Multi-disciplinary team   Encounter Overview/Reason: Spiritual/Emotional Needs, Advance Care Planning  Service Provided For: Patient    Tara, Belief, Meaning:   Patient has beliefs or practices that help with coping during difficult times  Family/Friends No family/friends present      Importance and Influence:  Patient has spiritual/personal beliefs that influence decisions regarding their health  Family/Friends No family/friends present    Community:  Patient feels well-supported. Support system includes: Extended family  Family/Friends No family/friends present    Assessment and Plan of Care:     Patient Interventions include: Facilitated expression of thoughts and feelings  Family/Friends Interventions include: No family/friends present    Patient Plan of Care: No spiritual needs identified for follow-up  Family/Friends Plan of Care: No family/friends present    Electronically signed by IAN Ann on 5/20/2025 at 6:17 PM      
[facility] - Progress Note    Admit Date: 5/17/2025  Attending Cardiologist: Dr. Marr    Assessment:     Patient Active Problem List    Diagnosis Date Noted    Hypokalemia 05/20/2025    Acute on chronic respiratory failure with hypoxia ([facility]) 05/17/2025    New onset atrial fibrillation (HCC) 02/16/2025    Pulmonary hypertension (HCC) 02/10/2025    Acute on chronic heart failure (HCC) 02/10/2025    Acute on chronic hypoxic respiratory failure (HCC) 02/07/2025    History of deep venous thrombosis (DVT) of distal vein of right lower extremity 02/07/2025    Current use of long term anticoagulation 02/07/2025    Hypothyroidism 02/07/2025    Acute on chronic heart failure with mildly reduced ejection fraction (HFmrEF, 41-49%) ([facility]) 01/29/2025    Malignant neoplasm of lung (HCC) 01/28/2025    Muscle weakness (generalized) 12/16/2024    Tobacco use disorder 12/12/2024    History of pulmonary embolism 12/07/2024    Pericardial effusion 12/06/2024    Acute on chronic respiratory failure ([facility]) 12/05/2024    Debility 11/20/2024    Encounter for palliative care 11/20/2024    Palliative care encounter 11/15/2024    Goals of care, counseling/discussion 11/15/2024    Malignant neoplasm of upper lobe of right lung (HCC) 10/24/2024    Chronic systolic (congestive) heart failure 09/19/2024    Acute exacerbation of chronic obstructive pulmonary disease (COPD) ([facility]) 06/14/2024    Congestive heart failure (HCC) 06/06/2024    Thrombocytosis 05/21/2024    Dyspnea 05/18/2024    Tobacco dependence due to cigarettes 04/22/2024    Malignant neoplastic disease (HCC) 04/21/2024    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  Patient utilizes 4 L nasal O2 at baseline.  
[facility] - Progress Note    Admit Date: 5/17/2025  Attending Cardiologist: Dr. Obregon    Assessment:     - Acute on chronic hypoxic respiratory failure. Required HFNC, now on baseline 4 L NC O2 at baseline.  Likely multifactorial in the setting of decompensated heart failure, lung CA, COPD, pulmonary hypertension, and possible pneumonia  - Acute on chronic systolic and diastolic heart failure likely due to dietary indiscretion.  EF 45-50% on echo 5/2025, which is unchanged from echo 2/2025.  Chest x-ray 5/19 with bilateral diffuse interstitial and hazy opacities and a small right pleural effusion. Initial NT proBNP 5985. On IV lasix 40mg BID  GDMT with toprol xl and lasix as outpatient   - Right lung with worsening opacities on CXR 5/19/25. Infiltrate versus edema versus lung cancer related.  On IV antibiotic and IV diuretics.  - Moderate  pericardial effusion on echocardiogram 2/2025. No indication of cardiac tamponade. Pericardial effusion has been on noted on echos to be in the small to moderate range dating back to 11/2024.   - Minimally elevated troponin likely demand ischemia in the setting of above.  - pAF.  DOAC with Eliquis.  Rate control with Toprol-XL  - Pulmonary hypertension WHO Grp 2/3 with dilated RV RVSP 45mmHg on 11/14/24  - Severe COPD with possible exacerbation.  On IV antibiotics, breathing treatments and steroids  - Non-small cell lung cancer.  On chemotherapy.  Follows with the [facility]  - HTN   - SVC syndrome  - History of PE and DVT.  On chronic anticoagulation with Eliquis twice daily.  - History of tobacco use.  Quit smoking this past year    Primary Cardiologist: Dr. Obregon    Plan:     - Transitioned from IV to PO lasix 40mg BID  - Continue toprol xl for rate control with pAF hx. OAC with Eliquis.    Subjective:     Patient's breathing is back to baseline. Denies chest pain and palpitations.     Objective:      Patient Vitals for the past 8 hrs:   Temp Pulse Resp BP 
[facility] - Progress Note    Admit Date: 5/17/2025  Attending Cardiologist: Dr. Stoner    Assessment:     - Acute on chronic hypoxic respiratory failure. Required HFNC, now on baseline 4 L NC O2.  Likely multifactorial in the setting of decompensated heart failure, lung CA, COPD, pulmonary hypertension, and possible pneumonia  - Acute on chronic systolic and diastolic heart failure likely due to dietary indiscretion.  EF 45-50% on echo 5/2025, which is unchanged from echo 2/2025.  Chest x-ray 5/19 with bilateral diffuse interstitial and hazy opacities and a small right pleural effusion. Initial NT proBNP 5985. On IV lasix 40mg BID  GDMT with toprol xl and lasix as outpatient.   - Right lung with worsening opacities on CXR 5/19/25. Infiltrate versus edema versus lung cancer related.  On PO diuretics and steroids. Completed IV abx,  - Moderate  pericardial effusion on echocardiogram 2/2025. No indication of cardiac tamponade. Pericardial effusion has been on noted on echos to be in the small to moderate range dating back to 11/2024.   - Minimally elevated troponin likely demand ischemia in the setting of above.  - pAF.  DOAC with Eliquis.  Rate control with Toprol-XL  - Pulmonary hypertension WHO Grp 2/3 with dilated RV RVSP 45mmHg on 11/14/24  - Severe COPD with possible exacerbation.  On IV antibiotics, breathing treatments and steroids  - Non-small cell lung cancer.  On chemotherapy.  Follows with the [facility]  - HTN   - SVC syndrome  - History of PE and DVT.  On chronic anticoagulation with Eliquis twice daily.  - History of tobacco use.  Quit smoking this past year     Primary Cardiologist: Dr. Obregon    Plan:     Addendum: Independently seen and evaluated.  Agree with below.  Would continue current cardiac medication regimen.  Would defer non-small cell lung CA to oncology and primary team.  No new cardiac recommendations at this time.    - Continue PO lasix 40mg BID.  - Continue toprol xl 25mg 
[facility] Hospitalist [facility]  Progress Note    Patient: Rina Prajapati Age: 64 y.o. : 1960 MR#: [id number] SSN: [id number]  Date/Time: 2025     Subjective: Patient sitting in the side of the bed, feels much better today.  Shortness of breath improving.  Cough better.  Overnight patient oxygen supplementation was decreased to 7 L and this morning she is at 5 L.     Assessment/Plan:   Acute on chronic hypoxic respiratory failure, worsened  Acute on chronic heart failure with mildly reduced ejection fraction  Right lung worsening opacities, infiltrate versus edema versus lung cancer related  Elevated troponin  Hypokalemia  Hypothyroidism  Non small cell lung cancer  Prior PE on Eliquis  Pulmonary hypertension  COPD  Hypertension  Hypokalemia  Lactic acidemia resolved  Hypochloremia  Hypoalbuminemia  Mild anemia  Nonfunctioning PICC line left arm      Plan  Will continue IV steroids, p.o. azithromycin and bronchodilators per pulmonary  Will continue aggressive IV diuresis, monitor I's/O  Will continue to wean oxygen to her patient's baseline of 3 to 4 L  Cardiology input noted, echo pending  Continue Eliquis, Synthroid, metoprolol  Continue Mucinex  Discussed with IR for PICC line exchange since patient clinically better today  Discussed with oncology Dr. Li, patient will be resumed on chemo and they will need PICC line  PT/OT eval and treatment  Will continue 1 more day of stepdown monitoring  Further plan based on hospital course      Discussed with patient at the bedside and explained in detail about my above plan of care.I answered all questions and concerns at the bedside appropriate.  Patient states she does not want me to call her family but she will update her family.      Dispo plan: Home with home health care once medically stable, anticipated discharge date 2025      Case discussed with:  [x]Patient  []Family  [x]Nursing  []Case Management  DVT Prophylaxis: 
[facility] Hospitalist [facility]  Progress Note    Patient: Rina Prajapati Age: 64 y.o. : 1960 MR#: [id number] SSN: [id number]  Date/Time: 2025     Subjective: Patient came back from IR and was hypoxic.  IR called me that they could not do the procedure as patient remained hypoxic on 6 L oxygen and could not lie flat.  Patient seen and evaluated immediately at the bedside  Patient lying in the bed, currently on 15 L oxygen, feels comfortable, states slightly short of breath on exertion.  Some cough with minimal sputum production, no chest pain chest tightness.  Patient currently saturating 94 to 96% on 15 L.  RN at the bedside     Assessment/Plan:   Acute on chronic hypoxic respiratory failure, worsened  Acute on chronic heart failure with mildly reduced ejection fraction  Elevated troponin  Hypokalemia  Hypothyroidism  Non small cell lung cancer  Prior PE on Eliquis  Pulmonary hypertension  COPD  Hypertension  Hypokalemia  Lactic acidemia resolved  Hypochloremia  Hypoalbuminemia  Mild anemia  Nonfunctioning PICC line left arm      Plan  Will obtain stat chest x-ray, ABG and will give start IV Lasix  Will continue aggressive IV diuresis, monitor I's/O  Continue high flow oxygen, pulmonary consulted with Dr. MOSS  Will consult cardiology  Will add bronchodilators as needed  Will replace potassium and monitor electrolytes  Continue Eliquis, Synthroid, metoprolol  Continue Mucinex  IR consulted for PICC line exchange but could not be done today due to patient's hypoxia  Discussed with oncology Dr. Li, patient will be resumed on chemo and they will need PICC line  PT/OT eval and treatment  Will transfer patient to stepdown unit for close monitoring  Further plan based on hospital course    Addendum  Chest x-ray reviewed, right lung diffuse edema versus infiltrate, will follow-up official chest x-ray results  Will check procalcitonin  Will hold off on antibiotics for now  ABG 
Initiate HFNC for sats in the 70's on 6L NC   05/19/25 1548   Oxygen Therapy/Pulse Ox   O2 Therapy Oxygen humidified   $Oxygen $Daily Charge   O2 Device High flow nasal cannula   O2 Flow Rate (L/min) 14 L/min   Pulse 93   Respirations 20   SpO2 93 %   $Pulse Oximeter $Spot check (multiple/continuous)       
Patient has PICC line from 10/2 in left arm. New PICC placed in R arm on 5/20/25. Telephone ordered to remove old PICC in left. Removed old PICC in left arm. Applied pressure dressing. Patient tolerated well.   
Progress Note  Pulmonary, Critical Care, and Sleep Medicine    Name: Rina Prajapati MRN: [id number]   : 1960 Hospital: [facility]   Date: 2025        IMPRESSION:   Acute on chronic hypoxic respiratory failure, 4L at baseline. Orthopnea with oxygen saturation in the low 80s, improved and with decreasing FIO2 requirements  R>L diffuse hazy pulmonary opacities due to CHF/pulmonary edema vs pneumonia vs metastatic disease vs Gemza induced pneumonitis. RVP negative  R pleural effusion, likely CHF related vs PNA  Severe COPD FEV1 in 2024 31% predicted with air trapping and reduced DLCO.  Possible exacerbation, CT with emphyematous changes. On 4L LTOT baseline  Combined systolic and Diastolic HF, last echo  with mild systolic dysfunction EF 45% likely decompensation with hypoxia, pulmonary opacites, and elevated BNP 5985ng/mL above 3300 ion 25  Pulmonary hypertension WHO Grp 2/3 with dilated RV  RVSP 45mmHg on 24, CT on 24 showing cardiomegaly with reflux of contrast into IVC  Non small cell Lung cancer, IV  Followed by Dr. Li, with [facility]. Noted last admission with disease progression while on Taxotere, starting on Gemzar. Hx of SVC syndrome  VINCE pulmonary mass, 3.5cm, unchanged from 24 to 24 and stable , and multiple BL nodules   Microcytic hypochromic anemia  History of Tobacco use disorder and MJ use, 1-2 ppd , reports quit smoking  Hx PE/DVT   Hypokalemia, likely secondary to diuresis  Mild Hypoalbuminemia, normal LFTs      PLAN:   Maintain aspiration precautions: HOB >30 degrees  SpO2 goal 88- 92%  AVOID Over oxygenation  given history of COPD. Almost at baseline FiO2 requirements  Continue diuresis, monitor I/O. Fluid homeostasis per primary services/cardiology, maintain net negative fluid balance. Monitor I&Os, and trend renal indices  Bronchial /oral hygiene; IS   Bronchodilators: ICS/LABA and 4x daily Duoneb  Steroids: IV steroids given hx of 
Review of Systems    Physical Exam  Skin:             
TRANSFER - OUT REPORT:    Verbal report given to Apurva KOLB on Rina Prajapati  being transferred to [facility] for routine post-op       Report consisted of patient's Situation, Background, Assessment and   Recommendations(SBAR).     Information from the following report(s) Nurse Handoff Report and MAR was reviewed with the receiving nurse.           Lines:   PICC 10/10/24 Left Brachial (Active)       Peripheral IV 05/20/25 Posterior;Right Forearm (Active)   Site Assessment Clean;Clean, dry & intact 05/20/25 2122   Line Status Normal saline locked 05/20/25 2122   Line Care Connections checked and tightened 05/20/25 2122   Phlebitis Assessment No symptoms 05/20/25 2122   Infiltration Assessment 0 05/20/25 2122   Alcohol Cap Used Yes 05/20/25 2122   Dressing Status Clean, dry & intact 05/20/25 2122   Dressing Type Antimicrobial 05/20/25 2122        Opportunity for questions and clarification was provided.      Patient transported with:  Tech        
TRANSFER - OUT REPORT:    Verbal report given to CORTES Ingram on Rina Prajapati  being transferred to [facility] for routine progression of patient care       Report consisted of patient's Situation, Background, Assessment and   Recommendations(SBAR).     Information from the following report(s) Nurse Handoff Report, Adult Overview, Intake/Output, MAR, Recent Results, Med Rec Status, Cardiac Rhythm NSR, Alarm Parameters, and Neuro Assessment was reviewed with the receiving nurse.           Lines:   PICC 10/10/24 Left Brachial (Active)       Peripheral IV 05/17/25 Right Antecubital (Active)   Site Assessment Clean, dry & intact 05/19/25 0100   Line Status Normal saline locked;Flushed;Capped 05/19/25 0100   Line Care Connections checked and tightened 05/19/25 0100   Phlebitis Assessment No symptoms 05/19/25 0100   Infiltration Assessment 0 05/19/25 0100   Alcohol Cap Used Yes 05/19/25 0100   Dressing Status Clean, dry & intact 05/19/25 0100   Dressing Type Transparent 05/19/25 0100   Dressing Intervention Dressing changed 05/18/25 1422        Opportunity for questions and clarification was provided.      Patient transported with:  O2 @ 5Llpm and Tech       
COPD. Taper with improvement  Maintain glycemic control  Images: CXR reviewed  Infectious screens: Check Procal, resp PCR, Legionella/strep pneumo ag  Follow up on pending cultures/Respiratory Cultures  Follow temp/WBC  Anti-infectives: No leukocytosis, or evidence of consolidations.   Complete PO azithromycin, for anti-inflammatory benefit  Trend renal indices and serum bicab  Electrolyte management, Stress Ulcer-prophylaxis and glycemic control per primary and respective consultants  DVT prophylaxis per  primary service and respective consultants   No further pulmonary recommendations, will S/o case. Please call if needed     Subjective/Interval History:   I have reviewed the flowsheet and previous day’s notes. Reviewed interval history. Discussed management with nursing staff.    25  Afebrile, VSS  Much less dyspneic on regular NC  No new respiratory complaints      ROS:Pertinent items are noted in HPI.  Orders reviewed including medications. Changes made if indicated.   Telemetry monitor reviewed at the bedside.  Objective:   Vital Signs:    /88   Pulse 85   Temp 97.8 °F (36.6 °C) (Oral)   Resp 18   Ht 1.626 m (5' 4\")   Wt 71.7 kg (158 lb)   SpO2 95%   BMI 27.12 kg/m²             Temp (24hrs), Av.6 °F (36.4 °C), Min:97.2 °F (36.2 °C), Max:97.9 °F (36.6 °C)       Intake/Output:   Last shift:      No intake/output data recorded.  Last 3 shifts:  1901 -  0700  In: 400 [P.O.:400]  Out: 1500 [Urine:1500]    Intake/Output Summary (Last 24 hours) at 2025 1307  Last data filed at 2025 0548  Gross per 24 hour   Intake 400 ml   Output 1500 ml   Net -1100 ml        Physical Exam:    General:  Alert, cooperative, not in distress, appears stated age.   Head:  Normocephalic, without obvious abnormality, atraumatic.   Eyes:  Anicteric, PERRL,   Nose: Nares normal. Mucosa normal. No drainage or sinus tenderness.    Throat: Lips, mucosa, and tongue normal. Teeth and gums normal.   
Management  DVT Prophylaxis:  []Lovenox  []Hep SQ  []SCDs  []Coumadin   [x]Eliquis/Xarelto     Objective:   VS: /70   Pulse 84   Temp 97.6 °F (36.4 °C) (Oral)   Resp 18   Ht 1.626 m (5' 4\")   Wt 71.7 kg (158 lb)   SpO2 94%   BMI 27.12 kg/m²    Tmax/24hrs: Temp (24hrs), Av.6 °F (36.4 °C), Min:97.2 °F (36.2 °C), Max:97.9 °F (36.6 °C)  IOBRIEF  Intake/Output Summary (Last 24 hours) at 2025 1737  Last data filed at 2025 1531  Gross per 24 hour   Intake 500 ml   Output 2600 ml   Net -2100 ml         General:  Alert, cooperative, no respiratory distress    Pulmonary: Bilateral clear to auscultation, no wheezing, no crackles  Cardiovascular: Regular rate and Rhythm.  GI:  Soft, Non distended, Non tender. + Bowel sounds.  Extremities:  No edema. No calf tenderness.   Psych: Good insight. Not anxious or agitated.  Neurologic: Alert and oriented X 3. Moves all ext.  Additional: Left arm PICC line site dressing clean    Medications:   Current Facility-Administered Medications   Medication Dose Route Frequency    furosemide (LASIX) tablet 40 mg  40 mg Oral BID    [START ON 2025] predniSONE (DELTASONE) tablet 40 mg  40 mg Oral Daily    arformoterol tartrate (BROVANA) nebulizer solution 15 mcg  15 mcg Nebulization BID RT    budesonide (PULMICORT) nebulizer suspension 1 mg  1 mg Nebulization BID RT    ipratropium 0.5 mg-albuterol 2.5 mg (DUONEB) nebulizer solution 1 Dose  1 Dose Inhalation 4x Daily RT    azithromycin (ZITHROMAX) tablet 250 mg  250 mg Oral Daily    sodium chloride flush 0.9 % injection 5-40 mL  5-40 mL IntraVENous 2 times per day    sodium chloride flush 0.9 % injection 5-40 mL  5-40 mL IntraVENous PRN    0.9 % sodium chloride infusion   IntraVENous PRN    potassium chloride (KLOR-CON M) extended release tablet 40 mEq  40 mEq Oral PRN    Or    potassium bicarb-citric acid (EFFER-K) effervescent tablet 40 mEq  40 mEq Oral PRN    Or    potassium chloride 10 mEq/100 mL IVPB (Peripheral 
SpO2 97%, not currently breastfeeding.  Vital signs are normal.  No fever.    HEENT: Normal HEENT exam.    Lungs:  Normal effort and normal respiratory rate.  Breath sounds clear to auscultation.    Heart: Normal rate.  Regular rhythm.  S1 normal and S2 normal.  No murmur, gallop or friction rub.   Chest: Symmetric chest wall expansion.   Abdomen: Abdomen is soft and non-distended.  Bowel sounds are normal.   There is no abdominal tenderness.     Extremities: Normal range of motion.    Pulses: Distal pulses are intact.    Neurological: Patient is alert and oriented to person, place and time.  Normal strength.    Pupils:  Pupils are equal, round, and reactive to light.    Skin:  Warm and dry.         Labs:    Recent Results (from the past 24 hours)   Basic Metabolic Panel w/ Reflex to MG    Collection Time: 05/18/25 12:39 AM   Result Value Ref Range    Sodium 141 136 - 145 mmol/L    Potassium 3.0 (L) 3.5 - 5.5 mmol/L    Chloride 100 98 - 107 mmol/L    CO2 30 21 - 32 mmol/L    Anion Gap 11 3.0 - 18.0 mmol/L    Glucose 126 (H) 74 - 108 mg/dL    BUN 15 6 - 23 MG/DL    Creatinine 0.60 0.6 - 1.3 MG/DL    BUN/Creatinine Ratio 25 (H) 12 - 20      Est, Glom Filt Rate >90 >60 ml/min/1.73m2    Calcium 8.6 8.5 - 10.1 MG/DL   CBC with Auto Differential    Collection Time: 05/18/25 12:39 AM   Result Value Ref Range    WBC 4.9 4.6 - 13.2 K/uL    RBC 4.42 4.20 - 5.30 M/uL    Hemoglobin 10.1 (L) 12.0 - 16.0 g/dL    Hematocrit 34.8 (L) 35.0 - 45.0 %    MCV 78.7 78.0 - 100.0 FL    MCH 22.9 (L) 24.0 - 34.0 PG    MCHC 29.0 (L) 31.0 - 37.0 g/dL    RDW 18.6 (H) 11.6 - 14.5 %    Platelets 165 135 - 420 K/uL    MPV 10.8 9.2 - 11.8 FL    Nucleated RBCs 0.0 0  WBC    nRBC 0.00 0.00 - 0.01 K/uL    Neutrophils % 77.0 (H) 40.0 - 73.0 %    Lymphocytes % 12.3 (L) 21.0 - 52.0 %    Monocytes % 9.1 3.0 - 10.0 %    Eosinophils % 0.6 0.0 - 5.0 %    Basophils % 0.4 0.0 - 2.0 %    Immature Granulocytes % 0.6 (H) 0.0 - 0.5 %    Neutrophils Absolute

## 2025-05-22 NOTE — DISCHARGE INSTRUCTIONS
Discharge Instructions    Patient: Rina Prajapati MRN: [id number]  CSN: [id number]    YOB: 1960  Age: 64 y.o.  Sex: female    DOA: 5/17/2025       DIET:  cardiac diet    ACTIVITY: activity as tolerated  Home health care for Skilled care for COPD, CHF, Hypertension and medication management       PT/OT consult      ADDITIONAL INFORMATION: If you experience any of the following symptoms but not limited to Fever, chills, nausea, vomiting, diarrhea, change in mentation, falling, bleeding, shortness of breath, chest pain, please call your primary care physician or return to the emergency room if you cannot get hold of your doctor:     FOLLOW UP CARE:      Nima Jaramillo MD  5/22/2025 11:48 AM

## 2025-05-23 ENCOUNTER — CARE COORDINATION (OUTPATIENT)
Facility: CLINIC | Age: 65
End: 2025-05-23

## 2025-05-23 NOTE — CARE COORDINATION
Care Transitions Note    Initial Call - Call within 2 business days of discharge: Yes    Patient Current Location:  [place]    Care Transition Nurse contacted the patient by telephone to perform post hospital discharge assessment, verified name and  as identifiers.  Provided introduction to self, and explanation of the Care Transition Nurse role.    Patient: Rina Prajapati    Patient : 1960   MRN: [id number]        Reason for Admission:   Acute on Chronic Hypoxic Respiratory Failure       Discharge Date: 25  RURS: Readmission Risk Score: 20.1      Last Discharge Facility       Date Complaint Diagnosis Description Type Department Provider    25  Shortness of breath ... Admission (Discharged) [id number] Estrella MD    25 Shortness of Breath Acute pulmonary edema (HCC) ... ED (TRANSFER) [facility] Cowan Jr., ; [facility]...            Was this an external facility discharge? No    Additional needs identified to be addressed with provider   Home Health:     Home Health was ordered at time of hospital discharge.   However, inpatient case management was not able to be secured/ arranged  at time of discharge.   Would PCP please evaluate need for Home Health ( SN, PT, OT) and place order for home health if needed?    Patient advises she will follow up with PCP re: Home Health at the upcoming PCP visit  on 25.      Remote Patient monitoring discussed with patient.   Patient is thinking about this resource.   Patient asked to follow up wit PCP as needed regarding recommendations regarding Remote Patient Monitoring during office visit.   RPM for CHF, and COPD ( depending upon provider).                 Method of communication with provider: chart routing.    Patients top risk factors for readmission:   8 or more current medications     Interventions to address risk factors:   The Initial Care Transitions Outreach was completed / initiated with patient.   Patient was 
To get better and follow your care plan as instructed.

## 2025-05-27 ENCOUNTER — CARE COORDINATION (OUTPATIENT)
Facility: CLINIC | Age: 65
End: 2025-05-27

## 2025-05-27 NOTE — CARE COORDINATION
Care Transitions Note    Initial Call - Call within 2 business days of discharge: Yes    Attempted to reach patient for transitions of care follow up. Unable to reach patient.    Outreach Attempts:   Unable to leave message.     Patient: Rina Prajapati    Patient : 1960   MRN: [id number]        Reason for Admission:   Please see below, in this note, Last Discharge Facility        Discharge Date: 25  RURS: Readmission Risk Score: 20.1    Last Discharge Facility       Date Complaint Diagnosis Description Type Department Provider    25  Shortness of breath ... Admission (Discharged) [id number] Talbert MD    25 Shortness of Breath Acute pulmonary edema (HCC) ... ED (TRANSFER) [facility] Jr[facility] ; [facility]...            Was this an external facility discharge? No    Follow Up Appointment:   Patient has hospital follow up appointment scheduled  as below in this note.    Request for PCP Transitions of care appointment has been previously submitted via secure e-mail.   Future Appointments         Provider Specialty Dept Phone    2025 1:00 PM Cora Her MD Family Medicine [phone]    2025 11:00 AM Trev Obregon MD Cardiology [phone]    2025 10:00 AM [facility] Cardiology [phone]    2025 10:00 AM Cora Her MD Family Medicine [phone]    10/16/2025 10:20 AM Trev Obregon MD Cardiology [phone]            Plan for follow-up call in 6-10 days     Karolina Shabazz RN

## 2025-05-29 ENCOUNTER — TELEMEDICINE (OUTPATIENT)
Facility: CLINIC | Age: 65
End: 2025-05-29
Payer: MEDICARE

## 2025-05-29 DIAGNOSIS — Z86.711 HISTORY OF PULMONARY EMBOLISM: Chronic | ICD-10-CM

## 2025-05-29 DIAGNOSIS — J96.11 CHRONIC RESPIRATORY FAILURE WITH HYPOXIA (HCC): Primary | ICD-10-CM

## 2025-05-29 DIAGNOSIS — C34.91 NON-SMALL CELL CANCER OF RIGHT LUNG (HCC): Chronic | ICD-10-CM

## 2025-05-29 DIAGNOSIS — I50.22 CHRONIC SYSTOLIC (CONGESTIVE) HEART FAILURE (HCC): ICD-10-CM

## 2025-05-29 PROCEDURE — 99213 OFFICE O/P EST LOW 20 MIN: CPT | Performed by: FAMILY MEDICINE

## 2025-05-29 PROCEDURE — 1160F RVW MEDS BY RX/DR IN RCRD: CPT | Performed by: FAMILY MEDICINE

## 2025-05-29 PROCEDURE — 1123F ACP DISCUSS/DSCN MKR DOCD: CPT | Performed by: FAMILY MEDICINE

## 2025-05-29 PROCEDURE — 1159F MED LIST DOCD IN RCRD: CPT | Performed by: FAMILY MEDICINE

## 2025-05-29 NOTE — PROGRESS NOTES
Have you been to the ER, urgent care clinic since your last visit?  Hospitalized since your last visit?   YES - When: approximately 10 days ago.  Where and Why: [facility].    Have you seen or consulted any other health care providers outside our system since your last visit?   NO    Have you had a mammogram?”   NO    Date of last Mammogram: 10/31/2022       “Have you had a colorectal cancer screening such as a colonoscopy/FIT/Cologuard?    NO    Date of last Colonoscopy: 11/7/2014  No cologuard on file  No FIT/FOBT on file   No flexible sigmoidoscopy on file          
On this date 5/29/2025 I have spent 20 minutes reviewing previous notes, test results, and other pertinent medical information with the patient, discussing the diagnosis and importance of compliance with the treatment plan as well as documenting on the day of the visit.    Rina Prajapati was evaluated through a synchronous (real-time) audio encounter. Patient identification was verified at the start of the visit. She (or guardian if applicable) is aware that this is a billable service, which includes applicable co-pays. This visit was conducted with the patient's (and/or legal guardian's) verbal consent. She has not had a related appointment within my department in the past 7 days or scheduled within the next 24 hours.   The patient was located at Home: [address] [place] [address].  The provider was located at Facility (Appt Dept): [address] [place] [phone].    Note: not billable if this call serves to triage the patient into an appointment for the relevant concern  Yes, I confirm.   Rina Prajapati is a 64 y.o. female evaluated via telephone on 5/29/2025 for Follow-Up from Hospital  .      Assessment & Plan  Chronic respiratory failure with hypoxia (HCC)  With COPD and CHF exacerbation on tapering oral steroids and Lasix plus potassium  Clinically improved back on baseline O2 requirement 3 L nasal cannula  Continue to follow pulm cards oncology         Chronic systolic (congestive) heart failure      History of pulmonary embolism  On chronic anticoagulation      Non-small cell cancer of right lung (HCC)         Keep appointment in September or sooner as needed    Subjective   Prior to Visit Medications    Medication Sig Taking? Authorizing Provider   ipratropium 0.5 mg-albuterol 2.5 mg (DUONEB) 0.5-2.5 (3) MG/3ML SOLN nebulizer solution Inhale 3 mLs into the lungs every 4 hours as needed for Wheezing Yes Nima Duran MD montelukast 
hard copy

## 2025-06-03 ENCOUNTER — CARE COORDINATION (OUTPATIENT)
Facility: CLINIC | Age: 65
End: 2025-06-03

## 2025-06-03 NOTE — CARE COORDINATION
Care Transitions Note    Follow Up Call     Patient Current Location:  Home: [address] [place] [id number]    [facility] Care Coordinator contacted the patient by telephone. Verified name and  as identifiers.    Additional needs identified to be addressed with provider   No needs identified                 Method of communication with provider: none.    Care Summary Note:   Spoke with patient.  Patient states she is doing okay.  Patient states she is using the O2.  Patient has no questions or concerns. Patient kept conversation brief.  Patient followed up with pcp televisit on 25.    Advance Care Planning:   Does patient have an Advance Directive: reviewed and current.   Advance Care Planning   Healthcare Decision Maker:    Primary Decision Maker: Radha Jolly - Brother/Sister - [phone]    Secondary Decision Maker: Dylan Hector - Brother/Sister - [phone]    Medication Review:  No changes since last call.       Assessments:  No changes since last call    Follow Up Appointment:   Reviewed upcoming appointment(s).  Future Appointments         Provider Specialty Dept Phone    2025 11:00 AM Trev Obregon MD Cardiology [phone]    2025 10:00 AM [facility] Cardiology [phone]    2025 10:00 AM Cora Her MD Family Medicine [phone]    10/16/2025 10:20 AM Trev Obregon MD Cardiology [phone]            [facility] Care Coordinator provided contact information.  Plan for follow-up call in 6-10 days based on severity of symptoms and risk factors.  Plan for next call: symptom management-assess for CHF/COPD red flags, assess for any needs      Liz Fong LPN

## 2025-06-10 ENCOUNTER — CARE COORDINATION (OUTPATIENT)
Facility: CLINIC | Age: 65
End: 2025-06-10

## 2025-06-10 ENCOUNTER — TELEPHONE (OUTPATIENT)
Facility: HOSPITAL | Age: 65
End: 2025-06-10

## 2025-06-10 NOTE — CARE COORDINATION
Care Transitions Note    Follow Up Call     Patient Current Location:  Home: [facility] [place] [facility] [address]    [facility] Care Coordinator contacted the patient by telephone. Verified name and  as identifiers.    Additional needs identified to be addressed with provider   No needs identified                 Method of communication with provider: phone.    Care Summary Note:   Spoke with patient.  Patient states she is doing fine.  Patient denies any swelling , sob or chest pain.  Patient followed up with pcp on 25.  Patient has no questions or concerns.    Advance Care Planning:   Does patient have an Advance Directive: reviewed during previous call, see note.  and noted DNR revoked on 5/10/2025 .    Medication Review:  No changes since last call.     Assessments:  No changes since last call    Follow Up Appointment:   Reviewed upcoming appointment(s).  Future Appointments         Provider Specialty Dept Phone    2025 11:00 AM Trev Obregon MD Cardiology [phone]    7/3/2025 10:00 AM [facility] CATH HOLDING; [facility] ANGIO RADIOLOGIST; [facility] IR RM 1 Radiology [phone]    2025 10:00 AM [facility] Cardiology [phone]    2025 10:00 AM Cora Her MD Family Medicine [phone]    10/16/2025 10:20 AM Trev Obregon MD Cardiology [phone]            N Care Coordinator provided contact information.  Plan for follow-up call in 6-10 days based on severity of symptoms and risk factors.  Plan for next call:  assess for any needs      Liz Fong LPN

## 2025-06-12 ENCOUNTER — APPOINTMENT (OUTPATIENT)
Facility: HOSPITAL | Age: 65
DRG: 190 | End: 2025-06-12
Payer: MEDICARE

## 2025-06-12 ENCOUNTER — CARE COORDINATION (OUTPATIENT)
Facility: CLINIC | Age: 65
End: 2025-06-12

## 2025-06-12 ENCOUNTER — HOSPITAL ENCOUNTER (INPATIENT)
Facility: HOSPITAL | Age: 65
LOS: 3 days | Discharge: HOME | DRG: 190 | End: 2025-06-15
Attending: EMERGENCY MEDICINE | Admitting: HOSPITALIST
Payer: MEDICARE

## 2025-06-12 DIAGNOSIS — R06.00 DYSPNEA AND RESPIRATORY ABNORMALITIES: Primary | ICD-10-CM

## 2025-06-12 DIAGNOSIS — R06.89 DYSPNEA AND RESPIRATORY ABNORMALITIES: Primary | ICD-10-CM

## 2025-06-12 PROBLEM — J96.20 ACUTE ON CHRONIC RESPIRATORY FAILURE (HCC): Status: ACTIVE | Noted: 2025-06-12

## 2025-06-12 LAB
ALBUMIN SERPL-MCNC: 3 G/DL (ref 3.4–5)
ALBUMIN/GLOB SERPL: 0.9 (ref 0.8–1.7)
ALP SERPL-CCNC: 72 U/L (ref 45–117)
ALT SERPL-CCNC: 21 U/L (ref 10–35)
ANION GAP BLD CALC-SCNC: ABNORMAL MMOL/L (ref 10–20)
ANION GAP SERPL CALC-SCNC: 17 MMOL/L (ref 3–18)
AST SERPL-CCNC: 33 U/L (ref 10–38)
BASE DEFICIT BLD-SCNC: 0.9 MMOL/L
BASOPHILS # BLD: 0.02 K/UL (ref 0–0.1)
BASOPHILS NFR BLD: 0.3 % (ref 0–2)
BDY SITE: ABNORMAL
BILIRUB SERPL-MCNC: 0.8 MG/DL (ref 0.2–1)
BUN SERPL-MCNC: 10 MG/DL (ref 6–23)
BUN/CREAT SERPL: 12 (ref 12–20)
CA-I BLD-MCNC: 1.14 MMOL/L (ref 1.15–1.33)
CALCIUM SERPL-MCNC: 9.1 MG/DL (ref 8.5–10.1)
CHLORIDE BLD-SCNC: 105 MMOL/L (ref 98–107)
CHLORIDE SERPL-SCNC: 100 MMOL/L (ref 98–107)
CO2 BLD-SCNC: 25 MMOL/L (ref 22–29)
CO2 SERPL-SCNC: 22 MMOL/L (ref 21–32)
CREAT BLD-MCNC: 0.69 MG/DL (ref 0.6–1.3)
CREAT SERPL-MCNC: 0.83 MG/DL (ref 0.6–1.3)
DIFFERENTIAL METHOD BLD: ABNORMAL
EKG ATRIAL RATE: 128 BPM
EKG DIAGNOSIS: NORMAL
EKG P AXIS: 60 DEGREES
EKG P-R INTERVAL: 128 MS
EKG Q-T INTERVAL: 314 MS
EKG QRS DURATION: 80 MS
EKG QTC CALCULATION (BAZETT): 458 MS
EKG R AXIS: 132 DEGREES
EKG T AXIS: 8 DEGREES
EKG VENTRICULAR RATE: 128 BPM
EOSINOPHIL # BLD: 0.03 K/UL (ref 0–0.4)
EOSINOPHIL NFR BLD: 0.5 % (ref 0–5)
ERYTHROCYTE [DISTWIDTH] IN BLOOD BY AUTOMATED COUNT: 20.6 % (ref 11.6–14.5)
FIO2 ON VENT: 100 %
GAS FLOW.O2 O2 DELIVERY SYS: ABNORMAL
GLOBULIN SER CALC-MCNC: 3.5 G/DL (ref 2–4)
GLUCOSE BLD-MCNC: 178 MG/DL (ref 74–99)
GLUCOSE SERPL-MCNC: 165 MG/DL (ref 74–108)
HCO3 BLD-SCNC: 25.6 MMOL/L (ref 21–28)
HCT VFR BLD AUTO: 37.8 % (ref 35–45)
HGB BLD-MCNC: 11.4 G/DL (ref 12–16)
IMM GRANULOCYTES # BLD AUTO: 0.02 K/UL (ref 0–0.04)
IMM GRANULOCYTES NFR BLD AUTO: 0.3 % (ref 0–0.5)
LACTATE BLD-SCNC: 4.48 MMOL/L (ref 0.4–2)
LYMPHOCYTES # BLD: 0.88 K/UL (ref 0.9–3.6)
LYMPHOCYTES NFR BLD: 13.9 % (ref 21–52)
MCH RBC QN AUTO: 23.2 PG (ref 24–34)
MCHC RBC AUTO-ENTMCNC: 30.2 G/DL (ref 31–37)
MCV RBC AUTO: 76.8 FL (ref 78–100)
MONOCYTES # BLD: 0.57 K/UL (ref 0.05–1.2)
MONOCYTES NFR BLD: 9 % (ref 3–10)
NEUTS SEG # BLD: 4.8 K/UL (ref 1.8–8)
NEUTS SEG NFR BLD: 76 % (ref 40–73)
NRBC # BLD: 0 K/UL (ref 0–0.01)
NRBC BLD-RTO: 0 PER 100 WBC
NT PRO BNP: 5887 PG/ML (ref 36–900)
PCO2 BLDV: 48.4 MMHG (ref 41–51)
PH BLDV: 7.33 (ref 7.32–7.42)
PLATELET # BLD AUTO: 182 K/UL (ref 135–420)
PO2 BLDV: 103 MMHG (ref 25–40)
POTASSIUM BLD-SCNC: 4.1 MMOL/L (ref 3.5–5.1)
POTASSIUM SERPL-SCNC: 3.9 MMOL/L (ref 3.5–5.5)
PROT SERPL-MCNC: 6.6 G/DL (ref 6.4–8.2)
RBC # BLD AUTO: 4.92 M/UL (ref 4.2–5.3)
RESPIRATORY RATE, POC: 24 (ref 5–40)
SAO2 % BLD: 97 % (ref 94–98)
SERVICE CMNT-IMP: ABNORMAL
SERVICE CMNT-IMP: ABNORMAL
SODIUM BLD-SCNC: 141 MMOL/L (ref 136–145)
SODIUM SERPL-SCNC: 139 MMOL/L (ref 136–145)
SPECIMEN SITE: ABNORMAL
TROPONIN T SERPL HS-MCNC: 24.5 NG/L (ref 0–14)
VENTILATION MODE VENT: ABNORMAL
WBC # BLD AUTO: 6.3 K/UL (ref 4.6–13.2)

## 2025-06-12 PROCEDURE — 94660 CPAP INITIATION&MGMT: CPT

## 2025-06-12 PROCEDURE — 84295 ASSAY OF SERUM SODIUM: CPT

## 2025-06-12 PROCEDURE — 82803 BLOOD GASES ANY COMBINATION: CPT

## 2025-06-12 PROCEDURE — 94640 AIRWAY INHALATION TREATMENT: CPT

## 2025-06-12 PROCEDURE — 99291 CRITICAL CARE FIRST HOUR: CPT

## 2025-06-12 PROCEDURE — 94762 N-INVAS EAR/PLS OXIMTRY CONT: CPT

## 2025-06-12 PROCEDURE — 85025 COMPLETE CBC W/AUTO DIFF WBC: CPT

## 2025-06-12 PROCEDURE — 5A09357 ASSISTANCE WITH RESPIRATORY VENTILATION, LESS THAN 24 CONSECUTIVE HOURS, CONTINUOUS POSITIVE AIRWAY PRESSURE: ICD-10-PCS | Performed by: HOSPITALIST

## 2025-06-12 PROCEDURE — 83880 ASSAY OF NATRIURETIC PEPTIDE: CPT

## 2025-06-12 PROCEDURE — 6370000000 HC RX 637 (ALT 250 FOR IP): Performed by: EMERGENCY MEDICINE

## 2025-06-12 PROCEDURE — 82330 ASSAY OF CALCIUM: CPT

## 2025-06-12 PROCEDURE — 93005 ELECTROCARDIOGRAM TRACING: CPT | Performed by: EMERGENCY MEDICINE

## 2025-06-12 PROCEDURE — 84132 ASSAY OF SERUM POTASSIUM: CPT

## 2025-06-12 PROCEDURE — 93010 ELECTROCARDIOGRAM REPORT: CPT | Performed by: INTERNAL MEDICINE

## 2025-06-12 PROCEDURE — 80053 COMPREHEN METABOLIC PANEL: CPT

## 2025-06-12 PROCEDURE — 94761 N-INVAS EAR/PLS OXIMETRY MLT: CPT

## 2025-06-12 PROCEDURE — 83605 ASSAY OF LACTIC ACID: CPT

## 2025-06-12 PROCEDURE — 85014 HEMATOCRIT: CPT

## 2025-06-12 PROCEDURE — 37799 UNLISTED PX VASCULAR SURGERY: CPT

## 2025-06-12 PROCEDURE — 1100000003 HC PRIVATE W/ TELEMETRY

## 2025-06-12 PROCEDURE — 2500000003 HC RX 250 WO HCPCS: Performed by: HOSPITALIST

## 2025-06-12 PROCEDURE — 84484 ASSAY OF TROPONIN QUANT: CPT

## 2025-06-12 PROCEDURE — 6360000002 HC RX W HCPCS: Performed by: EMERGENCY MEDICINE

## 2025-06-12 PROCEDURE — 71045 X-RAY EXAM CHEST 1 VIEW: CPT

## 2025-06-12 PROCEDURE — 2700000000 HC OXYGEN THERAPY PER DAY

## 2025-06-12 PROCEDURE — 82947 ASSAY GLUCOSE BLOOD QUANT: CPT

## 2025-06-12 PROCEDURE — 99223 1ST HOSP IP/OBS HIGH 75: CPT | Performed by: HOSPITALIST

## 2025-06-12 RX ORDER — ACETAMINOPHEN 650 MG/1
650 SUPPOSITORY RECTAL EVERY 6 HOURS PRN
Status: DISCONTINUED | OUTPATIENT
Start: 2025-06-12 | End: 2025-06-15 | Stop reason: HOSPADM

## 2025-06-12 RX ORDER — FUROSEMIDE 10 MG/ML
40 INJECTION INTRAMUSCULAR; INTRAVENOUS ONCE
Status: COMPLETED | OUTPATIENT
Start: 2025-06-12 | End: 2025-06-12

## 2025-06-12 RX ORDER — ONDANSETRON 4 MG/1
4 TABLET, ORALLY DISINTEGRATING ORAL EVERY 8 HOURS PRN
Status: DISCONTINUED | OUTPATIENT
Start: 2025-06-12 | End: 2025-06-15 | Stop reason: HOSPADM

## 2025-06-12 RX ORDER — METOPROLOL SUCCINATE 25 MG/1
25 TABLET, EXTENDED RELEASE ORAL DAILY
Status: DISCONTINUED | OUTPATIENT
Start: 2025-06-12 | End: 2025-06-15 | Stop reason: HOSPADM

## 2025-06-12 RX ORDER — LEVOTHYROXINE SODIUM 88 UG/1
88 TABLET ORAL
Status: DISCONTINUED | OUTPATIENT
Start: 2025-06-13 | End: 2025-06-15 | Stop reason: HOSPADM

## 2025-06-12 RX ORDER — FOLIC ACID 1 MG/1
1 TABLET ORAL DAILY
Status: DISCONTINUED | OUTPATIENT
Start: 2025-06-12 | End: 2025-06-15 | Stop reason: HOSPADM

## 2025-06-12 RX ORDER — SODIUM CHLORIDE 0.9 % (FLUSH) 0.9 %
5-40 SYRINGE (ML) INJECTION EVERY 12 HOURS SCHEDULED
Status: DISCONTINUED | OUTPATIENT
Start: 2025-06-12 | End: 2025-06-15 | Stop reason: HOSPADM

## 2025-06-12 RX ORDER — IPRATROPIUM BROMIDE AND ALBUTEROL SULFATE 2.5; .5 MG/3ML; MG/3ML
SOLUTION RESPIRATORY (INHALATION)
Status: DISCONTINUED
Start: 2025-06-12 | End: 2025-06-12

## 2025-06-12 RX ORDER — FUROSEMIDE 10 MG/ML
40 INJECTION INTRAMUSCULAR; INTRAVENOUS 2 TIMES DAILY
Status: DISCONTINUED | OUTPATIENT
Start: 2025-06-13 | End: 2025-06-15 | Stop reason: HOSPADM

## 2025-06-12 RX ORDER — POLYETHYLENE GLYCOL 3350 17 G/17G
17 POWDER, FOR SOLUTION ORAL DAILY PRN
Status: DISCONTINUED | OUTPATIENT
Start: 2025-06-12 | End: 2025-06-15 | Stop reason: HOSPADM

## 2025-06-12 RX ORDER — POTASSIUM CHLORIDE 1500 MG/1
40 TABLET, EXTENDED RELEASE ORAL PRN
Status: DISCONTINUED | OUTPATIENT
Start: 2025-06-12 | End: 2025-06-15 | Stop reason: HOSPADM

## 2025-06-12 RX ORDER — METOPROLOL TARTRATE 1 MG/ML
5 INJECTION, SOLUTION INTRAVENOUS EVERY 6 HOURS PRN
Status: DISCONTINUED | OUTPATIENT
Start: 2025-06-12 | End: 2025-06-15 | Stop reason: HOSPADM

## 2025-06-12 RX ORDER — MAGNESIUM SULFATE IN WATER 40 MG/ML
2000 INJECTION, SOLUTION INTRAVENOUS PRN
Status: DISCONTINUED | OUTPATIENT
Start: 2025-06-12 | End: 2025-06-15 | Stop reason: HOSPADM

## 2025-06-12 RX ORDER — IPRATROPIUM BROMIDE AND ALBUTEROL SULFATE 2.5; .5 MG/3ML; MG/3ML
3 SOLUTION RESPIRATORY (INHALATION)
Status: COMPLETED | OUTPATIENT
Start: 2025-06-12 | End: 2025-06-12

## 2025-06-12 RX ORDER — POTASSIUM CHLORIDE 7.45 MG/ML
10 INJECTION INTRAVENOUS PRN
Status: DISCONTINUED | OUTPATIENT
Start: 2025-06-12 | End: 2025-06-15 | Stop reason: HOSPADM

## 2025-06-12 RX ORDER — SODIUM CHLORIDE 9 MG/ML
INJECTION, SOLUTION INTRAVENOUS PRN
Status: DISCONTINUED | OUTPATIENT
Start: 2025-06-12 | End: 2025-06-15 | Stop reason: HOSPADM

## 2025-06-12 RX ORDER — FERROUS SULFATE 325(65) MG
325 TABLET ORAL
Status: DISCONTINUED | OUTPATIENT
Start: 2025-06-13 | End: 2025-06-15 | Stop reason: HOSPADM

## 2025-06-12 RX ORDER — IPRATROPIUM BROMIDE AND ALBUTEROL SULFATE 2.5; .5 MG/3ML; MG/3ML
1 SOLUTION RESPIRATORY (INHALATION)
Status: DISCONTINUED | OUTPATIENT
Start: 2025-06-13 | End: 2025-06-15 | Stop reason: HOSPADM

## 2025-06-12 RX ORDER — ACETAMINOPHEN 325 MG/1
650 TABLET ORAL EVERY 6 HOURS PRN
Status: DISCONTINUED | OUTPATIENT
Start: 2025-06-12 | End: 2025-06-15 | Stop reason: HOSPADM

## 2025-06-12 RX ORDER — GUAIFENESIN 600 MG/1
1200 TABLET, EXTENDED RELEASE ORAL 2 TIMES DAILY
Status: DISCONTINUED | OUTPATIENT
Start: 2025-06-12 | End: 2025-06-15 | Stop reason: HOSPADM

## 2025-06-12 RX ORDER — ONDANSETRON 2 MG/ML
4 INJECTION INTRAMUSCULAR; INTRAVENOUS EVERY 6 HOURS PRN
Status: DISCONTINUED | OUTPATIENT
Start: 2025-06-12 | End: 2025-06-15 | Stop reason: HOSPADM

## 2025-06-12 RX ORDER — SODIUM CHLORIDE 0.9 % (FLUSH) 0.9 %
5-40 SYRINGE (ML) INJECTION PRN
Status: DISCONTINUED | OUTPATIENT
Start: 2025-06-12 | End: 2025-06-15 | Stop reason: HOSPADM

## 2025-06-12 RX ADMIN — SODIUM CHLORIDE, PRESERVATIVE FREE 20 ML: 5 INJECTION INTRAVENOUS at 21:00

## 2025-06-12 RX ADMIN — IPRATROPIUM BROMIDE AND ALBUTEROL SULFATE 3 DOSE: .5; 3 SOLUTION RESPIRATORY (INHALATION) at 14:54

## 2025-06-12 RX ADMIN — FUROSEMIDE 40 MG: 10 INJECTION, SOLUTION INTRAMUSCULAR; INTRAVENOUS at 17:39

## 2025-06-12 NOTE — ED PROVIDER NOTES
known as: MUCINEX  Take 2 tablets by mouth 2 times daily     ipratropium 0.5 mg-albuterol 2.5 mg 0.5-2.5 (3) MG/3ML Soln nebulizer solution  Commonly known as: DUONEB  Inhale 3 mLs into the lungs every 4 hours as needed for Wheezing     levothyroxine 88 MCG tablet  Commonly known as: SYNTHROID  Take 1 tablet by mouth every morning (before breakfast)     metoprolol succinate 25 MG extended release tablet  Commonly known as: TOPROL XL  Take 1 tablet by mouth daily     montelukast 10 MG tablet  Commonly known as: SINGULAIR  Take 1 tablet by mouth daily Take by mouth     Multi Vitamin Daily Tabs     nicotine 21 MG/24HR  Commonly known as: NICODERM CQ  Place 1 patch onto the skin daily     ONE-A-DAY WOMENS PO     potassium chloride 20 MEQ Tbcr extended release tablet  Commonly known as: K-TAB     sertraline 50 MG tablet  Commonly known as: ZOLOFT  Take 1 tablet by mouth daily              Disposition: admit      Patient condition at time of disposition: Stable    DISCHARGE NOTE:   Pt has been reexamined. Patient has no new complaints, changes, or physical findings.  Care plan outlined and precautions discussed.  Results were reviewed with the patient. All medications were reviewed with the patient. All of pt's questions and concerns were addressed.  Alarm symptoms and return precautions associated with chief complaint and evaluation were reviewed with the patient in detail.  The patient demonstrated adequate understanding.  Patient was instructed to follow up with PCP, as well as given strict return precautions to the ED upon further deterioration. Patient is ready for discharge home.          Dragon Disclaimer     Please note that this dictation was completed with AMS-Qi, the computer voice recognition software.  Quite often unanticipated grammatical, syntax, homophones, and other interpretive errors are inadvertently transcribed by the computer software.  Please disregard these errors.  Please excuse any errors that

## 2025-06-12 NOTE — PROGRESS NOTES
Patient arrived via Ems on Cpap. Placed on Bipap and started on Duoneb x 3. VBG obtained. Results reported to Dr. Greenberg and Rubi KOLB. Patient is now resting comfortably. Will continue to monitor.

## 2025-06-12 NOTE — H&P
History and Physical      Chief complaint Shortness of breath and difficulty breathing    Subjective     HPI: Rina Prajapati is a 64 y.o. female with a PMHx significant for hypertension, COPD, chronic respiratory failure with hypoxia on 4 L oxygen at home, PE on Eliquis, non-small cell lung cancer and systolic CHF who presented to the ED with the above chief complaint.  History is gathered from the patient and labs review medical records.  Patient states that she started having shortness of breath 2 days ago zettabyte worse progressively.  She started using nebulizer treatment at home but the shortness of breath has gotten worse since she started having difficulty breathing so EMS was called and patient brought in to the emergency room.  Patient was noted to be saturating 75% on room air per EMS.  She was treated with Solu-Medrol 125 mg, DuoNeb, 2 g magnesium sulfate and put on CPAP en route to the hospital.  Currently patient is that she feels much better.  Shortness of breath improving.  Denies any chest pain or dizziness.  No fever or chills.  No recent upper tract tract infection symptoms.  No nausea vomiting or abdominal pain.  No worsening of bilateral lower extremity edema.  Denies current smoking, alcohol or illicit drug use.    In the emergency room she was noted to be tachycardic tachypneic with elevated blood pressure.  Patient put on BiPAP for respiratory distress.  Workup showed serum sodium 139 potassium 3.9 bicarb 22 BUN 10 creatinine 0.83.  Troponin 24.5 and BNP 5887.  LFTs normal.  WBC 6.3 H&H 11.4 and 37.8 and platelets 182.  MCV 76.4.  Chest x-ray showed similar appearance of right lung airspace opacities with scarring since 5/19/2025. Small right pleural effusion, grossly unchanged. Enlarged cardiomediastinal silhouette.  Lactic acid 4.48.  Patient treated with 40 mg of IV Lasix, DuoNebs and BiPAP.  Patient admitted for management of acute on chronic respiratory failure with

## 2025-06-12 NOTE — ED NOTES
Pt states \" I am unable to do a CT right now. I won't be able to lay flat\". MD Greenberg notified.

## 2025-06-12 NOTE — ED TRIAGE NOTES
Arrived via EMS from home c/o resp distress  SOB started yesterday and got progressively worse   Pt was tripoding in 70s on RA   Placed on CPAP by EMS   Wears 4L NC at baseline   Hx COPD and lung cancer    Given in route by EMS   2G Mag  125mg Solumedrol  Duo Neb     Past Medical History:   Diagnosis Date    Anemia, iron deficiency 2/2016    Chronic hypoxic respiratory failure (HCC)     3.5 L/min O2 via NC at baseline (as of 2/07/2025)    Current use of long term anticoagulation     on Apixaban (Eliquis) as of 2/07/2025    Depression     H/O seasonal allergies     Hypertension     Non-small cell carcinoma of lung (HCC) 2/2016    adenocarcinoma of right lung    Positive occult stool blood test 2/29/2016    Pulmonary embolism (HCC) 2/28/2016    small, bilateral PEs- on Xarelto    Right leg DVT (HCC) 02/28/2016    on Apixaban (Eliquis) as of 2/07/2025    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

## 2025-06-12 NOTE — CARE COORDINATION
Care Transitions Note    Follow Up     Location:  [facility] Note:   Per chart review, patient currently noted to be in the ED at [facility] for evaluation.       Follow Up Appointment:   Future Appointments         Provider Specialty Dept Phone    6/23/2025 11:00 AM Trev Obregon MD Cardiology [phone]    7/3/2025 10:00 AM [facility] CATH HOLDING; [facility] ANGIO RADIOLOGIST; [facility] IR RM 1 Radiology [phone]    8/6/2025 10:00 AM [facility] Cardiology [phone]    9/29/2025 10:00 AM Cora Her MD Family Medicine [phone]    10/16/2025 10:20 AM Trev Obregon MD Cardiology [phone]          Care Transitions Nurse ( CTN)  to follow up pending ED discharge disposition.      Karolina Shabazz RN

## 2025-06-13 LAB
ANION GAP SERPL CALC-SCNC: 11 MMOL/L (ref 3–18)
BASOPHILS # BLD: 0 K/UL (ref 0–0.1)
BASOPHILS NFR BLD: 0 % (ref 0–2)
BUN SERPL-MCNC: 9 MG/DL (ref 6–23)
BUN/CREAT SERPL: 15 (ref 12–20)
CALCIUM SERPL-MCNC: 9.3 MG/DL (ref 8.5–10.1)
CHLORIDE SERPL-SCNC: 102 MMOL/L (ref 98–107)
CO2 SERPL-SCNC: 27 MMOL/L (ref 21–32)
CREAT SERPL-MCNC: 0.63 MG/DL (ref 0.6–1.3)
DIFFERENTIAL METHOD BLD: ABNORMAL
EOSINOPHIL # BLD: 0 K/UL (ref 0–0.4)
EOSINOPHIL NFR BLD: 0 % (ref 0–5)
ERYTHROCYTE [DISTWIDTH] IN BLOOD BY AUTOMATED COUNT: 20.2 % (ref 11.6–14.5)
GLUCOSE SERPL-MCNC: 155 MG/DL (ref 74–108)
HCT VFR BLD AUTO: 38.5 % (ref 35–45)
HGB BLD-MCNC: 11.2 G/DL (ref 12–16)
IMM GRANULOCYTES # BLD AUTO: 0.02 K/UL (ref 0–0.04)
IMM GRANULOCYTES NFR BLD AUTO: 0.3 % (ref 0–0.5)
LACTATE SERPL-SCNC: 1.5 MMOL/L (ref 0.4–2)
LYMPHOCYTES # BLD: 0.23 K/UL (ref 0.9–3.6)
LYMPHOCYTES NFR BLD: 4 % (ref 21–52)
MCH RBC QN AUTO: 22.4 PG (ref 24–34)
MCHC RBC AUTO-ENTMCNC: 29.1 G/DL (ref 31–37)
MCV RBC AUTO: 77.2 FL (ref 78–100)
MONOCYTES # BLD: 0.13 K/UL (ref 0.05–1.2)
MONOCYTES NFR BLD: 2.2 % (ref 3–10)
NEUTS SEG # BLD: 5.43 K/UL (ref 1.8–8)
NEUTS SEG NFR BLD: 93.5 % (ref 40–73)
NRBC # BLD: 0 K/UL (ref 0–0.01)
NRBC BLD-RTO: 0 PER 100 WBC
PLATELET # BLD AUTO: 146 K/UL (ref 135–420)
POTASSIUM SERPL-SCNC: 4 MMOL/L (ref 3.5–5.5)
RBC # BLD AUTO: 4.99 M/UL (ref 4.2–5.3)
SODIUM SERPL-SCNC: 140 MMOL/L (ref 136–145)
WBC # BLD AUTO: 5.8 K/UL (ref 4.6–13.2)

## 2025-06-13 PROCEDURE — 6370000000 HC RX 637 (ALT 250 FOR IP): Performed by: HOSPITALIST

## 2025-06-13 PROCEDURE — 2700000000 HC OXYGEN THERAPY PER DAY

## 2025-06-13 PROCEDURE — 1100000003 HC PRIVATE W/ TELEMETRY

## 2025-06-13 PROCEDURE — 94660 CPAP INITIATION&MGMT: CPT

## 2025-06-13 PROCEDURE — 85025 COMPLETE CBC W/AUTO DIFF WBC: CPT

## 2025-06-13 PROCEDURE — 99233 SBSQ HOSP IP/OBS HIGH 50: CPT | Performed by: STUDENT IN AN ORGANIZED HEALTH CARE EDUCATION/TRAINING PROGRAM

## 2025-06-13 PROCEDURE — 2500000003 HC RX 250 WO HCPCS: Performed by: HOSPITALIST

## 2025-06-13 PROCEDURE — 94640 AIRWAY INHALATION TREATMENT: CPT

## 2025-06-13 PROCEDURE — 94761 N-INVAS EAR/PLS OXIMETRY MLT: CPT

## 2025-06-13 PROCEDURE — 80048 BASIC METABOLIC PNL TOTAL CA: CPT

## 2025-06-13 PROCEDURE — 6360000002 HC RX W HCPCS: Performed by: HOSPITALIST

## 2025-06-13 PROCEDURE — 83605 ASSAY OF LACTIC ACID: CPT

## 2025-06-13 PROCEDURE — 36415 COLL VENOUS BLD VENIPUNCTURE: CPT

## 2025-06-13 RX ORDER — BENZONATATE 100 MG/1
200 CAPSULE ORAL 3 TIMES DAILY PRN
Status: DISCONTINUED | OUTPATIENT
Start: 2025-06-13 | End: 2025-06-15 | Stop reason: HOSPADM

## 2025-06-13 RX ORDER — HYDRALAZINE HYDROCHLORIDE 20 MG/ML
10 INJECTION INTRAMUSCULAR; INTRAVENOUS EVERY 6 HOURS PRN
Status: DISCONTINUED | OUTPATIENT
Start: 2025-06-13 | End: 2025-06-15 | Stop reason: HOSPADM

## 2025-06-13 RX ADMIN — FOLIC ACID 1 MG: 1 TABLET ORAL at 10:02

## 2025-06-13 RX ADMIN — SODIUM CHLORIDE, PRESERVATIVE FREE 10 ML: 5 INJECTION INTRAVENOUS at 21:55

## 2025-06-13 RX ADMIN — SERTRALINE 50 MG: 50 TABLET, FILM COATED ORAL at 10:02

## 2025-06-13 RX ADMIN — APIXABAN 5 MG: 5 TABLET, FILM COATED ORAL at 21:55

## 2025-06-13 RX ADMIN — GUAIFENESIN 1200 MG: 600 TABLET, EXTENDED RELEASE ORAL at 21:55

## 2025-06-13 RX ADMIN — IPRATROPIUM BROMIDE AND ALBUTEROL SULFATE 1 DOSE: .5; 3 SOLUTION RESPIRATORY (INHALATION) at 09:30

## 2025-06-13 RX ADMIN — SODIUM CHLORIDE, PRESERVATIVE FREE 10 ML: 5 INJECTION INTRAVENOUS at 10:03

## 2025-06-13 RX ADMIN — IPRATROPIUM BROMIDE AND ALBUTEROL SULFATE 1 DOSE: .5; 3 SOLUTION RESPIRATORY (INHALATION) at 20:51

## 2025-06-13 RX ADMIN — GUAIFENESIN 1200 MG: 600 TABLET, EXTENDED RELEASE ORAL at 10:02

## 2025-06-13 RX ADMIN — FERROUS SULFATE TAB 325 MG (65 MG ELEMENTAL FE) 325 MG: 325 (65 FE) TAB at 10:02

## 2025-06-13 RX ADMIN — IPRATROPIUM BROMIDE AND ALBUTEROL SULFATE 1 DOSE: .5; 3 SOLUTION RESPIRATORY (INHALATION) at 16:38

## 2025-06-13 RX ADMIN — APIXABAN 5 MG: 5 TABLET, FILM COATED ORAL at 10:03

## 2025-06-13 RX ADMIN — FUROSEMIDE 40 MG: 10 INJECTION, SOLUTION INTRAMUSCULAR; INTRAVENOUS at 10:03

## 2025-06-13 RX ADMIN — FUROSEMIDE 40 MG: 10 INJECTION, SOLUTION INTRAMUSCULAR; INTRAVENOUS at 17:39

## 2025-06-13 RX ADMIN — METOPROLOL SUCCINATE 25 MG: 25 TABLET, EXTENDED RELEASE ORAL at 10:03

## 2025-06-13 RX ADMIN — IPRATROPIUM BROMIDE AND ALBUTEROL SULFATE 1 DOSE: .5; 3 SOLUTION RESPIRATORY (INHALATION) at 11:58

## 2025-06-13 ASSESSMENT — PAIN SCALES - GENERAL
PAINLEVEL_OUTOF10: 0

## 2025-06-13 NOTE — PROGRESS NOTES
Advance Care Planning   Healthcare Decision Maker:    Primary Decision Maker: Radha Jolly - Brother/Sister - [phone]    Secondary Decision Maker: Dylan Hector - Brother/Sister - [phone]    Click here to complete Healthcare Decision Makers including selection of the Healthcare Decision Maker Relationship (ie \"Primary\").    Spiritual Health History and Assessment/Progress Note  [facility]    Spiritual/Emotional Needs, Palliative Care,  ,  ,      Name: Rina Prajapati MRN: [id number]    Age: 64 y.o.     Sex: female   Language: English   Religious: None   Acute on chronic hypoxic respiratory failure (HCC)     Date: 6/13/2025            Total Time Calculated: 7 min              Spiritual Assessment began in [facility]        Referral/Consult From: Palliative Care   Encounter Overview/Reason: Spiritual/Emotional Needs, Palliative Care  Service Provided For: Patient    Tara, Belief, Meaning:   Patient identifies as spiritual and has beliefs or practices that help with coping during difficult times  Family/Friends No family/friends present      Importance and Influence:  Patient has spiritual/personal beliefs that influence decisions regarding their health  Family/Friends No family/friends present    Community:  Patient feels well-supported. Support system includes: Extended family  Family/Friends No family/friends present    Assessment and Plan of Care:     Patient Interventions include: Facilitated expression of thoughts and feelings, Affirmed coping skills/support systems, Provided sacramental/Restoration ritual, and Assisted in Advance Care Planning conversation  Family/Friends Interventions include: No family/friends present    Patient Plan of Care: Spiritual Care available upon further referral  Family/Friends Plan of Care: No family/friends present    Electronically signed by Chaplain Tae on 6/13/2025 at 12:26 PM

## 2025-06-13 NOTE — CARE COORDINATION
06/13/25 1206   Service Assessment   Patient Orientation Alert and Oriented;Person;Place;Situation;Self   Cognition Alert   History Provided By Patient   Primary Caregiver Self   Accompanied By/Relationship No one at bedside   Support Systems Spouse/Significant Other   Patient's Healthcare Decision Maker is: Named in Scanned ACP Document   PCP Verified by CM Yes   Last Visit to PCP Within last 3 months   Prior Functional Level Independent in ADLs/IADLs   Current Functional Level Independent in ADLs/IADLs   Can patient return to prior living arrangement Yes   Ability to make needs known: Good   Family able to assist with home care needs: Yes   Would you like for me to discuss the discharge plan with any other family members/significant others, and if so, who? No   Financial Resources Medicare   Community Resources None   CM/SW Referral Other (see comment)  (DC Planning)   Social/Functional History   Lives With Spouse   Type of Home House   Home Layout One level   Home Access Stairs to enter with rails   Entrance Stairs - Number of Steps 3   Entrance Stairs - Rails Both   Bathroom Shower/Tub Tub/Shower unit   Bathroom Toilet Standard   Bathroom Equipment Shower chair   Bathroom Accessibility Accessible   Home Equipment Rollator;Oxygen  (4L O2)   Receives Help From Family   Prior Level of Assist for ADLs Independent   Prior Level of Assist for Homemaking Independent   Homemaking Responsibilities Yes   Ambulation Assistance Independent   Prior Level of Assist for Transfers Independent   Active  No   Patient's  Info Family or Fiance   Mode of Transportation Car   Education N/A   Occupation On disability   Type of Occupation N/A   Discharge Planning   Type of Residence House   Living Arrangements Spouse/Significant Other   Current Services Prior To Admission Durable Medical Equipment;Oxygen Therapy   Current DME Prior to Arrival Other (Comment)  (Rollator)   Potential Assistance Needed N/A   DME Ordered? No

## 2025-06-13 NOTE — PROGRESS NOTES
4 Eyes Skin Assessment     NAME:  Rina Prajapati  YOB: 1960  MEDICAL RECORD NUMBER:  [id number]    The patient is being assessed for  Admission    I agree that at least one RN has performed a thorough Head to Toe Skin Assessment on the patient. ALL assessment sites listed below have been assessed.      Areas assessed by both nurses:    Head, Face, Ears, Shoulders, Back, Chest, Arms, Elbows, Hands, Sacrum. Buttock, Coccyx, Ischium, Legs. Feet and Heels, and Under Medical Devices         Does the Patient have a Wound? No noted wound(s)       Isidro Prevention initiated by RN: Yes  Wound Care Orders initiated by RN: No    Pressure Injury (Stage 3,4, Unstageable, DTI, NWPT, and Complex wounds) if present, place Wound referral order by RN under : No    New Ostomies, if present place, Ostomy referral order under : No     Nurse 1 eSignature: Electronically signed by Patito Braun RN on 6/13/25 at 4:52 AM EDT    **SHARE this note so that the co-signing nurse can place an eSignature**    Nurse 2 eSignature: {Esignature:[id number]}

## 2025-06-13 NOTE — CARE COORDINATION
06/13/25 1206   Readmission Assessment   Number of Days since last admission? 8-30 days   Previous Disposition Home with Family   Who is being Interviewed Patient   What was the patient's/caregiver's perception as to why they think they needed to return back to the hospital? Other (Comment)  (Still SOB)   Did you visit your Primary Care Physician after you left the hospital, before you returned this time? Yes   Did you see a specialist, such as Cardiac, Pulmonary, Orthopedic Physician, etc. after you left the hospital? No   Who advised the patient to return to the hospital? Self-referral   Does the patient report anything that got in the way of taking their medications? No   In our efforts to provide the best possible care to you and others like you, can you think of anything that we could have done to help you after you left the hospital the first time, so that you might not have needed to return so soon? Other (Comment)  (Maybe a nebulizer for breathing treatments at home)     JARED Feliciano   Inpatient Case Management (ICM)   [facility]

## 2025-06-13 NOTE — CARDIO/PULMONARY
Cardiopulmonary Rehab Note:    Cardiac RN attempted to see patient this day; unable to be seen due to:    [  ] lethargy/confusion  [  ] off floor for testing   [  ] RN care  [  ] Pain   [  X ] Other- has lung cancer and will be on chemo therapy    Will follow up next day as indicated.    Dorothy Mendes, RN, RN, BSN, CCRP  6/13/2025

## 2025-06-13 NOTE — PROGRESS NOTES
65 Y/o female full code on contact for MRSA in nares  Arrived EMS with c/o shortness of breath. When EMS arrived her oxygen was 75 % on room air, using nebulizer treatments at home,  BNP greater than 5000. Lactic acid 4.48    History: COPD, Lung Cancer, CHF, HTN, chronic respiratory failure with hypoxia, Baseline 4 lpm NC at home at all times,     Diagnosis: Respiratory failure with hypoxia, acute exacerbation of COPD, diagnosed with PE (chronic) 05/29/25, anticoagulation use, hypothyroidism , acute on chronic CHF, malignant neoplasm of lung      Arrived without CT scan d/t telling ED nurse she could not lay down      Neuro  alert and oriented No falls    Resp  BiPAP continuously, expiratory wheezing with fine crackles    Cardiac Cardiac telemetry monitor on the wall 363     GI has a diet order however also an order for continuous BiPAP, desaturated off BiPAP on nasal cannula in the ED just prior to coming up to the floor     fatimah and had incontinent urine and a bowel movement downstairs in the emergency dept    Skin WNL exception some bruising and scars    Lines  20 gauge right forearm, midline for long term antibiotic treatment              2001 received report from RN April in the emergency department. Patient will be arriving soon to the unit.    2045 arrived to [address] [place] [address] assisted into bed 363. Placed on cardiac telemetry monitor, vitals and temp assessed. Pt smells like cigarette smoke and with what she continues to be admitted for, assessed it and talked with her d/t her numerous admissions r/t lungs and fluid. Patient informed this RN that her  smokes in the house but only in one room. Discussed with her that it is still in the house and she is still inhaling it. Discussed this with her and that it will continue to cause her to have respiratory distress.    Patient's  brought back to visit with her. While walking with him educated him that smoking in the house or car will still subject her

## 2025-06-13 NOTE — PROGRESS NOTES
Shortly after arrival to the unit she asked for something to drink. I was informed while she was wearing BiPAP with a continuous BiPAP order that we cannot take the mask off and she cannot drink with it on. Informed her that if she drinks and has to put the BIPAP right back on she can aspirate. Patient insisted that she has taken it off other admissions and had drinks and eaten. Discussed also with her that when she was down in the emergency department that they removed the mask briefly and she desaturated immediately.           brought a bag of chick filet and patient became upset that she could not take the mask off long enough to eat, she stated \"I have always been able to take the mask off to eat and drink\". We discussed that with each visit she is coming in more short of breath and in more distress than the previous visit.       Spoke with RT Siddiqui and he agrees that it is a risk, asked patient if she would like me to contact the physician she did not respond, informed her that her doctor will do rounds in the morning and she can discuss it with him.

## 2025-06-13 NOTE — PROGRESS NOTES
[facility] Hospitalist Group  Progress Note    Patient: Rina Prajapati Age: 64 y.o. : 1960 MR#: [id number] SSN: [id number]  Date/Time: 2025     Chief Complaint   Patient presents with    Respiratory Distress       Subjective:   Patient seen and examined. No acute events overnight. In good spirits, no new complaints. Denies chest pain, abdominal pain, shortness of breath.  Plan was discussed, questions were addressed  Assessment/Plan:   Acute on chronic respiratory failure with hypoxia likely multifactorial in the setting of acute COPD exacerbation, pulmonary hypertension  Chronic respiratory failure on 4 L oxygen at home  Small cell lung cancer   - Continue BiPAP support as tolerated/as needed.  Continue supplemental O2 4 L as tolerated.  -Continue stepdown acuity, patient high risk for deterioration.  - Diuresis, steroids, DuoNebs, antitussives.    Elevated troponin  Likely demand ischemia secondary to above  Patient denies any chest pain  EKG reviewed  Monitor patient on telemetry     Lactic acidosis, resolved  Likely secondary to increased work of breathing  No clear evidence of infection    History of PE  Continue anticoagulation with Eliquis     Hypertension  Monitor blood pressure closely  Resume home medications accordingly     Hypothyroidism  Continue home medications    HFpEF, not in acute exacerbation  - Diuresis as above.  Daily weights, I's and O's.  Monitor renal function electrolytes  -Recent echo  will not repeat.  Can consider cardiology consult depending on clinical course    Severe pulmonary HTN      Dispo plan: Home once medically stable versus SNF/ARU evaluation ongoing, anticipated discharge date -06/15    I spent 50 minutes with the patient in face-to-face consultation, of which greater than 50% was spent in counseling and coordination of care as described above.    Discussed with patient's family at bedside. Questions were answered to the

## 2025-06-13 NOTE — PROGRESS NOTES
Patient taken off bipap per her request as she wanted to try so hopefully she could eat. Patient taken off bipap and placed on 4 liters. Patient having tachypnea but states she feels fine. Bipap left on standby at this time. Patient noted to have crackles/wheezing bilateral so dr ellsworthd to see if another dose of lasix could be given. Nursing in room at time of visit and aware of how to place patient back on bipap if she needs to go back on   06/13/25 1004   Oxygen Therapy/Pulse Ox   O2 Therapy Oxygen   O2 Flow Rate (L/min) 4 L/min   FiO2  36 %   Pulse (!) 103   Respirations 24   SpO2 90 %

## 2025-06-13 NOTE — ED NOTES
ED TO INPATIENT SBAR HANDOFF    Patient Name: Rina Prajapati   Preferred Name: Rina  : 1960  64 y.o.   Family/Caregiver Present: no   Code Status Order: Prior  PO Status: NPO:No  Telemetry Order: Yes  C-SSRS: Risk of Suicide: No Risk  Sitter no   Restraints:     Sepsis Risk Score Sepsis V2 Risk Score: 12.2    Situation  Chief Complaint   Patient presents with    Respiratory Distress     Brief Description of Patient's Condition: Resp distress when flat. Pt currently on bipap  Mental Status: alert, coherent, logical, thought processes intact, and able to concentrate and follow conversation  Arrived from:Home  Imaging:   XR CHEST PORTABLE   Final Result   Similar appearance of right lung airspace opacities with scarring since   2025. Small right pleural effusion, grossly unchanged.       Enlarged cardiomediastinal silhouette.         Electronically signed by Shon Shay        Abnormal labs:   Abnormal Labs Reviewed   COMPREHENSIVE METABOLIC PANEL - Abnormal; Notable for the following components:       Result Value    Glucose 165 (*)     Albumin 3.0 (*)     All other components within normal limits   CBC WITH AUTO DIFFERENTIAL - Abnormal; Notable for the following components:    Hemoglobin 11.4 (*)     MCV 76.8 (*)     MCH 23.2 (*)     MCHC 30.2 (*)     RDW 20.6 (*)     Neutrophils % 76.0 (*)     Lymphocytes % 13.9 (*)     Lymphocytes Absolute 0.88 (*)     All other components within normal limits   TROPONIN - Abnormal; Notable for the following components:    Troponin T 24.5 (*)     All other components within normal limits   BRAIN NATRIURETIC PEPTIDE - Abnormal; Notable for the following components:    NT Pro-BNP 5,887 (*)     All other components within normal limits   POCT BLOOD GAS & ELECTROLYTES - Abnormal; Notable for the following components:    POC Ionized Calcium 1.14 (*)     POC Glucose 178 (*)     POC Lactic Acid 4.48 (*)     PO2, VENOUS (POC) 103 (*)     All other components within

## 2025-06-13 NOTE — PROGRESS NOTES
0730: Bedside shift change report given to CORTES Walden (oncoming nurse) by CORTES Caputo (offgoing nurse). Report included the following information Nurse Handoff Report, Adult Overview, Intake/Output, MAR, and Cardiac Rhythm NSR/ST.     0945: Patient taken off BIPAP by RT.  at bedside.

## 2025-06-14 LAB
BASOPHILS # BLD: 0.01 K/UL (ref 0–0.1)
BASOPHILS NFR BLD: 0.2 % (ref 0–2)
DIFFERENTIAL METHOD BLD: ABNORMAL
EOSINOPHIL # BLD: 0.02 K/UL (ref 0–0.4)
EOSINOPHIL NFR BLD: 0.3 % (ref 0–5)
ERYTHROCYTE [DISTWIDTH] IN BLOOD BY AUTOMATED COUNT: 20 % (ref 11.6–14.5)
HCT VFR BLD AUTO: 34.8 % (ref 35–45)
HGB BLD-MCNC: 10.3 G/DL (ref 12–16)
IMM GRANULOCYTES # BLD AUTO: 0.02 K/UL (ref 0–0.04)
IMM GRANULOCYTES NFR BLD AUTO: 0.3 % (ref 0–0.5)
LYMPHOCYTES # BLD: 0.49 K/UL (ref 0.9–3.6)
LYMPHOCYTES NFR BLD: 7.4 % (ref 21–52)
MAGNESIUM SERPL-MCNC: 2 MG/DL (ref 1.6–2.6)
MCH RBC QN AUTO: 22.5 PG (ref 24–34)
MCHC RBC AUTO-ENTMCNC: 29.6 G/DL (ref 31–37)
MCV RBC AUTO: 76.1 FL (ref 78–100)
MONOCYTES # BLD: 0.34 K/UL (ref 0.05–1.2)
MONOCYTES NFR BLD: 5.2 % (ref 3–10)
NEUTS SEG # BLD: 5.71 K/UL (ref 1.8–8)
NEUTS SEG NFR BLD: 86.6 % (ref 40–73)
NRBC # BLD: 0 K/UL (ref 0–0.01)
NRBC BLD-RTO: 0 PER 100 WBC
PLATELET # BLD AUTO: 161 K/UL (ref 135–420)
RBC # BLD AUTO: 4.57 M/UL (ref 4.2–5.3)
WBC # BLD AUTO: 6.6 K/UL (ref 4.6–13.2)

## 2025-06-14 PROCEDURE — 6370000000 HC RX 637 (ALT 250 FOR IP): Performed by: HOSPITALIST

## 2025-06-14 PROCEDURE — 97161 PT EVAL LOW COMPLEX 20 MIN: CPT

## 2025-06-14 PROCEDURE — 85025 COMPLETE CBC W/AUTO DIFF WBC: CPT

## 2025-06-14 PROCEDURE — 36415 COLL VENOUS BLD VENIPUNCTURE: CPT

## 2025-06-14 PROCEDURE — 1100000003 HC PRIVATE W/ TELEMETRY

## 2025-06-14 PROCEDURE — 99232 SBSQ HOSP IP/OBS MODERATE 35: CPT | Performed by: FAMILY MEDICINE

## 2025-06-14 PROCEDURE — 2700000000 HC OXYGEN THERAPY PER DAY

## 2025-06-14 PROCEDURE — 94640 AIRWAY INHALATION TREATMENT: CPT

## 2025-06-14 PROCEDURE — 97165 OT EVAL LOW COMPLEX 30 MIN: CPT

## 2025-06-14 PROCEDURE — 94761 N-INVAS EAR/PLS OXIMETRY MLT: CPT

## 2025-06-14 PROCEDURE — 2500000003 HC RX 250 WO HCPCS: Performed by: HOSPITALIST

## 2025-06-14 PROCEDURE — 83735 ASSAY OF MAGNESIUM: CPT

## 2025-06-14 RX ORDER — LORAZEPAM 1 MG/1
1 TABLET ORAL
Status: DISCONTINUED | OUTPATIENT
Start: 2025-06-14 | End: 2025-06-15 | Stop reason: HOSPADM

## 2025-06-14 RX ADMIN — SERTRALINE 50 MG: 50 TABLET, FILM COATED ORAL at 11:09

## 2025-06-14 RX ADMIN — IPRATROPIUM BROMIDE AND ALBUTEROL SULFATE 1 DOSE: .5; 3 SOLUTION RESPIRATORY (INHALATION) at 07:05

## 2025-06-14 RX ADMIN — SODIUM CHLORIDE, PRESERVATIVE FREE 10 ML: 5 INJECTION INTRAVENOUS at 11:12

## 2025-06-14 RX ADMIN — FOLIC ACID 1 MG: 1 TABLET ORAL at 11:09

## 2025-06-14 RX ADMIN — FERROUS SULFATE TAB 325 MG (65 MG ELEMENTAL FE) 325 MG: 325 (65 FE) TAB at 11:09

## 2025-06-14 RX ADMIN — GUAIFENESIN 1200 MG: 600 TABLET, EXTENDED RELEASE ORAL at 20:31

## 2025-06-14 RX ADMIN — IPRATROPIUM BROMIDE AND ALBUTEROL SULFATE 1 DOSE: .5; 3 SOLUTION RESPIRATORY (INHALATION) at 16:26

## 2025-06-14 RX ADMIN — IPRATROPIUM BROMIDE AND ALBUTEROL SULFATE 1 DOSE: .5; 3 SOLUTION RESPIRATORY (INHALATION) at 20:21

## 2025-06-14 RX ADMIN — APIXABAN 5 MG: 5 TABLET, FILM COATED ORAL at 20:31

## 2025-06-14 RX ADMIN — LEVOTHYROXINE SODIUM 88 MCG: 88 TABLET ORAL at 06:20

## 2025-06-14 RX ADMIN — GUAIFENESIN 1200 MG: 600 TABLET, EXTENDED RELEASE ORAL at 11:09

## 2025-06-14 RX ADMIN — APIXABAN 5 MG: 5 TABLET, FILM COATED ORAL at 11:09

## 2025-06-14 RX ADMIN — IPRATROPIUM BROMIDE AND ALBUTEROL SULFATE 1 DOSE: .5; 3 SOLUTION RESPIRATORY (INHALATION) at 12:02

## 2025-06-14 RX ADMIN — SODIUM CHLORIDE, PRESERVATIVE FREE 10 ML: 5 INJECTION INTRAVENOUS at 20:32

## 2025-06-14 ASSESSMENT — PAIN SCALES - GENERAL
PAINLEVEL_OUTOF10: 0
PAINLEVEL_OUTOF10: 0

## 2025-06-14 NOTE — PROGRESS NOTES
[facility] Hospitalist [facility]  Progress Note    Patient: Rina Prajapati Age: 64 y.o. : 1960 MR#: [id number] SSN: [id number]      Subjective/24-hour events:     Sitting in chair on visit today.  Reports feeling significantly better overall, SOB improved.    Assessment:   Acute on chronic hypoxic respiratory failure  COPD with acute exacerbation  Elevated troponin  Hypertension  Hypothyroidism  Chronic diastolic CHF  Small cell lung cancer  History of PE, anticoagulant with Eliquis  Lactic acidosis, resolved next    Plan:   Supplemental oxygen, wean as tolerated.  Patient at 5 L/min currently.  Usual baseline is 4 L/min at home.  BiPAP as needed.  CT C/A/P.  PT/OT, mobilize as tolerated.  Supportive care otherwise.  Follow-up    Anticipated discharge: 6/15-    Case discussed with:  [x]Patient  []Family  [x] Nursing  []Case Management  DVT Prophylaxis:  []Lovenox  []Hep SQ  []SCDs  []Coumadin   []On Heparin gtt [x]PO anticoagulant    Objective:   VS: /71   Pulse 97   Temp 98.4 °F (36.9 °C) (Oral)   Resp 19   Ht 1.626 m (5' 4\")   Wt 69.8 kg (153 lb 12.8 oz)   SpO2 92%   BMI 26.40 kg/m²      Tmax/24hrs: Temp (24hrs), Av.8 °F (36.6 °C), Min:97.5 °F (36.4 °C), Max:98.4 °F (36.9 °C)    Intake/Output Summary (Last 24 hours) at 2025 1352  Last data filed at 2025 0751  Gross per 24 hour   Intake 800 ml   Output 1500 ml   Net -700 ml       Gen:  In NAD.  Nontoxic-appearing  Lungs: Effort nonlabored  CV: RRR.  Abdomen: Soft, NTTP.  Extremities: Warm, no pitting edema or ischemia.  Neuro:  Awake and alert, moves extremities spontaneously.    Current Facility-Administered Medications   Medication Dose Route Frequency    benzonatate (TESSALON) capsule 200 mg  200 mg Oral TID PRN    hydrALAZINE (APRESOLINE) injection 10 mg  10 mg IntraVENous Q6H PRN    apixaban (ELIQUIS) tablet 5 mg  5 mg Oral BID    folic acid (FOLVITE) tablet 1 mg  1 mg Oral Daily    ferrous

## 2025-06-14 NOTE — PROGRESS NOTES
Bedside and Verbal shift change report given to CORTES Zarate (oncoming nurse) by CORTES Baird (offgoing nurse). Report included the following information Nurse Handoff Report, Adult Overview, Intake/Output, MAR, and Recent Results.

## 2025-06-14 NOTE — PROGRESS NOTES
Occupational Therapy  OCCUPATIONAL THERAPY EVALUATION/DISCHARGE    Patient: Rina Prajapati (64 y.o. female)  Date: 6/14/2025  Primary Diagnosis: Acute on chronic respiratory failure (HCC) [J96.20]  Precautions: Fall Risk  PLOF: Pt lives with SO in 1 story home, Ronny in self care tasks, rollator for functional mobility      ASSESSMENT AND RECOMMENDATIONS:  Pt cleared for OT evaluation and pt agreeable to participate. Pt presents at baseline functioning for self care tasks. Pt Ronny in bed mobility, functional mobility within room, and functional transfers. Declines ADL participation at this time. Pt left sitting up in chair with all needs met and call bell within reach.     Further Equipment Recommendations for Discharge: Patient has all needed DME for home safety.    AMPA: AM-PAC Inpatient Daily Activity Raw Score: 24    Current research shows that an AM-PAC score of 18 or greater is associated with a discharge to the patient's home setting.    This AMPAC score should be considered in conjunction with interdisciplinary team recommendations to determine the most appropriate discharge setting. Patient's social support, diagnosis, medical stability, and prior level of function should also be taken into consideration.     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

## 2025-06-15 ENCOUNTER — APPOINTMENT (OUTPATIENT)
Facility: HOSPITAL | Age: 65
DRG: 190 | End: 2025-06-15
Payer: MEDICARE

## 2025-06-15 VITALS
BODY MASS INDEX: 26.26 KG/M2 | HEART RATE: 82 BPM | RESPIRATION RATE: 16 BRPM | TEMPERATURE: 98.2 F | OXYGEN SATURATION: 98 % | DIASTOLIC BLOOD PRESSURE: 98 MMHG | SYSTOLIC BLOOD PRESSURE: 145 MMHG | WEIGHT: 153.8 LBS | HEIGHT: 64 IN

## 2025-06-15 LAB
ANION GAP SERPL CALC-SCNC: 11 MMOL/L (ref 3–18)
BUN SERPL-MCNC: 15 MG/DL (ref 6–23)
BUN/CREAT SERPL: 25 (ref 12–20)
CALCIUM SERPL-MCNC: 9.3 MG/DL (ref 8.5–10.1)
CHLORIDE SERPL-SCNC: 100 MMOL/L (ref 98–107)
CO2 SERPL-SCNC: 27 MMOL/L (ref 21–32)
CREAT SERPL-MCNC: 0.61 MG/DL (ref 0.6–1.3)
GLUCOSE SERPL-MCNC: 82 MG/DL (ref 74–108)
POTASSIUM SERPL-SCNC: 3.6 MMOL/L (ref 3.5–5.5)
SODIUM SERPL-SCNC: 138 MMOL/L (ref 136–145)

## 2025-06-15 PROCEDURE — 94640 AIRWAY INHALATION TREATMENT: CPT

## 2025-06-15 PROCEDURE — 36415 COLL VENOUS BLD VENIPUNCTURE: CPT

## 2025-06-15 PROCEDURE — 71260 CT THORAX DX C+: CPT

## 2025-06-15 PROCEDURE — 94664 DEMO&/EVAL PT USE INHALER: CPT

## 2025-06-15 PROCEDURE — 6370000000 HC RX 637 (ALT 250 FOR IP): Performed by: HOSPITALIST

## 2025-06-15 PROCEDURE — 6360000004 HC RX CONTRAST MEDICATION: Performed by: FAMILY MEDICINE

## 2025-06-15 PROCEDURE — 2700000000 HC OXYGEN THERAPY PER DAY

## 2025-06-15 PROCEDURE — 2500000003 HC RX 250 WO HCPCS: Performed by: HOSPITALIST

## 2025-06-15 PROCEDURE — 80048 BASIC METABOLIC PNL TOTAL CA: CPT

## 2025-06-15 PROCEDURE — 6360000002 HC RX W HCPCS: Performed by: HOSPITALIST

## 2025-06-15 PROCEDURE — 94761 N-INVAS EAR/PLS OXIMETRY MLT: CPT

## 2025-06-15 RX ORDER — IOPAMIDOL 612 MG/ML
90 INJECTION, SOLUTION INTRAVASCULAR
Status: COMPLETED | OUTPATIENT
Start: 2025-06-15 | End: 2025-06-15

## 2025-06-15 RX ORDER — BENZONATATE 200 MG/1
200 CAPSULE ORAL 3 TIMES DAILY PRN
Qty: 21 CAPSULE | Refills: 0 | Status: SHIPPED | OUTPATIENT
Start: 2025-06-15 | End: 2025-06-22

## 2025-06-15 RX ADMIN — METOPROLOL SUCCINATE 25 MG: 25 TABLET, EXTENDED RELEASE ORAL at 09:00

## 2025-06-15 RX ADMIN — SODIUM CHLORIDE, PRESERVATIVE FREE 10 ML: 5 INJECTION INTRAVENOUS at 09:01

## 2025-06-15 RX ADMIN — IOPAMIDOL 90 ML: 612 INJECTION, SOLUTION INTRAVENOUS at 11:13

## 2025-06-15 RX ADMIN — IPRATROPIUM BROMIDE AND ALBUTEROL SULFATE 1 DOSE: .5; 3 SOLUTION RESPIRATORY (INHALATION) at 08:12

## 2025-06-15 RX ADMIN — LEVOTHYROXINE SODIUM 88 MCG: 88 TABLET ORAL at 05:53

## 2025-06-15 RX ADMIN — SERTRALINE 50 MG: 50 TABLET, FILM COATED ORAL at 09:00

## 2025-06-15 RX ADMIN — APIXABAN 5 MG: 5 TABLET, FILM COATED ORAL at 09:00

## 2025-06-15 RX ADMIN — GUAIFENESIN 1200 MG: 600 TABLET, EXTENDED RELEASE ORAL at 09:00

## 2025-06-15 RX ADMIN — FERROUS SULFATE TAB 325 MG (65 MG ELEMENTAL FE) 325 MG: 325 (65 FE) TAB at 09:00

## 2025-06-15 RX ADMIN — FUROSEMIDE 40 MG: 10 INJECTION, SOLUTION INTRAMUSCULAR; INTRAVENOUS at 08:59

## 2025-06-15 RX ADMIN — FOLIC ACID 1 MG: 1 TABLET ORAL at 09:00

## 2025-06-15 RX ADMIN — IPRATROPIUM BROMIDE AND ALBUTEROL SULFATE 1 DOSE: .5; 3 SOLUTION RESPIRATORY (INHALATION) at 12:07

## 2025-06-15 ASSESSMENT — PAIN SCALES - GENERAL
PAINLEVEL_OUTOF10: 0
PAINLEVEL_OUTOF10: 0

## 2025-06-15 NOTE — PROGRESS NOTES
0730: Bedside and Verbal shift change report given to CORTES Cash (oncoming nurse) by CORTES Zarate (offgoing nurse). Report included the following information Nurse Handoff Report, Adult Overview, Intake/Output, MAR, and Cardiac Rhythm NSR/ST.      1100: Pt taken off unit to CT

## 2025-06-15 NOTE — CARE COORDINATION
Discharge order noted for today. Orders received. No needs identified at this time. Case management remains available as needed.     Dispo. home with transport arranged.

## 2025-06-15 NOTE — PLAN OF CARE
Problem: Chronic Conditions and Co-morbidities  Goal: Patient's chronic conditions and co-morbidity symptoms are monitored and maintained or improved  Outcome: Progressing     Problem: Skin/Tissue Integrity  Goal: Skin integrity remains intact  Description: 1.  Monitor for areas of redness and/or skin breakdown2.  Assess vascular access sites hourly3.  Every 4-6 hours minimum:  Change oxygen saturation probe site4.  Every 4-6 hours:  If on nasal continuous positive airway pressure, respiratory therapy assess nares and determine need for appliance change or resting period  6/13/2025 2358 by Felton Rosales RN  Outcome: Progressing  Flowsheets (Taken 6/13/2025 2358)  Skin Integrity Remains Intact:   Monitor for areas of redness and/or skin breakdown   Positioning devices  6/13/2025 1036 by Consuelo Damon RN  Flowsheets  Taken 6/13/2025 1036  Skin Integrity Remains Intact: Turn and reposition as indicated  Taken 6/13/2025 0817  Skin Integrity Remains Intact: Monitor for areas of redness and/or skin breakdown  Note: Nurse to round on patient frequently and assess for skin breakdown. Patient to be turned every two hours. Skin around face and nose to be monitored from O2 devices.     Problem: Safety - Adult  Goal: Free from fall injury  6/13/2025 2358 by Felton Rosales RN  Outcome: Progressing  Note: Make sure pt's bed alarm are on to prevent any falls  6/13/2025 1036 by Consuelo Damon, RN  Outcome: Progressing  Flowsheets (Taken 6/12/2025 2333 by Patito Braun, RN)  Free From Fall Injury:   Instruct family/caregiver on patient safety   Based on caregiver fall risk screen, instruct family/caregiver to ask for assistance with transferring infant if caregiver noted to have fall risk factors  Note: Bed in lowest position, personal items within reach, gripper socks on, call light placed nearby.      Problem: Pain  Goal: Verbalizes/displays adequate comfort level or baseline comfort level  Outcome: 
  Problem: Chronic Conditions and Co-morbidities  Goal: Patient's chronic conditions and co-morbidity symptoms are monitored and maintained or improved  Outcome: Progressing  Flowsheets  Taken 6/12/2025 2333  Care Plan - Patient's Chronic Conditions and Co-Morbidity Symptoms are Monitored and Maintained or Improved:   Collaborate with multidisciplinary team to address chronic and comorbid conditions and prevent exacerbation or deterioration   Monitor and assess patient's chronic conditions and comorbid symptoms for stability, deterioration, or improvement   Update acute care plan with appropriate goals if chronic or comorbid symptoms are exacerbated and prevent overall improvement and discharge  Taken 6/12/2025 2105  Care Plan - Patient's Chronic Conditions and Co-Morbidity Symptoms are Monitored and Maintained or Improved:   Monitor and assess patient's chronic conditions and comorbid symptoms for stability, deterioration, or improvement   Collaborate with multidisciplinary team to address chronic and comorbid conditions and prevent exacerbation or deterioration   Update acute care plan with appropriate goals if chronic or comorbid symptoms are exacerbated and prevent overall improvement and discharge     Problem: Discharge Planning  Goal: Discharge to home or other facility with appropriate resources  Outcome: Progressing  Flowsheets  Taken 6/12/2025 2333  Discharge to home or other facility with appropriate resources:   Identify barriers to discharge with patient and caregiver   Arrange for needed discharge resources and transportation as appropriate   Identify discharge learning needs (meds, wound care, etc)   Refer to discharge planning if patient needs post-hospital services based on physician order or complex needs related to functional status, cognitive ability or social support system  Taken 6/12/2025 2105  Discharge to home or other facility with appropriate resources:   Identify barriers to discharge with 
  Problem: Chronic Conditions and Co-morbidities  Goal: Patient's chronic conditions and co-morbidity symptoms are monitored and maintained or improved  Outcome: Progressing  Flowsheets (Taken 6/15/2025 0806 by Liza Martinez, RN)  Care Plan - Patient's Chronic Conditions and Co-Morbidity Symptoms are Monitored and Maintained or Improved:   Monitor and assess patient's chronic conditions and comorbid symptoms for stability, deterioration, or improvement   Collaborate with multidisciplinary team to address chronic and comorbid conditions and prevent exacerbation or deterioration   Update acute care plan with appropriate goals if chronic or comorbid symptoms are exacerbated and prevent overall improvement and discharge     Problem: Discharge Planning  Goal: Discharge to home or other facility with appropriate resources  Outcome: Progressing  Flowsheets (Taken 6/15/2025 0806 by Liza Martinez, RN)  Discharge to home or other facility with appropriate resources:   Identify barriers to discharge with patient and caregiver   Arrange for needed discharge resources and transportation as appropriate   Identify discharge learning needs (meds, wound care, etc)     Problem: Skin/Tissue Integrity  Goal: Skin integrity remains intact  Description: 1.  Monitor for areas of redness and/or skin breakdown2.  Assess vascular access sites hourly3.  Every 4-6 hours minimum:  Change oxygen saturation probe site4.  Every 4-6 hours:  If on nasal continuous positive airway pressure, respiratory therapy assess nares and determine need for appliance change or resting period  Outcome: Progressing  Flowsheets (Taken 6/15/2025 0806 by Liza Martinez, RN)  Skin Integrity Remains Intact:   Monitor for areas of redness and/or skin breakdown   Assess vascular access sites hourly   Every 4-6 hours:  If on nasal continuous positive airway pressure, assess nares and determine need for appliance change or resting period   Turn and reposition as 
  Problem: Chronic Conditions and Co-morbidities  Goal: Patient's chronic conditions and co-morbidity symptoms are monitored and maintained or improved  Recent Flowsheet Documentation  Taken 6/13/2025 0817 by Consuelo Damon, RN  Care Plan - Patient's Chronic Conditions and Co-Morbidity Symptoms are Monitored and Maintained or Improved:   Monitor and assess patient's chronic conditions and comorbid symptoms for stability, deterioration, or improvement   Collaborate with multidisciplinary team to address chronic and comorbid conditions and prevent exacerbation or deterioration   Update acute care plan with appropriate goals if chronic or comorbid symptoms are exacerbated and prevent overall improvement and discharge  6/12/2025 2333 by Patito Braun RN  Outcome: Progressing  Flowsheets  Taken 6/12/2025 2333  Care Plan - Patient's Chronic Conditions and Co-Morbidity Symptoms are Monitored and Maintained or Improved:   Collaborate with multidisciplinary team to address chronic and comorbid conditions and prevent exacerbation or deterioration   Monitor and assess patient's chronic conditions and comorbid symptoms for stability, deterioration, or improvement   Update acute care plan with appropriate goals if chronic or comorbid symptoms are exacerbated and prevent overall improvement and discharge  Taken 6/12/2025 2105  Care Plan - Patient's Chronic Conditions and Co-Morbidity Symptoms are Monitored and Maintained or Improved:   Monitor and assess patient's chronic conditions and comorbid symptoms for stability, deterioration, or improvement   Collaborate with multidisciplinary team to address chronic and comorbid conditions and prevent exacerbation or deterioration   Update acute care plan with appropriate goals if chronic or comorbid symptoms are exacerbated and prevent overall improvement and discharge     Problem: Discharge Planning  Goal: Discharge to home or other facility with appropriate resources  Recent 
Free from fall injury  Outcome: Progressing  Flowsheets (Taken 6/14/2025 2350)  Free From Fall Injury:   Based on caregiver fall risk screen, instruct family/caregiver to ask for assistance with transferring infant if caregiver noted to have fall risk factors   Instruct family/caregiver on patient safety  Note: Place call light within reach  Place bed alarm on     Problem: Pain  Goal: Verbalizes/displays adequate comfort level or baseline comfort level  Outcome: Progressing  Flowsheets (Taken 6/14/2025 2350)  Verbalizes/displays adequate comfort level or baseline comfort level:   Implement non-pharmacological measures as appropriate and evaluate response   Assess pain using appropriate pain scale   Encourage patient to monitor pain and request assistance  Note: Assess pain and administer medications based on pain score     Problem: Respiratory - Adult  Goal: Achieves optimal ventilation and oxygenation  Outcome: Progressing  Flowsheets (Taken 6/14/2025 2350)  Achieves optimal ventilation and oxygenation:   Initiate smoking cessation protocol as indicated   Position to facilitate oxygenation and minimize respiratory effort   Assess for changes in mentation and behavior   Assess for changes in respiratory status  Note: Maintain oxygen saturations > 88%  Wean oxygen back to baseline (4-5L NC)     Problem: Physical Therapy - Adult  Goal: By Discharge: Performs mobility at highest level of function for planned discharge setting.  See evaluation for individualized goals.  Description: Physical Therapy Goals:  Initiated 6/14/2025 to be met within 7-10 days.    1.  Patient will move from supine to sit and sit to supine  in bed with independence.    2.  Patient will transfer from bed to chair and chair to bed with independence using the least restrictive device.  3.  Patient will perform sit to stand with independence.  4.  Patient will ambulate with modified independence for 100 feet with the least restrictive device. 
functional limits  Functional Mobility:  Bed Mobility:     Bed Mobility Training  Bed Mobility Training: Yes  Interventions: Verbal cues;Tactile cues  Supine to Sit: Modified independent  Scooting: Modified independent  Transfers:  Transfer Training  Transfer Training: Yes  Sit to Stand: Modified independent  Stand to Sit: Modified independent  Balance:   Balance  Sitting: Intact  Standing: Impaired;Without support  Standing - Static: Good  Standing - Dynamic: Fair  Ambulation/Gait Training:  Gait  Gait Training: Yes  Overall Level of Assistance: Contact guard assistance  Distance (ft): 5 Feet  Assistive Device: None  Interventions: Verbal cues  Speed/Xiao: Slow  Gait Abnormalities: Decreased step clearance  Pain:  Intensity Pre-treatment: 0/10   Intensity Post-treatment: 0/10  Scale: Numeric Rating Scale    Activity Tolerance:   Activity Tolerance: Patient tolerated treatment well  Please refer to the flowsheet for vital signs taken during this treatment.    After treatment:   [x]         Patient left in no apparent distress sitting up in chair  []         Patient left in no apparent distress in bed  [x]         Call bell left within reach  [x]         Nursing notified  []         Caregiver present  []         Bed/chair alarm activated  []   No alarmAlert and oriented x4  []         SCDs applied    COMMUNICATION/EDUCATION:   Patient Education  Education Given To: Patient  Education Provided: Role of Therapy;Plan of Care  Education Method: Demonstration;Verbal;Teach Back  Education Outcome: Verbalized understanding;Demonstrated understanding    Thank you for this referral.  Kristine Araujo, PT  Minutes: 15      Eval Complexity: Decision Making: Low Complexity

## 2025-06-15 NOTE — PROGRESS NOTES
[facility] Hospitalist Group  Progress Note    Patient: Rina Prajapati Age: 64 y.o. : 1960 MR#: [id number] SSN: [id number]      Subjective/24-hour events:         Assessment:   Acute on chronic hypoxic respiratory failure  COPD with acute exacerbation  Elevated troponin  Hypertension  Hypothyroidism  Chronic diastolic CHF  Small cell lung cancer  History of PE, anticoagulant with Eliquis  Lactic acidosis, resolved next    Plan:   Continue supplemental oxygen, wean to baseline as tolerated.  CT C/A/P.  Mobilize.    Anticipated discharge: 6/15-    Case discussed with:  [x]Patient  []Family  [x] Nursing  []Case Management  DVT Prophylaxis:  []Lovenox  []Hep SQ  []SCDs  []Coumadin   []On Heparin gtt [x]PO anticoagulant    Objective:   VS: /89   Pulse 82   Temp 98.2 °F (36.8 °C) (Oral)   Resp 16   Ht 1.626 m (5' 4\")   Wt 69.8 kg (153 lb 12.8 oz)   SpO2 97%   BMI 26.40 kg/m²      Tmax/24hrs: Temp (24hrs), Av °F (36.7 °C), Min:97.7 °F (36.5 °C), Max:98.4 °F (36.9 °C)    Intake/Output Summary (Last 24 hours) at 6/15/2025 0832  Last data filed at 6/15/2025 0359  Gross per 24 hour   Intake 941 ml   Output 450 ml   Net 491 ml       Gen:  In NAD.  Nontoxic-appearing  Lungs: Effort nonlabored  CV: RRR.  Abdomen: Soft, NTTP.  Extremities: Warm, no pitting edema or ischemia.  Neuro:  Awake and alert, moves extremities spontaneously.    Current Facility-Administered Medications   Medication Dose Route Frequency    LORazepam (ATIVAN) tablet 1 mg  1 mg Oral Once PRN    benzonatate (TESSALON) capsule 200 mg  200 mg Oral TID PRN    hydrALAZINE (APRESOLINE) injection 10 mg  10 mg IntraVENous Q6H PRN    apixaban (ELIQUIS) tablet 5 mg  5 mg Oral BID    folic acid (FOLVITE) tablet 1 mg  1 mg Oral Daily    ferrous sulfate (IRON 325) tablet 325 mg  325 mg Oral Daily with breakfast    guaiFENesin (MUCINEX) extended release tablet 1,200 mg  1,200 mg Oral BID    ipratropium 0.5

## 2025-06-16 ENCOUNTER — CARE COORDINATION (OUTPATIENT)
Facility: CLINIC | Age: 65
End: 2025-06-16

## 2025-06-16 NOTE — CARE COORDINATION
Care Transitions Program         Care Transitions Program for Admission  5/17/25 to 5/22/25 closed due to patient admitted to [facility] 6/12/2025.

## 2025-06-16 NOTE — CARE COORDINATION
Care Transitions Note    Initial Call - Call within 2 business days of discharge: Yes    Attempted to reach patient for transitions of care follow up. Unable to reach patient.    Outreach Attempts:   Unable to leave message.     Patient: Rina Prajapati    Patient : 1960   MRN: [id number]        Reason for Admission:  Please see below, in this note, Last Discharge Facility          Discharge Date: 6/15/25  RURS: Readmission Risk Score: 23.8    Last Discharge Facility       Date Complaint Diagnosis Description Type Department Provider    25 Respiratory Distress Dyspnea and respiratory abnormalities ED to Hosp-Admission (Discharged) (ADMITTED) [id number] Devlin MD; Elie Greenberg...            Was this an external facility discharge? No    Follow Up Appointment:   Request submitted for PCP Transitions of Care Appointment via secure e-mail.    Future Appointments         Provider Specialty Dept Phone    2025 11:00 AM Trev Obregon MD Cardiology [phone]    7/3/2025 10:00 AM [facility] CATH HOLDING; [facility] ANGIO RADIOLOGIST; [facility] IR RM 1 Radiology [phone]    2025 10:00 AM [facility] ECHO GE 70 Cardiology [phone]    2025 10:00 AM Cora Her MD Family Medicine [phone]    10/16/2025 10:20 AM Trev Obregon MD Cardiology [phone]            Plan for follow-up on next business day.      Karolina Shabazz RN

## 2025-06-17 ENCOUNTER — TELEPHONE (OUTPATIENT)
Facility: CLINIC | Age: 65
End: 2025-06-17

## 2025-06-17 ENCOUNTER — CARE COORDINATION (OUTPATIENT)
Facility: CLINIC | Age: 65
End: 2025-06-17

## 2025-06-17 NOTE — CARE COORDINATION
Care Transitions Note    Initial Call - Call within 2 business days of discharge: Yes    Patient Current Location:  [place]    Care Transition Nurse contacted the patient by telephone to perform post hospital discharge assessment, verified name and  as identifiers.  Provided introduction to self, and explanation of the Care Transition Nurse role.    Patient: Rina Prajapati    Patient : 1960   MRN: [id number]        Reason for Admission, per chart review:   - Acute on chronic hypoxic respiratory failure.        Discharge Date: 6/15/25  RURS: Readmission Risk Score: 23.8      Last Discharge Facility       Date Complaint Diagnosis Description Type Department Provider    25 Respiratory Distress Dyspnea and respiratory abnormalities ED to Hosp-Admission (Discharged) (ADMITTED) [id number] Devlin MD; Elie Greenberg...            Was this an external facility discharge? No    Additional needs identified to be addressed with provider   No needs noted at this time              Method of communication with provider: none.    Patients top risk factors for readmission:   Frequent hospitalizations  8 or more  current medications       Interventions to address risk factors:   The Initial Care Transitions Outreach was completed / initiated with patient.   Patient was asked to please follow up with medical providers for any concerns or questions. Patient voiced understanding this information.      Care Summary Note:   Patient was discharged from [facility] to home.   Per Inpatient Case Management note, no discharge orders noted at this time.     Patient reports:   - She is good   - No needs at this time     Care Transition Nurse reviewed discharge instructions( follow up appointments),  medical action plan and red flags with patient. The patient was given an opportunity to ask questions; no questions posed at this time. The patient verbalized understanding.   Were discharge instructions

## 2025-06-17 NOTE — TELEPHONE ENCOUNTER
Pt needs hospital f/u from discharge on 6/15/2025 [facility] for Acute on chronic hypoxic respiratory failure. Please advise.

## 2025-06-24 ENCOUNTER — CARE COORDINATION (OUTPATIENT)
Facility: CLINIC | Age: 65
End: 2025-06-24

## 2025-06-24 ENCOUNTER — OFFICE VISIT (OUTPATIENT)
Age: 65
End: 2025-06-24
Payer: MEDICARE

## 2025-06-24 VITALS
HEART RATE: 106 BPM | DIASTOLIC BLOOD PRESSURE: 62 MMHG | HEIGHT: 64 IN | OXYGEN SATURATION: 89 % | SYSTOLIC BLOOD PRESSURE: 120 MMHG | BODY MASS INDEX: 21.68 KG/M2 | WEIGHT: 127 LBS

## 2025-06-24 DIAGNOSIS — I31.39 PERICARDIAL EFFUSION: ICD-10-CM

## 2025-06-24 DIAGNOSIS — J96.11 CHRONIC HYPOXIC RESPIRATORY FAILURE, ON HOME OXYGEN THERAPY (HCC): Primary | ICD-10-CM

## 2025-06-24 DIAGNOSIS — Z86.711 HISTORY OF PULMONARY EMBOLISM: ICD-10-CM

## 2025-06-24 DIAGNOSIS — C34.91 NON-SMALL CELL CANCER OF RIGHT LUNG (HCC): ICD-10-CM

## 2025-06-24 DIAGNOSIS — Z99.81 CHRONIC HYPOXIC RESPIRATORY FAILURE, ON HOME OXYGEN THERAPY (HCC): Primary | ICD-10-CM

## 2025-06-24 DIAGNOSIS — I50.22 CHRONIC HEART FAILURE WITH MILDLY REDUCED EJECTION FRACTION (HFMREF, 41-49%) (HCC): ICD-10-CM

## 2025-06-24 PROCEDURE — 93000 ELECTROCARDIOGRAM COMPLETE: CPT | Performed by: INTERNAL MEDICINE

## 2025-06-24 PROCEDURE — 1123F ACP DISCUSS/DSCN MKR DOCD: CPT | Performed by: INTERNAL MEDICINE

## 2025-06-24 PROCEDURE — 1159F MED LIST DOCD IN RCRD: CPT | Performed by: INTERNAL MEDICINE

## 2025-06-24 PROCEDURE — 3078F DIAST BP <80 MM HG: CPT | Performed by: INTERNAL MEDICINE

## 2025-06-24 PROCEDURE — 99214 OFFICE O/P EST MOD 30 MIN: CPT | Performed by: INTERNAL MEDICINE

## 2025-06-24 PROCEDURE — 1160F RVW MEDS BY RX/DR IN RCRD: CPT | Performed by: INTERNAL MEDICINE

## 2025-06-24 PROCEDURE — 1126F AMNT PAIN NOTED NONE PRSNT: CPT | Performed by: INTERNAL MEDICINE

## 2025-06-24 PROCEDURE — 3074F SYST BP LT 130 MM HG: CPT | Performed by: INTERNAL MEDICINE

## 2025-06-24 ASSESSMENT — PATIENT HEALTH QUESTIONNAIRE - PHQ9
10. IF YOU CHECKED OFF ANY PROBLEMS, HOW DIFFICULT HAVE THESE PROBLEMS MADE IT FOR YOU TO DO YOUR WORK, TAKE CARE OF THINGS AT HOME, OR GET ALONG WITH OTHER PEOPLE: NOT DIFFICULT AT ALL
SUM OF ALL RESPONSES TO PHQ QUESTIONS 1-9: 0
3. TROUBLE FALLING OR STAYING ASLEEP: NOT AT ALL
SUM OF ALL RESPONSES TO PHQ QUESTIONS 1-9: 0
9. THOUGHTS THAT YOU WOULD BE BETTER OFF DEAD, OR OF HURTING YOURSELF: NOT AT ALL
6. FEELING BAD ABOUT YOURSELF - OR THAT YOU ARE A FAILURE OR HAVE LET YOURSELF OR YOUR FAMILY DOWN: NOT AT ALL
7. TROUBLE CONCENTRATING ON THINGS, SUCH AS READING THE NEWSPAPER OR WATCHING TELEVISION: NOT AT ALL
1. LITTLE INTEREST OR PLEASURE IN DOING THINGS: NOT AT ALL
4. FEELING TIRED OR HAVING LITTLE ENERGY: NOT AT ALL
2. FEELING DOWN, DEPRESSED OR HOPELESS: NOT AT ALL
5. POOR APPETITE OR OVEREATING: NOT AT ALL
8. MOVING OR SPEAKING SO SLOWLY THAT OTHER PEOPLE COULD HAVE NOTICED. OR THE OPPOSITE, BEING SO FIGETY OR RESTLESS THAT YOU HAVE BEEN MOVING AROUND A LOT MORE THAN USUAL: NOT AT ALL

## 2025-06-24 ASSESSMENT — ENCOUNTER SYMPTOMS
SHORTNESS OF BREATH: 1
SORE THROAT: 0
NAUSEA: 0
COUGH: 0
VOMITING: 0
ABDOMINAL PAIN: 0
ABDOMINAL DISTENTION: 0

## 2025-06-24 NOTE — CARE COORDINATION
Care Transitions Note    Follow Up Call     Patient Current Location:  Home: [address] [place] [id number]    LPN Care Coordinator contacted the patient by telephone. Verified name and  as identifiers.    Additional needs identified to be addressed with provider   No needs identified                 Method of communication with provider: none.    Care Summary Note:   Spoke with patient.  Patient states she is doing fine.  Patient states she is on her way to the doctor.  Patient has no questions or concerns for writer.      Advance Care Planning:   Does patient have an Advance Directive: reviewed and current and       Primary Decision Maker: Radha Jolly - Brother/Sister - [phone]    Secondary Decision Maker: Dylan Hector - Brother/Sister - [phone] .    Medication Review:  No changes since last call.     Remote Patient Monitoring:  Offered patient enrollment in the Remote Patient Monitoring (RPM) program for in-home monitoring: Deferred at this time because will discuss at next outreach.    Assessments:  No changes since last call    Follow Up Appointment:   Reviewed upcoming appointment(s).  Future Appointments         Provider Specialty Dept Phone    2025 10:00 AM Trev Obregon MD Cardiology [phone]    2025 10:00 AM [facility] INFUSION NURSE 5 Infusion Therapy [phone]    2025 10:30 AM [facility] INFUSION NURSE 5 Infusion Therapy [phone]    7/3/2025 10:00 AM [facility] CATH HOLDING; [facility] ANGIO RADIOLOGIST; [facility] IR RM 1 Radiology [phone]    2025 10:00 AM [facility] HV ECHO GE 70 Cardiology [phone]    2025 10:00 AM Cora Her MD Family Medicine [phone]    10/16/2025 10:20 AM Trev Obregon MD Cardiology [phone]            LPN Care Coordinator provided contact information.  Plan for follow-up call in 6-10 days based on severity of symptoms and risk factors.  Plan for next call: medication management- review for changes, offer RPM     Liz

## 2025-06-24 NOTE — PROGRESS NOTES
Rina Prajapati presents today for   Chief Complaint   Patient presents with    Follow-Up from Hospital       Rina Prajapati preferred language for health care discussion is english/other.    Is someone accompanying this pt? yes    Is the patient using any DME equipment during OV? yes    Depression Screening:  Depression: Not at risk (5/5/2025)    Received from [facility]    PHQ-2     Patient Health Questionnaire-2 Score: 0        Learning Assessment:  No question data found.       Pt currently taking Anticoagulant therapy? Eliquis 5 mg twice a day    Pt currently taking Antiplatelet therapy ? no      Coordination of Care:  1. Have you been to the ER, urgent care clinic since your last visit? Hospitalized since your last visit? yes    2. Have you seen or consulted any other health care providers outside of the [facility] [place] [facility] since your last visit? Include any pap smears or colon screening. no    
40 mg twice daily.  Her spironolactone was stopped last month due to low blood pressure.    Small to medium sized pericardial effusion.  This was initially seen on an echocardiogram in November 2024.  This has not significantly changed on serial echocardiograms.  Her most recent echocardiogram was in May 2025.  I would reassess this annually or with any significant condition change.    Chronic hypoxic respiratory failure.  This is felt to be multifactorial due to heart failure, COPD and lung cancer.  She is now on home oxygen 4 L most of the day.  She is followed closely by her pulmonologist.    Non-small cell lung cancer.  Patient recently had her Mediport replaced and is about to start new chemotherapy.  She continues to follow-up close with her medical oncologist.    History of hypertension.  Blood pressures not been on the low side.  She is currently only taking metoprolol XL 25 mg daily.  I would not be overly aggressive at increasing her medications.    History of PE and DVT.  She is on chronic anticoagulation with Eliquis 5 mg twice daily.    History of tobacco use.  Patient states she quit smoking altogether 7 months ago.  She was congratulated and encouraged remain tobacco free.      Follow-up in 6 months, sooner if needed.    Trev Obregon MD

## 2025-06-25 ENCOUNTER — TELEPHONE (OUTPATIENT)
Facility: CLINIC | Age: 65
End: 2025-06-25

## 2025-06-25 NOTE — TELEPHONE ENCOUNTER
Pt states that she is having a procedure on 7/3/2025. And she would like to know if she can get a RX for Ativan . She states that she was given this before and she took it a 1/2 hr before and then after and it helped. Tried to schedule her but she states that she can't come in before the procedure. Please advise. [facility]

## 2025-06-25 NOTE — TELEPHONE ENCOUNTER
This patient contacted office for the following prescriptions to be filled:    Medication requested :   folic acid (FOLVITE) 1 MG tablet QTY 30    potassium chloride (K-TAB) 20 MEQ TBCR extended release tablet QTY 30  PCP: aretha  Pharmacy or Print: [facility]  Mail order or Local pharmacy [address] [place] [address]   Last office visit 5/29/2025  Next office visit 9/29/2025

## 2025-06-26 NOTE — TELEPHONE ENCOUNTER
Notified pt that she needs to get anxiety medication for Dr doing the procedure . Pt voiced understanding. Closing encounter

## 2025-06-27 RX ORDER — FOLIC ACID 1 MG/1
1 TABLET ORAL DAILY
Qty: 90 TABLET | Refills: 3 | Status: SHIPPED | OUTPATIENT
Start: 2025-06-27

## 2025-06-27 RX ORDER — POTASSIUM CHLORIDE 1500 MG/1
20 TABLET, EXTENDED RELEASE ORAL DAILY
Qty: 90 TABLET | Refills: 1 | Status: SHIPPED | OUTPATIENT
Start: 2025-06-27

## 2025-06-27 RX ORDER — SODIUM CHLORIDE 9 MG/ML
INJECTION, SOLUTION INTRAVENOUS CONTINUOUS
OUTPATIENT
Start: 2025-06-27

## 2025-07-01 ENCOUNTER — CARE COORDINATION (OUTPATIENT)
Facility: CLINIC | Age: 65
End: 2025-07-01

## 2025-07-01 NOTE — CARE COORDINATION
Care Transitions Note    Follow Up Call     Patient Current Location:  Home: [address] [place] [id number]    [facility] Care Coordinator contacted the patient by telephone. Verified name and  as identifiers.    Additional needs identified to be addressed with provider   No needs identified                 Method of communication with provider: none.    Care Summary Note:   Spoke with patient.  Patient states she is doing okay.  Patient followed up with cardiology on  and Palliative Care on 25.  Patient has no questions or concerns for writer.    Advance Care Planning:   Does patient have an Advance Directive: reviewed during previous call, see note. .    Medication Review:  No changes since last call.     Remote Patient Monitoring:  Offered patient enrollment in the Remote Patient Monitoring (RPM) program for in-home monitoring: Yes, but did not enroll at this time: patient not interested at this time.    Assessments:  No changes since last call    Follow Up Appointment:   Reviewed upcoming appointment(s).  Future Appointments         Provider Specialty Dept Phone    2025 10:30 AM [facility] INFUSION NURSE 5 Infusion Therapy [phone]    7/3/2025 10:00 AM [facility] CATH HOLDING; [facility] ANGIO RADIOLOGIST; [facility] IR RM 1 Radiology [phone]    2025 10:00 AM [facility] HV ECHO GE 70 Cardiology [phone]    2025 10:00 AM Cora Her MD Family Medicine [phone]    10/16/2025 10:20 AM Trev Obregon MD Cardiology [phone]    2026 10:40 AM Trev Obregon MD Cardiology [phone]            [facility] Care Coordinator provided contact information.  Plan for follow-up call in 6-10 days based on severity of symptoms and risk factors.  Plan for next call: assess for red flag and any needs     Liz Fong LPN

## 2025-07-02 ENCOUNTER — ANESTHESIA EVENT (OUTPATIENT)
Facility: HOSPITAL | Age: 65
End: 2025-07-02
Payer: MEDICARE

## 2025-07-02 ENCOUNTER — TELEPHONE (OUTPATIENT)
Facility: HOSPITAL | Age: 65
End: 2025-07-02

## 2025-07-02 NOTE — TELEPHONE ENCOUNTER
Patient confirmed PICC line placement and possible angioplasty scheduled for July 3rd. Updated patient on new arrival time due to anesthesia delay. Procedure is now scheduled for 1300 with an 1100 arrival time. Last dose of eliquis 7/1- okay with Dr. Buck. All questions answered.

## 2025-07-03 ENCOUNTER — APPOINTMENT (OUTPATIENT)
Facility: HOSPITAL | Age: 65
DRG: 189 | End: 2025-07-03
Payer: MEDICARE

## 2025-07-03 ENCOUNTER — HOSPITAL ENCOUNTER (OUTPATIENT)
Facility: HOSPITAL | Age: 65
Discharge: HOME | End: 2025-07-03
Payer: MEDICARE

## 2025-07-03 ENCOUNTER — HOSPITAL ENCOUNTER (INPATIENT)
Facility: HOSPITAL | Age: 65
LOS: 6 days | Discharge: HOME | DRG: 189 | End: 2025-07-10
Attending: STUDENT IN AN ORGANIZED HEALTH CARE EDUCATION/TRAINING PROGRAM | Admitting: INTERNAL MEDICINE
Payer: MEDICARE

## 2025-07-03 ENCOUNTER — ANESTHESIA (OUTPATIENT)
Facility: HOSPITAL | Age: 65
End: 2025-07-03
Payer: MEDICARE

## 2025-07-03 VITALS
DIASTOLIC BLOOD PRESSURE: 109 MMHG | OXYGEN SATURATION: 89 % | HEART RATE: 111 BPM | RESPIRATION RATE: 36 BRPM | SYSTOLIC BLOOD PRESSURE: 160 MMHG

## 2025-07-03 DIAGNOSIS — C34.90 NON-SMALL CELL LUNG CANCER, UNSPECIFIED LATERALITY (HCC): ICD-10-CM

## 2025-07-03 DIAGNOSIS — C34.91 NON-SMALL CELL CANCER OF RIGHT LUNG (HCC): Chronic | ICD-10-CM

## 2025-07-03 DIAGNOSIS — R06.03 ACUTE RESPIRATORY DISTRESS: Primary | ICD-10-CM

## 2025-07-03 DIAGNOSIS — R06.02 SHORTNESS OF BREATH: ICD-10-CM

## 2025-07-03 DIAGNOSIS — I87.1 CHRONIC SUPERIOR VENA CAVA OCCLUSION: ICD-10-CM

## 2025-07-03 DIAGNOSIS — J96.01 ACUTE HYPOXIC RESPIRATORY FAILURE (HCC): ICD-10-CM

## 2025-07-03 DIAGNOSIS — J96.21 ACUTE ON CHRONIC RESPIRATORY FAILURE WITH HYPOXIA (HCC): ICD-10-CM

## 2025-07-03 LAB
ALBUMIN SERPL-MCNC: 3 G/DL (ref 3.4–5)
ALBUMIN SERPL-MCNC: 3.3 G/DL (ref 3.4–5)
ALBUMIN/GLOB SERPL: 0.9 (ref 0.8–1.7)
ALBUMIN/GLOB SERPL: 0.9 (ref 0.8–1.7)
ALP SERPL-CCNC: 68 U/L (ref 45–117)
ALP SERPL-CCNC: 76 U/L (ref 45–117)
ALT SERPL-CCNC: 14 U/L (ref 10–35)
ALT SERPL-CCNC: 15 U/L (ref 10–35)
ANION GAP BLD CALC-SCNC: ABNORMAL MMOL/L (ref 10–20)
ANION GAP SERPL CALC-SCNC: 13 MMOL/L (ref 3–18)
ANION GAP SERPL CALC-SCNC: 20 MMOL/L (ref 3–18)
ARTERIAL PATENCY WRIST A: POSITIVE
AST SERPL-CCNC: 22 U/L (ref 10–38)
AST SERPL-CCNC: 24 U/L (ref 10–38)
BASE EXCESS BLD CALC-SCNC: 5.4 MMOL/L
BASOPHILS # BLD: 0.02 K/UL (ref 0–0.1)
BASOPHILS # BLD: 0.03 K/UL (ref 0–0.1)
BASOPHILS NFR BLD: 0.3 % (ref 0–2)
BASOPHILS NFR BLD: 0.5 % (ref 0–2)
BDY SITE: ABNORMAL
BILIRUB SERPL-MCNC: 0.7 MG/DL (ref 0.2–1)
BILIRUB SERPL-MCNC: 0.8 MG/DL (ref 0.2–1)
BUN SERPL-MCNC: 12 MG/DL (ref 6–23)
BUN SERPL-MCNC: 12 MG/DL (ref 6–23)
BUN/CREAT SERPL: 16 (ref 12–20)
BUN/CREAT SERPL: 17 (ref 12–20)
CA-I BLD-MCNC: 1.2 MMOL/L (ref 1.15–1.33)
CALCIUM SERPL-MCNC: 9.2 MG/DL (ref 8.5–10.1)
CALCIUM SERPL-MCNC: 9.6 MG/DL (ref 8.5–10.1)
CHLORIDE BLD-SCNC: 104 MMOL/L (ref 98–107)
CHLORIDE SERPL-SCNC: 101 MMOL/L (ref 98–107)
CHLORIDE SERPL-SCNC: 103 MMOL/L (ref 98–107)
CO2 BLD-SCNC: 29 MMOL/L (ref 22–29)
CO2 SERPL-SCNC: 19 MMOL/L (ref 21–32)
CO2 SERPL-SCNC: 24 MMOL/L (ref 21–32)
CREAT BLD-MCNC: 0.66 MG/DL (ref 0.6–1.3)
CREAT SERPL-MCNC: 0.72 MG/DL (ref 0.6–1.3)
CREAT SERPL-MCNC: 0.8 MG/DL (ref 0.6–1.3)
DIFFERENTIAL METHOD BLD: ABNORMAL
DIFFERENTIAL METHOD BLD: ABNORMAL
EKG ATRIAL RATE: 109 BPM
EKG DIAGNOSIS: NORMAL
EKG P AXIS: 63 DEGREES
EKG P-R INTERVAL: 130 MS
EKG Q-T INTERVAL: 338 MS
EKG QRS DURATION: 74 MS
EKG QTC CALCULATION (BAZETT): 455 MS
EKG R AXIS: 129 DEGREES
EKG T AXIS: 56 DEGREES
EKG VENTRICULAR RATE: 109 BPM
EOSINOPHIL # BLD: 0 K/UL (ref 0–0.4)
EOSINOPHIL # BLD: 0.02 K/UL (ref 0–0.4)
EOSINOPHIL NFR BLD: 0 % (ref 0–5)
EOSINOPHIL NFR BLD: 0.3 % (ref 0–5)
ERYTHROCYTE [DISTWIDTH] IN BLOOD BY AUTOMATED COUNT: 20.2 % (ref 11.6–14.5)
ERYTHROCYTE [DISTWIDTH] IN BLOOD BY AUTOMATED COUNT: 20.6 % (ref 11.6–14.5)
FIO2 ON VENT: 60 %
GAS FLOW.O2 O2 DELIVERY SYS: ABNORMAL
GLOBULIN SER CALC-MCNC: 3.3 G/DL (ref 2–4)
GLOBULIN SER CALC-MCNC: 3.5 G/DL (ref 2–4)
GLUCOSE BLD-MCNC: 129 MG/DL (ref 74–99)
GLUCOSE SERPL-MCNC: 207 MG/DL (ref 74–108)
GLUCOSE SERPL-MCNC: 99 MG/DL (ref 74–108)
HCO3 BLD-SCNC: 30.3 MMOL/L (ref 21–28)
HCT VFR BLD AUTO: 37.3 % (ref 35–45)
HCT VFR BLD AUTO: 41.9 % (ref 35–45)
HGB BLD-MCNC: 10.8 G/DL (ref 12–16)
HGB BLD-MCNC: 12 G/DL (ref 12–16)
IMM GRANULOCYTES # BLD AUTO: 0.03 K/UL (ref 0–0.04)
IMM GRANULOCYTES # BLD AUTO: 0.04 K/UL (ref 0–0.04)
IMM GRANULOCYTES NFR BLD AUTO: 0.5 % (ref 0–0.5)
IMM GRANULOCYTES NFR BLD AUTO: 0.5 % (ref 0–0.5)
LACTATE BLD-SCNC: 1.65 MMOL/L (ref 0.4–2)
LYMPHOCYTES # BLD: 0.45 K/UL (ref 0.9–3.6)
LYMPHOCYTES # BLD: 0.71 K/UL (ref 0.9–3.6)
LYMPHOCYTES NFR BLD: 11.3 % (ref 21–52)
LYMPHOCYTES NFR BLD: 6.1 % (ref 21–52)
MAGNESIUM SERPL-MCNC: 1.8 MG/DL (ref 1.6–2.6)
MCH RBC QN AUTO: 22.3 PG (ref 24–34)
MCH RBC QN AUTO: 22.5 PG (ref 24–34)
MCHC RBC AUTO-ENTMCNC: 28.6 G/DL (ref 31–37)
MCHC RBC AUTO-ENTMCNC: 29 G/DL (ref 31–37)
MCV RBC AUTO: 76.9 FL (ref 78–100)
MCV RBC AUTO: 78.5 FL (ref 78–100)
MONOCYTES # BLD: 0.4 K/UL (ref 0.05–1.2)
MONOCYTES # BLD: 0.48 K/UL (ref 0.05–1.2)
MONOCYTES NFR BLD: 5.4 % (ref 3–10)
MONOCYTES NFR BLD: 7.7 % (ref 3–10)
NEUTS SEG # BLD: 5 K/UL (ref 1.8–8)
NEUTS SEG # BLD: 6.48 K/UL (ref 1.8–8)
NEUTS SEG NFR BLD: 79.7 % (ref 40–73)
NEUTS SEG NFR BLD: 87.7 % (ref 40–73)
NRBC # BLD: 0 K/UL (ref 0–0.01)
NRBC # BLD: 0 K/UL (ref 0–0.01)
NRBC BLD-RTO: 0 PER 100 WBC
NRBC BLD-RTO: 0 PER 100 WBC
NT PRO BNP: 6169 PG/ML (ref 36–900)
PCO2 BLD: 44.7 MMHG (ref 35–48)
PEEP RESPIRATORY: 5
PH BLD: 7.44 (ref 7.35–7.45)
PLATELET # BLD AUTO: 239 K/UL (ref 135–420)
PLATELET # BLD AUTO: 310 K/UL (ref 135–420)
PMV BLD AUTO: 10.3 FL (ref 9.2–11.8)
PMV BLD AUTO: 10.6 FL (ref 9.2–11.8)
PO2 BLD: 104 MMHG (ref 83–108)
POTASSIUM BLD-SCNC: 3.1 MMOL/L (ref 3.5–5.1)
POTASSIUM SERPL-SCNC: 3.6 MMOL/L (ref 3.5–5.5)
POTASSIUM SERPL-SCNC: 3.9 MMOL/L (ref 3.5–5.5)
PRESSURE SUPPORT SETTING VENT: 5
PROT SERPL-MCNC: 6.3 G/DL (ref 6.4–8.2)
PROT SERPL-MCNC: 6.8 G/DL (ref 6.4–8.2)
RBC # BLD AUTO: 4.85 M/UL (ref 4.2–5.3)
RBC # BLD AUTO: 5.34 M/UL (ref 4.2–5.3)
RESPIRATORY RATE, POC: 26 (ref 5–40)
SAO2 % BLD: 98 % (ref 94–98)
SERVICE CMNT-IMP: ABNORMAL
SODIUM BLD-SCNC: 142 MMOL/L (ref 136–145)
SODIUM SERPL-SCNC: 141 MMOL/L (ref 136–145)
SODIUM SERPL-SCNC: 141 MMOL/L (ref 136–145)
SPECIMEN SITE: ABNORMAL
TROPONIN T SERPL HS-MCNC: 19.3 NG/L (ref 0–14)
TROPONIN T SERPL HS-MCNC: 26 NG/L (ref 0–14)
VENTILATION MODE VENT: ABNORMAL
WBC # BLD AUTO: 6.3 K/UL (ref 4.6–13.2)
WBC # BLD AUTO: 7.4 K/UL (ref 4.6–13.2)

## 2025-07-03 PROCEDURE — 83735 ASSAY OF MAGNESIUM: CPT

## 2025-07-03 PROCEDURE — 83605 ASSAY OF LACTIC ACID: CPT

## 2025-07-03 PROCEDURE — 82330 ASSAY OF CALCIUM: CPT

## 2025-07-03 PROCEDURE — 6360000002 HC RX W HCPCS

## 2025-07-03 PROCEDURE — 82803 BLOOD GASES ANY COMBINATION: CPT

## 2025-07-03 PROCEDURE — 93005 ELECTROCARDIOGRAM TRACING: CPT | Performed by: PHYSICIAN ASSISTANT

## 2025-07-03 PROCEDURE — 6370000000 HC RX 637 (ALT 250 FOR IP): Performed by: STUDENT IN AN ORGANIZED HEALTH CARE EDUCATION/TRAINING PROGRAM

## 2025-07-03 PROCEDURE — 84132 ASSAY OF SERUM POTASSIUM: CPT

## 2025-07-03 PROCEDURE — 85025 COMPLETE CBC W/AUTO DIFF WBC: CPT

## 2025-07-03 PROCEDURE — 85014 HEMATOCRIT: CPT

## 2025-07-03 PROCEDURE — 36600 WITHDRAWAL OF ARTERIAL BLOOD: CPT

## 2025-07-03 PROCEDURE — 80053 COMPREHEN METABOLIC PANEL: CPT

## 2025-07-03 PROCEDURE — 5A09357 ASSISTANCE WITH RESPIRATORY VENTILATION, LESS THAN 24 CONSECUTIVE HOURS, CONTINUOUS POSITIVE AIRWAY PRESSURE: ICD-10-PCS | Performed by: STUDENT IN AN ORGANIZED HEALTH CARE EDUCATION/TRAINING PROGRAM

## 2025-07-03 PROCEDURE — 99285 EMERGENCY DEPT VISIT HI MDM: CPT

## 2025-07-03 PROCEDURE — 71045 X-RAY EXAM CHEST 1 VIEW: CPT

## 2025-07-03 PROCEDURE — 82947 ASSAY GLUCOSE BLOOD QUANT: CPT

## 2025-07-03 PROCEDURE — 6360000002 HC RX W HCPCS: Performed by: STUDENT IN AN ORGANIZED HEALTH CARE EDUCATION/TRAINING PROGRAM

## 2025-07-03 PROCEDURE — 94761 N-INVAS EAR/PLS OXIMETRY MLT: CPT

## 2025-07-03 PROCEDURE — 6370000000 HC RX 637 (ALT 250 FOR IP): Performed by: PHYSICIAN ASSISTANT

## 2025-07-03 PROCEDURE — 94640 AIRWAY INHALATION TREATMENT: CPT

## 2025-07-03 PROCEDURE — 84295 ASSAY OF SERUM SODIUM: CPT

## 2025-07-03 PROCEDURE — 84484 ASSAY OF TROPONIN QUANT: CPT

## 2025-07-03 PROCEDURE — 71275 CT ANGIOGRAPHY CHEST: CPT

## 2025-07-03 PROCEDURE — 93005 ELECTROCARDIOGRAM TRACING: CPT

## 2025-07-03 PROCEDURE — 83880 ASSAY OF NATRIURETIC PEPTIDE: CPT

## 2025-07-03 PROCEDURE — 6360000004 HC RX CONTRAST MEDICATION: Performed by: EMERGENCY MEDICINE

## 2025-07-03 PROCEDURE — 2700000000 HC OXYGEN THERAPY PER DAY

## 2025-07-03 PROCEDURE — 96374 THER/PROPH/DIAG INJ IV PUSH: CPT

## 2025-07-03 PROCEDURE — 94660 CPAP INITIATION&MGMT: CPT

## 2025-07-03 PROCEDURE — 94664 DEMO&/EVAL PT USE INHALER: CPT

## 2025-07-03 PROCEDURE — 6370000000 HC RX 637 (ALT 250 FOR IP): Performed by: EMERGENCY MEDICINE

## 2025-07-03 PROCEDURE — 99223 1ST HOSP IP/OBS HIGH 75: CPT | Performed by: INTERNAL MEDICINE

## 2025-07-03 RX ORDER — FUROSEMIDE 10 MG/ML
40 INJECTION INTRAMUSCULAR; INTRAVENOUS ONCE
Status: COMPLETED | OUTPATIENT
Start: 2025-07-03 | End: 2025-07-03

## 2025-07-03 RX ORDER — IPRATROPIUM BROMIDE AND ALBUTEROL SULFATE 2.5; .5 MG/3ML; MG/3ML
1 SOLUTION RESPIRATORY (INHALATION)
Status: DISCONTINUED | OUTPATIENT
Start: 2025-07-03 | End: 2025-07-03

## 2025-07-03 RX ORDER — IPRATROPIUM BROMIDE AND ALBUTEROL SULFATE 2.5; .5 MG/3ML; MG/3ML
1 SOLUTION RESPIRATORY (INHALATION) ONCE
Status: COMPLETED | OUTPATIENT
Start: 2025-07-03 | End: 2025-07-03

## 2025-07-03 RX ORDER — IPRATROPIUM BROMIDE AND ALBUTEROL SULFATE 2.5; .5 MG/3ML; MG/3ML
1 SOLUTION RESPIRATORY (INHALATION)
Status: COMPLETED | OUTPATIENT
Start: 2025-07-03 | End: 2025-07-03

## 2025-07-03 RX ORDER — FUROSEMIDE 10 MG/ML
INJECTION INTRAMUSCULAR; INTRAVENOUS
Status: COMPLETED
Start: 2025-07-03 | End: 2025-07-03

## 2025-07-03 RX ORDER — IOPAMIDOL 755 MG/ML
100 INJECTION, SOLUTION INTRAVASCULAR
Status: COMPLETED | OUTPATIENT
Start: 2025-07-03 | End: 2025-07-03

## 2025-07-03 RX ORDER — HYDROXYZINE HYDROCHLORIDE 25 MG/1
25 TABLET, FILM COATED ORAL
Status: COMPLETED | OUTPATIENT
Start: 2025-07-03 | End: 2025-07-03

## 2025-07-03 RX ADMIN — FUROSEMIDE 40 MG: 10 INJECTION, SOLUTION INTRAMUSCULAR; INTRAVENOUS at 10:35

## 2025-07-03 RX ADMIN — FUROSEMIDE 40 MG: 10 INJECTION, SOLUTION INTRAMUSCULAR; INTRAVENOUS at 20:11

## 2025-07-03 RX ADMIN — IPRATROPIUM BROMIDE AND ALBUTEROL SULFATE 1 DOSE: .5; 3 SOLUTION RESPIRATORY (INHALATION) at 10:15

## 2025-07-03 RX ADMIN — IPRATROPIUM BROMIDE AND ALBUTEROL SULFATE 1 DOSE: .5; 3 SOLUTION RESPIRATORY (INHALATION) at 16:32

## 2025-07-03 RX ADMIN — IOPAMIDOL 100 ML: 755 INJECTION, SOLUTION INTRAVENOUS at 22:32

## 2025-07-03 RX ADMIN — FUROSEMIDE 40 MG: 10 INJECTION INTRAMUSCULAR; INTRAVENOUS at 10:35

## 2025-07-03 RX ADMIN — HYDROXYZINE HYDROCHLORIDE 25 MG: 25 TABLET, FILM COATED ORAL at 12:27

## 2025-07-03 NOTE — PROGRESS NOTES
OUTPATIENT HISTORY AND PHYSICAL    Today 7/3/2025     Indication/Symptoms:   Rina Prajapati is a 64 y.o. female with history of anxiety with panic attacks, COPD, pAFib, CHF, PE on Eliquis at home (last dose 7/01 PM), NSCLC requiring central vascular access for chemotherapy, and central venous occlusion requiring venous recanalization presents to IR today for tunneled central venous line placement with central venous re canalization under anesthesia support.    Upon arrival patient tachypneic, tachycardic, with SpO2 in the 70s on home oxygen and coarse lung sounds. She did not take her morning medications including lasix despite pre op instructions to continue all medications including eliquis and lasix.     Duo nebs administered and rapid response was called, IV lasix given. Patient stabilized to SpO2 in the 90s on 8L non rebreather, normotensive.    Paged Dr. Rao for admission for observation post procedure due to patient with tenuous respiratory status planning to undergo anesthesia today. Per admitting hospitalist the patient needs to be admitted after ER workup and stabilization.    Patient taken to the ER at this time.    Anesthesia will visit patient in the ER to determine if the patient is a candidate to undergo anesthesia today.    We have been attempting (unsuccessfully) to obtain central vascular access for the patient since 02/2025 for chemotherapy. Multiple attempts have been unsuccessful due to patient respiratory status.    Her current right arm midline does not aspirate blood and has not been used since placement 5/21/25 per the patient.     Current Meds:    Prior to Admission medications    Medication Sig Start Date End Date Taking? Authorizing Provider   folic acid (FOLVITE) 1 MG tablet Take 1 tablet by mouth daily 6/27/25   Cora Her MD   potassium chloride (K-TAB) 20 MEQ TBCR extended release tablet Take 1 tablet by mouth daily 6/27/25   Cora Her MD   ipratropium  0.5 mg-albuterol 2.5 mg (DUONEB) 0.5-2.5 (3) MG/3ML SOLN nebulizer solution Inhale 3 mLs into the lungs every 4 hours as needed for Wheezing 5/22/25   Nima Duran MD   montelukast (SINGULAIR) 10 MG tablet Take 1 tablet by mouth daily Take by mouth 5/22/25   Nima Duran MD   guaiFENesin (MUCINEX) 600 MG extended release tablet Take 2 tablets by mouth 2 times daily 5/22/25   Nima Duran MD   Multiple Vitamin (MULTI VITAMIN DAILY) TABS Take by mouth 12/14/24   Adilson Severino MD   sertraline (ZOLOFT) 50 MG tablet Take 1 tablet by mouth daily 3/31/25   Cora Her MD   metoprolol succinate (TOPROL XL) 25 MG extended release tablet Take 1 tablet by mouth daily 2/11/25   Jeff Ware DO   furosemide (LASIX) 40 MG tablet Take 1 tablet by mouth 2 times daily 12/13/24   Herminio Sadler DO   nicotine (NICODERM CQ) 21 MG/24HR Place 1 patch onto the skin daily 12/13/24 5/29/25  Herminio Sadler DO   Multiple Vitamins-Minerals (ONE-A-DAY WOMENS PO) Take 1 tablet by mouth daily    Adilson Severino MD   levothyroxine (SYNTHROID) 88 MCG tablet Take 1 tablet by mouth every morning (before breakfast) 6/14/24   Cora Her MD   apixaban (ELIQUIS) 5 MG TABS tablet Take 1 tablet by mouth 2 times daily    Adilson Severino MD   ferrous sulfate (IRON 325) 325 (65 Fe) MG tablet Take 1 tablet by mouth daily (with breakfast) 5/6/21   Adilson Sevreino MD       Allergies:      Allergies   Allergen Reactions    Metronidazole Hives and Other (See Comments)     Other Reaction(s): Not available    Metronidazole And Other Nitroimidazoles Hives, Other (See Comments) and Rash     Other Reaction(s): Unknown    Nitroimidazole derivatives      Other Reaction(s): Not available       Comorbid Conditions:    Past Medical History:   Diagnosis Date    Anemia, iron deficiency 2/2016    Chronic hypoxic respiratory failure (HCC)     3.5 L/min O2 via NC at baseline

## 2025-07-03 NOTE — ED NOTES
Bedside and Verbal shift change report given to Dee Dee (oncoming nurse) by Rosalee (offgoing nurse). Report included the following information Nurse Handoff Report, ED SBAR, MAR, and Recent Results.      Confirmed Artur, EDT will attempt to obtain an US-guided IV for CTA.

## 2025-07-03 NOTE — SIGNIFICANT EVENT
[facility] Medicine  Rapid/Medical Response Team Note      Patient:    Rina Prajapati 64 y.o. 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 Placed on 15 L nonrebreather with RR 20s and O2 saturation of upper 80s. Eventually titrated down to 6L, but O2 saturation dropped to 87%, so increased back to 8L with improvement. EKG unchanged from prior in 2025. Pt did not take Lasix this AM or metoprolol. Given 40mg Lasix Iv.  Patient started feeling better post IV Lasix, O2 sats staying between 88 and 92%.  Patient was then moved to the ED.    36 RR upon arrival to bedside; Duoneb PTA and RR transitioned more into the mid-20s   Lasix order, 40mg, 1022 (pt takes 40mg BID at home, did not take dose this AM PTA)  Plan to admit pt to hospital service  Tachycardia -- 100-115 - DDX includes new afib, anxiety, dubneb beta blocker rebound  Last dose of eliquis Tuesday night ()    OBJECTIVE  RRT/MRT start time:  1016          RRT/MRT end time:  1045  Vitals:    25 1035   BP: (!) 160/109   Pulse:    Resp:    SpO2:         Physical Exam:  Gen: moderate respiratory distress with increased work of breathing, pursed lips, currently on nonrebreather 15L, decreased to 6L a few minutes after arriving to rapid response. Increased back to 8L to achieve O2 sat of 88-92  2025 10:43 AM

## 2025-07-03 NOTE — PROGRESS NOTES
Pt transoprted to ER by CORTES Aldridge along with rapid response team. Pt transported on cardiac monitor and O2 @ 8LPM via NRB mask.

## 2025-07-03 NOTE — ED PROVIDER NOTES
I assumed care of the patient from Dr. coy at 3:30 PM.  Patient with known lung cancer, COPD.  Went for procedure today was found to be hypoxic and could not tolerate that.  Sent to the emergency department with BiPAP in place.  Improved.  At the time of turnover CTA was pending.  Plan was for admission for her acute on chronic respiratory failure.  Patient now appears relatively comfortable on her existing home oxygen.    2330: Care of the patient with Dr. Mehta this time     Joon Santiago MD  07/03/25 5256

## 2025-07-03 NOTE — ED NOTES
Patient taken off bi-pap, placed on 5L NC which is the dose she uses at home. Patient is tolerating well.

## 2025-07-03 NOTE — PROGRESS NOTES
Pt brought to holding area from [facility] via wheelchair by CARLOS Wang. Pt in respiratory distress. Pt is on continious O2 via NC at home on 6 LPM. Pt with Rhales and Rhonchi to bilateral upper lobes with a resp rate of 30  bpm. With O2 sats of 79%.  Pt placed in bay 17 and placed on O2 via NRB. @ 15 LPM. CARLOS Wang to bedside. Pt given 1 Duo Neb via mask. IV started 20 G to pts left hand and labs drawn.

## 2025-07-03 NOTE — ED PROVIDER NOTES
EMERGENCY DEPARTMENT HISTORY AND PHYSICAL EXAM    8:20 AM      Date: 7/3/2025  Patient Name: Rina Prajapati    History of Presenting Illness     Chief Complaint   Patient presents with    Shortness of Breath       History From: Patient    Rina Prajapati is a 64 y.o. female   HPI  64-year-old female with history of anxiety, COPD, A-fib, CHF, PE on Eliquis, non-small cell carcinoma of the lung requiring central venous access for chemotherapy who presented with shortness of breath.  Patient was scheduled for central venous line placement for chemotherapy.  However patient became tachycardic, tachypneic and hypoxic.  Patient was placed on nonrebreather.  She denies any change in meds, sick contacts, or any other changes.  No aggravating or alleviating factors.  When assessing ROS she denies any nausea, vomiting, abdominal pain, change in bowel movements, lower extremity edema, or any other changes.      Nursing Notes were all reviewed and agreed with or any disagreements were addressed in the HPI.    PCP: Cora Her MD    Current Facility-Administered Medications   Medication Dose Route Frequency Provider Last Rate Last Admin    sodium chloride flush 0.9 % injection 5-40 mL  5-40 mL IntraVENous 2 times per day Kelsey Shabazz MD        sodium chloride flush 0.9 % injection 5-40 mL  5-40 mL IntraVENous PRN Kelsey Shabazz MD        0.9 % sodium chloride infusion   IntraVENous PRN Kelsey Shabazz MD        potassium chloride (KLOR-CON M) extended release tablet 40 mEq  40 mEq Oral PRKelsey Webber MD        Or    potassium bicarb-citric acid (EFFER-K) effervescent tablet 40 mEq  40 mEq Oral PRN Kelsey Shabazz MD        Or    potassium chloride 10 mEq/100 mL IVPB (Peripheral Line)  10 mEq IntraVENous PRN Kelsey Shabazz MD        magnesium sulfate 2000 mg in 50 mL IVPB premix  2,000 mg IntraVENous PRKelsey Webber MD        ondansetron (ZOFRAN-ODT) disintegrating tablet 4 mg  4 mg Oral Q8H PRKelsey Webber  See picture in media from 6/26  Restart home Metronidazole cream daily

## 2025-07-04 ENCOUNTER — APPOINTMENT (OUTPATIENT)
Facility: HOSPITAL | Age: 65
DRG: 189 | End: 2025-07-04
Payer: MEDICARE

## 2025-07-04 PROBLEM — J96.20 ACUTE ON CHRONIC RESPIRATORY FAILURE (HCC): Chronic | Status: ACTIVE | Noted: 2025-06-12

## 2025-07-04 LAB
ALBUMIN SERPL-MCNC: 3.2 G/DL (ref 3.4–5)
ALBUMIN/GLOB SERPL: 0.8 (ref 0.8–1.7)
ALP SERPL-CCNC: 72 U/L (ref 45–117)
ALT SERPL-CCNC: 11 U/L (ref 10–35)
ANION GAP BLD CALC-SCNC: ABNORMAL MMOL/L (ref 10–20)
ANION GAP SERPL CALC-SCNC: 16 MMOL/L (ref 3–18)
AST SERPL-CCNC: 20 U/L (ref 10–38)
BASE EXCESS BLD CALC-SCNC: 4 MMOL/L
BASOPHILS # BLD: 0.02 K/UL (ref 0–0.1)
BASOPHILS NFR BLD: 0.3 % (ref 0–2)
BILIRUB SERPL-MCNC: 0.7 MG/DL (ref 0.2–1)
BODY TEMPERATURE: 98
BUN SERPL-MCNC: 13 MG/DL (ref 6–23)
BUN/CREAT SERPL: 17 (ref 12–20)
CA-I BLD-MCNC: 1.24 MMOL/L (ref 1.15–1.33)
CALCIUM SERPL-MCNC: 9.7 MG/DL (ref 8.5–10.1)
CHLORIDE BLD-SCNC: 102 MMOL/L (ref 98–107)
CHLORIDE SERPL-SCNC: 102 MMOL/L (ref 98–107)
CO2 BLD-SCNC: 31 MMOL/L (ref 22–29)
CO2 SERPL-SCNC: 24 MMOL/L (ref 21–32)
CREAT BLD-MCNC: 0.73 MG/DL (ref 0.6–1.3)
CREAT SERPL-MCNC: 0.77 MG/DL (ref 0.6–1.3)
DIFFERENTIAL METHOD BLD: ABNORMAL
EKG ATRIAL RATE: 89 BPM
EKG DIAGNOSIS: NORMAL
EKG P AXIS: 85 DEGREES
EKG P-R INTERVAL: 136 MS
EKG Q-T INTERVAL: 370 MS
EKG QRS DURATION: 86 MS
EKG QTC CALCULATION (BAZETT): 450 MS
EKG R AXIS: 143 DEGREES
EKG T AXIS: -43 DEGREES
EKG VENTRICULAR RATE: 89 BPM
EOSINOPHIL # BLD: 0.02 K/UL (ref 0–0.4)
EOSINOPHIL NFR BLD: 0.3 % (ref 0–5)
ERYTHROCYTE [DISTWIDTH] IN BLOOD BY AUTOMATED COUNT: 20.5 % (ref 11.6–14.5)
GLOBULIN SER CALC-MCNC: 4 G/DL (ref 2–4)
GLUCOSE BLD-MCNC: 146 MG/DL (ref 74–99)
GLUCOSE SERPL-MCNC: 133 MG/DL (ref 74–108)
HCO3 BLD-SCNC: 31.7 MMOL/L (ref 21–28)
HCT VFR BLD AUTO: 41.2 % (ref 35–45)
HGB BLD-MCNC: 11.6 G/DL (ref 12–16)
IMM GRANULOCYTES # BLD AUTO: 0.03 K/UL (ref 0–0.04)
IMM GRANULOCYTES NFR BLD AUTO: 0.5 % (ref 0–0.5)
LACTATE BLD-SCNC: 2.15 MMOL/L (ref 0.4–2)
LYMPHOCYTES # BLD: 0.7 K/UL (ref 0.9–3.6)
LYMPHOCYTES NFR BLD: 11.1 % (ref 21–52)
MCH RBC QN AUTO: 22 PG (ref 24–34)
MCHC RBC AUTO-ENTMCNC: 28.2 G/DL (ref 31–37)
MCV RBC AUTO: 78.2 FL (ref 78–100)
MONOCYTES # BLD: 0.6 K/UL (ref 0.05–1.2)
MONOCYTES NFR BLD: 9.5 % (ref 3–10)
NEUTS SEG # BLD: 4.95 K/UL (ref 1.8–8)
NEUTS SEG NFR BLD: 78.3 % (ref 40–73)
NRBC # BLD: 0 K/UL (ref 0–0.01)
NRBC BLD-RTO: 0 PER 100 WBC
PCO2 BLD: 59.2 MMHG (ref 35–48)
PH BLD: 7.34 (ref 7.35–7.45)
PLATELET # BLD AUTO: 250 K/UL (ref 135–420)
PMV BLD AUTO: 9.9 FL (ref 9.2–11.8)
PO2 BLD: 112 MMHG (ref 83–108)
POTASSIUM BLD-SCNC: 3.7 MMOL/L (ref 3.5–5.1)
POTASSIUM SERPL-SCNC: 3.9 MMOL/L (ref 3.5–5.5)
PROT SERPL-MCNC: 7.2 G/DL (ref 6.4–8.2)
RBC # BLD AUTO: 5.27 M/UL (ref 4.2–5.3)
SAO2 % BLD: 98 % (ref 94–98)
SERVICE CMNT-IMP: ABNORMAL
SODIUM BLD-SCNC: 145 MMOL/L (ref 136–145)
SODIUM SERPL-SCNC: 142 MMOL/L (ref 136–145)
SPECIMEN SITE: ABNORMAL
WBC # BLD AUTO: 6.3 K/UL (ref 4.6–13.2)

## 2025-07-04 PROCEDURE — 71045 X-RAY EXAM CHEST 1 VIEW: CPT

## 2025-07-04 PROCEDURE — 6360000002 HC RX W HCPCS: Performed by: STUDENT IN AN ORGANIZED HEALTH CARE EDUCATION/TRAINING PROGRAM

## 2025-07-04 PROCEDURE — 82803 BLOOD GASES ANY COMBINATION: CPT

## 2025-07-04 PROCEDURE — 82330 ASSAY OF CALCIUM: CPT

## 2025-07-04 PROCEDURE — 2500000003 HC RX 250 WO HCPCS: Performed by: INTERNAL MEDICINE

## 2025-07-04 PROCEDURE — 36600 WITHDRAWAL OF ARTERIAL BLOOD: CPT

## 2025-07-04 PROCEDURE — 94664 DEMO&/EVAL PT USE INHALER: CPT

## 2025-07-04 PROCEDURE — 93010 ELECTROCARDIOGRAM REPORT: CPT | Performed by: INTERNAL MEDICINE

## 2025-07-04 PROCEDURE — 6360000002 HC RX W HCPCS

## 2025-07-04 PROCEDURE — 80053 COMPREHEN METABOLIC PANEL: CPT

## 2025-07-04 PROCEDURE — 82947 ASSAY GLUCOSE BLOOD QUANT: CPT

## 2025-07-04 PROCEDURE — 94640 AIRWAY INHALATION TREATMENT: CPT

## 2025-07-04 PROCEDURE — 6370000000 HC RX 637 (ALT 250 FOR IP): Performed by: INTERNAL MEDICINE

## 2025-07-04 PROCEDURE — 6370000000 HC RX 637 (ALT 250 FOR IP)

## 2025-07-04 PROCEDURE — 84132 ASSAY OF SERUM POTASSIUM: CPT

## 2025-07-04 PROCEDURE — 83605 ASSAY OF LACTIC ACID: CPT

## 2025-07-04 PROCEDURE — 85025 COMPLETE CBC W/AUTO DIFF WBC: CPT

## 2025-07-04 PROCEDURE — 84295 ASSAY OF SERUM SODIUM: CPT

## 2025-07-04 PROCEDURE — 5A0945A ASSISTANCE WITH RESPIRATORY VENTILATION, 24-96 CONSECUTIVE HOURS, HIGH NASAL FLOW/VELOCITY: ICD-10-PCS | Performed by: STUDENT IN AN ORGANIZED HEALTH CARE EDUCATION/TRAINING PROGRAM

## 2025-07-04 PROCEDURE — 36415 COLL VENOUS BLD VENIPUNCTURE: CPT

## 2025-07-04 PROCEDURE — 2500000003 HC RX 250 WO HCPCS

## 2025-07-04 PROCEDURE — 1100000003 HC PRIVATE W/ TELEMETRY

## 2025-07-04 PROCEDURE — 85014 HEMATOCRIT: CPT

## 2025-07-04 PROCEDURE — 99222 1ST HOSP IP/OBS MODERATE 55: CPT | Performed by: INTERNAL MEDICINE

## 2025-07-04 PROCEDURE — 94761 N-INVAS EAR/PLS OXIMETRY MLT: CPT

## 2025-07-04 PROCEDURE — 94660 CPAP INITIATION&MGMT: CPT

## 2025-07-04 RX ORDER — SODIUM CHLORIDE 0.9 % (FLUSH) 0.9 %
5-40 SYRINGE (ML) INJECTION PRN
Status: DISCONTINUED | OUTPATIENT
Start: 2025-07-04 | End: 2025-07-10 | Stop reason: HOSPADM

## 2025-07-04 RX ORDER — ONDANSETRON 4 MG/1
4 TABLET, ORALLY DISINTEGRATING ORAL EVERY 8 HOURS PRN
Status: DISCONTINUED | OUTPATIENT
Start: 2025-07-04 | End: 2025-07-10 | Stop reason: HOSPADM

## 2025-07-04 RX ORDER — ACETAMINOPHEN 650 MG/1
650 SUPPOSITORY RECTAL EVERY 6 HOURS PRN
Status: DISCONTINUED | OUTPATIENT
Start: 2025-07-04 | End: 2025-07-10 | Stop reason: HOSPADM

## 2025-07-04 RX ORDER — FUROSEMIDE 10 MG/ML
40 INJECTION INTRAMUSCULAR; INTRAVENOUS ONCE
Status: COMPLETED | OUTPATIENT
Start: 2025-07-04 | End: 2025-07-04

## 2025-07-04 RX ORDER — SODIUM CHLORIDE 0.9 % (FLUSH) 0.9 %
5-40 SYRINGE (ML) INJECTION EVERY 12 HOURS SCHEDULED
Status: DISCONTINUED | OUTPATIENT
Start: 2025-07-04 | End: 2025-07-10 | Stop reason: HOSPADM

## 2025-07-04 RX ORDER — GUAIFENESIN 600 MG/1
600 TABLET, EXTENDED RELEASE ORAL 2 TIMES DAILY
Status: DISCONTINUED | OUTPATIENT
Start: 2025-07-04 | End: 2025-07-10 | Stop reason: HOSPADM

## 2025-07-04 RX ORDER — ACETAMINOPHEN 325 MG/1
650 TABLET ORAL EVERY 6 HOURS PRN
Status: DISCONTINUED | OUTPATIENT
Start: 2025-07-04 | End: 2025-07-10 | Stop reason: HOSPADM

## 2025-07-04 RX ORDER — SODIUM CHLORIDE 9 MG/ML
INJECTION, SOLUTION INTRAVENOUS PRN
Status: DISCONTINUED | OUTPATIENT
Start: 2025-07-04 | End: 2025-07-10 | Stop reason: HOSPADM

## 2025-07-04 RX ORDER — MAGNESIUM SULFATE IN WATER 40 MG/ML
2000 INJECTION, SOLUTION INTRAVENOUS PRN
Status: DISCONTINUED | OUTPATIENT
Start: 2025-07-04 | End: 2025-07-10 | Stop reason: HOSPADM

## 2025-07-04 RX ORDER — LEVOTHYROXINE SODIUM 88 UG/1
88 TABLET ORAL DAILY
Status: DISCONTINUED | OUTPATIENT
Start: 2025-07-04 | End: 2025-07-10 | Stop reason: HOSPADM

## 2025-07-04 RX ORDER — ALBUTEROL SULFATE 0.83 MG/ML
SOLUTION RESPIRATORY (INHALATION)
Status: COMPLETED
Start: 2025-07-04 | End: 2025-07-04

## 2025-07-04 RX ORDER — IPRATROPIUM BROMIDE AND ALBUTEROL SULFATE 2.5; .5 MG/3ML; MG/3ML
1 SOLUTION RESPIRATORY (INHALATION)
Status: DISCONTINUED | OUTPATIENT
Start: 2025-07-04 | End: 2025-07-08

## 2025-07-04 RX ORDER — POLYETHYLENE GLYCOL 3350 17 G/17G
17 POWDER, FOR SOLUTION ORAL DAILY PRN
Status: DISCONTINUED | OUTPATIENT
Start: 2025-07-04 | End: 2025-07-10 | Stop reason: HOSPADM

## 2025-07-04 RX ORDER — POTASSIUM CHLORIDE 7.45 MG/ML
10 INJECTION INTRAVENOUS PRN
Status: DISCONTINUED | OUTPATIENT
Start: 2025-07-04 | End: 2025-07-10 | Stop reason: HOSPADM

## 2025-07-04 RX ORDER — POTASSIUM CHLORIDE 1500 MG/1
40 TABLET, EXTENDED RELEASE ORAL PRN
Status: DISCONTINUED | OUTPATIENT
Start: 2025-07-04 | End: 2025-07-10 | Stop reason: HOSPADM

## 2025-07-04 RX ORDER — ONDANSETRON 2 MG/ML
4 INJECTION INTRAMUSCULAR; INTRAVENOUS EVERY 6 HOURS PRN
Status: DISCONTINUED | OUTPATIENT
Start: 2025-07-04 | End: 2025-07-10 | Stop reason: HOSPADM

## 2025-07-04 RX ADMIN — ALTEPLASE 1 MG: 2.2 INJECTION, POWDER, LYOPHILIZED, FOR SOLUTION INTRAVENOUS at 17:43

## 2025-07-04 RX ADMIN — IPRATROPIUM BROMIDE AND ALBUTEROL SULFATE 1 DOSE: 2.5; .5 SOLUTION RESPIRATORY (INHALATION) at 16:13

## 2025-07-04 RX ADMIN — APIXABAN 5 MG: 5 TABLET, FILM COATED ORAL at 09:01

## 2025-07-04 RX ADMIN — GUAIFENESIN 600 MG: 600 TABLET ORAL at 20:51

## 2025-07-04 RX ADMIN — ALBUTEROL SULFATE: 2.5 SOLUTION RESPIRATORY (INHALATION) at 04:59

## 2025-07-04 RX ADMIN — SERTRALINE HYDROCHLORIDE 50 MG: 50 TABLET ORAL at 09:01

## 2025-07-04 RX ADMIN — APIXABAN 5 MG: 5 TABLET, FILM COATED ORAL at 01:26

## 2025-07-04 RX ADMIN — IPRATROPIUM BROMIDE AND ALBUTEROL SULFATE 1 DOSE: 2.5; .5 SOLUTION RESPIRATORY (INHALATION) at 12:26

## 2025-07-04 RX ADMIN — APIXABAN 5 MG: 5 TABLET, FILM COATED ORAL at 20:42

## 2025-07-04 RX ADMIN — METHYLPREDNISOLONE SODIUM SUCCINATE 125 MG: 125 INJECTION INTRAMUSCULAR; INTRAVENOUS at 03:28

## 2025-07-04 RX ADMIN — IPRATROPIUM BROMIDE AND ALBUTEROL SULFATE 1 DOSE: 2.5; .5 SOLUTION RESPIRATORY (INHALATION) at 04:59

## 2025-07-04 RX ADMIN — IPRATROPIUM BROMIDE AND ALBUTEROL SULFATE 1 DOSE: 2.5; .5 SOLUTION RESPIRATORY (INHALATION) at 20:30

## 2025-07-04 RX ADMIN — SODIUM CHLORIDE, PRESERVATIVE FREE 10 ML: 5 INJECTION INTRAVENOUS at 09:00

## 2025-07-04 RX ADMIN — SODIUM CHLORIDE, PRESERVATIVE FREE 30 ML: 5 INJECTION INTRAVENOUS at 20:43

## 2025-07-04 RX ADMIN — IPRATROPIUM BROMIDE AND ALBUTEROL SULFATE 1 DOSE: 2.5; .5 SOLUTION RESPIRATORY (INHALATION) at 07:42

## 2025-07-04 RX ADMIN — FUROSEMIDE 40 MG: 10 INJECTION, SOLUTION INTRAMUSCULAR; INTRAVENOUS at 03:28

## 2025-07-04 ASSESSMENT — PAIN SCALES - GENERAL: PAINLEVEL_OUTOF10: 0

## 2025-07-04 NOTE — ED NOTES
RT was called to bedside to reassess patient. Pt states she is willing to try to go to CT. Pt was then brought to CT and had no issues.

## 2025-07-04 NOTE — ED NOTES
TRANSFER - OUT REPORT:    Verbal report given to Aristides on Rina Prajapati  being transferred to  for routine progression of patient care       Report consisted of patient's Situation, Background, Assessment and   Recommendations(SBAR).     Information from the following report(s) Nurse Handoff Report, ED SBAR, MAR, and Recent Results was reviewed with the receiving nurse.    [place] Fall Assessment:    Presents to emergency department  because of falls (Syncope, seizure, or loss of consciousness): No  Age > 70: No  Altered Mental Status, Intoxication with alcohol or substance confusion (Disorientation, impaired judgment, poor safety awaremess, or inability to follow instructions): No  Impaired Mobility: Ambulates or transfers with assistive devices or assistance; Unable to ambulate or transer.: Yes  Nursing Judgement: Yes          Lines:   PICC 05/21/25 Right Brachial (Active)       Peripheral IV 07/03/25 Posterior;Left Hand (Active)       Peripheral IV 07/03/25 Left;Posterior;Proximal Forearm (Active)       Peripheral IV 07/04/25 Right;Dorsal Hand (Active)        Opportunity for questions and clarification was provided.

## 2025-07-04 NOTE — ED NOTES
Pt states she does not feel like she can tolerate laying flat for CT. Dr. Santiago was made aware. Confirmed plan to administer lasix and reassess after treatment.

## 2025-07-04 NOTE — ED PROVIDER NOTES
Sign out received from Dr. Santiago    ED Course as of 07/04/25 0000   Thu Jul 03, 2025   1513 CARBON DIOXIDE: 24 [SH]   2307 Pmhx lung a, copd, maybe chf  Went for outpatient surgical procedure and was in resp distress, placed on bipap and sent here    - pending cta chest   - on bipap, see if you can wean back off of bipap  - dispo admision [NF]      ED Course User Index  [NF] Angela Petersen MD  [SH] Joon Santiago MD Frith, Nikea, MD  07/04/25 0000

## 2025-07-04 NOTE — SIGNIFICANT EVENT
[facility]  Rapid/Medical Response Team Note      Patient:    Rina Prajapati 64 y.o. female, : 1960  Patient MRN: [id number]    Admission Date: 7/3/2025   Admission Diagnosis: Acute on chronic respiratory failure (HCC) [J96.20]      RAPID RESPONSE    Rina Prajapati is a 65 yo F with pmhx of SVC syndrome, DM, RLE DVT, PE, non-small cell carcinoma of lung, HTN, long-term use of anticoagulation, chronic hypoxis respiratory failure, and iron deficiency anemia.      Rapid response called for: respiratory distress and desaturations (SpO2 was in the 80s) while she was on 5L NC. Patient was then placed on 15 L nonrebreather. CXR was ordered revealing pulmonary edema at the right lung. Last lasix was ordered 5 hrs ago, so Lasix was ordered. ABG revealed Lactic acid elevated 2.1, and patient was wheezing, Solumendrol was ordered. Patient was transferred to stepdown to Start BiPAP.  Patient denied HA, CP, or blurred vision, and reported an improvement with the SOB after being switched to 15 L non re breather.    OBJECTIVE    RRT/MRT start time:  3:00 AM             RRT/MRT end time:  Vitals:    25 0215   BP: (!) 133/90   Pulse: 86   Resp: 21   Temp:    SpO2: 96%        Physical Exam:  Gen:  obese in moderate distress  HEENT: normocephalic, atraumatic, MMM, no thyromegaly, no JVD  CV: tachycardia, S1/S2 present without M/R/G, +2 radial pulses, well-perfused, PMI not displaced  Pulm: wheezes and crackles (bilaterally)  Abd: S/NT/ND, no rebound, no guarding, no hepatosplenomegaly   MSK: no clubbing, trace edema at the lower extremities bilaterally  Skin: warm, dry, intact, no rash  Neuro: no focal deficits appreciated   Psych: appropriate, alert, oriented x3      ASSESSMENT/PLAN AND DISPOSITION  Rina Prajapati is a 64 y.o. 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

## 2025-07-04 NOTE — H&P
Hospitalist Admission History and Physical    NAME:  Rian Prajapati   :   1960   MRN:   [id number]     PCP:  Cora Her MD  Date/Time:  2025 1:09 AM  Subjective:   CHIEF COMPLAINT: Shortness of breath    HISTORY OF PRESENT ILLNESS:     Rina is a 64 y.o.   female who presents with shortness of breath.  The patient reports that she was going to get a CT scan and got excited and subsequently had worsening of shortness of breath.  The patient is on 5 L of nasal cannula oxygen at baseline.  She was placed on BiPAP and brought to the ED for further evaluation.     PMHx significant for chronic respiratory failure on 5 L nasal cannula at home, hypertension, COPD, PE on Eliquis, non-small cell lung cancer and systolic CHF         Past Medical History:   Diagnosis Date    Anemia, iron deficiency 2016    Chronic hypoxic respiratory failure (HCC)     3.5 L/min O2 via NC at baseline (as of 2025)    Current use of long term anticoagulation     on Apixaban (Eliquis) as of 2025    Depression     H/O seasonal allergies     Hypertension     Non-small cell carcinoma of lung (HCC) 2016    adenocarcinoma of right lung    Positive occult stool blood test 2016    Pulmonary embolism (HCC) 2016    small, bilateral PEs- on Xarelto    Right leg DVT (HCC) 2016    on Apixaban (Eliquis) as of 2025    Steroid-induced diabetes mellitus 2016    resolved    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        Past Surgical History:   Procedure Laterality Date    ANGIOPLASTY Right 3/20/2016    IJ, subclavian, and brachiocephalic veins    BREAST SURGERY      biopsy    CT BIOPSY LYMPH NODES SUPERFICIAL  2022    CT BIOPSY LYMPH NODES SUPERFICIAL 2022 [facility] RAD CT    HYSTERECTOMY (CERVIX STATUS UNKNOWN)      due to menorrhagia     OTHER SURGICAL HISTORY Right 3/20/2016    2 placed in right IJ, subclavian/brachiocephalic    THROMBECTOMY  3/20/2016    with

## 2025-07-05 PROBLEM — J96.01 ACUTE HYPOXIC RESPIRATORY FAILURE (HCC): Status: ACTIVE | Noted: 2025-06-12

## 2025-07-05 LAB
ANION GAP SERPL CALC-SCNC: 12 MMOL/L (ref 3–18)
BASOPHILS # BLD: 0.01 K/UL (ref 0–0.1)
BASOPHILS NFR BLD: 0.2 % (ref 0–2)
BUN SERPL-MCNC: 14 MG/DL (ref 6–23)
BUN/CREAT SERPL: 23 (ref 12–20)
CALCIUM SERPL-MCNC: 9.4 MG/DL (ref 8.5–10.1)
CHLORIDE SERPL-SCNC: 99 MMOL/L (ref 98–107)
CO2 SERPL-SCNC: 30 MMOL/L (ref 21–32)
CREAT SERPL-MCNC: 0.62 MG/DL (ref 0.6–1.3)
DIFFERENTIAL METHOD BLD: ABNORMAL
EOSINOPHIL # BLD: 0.01 K/UL (ref 0–0.4)
EOSINOPHIL NFR BLD: 0.2 % (ref 0–5)
ERYTHROCYTE [DISTWIDTH] IN BLOOD BY AUTOMATED COUNT: 19.9 % (ref 11.6–14.5)
GLUCOSE SERPL-MCNC: 108 MG/DL (ref 74–108)
HCT VFR BLD AUTO: 37.2 % (ref 35–45)
HGB BLD-MCNC: 11 G/DL (ref 12–16)
IMM GRANULOCYTES # BLD AUTO: 0.02 K/UL (ref 0–0.04)
IMM GRANULOCYTES NFR BLD AUTO: 0.3 % (ref 0–0.5)
LACTATE SERPL-SCNC: 1.7 MMOL/L (ref 0.4–2)
LYMPHOCYTES # BLD: 0.57 K/UL (ref 0.9–3.6)
LYMPHOCYTES NFR BLD: 8.6 % (ref 21–52)
MAGNESIUM SERPL-MCNC: 1.7 MG/DL (ref 1.6–2.6)
MCH RBC QN AUTO: 23 PG (ref 24–34)
MCHC RBC AUTO-ENTMCNC: 29.6 G/DL (ref 31–37)
MCV RBC AUTO: 77.8 FL (ref 78–100)
MONOCYTES # BLD: 0.52 K/UL (ref 0.05–1.2)
MONOCYTES NFR BLD: 7.9 % (ref 3–10)
NEUTS SEG # BLD: 5.46 K/UL (ref 1.8–8)
NEUTS SEG NFR BLD: 82.8 % (ref 40–73)
NRBC # BLD: 0 K/UL (ref 0–0.01)
NRBC BLD-RTO: 0 PER 100 WBC
PLATELET # BLD AUTO: 209 K/UL (ref 135–420)
PMV BLD AUTO: 10.5 FL (ref 9.2–11.8)
POTASSIUM SERPL-SCNC: 3.4 MMOL/L (ref 3.5–5.5)
RBC # BLD AUTO: 4.78 M/UL (ref 4.2–5.3)
SODIUM SERPL-SCNC: 140 MMOL/L (ref 136–145)
WBC # BLD AUTO: 6.6 K/UL (ref 4.6–13.2)

## 2025-07-05 PROCEDURE — 80048 BASIC METABOLIC PNL TOTAL CA: CPT

## 2025-07-05 PROCEDURE — 94761 N-INVAS EAR/PLS OXIMETRY MLT: CPT

## 2025-07-05 PROCEDURE — 2500000003 HC RX 250 WO HCPCS: Performed by: INTERNAL MEDICINE

## 2025-07-05 PROCEDURE — 6360000002 HC RX W HCPCS: Performed by: INTERNAL MEDICINE

## 2025-07-05 PROCEDURE — 36415 COLL VENOUS BLD VENIPUNCTURE: CPT

## 2025-07-05 PROCEDURE — 6370000000 HC RX 637 (ALT 250 FOR IP): Performed by: INTERNAL MEDICINE

## 2025-07-05 PROCEDURE — 2700000000 HC OXYGEN THERAPY PER DAY

## 2025-07-05 PROCEDURE — 83735 ASSAY OF MAGNESIUM: CPT

## 2025-07-05 PROCEDURE — 85025 COMPLETE CBC W/AUTO DIFF WBC: CPT

## 2025-07-05 PROCEDURE — 94660 CPAP INITIATION&MGMT: CPT

## 2025-07-05 PROCEDURE — 6370000000 HC RX 637 (ALT 250 FOR IP)

## 2025-07-05 PROCEDURE — 99233 SBSQ HOSP IP/OBS HIGH 50: CPT | Performed by: STUDENT IN AN ORGANIZED HEALTH CARE EDUCATION/TRAINING PROGRAM

## 2025-07-05 PROCEDURE — 1100000003 HC PRIVATE W/ TELEMETRY

## 2025-07-05 PROCEDURE — 6360000002 HC RX W HCPCS: Performed by: STUDENT IN AN ORGANIZED HEALTH CARE EDUCATION/TRAINING PROGRAM

## 2025-07-05 PROCEDURE — 94640 AIRWAY INHALATION TREATMENT: CPT

## 2025-07-05 PROCEDURE — 99222 1ST HOSP IP/OBS MODERATE 55: CPT | Performed by: INTERNAL MEDICINE

## 2025-07-05 PROCEDURE — 2500000003 HC RX 250 WO HCPCS: Performed by: STUDENT IN AN ORGANIZED HEALTH CARE EDUCATION/TRAINING PROGRAM

## 2025-07-05 PROCEDURE — 83605 ASSAY OF LACTIC ACID: CPT

## 2025-07-05 RX ORDER — FUROSEMIDE 20 MG/1
20 TABLET ORAL DAILY
Status: DISCONTINUED | OUTPATIENT
Start: 2025-07-05 | End: 2025-07-05

## 2025-07-05 RX ORDER — FUROSEMIDE 10 MG/ML
40 INJECTION INTRAMUSCULAR; INTRAVENOUS ONCE
Status: COMPLETED | OUTPATIENT
Start: 2025-07-05 | End: 2025-07-05

## 2025-07-05 RX ORDER — FUROSEMIDE 20 MG/1
20 TABLET ORAL DAILY
Status: DISCONTINUED | OUTPATIENT
Start: 2025-07-06 | End: 2025-07-08

## 2025-07-05 RX ORDER — ARFORMOTEROL TARTRATE 15 UG/2ML
15 SOLUTION RESPIRATORY (INHALATION)
Status: DISCONTINUED | OUTPATIENT
Start: 2025-07-05 | End: 2025-07-10 | Stop reason: HOSPADM

## 2025-07-05 RX ORDER — BUDESONIDE 1 MG/2ML
1 INHALANT ORAL 2 TIMES DAILY
Status: DISCONTINUED | OUTPATIENT
Start: 2025-07-05 | End: 2025-07-10 | Stop reason: HOSPADM

## 2025-07-05 RX ADMIN — IPRATROPIUM BROMIDE AND ALBUTEROL SULFATE 1 DOSE: 2.5; .5 SOLUTION RESPIRATORY (INHALATION) at 19:26

## 2025-07-05 RX ADMIN — APIXABAN 5 MG: 5 TABLET, FILM COATED ORAL at 09:26

## 2025-07-05 RX ADMIN — GUAIFENESIN 600 MG: 600 TABLET ORAL at 20:01

## 2025-07-05 RX ADMIN — IPRATROPIUM BROMIDE AND ALBUTEROL SULFATE 1 DOSE: 2.5; .5 SOLUTION RESPIRATORY (INHALATION) at 07:55

## 2025-07-05 RX ADMIN — FUROSEMIDE 40 MG: 10 INJECTION, SOLUTION INTRAMUSCULAR; INTRAVENOUS at 13:36

## 2025-07-05 RX ADMIN — GUAIFENESIN 600 MG: 600 TABLET ORAL at 09:28

## 2025-07-05 RX ADMIN — SODIUM CHLORIDE, PRESERVATIVE FREE 10 ML: 5 INJECTION INTRAVENOUS at 20:01

## 2025-07-05 RX ADMIN — SODIUM CHLORIDE, PRESERVATIVE FREE 10 ML: 5 INJECTION INTRAVENOUS at 09:35

## 2025-07-05 RX ADMIN — BUDESONIDE 1 MG: 1 SUSPENSION RESPIRATORY (INHALATION) at 19:26

## 2025-07-05 RX ADMIN — IPRATROPIUM BROMIDE AND ALBUTEROL SULFATE 1 DOSE: 2.5; .5 SOLUTION RESPIRATORY (INHALATION) at 11:43

## 2025-07-05 RX ADMIN — ARFORMOTEROL TARTRATE 15 MCG: 15 SOLUTION RESPIRATORY (INHALATION) at 19:26

## 2025-07-05 RX ADMIN — METHYLPREDNISOLONE SODIUM SUCCINATE 40 MG: 40 INJECTION, POWDER, LYOPHILIZED, FOR SOLUTION INTRAMUSCULAR; INTRAVENOUS at 20:01

## 2025-07-05 RX ADMIN — IPRATROPIUM BROMIDE AND ALBUTEROL SULFATE 1 DOSE: 2.5; .5 SOLUTION RESPIRATORY (INHALATION) at 16:24

## 2025-07-05 RX ADMIN — POTASSIUM CHLORIDE 40 MEQ: 1500 TABLET, EXTENDED RELEASE ORAL at 09:30

## 2025-07-05 RX ADMIN — SERTRALINE HYDROCHLORIDE 50 MG: 50 TABLET ORAL at 09:28

## 2025-07-05 RX ADMIN — LEVOTHYROXINE SODIUM 88 MCG: 88 TABLET ORAL at 06:39

## 2025-07-05 RX ADMIN — WATER 60 MG: 1 INJECTION INTRAMUSCULAR; INTRAVENOUS; SUBCUTANEOUS at 09:26

## 2025-07-05 RX ADMIN — APIXABAN 5 MG: 5 TABLET, FILM COATED ORAL at 20:01

## 2025-07-05 ASSESSMENT — PAIN SCALES - GENERAL
PAINLEVEL_OUTOF10: 0

## 2025-07-05 NOTE — CONSULTS
Interventional Radiology    The patient has not been cleared for procedure today at this time, appreciate ongoing ER, cardiology, and anesthesia work ups.    No plans for an IR procedure this afternoon as her medical optimization/ workup remains ongoing.    Tunneled central venous catheter placement with central venous recanalization can be rescheduled either outpatient v inpatient.     She would benefit from admission for post procedure observation if she does come back through the outpatient tract.    From IR perspective the patient is clear for diet - defer to primary medical teams if this is appropriate.    Discussed with the patient bedside.    Thank you,  CARLOS Lomeli  1000    
compared with ECG of 12-JUN-2025 14:46,  No significant change was found  Confirmed by MD Obregon Marc ([id number]) on 7/3/2025 1:05:33 PM       05/17/25    ECHO (TTE) LIMITED (PRN CONTRAST/BUBBLE/STRAIN/3D) 05/20/2025  4:06 PM (Final)    Interpretation Summary    Image quality is adequate. Technically difficult study due to patient's body habitus.    Left Ventricle: Mildly reduced left ventricular systolic function with a visually estimated EF of 45 - 50%. Left ventricle is smaller than normal. Normal wall thickness. Normal wall motion.    Right Ventricle: Right ventricle is dilated. Reduced systolic function. TAPSE is abnormal.    Aortic Valve: No regurgitation.    Mitral Valve: Trace regurgitation.    Tricuspid Valve: Moderate regurgitation. RVSP is 73 mmHg.    Pulmonic Valve: No regurgitation.    Pericardium: Moderate (1-2 cm) circumferential pericardial effusion present. No indication of cardiac tamponade.    Signed by: Shon Marr MD on 5/20/2025  4:06 PM    No results found for this or any previous visit.    No results found for this or any previous visit.    Xray Result (most recent):  XR CHEST PORTABLE 07/03/2025    Narrative  EXAM: CHEST  CPT CODE: 37508    CLINICAL INDICATION/HISTORY: Shortness of breath.    COMPARISON: CT chest, abdomen and pelvis of 15 Maame 2025.    TECHNIQUE: Single AP portable view of chest at 1107.    FINDINGS: There is very mild rotation of the patient to the right. There are  persistent hazy and patchy opacities throughout the right lung and there is a  meniscus opacity at the base consistent with possible small  effusion/atelectasis. The left lung is clear except for some hazy opacity at the  base which may represent a small effusion. A 3 cm mass in the medial left upper  lobe noted on CT is not well seen and may project over the hilar vessels. The  mediastinum is not widened but the heart is moderately enlarged. The shape is  consistent with some persistent pericardial 
the lung base  which is stable. Nodular pleural parenchymal thickening at the right lung apex  again seen. There is emphysema and mild interlobular septal thickening to a  lesser extent in the left lung. 3 cm circumscribed low-density mass in the left  upper lobe is stable. Small consolidation versus subsegmental atelectasis at the  left posterior lung base stable to prior. Bilateral pleural effusions. Diffuse  peribronchial and bronchiolar thickening is again seen.    UPPER ABDOMEN:  Visualized portions of the upper abdomen are unremarkable.    MUSCULOSKELETAL:  There are no aggressive appearing osteolytic or blastic lesions noted in the  visualized skeleton.    SOFT TISSUES:  The imaged portions of the thyroid are within normal limits. There is  generalized soft tissue edema about the thorax. The soft tissues about the chest  and thorax are otherwise unremarkable. There is no axillary lymphadenopathy.    Impression  1. No evidence of pulmonary embolus.    2. Mild cardiomegaly with pericardial effusions, the specifically there is right  atrium and right ventricle enlargement with reflux of contrast into the IVC  suggestive of elevated right heart pressures. There is also generalized soft  tissue edema which is also concerning for heart failure.    3. Right greater than left fibrotic lung changes, there is volume loss on the  right with pleural parenchymal scarring. There are hazy opacities the posterior  lung bases in the periphery of the right lung which were seen on prior study  which may represent sequela of volume loss versus an infectious or inflammatory  process. There is also a well-circumscribed 3 cm low-density mass in the left  lung which is stable.          Electronically signed by Elizabeth Fink        High complexity decision making was performed during the evaluation of this patient at high risk for decompensation with multiple organ involvement     Above mentioned total time spent on reviewing the

## 2025-07-06 LAB
ANION GAP SERPL CALC-SCNC: 11 MMOL/L (ref 3–18)
BASOPHILS # BLD: 0.01 K/UL (ref 0–0.1)
BASOPHILS NFR BLD: 0.1 % (ref 0–2)
BUN SERPL-MCNC: 19 MG/DL (ref 6–23)
BUN/CREAT SERPL: 27 (ref 12–20)
CALCIUM SERPL-MCNC: 9.6 MG/DL (ref 8.5–10.1)
CHLORIDE SERPL-SCNC: 98 MMOL/L (ref 98–107)
CO2 SERPL-SCNC: 29 MMOL/L (ref 21–32)
CREAT SERPL-MCNC: 0.69 MG/DL (ref 0.6–1.3)
DIFFERENTIAL METHOD BLD: ABNORMAL
EOSINOPHIL # BLD: 0 K/UL (ref 0–0.4)
EOSINOPHIL NFR BLD: 0 % (ref 0–5)
ERYTHROCYTE [DISTWIDTH] IN BLOOD BY AUTOMATED COUNT: 20 % (ref 11.6–14.5)
GLUCOSE SERPL-MCNC: 154 MG/DL (ref 74–108)
HCT VFR BLD AUTO: 40 % (ref 35–45)
HGB BLD-MCNC: 11.7 G/DL (ref 12–16)
IMM GRANULOCYTES # BLD AUTO: 0.07 K/UL (ref 0–0.04)
IMM GRANULOCYTES NFR BLD AUTO: 0.9 % (ref 0–0.5)
LYMPHOCYTES # BLD: 0.25 K/UL (ref 0.9–3.6)
LYMPHOCYTES NFR BLD: 3.1 % (ref 21–52)
MCH RBC QN AUTO: 23.1 PG (ref 24–34)
MCHC RBC AUTO-ENTMCNC: 29.3 G/DL (ref 31–37)
MCV RBC AUTO: 78.9 FL (ref 78–100)
MONOCYTES # BLD: 0.2 K/UL (ref 0.05–1.2)
MONOCYTES NFR BLD: 2.4 % (ref 3–10)
NEUTS SEG # BLD: 7.66 K/UL (ref 1.8–8)
NEUTS SEG NFR BLD: 93.5 % (ref 40–73)
NRBC # BLD: 0 K/UL (ref 0–0.01)
NRBC BLD-RTO: 0 PER 100 WBC
PLATELET # BLD AUTO: 210 K/UL (ref 135–420)
PMV BLD AUTO: 10.8 FL (ref 9.2–11.8)
POTASSIUM SERPL-SCNC: 4.3 MMOL/L (ref 3.5–5.5)
RBC # BLD AUTO: 5.07 M/UL (ref 4.2–5.3)
SODIUM SERPL-SCNC: 138 MMOL/L (ref 136–145)
WBC # BLD AUTO: 8.2 K/UL (ref 4.6–13.2)

## 2025-07-06 PROCEDURE — 94640 AIRWAY INHALATION TREATMENT: CPT

## 2025-07-06 PROCEDURE — 80048 BASIC METABOLIC PNL TOTAL CA: CPT

## 2025-07-06 PROCEDURE — 1100000003 HC PRIVATE W/ TELEMETRY

## 2025-07-06 PROCEDURE — 2500000003 HC RX 250 WO HCPCS: Performed by: INTERNAL MEDICINE

## 2025-07-06 PROCEDURE — 94660 CPAP INITIATION&MGMT: CPT

## 2025-07-06 PROCEDURE — 99232 SBSQ HOSP IP/OBS MODERATE 35: CPT | Performed by: INTERNAL MEDICINE

## 2025-07-06 PROCEDURE — 2700000000 HC OXYGEN THERAPY PER DAY

## 2025-07-06 PROCEDURE — 94761 N-INVAS EAR/PLS OXIMETRY MLT: CPT

## 2025-07-06 PROCEDURE — 6360000002 HC RX W HCPCS: Performed by: INTERNAL MEDICINE

## 2025-07-06 PROCEDURE — 6370000000 HC RX 637 (ALT 250 FOR IP): Performed by: STUDENT IN AN ORGANIZED HEALTH CARE EDUCATION/TRAINING PROGRAM

## 2025-07-06 PROCEDURE — 6370000000 HC RX 637 (ALT 250 FOR IP): Performed by: INTERNAL MEDICINE

## 2025-07-06 PROCEDURE — 36415 COLL VENOUS BLD VENIPUNCTURE: CPT

## 2025-07-06 PROCEDURE — 85025 COMPLETE CBC W/AUTO DIFF WBC: CPT

## 2025-07-06 PROCEDURE — 6370000000 HC RX 637 (ALT 250 FOR IP)

## 2025-07-06 PROCEDURE — 99232 SBSQ HOSP IP/OBS MODERATE 35: CPT | Performed by: STUDENT IN AN ORGANIZED HEALTH CARE EDUCATION/TRAINING PROGRAM

## 2025-07-06 RX ADMIN — BUDESONIDE 1 MG: 1 SUSPENSION RESPIRATORY (INHALATION) at 08:16

## 2025-07-06 RX ADMIN — IPRATROPIUM BROMIDE AND ALBUTEROL SULFATE 1 DOSE: 2.5; .5 SOLUTION RESPIRATORY (INHALATION) at 12:33

## 2025-07-06 RX ADMIN — SODIUM CHLORIDE, PRESERVATIVE FREE 10 ML: 5 INJECTION INTRAVENOUS at 09:04

## 2025-07-06 RX ADMIN — BUDESONIDE 1 MG: 1 SUSPENSION RESPIRATORY (INHALATION) at 20:23

## 2025-07-06 RX ADMIN — ARFORMOTEROL TARTRATE 15 MCG: 15 SOLUTION RESPIRATORY (INHALATION) at 20:23

## 2025-07-06 RX ADMIN — GUAIFENESIN 600 MG: 600 TABLET ORAL at 09:03

## 2025-07-06 RX ADMIN — METHYLPREDNISOLONE SODIUM SUCCINATE 40 MG: 40 INJECTION, POWDER, LYOPHILIZED, FOR SOLUTION INTRAMUSCULAR; INTRAVENOUS at 09:03

## 2025-07-06 RX ADMIN — FUROSEMIDE 20 MG: 20 TABLET ORAL at 09:03

## 2025-07-06 RX ADMIN — ARFORMOTEROL TARTRATE 15 MCG: 15 SOLUTION RESPIRATORY (INHALATION) at 08:16

## 2025-07-06 RX ADMIN — LEVOTHYROXINE SODIUM 88 MCG: 88 TABLET ORAL at 06:51

## 2025-07-06 RX ADMIN — IPRATROPIUM BROMIDE AND ALBUTEROL SULFATE 1 DOSE: 2.5; .5 SOLUTION RESPIRATORY (INHALATION) at 08:15

## 2025-07-06 RX ADMIN — IPRATROPIUM BROMIDE AND ALBUTEROL SULFATE 1 DOSE: 2.5; .5 SOLUTION RESPIRATORY (INHALATION) at 20:23

## 2025-07-06 RX ADMIN — METHYLPREDNISOLONE SODIUM SUCCINATE 40 MG: 40 INJECTION, POWDER, LYOPHILIZED, FOR SOLUTION INTRAMUSCULAR; INTRAVENOUS at 20:57

## 2025-07-06 RX ADMIN — APIXABAN 5 MG: 5 TABLET, FILM COATED ORAL at 09:03

## 2025-07-06 RX ADMIN — GUAIFENESIN 600 MG: 600 TABLET ORAL at 20:57

## 2025-07-06 RX ADMIN — APIXABAN 5 MG: 5 TABLET, FILM COATED ORAL at 20:57

## 2025-07-06 RX ADMIN — IPRATROPIUM BROMIDE AND ALBUTEROL SULFATE 1 DOSE: 2.5; .5 SOLUTION RESPIRATORY (INHALATION) at 15:33

## 2025-07-06 RX ADMIN — SERTRALINE HYDROCHLORIDE 50 MG: 50 TABLET ORAL at 09:03

## 2025-07-06 RX ADMIN — SODIUM CHLORIDE, PRESERVATIVE FREE 10 ML: 5 INJECTION INTRAVENOUS at 20:57

## 2025-07-06 ASSESSMENT — PAIN SCALES - GENERAL
PAINLEVEL_OUTOF10: 0

## 2025-07-07 ENCOUNTER — APPOINTMENT (OUTPATIENT)
Facility: HOSPITAL | Age: 65
DRG: 189 | End: 2025-07-07
Attending: INTERNAL MEDICINE
Payer: MEDICARE

## 2025-07-07 PROBLEM — I48.0 PAF (PAROXYSMAL ATRIAL FIBRILLATION) (HCC): Status: ACTIVE | Noted: 2025-02-16

## 2025-07-07 LAB
ANION GAP SERPL CALC-SCNC: 12 MMOL/L (ref 3–18)
APTT PPP: 25.2 SEC (ref 21.7–34.2)
APTT PPP: 86 SEC (ref 21.7–34.2)
BASOPHILS # BLD: 0.01 K/UL (ref 0–0.1)
BASOPHILS NFR BLD: 0.1 % (ref 0–2)
BUN SERPL-MCNC: 23 MG/DL (ref 6–23)
BUN/CREAT SERPL: 37 (ref 12–20)
CALCIUM SERPL-MCNC: 9.1 MG/DL (ref 8.5–10.1)
CHLORIDE SERPL-SCNC: 99 MMOL/L (ref 98–107)
CO2 SERPL-SCNC: 28 MMOL/L (ref 21–32)
CREAT SERPL-MCNC: 0.62 MG/DL (ref 0.6–1.3)
DIFFERENTIAL METHOD BLD: ABNORMAL
ECHO AO ASC DIAM: 3.2 CM
ECHO AO ASCENDING AORTA INDEX: 1.99 CM/M2
ECHO AO ROOT DIAM: 2.7 CM
ECHO AO ROOT INDEX: 1.68 CM/M2
ECHO AV AREA PEAK VELOCITY: 2.6 CM2
ECHO AV AREA VTI: 2.5 CM2
ECHO AV AREA/BSA PEAK VELOCITY: 1.6 CM2/M2
ECHO AV AREA/BSA VTI: 1.6 CM2/M2
ECHO AV MEAN GRADIENT: 2 MMHG
ECHO AV MEAN VELOCITY: 0.6 M/S
ECHO AV PEAK GRADIENT: 3 MMHG
ECHO AV PEAK VELOCITY: 0.9 M/S
ECHO AV VELOCITY RATIO: 0.89
ECHO AV VTI: 17.7 CM
ECHO BSA: 1.61 M2
ECHO LA VOL A-L A2C: 58 ML (ref 22–52)
ECHO LA VOL A-L A4C: 28 ML (ref 22–52)
ECHO LA VOL BP: 38 ML (ref 22–52)
ECHO LA VOL MOD A2C: 54 ML (ref 22–52)
ECHO LA VOL MOD A4C: 26 ML (ref 22–52)
ECHO LA VOL/BSA BIPLANE: 24 ML/M2 (ref 16–34)
ECHO LA VOLUME AREA LENGTH: 42 ML
ECHO LA VOLUME INDEX A-L A2C: 36 ML/M2 (ref 16–34)
ECHO LA VOLUME INDEX A-L A4C: 17 ML/M2 (ref 16–34)
ECHO LA VOLUME INDEX AREA LENGTH: 26 ML/M2 (ref 16–34)
ECHO LA VOLUME INDEX MOD A2C: 34 ML/M2 (ref 16–34)
ECHO LA VOLUME INDEX MOD A4C: 16 ML/M2 (ref 16–34)
ECHO LV E' LATERAL VELOCITY: 4.43 CM/S
ECHO LV E' SEPTAL VELOCITY: 2.65 CM/S
ECHO LV EDV A2C: 60 ML
ECHO LV EDV A4C: 57 ML
ECHO LV EDV BP: 59 ML (ref 56–104)
ECHO LV EDV INDEX A4C: 35 ML/M2
ECHO LV EDV INDEX BP: 37 ML/M2
ECHO LV EDV NDEX A2C: 37 ML/M2
ECHO LV EF PHYSICIAN: 45 %
ECHO LV EJECTION FRACTION A2C: 31 %
ECHO LV EJECTION FRACTION A4C: 44 %
ECHO LV EJECTION FRACTION BIPLANE: 39 % (ref 55–100)
ECHO LV ESV A2C: 41 ML
ECHO LV ESV A4C: 32 ML
ECHO LV ESV BP: 36 ML (ref 19–49)
ECHO LV ESV INDEX A2C: 25 ML/M2
ECHO LV ESV INDEX A4C: 20 ML/M2
ECHO LV ESV INDEX BP: 22 ML/M2
ECHO LV FRACTIONAL SHORTENING: 27 % (ref 28–44)
ECHO LV INTERNAL DIMENSION DIASTOLE INDEX: 2.8 CM/M2
ECHO LV INTERNAL DIMENSION DIASTOLIC: 4.5 CM (ref 3.9–5.3)
ECHO LV INTERNAL DIMENSION SYSTOLIC INDEX: 2.05 CM/M2
ECHO LV INTERNAL DIMENSION SYSTOLIC: 3.3 CM
ECHO LV IVSD: 0.9 CM (ref 0.6–0.9)
ECHO LV MASS 2D: 113.6 G (ref 67–162)
ECHO LV MASS INDEX 2D: 70.6 G/M2 (ref 43–95)
ECHO LV POSTERIOR WALL DIASTOLIC: 0.7 CM (ref 0.6–0.9)
ECHO LV RELATIVE WALL THICKNESS RATIO: 0.31
ECHO LVOT AREA: 3.1 CM2
ECHO LVOT AV VTI INDEX: 0.82
ECHO LVOT DIAM: 2 CM
ECHO LVOT MEAN GRADIENT: 1 MMHG
ECHO LVOT PEAK GRADIENT: 2 MMHG
ECHO LVOT PEAK VELOCITY: 0.8 M/S
ECHO LVOT STROKE VOLUME INDEX: 28.5 ML/M2
ECHO LVOT SV: 45.8 ML
ECHO LVOT VTI: 14.6 CM
ECHO MV A VELOCITY: 0.81 M/S
ECHO MV E DECELERATION TIME (DT): 230.4 MS
ECHO MV E VELOCITY: 0.5 M/S
ECHO MV E/A RATIO: 0.62
ECHO MV E/E' LATERAL: 11.29
ECHO MV E/E' RATIO (AVERAGED): 15.08
ECHO MV E/E' SEPTAL: 18.87
ECHO PV MAX VELOCITY: 0.5 M/S
ECHO PV PEAK GRADIENT: 1 MMHG
ECHO RA AREA 4C: 18.8 CM2
ECHO RA END SYSTOLIC VOLUME APICAL 4 CHAMBER INDEX BSA: 32 ML/M2
ECHO RA VOLUME AREA LENGTH APICAL 4 CHAMBER: 53 ML
ECHO RA VOLUME: 51 ML
ECHO RV BASAL DIMENSION: 4.2 CM
ECHO RV FREE WALL PEAK S': 6.5 CM/S
ECHO RV LONGITUDINAL DIMENSION: 6.6 CM
ECHO RV MID DIMENSION: 3.3 CM
ECHO RV TAPSE: 1.3 CM (ref 1.7–?)
ECHO RVOT PEAK GRADIENT: 1 MMHG
ECHO RVOT PEAK VELOCITY: 0.4 M/S
ECHO TV REGURGITANT MAX VELOCITY: 3.8 M/S
ECHO TV REGURGITANT PEAK GRADIENT: 58 MMHG
EOSINOPHIL # BLD: 0 K/UL (ref 0–0.4)
EOSINOPHIL NFR BLD: 0 % (ref 0–5)
ERYTHROCYTE [DISTWIDTH] IN BLOOD BY AUTOMATED COUNT: 19.9 % (ref 11.6–14.5)
ERYTHROCYTE [DISTWIDTH] IN BLOOD BY AUTOMATED COUNT: 19.9 % (ref 11.6–14.5)
GLUCOSE BLD STRIP.AUTO-MCNC: 144 MG/DL (ref 70–110)
GLUCOSE BLD STRIP.AUTO-MCNC: 188 MG/DL (ref 70–110)
GLUCOSE BLD STRIP.AUTO-MCNC: 195 MG/DL (ref 70–110)
GLUCOSE BLD STRIP.AUTO-MCNC: 197 MG/DL (ref 70–110)
GLUCOSE SERPL-MCNC: 159 MG/DL (ref 74–108)
HCT VFR BLD AUTO: 37.1 % (ref 35–45)
HCT VFR BLD AUTO: 38.2 % (ref 35–45)
HGB BLD-MCNC: 10.9 G/DL (ref 12–16)
HGB BLD-MCNC: 10.9 G/DL (ref 12–16)
IMM GRANULOCYTES # BLD AUTO: 0.06 K/UL (ref 0–0.04)
IMM GRANULOCYTES NFR BLD AUTO: 0.7 % (ref 0–0.5)
LYMPHOCYTES # BLD: 0.23 K/UL (ref 0.9–3.6)
LYMPHOCYTES NFR BLD: 2.6 % (ref 21–52)
MCH RBC QN AUTO: 22.3 PG (ref 24–34)
MCH RBC QN AUTO: 22.9 PG (ref 24–34)
MCHC RBC AUTO-ENTMCNC: 28.5 G/DL (ref 31–37)
MCHC RBC AUTO-ENTMCNC: 29.4 G/DL (ref 31–37)
MCV RBC AUTO: 77.9 FL (ref 78–100)
MCV RBC AUTO: 78.1 FL (ref 78–100)
MONOCYTES # BLD: 0.13 K/UL (ref 0.05–1.2)
MONOCYTES NFR BLD: 1.5 % (ref 3–10)
NEUTS SEG # BLD: 8.52 K/UL (ref 1.8–8)
NEUTS SEG NFR BLD: 95.1 % (ref 40–73)
NRBC # BLD: 0 K/UL (ref 0–0.01)
NRBC # BLD: 0 K/UL (ref 0–0.01)
NRBC BLD-RTO: 0 PER 100 WBC
NRBC BLD-RTO: 0 PER 100 WBC
PLATELET # BLD AUTO: 211 K/UL (ref 135–420)
PLATELET # BLD AUTO: 220 K/UL (ref 135–420)
PMV BLD AUTO: 10 FL (ref 9.2–11.8)
PMV BLD AUTO: 10.4 FL (ref 9.2–11.8)
POTASSIUM SERPL-SCNC: 4.3 MMOL/L (ref 3.5–5.5)
RBC # BLD AUTO: 4.76 M/UL (ref 4.2–5.3)
RBC # BLD AUTO: 4.89 M/UL (ref 4.2–5.3)
SODIUM SERPL-SCNC: 139 MMOL/L (ref 136–145)
WBC # BLD AUTO: 10.2 K/UL (ref 4.6–13.2)
WBC # BLD AUTO: 9 K/UL (ref 4.6–13.2)

## 2025-07-07 PROCEDURE — 6370000000 HC RX 637 (ALT 250 FOR IP)

## 2025-07-07 PROCEDURE — 6370000000 HC RX 637 (ALT 250 FOR IP): Performed by: STUDENT IN AN ORGANIZED HEALTH CARE EDUCATION/TRAINING PROGRAM

## 2025-07-07 PROCEDURE — 1100000003 HC PRIVATE W/ TELEMETRY

## 2025-07-07 PROCEDURE — 36415 COLL VENOUS BLD VENIPUNCTURE: CPT

## 2025-07-07 PROCEDURE — 82962 GLUCOSE BLOOD TEST: CPT

## 2025-07-07 PROCEDURE — 6360000002 HC RX W HCPCS: Performed by: INTERNAL MEDICINE

## 2025-07-07 PROCEDURE — 2700000000 HC OXYGEN THERAPY PER DAY

## 2025-07-07 PROCEDURE — 94640 AIRWAY INHALATION TREATMENT: CPT

## 2025-07-07 PROCEDURE — 2500000003 HC RX 250 WO HCPCS: Performed by: INTERNAL MEDICINE

## 2025-07-07 PROCEDURE — 93306 TTE W/DOPPLER COMPLETE: CPT

## 2025-07-07 PROCEDURE — 94761 N-INVAS EAR/PLS OXIMETRY MLT: CPT

## 2025-07-07 PROCEDURE — 99232 SBSQ HOSP IP/OBS MODERATE 35: CPT | Performed by: INTERNAL MEDICINE

## 2025-07-07 PROCEDURE — 99233 SBSQ HOSP IP/OBS HIGH 50: CPT | Performed by: STUDENT IN AN ORGANIZED HEALTH CARE EDUCATION/TRAINING PROGRAM

## 2025-07-07 PROCEDURE — 6360000002 HC RX W HCPCS: Performed by: STUDENT IN AN ORGANIZED HEALTH CARE EDUCATION/TRAINING PROGRAM

## 2025-07-07 PROCEDURE — 94660 CPAP INITIATION&MGMT: CPT

## 2025-07-07 PROCEDURE — 85025 COMPLETE CBC W/AUTO DIFF WBC: CPT

## 2025-07-07 PROCEDURE — 85730 THROMBOPLASTIN TIME PARTIAL: CPT

## 2025-07-07 PROCEDURE — 80048 BASIC METABOLIC PNL TOTAL CA: CPT

## 2025-07-07 PROCEDURE — 6370000000 HC RX 637 (ALT 250 FOR IP): Performed by: INTERNAL MEDICINE

## 2025-07-07 PROCEDURE — 93306 TTE W/DOPPLER COMPLETE: CPT | Performed by: INTERNAL MEDICINE

## 2025-07-07 PROCEDURE — 85027 COMPLETE CBC AUTOMATED: CPT

## 2025-07-07 RX ORDER — HEPARIN SODIUM 1000 [USP'U]/ML
80 INJECTION, SOLUTION INTRAVENOUS; SUBCUTANEOUS PRN
Status: DISCONTINUED | OUTPATIENT
Start: 2025-07-07 | End: 2025-07-08

## 2025-07-07 RX ORDER — HEPARIN SODIUM 1000 [USP'U]/ML
40 INJECTION, SOLUTION INTRAVENOUS; SUBCUTANEOUS PRN
Status: DISCONTINUED | OUTPATIENT
Start: 2025-07-07 | End: 2025-07-08

## 2025-07-07 RX ORDER — HEPARIN SODIUM 1000 [USP'U]/ML
80 INJECTION, SOLUTION INTRAVENOUS; SUBCUTANEOUS ONCE
Status: COMPLETED | OUTPATIENT
Start: 2025-07-07 | End: 2025-07-07

## 2025-07-07 RX ORDER — HEPARIN SODIUM 10000 [USP'U]/100ML
5-30 INJECTION, SOLUTION INTRAVENOUS CONTINUOUS
Status: DISCONTINUED | OUTPATIENT
Start: 2025-07-07 | End: 2025-07-08

## 2025-07-07 RX ADMIN — GUAIFENESIN 600 MG: 600 TABLET ORAL at 22:08

## 2025-07-07 RX ADMIN — IPRATROPIUM BROMIDE AND ALBUTEROL SULFATE 1 DOSE: 2.5; .5 SOLUTION RESPIRATORY (INHALATION) at 12:12

## 2025-07-07 RX ADMIN — IPRATROPIUM BROMIDE AND ALBUTEROL SULFATE 1 DOSE: 2.5; .5 SOLUTION RESPIRATORY (INHALATION) at 20:12

## 2025-07-07 RX ADMIN — SERTRALINE HYDROCHLORIDE 50 MG: 50 TABLET ORAL at 09:46

## 2025-07-07 RX ADMIN — APIXABAN 5 MG: 5 TABLET, FILM COATED ORAL at 09:46

## 2025-07-07 RX ADMIN — IPRATROPIUM BROMIDE AND ALBUTEROL SULFATE 1 DOSE: 2.5; .5 SOLUTION RESPIRATORY (INHALATION) at 15:35

## 2025-07-07 RX ADMIN — ARFORMOTEROL TARTRATE 15 MCG: 15 SOLUTION RESPIRATORY (INHALATION) at 06:21

## 2025-07-07 RX ADMIN — IPRATROPIUM BROMIDE AND ALBUTEROL SULFATE 1 DOSE: 2.5; .5 SOLUTION RESPIRATORY (INHALATION) at 06:20

## 2025-07-07 RX ADMIN — SODIUM CHLORIDE, PRESERVATIVE FREE 10 ML: 5 INJECTION INTRAVENOUS at 09:46

## 2025-07-07 RX ADMIN — FUROSEMIDE 20 MG: 20 TABLET ORAL at 09:46

## 2025-07-07 RX ADMIN — GUAIFENESIN 600 MG: 600 TABLET ORAL at 09:46

## 2025-07-07 RX ADMIN — BUDESONIDE 1 MG: 1 SUSPENSION RESPIRATORY (INHALATION) at 07:21

## 2025-07-07 RX ADMIN — METHYLPREDNISOLONE SODIUM SUCCINATE 40 MG: 40 INJECTION, POWDER, LYOPHILIZED, FOR SOLUTION INTRAMUSCULAR; INTRAVENOUS at 09:46

## 2025-07-07 RX ADMIN — ARFORMOTEROL TARTRATE 15 MCG: 15 SOLUTION RESPIRATORY (INHALATION) at 20:12

## 2025-07-07 RX ADMIN — LEVOTHYROXINE SODIUM 88 MCG: 88 TABLET ORAL at 06:02

## 2025-07-07 RX ADMIN — HEPARIN SODIUM 4600 UNITS: 1000 INJECTION, SOLUTION INTRAVENOUS; SUBCUTANEOUS at 16:23

## 2025-07-07 RX ADMIN — HEPARIN SODIUM 18 UNITS/KG/HR: 10000 INJECTION, SOLUTION INTRAVENOUS at 16:31

## 2025-07-07 RX ADMIN — SODIUM CHLORIDE, PRESERVATIVE FREE 10 ML: 5 INJECTION INTRAVENOUS at 21:01

## 2025-07-07 RX ADMIN — BUDESONIDE 1 MG: 1 SUSPENSION RESPIRATORY (INHALATION) at 20:12

## 2025-07-07 ASSESSMENT — PAIN SCALES - GENERAL
PAINLEVEL_OUTOF10: 0

## 2025-07-07 NOTE — CARE COORDINATION
07/07/25 1704   Service Assessment   Patient Orientation Alert and Oriented   Cognition Alert   History Provided By Patient   Primary Caregiver Self   Accompanied By/Relationship alone   Support Systems Spouse/Significant Other   Patient's Healthcare Decision Maker is: Named in Scanned ACP Document   PCP Verified by CM Yes   Last Visit to PCP Within last 3 months   Current Functional Level Independent in ADLs/IADLs   Can patient return to prior living arrangement Yes   Ability to make needs known: Good   Family able to assist with home care needs: Yes   Would you like for me to discuss the discharge plan with any other family members/significant others, and if so, who? Yes   Financial Resources Medicare   Community Resources None   Social/Functional History   Lives With Significant other   Type of Home House   Home Layout One level   Home Access Stairs to enter with rails   Entrance Stairs - Number of Steps 2   Entrance Stairs - Rails None   Bathroom Shower/Tub Tub/Shower unit   Bathroom Toilet Standard   Bathroom Equipment Shower chair   Bathroom Accessibility Accessible   Home Equipment Rollator;Oxygen  (adapt)   Receives Help From Family   Prior Level of Assist for ADLs Independent   Prior Level of Assist for Homemaking Independent   Homemaking Responsibilities Yes   Ambulation Assistance Independent   Prior Level of Assist for Transfers Independent   Active  No   Patient's  Info Family or Fiance   Mode of Transportation Car   Education N/A   Occupation On disability   Type of Occupation N/A   Discharge Planning   Type of Residence House   Living Arrangements Spouse/Significant Other   Current Services Prior To Admission Oxygen Therapy   Current DME Prior to Arrival Oxygen Therapy (Comment)   Potential Assistance Needed N/A   DME Ordered? No   Potential Assistance Purchasing Medications No   Type of Home Care Services None   Patient expects to be discharged to: House   Follow Up Appointment: Best

## 2025-07-07 NOTE — NURSE NAVIGATOR
Followed up with patient and spouse about heart failure reinforcing low sodium and fluid restrictions.

## 2025-07-08 ENCOUNTER — APPOINTMENT (OUTPATIENT)
Facility: HOSPITAL | Age: 65
DRG: 189 | End: 2025-07-08
Payer: MEDICARE

## 2025-07-08 LAB
ANION GAP SERPL CALC-SCNC: 11 MMOL/L (ref 3–18)
APTT PPP: 127.9 SEC (ref 21.7–34.2)
APTT PPP: 40.4 SEC (ref 21.7–34.2)
APTT PPP: 40.5 SEC (ref 21.7–34.2)
BASOPHILS # BLD: 0.01 K/UL (ref 0–0.1)
BASOPHILS NFR BLD: 0.1 % (ref 0–2)
BUN SERPL-MCNC: 24 MG/DL (ref 6–23)
BUN/CREAT SERPL: 32 (ref 12–20)
CALCIUM SERPL-MCNC: 8.8 MG/DL (ref 8.5–10.1)
CHLORIDE SERPL-SCNC: 99 MMOL/L (ref 98–107)
CO2 SERPL-SCNC: 29 MMOL/L (ref 21–32)
CREAT SERPL-MCNC: 0.75 MG/DL (ref 0.6–1.3)
DIFFERENTIAL METHOD BLD: ABNORMAL
EOSINOPHIL # BLD: 0 K/UL (ref 0–0.4)
EOSINOPHIL NFR BLD: 0 % (ref 0–5)
ERYTHROCYTE [DISTWIDTH] IN BLOOD BY AUTOMATED COUNT: 19.9 % (ref 11.6–14.5)
GLUCOSE BLD STRIP.AUTO-MCNC: 102 MG/DL (ref 70–110)
GLUCOSE BLD STRIP.AUTO-MCNC: 121 MG/DL (ref 70–110)
GLUCOSE BLD STRIP.AUTO-MCNC: 144 MG/DL (ref 70–110)
GLUCOSE BLD STRIP.AUTO-MCNC: 180 MG/DL (ref 70–110)
GLUCOSE SERPL-MCNC: 156 MG/DL (ref 74–108)
HCT VFR BLD AUTO: 37.4 % (ref 35–45)
HGB BLD-MCNC: 11.1 G/DL (ref 12–16)
IMM GRANULOCYTES # BLD AUTO: 0.05 K/UL (ref 0–0.04)
IMM GRANULOCYTES NFR BLD AUTO: 0.4 % (ref 0–0.5)
INR PPP: 1.1 (ref 0.9–1.2)
LYMPHOCYTES # BLD: 0.58 K/UL (ref 0.9–3.6)
LYMPHOCYTES NFR BLD: 5.2 % (ref 21–52)
MCH RBC QN AUTO: 23.2 PG (ref 24–34)
MCHC RBC AUTO-ENTMCNC: 29.7 G/DL (ref 31–37)
MCV RBC AUTO: 78.2 FL (ref 78–100)
MONOCYTES # BLD: 0.73 K/UL (ref 0.05–1.2)
MONOCYTES NFR BLD: 6.5 % (ref 3–10)
NEUTS SEG # BLD: 9.88 K/UL (ref 1.8–8)
NEUTS SEG NFR BLD: 87.8 % (ref 40–73)
NRBC # BLD: 0 K/UL (ref 0–0.01)
NRBC BLD-RTO: 0 PER 100 WBC
PLATELET # BLD AUTO: 207 K/UL (ref 135–420)
PMV BLD AUTO: 10.4 FL (ref 9.2–11.8)
POTASSIUM SERPL-SCNC: 3.9 MMOL/L (ref 3.5–5.5)
PROTHROMBIN TIME: 14.9 SEC (ref 12–15.1)
RBC # BLD AUTO: 4.78 M/UL (ref 4.2–5.3)
SODIUM SERPL-SCNC: 138 MMOL/L (ref 136–145)
WBC # BLD AUTO: 11.3 K/UL (ref 4.6–13.2)

## 2025-07-08 PROCEDURE — 94640 AIRWAY INHALATION TREATMENT: CPT

## 2025-07-08 PROCEDURE — 36415 COLL VENOUS BLD VENIPUNCTURE: CPT

## 2025-07-08 PROCEDURE — 82962 GLUCOSE BLOOD TEST: CPT

## 2025-07-08 PROCEDURE — 99233 SBSQ HOSP IP/OBS HIGH 50: CPT | Performed by: STUDENT IN AN ORGANIZED HEALTH CARE EDUCATION/TRAINING PROGRAM

## 2025-07-08 PROCEDURE — 2700000000 HC OXYGEN THERAPY PER DAY

## 2025-07-08 PROCEDURE — 2500000003 HC RX 250 WO HCPCS: Performed by: INTERNAL MEDICINE

## 2025-07-08 PROCEDURE — 6370000000 HC RX 637 (ALT 250 FOR IP)

## 2025-07-08 PROCEDURE — 94761 N-INVAS EAR/PLS OXIMETRY MLT: CPT

## 2025-07-08 PROCEDURE — 94664 DEMO&/EVAL PT USE INHALER: CPT

## 2025-07-08 PROCEDURE — 99232 SBSQ HOSP IP/OBS MODERATE 35: CPT | Performed by: INTERNAL MEDICINE

## 2025-07-08 PROCEDURE — 6370000000 HC RX 637 (ALT 250 FOR IP): Performed by: INTERNAL MEDICINE

## 2025-07-08 PROCEDURE — 1100000003 HC PRIVATE W/ TELEMETRY

## 2025-07-08 PROCEDURE — 85025 COMPLETE CBC W/AUTO DIFF WBC: CPT

## 2025-07-08 PROCEDURE — 85610 PROTHROMBIN TIME: CPT

## 2025-07-08 PROCEDURE — 6360000002 HC RX W HCPCS: Performed by: INTERNAL MEDICINE

## 2025-07-08 PROCEDURE — 6370000000 HC RX 637 (ALT 250 FOR IP): Performed by: STUDENT IN AN ORGANIZED HEALTH CARE EDUCATION/TRAINING PROGRAM

## 2025-07-08 PROCEDURE — 6360000002 HC RX W HCPCS: Performed by: STUDENT IN AN ORGANIZED HEALTH CARE EDUCATION/TRAINING PROGRAM

## 2025-07-08 PROCEDURE — 71045 X-RAY EXAM CHEST 1 VIEW: CPT

## 2025-07-08 PROCEDURE — 85730 THROMBOPLASTIN TIME PARTIAL: CPT

## 2025-07-08 PROCEDURE — 80048 BASIC METABOLIC PNL TOTAL CA: CPT

## 2025-07-08 RX ORDER — METOPROLOL SUCCINATE 25 MG/1
25 TABLET, EXTENDED RELEASE ORAL DAILY
Status: DISCONTINUED | OUTPATIENT
Start: 2025-07-08 | End: 2025-07-10 | Stop reason: HOSPADM

## 2025-07-08 RX ORDER — FUROSEMIDE 40 MG/1
40 TABLET ORAL DAILY
Status: DISCONTINUED | OUTPATIENT
Start: 2025-07-09 | End: 2025-07-10 | Stop reason: HOSPADM

## 2025-07-08 RX ORDER — FUROSEMIDE 10 MG/ML
40 INJECTION INTRAMUSCULAR; INTRAVENOUS ONCE
Status: COMPLETED | OUTPATIENT
Start: 2025-07-08 | End: 2025-07-08

## 2025-07-08 RX ORDER — IPRATROPIUM BROMIDE AND ALBUTEROL SULFATE 2.5; .5 MG/3ML; MG/3ML
1 SOLUTION RESPIRATORY (INHALATION) EVERY 4 HOURS PRN
Status: DISCONTINUED | OUTPATIENT
Start: 2025-07-08 | End: 2025-07-10 | Stop reason: HOSPADM

## 2025-07-08 RX ADMIN — SODIUM CHLORIDE, PRESERVATIVE FREE 10 ML: 5 INJECTION INTRAVENOUS at 21:30

## 2025-07-08 RX ADMIN — METOPROLOL SUCCINATE 25 MG: 25 TABLET, EXTENDED RELEASE ORAL at 14:10

## 2025-07-08 RX ADMIN — IPRATROPIUM BROMIDE 0.5 MG: 0.5 SOLUTION RESPIRATORY (INHALATION) at 20:29

## 2025-07-08 RX ADMIN — BUDESONIDE 1 MG: 1 SUSPENSION RESPIRATORY (INHALATION) at 20:29

## 2025-07-08 RX ADMIN — SODIUM CHLORIDE, PRESERVATIVE FREE 10 ML: 5 INJECTION INTRAVENOUS at 09:21

## 2025-07-08 RX ADMIN — LEVOTHYROXINE SODIUM 88 MCG: 88 TABLET ORAL at 06:08

## 2025-07-08 RX ADMIN — SERTRALINE HYDROCHLORIDE 50 MG: 50 TABLET ORAL at 09:21

## 2025-07-08 RX ADMIN — IPRATROPIUM BROMIDE AND ALBUTEROL SULFATE 1 DOSE: 2.5; .5 SOLUTION RESPIRATORY (INHALATION) at 08:55

## 2025-07-08 RX ADMIN — METHYLPREDNISOLONE SODIUM SUCCINATE 20 MG: 40 INJECTION, POWDER, LYOPHILIZED, FOR SOLUTION INTRAMUSCULAR; INTRAVENOUS at 09:19

## 2025-07-08 RX ADMIN — IPRATROPIUM BROMIDE 0.5 MG: 0.5 SOLUTION RESPIRATORY (INHALATION) at 14:44

## 2025-07-08 RX ADMIN — ARFORMOTEROL TARTRATE 15 MCG: 15 SOLUTION RESPIRATORY (INHALATION) at 08:55

## 2025-07-08 RX ADMIN — APIXABAN 5 MG: 5 TABLET, FILM COATED ORAL at 21:29

## 2025-07-08 RX ADMIN — HEPARIN SODIUM 4600 UNITS: 1000 INJECTION, SOLUTION INTRAVENOUS; SUBCUTANEOUS at 04:11

## 2025-07-08 RX ADMIN — GUAIFENESIN 600 MG: 600 TABLET ORAL at 09:19

## 2025-07-08 RX ADMIN — ARFORMOTEROL TARTRATE 15 MCG: 15 SOLUTION RESPIRATORY (INHALATION) at 20:29

## 2025-07-08 RX ADMIN — GUAIFENESIN 600 MG: 600 TABLET ORAL at 21:30

## 2025-07-08 RX ADMIN — FUROSEMIDE 40 MG: 10 INJECTION, SOLUTION INTRAMUSCULAR; INTRAVENOUS at 13:23

## 2025-07-08 RX ADMIN — IPRATROPIUM BROMIDE AND ALBUTEROL SULFATE 1 DOSE: 2.5; .5 SOLUTION RESPIRATORY (INHALATION) at 11:13

## 2025-07-08 RX ADMIN — BUDESONIDE 1 MG: 1 SUSPENSION RESPIRATORY (INHALATION) at 08:55

## 2025-07-08 ASSESSMENT — PAIN SCALES - GENERAL
PAINLEVEL_OUTOF10: 0

## 2025-07-09 LAB
ANION GAP SERPL CALC-SCNC: 9 MMOL/L (ref 3–18)
BASOPHILS # BLD: 0.01 K/UL (ref 0–0.1)
BASOPHILS NFR BLD: 0.1 % (ref 0–2)
BUN SERPL-MCNC: 28 MG/DL (ref 6–23)
BUN/CREAT SERPL: 38 (ref 12–20)
CALCIUM SERPL-MCNC: 9.1 MG/DL (ref 8.5–10.1)
CHLORIDE SERPL-SCNC: 100 MMOL/L (ref 98–107)
CO2 SERPL-SCNC: 30 MMOL/L (ref 21–32)
CREAT SERPL-MCNC: 0.73 MG/DL (ref 0.6–1.3)
DIFFERENTIAL METHOD BLD: ABNORMAL
EOSINOPHIL # BLD: 0.03 K/UL (ref 0–0.4)
EOSINOPHIL NFR BLD: 0.4 % (ref 0–5)
ERYTHROCYTE [DISTWIDTH] IN BLOOD BY AUTOMATED COUNT: 20 % (ref 11.6–14.5)
GLUCOSE BLD STRIP.AUTO-MCNC: 106 MG/DL (ref 70–110)
GLUCOSE BLD STRIP.AUTO-MCNC: 117 MG/DL (ref 70–110)
GLUCOSE BLD STRIP.AUTO-MCNC: 161 MG/DL (ref 70–110)
GLUCOSE BLD STRIP.AUTO-MCNC: 197 MG/DL (ref 70–110)
GLUCOSE SERPL-MCNC: 100 MG/DL (ref 74–108)
HCT VFR BLD AUTO: 36.8 % (ref 35–45)
HGB BLD-MCNC: 10.6 G/DL (ref 12–16)
IMM GRANULOCYTES # BLD AUTO: 0.05 K/UL (ref 0–0.04)
IMM GRANULOCYTES NFR BLD AUTO: 0.6 % (ref 0–0.5)
LYMPHOCYTES # BLD: 0.73 K/UL (ref 0.9–3.6)
LYMPHOCYTES NFR BLD: 9.3 % (ref 21–52)
MCH RBC QN AUTO: 22.7 PG (ref 24–34)
MCHC RBC AUTO-ENTMCNC: 28.8 G/DL (ref 31–37)
MCV RBC AUTO: 79 FL (ref 78–100)
MONOCYTES # BLD: 0.63 K/UL (ref 0.05–1.2)
MONOCYTES NFR BLD: 8 % (ref 3–10)
NEUTS SEG # BLD: 6.41 K/UL (ref 1.8–8)
NEUTS SEG NFR BLD: 81.6 % (ref 40–73)
NRBC # BLD: 0 K/UL (ref 0–0.01)
NRBC BLD-RTO: 0 PER 100 WBC
PLATELET # BLD AUTO: 209 K/UL (ref 135–420)
PMV BLD AUTO: 10.7 FL (ref 9.2–11.8)
POTASSIUM SERPL-SCNC: 4.3 MMOL/L (ref 3.5–5.5)
RBC # BLD AUTO: 4.66 M/UL (ref 4.2–5.3)
SODIUM SERPL-SCNC: 139 MMOL/L (ref 136–145)
WBC # BLD AUTO: 7.9 K/UL (ref 4.6–13.2)

## 2025-07-09 PROCEDURE — 99232 SBSQ HOSP IP/OBS MODERATE 35: CPT | Performed by: INTERNAL MEDICINE

## 2025-07-09 PROCEDURE — 2700000000 HC OXYGEN THERAPY PER DAY

## 2025-07-09 PROCEDURE — 6360000002 HC RX W HCPCS: Performed by: INTERNAL MEDICINE

## 2025-07-09 PROCEDURE — 36415 COLL VENOUS BLD VENIPUNCTURE: CPT

## 2025-07-09 PROCEDURE — 6370000000 HC RX 637 (ALT 250 FOR IP)

## 2025-07-09 PROCEDURE — 1100000003 HC PRIVATE W/ TELEMETRY

## 2025-07-09 PROCEDURE — 85025 COMPLETE CBC W/AUTO DIFF WBC: CPT

## 2025-07-09 PROCEDURE — 6370000000 HC RX 637 (ALT 250 FOR IP): Performed by: STUDENT IN AN ORGANIZED HEALTH CARE EDUCATION/TRAINING PROGRAM

## 2025-07-09 PROCEDURE — 82962 GLUCOSE BLOOD TEST: CPT

## 2025-07-09 PROCEDURE — 99232 SBSQ HOSP IP/OBS MODERATE 35: CPT | Performed by: STUDENT IN AN ORGANIZED HEALTH CARE EDUCATION/TRAINING PROGRAM

## 2025-07-09 PROCEDURE — 94761 N-INVAS EAR/PLS OXIMETRY MLT: CPT

## 2025-07-09 PROCEDURE — 6370000000 HC RX 637 (ALT 250 FOR IP): Performed by: INTERNAL MEDICINE

## 2025-07-09 PROCEDURE — 2500000003 HC RX 250 WO HCPCS: Performed by: INTERNAL MEDICINE

## 2025-07-09 PROCEDURE — 94640 AIRWAY INHALATION TREATMENT: CPT

## 2025-07-09 PROCEDURE — 80048 BASIC METABOLIC PNL TOTAL CA: CPT

## 2025-07-09 RX ADMIN — SERTRALINE HYDROCHLORIDE 50 MG: 50 TABLET ORAL at 09:10

## 2025-07-09 RX ADMIN — IPRATROPIUM BROMIDE 0.5 MG: 0.5 SOLUTION RESPIRATORY (INHALATION) at 12:22

## 2025-07-09 RX ADMIN — METHYLPREDNISOLONE SODIUM SUCCINATE 20 MG: 40 INJECTION, POWDER, LYOPHILIZED, FOR SOLUTION INTRAMUSCULAR; INTRAVENOUS at 09:10

## 2025-07-09 RX ADMIN — BUDESONIDE 1 MG: 1 SUSPENSION RESPIRATORY (INHALATION) at 07:37

## 2025-07-09 RX ADMIN — LEVOTHYROXINE SODIUM 88 MCG: 88 TABLET ORAL at 06:50

## 2025-07-09 RX ADMIN — BUDESONIDE 1 MG: 1 SUSPENSION RESPIRATORY (INHALATION) at 20:22

## 2025-07-09 RX ADMIN — SODIUM CHLORIDE, PRESERVATIVE FREE 10 ML: 5 INJECTION INTRAVENOUS at 20:54

## 2025-07-09 RX ADMIN — IPRATROPIUM BROMIDE 0.5 MG: 0.5 SOLUTION RESPIRATORY (INHALATION) at 15:59

## 2025-07-09 RX ADMIN — APIXABAN 5 MG: 5 TABLET, FILM COATED ORAL at 09:10

## 2025-07-09 RX ADMIN — ARFORMOTEROL TARTRATE 15 MCG: 15 SOLUTION RESPIRATORY (INHALATION) at 07:37

## 2025-07-09 RX ADMIN — FUROSEMIDE 40 MG: 40 TABLET ORAL at 09:09

## 2025-07-09 RX ADMIN — GUAIFENESIN 600 MG: 600 TABLET ORAL at 20:54

## 2025-07-09 RX ADMIN — GUAIFENESIN 600 MG: 600 TABLET ORAL at 09:10

## 2025-07-09 RX ADMIN — ARFORMOTEROL TARTRATE 15 MCG: 15 SOLUTION RESPIRATORY (INHALATION) at 20:22

## 2025-07-09 RX ADMIN — IPRATROPIUM BROMIDE 0.5 MG: 0.5 SOLUTION RESPIRATORY (INHALATION) at 20:22

## 2025-07-09 RX ADMIN — APIXABAN 5 MG: 5 TABLET, FILM COATED ORAL at 20:54

## 2025-07-09 RX ADMIN — IPRATROPIUM BROMIDE 0.5 MG: 0.5 SOLUTION RESPIRATORY (INHALATION) at 07:37

## 2025-07-09 RX ADMIN — METOPROLOL SUCCINATE 25 MG: 25 TABLET, EXTENDED RELEASE ORAL at 09:10

## 2025-07-09 RX ADMIN — SODIUM CHLORIDE, PRESERVATIVE FREE 10 ML: 5 INJECTION INTRAVENOUS at 09:11

## 2025-07-09 ASSESSMENT — PAIN SCALES - GENERAL
PAINLEVEL_OUTOF10: 0

## 2025-07-10 VITALS
SYSTOLIC BLOOD PRESSURE: 111 MMHG | DIASTOLIC BLOOD PRESSURE: 72 MMHG | BODY MASS INDEX: 21.68 KG/M2 | TEMPERATURE: 98.3 F | HEIGHT: 64 IN | RESPIRATION RATE: 20 BRPM | HEART RATE: 76 BPM | OXYGEN SATURATION: 93 % | WEIGHT: 127 LBS

## 2025-07-10 LAB
ANION GAP SERPL CALC-SCNC: 11 MMOL/L (ref 3–18)
BASOPHILS # BLD: 0.01 K/UL (ref 0–0.1)
BASOPHILS NFR BLD: 0.1 % (ref 0–2)
BUN SERPL-MCNC: 30 MG/DL (ref 6–23)
BUN/CREAT SERPL: 38 (ref 12–20)
CALCIUM SERPL-MCNC: 8.5 MG/DL (ref 8.5–10.1)
CHLORIDE SERPL-SCNC: 99 MMOL/L (ref 98–107)
CO2 SERPL-SCNC: 27 MMOL/L (ref 21–32)
CREAT SERPL-MCNC: 0.79 MG/DL (ref 0.6–1.3)
DIFFERENTIAL METHOD BLD: ABNORMAL
EOSINOPHIL # BLD: 0.05 K/UL (ref 0–0.4)
EOSINOPHIL NFR BLD: 0.6 % (ref 0–5)
ERYTHROCYTE [DISTWIDTH] IN BLOOD BY AUTOMATED COUNT: 19.9 % (ref 11.6–14.5)
GLUCOSE BLD STRIP.AUTO-MCNC: 134 MG/DL (ref 70–110)
GLUCOSE BLD STRIP.AUTO-MCNC: 166 MG/DL (ref 70–110)
GLUCOSE BLD STRIP.AUTO-MCNC: 206 MG/DL (ref 70–110)
GLUCOSE SERPL-MCNC: 88 MG/DL (ref 74–108)
HCT VFR BLD AUTO: 38 % (ref 35–45)
HGB BLD-MCNC: 10.8 G/DL (ref 12–16)
IMM GRANULOCYTES # BLD AUTO: 0.05 K/UL (ref 0–0.04)
IMM GRANULOCYTES NFR BLD AUTO: 0.6 % (ref 0–0.5)
LYMPHOCYTES # BLD: 0.83 K/UL (ref 0.9–3.6)
LYMPHOCYTES NFR BLD: 10.7 % (ref 21–52)
MCH RBC QN AUTO: 22.3 PG (ref 24–34)
MCHC RBC AUTO-ENTMCNC: 28.4 G/DL (ref 31–37)
MCV RBC AUTO: 78.4 FL (ref 78–100)
MONOCYTES # BLD: 0.6 K/UL (ref 0.05–1.2)
MONOCYTES NFR BLD: 7.7 % (ref 3–10)
NEUTS SEG # BLD: 6.24 K/UL (ref 1.8–8)
NEUTS SEG NFR BLD: 80.3 % (ref 40–73)
NRBC # BLD: 0 K/UL (ref 0–0.01)
NRBC BLD-RTO: 0 PER 100 WBC
PLATELET # BLD AUTO: 186 K/UL (ref 135–420)
PMV BLD AUTO: 11.3 FL (ref 9.2–11.8)
POTASSIUM SERPL-SCNC: 4.3 MMOL/L (ref 3.5–5.5)
RBC # BLD AUTO: 4.85 M/UL (ref 4.2–5.3)
SODIUM SERPL-SCNC: 137 MMOL/L (ref 136–145)
WBC # BLD AUTO: 7.8 K/UL (ref 4.6–13.2)

## 2025-07-10 PROCEDURE — 94640 AIRWAY INHALATION TREATMENT: CPT

## 2025-07-10 PROCEDURE — 85025 COMPLETE CBC W/AUTO DIFF WBC: CPT

## 2025-07-10 PROCEDURE — 36415 COLL VENOUS BLD VENIPUNCTURE: CPT

## 2025-07-10 PROCEDURE — 94761 N-INVAS EAR/PLS OXIMETRY MLT: CPT

## 2025-07-10 PROCEDURE — 80048 BASIC METABOLIC PNL TOTAL CA: CPT

## 2025-07-10 PROCEDURE — 6360000002 HC RX W HCPCS: Performed by: INTERNAL MEDICINE

## 2025-07-10 PROCEDURE — 6370000000 HC RX 637 (ALT 250 FOR IP)

## 2025-07-10 PROCEDURE — 6370000000 HC RX 637 (ALT 250 FOR IP): Performed by: INTERNAL MEDICINE

## 2025-07-10 PROCEDURE — 2500000003 HC RX 250 WO HCPCS: Performed by: INTERNAL MEDICINE

## 2025-07-10 PROCEDURE — APPSS15 APP SPLIT SHARED TIME 0-15 MINUTES

## 2025-07-10 PROCEDURE — 6370000000 HC RX 637 (ALT 250 FOR IP): Performed by: STUDENT IN AN ORGANIZED HEALTH CARE EDUCATION/TRAINING PROGRAM

## 2025-07-10 PROCEDURE — 2700000000 HC OXYGEN THERAPY PER DAY

## 2025-07-10 PROCEDURE — 94664 DEMO&/EVAL PT USE INHALER: CPT

## 2025-07-10 PROCEDURE — 99239 HOSP IP/OBS DSCHRG MGMT >30: CPT | Performed by: STUDENT IN AN ORGANIZED HEALTH CARE EDUCATION/TRAINING PROGRAM

## 2025-07-10 PROCEDURE — 82962 GLUCOSE BLOOD TEST: CPT

## 2025-07-10 PROCEDURE — 99232 SBSQ HOSP IP/OBS MODERATE 35: CPT | Performed by: INTERNAL MEDICINE

## 2025-07-10 RX ORDER — PREDNISONE 20 MG/1
TABLET ORAL
Qty: 21 TABLET | Refills: 0 | Status: SHIPPED | OUTPATIENT
Start: 2025-07-10 | End: 2025-08-07

## 2025-07-10 RX ADMIN — BUDESONIDE 1 MG: 1 SUSPENSION RESPIRATORY (INHALATION) at 07:09

## 2025-07-10 RX ADMIN — SODIUM CHLORIDE, PRESERVATIVE FREE 10 ML: 5 INJECTION INTRAVENOUS at 09:05

## 2025-07-10 RX ADMIN — GUAIFENESIN 600 MG: 600 TABLET ORAL at 09:04

## 2025-07-10 RX ADMIN — FUROSEMIDE 40 MG: 40 TABLET ORAL at 09:04

## 2025-07-10 RX ADMIN — APIXABAN 5 MG: 5 TABLET, FILM COATED ORAL at 09:03

## 2025-07-10 RX ADMIN — METHYLPREDNISOLONE SODIUM SUCCINATE 20 MG: 40 INJECTION, POWDER, LYOPHILIZED, FOR SOLUTION INTRAMUSCULAR; INTRAVENOUS at 09:04

## 2025-07-10 RX ADMIN — IPRATROPIUM BROMIDE 0.5 MG: 0.5 SOLUTION RESPIRATORY (INHALATION) at 07:09

## 2025-07-10 RX ADMIN — IPRATROPIUM BROMIDE 0.5 MG: 0.5 SOLUTION RESPIRATORY (INHALATION) at 15:36

## 2025-07-10 RX ADMIN — ARFORMOTEROL TARTRATE 15 MCG: 15 SOLUTION RESPIRATORY (INHALATION) at 07:09

## 2025-07-10 RX ADMIN — SERTRALINE HYDROCHLORIDE 50 MG: 50 TABLET ORAL at 09:04

## 2025-07-10 RX ADMIN — LEVOTHYROXINE SODIUM 88 MCG: 88 TABLET ORAL at 05:50

## 2025-07-10 RX ADMIN — METOPROLOL SUCCINATE 25 MG: 25 TABLET, EXTENDED RELEASE ORAL at 09:04

## 2025-07-10 ASSESSMENT — PAIN SCALES - GENERAL
PAINLEVEL_OUTOF10: 0

## 2025-07-10 NOTE — DISCHARGE SUMMARY
Unremarkable.    Spleen: Unremarkable.    Kidneys: Unremarkable.    Adrenals: Unremarkable.    Intestines/mesentery: Unremarkable.    Vascular: Unremarkable.    Lymph nodes: Unremarkable.    Lung bases: Unchanged 2-3 cm region of mass/consolidation right lower lung  posteriorly. Reference recent prior CT.    Musculoskeletal: Unremarkable for age.    Impression  1. No finding to suggest metastatic disease within the liver. Discussion as  above.      Discharge Medications:     Discharge Medication List as of 7/10/2025  3:35 PM        START taking these medications    Details   predniSONE (DELTASONE) 20 MG tablet Take 1 tablet by mouth daily for 14 days, THEN 0.5 tablets daily for 14 days., Disp-21 tablet, R-0Normal           CONTINUE these medications which have NOT CHANGED    Details   potassium chloride (K-TAB) 20 MEQ TBCR extended release tablet Take 1 tablet by mouth daily, Disp-90 tablet, R-1Normal      ipratropium 0.5 mg-albuterol 2.5 mg (DUONEB) 0.5-2.5 (3) MG/3ML SOLN nebulizer solution Inhale 3 mLs into the lungs every 4 hours as needed for Wheezing, Disp-100 each, R-0Normal      montelukast (SINGULAIR) 10 MG tablet Take 1 tablet by mouth daily Take by mouth, Disp-30 tablet, R-0Normal      guaiFENesin (MUCINEX) 600 MG extended release tablet Take 2 tablets by mouth 2 times daily, Disp-20 tablet, R-0Normal      furosemide (LASIX) 40 MG tablet Take 1 tablet by mouth 2 times daily, Disp-60 tablet, R-3Normal      levothyroxine (SYNTHROID) 88 MCG tablet Take 1 tablet by mouth every morning (before breakfast), Disp-90 tablet, R-3Normal      apixaban (ELIQUIS) 5 MG TABS tablet Take 1 tablet by mouth 2 times dailyHistorical Med      ferrous sulfate (IRON 325) 325 (65 Fe) MG tablet Take 1 tablet by mouth daily (with breakfast)Historical Med      folic acid (FOLVITE) 1 MG tablet Take 1 tablet by mouth daily, Disp-90 tablet, R-3Normal      Multiple Vitamin (MULTI VITAMIN DAILY) TABS Take by mouthHistorical Med

## 2025-07-10 NOTE — PLAN OF CARE
Problem: Chronic Conditions and Co-morbidities  Goal: Patient's chronic conditions and co-morbidity symptoms are monitored and maintained or improved  7/5/2025 2214 by Carlita Bergman RN  Outcome: Progressing  Flowsheets (Taken 7/4/2025 0900 by Brie Sandoval GN)  Care Plan - Patient's Chronic Conditions and Co-Morbidity Symptoms are Monitored and Maintained or Improved: Monitor and assess patient's chronic conditions and comorbid symptoms for stability, deterioration, or improvement  Note: Monitor for deterioration or improvement of symptoms and collaborate with interdisciplinary team   7/5/2025 1054 by Harini Hunter RN  Outcome: Progressing     Problem: Discharge Planning  Goal: Discharge to home or other facility with appropriate resources  7/5/2025 2214 by Carlita Bergman RN  Outcome: Progressing  Flowsheets (Taken 7/4/2025 0900 by Brie Sandoval GN)  Discharge to home or other facility with appropriate resources: Identify discharge learning needs (meds, wound care, etc)  7/5/2025 1054 by Harini Hunter RN  Outcome: Progressing     Problem: Skin/Tissue Integrity  Goal: Skin integrity remains intact  Description: 1.  Monitor for areas of redness and/or skin breakdown  2.  Assess vascular access sites hourly  3.  Every 4-6 hours minimum:  Change oxygen saturation probe site  4.  Every 4-6 hours:  If on nasal continuous positive airway pressure, respiratory therapy assess nares and determine need for appliance change or resting period  7/5/2025 2214 by Carlita Bergman, RN  Outcome: Progressing  Flowsheets (Taken 7/5/2025 2000)  Skin Integrity Remains Intact: Monitor for areas of redness and/or skin breakdown  Note: Turn q 2   7/5/2025 1054 by Harini Hunter RN  Outcome: Progressing     Problem: ABCDS Injury Assessment  Goal: Absence of physical injury  7/5/2025 2214 by Carlita Bergman, RN  Outcome: Progressing  Flowsheets (Taken 7/5/2025 2214)  Absence of Physical Injury: Implement 
  Problem: Chronic Conditions and Co-morbidities  Goal: Patient's chronic conditions and co-morbidity symptoms are monitored and maintained or improved  7/6/2025 1051 by Lupe Cm RN  Outcome: Progressing  Flowsheets (Taken 7/6/2025 1051)  Care Plan - Patient's Chronic Conditions and Co-Morbidity Symptoms are Monitored and Maintained or Improved: Monitor and assess patient's chronic conditions and comorbid symptoms for stability, deterioration, or improvement     Problem: Skin/Tissue Integrity  Goal: Skin integrity remains intact  Description: 1.  Monitor for areas of redness and/or skin breakdown  2.  Assess vascular access sites hourly  3.  Every 4-6 hours minimum:  Change oxygen saturation probe site  4.  Every 4-6 hours:  If on nasal continuous positive airway pressure, respiratory therapy assess nares and determine need for appliance change or resting period  7/6/2025 1051 by Lupe Cm RN  Outcome: Progressing  Flowsheets (Taken 7/6/2025 1051)  Skin Integrity Remains Intact: Monitor for areas of redness and/or skin breakdown  Note: -Turn/reposition patient approximately every 2-3 hours.  -Encourage patient to turn self every 2 hours.  -Utilize pillows.         Problem: Safety - Adult  Goal: Free from fall injury  7/6/2025 1051 by Lupe Cm RN  Outcome: Progressing  Flowsheets (Taken 7/6/2025 1051)  Free From Fall Injury: Instruct family/caregiver on patient safety  Note: -Bed alarm on.  -Bedside table/call bell within arms reach.  -Gripper socks on.  -Encourage patient to call for help/assistance.      Problem: Pain  Goal: Verbalizes/displays adequate comfort level or baseline comfort level  7/6/2025 1051 by Lupe Cm RN  Outcome: Progressing  Flowsheets (Taken 7/6/2025 1051)  Verbalizes/displays adequate comfort level or baseline comfort level:   Encourage patient to monitor pain and request assistance   Assess pain using appropriate pain scale     
  Problem: Chronic Conditions and Co-morbidities  Goal: Patient's chronic conditions and co-morbidity symptoms are monitored and maintained or improved  7/9/2025 1645 by Liza Pulido RN  Outcome: Progressing  7/9/2025 0333 by Tessa Geller RN  Outcome: Progressing  Flowsheets (Taken 7/9/2025 0333)  Care Plan - Patient's Chronic Conditions and Co-Morbidity Symptoms are Monitored and Maintained or Improved:   Collaborate with multidisciplinary team to address chronic and comorbid conditions and prevent exacerbation or deterioration   Monitor and assess patient's chronic conditions and comorbid symptoms for stability, deterioration, or improvement  Note: Educate patient about oxygen requirements and goal to wean patient's oxygen back to baseline     Problem: Discharge Planning  Goal: Discharge to home or other facility with appropriate resources  7/9/2025 1645 by Liza Pulido RN  Outcome: Progressing  Flowsheets (Taken 7/9/2025 1644)  Discharge to home or other facility with appropriate resources:   Identify barriers to discharge with patient and caregiver   Identify discharge learning needs (meds, wound care, etc)   Refer to discharge planning if patient needs post-hospital services based on physician order or complex needs related to functional status, cognitive ability or social support system  Note: Discussed supplemental oxygen goals prior to discharge  Monitored for signs and symptoms of hypoxia     7/9/2025 0333 by Tessa Geller RN  Outcome: Progressing  Flowsheets (Taken 7/9/2025 0333)  Discharge to home or other facility with appropriate resources:   Arrange for needed discharge resources and transportation as appropriate   Identify barriers to discharge with patient and caregiver  Note: Involve patient in bedside shift report     Problem: Skin/Tissue Integrity  Goal: Skin integrity remains intact  Description: 1.  Monitor for areas of redness and/or skin breakdown  2.  Assess vascular access sites 
  Problem: Chronic Conditions and Co-morbidities  Goal: Patient's chronic conditions and co-morbidity symptoms are monitored and maintained or improved  Outcome: Not Progressing  Flowsheets (Taken 7/8/2025 1726)  Care Plan - Patient's Chronic Conditions and Co-Morbidity Symptoms are Monitored and Maintained or Improved:   Monitor and assess patient's chronic conditions and comorbid symptoms for stability, deterioration, or improvement   Collaborate with multidisciplinary team to address chronic and comorbid conditions and prevent exacerbation or deterioration   Update acute care plan with appropriate goals if chronic or comorbid symptoms are exacerbated and prevent overall improvement and discharge  Note: Monitored patient oxygen saturation during periods of activity and inactivity  Titrated supplemental O2 as needed     Problem: Chronic Conditions and Co-morbidities  Goal: Patient's chronic conditions and co-morbidity symptoms are monitored and maintained or improved  Outcome: Not Progressing  Flowsheets (Taken 7/8/2025 1726)  Care Plan - Patient's Chronic Conditions and Co-Morbidity Symptoms are Monitored and Maintained or Improved:   Monitor and assess patient's chronic conditions and comorbid symptoms for stability, deterioration, or improvement   Collaborate with multidisciplinary team to address chronic and comorbid conditions and prevent exacerbation or deterioration   Update acute care plan with appropriate goals if chronic or comorbid symptoms are exacerbated and prevent overall improvement and discharge  Note: Monitored patient oxygen saturation during periods of activity and inactivity  Titrated supplemental O2 as needed     
  Problem: Chronic Conditions and Co-morbidities  Goal: Patient's chronic conditions and co-morbidity symptoms are monitored and maintained or improved  Outcome: Progressing     Problem: Discharge Planning  Goal: Discharge to home or other facility with appropriate resources  Outcome: Progressing     Problem: Skin/Tissue Integrity  Goal: Skin integrity remains intact  Description: 1.  Monitor for areas of redness and/or skin breakdown  2.  Assess vascular access sites hourly  3.  Every 4-6 hours minimum:  Change oxygen saturation probe site  4.  Every 4-6 hours:  If on nasal continuous positive airway pressure, respiratory therapy assess nares and determine need for appliance change or resting period  Outcome: Progressing     Problem: ABCDS Injury Assessment  Goal: Absence of physical injury  Outcome: Progressing     
  Problem: Chronic Conditions and Co-morbidities  Goal: Patient's chronic conditions and co-morbidity symptoms are monitored and maintained or improved  Outcome: Progressing  Flowsheets  Taken 7/7/2025 1312  Care Plan - Patient's Chronic Conditions and Co-Morbidity Symptoms are Monitored and Maintained or Improved: Monitor and assess patient's chronic conditions and comorbid symptoms for stability, deterioration, or improvement  Taken 7/7/2025 0731  Care Plan - Patient's Chronic Conditions and Co-Morbidity Symptoms are Monitored and Maintained or Improved: Monitor and assess patient's chronic conditions and comorbid symptoms for stability, deterioration, or improvement  Note: Patient monitored for any changes from baseline      Problem: Discharge Planning  Goal: Discharge to home or other facility with appropriate resources  Outcome: Progressing  Flowsheets  Taken 7/7/2025 1312  Discharge to home or other facility with appropriate resources: Identify barriers to discharge with patient and caregiver  Taken 7/7/2025 0731  Discharge to home or other facility with appropriate resources: Identify barriers to discharge with patient and caregiver  Note: Patient discharge disused they have no further questions at this time      Problem: Skin/Tissue Integrity  Goal: Skin integrity remains intact  Description: 1.  Monitor for areas of redness and/or skin breakdown  2.  Assess vascular access sites hourly  3.  Every 4-6 hours minimum:  Change oxygen saturation probe site  4.  Every 4-6 hours:  If on nasal continuous positive airway pressure, respiratory therapy assess nares and determine need for appliance change or resting period  Outcome: Progressing  Flowsheets  Taken 7/7/2025 1312  Skin Integrity Remains Intact: Monitor for areas of redness and/or skin breakdown  Taken 7/7/2025 0731  Skin Integrity Remains Intact: Monitor for areas of redness and/or skin breakdown  Note: Patient skin assessed for skin breakdown    
  Problem: Chronic Conditions and Co-morbidities  Goal: Patient's chronic conditions and co-morbidity symptoms are monitored and maintained or improved  Recent Flowsheet Documentation  Taken 7/10/2025 0722 by Mario Smith RN  Care Plan - Patient's Chronic Conditions and Co-Morbidity Symptoms are Monitored and Maintained or Improved: Collaborate with multidisciplinary team to address chronic and comorbid conditions and prevent exacerbation or deterioration  7/10/2025 0526 by Tessa Geller RN  Outcome: Progressing  Flowsheets (Taken 7/10/2025 0526)  Care Plan - Patient's Chronic Conditions and Co-Morbidity Symptoms are Monitored and Maintained or Improved:   Collaborate with multidisciplinary team to address chronic and comorbid conditions and prevent exacerbation or deterioration   Update acute care plan with appropriate goals if chronic or comorbid symptoms are exacerbated and prevent overall improvement and discharge  Note: Educate patient about oxygen and how to manage shortness of breath     Problem: Discharge Planning  Goal: Discharge to home or other facility with appropriate resources  Recent Flowsheet Documentation  Taken 7/10/2025 0722 by Mario Smith RN  Discharge to home or other facility with appropriate resources: Arrange for needed discharge resources and transportation as appropriate  7/10/2025 0526 by Tessa Geller RN  Outcome: Progressing  Flowsheets (Taken 7/10/2025 0526)  Discharge to home or other facility with appropriate resources:   Arrange for interpreters to assist at discharge as needed   Identify discharge learning needs (meds, wound care, etc)   Arrange for needed discharge resources and transportation as appropriate  Note: Update patient on potential discharge date     Problem: Skin/Tissue Integrity  Goal: Skin integrity remains intact  Description: 1.  Monitor for areas of redness and/or skin breakdown  2.  Assess vascular access sites hourly  3.  Every 4-6 hours minimum:  
  Problem: Discharge Planning  Goal: Discharge to home or other facility with appropriate resources  7/9/2025 0333 by Tessa Geller RN  Outcome: Progressing  Flowsheets (Taken 7/9/2025 0333)  Discharge to home or other facility with appropriate resources:   Arrange for needed discharge resources and transportation as appropriate   Identify barriers to discharge with patient and caregiver  Note: Involve patient in bedside shift report  7/8/2025 1732 by Liza Pulido, RN  Outcome: Progressing  Flowsheets (Taken 7/8/2025 1729)  Discharge to home or other facility with appropriate resources:   Identify barriers to discharge with patient and caregiver   Arrange for needed discharge resources and transportation as appropriate   Identify discharge learning needs (meds, wound care, etc)  Note: Identified patient and care plans for discharge regarding reaching and maintaining adequate oxygenation     Problem: Skin/Tissue Integrity  Goal: Skin integrity remains intact  Description: 1.  Monitor for areas of redness and/or skin breakdown  2.  Assess vascular access sites hourly  3.  Every 4-6 hours minimum:  Change oxygen saturation probe site  4.  Every 4-6 hours:  If on nasal continuous positive airway pressure, respiratory therapy assess nares and determine need for appliance change or resting period  7/9/2025 0333 by Tessa Geller RN  Outcome: Progressing  Flowsheets (Taken 7/9/2025 0333)  Skin Integrity Remains Intact:   Every 4-6 hours minimum:  Change oxygen saturation probe site   Monitor for areas of redness and/or skin breakdown   Every 4-6 hours:  If on nasal continuous positive airway pressure, assess nares and determine need for appliance change or resting period   Turn and reposition as indicated  Note: Remind patient to turn and reposition q2 hour  7/8/2025 1732 by Liza Pulido, RN  Outcome: Progressing  Flowsheets (Taken 7/8/2025 1729)  Skin Integrity Remains Intact:   Monitor for areas of redness and/or 
Problem: Safety - Adult  Goal: Free from fall injury  7/7/2025 2222 by Liz Velez RN  Outcome: Progressing  Flowsheets (Taken 7/7/2025 2222)  Free From Fall Injury: Instruct family/caregiver on patient safety     Problem: Chronic Conditions and Co-morbidities  7/7/2025 2222 by Liz Velez RN  Outcome: Progressing  Flowsheets (Taken 7/7/2025 2222)  Care Plan - Patient's Chronic Conditions and Co-Morbidity Symptoms are Monitored and Maintained or Improved:   Monitor and assess patient's chronic conditions and comorbid symptoms for stability, deterioration, or improvement   Collaborate with multidisciplinary team to address chronic and comorbid conditions and prevent exacerbation or deterioration   Update acute care plan with appropriate goals if chronic or comorbid symptoms are exacerbated and prevent overall improvement and discharge     Problem: Pain  Goal: Verbalizes/displays adequate comfort level or baseline comfort level  7/7/2025 2222 by Liz Velez RN  Outcome: Progressing  Flowsheets (Taken 7/7/2025 2222)  Verbalizes/displays adequate comfort level or baseline comfort level:   Assess pain using appropriate pain scale   Encourage patient to monitor pain and request assistance   Implement non-pharmacological measures as appropriate and evaluate response     Problem: ABCDS Injury Assessment  Goal: Absence of physical injury  7/7/2025 2222 by Liz Velez RN  Outcome: Progressing  Flowsheets (Taken 7/5/2025 2214 by Carlita Bergman, RN)  Absence of Physical Injury: Implement safety measures based on patient assessment  Problem: Skin/Tissue Integrity  Goal: Skin integrity remains intact  7/7/2025 2222 by Liz Velez RN  Outcome: Progressing  Flowsheets (Taken 7/7/2025 2222)  Skin Integrity Remains Intact:   Monitor for areas of redness and/or skin breakdown   Pressure redistribution bed/mattress (bed type)   Turn and reposition as indicated     Problem: Discharge 
Remains Intact: Monitor for areas of redness and/or skin breakdown  Taken 7/6/2025 2000  Skin Integrity Remains Intact: Monitor for areas of redness and/or skin breakdown  Note: Monitor for signs of skin compromise. Reposition q 2 hourly   7/6/2025 1051 by Lupe Cm RN  Outcome: Progressing  Flowsheets (Taken 7/6/2025 1051)  Skin Integrity Remains Intact: Monitor for areas of redness and/or skin breakdown  Note: -Turn/reposition patient approximately every 2-3 hours.  -Encourage patient to turn self every 2 hours.  -Utilize pillows.         Problem: ABCDS Injury Assessment  Goal: Absence of physical injury  Outcome: Progressing  Flowsheets (Taken 7/5/2025 2214)  Absence of Physical Injury: Implement safety measures based on patient assessment     Problem: Safety - Adult  Goal: Free from fall injury  7/6/2025 2206 by Carlita Bergman, RN  Outcome: Progressing  Flowsheets (Taken 7/6/2025 1051 by Lupe Cm RN)  Free From Fall Injury: Instruct family/caregiver on patient safety  Note: Encourage the patient to use the call light, keep all personal effects well within the patients reach  7/6/2025 1051 by Lupe Cm RN  Outcome: Progressing  Flowsheets (Taken 7/6/2025 1051)  Free From Fall Injury: Instruct family/caregiver on patient safety  Note: -Bed alarm on.  -Bedside table/call bell within arms reach.  -Gripper socks on.  -Encourage patient to call for help/assistance.      Problem: Pain  Goal: Verbalizes/displays adequate comfort level or baseline comfort level  7/6/2025 2206 by Carlita Bergman, RN  Outcome: Progressing  Flowsheets (Taken 7/6/2025 2206)  Verbalizes/displays adequate comfort level or baseline comfort level: Encourage patient to monitor pain and request assistance  Note: Use appropriate pain rating scale and medicate as ordered   7/6/2025 1051 by Lupe Cm RN  Outcome: Progressing  Flowsheets (Taken 7/6/2025 1051)  Verbalizes/displays adequate comfort level 
Progressing  Flowsheets (Taken 7/10/2025 0526)  Verbalizes/displays adequate comfort level or baseline comfort level:   Consider cultural and social influences on pain and pain management   Administer analgesics based on type and severity of pain and evaluate response   Assess pain using appropriate pain scale   Encourage patient to monitor pain and request assistance  Note: Assess pain and administer medication as needed  7/9/2025 1645 by Liza Pulido RN  Outcome: Progressing  Flowsheets (Taken 7/9/2025 1645)  Verbalizes/displays adequate comfort level or baseline comfort level:   Encourage patient to monitor pain and request assistance   Administer analgesics based on type and severity of pain and evaluate response   Consider cultural and social influences on pain and pain management  Note: Encouraged communication of any new or worsening pain

## 2025-07-10 NOTE — CARE COORDINATION
Patient is discharge with fiance' to transport.    Discharge order noted for today. Orders received. No other  needs identified at this time. Case management remains available as needed.      Verito COLEMAN   Case Management  [address] [address]  O:[phone]

## 2025-07-10 NOTE — PROGRESS NOTES
Interventional Radiology Progress Note    Patient: Rina Prajapati               Sex: female          DOA: 7/3/2025       YOB: 1960      Age:  64 y.o.        LOS: 4 days       Assessment/Plan     Patient with central venous occlusion, SVC syndrome, NSCLC stage IIIB, severe COPD, CHF, pulmonary hypertension, and multiple failed attempts at recannalizing and obtaining central vascular access.    Most recent failed attempt was 5/21/25 where midline was left in right axillary vein - non functioning. Right arm midline removed bedside.    Attempted to have patient brought in outpatient for tunneled central venous catheter placement, however, the patient was in acute respiratory failure and has been admitted since this time (7/03/25) and is still requiring high flow nasal cannula.    We discussed the next option for central venous access would be a tunneled transhepatic central venous catheter, as her chest vascular access has been exhausted. The patient would need to be intubated for this procedure. We discussed extensively, and Ms. Prajapati states she does not want to undergo intubation at this time and understands that this would mean no central venous access for chemotherapy.    Discussed with Dr. Li and Dr. Ames as well    Subjective:     The patient reports that she feels \"much better\" today  The patient denies N/V, HA, dizziness, fever, chills, abdominal pain, facial swelling  Right arm midline removed and dressed bedside    Objective:      Visit Vitals  /65   Pulse (!) 107   Temp 98.1 °F (36.7 °C) (Oral)   Resp 20   Ht 1.626 m (5' 4\")   Wt 57.6 kg (127 lb)   SpO2 94%   BMI 21.80 kg/m²     Recent images:  Procedure images are available for review in PACS    Cross sectional imaging reviewed with Dr. Moran. She is a candidate for a tunneled transhepatic central venous catheter placement via middle hepatic vein    Interval history:  The patient is on high flow nasal cannula 
   07/10/25 1454   Oxygen Therapy/Pulse Ox   O2 Therapy Oxygen   O2 Device Nasal cannula   O2 Flow Rate (L/min) 5 L/min   Pulse 80   Respirations 20   SpO2 95 %     Talked with patient regarding dx, causes, effects and symptoms of COPD. Patient stated she was a 1/2 pk/day cigarettes for 30 years; stated she quit smoking 6 months ago. Patient denies having any issues with getting respiratory medications; however she voice concern over not having a nebulizer machine; patient was instructed to talk with her physician about getting a home nebulizer. Patient was taught the COPD zones, purse lip breathing, what to look for when she needs to come into the hospital.   
   Hematology / Oncology Progress Note  [facility]      Patient ID:  Rina Prajapati  64 y.o.  1960    Admit Date: 7/3/2025    Assessment:     Principal Problem:    Acute hypoxic respiratory failure (HCC)  Active Problems:    Non-small cell lung cancer (HCC)    Recurrent major depressive disorder    Primary hypertension    Chronic systolic (congestive) heart failure    History of pulmonary embolism    Hypothyroidism  Resolved Problems:    * No resolved hospital problems. *    COPD exacerbation  Chronic R heart failure with pulm htn    NSCLC, RUL, stage IIIB 2016  SVC obstruction requiring stents, thrombectomy and angioplasty R IJ,  2016  XRT 2016  Carbo Alimta fol by maintenance alimta until 2018  Opdivo 2018-2018  Navelbine 2019  Keytruda 10/2019 - 2022  Taxotere + Ramucirumab 2023 - 10/2024  Gemzar 2024, variable delivery because of repeated admissions for resp failure, copd exacerbations    Difficult central venous access due to hx SVC obstruction with multiple stents, xrt    Plan:     Discussed with Dr Moran  Kindly reconsult 1 d prior to planned dc so that alternative (possibly hepatic) central venous access may be attemtped    Subjective:   Feels better but not able to get around as well    Objective:     /83   Pulse (!) 107   Temp 97.4 °F (36.3 °C) (Axillary)   Resp 27   Ht 1.626 m (5' 4\")   Wt 57.6 kg (127 lb)   SpO2 97%   BMI 21.80 kg/m²           Temp (24hrs), Av °F (36.7 °C), Min:97.4 °F (36.3 °C), Max:98.4 °F (36.9 °C)        Intake/Output Summary (Last 24 hours) at 2025 1052  Last data filed at 2025 0600  Gross per 24 hour   Intake 720 ml   Output 750 ml   Net -30 ml       Review of Systems:   Constitutional:  No Fever; No chills; No weight loss    Skin:  No rash; No itching   HEENT:  No changes in vision or hearing   Cardiovascular:  No palpitations, chest pain, angina   Respiratory:  No shortness of breath, no wheezing, 
0730: Bedside and Verbal shift change report given to Dorcas RN, Lorenza RN (oncoming nurse) by Tessa RN (offgoing nurse). Report included the following information Nurse Handoff Report, Adult Overview, Intake/Output, MAR, and Cardiac Rhythm SR.     
0730: Bedside and Verbal shift change report given to Dorcas Rn, Lorenza RN (oncoming nurse) by Tessa RN (offgoing nurse). Report included the following information Nurse Handoff Report, Adult Overview, Intake/Output, MAR, and Cardiac Rhythm SR.     
1730: This nurse spoke with MD in regard to whether or not her heparin drip should be continued. Per MD, he will discontinue the patient's heparin drip and start her on eliquis.   
Assumed care of patient from CORTES Solis (off going nurse). Patient alert and oriented. No apparent distress noted. Patient offers no concerns and can verbalize needs. Assessment to follow. Call bell within reach. Bed in lowest, locked position.   
Bedside and Verbal shift change report given to CORTES Tello (oncoming nurse) by CORTES Henderson (offgoing nurse). Report included the following information Nurse Handoff Report and Adult Overview.     0830: Patient taken off of bipap and placed on 8 mL of oxygen     1146: Patient PICC line dressing changed     1800: Patient IV removed from left forearm pressure dressing applied with quick clot to stop bleeding. Will reassesses at shift change.     1930: Patient dressing reassessed, dressing reapplied by CORTES Hill.             Bedside and Verbal shift change report given to CORTES Hill (oncoming nurse) by CORTES Tello (offgoing nurse). Report included the following information Nurse Handoff Report and Adult Overview.         Review of Systems    Physical Exam  
Bedside and Verbal shift change report given to CORTES Zarate (oncoming nurse) by CORTES Hill (offgoing nurse). Report included the following information Nurse Handoff Report, Adult Overview, Intake/Output, MAR, and Recent Results.     Bedside and Verbal shift change report given to CORTES Toscano/CORTES Britt (oncoming nurse) by CORTES Zarate (offgoing nurse). Report included the following information Nurse Handoff Report, Adult Overview, Intake/Output, MAR, Recent Results, and Cardiac Rhythm NSR.     
Bedside and Verbal shift change report given to CORTES Zarate (oncoming nurse) by CORTES Toscano/CORTES Britt (offgoing nurse). Report included the following information Nurse Handoff Report, Adult Overview, Intake/Output, MAR, and Recent Results.     Bedside and Verbal shift change report given to CORTES Toscano/CORTES Britt (oncoming nurse) by CORTES Zarate (offgoing nurse). Report included the following information Nurse Handoff Report, Adult Overview, Intake/Output, MAR, and Recent Results.     
[facility] - Progress Note    Admit Date: 7/3/2025  Attending Cardiologist: Dr. Harvey, DO    Follow up SOB/ RHF    Assessment:     Patient Active Problem List    Diagnosis Date Noted    Acute hypoxic respiratory failure (HCC) 06/12/2025    Hypokalemia 05/20/2025    Acute on chronic respiratory failure with hypoxia (HCC) 05/17/2025    New onset atrial fibrillation (HCC) 02/16/2025    Pulmonary hypertension (HCC) 02/10/2025    Acute on chronic heart failure (HCC) 02/10/2025    Acute on chronic hypoxic respiratory failure (HCC) 02/07/2025    History of deep venous thrombosis (DVT) of distal vein of right lower extremity 02/07/2025    Current use of long term anticoagulation 02/07/2025    Hypothyroidism 02/07/2025    Acute on chronic heart failure with mildly reduced ejection fraction (HFmrEF, 41-49%) (HCC) 01/29/2025    Malignant neoplasm of lung (HCC) 01/28/2025    Muscle weakness (generalized) 12/16/2024    Tobacco use disorder 12/12/2024    History of pulmonary embolism 12/07/2024    Pericardial effusion 12/06/2024    Debility 11/20/2024    Encounter for palliative care 11/20/2024    Palliative care encounter 11/15/2024    Goals of care, counseling/discussion 11/15/2024    Malignant neoplasm of upper lobe of right lung (HCC) 10/24/2024    Chronic systolic (congestive) heart failure 09/19/2024    Acute exacerbation of chronic obstructive pulmonary disease (COPD) (HCC) 06/14/2024    Congestive heart failure (HCC) 06/06/2024    Thrombocytosis 05/21/2024    Dyspnea 05/18/2024    Tobacco dependence due to cigarettes 04/22/2024    Malignant neoplastic disease (HCC) 04/21/2024    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) 04/10/2024    Protein calorie malnutrition 04/10/2024    Non-small cell lung cancer (HCC) 04/05/2018    Recurrent major depressive disorder 07/01/2016    Primary hypertension 04/06/2016     Cardiac risk stratification for tunneled central venous catheter placement 
[facility]  Progress Note    Patient: Rina Prajapati Age: 64 y.o. : 1960 MR#: [id number] SSN: [id number]  Date/Time: 2025    Chief Complaint   Patient presents with    Shortness of Breath        Subjective:   Subjective   No acute events overnight, no new concerns or complaints, vitals stable.    Assessment/Plan:   Acute on chronic hypoxic respiratory failure  Non-small cell lung cancer  Chronic systolic heart failure with possible exacerbation  Prior PE on anticoagulation chronically  Hypertension  Hypothyroidism  Pulmonary hypertension  Atrial fibrillation, paroxysmal  Secondary hypercoagulable state due to A-fib and cancer diagnosis  Small to medium pericardial effusion  Severe COPD likely exacerbation  SVC syndrome    Plan  Interval worsening hypoxia.  Patient now on high flow nasal cannula and overall middle-of-the-road settings.  Seems to go up and down depending on the time depending on the amount of exercise or movement that the patient is doing amongst other things.  Pulmonology on board, continuing IV steroids, bronchodilators.    Patient has spoken to IR and at this point has declined transhepatic catheter.  Unfortunately do not see a good clear way that we could potentially give patient chemotherapy outside of this however patient states that she spoke with her hematology oncologist and they said that patient had overall stable lung cancer that did not require a catheter.  Uncertain if this is true or not however will ultimately defer to patient's decision-making on this.  Will continue to treat her breathing.    Disposition: Expected location: Home     Expected discharge date: 7/10/2025      Case discussed with:  [x]Patient  []Family  [x]Nursing  [x]Case Management  DVT Prophylaxis:  []Lovenox  []Hep SQ  [x]SCDs  []Coumadin   [x]On Heparin gtt   [] DOAC    Objective:   Objective:  General Appearance:  In no acute distress, not in pain, 
[facility]  Progress Note    Patient: Rina Prajapati Age: 64 y.o. : 1960 MR#: [id number] SSN: [id number]  Date/Time: 2025    Chief Complaint   Patient presents with    Shortness of Breath        Subjective:   Subjective   No acute events overnight, no new concerns or complaints, vitals stable.    Assessment/Plan:   Acute on chronic hypoxic respiratory failure  Non-small cell lung cancer  Chronic systolic heart failure with possible exacerbation  Prior PE on anticoagulation chronically  Hypertension  Hypothyroidism  Pulmonary hypertension  Atrial fibrillation, paroxysmal  Secondary hypercoagulable state due to A-fib and cancer diagnosis  Small to medium pericardial effusion  Severe COPD likely exacerbation  SVC syndrome    Plan  Seemingly better today, seems to be doing all right on 5 L nasal cannula.  This is her home oxygen dose.  Assuming that she continues to do well over the next day, may be able to discharge back home.  Will continue to treat and follow with pulmonology, appreciate their assistance as always    Disposition: Expected location: Home     Expected discharge date: 7/10/2025      Case discussed with:  [x]Patient  []Family  [x]Nursing  [x]Case Management  DVT Prophylaxis:  []Lovenox  []Hep SQ  [x]SCDs  []Coumadin   [x]On Heparin gtt   [] DOAC    Objective:   Objective:  General Appearance:  In no acute distress, not in pain, uncomfortable and ill-appearing.    Vital signs: (most recent): Blood pressure 99/66, pulse 75, temperature 97.5 °F (36.4 °C), temperature source Axillary, resp. rate 23, height 1.626 m (5' 4\"), weight 57.6 kg (127 lb), SpO2 99%, not currently breastfeeding.  No fever.    Output: Producing urine.    HEENT: Normal HEENT exam.    Lungs:  Normal effort and tachypnea.  There are rales, decreased breath sounds, wheezes and rhonchi.    Heart: Tachycardia.  Irregular rhythm.  S1 normal and S2 normal.  Positive for murmur.  No 
[facility]  Pulmonary, Critical Care, and Sleep Medicine    Name: Rina Prajapati MRN: [id number]   : 1960 Hospital: [facility]   Date: 2025        IMPRESSION:   Acute on chronic hypoxic respiratory failure: multifactorial; AECOPD vs CHF vs NSCLC.  COPD, severe: in acute exacerbation. GOLD 4; risk category E.  Abnormal CT chest: CT notable for bilateral opacities with volume loss in the right lung along with bilateral fibrotic changes.  Pericardial effusion: Mild per CT; mild-mod on TTE and without cardiac temponade.   Combined systolic & diastolic HF: Likely in acute exacerbation.  Pulmonary hypertension: Likely WHO group 2 and 3 disease.  NSCLC, of VINCE, stage IV: currently not on therapy - failed Taxotere & Gemzar. Onc - Dr. Li.  SVC syndrome: due to above.  Lactic acidosis- resolved  H/o PE ()/DVT (): on Eliquis at home.  H/o Tobacco abuse     Patient Active Problem List   Diagnosis    Non-small cell lung cancer (HCC)    Recurrent major depressive disorder    Primary hypertension    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)    Protein calorie malnutrition    Dyspnea    Thrombocytosis    Acute exacerbation of chronic obstructive pulmonary disease (COPD) (HCC)    Chronic systolic (congestive) heart failure    Malignant neoplasm of upper lobe of right lung (HCC)    Palliative care encounter    Goals of care, counseling/discussion    Debility    Encounter for palliative care    Pericardial effusion    History of pulmonary embolism    Tobacco use disorder    Acute on chronic heart failure with mildly reduced ejection fraction (HFmrEF, 41-49%) (HCC)    Acute on chronic hypoxic respiratory failure (HCC)    History of deep venous thrombosis (DVT) of distal vein of right lower extremity    Current use of long term anticoagulation    Hypothyroidism    Pulmonary hypertension (HCC)    Acute on chronic heart failure (HCC)    PAF (paroxysmal 
[facility]  Pulmonary, Critical Care, and Sleep Medicine    Name: Rina Prajapati MRN: [id number]   : 1960 Hospital: [facility]   Date: 2025        IMPRESSION:   Acute on chronic hypoxic respiratory failure: multifactorial; AECOPD vs CHF vs NSCLC.  COPD, severe: in acute exacerbation. GOLD 4; risk category E.  Abnormal CT chest: CT notable for bilateral opacities with volume loss in the right lung along with bilateral fibrotic changes.  Pericardial effusion: Mild per CT; mild-mod on TTE and without cardiac temponade.   Combined systolic & diastolic HF: Likely in acute exacerbation.  Pulmonary hypertension: Likely WHO group 2 and 3 disease.  NSCLC, of VINCE, stage IV: currently not on therapy - failed Taxotere & Gemzar. Onc - Dr. Li.  SVC syndrome: due to above.  Lactic acidosis- resolved  H/o PE ()/DVT (): on Eliquis at home.  H/o Tobacco abuse     Patient Active Problem List   Diagnosis    Non-small cell lung cancer (HCC)    Recurrent major depressive disorder    Primary hypertension    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)    Protein calorie malnutrition    Dyspnea    Thrombocytosis    Acute exacerbation of chronic obstructive pulmonary disease (COPD) (HCC)    Chronic systolic (congestive) heart failure    Malignant neoplasm of upper lobe of right lung (HCC)    Palliative care encounter    Goals of care, counseling/discussion    Debility    Encounter for palliative care    Pericardial effusion    History of pulmonary embolism    Tobacco use disorder    Acute on chronic heart failure with mildly reduced ejection fraction (HFmrEF, 41-49%) (HCC)    Acute on chronic hypoxic respiratory failure (HCC)    History of deep venous thrombosis (DVT) of distal vein of right lower extremity    Current use of long term anticoagulation    Hypothyroidism    Pulmonary hypertension (HCC)    Acute on chronic heart failure (HCC)    PAF (paroxysmal 
[facility]  Pulmonary, Critical Care, and Sleep Medicine    Name: Rina Prajapati MRN: [id number]   : 1960 Hospital: [facility]   Date: 2025        IMPRESSION:   Acute on chronic hypoxic respiratory failure: multifactorial; AECOPD vs CHF vs NSCLC.  COPD, severe: in acute exacerbation. GOLD 4; risk category E.  Abnormal CT chest: CT notable for bilateral opacities with volume loss in the right lung along with bilateral fibrotic changes.  Pericardial effusion: Mild per CT; mild-mod on TTE and without cardiac temponade.   Combined systolic & diastolic HF: Likely in acute exacerbation.  Pulmonary hypertension: Likely WHO group 2 and 3 disease.  NSCLC, of VINCE, stage IV: currently not on therapy - failed Taxotere & Gemzar. Onc - Dr. Li.  SVC syndrome: due to above.  Lactic acidosis- resolved  H/o PE ()/DVT (): on Eliquis at home.  H/o Tobacco abuse     Patient Active Problem List   Diagnosis    Non-small cell lung cancer (HCC)    Recurrent major depressive disorder    Primary hypertension    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)    Protein calorie malnutrition    Dyspnea    Thrombocytosis    Acute exacerbation of chronic obstructive pulmonary disease (COPD) (HCC)    Chronic systolic (congestive) heart failure    Malignant neoplasm of upper lobe of right lung (HCC)    Palliative care encounter    Goals of care, counseling/discussion    Debility    Encounter for palliative care    Pericardial effusion    History of pulmonary embolism    Tobacco use disorder    Acute on chronic heart failure with mildly reduced ejection fraction (HFmrEF, 41-49%) (HCC)    Acute on chronic hypoxic respiratory failure (HCC)    History of deep venous thrombosis (DVT) of distal vein of right lower extremity    Current use of long term anticoagulation    Hypothyroidism    Pulmonary hypertension (HCC)    Acute on chronic heart failure (HCC)    New onset atrial 
[facility]  Pulmonary, Critical Care, and Sleep Medicine    Name: Rina Prajapati MRN: [id number]   : 1960 Hospital: [facility]   Date: 2025        IMPRESSION:   Acute on chronic hypoxic respiratory failure: multifactorial; AECOPD vs CHF vs NSCLC.  COPD, severe: in acute exacerbation. GOLD 4; risk category E.  Abnormal CT chest: CT notable for bilateral opacities with volume loss in the right lung along with bilateral fibrotic changes.  Pericardial effusion: Mild per CT.  Combined systolic & diastolic HF: Likely in acute exacerbation.  Pulmonary hypertension: Likely WHO group 2 and 3 disease.  NSCLC, of VINCE, stage IV: currently not on therapy - failed Taxotere & Gemzar. Onc - Dr. Li.  SVC syndrome: due to above   Hypokalemia  Lactic acidosis  H/o PE ()/DVT (): on Eliquis at home.  H/o Tobacco abuse     Patient Active Problem List   Diagnosis    Non-small cell lung cancer (HCC)    Recurrent major depressive disorder    Primary hypertension    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)    Protein calorie malnutrition    Dyspnea    Thrombocytosis    Acute exacerbation of chronic obstructive pulmonary disease (COPD) (HCC)    Chronic systolic (congestive) heart failure    Malignant neoplasm of upper lobe of right lung (HCC)    Palliative care encounter    Goals of care, counseling/discussion    Debility    Encounter for palliative care    Pericardial effusion    History of pulmonary embolism    Tobacco use disorder    Acute on chronic heart failure with mildly reduced ejection fraction (HFmrEF, 41-49%) (HCC)    Acute on chronic hypoxic respiratory failure (HCC)    History of deep venous thrombosis (DVT) of distal vein of right lower extremity    Current use of long term anticoagulation    Hypothyroidism    Pulmonary hypertension (HCC)    Acute on chronic heart failure (HCC)    New onset atrial fibrillation (HCC)    Acute on chronic 
[facility] Hospitalist Group  Progress Note    Patient: Rina Prajapati Age: 64 y.o. : 1960 MR#: [id number] SSN: [id number]  Date/Time: 2025    Chief Complaint   Patient presents with    Shortness of Breath        Subjective:   Subjective   Again seemingly better on my visit.  Resting comfortably.  Of note, patient did receive as written in my note an IV push dose of Lasix given increased respiratory issues yesterday.      Assessment/Plan:   Acute on chronic hypoxic respiratory failure  Non-small cell lung cancer  Chronic systolic heart failure with possible exacerbation  Prior PE on anticoagulation chronically  Hypertension  Hypothyroidism  Pulmonary hypertension  Atrial fibrillation, paroxysmal  Secondary hypercoagulable state due to A-fib and cancer diagnosis  Small to medium pericardial effusion  Severe COPD likely exacerbation  SVC syndrome    Plan  Overall complex patient.  Has numerous issues which could affect her respiratory failure including lung cancer, COPD, CHF, pulmonary hypertension.  Seems improved with IV push dose Lasix given yesterday.  Will defer to cardiology unless any acute changes occur.    Have consulted pulmonology given history of severe pulmonary logic disease including COPD and cancer.  Appreciate their assistance    Cardiology already on board given chronic right heart failure tricuspid regurg pulmonary hypertension and likely acute component to this heart failure.  Appreciate their assistance as well.    Supportive oxygen by high flow nasal cannula versus BiPAP as needed to support and oxygen saturation of 88 to 92%.  do not hyperoxygenated.  At home maintains 4 to 5 L via nasal cannula.    Notably patient was trying to have port/line placed for chemotherapy and has been having attempts of this for a number of months without success due to respiratory status.  If patient remains in hospital we will talk to interventional radiology to see if she 
[facility] Hospital[facility]  Progress Note    Patient: Rina Prajapati Age: 64 y.o. : 1960 MR#: [id number] SSN: [id number]  Date/Time: 2025    Chief Complaint   Patient presents with    Shortness of Breath        Subjective:   Subjective   Again seemingly better on my visit.  Resting comfortably.  Of note, patient did receive as written in my note an IV push dose of Lasix given increased respiratory issues yesterday.      Assessment/Plan:   Acute on chronic hypoxic respiratory failure  Non-small cell lung cancer  Chronic systolic heart failure with possible exacerbation  Prior PE on anticoagulation chronically  Hypertension  Hypothyroidism  Pulmonary hypertension  Atrial fibrillation, paroxysmal  Secondary hypercoagulable state due to A-fib and cancer diagnosis  Small to medium pericardial effusion  Severe COPD likely exacerbation  SVC syndrome    Plan  Overall complex patient.  Has numerous issues which could affect her respiratory failure including lung cancer, COPD, CHF, pulmonary hypertension.  Seems overall somewhat stable, somewhat difficult to assess given patient respiratory seems overall quite labile.  Overall continuing to pull fluid gently with appropriate breathing treatments when needed.    Have consulted pulmonology given history of severe pulmonary logic disease including COPD and cancer.  Appreciate their assistance    Cardiology already on board given chronic right heart failure tricuspid regurg pulmonary hypertension and likely acute component to this heart failure.  Appreciate their assistance as well.    Supportive oxygen by high flow nasal cannula versus BiPAP as needed to support and oxygen saturation of 88 to 92%.  do not hyperoxygenated.  At home maintains 4 to 5 L via nasal cannula.    IR spoken to, patient will need an Eliquis washout and so have changed patient over to heparin drip in anticipation of hopeful procedure later this week.  Patient 
Continues with right sided rales.   
Discharge education completed with patient.  Patient verbalized understanding of education.  Discharge papers given to patient.  
Initiate Vapotherm for increased WOB.   07/07/25 1826   Oxygen Therapy/Pulse Ox   O2 Therapy Oxygen humidified   O2 Device Heated high flow cannula   O2 Flow Rate (L/min) 25 L/min   FiO2  50 %   Pulse 92   Respirations 16   SpO2 96 %       
Patient asked twice if she would like to put in CPAP, she refused both times and said she was ok. No respiratory distress, Sp02 97%.  
Patient called out and states she can not breath. Patient oxygen saturations are in low 80's and HR in 120-130's. Patient assessed and high flow increased to 8 liters, oxygen level went up to 90's and started decreasing again to the 80's. Patient currently sitting up on side of the bed. Respiratory called to assess patient. Patient sounds very wet without auscultation. Patient placed back on bi-pap.  MD to be notified. Will continue to monitor. Call bell within reach.  
Patient has rales upper Right side. Diminished with wheezes on the left side.   
Patient is starting to have audible wheezing. States she is feeling fine.  
Patient noted to have gotten very winded with development of rhonchi within a few minutes while meeting her hygienic needs this morning. Placed in high fowlers position and RN notified respiratory therapist who placed gave a PRN breathing treatment and placed the patient on BiPAP. Symptoms rapidly improved. SpO2 returned to high 90's.   
Patient was here for a procedure when she became diaphoretic and extremely SOB with sats in low 70's. She rrived on NRB and was placed on NIV on a CPAP of 12 as she was unable to tolerate higher pressure and kept trying to remove the mask. She is now resting and states she feels much better. Nurse and Dr. Wong are aware.       07/03/25 1108   NIV Type   $NIV $Daily Charge   Ventilator ID [id number]   Suction Setup and Functional Yes   NIV Started/Stopped On   Equipment Type v60   Mode CPAP   Mask Type Full face mask   Mask Size Medium   Bonnet size Medium   Breath Sounds   Respiratory Pattern Tachypneic   Breath Sounds Bilateral Crackles    Settings/Measurements   PIP Observed 13 cm H20   CPAP/EPAP 12 cmH2O   Vt (Measured) 333 mL   FiO2  50 %   Minute Volume (L/min) 10.6 Liters   Mask Leak (lpm) 57 lpm   Patient's Home Machine No   Alarm Settings   Alarms On Y   Low Pressure (cmH2O) 8 cmH2O   High Pressure (cmH2O) 40 cmH2O   Delay Alarm 20 sec(s)   Apnea (secs) 20 secs   RR Low (bpm) 8   RR High (bpm) 40 br/min       
Placed on nasal cannula. Patient placed on 5 liters as she reports she wears this at home. Pt tolerating well with no signs of distress   07/09/25 0758   Oxygen Therapy/Pulse Ox   O2 Device Nasal cannula   O2 Flow Rate (L/min) 5 L/min   FiO2  40 %   Pulse 78   Respirations 16   SpO2 100 %       
Previous left forearm piv site bleeding, dressing  changed, will continue to monitor.  2235: dressing remain intact, dressing with minimal bleeding as it appears during last dressing change, will reassess and treat.  
Rales are noted on the upper right side on expiration, clear but diminished on left side.  Patient remains of bi-pap  
Spiritual Health History and Assessment/Progress Note  [facility]    Spiritual/Emotional Needs,  ,  ,      Name: Rina Prajapati MRN: [id number]    Age: 64 y.o.     Sex: female   Language: English   Judaism: Non-Scientology   Acute hypoxic respiratory failure (HCC)     Date: 7/6/2025            Total Time Calculated: 7 min              Spiritual Assessment began in [address] TELEMETRY        Referral/Consult From: Multi-disciplinary team   Encounter Overview/Reason: Spiritual/Emotional Needs  Service Provided For: Patient    Tara, Belief, Meaning:   Patient has beliefs or practices that help with coping during difficult times  Family/Friends No family/friends present      Importance and Influence:  Patient has spiritual/personal beliefs that influence decisions regarding their health  Family/Friends No family/friends present    Community:  Patient is connected with a spiritual community  Family/Friends No family/friends present    Assessment and Plan of Care:     Patient Interventions include: Facilitated expression of thoughts and feelings  Family/Friends Interventions include: No family/friends present    Patient Plan of Care: Spiritual Care available upon further referral  Family/Friends Plan of Care: No family/friends present    Electronically signed by IAN Ann on 7/6/2025 at 2:19 PM    
pleurisy, no cough, no  hemoptysis   Gastrointestinal:  No nausea or vomiting; No diarrhea, no dyspepsis   Genitourinary:  No dysuria; No increase in urinary frequency   Musculoskeletal:  No weakness or muscle pains   Endo:  No polyuria; no symptoms of thyroid dysfunction   Heme:  No bruising; No bleeding, no adenopathy   Neurological:  No Seizures; No focal weakness or sensory changes   Psychiatric:  No mood changes; no suicidal ideation       Physical Exam:  Chronically ill, high flow oxygen  Pink conj, anicteric  Dyspneic at rest  Able to speak full sentences  Edematous right breast chronic  Tr bipedal edema    Labs:  Basic Metabolic Profile   Recent Labs     07/06/25  0413 07/07/25  0059 07/08/25  0115    139 138   CO2 29 28 29   BUN 19 23 24*   GLUCOSE 154* 159* 156*   CALCIUM 9.6 9.1 8.8        CBC w/Diff    Recent Labs     07/07/25  0059 07/07/25  1435 07/08/25  0115   WBC 9.0 10.2 11.3   HCT 37.1 38.2 37.4    No results for input(s): \"BANDS\", \"SEGS\" in the last 72 hours.    Invalid input(s): \"LYMPHOCYTES\"     Hepatic Panel   Hepatic Function  Lab Results   Component Value Date    ALBUMIN 3.2 (L) 07/04/2025    ALKPHOS 72 07/04/2025         Coagulation   Recent Labs     07/07/25  1435 07/07/25  1855 07/08/25  0115   INR  --   --  1.1   APTT 25.2 86.0* 40.5*          Current medications reviewed    Jameel Li MD   [facility]  Office [phone]          
recanalization with anesthesia  - Acute on chronic hypoxic respiratory failure requiring bibap. Wears 4-5L via NC at baseline  - Possible acute on chronic HFmrEF. EF 45-50% on echo 5/2025, which is unchanged from echo 2/2025. Nt pro bnp 6K which is not far from her baseline. CXR with  Persistent patchy, streaky and hazy opacities in the right lung c/w her pulmonary disease and a possible small left pleural effusion. CTA chest pending. Received 2 doses of IV lasix 40mg  - Small to medium sized  pericardial effusion on echocardiogram 2/2025. No indication of cardiac tamponade. Pericardial effusion has been on noted on echos to be in the small to moderate range dating back to 11/2024.   - Severe COPD with possible exacerbation.    - NSCLC. Followed by [facility]  - Pulmonary hypertension WHO Grp 2/3 with dilated RV RVSP 45mmHg on 11/14/24  - Minimally elevated troponin likely demand ischemia in the setting of hypoxia.  - pAF.  DOAC with Eliquis., Eliquis has been on hold since 7/1/25 for IR procedure. Rate control with Toprol-XL  - HTN   - SVC syndrome  - History of PE and DVT.  On chronic anticoagulation with Eliquis twice daily.  - History of tobacco use.  Quit smoking 7 months ago     Primary cardiologist: Dr. Obregon      Plan:     She has chronic right heart failure, with tricuspid regurgitation and elevated right sided pressures.  She says was taking both lasix and metolazone at home  Will restart just lasix first and assess clinical improvement- please monitor electrolytes    Subjective:     No new complaints. Currently getting a breathing treatment.    Objective:      Patient Vitals for the past 8 hrs:   Temp Pulse Resp BP SpO2   07/05/25 1240 -- (!) 137 25 -- 99 %   07/05/25 1156 97.7 °F (36.5 °C) (!) 103 18 115/63 97 %   07/05/25 0755 -- 93 19 -- 98 %   07/05/25 0754 97.8 °F (36.6 °C) 93 20 (!) 140/99 100 %   07/05/25 0704 -- 93 -- -- --         Patient Vitals for the past 96 hrs:   Weight   07/03/25 1107 57.6 kg 
420 K/uL    MPV 10.5 9.2 - 11.8 FL    Nucleated RBCs 0.0 0  WBC    nRBC 0.00 0.00 - 0.01 K/uL    Neutrophils % 82.8 (H) 40.0 - 73.0 %    Lymphocytes % 8.6 (L) 21.0 - 52.0 %    Monocytes % 7.9 3.0 - 10.0 %    Eosinophils % 0.2 0.0 - 5.0 %    Basophils % 0.2 0.0 - 2.0 %    Immature Granulocytes % 0.3 0.0 - 0.5 %    Neutrophils Absolute 5.46 1.80 - 8.00 K/UL    Lymphocytes Absolute 0.57 (L) 0.90 - 3.60 K/UL    Monocytes Absolute 0.52 0.05 - 1.20 K/UL    Eosinophils Absolute 0.01 0.00 - 0.40 K/UL    Basophils Absolute 0.01 0.00 - 0.10 K/UL    Immature Granulocytes Absolute 0.02 0.00 - 0.04 K/UL    Differential Type AUTOMATED     Basic Metabolic Panel w/ Reflex to MG    Collection Time: 07/05/25  6:04 AM   Result Value Ref Range    Sodium 140 136 - 145 mmol/L    Potassium 3.4 (L) 3.5 - 5.5 mmol/L    Chloride 99 98 - 107 mmol/L    CO2 30 21 - 32 mmol/L    Anion Gap 12 3.0 - 18.0 mmol/L    Glucose 108 74 - 108 mg/dL    BUN 14 6 - 23 MG/DL    Creatinine 0.62 0.6 - 1.3 MG/DL    BUN/Creatinine Ratio 23 (H) 12 - 20      Est, Glom Filt Rate >90 >60 ml/min/1.73m2    Calcium 9.4 8.5 - 10.1 MG/DL   Lactic Acid    Collection Time: 07/05/25  6:04 AM   Result Value Ref Range    Lactic Acid 1.7 0.4 - 2.0 mmol/L   Magnesium    Collection Time: 07/05/25  6:04 AM   Result Value Ref Range    Magnesium 1.7 1.6 - 2.6 mg/dL          Signed By: Clayton Clark MD     July 5, 2025 11:14 AM     
bicarb-citric acid (EFFER-K) effervescent tablet 40 mEq  40 mEq Oral PRN    Or    potassium chloride 10 mEq/100 mL IVPB (Peripheral Line)  10 mEq IntraVENous PRN    magnesium sulfate 2000 mg in 50 mL IVPB premix  2,000 mg IntraVENous PRN    ondansetron (ZOFRAN-ODT) disintegrating tablet 4 mg  4 mg Oral Q8H PRN    Or    ondansetron (ZOFRAN) injection 4 mg  4 mg IntraVENous Q6H PRN    polyethylene glycol (GLYCOLAX) packet 17 g  17 g Oral Daily PRN    acetaminophen (TYLENOL) tablet 650 mg  650 mg Oral Q6H PRN    Or    acetaminophen (TYLENOL) suppository 650 mg  650 mg Rectal Q6H PRN    [Held by provider] apixaban (ELIQUIS) tablet 5 mg  5 mg Oral BID    levothyroxine (SYNTHROID) tablet 88 mcg  88 mcg Oral Daily    sertraline (ZOLOFT) tablet 50 mg  50 mg Oral Daily    ipratropium 0.5 mg-albuterol 2.5 mg (DUONEB) nebulizer solution 1 Dose  1 Dose Inhalation 4x Daily RT    guaiFENesin (MUCINEX) extended release tablet 600 mg  600 mg Oral BID         Intake/Output Summary (Last 24 hours) at 7/7/2025 1513  Last data filed at 7/7/2025 0600  Gross per 24 hour   Intake 360 ml   Output 750 ml   Net -390 ml       Physical Exam:  General:  alert, appears stated age, and cooperative  Neck:  no JVD  Lungs:  poor air movement, bilateral wheezes  Heart:  regular rate and rhythm, S1, S2 normal, no murmur, click, rub or gallop  Abdomen:  abdomen is soft without significant tenderness, masses, organomegaly or guarding  Extremities:  extremities normal, atraumatic, no cyanosis or edema    Visit Vitals  /86   Pulse 96   Temp 98 °F (36.7 °C) (Oral)   Resp 19   Ht 1.626 m (5' 4\")   Wt 57.6 kg (127 lb)   SpO2 99%   BMI 21.80 kg/m²       Data Review:     Labs: Results:       Chemistry Recent Labs     07/05/25  0604 07/06/25  0413 07/07/25  0059    138 139   K 3.4* 4.3 4.3   CL 99 98 99   CO2 30 29 28   BUN 14 19 23   MG 1.7  --   --       CBC w/Diff Recent Labs     07/05/25  0604 07/06/25  0413 07/07/25  0059   WBC 6.6 8.2 9.0 
  APTT 40.5* 127.9*       Lipid Panel No results found for: \"CHOL\", \"CHOLPOCT\", \"CHLST\", \"CHOLV\", \"187407\", \"HDL\", \"HDLC\", \"LDL\", \"LDLC\", \"VLDLC\", \"VLDL\"   BNP No results found for: \"BNP\", \"BNPPOC\"    No results found for: \"BNP\"   Liver Enzymes Lab Results   Component Value Date    ALT 11 07/04/2025    AST 20 07/04/2025    ALKPHOS 72 07/04/2025    BILITOT 0.7 07/04/2025      Thyroid Studies Lab Results   Component Value Date/Time    TSH 0.84 02/08/2025 01:50 AM          Signed By: PANKAJ Warner     July 8, 2025       
  SpO2 95%   BMI 21.80 kg/m²       Data Review:     Labs: Results:       Chemistry Recent Labs     07/03/25  1016 07/03/25  1230 07/04/25  0348    141 142   K 3.9 3.6 3.9    103 102   CO2 19* 24 24   BUN 12 12 13   MG 1.8  --   --    GLOB 3.5 3.3 4.0      CBC w/Diff Recent Labs     07/03/25  1016 07/03/25  1230 07/04/25  0348   WBC 6.3 7.4 6.3   RBC 5.34* 4.85 5.27   HGB 12.0 10.8* 11.6*   HCT 41.9 37.3 41.2    239 250      Cardiac Enzymes Lab Results   Component Value Date/Time    TROPHS 21 02/08/2025 01:50 AM      Coagulation No results for input(s): \"INR\", \"APTT\" in the last 72 hours.    Lipid Panel No results found for: \"CHOL\", \"CHOLPOCT\", \"CHLST\", \"CHOLV\", \"631327\", \"HDL\", \"HDLC\", \"LDL\", \"LDLC\", \"VLDLC\", \"VLDL\"   BNP No results found for: \"BNP\", \"BNPPOC\"    No results found for: \"BNP\"   Liver Enzymes Lab Results   Component Value Date    ALT 11 07/04/2025    AST 20 07/04/2025    ALKPHOS 72 07/04/2025    BILITOT 0.7 07/04/2025      Thyroid Studies Lab Results   Component Value Date/Time    TSH 0.84 02/08/2025 01:50 AM            05/17/25    ECHO (TTE) LIMITED (PRN CONTRAST/BUBBLE/STRAIN/3D) 05/20/2025  4:06 PM (Final)    Interpretation Summary    Image quality is adequate. Technically difficult study due to patient's body habitus.    Left Ventricle: Mildly reduced left ventricular systolic function with a visually estimated EF of 45 - 50%. Left ventricle is smaller than normal. Normal wall thickness. Normal wall motion.    Right Ventricle: Right ventricle is dilated. Reduced systolic function. TAPSE is abnormal.    Aortic Valve: No regurgitation.    Mitral Valve: Trace regurgitation.    Tricuspid Valve: Moderate regurgitation. RVSP is 73 mmHg.    Pulmonic Valve: No regurgitation.    Pericardium: Moderate (1-2 cm) circumferential pericardial effusion present. No indication of cardiac tamponade.    Signed by: Shon Marr MD on 5/20/2025  4:06 PM    Signed By: Loreta Harvey DO     
upper limits of normal. Reduced systolic function. TAPSE is abnormal. TDI systolic excursion is abnormal. TAPSE is 1.3 cm. RV Free Wall Peak S' is 6.5 cm/s.    Right Atrium: Right atrium is mildly dilated.    Tricuspid Valve: Mild to moderate regurgitation. Unable to assess RVSP due to patient on bipap machine. Pulmonary hypertension is present.  TR Peak Gradient is 58 mmHg.    Signed by: Aamir Stoner MD on 7/7/2025  1:14 PM      Imaging:  [x]I have personally reviewed the patient’s radiographs  []Radiographs reviewed with radiologist   []No change from prior, tubes and lines in adequate position  []Improved   []Worsening    High complexity decision making was performed during the evaluation of this patient at high risk for decompensation with multiple organ involvement     Above mentioned total time spent on reviewing the case/medical record/data/notes/EMR/patient examination/documentation/coordinating care with nurse/consultants, exclusive of procedures with complex decision making performed and > 50% time spent in face to face evaluation.     MARIAN time: 10 mins    Srinath Mahlotra, PhD., PA-C.  3:18 PM  Pulmonary, Critical Care Medicine  [facility]

## 2025-07-11 ENCOUNTER — CARE COORDINATION (OUTPATIENT)
Facility: CLINIC | Age: 65
End: 2025-07-11

## 2025-07-11 NOTE — CARE COORDINATION
Care Transitions Note    Initial Call - Call within 2 business days of discharge: Yes    Attempted to reach patient for transitions of care follow up. Unable to reach patient.    Outreach Attempts:   Unable to leave message.     Patient: Rina Prajapati    Patient : 1960   MRN: [id number]        Reason for Admission:   Please see below, in this note, Last Discharge Facility        Discharge Date: 7/10/25  RURS: Readmission Risk Score: 26.3    Last Discharge Facility       Date Complaint Diagnosis Description Type Department Provider    7/3/25 Shortness of Breath Acute respiratory distress ... ED to Hosp-Admission (Discharged) (ADMITTED) [id number] Babin MD; Evonne Wong..            Was this an external facility discharge? No    Follow Up Appointment:   Patient has hospital follow up appointment scheduled within 14 days of discharge.   Request for PCP Transitions of Care appointment submitted via secure e-mail.    Future Appointments         Provider Specialty Dept Phone    2025 3:15 PM Adrienne Goldstein MD Family Medicine [phone]    2025 1:00 PM Trev Obregon MD Cardiology [phone]    2025 10:00 AM Cora Her MD Family Medicine [phone]    10/16/2025 10:20 AM Trev Obregon MD Cardiology [phone]    2026 10:40 AM Trev Obregon MD Cardiology [phone]            Plan for follow-up on next business day.      Karolina Shabazz RN

## 2025-07-14 ENCOUNTER — CARE COORDINATION (OUTPATIENT)
Facility: CLINIC | Age: 65
End: 2025-07-14

## 2025-07-14 DIAGNOSIS — J96.21 ACUTE ON CHRONIC HYPOXIC RESPIRATORY FAILURE (HCC): Primary | ICD-10-CM

## 2025-07-14 PROCEDURE — 1111F DSCHRG MED/CURRENT MED MERGE: CPT | Performed by: FAMILY MEDICINE

## 2025-07-14 NOTE — CARE COORDINATION
Care Transitions Note    Initial Call - Call within 2 business days of discharge: Yes    Patient Current Location:  [place]    Care Transition Nurse contacted the patient by telephone to perform post hospital discharge assessment, verified name and  as identifiers.  Provided introduction to self, and explanation of the Care Transition Nurse role.    Patient: Rina Prajapati    Patient : 1960   MRN: [id number]        Reason for Admission:   Please see below, in this note, Last Discharge Facility        Discharge Date: 7/10/25  RURS: Readmission Risk Score: 26.3      Last Discharge Facility       Date Complaint Diagnosis Description Type Department Provider    7/3/25 Shortness of Breath Acute respiratory distress ... ED to Hosp-Admission (Discharged) (ADMITTED) [id number] Babin MD; Roosevelt Wong...            Was this an external facility discharge? No    Additional needs identified to be addressed with provider   No needs identified             Method of communication with provider: none.    Patients top risk factors for readmission:   Frequent admissions     Interventions to address risk factors:   The Initial Care Transitions Outreach was completed with patient.   Patient was encouraged to review discharge instructions and to please follow up with medical providers for any concerns or questions.   Patient voiced understanding this information.      Care Summary Note:   Patient reports that she is feeling OK       Care Transition Nurse reviewed discharge instructions - follow up appointments, medical action plan and red flags with patient. The patient was given an opportunity to ask questions; No questions at this time . The patient verbalized understanding.   Were discharge instructions available to patient? Yes.   Reviewed appropriate site of care based on symptoms and resources available to patient including: PCP  Specialist  After hours contact number-Medical provider office phone number.

## 2025-07-22 ENCOUNTER — CARE COORDINATION (OUTPATIENT)
Facility: CLINIC | Age: 65
End: 2025-07-22

## 2025-07-22 NOTE — CARE COORDINATION
Care Transitions Note    Follow Up Call     Patient Current Location:  Home: [facility] [address] [facility] [address]    [facility] Care Coordinator contacted the patient by telephone. Verified name and  as identifiers.    Additional needs identified to be addressed with provider     No needs identified                 Method of communication with provider: none    Care Summary Note:   Spoke with patient.  Patient states she is doing okay.  Patient denies any sob or chest pain.  Patient states she needs refill for Metoprolol Succinate 25 mg and Furosemide 40 mg.  Patient unsure who fills her medications.  Reviewed patient's chart. Palliative has been doing medication refills. Call place to Palliative Care. Spoke with Mattie who will send refill request for both meds to NP. Medications will be sent to Elena. Patient notified.  Patient states she has a pulmonary doctor. She think it is a [facility] doctor. Patient declines assistance with scheduling an appointment.  Patient has no other questions or concerns.    Plan of care updates since last contact:  Review of patient management of conditions/medications: refills needed for Lasix and Toprol XL. Palliative Care notified and will refill medications       Advance Care Planning:   Does patient have an Advance Directive: reviewed and needs to be updated and patient declined education. Patient does not want paperwork at this time.    Medication Review:  Refills needed    Remote Patient Monitoring:  Offered patient enrollment in the Remote Patient Monitoring (RPM) program for in-home monitoring: Yes, but did not enroll at this time: patient decline.    Assessments:  No changes since last call    Follow Up Appointment:   BRENDA appointment cancelled due to patient canceled and declined assistance in rescheduling new appointment.  Future Appointments         Provider Specialty Dept Phone    2025 1:00 PM Trev Obregon MD Cardiology [phone]    2025 10:00 AM

## 2025-07-22 NOTE — CARE COORDINATION
Received call back from Cyndee with palliative care with questions about medications. Has patient's medications changed. No changes only new medication is prednisone.  Cyndee will call writer back if NP will not fill medications.    Liz Fong LPN

## 2025-07-28 ENCOUNTER — CARE COORDINATION (OUTPATIENT)
Facility: CLINIC | Age: 65
End: 2025-07-28

## 2025-07-28 NOTE — CARE COORDINATION
Care Transitions Note    Follow Up Call     Patient Current Location:  Home: [address] [place], [place] [address] [facility] [address]    [facility] Care Coordinator contacted the patient by telephone. Verified name and  as identifiers.    Additional needs identified to be addressed with provider   No needs identified                 Method of communication with provider: none.    Care Summary Note:   Spoke with patient.  Patient states she is doing good.  Patient states she received the refills.  Patient has no questions or concerns.    Advance Care Planning:   Does patient have an Advance Directive: reviewed during previous call, see note. .    Medication Review:  Medications changed since last call, reviewed today.     Assessments:  No changes since last call    Follow Up Appointment:   Reviewed upcoming appointment(s).  Future Appointments         Provider Specialty Dept Phone    2025 1:00 PM Trev Obregon MD Cardiology [phone]    2025 10:00 AM Cora Her MD Family Medicine [phone]    10/16/2025 10:20 AM Trev Obregon MD Cardiology [phone]    2026 10:40 AM Trev Obregon MD Cardiology [phone]            [facility] Care Coordinator provided contact information.  Plan for follow-up call in 6-10 days based on severity of symptoms and risk factors.  Plan for next call: assess for any needs or red flags     Liz Fong LPN

## 2025-08-04 ENCOUNTER — CARE COORDINATION (OUTPATIENT)
Facility: CLINIC | Age: 65
End: 2025-08-04

## 2025-08-11 ENCOUNTER — CARE COORDINATION (OUTPATIENT)
Facility: CLINIC | Age: 65
End: 2025-08-11

## 2025-08-13 DIAGNOSIS — J44.9 CHRONIC OBSTRUCTIVE PULMONARY DISEASE, UNSPECIFIED COPD TYPE (HCC): Primary | ICD-10-CM

## 2025-08-15 RX ORDER — IPRATROPIUM BROMIDE AND ALBUTEROL SULFATE 2.5; .5 MG/3ML; MG/3ML
1 SOLUTION RESPIRATORY (INHALATION) EVERY 4 HOURS
Qty: 360 ML | Refills: 1 | Status: SHIPPED | OUTPATIENT
Start: 2025-08-15 | End: 2025-08-20 | Stop reason: SDUPTHER

## 2025-08-20 RX ORDER — IPRATROPIUM BROMIDE AND ALBUTEROL SULFATE 2.5; .5 MG/3ML; MG/3ML
1 SOLUTION RESPIRATORY (INHALATION) EVERY 4 HOURS
Qty: 360 ML | Refills: 1 | Status: SHIPPED | OUTPATIENT
Start: 2025-08-20

## 2025-08-29 LAB
EKG ATRIAL RATE: 53 BPM
EKG DIAGNOSIS: NORMAL
EKG P AXIS: 84 DEGREES
EKG P-R INTERVAL: 136 MS
EKG Q-T INTERVAL: 384 MS
EKG QRS DURATION: 82 MS
EKG QTC CALCULATION (BAZETT): 360 MS
EKG R AXIS: 118 DEGREES
EKG T AXIS: 65 DEGREES
EKG VENTRICULAR RATE: 53 BPM
